# Patient Record
Sex: MALE | Race: WHITE | NOT HISPANIC OR LATINO | Employment: FULL TIME | URBAN - METROPOLITAN AREA
[De-identification: names, ages, dates, MRNs, and addresses within clinical notes are randomized per-mention and may not be internally consistent; named-entity substitution may affect disease eponyms.]

---

## 2017-01-11 RX ORDER — LEVOCETIRIZINE DIHYDROCHLORIDE 5 MG/1
5 TABLET, FILM COATED ORAL EVERY EVENING
Qty: 90 TABLET | Refills: 3 | Status: SHIPPED | OUTPATIENT
Start: 2017-01-11 | End: 2017-06-16 | Stop reason: SDUPTHER

## 2017-02-23 RX ORDER — MONTELUKAST SODIUM 10 MG/1
10 TABLET ORAL NIGHTLY
Qty: 30 TABLET | Refills: 3 | Status: SHIPPED | OUTPATIENT
Start: 2017-02-23 | End: 2022-10-31 | Stop reason: SDUPTHER

## 2017-03-10 DIAGNOSIS — Z79.899 ENCOUNTER FOR LONG-TERM (CURRENT) USE OF MEDICATIONS: Primary | ICD-10-CM

## 2017-03-10 DIAGNOSIS — Z13.1 SCREENING FOR DIABETES MELLITUS: ICD-10-CM

## 2017-03-11 LAB
ALBUMIN SERPL-MCNC: 4.3 G/DL (ref 3.5–5.2)
ALBUMIN/GLOB SERPL: 1.8 G/DL
ALP SERPL-CCNC: 71 U/L (ref 39–117)
ALT SERPL-CCNC: 16 U/L (ref 1–41)
AST SERPL-CCNC: 12 U/L (ref 1–40)
BILIRUB SERPL-MCNC: 0.4 MG/DL (ref 0.1–1.2)
BUN SERPL-MCNC: 16 MG/DL (ref 8–23)
BUN/CREAT SERPL: 13.7 (ref 7–25)
CALCIUM SERPL-MCNC: 9.3 MG/DL (ref 8.6–10.5)
CHLORIDE SERPL-SCNC: 98 MMOL/L (ref 98–107)
CO2 SERPL-SCNC: 26.1 MMOL/L (ref 22–29)
CREAT SERPL-MCNC: 1.17 MG/DL (ref 0.76–1.27)
GLOBULIN SER CALC-MCNC: 2.4 GM/DL
GLUCOSE SERPL-MCNC: 158 MG/DL (ref 65–99)
HBA1C MFR BLD: 6.45 % (ref 4.8–5.6)
POTASSIUM SERPL-SCNC: 4.1 MMOL/L (ref 3.5–5.2)
PROT SERPL-MCNC: 6.7 G/DL (ref 6–8.5)
SODIUM SERPL-SCNC: 137 MMOL/L (ref 136–145)

## 2017-03-15 LAB
CHOLEST SERPL-MCNC: 130 MG/DL (ref 0–200)
HDLC SERPL-MCNC: 35 MG/DL (ref 40–60)
LDLC SERPL CALC-MCNC: 62 MG/DL (ref 0–100)
LDLC/HDLC SERPL: 1.76 {RATIO}
TRIGL SERPL-MCNC: 167 MG/DL (ref 0–150)
VLDLC SERPL CALC-MCNC: 33.4 MG/DL (ref 5–40)
WRITTEN AUTHORIZATION: NORMAL

## 2017-05-23 RX ORDER — ATORVASTATIN CALCIUM 10 MG/1
10 TABLET, FILM COATED ORAL DAILY
Qty: 90 TABLET | Refills: 3 | Status: SHIPPED | OUTPATIENT
Start: 2017-05-23 | End: 2019-08-22 | Stop reason: SDUPTHER

## 2017-06-16 RX ORDER — CEPHALEXIN 500 MG/1
500 CAPSULE ORAL 3 TIMES DAILY
Qty: 30 CAPSULE | Refills: 1 | Status: SHIPPED | OUTPATIENT
Start: 2017-06-16

## 2017-06-16 RX ORDER — LEVOCETIRIZINE DIHYDROCHLORIDE 5 MG/1
5 TABLET, FILM COATED ORAL EVERY EVENING
Qty: 90 TABLET | Refills: 3 | Status: SHIPPED | OUTPATIENT
Start: 2017-06-16

## 2017-08-28 RX ORDER — OMEPRAZOLE 40 MG/1
40 CAPSULE, DELAYED RELEASE ORAL DAILY
Qty: 90 CAPSULE | Refills: 1 | Status: SHIPPED | OUTPATIENT
Start: 2017-08-28 | End: 2022-06-13 | Stop reason: SDUPTHER

## 2017-09-12 DIAGNOSIS — Z79.899 ENCOUNTER FOR LONG-TERM (CURRENT) USE OF MEDICATIONS: Primary | ICD-10-CM

## 2017-09-12 DIAGNOSIS — Z79.899 ENCOUNTER FOR LONG-TERM (CURRENT) USE OF MEDICATIONS: ICD-10-CM

## 2017-09-12 DIAGNOSIS — Z13.9 SCREENING: ICD-10-CM

## 2017-09-12 DIAGNOSIS — N40.0 BENIGN PROSTATIC HYPERPLASIA, PRESENCE OF LOWER URINARY TRACT SYMPTOMS UNSPECIFIED, UNSPECIFIED MORPHOLOGY: Primary | ICD-10-CM

## 2017-09-12 DIAGNOSIS — Z00.00 PREVENTATIVE HEALTH CARE: ICD-10-CM

## 2017-09-13 LAB
Lab: NORMAL
Lab: NORMAL

## 2017-09-14 LAB
ALBUMIN SERPL-MCNC: 4.5 G/DL (ref 3.5–5.2)
ALBUMIN/GLOB SERPL: 2 G/DL
ALP SERPL-CCNC: 72 U/L (ref 39–117)
ALT SERPL-CCNC: 23 U/L (ref 1–41)
AST SERPL-CCNC: 17 U/L (ref 1–40)
BASOPHILS # BLD AUTO: 0.05 10*3/MM3 (ref 0–0.2)
BASOPHILS NFR BLD AUTO: 0.6 % (ref 0–1.5)
BILIRUB SERPL-MCNC: 0.5 MG/DL (ref 0.1–1.2)
BUN SERPL-MCNC: 14 MG/DL (ref 8–23)
BUN/CREAT SERPL: 13.2 (ref 7–25)
CALCIUM SERPL-MCNC: 8.9 MG/DL (ref 8.6–10.5)
CHLORIDE SERPL-SCNC: 102 MMOL/L (ref 98–107)
CHOLEST SERPL-MCNC: 118 MG/DL (ref 0–200)
CO2 SERPL-SCNC: 25 MMOL/L (ref 22–29)
CREAT SERPL-MCNC: 1.06 MG/DL (ref 0.76–1.27)
EOSINOPHIL # BLD AUTO: 0.28 10*3/MM3 (ref 0–0.7)
EOSINOPHIL NFR BLD AUTO: 3.6 % (ref 0.3–6.2)
ERYTHROCYTE [DISTWIDTH] IN BLOOD BY AUTOMATED COUNT: 13.8 % (ref 11.5–14.5)
FT4I SERPL CALC-MCNC: 1.9 (ref 1.2–4.9)
GLOBULIN SER CALC-MCNC: 2.2 GM/DL
GLUCOSE SERPL-MCNC: 146 MG/DL (ref 65–99)
HBA1C MFR BLD: 7.6 % (ref 4.8–5.6)
HCT VFR BLD AUTO: 40.5 % (ref 40.4–52.2)
HDLC SERPL-MCNC: 35 MG/DL (ref 40–60)
HGB BLD-MCNC: 13.5 G/DL (ref 13.7–17.6)
IMM GRANULOCYTES # BLD: 0.02 10*3/MM3 (ref 0–0.03)
IMM GRANULOCYTES NFR BLD: 0.3 % (ref 0–0.5)
LDLC SERPL CALC-MCNC: 46 MG/DL (ref 0–100)
LDLC/HDLC SERPL: 1.32 {RATIO}
LYMPHOCYTES # BLD AUTO: 2.08 10*3/MM3 (ref 0.9–4.8)
LYMPHOCYTES NFR BLD AUTO: 26.8 % (ref 19.6–45.3)
MCH RBC QN AUTO: 30.1 PG (ref 27–32.7)
MCHC RBC AUTO-ENTMCNC: 33.3 G/DL (ref 32.6–36.4)
MCV RBC AUTO: 90.2 FL (ref 79.8–96.2)
MONOCYTES # BLD AUTO: 0.55 10*3/MM3 (ref 0.2–1.2)
MONOCYTES NFR BLD AUTO: 7.1 % (ref 5–12)
NEUTROPHILS # BLD AUTO: 4.79 10*3/MM3 (ref 1.9–8.1)
NEUTROPHILS NFR BLD AUTO: 61.6 % (ref 42.7–76)
PLATELET # BLD AUTO: 243 10*3/MM3 (ref 140–500)
POTASSIUM SERPL-SCNC: 4.1 MMOL/L (ref 3.5–5.2)
PROT SERPL-MCNC: 6.7 G/DL (ref 6–8.5)
PSA SERPL-MCNC: 0.82 NG/ML (ref 0–4)
R RICKETTSI IGG SER QL IA: POSITIVE
R RICKETTSI IGG TITR SER IF: ABNORMAL {TITER}
R RICKETTSI IGM TITR SER: 0.38 INDEX (ref 0–0.89)
RBC # BLD AUTO: 4.49 10*6/MM3 (ref 4.6–6)
SODIUM SERPL-SCNC: 141 MMOL/L (ref 136–145)
T3RU NFR SERPL: 24 % (ref 24–39)
T4 SERPL-MCNC: 8 UG/DL (ref 4.5–12)
TRIGL SERPL-MCNC: 184 MG/DL (ref 0–150)
TSH SERPL DL<=0.005 MIU/L-ACNC: 1.35 UIU/ML (ref 0.45–4.5)
VLDLC SERPL CALC-MCNC: 36.8 MG/DL (ref 5–40)
WBC # BLD AUTO: 7.77 10*3/MM3 (ref 4.5–10.7)
WRITTEN AUTHORIZATION: NORMAL

## 2017-09-18 RX ORDER — DOXYCYCLINE 100 MG/1
100 CAPSULE ORAL 2 TIMES DAILY
Qty: 20 CAPSULE | Refills: 0 | Status: SHIPPED | OUTPATIENT
Start: 2017-09-18 | End: 2017-09-28

## 2017-09-19 LAB
B BURGDOR IGM SER IA-ACNC: <0.8 INDEX (ref 0–0.79)
WRITTEN AUTHORIZATION: NORMAL

## 2017-12-06 DIAGNOSIS — K64.9 HEMORRHOIDS, UNSPECIFIED HEMORRHOID TYPE: Primary | ICD-10-CM

## 2017-12-13 DIAGNOSIS — E11.9 TYPE 2 DIABETES MELLITUS WITHOUT COMPLICATION, WITHOUT LONG-TERM CURRENT USE OF INSULIN (HCC): ICD-10-CM

## 2017-12-13 DIAGNOSIS — E78.5 HYPERLIPIDEMIA, UNSPECIFIED HYPERLIPIDEMIA TYPE: ICD-10-CM

## 2017-12-13 DIAGNOSIS — E78.5 HYPERLIPIDEMIA, UNSPECIFIED HYPERLIPIDEMIA TYPE: Primary | ICD-10-CM

## 2017-12-14 LAB
ALBUMIN SERPL-MCNC: 4.1 G/DL (ref 3.5–5.2)
ALBUMIN/GLOB SERPL: 1.4 G/DL
ALP SERPL-CCNC: 77 U/L (ref 39–117)
ALT SERPL-CCNC: 22 U/L (ref 1–41)
AST SERPL-CCNC: 14 U/L (ref 1–40)
BILIRUB SERPL-MCNC: 0.4 MG/DL (ref 0.1–1.2)
BUN SERPL-MCNC: 17 MG/DL (ref 8–23)
BUN/CREAT SERPL: 13.5 (ref 7–25)
CALCIUM SERPL-MCNC: 9 MG/DL (ref 8.6–10.5)
CHLORIDE SERPL-SCNC: 103 MMOL/L (ref 98–107)
CHOLEST SERPL-MCNC: 122 MG/DL (ref 0–200)
CO2 SERPL-SCNC: 25 MMOL/L (ref 22–29)
CREAT SERPL-MCNC: 1.26 MG/DL (ref 0.76–1.27)
GFR SERPLBLD CREATININE-BSD FMLA CKD-EPI: 57 ML/MIN/1.73
GFR SERPLBLD CREATININE-BSD FMLA CKD-EPI: 69 ML/MIN/1.73
GLOBULIN SER CALC-MCNC: 2.9 GM/DL
GLUCOSE SERPL-MCNC: 126 MG/DL (ref 65–99)
HBA1C MFR BLD: 7.17 % (ref 4.8–5.6)
HDLC SERPL-MCNC: 34 MG/DL (ref 40–60)
LDLC SERPL CALC-MCNC: 44 MG/DL (ref 0–100)
LDLC/HDLC SERPL: 1.29 {RATIO}
POTASSIUM SERPL-SCNC: 4.6 MMOL/L (ref 3.5–5.2)
PROT SERPL-MCNC: 7 G/DL (ref 6–8.5)
SODIUM SERPL-SCNC: 141 MMOL/L (ref 136–145)
TRIGL SERPL-MCNC: 220 MG/DL (ref 0–150)
VLDLC SERPL CALC-MCNC: 44 MG/DL (ref 5–40)

## 2017-12-20 ENCOUNTER — OFFICE VISIT (OUTPATIENT)
Dept: SURGERY | Facility: CLINIC | Age: 67
End: 2017-12-20

## 2017-12-20 VITALS
WEIGHT: 250.5 LBS | HEIGHT: 73 IN | SYSTOLIC BLOOD PRESSURE: 140 MMHG | BODY MASS INDEX: 33.2 KG/M2 | HEART RATE: 92 BPM | DIASTOLIC BLOOD PRESSURE: 78 MMHG | OXYGEN SATURATION: 99 %

## 2017-12-20 DIAGNOSIS — K64.4 ANAL SKIN TAG: Primary | ICD-10-CM

## 2017-12-20 PROCEDURE — 99203 OFFICE O/P NEW LOW 30 MIN: CPT | Performed by: COLON & RECTAL SURGERY

## 2017-12-20 PROCEDURE — 46230 REMOVAL OF ANAL TAGS: CPT | Performed by: COLON & RECTAL SURGERY

## 2017-12-20 RX ORDER — LIDOCAINE 50 MG/G
OINTMENT TOPICAL EVERY 4 HOURS PRN
Qty: 1 TUBE | Refills: 5 | Status: SHIPPED | OUTPATIENT
Start: 2017-12-20 | End: 2018-01-19

## 2017-12-20 RX ORDER — HYDROCODONE BITARTRATE AND ACETAMINOPHEN 5; 325 MG/1; MG/1
TABLET ORAL
Qty: 10 TABLET | Refills: 0 | Status: SHIPPED | OUTPATIENT
Start: 2017-12-20 | End: 2022-09-07

## 2017-12-20 RX ORDER — SPIRONOLACT/HYDROCHLOROTHIAZID 25 MG-25MG
TABLET ORAL
COMMUNITY

## 2017-12-20 NOTE — PROGRESS NOTES
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      HPI:  C/o  fecal soiling.  Going on for years.  No pain  hc cream prn    citrucel 3 t daily  miralax qod  Denies abd pain.  +anal discomfort but not pain    Tag interferes with cleaning.     No rb  Cy 6 mons ago - ok    Past Medical History:   Diagnosis Date   • Allergic rhinitis    • Arthritis    • BPH (benign prostatic hyperplasia)    • Diabetes mellitus     TYPE 2   • Foraminal stenosis of cervical region     C5-C6   • GERD (gastroesophageal reflux disease)    • Hemorrhoids    • History of urinary retention 2010    S/P TURP   • Hyperlipidemia    • Macular degeneration    • PING (obstructive sleep apnea) 01/07/2016    MILD, SEES DR. AMARILIS MORGAN       Past Surgical History:   Procedure Laterality Date   • COLONOSCOPY N/A     WNL PER PT, NO RECORDS,    • COLONOSCOPY N/A 04/07/2009    DR. GORGE BENAVIDEZ AT [place]   • PROSTATE SURGERY N/A 11/12/2010    TURP, DR. BRIGHT COLLAZO AT [facility]   • SKIN TAG REMOVAL N/A 12/20/2017    ANAL SKIN TAG X2, PERFORMED IN OFFICE, DR. LISA ORANTES   • TURP / TRANSURETHRAL INCISION / DRAINAGE PROSTATE  2010    Dr. Goodrich       Social History:   reports that he has never smoked. He has never used smokeless tobacco. He reports that he drinks alcohol. He reports that he does not use illicit drugs.      Marriage status:     Family History   Problem Relation Age of Onset   • Lung cancer Mother    • Stroke Father    • Dementia Father    • Colon polyps Brother    • Colon polyps Brother          Current Outpatient Prescriptions:   •  aspirin 81 MG tablet, Take 81 mg by mouth Daily., Disp: , Rfl:   •  Coenzyme Q10 (CO Q-10) 300 MG capsule, Take  by mouth., Disp: , Rfl:   •  atorvastatin (LIPITOR) 10 MG tablet, Take 1 tablet by mouth Daily., Disp: 90 tablet, Rfl: 3  •  cephalexin (KEFLEX) 500 MG capsule, Take 1 capsule by mouth 3 (Three) Times a Day., Disp: 30 capsule, Rfl: 1  •  HYDROcodone-acetaminophen  (NORCO) 5-325 MG per tablet, 1-2 tabs po q 6 hrs prn pain, Disp: 10 tablet, Rfl: 0  •  levocetirizine (XYZAL) 5 MG tablet, Take 1 tablet by mouth Every Evening., Disp: 90 tablet, Rfl: 3  •  lidocaine (XYLOCAINE) 5 % ointment, Apply  topically Every 4 (Four) Hours As Needed for Mild Pain  or Moderate Pain  for up to 30 days., Disp: 1 tube, Rfl: 5  •  montelukast (SINGULAIR) 10 MG tablet, Take 1 tablet by mouth Every Night., Disp: 30 tablet, Rfl: 3  •  omeprazole (priLOSEC) 40 MG capsule, Take 1 capsule by mouth Daily., Disp: 90 capsule, Rfl: 1  Metformin  gamumet bid  Allergy  Review of patient's allergies indicates no known allergies.    Review of Systems   Constitution: Negative.   HENT: Negative.    Eyes: Negative.    Cardiovascular: Negative.    Respiratory: Negative.    Endocrine: Negative.    Hematologic/Lymphatic: Negative.    Skin: Negative.    Musculoskeletal: Negative.    Gastrointestinal: Negative.    Genitourinary: Negative.    Neurological: Negative.    Psychiatric/Behavioral: Negative.    Allergic/Immunologic: Negative.    All other systems reviewed and are negative.      Vitals:    12/20/17 1323   BP: 140/78   Pulse: 92   SpO2: 99%     Body mass index is 33.05 kg/(m^2).    Physical Exam   Constitutional: He is oriented to person, place, and time. He appears well-developed and well-nourished. No distress.   HENT:   Head: Normocephalic and atraumatic.   Nose: Nose normal.   Mouth/Throat: Oropharynx is clear and moist.   Eyes: Conjunctivae and EOM are normal. Pupils are equal, round, and reactive to light.   Neck: Normal range of motion. No tracheal deviation present.   Pulmonary/Chest: Effort normal and breath sounds normal. No respiratory distress.   Abdominal: Soft. Bowel sounds are normal. He exhibits no distension.   Genitourinary:   Genitourinary Comments: Perianal exam: external hem - r lat tag and r a tag  JOSE- good tone, no masses       Musculoskeletal: Normal range of motion. He exhibits no  edema or deformity.   Neurological: He is alert and oriented to person, place, and time. No cranial nerve deficit. Coordination and gait normal.   Skin: Skin is warm and dry.   Psychiatric: He has a normal mood and affect. His behavior is normal. Judgment normal.       Assessment:  1. Anal skin tag        Plan:    Excision of tags.    I described with patient typical surgical time, postop recovery including pain management, and restrictions. I discussed with patient risks, benefits, and alternatives.  The patient had opportunity to ask questions.  I answered all questions.  Patient understands and wishes to proceed with procedure.      Procedure: excision of anal tags x 2    Patient gave informed consent verbally.  Patient was prepped with Betadine.  1% lidocaine with epinephrine and 0.25 percent Marcaine plain was used as a local infiltration.  The tags are right lateral and right anterior.  I made an elliptical incision around the tags to excise them.  I used silver nitrate for hemostasis.  I closed the incisions with 3.0 vicryl in a running fashion.      Patient tolerated well.    Patient was given Rx for pain medication, after care information sheet with warning signs, and follow up in 7-10 days.

## 2018-04-17 DIAGNOSIS — E11.9 CONTROLLED TYPE 2 DIABETES MELLITUS WITHOUT COMPLICATION, WITHOUT LONG-TERM CURRENT USE OF INSULIN (HCC): Primary | ICD-10-CM

## 2018-04-17 DIAGNOSIS — E78.2 MIXED HYPERLIPIDEMIA: ICD-10-CM

## 2018-04-27 LAB
ALBUMIN SERPL-MCNC: 4.5 G/DL (ref 3.5–5.2)
ALBUMIN/GLOB SERPL: 1.8 G/DL
ALP SERPL-CCNC: 72 U/L (ref 39–117)
ALT SERPL-CCNC: 13 U/L (ref 1–41)
AST SERPL-CCNC: 15 U/L (ref 1–40)
BASOPHILS # BLD AUTO: 0.03 10*3/MM3 (ref 0–0.2)
BASOPHILS NFR BLD AUTO: 0.3 % (ref 0–1.5)
BILIRUB SERPL-MCNC: 0.4 MG/DL (ref 0.1–1.2)
BUN SERPL-MCNC: 16 MG/DL (ref 8–23)
BUN/CREAT SERPL: 14.5 (ref 7–25)
CALCIUM SERPL-MCNC: 9.8 MG/DL (ref 8.6–10.5)
CHLORIDE SERPL-SCNC: 105 MMOL/L (ref 98–107)
CHOLEST SERPL-MCNC: 115 MG/DL (ref 0–200)
CHOLEST/HDLC SERPL: 3.11 {RATIO}
CO2 SERPL-SCNC: 25.2 MMOL/L (ref 22–29)
CREAT SERPL-MCNC: 1.1 MG/DL (ref 0.76–1.27)
EOSINOPHIL # BLD AUTO: 0.52 10*3/MM3 (ref 0–0.7)
EOSINOPHIL NFR BLD AUTO: 5.7 % (ref 0.3–6.2)
ERYTHROCYTE [DISTWIDTH] IN BLOOD BY AUTOMATED COUNT: 13.9 % (ref 11.5–14.5)
GFR SERPLBLD CREATININE-BSD FMLA CKD-EPI: 66 ML/MIN/1.73
GFR SERPLBLD CREATININE-BSD FMLA CKD-EPI: 80 ML/MIN/1.73
GLOBULIN SER CALC-MCNC: 2.5 GM/DL
GLUCOSE SERPL-MCNC: 113 MG/DL (ref 65–99)
HCT VFR BLD AUTO: 45.5 % (ref 40.4–52.2)
HDLC SERPL-MCNC: 37 MG/DL (ref 40–60)
HGB BLD-MCNC: 14.8 G/DL (ref 13.7–17.6)
IMM GRANULOCYTES # BLD: 0.03 10*3/MM3 (ref 0–0.03)
IMM GRANULOCYTES NFR BLD: 0.3 % (ref 0–0.5)
LDLC SERPL CALC-MCNC: 53 MG/DL (ref 0–100)
LYMPHOCYTES # BLD AUTO: 2.07 10*3/MM3 (ref 0.9–4.8)
LYMPHOCYTES NFR BLD AUTO: 22.7 % (ref 19.6–45.3)
MCH RBC QN AUTO: 30.3 PG (ref 27–32.7)
MCHC RBC AUTO-ENTMCNC: 32.5 G/DL (ref 32.6–36.4)
MCV RBC AUTO: 93 FL (ref 79.8–96.2)
MONOCYTES # BLD AUTO: 0.61 10*3/MM3 (ref 0.2–1.2)
MONOCYTES NFR BLD AUTO: 6.7 % (ref 5–12)
NEUTROPHILS # BLD AUTO: 5.89 10*3/MM3 (ref 1.9–8.1)
NEUTROPHILS NFR BLD AUTO: 64.6 % (ref 42.7–76)
PLATELET # BLD AUTO: 274 10*3/MM3 (ref 140–500)
POTASSIUM SERPL-SCNC: 4.6 MMOL/L (ref 3.5–5.2)
PROT SERPL-MCNC: 7 G/DL (ref 6–8.5)
RBC # BLD AUTO: 4.89 10*6/MM3 (ref 4.6–6)
SODIUM SERPL-SCNC: 144 MMOL/L (ref 136–145)
TRIGL SERPL-MCNC: 125 MG/DL (ref 0–150)
VLDLC SERPL CALC-MCNC: 25 MG/DL (ref 5–40)
WBC # BLD AUTO: 9.12 10*3/MM3 (ref 4.5–10.7)

## 2018-05-15 LAB
Lab: NORMAL
Lab: NORMAL
VERBAL ADD-ON: NORMAL

## 2018-05-23 LAB
HBA1C MFR BLD: 6.8 % (ref 4.8–5.6)
WRITTEN AUTHORIZATION: NORMAL

## 2018-09-25 DIAGNOSIS — E11.9 CONTROLLED TYPE 2 DIABETES MELLITUS WITHOUT COMPLICATION, WITHOUT LONG-TERM CURRENT USE OF INSULIN (HCC): Primary | ICD-10-CM

## 2018-09-26 LAB
ALBUMIN SERPL-MCNC: 4.4 G/DL (ref 3.5–5.2)
ALBUMIN/GLOB SERPL: 1.8 G/DL
ALP SERPL-CCNC: 74 U/L (ref 39–117)
ALT SERPL-CCNC: 18 U/L (ref 1–41)
AST SERPL-CCNC: 12 U/L (ref 1–40)
BILIRUB SERPL-MCNC: 0.4 MG/DL (ref 0.1–1.2)
BUN SERPL-MCNC: 15 MG/DL (ref 8–23)
BUN/CREAT SERPL: 13.3 (ref 7–25)
CALCIUM SERPL-MCNC: 9.3 MG/DL (ref 8.6–10.5)
CHLORIDE SERPL-SCNC: 104 MMOL/L (ref 98–107)
CO2 SERPL-SCNC: 26.6 MMOL/L (ref 22–29)
CREAT SERPL-MCNC: 1.13 MG/DL (ref 0.76–1.27)
GLOBULIN SER CALC-MCNC: 2.5 GM/DL
GLUCOSE SERPL-MCNC: 185 MG/DL (ref 65–99)
HBA1C MFR BLD: 6.7 % (ref 4.8–5.6)
POTASSIUM SERPL-SCNC: 4.3 MMOL/L (ref 3.5–5.2)
PROT SERPL-MCNC: 6.9 G/DL (ref 6–8.5)
SODIUM SERPL-SCNC: 143 MMOL/L (ref 136–145)

## 2019-05-09 DIAGNOSIS — N40.0 BENIGN PROSTATIC HYPERPLASIA WITHOUT LOWER URINARY TRACT SYMPTOMS: ICD-10-CM

## 2019-05-09 DIAGNOSIS — E78.2 MIXED HYPERLIPIDEMIA: ICD-10-CM

## 2019-05-09 DIAGNOSIS — E11.9 CONTROLLED TYPE 2 DIABETES MELLITUS WITHOUT COMPLICATION, WITHOUT LONG-TERM CURRENT USE OF INSULIN (HCC): Primary | ICD-10-CM

## 2019-05-16 LAB
ALBUMIN SERPL-MCNC: 4 G/DL (ref 3.5–5.2)
ALBUMIN/GLOB SERPL: 1.4 G/DL
ALP SERPL-CCNC: 99 U/L (ref 39–117)
ALT SERPL-CCNC: 13 U/L (ref 1–41)
AST SERPL-CCNC: 9 U/L (ref 1–40)
BASOPHILS # BLD AUTO: 0.04 10*3/MM3 (ref 0–0.2)
BASOPHILS NFR BLD AUTO: 0.5 % (ref 0–1.5)
BILIRUB SERPL-MCNC: 0.4 MG/DL (ref 0.2–1.2)
BUN SERPL-MCNC: 16 MG/DL (ref 8–23)
BUN/CREAT SERPL: 13.7 (ref 7–25)
CALCIUM SERPL-MCNC: 9.5 MG/DL (ref 8.6–10.5)
CHLORIDE SERPL-SCNC: 104 MMOL/L (ref 98–107)
CHOLEST SERPL-MCNC: 105 MG/DL (ref 0–200)
CHOLEST/HDLC SERPL: 3.09 {RATIO}
CO2 SERPL-SCNC: 22.5 MMOL/L (ref 22–29)
CREAT SERPL-MCNC: 1.17 MG/DL (ref 0.76–1.27)
EOSINOPHIL # BLD AUTO: 0.54 10*3/MM3 (ref 0–0.4)
EOSINOPHIL NFR BLD AUTO: 6.9 % (ref 0.3–6.2)
ERYTHROCYTE [DISTWIDTH] IN BLOOD BY AUTOMATED COUNT: 13.7 % (ref 12.3–15.4)
GLOBULIN SER CALC-MCNC: 2.9 GM/DL
GLUCOSE SERPL-MCNC: 124 MG/DL (ref 65–99)
HBA1C MFR BLD: 7.5 % (ref 4.8–5.6)
HCT VFR BLD AUTO: 44.1 % (ref 37.5–51)
HDLC SERPL-MCNC: 34 MG/DL (ref 40–60)
HGB BLD-MCNC: 14 G/DL (ref 13–17.7)
IMM GRANULOCYTES # BLD AUTO: 0.04 10*3/MM3 (ref 0–0.05)
IMM GRANULOCYTES NFR BLD AUTO: 0.5 % (ref 0–0.5)
LDLC SERPL CALC-MCNC: 47 MG/DL (ref 0–100)
LYMPHOCYTES # BLD AUTO: 1.32 10*3/MM3 (ref 0.7–3.1)
LYMPHOCYTES NFR BLD AUTO: 17 % (ref 19.6–45.3)
MCH RBC QN AUTO: 28.7 PG (ref 26.6–33)
MCHC RBC AUTO-ENTMCNC: 31.7 G/DL (ref 31.5–35.7)
MCV RBC AUTO: 90.4 FL (ref 79–97)
MONOCYTES # BLD AUTO: 0.77 10*3/MM3 (ref 0.1–0.9)
MONOCYTES NFR BLD AUTO: 9.9 % (ref 5–12)
NEUTROPHILS # BLD AUTO: 5.06 10*3/MM3 (ref 1.7–7)
NEUTROPHILS NFR BLD AUTO: 65.2 % (ref 42.7–76)
NRBC BLD AUTO-RTO: 0 /100 WBC (ref 0–0.2)
PLATELET # BLD AUTO: 297 10*3/MM3 (ref 140–450)
POTASSIUM SERPL-SCNC: 4.3 MMOL/L (ref 3.5–5.2)
PROT SERPL-MCNC: 6.9 G/DL (ref 6–8.5)
PSA FREE MFR SERPL: 9.2 %
PSA FREE SERPL-MCNC: 0.22 NG/ML
PSA SERPL-MCNC: 2.4 NG/ML (ref 0–4)
RBC # BLD AUTO: 4.88 10*6/MM3 (ref 4.14–5.8)
SODIUM SERPL-SCNC: 141 MMOL/L (ref 136–145)
TRIGL SERPL-MCNC: 122 MG/DL (ref 0–150)
VLDLC SERPL CALC-MCNC: 24.4 MG/DL
WBC # BLD AUTO: 7.77 10*3/MM3 (ref 3.4–10.8)

## 2019-07-02 ENCOUNTER — CLINICAL SUPPORT (OUTPATIENT)
Dept: INTERNAL MEDICINE | Facility: CLINIC | Age: 69
End: 2019-07-02

## 2019-07-02 PROCEDURE — 90471 IMMUNIZATION ADMIN: CPT | Performed by: FAMILY MEDICINE

## 2019-07-02 PROCEDURE — 90715 TDAP VACCINE 7 YRS/> IM: CPT | Performed by: FAMILY MEDICINE

## 2019-08-23 RX ORDER — ATORVASTATIN CALCIUM 10 MG/1
TABLET, FILM COATED ORAL
Qty: 90 TABLET | Refills: 0 | Status: SHIPPED | OUTPATIENT
Start: 2019-08-23

## 2019-10-17 DIAGNOSIS — E11.9 CONTROLLED TYPE 2 DIABETES MELLITUS WITHOUT COMPLICATION, WITHOUT LONG-TERM CURRENT USE OF INSULIN (HCC): Primary | ICD-10-CM

## 2019-10-18 LAB
ALBUMIN SERPL-MCNC: 4.6 G/DL (ref 3.5–5.2)
ALBUMIN/GLOB SERPL: 1.9 G/DL
ALP SERPL-CCNC: 81 U/L (ref 39–117)
ALT SERPL-CCNC: 15 U/L (ref 1–41)
AST SERPL-CCNC: 13 U/L (ref 1–40)
BILIRUB SERPL-MCNC: 0.4 MG/DL (ref 0.2–1.2)
BUN SERPL-MCNC: 19 MG/DL (ref 8–23)
BUN/CREAT SERPL: 16.1 (ref 7–25)
CALCIUM SERPL-MCNC: 9.2 MG/DL (ref 8.6–10.5)
CHLORIDE SERPL-SCNC: 103 MMOL/L (ref 98–107)
CO2 SERPL-SCNC: 23.8 MMOL/L (ref 22–29)
CREAT SERPL-MCNC: 1.18 MG/DL (ref 0.76–1.27)
GLOBULIN SER CALC-MCNC: 2.4 GM/DL
GLUCOSE SERPL-MCNC: 147 MG/DL (ref 65–99)
HBA1C MFR BLD: 7.2 % (ref 4.8–5.6)
POTASSIUM SERPL-SCNC: 4.4 MMOL/L (ref 3.5–5.2)
PROT SERPL-MCNC: 7 G/DL (ref 6–8.5)
SODIUM SERPL-SCNC: 141 MMOL/L (ref 136–145)

## 2019-12-19 DIAGNOSIS — R07.9 CHEST PAIN, UNSPECIFIED TYPE: Primary | ICD-10-CM

## 2019-12-19 PROCEDURE — 93000 ELECTROCARDIOGRAM COMPLETE: CPT | Performed by: FAMILY MEDICINE

## 2020-07-21 DIAGNOSIS — Z20.822 CLOSE EXPOSURE TO COVID-19 VIRUS: Primary | ICD-10-CM

## 2020-11-18 DIAGNOSIS — Z12.5 SPECIAL SCREENING FOR MALIGNANT NEOPLASM OF PROSTATE: ICD-10-CM

## 2020-11-18 DIAGNOSIS — N40.0 BENIGN PROSTATIC HYPERPLASIA WITHOUT LOWER URINARY TRACT SYMPTOMS: ICD-10-CM

## 2020-11-18 DIAGNOSIS — E78.2 MIXED HYPERLIPIDEMIA: ICD-10-CM

## 2020-11-18 DIAGNOSIS — E11.9 CONTROLLED TYPE 2 DIABETES MELLITUS WITHOUT COMPLICATION, WITHOUT LONG-TERM CURRENT USE OF INSULIN (HCC): Primary | ICD-10-CM

## 2020-11-20 ENCOUNTER — RESULTS ENCOUNTER (OUTPATIENT)
Dept: INTERNAL MEDICINE | Facility: CLINIC | Age: 70
End: 2020-11-20

## 2020-11-20 DIAGNOSIS — N40.0 BENIGN PROSTATIC HYPERPLASIA WITHOUT LOWER URINARY TRACT SYMPTOMS: ICD-10-CM

## 2020-11-20 DIAGNOSIS — E78.2 MIXED HYPERLIPIDEMIA: ICD-10-CM

## 2020-11-20 DIAGNOSIS — E11.9 CONTROLLED TYPE 2 DIABETES MELLITUS WITHOUT COMPLICATION, WITHOUT LONG-TERM CURRENT USE OF INSULIN (HCC): ICD-10-CM

## 2020-11-20 DIAGNOSIS — Z12.5 SPECIAL SCREENING FOR MALIGNANT NEOPLASM OF PROSTATE: ICD-10-CM

## 2020-11-24 LAB
ALBUMIN SERPL-MCNC: 4.3 G/DL (ref 3.7–4.7)
ALBUMIN/GLOB SERPL: 1.6 {RATIO} (ref 1.2–2.2)
ALP SERPL-CCNC: 78 IU/L (ref 39–117)
ALT SERPL-CCNC: 16 IU/L (ref 0–44)
AST SERPL-CCNC: 12 IU/L (ref 0–40)
BASOPHILS # BLD AUTO: 0 X10E3/UL (ref 0–0.2)
BASOPHILS NFR BLD AUTO: 0 %
BILIRUB SERPL-MCNC: 0.4 MG/DL (ref 0–1.2)
BUN SERPL-MCNC: 18 MG/DL (ref 8–27)
BUN/CREAT SERPL: 14 (ref 10–24)
CALCIUM SERPL-MCNC: 9.2 MG/DL (ref 8.6–10.2)
CHLORIDE SERPL-SCNC: 105 MMOL/L (ref 96–106)
CHOLEST SERPL-MCNC: 125 MG/DL (ref 100–199)
CO2 SERPL-SCNC: 24 MMOL/L (ref 20–29)
CREAT SERPL-MCNC: 1.31 MG/DL (ref 0.76–1.27)
EOSINOPHIL # BLD AUTO: 0.3 X10E3/UL (ref 0–0.4)
EOSINOPHIL NFR BLD AUTO: 3 %
ERYTHROCYTE [DISTWIDTH] IN BLOOD BY AUTOMATED COUNT: 12.8 % (ref 11.6–15.4)
GLOBULIN SER CALC-MCNC: 2.7 G/DL (ref 1.5–4.5)
GLUCOSE SERPL-MCNC: 148 MG/DL (ref 65–99)
HBA1C MFR BLD: 7.1 % (ref 4.8–5.6)
HCT VFR BLD AUTO: 44 % (ref 37.5–51)
HDLC SERPL-MCNC: 36 MG/DL
HGB BLD-MCNC: 15.2 G/DL (ref 13–17.7)
IMM GRANULOCYTES # BLD AUTO: 0 X10E3/UL (ref 0–0.1)
IMM GRANULOCYTES NFR BLD AUTO: 0 %
LDLC SERPL CALC-MCNC: 62 MG/DL (ref 0–99)
LDLC/HDLC SERPL: 1.7 RATIO (ref 0–3.6)
LYMPHOCYTES # BLD AUTO: 1.4 X10E3/UL (ref 0.7–3.1)
LYMPHOCYTES NFR BLD AUTO: 11 %
MCH RBC QN AUTO: 30.4 PG (ref 26.6–33)
MCHC RBC AUTO-ENTMCNC: 34.5 G/DL (ref 31.5–35.7)
MCV RBC AUTO: 88 FL (ref 79–97)
MONOCYTES # BLD AUTO: 1 X10E3/UL (ref 0.1–0.9)
MONOCYTES NFR BLD AUTO: 7 %
NEUTROPHILS # BLD AUTO: 10.4 X10E3/UL (ref 1.4–7)
NEUTROPHILS NFR BLD AUTO: 79 %
PLATELET # BLD AUTO: 257 X10E3/UL (ref 150–450)
POTASSIUM SERPL-SCNC: 4.8 MMOL/L (ref 3.5–5.2)
PROT SERPL-MCNC: 7 G/DL (ref 6–8.5)
PSA SERPL-MCNC: 1 NG/ML (ref 0–4)
RBC # BLD AUTO: 5 X10E6/UL (ref 4.14–5.8)
SODIUM SERPL-SCNC: 142 MMOL/L (ref 134–144)
TRIGL SERPL-MCNC: 160 MG/DL (ref 0–149)
VLDLC SERPL CALC-MCNC: 27 MG/DL (ref 5–40)
WBC # BLD AUTO: 13.2 X10E3/UL (ref 3.4–10.8)

## 2021-03-09 DIAGNOSIS — Z23 IMMUNIZATION DUE: ICD-10-CM

## 2021-06-02 DIAGNOSIS — E11.9 CONTROLLED TYPE 2 DIABETES MELLITUS WITHOUT COMPLICATION, WITHOUT LONG-TERM CURRENT USE OF INSULIN (HCC): Primary | ICD-10-CM

## 2021-06-02 DIAGNOSIS — E78.2 MIXED HYPERLIPIDEMIA: ICD-10-CM

## 2021-09-20 DIAGNOSIS — E11.9 CONTROLLED TYPE 2 DIABETES MELLITUS WITHOUT COMPLICATION, WITHOUT LONG-TERM CURRENT USE OF INSULIN (HCC): Primary | ICD-10-CM

## 2021-09-20 DIAGNOSIS — R53.83 OTHER FATIGUE: ICD-10-CM

## 2021-12-06 DIAGNOSIS — E11.9 CONTROLLED TYPE 2 DIABETES MELLITUS WITHOUT COMPLICATION, WITHOUT LONG-TERM CURRENT USE OF INSULIN (HCC): Primary | ICD-10-CM

## 2022-04-21 ENCOUNTER — DOCUMENTATION (OUTPATIENT)
Dept: INTERNAL MEDICINE | Facility: CLINIC | Age: 72
End: 2022-04-21

## 2022-04-22 DIAGNOSIS — U07.1 COVID: Primary | ICD-10-CM

## 2022-04-22 NOTE — PROGRESS NOTES
Pt called.     Wife + Covid.    He tested positive today.  Sx onset today.  Body aches, sweats.  No SOA     He is vaccinated.     Pmhx: asthma, dm, obesity.    Age: 69     Discussed options.   Agrees paxlovid.    He will notify me if any issues or if symptoms worsen or does not improve.

## 2022-05-16 DIAGNOSIS — E11.9 CONTROLLED TYPE 2 DIABETES MELLITUS WITHOUT COMPLICATION, WITHOUT LONG-TERM CURRENT USE OF INSULIN: Primary | ICD-10-CM

## 2022-05-16 DIAGNOSIS — M79.605 PAIN IN BOTH LOWER EXTREMITIES: ICD-10-CM

## 2022-05-16 DIAGNOSIS — R53.83 OTHER FATIGUE: ICD-10-CM

## 2022-05-16 DIAGNOSIS — M79.604 PAIN IN BOTH LOWER EXTREMITIES: ICD-10-CM

## 2022-05-24 LAB
ALBUMIN SERPL-MCNC: 4.1 G/DL (ref 3.7–4.7)
ALBUMIN/GLOB SERPL: 1.7 {RATIO} (ref 1.2–2.2)
ALP SERPL-CCNC: 93 IU/L (ref 44–121)
ALT SERPL-CCNC: 14 IU/L (ref 0–44)
AST SERPL-CCNC: 11 IU/L (ref 0–40)
B BURGDOR IGG+IGM SER QL IA: NEGATIVE
BASOPHILS # BLD AUTO: 0 X10E3/UL (ref 0–0.2)
BASOPHILS NFR BLD AUTO: 1 %
BILIRUB SERPL-MCNC: 0.3 MG/DL (ref 0–1.2)
BUN SERPL-MCNC: 13 MG/DL (ref 8–27)
BUN/CREAT SERPL: 11 (ref 10–24)
CALCIUM SERPL-MCNC: 9.2 MG/DL (ref 8.6–10.2)
CHLORIDE SERPL-SCNC: 101 MMOL/L (ref 96–106)
CO2 SERPL-SCNC: 19 MMOL/L (ref 20–29)
CREAT SERPL-MCNC: 1.22 MG/DL (ref 0.76–1.27)
EGFRCR SERPLBLD CKD-EPI 2021: 63 ML/MIN/1.73
EOSINOPHIL # BLD AUTO: 0.4 X10E3/UL (ref 0–0.4)
EOSINOPHIL NFR BLD AUTO: 6 %
ERYTHROCYTE [DISTWIDTH] IN BLOOD BY AUTOMATED COUNT: 13 % (ref 11.6–15.4)
ERYTHROCYTE [SEDIMENTATION RATE] IN BLOOD BY WESTERGREN METHOD: 25 MM/HR (ref 0–30)
GLOBULIN SER CALC-MCNC: 2.4 G/DL (ref 1.5–4.5)
GLUCOSE SERPL-MCNC: 336 MG/DL (ref 65–99)
HBA1C MFR BLD: 9.2 % (ref 4.8–5.6)
HCT VFR BLD AUTO: 40.5 % (ref 37.5–51)
HGB BLD-MCNC: 14 G/DL (ref 13–17.7)
IMM GRANULOCYTES # BLD AUTO: 0.1 X10E3/UL (ref 0–0.1)
IMM GRANULOCYTES NFR BLD AUTO: 1 %
LYMPHOCYTES # BLD AUTO: 1.2 X10E3/UL (ref 0.7–3.1)
LYMPHOCYTES NFR BLD AUTO: 17 %
MCH RBC QN AUTO: 30.4 PG (ref 26.6–33)
MCHC RBC AUTO-ENTMCNC: 34.6 G/DL (ref 31.5–35.7)
MCV RBC AUTO: 88 FL (ref 79–97)
MONOCYTES # BLD AUTO: 0.7 X10E3/UL (ref 0.1–0.9)
MONOCYTES NFR BLD AUTO: 10 %
NEUTROPHILS # BLD AUTO: 4.3 X10E3/UL (ref 1.4–7)
NEUTROPHILS NFR BLD AUTO: 65 %
PLATELET # BLD AUTO: 256 X10E3/UL (ref 150–450)
POTASSIUM SERPL-SCNC: 4.7 MMOL/L (ref 3.5–5.2)
PROT SERPL-MCNC: 6.5 G/DL (ref 6–8.5)
RBC # BLD AUTO: 4.6 X10E6/UL (ref 4.14–5.8)
SODIUM SERPL-SCNC: 137 MMOL/L (ref 134–144)
WBC # BLD AUTO: 6.7 X10E3/UL (ref 3.4–10.8)

## 2022-06-02 PROBLEM — E11.9 DM (DIABETES MELLITUS), TYPE 2: Status: ACTIVE | Noted: 2022-06-02

## 2022-06-02 PROBLEM — E78.2 MIXED HYPERLIPIDEMIA: Status: ACTIVE | Noted: 2022-06-02

## 2022-06-13 RX ORDER — OMEPRAZOLE 40 MG/1
40 CAPSULE, DELAYED RELEASE ORAL DAILY
Qty: 90 CAPSULE | Refills: 4 | Status: SHIPPED | OUTPATIENT
Start: 2022-06-13

## 2022-08-30 DIAGNOSIS — J45.909 MODERATE ASTHMA, UNSPECIFIED WHETHER COMPLICATED, UNSPECIFIED WHETHER PERSISTENT: Primary | ICD-10-CM

## 2022-08-30 PROBLEM — J45.20 MILD INTERMITTENT ASTHMA WITHOUT COMPLICATION: Status: ACTIVE | Noted: 2022-08-30

## 2022-08-31 ENCOUNTER — TELEPHONE (OUTPATIENT)
Dept: INTERNAL MEDICINE | Facility: CLINIC | Age: 72
End: 2022-08-31

## 2022-08-31 NOTE — TELEPHONE ENCOUNTER
ATTENTION ZAHIDA    Caller: ABILIO    Relationship to patient: Other    Best call back number: [phone]    Patient is needing: ABILIO FROM [facility] IS CALLING TO SPEAK WITH ZAHIDA REGARDING A REFERRAL.    SHE WOULD LIKE A RETURN CALL FROM ZAHIDA.  UNABLE TO WARM TRANSFER.    PLEASE ADVISE.

## 2022-09-08 ENCOUNTER — TRANSCRIBE ORDERS (OUTPATIENT)
Dept: ADMINISTRATIVE | Facility: HOSPITAL | Age: 72
End: 2022-09-08

## 2022-09-08 DIAGNOSIS — J84.9 ILD (INTERSTITIAL LUNG DISEASE): Primary | ICD-10-CM

## 2022-09-08 DIAGNOSIS — U09.9 POST-COVID-19 CONDITION: ICD-10-CM

## 2022-09-22 ENCOUNTER — HOSPITAL ENCOUNTER (OUTPATIENT)
Dept: CT IMAGING | Facility: HOSPITAL | Age: 72
Discharge: HOME | End: 2022-09-22
Admitting: INTERNAL MEDICINE

## 2022-09-22 DIAGNOSIS — U09.9 POST-COVID-19 CONDITION: ICD-10-CM

## 2022-09-22 DIAGNOSIS — J84.9 ILD (INTERSTITIAL LUNG DISEASE): ICD-10-CM

## 2022-09-22 PROCEDURE — 71250 CT THORAX DX C-: CPT

## 2022-10-11 ENCOUNTER — TRANSCRIBE ORDERS (OUTPATIENT)
Dept: ADMINISTRATIVE | Facility: HOSPITAL | Age: 72
End: 2022-10-11

## 2022-10-11 DIAGNOSIS — R91.8 PULMONARY NODULES: Primary | ICD-10-CM

## 2022-10-31 DIAGNOSIS — J45.20 MILD INTERMITTENT ASTHMA WITHOUT COMPLICATION: Primary | ICD-10-CM

## 2022-10-31 RX ORDER — MONTELUKAST SODIUM 10 MG/1
10 TABLET ORAL NIGHTLY
Qty: 30 TABLET | Refills: 3 | Status: SHIPPED | OUTPATIENT
Start: 2022-10-31

## 2022-10-31 NOTE — TELEPHONE ENCOUNTER
Left pt a voicemail asking if patient takes reglan 10 mg tablet.     Left voicemail [phone].

## 2023-03-21 DIAGNOSIS — E78.2 MIXED HYPERLIPIDEMIA: Primary | ICD-10-CM

## 2023-03-21 DIAGNOSIS — E11.9 CONTROLLED TYPE 2 DIABETES MELLITUS WITHOUT COMPLICATION, WITHOUT LONG-TERM CURRENT USE OF INSULIN: ICD-10-CM

## 2023-03-21 DIAGNOSIS — Z12.5 SCREENING FOR PROSTATE CANCER: ICD-10-CM

## 2023-03-27 ENCOUNTER — TELEPHONE (OUTPATIENT)
Dept: INTERNAL MEDICINE | Facility: CLINIC | Age: 73
End: 2023-03-27
Payer: MEDICARE

## 2023-05-03 ENCOUNTER — TRANSCRIBE ORDERS (OUTPATIENT)
Dept: ADMINISTRATIVE | Facility: HOSPITAL | Age: 73
End: 2023-05-03
Payer: MEDICARE

## 2023-05-03 DIAGNOSIS — I50.9 CONGESTIVE HEART FAILURE OF UNKNOWN ETIOLOGY: ICD-10-CM

## 2023-05-04 ENCOUNTER — TRANSCRIBE ORDERS (OUTPATIENT)
Dept: PULMONOLOGY | Facility: HOSPITAL | Age: 73
End: 2023-05-04
Payer: MEDICARE

## 2023-05-04 DIAGNOSIS — R06.00 DYSPNEA, UNSPECIFIED TYPE: Primary | ICD-10-CM

## 2023-05-09 ENCOUNTER — OFFICE VISIT (OUTPATIENT)
Dept: CARDIOLOGY | Facility: CLINIC | Age: 73
End: 2023-05-09
Payer: MEDICARE

## 2023-05-09 ENCOUNTER — HOSPITAL ENCOUNTER (OUTPATIENT)
Dept: CARDIOLOGY | Facility: HOSPITAL | Age: 73
Discharge: HOME | DRG: 287 | End: 2023-05-09
Payer: MEDICARE

## 2023-05-09 ENCOUNTER — APPOINTMENT (OUTPATIENT)
Dept: ULTRASOUND IMAGING | Facility: HOSPITAL | Age: 73
DRG: 287 | End: 2023-05-09
Payer: MEDICARE

## 2023-05-09 ENCOUNTER — HOSPITAL ENCOUNTER (INPATIENT)
Facility: HOSPITAL | Age: 73
LOS: 3 days | Discharge: HOME | DRG: 287 | End: 2023-05-12
Attending: INTERNAL MEDICINE | Admitting: INTERNAL MEDICINE
Payer: MEDICARE

## 2023-05-09 VITALS
WEIGHT: 265 LBS | HEIGHT: 73 IN | DIASTOLIC BLOOD PRESSURE: 68 MMHG | BODY MASS INDEX: 35.12 KG/M2 | HEART RATE: 82 BPM | SYSTOLIC BLOOD PRESSURE: 110 MMHG

## 2023-05-09 VITALS
HEART RATE: 93 BPM | WEIGHT: 250 LBS | SYSTOLIC BLOOD PRESSURE: 116 MMHG | DIASTOLIC BLOOD PRESSURE: 60 MMHG | HEIGHT: 73 IN | BODY MASS INDEX: 33.13 KG/M2

## 2023-05-09 DIAGNOSIS — I34.0 NONRHEUMATIC MITRAL VALVE REGURGITATION: Primary | ICD-10-CM

## 2023-05-09 DIAGNOSIS — Z79.4 TYPE 2 DIABETES MELLITUS WITH OTHER CIRCULATORY COMPLICATION, WITH LONG-TERM CURRENT USE OF INSULIN: ICD-10-CM

## 2023-05-09 DIAGNOSIS — E11.59 TYPE 2 DIABETES MELLITUS WITH OTHER CIRCULATORY COMPLICATION, WITH LONG-TERM CURRENT USE OF INSULIN: ICD-10-CM

## 2023-05-09 DIAGNOSIS — E78.2 MIXED HYPERLIPIDEMIA: Primary | ICD-10-CM

## 2023-05-09 DIAGNOSIS — I50.31 ACUTE DIASTOLIC CHF (CONGESTIVE HEART FAILURE): ICD-10-CM

## 2023-05-09 DIAGNOSIS — R06.00 DYSPNEA, UNSPECIFIED TYPE: ICD-10-CM

## 2023-05-09 DIAGNOSIS — I34.0 NONRHEUMATIC MITRAL VALVE REGURGITATION: ICD-10-CM

## 2023-05-09 LAB
ALBUMIN SERPL-MCNC: 4.2 G/DL (ref 3.5–5.2)
ALBUMIN/GLOB SERPL: 1.7 G/DL
ALP SERPL-CCNC: 75 U/L (ref 39–117)
ALT SERPL W P-5'-P-CCNC: 29 U/L (ref 1–41)
ANION GAP SERPL CALCULATED.3IONS-SCNC: 8.6 MMOL/L (ref 5–15)
AORTIC ARCH: 2.5 CM
ASCENDING AORTA: 2.8 CM
AST SERPL-CCNC: 14 U/L (ref 1–40)
BH CV ECHO LEFT VENTRICLE GLOBAL LONGITUDINAL STRAIN: -25.6 %
BH CV ECHO MEAS - ACS: 2.03 CM
BH CV ECHO MEAS - AO MAX PG: 9.8 MMHG
BH CV ECHO MEAS - AO MEAN PG: 5.6 MMHG
BH CV ECHO MEAS - AO ROOT DIAM: 3 CM
BH CV ECHO MEAS - AO V2 MAX: 156.9 CM/SEC
BH CV ECHO MEAS - AO V2 VTI: 27.3 CM
BH CV ECHO MEAS - AVA(I,D): 1.89 CM2
BH CV ECHO MEAS - EDV(CUBED): 128.6 ML
BH CV ECHO MEAS - EDV(MOD-SP2): 218 ML
BH CV ECHO MEAS - EDV(MOD-SP4): 233 ML
BH CV ECHO MEAS - EF(MOD-BP): 64.3 %
BH CV ECHO MEAS - EF(MOD-SP2): 61.9 %
BH CV ECHO MEAS - EF(MOD-SP4): 65.7 %
BH CV ECHO MEAS - ESV(CUBED): 35.4 ML
BH CV ECHO MEAS - ESV(MOD-SP2): 83 ML
BH CV ECHO MEAS - ESV(MOD-SP4): 80 ML
BH CV ECHO MEAS - FS: 35 %
BH CV ECHO MEAS - IVS/LVPW: 1.08 CM
BH CV ECHO MEAS - IVSD: 1.1 CM
BH CV ECHO MEAS - LAT PEAK E' VEL: 13.3 CM/SEC
BH CV ECHO MEAS - LV DIASTOLIC VOL/BSA (35-75): 98.5 CM2
BH CV ECHO MEAS - LV MASS(C)D: 199.8 GRAMS
BH CV ECHO MEAS - LV MAX PG: 3.9 MMHG
BH CV ECHO MEAS - LV MEAN PG: 1.88 MMHG
BH CV ECHO MEAS - LV SYSTOLIC VOL/BSA (12-30): 33.8 CM2
BH CV ECHO MEAS - LV V1 MAX: 99 CM/SEC
BH CV ECHO MEAS - LV V1 VTI: 17 CM
BH CV ECHO MEAS - LVIDD: 5 CM
BH CV ECHO MEAS - LVIDS: 3.3 CM
BH CV ECHO MEAS - LVOT AREA: 3.1 CM2
BH CV ECHO MEAS - LVOT DIAM: 1.97 CM
BH CV ECHO MEAS - LVPWD: 1.02 CM
BH CV ECHO MEAS - MED PEAK E' VEL: 7.8 CM/SEC
BH CV ECHO MEAS - MR MAX PG: 112.3 MMHG
BH CV ECHO MEAS - MR MAX VEL: 529.9 CM/SEC
BH CV ECHO MEAS - MR MEAN PG: 72.8 MMHG
BH CV ECHO MEAS - MR MEAN VEL: 403.7 CM/SEC
BH CV ECHO MEAS - MR VTI: 147.1 CM
BH CV ECHO MEAS - MV A DUR: 0.14 SEC
BH CV ECHO MEAS - MV A MAX VEL: 116 CM/SEC
BH CV ECHO MEAS - MV DEC SLOPE: 930.5 CM/SEC2
BH CV ECHO MEAS - MV DEC TIME: 0.11 MSEC
BH CV ECHO MEAS - MV E MAX VEL: 168 CM/SEC
BH CV ECHO MEAS - MV E/A: 1.45
BH CV ECHO MEAS - MV MAX PG: 12.8 MMHG
BH CV ECHO MEAS - MV MEAN PG: 5 MMHG
BH CV ECHO MEAS - MV P1/2T: 56 MSEC
BH CV ECHO MEAS - MV V2 VTI: 37.2 CM
BH CV ECHO MEAS - MVA(P1/2T): 3.9 CM2
BH CV ECHO MEAS - MVA(VTI): 1.39 CM2
BH CV ECHO MEAS - PA ACC TIME: 0.12 SEC
BH CV ECHO MEAS - PA PR(ACCEL): 25.5 MMHG
BH CV ECHO MEAS - PA V2 MAX: 101.8 CM/SEC
BH CV ECHO MEAS - PULM A REVS DUR: 0.15 SEC
BH CV ECHO MEAS - PULM A REVS VEL: 32.7 CM/SEC
BH CV ECHO MEAS - PULM DIAS VEL: 43.6 CM/SEC
BH CV ECHO MEAS - PULM S/D: 0.74
BH CV ECHO MEAS - PULM SYS VEL: 32.2 CM/SEC
BH CV ECHO MEAS - QP/QS: 1.27
BH CV ECHO MEAS - RAP SYSTOLE: 3 MMHG
BH CV ECHO MEAS - RV MAX PG: 1.3 MMHG
BH CV ECHO MEAS - RV V1 MAX: 57 CM/SEC
BH CV ECHO MEAS - RV V1 VTI: 11.1 CM
BH CV ECHO MEAS - RVOT DIAM: 2.8 CM
BH CV ECHO MEAS - RVSP: 60 MMHG
BH CV ECHO MEAS - SI(MOD-SP2): 57.1 ML/M2
BH CV ECHO MEAS - SI(MOD-SP4): 64.7 ML/M2
BH CV ECHO MEAS - SUP REN AO DIAM: 2.6 CM
BH CV ECHO MEAS - SV(LVOT): 51.7 ML
BH CV ECHO MEAS - SV(MOD-SP2): 135 ML
BH CV ECHO MEAS - SV(MOD-SP4): 153 ML
BH CV ECHO MEAS - SV(RVOT): 65.9 ML
BH CV ECHO MEAS - TAPSE (>1.6): 2.43 CM
BH CV ECHO MEAS - TR MAX PG: 57 MMHG
BH CV ECHO MEAS - TR MAX VEL: 377.6 CM/SEC
BH CV ECHO MEASUREMENTS AVERAGE E/E' RATIO: 15.92
BH CV XLRA - RV BASE: 3.9 CM
BH CV XLRA - RV LENGTH: 7.5 CM
BH CV XLRA - RV MID: 2.9 CM
BH CV XLRA - TDI S': 13.7 CM/SEC
BILIRUB SERPL-MCNC: 0.4 MG/DL (ref 0–1.2)
BILIRUB UR QL STRIP: NEGATIVE
BUN SERPL-MCNC: 19 MG/DL (ref 8–23)
BUN/CREAT SERPL: 15.1 (ref 7–25)
CALCIUM SPEC-SCNC: 9.1 MG/DL (ref 8.6–10.5)
CHLORIDE SERPL-SCNC: 106 MMOL/L (ref 98–107)
CLARITY UR: CLEAR
CO2 SERPL-SCNC: 24.4 MMOL/L (ref 22–29)
COLOR UR: YELLOW
CREAT SERPL-MCNC: 1.26 MG/DL (ref 0.76–1.27)
CREAT UR-MCNC: 48.6 MG/DL
DEPRECATED RDW RBC AUTO: 45.6 FL (ref 37–54)
EGFRCR SERPLBLD CKD-EPI 2021: 59.8 ML/MIN/1.73
ERYTHROCYTE [DISTWIDTH] IN BLOOD BY AUTOMATED COUNT: 14.1 % (ref 12.3–15.4)
GLOBULIN UR ELPH-MCNC: 2.5 GM/DL
GLUCOSE BLDC GLUCOMTR-MCNC: 235 MG/DL (ref 70–130)
GLUCOSE BLDC GLUCOMTR-MCNC: 308 MG/DL (ref 70–130)
GLUCOSE SERPL-MCNC: 234 MG/DL (ref 65–99)
GLUCOSE UR STRIP-MCNC: ABNORMAL MG/DL
HCT VFR BLD AUTO: 41.9 % (ref 37.5–51)
HGB BLD-MCNC: 13.7 G/DL (ref 13–17.7)
HGB UR QL STRIP.AUTO: NEGATIVE
KETONES UR QL STRIP: NEGATIVE
LEFT ATRIUM VOLUME INDEX: 36.2 ML/M2
LEUKOCYTE ESTERASE UR QL STRIP.AUTO: NEGATIVE
MAXIMAL PREDICTED HEART RATE: 146 BPM
MCH RBC QN AUTO: 29.3 PG (ref 26.6–33)
MCHC RBC AUTO-ENTMCNC: 32.7 G/DL (ref 31.5–35.7)
MCV RBC AUTO: 89.5 FL (ref 79–97)
NITRITE UR QL STRIP: NEGATIVE
NT-PROBNP SERPL-MCNC: 830 PG/ML (ref 0–900)
PH UR STRIP.AUTO: 6 [PH] (ref 5–8)
PLATELET # BLD AUTO: 271 10*3/MM3 (ref 140–450)
PMV BLD AUTO: 9.8 FL (ref 6–12)
POTASSIUM SERPL-SCNC: 4.3 MMOL/L (ref 3.5–5.2)
PROT ?TM UR-MCNC: 4.5 MG/DL
PROT SERPL-MCNC: 6.7 G/DL (ref 6–8.5)
PROT UR QL STRIP: NEGATIVE
PROT/CREAT UR: 92.6 MG/G CREA (ref 0–200)
RBC # BLD AUTO: 4.68 10*6/MM3 (ref 4.14–5.8)
SINUS: 2.9 CM
SODIUM SERPL-SCNC: 139 MMOL/L (ref 136–145)
SP GR UR STRIP: 1.03 (ref 1–1.03)
STJ: 2.6 CM
STRESS TARGET HR: 124 BPM
UROBILINOGEN UR QL STRIP: ABNORMAL
WBC NRBC COR # BLD: 11.49 10*3/MM3 (ref 3.4–10.8)

## 2023-05-09 PROCEDURE — 25010000002 FUROSEMIDE PER 20 MG: Performed by: INTERNAL MEDICINE

## 2023-05-09 PROCEDURE — 82948 REAGENT STRIP/BLOOD GLUCOSE: CPT

## 2023-05-09 PROCEDURE — 85027 COMPLETE CBC AUTOMATED: CPT | Performed by: INTERNAL MEDICINE

## 2023-05-09 PROCEDURE — 82570 ASSAY OF URINE CREATININE: CPT | Performed by: INTERNAL MEDICINE

## 2023-05-09 PROCEDURE — 83880 ASSAY OF NATRIURETIC PEPTIDE: CPT | Performed by: INTERNAL MEDICINE

## 2023-05-09 PROCEDURE — 80053 COMPREHEN METABOLIC PANEL: CPT | Performed by: INTERNAL MEDICINE

## 2023-05-09 PROCEDURE — 93356 MYOCRD STRAIN IMG SPCKL TRCK: CPT

## 2023-05-09 PROCEDURE — 93306 TTE W/DOPPLER COMPLETE: CPT

## 2023-05-09 PROCEDURE — 94640 AIRWAY INHALATION TREATMENT: CPT

## 2023-05-09 PROCEDURE — 76775 US EXAM ABDO BACK WALL LIM: CPT

## 2023-05-09 PROCEDURE — 81003 URINALYSIS AUTO W/O SCOPE: CPT | Performed by: INTERNAL MEDICINE

## 2023-05-09 PROCEDURE — 25510000001 PERFLUTREN (DEFINITY) 8.476 MG IN SODIUM CHLORIDE (PF) 0.9 % 10 ML INJECTION: Performed by: INTERNAL MEDICINE

## 2023-05-09 PROCEDURE — 94761 N-INVAS EAR/PLS OXIMETRY MLT: CPT

## 2023-05-09 PROCEDURE — 94799 UNLISTED PULMONARY SVC/PX: CPT

## 2023-05-09 PROCEDURE — 84156 ASSAY OF PROTEIN URINE: CPT | Performed by: INTERNAL MEDICINE

## 2023-05-09 PROCEDURE — 25010000002 ENOXAPARIN PER 10 MG: Performed by: INTERNAL MEDICINE

## 2023-05-09 RX ORDER — PREDNISONE 10 MG/1
15 TABLET ORAL DAILY
COMMUNITY
Start: 2023-03-27

## 2023-05-09 RX ORDER — ASPIRIN 81 MG/1
81 TABLET ORAL DAILY
Status: DISCONTINUED | OUTPATIENT
Start: 2023-05-09 | End: 2023-05-12 | Stop reason: HOSPADM

## 2023-05-09 RX ORDER — BUDESONIDE AND FORMOTEROL FUMARATE DIHYDRATE 160; 4.5 UG/1; UG/1
2 AEROSOL RESPIRATORY (INHALATION) 2 TIMES DAILY
COMMUNITY
Start: 2023-05-01

## 2023-05-09 RX ORDER — MONTELUKAST SODIUM 10 MG/1
10 TABLET ORAL NIGHTLY
Status: DISCONTINUED | OUTPATIENT
Start: 2023-05-09 | End: 2023-05-12 | Stop reason: HOSPADM

## 2023-05-09 RX ORDER — ENOXAPARIN SODIUM 100 MG/ML
40 INJECTION SUBCUTANEOUS EVERY 24 HOURS
Status: DISCONTINUED | OUTPATIENT
Start: 2023-05-09 | End: 2023-05-10

## 2023-05-09 RX ORDER — CETIRIZINE HYDROCHLORIDE 10 MG/1
10 TABLET ORAL DAILY
Status: DISCONTINUED | OUTPATIENT
Start: 2023-05-09 | End: 2023-05-12 | Stop reason: HOSPADM

## 2023-05-09 RX ORDER — BUDESONIDE AND FORMOTEROL FUMARATE DIHYDRATE 160; 4.5 UG/1; UG/1
2 AEROSOL RESPIRATORY (INHALATION) 2 TIMES DAILY
Status: DISCONTINUED | OUTPATIENT
Start: 2023-05-09 | End: 2023-05-12 | Stop reason: HOSPADM

## 2023-05-09 RX ORDER — NICOTINE POLACRILEX 4 MG
15 LOZENGE BUCCAL
Status: DISCONTINUED | OUTPATIENT
Start: 2023-05-09 | End: 2023-05-12 | Stop reason: HOSPADM

## 2023-05-09 RX ORDER — ATORVASTATIN CALCIUM 20 MG/1
10 TABLET, FILM COATED ORAL DAILY
Status: DISCONTINUED | OUTPATIENT
Start: 2023-05-10 | End: 2023-05-12 | Stop reason: HOSPADM

## 2023-05-09 RX ORDER — INSULIN LISPRO 100 [IU]/ML
2-9 INJECTION, SOLUTION INTRAVENOUS; SUBCUTANEOUS
Status: DISCONTINUED | OUTPATIENT
Start: 2023-05-10 | End: 2023-05-12 | Stop reason: HOSPADM

## 2023-05-09 RX ORDER — PREDNISONE 10 MG/1
10 TABLET ORAL DAILY
Status: DISCONTINUED | OUTPATIENT
Start: 2023-05-10 | End: 2023-05-12 | Stop reason: HOSPADM

## 2023-05-09 RX ORDER — EMPAGLIFLOZIN 25 MG/1
1 TABLET, FILM COATED ORAL DAILY
COMMUNITY
Start: 2023-05-01

## 2023-05-09 RX ORDER — PANTOPRAZOLE SODIUM 40 MG/1
40 TABLET, DELAYED RELEASE ORAL
Status: DISCONTINUED | OUTPATIENT
Start: 2023-05-10 | End: 2023-05-12 | Stop reason: HOSPADM

## 2023-05-09 RX ORDER — METOCLOPRAMIDE 10 MG/1
10 TABLET ORAL DAILY
Status: DISCONTINUED | OUTPATIENT
Start: 2023-05-09 | End: 2023-05-12 | Stop reason: HOSPADM

## 2023-05-09 RX ORDER — METOCLOPRAMIDE 10 MG/1
1 TABLET ORAL DAILY
COMMUNITY
Start: 2023-04-21

## 2023-05-09 RX ORDER — FUROSEMIDE 10 MG/ML
40 INJECTION INTRAMUSCULAR; INTRAVENOUS EVERY 12 HOURS
Status: DISCONTINUED | OUTPATIENT
Start: 2023-05-09 | End: 2023-05-10

## 2023-05-09 RX ORDER — IBUPROFEN 600 MG/1
1 TABLET ORAL
Status: DISCONTINUED | OUTPATIENT
Start: 2023-05-09 | End: 2023-05-12 | Stop reason: HOSPADM

## 2023-05-09 RX ORDER — ALUMINA, MAGNESIA, AND SIMETHICONE 2400; 2400; 240 MG/30ML; MG/30ML; MG/30ML
15 SUSPENSION ORAL EVERY 6 HOURS PRN
Status: DISCONTINUED | OUTPATIENT
Start: 2023-05-09 | End: 2023-05-12 | Stop reason: HOSPADM

## 2023-05-09 RX ORDER — FUROSEMIDE 40 MG/1
1 TABLET ORAL DAILY
Status: ON HOLD | COMMUNITY
Start: 2023-05-03 | End: 2023-05-12 | Stop reason: SDUPTHER

## 2023-05-09 RX ORDER — POTASSIUM CHLORIDE 750 MG/1
2 CAPSULE, EXTENDED RELEASE ORAL DAILY
COMMUNITY
Start: 2023-05-03

## 2023-05-09 RX ORDER — IRBESARTAN 75 MG/1
1 TABLET ORAL DAILY
COMMUNITY
Start: 2023-04-24

## 2023-05-09 RX ORDER — DEXTROSE MONOHYDRATE 25 G/50ML
25 INJECTION, SOLUTION INTRAVENOUS
Status: DISCONTINUED | OUTPATIENT
Start: 2023-05-09 | End: 2023-05-12 | Stop reason: HOSPADM

## 2023-05-09 RX ORDER — LOSARTAN POTASSIUM 25 MG/1
25 TABLET ORAL
Status: DISCONTINUED | OUTPATIENT
Start: 2023-05-10 | End: 2023-05-09

## 2023-05-09 RX ORDER — POTASSIUM CHLORIDE 750 MG/1
20 TABLET, FILM COATED, EXTENDED RELEASE ORAL DAILY
Status: DISCONTINUED | OUTPATIENT
Start: 2023-05-10 | End: 2023-05-12 | Stop reason: HOSPADM

## 2023-05-09 RX ADMIN — ENOXAPARIN SODIUM 40 MG: 100 INJECTION SUBCUTANEOUS at 16:45

## 2023-05-09 RX ADMIN — FUROSEMIDE 40 MG: 10 INJECTION, SOLUTION INTRAMUSCULAR; INTRAVENOUS at 16:46

## 2023-05-09 RX ADMIN — PERFLUTREN 1.5 ML: 6.52 INJECTION, SUSPENSION INTRAVENOUS at 11:40

## 2023-05-09 RX ADMIN — ALUMINUM HYDROXIDE, MAGNESIUM HYDROXIDE, AND DIMETHICONE 15 ML: 400; 400; 40 SUSPENSION ORAL at 21:48

## 2023-05-09 RX ADMIN — ASPIRIN 81 MG: 81 TABLET, COATED ORAL at 21:19

## 2023-05-09 RX ADMIN — INSULIN GLARGINE-YFGN 10 UNITS: 100 INJECTION, SOLUTION SUBCUTANEOUS at 23:31

## 2023-05-09 RX ADMIN — METOCLOPRAMIDE 10 MG: 10 TABLET ORAL at 21:20

## 2023-05-09 RX ADMIN — BUDESONIDE AND FORMOTEROL FUMARATE DIHYDRATE 2 PUFF: 160; 4.5 AEROSOL RESPIRATORY (INHALATION) at 19:58

## 2023-05-09 NOTE — Clinical Note
Hemostasis started on the right brachial vein. Manual pressure applied to vessel. Manual pressure was held by CHASE. Manual pressure was held for 8 min. Hemostasis achieved successfully.

## 2023-05-09 NOTE — H&P (VIEW-ONLY)
Date of Office Visit: 2023  Encounter Provider: Karishma Arango MD  Place of Service: [facility]  Patient Name: Vernon Solorzano MD  :1950    Chief complaint  Consult requested by Dr. Aguilar for evaluation of new onset heart failure.    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  He has no prior cardiac history without murmur or an abnormal EKG in the past.  He recalls having had a stress test that was unremarkable.     In 2022 he developed COVID infection with persistent pneumonia which was initially felt to be COVID pneumonitis with possible ILD flare due to COVID.  However he has not responded well to steroids which she has been on intermittently for much of the past year.  Inhalers only has been helped slightly. Patient had a CT scan of the chest 2022 that showed bronchiectasis.  On my review of images there also appears to be coronary calcification in the LAD and circumflex vessel. Has continued to have significant dyspnea.  He also has had episodes where he wakes up at night short of breath though believes it is improved and not as prevalent when he is taking the prednisone.  He had a repeat CT chest that reportedly showed pleural effusions pulmonary edema.  He was started on Lasix evaluation.    Echo today shows normal systolic function with questionable wall motion abnormality in inferolateral wall.  Posterior leaflet is thickened and catheter at least moderate mitral valve regurgitation that is likely underestimated.  RVSP has increased to 60 mmHg.  He demonstrated significant edema and dyspnea especially when lying down.    Blood work dated 3/2022 includes glucose of 158 hemoglobin A1c 8.9 otherwise CMP unremarkable, hemoglobin 12.7, white count slightly elevated at 12, platelet count normal.  Lipid panel with LDL 59, HDL 39,  triglycerides 107      Past Medical History:   Diagnosis Date   • Allergic rhinitis    • Arthritis    • BPH (benign prostatic hyperplasia)    • Diabetes mellitus     TYPE 2   • Foraminal stenosis of cervical region     C5-C6   • GERD (gastroesophageal reflux disease)    • Hemorrhoids    • History of urinary retention 2010    S/P TURP   • Hyperlipidemia    • Macular degeneration    • PING (obstructive sleep apnea) 01/07/2016    MILD, SEES DR. AMARILIS MORGAN     Past Surgical History:   Procedure Laterality Date   • COLONOSCOPY N/A     WNL PER PT, NO RECORDS,    • COLONOSCOPY N/A 04/07/2009    DR. GORGE BENAVIDEZ AT [place]   • PROSTATE SURGERY N/A 11/12/2010    TURP, DR. BRIGHT COLLAZO AT [facility]   • SKIN TAG REMOVAL N/A 12/20/2017    ANAL SKIN TAG X2, PERFORMED IN OFFICE, DR. LISA ORANTES   • TURP / TRANSURETHRAL INCISION / DRAINAGE PROSTATE  2010    Dr. Goodrich     No facility-administered medications prior to visit.     Outpatient Medications Prior to Visit   Medication Sig Dispense Refill   • aspirin 81 MG tablet Take 1 tablet by mouth Daily.     • atorvastatin (LIPITOR) 10 MG tablet TAKE 1 TABLET BY MOUTH EVERY DAY 90 tablet 0   • furosemide (LASIX) 40 MG tablet Take 1 tablet by mouth Daily.     • insulin degludec (TRESIBA FLEXTOUCH) 100 UNIT/ML solution pen-injector injection Start 10 units daily and titrate up 2 units every 3 days up to 30 units daily. 3 pen 5   • irbesartan (AVAPRO) 75 MG tablet Take 1 tablet by mouth Daily.     • Jardiance 25 MG tablet tablet Take 1 tablet by mouth Daily.     • levocetirizine (XYZAL) 5 MG tablet Take 1 tablet by mouth Every Evening. 90 tablet 3   • metoclopramide (REGLAN) 10 MG tablet Take 1 tablet by mouth Daily.     • montelukast (Singulair) 10 MG tablet Take 1 tablet by mouth Every Night. 30 tablet 3   • Multiple Vitamins-Minerals (PRESERVISION AREDS 2+MULTI VIT PO) Take  by mouth.     • omeprazole (priLOSEC) 40 MG capsule Take 1 capsule by mouth Daily. 90 capsule  "4   • potassium chloride (MICRO-K) 10 MEQ CR capsule Take 2 capsules by mouth Daily.     • predniSONE (DELTASONE) 10 MG tablet Take 1.5 tablets by mouth Daily.     • Symbicort 160-4.5 MCG/ACT inhaler Inhale 2 puffs 2 (Two) Times a Day.     • tiotropium (SPIRIVA) 18 MCG per inhalation capsule Place 1 capsule into inhaler and inhale Daily.     • cephalexin (KEFLEX) 500 MG capsule Take 1 capsule by mouth 3 (Three) Times a Day. 30 capsule 1   • Coenzyme Q10 (CO Q-10) 300 MG capsule Take  by mouth.         Allergies as of 05/09/2023   • (No Known Allergies)     Social History     Socioeconomic History   • Marital status:    Tobacco Use   • Smoking status: Never   • Smokeless tobacco: Never   Substance and Sexual Activity   • Alcohol use: Yes     Comment: RARELY   • Drug use: No   • Sexual activity: Defer     Family History   Problem Relation Age of Onset   • Lung cancer Mother    • Stroke Father    • Dementia Father    • Colon polyps Brother    • Colon polyps Brother      Review of Systems   Constitutional: Positive for malaise/fatigue and weight gain. Negative for chills, fever and weight loss.   Cardiovascular: Positive for dyspnea on exertion and leg swelling.   Respiratory: Negative for cough, snoring and wheezing.    Hematologic/Lymphatic: Negative for bleeding problem. Does not bruise/bleed easily.   Skin: Negative for color change.   Musculoskeletal: Positive for joint pain. Negative for falls and myalgias.   Gastrointestinal: Negative for melena.   Genitourinary: Negative for hematuria.   Neurological: Negative for excessive daytime sleepiness.   Psychiatric/Behavioral: Negative for depression. The patient is not nervous/anxious.         Objective:     Vitals:    05/09/23 1107 05/09/23 1201   BP: 110/62 110/68   BP Location: Left arm Right arm   Pulse: 82    Weight: 120 kg (265 lb)    Height: 185.4 cm (73\")      Body mass index is 34.96 kg/m².    Vitals reviewed.   Constitutional:       Appearance: " Well-developed.   Eyes:      General: No scleral icterus.        Right eye: No discharge.      Conjunctiva/sclera: Conjunctivae normal.      Pupils: Pupils are equal, round, and reactive to light.   HENT:      Head: Normocephalic.      Nose: Nose normal.   Neck:      Thyroid: No thyromegaly.      Vascular: No JVD.   Pulmonary:      Effort: Pulmonary effort is normal. No respiratory distress.      Breath sounds: Normal breath sounds. No wheezing. No rales.   Cardiovascular:      Normal rate. Occasional ectopic beats. Regular rhythm. Normal S1. Normal S2.      Murmurs: There is a grade 3 to 2/6 high frequency blowing holosystolic murmur at the apex, radiating to the axilla.      No gallop.   Pulses:     Carotid: 2+ bilaterally.     Radial: 2+ bilaterally.     Dorsalis pedis: 2+ bilaterally.     Posterior tibial: 2+ bilaterally.  Edema:     Pretibial: bilateral 2+ edema of the pretibial area.     Ankle: bilateral 2+ edema of the ankle.  Abdominal:      General: Bowel sounds are normal. There is no distension.      Palpations: Abdomen is soft.      Tenderness: There is no abdominal tenderness. There is no rebound.   Musculoskeletal: Normal range of motion.         General: No tenderness.      Cervical back: Normal range of motion and neck supple. Skin:     General: Skin is warm and dry.      Findings: No erythema or rash.   Neurological:      Mental Status: Alert and oriented to person, place, and time.   Psychiatric:         Behavior: Behavior normal.         Thought Content: Thought content normal.         Judgment: Judgment normal.       Lab Review:         ECG 12 Lead    Date/Time: 5/9/2023 12:07 PM  Performed by: Karishma Arango MD  Authorized by: Karishma Arango MD   Comparison: compared with previous ECG   Comparison to previous ECG: Right bundle branch block and left intrafascicular block are new  Rhythm: sinus rhythm  Conduction: right bundle branch block and left anterior fascicular block    Clinical impression:  abnormal EKG          Assessment:       Diagnosis Plan   1. Mixed hyperlipidemia        2. Type 2 diabetes mellitus with other circulatory complication, with long-term current use of insulin          Plan:       1.  Dyspnea on exertion.  This is likely multifactorial but a significant contribution is most likely from mitral regurgitation which appears to be likely severe.  I believe transthoracic echo is underestimating this.  He also has significant coronary artery calcification on prior CT scan and I suspect this is likely due to ischemic etiology.  He appears quite wet today and dyspneic while laying flat on the table for exam.  With this in mind we will admit to hospital initiate treatment with IV diuresis, consult nephrology given underlying renal insufficiency.  Will eventually need GAUDENCIO and then left and right heart cath.  2.  Congestive heart failure likely due to diastolic dysfunction and mitral regurgitation.  Assess as above.  3.  Renal insufficiency.  Creatinine was 1.43 on recent blood work with Dr. Jackman.  We will need to watch closely with diuresis.  4.  COVID pneumonia with residual dyspnea and questionable ILD.  Followed by Dr. Medina who does not feel this is likely a big contributor current symptoms  5.  Coronary artery disease with coronary artery calcification noted on CT scan 9/2022  6.  Hyperlipidemia  7.  Diabetes poorly controlled.  We will ask internal medicine service to help manage  8.  Severe pulmonary hypertension, likely due to combination of valvular heart disease asthma probable sleep apnea.  Address as above.  Will need right heart cath.    Time Spent: I spent 60 minutes caring for Vernon on this date of service. This time includes time spent by me in the following activities: preparing for the visit, reviewing tests, obtaining and/or reviewing a separately obtained history, performing a medically appropriate examination and/or evaluation, counseling and educating the  patient/family/caregiver, ordering medications, tests, or procedures, referring and communicating with other health care professionals, documenting information in the medical record, independently interpreting results and communicating that information with the patient/family/caregiver and care coordination.   I spent 3 minutes on the separately reported service of ECG and Echo. This time is not included in the time used to support the E/M service also reported today.        Your medication list          Accurate as of May 9, 2023  3:16 PM. If you have any questions, ask your nurse or doctor.            CONTINUE taking these medications      Instructions Last Dose Given Next Dose Due   aspirin 81 MG tablet      Take 1 tablet by mouth Daily.       atorvastatin 10 MG tablet  Commonly known as: LIPITOR      TAKE 1 TABLET BY MOUTH EVERY DAY       furosemide 40 MG tablet  Commonly known as: LASIX      Take 1 tablet by mouth Daily.       insulin degludec 100 UNIT/ML solution pen-injector injection  Commonly known as: TRESIBA FLEXTOUCH      Start 10 units daily and titrate up 2 units every 3 days up to 30 units daily.       irbesartan 75 MG tablet  Commonly known as: AVAPRO      Take 1 tablet by mouth Daily.       Jardiance 25 MG tablet tablet  Generic drug: empagliflozin      Take 1 tablet by mouth Daily.       levocetirizine 5 MG tablet  Commonly known as: XYZAL      Take 1 tablet by mouth Every Evening.       metoclopramide 10 MG tablet  Commonly known as: REGLAN      Take 1 tablet by mouth Daily.       montelukast 10 MG tablet  Commonly known as: Singulair      Take 1 tablet by mouth Every Night.       omeprazole 40 MG capsule  Commonly known as: priLOSEC      Take 1 capsule by mouth Daily.       potassium chloride 10 MEQ CR capsule  Commonly known as: MICRO-K      Take 2 capsules by mouth Daily.       predniSONE 10 MG tablet  Commonly known as: DELTASONE      Take 1.5 tablets by mouth Daily.       PRESERVISION AREDS  2+MULTI VIT PO      Take  by mouth.       Symbicort 160-4.5 MCG/ACT inhaler  Generic drug: budesonide-formoterol      Inhale 2 puffs 2 (Two) Times a Day.       tiotropium 18 MCG per inhalation capsule  Commonly known as: SPIRIVA      Place 1 capsule into inhaler and inhale Daily.              Patient is no longer taking -.  I corrected the med list to reflect this.  I did not stop these medications.      Dictated utilizing Dragon dictation

## 2023-05-09 NOTE — Clinical Note
Hemostasis started on the right radial artery. R-Band was used in achieving hemostasis. Radial compression device applied to vessel. Hemostasis achieved successfully. Closure device additional comment: TR BAND 14 CC OF AIR

## 2023-05-09 NOTE — PROGRESS NOTES
Date of Office Visit: 2023  Encounter Provider: Karishma Arango MD  Place of Service: [facility] CARDIOLOGY  Patient Name: Vernon Solorzano MD  :1950    Chief complaint  Consult requested by Dr. Aguilar for evaluation of new onset heart failure.    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  He has no prior cardiac history without murmur or an abnormal EKG in the past.  He recalls having had a stress test that was unremarkable.     In 2022 he developed COVID infection with persistent pneumonia which was initially felt to be COVID pneumonitis with possible ILD flare due to COVID.  However he has not responded well to steroids which she has been on intermittently for much of the past year.  Inhalers only has been helped slightly. Patient had a CT scan of the chest 2022 that showed bronchiectasis.  On my review of images there also appears to be coronary calcification in the LAD and circumflex vessel. Has continued to have significant dyspnea.  He also has had episodes where he wakes up at night short of breath though believes it is improved and not as prevalent when he is taking the prednisone.  He had a repeat CT chest that reportedly showed pleural effusions pulmonary edema.  He was started on Lasix evaluation.    Echo today shows normal systolic function with questionable wall motion abnormality in inferolateral wall.  Posterior leaflet is thickened and catheter at least moderate mitral valve regurgitation that is likely underestimated.  RVSP has increased to 60 mmHg.  He demonstrated significant edema and dyspnea especially when lying down.    Blood work dated 3/2022 includes glucose of 158 hemoglobin A1c 8.9 otherwise CMP unremarkable, hemoglobin 12.7, white count slightly elevated at 12, platelet count normal.  Lipid panel with LDL 59, HDL 39,  triglycerides 107      Past Medical History:   Diagnosis Date   • Allergic rhinitis    • Arthritis    • BPH (benign prostatic hyperplasia)    • Diabetes mellitus     TYPE 2   • Foraminal stenosis of cervical region     C5-C6   • GERD (gastroesophageal reflux disease)    • Hemorrhoids    • History of urinary retention 2010    S/P TURP   • Hyperlipidemia    • Macular degeneration    • PING (obstructive sleep apnea) 01/07/2016    MILD, SEES DR. AMARILIS MORGAN     Past Surgical History:   Procedure Laterality Date   • COLONOSCOPY N/A     WNL PER PT, NO RECORDS,    • COLONOSCOPY N/A 04/07/2009    DR. GORGE BENAVIDEZ AT [place]   • PROSTATE SURGERY N/A 11/12/2010    TURP, DR. BRIGHT COLLAZO AT [facility]   • SKIN TAG REMOVAL N/A 12/20/2017    ANAL SKIN TAG X2, PERFORMED IN OFFICE, DR. LISA ORNATES   • TURP / TRANSURETHRAL INCISION / DRAINAGE PROSTATE  2010    Dr. Goodrich     No facility-administered medications prior to visit.     Outpatient Medications Prior to Visit   Medication Sig Dispense Refill   • aspirin 81 MG tablet Take 1 tablet by mouth Daily.     • atorvastatin (LIPITOR) 10 MG tablet TAKE 1 TABLET BY MOUTH EVERY DAY 90 tablet 0   • furosemide (LASIX) 40 MG tablet Take 1 tablet by mouth Daily.     • insulin degludec (TRESIBA FLEXTOUCH) 100 UNIT/ML solution pen-injector injection Start 10 units daily and titrate up 2 units every 3 days up to 30 units daily. 3 pen 5   • irbesartan (AVAPRO) 75 MG tablet Take 1 tablet by mouth Daily.     • Jardiance 25 MG tablet tablet Take 1 tablet by mouth Daily.     • levocetirizine (XYZAL) 5 MG tablet Take 1 tablet by mouth Every Evening. 90 tablet 3   • metoclopramide (REGLAN) 10 MG tablet Take 1 tablet by mouth Daily.     • montelukast (Singulair) 10 MG tablet Take 1 tablet by mouth Every Night. 30 tablet 3   • Multiple Vitamins-Minerals (PRESERVISION AREDS 2+MULTI VIT PO) Take  by mouth.     • omeprazole (priLOSEC) 40 MG capsule Take 1 capsule by mouth Daily. 90 capsule  "4   • potassium chloride (MICRO-K) 10 MEQ CR capsule Take 2 capsules by mouth Daily.     • predniSONE (DELTASONE) 10 MG tablet Take 1.5 tablets by mouth Daily.     • Symbicort 160-4.5 MCG/ACT inhaler Inhale 2 puffs 2 (Two) Times a Day.     • tiotropium (SPIRIVA) 18 MCG per inhalation capsule Place 1 capsule into inhaler and inhale Daily.     • cephalexin (KEFLEX) 500 MG capsule Take 1 capsule by mouth 3 (Three) Times a Day. 30 capsule 1   • Coenzyme Q10 (CO Q-10) 300 MG capsule Take  by mouth.         Allergies as of 05/09/2023   • (No Known Allergies)     Social History     Socioeconomic History   • Marital status:    Tobacco Use   • Smoking status: Never   • Smokeless tobacco: Never   Substance and Sexual Activity   • Alcohol use: Yes     Comment: RARELY   • Drug use: No   • Sexual activity: Defer     Family History   Problem Relation Age of Onset   • Lung cancer Mother    • Stroke Father    • Dementia Father    • Colon polyps Brother    • Colon polyps Brother      Review of Systems   Constitutional: Positive for malaise/fatigue and weight gain. Negative for chills, fever and weight loss.   Cardiovascular: Positive for dyspnea on exertion and leg swelling.   Respiratory: Negative for cough, snoring and wheezing.    Hematologic/Lymphatic: Negative for bleeding problem. Does not bruise/bleed easily.   Skin: Negative for color change.   Musculoskeletal: Positive for joint pain. Negative for falls and myalgias.   Gastrointestinal: Negative for melena.   Genitourinary: Negative for hematuria.   Neurological: Negative for excessive daytime sleepiness.   Psychiatric/Behavioral: Negative for depression. The patient is not nervous/anxious.         Objective:     Vitals:    05/09/23 1107 05/09/23 1201   BP: 110/62 110/68   BP Location: Left arm Right arm   Pulse: 82    Weight: 120 kg (265 lb)    Height: 185.4 cm (73\")      Body mass index is 34.96 kg/m².    Vitals reviewed.   Constitutional:       Appearance: " Well-developed.   Eyes:      General: No scleral icterus.        Right eye: No discharge.      Conjunctiva/sclera: Conjunctivae normal.      Pupils: Pupils are equal, round, and reactive to light.   HENT:      Head: Normocephalic.      Nose: Nose normal.   Neck:      Thyroid: No thyromegaly.      Vascular: No JVD.   Pulmonary:      Effort: Pulmonary effort is normal. No respiratory distress.      Breath sounds: Normal breath sounds. No wheezing. No rales.   Cardiovascular:      Normal rate. Occasional ectopic beats. Regular rhythm. Normal S1. Normal S2.      Murmurs: There is a grade 3 to 2/6 high frequency blowing holosystolic murmur at the apex, radiating to the axilla.      No gallop.   Pulses:     Carotid: 2+ bilaterally.     Radial: 2+ bilaterally.     Dorsalis pedis: 2+ bilaterally.     Posterior tibial: 2+ bilaterally.  Edema:     Pretibial: bilateral 2+ edema of the pretibial area.     Ankle: bilateral 2+ edema of the ankle.  Abdominal:      General: Bowel sounds are normal. There is no distension.      Palpations: Abdomen is soft.      Tenderness: There is no abdominal tenderness. There is no rebound.   Musculoskeletal: Normal range of motion.         General: No tenderness.      Cervical back: Normal range of motion and neck supple. Skin:     General: Skin is warm and dry.      Findings: No erythema or rash.   Neurological:      Mental Status: Alert and oriented to person, place, and time.   Psychiatric:         Behavior: Behavior normal.         Thought Content: Thought content normal.         Judgment: Judgment normal.       Lab Review:         ECG 12 Lead    Date/Time: 5/9/2023 12:07 PM  Performed by: Karishma Arango MD  Authorized by: Karishma Arango MD   Comparison: compared with previous ECG   Comparison to previous ECG: Right bundle branch block and left intrafascicular block are new  Rhythm: sinus rhythm  Conduction: right bundle branch block and left anterior fascicular block    Clinical impression:  abnormal EKG          Assessment:       Diagnosis Plan   1. Mixed hyperlipidemia        2. Type 2 diabetes mellitus with other circulatory complication, with long-term current use of insulin          Plan:       1.  Dyspnea on exertion.  This is likely multifactorial but a significant contribution is most likely from mitral regurgitation which appears to be likely severe.  I believe transthoracic echo is underestimating this.  He also has significant coronary artery calcification on prior CT scan and I suspect this is likely due to ischemic etiology.  He appears quite wet today and dyspneic while laying flat on the table for exam.  With this in mind we will admit to hospital initiate treatment with IV diuresis, consult nephrology given underlying renal insufficiency.  Will eventually need GAUDENCIO and then left and right heart cath.  2.  Congestive heart failure likely due to diastolic dysfunction and mitral regurgitation.  Assess as above.  3.  Renal insufficiency.  Creatinine was 1.43 on recent blood work with Dr. Jackman.  We will need to watch closely with diuresis.  4.  COVID pneumonia with residual dyspnea and questionable ILD.  Followed by Dr. Medina who does not feel this is likely a big contributor current symptoms  5.  Coronary artery disease with coronary artery calcification noted on CT scan 9/2022  6.  Hyperlipidemia  7.  Diabetes poorly controlled.  We will ask internal medicine service to help manage  8.  Severe pulmonary hypertension, likely due to combination of valvular heart disease asthma probable sleep apnea.  Address as above.  Will need right heart cath.    Time Spent: I spent 60 minutes caring for Vernon on this date of service. This time includes time spent by me in the following activities: preparing for the visit, reviewing tests, obtaining and/or reviewing a separately obtained history, performing a medically appropriate examination and/or evaluation, counseling and educating the  patient/family/caregiver, ordering medications, tests, or procedures, referring and communicating with other health care professionals, documenting information in the medical record, independently interpreting results and communicating that information with the patient/family/caregiver and care coordination.   I spent 3 minutes on the separately reported service of ECG and Echo. This time is not included in the time used to support the E/M service also reported today.        Your medication list          Accurate as of May 9, 2023  3:16 PM. If you have any questions, ask your nurse or doctor.            CONTINUE taking these medications      Instructions Last Dose Given Next Dose Due   aspirin 81 MG tablet      Take 1 tablet by mouth Daily.       atorvastatin 10 MG tablet  Commonly known as: LIPITOR      TAKE 1 TABLET BY MOUTH EVERY DAY       furosemide 40 MG tablet  Commonly known as: LASIX      Take 1 tablet by mouth Daily.       insulin degludec 100 UNIT/ML solution pen-injector injection  Commonly known as: TRESIBA FLEXTOUCH      Start 10 units daily and titrate up 2 units every 3 days up to 30 units daily.       irbesartan 75 MG tablet  Commonly known as: AVAPRO      Take 1 tablet by mouth Daily.       Jardiance 25 MG tablet tablet  Generic drug: empagliflozin      Take 1 tablet by mouth Daily.       levocetirizine 5 MG tablet  Commonly known as: XYZAL      Take 1 tablet by mouth Every Evening.       metoclopramide 10 MG tablet  Commonly known as: REGLAN      Take 1 tablet by mouth Daily.       montelukast 10 MG tablet  Commonly known as: Singulair      Take 1 tablet by mouth Every Night.       omeprazole 40 MG capsule  Commonly known as: priLOSEC      Take 1 capsule by mouth Daily.       potassium chloride 10 MEQ CR capsule  Commonly known as: MICRO-K      Take 2 capsules by mouth Daily.       predniSONE 10 MG tablet  Commonly known as: DELTASONE      Take 1.5 tablets by mouth Daily.       PRESERVISION AREDS  2+MULTI VIT PO      Take  by mouth.       Symbicort 160-4.5 MCG/ACT inhaler  Generic drug: budesonide-formoterol      Inhale 2 puffs 2 (Two) Times a Day.       tiotropium 18 MCG per inhalation capsule  Commonly known as: SPIRIVA      Place 1 capsule into inhaler and inhale Daily.              Patient is no longer taking -.  I corrected the med list to reflect this.  I did not stop these medications.      Dictated utilizing Dragon dictation

## 2023-05-09 NOTE — CONSULTS
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  Patient developed COVID.  In  requiring prednisone 40 mg daily he was able to wean gradually to 15 mg a day, followed by pulmonary, he attempted to further wean the prednisone off but his shortness of breath would worsen reason why he kept on taking prednisone 15 mg daily for the last year he has gained over 40 pounds of weight.    Patient presented for evaluation to cardiology where a echocardiogram showing hydrostatic pulmonary pressures with mitral regurgitation and admitted to cardiology to optimize his medical management found to have mild renal dysfunction with a creatinine 1.1-1.2 suggestive of chronic kidney disease stage II/III .     Cardiology is planning to do right-sided and left-sided cardiac cath and a trans esophageal echocardiogram    Review of Systems:   14 point review of systems is otherwise negative except for mentioned above on HPI    Personal History     Past Medical History:   Diagnosis Date   • Allergic rhinitis    • Arthritis    • BPH (benign prostatic hyperplasia)    • Diabetes mellitus     TYPE 2   • Foraminal stenosis of cervical region     C5-C6   •  GERD (gastroesophageal reflux disease)    • Hemorrhoids    • History of urinary retention 2010    S/P TURP   • Hyperlipidemia    • Macular degeneration    • PING (obstructive sleep apnea) 01/07/2016    MILD, SEES DR. AMARILIS MORGAN       Past Surgical History:   Procedure Laterality Date   • COLONOSCOPY N/A     WNL PER PT, NO RECORDS,    • COLONOSCOPY N/A 04/07/2009    DR. GORGE BENAVIDEZ AT [place]   • PROSTATE SURGERY N/A 11/12/2010    TURP, DR. BRIGHT COLLAZO AT [facility]   • SKIN TAG REMOVAL N/A 12/20/2017    ANAL SKIN TAG X2, PERFORMED IN OFFICE, DR. LISA ORANTES   • TURP / TRANSURETHRAL INCISION / DRAINAGE PROSTATE  2010    Dr. Goodrich       Family History: family history includes Colon polyps in his brother and brother; Dementia in his father; Lung cancer in his mother; Stroke in his father.    Social History:  reports that he has never smoked. He has never used smokeless tobacco. He reports current alcohol use. He reports that he does not use drugs.    Home Medications:  Prior to Admission medications    Medication Sig Start Date End Date Taking? Authorizing Provider   atorvastatin (LIPITOR) 10 MG tablet TAKE 1 TABLET BY MOUTH EVERY DAY 8/23/19  Yes Vernon Solorzano MD   furosemide (LASIX) 40 MG tablet Take 1 tablet by mouth Daily. 5/3/23  Yes ProviderJv MD   irbesartan (AVAPRO) 75 MG tablet Take 1 tablet by mouth Daily. 4/24/23  Yes Provider, MD Jv   Jardiance 25 MG tablet tablet Take 1 tablet by mouth Daily. 5/1/23  Yes ProviderJv MD   metoclopramide (REGLAN) 10 MG tablet Take 1 tablet by mouth Daily. 4/21/23  Yes ProviderJv MD   Multiple Vitamins-Minerals (PRESERVISION AREDS 2+MULTI VIT PO) Take  by mouth.   Yes Provider, Historical, MD   omeprazole (priLOSEC) 40 MG capsule Take 1 capsule by mouth Daily. 6/13/22  Yes Liza Oconnell III, NP-C   potassium chloride (MICRO-K) 10 MEQ CR capsule Take 2 capsules by mouth Daily. 5/3/23  Yes Provider  MD Jv   predniSONE (DELTASONE) 10 MG tablet Take 1.5 tablets by mouth Daily. 3/27/23  Yes ProviderJv MD   Symbicort 160-4.5 MCG/ACT inhaler Inhale 2 puffs 2 (Two) Times a Day. 5/1/23  Yes Jv Beatty MD   tiotropium (SPIRIVA) 18 MCG per inhalation capsule Place 1 capsule into inhaler and inhale Daily.   Yes Jv Beatty MD   aspirin 81 MG tablet Take 1 tablet by mouth Daily.    ProviderJv MD   insulin degludec (TRESIBA FLEXTOUCH) 100 UNIT/ML solution pen-injector injection Start 10 units daily and titrate up 2 units every 3 days up to 30 units daily. 5/31/22   Liza Oconnell III, NP-C   levocetirizine (XYZAL) 5 MG tablet Take 1 tablet by mouth Every Evening. 6/16/17   Dom Alfaro Jr., MD   montelukast (Singulair) 10 MG tablet Take 1 tablet by mouth Every Night. 10/31/22   Liza Oconnell III, NP-C   cephalexin (KEFLEX) 500 MG capsule Take 1 capsule by mouth 3 (Three) Times a Day. 6/16/17 5/9/23  Dom Alfaro Jr., MD   Coenzyme Q10 (CO Q-10) 300 MG capsule Take  by mouth.  5/9/23  ProviderJv MD       Allergies:  No Known Allergies    Objective     Vitals:   Temp:  [96.5 °F (35.8 °C)-97.6 °F (36.4 °C)] 97.6 °F (36.4 °C)  Heart Rate:  [76-93] 83  Resp:  [18] 18  BP: (110-118)/(60-81) 114/73  No intake or output data in the 24 hours ending 05/09/23 1954    Physical Exam:   Constitutional: Awake, alert, no acute distress.  Cushingoid expression obese with BMI 34  HEENT: Sclera anicteric, no conjunctival injection  Neck: Supple, no thyromegaly, no lymphadenopathy, trachea at midline, no JVD  Respiratory: Clear to auscultation bilaterally, nonlabored respiration  Cardiovascular: RRR systolic ejection murmur  Gastrointestinal: Positive bowel sounds, abdomen is soft, nontender and nondistended  : No palpable bladder  Musculoskeletal: Trace edema bilateral no clubbing or cyanosis  Psychiatric: Appropriate affect,  cooperative  Neurologic: Oriented x3, moving all extremities, normal speech and mental status  Skin: Warm and dry       Scheduled Meds:     aspirin, 81 mg, Oral, Daily  [START ON 5/10/2023] atorvastatin, 10 mg, Oral, Daily  budesonide-formoterol, 2 puff, Inhalation, BID  cetirizine, 10 mg, Oral, Daily  [START ON 5/10/2023] empagliflozin, 25 mg, Oral, Daily  enoxaparin, 40 mg, Subcutaneous, Q24H  furosemide, 40 mg, Intravenous, Q12H  metoclopramide, 10 mg, Oral, Daily  montelukast, 10 mg, Oral, Nightly  [START ON 5/10/2023] pantoprazole, 40 mg, Oral, Q AM  [START ON 5/10/2023] potassium chloride, 20 mEq, Oral, Daily  [START ON 5/10/2023] predniSONE, 10 mg, Oral, Daily  tiotropium bromide monohydrate, 2 puff, Inhalation, Daily - RT      IV Meds:        Results Reviewed:   I have personally reviewed the results from the time of this admission to 5/9/2023 19:54 EDT     Lab Results   Component Value Date    GLUCOSE 234 (H) 05/09/2023    CALCIUM 9.1 05/09/2023     05/09/2023    K 4.3 05/09/2023    CO2 24.4 05/09/2023     05/09/2023    BUN 19 05/09/2023    CREATININE 1.26 05/09/2023    EGFRIFAFRI 76 12/15/2021    EGFRIFNONA 66 12/15/2021    BCR 15.1 05/09/2023    ANIONGAP 8.6 05/09/2023      Lab Results   Component Value Date    ALBUMIN 4.2 05/09/2023           Assessment / Plan     ASSESSMENT:    -Chronic kidney disease stage II/IIIa.  Patient creatinine has been between 1.1 and 1.2 ( from 2016 to 2023 ) , seems that jump  slightly after initiation of  ARB's .  Likely etiology secondary to hypertensive nephrosclerosis, diabetic nephropathy and unclear, cardiorenal syndrome ?  We will obtain UA, UPCR and renal ultrasound for completeness of his renal dysfunction.  -Benign prostatic hypertrophy status post TURP  -Diabetes mellitus type 2 over 6 yrs ago , initially diet controlled for many years since 2022 , slightly worsening glycemia secondary to prednisone use.  Currently on empaglilfozin   -Diastolic  congestive heart failure with possible cor pulmonale .  Patient has been on oral diuretics transition to IV we will continue to follow closely.  I will discontinue his losartan to allow better renal perfusion and optimize diuretic effect.  -Pulmonary hypertension by echocardiogram cardiology planning to perform right-sided cardiac cath,   -Mitral regurgitation.  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  Accompanied with lower extremity edema and cushingoid facial expressions, patient is agreeable to attempt low titration of steroids   -History of obstructive sleep apnea likely aggravated by significant weight gain over 40 pounds in the last 12 months, that could have been a contributor factor for his worsening pulmonary hypertension    PLAN:  -I agree with transition of oral diuretics to IV  -I will discontinue losartan while attempting to optimize volume status, and allowing better renal perfusion his blood pressure is borderline low.  -I discussed with patient the risk of contrast-induced nephropathy.  Dr. Solorzano  verbalized understanding  -We will obtain a UA, UPCR and renal ultrasound   -Patient would benefit from initiation of ARB, once his volume status has been optimized.  -We will continue surveillance labs    Thank you for involving us in the care of Vernon Solorzano MD.  Please feel free to call with any questions.    Jesús Braxton MD  05/09/23  19:54 EDT    [facility]  [phone]      Please note that portions of this note were completed with a voice recognition program.

## 2023-05-10 PROBLEM — I50.9 NEW ONSET OF CONGESTIVE HEART FAILURE: Status: ACTIVE | Noted: 2023-05-10

## 2023-05-10 LAB
ANION GAP SERPL CALCULATED.3IONS-SCNC: 13.9 MMOL/L (ref 5–15)
BUN SERPL-MCNC: 20 MG/DL (ref 8–23)
BUN/CREAT SERPL: 16.9 (ref 7–25)
CALCIUM SPEC-SCNC: 8.5 MG/DL (ref 8.6–10.5)
CHLORIDE SERPL-SCNC: 103 MMOL/L (ref 98–107)
CO2 SERPL-SCNC: 26.1 MMOL/L (ref 22–29)
CREAT SERPL-MCNC: 1.18 MG/DL (ref 0.76–1.27)
EGFRCR SERPLBLD CKD-EPI 2021: 64.8 ML/MIN/1.73
GLUCOSE BLDC GLUCOMTR-MCNC: 137 MG/DL (ref 70–130)
GLUCOSE BLDC GLUCOMTR-MCNC: 238 MG/DL (ref 70–130)
GLUCOSE BLDC GLUCOMTR-MCNC: 433 MG/DL (ref 70–130)
GLUCOSE BLDC GLUCOMTR-MCNC: 92 MG/DL (ref 70–130)
GLUCOSE SERPL-MCNC: 93 MG/DL (ref 65–99)
POTASSIUM SERPL-SCNC: 3.2 MMOL/L (ref 3.5–5.2)
SODIUM SERPL-SCNC: 143 MMOL/L (ref 136–145)

## 2023-05-10 PROCEDURE — 80048 BASIC METABOLIC PNL TOTAL CA: CPT | Performed by: INTERNAL MEDICINE

## 2023-05-10 PROCEDURE — 82948 REAGENT STRIP/BLOOD GLUCOSE: CPT

## 2023-05-10 PROCEDURE — 25010000002 FUROSEMIDE PER 20 MG: Performed by: INTERNAL MEDICINE

## 2023-05-10 PROCEDURE — 94799 UNLISTED PULMONARY SVC/PX: CPT

## 2023-05-10 PROCEDURE — 94761 N-INVAS EAR/PLS OXIMETRY MLT: CPT

## 2023-05-10 PROCEDURE — 63710000001 INSULIN LISPRO (HUMAN) PER 5 UNITS: Performed by: NURSE PRACTITIONER

## 2023-05-10 PROCEDURE — 63710000001 PREDNISONE PER 5 MG: Performed by: INTERNAL MEDICINE

## 2023-05-10 PROCEDURE — 99232 SBSQ HOSP IP/OBS MODERATE 35: CPT | Performed by: INTERNAL MEDICINE

## 2023-05-10 PROCEDURE — 94760 N-INVAS EAR/PLS OXIMETRY 1: CPT

## 2023-05-10 PROCEDURE — 94664 DEMO&/EVAL PT USE INHALER: CPT

## 2023-05-10 RX ORDER — ONDANSETRON 2 MG/ML
4 INJECTION INTRAMUSCULAR; INTRAVENOUS EVERY 6 HOURS PRN
Status: DISCONTINUED | OUTPATIENT
Start: 2023-05-10 | End: 2023-05-12 | Stop reason: HOSPADM

## 2023-05-10 RX ORDER — POTASSIUM CHLORIDE 750 MG/1
20 TABLET, FILM COATED, EXTENDED RELEASE ORAL ONCE
Status: COMPLETED | OUTPATIENT
Start: 2023-05-10 | End: 2023-05-10

## 2023-05-10 RX ORDER — FUROSEMIDE 10 MG/ML
80 INJECTION INTRAMUSCULAR; INTRAVENOUS ONCE
Status: COMPLETED | OUTPATIENT
Start: 2023-05-10 | End: 2023-05-10

## 2023-05-10 RX ORDER — POTASSIUM CHLORIDE 750 MG/1
40 TABLET, FILM COATED, EXTENDED RELEASE ORAL ONCE
Status: COMPLETED | OUTPATIENT
Start: 2023-05-10 | End: 2023-05-10

## 2023-05-10 RX ORDER — ACETAMINOPHEN 325 MG/1
650 TABLET ORAL EVERY 6 HOURS PRN
Status: DISCONTINUED | OUTPATIENT
Start: 2023-05-10 | End: 2023-05-12 | Stop reason: HOSPADM

## 2023-05-10 RX ADMIN — ASPIRIN 81 MG: 81 TABLET, COATED ORAL at 08:49

## 2023-05-10 RX ADMIN — POTASSIUM CHLORIDE 40 MEQ: 750 TABLET, EXTENDED RELEASE ORAL at 13:05

## 2023-05-10 RX ADMIN — BUDESONIDE AND FORMOTEROL FUMARATE DIHYDRATE 2 PUFF: 160; 4.5 AEROSOL RESPIRATORY (INHALATION) at 18:42

## 2023-05-10 RX ADMIN — PANTOPRAZOLE SODIUM 40 MG: 40 TABLET, DELAYED RELEASE ORAL at 08:50

## 2023-05-10 RX ADMIN — BUDESONIDE AND FORMOTEROL FUMARATE DIHYDRATE 2 PUFF: 160; 4.5 AEROSOL RESPIRATORY (INHALATION) at 07:30

## 2023-05-10 RX ADMIN — TIOTROPIUM BROMIDE INHALATION SPRAY 2 PUFF: 3.12 SPRAY, METERED RESPIRATORY (INHALATION) at 07:30

## 2023-05-10 RX ADMIN — FUROSEMIDE 80 MG: 10 INJECTION, SOLUTION INTRAMUSCULAR; INTRAVENOUS at 13:05

## 2023-05-10 RX ADMIN — METOCLOPRAMIDE 10 MG: 10 TABLET ORAL at 08:48

## 2023-05-10 RX ADMIN — CETIRIZINE HYDROCHLORIDE 10 MG: 10 TABLET ORAL at 08:49

## 2023-05-10 RX ADMIN — EMPAGLIFLOZIN 25 MG: 25 TABLET, FILM COATED ORAL at 08:50

## 2023-05-10 RX ADMIN — PREDNISONE 10 MG: 10 TABLET ORAL at 08:49

## 2023-05-10 RX ADMIN — POTASSIUM CHLORIDE 20 MEQ: 750 TABLET, EXTENDED RELEASE ORAL at 20:58

## 2023-05-10 RX ADMIN — INSULIN LISPRO 4 UNITS: 100 INJECTION, SOLUTION INTRAVENOUS; SUBCUTANEOUS at 19:02

## 2023-05-10 RX ADMIN — ATORVASTATIN CALCIUM 10 MG: 20 TABLET, FILM COATED ORAL at 08:49

## 2023-05-10 RX ADMIN — FUROSEMIDE 40 MG: 10 INJECTION, SOLUTION INTRAMUSCULAR; INTRAVENOUS at 04:21

## 2023-05-10 RX ADMIN — FUROSEMIDE 80 MG: 10 INJECTION, SOLUTION INTRAMUSCULAR; INTRAVENOUS at 20:56

## 2023-05-10 RX ADMIN — POTASSIUM CHLORIDE 20 MEQ: 750 TABLET, EXTENDED RELEASE ORAL at 08:50

## 2023-05-10 RX ADMIN — INSULIN GLARGINE-YFGN 10 UNITS: 100 INJECTION, SOLUTION SUBCUTANEOUS at 21:22

## 2023-05-10 NOTE — CONSULTS
CONSULT NOTE    INTERNAL MEDICINE   [facility]       Patient Identification:  Name: Vernon MD Rashad  Age: 72 y.o.  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; he was seen in the office and diagnosed with new onset chf; he has been short of air; he is followed by dr jordan;he has had issues since last year when he had covid; we are asked to see him regarding  Management of his diabetes    History of Present Illness:   As above      Past Medical History:  Past Medical History:   Diagnosis Date   • Allergic rhinitis    • Arthritis    • BPH (benign prostatic hyperplasia)    • Diabetes mellitus     TYPE 2   • Foraminal stenosis of cervical region     C5-C6   • GERD (gastroesophageal reflux disease)    • Hemorrhoids    • History of urinary retention     S/P TURP   • Hyperlipidemia    • Macular degeneration    • PING (obstructive sleep apnea) 2016    MILD, SEES DR. AMARILIS MORGAN     Past Surgical History:  Past Surgical History:   Procedure Laterality Date   • COLONOSCOPY N/A     WNL PER PT, NO RECORDS,    • COLONOSCOPY N/A 2009    DR. GORGE BENAVIDEZ AT [place]   • PROSTATE SURGERY N/A 2010    TURP, DR. BRIGHT COLLAZO AT [facility]   • SKIN TAG REMOVAL N/A 2017    ANAL SKIN TAG X2, PERFORMED IN OFFICE, DR. LISA ORANTES   • TURP / TRANSURETHRAL INCISION / DRAINAGE PROSTATE      Dr. Goodrich      Home Meds:  Medications Prior to Admission   Medication Sig Dispense Refill Last Dose   • atorvastatin (LIPITOR) 10 MG tablet TAKE 1 TABLET BY MOUTH EVERY DAY 90 tablet 0 2023   • furosemide (LASIX) 40 MG tablet Take 1 tablet by mouth Daily.   2023   • irbesartan (AVAPRO) 75  MG tablet Take 1 tablet by mouth Daily.   5/9/2023   • Jardiance 25 MG tablet tablet Take 1 tablet by mouth Daily.   5/9/2023   • metoclopramide (REGLAN) 10 MG tablet Take 1 tablet by mouth Daily.   5/8/2023 at 2000   • Multiple Vitamins-Minerals (PRESERVISION AREDS 2+MULTI VIT PO) Take  by mouth.   5/9/2023   • omeprazole (priLOSEC) 40 MG capsule Take 1 capsule by mouth Daily. 90 capsule 4 5/9/2023   • potassium chloride (MICRO-K) 10 MEQ CR capsule Take 2 capsules by mouth Daily.   5/9/2023   • predniSONE (DELTASONE) 10 MG tablet Take 1.5 tablets by mouth Daily.   5/9/2023   • Symbicort 160-4.5 MCG/ACT inhaler Inhale 2 puffs 2 (Two) Times a Day.   5/9/2023   • tiotropium (SPIRIVA) 18 MCG per inhalation capsule Place 1 capsule into inhaler and inhale Daily.   5/9/2023   • aspirin 81 MG tablet Take 1 tablet by mouth Daily.      • insulin degludec (TRESIBA FLEXTOUCH) 100 UNIT/ML solution pen-injector injection Start 10 units daily and titrate up 2 units every 3 days up to 30 units daily. 3 pen 5    • levocetirizine (XYZAL) 5 MG tablet Take 1 tablet by mouth Every Evening. 90 tablet 3    • montelukast (Singulair) 10 MG tablet Take 1 tablet by mouth Every Night. 30 tablet 3      Current Meds:     Current Facility-Administered Medications:   •  aluminum-magnesium hydroxide-simethicone (MAALOX MAX) 400-400-40 MG/5ML suspension 15 mL, 15 mL, Oral, Q6H PRN, Jessica Stiles, APRN, 15 mL at 05/09/23 2148  •  aspirin EC tablet 81 mg, 81 mg, Oral, Daily, Karishma Arango MD, 81 mg at 05/09/23 2119  •  [START ON 5/10/2023] atorvastatin (LIPITOR) tablet 10 mg, 10 mg, Oral, Daily, Karishma Arango MD  •  budesonide-formoterol (SYMBICORT) 160-4.5 MCG/ACT inhaler 2 puff, 2 puff, Inhalation, BID, Karishma Arango MD, 2 puff at 05/09/23 1958  •  cetirizine (zyrTEC) tablet 10 mg, 10 mg, Oral, Daily, Karishma Arango MD  •  dextrose (D50W) (25 g/50 mL) IV injection 25 g, 25 g, Intravenous, Q15 Min PRN, Jessica Stiles APRN  •  dextrose  (GLUTOSE) oral gel 15 g, 15 g, Oral, Q15 Min PRN, Jessica Stiles APRN  •  [START ON 5/10/2023] empagliflozin (JARDIANCE) tablet 25 mg, 25 mg, Oral, Daily, Karishma Arango MD  •  Enoxaparin Sodium (LOVENOX) syringe 40 mg, 40 mg, Subcutaneous, Q24H, Karishma Arango MD, 40 mg at 05/09/23 1645  •  furosemide (LASIX) injection 40 mg, 40 mg, Intravenous, Q12H, Karishma Arango MD, 40 mg at 05/09/23 1646  •  glucagon (GLUCAGEN) injection 1 mg, 1 mg, Intramuscular, Q15 Min PRN, Jessica Stiles APRN  •  [START ON 5/10/2023] insulin lispro (HUMALOG/ADMELOG) injection 2-9 Units, 2-9 Units, Subcutaneous, TID With Meals, Jessica Stiles APRN  •  metoclopramide (REGLAN) tablet 10 mg, 10 mg, Oral, Daily, Karishma Arango MD, 10 mg at 05/09/23 2120  •  montelukast (SINGULAIR) tablet 10 mg, 10 mg, Oral, Nightly, Karishma Arango MD  •  [START ON 5/10/2023] pantoprazole (PROTONIX) EC tablet 40 mg, 40 mg, Oral, Q AM, Karishma Arango MD  •  [START ON 5/10/2023] potassium chloride (K-DUR,KLOR-CON) ER tablet 20 mEq, 20 mEq, Oral, Daily, Karishma Arango MD  •  [START ON 5/10/2023] predniSONE (DELTASONE) tablet 10 mg, 10 mg, Oral, Daily, Karishma Arango MD  •  tiotropium (SPIRIVA RESPIMAT) 2.5 mcg/act aerosol solution inhaler, 2 puff, Inhalation, Daily - RT, Karishma Arango MD  Allergies:  No Known Allergies  Social History:   Social History     Socioeconomic History   • Marital status:    Tobacco Use   • Smoking status: Never   • Smokeless tobacco: Never   Vaping Use   • Vaping Use: Never used   Substance and Sexual Activity   • Alcohol use: Yes     Comment: RARELY   • Drug use: No   • Sexual activity: Defer     Partners: Female     Family History:  Family History   Problem Relation Age of Onset   • Lung cancer Mother    • Stroke Father    • Dementia Father    • Colon polyps Brother    • Colon polyps Brother           Review of Systems  See history of present illness and past medical history.  Patient denies headache, dizziness,  "syncope, falls, trauma, change in vision, change in hearing, change in taste, changes in weight, changes in appetite, focal weakness, numbness, or paresthesia.  Patient denies chest pain,  cough, sinus pressure, rhinorrhea, epistaxis, hemoptysis, nausea, vomiting,hematemesis, diarrhea, constipation or hematchezia.  Denies cold or heat intolerance, polydipsia, polyuria, polyphagia. Denies hematuria, pyuria, dysuria, hesitancy, frequency or urgency. Denies consumption of raw and under cooked meats foods or change in water source.  Denies fever, chills, sweats, night sweats.  Denies missing any routine medications. Remainder of ROS is negative.      Vitals:   /73 (BP Location: Right arm, Patient Position: Lying)   Pulse 79   Temp 97.6 °F (36.4 °C) (Oral)   Resp 18   Ht 185.4 cm (73\")   Wt 120 kg (265 lb)   SpO2 95%   BMI 34.96 kg/m²   I/O: No intake or output data in the 24 hours ending 05/09/23 5278  Exam:  General Appearance:    Alert, cooperative, no distress, appears stated age   Head:    Normocephalic, without obvious abnormality, atraumatic   Eyes:    PERRL, conjunctivae/corneas clear, EOM's intact, both eyes   Ears:    Normal external ear canals, both ears   Nose:   Nares normal, septum midline, mucosa normal, no drainage    or sinus tenderness   Throat:   Lips, tongue, gums normal; oral mucosa pink and moist   Neck:   Supple, symmetrical, trachea midline, no adenopathy;     thyroid:  no enlargement/tenderness/nodules; no carotid    bruit or JVD   Back:     Symmetric, no curvature, ROM normal, no CVA tenderness   Lungs:     Decreased breath sounds bilaterally, respirations unlabored   Chest Wall:    No tenderness or deformity    Heart:    Regular rate and rhythm, S1 and S2 normal, no murmur, rub   or gallop   Abdomen:     Soft, nontender, bowel sounds active all four quadrants,     no masses, no hepatomegaly, no splenomegaly   Extremities:   Extremities normal, atraumatic, no cyanosis or edema "   Pulses:   Pulses palpable in all extremities; symmetric all extremities   Skin:   Skin color normal, Skin is warm and dry,  no rashes or palpable lesions        Data Review:  Labs in chart were reviewed.  WBC   Date Value Ref Range Status   05/09/2023 11.49 (H) 3.40 - 10.80 10*3/mm3 Final     Hemoglobin   Date Value Ref Range Status   05/09/2023 13.7 13.0 - 17.7 g/dL Final     Hematocrit   Date Value Ref Range Status   05/09/2023 41.9 37.5 - 51.0 % Final     Platelets   Date Value Ref Range Status   05/09/2023 271 140 - 450 10*3/mm3 Final     Sodium   Date Value Ref Range Status   05/09/2023 139 136 - 145 mmol/L Final     Potassium   Date Value Ref Range Status   05/09/2023 4.3 3.5 - 5.2 mmol/L Final     Chloride   Date Value Ref Range Status   05/09/2023 106 98 - 107 mmol/L Final     CO2   Date Value Ref Range Status   05/09/2023 24.4 22.0 - 29.0 mmol/L Final     BUN   Date Value Ref Range Status   05/09/2023 19 8 - 23 mg/dL Final     Creatinine   Date Value Ref Range Status   05/09/2023 1.26 0.76 - 1.27 mg/dL Final     Glucose   Date Value Ref Range Status   05/09/2023 234 (H) 65 - 99 mg/dL Final     Calcium   Date Value Ref Range Status   05/09/2023 9.1 8.6 - 10.5 mg/dL Final     AST (SGOT)   Date Value Ref Range Status   05/09/2023 14 1 - 40 U/L Final     ALT (SGPT)   Date Value Ref Range Status   05/09/2023 29 1 - 41 U/L Final     Alkaline Phosphatase   Date Value Ref Range Status   05/09/2023 75 39 - 117 U/L Final               Imaging Results (Last 7 Days)     Procedure Component Value Units Date/Time    US Renal Bilateral [[id number]] Collected: 05/09/23 2116     Updated: 05/09/23 2116    Narrative:      RENAL ULTRASOUND     HISTORY: 72-year-old male with chronic renal disease. BPH.     TECHNIQUE: Real-time ultrasound of the kidneys and bladder was  performed. Confirmatory images were obtained.     FINDINGS: The right kidney measures 15.8 x 6.4 x 6.5 cm and the left  kidney measures 13.9 x 6.8 x 5.3 cm.  There is an approximately 6 cm cyst  with septations at the upper pole of the right kidney and there is a 1.5  cm cyst at the upper pole of the left kidney. There are no renal stones  and there is no hydronephrosis. Bilateral ureteral jets are seen.  Urinary bladder is moderately distended.                 Past Medical History:   Diagnosis Date   • Allergic rhinitis    • Arthritis    • BPH (benign prostatic hyperplasia)    • Diabetes mellitus     TYPE 2   • Foraminal stenosis of cervical region     C5-C6   • GERD (gastroesophageal reflux disease)    • Hemorrhoids    • History of urinary retention 2010    S/P TURP   • Hyperlipidemia    • Macular degeneration    • PING (obstructive sleep apnea) 01/07/2016    MILD, SEES DR. AMARILIS MORGAN       Assessment:  Acute on chronic diastolic chf  Pulmonary hypertension  Diabetes  Obesity  ckd3.      Plan:  Continue jardiance  He recently stopped taking his metformin because thought it made him worse  Continue insulin  Right and left heart cath is planned  Nephrology has seen him  Thanks for involving us in his care  Will follow with you  Lyndsay Stallworth MD   5/9/2023  22:45 EDT

## 2023-05-10 NOTE — PLAN OF CARE
Goal Outcome Evaluation:      Pt A&Ox4. Pt does not complain of any pain this shift, I's & O's monitored. Pt refused bed alarm, but does not appear to be a falls risk. Pt NPO at midnight, to receive GAUDENCIO in the morning and possible heart cath, consent signed and place in folder. Pt resting in bed calmly with family at beside, this RN will continue to monitor   Problem: Adult Inpatient Plan of Care  Goal: Plan of Care Review  Outcome: Ongoing, Progressing

## 2023-05-10 NOTE — PROGRESS NOTES
LOS: 1 day   Patient Care Team:  Liza Oconnell III, NP-C as PCP - General (Family Medicine)  Corey Lao Jr., MD (Inactive) as Consulting Physician (Urology)    Chief Complaint: Follow-up acute on chronic diastolic and valvular CHF, mitral regurgitation, coronary artery calcification.    Interval History: Feeling better after the IV diuretics.  However, he still has substantial edema.  Not feeling significant shortness of breath today.  No chest pain.    Vital Signs:  Temp:  [96.5 °F (35.8 °C)-98.6 °F (37 °C)] 97.6 °F (36.4 °C)  Heart Rate:  [70-92] 82  Resp:  [18] 18  BP: (113-123)/(70-91) 113/70    Intake/Output Summary (Last 24 hours) at 5/10/2023 1346  Last data filed at 5/9/2023 2200  Gross per 24 hour   Intake 220 ml   Output 200 ml   Net 20 ml       Physical Exam:   General Appearance:    No acute distress, alert and oriented x4   Lungs:     Clear to auscultation bilaterally     Heart:    Regular rhythm and normal rate. III/VI SM LLSB and apex.   Abdomen:     Soft, nontender, nondistended.    Extremities:   2-3+ edema of the lower extremities bilaterally     Results Review:    Results from last 7 days   Lab Units 05/10/23  0559   SODIUM mmol/L 143   POTASSIUM mmol/L 3.2*   CHLORIDE mmol/L 103   CO2 mmol/L 26.1   BUN mg/dL 20   CREATININE mg/dL 1.18   GLUCOSE mg/dL 93   CALCIUM mg/dL 8.5*         Results from last 7 days   Lab Units 05/09/23  1524   WBC 10*3/mm3 11.49*   HEMOGLOBIN g/dL 13.7   HEMATOCRIT % 41.9   PLATELETS 10*3/mm3 271                       I reviewed the patient's new clinical results.        Assessment:  1.  Acute on chronic diastolic and valvular CHF  2.  Moderate to severe mitral regurgitation  3.  Moderate to severe left atrial enlargement  4.  Elevated RVSP of 60 mmHg, suggestive of pulmonary hypertension  5.  Coronary artery calcification  6.  History of COVID-19 pneumonia with residual dyspnea and questionable interstitial lung disease  7.  Poorly  controlled diabetes  8.  Stage IIIa chronic kidney disease  9.  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  BPH, status post TURP    Plan:  -Discussed with the patient at length.  He really needs evaluation of his mitral regurgitation to see if he needs potential mitral valve surgery.  I agree that the posterior leaflet appears to be tethered, and there is at least moderate to severe (if not severe) mitral regurgitation.  He also has a mitral regurgitation murmur.    -Plan for GAUDENCIO tomorrow morning in the PACU.  This will be followed by a right and left heart catheterization later in the day.    -Some of his edema is from third spacing from his recent chronic steroid use.  I am going to give him Lasix 80 mg IV x1 today.    -Appreciate nephrology and general medicine consults.  Prednisone being titrated downward slowly.  Diabetes being addressed.  Continue Jardiance.    -Stop prophylactic Lovenox in preparation for heart catheterization tomorrow.    -Potassium being repleted.    Corwin Hobson MD  05/10/23  13:46 EDT

## 2023-05-10 NOTE — PLAN OF CARE
Goal Outcome Evaluation:    Problem: Adult Inpatient Plan of Care  Goal: Absence of Hospital-Acquired Illness or Injury  Intervention: Prevent and Manage VTE (Venous Thromboembolism) Risk  Recent Flowsheet Documentation  Taken 5/10/2023 0400 by Justin Morse RN  Activity Management: up ad estevan  Taken 5/10/2023 0200 by Justin Morse RN  Activity Management: up ad estevan  Taken 5/10/2023 0010 by Justin Morse RN  Activity Management: up ad estevan  Range of Motion: ROM (range of motion) performed  Taken 5/9/2023 2200 by Justin Morse RN  Activity Management: up ad estevan  Taken 5/9/2023 2010 by Justin Morse RN  Activity Management: up ad estevan  Range of Motion: ROM (range of motion) performed   Plan of Care Reviewed With: patient AOX4, room air, denies pain or distress, gave lasix will continue to monitor

## 2023-05-10 NOTE — PROGRESS NOTES
[facility] Progress Note      Patient Name: Vernon Solorzano MD  : 1950  MRN: [id number]  Primary Care Physician:  Liza Oconnell III, RACHAEL  Date of admission: 2023    Subjective     Interval History:   Follow up CKD 2.  Bored. Urine not recorded after IV lasix 40 mg yesterday. Weight reportedly down. Lower ext edema. Eating. Drinking a lot for dry mouth.      Review of Systems:   As noted above    Objective     Vitals:   Temp:  [96.5 °F (35.8 °C)-98.6 °F (37 °C)] 97.6 °F (36.4 °C)  Heart Rate:  [70-92] 82  Resp:  [18] 18  BP: (113-123)/(70-91) 113/70    Intake/Output Summary (Last 24 hours) at 5/10/2023 1342  Last data filed at 2023 2200  Gross per 24 hour   Intake 220 ml   Output 200 ml   Net 20 ml       Physical Exam:    General Appearance: alert, oriented x 3, no acute distress . Mild dyspnea with prolonged conversation  Skin: warm and dry  HEENT: oral mucosa normal, nonicteric sclera  Neck: supple, no JVD  Lungs: clear to auscultation.   Heart: RRR, normal S1 and S2  Abdomen: soft, nontender, distended. Body wall edema. + bs  : no palpable bladder  Extremities: 2+ lower ext ,1+ bilateral hand edema.   Neuro: normal speech and mental status     Scheduled Meds:     aspirin, 81 mg, Oral, Daily  atorvastatin, 10 mg, Oral, Daily  budesonide-formoterol, 2 puff, Inhalation, BID  cetirizine, 10 mg, Oral, Daily  empagliflozin, 25 mg, Oral, Daily  enoxaparin, 40 mg, Subcutaneous, Q24H  insulin glargine, 10 Units, Subcutaneous, Nightly  insulin lispro, 2-9 Units, Subcutaneous, TID With Meals  metoclopramide, 10 mg, Oral, Daily  montelukast, 10 mg, Oral, Nightly  pantoprazole, 40 mg, Oral, Q AM  potassium chloride, 20 mEq, Oral, Daily  predniSONE, 10 mg, Oral, Daily  tiotropium bromide monohydrate, 2 puff, Inhalation, Daily - RT      IV Meds:        Results Reviewed:   I have personally reviewed the results from the time of this admission to 5/10/2023 13:42 EDT      Results from last 7 days   Lab Units 05/10/23  0559 05/09/23  1524   SODIUM mmol/L 143 139   POTASSIUM mmol/L 3.2* 4.3   CHLORIDE mmol/L 103 106   CO2 mmol/L 26.1 24.4   BUN mg/dL 20 19   CREATININE mg/dL 1.18 1.26   CALCIUM mg/dL 8.5* 9.1   BILIRUBIN mg/dL  --  0.4   ALK PHOS U/L  --  75   ALT (SGPT) U/L  --  29   AST (SGOT) U/L  --  14   GLUCOSE mg/dL 93 234*       Estimated Creatinine Clearance: 73.3 mL/min (by C-G formula based on SCr of 1.18 mg/dL).                Results from last 7 days   Lab Units 05/09/23  1524   WBC 10*3/mm3 11.49*   HEMOGLOBIN g/dL 13.7   PLATELETS 10*3/mm3 271             Assessment / Plan     ASSESSMENT:  1. CKD II, baseline 1.1-1.2. Renal US negative except cyst, UA negative except glucose. Stable creatinine.  Preparation for cardiac cath tomorrow. IV diuretic.   2. Acute on chronic diastolic heart failure, Pulmonary HTN, mitral regurg.  Left and right heart cath and  GAUDENCIO planned tomorrow.  No pre-contrast IVF.   3. COVID Pna with persistent dyspnea, Possible ILD.  4. DM2, poorly controlled. On prednisone.    5. Macular degeneration.  PLAN:  1. Replace K.   2. IV diuretic. Repeat IV lasix 80 mg this evening.   3. Advised patient to drink less fluid.   Joan Batista MD  05/10/23  13:42 EDT    [facility]  [phone]

## 2023-05-10 NOTE — PROGRESS NOTES
Name: Vernon Solorzano MD ADMIT: 2023   : 1950  PCP: Liza Oconnell III, NP-C    MRN: [id number] LOS: 1 days   AGE/SEX: 72 y.o. male  ROOM: [facility]     Subjective   Subjective   Referring provider: Dr. Arango  Reason for follow-up: Type 2 DM  No acute events. Patient has no new complaints. Taking PO without nausea or vomiting.     Objective   Objective   Vital Signs  Temp:  [97.6 °F (36.4 °C)-98.6 °F (37 °C)] 97.6 °F (36.4 °C)  Heart Rate:  [70-92] 84  Resp:  [18] 18  BP: (113-123)/(70-91) 115/86  SpO2:  [92 %-99 %] 92 %  on   ;   Device (Oxygen Therapy): room air  Body mass index is 33.7 kg/m².  Physical Exam  Vitals and nursing note reviewed.   Constitutional:       General: He is not in acute distress.     Appearance: He is not diaphoretic.   HENT:      Head: Normocephalic and atraumatic.      Nose: Nose normal.      Mouth/Throat:      Mouth: Mucous membranes are moist.      Pharynx: Oropharynx is clear.   Eyes:      Conjunctiva/sclera: Conjunctivae normal.      Pupils: Pupils are equal, round, and reactive to light.   Cardiovascular:      Rate and Rhythm: Normal rate and regular rhythm.   Pulmonary:      Effort: Pulmonary effort is normal.      Breath sounds: Normal breath sounds.   Abdominal:      General: Bowel sounds are normal.      Palpations: Abdomen is soft.      Tenderness: There is no abdominal tenderness.   Musculoskeletal:         General: Swelling (3+ BLE) present. No tenderness.      Cervical back: Neck supple.   Skin:     General: Skin is warm and dry.   Neurological:      General: No focal deficit present.      Mental Status: He is alert and oriented to person, place, and time.   Psychiatric:         Mood and Affect: Mood normal.         Behavior: Behavior normal.       Results Review     I reviewed the patient's new clinical results.  Results from last 7 days   Lab Units 23  1524   WBC 10*3/mm3 11.49*   HEMOGLOBIN g/dL 13.7   PLATELETS 10*3/mm3 271     Results from  last 7 days   Lab Units 05/10/23  0559 05/09/23  1524   SODIUM mmol/L 143 139   POTASSIUM mmol/L 3.2* 4.3   CHLORIDE mmol/L 103 106   CO2 mmol/L 26.1 24.4   BUN mg/dL 20 19   CREATININE mg/dL 1.18 1.26   GLUCOSE mg/dL 93 234*   EGFR mL/min/1.73 64.8 59.8*     Results from last 7 days   Lab Units 05/09/23  1524   ALBUMIN g/dL 4.2   BILIRUBIN mg/dL 0.4   ALK PHOS U/L 75   AST (SGOT) U/L 14   ALT (SGPT) U/L 29     Results from last 7 days   Lab Units 05/10/23  0559 05/09/23  1524   CALCIUM mg/dL 8.5* 9.1   ALBUMIN g/dL  --  4.2       Glucose   Date/Time Value Ref Range Status   05/10/2023 1145 137 (H) 70 - 130 mg/dL Final     Comment:     Meter: [id number] : [id number] Ant Gant NA   05/10/2023 0605 92 70 - 130 mg/dL Final     Comment:     Meter: [id number] : [id number] Nikkie Jauregui NA   05/09/2023 2105 235 (H) 70 - 130 mg/dL Final     Comment:     Meter: [id number] : [id number] Nikkie Jauregui NA   05/09/2023 1724 308 (H) 70 - 130 mg/dL Final     Comment:     Meter: [id number] : [id number] Pallavi BARRETO       No radiology results for the last day  I have personally reviewed all medications:  Scheduled Medications  aspirin, 81 mg, Oral, Daily  atorvastatin, 10 mg, Oral, Daily  budesonide-formoterol, 2 puff, Inhalation, BID  cetirizine, 10 mg, Oral, Daily  empagliflozin, 25 mg, Oral, Daily  furosemide, 80 mg, Intravenous, Once  insulin glargine, 10 Units, Subcutaneous, Nightly  insulin lispro, 2-9 Units, Subcutaneous, TID With Meals  metoclopramide, 10 mg, Oral, Daily  montelukast, 10 mg, Oral, Nightly  pantoprazole, 40 mg, Oral, Q AM  potassium chloride, 20 mEq, Oral, Daily  potassium chloride, 20 mEq, Oral, Once  predniSONE, 10 mg, Oral, Daily  tiotropium bromide monohydrate, 2 puff, Inhalation, Daily - RT    Infusions   Diet  Diet: Diabetic Diets; Consistent Carbohydrate; Texture: Regular Texture (IDDSI 7); Fluid Consistency: Thin (IDDSI 0)  NPO Diet NPO Type: Sips with Meds    I have  personally reviewed:  [x]  Laboratory   []  Microbiology   [x]  Radiology   [x]  EKG/Telemetry  []  Cardiology/Vascular   []  Pathology    [x]  Records    Assessment/Plan     Active Hospital Problems    Diagnosis  POA   • **New onset of congestive heart failure [I50.9]  Yes   • DM (diabetes mellitus), type 2 [E11.9]  Yes   • Mixed hyperlipidemia [E78.2]  Yes      Resolved Hospital Problems   No resolved problems to display.   New Onset CHF  - management per primary team and nephrology-he is on IV diuretics and GAUDENCIO as well as left and right heart cath are planned for tomorrow    Type 2 DM  - complications include CKD stage 3a  - BG acceptable  - he is on jardiance and Tresiba 48 units nightly at home  - continue jardiance  - continue lantus 10 units nightly  - cover with ssi/hypoglycemia protocol    Hypokalemia  - replacement doses ordered    Florentino Kennedy MD  [facility]  05/10/23  16:03 EDT

## 2023-05-10 NOTE — CASE MANAGEMENT/SOCIAL WORK
Discharge Planning Assessment  [facility]     Patient Name: Vernon Solorzano MD  MRN: [id number]  Today's Date: 5/10/2023    Admit Date: 5/9/2023    Plan: Home with spouse   Discharge Needs Assessment     Row Name 05/10/23 1652       Living Environment    People in Home spouse    Current Living Arrangements home    Potentially Unsafe Housing Conditions none    Primary Care Provided by self    Provides Primary Care For no one    Family Caregiver if Needed spouse    Quality of Family Relationships helpful;supportive       Resource/Environmental Concerns    Resource/Environmental Concerns none       Transition Planning    Transportation Anticipated family or friend will provide;car, drives self       Discharge Needs Assessment    Readmission Within the Last 30 Days no previous admission in last 30 days    Equipment Currently Used at Home none    Concerns to be Addressed denies needs/concerns at this time    Anticipated Changes Related to Illness none    Equipment Needed After Discharge none    Provided Post Acute Provider List? N/A    Provided Post Acute Provider Quality & Resource List? N/A               Discharge Plan     Row Name 05/10/23 1654       Plan    Plan Home with spouse    Plan Comments S/W pt at bedside.  Facesheet and pharmacy info confirmed.  Pt lives in a house w/ his wife Maricel, is IADLs and can drive.  He does not use any home DME and no hx of HH or SNF.  Pt denies issues with mobility and states he plans to return home with his wife upon DC.  CCP will continue to follow and assist if needed.  .........Clarissa TREVIZO/ MADELYN              Continued Care and Services - Admitted Since 5/9/2023    Coordination has not been started for this encounter.          Demographic Summary     Row Name 05/10/23 1654       General Information    Admission Type inpatient    Arrived From home    Required Notices Provided Important Message from Medicare    Referral Source admission list    Reason for Consult discharge  planning    Preferred Language English               Functional Status     Row Name 05/10/23 1651       Functional Status    Usual Activity Tolerance good    Current Activity Tolerance moderate       Functional Status, IADL    Medications independent    Meal Preparation independent;assistive person    Housekeeping independent;assistive person    Laundry independent;assistive person    Shopping independent;assistive person       Mental Status    General Appearance WDL WDL       Mental Status Summary    Recent Changes in Mental Status/Cognitive Functioning no changes       Employment/    Employment Status retired                         Clarissa Boss RN

## 2023-05-11 ENCOUNTER — ANESTHESIA (OUTPATIENT)
Dept: POSTOP/PACU | Facility: HOSPITAL | Age: 73
DRG: 287 | End: 2023-05-11
Payer: MEDICARE

## 2023-05-11 ENCOUNTER — ANESTHESIA EVENT (OUTPATIENT)
Dept: POSTOP/PACU | Facility: HOSPITAL | Age: 73
DRG: 287 | End: 2023-05-11
Payer: MEDICARE

## 2023-05-11 ENCOUNTER — APPOINTMENT (OUTPATIENT)
Dept: POSTOP/PACU | Facility: HOSPITAL | Age: 73
DRG: 287 | End: 2023-05-11
Payer: MEDICARE

## 2023-05-11 VITALS — DIASTOLIC BLOOD PRESSURE: 67 MMHG | OXYGEN SATURATION: 97 % | SYSTOLIC BLOOD PRESSURE: 97 MMHG | HEART RATE: 82 BPM

## 2023-05-11 PROBLEM — I34.0 NONRHEUMATIC MITRAL VALVE REGURGITATION: Status: ACTIVE | Noted: 2023-05-11

## 2023-05-11 LAB
ALBUMIN SERPL-MCNC: 3.8 G/DL (ref 3.5–5.2)
ALBUMIN SERPL-MCNC: 4 G/DL (ref 3.5–5.2)
ANION GAP SERPL CALCULATED.3IONS-SCNC: 11.7 MMOL/L (ref 5–15)
ANION GAP SERPL CALCULATED.3IONS-SCNC: 12.6 MMOL/L (ref 5–15)
BH CV ECHO MEAS - LAT PEAK E' VEL: 10.4 CM/SEC
BH CV ECHO MEAS - MED PEAK E' VEL: 6.4 CM/SEC
BH CV ECHO MEAS - MR MAX PG: 123.6 MMHG
BH CV ECHO MEAS - MR MAX VEL: 556 CM/SEC
BH CV ECHO MEAS - MR MEAN PG: 71.7 MMHG
BH CV ECHO MEAS - MR MEAN VEL: 387.4 CM/SEC
BH CV ECHO MEAS - MR VTI: 163.7 CM
BH CV ECHO MEAS - MV A MAX VEL: 77.5 CM/SEC
BH CV ECHO MEAS - MV DEC TIME: 0.14 MSEC
BH CV ECHO MEAS - MV E MAX VEL: 125 CM/SEC
BH CV ECHO MEAS - MV E/A: 1.61
BH CV ECHO MEAS - RAP SYSTOLE: 8 MMHG
BH CV ECHO MEAS - RVSP: 44 MMHG
BH CV ECHO MEASUREMENTS AVERAGE E/E' RATIO: 14.88
BH CV ECHO SHUNT ASSESSMENT PERFORMED (HIDDEN SCRIPTING): 1
BUN SERPL-MCNC: 22 MG/DL (ref 8–23)
BUN SERPL-MCNC: 23 MG/DL (ref 8–23)
BUN/CREAT SERPL: 13.3 (ref 7–25)
BUN/CREAT SERPL: 15.1 (ref 7–25)
CALCIUM SPEC-SCNC: 8.6 MG/DL (ref 8.6–10.5)
CALCIUM SPEC-SCNC: 9.2 MG/DL (ref 8.6–10.5)
CHLORIDE SERPL-SCNC: 103 MMOL/L (ref 98–107)
CHLORIDE SERPL-SCNC: 108 MMOL/L (ref 98–107)
CO2 SERPL-SCNC: 23.4 MMOL/L (ref 22–29)
CO2 SERPL-SCNC: 26.3 MMOL/L (ref 22–29)
CREAT SERPL-MCNC: 1.52 MG/DL (ref 0.76–1.27)
CREAT SERPL-MCNC: 1.66 MG/DL (ref 0.76–1.27)
DEPRECATED RDW RBC AUTO: 46.8 FL (ref 37–54)
EGFRCR SERPLBLD CKD-EPI 2021: 43 ML/MIN/1.73
EGFRCR SERPLBLD CKD-EPI 2021: 47.8 ML/MIN/1.73
ERYTHROCYTE [DISTWIDTH] IN BLOOD BY AUTOMATED COUNT: 14.4 % (ref 12.3–15.4)
GLUCOSE BLDC GLUCOMTR-MCNC: 124 MG/DL (ref 70–130)
GLUCOSE BLDC GLUCOMTR-MCNC: 186 MG/DL (ref 70–130)
GLUCOSE BLDC GLUCOMTR-MCNC: 319 MG/DL (ref 70–130)
GLUCOSE BLDC GLUCOMTR-MCNC: 342 MG/DL (ref 70–130)
GLUCOSE SERPL-MCNC: 168 MG/DL (ref 65–99)
GLUCOSE SERPL-MCNC: 379 MG/DL (ref 65–99)
HCT VFR BLD AUTO: 44.6 % (ref 37.5–51)
HGB BLD-MCNC: 14.4 G/DL (ref 13–17.7)
LV EF 2D ECHO EST: 50 %
MAGNESIUM SERPL-MCNC: 2.6 MG/DL (ref 1.6–2.4)
MCH RBC QN AUTO: 28.9 PG (ref 26.6–33)
MCHC RBC AUTO-ENTMCNC: 32.3 G/DL (ref 31.5–35.7)
MCV RBC AUTO: 89.4 FL (ref 79–97)
PHOSPHATE SERPL-MCNC: 4 MG/DL (ref 2.5–4.5)
PHOSPHATE SERPL-MCNC: 4.9 MG/DL (ref 2.5–4.5)
PLATELET # BLD AUTO: 280 10*3/MM3 (ref 140–450)
PMV BLD AUTO: 9.8 FL (ref 6–12)
POTASSIUM SERPL-SCNC: 3.6 MMOL/L (ref 3.5–5.2)
POTASSIUM SERPL-SCNC: 4.1 MMOL/L (ref 3.5–5.2)
RBC # BLD AUTO: 4.99 10*6/MM3 (ref 4.14–5.8)
SINUS: 3 CM
SODIUM SERPL-SCNC: 139 MMOL/L (ref 136–145)
SODIUM SERPL-SCNC: 146 MMOL/L (ref 136–145)
STJ: 2.6 CM
WBC NRBC COR # BLD: 9.8 10*3/MM3 (ref 3.4–10.8)

## 2023-05-11 PROCEDURE — 94761 N-INVAS EAR/PLS OXIMETRY MLT: CPT

## 2023-05-11 PROCEDURE — 25510000001 IOPAMIDOL PER 1 ML: Performed by: INTERNAL MEDICINE

## 2023-05-11 PROCEDURE — C1769 GUIDE WIRE: HCPCS | Performed by: INTERNAL MEDICINE

## 2023-05-11 PROCEDURE — 80069 RENAL FUNCTION PANEL: CPT | Performed by: INTERNAL MEDICINE

## 2023-05-11 PROCEDURE — 85014 HEMATOCRIT: CPT

## 2023-05-11 PROCEDURE — 25010000002 PROPOFOL 10 MG/ML EMULSION

## 2023-05-11 PROCEDURE — 25010000002 FENTANYL CITRATE (PF) 50 MCG/ML SOLUTION: Performed by: INTERNAL MEDICINE

## 2023-05-11 PROCEDURE — 83735 ASSAY OF MAGNESIUM: CPT | Performed by: INTERNAL MEDICINE

## 2023-05-11 PROCEDURE — B2111ZZ FLUOROSCOPY OF MULTIPLE CORONARY ARTERIES USING LOW OSMOLAR CONTRAST: ICD-10-PCS | Performed by: INTERNAL MEDICINE

## 2023-05-11 PROCEDURE — 93325 DOPPLER ECHO COLOR FLOW MAPG: CPT | Performed by: INTERNAL MEDICINE

## 2023-05-11 PROCEDURE — 93320 DOPPLER ECHO COMPLETE: CPT

## 2023-05-11 PROCEDURE — 93325 DOPPLER ECHO COLOR FLOW MAPG: CPT

## 2023-05-11 PROCEDURE — 85027 COMPLETE CBC AUTOMATED: CPT | Performed by: INTERNAL MEDICINE

## 2023-05-11 PROCEDURE — 82948 REAGENT STRIP/BLOOD GLUCOSE: CPT

## 2023-05-11 PROCEDURE — 82810 BLOOD GASES O2 SAT ONLY: CPT

## 2023-05-11 PROCEDURE — 0 LIDOCAINE 1 % SOLUTION

## 2023-05-11 PROCEDURE — 99152 MOD SED SAME PHYS/QHP 5/>YRS: CPT | Performed by: INTERNAL MEDICINE

## 2023-05-11 PROCEDURE — 25010000002 HEPARIN (PORCINE) PER 1000 UNITS: Performed by: INTERNAL MEDICINE

## 2023-05-11 PROCEDURE — 85018 HEMOGLOBIN: CPT

## 2023-05-11 PROCEDURE — 25010000002 MIDAZOLAM PER 1 MG: Performed by: INTERNAL MEDICINE

## 2023-05-11 PROCEDURE — C1894 INTRO/SHEATH, NON-LASER: HCPCS | Performed by: INTERNAL MEDICINE

## 2023-05-11 PROCEDURE — 93312 ECHO TRANSESOPHAGEAL: CPT

## 2023-05-11 PROCEDURE — 94799 UNLISTED PULMONARY SVC/PX: CPT

## 2023-05-11 PROCEDURE — 99232 SBSQ HOSP IP/OBS MODERATE 35: CPT | Performed by: INTERNAL MEDICINE

## 2023-05-11 PROCEDURE — 4A023N8 MEASUREMENT OF CARDIAC SAMPLING AND PRESSURE, BILATERAL, PERCUTANEOUS APPROACH: ICD-10-PCS | Performed by: INTERNAL MEDICINE

## 2023-05-11 PROCEDURE — 93312 ECHO TRANSESOPHAGEAL: CPT | Performed by: INTERNAL MEDICINE

## 2023-05-11 PROCEDURE — 63710000001 INSULIN LISPRO (HUMAN) PER 5 UNITS: Performed by: INTERNAL MEDICINE

## 2023-05-11 PROCEDURE — 93320 DOPPLER ECHO COMPLETE: CPT | Performed by: INTERNAL MEDICINE

## 2023-05-11 PROCEDURE — 93460 R&L HRT ART/VENTRICLE ANGIO: CPT | Performed by: INTERNAL MEDICINE

## 2023-05-11 PROCEDURE — B2151ZZ FLUOROSCOPY OF LEFT HEART USING LOW OSMOLAR CONTRAST: ICD-10-PCS | Performed by: INTERNAL MEDICINE

## 2023-05-11 RX ORDER — SODIUM CHLORIDE, SODIUM LACTATE, POTASSIUM CHLORIDE, CALCIUM CHLORIDE 600; 310; 30; 20 MG/100ML; MG/100ML; MG/100ML; MG/100ML
9 INJECTION, SOLUTION INTRAVENOUS CONTINUOUS PRN
Status: DISCONTINUED | OUTPATIENT
Start: 2023-05-11 | End: 2023-05-12 | Stop reason: HOSPADM

## 2023-05-11 RX ORDER — NITROGLYCERIN 0.4 MG/1
0.4 TABLET SUBLINGUAL
Status: DISCONTINUED | OUTPATIENT
Start: 2023-05-11 | End: 2023-05-12 | Stop reason: HOSPADM

## 2023-05-11 RX ORDER — LIDOCAINE HYDROCHLORIDE 10 MG/ML
INJECTION, SOLUTION INFILTRATION; PERINEURAL AS NEEDED
Status: DISCONTINUED | OUTPATIENT
Start: 2023-05-11 | End: 2023-05-11 | Stop reason: SURG

## 2023-05-11 RX ORDER — PROPOFOL 10 MG/ML
VIAL (ML) INTRAVENOUS AS NEEDED
Status: DISCONTINUED | OUTPATIENT
Start: 2023-05-11 | End: 2023-05-11 | Stop reason: SURG

## 2023-05-11 RX ORDER — GLYCOPYRROLATE 0.2 MG/ML
INJECTION INTRAMUSCULAR; INTRAVENOUS AS NEEDED
Status: DISCONTINUED | OUTPATIENT
Start: 2023-05-11 | End: 2023-05-11 | Stop reason: SURG

## 2023-05-11 RX ORDER — NALOXONE HCL 0.4 MG/ML
0.4 VIAL (ML) INJECTION
Status: DISCONTINUED | OUTPATIENT
Start: 2023-05-11 | End: 2023-05-12 | Stop reason: HOSPADM

## 2023-05-11 RX ORDER — ONDANSETRON 4 MG/1
4 TABLET, FILM COATED ORAL EVERY 6 HOURS PRN
Status: DISCONTINUED | OUTPATIENT
Start: 2023-05-11 | End: 2023-05-12 | Stop reason: HOSPADM

## 2023-05-11 RX ORDER — ONDANSETRON 2 MG/ML
4 INJECTION INTRAMUSCULAR; INTRAVENOUS ONCE AS NEEDED
Status: DISCONTINUED | OUTPATIENT
Start: 2023-05-11 | End: 2023-05-11

## 2023-05-11 RX ORDER — SODIUM CHLORIDE 9 MG/ML
40 INJECTION, SOLUTION INTRAVENOUS AS NEEDED
Status: DISCONTINUED | OUTPATIENT
Start: 2023-05-11 | End: 2023-05-12 | Stop reason: HOSPADM

## 2023-05-11 RX ORDER — SODIUM CHLORIDE 9 MG/ML
INJECTION, SOLUTION INTRAVENOUS
Status: COMPLETED | OUTPATIENT
Start: 2023-05-11 | End: 2023-05-11

## 2023-05-11 RX ORDER — HYDROCODONE BITARTRATE AND ACETAMINOPHEN 5; 325 MG/1; MG/1
1 TABLET ORAL EVERY 4 HOURS PRN
Status: DISCONTINUED | OUTPATIENT
Start: 2023-05-11 | End: 2023-05-12 | Stop reason: HOSPADM

## 2023-05-11 RX ORDER — MIDAZOLAM HYDROCHLORIDE 1 MG/ML
INJECTION INTRAMUSCULAR; INTRAVENOUS
Status: DISCONTINUED | OUTPATIENT
Start: 2023-05-11 | End: 2023-05-11 | Stop reason: HOSPADM

## 2023-05-11 RX ORDER — HEPARIN SODIUM 1000 [USP'U]/ML
INJECTION, SOLUTION INTRAVENOUS; SUBCUTANEOUS
Status: DISCONTINUED | OUTPATIENT
Start: 2023-05-11 | End: 2023-05-11 | Stop reason: HOSPADM

## 2023-05-11 RX ORDER — ONDANSETRON 2 MG/ML
4 INJECTION INTRAMUSCULAR; INTRAVENOUS EVERY 6 HOURS PRN
Status: DISCONTINUED | OUTPATIENT
Start: 2023-05-11 | End: 2023-05-12 | Stop reason: HOSPADM

## 2023-05-11 RX ORDER — FENTANYL CITRATE 50 UG/ML
INJECTION, SOLUTION INTRAMUSCULAR; INTRAVENOUS
Status: DISCONTINUED | OUTPATIENT
Start: 2023-05-11 | End: 2023-05-11 | Stop reason: HOSPADM

## 2023-05-11 RX ORDER — DIPHENHYDRAMINE HYDROCHLORIDE 50 MG/ML
12.5 INJECTION INTRAMUSCULAR; INTRAVENOUS
Status: DISCONTINUED | OUTPATIENT
Start: 2023-05-11 | End: 2023-05-11

## 2023-05-11 RX ORDER — SODIUM CHLORIDE 0.9 % (FLUSH) 0.9 %
10 SYRINGE (ML) INJECTION EVERY 12 HOURS SCHEDULED
Status: DISCONTINUED | OUTPATIENT
Start: 2023-05-11 | End: 2023-05-12 | Stop reason: HOSPADM

## 2023-05-11 RX ORDER — FLUMAZENIL 0.1 MG/ML
0.2 INJECTION INTRAVENOUS AS NEEDED
Status: DISCONTINUED | OUTPATIENT
Start: 2023-05-11 | End: 2023-05-11

## 2023-05-11 RX ORDER — VERAPAMIL HYDROCHLORIDE 2.5 MG/ML
INJECTION, SOLUTION INTRAVENOUS
Status: DISCONTINUED | OUTPATIENT
Start: 2023-05-11 | End: 2023-05-11 | Stop reason: HOSPADM

## 2023-05-11 RX ORDER — NALOXONE HCL 0.4 MG/ML
0.2 VIAL (ML) INJECTION AS NEEDED
Status: DISCONTINUED | OUTPATIENT
Start: 2023-05-11 | End: 2023-05-11

## 2023-05-11 RX ORDER — SODIUM CHLORIDE 9 MG/ML
INJECTION, SOLUTION INTRAVENOUS CONTINUOUS PRN
Status: DISCONTINUED | OUTPATIENT
Start: 2023-05-11 | End: 2023-05-11 | Stop reason: SURG

## 2023-05-11 RX ORDER — FAMOTIDINE 10 MG/ML
20 INJECTION, SOLUTION INTRAVENOUS
Status: DISCONTINUED | OUTPATIENT
Start: 2023-05-11 | End: 2023-05-12 | Stop reason: HOSPADM

## 2023-05-11 RX ORDER — SODIUM CHLORIDE 0.9 % (FLUSH) 0.9 %
10 SYRINGE (ML) INJECTION AS NEEDED
Status: DISCONTINUED | OUTPATIENT
Start: 2023-05-11 | End: 2023-05-12 | Stop reason: HOSPADM

## 2023-05-11 RX ORDER — MORPHINE SULFATE 2 MG/ML
1 INJECTION, SOLUTION INTRAMUSCULAR; INTRAVENOUS EVERY 4 HOURS PRN
Status: DISCONTINUED | OUTPATIENT
Start: 2023-05-11 | End: 2023-05-12 | Stop reason: HOSPADM

## 2023-05-11 RX ORDER — PROPOFOL 10 MG/ML
VIAL (ML) INTRAVENOUS CONTINUOUS PRN
Status: DISCONTINUED | OUTPATIENT
Start: 2023-05-11 | End: 2023-05-11 | Stop reason: SURG

## 2023-05-11 RX ORDER — MIDAZOLAM HYDROCHLORIDE 1 MG/ML
1 INJECTION INTRAMUSCULAR; INTRAVENOUS
Status: DISCONTINUED | OUTPATIENT
Start: 2023-05-11 | End: 2023-05-12 | Stop reason: HOSPADM

## 2023-05-11 RX ORDER — LIDOCAINE HYDROCHLORIDE 20 MG/ML
INJECTION, SOLUTION INFILTRATION; PERINEURAL
Status: DISCONTINUED | OUTPATIENT
Start: 2023-05-11 | End: 2023-05-11 | Stop reason: HOSPADM

## 2023-05-11 RX ADMIN — INSULIN GLARGINE-YFGN 10 UNITS: 100 INJECTION, SOLUTION SUBCUTANEOUS at 21:45

## 2023-05-11 RX ADMIN — Medication 10 ML: at 13:57

## 2023-05-11 RX ADMIN — INSULIN LISPRO 7 UNITS: 100 INJECTION, SOLUTION INTRAVENOUS; SUBCUTANEOUS at 16:52

## 2023-05-11 RX ADMIN — Medication 10 ML: at 21:45

## 2023-05-11 RX ADMIN — TIOTROPIUM BROMIDE INHALATION SPRAY 2 PUFF: 3.12 SPRAY, METERED RESPIRATORY (INHALATION) at 09:30

## 2023-05-11 RX ADMIN — POTASSIUM CHLORIDE 20 MEQ: 750 TABLET, EXTENDED RELEASE ORAL at 13:57

## 2023-05-11 RX ADMIN — BUDESONIDE AND FORMOTEROL FUMARATE DIHYDRATE 2 PUFF: 160; 4.5 AEROSOL RESPIRATORY (INHALATION) at 09:30

## 2023-05-11 RX ADMIN — Medication 180 MG/HR: at 07:50

## 2023-05-11 RX ADMIN — PROPOFOL 100 MG: 10 INJECTION, EMULSION INTRAVENOUS at 07:50

## 2023-05-11 RX ADMIN — SODIUM CHLORIDE: 9 INJECTION, SOLUTION INTRAVENOUS at 07:46

## 2023-05-11 RX ADMIN — GLYCOPYRROLATE 0.1 MG: 0.2 INJECTION INTRAMUSCULAR; INTRAVENOUS at 08:00

## 2023-05-11 RX ADMIN — LIDOCAINE HYDROCHLORIDE 80 ML: 10 INJECTION, SOLUTION INFILTRATION; PERINEURAL at 07:50

## 2023-05-11 RX ADMIN — BUDESONIDE AND FORMOTEROL FUMARATE DIHYDRATE 2 PUFF: 160; 4.5 AEROSOL RESPIRATORY (INHALATION) at 19:44

## 2023-05-11 NOTE — H&P (VIEW-ONLY)
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  Recently evaluated by Dr. Arango in office on 5/9 for acute onset congestive heart failure.  He reportedly has no prior cardiac history.  He underwent transthoracic echocardiogram which showed normal LV systolic function with questionable wall motion abnormality in the inferior wall.  The mitral valve posterior leaflet and chordae are thickened.  MR is moderate to severe.  Today he is undergoing GAUDENCIO for further evaluation.  In April 2022 he developed severe COVID-19 infection with persistent pneumonia and he has been on and off steroids for the past year. He is scheduled for left and right heart catheterization today.  Dr. Hobson has consulted Dr. Frey for surgical recommendations.    Review of Systems   Constitutional: Positive for activity change.   Respiratory: Positive for shortness of breath.    Cardiovascular: Positive for leg swelling.        Past Medical History:   Diagnosis Date    Allergic rhinitis     Arthritis     BPH (benign prostatic hyperplasia)     Diabetes mellitus     TYPE 2    Foraminal stenosis of cervical region     C5-C6    GERD (gastroesophageal reflux disease)     Hemorrhoids     History of urinary retention 2010    S/P TURP    Hyperlipidemia     Macular degeneration     PING (obstructive sleep apnea) 01/07/2016    MILD, SEES DR. AMARILIS MORGAN     Past Surgical History:   Procedure Laterality Date    COLONOSCOPY N/A     WNL PER PT, NO RECORDS,     COLONOSCOPY N/A 04/07/2009    DR. GORGE BENAVIDEZ AT [place]    PROSTATE SURGERY N/A 11/12/2010    TURP, DR. NOVOA  VALE AT [facility]    SKIN TAG REMOVAL N/A 12/20/2017    ANAL SKIN TAG X2, PERFORMED IN OFFICE, DR. LISA ORANTES    TURP / TRANSURETHRAL INCISION / DRAINAGE PROSTATE  2010    Dr. Goodrich     Family History   Problem Relation Age of Onset    Lung cancer Mother     Stroke Father     Dementia Father     Colon polyps Brother     Colon polyps Brother      Social History     Tobacco Use    Smoking status: Never    Smokeless tobacco: Never   Vaping Use    Vaping Use: Never used   Substance Use Topics    Alcohol use: Yes     Comment: RARELY    Drug use: No     Medications Prior to Admission   Medication Sig Dispense Refill Last Dose    atorvastatin (LIPITOR) 10 MG tablet TAKE 1 TABLET BY MOUTH EVERY DAY 90 tablet 0 5/9/2023    furosemide (LASIX) 40 MG tablet Take 1 tablet by mouth Daily.   5/9/2023    irbesartan (AVAPRO) 75 MG tablet Take 1 tablet by mouth Daily.   5/9/2023    Jardiance 25 MG tablet tablet Take 1 tablet by mouth Daily.   5/9/2023    metoclopramide (REGLAN) 10 MG tablet Take 1 tablet by mouth Daily.   5/8/2023 at 2000    Multiple Vitamins-Minerals (PRESERVISION AREDS 2+MULTI VIT PO) Take  by mouth.   5/9/2023    omeprazole (priLOSEC) 40 MG capsule Take 1 capsule by mouth Daily. 90 capsule 4 5/9/2023    potassium chloride (MICRO-K) 10 MEQ CR capsule Take 2 capsules by mouth Daily.   5/9/2023    predniSONE (DELTASONE) 10 MG tablet Take 1.5 tablets by mouth Daily.   5/9/2023    Symbicort 160-4.5 MCG/ACT inhaler Inhale 2 puffs 2 (Two) Times a Day.   5/9/2023    tiotropium (SPIRIVA) 18 MCG per inhalation capsule Place 1 capsule into inhaler and inhale Daily.   5/9/2023    aspirin 81 MG tablet Take 1 tablet by mouth Daily.       insulin degludec (TRESIBA FLEXTOUCH) 100 UNIT/ML solution pen-injector injection Start 10 units daily and titrate up 2 units every 3 days up to 30 units daily. 3 pen 5     levocetirizine (XYZAL) 5 MG tablet Take 1 tablet by mouth Every Evening. 90 tablet 3     montelukast (Singulair) 10  "MG tablet Take 1 tablet by mouth Every Night. 30 tablet 3      [MAR Hold] aspirin, 81 mg, Oral, Daily  [MAR Hold] atorvastatin, 10 mg, Oral, Daily  [MAR Hold] budesonide-formoterol, 2 puff, Inhalation, BID  [MAR Hold] cetirizine, 10 mg, Oral, Daily  [MAR Hold] insulin glargine, 10 Units, Subcutaneous, Nightly  [MAR Hold] insulin lispro, 2-9 Units, Subcutaneous, TID With Meals  [MAR Hold] metoclopramide, 10 mg, Oral, Daily  [MAR Hold] montelukast, 10 mg, Oral, Nightly  [MAR Hold] pantoprazole, 40 mg, Oral, Q AM  potassium chloride, 20 mEq, Oral, Daily  [MAR Hold] predniSONE, 10 mg, Oral, Daily  [MAR Hold] tiotropium bromide monohydrate, 2 puff, Inhalation, Daily - RT      Allergies:  Patient has no known allergies.    Objective      Vital Signs  Temp:  [97.4 °F (36.3 °C)-98.4 °F (36.9 °C)] 97.6 °F (36.4 °C)  Heart Rate:  [65-92] 65  Resp:  [16-20] 20  BP: ()/(60-93) 140/89    Flowsheet Rows      Flowsheet Row First Filed Value   Admission Height 185.4 cm (73\") Documented at 05/09/2023 1527   Admission Weight 120 kg (265 lb) Documented at 05/09/2023 1527          185.4 cm (73\")    Physical Exam  HENT:      Head: Normocephalic.      Mouth/Throat:      Mouth: Mucous membranes are moist.   Eyes:      Pupils: Pupils are equal, round, and reactive to light.   Cardiovascular:      Rate and Rhythm: Normal rate.      Heart sounds: Murmur heard.   Pulmonary:      Effort: Pulmonary effort is normal.   Abdominal:      General: There is no distension.   Neurological:      General: No focal deficit present.      Mental Status: He is alert and oriented to person, place, and time.   Psychiatric:         Mood and Affect: Mood normal.         Results Review:   Lab Results (last 24 hours)       Procedure Component Value Units Date/Time    Renal Function Panel [[id number]]  (Abnormal) Collected: 05/11/23 0618    Specimen: Blood Updated: 05/11/23 0658     Glucose 168 mg/dL      BUN 22 mg/dL      Creatinine 1.66 mg/dL      Sodium 146 " mmol/L      Potassium 3.6 mmol/L      Chloride 108 mmol/L      CO2 26.3 mmol/L      Calcium 9.2 mg/dL      Albumin 4.0 g/dL      Phosphorus 4.9 mg/dL      Anion Gap 11.7 mmol/L      BUN/Creatinine Ratio 13.3     eGFR 43.0 mL/min/1.73     Narrative:      GFR Normal >60  Chronic Kidney Disease <60  Kidney Failure <15    The GFR formula is only valid for adults with stable renal function between ages 18 and 70.    Magnesium [[id number]]  (Abnormal) Collected: 05/11/23 0618    Specimen: Blood Updated: 05/11/23 0658     Magnesium 2.6 mg/dL     CBC (No Diff) [[id number]]  (Normal) Collected: 05/11/23 0617    Specimen: Blood Updated: 05/11/23 0637     WBC 9.80 10*3/mm3      RBC 4.99 10*6/mm3      Hemoglobin 14.4 g/dL      Hematocrit 44.6 %      MCV 89.4 fL      MCH 28.9 pg      MCHC 32.3 g/dL      RDW 14.4 %      RDW-SD 46.8 fl      MPV 9.8 fL      Platelets 280 10*3/mm3     POC Glucose Once [[id number]]  (Abnormal) Collected: 05/11/23 0622    Specimen: Blood Updated: 05/11/23 0624     Glucose 186 mg/dL      Comment: Meter: [id number] : [id number] [employer]rica [employer] NA       POC Glucose Once [[id number]]  (Abnormal) Collected: 05/10/23 2056    Specimen: Blood Updated: 05/10/23 2058     Glucose 433 mg/dL      Comment: RN Notified R and V Meter: [id number] : [id number] [employer]chame Sibite NA       POC Glucose Once [[id number]]  (Abnormal) Collected: 05/10/23 1617    Specimen: Blood Updated: 05/10/23 1619     Glucose 238 mg/dL      Comment: Meter: [id number] : [id number] Fish Stalin NA       POC Glucose Once [[id number]]  (Abnormal) Collected: 05/10/23 1145    Specimen: Blood Updated: 05/10/23 1147     Glucose 137 mg/dL      Comment: Meter: [id number] : [id number] Fish Stalin NA                     Assessment & Plan    -Dyspnea on exertion   -Moderate to severe mitral regurgitation   -Acute on chronic diastolic heart failure with preserved EF--55%  -CKD stage IIIa; baseline creatinine 1.2--nephrology following   -DM type  II--internal medicine following   -Severe pulmonary HTN  -PING  -Urinary retention s/p TURP  -BPH   -Interstitial lung disease s/p COVID-19 infection     Right and left heart catheterization was performed today which showed a right dominant system with normal filling pressures and nonobstructive coronary disease.  Transesophageal echocardiogram demonstrates severe mitral regurgitation, with moderate posterior mitral annular calcification and tethered posterior mitral valve leaflet.  Dr. Frey will review the case and provide further surgical recommendations.    Thank you for allowing us to participate in the care of this patient.      Modesta Law, APRN  05/11/23  10:08 EDT  Addendum  Patient was seen and examined by me, agree with the findings above.  I have reviewed and interpreted myself the cardiac cath and echocardiograms.  He has severe mitral regurgitation and moderate to severe tricuspid regurgitation.  He has CKD 3 and he s/p prolonged COVID.  He has congestive heart failure class III and he was admitted for optimization of diuresis.  I recommend mitral valve repair and I quoted a repairability rate of over 90%.  I recommend tricuspid valve repair.  I discussed the risks (STS calculated for mitral valve repair), benefits and alternatives of surgery and he wishes to proceed.  The plan is for continuing with the optimization and gentle diuresis as outpatient if the renal function allows and then surgery as an outpatient  George Frey MD

## 2023-05-11 NOTE — PROGRESS NOTES
LOS: 2 days   Patient Care Team:  Liza Oconnell III, NP-C as PCP - General (Family Medicine)  Corey Lao Jr., MD (Inactive) as Consulting Physician (Urology)    Chief Complaint: Follow-up acute on chronic diastolic and valvular CHF, mitral regurgitation, coronary artery calcification.    Interval History: This breath or chest pain.  GAUDENCIO showed severe mitral regurgitation.    Vital Signs:  Temp:  [97.4 °F (36.3 °C)-98.4 °F (36.9 °C)] 97.6 °F (36.4 °C)  Heart Rate:  [65-92] 72  Resp:  [9-22] 18  BP: ()/() 129/91    Intake/Output Summary (Last 24 hours) at 5/11/2023 1542  Last data filed at 5/11/2023 0600  Gross per 24 hour   Intake 260 ml   Output 2140 ml   Net -1880 ml       Physical Exam:   General Appearance:    No acute distress, alert and oriented x4   Lungs:     Clear to auscultation bilaterally     Heart:    Regular rhythm and normal rate. III/VI SM LLSB and apex.   Abdomen:     Soft, nontender, nondistended.    Extremities:   2+ edema of the lower extremities bilaterally     Results Review:    Results from last 7 days   Lab Units 05/11/23  0618   SODIUM mmol/L 146*   POTASSIUM mmol/L 3.6   CHLORIDE mmol/L 108*   CO2 mmol/L 26.3   BUN mg/dL 22   CREATININE mg/dL 1.66*   GLUCOSE mg/dL 168*   CALCIUM mg/dL 9.2         Results from last 7 days   Lab Units 05/11/23  0617   WBC 10*3/mm3 9.80   HEMOGLOBIN g/dL 14.4   HEMATOCRIT % 44.6   PLATELETS 10*3/mm3 280             Results from last 7 days   Lab Units 05/11/23  0618   MAGNESIUM mg/dL 2.6*           I reviewed the patient's new clinical results.        Assessment:  1.  Acute on chronic diastolic and valvular CHF, EF 50 to 55%  2.  Severe mitral regurgitation  3.  Normal coronary arteries by catheterization on 5/11/2023  4.  Mild pulmonary hypertension, mean 26 mmHg by RHC 5/11/2023  5.  Moderate tricuspid regurgitation  6.  History of COVID-19 pneumonia with residual dyspnea and questionable interstitial lung  disease  7.  Poorly controlled diabetes  8.  Stage IIIa chronic kidney disease  9.  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  BPH, status post TURP    Plan:  -Mitral regurgitation was severe.  The posterior leaflet is tethered and does not coapt well.  Dr. Frey has been consulted from cardiovascular surgery, and will see the patient later.    -Unbelievably, the catheterization showed normal coronary arteries.  He had mild pulmonary hypertension.  His wedge pressure was only 12.    -No further diuresis today given renal function.  Nephrology following.  Jardiance also discontinued for now.    -Appreciate nephrology and general medicine consults.  Prednisone being titrated downward slowly.  Diabetes being addressed.  Continue Jardiance.      Corwin Hobson MD  05/11/23  15:42 EDT

## 2023-05-11 NOTE — PROGRESS NOTES
[facility] Progress Note      Patient Name: Vernon Solorzano MD  : 1950  MRN: [id number]  Primary Care Physician:  Liza Oconnell III, RACHAEL  Date of admission: 2023    Subjective     Interval History:   Follow up CKD 2.  Getting GAUDENCIO this am.  Slept fair. Thinks breathing more difficult with reduction in prednisone from 15 mg to 10.  Urinated a lot. Weight down. Edema better.      Review of Systems:   As noted above    Objective     Vitals:   Temp:  [97.4 °F (36.3 °C)-98.4 °F (36.9 °C)] 97.8 °F (36.6 °C)  Heart Rate:  [72-88] 72  Resp:  [16-18] 18  BP: (103-140)/(70-93) 140/93    Intake/Output Summary (Last 24 hours) at 2023 0759  Last data filed at 2023 0600  Gross per 24 hour   Intake 260 ml   Output 3340 ml   Net -3080 ml       Physical Exam:    General Appearance: alert, oriented x 3, no acute distress .  Skin: warm and dry  HEENT: oral mucosa normal, nonicteric sclera  Neck: supple, no JVD  Lungs: clear to auscultation.   Heart: RRR, normal S1 and S2  Abdomen: soft, nontender, distended. Body wall edema. + bs  : no palpable bladder  Extremities: 2+ lower ext ,1+ bilateral hand edema. Some wrinkling of skin on hands and legs this am.   Neuro: normal speech and mental status     Scheduled Meds:     aspirin, 81 mg, Oral, Daily  atorvastatin, 10 mg, Oral, Daily  budesonide-formoterol, 2 puff, Inhalation, BID  cetirizine, 10 mg, Oral, Daily  empagliflozin, 25 mg, Oral, Daily  insulin glargine, 10 Units, Subcutaneous, Nightly  insulin lispro, 2-9 Units, Subcutaneous, TID With Meals  metoclopramide, 10 mg, Oral, Daily  montelukast, 10 mg, Oral, Nightly  pantoprazole, 40 mg, Oral, Q AM  potassium chloride, 20 mEq, Oral, Daily  predniSONE, 10 mg, Oral, Daily  tiotropium bromide monohydrate, 2 puff, Inhalation, Daily - RT      IV Meds:        Results Reviewed:   I have personally reviewed the results from the time of this admission to 2023 07:59 EDT      Results from last 7 days   Lab Units 05/11/23  0618 05/10/23  0559 05/09/23  1524   SODIUM mmol/L 146* 143 139   POTASSIUM mmol/L 3.6 3.2* 4.3   CHLORIDE mmol/L 108* 103 106   CO2 mmol/L 26.3 26.1 24.4   BUN mg/dL 22 20 19   CREATININE mg/dL 1.66* 1.18 1.26   CALCIUM mg/dL 9.2 8.5* 9.1   BILIRUBIN mg/dL  --   --  0.4   ALK PHOS U/L  --   --  75   ALT (SGPT) U/L  --   --  29   AST (SGOT) U/L  --   --  14   GLUCOSE mg/dL 168* 93 234*       Estimated Creatinine Clearance: 51.6 mL/min (A) (by C-G formula based on SCr of 1.66 mg/dL (H)).    Results from last 7 days   Lab Units 05/11/23  0618   MAGNESIUM mg/dL 2.6*   PHOSPHORUS mg/dL 4.9*             Results from last 7 days   Lab Units 05/11/23  0617 05/09/23  1524   WBC 10*3/mm3 9.80 11.49*   HEMOGLOBIN g/dL 14.4 13.7   PLATELETS 10*3/mm3 280 271             Assessment / Plan     ASSESSMENT:  1. CKD II, baseline 1.1-1.2. Renal US negative except cyst, UA negative except glucose. Diuresing. His creatinine has risen with diuresis as expected.  I think that we may see a new baseline when he is euvolemic.  Ok to proceed with cath today. Right and left heart.  Will help guide further diuretic therapy. No diuretic this am.  ARB on hold.  I am going to hold jardiance for now with his eGFR 43.   2. Acute on chronic diastolic heart failure, Pulmonary HTN, suspected significant mitral regurg. Diuresed yesterday.  Left and right heart cath and  GAUDENCIO today.   No pre-contrast IVF.   3. COVID Pna with persistent dyspnea, Possible ILD.  4. DM2, poorly controlled. On prednisone.    5. Macular degeneration.  PLAN:  1. Ok for cardiac cath today.   2. NO diuretic this am. Will check back on GAUDENCIO and cath .  3. DC Jardiance.   Joan Batista MD  05/11/23  07:59 EDT    [facility]  [phone]

## 2023-05-11 NOTE — ANESTHESIA PREPROCEDURE EVALUATION
Anesthesia Evaluation     Patient summary reviewed   NPO Solid Status: > 8 hours             Airway   Mallampati: II  Neck ROM: full  No difficulty expected  Dental      Pulmonary    (+) asthma,sleep apnea,   Cardiovascular     ECG reviewed  Rhythm: irregular    (+) CHF , hyperlipidemia,       Neuro/Psych  GI/Hepatic/Renal/Endo    (+)  GERD,  diabetes mellitus,     Musculoskeletal     Abdominal    Substance History      OB/GYN          Other                        Anesthesia Plan    ASA 3     MAC       Anesthetic plan, risks, benefits, and alternatives have been provided, discussed and informed consent has been obtained with: patient.        CODE STATUS:

## 2023-05-11 NOTE — PLAN OF CARE
Goal Outcome Evaluation:    Problem: Adult Inpatient Plan of Care  Goal: Absence of Hospital-Acquired Illness or Injury  Intervention: Identify and Manage Fall Risk  Recent Flowsheet Documentation  Taken 5/11/2023 0400 by Justin Morse RN  Safety Promotion/Fall Prevention:  • toileting scheduled  • safety round/check completed  • nonskid shoes/slippers when out of bed  • lighting adjusted  • fall prevention program maintained  • activity supervised  • clutter free environment maintained  Taken 5/11/2023 0200 by Justin Morse RN  Safety Promotion/Fall Prevention:  • toileting scheduled  • safety round/check completed  • nonskid shoes/slippers when out of bed  • lighting adjusted  • fall prevention program maintained  • clutter free environment maintained  • activity supervised  Taken 5/11/2023 0015 by Justin Morse RN  Safety Promotion/Fall Prevention:  • toileting scheduled  • safety round/check completed  • nonskid shoes/slippers when out of bed  • lighting adjusted  • fall prevention program maintained  • clutter free environment maintained  • activity supervised  Taken 5/10/2023 2200 by Justin Morse RN  Safety Promotion/Fall Prevention:  • toileting scheduled  • safety round/check completed  Taken 5/10/2023 2040 by Justin Morse RN  Safety Promotion/Fall Prevention:  • toileting scheduled  • safety round/check completed  • nonskid shoes/slippers when out of bed  • lighting adjusted  • fall prevention program maintained  • clutter free environment maintained  • activity supervised   Plan of Care Reviewed With: patient AOX4, room air, NPO,  GAUDENCIO, right and left heart cath today, no c/o at this time.

## 2023-05-11 NOTE — PROGRESS NOTES
Name: Vernon Solorzano MD ADMIT: 2023   : 1950  PCP: Liza Oconnell III, RACHAEL    MRN: [id number] LOS: 2 days   AGE/SEX: 72 y.o. male  ROOM: [facility]     Subjective   Subjective   Referring provider: Dr. Arango  Reason for follow-up: Type 2 DM  No acute events. Patient had GAUDENCIO and left heart cath today. He has no new complaints. Taking PO without nausea or vomiting.   His wife is at bedside.    Objective   Objective   Vital Signs  Temp:  [97.4 °F (36.3 °C)-98.4 °F (36.9 °C)] 97.6 °F (36.4 °C)  Heart Rate:  [65-92] 72  Resp:  [9-22] 18  BP: ()/() 129/91  SpO2:  [90 %-97 %] 94 %  on  Flow (L/min):  [2-5] 3;   Device (Oxygen Therapy): room air  Body mass index is 33.14 kg/m².  Physical Exam  Vitals and nursing note reviewed.   Constitutional:       General: He is not in acute distress.     Appearance: He is not diaphoretic.   HENT:      Head: Normocephalic and atraumatic.      Nose: Nose normal.      Mouth/Throat:      Mouth: Mucous membranes are moist.      Pharynx: Oropharynx is clear.   Eyes:      Conjunctiva/sclera: Conjunctivae normal.      Pupils: Pupils are equal, round, and reactive to light.   Cardiovascular:      Rate and Rhythm: Normal rate and regular rhythm.   Pulmonary:      Effort: Pulmonary effort is normal.      Breath sounds: Normal breath sounds.   Abdominal:      General: Bowel sounds are normal.      Palpations: Abdomen is soft.      Tenderness: There is no abdominal tenderness.   Musculoskeletal:         General: Swelling (2+ BLE R>L) present. No tenderness.      Cervical back: Neck supple.   Skin:     General: Skin is warm and dry.   Neurological:      General: No focal deficit present.      Mental Status: He is alert and oriented to person, place, and time.   Psychiatric:         Mood and Affect: Mood normal.         Behavior: Behavior normal.       Results Review     I reviewed the patient's new clinical results.  Results from last 7 days   Lab Units  05/11/23 0617 05/09/23  1524   WBC 10*3/mm3 9.80 11.49*   HEMOGLOBIN g/dL 14.4 13.7   PLATELETS 10*3/mm3 280 271     Results from last 7 days   Lab Units 05/11/23  0618 05/10/23  0559 05/09/23  1524   SODIUM mmol/L 146* 143 139   POTASSIUM mmol/L 3.6 3.2* 4.3   CHLORIDE mmol/L 108* 103 106   CO2 mmol/L 26.3 26.1 24.4   BUN mg/dL 22 20 19   CREATININE mg/dL 1.66* 1.18 1.26   GLUCOSE mg/dL 168* 93 234*   EGFR mL/min/1.73 43.0* 64.8 59.8*     Results from last 7 days   Lab Units 05/11/23  0618 05/09/23  1524   ALBUMIN g/dL 4.0 4.2   BILIRUBIN mg/dL  --  0.4   ALK PHOS U/L  --  75   AST (SGOT) U/L  --  14   ALT (SGPT) U/L  --  29     Results from last 7 days   Lab Units 05/11/23  0618 05/10/23  0559 05/09/23  1524   CALCIUM mg/dL 9.2 8.5* 9.1   ALBUMIN g/dL 4.0  --  4.2   MAGNESIUM mg/dL 2.6*  --   --    PHOSPHORUS mg/dL 4.9*  --   --        Glucose   Date/Time Value Ref Range Status   05/11/2023 1346 124 70 - 130 mg/dL Final     Comment:     Meter: [id number] : [id number] Raji Newman    05/11/2023 0622 186 (H) 70 - 130 mg/dL Final     Comment:     Meter: [id number] : [id number] Juan Palomares NA   05/10/2023 2056 433 (C) 70 - 130 mg/dL Final     Comment:     RN Notified R and V Meter: [id number] : [id number] Juan Palomares NA   05/10/2023 1617 238 (H) 70 - 130 mg/dL Final     Comment:     Meter: [id number] : [id number] Ant Stalin    05/10/2023 1145 137 (H) 70 - 130 mg/dL Final     Comment:     Meter: [id number] : [id number] Ant Gant ESTEVAN   05/10/2023 0605 92 70 - 130 mg/dL Final     Comment:     Meter: [id number] : [id number] Nikkie BARRETO   05/09/2023 2105 235 (H) 70 - 130 mg/dL Final     Comment:     Meter: [id number] : [id number] Nikkie BARRETO       No radiology results for the last day  I have personally reviewed all medications:  Scheduled Medications  aspirin, 81 mg, Oral, Daily  atorvastatin, 10 mg, Oral, Daily  budesonide-formoterol, 2 puff, Inhalation,  BID  cetirizine, 10 mg, Oral, Daily  famotidine, 20 mg, Intravenous, 60 Min Pre-Op  insulin glargine, 10 Units, Subcutaneous, Nightly  insulin lispro, 2-9 Units, Subcutaneous, TID With Meals  metoclopramide, 10 mg, Oral, Daily  montelukast, 10 mg, Oral, Nightly  pantoprazole, 40 mg, Oral, Q AM  potassium chloride, 20 mEq, Oral, Daily  predniSONE, 10 mg, Oral, Daily  sodium chloride, 10 mL, Intravenous, Q12H  tiotropium bromide monohydrate, 2 puff, Inhalation, Daily - RT    Infusions  lactated ringers, 9 mL/hr    Diet  NPO Diet NPO Type: Sips with Meds    I have personally reviewed:  [x]  Laboratory   []  Microbiology   []  Radiology   [x]  EKG/Telemetry  [x]  Cardiology/Vascular   []  Pathology    []  Records    Assessment/Plan     Active Hospital Problems    Diagnosis  POA   • **New onset of congestive heart failure [I50.9]  Yes   • Nonrheumatic mitral valve regurgitation [I34.0]  Yes   • DM (diabetes mellitus), type 2 [E11.9]  Yes   • Mixed hyperlipidemia [E78.2]  Yes      Resolved Hospital Problems   No resolved problems to display.   New Onset CHF  - management per primary team and nephrology  - diuretics held today  - GAUDENCIO and left/right cardiac cath 5/11/23 showed severe mitral regurgitation with normal coronary arteries and mild pulmonary hypertension-CT surgery consulted to evaluate for mitral valve repair    Type 2 DM  - complications include CKD stage 3a  - BG acceptable overall aside from a spike last night  - he is on jardiance and Tresiba 48 units nightly at home  - jardiance held  - continue lantus 10 units nightly  - cover with ssi/hypoglycemia protocol    Hypokalemia  - better today, replace as needed    Florentino Kennedy MD  [facility]  05/11/23  15:53 EDT

## 2023-05-11 NOTE — PLAN OF CARE
Goal Outcome Evaluation:      Pt alert and oriented x 4. Pt received GAUDENCIO and heart cath today, see notes. Pt vital signs within normal limits. Dr say he may need valve replacement in the future. Pt now resting in bed calmly, this RN will continue to monitor   Problem: Adult Inpatient Plan of Care  Goal: Plan of Care Review  Outcome: Ongoing, Progressing

## 2023-05-11 NOTE — ANESTHESIA POSTPROCEDURE EVALUATION
"Patient: Vernon Solorzano MD    Procedure Summary     Date: 05/11/23 Room / Location: [facility] PACU    Anesthesia Start: 0746 Anesthesia Stop: 0813    Procedure: ADULT TRANSESOPHAGEAL ECHO (GAUDENCIO) W/ CONT IF NECESSARY PER PROTOCOL Diagnosis: (Valvular Disease)    Scheduled Providers: Corwin Hobson MD Provider: Prateek Quezada MD    Anesthesia Type: MAC ASA Status: 3          Anesthesia Type: MAC    Vitals  Vitals Value Taken Time   /69 05/11/23 0856   Temp     Pulse 82 05/11/23 0900   Resp 20 05/11/23 0855   SpO2 94 % 05/11/23 0900   Vitals shown include unvalidated device data.        Post Anesthesia Care and Evaluation    Patient location during evaluation: bedside  Patient participation: complete - patient participated  Level of consciousness: awake  Pain management: adequate    Airway patency: patent  Anesthetic complications: No anesthetic complications  PONV Status: controlled  Cardiovascular status: acceptable  Respiratory status: acceptable  Hydration status: acceptable    Comments: /69   Pulse 82   Temp 36.6 °C (97.8 °F) (Oral)   Resp 20   Ht 185.4 cm (73\")   Wt 114 kg (251 lb 3.2 oz)   SpO2 93%   BMI 33.14 kg/m²         "

## 2023-05-11 NOTE — CONSULTS
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  Recently evaluated by Dr. Arango in office on 5/9 for acute onset congestive heart failure.  He reportedly has no prior cardiac history.  He underwent transthoracic echocardiogram which showed normal LV systolic function with questionable wall motion abnormality in the inferior wall.  The mitral valve posterior leaflet and chordae are thickened.  MR is moderate to severe.  Today he is undergoing GAUDENCIO for further evaluation.  In April 2022 he developed severe COVID-19 infection with persistent pneumonia and he has been on and off steroids for the past year. He is scheduled for left and right heart catheterization today.  Dr. Hobson has consulted Dr. Frey for surgical recommendations.    Review of Systems   Constitutional: Positive for activity change.   Respiratory: Positive for shortness of breath.    Cardiovascular: Positive for leg swelling.        Past Medical History:   Diagnosis Date   • Allergic rhinitis    • Arthritis    • BPH (benign prostatic hyperplasia)    • Diabetes mellitus     TYPE 2   • Foraminal stenosis of cervical region     C5-C6   • GERD (gastroesophageal reflux disease)    • Hemorrhoids    • History of urinary retention 2010    S/P TURP   • Hyperlipidemia    • Macular degeneration    • PING (obstructive sleep apnea) 01/07/2016    MILD, SEES DR. AMARILIS MORGAN     Past Surgical History:   Procedure Laterality Date   • COLONOSCOPY N/A     WNL PER PT, NO RECORDS,    • COLONOSCOPY N/A 04/07/2009    DR. GORGE BENAVIDEZ AT [place]   • PROSTATE SURGERY N/A 11/12/2010    TURP,   BRIGHT COLLAZO AT [facility]   • SKIN TAG REMOVAL N/A 12/20/2017    ANAL SKIN TAG X2, PERFORMED IN OFFICE, DR. LISA ORANTES   • TURP / TRANSURETHRAL INCISION / DRAINAGE PROSTATE  2010    Dr. Goodrich     Family History   Problem Relation Age of Onset   • Lung cancer Mother    • Stroke Father    • Dementia Father    • Colon polyps Brother    • Colon polyps Brother      Social History     Tobacco Use   • Smoking status: Never   • Smokeless tobacco: Never   Vaping Use   • Vaping Use: Never used   Substance Use Topics   • Alcohol use: Yes     Comment: RARELY   • Drug use: No     Medications Prior to Admission   Medication Sig Dispense Refill Last Dose   • atorvastatin (LIPITOR) 10 MG tablet TAKE 1 TABLET BY MOUTH EVERY DAY 90 tablet 0 5/9/2023   • furosemide (LASIX) 40 MG tablet Take 1 tablet by mouth Daily.   5/9/2023   • irbesartan (AVAPRO) 75 MG tablet Take 1 tablet by mouth Daily.   5/9/2023   • Jardiance 25 MG tablet tablet Take 1 tablet by mouth Daily.   5/9/2023   • metoclopramide (REGLAN) 10 MG tablet Take 1 tablet by mouth Daily.   5/8/2023 at 2000   • Multiple Vitamins-Minerals (PRESERVISION AREDS 2+MULTI VIT PO) Take  by mouth.   5/9/2023   • omeprazole (priLOSEC) 40 MG capsule Take 1 capsule by mouth Daily. 90 capsule 4 5/9/2023   • potassium chloride (MICRO-K) 10 MEQ CR capsule Take 2 capsules by mouth Daily.   5/9/2023   • predniSONE (DELTASONE) 10 MG tablet Take 1.5 tablets by mouth Daily.   5/9/2023   • Symbicort 160-4.5 MCG/ACT inhaler Inhale 2 puffs 2 (Two) Times a Day.   5/9/2023   • tiotropium (SPIRIVA) 18 MCG per inhalation capsule Place 1 capsule into inhaler and inhale Daily.   5/9/2023   • aspirin 81 MG tablet Take 1 tablet by mouth Daily.      • insulin degludec (TRESIBA FLEXTOUCH) 100 UNIT/ML solution pen-injector injection Start 10 units daily and titrate up 2 units every 3 days up to 30 units daily. 3 pen 5    • levocetirizine (XYZAL) 5 MG tablet Take 1 tablet by mouth Every Evening. 90 tablet  "3    • montelukast (Singulair) 10 MG tablet Take 1 tablet by mouth Every Night. 30 tablet 3      [MAR Hold] aspirin, 81 mg, Oral, Daily  [MAR Hold] atorvastatin, 10 mg, Oral, Daily  [MAR Hold] budesonide-formoterol, 2 puff, Inhalation, BID  [MAR Hold] cetirizine, 10 mg, Oral, Daily  [MAR Hold] insulin glargine, 10 Units, Subcutaneous, Nightly  [MAR Hold] insulin lispro, 2-9 Units, Subcutaneous, TID With Meals  [MAR Hold] metoclopramide, 10 mg, Oral, Daily  [MAR Hold] montelukast, 10 mg, Oral, Nightly  [MAR Hold] pantoprazole, 40 mg, Oral, Q AM  potassium chloride, 20 mEq, Oral, Daily  [MAR Hold] predniSONE, 10 mg, Oral, Daily  [MAR Hold] tiotropium bromide monohydrate, 2 puff, Inhalation, Daily - RT      Allergies:  Patient has no known allergies.    Objective      Vital Signs  Temp:  [97.4 °F (36.3 °C)-98.4 °F (36.9 °C)] 97.6 °F (36.4 °C)  Heart Rate:  [65-92] 65  Resp:  [16-20] 20  BP: ()/(60-93) 140/89    Flowsheet Rows    Flowsheet Row First Filed Value   Admission Height 185.4 cm (73\") Documented at 05/09/2023 1527   Admission Weight 120 kg (265 lb) Documented at 05/09/2023 1527        185.4 cm (73\")    Physical Exam  HENT:      Head: Normocephalic.      Mouth/Throat:      Mouth: Mucous membranes are moist.   Eyes:      Pupils: Pupils are equal, round, and reactive to light.   Cardiovascular:      Rate and Rhythm: Normal rate.      Heart sounds: Murmur heard.   Pulmonary:      Effort: Pulmonary effort is normal.   Abdominal:      General: There is no distension.   Neurological:      General: No focal deficit present.      Mental Status: He is alert and oriented to person, place, and time.   Psychiatric:         Mood and Affect: Mood normal.         Results Review:   Lab Results (last 24 hours)     Procedure Component Value Units Date/Time    Renal Function Panel [[id number]]  (Abnormal) Collected: 05/11/23 0618    Specimen: Blood Updated: 05/11/23 0658     Glucose 168 mg/dL      BUN 22 mg/dL      " Creatinine 1.66 mg/dL      Sodium 146 mmol/L      Potassium 3.6 mmol/L      Chloride 108 mmol/L      CO2 26.3 mmol/L      Calcium 9.2 mg/dL      Albumin 4.0 g/dL      Phosphorus 4.9 mg/dL      Anion Gap 11.7 mmol/L      BUN/Creatinine Ratio 13.3     eGFR 43.0 mL/min/1.73     Narrative:      GFR Normal >60  Chronic Kidney Disease <60  Kidney Failure <15    The GFR formula is only valid for adults with stable renal function between ages 18 and 70.    Magnesium [[id number]]  (Abnormal) Collected: 05/11/23 0618    Specimen: Blood Updated: 05/11/23 0658     Magnesium 2.6 mg/dL     CBC (No Diff) [[id number]]  (Normal) Collected: 05/11/23 0617    Specimen: Blood Updated: 05/11/23 0637     WBC 9.80 10*3/mm3      RBC 4.99 10*6/mm3      Hemoglobin 14.4 g/dL      Hematocrit 44.6 %      MCV 89.4 fL      MCH 28.9 pg      MCHC 32.3 g/dL      RDW 14.4 %      RDW-SD 46.8 fl      MPV 9.8 fL      Platelets 280 10*3/mm3     POC Glucose Once [[id number]]  (Abnormal) Collected: 05/11/23 0622    Specimen: Blood Updated: 05/11/23 0624     Glucose 186 mg/dL      Comment: Meter: [id number] : [id number] [employer] NA       POC Glucose Once [[id number]]  (Abnormal) Collected: 05/10/23 2056    Specimen: Blood Updated: 05/10/23 2058     Glucose 433 mg/dL      Comment: RN Notified R and V Meter: [id number] : [id number] [employer] Sibite NA       POC Glucose Once [[id number]]  (Abnormal) Collected: 05/10/23 1617    Specimen: Blood Updated: 05/10/23 1619     Glucose 238 mg/dL      Comment: Meter: [id number] : [id number] [employer] NA       POC Glucose Once [[id number]]  (Abnormal) Collected: 05/10/23 1145    Specimen: Blood Updated: 05/10/23 1147     Glucose 137 mg/dL      Comment: Meter: [id number] : [id number] Fish Stalin NA                   Assessment & Plan    -Dyspnea on exertion   -Moderate to severe mitral regurgitation   -Acute on chronic diastolic heart failure with preserved EF--55%  -CKD stage IIIa; baseline creatinine  1.2--nephrology following   -DM type II--internal medicine following   -Severe pulmonary HTN  -PING  -Urinary retention s/p TURP  -BPH   -Interstitial lung disease s/p COVID-19 infection     Right and left heart catheterization was performed today which showed a right dominant system with normal filling pressures and nonobstructive coronary disease.  Transesophageal echocardiogram demonstrates severe mitral regurgitation, with moderate posterior mitral annular calcification and tethered posterior mitral valve leaflet.  Dr. Frey will review the case and provide further surgical recommendations.    Thank you for allowing us to participate in the care of this patient.      Modesta Law, DSETIN  05/11/23  10:08 EDT

## 2023-05-12 ENCOUNTER — READMISSION MANAGEMENT (OUTPATIENT)
Dept: CALL CENTER | Facility: HOSPITAL | Age: 73
End: 2023-05-12
Payer: MEDICARE

## 2023-05-12 VITALS
HEIGHT: 73 IN | SYSTOLIC BLOOD PRESSURE: 140 MMHG | HEART RATE: 80 BPM | BODY MASS INDEX: 34.11 KG/M2 | WEIGHT: 257.4 LBS | TEMPERATURE: 97.5 F | OXYGEN SATURATION: 95 % | DIASTOLIC BLOOD PRESSURE: 91 MMHG | RESPIRATION RATE: 18 BRPM

## 2023-05-12 DIAGNOSIS — I34.0 NONRHEUMATIC MITRAL VALVE REGURGITATION: Primary | ICD-10-CM

## 2023-05-12 LAB
ALBUMIN SERPL-MCNC: 3.9 G/DL (ref 3.5–5.2)
ANION GAP SERPL CALCULATED.3IONS-SCNC: 11.2 MMOL/L (ref 5–15)
BUN SERPL-MCNC: 26 MG/DL (ref 8–23)
BUN/CREAT SERPL: 21.8 (ref 7–25)
CALCIUM SPEC-SCNC: 8.9 MG/DL (ref 8.6–10.5)
CHLORIDE SERPL-SCNC: 107 MMOL/L (ref 98–107)
CO2 SERPL-SCNC: 21.8 MMOL/L (ref 22–29)
CREAT SERPL-MCNC: 1.19 MG/DL (ref 0.76–1.27)
DEPRECATED RDW RBC AUTO: 43.9 FL (ref 37–54)
EGFRCR SERPLBLD CKD-EPI 2021: 64.1 ML/MIN/1.73
ERYTHROCYTE [DISTWIDTH] IN BLOOD BY AUTOMATED COUNT: 13.9 % (ref 12.3–15.4)
GLUCOSE BLDC GLUCOMTR-MCNC: 194 MG/DL (ref 70–130)
GLUCOSE BLDC GLUCOMTR-MCNC: 240 MG/DL (ref 70–130)
GLUCOSE SERPL-MCNC: 247 MG/DL (ref 65–99)
HCT VFR BLD AUTO: 42.5 % (ref 37.5–51)
HCT VFR BLDA CALC: 42 % (ref 38–51)
HGB BLD-MCNC: 13.9 G/DL (ref 13–17.7)
HGB BLDA-MCNC: 14.3 G/DL (ref 12–17)
MCH RBC QN AUTO: 29 PG (ref 26.6–33)
MCHC RBC AUTO-ENTMCNC: 32.7 G/DL (ref 31.5–35.7)
MCV RBC AUTO: 88.5 FL (ref 79–97)
PHOSPHATE SERPL-MCNC: 3.6 MG/DL (ref 2.5–4.5)
PLATELET # BLD AUTO: 281 10*3/MM3 (ref 140–450)
PMV BLD AUTO: 9.8 FL (ref 6–12)
POTASSIUM SERPL-SCNC: 4.4 MMOL/L (ref 3.5–5.2)
RBC # BLD AUTO: 4.8 10*6/MM3 (ref 4.14–5.8)
SAO2 % BLDA: 71 % (ref 95–98)
SODIUM SERPL-SCNC: 140 MMOL/L (ref 136–145)
WBC NRBC COR # BLD: 16.22 10*3/MM3 (ref 3.4–10.8)

## 2023-05-12 PROCEDURE — 85027 COMPLETE CBC AUTOMATED: CPT | Performed by: INTERNAL MEDICINE

## 2023-05-12 PROCEDURE — 63710000001 PREDNISONE PER 5 MG: Performed by: INTERNAL MEDICINE

## 2023-05-12 PROCEDURE — 25010000002 FUROSEMIDE PER 20 MG: Performed by: INTERNAL MEDICINE

## 2023-05-12 PROCEDURE — 80069 RENAL FUNCTION PANEL: CPT | Performed by: INTERNAL MEDICINE

## 2023-05-12 PROCEDURE — 63710000001 INSULIN LISPRO (HUMAN) PER 5 UNITS: Performed by: INTERNAL MEDICINE

## 2023-05-12 PROCEDURE — 94799 UNLISTED PULMONARY SVC/PX: CPT

## 2023-05-12 PROCEDURE — 82948 REAGENT STRIP/BLOOD GLUCOSE: CPT

## 2023-05-12 PROCEDURE — 94664 DEMO&/EVAL PT USE INHALER: CPT

## 2023-05-12 PROCEDURE — 99239 HOSP IP/OBS DSCHRG MGMT >30: CPT | Performed by: INTERNAL MEDICINE

## 2023-05-12 RX ORDER — FUROSEMIDE 40 MG/1
40 TABLET ORAL 2 TIMES DAILY
Qty: 60 TABLET | Refills: 1 | Status: SHIPPED | OUTPATIENT
Start: 2023-05-12

## 2023-05-12 RX ORDER — FUROSEMIDE 10 MG/ML
40 INJECTION INTRAMUSCULAR; INTRAVENOUS
Status: DISCONTINUED | OUTPATIENT
Start: 2023-05-12 | End: 2023-05-12 | Stop reason: HOSPADM

## 2023-05-12 RX ADMIN — CETIRIZINE HYDROCHLORIDE 10 MG: 10 TABLET ORAL at 08:51

## 2023-05-12 RX ADMIN — Medication 10 ML: at 08:57

## 2023-05-12 RX ADMIN — POTASSIUM CHLORIDE 20 MEQ: 750 TABLET, EXTENDED RELEASE ORAL at 08:51

## 2023-05-12 RX ADMIN — ATORVASTATIN CALCIUM 10 MG: 20 TABLET, FILM COATED ORAL at 08:51

## 2023-05-12 RX ADMIN — METOCLOPRAMIDE 10 MG: 10 TABLET ORAL at 08:51

## 2023-05-12 RX ADMIN — ASPIRIN 81 MG: 81 TABLET, COATED ORAL at 08:51

## 2023-05-12 RX ADMIN — FUROSEMIDE 40 MG: 10 INJECTION, SOLUTION INTRAMUSCULAR; INTRAVENOUS at 12:19

## 2023-05-12 RX ADMIN — PANTOPRAZOLE SODIUM 40 MG: 40 TABLET, DELAYED RELEASE ORAL at 08:51

## 2023-05-12 RX ADMIN — PREDNISONE 10 MG: 10 TABLET ORAL at 08:51

## 2023-05-12 RX ADMIN — INSULIN LISPRO 2 UNITS: 100 INJECTION, SOLUTION INTRAVENOUS; SUBCUTANEOUS at 12:19

## 2023-05-12 RX ADMIN — TIOTROPIUM BROMIDE INHALATION SPRAY 2 PUFF: 3.12 SPRAY, METERED RESPIRATORY (INHALATION) at 08:04

## 2023-05-12 RX ADMIN — BUDESONIDE AND FORMOTEROL FUMARATE DIHYDRATE 2 PUFF: 160; 4.5 AEROSOL RESPIRATORY (INHALATION) at 08:03

## 2023-05-12 RX ADMIN — INSULIN LISPRO 4 UNITS: 100 INJECTION, SOLUTION INTRAVENOUS; SUBCUTANEOUS at 08:53

## 2023-05-12 NOTE — PROGRESS NOTES
Name: Vernon Solorzano MD ADMIT: 2023   : 1950  PCP: Liza Oconnell III, NP-C    MRN: [id number] LOS: 3 days   AGE/SEX: 72 y.o. male  ROOM: [facility]     Subjective   Subjective   Referring provider: Dr. Arango  Reason for follow-up: Type 2 DM  No acute events. Patient denies new complaints. Taking PO without nausea or vomiting.   No family at bedside.    Objective   Objective   Vital Signs  Temp:  [97.5 °F (36.4 °C)-98.4 °F (36.9 °C)] 97.5 °F (36.4 °C)  Heart Rate:  [75-87] 80  Resp:  [18] 18  BP: (127-140)/(76-91) 140/91  SpO2:  [94 %-96 %] 95 %  on   ;   Device (Oxygen Therapy): room air  Body mass index is 33.96 kg/m².  Physical Exam  Vitals and nursing note reviewed.   Constitutional:       General: He is not in acute distress.     Appearance: He is not diaphoretic.   HENT:      Head: Normocephalic and atraumatic.      Nose: Nose normal.      Mouth/Throat:      Mouth: Mucous membranes are moist.      Pharynx: Oropharynx is clear.   Eyes:      Conjunctiva/sclera: Conjunctivae normal.      Pupils: Pupils are equal, round, and reactive to light.   Cardiovascular:      Rate and Rhythm: Normal rate and regular rhythm.   Pulmonary:      Effort: Pulmonary effort is normal.      Breath sounds: Normal breath sounds.   Abdominal:      General: Bowel sounds are normal.      Palpations: Abdomen is soft.      Tenderness: There is no abdominal tenderness.   Musculoskeletal:         General: Swelling (2+ BLE R>L) present. No tenderness.      Cervical back: Neck supple.   Skin:     General: Skin is warm and dry.   Neurological:      General: No focal deficit present.      Mental Status: He is alert and oriented to person, place, and time.   Psychiatric:         Mood and Affect: Mood normal.         Behavior: Behavior normal.       Results Review     I reviewed the patient's new clinical results.  Results from last 7 days   Lab Units 23  0551 23  1038 23  0617 23  1524   WBC  10*3/mm3 16.22*  --  9.80 11.49*   HEMOGLOBIN g/dL 13.9  --  14.4 13.7   HEMOGLOBIN, POC g/dL  --  14.3  --   --    PLATELETS 10*3/mm3 281  --  280 271     Results from last 7 days   Lab Units 05/12/23  0551 05/11/23  1556 05/11/23  0618 05/10/23  0559   SODIUM mmol/L 140 139 146* 143   POTASSIUM mmol/L 4.4 4.1 3.6 3.2*   CHLORIDE mmol/L 107 103 108* 103   CO2 mmol/L 21.8* 23.4 26.3 26.1   BUN mg/dL 26* 23 22 20   CREATININE mg/dL 1.19 1.52* 1.66* 1.18   GLUCOSE mg/dL 247* 379* 168* 93   EGFR mL/min/1.73 64.1 47.8* 43.0* 64.8     Results from last 7 days   Lab Units 05/12/23  0551 05/11/23  1556 05/11/23  0618 05/09/23  1524   ALBUMIN g/dL 3.9 3.8 4.0 4.2   BILIRUBIN mg/dL  --   --   --  0.4   ALK PHOS U/L  --   --   --  75   AST (SGOT) U/L  --   --   --  14   ALT (SGPT) U/L  --   --   --  29     Results from last 7 days   Lab Units 05/12/23  0551 05/11/23  1556 05/11/23 0618 05/10/23  0559 05/09/23  1524   CALCIUM mg/dL 8.9 8.6 9.2 8.5* 9.1   ALBUMIN g/dL 3.9 3.8 4.0  --  4.2   MAGNESIUM mg/dL  --   --  2.6*  --   --    PHOSPHORUS mg/dL 3.6 4.0 4.9*  --   --        Glucose   Date/Time Value Ref Range Status   05/12/2023 1141 194 (H) 70 - 130 mg/dL Final     Comment:     Meter: [id number] : [id number] Kristina BARRETO   05/12/2023 0611 240 (H) 70 - 130 mg/dL Final     Comment:     Meter: [id number] : [id number] Juan Palomares    05/11/2023 2111 319 (H) 70 - 130 mg/dL Final     Comment:     Meter: [id number] : [id number] Juan Palomares    05/11/2023 1645 342 (H) 70 - 130 mg/dL Final     Comment:     Meter: [id number] : [id number] Maguejuan Newman    05/11/2023 1346 124 70 - 130 mg/dL Final     Comment:     Meter: [id number] : [id number] Maguejuan Newman    05/11/2023 0622 186 (H) 70 - 130 mg/dL Final     Comment:     Meter: [id number] : [id number] Juan Palomares    05/10/2023 2056 433 (C) 70 - 130 mg/dL Final     Comment:     RN Notified R and V Meter: [id number] : [id number]  Jorgejanae Palomares NA       No radiology results for the last day  I have personally reviewed all medications:  Scheduled Medications  aspirin, 81 mg, Oral, Daily  atorvastatin, 10 mg, Oral, Daily  budesonide-formoterol, 2 puff, Inhalation, BID  cetirizine, 10 mg, Oral, Daily  famotidine, 20 mg, Intravenous, 60 Min Pre-Op  furosemide, 40 mg, Intravenous, BID  insulin glargine, 10 Units, Subcutaneous, Nightly  insulin lispro, 2-9 Units, Subcutaneous, TID With Meals  metoclopramide, 10 mg, Oral, Daily  montelukast, 10 mg, Oral, Nightly  pantoprazole, 40 mg, Oral, Q AM  potassium chloride, 20 mEq, Oral, Daily  predniSONE, 10 mg, Oral, Daily  sodium chloride, 10 mL, Intravenous, Q12H  tiotropium bromide monohydrate, 2 puff, Inhalation, Daily - RT    Infusions  lactated ringers, 9 mL/hr    Diet  Diet: Diabetic Diets; Consistent Carbohydrate; Texture: Regular Texture (IDDSI 7); Fluid Consistency: Thin (IDDSI 0)    I have personally reviewed:  [x]  Laboratory   []  Microbiology   []  Radiology   [x]  EKG/Telemetry  [x]  Cardiology/Vascular   []  Pathology    []  Records    Assessment/Plan     Active Hospital Problems    Diagnosis  POA   • **New onset of congestive heart failure [I50.9]  Yes   • Nonrheumatic mitral valve regurgitation [I34.0]  Yes   • DM (diabetes mellitus), type 2 [E11.9]  Yes   • Mixed hyperlipidemia [E78.2]  Yes      Resolved Hospital Problems   No resolved problems to display.   New Onset CHF  - management per primary team and nephrology  - diuretics held today  - GAUDENCIO and left/right cardiac cath 5/11/23 showed severe mitral regurgitation with normal coronary arteries and mild pulmonary hypertension-CT surgery consulted to evaluate for mitral valve repair    Type 2 DM  - complications include CKD stage 3a  - BG acceptable overall aside from a spike again yesterday afternoon  - he is on jardiance and Tresiba 48 units nightly at home  - jardiance held  - continue lantus 10 units nightly and ssi while he is  here  - I have asked him to restart his Tresiba at home at 25 units daily and titrate up from there if BG are elevated         Florentino Kennedy MD  [facility]  05/12/23  14:07 EDT

## 2023-05-12 NOTE — OUTREACH NOTE
Prep Survey    Flowsheet Row Responses   [facility] patient discharged from? [place]   Is LACE score < 7 ? No   Eligibility [facility]   Date of Admission 05/09/23   Date of Discharge 05/12/23   Discharge Disposition Home or Self Care   Discharge diagnosis New onset of CHF,  right heart cath   Does the patient have one of the following disease processes/diagnoses(primary or secondary)? CHF   Does the patient have Home health ordered? No   Is there a DME ordered? No   Prep survey completed? Yes          Viviana VIDAL - Registered Nurse

## 2023-05-12 NOTE — PROGRESS NOTES
Discharge Planning Assessment  [facility]     Patient Name: Vernon Solorzano MD  MRN: [id number]  Today's Date: 5/12/2023    Admit Date: 5/9/2023    Plan: Home with spouse   Discharge Needs Assessment    No documentation.                Discharge Plan     Row Name 05/12/23 1814       Plan    Plan Home with spouse    Final Discharge Disposition Code 01 - home or self-care    Final Note home              Continued Care and Services - Discharged on 5/12/2023 Admission date: 5/9/2023 - Discharge disposition: Home or Self Care   Coordination has not been started for this encounter.       Expected Discharge Date and Time     Expected Discharge Date Expected Discharge Time    May 12, 2023          Demographic Summary    No documentation.                Functional Status    No documentation.                Psychosocial    No documentation.                Abuse/Neglect    No documentation.                Legal    No documentation.                Substance Abuse    No documentation.                Patient Forms    No documentation.                   Rehana Landin RN

## 2023-05-12 NOTE — DISCHARGE SUMMARY
Date of Admission: 5/9/2023    Date of Discharge:  5/12/2023    Discharge Diagnoses:   1.  Acute on chronic diastolic and valvular CHF, EF 50 to 55%  2.  Severe mitral regurgitation (primary cause for #1)  3.  Normal coronary arteries by catheterization on 5/11/2023  4.  Mild pulmonary hypertension, mean 26 mmHg by RHC 5/11/2023  5.  Moderate tricuspid regurgitation  6.  History of COVID-19 pneumonia with residual dyspnea and questionable interstitial lung disease  7.  Poorly controlled diabetes  8.  Stage IIIa chronic kidney disease  9.  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  BPH, status post TURP     Procedures Performed:  1.  GAUDENCIO on 5/11/2023  2.  Left and right heart catheterization on 5/10/2023 by Dr. 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  He has a history of diabetes and stage IIIa chronic kidney disease.  The patient saw Dr. Arango in our office on 5/9/2023 with increasing shortness of breath and lower extremity edema.  He had COVID-19 pneumonia, and felt like some of his symptoms had been from long haulers syndrome and questionable interstitial lung disease.  He had been on steroids long-term, and had developed cushingoid syndrome with third spacing edema, and weight gain.  However, an echocardiogram in the office showed significant mitral regurgitation and possible inferolateral wall motion abnormalities.  There was concern for valvular heart disease causing congestive heart failure, as well as potential coronary artery disease.    He was admitted for IV diuresis.  Nephrology was consulted given his baseline chronic kidney disease.  His Avapro was held on  admission, although it was resumed at discharge.  His creatinine did peak at 1.66, but had come back down to baseline at the time of discharge.  He underwent a GAUDENCIO on 5/11/2023 which confirmed a tethered posterior mitral valve leaflet with severe mitral regurgitation.  His ejection fraction was 50 to 55% with inferolateral hypokinesis.  A subsequent right and left heart catheterization on 5/11/2023 by Dr. Gaffney showed angiographically normal coronary arteries and very mild pulmonary hypertension with a mean of 26 (secondary to mitral regurgitation and diastolic CHF).  Nephrology recommended increasing the baseline Lasix to 40 mg twice a day at discharge.  Dr. Frey saw him from cardiovascular surgery and recommended mitral valve repair.  He is going to be scheduled as an outpatient within the next several weeks to have this performed.  His prednisone was also decreased from 15 mg/day to 10 mg/day, and can likely be slowly titrated off as an outpatient as well.      Discharge Medications:     Discharge Medications      Changes to Medications      Instructions Start Date   furosemide 40 MG tablet  Commonly known as: LASIX  What changed: when to take this   40 mg, Oral, 2 Times Daily         Continue These Medications      Instructions Start Date   aspirin 81 MG tablet   81 mg, Oral, Daily      atorvastatin 10 MG tablet  Commonly known as: LIPITOR   TAKE 1 TABLET BY MOUTH EVERY DAY      insulin degludec 100 UNIT/ML solution pen-injector injection  Commonly known as: TRESIBA FLEXTOUCH   Start 10 units daily and titrate up 2 units every 3 days up to 30 units daily.      irbesartan 75 MG tablet  Commonly known as: AVAPRO   1 tablet, Oral, Daily      Jardiance 25 MG tablet tablet  Generic drug: empagliflozin   1 tablet, Oral, Daily      levocetirizine 5 MG tablet  Commonly known as: XYZAL   5 mg, Oral, Every Evening      metoclopramide 10 MG tablet  Commonly known as: REGLAN   1 tablet, Oral, Daily      montelukast 10 MG  tablet  Commonly known as: Singulair   10 mg, Oral, Nightly      omeprazole 40 MG capsule  Commonly known as: priLOSEC   40 mg, Oral, Daily      potassium chloride 10 MEQ CR capsule  Commonly known as: MICRO-K   2 capsules, Oral, Daily      predniSONE 10 MG tablet  Commonly known as: DELTASONE   15 mg, Oral, Daily      PRESERVISION AREDS 2+MULTI VIT PO   Oral      Symbicort 160-4.5 MCG/ACT inhaler  Generic drug: budesonide-formoterol   2 puffs, Inhalation, 2 Times Daily      tiotropium 18 MCG per inhalation capsule  Commonly known as: SPIRIVA   1 capsule, Inhalation, Daily - RT               Physical Exam:           General Appearance:    No acute distress, alert and oriented x4   Lungs:     Clear to auscultation bilaterally     Heart:    Regular rhythm and normal rate. III/VI SM LLSB and apex.   Abdomen:     Soft, nontender, nondistended.    Extremities:   1-2+ edema of the lower extremities bilaterally         Time Spent on Discharge: 35 minutes      Corwin Hobson MD  05/12/23  14:00 EDT

## 2023-05-12 NOTE — PROGRESS NOTES
[facility] Progress Note      Patient Name: Vernon Solorzano MD  : 1950  MRN: [id number]  Primary Care Physician:  Liza Oconnell III, RACHAEL  Date of admission: 2023    Subjective     Interval History:     Overnight no event  Patient volume status continues to improve  Patient underwent extensive work-up by cardiology    Currently sitting down in bed eating breakfast  Lower extremity edema and shortness of breath improving    Review of Systems:   As noted above    Objective     Vitals:   Temp:  [97.5 °F (36.4 °C)-98.4 °F (36.9 °C)] 97.5 °F (36.4 °C)  Heart Rate:  [65-91] 80  Resp:  [9-22] 18  BP: (123-149)/() 140/91  Flow (L/min):  [2-3] 3    Intake/Output Summary (Last 24 hours) at 2023 0928  Last data filed at 2023 0600  Gross per 24 hour   Intake 600 ml   Output 850 ml   Net -250 ml       Physical Exam:      Constitutional: Awake, alert, no acute distress.  mild Cushingoid expression obese with BMI 34  HEENT: Sclera anicteric, no conjunctival injection  Neck: Supple, no thyromegaly, no lymphadenopathy, trachea at midline, no JVD  Respiratory: Clear to auscultation bilaterally, nonlabored respiration  Cardiovascular: RRR systolic ejection murmur  Gastrointestinal: Positive bowel sounds, abdomen is soft, nontender and nondistended  : No palpable bladder  Musculoskeletal: Trace edema bilateral no clubbing or cyanosis  Psychiatric: Appropriate affect, cooperative  Neurologic: Oriented x3, moving all extremities, normal speech and mental status  Skin: Warm and dry        Scheduled Meds:     aspirin, 81 mg, Oral, Daily  atorvastatin, 10 mg, Oral, Daily  budesonide-formoterol, 2 puff, Inhalation, BID  cetirizine, 10 mg, Oral, Daily  famotidine, 20 mg, Intravenous, 60 Min Pre-Op  furosemide, 40 mg, Intravenous, BID  insulin glargine, 10 Units, Subcutaneous, Nightly  insulin lispro, 2-9 Units, Subcutaneous, TID With Meals  metoclopramide, 10 mg,  Oral, Daily  montelukast, 10 mg, Oral, Nightly  pantoprazole, 40 mg, Oral, Q AM  potassium chloride, 20 mEq, Oral, Daily  predniSONE, 10 mg, Oral, Daily  sodium chloride, 10 mL, Intravenous, Q12H  tiotropium bromide monohydrate, 2 puff, Inhalation, Daily - RT      IV Meds:   lactated ringers, 9 mL/hr        Results Reviewed:   I have personally reviewed the results from the time of this admission to 5/12/2023 09:28 EDT     Results from last 7 days   Lab Units 05/12/23  0551 05/11/23  1556 05/11/23  0618 05/10/23  0559 05/09/23  1524   SODIUM mmol/L 140 139 146*   < > 139   POTASSIUM mmol/L 4.4 4.1 3.6   < > 4.3   CHLORIDE mmol/L 107 103 108*   < > 106   CO2 mmol/L 21.8* 23.4 26.3   < > 24.4   BUN mg/dL 26* 23 22   < > 19   CREATININE mg/dL 1.19 1.52* 1.66*   < > 1.26   CALCIUM mg/dL 8.9 8.6 9.2   < > 9.1   BILIRUBIN mg/dL  --   --   --   --  0.4   ALK PHOS U/L  --   --   --   --  75   ALT (SGPT) U/L  --   --   --   --  29   AST (SGOT) U/L  --   --   --   --  14   GLUCOSE mg/dL 247* 379* 168*   < > 234*    < > = values in this interval not displayed.       Estimated Creatinine Clearance: 72.9 mL/min (by C-G formula based on SCr of 1.19 mg/dL).    Results from last 7 days   Lab Units 05/12/23  0551 05/11/23  1556 05/11/23  0618   MAGNESIUM mg/dL  --   --  2.6*   PHOSPHORUS mg/dL 3.6 4.0 4.9*             Results from last 7 days   Lab Units 05/12/23  0551 05/11/23  1038 05/11/23  0617 05/09/23  1524   WBC 10*3/mm3 16.22*  --  9.80 11.49*   HEMOGLOBIN g/dL 13.9  --  14.4 13.7   HEMOGLOBIN, POC g/dL  --  14.3  --   --    PLATELETS 10*3/mm3 281  --  280 271     Images     RENAL ULTRASOUND     HISTORY: 72-year-old male with chronic renal disease. BPH.     TECHNIQUE: Real-time ultrasound of the kidneys and bladder was  performed. Confirmatory images were obtained.     FINDINGS: The right kidney measures 15.8 x 6.4 x 6.5 cm and the leftkidney measures 13.9 x 6.8 x 5.3 cm. There is an approximately 6 cm cystwith s eptations  "at the upper pole of the right kidney and there is a 1.5cm cyst at the upper pole of the left kidney. There are no renal stonesand there is no hydronephrosis. Bilateral ureteral jets are seen.Urinary bladder is moderately distended.     This report was finalized on 5/9/2023 11:26 PM by Dr. Aleida Martinez M.D.    Transesophageal echocardiogram    •  The left ventricular cavity is mildly dilated.  •  There is moderate inferolateral hypokinesis  •  Left ventricular systolic function is low normal. Left ventricular ejection fraction appears to be 51 - 55%.  •  Left ventricular diastolic function is consistent with (grade II w/high LAP) pseudonormalization  •  The right ventricular cavity is mildly dilated. Normal right ventricular systolic function noted.  •  The left atrial cavity is moderately dilated.  •  The posterior mitral valve leaflet is tethered and restricted in motion, leading to malcoaptation of the mitral valve leaflets  •  Severe mitral valve regurgitation is present.  •  Moderate tricuspid valve regurgitation is present.  •  Calculated right ventricular systolic pressure from tricuspid regurgitation is 44 mmHg.  •  There is no evidence of pericardial effusion               Assessment / Plan       ASSESSMENT:     -Acute on chronic kidney disease stage II/IIIa.  Patient creatinine has been between 1.1 and 1.2 ( from 2016 to 2023 ) , seems that jump  slightly after initiation of  ARB's .  Likely etiology secondary to hypertensive nephrosclerosis, diabetic nephropathy and unclear, cardiorenal syndrome ?.  Acute episode to secondary to contrast pressure has normalized. UA [place] Glucosuria from jardiance and UPRC of 92 mg and renal ultrasound   \" The right kidney measures 15.8 x 6.4 x 6.5 cm and the leftkidney measures 13.9 x 6.8 x 5.3 cm. There is an approximately 6 cm cystwith s eptations at the upper pole of the right kidney and there is a 1.5cm cyst at the upper pole of the left kidney\"    -Benign prostatic " hypertrophy status post TURP  -Diabetes mellitus type 2 over 6 yrs ago , initially diet controlled for many years since 2022 , slightly worsening glycemia secondary to prednisone use.  Currently on empaglilfozin   -Diastolic congestive heart failure with possible cor pulmonale .  Patient has been on oral diuretics transition to IV we will continue to follow closely.  I will discontinue his losartan to allow better renal perfusion and optimize diuretic effect.  -Pulmonary hypertension by echocardiogram cardiology planning to perform right-sided cardiac cath,   -Mitral regurgitation.     transesophageal echo showing Severe mitral valve regurgitation is present.. Dr. Frey has been consulted from cardiovascular surgery  -Cushingoid syndrome patient has been on prednisone for over a year with over 40 pounds of weight gain.  Accompanied with lower extremity edema and cushingoid facial expressions, patient is agreeable to attempt low titration of steroids   -History of obstructive sleep apnea likely aggravated by significant weight gain over 40 pounds in the last 12 months, that could have been a contributor factor for his worsening pulmonary hypertension     PLAN:  - Will continue furosemide 40 mg IV BID   -Kidney function has improved we will resume diuresis  -Awaiting cardiothoracic surgery evaluation  -Patient would benefit from initiation of ARB, once his volume status has been optimized.  -We will continue surveillance labs       Thank you for involving us in the care of Vernon Solorzano MD.  Please feel free to call with any questions.    Jesús Braxton MD  05/12/23  09:28 EDT    [facility]\""  [phone]    Please note that portions of this note were completed with a voice recognition program.

## 2023-05-12 NOTE — PROGRESS NOTES
LOS: 3 days   Patient Care Team:  Liza Oconnell III, NP-C as PCP - General (Family Medicine)  Corey Lao Jr., MD (Inactive) as Consulting Physician (Urology)    Chief Complaint: Shortness of breath    Subjective      Vital Signs  Temp:  [97.5 °F (36.4 °C)-98.4 °F (36.9 °C)] 97.5 °F (36.4 °C)  Heart Rate:  [72-91] 80  Resp:  [18] 18  BP: (123-149)/() 140/91  Body mass index is 33.96 kg/m².    Intake/Output Summary (Last 24 hours) at 5/12/2023 1122  Last data filed at 5/12/2023 0830  Gross per 24 hour   Intake 1080 ml   Output 850 ml   Net 230 ml     I/O this shift:  In: 480 [P.O.:480]  Out: -           05/10/23  0600 05/11/23  0500 05/12/23  0600   Weight: 116 kg (255 lb 6.4 oz) 114 kg (251 lb 3.2 oz) 117 kg (257 lb 6.4 oz)       Objective    Results Review:        WBC WBC   Date Value Ref Range Status   05/12/2023 16.22 (H) 3.40 - 10.80 10*3/mm3 Final   05/11/2023 9.80 3.40 - 10.80 10*3/mm3 Final   05/09/2023 11.49 (H) 3.40 - 10.80 10*3/mm3 Final      HGB Hemoglobin   Date Value Ref Range Status   05/12/2023 13.9 13.0 - 17.7 g/dL Final   05/11/2023 14.3 12.0 - 17.0 g/dL Final   05/11/2023 14.4 13.0 - 17.7 g/dL Final   05/09/2023 13.7 13.0 - 17.7 g/dL Final      HCT Hematocrit   Date Value Ref Range Status   05/12/2023 42.5 37.5 - 51.0 % Final   05/11/2023 42 38 - 51 % Final   05/11/2023 44.6 37.5 - 51.0 % Final   05/09/2023 41.9 37.5 - 51.0 % Final      Platelets Platelets   Date Value Ref Range Status   05/12/2023 281 140 - 450 10*3/mm3 Final   05/11/2023 280 140 - 450 10*3/mm3 Final   05/09/2023 271 140 - 450 10*3/mm3 Final        PT/INR:  No results found for: PROTIME/No results found for: INR    Sodium Sodium   Date Value Ref Range Status   05/12/2023 140 136 - 145 mmol/L Final   05/11/2023 139 136 - 145 mmol/L Final   05/11/2023 146 (H) 136 - 145 mmol/L Final   05/10/2023 143 136 - 145 mmol/L Final   05/09/2023 139 136 - 145 mmol/L Final      Potassium Potassium   Date  Value Ref Range Status   05/12/2023 4.4 3.5 - 5.2 mmol/L Final   05/11/2023 4.1 3.5 - 5.2 mmol/L Final   05/11/2023 3.6 3.5 - 5.2 mmol/L Final   05/10/2023 3.2 (L) 3.5 - 5.2 mmol/L Final   05/09/2023 4.3 3.5 - 5.2 mmol/L Final      Chloride Chloride   Date Value Ref Range Status   05/12/2023 107 98 - 107 mmol/L Final   05/11/2023 103 98 - 107 mmol/L Final   05/11/2023 108 (H) 98 - 107 mmol/L Final   05/10/2023 103 98 - 107 mmol/L Final   05/09/2023 106 98 - 107 mmol/L Final      Bicarbonate CO2   Date Value Ref Range Status   05/12/2023 21.8 (L) 22.0 - 29.0 mmol/L Final   05/11/2023 23.4 22.0 - 29.0 mmol/L Final   05/11/2023 26.3 22.0 - 29.0 mmol/L Final   05/10/2023 26.1 22.0 - 29.0 mmol/L Final   05/09/2023 24.4 22.0 - 29.0 mmol/L Final      BUN BUN   Date Value Ref Range Status   05/12/2023 26 (H) 8 - 23 mg/dL Final   05/11/2023 23 8 - 23 mg/dL Final   05/11/2023 22 8 - 23 mg/dL Final   05/10/2023 20 8 - 23 mg/dL Final   05/09/2023 19 8 - 23 mg/dL Final      Creatinine Creatinine   Date Value Ref Range Status   05/12/2023 1.19 0.76 - 1.27 mg/dL Final   05/11/2023 1.52 (H) 0.76 - 1.27 mg/dL Final   05/11/2023 1.66 (H) 0.76 - 1.27 mg/dL Final   05/10/2023 1.18 0.76 - 1.27 mg/dL Final   05/09/2023 1.26 0.76 - 1.27 mg/dL Final      Calcium Calcium   Date Value Ref Range Status   05/12/2023 8.9 8.6 - 10.5 mg/dL Final   05/11/2023 8.6 8.6 - 10.5 mg/dL Final   05/11/2023 9.2 8.6 - 10.5 mg/dL Final   05/10/2023 8.5 (L) 8.6 - 10.5 mg/dL Final   05/09/2023 9.1 8.6 - 10.5 mg/dL Final      Magnesium Magnesium   Date Value Ref Range Status   05/11/2023 2.6 (H) 1.6 - 2.4 mg/dL Final          aspirin, 81 mg, Oral, Daily  atorvastatin, 10 mg, Oral, Daily  budesonide-formoterol, 2 puff, Inhalation, BID  cetirizine, 10 mg, Oral, Daily  famotidine, 20 mg, Intravenous, 60 Min Pre-Op  furosemide, 40 mg, Intravenous, BID  insulin glargine, 10 Units, Subcutaneous, Nightly  insulin lispro, 2-9 Units, Subcutaneous, TID With  Meals  metoclopramide, 10 mg, Oral, Daily  montelukast, 10 mg, Oral, Nightly  pantoprazole, 40 mg, Oral, Q AM  potassium chloride, 20 mEq, Oral, Daily  predniSONE, 10 mg, Oral, Daily  sodium chloride, 10 mL, Intravenous, Q12H  tiotropium bromide monohydrate, 2 puff, Inhalation, Daily - RT      lactated ringers, 9 mL/hr        Patient Active Problem List   Diagnosis Code   • DM (diabetes mellitus), type 2 E11.9   • Mixed hyperlipidemia E78.2   • Mild intermittent asthma without complication J45.20   • New onset of congestive heart failure I50.9   • Nonrheumatic mitral valve regurgitation I34.0       Assessment & Plan    -Severe mitral regurgitation   -Moderate TR  -Acute on chronic diastolic heart failure with preserved EF--55%  -CKD stage IIIa; baseline creatinine 1.2--nephrology following   -DM type II--internal medicine following   -Pulmonary HTN  -PING  -Urinary retention s/p TURP  -BPH   -Interstitial lung disease s/p COVID-19 infection     Dr. Frey has evaluated the patient and recommends surgical MVR. OK to dc today from CT sx standpoint after Dr. Frey has spoken to him. He would like him to continue to be diuresed and medically optimized at home. We would like to recheck a BMP next week. We will continue to titrated down his steroids prior to surgery.     Modesta Law, DESTIN  05/12/23  11:22 EDT

## 2023-05-12 NOTE — PLAN OF CARE
Chief Complaint   Patient presents with    Sore Throat     Jt Fragoso III (: 2015) is a 4 y.o. male, established patient, here for evaluation of the following chief complaint(s):   Sore Throat   He has had a sore throat for the past several days and has a decreased appetite. Mom says that his throat has been red. There has been no known exposure to covid but children have been at another person's house and mother questions covid    ASSESSMENT/PLAN:  Below is the assessment and plan developed based on review of pertinent history, labs, studies, and medications. 1. Strep throat    No follow-ups on file. SUBJECTIVE/OBJECTIVE:  HPI    Review of Systems   Constitutional:  Positive for appetite change. HENT:  Positive for sore throat.        No data recorded   Results for orders placed or performed in visit on 22   UPPER RESPIRATORY CULTURE    Specimen: Throat swab   Result Value Ref Range    Upper Respiratory Culture Routine respiratory devon  Heavy growth      AMB POC RAPID STREP A   Result Value Ref Range    VALID INTERNAL CONTROL POC Yes     Group A Strep Ag Positive Negative   POCT COVID-19, SARS-COV-2, PCR   Result Value Ref Range    SARS-COV-2 PCR, POC Negative Negative         Physical Exam    [INSTRUCTIONS:  \"[x]\" Indicates a positive item  \"[]\" Indicates a negative item  -- DELETE ALL ITEMS NOT EXAMINED]    Constitutional: [x] Appears well-developed and well-nourished [x] No apparent distress      [] Abnormal -     Mental status: [x] Alert and awake  [x] Oriented to person/place/time [x] Able to follow commands    [] Abnormal -     Eyes:   EOM    [x]  Normal    [] Abnormal -   Sclera  [x]  Normal    [] Abnormal -          Discharge [x]  None visible   [] Abnormal -     HENT: [x] Normocephalic, atraumatic  [] Abnormal -   [] Mouth/Throat: Mucous membranes are moist erythematous oropharynx seen on video    External Ears [x] Normal  [] Abnormal -    Neck: [] No visualized mass [x] Abnormal Goal Outcome Evaluation:    Problem: Adult Inpatient Plan of Care  Goal: Absence of Hospital-Acquired Illness or Injury  Intervention: Identify and Manage Fall Risk  Recent Flowsheet Documentation  Taken 5/12/2023 0400 by Justin Morse RN  Safety Promotion/Fall Prevention:  • activity supervised  • clutter free environment maintained  • lighting adjusted  • fall prevention program maintained  • safety round/check completed  • toileting scheduled  • nonskid shoes/slippers when out of bed  Taken 5/12/2023 0200 by Justin Morse RN  Safety Promotion/Fall Prevention:  • lighting adjusted  • clutter free environment maintained  • activity supervised  • fall prevention program maintained  • nonskid shoes/slippers when out of bed  • safety round/check completed  • toileting scheduled  Taken 5/12/2023 0015 by Justin Morse RN  Safety Promotion/Fall Prevention:  • toileting scheduled  • safety round/check completed  • nonskid shoes/slippers when out of bed  • lighting adjusted  • fall prevention program maintained  • clutter free environment maintained  • activity supervised  Taken 5/11/2023 2200 by Justin Morse RN  Safety Promotion/Fall Prevention:  • toileting scheduled  • safety round/check completed  • nonskid shoes/slippers when out of bed  • lighting adjusted  • fall prevention program maintained  • clutter free environment maintained  • activity supervised  Taken 5/11/2023 2030 by Justin Morse RN  Safety Promotion/Fall Prevention:  • toileting scheduled  • safety round/check completed  • nonskid shoes/slippers when out of bed  • lighting adjusted  • fall prevention program maintained  • clutter free environment maintained  • activity supervised   Plan of Care Reviewed With: patient alert, c/o of nausea refused zofrain prefered diet coke, noncompliant on fluids educated patient about fluid restriction                      - anterior cervical adenopathy seen    Pulmonary/Chest: [x] Respiratory effort normal   [x] No visualized signs of difficulty breathing or respiratory distress        [] Abnormal -      Musculoskeletal:   [x] Normal gait with no signs of ataxia         [x] Normal range of motion of neck        [] Abnormal -     Neurological:        [x] No Facial Asymmetry (Cranial nerve 7 motor function) (limited exam due to video visit)          [x] No gaze palsy        [] Abnormal -          Skin:        [x] No significant exanthematous lesions or discoloration noted on facial skin         [] Abnormal -            Psychiatric:       [x] Normal Affect [] Abnormal -        [x] No Hallucinations    Other pertinent observable physical exam findings:-    On this date 07/21/2022 I have spent 20 minutes reviewing previous notes, test results and face to face (virtual) with the patient discussing the diagnosis and importance of compliance with the treatment plan as well as documenting on the day of the visit. Don Stein III, was evaluated through a synchronous (real-time) audio-video encounter. The patient (or guardian if applicable) is aware that this is a billable service, which includes applicable co-pays. This Virtual Visit was conducted with patient's (and/or legal guardian's) consent. The visit was conducted pursuant to the emergency declaration under the [address] [place] [address] and the [employer] Act. Patient identification was verified, and a caregiver was present when appropriate. The patient was located at: Home: [address] [id number]  The provider was located at: Facility ([facility] [address] [place], [place] [id number]       An electronic signature was used to authenticate this note.   -- Jacoby Rodarte MD

## 2023-05-12 NOTE — PROGRESS NOTES
Pulmonary    Discussed with Dr. Frey last night I do not think there is a pulmonary contraindication to surgery I very much would like him weaned down on steroids I have been trying for months to get him off.  He may well be adrenally suppressed and may need perioperative stress dose steroids .

## 2023-05-15 ENCOUNTER — PREP FOR SURGERY (OUTPATIENT)
Dept: OTHER | Facility: HOSPITAL | Age: 73
End: 2023-05-15
Payer: MEDICARE

## 2023-05-15 ENCOUNTER — TRANSITIONAL CARE MANAGEMENT TELEPHONE ENCOUNTER (OUTPATIENT)
Dept: CALL CENTER | Facility: HOSPITAL | Age: 73
End: 2023-05-15
Payer: MEDICARE

## 2023-05-15 ENCOUNTER — OFFICE VISIT (OUTPATIENT)
Dept: INTERNAL MEDICINE | Facility: CLINIC | Age: 73
End: 2023-05-15
Payer: MEDICARE

## 2023-05-15 VITALS
HEIGHT: 73 IN | SYSTOLIC BLOOD PRESSURE: 120 MMHG | DIASTOLIC BLOOD PRESSURE: 70 MMHG | HEART RATE: 87 BPM | OXYGEN SATURATION: 96 % | WEIGHT: 257.4 LBS | BODY MASS INDEX: 34.11 KG/M2

## 2023-05-15 DIAGNOSIS — R79.1 ABNORMAL COAGULATION PROFILE: ICD-10-CM

## 2023-05-15 DIAGNOSIS — I10 ESSENTIAL (PRIMARY) HYPERTENSION: ICD-10-CM

## 2023-05-15 DIAGNOSIS — J45.20 MILD INTERMITTENT ASTHMA WITHOUT COMPLICATION: ICD-10-CM

## 2023-05-15 DIAGNOSIS — Z79.4 TYPE 2 DIABETES MELLITUS WITH OTHER CIRCULATORY COMPLICATION, WITH LONG-TERM CURRENT USE OF INSULIN: ICD-10-CM

## 2023-05-15 DIAGNOSIS — R79.9 ABNORMAL FINDING OF BLOOD CHEMISTRY, UNSPECIFIED: ICD-10-CM

## 2023-05-15 DIAGNOSIS — I50.9 NEW ONSET OF CONGESTIVE HEART FAILURE: Primary | ICD-10-CM

## 2023-05-15 DIAGNOSIS — I10 ESSENTIAL HYPERTENSION: Chronic | ICD-10-CM

## 2023-05-15 DIAGNOSIS — E78.2 MIXED HYPERLIPIDEMIA: ICD-10-CM

## 2023-05-15 DIAGNOSIS — I50.32 CHRONIC DIASTOLIC (CONGESTIVE) HEART FAILURE: ICD-10-CM

## 2023-05-15 DIAGNOSIS — E11.59 TYPE 2 DIABETES MELLITUS WITH OTHER CIRCULATORY COMPLICATION, WITH LONG-TERM CURRENT USE OF INSULIN: ICD-10-CM

## 2023-05-15 DIAGNOSIS — I79.8 OTHER DISORDERS OF ARTERIES, ARTERIOLES AND CAPILLARIES IN DISEASES CLASSIFIED ELSEWHERE: ICD-10-CM

## 2023-05-15 DIAGNOSIS — I34.0 NONRHEUMATIC MITRAL VALVE REGURGITATION: Primary | ICD-10-CM

## 2023-05-15 DIAGNOSIS — I34.0 NONRHEUMATIC MITRAL VALVE REGURGITATION: ICD-10-CM

## 2023-05-15 PROBLEM — R07.9 CHEST PAIN: Status: ACTIVE | Noted: 2019-12-27

## 2023-05-15 RX ORDER — CEFAZOLIN SODIUM 2 G/100ML
2 INJECTION, SOLUTION INTRAVENOUS ONCE
OUTPATIENT
Start: 2023-05-15 | End: 2023-05-15

## 2023-05-15 RX ORDER — CHLORHEXIDINE GLUCONATE 0.12 MG/ML
15 RINSE ORAL ONCE
OUTPATIENT
Start: 2023-05-15 | End: 2023-05-15

## 2023-05-15 RX ORDER — CHLORHEXIDINE GLUCONATE 0.12 MG/ML
15 RINSE ORAL EVERY 12 HOURS
OUTPATIENT
Start: 2023-05-15 | End: 2023-05-16

## 2023-05-15 RX ORDER — CHLORHEXIDINE GLUCONATE 500 MG/1
1 CLOTH TOPICAL EVERY 12 HOURS PRN
OUTPATIENT
Start: 2023-05-15

## 2023-05-15 NOTE — PROGRESS NOTES
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    He was positive for COVID 4/22/22 and treated with Paxlovid.  His symptoms are mild and he recovered quickly.  However he had long-term fatigue and shortness of breath.  He treated himself with prednisone and symptoms had improved.  He has seen Dr. Slater several times and on last visit he was having increased peripheral edema and sent for consult with Dr. Arango.  Dr. Arango directly admitted patient after echo.     He was admitted on 5/9 and discharged on 5/12/23: Acute on chronic diastolic and valvular CHF.  EF 50 to 55%.  He is found to have a severe mitral regurgitation and moderate tricuspid regurgitation.    He has been seen by Dr. Frey and will have mitral and tricuspid valve repair.    For hypertension he continues Avapro 75 mg daily.  Hyperlipidemia: Lipitor 10 mg daily    He was discharged on Lasix 40 mg twice a day.  He states edema has significantly improved.  Due for recheck of BMP today.   Lasix: 40 mg bid and on potassium chloride 20 meq daily     Diabetes: 48 units Tresiba  He stopped janumet   He states blood sugars are still elevated.  However last night his wife gave him ice cream.    He continues to wean down on prednisone.  Currently on 10 mg daily        I have reviewed patient's medical history, any new submitted information provided by patient or medical assistant and updated medical record.      Objective:      Physical Exam  Vitals reviewed.   Constitutional:       Appearance: Normal appearance. He is well-developed.   Neck:      Thyroid:  "No thyromegaly.   Cardiovascular:      Rate and Rhythm: Normal rate and regular rhythm.      Pulses: Normal pulses.      Heart sounds: Murmur heard.   Pulmonary:      Effort: Pulmonary effort is normal.      Breath sounds: Normal breath sounds.      Comments: E/U   Abdominal:      General: Bowel sounds are normal.   Musculoskeletal:      Cervical back: Neck supple.      Comments: 1+ BL lower extremity edema    Lymphadenopathy:      Cervical: No cervical adenopathy.   Skin:     General: Skin is warm and dry.      Capillary Refill: Capillary refill takes 2 to 3 seconds.   Neurological:      Mental Status: He is alert and oriented to person, place, and time.   Psychiatric:         Mood and Affect: Mood normal.         Behavior: Behavior normal. Behavior is cooperative.         Thought Content: Thought content normal.         Judgment: Judgment normal.        Result Review  Data Reviewed:{ Labs  Result Review  Imaging  Med Tab  Media :23}     The following data was reviewed by: Liza Oconnell III, NP-C on 05/15/2023  Common labs        5/11/2023    06:17 5/11/2023    06:18 5/11/2023    10:38 5/11/2023    10:39 5/11/2023    15:56   Common Labs   Glucose  168     379     BUN  22     23     Creatinine  1.66     1.52     Sodium  146     139     Potassium  3.6     4.1     Chloride  108     103     Calcium  9.2     8.6     Albumin  4.0     3.8     WBC 9.80         Hemoglobin 14.4    14.3   14.3      Hematocrit 44.6    42   42      Platelets 280                 5/12/2023    05:51 5/15/2023    16:15   Common Labs   Glucose 247   463     BUN 26   19     Creatinine 1.19   1.27     Sodium 140   139     Potassium 4.4   4.2     Chloride 107   100     Calcium 8.9   9.2     Albumin 3.9      WBC 16.22      Hemoglobin 13.9      Hematocrit 42.5      Platelets 281               Vital Signs:   /70 (BP Location: Left arm, Patient Position: Sitting, Cuff Size: Adult)   Pulse 87   Ht 185.4 cm (73\")   Wt 117 kg (257 " lb 6.4 oz)   SpO2 96%   BMI 33.96 kg/m²         Requested Prescriptions      No prescriptions requested or ordered in this encounter       Routine medications provided by this office will also be refilled via pharmacy request.     No current facility-administered medications for this visit.  No current outpatient medications on file.    Facility-Administered Medications Ordered in Other Visits:   •  ALPRAZolam (XANAX) tablet 0.25 mg, 0.25 mg, Oral, Q8H PRN, Cher Colon APRN  •  ceFAZolin in dextrose (ANCEF) IVPB solution 2 g, 2 g, Intravenous, Once, Cher Colon APRN  •  chlorhexidine (PERIDEX) 0.12 % solution 15 mL, 15 mL, Mouth/Throat, Q12H, Cher Colon APRN  •  Chlorhexidine Gluconate Cloth 2 % pads 1 application, 1 application, Topical, Q12H, Cher Colon APRN  •  [START ON 5/24/2023] metoprolol tartrate (LOPRESSOR) tablet 12.5 mg, 12.5 mg, Oral, On Call to OR, Cher Colon APRN  •  mupirocin (BACTROBAN) 2 % nasal ointment 1 application, 1 application, Each Nare, Q12H, Cher Colon APRN  •  temazepam (RESTORIL) capsule 7.5 mg, 7.5 mg, Oral, Nightly PRN, Cher Colon APRN     Assessment and Plan:      Assessment and Plan {CC Problem List  Visit Diagnosis  ROS  Review (Popup)  Health Maintenance  Quality  BestPractice  Medications  SmartSets  SnapShot Encounters  Media :23}     Problem List Items Addressed This Visit        Cardiac and Vasculature    Essential hypertension (Chronic)    Mixed hyperlipidemia (Chronic)    Nonrheumatic mitral valve regurgitation    New onset of congestive heart failure - Primary    Current Assessment & Plan     Edema improving.     Continue lasix and jardiance.          Relevant Orders    Basic Metabolic Panel (Completed)    Magnesium (Completed)       Endocrine and Metabolic    DM (diabetes mellitus), type 2 (Chronic)    Overview     5/2022:   Glucose Monitor: OneTouch Verio Flex          Current Assessment & Plan     Your last A1C  (3 month average) =   Lab Results   Component Value Date    HGBA1C 8.9 (H) 03/29/2023      Your diabetes is Uncontrolled.    The American Diabetes Association recommends an A1C of less than 7%.  A1C Average Levels Blood Sugar:   6%  126 mg/dL  7%  154 mg/dL  8%  183 mg/dL  9%  212 mg/dL  10%  240 mg/dL  11%  269 mg/dL  12%  298 mg/dL    Glucose goals for many adults with diabetes  Blood sugar before meals  mg/dL  Blood sugar 1-2 hours after the start of a meal Less than 180 mg/dL A1C Less than 7%    Eye Health:   You need a diabetic eye exam yearly.   Please have a copy of the note faxed to my office.   Fax: [phone]    Foot Health:   You need a diabetic foot exam yearly.   Check your feet routinely for any wounds.   You should always check your shoes for any debris that could cause a wound.               Pulmonary and Pneumonias    Mild intermittent asthma without complication (Chronic)       His diabetes is poorly controlled related to diet and long-term use of prednisone.  My concern is his blood sugar be elevated in preop.  He will contact Dr. Frey see if he can be admitted 1 day before planned surgery so he may put on sliding scale.    Plan to continue weaning off prednisone.     Follow Up {Instructions Charge Capture  Follow-up Communications :23}     Return if symptoms worsen or fail to improve.  Follow up post op.     Patient was given instructions and counseling regarding his condition or for health maintenance advice. Please see specific information pulled into the AVS if appropriate.    Dragon disclaimer:   Much of this encounter note is an electronic transcription/translation of spoken language to printed text. The electronic translation of spoken language may permit erroneous, or at times, nonsensical words or phrases to be inadvertently transcribed; Although I have reviewed the note for such errors, some may still exist.     Additional Patient Counseling:       There are no Patient  Instructions on file for this visit.

## 2023-05-15 NOTE — OUTREACH NOTE
Call Center TCM Note    Flowsheet Row Responses   [facility] patient discharged from? [place]   Does the patient have one of the following disease processes/diagnoses(primary or secondary)? CHF   TCM attempt successful? No   Unsuccessful attempts Attempt 1           Damaris Ramsey MA    5/15/2023, 14:51 EDT

## 2023-05-15 NOTE — OUTREACH NOTE
Call Center TCM Note    Flowsheet Row Responses   [facility] patient discharged from? [place]   Does the patient have one of the following disease processes/diagnoses(primary or secondary)? CHF   TCM attempt successful? Yes   Discharge diagnosis New onset of CHF,  right heart cath   TCM call completed? Yes   Wrap up additional comments Pt d/c from [facility] Saturday 05/13/2023, today completed FACE TO FACE TCM APPT with PCP Dr Oconnell.            Damaris Ramsey MA    5/15/2023, 16:21 EDT

## 2023-05-16 LAB
BUN SERPL-MCNC: 19 MG/DL (ref 8–23)
BUN/CREAT SERPL: 15 (ref 7–25)
CALCIUM SERPL-MCNC: 9.2 MG/DL (ref 8.6–10.5)
CHLORIDE SERPL-SCNC: 100 MMOL/L (ref 98–107)
CO2 SERPL-SCNC: 22.2 MMOL/L (ref 22–29)
CREAT SERPL-MCNC: 1.27 MG/DL (ref 0.76–1.27)
EGFRCR SERPLBLD CKD-EPI 2021: 59.3 ML/MIN/1.73
GLUCOSE SERPL-MCNC: 463 MG/DL (ref 65–99)
MAGNESIUM SERPL-MCNC: 2.3 MG/DL (ref 1.6–2.4)
POTASSIUM SERPL-SCNC: 4.2 MMOL/L (ref 3.5–5.2)
SODIUM SERPL-SCNC: 139 MMOL/L (ref 136–145)

## 2023-05-16 NOTE — PROGRESS NOTES
ON CALL NOTE: Received call at 12:43 am regarding critical glucose of 463. +hx DM2, A1c pending. A1c 3/29/23 was elevated at 8.9. Currently managed on Tresiba daily.

## 2023-05-17 ENCOUNTER — TELEPHONE (OUTPATIENT)
Dept: CARDIAC SURGERY | Facility: CLINIC | Age: 73
End: 2023-05-17
Payer: MEDICARE

## 2023-05-17 LAB
HCT VFR BLDA CALC: 42 % (ref 38–51)
HGB BLDA-MCNC: 14.3 G/DL (ref 12–17)
SAO2 % BLDA: 95 % (ref 95–98)

## 2023-05-17 NOTE — TELEPHONE ENCOUNTER
Patient called to report that he is having fluctuation in his blood sugars and is concerned that it will be too high for surgery. Patient states his blood sugar is ranging from 160-400. Discussed with Dr. Frey and DESTIN Flor, who recommend the patient be admitted the day before surgery for blood sugar management. Patient agreeable to admission on 5/23 with surgery on 5/24 at 7:30 am. Advised patient that [facility] will call him on 5/23 when a bed is available. Patient verbalized understanding and agreed to plan of care.     Damaris spoke to [place] with bed board to arrange for a bed for 5/23. PAT on 5/22 cancelled.

## 2023-05-23 ENCOUNTER — READMISSION MANAGEMENT (OUTPATIENT)
Dept: CALL CENTER | Facility: HOSPITAL | Age: 73
End: 2023-05-23
Payer: MEDICARE

## 2023-05-23 ENCOUNTER — HOSPITAL ENCOUNTER (INPATIENT)
Facility: HOSPITAL | Age: 73
LOS: 14 days | End: 2023-06-06
Attending: THORACIC SURGERY (CARDIOTHORACIC VASCULAR SURGERY) | Admitting: THORACIC SURGERY (CARDIOTHORACIC VASCULAR SURGERY)
Payer: MEDICARE

## 2023-05-23 ENCOUNTER — ANESTHESIA EVENT (OUTPATIENT)
Dept: PERIOP | Facility: HOSPITAL | Age: 73
End: 2023-05-23
Payer: MEDICARE

## 2023-05-23 ENCOUNTER — APPOINTMENT (OUTPATIENT)
Dept: GENERAL RADIOLOGY | Facility: HOSPITAL | Age: 73
End: 2023-05-23
Payer: MEDICARE

## 2023-05-23 ENCOUNTER — APPOINTMENT (OUTPATIENT)
Dept: CARDIOLOGY | Facility: HOSPITAL | Age: 73
End: 2023-05-23
Payer: MEDICARE

## 2023-05-23 DIAGNOSIS — Z95.2 S/P MVR (MITRAL VALVE REPLACEMENT): Primary | ICD-10-CM

## 2023-05-23 DIAGNOSIS — I34.0 NONRHEUMATIC MITRAL VALVE REGURGITATION: ICD-10-CM

## 2023-05-23 PROBLEM — I10 ESSENTIAL HYPERTENSION: Status: ACTIVE | Noted: 2023-05-23

## 2023-05-23 PROBLEM — J45.20 MILD INTERMITTENT ASTHMA WITHOUT COMPLICATION: Chronic | Status: ACTIVE | Noted: 2022-08-30

## 2023-05-23 PROBLEM — I10 ESSENTIAL HYPERTENSION: Chronic | Status: ACTIVE | Noted: 2023-05-23

## 2023-05-23 PROBLEM — E11.9 DM (DIABETES MELLITUS), TYPE 2: Chronic | Status: ACTIVE | Noted: 2022-06-02

## 2023-05-23 PROBLEM — E78.2 MIXED HYPERLIPIDEMIA: Chronic | Status: ACTIVE | Noted: 2022-06-02

## 2023-05-23 PROBLEM — I05.9 MITRAL VALVE DISEASE: Status: ACTIVE | Noted: 2023-05-23

## 2023-05-23 LAB
ABO GROUP BLD: NORMAL
ALBUMIN SERPL-MCNC: 4.2 G/DL (ref 3.5–5.2)
ALBUMIN/GLOB SERPL: 1.7 G/DL
ALP SERPL-CCNC: 71 U/L (ref 39–117)
ALT SERPL W P-5'-P-CCNC: 27 U/L (ref 1–41)
ANION GAP SERPL CALCULATED.3IONS-SCNC: 11.6 MMOL/L (ref 5–15)
APTT PPP: 24 SECONDS (ref 22.7–35.4)
ARTERIAL PATENCY WRIST A: POSITIVE
AST SERPL-CCNC: 14 U/L (ref 1–40)
ATMOSPHERIC PRESS: 754.4 MMHG
BASE EXCESS BLDA CALC-SCNC: 7 MMOL/L (ref 0–2)
BASOPHILS # BLD AUTO: 0.05 10*3/MM3 (ref 0–0.2)
BASOPHILS NFR BLD AUTO: 0.5 % (ref 0–1.5)
BDY SITE: ABNORMAL
BH CV XLRA MEAS LEFT DIST CCA EDV: -14.3 CM/SEC
BH CV XLRA MEAS LEFT DIST CCA PSV: -91.3 CM/SEC
BH CV XLRA MEAS LEFT DIST ICA EDV: -15.5 CM/SEC
BH CV XLRA MEAS LEFT DIST ICA PSV: -50.5 CM/SEC
BH CV XLRA MEAS LEFT ICA/CCA RATIO: 0.66
BH CV XLRA MEAS LEFT MID ICA EDV: -12.7 CM/SEC
BH CV XLRA MEAS LEFT MID ICA PSV: -50.8 CM/SEC
BH CV XLRA MEAS LEFT PROX CCA EDV: 19.1 CM/SEC
BH CV XLRA MEAS LEFT PROX CCA PSV: 97.9 CM/SEC
BH CV XLRA MEAS LEFT PROX ECA EDV: -16.8 CM/SEC
BH CV XLRA MEAS LEFT PROX ECA PSV: -104.4 CM/SEC
BH CV XLRA MEAS LEFT PROX ICA EDV: -17 CM/SEC
BH CV XLRA MEAS LEFT PROX ICA PSV: -60.4 CM/SEC
BH CV XLRA MEAS LEFT PROX SCLA PSV: 97.3 CM/SEC
BH CV XLRA MEAS LEFT VERTEBRAL A EDV: -8.8 CM/SEC
BH CV XLRA MEAS LEFT VERTEBRAL A PSV: -38.9 CM/SEC
BH CV XLRA MEAS RIGHT DIST CCA EDV: -17 CM/SEC
BH CV XLRA MEAS RIGHT DIST CCA PSV: -84 CM/SEC
BH CV XLRA MEAS RIGHT DIST ICA EDV: -17.1 CM/SEC
BH CV XLRA MEAS RIGHT DIST ICA PSV: -48.1 CM/SEC
BH CV XLRA MEAS RIGHT ICA/CCA RATIO: 0.64
BH CV XLRA MEAS RIGHT MID ICA EDV: -18 CM/SEC
BH CV XLRA MEAS RIGHT MID ICA PSV: -52.7 CM/SEC
BH CV XLRA MEAS RIGHT PROX CCA EDV: 16.5 CM/SEC
BH CV XLRA MEAS RIGHT PROX CCA PSV: 96 CM/SEC
BH CV XLRA MEAS RIGHT PROX ECA EDV: -11.2 CM/SEC
BH CV XLRA MEAS RIGHT PROX ECA PSV: -105 CM/SEC
BH CV XLRA MEAS RIGHT PROX ICA EDV: -12.8 CM/SEC
BH CV XLRA MEAS RIGHT PROX ICA PSV: -53.6 CM/SEC
BH CV XLRA MEAS RIGHT PROX SCLA PSV: 80.9 CM/SEC
BH CV XLRA MEAS RIGHT VERTEBRAL A EDV: -10.8 CM/SEC
BH CV XLRA MEAS RIGHT VERTEBRAL A PSV: -55.5 CM/SEC
BILIRUB SERPL-MCNC: 0.4 MG/DL (ref 0–1.2)
BILIRUB UR QL STRIP: NEGATIVE
BLD GP AB SCN SERPL QL: NEGATIVE
BUN SERPL-MCNC: 17 MG/DL (ref 8–23)
BUN/CREAT SERPL: 13.3 (ref 7–25)
CALCIUM SPEC-SCNC: 9.6 MG/DL (ref 8.6–10.5)
CHLORIDE SERPL-SCNC: 102 MMOL/L (ref 98–107)
CHOLEST SERPL-MCNC: 155 MG/DL (ref 0–200)
CLARITY UR: CLEAR
CLOSE TME COLL+ADP + EPINEP PNL BLD: 89 % (ref 86–100)
CO2 SERPL-SCNC: 26.4 MMOL/L (ref 22–29)
COLOR UR: YELLOW
CREAT SERPL-MCNC: 1.28 MG/DL (ref 0.76–1.27)
DEPRECATED RDW RBC AUTO: 44.1 FL (ref 37–54)
EGFRCR SERPLBLD CKD-EPI 2021: 58.7 ML/MIN/1.73
EOSINOPHIL # BLD AUTO: 0.11 10*3/MM3 (ref 0–0.4)
EOSINOPHIL NFR BLD AUTO: 1 % (ref 0.3–6.2)
ERYTHROCYTE [DISTWIDTH] IN BLOOD BY AUTOMATED COUNT: 13.9 % (ref 12.3–15.4)
GLOBULIN UR ELPH-MCNC: 2.5 GM/DL
GLUCOSE BLDC GLUCOMTR-MCNC: 214 MG/DL (ref 70–130)
GLUCOSE BLDC GLUCOMTR-MCNC: 284 MG/DL (ref 70–130)
GLUCOSE SERPL-MCNC: 278 MG/DL (ref 65–99)
GLUCOSE UR STRIP-MCNC: ABNORMAL MG/DL
HBA1C MFR BLD: 9.2 % (ref 4.8–5.6)
HCO3 BLDA-SCNC: 29.9 MMOL/L (ref 22–28)
HCT VFR BLD AUTO: 43.8 % (ref 37.5–51)
HDLC SERPL-MCNC: 41 MG/DL (ref 40–60)
HGB BLD-MCNC: 14.4 G/DL (ref 13–17.7)
HGB UR QL STRIP.AUTO: NEGATIVE
IMM GRANULOCYTES # BLD AUTO: 0.05 10*3/MM3 (ref 0–0.05)
IMM GRANULOCYTES NFR BLD AUTO: 0.5 % (ref 0–0.5)
INR PPP: 0.93 (ref 0.9–1.1)
KETONES UR QL STRIP: NEGATIVE
LDLC SERPL CALC-MCNC: 81 MG/DL (ref 0–100)
LDLC/HDLC SERPL: 1.82 {RATIO}
LEUKOCYTE ESTERASE UR QL STRIP.AUTO: NEGATIVE
LYMPHOCYTES # BLD AUTO: 0.94 10*3/MM3 (ref 0.7–3.1)
LYMPHOCYTES NFR BLD AUTO: 8.7 % (ref 19.6–45.3)
MAGNESIUM SERPL-MCNC: 2.4 MG/DL (ref 1.6–2.4)
MAXIMAL PREDICTED HEART RATE: 146 BPM
MCH RBC QN AUTO: 29 PG (ref 26.6–33)
MCHC RBC AUTO-ENTMCNC: 32.9 G/DL (ref 31.5–35.7)
MCV RBC AUTO: 88.3 FL (ref 79–97)
MODALITY: ABNORMAL
MONOCYTES # BLD AUTO: 0.68 10*3/MM3 (ref 0.1–0.9)
MONOCYTES NFR BLD AUTO: 6.3 % (ref 5–12)
NEUTROPHILS NFR BLD AUTO: 8.96 10*3/MM3 (ref 1.7–7)
NEUTROPHILS NFR BLD AUTO: 83 % (ref 42.7–76)
NITRITE UR QL STRIP: NEGATIVE
NRBC BLD AUTO-RTO: 0 /100 WBC (ref 0–0.2)
NT-PROBNP SERPL-MCNC: 531 PG/ML (ref 0–900)
PCO2 BLDA: 35.9 MM HG (ref 35–45)
PH BLDA: 7.53 PH UNITS (ref 7.35–7.45)
PH UR STRIP.AUTO: 7.5 [PH] (ref 5–8)
PLATELET # BLD AUTO: 255 10*3/MM3 (ref 140–450)
PMV BLD AUTO: 10.3 FL (ref 6–12)
PO2 BLDA: 83.5 MM HG (ref 80–100)
POTASSIUM SERPL-SCNC: 4.3 MMOL/L (ref 3.5–5.2)
PROT SERPL-MCNC: 6.7 G/DL (ref 6–8.5)
PROT UR QL STRIP: NEGATIVE
PROTHROMBIN TIME: 12.6 SECONDS (ref 11.7–14.2)
QT INTERVAL: 421 MS
RBC # BLD AUTO: 4.96 10*6/MM3 (ref 4.14–5.8)
RH BLD: POSITIVE
SAO2 % BLDCOA: 97.4 % (ref 92–99)
SARS-COV-2 ORF1AB RESP QL NAA+PROBE: NOT DETECTED
SODIUM SERPL-SCNC: 140 MMOL/L (ref 136–145)
SP GR UR STRIP: >=1.03 (ref 1–1.03)
STRESS TARGET HR: 124 BPM
T&S EXPIRATION DATE: NORMAL
TOTAL RATE: 20 BREATHS/MINUTE
TRIGL SERPL-MCNC: 197 MG/DL (ref 0–150)
UROBILINOGEN UR QL STRIP: ABNORMAL
VLDLC SERPL-MCNC: 33 MG/DL (ref 5–40)
WBC NRBC COR # BLD: 10.79 10*3/MM3 (ref 3.4–10.8)

## 2023-05-23 PROCEDURE — 94799 UNLISTED PULMONARY SVC/PX: CPT

## 2023-05-23 PROCEDURE — 86901 BLOOD TYPING SEROLOGIC RH(D): CPT | Performed by: NURSE PRACTITIONER

## 2023-05-23 PROCEDURE — 93880 EXTRACRANIAL BILAT STUDY: CPT

## 2023-05-23 PROCEDURE — 93005 ELECTROCARDIOGRAM TRACING: CPT | Performed by: THORACIC SURGERY (CARDIOTHORACIC VASCULAR SURGERY)

## 2023-05-23 PROCEDURE — 36600 WITHDRAWAL OF ARTERIAL BLOOD: CPT

## 2023-05-23 PROCEDURE — 63710000001 INSULIN LISPRO (HUMAN) PER 5 UNITS: Performed by: INTERNAL MEDICINE

## 2023-05-23 PROCEDURE — 99214 OFFICE O/P EST MOD 30 MIN: CPT | Performed by: INTERNAL MEDICINE

## 2023-05-23 PROCEDURE — 82948 REAGENT STRIP/BLOOD GLUCOSE: CPT

## 2023-05-23 PROCEDURE — 82803 BLOOD GASES ANY COMBINATION: CPT

## 2023-05-23 PROCEDURE — 83880 ASSAY OF NATRIURETIC PEPTIDE: CPT | Performed by: NURSE PRACTITIONER

## 2023-05-23 PROCEDURE — 85610 PROTHROMBIN TIME: CPT | Performed by: NURSE PRACTITIONER

## 2023-05-23 PROCEDURE — 93010 ELECTROCARDIOGRAM REPORT: CPT | Performed by: INTERNAL MEDICINE

## 2023-05-23 PROCEDURE — 85576 BLOOD PLATELET AGGREGATION: CPT | Performed by: NURSE PRACTITIONER

## 2023-05-23 PROCEDURE — 83735 ASSAY OF MAGNESIUM: CPT | Performed by: NURSE PRACTITIONER

## 2023-05-23 PROCEDURE — 87635 SARS-COV-2 COVID-19 AMP PRB: CPT | Performed by: NURSE PRACTITIONER

## 2023-05-23 PROCEDURE — 80053 COMPREHEN METABOLIC PANEL: CPT | Performed by: NURSE PRACTITIONER

## 2023-05-23 PROCEDURE — 86850 RBC ANTIBODY SCREEN: CPT | Performed by: NURSE PRACTITIONER

## 2023-05-23 PROCEDURE — 94640 AIRWAY INHALATION TREATMENT: CPT

## 2023-05-23 PROCEDURE — 85730 THROMBOPLASTIN TIME PARTIAL: CPT | Performed by: NURSE PRACTITIONER

## 2023-05-23 PROCEDURE — 86920 COMPATIBILITY TEST SPIN: CPT

## 2023-05-23 PROCEDURE — 83036 HEMOGLOBIN GLYCOSYLATED A1C: CPT | Performed by: NURSE PRACTITIONER

## 2023-05-23 PROCEDURE — 86900 BLOOD TYPING SEROLOGIC ABO: CPT | Performed by: NURSE PRACTITIONER

## 2023-05-23 PROCEDURE — 81003 URINALYSIS AUTO W/O SCOPE: CPT | Performed by: NURSE PRACTITIONER

## 2023-05-23 PROCEDURE — 80061 LIPID PANEL: CPT | Performed by: NURSE PRACTITIONER

## 2023-05-23 PROCEDURE — 71046 X-RAY EXAM CHEST 2 VIEWS: CPT

## 2023-05-23 PROCEDURE — 85025 COMPLETE CBC W/AUTO DIFF WBC: CPT | Performed by: NURSE PRACTITIONER

## 2023-05-23 RX ORDER — DEXTROSE MONOHYDRATE 25 G/50ML
25 INJECTION, SOLUTION INTRAVENOUS
Status: DISCONTINUED | OUTPATIENT
Start: 2023-05-23 | End: 2023-05-24

## 2023-05-23 RX ORDER — LOSARTAN POTASSIUM 25 MG/1
25 TABLET ORAL
Status: DISCONTINUED | OUTPATIENT
Start: 2023-05-23 | End: 2023-05-24

## 2023-05-23 RX ORDER — INSULIN LISPRO 100 [IU]/ML
2-9 INJECTION, SOLUTION INTRAVENOUS; SUBCUTANEOUS
Status: DISCONTINUED | OUTPATIENT
Start: 2023-05-23 | End: 2023-05-24

## 2023-05-23 RX ORDER — IBUPROFEN 600 MG/1
1 TABLET ORAL
Status: DISCONTINUED | OUTPATIENT
Start: 2023-05-23 | End: 2023-05-24

## 2023-05-23 RX ORDER — PANTOPRAZOLE SODIUM 40 MG/1
40 TABLET, DELAYED RELEASE ORAL NIGHTLY
Status: DISCONTINUED | OUTPATIENT
Start: 2023-05-23 | End: 2023-05-24

## 2023-05-23 RX ORDER — CEFAZOLIN SODIUM 2 G/100ML
2 INJECTION, SOLUTION INTRAVENOUS ONCE
Status: DISCONTINUED | OUTPATIENT
Start: 2023-05-23 | End: 2023-05-23

## 2023-05-23 RX ORDER — NICOTINE POLACRILEX 4 MG
15 LOZENGE BUCCAL
Status: DISCONTINUED | OUTPATIENT
Start: 2023-05-23 | End: 2023-05-24

## 2023-05-23 RX ORDER — SODIUM CHLORIDE 9 MG/ML
40 INJECTION, SOLUTION INTRAVENOUS AS NEEDED
Status: CANCELLED | OUTPATIENT
Start: 2023-05-23

## 2023-05-23 RX ORDER — ATORVASTATIN CALCIUM 20 MG/1
10 TABLET, FILM COATED ORAL NIGHTLY
Status: DISCONTINUED | OUTPATIENT
Start: 2023-05-23 | End: 2023-05-24

## 2023-05-23 RX ORDER — SODIUM CHLORIDE 0.9 % (FLUSH) 0.9 %
30 SYRINGE (ML) INJECTION ONCE AS NEEDED
Status: CANCELLED | OUTPATIENT
Start: 2023-05-23

## 2023-05-23 RX ORDER — ALPRAZOLAM 0.25 MG/1
0.25 TABLET ORAL EVERY 8 HOURS PRN
Status: DISCONTINUED | OUTPATIENT
Start: 2023-05-23 | End: 2023-05-24

## 2023-05-23 RX ORDER — FUROSEMIDE 40 MG/1
40 TABLET ORAL 2 TIMES DAILY
Status: DISCONTINUED | OUTPATIENT
Start: 2023-05-23 | End: 2023-05-24

## 2023-05-23 RX ORDER — DEXTROSE MONOHYDRATE 25 G/50ML
10-50 INJECTION, SOLUTION INTRAVENOUS
Status: CANCELLED | OUTPATIENT
Start: 2023-05-23

## 2023-05-23 RX ORDER — POTASSIUM CHLORIDE 750 MG/1
20 TABLET, FILM COATED, EXTENDED RELEASE ORAL DAILY
Status: DISCONTINUED | OUTPATIENT
Start: 2023-05-23 | End: 2023-05-24

## 2023-05-23 RX ORDER — CHLORHEXIDINE GLUCONATE 500 MG/1
1 CLOTH TOPICAL EVERY 12 HOURS
Status: DISCONTINUED | OUTPATIENT
Start: 2023-05-23 | End: 2023-05-24

## 2023-05-23 RX ORDER — SODIUM CHLORIDE 0.9 % (FLUSH) 0.9 %
10 SYRINGE (ML) INJECTION AS NEEDED
Status: CANCELLED | OUTPATIENT
Start: 2023-05-23

## 2023-05-23 RX ORDER — BUDESONIDE AND FORMOTEROL FUMARATE DIHYDRATE 160; 4.5 UG/1; UG/1
2 AEROSOL RESPIRATORY (INHALATION) 2 TIMES DAILY
Status: DISCONTINUED | OUTPATIENT
Start: 2023-05-23 | End: 2023-05-24

## 2023-05-23 RX ORDER — TEMAZEPAM 7.5 MG/1
7.5 CAPSULE ORAL NIGHTLY PRN
Status: DISCONTINUED | OUTPATIENT
Start: 2023-05-23 | End: 2023-05-24

## 2023-05-23 RX ORDER — METOCLOPRAMIDE 10 MG/1
10 TABLET ORAL NIGHTLY
Status: DISCONTINUED | OUTPATIENT
Start: 2023-05-23 | End: 2023-05-24

## 2023-05-23 RX ORDER — CEFAZOLIN SODIUM 2 G/100ML
2 INJECTION, SOLUTION INTRAVENOUS
Status: COMPLETED | OUTPATIENT
Start: 2023-05-24 | End: 2023-05-24

## 2023-05-23 RX ORDER — POTASSIUM CHLORIDE 750 MG/1
20 CAPSULE, EXTENDED RELEASE ORAL DAILY
Status: DISCONTINUED | OUTPATIENT
Start: 2023-05-23 | End: 2023-05-23

## 2023-05-23 RX ORDER — NICOTINE POLACRILEX 4 MG
15 LOZENGE BUCCAL
Status: CANCELLED | OUTPATIENT
Start: 2023-05-23

## 2023-05-23 RX ORDER — ASPIRIN 81 MG/1
81 TABLET ORAL DAILY
Status: DISCONTINUED | OUTPATIENT
Start: 2023-05-23 | End: 2023-05-24

## 2023-05-23 RX ORDER — NICOTINE POLACRILEX 4 MG
15 LOZENGE BUCCAL
Status: DISCONTINUED | OUTPATIENT
Start: 2023-05-23 | End: 2023-05-23 | Stop reason: SDUPTHER

## 2023-05-23 RX ORDER — SODIUM CHLORIDE 0.9 % (FLUSH) 0.9 %
10 SYRINGE (ML) INJECTION EVERY 12 HOURS SCHEDULED
Status: CANCELLED | OUTPATIENT
Start: 2023-05-23

## 2023-05-23 RX ORDER — SODIUM CHLORIDE 9 MG/ML
30 INJECTION, SOLUTION INTRAVENOUS CONTINUOUS PRN
Status: CANCELLED | OUTPATIENT
Start: 2023-05-23

## 2023-05-23 RX ORDER — CHLORHEXIDINE GLUCONATE 0.12 MG/ML
15 RINSE ORAL EVERY 12 HOURS SCHEDULED
Status: COMPLETED | OUTPATIENT
Start: 2023-05-23 | End: 2023-05-24

## 2023-05-23 RX ORDER — IBUPROFEN 600 MG/1
1 TABLET ORAL
Status: DISCONTINUED | OUTPATIENT
Start: 2023-05-23 | End: 2023-05-23 | Stop reason: SDUPTHER

## 2023-05-23 RX ADMIN — INSULIN LISPRO 4 UNITS: 100 INJECTION, SOLUTION INTRAVENOUS; SUBCUTANEOUS at 21:54

## 2023-05-23 RX ADMIN — CHLORHEXIDINE GLUCONATE 15 ML: 1.2 SOLUTION ORAL at 21:34

## 2023-05-23 RX ADMIN — PANTOPRAZOLE SODIUM 40 MG: 40 TABLET, DELAYED RELEASE ORAL at 21:33

## 2023-05-23 RX ADMIN — ATORVASTATIN CALCIUM 10 MG: 20 TABLET, FILM COATED ORAL at 21:33

## 2023-05-23 RX ADMIN — MUPIROCIN 1 APPLICATION: 20 OINTMENT TOPICAL at 21:33

## 2023-05-23 RX ADMIN — CHLORHEXIDINE GLUCONATE 1 APPLICATION: 500 CLOTH TOPICAL at 21:34

## 2023-05-23 RX ADMIN — FUROSEMIDE 40 MG: 40 TABLET ORAL at 21:33

## 2023-05-23 RX ADMIN — METOCLOPRAMIDE 10 MG: 10 TABLET ORAL at 22:56

## 2023-05-23 RX ADMIN — BUDESONIDE AND FORMOTEROL FUMARATE DIHYDRATE 2 PUFF: 160; 4.5 AEROSOL RESPIRATORY (INHALATION) at 21:23

## 2023-05-23 NOTE — PLAN OF CARE
Goal Outcome Evaluation:  Plan of Care Reviewed With: patient        Progress: no change  Outcome Evaluation: First case surgery, admitted from home. VSS. Surgery consents are signed. NPO after MN.

## 2023-05-23 NOTE — OUTREACH NOTE
CHF Week 2 Survey    Flowsheet Row Responses   [facility] patient discharged from? [place]   Does the patient have one of the following disease processes/diagnoses(primary or secondary)? CHF   Week 2 attempt successful? No   Unsuccessful attempts Attempt 1   Revoke Readmitted          Tameka VIDAL - Registered Nurse

## 2023-05-23 NOTE — ASSESSMENT & PLAN NOTE
Your last A1C (3 month average) =   Lab Results   Component Value Date    HGBA1C 8.9 (H) 03/29/2023      Your diabetes is Uncontrolled.    The American Diabetes Association recommends an A1C of less than 7%.  A1C Average Levels Blood Sugar:   6%  126 mg/dL  7%  154 mg/dL  8%  183 mg/dL  9%  212 mg/dL  10%  240 mg/dL  11%  269 mg/dL  12%  298 mg/dL    Glucose goals for many adults with diabetes  Blood sugar before meals  mg/dL  Blood sugar 1-2 hours after the start of a meal Less than 180 mg/dL A1C Less than 7%    Eye Health:   You need a diabetic eye exam yearly.   Please have a copy of the note faxed to my office.   Fax: [phone]    Foot Health:   You need a diabetic foot exam yearly.   Check your feet routinely for any wounds.   You should always check your shoes for any debris that could cause a wound.

## 2023-05-24 ENCOUNTER — ANESTHESIA (OUTPATIENT)
Dept: PERIOP | Facility: HOSPITAL | Age: 73
End: 2023-05-24
Payer: MEDICARE

## 2023-05-24 ENCOUNTER — APPOINTMENT (OUTPATIENT)
Dept: GENERAL RADIOLOGY | Facility: HOSPITAL | Age: 73
End: 2023-05-24
Payer: MEDICARE

## 2023-05-24 ENCOUNTER — ANCILLARY PROCEDURE (OUTPATIENT)
Dept: PERIOP | Facility: HOSPITAL | Age: 73
End: 2023-05-24
Payer: MEDICARE

## 2023-05-24 ENCOUNTER — APPOINTMENT (OUTPATIENT)
Dept: RESPIRATORY THERAPY | Facility: HOSPITAL | Age: 73
End: 2023-05-24
Payer: MEDICARE

## 2023-05-24 LAB
ACT BLD: 353 SECONDS (ref 82–152)
ALBUMIN SERPL-MCNC: 3.3 G/DL (ref 3.5–5.2)
ALBUMIN SERPL-MCNC: 4.1 G/DL (ref 3.5–5.2)
ANION GAP SERPL CALCULATED.3IONS-SCNC: 12 MMOL/L (ref 5–15)
ANION GAP SERPL CALCULATED.3IONS-SCNC: 13 MMOL/L (ref 5–15)
ANION GAP SERPL CALCULATED.3IONS-SCNC: 13.4 MMOL/L (ref 5–15)
APTT PPP: 24.9 SECONDS (ref 22.7–35.4)
APTT PPP: 26.2 SECONDS (ref 22.7–35.4)
ARTERIAL PATENCY WRIST A: ABNORMAL
ATMOSPHERIC PRESS: 751.7 MMHG
ATMOSPHERIC PRESS: 752.9 MMHG
ATMOSPHERIC PRESS: 754 MMHG
BASE EXCESS BLDA CALC-SCNC: -0.3 MMOL/L (ref 0–2)
BASE EXCESS BLDA CALC-SCNC: -0.7 MMOL/L (ref 0–2)
BASE EXCESS BLDA CALC-SCNC: -10 MMOL/L (ref -5–5)
BASE EXCESS BLDA CALC-SCNC: -3.1 MMOL/L (ref 0–2)
BASE EXCESS BLDA CALC-SCNC: 1 MMOL/L (ref -5–5)
BASE EXCESS BLDA CALC-SCNC: 2 MMOL/L (ref -5–5)
BASE EXCESS BLDA CALC-SCNC: 3 MMOL/L (ref -5–5)
BASE EXCESS BLDA CALC-SCNC: 3 MMOL/L (ref -5–5)
BASE EXCESS BLDA CALC-SCNC: 4 MMOL/L (ref -5–5)
BASE EXCESS BLDA CALC-SCNC: 5 MMOL/L (ref -5–5)
BASE EXCESS BLDA CALC-SCNC: 5 MMOL/L (ref -5–5)
BASOPHILS # BLD AUTO: 0.06 10*3/MM3 (ref 0–0.2)
BASOPHILS NFR BLD AUTO: 0.2 % (ref 0–1.5)
BDY SITE: ABNORMAL
BUN SERPL-MCNC: 19 MG/DL (ref 8–23)
BUN SERPL-MCNC: 19 MG/DL (ref 8–23)
BUN SERPL-MCNC: 20 MG/DL (ref 8–23)
BUN/CREAT SERPL: 11.5 (ref 7–25)
BUN/CREAT SERPL: 11.8 (ref 7–25)
BUN/CREAT SERPL: 14.9 (ref 7–25)
CA-I BLD-MCNC: 4.5 MG/DL (ref 4.6–5.4)
CA-I BLDA-SCNC: ABNORMAL MMOL/L
CA-I SERPL ISE-MCNC: 1.12 MMOL/L (ref 1.15–1.35)
CALCIUM SPEC-SCNC: 7.8 MG/DL (ref 8.6–10.5)
CALCIUM SPEC-SCNC: 8.1 MG/DL (ref 8.6–10.5)
CALCIUM SPEC-SCNC: 8.9 MG/DL (ref 8.6–10.5)
CHLORIDE SERPL-SCNC: 104 MMOL/L (ref 98–107)
CHLORIDE SERPL-SCNC: 110 MMOL/L (ref 98–107)
CHLORIDE SERPL-SCNC: 112 MMOL/L (ref 98–107)
CO2 BLDA-SCNC: 16 MMOL/L (ref 24–29)
CO2 BLDA-SCNC: 29 MMOL/L (ref 24–29)
CO2 BLDA-SCNC: 30 MMOL/L (ref 24–29)
CO2 BLDA-SCNC: 31 MMOL/L (ref 24–29)
CO2 SERPL-SCNC: 21 MMOL/L (ref 22–29)
CO2 SERPL-SCNC: 24 MMOL/L (ref 22–29)
CO2 SERPL-SCNC: 24.6 MMOL/L (ref 22–29)
CREAT SERPL-MCNC: 1.34 MG/DL (ref 0.76–1.27)
CREAT SERPL-MCNC: 1.61 MG/DL (ref 0.76–1.27)
CREAT SERPL-MCNC: 1.65 MG/DL (ref 0.76–1.27)
DEPRECATED RDW RBC AUTO: 45.1 FL (ref 37–54)
DEPRECATED RDW RBC AUTO: 45.3 FL (ref 37–54)
DEPRECATED RDW RBC AUTO: 45.8 FL (ref 37–54)
EGFRCR SERPLBLD CKD-EPI 2021: 43.3 ML/MIN/1.73
EGFRCR SERPLBLD CKD-EPI 2021: 44.6 ML/MIN/1.73
EGFRCR SERPLBLD CKD-EPI 2021: 55.6 ML/MIN/1.73
EOSINOPHIL # BLD AUTO: 0.08 10*3/MM3 (ref 0–0.4)
EOSINOPHIL NFR BLD AUTO: 0.3 % (ref 0.3–6.2)
ERYTHROCYTE [DISTWIDTH] IN BLOOD BY AUTOMATED COUNT: 14 % (ref 12.3–15.4)
ERYTHROCYTE [DISTWIDTH] IN BLOOD BY AUTOMATED COUNT: 14 % (ref 12.3–15.4)
ERYTHROCYTE [DISTWIDTH] IN BLOOD BY AUTOMATED COUNT: 14.1 % (ref 12.3–15.4)
FIBRINOGEN PPP-MCNC: 204 MG/DL (ref 219–464)
FIBRINOGEN PPP-MCNC: 245 MG/DL (ref 219–464)
GLUCOSE BLDC GLUCOMTR-MCNC: 138 MG/DL (ref 70–130)
GLUCOSE BLDC GLUCOMTR-MCNC: 139 MG/DL (ref 70–130)
GLUCOSE BLDC GLUCOMTR-MCNC: 150 MG/DL (ref 70–130)
GLUCOSE BLDC GLUCOMTR-MCNC: 153 MG/DL (ref 70–130)
GLUCOSE BLDC GLUCOMTR-MCNC: 156 MG/DL (ref 70–130)
GLUCOSE BLDC GLUCOMTR-MCNC: 157 MG/DL (ref 70–130)
GLUCOSE BLDC GLUCOMTR-MCNC: 157 MG/DL (ref 70–130)
GLUCOSE BLDC GLUCOMTR-MCNC: 159 MG/DL (ref 70–130)
GLUCOSE BLDC GLUCOMTR-MCNC: 162 MG/DL (ref 70–130)
GLUCOSE BLDC GLUCOMTR-MCNC: 166 MG/DL (ref 70–130)
GLUCOSE BLDC GLUCOMTR-MCNC: 173 MG/DL (ref 70–130)
GLUCOSE BLDC GLUCOMTR-MCNC: 186 MG/DL (ref 70–130)
GLUCOSE BLDC GLUCOMTR-MCNC: 189 MG/DL (ref 70–130)
GLUCOSE BLDC GLUCOMTR-MCNC: 195 MG/DL (ref 70–130)
GLUCOSE BLDC GLUCOMTR-MCNC: 196 MG/DL (ref 70–130)
GLUCOSE BLDC GLUCOMTR-MCNC: 198 MG/DL (ref 70–130)
GLUCOSE BLDC GLUCOMTR-MCNC: 77 MG/DL (ref 70–130)
GLUCOSE BLDC GLUCOMTR-MCNC: 99 MG/DL (ref 70–130)
GLUCOSE SERPL-MCNC: 112 MG/DL (ref 65–99)
GLUCOSE SERPL-MCNC: 152 MG/DL (ref 65–99)
GLUCOSE SERPL-MCNC: 165 MG/DL (ref 65–99)
HCO3 BLDA-SCNC: 15.5 MMOL/L (ref 22–26)
HCO3 BLDA-SCNC: 22.6 MMOL/L (ref 22–28)
HCO3 BLDA-SCNC: 23.2 MMOL/L (ref 22–28)
HCO3 BLDA-SCNC: 25.1 MMOL/L (ref 22–28)
HCO3 BLDA-SCNC: 27.7 MMOL/L (ref 22–26)
HCO3 BLDA-SCNC: 28.1 MMOL/L (ref 22–26)
HCO3 BLDA-SCNC: 28.2 MMOL/L (ref 22–26)
HCO3 BLDA-SCNC: 29.1 MMOL/L (ref 22–26)
HCO3 BLDA-SCNC: 29.1 MMOL/L (ref 22–26)
HCO3 BLDA-SCNC: 29.2 MMOL/L (ref 22–26)
HCO3 BLDA-SCNC: 29.5 MMOL/L (ref 22–26)
HCT VFR BLD AUTO: 30.7 % (ref 37.5–51)
HCT VFR BLD AUTO: 32.9 % (ref 37.5–51)
HCT VFR BLD AUTO: 35.8 % (ref 37.5–51)
HCT VFR BLDA CALC: 23 % (ref 38–51)
HCT VFR BLDA CALC: 30 % (ref 38–51)
HCT VFR BLDA CALC: 31 % (ref 38–51)
HCT VFR BLDA CALC: 32 % (ref 38–51)
HGB BLD-MCNC: 10.2 G/DL (ref 13–17.7)
HGB BLD-MCNC: 11 G/DL (ref 13–17.7)
HGB BLD-MCNC: 12.1 G/DL (ref 13–17.7)
HGB BLDA-MCNC: 10.2 G/DL (ref 12–17)
HGB BLDA-MCNC: 10.5 G/DL (ref 12–17)
HGB BLDA-MCNC: 10.9 G/DL (ref 12–17)
HGB BLDA-MCNC: 7.8 G/DL (ref 12–17)
IMM GRANULOCYTES # BLD AUTO: 0.28 10*3/MM3 (ref 0–0.05)
IMM GRANULOCYTES NFR BLD AUTO: 0.9 % (ref 0–0.5)
INHALED O2 CONCENTRATION: 100 %
INHALED O2 CONCENTRATION: 40 %
INHALED O2 CONCENTRATION: 40 %
INR PPP: 1.2 (ref 0.9–1.1)
INR PPP: 1.47 (ref 0.9–1.1)
LYMPHOCYTES # BLD AUTO: 1.2 10*3/MM3 (ref 0.7–3.1)
LYMPHOCYTES NFR BLD AUTO: 4.1 % (ref 19.6–45.3)
MAGNESIUM SERPL-MCNC: 2.9 MG/DL (ref 1.6–2.4)
MAGNESIUM SERPL-MCNC: 3.4 MG/DL (ref 1.6–2.4)
MCH RBC QN AUTO: 29.6 PG (ref 26.6–33)
MCH RBC QN AUTO: 29.7 PG (ref 26.6–33)
MCH RBC QN AUTO: 30 PG (ref 26.6–33)
MCHC RBC AUTO-ENTMCNC: 33.2 G/DL (ref 31.5–35.7)
MCHC RBC AUTO-ENTMCNC: 33.4 G/DL (ref 31.5–35.7)
MCHC RBC AUTO-ENTMCNC: 33.8 G/DL (ref 31.5–35.7)
MCV RBC AUTO: 88.7 FL (ref 79–97)
MCV RBC AUTO: 88.8 FL (ref 79–97)
MCV RBC AUTO: 89.5 FL (ref 79–97)
MODALITY: ABNORMAL
MONOCYTES # BLD AUTO: 1.17 10*3/MM3 (ref 0.1–0.9)
MONOCYTES NFR BLD AUTO: 4 % (ref 5–12)
NEUTROPHILS NFR BLD AUTO: 26.83 10*3/MM3 (ref 1.7–7)
NEUTROPHILS NFR BLD AUTO: 90.5 % (ref 42.7–76)
NRBC BLD AUTO-RTO: 0 /100 WBC (ref 0–0.2)
O2 A-A PPRESDIFF RESPIRATORY: 0.2 MMHG
O2 A-A PPRESDIFF RESPIRATORY: 0.3 MMHG
O2 A-A PPRESDIFF RESPIRATORY: 0.4 MMHG
PCO2 BLDA: 25.7 MM HG (ref 35–45)
PCO2 BLDA: 34.8 MM HG (ref 35–45)
PCO2 BLDA: 42.3 MM HG (ref 35–45)
PCO2 BLDA: 43.1 MM HG (ref 35–45)
PCO2 BLDA: 44.5 MM HG (ref 35–45)
PCO2 BLDA: 47.2 MM HG (ref 35–45)
PCO2 BLDA: 48 MM HG (ref 35–45)
PCO2 BLDA: 49 MM HG (ref 35–45)
PCO2 BLDA: 51.2 MM HG (ref 35–45)
PCO2 BLDA: 53.4 MM HG (ref 35–45)
PCO2 BLDA: 57.9 MM HG (ref 35–45)
PEEP RESPIRATORY: 10 CM[H2O]
PEEP RESPIRATORY: 7.5 CM[H2O]
PEEP RESPIRATORY: 7.5 CM[H2O]
PH BLDA: 7.3 PH UNITS (ref 7.35–7.6)
PH BLDA: 7.34 PH UNITS (ref 7.35–7.45)
PH BLDA: 7.36 PH UNITS (ref 7.35–7.6)
PH BLDA: 7.36 PH UNITS (ref 7.35–7.6)
PH BLDA: 7.37 PH UNITS (ref 7.35–7.45)
PH BLDA: 7.39 PH UNITS (ref 7.35–7.6)
PH BLDA: 7.4 PH UNITS (ref 7.35–7.6)
PH BLDA: 7.4 PH UNITS (ref 7.35–7.6)
PH BLDA: 7.42 PH UNITS (ref 7.35–7.6)
PH BLDA: 7.43 PH UNITS (ref 7.35–7.45)
PH BLDA: 7.44 PH UNITS (ref 7.35–7.6)
PHOSPHATE SERPL-MCNC: 0.7 MG/DL (ref 2.5–4.5)
PHOSPHATE SERPL-MCNC: 2.2 MG/DL (ref 2.5–4.5)
PLATELET # BLD AUTO: 135 10*3/MM3 (ref 140–450)
PLATELET # BLD AUTO: 160 10*3/MM3 (ref 140–450)
PLATELET # BLD AUTO: 188 10*3/MM3 (ref 140–450)
PMV BLD AUTO: 10 FL (ref 6–12)
PMV BLD AUTO: 10.2 FL (ref 6–12)
PMV BLD AUTO: 10.4 FL (ref 6–12)
PO2 BLDA: 108.1 MM HG (ref 80–100)
PO2 BLDA: 125.1 MM HG (ref 80–100)
PO2 BLDA: 241 MMHG (ref 80–105)
PO2 BLDA: 248 MMHG (ref 80–105)
PO2 BLDA: 342 MMHG (ref 80–105)
PO2 BLDA: 351 MMHG (ref 80–105)
PO2 BLDA: 358 MMHG (ref 80–105)
PO2 BLDA: 440 MMHG (ref 80–105)
PO2 BLDA: 458 MMHG (ref 80–105)
PO2 BLDA: 51 MMHG (ref 80–105)
PO2 BLDA: 85.7 MM HG (ref 80–100)
POTASSIUM BLDA-SCNC: 3.2 MMOL/L (ref 3.5–4.9)
POTASSIUM BLDA-SCNC: 3.4 MMOL/L (ref 3.5–4.9)
POTASSIUM BLDA-SCNC: 3.5 MMOL/L (ref 3.5–4.9)
POTASSIUM BLDA-SCNC: 3.6 MMOL/L (ref 3.5–4.9)
POTASSIUM BLDA-SCNC: 4.4 MMOL/L (ref 3.5–4.9)
POTASSIUM BLDA-SCNC: 4.4 MMOL/L (ref 3.5–4.9)
POTASSIUM BLDA-SCNC: 4.7 MMOL/L (ref 3.5–4.9)
POTASSIUM BLDA-SCNC: <2 MMOL/L (ref 3.5–4.9)
POTASSIUM SERPL-SCNC: 3.3 MMOL/L (ref 3.5–5.2)
POTASSIUM SERPL-SCNC: 3.4 MMOL/L (ref 3.5–5.2)
POTASSIUM SERPL-SCNC: 3.7 MMOL/L (ref 3.5–5.2)
PROTHROMBIN TIME: 15.3 SECONDS (ref 11.7–14.2)
PROTHROMBIN TIME: 18.1 SECONDS (ref 11.7–14.2)
PSV: 8 CMH2O
PSV: 8 CMH2O
QT INTERVAL: 420 MS
RBC # BLD AUTO: 3.43 10*6/MM3 (ref 4.14–5.8)
RBC # BLD AUTO: 3.71 10*6/MM3 (ref 4.14–5.8)
RBC # BLD AUTO: 4.03 10*6/MM3 (ref 4.14–5.8)
SAO2 % BLDA: 100 % (ref 95–98)
SAO2 % BLDA: 85 % (ref 95–98)
SAO2 % BLDCOA: 96.9 % (ref 92–99)
SAO2 % BLDCOA: 97.8 % (ref 92–99)
SAO2 % BLDCOA: 98.7 % (ref 92–99)
SET MECH RESP RATE: 14
SET MECH RESP RATE: 23
SODIUM SERPL-SCNC: 142 MMOL/L (ref 136–145)
SODIUM SERPL-SCNC: 146 MMOL/L (ref 136–145)
SODIUM SERPL-SCNC: 146 MMOL/L (ref 136–145)
TOTAL RATE: 14 BREATHS/MINUTE
TOTAL RATE: 23 BREATHS/MINUTE
TOTAL RATE: 24 BREATHS/MINUTE
VENTILATOR MODE: ABNORMAL
VT ON VENT VENT: 562 ML
VT ON VENT VENT: 750 ML
WBC NRBC COR # BLD: 20.66 10*3/MM3 (ref 3.4–10.8)
WBC NRBC COR # BLD: 23.61 10*3/MM3 (ref 3.4–10.8)
WBC NRBC COR # BLD: 29.62 10*3/MM3 (ref 3.4–10.8)

## 2023-05-24 PROCEDURE — 25010000002 AMIODARONE IN DEXTROSE 5% 150-4.21 MG/100ML-% SOLUTION: Performed by: NURSE PRACTITIONER

## 2023-05-24 PROCEDURE — 0 POTASSIUM CHLORIDE PER 2 MEQ: Performed by: THORACIC SURGERY (CARDIOTHORACIC VASCULAR SURGERY)

## 2023-05-24 PROCEDURE — 33430 REPLACEMENT OF MITRAL VALVE: CPT | Performed by: THORACIC SURGERY (CARDIOTHORACIC VASCULAR SURGERY)

## 2023-05-24 PROCEDURE — 82803 BLOOD GASES ANY COMBINATION: CPT

## 2023-05-24 PROCEDURE — 85384 FIBRINOGEN ACTIVITY: CPT | Performed by: NURSE PRACTITIONER

## 2023-05-24 PROCEDURE — 25010000002 FENTANYL CITRATE (PF) 250 MCG/5ML SOLUTION: Performed by: ANESTHESIOLOGY

## 2023-05-24 PROCEDURE — 25010000002 EPINEPHRINE PER 0.1 MG: Performed by: ANESTHESIOLOGY

## 2023-05-24 PROCEDURE — 85025 COMPLETE CBC W/AUTO DIFF WBC: CPT | Performed by: NURSE PRACTITIONER

## 2023-05-24 PROCEDURE — 85014 HEMATOCRIT: CPT

## 2023-05-24 PROCEDURE — 25010000002 ALBUMIN HUMAN 5% PER 50 ML: Performed by: NURSE PRACTITIONER

## 2023-05-24 PROCEDURE — 85730 THROMBOPLASTIN TIME PARTIAL: CPT | Performed by: NURSE PRACTITIONER

## 2023-05-24 PROCEDURE — 0 MILRINONE LACTATE IN DEXTROSE 20-5 MG/100ML-% SOLUTION: Performed by: NURSE PRACTITIONER

## 2023-05-24 PROCEDURE — C1713 ANCHOR/SCREW BN/BN,TIS/BN: HCPCS | Performed by: THORACIC SURGERY (CARDIOTHORACIC VASCULAR SURGERY)

## 2023-05-24 PROCEDURE — 25010000002 MAGNESIUM SULFATE 2 GM/50ML SOLUTION: Performed by: NURSE PRACTITIONER

## 2023-05-24 PROCEDURE — C1889 IMPLANT/INSERT DEVICE, NOC: HCPCS | Performed by: THORACIC SURGERY (CARDIOTHORACIC VASCULAR SURGERY)

## 2023-05-24 PROCEDURE — 63710000001 INSULIN REGULAR HUMAN PER 5 UNITS: Performed by: ANESTHESIOLOGY

## 2023-05-24 PROCEDURE — 85347 COAGULATION TIME ACTIVATED: CPT

## 2023-05-24 PROCEDURE — 02RG08Z REPLACEMENT OF MITRAL VALVE WITH ZOOPLASTIC TISSUE, OPEN APPROACH: ICD-10-PCS | Performed by: THORACIC SURGERY (CARDIOTHORACIC VASCULAR SURGERY)

## 2023-05-24 PROCEDURE — 02L70ZK OCCLUSION OF LEFT ATRIAL APPENDAGE, OPEN APPROACH: ICD-10-PCS | Performed by: THORACIC SURGERY (CARDIOTHORACIC VASCULAR SURGERY)

## 2023-05-24 PROCEDURE — 85730 THROMBOPLASTIN TIME PARTIAL: CPT | Performed by: THORACIC SURGERY (CARDIOTHORACIC VASCULAR SURGERY)

## 2023-05-24 PROCEDURE — 0 MILRINONE LACTATE 10 MG/10ML SOLUTION 10 ML VIAL: Performed by: ANESTHESIOLOGY

## 2023-05-24 PROCEDURE — B24BZZ4 ULTRASONOGRAPHY OF HEART WITH AORTA, TRANSESOPHAGEAL: ICD-10-PCS | Performed by: ANESTHESIOLOGY

## 2023-05-24 PROCEDURE — 85610 PROTHROMBIN TIME: CPT

## 2023-05-24 PROCEDURE — 25010000002 CALCIUM GLUCONATE 2-0.675 GM/100ML-% SOLUTION: Performed by: NURSE PRACTITIONER

## 2023-05-24 PROCEDURE — 94002 VENT MGMT INPAT INIT DAY: CPT

## 2023-05-24 PROCEDURE — 94060 EVALUATION OF WHEEZING: CPT

## 2023-05-24 PROCEDURE — 25010000002 HYDROCORTISONE SOD SUC (PF) 100 MG RECONSTITUTED SOLUTION: Performed by: NURSE PRACTITIONER

## 2023-05-24 PROCEDURE — 82948 REAGENT STRIP/BLOOD GLUCOSE: CPT

## 2023-05-24 PROCEDURE — 25010000002 MORPHINE PER 10 MG: Performed by: NURSE PRACTITIONER

## 2023-05-24 PROCEDURE — 86901 BLOOD TYPING SEROLOGIC RH(D): CPT

## 2023-05-24 PROCEDURE — 25010000002 PROTAMINE SULFATE PER 10 MG: Performed by: ANESTHESIOLOGY

## 2023-05-24 PROCEDURE — 25010000002 CEFAZOLIN IN DEXTROSE 2-4 GM/100ML-% SOLUTION: Performed by: NURSE PRACTITIONER

## 2023-05-24 PROCEDURE — 94799 UNLISTED PULMONARY SVC/PX: CPT

## 2023-05-24 PROCEDURE — 85018 HEMOGLOBIN: CPT

## 2023-05-24 PROCEDURE — 25010000002 CEFAZOLIN IN DEXTROSE 2-4 GM/100ML-% SOLUTION: Performed by: THORACIC SURGERY (CARDIOTHORACIC VASCULAR SURGERY)

## 2023-05-24 PROCEDURE — 85027 COMPLETE CBC AUTOMATED: CPT | Performed by: THORACIC SURGERY (CARDIOTHORACIC VASCULAR SURGERY)

## 2023-05-24 PROCEDURE — 88305 TISSUE EXAM BY PATHOLOGIST: CPT | Performed by: THORACIC SURGERY (CARDIOTHORACIC VASCULAR SURGERY)

## 2023-05-24 PROCEDURE — P9041 ALBUMIN (HUMAN),5%, 50ML: HCPCS | Performed by: NURSE PRACTITIONER

## 2023-05-24 PROCEDURE — 85610 PROTHROMBIN TIME: CPT | Performed by: THORACIC SURGERY (CARDIOTHORACIC VASCULAR SURGERY)

## 2023-05-24 PROCEDURE — C1751 CATH, INF, PER/CENT/MIDLINE: HCPCS | Performed by: ANESTHESIOLOGY

## 2023-05-24 PROCEDURE — 93010 ELECTROCARDIOGRAM REPORT: CPT | Performed by: INTERNAL MEDICINE

## 2023-05-24 PROCEDURE — C1729 CATH, DRAINAGE: HCPCS | Performed by: THORACIC SURGERY (CARDIOTHORACIC VASCULAR SURGERY)

## 2023-05-24 PROCEDURE — 82330 ASSAY OF CALCIUM: CPT | Performed by: NURSE PRACTITIONER

## 2023-05-24 PROCEDURE — 25010000002 PROPOFOL 10 MG/ML EMULSION: Performed by: ANESTHESIOLOGY

## 2023-05-24 PROCEDURE — 71045 X-RAY EXAM CHEST 1 VIEW: CPT

## 2023-05-24 PROCEDURE — 85610 PROTHROMBIN TIME: CPT | Performed by: NURSE PRACTITIONER

## 2023-05-24 PROCEDURE — 85027 COMPLETE CBC AUTOMATED: CPT | Performed by: NURSE PRACTITIONER

## 2023-05-24 PROCEDURE — 25010000002 PHENYLEPHRINE 10 MG/ML SOLUTION 5 ML VIAL: Performed by: ANESTHESIOLOGY

## 2023-05-24 PROCEDURE — 80048 BASIC METABOLIC PNL TOTAL CA: CPT | Performed by: THORACIC SURGERY (CARDIOTHORACIC VASCULAR SURGERY)

## 2023-05-24 PROCEDURE — 25010000002 HYDROCORTISONE SOD SUC (PF) 100 MG RECONSTITUTED SOLUTION: Performed by: ANESTHESIOLOGY

## 2023-05-24 PROCEDURE — 25010000002 HEPARIN (PORCINE) PER 1000 UNITS: Performed by: ANESTHESIOLOGY

## 2023-05-24 PROCEDURE — 80069 RENAL FUNCTION PANEL: CPT | Performed by: NURSE PRACTITIONER

## 2023-05-24 PROCEDURE — 94761 N-INVAS EAR/PLS OXIMETRY MLT: CPT

## 2023-05-24 PROCEDURE — 83735 ASSAY OF MAGNESIUM: CPT | Performed by: NURSE PRACTITIONER

## 2023-05-24 PROCEDURE — 94760 N-INVAS EAR/PLS OXIMETRY 1: CPT

## 2023-05-24 PROCEDURE — 33464 VALVULOPLASTY TRICUSPID: CPT | Performed by: THORACIC SURGERY (CARDIOTHORACIC VASCULAR SURGERY)

## 2023-05-24 PROCEDURE — 02UJ0JZ SUPPLEMENT TRICUSPID VALVE WITH SYNTHETIC SUBSTITUTE, OPEN APPROACH: ICD-10-PCS | Performed by: THORACIC SURGERY (CARDIOTHORACIC VASCULAR SURGERY)

## 2023-05-24 PROCEDURE — 25010000002 AMIODARONE IN DEXTROSE 5% 360-4.14 MG/200ML-% SOLUTION: Performed by: THORACIC SURGERY (CARDIOTHORACIC VASCULAR SURGERY)

## 2023-05-24 PROCEDURE — 93005 ELECTROCARDIOGRAM TRACING: CPT | Performed by: NURSE PRACTITIONER

## 2023-05-24 PROCEDURE — 25010000002 MAGNESIUM SULFATE PER 500 MG OF MAGNESIUM: Performed by: ANESTHESIOLOGY

## 2023-05-24 PROCEDURE — 85384 FIBRINOGEN ACTIVITY: CPT | Performed by: THORACIC SURGERY (CARDIOTHORACIC VASCULAR SURGERY)

## 2023-05-24 PROCEDURE — 86900 BLOOD TYPING SEROLOGIC ABO: CPT

## 2023-05-24 PROCEDURE — 25010000002 CALCIUM GLUCONATE 2-0.675 GM/100ML-% SOLUTION: Performed by: THORACIC SURGERY (CARDIOTHORACIC VASCULAR SURGERY)

## 2023-05-24 PROCEDURE — 25010000002 AMIODARONE IN DEXTROSE 5% 360-4.14 MG/200ML-% SOLUTION: Performed by: ANESTHESIOLOGY

## 2023-05-24 PROCEDURE — 25010000002 METOCLOPRAMIDE PER 10 MG: Performed by: NURSE PRACTITIONER

## 2023-05-24 PROCEDURE — 5A1221Z PERFORMANCE OF CARDIAC OUTPUT, CONTINUOUS: ICD-10-PCS | Performed by: THORACIC SURGERY (CARDIOTHORACIC VASCULAR SURGERY)

## 2023-05-24 PROCEDURE — 82947 ASSAY GLUCOSE BLOOD QUANT: CPT

## 2023-05-24 PROCEDURE — 99232 SBSQ HOSP IP/OBS MODERATE 35: CPT | Performed by: INTERNAL MEDICINE

## 2023-05-24 DEVICE — COR-KNOT MINI® COMBO KITBASE PACKAGE TYPE - KITEACH STERILE PACKAGE KIT CONTAINS (2) SINGLE PATIENT USE COR-KNOT MINI® DEVICES AND (12) COR-KNOT® QUICK LOADS®.
Type: IMPLANTABLE DEVICE | Site: HEART | Status: FUNCTIONAL
Brand: COR-KNOT MINI®

## 2023-05-24 DEVICE — COR-KNOT® QUICK LOAD® SINGLES
Type: IMPLANTABLE DEVICE | Site: HEART | Status: FUNCTIONAL
Brand: COR-KNOT® QUICK LOAD®

## 2023-05-24 DEVICE — RNG TRICUSPID MDL 6200 28MM: Type: IMPLANTABLE DEVICE | Site: HEART | Status: FUNCTIONAL

## 2023-05-24 DEVICE — SS SUTURE, 3 PER SLEEVE
Type: IMPLANTABLE DEVICE | Site: HEART | Status: FUNCTIONAL
Brand: MYO/WIRE II

## 2023-05-24 DEVICE — SS SUTURE, 4 PER SLEEVE
Type: IMPLANTABLE DEVICE | Site: HEART | Status: FUNCTIONAL
Brand: MYO/WIRE II

## 2023-05-24 DEVICE — WAX BONE HEMO NAT 2.5G: Type: IMPLANTABLE DEVICE | Site: CHEST | Status: FUNCTIONAL

## 2023-05-24 DEVICE — VLV MITRAL HANCOCK 2 ULTR T510 29MM: Type: IMPLANTABLE DEVICE | Site: HEART | Status: FUNCTIONAL

## 2023-05-24 RX ORDER — BISACODYL 5 MG/1
10 TABLET, DELAYED RELEASE ORAL DAILY PRN
Status: DISCONTINUED | OUTPATIENT
Start: 2023-05-24 | End: 2023-06-06 | Stop reason: HOSPADM

## 2023-05-24 RX ORDER — BISACODYL 10 MG
10 SUPPOSITORY, RECTAL RECTAL DAILY PRN
Status: DISCONTINUED | OUTPATIENT
Start: 2023-05-25 | End: 2023-06-06 | Stop reason: HOSPADM

## 2023-05-24 RX ORDER — MAGNESIUM SULFATE HEPTAHYDRATE 40 MG/ML
2 INJECTION, SOLUTION INTRAVENOUS EVERY 8 HOURS
Status: DISPENSED | OUTPATIENT
Start: 2023-05-24 | End: 2023-05-25

## 2023-05-24 RX ORDER — ACETAMINOPHEN 160 MG/5ML
650 SOLUTION ORAL EVERY 4 HOURS
Status: DISCONTINUED | OUTPATIENT
Start: 2023-05-25 | End: 2023-05-26

## 2023-05-24 RX ORDER — PANTOPRAZOLE SODIUM 40 MG/10ML
40 INJECTION, POWDER, LYOPHILIZED, FOR SOLUTION INTRAVENOUS ONCE
Status: COMPLETED | OUTPATIENT
Start: 2023-05-24 | End: 2023-05-24

## 2023-05-24 RX ORDER — ROCURONIUM BROMIDE 10 MG/ML
INJECTION, SOLUTION INTRAVENOUS AS NEEDED
Status: DISCONTINUED | OUTPATIENT
Start: 2023-05-24 | End: 2023-05-24 | Stop reason: SURG

## 2023-05-24 RX ORDER — ALBUTEROL SULFATE 2.5 MG/3ML
2.5 SOLUTION RESPIRATORY (INHALATION) ONCE AS NEEDED
Status: COMPLETED | OUTPATIENT
Start: 2023-05-24 | End: 2023-05-24

## 2023-05-24 RX ORDER — PHENYLEPHRINE HCL IN 0.9% NACL 0.5 MG/5ML
.2-2 SYRINGE (ML) INTRAVENOUS CONTINUOUS PRN
Status: DISCONTINUED | OUTPATIENT
Start: 2023-05-24 | End: 2023-05-26

## 2023-05-24 RX ORDER — SODIUM CHLORIDE 9 MG/ML
30 INJECTION, SOLUTION INTRAVENOUS CONTINUOUS
Status: DISCONTINUED | OUTPATIENT
Start: 2023-05-24 | End: 2023-05-26

## 2023-05-24 RX ORDER — METOCLOPRAMIDE HYDROCHLORIDE 5 MG/ML
10 INJECTION INTRAMUSCULAR; INTRAVENOUS EVERY 6 HOURS
Status: ACTIVE | OUTPATIENT
Start: 2023-05-24 | End: 2023-05-25

## 2023-05-24 RX ORDER — NICOTINE POLACRILEX 4 MG
15 LOZENGE BUCCAL
Status: DISCONTINUED | OUTPATIENT
Start: 2023-05-24 | End: 2023-05-26

## 2023-05-24 RX ORDER — PROPOFOL 10 MG/ML
VIAL (ML) INTRAVENOUS AS NEEDED
Status: DISCONTINUED | OUTPATIENT
Start: 2023-05-24 | End: 2023-05-24 | Stop reason: SURG

## 2023-05-24 RX ORDER — PROTAMINE SULFATE 10 MG/ML
INJECTION, SOLUTION INTRAVENOUS AS NEEDED
Status: DISCONTINUED | OUTPATIENT
Start: 2023-05-24 | End: 2023-05-24 | Stop reason: SURG

## 2023-05-24 RX ORDER — ASPIRIN 81 MG/1
81 TABLET ORAL DAILY
Status: DISCONTINUED | OUTPATIENT
Start: 2023-05-25 | End: 2023-05-27

## 2023-05-24 RX ORDER — MORPHINE SULFATE 2 MG/ML
4 INJECTION, SOLUTION INTRAMUSCULAR; INTRAVENOUS
Status: DISCONTINUED | OUTPATIENT
Start: 2023-05-24 | End: 2023-05-26

## 2023-05-24 RX ORDER — FENTANYL CITRATE 50 UG/ML
INJECTION, SOLUTION INTRAMUSCULAR; INTRAVENOUS AS NEEDED
Status: DISCONTINUED | OUTPATIENT
Start: 2023-05-24 | End: 2023-05-24 | Stop reason: SURG

## 2023-05-24 RX ORDER — ENOXAPARIN SODIUM 100 MG/ML
40 INJECTION SUBCUTANEOUS DAILY
Status: DISCONTINUED | OUTPATIENT
Start: 2023-05-25 | End: 2023-06-01

## 2023-05-24 RX ORDER — NITROGLYCERIN 0.4 MG/1
0.4 TABLET SUBLINGUAL
Status: DISCONTINUED | OUTPATIENT
Start: 2023-05-24 | End: 2023-06-06 | Stop reason: HOSPADM

## 2023-05-24 RX ORDER — NOREPINEPHRINE BITARTRATE 0.03 MG/ML
.02-.2 INJECTION, SOLUTION INTRAVENOUS CONTINUOUS PRN
Status: DISCONTINUED | OUTPATIENT
Start: 2023-05-24 | End: 2023-05-26

## 2023-05-24 RX ORDER — SODIUM CHLORIDE 9 MG/ML
INJECTION, SOLUTION INTRAVENOUS CONTINUOUS PRN
Status: DISCONTINUED | OUTPATIENT
Start: 2023-05-24 | End: 2023-05-24 | Stop reason: SURG

## 2023-05-24 RX ORDER — KETAMINE HCL IN NACL, ISO-OSM 100MG/10ML
SYRINGE (ML) INJECTION AS NEEDED
Status: DISCONTINUED | OUTPATIENT
Start: 2023-05-24 | End: 2023-05-24 | Stop reason: SURG

## 2023-05-24 RX ORDER — CALCIUM GLUCONATE 20 MG/ML
2 INJECTION, SOLUTION INTRAVENOUS ONCE
Status: COMPLETED | OUTPATIENT
Start: 2023-05-24 | End: 2023-05-24

## 2023-05-24 RX ORDER — HYDROCODONE BITARTRATE AND ACETAMINOPHEN 5; 325 MG/1; MG/1
2 TABLET ORAL EVERY 4 HOURS PRN
Status: DISCONTINUED | OUTPATIENT
Start: 2023-05-24 | End: 2023-05-30

## 2023-05-24 RX ORDER — ALBUMIN, HUMAN INJ 5% 5 %
1000 SOLUTION INTRAVENOUS AS NEEDED
Status: DISPENSED | OUTPATIENT
Start: 2023-05-24 | End: 2023-05-25

## 2023-05-24 RX ORDER — DEXTROSE MONOHYDRATE 25 G/50ML
10-50 INJECTION, SOLUTION INTRAVENOUS
Status: DISCONTINUED | OUTPATIENT
Start: 2023-05-24 | End: 2023-05-26

## 2023-05-24 RX ORDER — MAGNESIUM SULFATE HEPTAHYDRATE 500 MG/ML
INJECTION, SOLUTION INTRAMUSCULAR; INTRAVENOUS AS NEEDED
Status: DISCONTINUED | OUTPATIENT
Start: 2023-05-24 | End: 2023-05-24 | Stop reason: SURG

## 2023-05-24 RX ORDER — ACETAMINOPHEN 325 MG/1
650 TABLET ORAL EVERY 4 HOURS PRN
Status: DISCONTINUED | OUTPATIENT
Start: 2023-05-25 | End: 2023-06-06 | Stop reason: HOSPADM

## 2023-05-24 RX ORDER — DEXMEDETOMIDINE HYDROCHLORIDE 4 UG/ML
.2-1.5 INJECTION, SOLUTION INTRAVENOUS
Status: DISCONTINUED | OUTPATIENT
Start: 2023-05-24 | End: 2023-05-26

## 2023-05-24 RX ORDER — LIDOCAINE HYDROCHLORIDE 20 MG/ML
INJECTION, SOLUTION INFILTRATION; PERINEURAL AS NEEDED
Status: DISCONTINUED | OUTPATIENT
Start: 2023-05-24 | End: 2023-05-24 | Stop reason: SURG

## 2023-05-24 RX ORDER — PANTOPRAZOLE SODIUM 40 MG/1
40 TABLET, DELAYED RELEASE ORAL EVERY MORNING
Status: DISCONTINUED | OUTPATIENT
Start: 2023-05-25 | End: 2023-05-25

## 2023-05-24 RX ORDER — IBUPROFEN 600 MG/1
1 TABLET ORAL
Status: DISCONTINUED | OUTPATIENT
Start: 2023-05-24 | End: 2023-05-26

## 2023-05-24 RX ORDER — ACETAMINOPHEN 325 MG/1
650 TABLET ORAL EVERY 4 HOURS
Status: DISCONTINUED | OUTPATIENT
Start: 2023-05-25 | End: 2023-05-26

## 2023-05-24 RX ORDER — CYCLOBENZAPRINE HCL 10 MG
10 TABLET ORAL EVERY 8 HOURS PRN
Status: DISCONTINUED | OUTPATIENT
Start: 2023-05-25 | End: 2023-05-25

## 2023-05-24 RX ORDER — POTASSIUM CHLORIDE 29.8 MG/ML
20 INJECTION INTRAVENOUS ONCE
Status: COMPLETED | OUTPATIENT
Start: 2023-05-24 | End: 2023-05-24

## 2023-05-24 RX ORDER — HEPARIN SODIUM 1000 [USP'U]/ML
INJECTION, SOLUTION INTRAVENOUS; SUBCUTANEOUS AS NEEDED
Status: DISCONTINUED | OUTPATIENT
Start: 2023-05-24 | End: 2023-05-24 | Stop reason: SURG

## 2023-05-24 RX ORDER — DOPAMINE HYDROCHLORIDE 160 MG/100ML
2-20 INJECTION, SOLUTION INTRAVENOUS CONTINUOUS PRN
Status: DISCONTINUED | OUTPATIENT
Start: 2023-05-24 | End: 2023-05-26

## 2023-05-24 RX ORDER — CEFAZOLIN SODIUM 2 G/100ML
2 INJECTION, SOLUTION INTRAVENOUS EVERY 8 HOURS
Status: COMPLETED | OUTPATIENT
Start: 2023-05-24 | End: 2023-05-26

## 2023-05-24 RX ORDER — MILRINONE LACTATE 0.2 MG/ML
.25-.375 INJECTION, SOLUTION INTRAVENOUS CONTINUOUS PRN
Status: DISCONTINUED | OUTPATIENT
Start: 2023-05-24 | End: 2023-05-26

## 2023-05-24 RX ORDER — MIDAZOLAM HYDROCHLORIDE 1 MG/ML
2 INJECTION INTRAMUSCULAR; INTRAVENOUS
Status: DISCONTINUED | OUTPATIENT
Start: 2023-05-24 | End: 2023-05-26

## 2023-05-24 RX ORDER — CHLORHEXIDINE GLUCONATE 0.12 MG/ML
15 RINSE ORAL EVERY 12 HOURS
Status: DISCONTINUED | OUTPATIENT
Start: 2023-05-24 | End: 2023-05-26

## 2023-05-24 RX ORDER — NOREPINEPHRINE BITARTRATE 1 MG/ML
INJECTION, SOLUTION INTRAVENOUS CONTINUOUS PRN
Status: DISCONTINUED | OUTPATIENT
Start: 2023-05-24 | End: 2023-05-24 | Stop reason: SURG

## 2023-05-24 RX ORDER — ACETAMINOPHEN 650 MG/1
650 SUPPOSITORY RECTAL EVERY 4 HOURS
Status: DISCONTINUED | OUTPATIENT
Start: 2023-05-25 | End: 2023-05-26

## 2023-05-24 RX ORDER — ACETAMINOPHEN 650 MG/1
650 SUPPOSITORY RECTAL EVERY 4 HOURS PRN
Status: DISCONTINUED | OUTPATIENT
Start: 2023-05-25 | End: 2023-06-06 | Stop reason: HOSPADM

## 2023-05-24 RX ORDER — NITROGLYCERIN 20 MG/100ML
5-200 INJECTION INTRAVENOUS
Status: DISCONTINUED | OUTPATIENT
Start: 2023-05-24 | End: 2023-05-26

## 2023-05-24 RX ORDER — AMINOCAPROIC ACID 250 MG/ML
INJECTION, SOLUTION INTRAVENOUS AS NEEDED
Status: DISCONTINUED | OUTPATIENT
Start: 2023-05-24 | End: 2023-05-24 | Stop reason: SURG

## 2023-05-24 RX ORDER — DEXMEDETOMIDINE HYDROCHLORIDE 4 UG/ML
INJECTION INTRAVENOUS CONTINUOUS PRN
Status: DISCONTINUED | OUTPATIENT
Start: 2023-05-24 | End: 2023-05-24 | Stop reason: SURG

## 2023-05-24 RX ORDER — NALOXONE HCL 0.4 MG/ML
0.4 VIAL (ML) INJECTION
Status: DISCONTINUED | OUTPATIENT
Start: 2023-05-24 | End: 2023-06-06 | Stop reason: HOSPADM

## 2023-05-24 RX ORDER — ONDANSETRON 2 MG/ML
4 INJECTION INTRAMUSCULAR; INTRAVENOUS EVERY 6 HOURS PRN
Status: DISCONTINUED | OUTPATIENT
Start: 2023-05-24 | End: 2023-06-06 | Stop reason: HOSPADM

## 2023-05-24 RX ORDER — ACETAMINOPHEN 160 MG/5ML
650 SOLUTION ORAL EVERY 4 HOURS PRN
Status: DISCONTINUED | OUTPATIENT
Start: 2023-05-25 | End: 2023-06-06 | Stop reason: HOSPADM

## 2023-05-24 RX ORDER — AMOXICILLIN 250 MG
2 CAPSULE ORAL NIGHTLY
Status: DISCONTINUED | OUTPATIENT
Start: 2023-05-25 | End: 2023-06-06 | Stop reason: HOSPADM

## 2023-05-24 RX ORDER — ALPRAZOLAM 0.25 MG/1
0.25 TABLET ORAL EVERY 8 HOURS PRN
Status: DISCONTINUED | OUTPATIENT
Start: 2023-05-24 | End: 2023-05-25

## 2023-05-24 RX ORDER — MEPERIDINE HYDROCHLORIDE 25 MG/ML
25 INJECTION INTRAMUSCULAR; INTRAVENOUS; SUBCUTANEOUS EVERY 4 HOURS PRN
Status: ACTIVE | OUTPATIENT
Start: 2023-05-24 | End: 2023-05-25

## 2023-05-24 RX ORDER — POLYETHYLENE GLYCOL 3350 17 G/17G
17 POWDER, FOR SOLUTION ORAL DAILY PRN
Status: DISCONTINUED | OUTPATIENT
Start: 2023-05-24 | End: 2023-06-06 | Stop reason: HOSPADM

## 2023-05-24 RX ORDER — ATORVASTATIN CALCIUM 20 MG/1
40 TABLET, FILM COATED ORAL NIGHTLY
Status: DISCONTINUED | OUTPATIENT
Start: 2023-05-24 | End: 2023-06-06 | Stop reason: HOSPADM

## 2023-05-24 RX ORDER — OXYCODONE HYDROCHLORIDE 5 MG/1
10 TABLET ORAL EVERY 4 HOURS PRN
Status: DISCONTINUED | OUTPATIENT
Start: 2023-05-24 | End: 2023-05-25

## 2023-05-24 RX ORDER — ACETAMINOPHEN 10 MG/ML
1000 INJECTION, SOLUTION INTRAVENOUS EVERY 8 HOURS
Status: COMPLETED | OUTPATIENT
Start: 2023-05-24 | End: 2023-05-25

## 2023-05-24 RX ORDER — MORPHINE SULFATE 2 MG/ML
1 INJECTION, SOLUTION INTRAMUSCULAR; INTRAVENOUS EVERY 4 HOURS PRN
Status: DISPENSED | OUTPATIENT
Start: 2023-05-24 | End: 2023-05-31

## 2023-05-24 RX ADMIN — SODIUM CHLORIDE: 9 INJECTION, SOLUTION INTRAVENOUS at 07:15

## 2023-05-24 RX ADMIN — METOCLOPRAMIDE 10 MG: 5 INJECTION, SOLUTION INTRAMUSCULAR; INTRAVENOUS at 12:24

## 2023-05-24 RX ADMIN — MORPHINE SULFATE 4 MG: 2 INJECTION, SOLUTION INTRAMUSCULAR; INTRAVENOUS at 19:30

## 2023-05-24 RX ADMIN — OXYCODONE HYDROCHLORIDE 10 MG: 5 TABLET ORAL at 20:48

## 2023-05-24 RX ADMIN — MAGNESIUM SULFATE HEPTAHYDRATE 2 G: 500 INJECTION, SOLUTION INTRAMUSCULAR; INTRAVENOUS at 06:49

## 2023-05-24 RX ADMIN — MUPIROCIN 1 APPLICATION: 20 OINTMENT TOPICAL at 06:22

## 2023-05-24 RX ADMIN — DEXMEDETOMIDINE HYDROCHLORIDE 0.5 MCG/KG/HR: 4 INJECTION, SOLUTION INTRAVENOUS at 14:57

## 2023-05-24 RX ADMIN — ALBUMIN (HUMAN) 250 ML: 12.5 INJECTION, SOLUTION INTRAVENOUS at 17:45

## 2023-05-24 RX ADMIN — ACETAMINOPHEN 1000 MG: 10 INJECTION, SOLUTION INTRAVENOUS at 20:47

## 2023-05-24 RX ADMIN — PROPOFOL 30 MG: 10 INJECTION, EMULSION INTRAVENOUS at 06:36

## 2023-05-24 RX ADMIN — POTASSIUM CHLORIDE 20 MEQ: 400 INJECTION, SOLUTION INTRAVENOUS at 18:08

## 2023-05-24 RX ADMIN — DEXMEDETOMIDINE HYDROCHLORIDE 1 MCG/KG/HR: 4 INJECTION INTRAVENOUS at 06:36

## 2023-05-24 RX ADMIN — PANTOPRAZOLE SODIUM 40 MG: 40 INJECTION, POWDER, FOR SOLUTION INTRAVENOUS at 12:24

## 2023-05-24 RX ADMIN — FENTANYL CITRATE 100 MCG: 50 INJECTION, SOLUTION INTRAMUSCULAR; INTRAVENOUS at 06:49

## 2023-05-24 RX ADMIN — ROCURONIUM BROMIDE 30 MG: 10 INJECTION, SOLUTION INTRAVENOUS at 07:46

## 2023-05-24 RX ADMIN — ATORVASTATIN CALCIUM 40 MG: 20 TABLET, FILM COATED ORAL at 20:48

## 2023-05-24 RX ADMIN — Medication 30 MG: at 07:16

## 2023-05-24 RX ADMIN — HYDROCORTISONE SODIUM SUCCINATE 100 MG: 100 INJECTION, POWDER, FOR SOLUTION INTRAMUSCULAR; INTRAVENOUS at 07:39

## 2023-05-24 RX ADMIN — CEFAZOLIN SODIUM 2 G: 2 INJECTION, SOLUTION INTRAVENOUS at 18:22

## 2023-05-24 RX ADMIN — LIDOCAINE HYDROCHLORIDE 100 MG: 20 INJECTION, SOLUTION INFILTRATION; PERINEURAL at 06:49

## 2023-05-24 RX ADMIN — SODIUM CHLORIDE 2.8 UNITS/HR: 900 INJECTION, SOLUTION INTRAVENOUS at 08:30

## 2023-05-24 RX ADMIN — HYDROCORTISONE SODIUM SUCCINATE 100 MG: 100 INJECTION, POWDER, FOR SOLUTION INTRAMUSCULAR; INTRAVENOUS at 13:13

## 2023-05-24 RX ADMIN — CHLORHEXIDINE GLUCONATE 15 ML: 1.2 SOLUTION ORAL at 06:22

## 2023-05-24 RX ADMIN — PROPOFOL 25 MCG/KG/MIN: 10 INJECTION, EMULSION INTRAVENOUS at 08:07

## 2023-05-24 RX ADMIN — HYDROCORTISONE SODIUM SUCCINATE 100 MG: 100 INJECTION, POWDER, FOR SOLUTION INTRAMUSCULAR; INTRAVENOUS at 20:47

## 2023-05-24 RX ADMIN — MUPIROCIN 1 APPLICATION: 20 OINTMENT TOPICAL at 20:48

## 2023-05-24 RX ADMIN — SODIUM CHLORIDE: 9 INJECTION, SOLUTION INTRAVENOUS at 06:34

## 2023-05-24 RX ADMIN — POTASSIUM CHLORIDE 20 MEQ: 29.8 INJECTION, SOLUTION INTRAVENOUS at 13:13

## 2023-05-24 RX ADMIN — PROPOFOL 50 MG: 10 INJECTION, EMULSION INTRAVENOUS at 06:49

## 2023-05-24 RX ADMIN — FENTANYL CITRATE 50 MCG: 50 INJECTION, SOLUTION INTRAMUSCULAR; INTRAVENOUS at 11:14

## 2023-05-24 RX ADMIN — ALBUTEROL SULFATE 2.5 MG: 2.5 SOLUTION RESPIRATORY (INHALATION) at 06:05

## 2023-05-24 RX ADMIN — HEPARIN SODIUM 5000 UNITS: 1000 INJECTION, SOLUTION INTRAVENOUS; SUBCUTANEOUS at 08:02

## 2023-05-24 RX ADMIN — SODIUM CHLORIDE 0.25 MCG/KG/MIN: 0.9 INJECTION, SOLUTION INTRAVENOUS at 10:27

## 2023-05-24 RX ADMIN — PROTAMINE SULFATE 300 MG: 10 INJECTION, SOLUTION INTRAVENOUS at 10:47

## 2023-05-24 RX ADMIN — METOPROLOL TARTRATE 12.5 MG: 25 TABLET, FILM COATED ORAL at 06:21

## 2023-05-24 RX ADMIN — CHLORHEXIDINE GLUCONATE 15 ML: 1.2 SOLUTION ORAL at 20:47

## 2023-05-24 RX ADMIN — HEPARIN SODIUM 30000 UNITS: 1000 INJECTION, SOLUTION INTRAVENOUS; SUBCUTANEOUS at 07:52

## 2023-05-24 RX ADMIN — MILRINONE LACTATE 0.25 MCG/KG/MIN: 0.2 INJECTION, SOLUTION INTRAVENOUS at 17:59

## 2023-05-24 RX ADMIN — CALCIUM GLUCONATE 2 G: 20 INJECTION, SOLUTION INTRAVENOUS at 13:13

## 2023-05-24 RX ADMIN — NOREPINEPHRINE BITARTRATE 0.05 MCG/KG/MIN: 1 INJECTION, SOLUTION, CONCENTRATE INTRAVENOUS at 07:40

## 2023-05-24 RX ADMIN — ACETAMINOPHEN 1000 MG: 10 INJECTION, SOLUTION INTRAVENOUS at 12:20

## 2023-05-24 RX ADMIN — MAGNESIUM SULFATE HEPTAHYDRATE 2 G: 2 INJECTION, SOLUTION INTRAVENOUS at 20:48

## 2023-05-24 RX ADMIN — ALBUMIN (HUMAN) 250 ML: 12.5 INJECTION, SOLUTION INTRAVENOUS at 14:50

## 2023-05-24 RX ADMIN — POTASSIUM CHLORIDE 20 MEQ: 29.8 INJECTION, SOLUTION INTRAVENOUS at 22:58

## 2023-05-24 RX ADMIN — POTASSIUM PHOSPHATE, MONOBASIC POTASSIUM PHOSPHATE, DIBASIC 30 MMOL: 224; 236 INJECTION, SOLUTION, CONCENTRATE INTRAVENOUS at 19:18

## 2023-05-24 RX ADMIN — CEFAZOLIN SODIUM 2 G: 2 INJECTION, SOLUTION INTRAVENOUS at 11:13

## 2023-05-24 RX ADMIN — PHENYLEPHRINE HYDROCHLORIDE 0.3 MCG/KG/MIN: 10 INJECTION INTRAVENOUS at 06:49

## 2023-05-24 RX ADMIN — AMIODARONE HYDROCHLORIDE 1 MG/MIN: 1.8 INJECTION, SOLUTION INTRAVENOUS at 09:51

## 2023-05-24 RX ADMIN — AMINOCAPROIC ACID 10 G: 250 INJECTION, SOLUTION INTRAVENOUS at 11:09

## 2023-05-24 RX ADMIN — FENTANYL CITRATE 50 MCG: 50 INJECTION, SOLUTION INTRAMUSCULAR; INTRAVENOUS at 06:36

## 2023-05-24 RX ADMIN — Medication 20 MG: at 08:32

## 2023-05-24 RX ADMIN — ROCURONIUM BROMIDE 20 MG: 10 INJECTION, SOLUTION INTRAVENOUS at 08:19

## 2023-05-24 RX ADMIN — EPINEPHRINE 0.02 MCG/KG/MIN: 1 INJECTION, SOLUTION, CONCENTRATE INTRAVENOUS at 10:34

## 2023-05-24 RX ADMIN — PROPOFOL 20 MG: 10 INJECTION, EMULSION INTRAVENOUS at 06:38

## 2023-05-24 RX ADMIN — CEFAZOLIN SODIUM 2 G: 2 INJECTION, SOLUTION INTRAVENOUS at 07:16

## 2023-05-24 RX ADMIN — AMIODARONE HYDROCHLORIDE 150 MG: 1.5 INJECTION, SOLUTION INTRAVENOUS at 14:49

## 2023-05-24 RX ADMIN — ALBUMIN (HUMAN) 250 ML: 12.5 INJECTION, SOLUTION INTRAVENOUS at 13:25

## 2023-05-24 RX ADMIN — SODIUM CHLORIDE: 9 INJECTION, SOLUTION INTRAVENOUS at 06:36

## 2023-05-24 RX ADMIN — AMIODARONE HYDROCHLORIDE 1 MG/MIN: 1.8 INJECTION, SOLUTION INTRAVENOUS at 17:18

## 2023-05-24 RX ADMIN — AMINOCAPROIC ACID 10 G: 250 INJECTION, SOLUTION INTRAVENOUS at 07:52

## 2023-05-24 RX ADMIN — CALCIUM GLUCONATE 2 G: 20 INJECTION, SOLUTION INTRAVENOUS at 18:07

## 2023-05-24 RX ADMIN — FENTANYL CITRATE 50 MCG: 50 INJECTION, SOLUTION INTRAMUSCULAR; INTRAVENOUS at 10:48

## 2023-05-24 RX ADMIN — ROCURONIUM BROMIDE 50 MG: 10 INJECTION, SOLUTION INTRAVENOUS at 06:49

## 2023-05-24 RX ADMIN — ROCURONIUM BROMIDE 50 MG: 10 INJECTION, SOLUTION INTRAVENOUS at 09:48

## 2023-05-24 RX ADMIN — INSULIN HUMAN 1.9 UNITS/HR: 1 INJECTION, SOLUTION INTRAVENOUS at 23:05

## 2023-05-24 RX ADMIN — ROCURONIUM BROMIDE 40 MG: 10 INJECTION, SOLUTION INTRAVENOUS at 11:29

## 2023-05-24 RX ADMIN — MAGNESIUM SULFATE HEPTAHYDRATE 2 G: 500 INJECTION, SOLUTION INTRAMUSCULAR; INTRAVENOUS at 09:17

## 2023-05-24 NOTE — ANESTHESIA POSTPROCEDURE EVALUATION
Patient: Vernon Solorzano    Procedure Summary     Date: 05/24/23 Room / Location: [place] [address] / [facility] CARDIOVASCULAR OPERATING ROOM    Anesthesia Start: 0632 Anesthesia Stop: 1212    Procedure: MITRAL VALVE REPAIR/REPLACEMENT TRICUSPID VALVE REPAIR/REPLACEMENT TRANSESOPHAGEAL ECHOCARDIOGRAM WITH ANESTHESIA (Chest) Diagnosis:       Nonrheumatic mitral valve regurgitation      (Nonrheumatic mitral valve regurgitation [I34.0])    Surgeons: George Frey MD Provider: Darien Bullard MD    Anesthesia Type: general ASA Status: 4          Anesthesia Type: general    Vitals  Vitals Value Taken Time   /72 05/24/23 1801   Temp 36.7 °C (98.06 °F) 05/24/23 1829   Pulse 89 05/24/23 1829   Resp     SpO2 96 % 05/24/23 1829   Vitals shown include unvalidated device data.        Post Anesthesia Care and Evaluation      Comments: Post op note entered for chart completion purposes only

## 2023-05-24 NOTE — ANESTHESIA PROCEDURE NOTES
Central Line      Patient reassessed immediately prior to procedure    Patient location during procedure: OR  Start time: 5/24/2023 6:58 AM  Stop Time:5/24/2023 7:12 AM  Indications: central pressure monitoring, vascular access and MD/Surgeon request  Staff  Anesthesiologist: Darien Bullard MD  Preanesthetic Checklist  Completed: patient identified and risks and benefits discussed  Central Line Prep  Sterile Tech:cap, gloves, gown, mask and sterile barriers  Prep: chloraprep  Patient monitoring: blood pressure monitoring, continuous pulse oximetry and EKG  Central Line Procedure  Laterality:right  Location:internal jugular  Catheter Type:Bondurant-Dominick  Catheter Size:7.5 Fr  Guidance:ultrasound guided  PROCEDURE NOTE/ULTRASOUND INTERPRETATION.  Using ultrasound guidance the potential vascular sites for insertion of the catheter were visualized to determine the patency of the vessel to be used for vascular access.  After selecting the appropriate site for insertion, the needle was visualized under ultrasound being inserted into the internal jugular vein, followed by ultrasound confirmation of wire and catheter placement. There were no abnormalities seen on ultrasound; an image was taken; and the patient tolerated the procedure with no complications. Images: still images obtained, printed/placed on chart  Assessment  Post procedure:biopatch applied, line sutured, occlusive dressing applied and secured with tape  Assessement:blood return through all ports and free fluid flow  Complications:no  Patient Tolerance:patient tolerated the procedure well with no apparent complications

## 2023-05-24 NOTE — CONSULTS
CONSULT NOTE    INTERNAL MEDICINE   [facility]       Patient Identification:  Name: Vernon Solorzano  Age: 72 y.o.  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; he was admitted for planned valve replacement surgery tomorrow; we are asked to see him regarding management of his diabetes and other medical issues; the patient is doing well; he took his tresiba this morning    History of Present Illness:   As above      Past Medical History:  Past Medical History:   Diagnosis Date   • Allergic rhinitis    • Arthritis    • BPH (benign prostatic hyperplasia)    • Diabetes mellitus     TYPE 2   • Foraminal stenosis of cervical region     C5-C6   • GERD (gastroesophageal reflux disease)    • Hemorrhoids    • History of urinary retention     S/P TURP   • Hyperlipidemia    • Macular degeneration    • PING (obstructive sleep apnea) 2016    MILD, SEES DR. AMARILIS MORGAN     Past Surgical History:  Past Surgical History:   Procedure Laterality Date   • CARDIAC CATHETERIZATION N/A 2023    Procedure: Right Heart Cath;  Surgeon: Corey Gaffney MD;  Location: [facility] CATH INVASIVE LOCATION;  Service: Cardiology;  Laterality: N/A;   • CARDIAC CATHETERIZATION N/A 2023    Procedure: Left Heart Cath;  Surgeon: Corey Gaffney MD;  Location: [facility] CATH INVASIVE LOCATION;  Service: Cardiology;  Laterality: N/A;   • CARDIAC CATHETERIZATION N/A 2023    Procedure: Left ventriculography;  Surgeon: Corey Gaffney MD;  Location: [facility] CATH INVASIVE LOCATION;  Service: Cardiology;  Laterality: N/A;   • CARDIAC CATHETERIZATION N/A 2023    Procedure: Coronary angiography;  Surgeon: Corey Gaffney MD;  Location: [facility]  CATH INVASIVE LOCATION;  Service: Cardiology;  Laterality: N/A;   • COLONOSCOPY N/A     WNL PER PT, NO RECORDS,    • COLONOSCOPY N/A 04/07/2009    DR. GORGE BENAVIDEZ AT [place]   • PROSTATE SURGERY N/A 11/12/2010    TURP, DR. BRIGHT COLLAZO AT [facility]   • SKIN TAG REMOVAL N/A 12/20/2017    ANAL SKIN TAG X2, PERFORMED IN OFFICE, DR. LISA ORANTES   • TURP / TRANSURETHRAL INCISION / DRAINAGE PROSTATE  2010    Dr. Goodrich      Home Meds:  Medications Prior to Admission   Medication Sig Dispense Refill Last Dose   • aspirin 81 MG tablet Take 1 tablet by mouth Daily.   5/23/2023   • atorvastatin (LIPITOR) 10 MG tablet TAKE 1 TABLET BY MOUTH EVERY DAY (Patient taking differently: 1 tablet Every Night.) 90 tablet 0 5/22/2023   • furosemide (LASIX) 40 MG tablet Take 1 tablet by mouth 2 (Two) Times a Day. 60 tablet 1 5/23/2023   • insulin degludec (TRESIBA FLEXTOUCH) 100 UNIT/ML solution pen-injector injection Start 25 units daily and titrate up 2 units every 2 days if blood glucose levels are consistently above 150 (Patient taking differently: Inject 50 Units under the skin into the appropriate area as directed Daily.)   5/23/2023   • irbesartan (AVAPRO) 75 MG tablet Take 1 tablet by mouth Daily.   5/23/2023   • Jardiance 25 MG tablet tablet Take 1 tablet by mouth Daily.   5/23/2023   • metoclopramide (REGLAN) 10 MG tablet Take 1 tablet by mouth Every Night.   5/22/2023   • Multiple Vitamins-Minerals (PRESERVISION AREDS 2+MULTI VIT PO) Take  by mouth 2 (Two) Times a Day.   5/23/2023   • omeprazole (priLOSEC) 40 MG capsule Take 1 capsule by mouth Daily. (Patient taking differently: Take 1 capsule by mouth Every Night.) 90 capsule 4 5/22/2023   • potassium chloride (MICRO-K) 10 MEQ CR capsule Take 2 capsules by mouth Daily.   5/23/2023   • predniSONE (DELTASONE) 10 MG tablet Take 9 mg by mouth Daily.   5/23/2023   • Symbicort 160-4.5 MCG/ACT inhaler Inhale 2 puffs 2 (Two) Times a Day.   5/23/2023   • tiotropium  (SPIRIVA) 18 MCG per inhalation capsule Place 1 capsule into inhaler and inhale Daily.   5/23/2023     Current Meds:     Current Facility-Administered Medications:   •  ALPRAZolam (XANAX) tablet 0.25 mg, 0.25 mg, Oral, Q8H PRN, Cher Colon APRN  •  aspirin EC tablet 81 mg, 81 mg, Oral, Daily, Basia Duarte APRN  •  atorvastatin (LIPITOR) tablet 10 mg, 10 mg, Oral, Nightly, Basia Duarte APRN  •  budesonide-formoterol (SYMBICORT) 160-4.5 MCG/ACT inhaler 2 puff, 2 puff, Inhalation, BID, Basia Duarte APRN  •  [START ON 5/24/2023] ceFAZolin in dextrose (ANCEF) IVPB solution 2 g, 2 g, Intravenous, On Call to OR, George Frey MD  •  chlorhexidine (PERIDEX) 0.12 % solution 15 mL, 15 mL, Mouth/Throat, Q12H, Cher Colon APRN  •  Chlorhexidine Gluconate Cloth 2 % pads 1 application, 1 application, Topical, Q12H, Cher Colon APRN  •  dextrose (D50W) (25 g/50 mL) IV injection 25 g, 25 g, Intravenous, Q15 Min PRN, Lyndsay Stallworth MD  •  dextrose (D50W) (25 g/50 mL) IV injection 25 g, 25 g, Intravenous, Q15 Min PRBasim MORENO Renate Andrea, MD  •  dextrose (GLUTOSE) oral gel 15 g, 15 g, Oral, Q15 Min PRNBasim Renate Andrea, MD  •  dextrose (GLUTOSE) oral gel 15 g, 15 g, Oral, Q15 Min PRBasim MORENO Renate Andrea, MD  •  empagliflozin (JARDIANCE) tablet 25 mg, 25 mg, Oral, Daily, Basia Duarte APRN  •  furosemide (LASIX) tablet 40 mg, 40 mg, Oral, BID, Basia Duarte APRN  •  glucagon (GLUCAGEN) injection 1 mg, 1 mg, Intramuscular, Q15 Min PRN, Lyndsay Stallworth MD  •  glucagon (GLUCAGEN) injection 1 mg, 1 mg, Intramuscular, Q15 Min PRN, Lyndsay Stallworth MD  •  insulin lispro (HUMALOG/ADMELOG) injection 2-9 Units, 2-9 Units, Subcutaneous, 4x Daily AC & at Bedtime, Lyndsay Stallworth MD  •  [START ON 5/24/2023] insulin regular (humuLIN R,novoLIN R) injection 2-9 Units, 2-9 Units, Subcutaneous, Q6H, Lyndsay Stallworth MD  •  losartan  (COZAAR) tablet 25 mg, 25 mg, Oral, Q24H, Basia Duarte APRN  •  metoclopramide (REGLAN) tablet 10 mg, 10 mg, Oral, Nightly, Basia Duarte APRN  •  [START ON 5/24/2023] metoprolol tartrate (LOPRESSOR) tablet 12.5 mg, 12.5 mg, Oral, On Call to OR, Cher Colon APRN  •  mupirocin (BACTROBAN) 2 % nasal ointment 1 application, 1 application, Each Nare, Q12H, Cher Colon APRN  •  pantoprazole (PROTONIX) EC tablet 40 mg, 40 mg, Oral, Nightly, Basia Duarte APRN  •  potassium chloride (K-DUR,KLOR-CON) ER tablet 20 mEq, 20 mEq, Oral, Daily, George Frey MD  •  temazepam (RESTORIL) capsule 7.5 mg, 7.5 mg, Oral, Nightly PRN, Cher Colon APRN  •  tiotropium (SPIRIVA RESPIMAT) 2.5 mcg/act aerosol solution inhaler, 2 puff, Inhalation, Daily - RT, Basia Duarte APRN  Allergies:  No Known Allergies  Social History:   Social History     Socioeconomic History   • Marital status:    Tobacco Use   • Smoking status: Never     Passive exposure: Never   • Smokeless tobacco: Never   Vaping Use   • Vaping Use: Never used   Substance and Sexual Activity   • Alcohol use: Yes     Comment: RARELY   • Drug use: No   • Sexual activity: Defer     Partners: Female     Family History:  Family History   Problem Relation Age of Onset   • Lung cancer Mother    • Stroke Father    • Dementia Father    • Colon polyps Brother    • Colon polyps Brother           Review of Systems  See history of present illness and past medical history.  Patient denies headache, dizziness, syncope, falls, trauma, change in vision, change in hearing, change in taste, changes in weight, changes in appetite, focal weakness, numbness, or paresthesia.  Patient denies chest pain, palpitations, dyspnea, orthopnea, PND, cough, sinus pressure, rhinorrhea, epistaxis, hemoptysis, nausea, vomiting,hematemesis, diarrhea, constipation or hematochezia. Denies cold or heat intolerance, polydipsia, polyuria, polyphagia.  "Denies hematuria, pyuria, dysuria, hesitancy, frequency or urgency. Denies consumption of raw and under cooked meats foods or change in water source.  Denies fever, chills, sweats, night sweats.  Denies missing any routine medications. Remainder of ROS is negative.      Vitals:   /68 (BP Location: Left arm, Patient Position: Sitting)   Pulse 83   Temp 97.7 °F (36.5 °C) (Oral)   Resp 18   Ht 185.4 cm (73\")   Wt 117 kg (257 lb 0.9 oz)   SpO2 95%   BMI 33.91 kg/m²   I/O: No intake or output data in the 24 hours ending 05/23/23 2022  Exam:  General Appearance:    Alert, cooperative, no distress, appears stated age   Head:    Normocephalic, without obvious abnormality, atraumatic   Eyes:    PERRL, conjunctivae/corneas clear, EOM's intact, both eyes   Ears:    Normal external ear canals, both ears   Nose:   Nares normal, septum midline, mucosa normal, no drainage    or sinus tenderness   Throat:   Lips, tongue, gums normal; oral mucosa pink and moist   Neck:   Supple, symmetrical, trachea midline, no adenopathy;     thyroid:  no enlargement/tenderness/nodules; no carotid    bruit or JVD   Back:     Symmetric, no curvature, ROM normal, no CVA tenderness   Lungs:     Clear to auscultation bilaterally, respirations unlabored   Chest Wall:    No tenderness or deformity    Heart:    Regular rate and rhythm, S1 and S2 normal, no murmur, rub   or gallop   Abdomen:     Soft, nontender, bowel sounds active all four quadrants,     no masses, no hepatomegaly, no splenomegaly   Extremities:   Extremities normal, atraumatic, no cyanosis or edema   Pulses:   Pulses palpable in all extremities; symmetric all extremities   Skin:   Skin color normal, Skin is warm and dry,  no rashes or palpable lesions        Data Review:  Labs in chart were reviewed.  WBC   Date Value Ref Range Status   05/23/2023 10.79 3.40 - 10.80 10*3/mm3 Final     Hemoglobin   Date Value Ref Range Status   05/23/2023 14.4 13.0 - 17.7 g/dL Final "     Hematocrit   Date Value Ref Range Status   05/23/2023 43.8 37.5 - 51.0 % Final     Platelets   Date Value Ref Range Status   05/23/2023 255 140 - 450 10*3/mm3 Final     Sodium   Date Value Ref Range Status   05/23/2023 140 136 - 145 mmol/L Final     Potassium   Date Value Ref Range Status   05/23/2023 4.3 3.5 - 5.2 mmol/L Final     Chloride   Date Value Ref Range Status   05/23/2023 102 98 - 107 mmol/L Final     CO2   Date Value Ref Range Status   05/23/2023 26.4 22.0 - 29.0 mmol/L Final     BUN   Date Value Ref Range Status   05/23/2023 17 8 - 23 mg/dL Final     Creatinine   Date Value Ref Range Status   05/23/2023 1.28 (H) 0.76 - 1.27 mg/dL Final     Glucose   Date Value Ref Range Status   05/23/2023 278 (H) 65 - 99 mg/dL Final     Calcium   Date Value Ref Range Status   05/23/2023 9.6 8.6 - 10.5 mg/dL Final     Magnesium   Date Value Ref Range Status   05/23/2023 2.4 1.6 - 2.4 mg/dL Final     AST (SGOT)   Date Value Ref Range Status   05/23/2023 14 1 - 40 U/L Final     ALT (SGPT)   Date Value Ref Range Status   05/23/2023 27 1 - 41 U/L Final     Alkaline Phosphatase   Date Value Ref Range Status   05/23/2023 71 39 - 117 U/L Final         Results from last 7 days   Lab Units 05/23/23  1429   HEMOGLOBIN A1C % 9.20*       Imaging Results (Last 7 Days)     Procedure Component Value Units Date/Time    XR Chest PA & Lateral [[id number]] Collected: 05/23/23 1739     Updated: 05/23/23 1742    Narrative:      PA AND LATERAL RADIOGRAPHIC VIEWS OF THE CHEST      CLINICAL HISTORY: Preop open heart.     FINDINGS:     The lungs are clear of acute infiltrates. The cardiomediastinal  silhouette is mildly enlarged but otherwise unremarkable. Osseous  structures are incidentally notable for degenerative phenomena within  the thoracic spine.       Impression:         There is mild enlargement of the cardiomediastinal silhouette.  Otherwise, there is no other evidence to suggest active disease in the  chest.     This report was  finalized on 5/23/2023 5:39 PM by Dr. Codey Cruz M.D.           Past Medical History:   Diagnosis Date   • Allergic rhinitis    • Arthritis    • BPH (benign prostatic hyperplasia)    • Diabetes mellitus     TYPE 2   • Foraminal stenosis of cervical region     C5-C6   • GERD (gastroesophageal reflux disease)    • Hemorrhoids    • History of urinary retention 2010    S/P TURP   • Hyperlipidemia    • Macular degeneration    • PING (obstructive sleep apnea) 01/07/2016    MILD, SEES DR. AMARILIS MORGAN       Assessment:  Active Hospital Problems    Diagnosis  POA   • **Mitral valve disease [I05.9]  Yes      Resolved Hospital Problems   No resolved problems to display.   diabetes  Hypertension  ckd3  Obesity  Sleep apnea    Plan:  Will order sliding scale  Ac, hs until midnight and q6 hours once npo  He took long acting insulin this am so will not order any at bedtime to avoid hypoglycemia  Continue jardiance  Thanks for involving us in his care  Will follow with you    Lyndsay Stallworth MD   5/23/2023  20:22 EDT

## 2023-05-24 NOTE — ANESTHESIA PREPROCEDURE EVALUATION
Anesthesia Evaluation                  Airway   Mallampati: II  Dental      Pulmonary    (+) asthma,sleep apnea,     ROS comment: Positive PING screen/Positive PING diagnosis and 2 or more mitigating factors used (recovery in non-supine position and multimodal analgesia)    Cardiovascular     (+) hypertension, valvular problems/murmurs MR and TI, CHF , hyperlipidemia,       Neuro/Psych  GI/Hepatic/Renal/Endo    (+)  GERD,  renal disease CRI, diabetes mellitus type 2,     Musculoskeletal     Abdominal    Substance History      OB/GYN          Other                        Anesthesia Plan    ASA 4     general     Postoperative Plan: Expected vent after surgery  Anesthetic plan, risks, benefits, and alternatives have been provided, discussed and informed consent has been obtained with: patient.        CODE STATUS:    Level Of Support Discussed With: Patient  Code Status (Patient has no pulse and is not breathing): CPR (Attempt to Resuscitate)  Medical Interventions (Patient has pulse or is breathing): Full Support

## 2023-05-24 NOTE — ANESTHESIA PROCEDURE NOTES
Airway  Date/Time: 5/24/2023 6:52 AM  Airway not difficult    General Information and Staff    Anesthesiologist: Darien Bullard MD    Indications and Patient Condition    Preoxygenated: yes  Mask difficulty assessment: 1 - vent by mask    Final Airway Details  Final airway type: endotracheal airway      Successful airway: ETT  Cuffed: yes   Successful intubation technique: direct laryngoscopy  Facilitating devices/methods: anterior pressure/BURP and Bougie  Endotracheal tube insertion site: oral  Blade: Najera  Blade size: 2  ETT size (mm): 8.5  Cormack-Lehane Classification: grade IIb - view of arytenoids or posterior of glottis only  Placement verified by: chest auscultation and capnometry   Measured from: teeth  ETT/EBT  to teeth (cm): 23  Assessment: lips, teeth, and gum same as pre-op and atraumatic intubation

## 2023-05-24 NOTE — PROGRESS NOTES
LOS: 1 day   Patient Care Team:  Liza Oconnell III, NP-C as PCP - General (Family Medicine)  Corey Lao Jr., MD (Inactive) as Consulting Physician (Urology)    Chief Complaint: Follow-up severe mitral regurgitation status post tissue mitral valve replacement    Interval History: I saw him postoperatively in CVR.  He was still on multiple pressors, milrinone, and was having very frequent ventricular ectopy.  He also evidently had some short ventricular runs after the OR.    Vital Signs:  Temp:  [97.3 °F (36.3 °C)-98.6 °F (37 °C)] 98.1 °F (36.7 °C)  Heart Rate:  [75-92] 89  Resp:  [18] 18  BP: ()/(54-77) 102/72  FiO2 (%):  [41 %-61 %] 41 %    Intake/Output Summary (Last 24 hours) at 5/24/2023 1800  Last data filed at 5/24/2023 1700  Gross per 24 hour   Intake 1440 ml   Output 2555 ml   Net -1115 ml       Physical Exam:   General Appearance:    Intubated and sedated.   Lungs:     Clear to auscultation bilaterally     Heart:    Regular rhythm with ectopy and normal rate.  No murmurs, gallops, or       rubs.   Abdomen:     Soft, nontender, nondistended.    Extremities:   Trace-1+ lower extremity edema.     Results Review:    Results from last 7 days   Lab Units 05/24/23  1626   SODIUM mmol/L 146*   POTASSIUM mmol/L 3.4*   CHLORIDE mmol/L 112*   CO2 mmol/L 21.0*   BUN mg/dL 19   CREATININE mg/dL 1.61*   GLUCOSE mg/dL 152*   CALCIUM mg/dL 8.1*         Results from last 7 days   Lab Units 05/24/23  1626   WBC 10*3/mm3 23.61*   HEMOGLOBIN g/dL 11.0*   HEMATOCRIT % 32.9*   PLATELETS 10*3/mm3 135*     Results from last 7 days   Lab Units 05/24/23  1210 05/24/23  1056 05/23/23  1429   INR  1.20* 1.47* 0.93   APTT seconds 26.2 24.9 24.0     Results from last 7 days   Lab Units 05/23/23  1429   CHOLESTEROL mg/dL 155     Results from last 7 days   Lab Units 05/24/23  1626   MAGNESIUM mg/dL 2.9*     Results from last 7 days   Lab Units 05/23/23  1429   CHOLESTEROL mg/dL 155   TRIGLYCERIDES  mg/dL 197*   HDL CHOL mg/dL 41   LDL CHOL mg/dL 81       I reviewed the patient's new clinical results.        Assessment:  1.  Severe symptomatic mitral regurgitation and tricuspid regurgitation  2.  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 Frey  3.  Chronic diastolic CHF  4.  History of COVID-19 pneumonia with residual dyspnea and possible interstitial lung disease  5.  Stage IIIa chronic kidney disease  6.  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  Expected postoperative anemia  8.  Postoperative thrombocytopenia  9.  Postoperative ventricular ectopy and NSVT  10.  Diabetes    Plan:  -Again, when I saw him, he was still on 2 pressors, as well as milrinone.  He was having quite a bit of ventricular ectopy, and evidently had some short runs of ventricular tachycardia in the OR.  He was given a lidocaine bolus in the OR.  He is on an amiodarone drip currently, which I would continue.    -Continue to watch rhythm closely given valve surgery.    -Routine postoperative care otherwise.  We will continue to follow.    Corwin Hobson MD  05/24/23  18:00 EDT

## 2023-05-24 NOTE — PLAN OF CARE
Goal Outcome Evaluation:                      Patient pre-op and going to surgery this morning for Mitral/tricuspid repair/replacement. VSS, SR with PVCs. Patient had an 11 beat run of vtach. On room air. No c/o pain. Care ongoing.

## 2023-05-24 NOTE — PROGRESS NOTES
Off floor currently    Blood sugars are controlled and will continue to follow and give recommendations accordingly    Cardiology also consulted and following    CTS attending

## 2023-05-24 NOTE — ANESTHESIA PROCEDURE NOTES
Central Line      Patient reassessed immediately prior to procedure    Patient location during procedure: OR  Start time: 5/24/2023 6:58 AM  Stop Time:5/24/2023 7:12 AM  Indications: vascular access and central pressure monitoring  Staff  Anesthesiologist: Darien Bullard MD  Preanesthetic Checklist  Completed: patient identified and risks and benefits discussed  Central Line Prep  Sterile Tech:cap, gloves, gown, mask and sterile barriers  Prep: chloraprep  Patient monitoring: blood pressure monitoring, continuous pulse oximetry and EKG  Central Line Procedure  Laterality:right  Location:internal jugular  Catheter Type:Cordis  Catheter Size:9 Fr  Guidance:ultrasound guided  PROCEDURE NOTE/ULTRASOUND INTERPRETATION.  Using ultrasound guidance the potential vascular sites for insertion of the catheter were visualized to determine the patency of the vessel to be used for vascular access.  After selecting the appropriate site for insertion, the needle was visualized under ultrasound being inserted into the internal jugular vein, followed by ultrasound confirmation of wire and catheter placement. There were no abnormalities seen on ultrasound; an image was taken; and the patient tolerated the procedure with no complications. Images: still images obtained, printed/placed on chart  Assessment  Post procedure:biopatch applied, line sutured, occlusive dressing applied and secured with tape  Assessement:blood return through all ports and chest x-ray ordered  Complications:no  Patient Tolerance:patient tolerated the procedure well with no apparent complications  Additional Notes  Ultrasound Interpretation:  Using ultrasound guidance the potential vascular sites for insertion of the catheter were visualized to determine the patency of the vessel to be used for vascular access.  After selecting the appropriate site for insertion, the needle was visualized under ultrasound being inserted into the vessel, followed by ultrasound  confirmation of wire and catheter placement.  There were no abnormalities seen on ultrasound; an image was taken/ and the patient tolerated the procedure with no complications.

## 2023-05-24 NOTE — ANESTHESIA PROCEDURE NOTES
Diagnostic IntraOp Eric    Procedure Performed: Diagnostic IntraOp Eric       Start Time:        End Time:      Preanesthesia Checklist:  Procedure being performed at surgeon's request.    General Procedure Information  Diagnostic Indications for Echo:  assessment of surgical repair  Physician Requesting Echo: George Frey MD  CPT Code:  Mitral and tricuspid regurgitation  Location performed:  OR  Intubated  Bite block placed  Heart visualized  Probe Insertion:  Easy  Probe Type:  Multiplane    Echocardiographic and Doppler Measurements    Ventricles    Right Ventricle:  Cavity size normal.  Hypertrophy not present.  Thrombus not present.    Left Ventricle:  Cavity size dilated.  Thrombus not present.  Global Function mildly impaired.  Ejection Fraction 40%.          Valves    Aortic Valve:  Annulus normal.  Stenosis not present.  Area: 2.95 cm².  Mean Gradient: 6 mmHg.  Regurgitation trace.  Leaflets normal.  Leaflet motions normal.      Mitral Valve:  Annulus dilated.  Stenosis not present.  Area: 2.47 cm².  Mean Gradient: 2 mmHg.  Regurgitation severe.  Leaflets thickened.  Leaflet motions restricted.  Specific leaflet segments with abnormal motions are described in the following comments:       posterior    Tricuspid Valve:  Annulus dilated.  Stenosis not present.  Regurgitation moderate.  Leaflets normal.  Leaflet motions normal.        Aorta    Ascending Aorta:  Size normal.  Dissection not present.    Descending Aorta:  Size normal.  Dissection not present.  Plaque thickness less than 3 mm.  Mobile plaque not present.        Atria    Right Atrium:  Size normal.  Spontaneous echo contrast not present.  Thrombus not present.    Left Atrium:  Size dilated.  Spontaneous echo contrast not present.  Thrombus not present.  Left atrial appendage normal.                  Anesthesia Information  Performed Personally  Anesthesiologist:  Darien Bullard MD      Echocardiogram Comments:       S/p MVR with [employer]  29mm, tricuspid annuloplasty ring  No leak, no MR, no TR  EF reduced 30% on epi, primacor  Mitral PGmean 7, MVA 1.56 cm2  AV PGmean 5

## 2023-05-24 NOTE — ANESTHESIA PROCEDURE NOTES
Arterial Line      Patient reassessed immediately prior to procedure    Patient location during procedure: OR  Start time: 5/24/2023 6:39 AM  Stop Time:5/24/2023 6:46 AM       Line placed for hemodynamic monitoring.  Performed By   Anesthesiologist: Darien Bullard MD   Preanesthetic Checklist  Completed: patient identified and risks and benefits discussed  Arterial Line Prep    Sterile Tech: gloves  Prep: ChloraPrep  Patient monitoring: blood pressure monitoring, continuous pulse oximetry and EKG  Arterial Line Procedure   Laterality:left  Location:  radial artery  Catheter size: 20 G   Guidance: ultrasound guided  PROCEDURE NOTE/ULTRASOUND INTERPRETATION.  Using ultrasound guidance the potential vascular sites for insertion of the catheter were visualized to determine the patency of the vessel to be used for vascular access.  After selecting the appropriate site for insertion, the needle was visualized under ultrasound being inserted into the radial artery, followed by ultrasound confirmation of wire and catheter placement. There were no abnormalities seen on ultrasound; an image was taken; and the patient tolerated the procedure with no complications.   Successful placement: yes Images: still images obtained, printed/placed on the chart  Post Assessment   Dressing Type: occlusive dressing applied and secured with tape.   Complications no   Patient Tolerance: patient tolerated the procedure well with no apparent complications  Additional Notes  Using ultrasound guidance the potential vascular sites for insertion of the catheter were visualized to determine the patency of the vessel to be used for vascular access.  After selecting the appropriate site for insertion, the needle was visualized under ultrasound being inserted into the radial artery, followed by ultrasound confirmation of wire and catheter placement.  There were no abnormalities seen on ultrasound; an image was taken/ and the patient tolerated the  procedure with no complications.

## 2023-05-24 NOTE — CONSULTS
Date of Consultation: 23    Referral Provider: George Frey MD     Reason for Consultation: Severe mitral regurgitation.    Encounter Provider: Corwin Hobson MD    Group of Service: [facility]     Patient Name: Vernon Solorzano    :1950    Chief complaint: Shortness of breath.    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  He is normally followed by Dr. Arango in our group.  He recently was hospitalized on 2023 through 2023 for congestive heart failure.  He was diuresed, and underwent a GAUDENCIO on 2023 which confirmed severe mitral regurgitation and moderate tricuspid regurgitation.  He had angiographically normal coronary arteries by catheterization.  He had been on steroids for quite some time because of COVID-19 pneumonia with residual dyspnea and questionable interstitial lung disease.  However, it was actually felt that his mitral regurgitation was the primary issue for most of his symptoms.  He was discharged, and was electively admitted today for surgery tomorrow morning with Dr. Frey.  He still has some shortness of breath, much improved.  His edema has also improved.  He has had no chest pain.  He is scheduled for a likely mitral valve repair and tricuspid valve repair tomorrow.    Past Medical History:   Diagnosis Date   • Allergic rhinitis    • Arthritis    • BPH (benign prostatic hyperplasia)    • Diabetes mellitus     TYPE 2   • Foraminal stenosis of cervical region     C5-C6   • GERD (gastroesophageal reflux disease)    • Hemorrhoids    • History of urinary retention     S/P TURP   • Hyperlipidemia    • Macular degeneration    • PING (obstructive sleep apnea) 2016    MILD, SEES DR. AMARILIS MORGAN         Past Surgical History:   Procedure Laterality Date   • CARDIAC CATHETERIZATION N/A 2023    Procedure: Right Heart Cath;  Surgeon: Corey Gaffney MD;  Location: [facility] INVASIVE  LOCATION;  Service: Cardiology;  Laterality: N/A;   • CARDIAC CATHETERIZATION N/A 5/11/2023    Procedure: Left Heart Cath;  Surgeon: Corey Gaffney MD;  Location:  [facility];  Service: Cardiology;  Laterality: N/A;   • CARDIAC CATHETERIZATION N/A 5/11/2023    Procedure: Left ventriculography;  Surgeon: Corey Gaffney MD;  Location:  [facility];  Service: Cardiology;  Laterality: N/A;   • CARDIAC CATHETERIZATION N/A 5/11/2023    Procedure: Coronary angiography;  Surgeon: Corey Gaffney MD;  Location:  [facility];  Service: Cardiology;  Laterality: N/A;   • COLONOSCOPY N/A     WNL PER PT, NO RECORDS,    • COLONOSCOPY N/A 04/07/2009    DR. GORGE BENAVIDEZ AT [facility]   • PROSTATE SURGERY N/A 11/12/2010    TURP, DR. COREY COLLAZO AT [facility]   • SKIN TAG REMOVAL N/A 12/20/2017    ANAL SKIN TAG X2, PERFORMED IN OFFICE, DR. LISA ORANTES   • TURP / TRANSURETHRAL INCISION / DRAINAGE PROSTATE  2010    Dr. Goodrich         No Known Allergies      No current facility-administered medications on file prior to encounter.     Current Outpatient Medications on File Prior to Encounter   Medication Sig Dispense Refill   • aspirin 81 MG tablet Take 1 tablet by mouth Daily.     • atorvastatin (LIPITOR) 10 MG tablet TAKE 1 TABLET BY MOUTH EVERY DAY (Patient taking differently: 1 tablet Every Night.) 90 tablet 0   • furosemide (LASIX) 40 MG tablet Take 1 tablet by mouth 2 (Two) Times a Day. 60 tablet 1   • insulin degludec (TRESIBA FLEXTOUCH) 100 UNIT/ML solution pen-injector injection Start 25 units daily and titrate up 2 units every 2 days if blood glucose levels are consistently above 150 (Patient taking differently: Inject 50 Units under the skin into the appropriate area as directed Daily.)     • irbesartan (AVAPRO) 75 MG tablet Take 1 tablet by mouth Daily.     • Jardiance 25 MG tablet tablet Take 1 tablet by mouth Daily.     • metoclopramide (REGLAN) 10 MG tablet  "Take 1 tablet by mouth Every Night.     • Multiple Vitamins-Minerals (PRESERVISION AREDS 2+MULTI VIT PO) Take  by mouth 2 (Two) Times a Day.     • omeprazole (priLOSEC) 40 MG capsule Take 1 capsule by mouth Daily. (Patient taking differently: Take 1 capsule by mouth Every Night.) 90 capsule 4   • potassium chloride (MICRO-K) 10 MEQ CR capsule Take 2 capsules by mouth Daily.     • predniSONE (DELTASONE) 10 MG tablet Take 9 mg by mouth Daily.     • Symbicort 160-4.5 MCG/ACT inhaler Inhale 2 puffs 2 (Two) Times a Day.     • tiotropium (SPIRIVA) 18 MCG per inhalation capsule Place 1 capsule into inhaler and inhale Daily.           Social History     Socioeconomic History   • Marital status:    Tobacco Use   • Smoking status: Never     Passive exposure: Never   • Smokeless tobacco: Never   Vaping Use   • Vaping Use: Never used   Substance and Sexual Activity   • Alcohol use: Yes     Comment: RARELY   • Drug use: No   • Sexual activity: Defer     Partners: Female         Family History   Problem Relation Age of Onset   • Lung cancer Mother    • Stroke Father    • Dementia Father    • Colon polyps Brother    • Colon polyps Brother        REVIEW OF SYSTEMS:   Pertinent positives are noted in the HPI above.  Otherwise, all other systems were reviewed, and are negative.     Objective:     Vitals:    05/23/23 1340 05/23/23 1531 05/23/23 2123   BP: 126/82 123/68    BP Location: Left arm Left arm    Patient Position: Sitting Sitting    Pulse: 78 83 75   Resp: 20 18 18   Temp: 97.7 °F (36.5 °C)     TempSrc: Oral     SpO2: 97% 95% 93%   Weight:  117 kg (257 lb 0.9 oz)    Height: 185.4 cm (73\")       Body mass index is 33.91 kg/m².  Flowsheet Rows    Flowsheet Row First Filed Value   Admission Height 185.4 cm (73\") Documented at 05/23/2023 1340   Admission Weight 117 kg (257 lb 0.9 oz) Documented at 05/23/2023 1531           General:    No acute distress, alert and oriented x4, pleasant                   Head:    " Normocephalic, atraumatic.   Eyes:          Conjunctivae and sclerae normal, no icterus.   Throat:   No oral lesions, no thrush, oral mucosa moist.    Neck:   Supple, trachea midline.   Lungs:     Clear to auscultation bilaterally     Heart:    Regular rhythm and normal rate. II/VI SM LLSB and apex.   Abdomen:     Soft, non-tender, non-distended, positive bowel sounds.    Extremities:   Trace edema of the lower extremities.   Pulses:   Pulses palpable and equal bilaterally.    Skin:   No bleeding or rash.   Neuro:   Non-focal.  Moves all extremities well.    Psychiatric:   Normal mood and affect.         Lab Review:                Results from last 7 days   Lab Units 05/23/23  1429   SODIUM mmol/L 140   POTASSIUM mmol/L 4.3   CHLORIDE mmol/L 102   CO2 mmol/L 26.4   BUN mg/dL 17   CREATININE mg/dL 1.28*   GLUCOSE mg/dL 278*   CALCIUM mg/dL 9.6         Results from last 7 days   Lab Units 05/23/23  1429   WBC 10*3/mm3 10.79   HEMOGLOBIN g/dL 14.4   HEMATOCRIT % 43.8   PLATELETS 10*3/mm3 255     Results from last 7 days   Lab Units 05/23/23  1429   INR  0.93   APTT seconds 24.0     Results from last 7 days   Lab Units 05/23/23  1429   CHOLESTEROL mg/dL 155     Results from last 7 days   Lab Units 05/23/23  1429   MAGNESIUM mg/dL 2.4     Results from last 7 days   Lab Units 05/23/23  1429   CHOLESTEROL mg/dL 155   TRIGLYCERIDES mg/dL 197*   HDL CHOL mg/dL 41   LDL CHOL mg/dL 81           Assessment:   1.  Severe mitral regurgitation   2.  Chronic diastolic and valvular CHF, mainly secondary to #1  3.  Normal coronary arteries by catheterization on 5/11/2023  4.  Mild pulmonary hypertension, mean 26 mmHg by RHC 5/11/2023  5.  Moderate tricuspid regurgitation  6.  History of COVID-19 pneumonia with residual dyspnea and questionable interstitial lung disease  7.  Diabetes  8.  Stage IIIa chronic kidney disease  9.  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   BPH, status post TURP     Plan:       Again, he is fairly stable currently.  He is scheduled for a mitral valve repair versus valve replacement tomorrow, as well as a tricuspid valve repair.  He is first case with Dr. Frey.  I would keep his current medications the same.  He appears much better from a volume standpoint than earlier this month when he was admitted.  His renal function also is stable.  We will continue to follow postoperatively.    Thank you very much for this consult.      Jose Hobson MD

## 2023-05-25 ENCOUNTER — APPOINTMENT (OUTPATIENT)
Dept: CT IMAGING | Facility: HOSPITAL | Age: 73
End: 2023-05-25
Payer: MEDICARE

## 2023-05-25 ENCOUNTER — APPOINTMENT (OUTPATIENT)
Dept: GENERAL RADIOLOGY | Facility: HOSPITAL | Age: 73
End: 2023-05-25
Payer: MEDICARE

## 2023-05-25 LAB
ACT BLD: 107 SECONDS (ref 82–152)
ACT BLD: 143 SECONDS (ref 82–152)
ACT BLD: 365 SECONDS (ref 82–152)
ACT BLD: 371 SECONDS (ref 82–152)
ACT BLD: 395 SECONDS (ref 82–152)
ACT BLD: 450 SECONDS (ref 82–152)
ACT BLD: 456 SECONDS (ref 82–152)
ALBUMIN SERPL-MCNC: 4.2 G/DL (ref 3.5–5.2)
ANION GAP SERPL CALCULATED.3IONS-SCNC: 10.6 MMOL/L (ref 5–15)
ANION GAP SERPL CALCULATED.3IONS-SCNC: 11.9 MMOL/L (ref 5–15)
ARTERIAL PATENCY WRIST A: ABNORMAL
ATMOSPHERIC PRESS: 752.7 MMHG
BASE EXCESS BLDA CALC-SCNC: -4.5 MMOL/L (ref 0–2)
BASOPHILS # BLD AUTO: 0.01 10*3/MM3 (ref 0–0.2)
BASOPHILS NFR BLD AUTO: 0.1 % (ref 0–1.5)
BDY SITE: ABNORMAL
BUN SERPL-MCNC: 19 MG/DL (ref 8–23)
BUN SERPL-MCNC: 20 MG/DL (ref 8–23)
BUN/CREAT SERPL: 13 (ref 7–25)
BUN/CREAT SERPL: 14.3 (ref 7–25)
CA-I BLD-MCNC: 5.1 MG/DL (ref 4.6–5.4)
CA-I SERPL ISE-MCNC: 1.28 MMOL/L (ref 1.15–1.35)
CALCIUM SPEC-SCNC: 8.9 MG/DL (ref 8.6–10.5)
CALCIUM SPEC-SCNC: 9 MG/DL (ref 8.6–10.5)
CHLORIDE SERPL-SCNC: 110 MMOL/L (ref 98–107)
CHLORIDE SERPL-SCNC: 114 MMOL/L (ref 98–107)
CO2 SERPL-SCNC: 18.4 MMOL/L (ref 22–29)
CO2 SERPL-SCNC: 20.1 MMOL/L (ref 22–29)
CREAT SERPL-MCNC: 1.4 MG/DL (ref 0.76–1.27)
CREAT SERPL-MCNC: 1.46 MG/DL (ref 0.76–1.27)
DEPRECATED RDW RBC AUTO: 43.5 FL (ref 37–54)
EGFRCR SERPLBLD CKD-EPI 2021: 50.2 ML/MIN/1.73
EGFRCR SERPLBLD CKD-EPI 2021: 52.7 ML/MIN/1.73
EOSINOPHIL # BLD AUTO: 0 10*3/MM3 (ref 0–0.4)
EOSINOPHIL NFR BLD AUTO: 0 % (ref 0.3–6.2)
ERYTHROCYTE [DISTWIDTH] IN BLOOD BY AUTOMATED COUNT: 13.7 % (ref 12.3–15.4)
GAS FLOW AIRWAY: 2 LPM
GLUCOSE BLDC GLUCOMTR-MCNC: 101 MG/DL (ref 70–130)
GLUCOSE BLDC GLUCOMTR-MCNC: 113 MG/DL (ref 70–130)
GLUCOSE BLDC GLUCOMTR-MCNC: 125 MG/DL (ref 70–130)
GLUCOSE BLDC GLUCOMTR-MCNC: 128 MG/DL (ref 70–130)
GLUCOSE BLDC GLUCOMTR-MCNC: 132 MG/DL (ref 70–130)
GLUCOSE BLDC GLUCOMTR-MCNC: 145 MG/DL (ref 70–130)
GLUCOSE BLDC GLUCOMTR-MCNC: 162 MG/DL (ref 70–130)
GLUCOSE BLDC GLUCOMTR-MCNC: 164 MG/DL (ref 70–130)
GLUCOSE SERPL-MCNC: 117 MG/DL (ref 65–99)
GLUCOSE SERPL-MCNC: 149 MG/DL (ref 65–99)
HCO3 BLDA-SCNC: 20.5 MMOL/L (ref 22–28)
HCT VFR BLD AUTO: 29.6 % (ref 37.5–51)
HGB BLD-MCNC: 10.1 G/DL (ref 13–17.7)
IMM GRANULOCYTES # BLD AUTO: 0.07 10*3/MM3 (ref 0–0.05)
IMM GRANULOCYTES NFR BLD AUTO: 0.5 % (ref 0–0.5)
INR PPP: 1.16 (ref 0.9–1.1)
INR PPP: 1.5 (ref 0.8–1.2)
LAB AP CASE REPORT: NORMAL
LYMPHOCYTES # BLD AUTO: 0.82 10*3/MM3 (ref 0.7–3.1)
LYMPHOCYTES NFR BLD AUTO: 5.4 % (ref 19.6–45.3)
MAGNESIUM SERPL-MCNC: 2.9 MG/DL (ref 1.6–2.4)
MAGNESIUM SERPL-MCNC: 3 MG/DL (ref 1.6–2.4)
MCH RBC QN AUTO: 29.6 PG (ref 26.6–33)
MCHC RBC AUTO-ENTMCNC: 34.1 G/DL (ref 31.5–35.7)
MCV RBC AUTO: 86.8 FL (ref 79–97)
MODALITY: ABNORMAL
MONOCYTES # BLD AUTO: 1.06 10*3/MM3 (ref 0.1–0.9)
MONOCYTES NFR BLD AUTO: 7 % (ref 5–12)
NEUTROPHILS NFR BLD AUTO: 13.18 10*3/MM3 (ref 1.7–7)
NEUTROPHILS NFR BLD AUTO: 87 % (ref 42.7–76)
NRBC BLD AUTO-RTO: 0 /100 WBC (ref 0–0.2)
PATH REPORT.FINAL DX SPEC: NORMAL
PATH REPORT.GROSS SPEC: NORMAL
PCO2 BLDA: 36.6 MM HG (ref 35–45)
PH BLDA: 7.36 PH UNITS (ref 7.35–7.45)
PHOSPHATE SERPL-MCNC: 5.1 MG/DL (ref 2.5–4.5)
PLATELET # BLD AUTO: 118 10*3/MM3 (ref 140–450)
PMV BLD AUTO: 10.6 FL (ref 6–12)
PO2 BLDA: 80 MM HG (ref 80–100)
POTASSIUM SERPL-SCNC: 4.1 MMOL/L (ref 3.5–5.2)
POTASSIUM SERPL-SCNC: 5.1 MMOL/L (ref 3.5–5.2)
PROTHROMBIN TIME: 15 SECONDS (ref 11.7–14.2)
PROTHROMBIN TIME: 17.4 SECONDS (ref 12.8–15.2)
QT INTERVAL: 435 MS
RBC # BLD AUTO: 3.41 10*6/MM3 (ref 4.14–5.8)
SAO2 % BLDCOA: 95.3 % (ref 92–99)
SET MECH RESP RATE: 18
SODIUM SERPL-SCNC: 142 MMOL/L (ref 136–145)
SODIUM SERPL-SCNC: 143 MMOL/L (ref 136–145)
TOTAL RATE: 18 BREATHS/MINUTE
WBC NRBC COR # BLD: 15.14 10*3/MM3 (ref 3.4–10.8)

## 2023-05-25 PROCEDURE — 82948 REAGENT STRIP/BLOOD GLUCOSE: CPT

## 2023-05-25 PROCEDURE — 83735 ASSAY OF MAGNESIUM: CPT | Performed by: NURSE PRACTITIONER

## 2023-05-25 PROCEDURE — 25010000002 HYDROCORTISONE SOD SUC (PF) 100 MG RECONSTITUTED SOLUTION: Performed by: NURSE PRACTITIONER

## 2023-05-25 PROCEDURE — 99024 POSTOP FOLLOW-UP VISIT: CPT | Performed by: THORACIC SURGERY (CARDIOTHORACIC VASCULAR SURGERY)

## 2023-05-25 PROCEDURE — 70450 CT HEAD/BRAIN W/O DYE: CPT

## 2023-05-25 PROCEDURE — 71045 X-RAY EXAM CHEST 1 VIEW: CPT

## 2023-05-25 PROCEDURE — 25010000002 NALOXONE PER 1 MG: Performed by: NURSE PRACTITIONER

## 2023-05-25 PROCEDURE — 99232 SBSQ HOSP IP/OBS MODERATE 35: CPT | Performed by: INTERNAL MEDICINE

## 2023-05-25 PROCEDURE — 80069 RENAL FUNCTION PANEL: CPT | Performed by: NURSE PRACTITIONER

## 2023-05-25 PROCEDURE — 25010000002 ENOXAPARIN PER 10 MG: Performed by: NURSE PRACTITIONER

## 2023-05-25 PROCEDURE — 94799 UNLISTED PULMONARY SVC/PX: CPT

## 2023-05-25 PROCEDURE — 92610 EVALUATE SWALLOWING FUNCTION: CPT | Performed by: SPEECH-LANGUAGE PATHOLOGIST

## 2023-05-25 PROCEDURE — 93010 ELECTROCARDIOGRAM REPORT: CPT | Performed by: INTERNAL MEDICINE

## 2023-05-25 PROCEDURE — 0 MILRINONE LACTATE IN DEXTROSE 20-5 MG/100ML-% SOLUTION: Performed by: NURSE PRACTITIONER

## 2023-05-25 PROCEDURE — 25010000002 CEFAZOLIN IN DEXTROSE 2-4 GM/100ML-% SOLUTION: Performed by: NURSE PRACTITIONER

## 2023-05-25 PROCEDURE — 97162 PT EVAL MOD COMPLEX 30 MIN: CPT

## 2023-05-25 PROCEDURE — 82803 BLOOD GASES ANY COMBINATION: CPT

## 2023-05-25 PROCEDURE — 93005 ELECTROCARDIOGRAM TRACING: CPT | Performed by: NURSE PRACTITIONER

## 2023-05-25 PROCEDURE — 99223 1ST HOSP IP/OBS HIGH 75: CPT | Performed by: PSYCHIATRY & NEUROLOGY

## 2023-05-25 PROCEDURE — 80048 BASIC METABOLIC PNL TOTAL CA: CPT | Performed by: NURSE PRACTITIONER

## 2023-05-25 PROCEDURE — 63710000001 INSULIN LISPRO (HUMAN) PER 5 UNITS: Performed by: NURSE PRACTITIONER

## 2023-05-25 PROCEDURE — 25010000002 ONDANSETRON PER 1 MG: Performed by: NURSE PRACTITIONER

## 2023-05-25 PROCEDURE — 85025 COMPLETE CBC W/AUTO DIFF WBC: CPT | Performed by: NURSE PRACTITIONER

## 2023-05-25 PROCEDURE — 85610 PROTHROMBIN TIME: CPT | Performed by: NURSE PRACTITIONER

## 2023-05-25 PROCEDURE — 97530 THERAPEUTIC ACTIVITIES: CPT

## 2023-05-25 PROCEDURE — 82330 ASSAY OF CALCIUM: CPT | Performed by: NURSE PRACTITIONER

## 2023-05-25 RX ORDER — PANTOPRAZOLE SODIUM 40 MG/10ML
40 INJECTION, POWDER, LYOPHILIZED, FOR SOLUTION INTRAVENOUS
Status: DISCONTINUED | OUTPATIENT
Start: 2023-05-26 | End: 2023-06-06

## 2023-05-25 RX ORDER — PREDNISONE 10 MG/1
10 TABLET ORAL
Status: DISCONTINUED | OUTPATIENT
Start: 2023-05-25 | End: 2023-05-25

## 2023-05-25 RX ORDER — IBUPROFEN 600 MG/1
1 TABLET ORAL
Status: DISCONTINUED | OUTPATIENT
Start: 2023-05-25 | End: 2023-06-06 | Stop reason: HOSPADM

## 2023-05-25 RX ORDER — BUMETANIDE 0.25 MG/ML
2 INJECTION INTRAMUSCULAR; INTRAVENOUS EVERY 12 HOURS
Status: COMPLETED | OUTPATIENT
Start: 2023-05-25 | End: 2023-05-25

## 2023-05-25 RX ORDER — GUAIFENESIN 600 MG/1
1200 TABLET, EXTENDED RELEASE ORAL EVERY 12 HOURS SCHEDULED
Status: DISCONTINUED | OUTPATIENT
Start: 2023-05-25 | End: 2023-05-27

## 2023-05-25 RX ORDER — NICOTINE POLACRILEX 4 MG
15 LOZENGE BUCCAL
Status: DISCONTINUED | OUTPATIENT
Start: 2023-05-25 | End: 2023-06-06 | Stop reason: HOSPADM

## 2023-05-25 RX ORDER — INSULIN LISPRO 100 [IU]/ML
3-14 INJECTION, SOLUTION INTRAVENOUS; SUBCUTANEOUS
Status: DISCONTINUED | OUTPATIENT
Start: 2023-05-25 | End: 2023-05-26

## 2023-05-25 RX ORDER — BUMETANIDE 0.25 MG/ML
1 INJECTION INTRAMUSCULAR; INTRAVENOUS EVERY 12 HOURS
Status: DISCONTINUED | OUTPATIENT
Start: 2023-05-25 | End: 2023-05-25

## 2023-05-25 RX ORDER — DEXTROSE MONOHYDRATE 25 G/50ML
25 INJECTION, SOLUTION INTRAVENOUS
Status: DISCONTINUED | OUTPATIENT
Start: 2023-05-25 | End: 2023-06-06 | Stop reason: HOSPADM

## 2023-05-25 RX ADMIN — HYDROCORTISONE SODIUM SUCCINATE 100 MG: 100 INJECTION, POWDER, FOR SOLUTION INTRAMUSCULAR; INTRAVENOUS at 23:09

## 2023-05-25 RX ADMIN — MUPIROCIN: 20 OINTMENT TOPICAL at 10:10

## 2023-05-25 RX ADMIN — CHLORHEXIDINE GLUCONATE 15 ML: 1.2 SOLUTION ORAL at 20:55

## 2023-05-25 RX ADMIN — MUPIROCIN 1 APPLICATION: 20 OINTMENT TOPICAL at 20:55

## 2023-05-25 RX ADMIN — INSULIN LISPRO 3 UNITS: 100 INJECTION, SOLUTION INTRAVENOUS; SUBCUTANEOUS at 12:27

## 2023-05-25 RX ADMIN — MILRINONE LACTATE 0.12 MCG/KG/MIN: 0.2 INJECTION, SOLUTION INTRAVENOUS at 15:39

## 2023-05-25 RX ADMIN — ONDANSETRON 4 MG: 2 INJECTION INTRAMUSCULAR; INTRAVENOUS at 04:50

## 2023-05-25 RX ADMIN — NALXONE HYDROCHLORIDE 0.4 MG: 0.4 INJECTION INTRAMUSCULAR; INTRAVENOUS; SUBCUTANEOUS at 09:00

## 2023-05-25 RX ADMIN — CEFAZOLIN SODIUM 2 G: 2 INJECTION, SOLUTION INTRAVENOUS at 10:18

## 2023-05-25 RX ADMIN — CEFAZOLIN SODIUM 2 G: 2 INJECTION, SOLUTION INTRAVENOUS at 03:09

## 2023-05-25 RX ADMIN — PANTOPRAZOLE SODIUM 40 MG: 40 TABLET, DELAYED RELEASE ORAL at 06:26

## 2023-05-25 RX ADMIN — OXYCODONE HYDROCHLORIDE 10 MG: 5 TABLET ORAL at 04:49

## 2023-05-25 RX ADMIN — BUMETANIDE 2 MG: 0.25 INJECTION INTRAMUSCULAR; INTRAVENOUS at 19:40

## 2023-05-25 RX ADMIN — BUMETANIDE 1 MG: 0.25 INJECTION INTRAMUSCULAR; INTRAVENOUS at 10:16

## 2023-05-25 RX ADMIN — HYDROCORTISONE SODIUM SUCCINATE 100 MG: 100 INJECTION, POWDER, FOR SOLUTION INTRAMUSCULAR; INTRAVENOUS at 04:37

## 2023-05-25 RX ADMIN — INSULIN LISPRO 3 UNITS: 100 INJECTION, SOLUTION INTRAVENOUS; SUBCUTANEOUS at 17:06

## 2023-05-25 RX ADMIN — ACETAMINOPHEN 1000 MG: 10 INJECTION, SOLUTION INTRAVENOUS at 04:38

## 2023-05-25 RX ADMIN — HYDROCORTISONE SODIUM SUCCINATE 100 MG: 100 INJECTION, POWDER, FOR SOLUTION INTRAMUSCULAR; INTRAVENOUS at 10:17

## 2023-05-25 RX ADMIN — ENOXAPARIN SODIUM 40 MG: 100 INJECTION SUBCUTANEOUS at 17:06

## 2023-05-25 RX ADMIN — HYDROCODONE BITARTRATE AND ACETAMINOPHEN 1 TABLET: 5; 325 TABLET ORAL at 20:54

## 2023-05-25 RX ADMIN — CEFAZOLIN SODIUM 2 G: 2 INJECTION, SOLUTION INTRAVENOUS at 18:11

## 2023-05-25 NOTE — CONSULTS
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 Frey.  He is creatinine admission was 1.28 mg/dL and went up to 1.6 mg/dL on May 24 and today is 1.4 mg/dL.  He is sitting up in chair off all pressors.  Allen catheter is anchored.  We will consult help with management of his acute dysfunction and volume overload.  Patient complaining of chest pain however he is confused and neurology has been consulted    Review of Systems:   Unable to obtain due to medical condition.    Personal History     Past Medical History:   Diagnosis Date   • Allergic rhinitis    • Arthritis    • BPH (benign prostatic hyperplasia)    • Diabetes mellitus     TYPE 2   • Foraminal stenosis of cervical region     C5-C6   • GERD (gastroesophageal reflux disease)    • Hemorrhoids    • History of urinary retention 2010    S/P TURP   • Hyperlipidemia    • Macular degeneration    • PING (obstructive sleep apnea) 2016    MILD, SEES DR. AMARILIS MORGAN       Past Surgical History:   Procedure Laterality Date   • CARDIAC CATHETERIZATION N/A 2023    Procedure: Right Heart Cath;  Surgeon: Corey Gaffney MD;  Location:  [facility];  Service: Cardiology;  Laterality: N/A;   • CARDIAC CATHETERIZATION N/A 2023    Procedure: Left Heart Cath;  Surgeon: Corey Gaffney MD;  Location:  [facility];  Service: Cardiology;  Laterality: N/A;   • CARDIAC  CATHETERIZATION N/A 5/11/2023    Procedure: Left ventriculography;  Surgeon: Corey Gaffney MD;  Location:  [facility];  Service: Cardiology;  Laterality: N/A;   • CARDIAC CATHETERIZATION N/A 5/11/2023    Procedure: Coronary angiography;  Surgeon: Corey Gaffney MD;  Location:  [facility];  Service: Cardiology;  Laterality: N/A;   • COLONOSCOPY N/A     WNL PER PT, NO RECORDS,    • COLONOSCOPY N/A 04/07/2009    DR. GORGE BENAVIDEZ AT [facility]   • MITRAL VALVE REPAIR/REPLACEMENT N/A 5/24/2023    Procedure: MITRAL VALVE REPAIR/REPLACEMENT TRICUSPID VALVE REPAIR/REPLACEMENT TRANSESOPHAGEAL ECHOCARDIOGRAM WITH ANESTHESIA;  Surgeon: George Frey MD;  [facility];  Service: Cardiothoracic;  Laterality: N/A;   • PROSTATE SURGERY N/A 11/12/2010    TURP, DR. COREY COLLAZO AT [facility]   • SKIN TAG REMOVAL N/A 12/20/2017    ANAL SKIN TAG X2, PERFORMED IN OFFICE, DR. LISA ORANTES   • TURP / TRANSURETHRAL INCISION / DRAINAGE PROSTATE  2010    Dr. Goodrich       Family History: family history includes Colon polyps in his brother and brother; Dementia in his father; Lung cancer in his mother; Stroke in his father.    Social History:  reports that he has never smoked. He has never been exposed to tobacco smoke. He has never used smokeless tobacco. He reports current alcohol use. He reports that he does not use drugs.    Home Medications:  Prior to Admission medications    Medication Sig Start Date End Date Taking? Authorizing Provider   aspirin 81 MG tablet Take 1 tablet by mouth Daily.   Yes Provider, MD Jv   atorvastatin (LIPITOR) 10 MG tablet TAKE 1 TABLET BY MOUTH EVERY DAY  Patient taking differently: 1 tablet Every Night. 8/23/19  Yes Vernon Solorzano MD   furosemide (LASIX) 40 MG tablet Take 1 tablet by mouth 2 (Two) Times a Day. 5/12/23  Yes Corwin Hobson MD   insulin degludec (TRESIBA FLEXTOUCH) 100 UNIT/ML solution pen-injector injection Start 25  units daily and titrate up 2 units every 2 days if blood glucose levels are consistently above 150  Patient taking differently: Inject 50 Units under the skin into the appropriate area as directed Daily. 5/12/23  Yes Florentino Kennedy MD   irbesartan (AVAPRO) 75 MG tablet Take 1 tablet by mouth Daily. 4/24/23  Yes Jv Beatty MD   Jardiance 25 MG tablet tablet Take 1 tablet by mouth Daily. 5/1/23  Yes Jv Beatty MD   metoclopramide (REGLAN) 10 MG tablet Take 1 tablet by mouth Every Night. 4/21/23  Yes Jv Beatty MD   Multiple Vitamins-Minerals (PRESERVISION AREDS 2+MULTI VIT PO) Take  by mouth 2 (Two) Times a Day.   Yes Jv Beatty MD   omeprazole (priLOSEC) 40 MG capsule Take 1 capsule by mouth Daily.  Patient taking differently: Take 1 capsule by mouth Every Night. 6/13/22  Yes Liza Oconnell III, NP-C   potassium chloride (MICRO-K) 10 MEQ CR capsule Take 2 capsules by mouth Daily. 5/3/23  Yes Jv Beatty MD   predniSONE (DELTASONE) 10 MG tablet Take 9 mg by mouth Daily. 3/27/23  Yes Jv Beatty MD   Symbicort 160-4.5 MCG/ACT inhaler Inhale 2 puffs 2 (Two) Times a Day. 5/1/23  Yes Jv Beatty MD   tiotropium (SPIRIVA) 18 MCG per inhalation capsule Place 1 capsule into inhaler and inhale Daily.   Yes Jv Beatty MD       Allergies:  No Known Allergies    Objective     Vitals:   Temp:  [97.5 °F (36.4 °C)-98.6 °F (37 °C)] 98.4 °F (36.9 °C)  Heart Rate:  [] 80  Resp:  [16-23] 16  BP: ()/(65-82) 133/81  Flow (L/min):  [2-4] 2  FiO2 (%):  [41 %] 41 %    Intake/Output Summary (Last 24 hours) at 5/25/2023 1340  Last data filed at 5/25/2023 1100  Gross per 24 hour   Intake 1588.26 ml   Output 3745 ml   Net -2156.74 ml       Physical Exam:    General Appearance: alert, no acute distress   Skin: warm and dry  HEENT: oral mucosa normal, nonicteric sclera  Neck: supple, no JVD  Lungs: CTA  Heart: RRR, normal S1 and  S2  Abdomen: soft, nontender, nondistended  : no palpable bladder  Extremities: no edema, cyanosis or clubbing      Scheduled Meds:     acetaminophen, 650 mg, Oral, Q4H   Or  acetaminophen, 650 mg, Oral, Q4H   Or  acetaminophen, 650 mg, Rectal, Q4H  aspirin, 81 mg, Oral, Daily  atorvastatin, 40 mg, Oral, Nightly  bumetanide, 1 mg, Intravenous, Q12H  ceFAZolin, 2 g, Intravenous, Q8H  chlorhexidine, 15 mL, Mouth/Throat, Q12H  enoxaparin, 40 mg, Subcutaneous, Daily  guaiFENesin, 1,200 mg, Oral, Q12H  hydrocortisone sodium succinate, 100 mg, Intravenous, Q12H  insulin lispro, 3-14 Units, Subcutaneous, 4x Daily AC & at Bedtime  mupirocin, , Each Nare, BID  pantoprazole, 40 mg, Oral, QAM  senna-docusate sodium, 2 tablet, Oral, Nightly      IV Meds:   clevidipine, 2-32 mg/hr  dexmedetomidine, 0.2-1.5 mcg/kg/hr, Last Rate: Stopped (05/24/23 1631)  DOPamine, 2-20 mcg/kg/min  EPINEPHrine, 0.02-0.1 mcg/kg/min, Last Rate: Stopped (05/24/23 2155)  insulin, 0-100 Units/hr, Last Rate: Stopped (05/25/23 0828)  milrinone, 0.25-0.375 mcg/kg/min, Last Rate: 0.125 mcg/kg/min (05/24/23 2155)  niCARdipine, 5-15 mg/hr  nitroglycerin, 5-200 mcg/min  norepinephrine, 0.02-0.2 mcg/kg/min, Last Rate: Stopped (05/24/23 2335)  phenylephrine, 0.2-2 mcg/kg/min  propofol, 5-50 mcg/kg/min, Last Rate: Stopped (05/24/23 1516)  sodium chloride, 30 mL/hr, Last Rate: 30 mL/hr (05/24/23 1223)        Results Reviewed:   I have personally reviewed the results from the time of this admission to 5/25/2023 13:40 EDT     Lab Results   Component Value Date    GLUCOSE 117 (H) 05/25/2023    CALCIUM 9.0 05/25/2023     05/25/2023    K 5.1 05/25/2023    CO2 18.4 (L) 05/25/2023     (H) 05/25/2023    BUN 19 05/25/2023    CREATININE 1.46 (H) 05/25/2023    EGFRIFAFRI 76 12/15/2021    EGFRIFNONA 66 12/15/2021    BCR 13.0 05/25/2023    ANIONGAP 10.6 05/25/2023      Lab Results   Component Value Date    MG 3.0 (H) 05/25/2023    PHOS 5.1 (H) 05/25/2023     ALBUMIN 4.2 05/25/2023     US Renal Bilateral    Result Date: 5/9/2023  RENAL ULTRASOUND  HISTORY: 72-year-old male with chronic renal disease. BPH.  TECHNIQUE: Real-time ultrasound of the kidneys and bladder was performed. Confirmatory images were obtained.  FINDINGS: The right kidney measures 15.8 x 6.4 x 6.5 cm and the left kidney measures 13.9 x 6.8 x 5.3 cm. There is an approximately 6 cm cyst with septations at the upper pole of the right kidney and there is a 1.5 cm cyst at the upper pole of the left kidney. There are no renal stones and there is no hydronephrosis. Bilateral ureteral jets are seen. Urinary bladder is moderately distended.  This report was finalized on 5/9/2023 11:26 PM by Dr. Aleida Martinez M.D.         Assessment / Plan     ASSESSMENT:    1.  Acute kidney injury on chronic kidney disease stage III history.  Baseline creatinine 1.2 mg/dL and it went up to 1.68 mg/dL 05/24 and today is down to 1.4 mg/dL.  Volume status appears to be generous.  Renal ultrasound showed normal-looking kidneys except of 6 cm cyst with septation in the upper pole of the right kidney.  Urinalysis essentially unremarkable.  Volume status appears to be generous so we will go out and increase his Bumex to 2 mg IV every 12 hours.    2.  Chronic kidney disease stage III with baseline creatinine 1.2 mg/dL  3.  Moderate to severe mitral regurgitation status post mitral valve repair placement and tricuspid valve repair on May 24  4.  Severe pulmonary hypertension  5.  History of COVID-19 pneumonia with possible interstitial lung disease on chronic steroid   6.  Diastolic congestive heart failure  7.  Postoperative anemia and thrombocytopenia    PLAN:    1.  Will increase the Bumex to 2 mg IV twice daily  2.  We will need urology evaluation for his assist as an outpatient  3.  Neurology has been consulted for his mental status  4.  Surveillance labs    Thank you for involving us in the care of Vernon Solorzano.  Please feel  free to call with any questions.    Tae Lay MD  05/25/23  13:40 EDT    [facility]  [phone]

## 2023-05-25 NOTE — PROGRESS NOTES
" LOS: 2 days   Patient Care Team:  Liza cOonnell III, NP-C as PCP - General (Family Medicine)  Corey Lao Jr., MD (Inactive) as Consulting Physician (Urology)    Chief Complaint: post op    Subjective:  Symptoms:  He reports weakness.  No shortness of breath, cough or chest pain.    Diet:  Poor intake.  No nausea or vomiting.    Activity level: Impaired due to weakness.    Pain:  He complains of pain that is mild.  Pain is partially controlled.      Pretty drowsy this morning    Vital Signs  Temp:  [97.3 °F (36.3 °C)-98.6 °F (37 °C)] 98.6 °F (37 °C)  Heart Rate:  [] 89  Resp:  [16-23] 16  BP: ()/(54-81) 109/70  FiO2 (%):  [41 %-61 %] 41 %  Body mass index is 35.29 kg/m².    Intake/Output Summary (Last 24 hours) at 5/25/2023 0817  Last data filed at 5/25/2023 0650  Gross per 24 hour   Intake 2688.26 ml   Output 3605 ml   Net -916.74 ml     No intake/output data recorded.    Chest tube drainage last 8 hours: 160        05/23/23  1531 05/24/23  2000 05/25/23  0600   Weight: 117 kg (257 lb 0.9 oz) 117 kg (257 lb 15 oz) 121 kg (267 lb 6.7 oz)     Objective:  General Appearance:  Comfortable and in no acute distress.    Vital signs: (most recent): Blood pressure 109/70, pulse 89, temperature 98.6 °F (37 °C), resp. rate 16, height 185.4 cm (72.99\"), weight 121 kg (267 lb 6.7 oz), SpO2 92 %.  Vital signs are normal.  No fever.    Output: Producing urine and no stool output.    Lungs:  Normal effort and normal respiratory rate.  There are decreased breath sounds.    Heart: Normal rate.  Regular rhythm.  (100% AV paced; junctional 30s underlying)  Abdomen: Abdomen is soft and distended.  Hypoactive bowel sounds.     Extremities: There is dependent edema (mild bilateral LE).    Pulses: Distal pulses are intact.    Neurological: Patient is alert.  (Oriented to person).    Skin:  Warm and dry.  (Sternal dressing clean, dry, and intact)        Results Review:        WBC WBC   Date Value " Ref Range Status   05/25/2023 15.14 (H) 3.40 - 10.80 10*3/mm3 Final   05/24/2023 23.61 (H) 3.40 - 10.80 10*3/mm3 Final   05/24/2023 29.62 (H) 3.40 - 10.80 10*3/mm3 Final   05/24/2023 20.66 (H) 3.40 - 10.80 10*3/mm3 Final   05/23/2023 10.79 3.40 - 10.80 10*3/mm3 Final      HGB Hemoglobin   Date Value Ref Range Status   05/25/2023 10.1 (L) 13.0 - 17.7 g/dL Final   05/24/2023 11.0 (L) 13.0 - 17.7 g/dL Final   05/24/2023 12.1 (L) 13.0 - 17.7 g/dL Final   05/24/2023 10.2 (L) 12.0 - 17.0 g/dL Final   05/24/2023 10.2 (L) 13.0 - 17.7 g/dL Final   05/24/2023 10.5 (L) 12.0 - 17.0 g/dL Final   05/24/2023 10.5 (L) 12.0 - 17.0 g/dL Final   05/24/2023 10.2 (L) 12.0 - 17.0 g/dL Final   05/24/2023 10.5 (L) 12.0 - 17.0 g/dL Final   05/24/2023 10.9 (L) 12.0 - 17.0 g/dL Final   05/24/2023 10.2 (L) 12.0 - 17.0 g/dL Final   05/24/2023 7.8 (C) 12.0 - 17.0 g/dL Final   05/23/2023 14.4 13.0 - 17.7 g/dL Final      HCT Hematocrit   Date Value Ref Range Status   05/25/2023 29.6 (L) 37.5 - 51.0 % Final   05/24/2023 32.9 (L) 37.5 - 51.0 % Final   05/24/2023 35.8 (L) 37.5 - 51.0 % Final   05/24/2023 30 (L) 38 - 51 % Final   05/24/2023 30.7 (L) 37.5 - 51.0 % Final   05/24/2023 31 (L) 38 - 51 % Final   05/24/2023 31 (L) 38 - 51 % Final   05/24/2023 30 (L) 38 - 51 % Final   05/24/2023 31 (L) 38 - 51 % Final   05/24/2023 32 (L) 38 - 51 % Final   05/24/2023 30 (L) 38 - 51 % Final   05/24/2023 23 (L) 38 - 51 % Final   05/23/2023 43.8 37.5 - 51.0 % Final      Platelets Platelets   Date Value Ref Range Status   05/25/2023 118 (L) 140 - 450 10*3/mm3 Final   05/24/2023 135 (L) 140 - 450 10*3/mm3 Final   05/24/2023 188 140 - 450 10*3/mm3 Final   05/24/2023 160 140 - 450 10*3/mm3 Final   05/23/2023 255 140 - 450 10*3/mm3 Final        PT/INR:    Protime   Date Value Ref Range Status   05/25/2023 15.0 (H) 11.7 - 14.2 Seconds Final   05/24/2023 15.3 (H) 11.7 - 14.2 Seconds Final   05/24/2023 17.4 (H) 12.8 - 15.2 seconds Final     Comment:     Serial Number:  [id number]:  [id number]   05/24/2023 18.1 (H) 11.7 - 14.2 Seconds Final   05/23/2023 12.6 11.7 - 14.2 Seconds Final   /  INR   Date Value Ref Range Status   05/25/2023 1.16 (H) 0.90 - 1.10 Final   05/24/2023 1.20 (H) 0.90 - 1.10 Final   05/24/2023 1.5 (H) 0.8 - 1.2 Final   05/24/2023 1.47 (H) 0.90 - 1.10 Final   05/23/2023 0.93 0.90 - 1.10 Final       Sodium Sodium   Date Value Ref Range Status   05/25/2023 143 136 - 145 mmol/L Final   05/24/2023 146 (H) 136 - 145 mmol/L Final   05/24/2023 146 (H) 136 - 145 mmol/L Final   05/24/2023 142 136 - 145 mmol/L Final   05/23/2023 140 136 - 145 mmol/L Final      Potassium Potassium   Date Value Ref Range Status   05/25/2023 5.1 3.5 - 5.2 mmol/L Final   05/24/2023 3.4 (L) 3.5 - 5.2 mmol/L Final     Comment:     Slight hemolysis detected by analyzer. Results may be affected.   05/24/2023 3.7 3.5 - 5.2 mmol/L Final     Comment:     Specimen hemolyzed.  Results may be affected.   05/24/2023 3.3 (L) 3.5 - 5.2 mmol/L Final   05/23/2023 4.3 3.5 - 5.2 mmol/L Final      Chloride Chloride   Date Value Ref Range Status   05/25/2023 114 (H) 98 - 107 mmol/L Final   05/24/2023 112 (H) 98 - 107 mmol/L Final   05/24/2023 110 (H) 98 - 107 mmol/L Final   05/24/2023 104 98 - 107 mmol/L Final   05/23/2023 102 98 - 107 mmol/L Final      Bicarbonate CO2   Date Value Ref Range Status   05/25/2023 18.4 (L) 22.0 - 29.0 mmol/L Final   05/24/2023 21.0 (L) 22.0 - 29.0 mmol/L Final   05/24/2023 24.0 22.0 - 29.0 mmol/L Final   05/24/2023 24.6 22.0 - 29.0 mmol/L Final   05/23/2023 26.4 22.0 - 29.0 mmol/L Final      BUN BUN   Date Value Ref Range Status   05/25/2023 19 8 - 23 mg/dL Final   05/24/2023 19 8 - 23 mg/dL Final   05/24/2023 19 8 - 23 mg/dL Final   05/24/2023 20 8 - 23 mg/dL Final   05/23/2023 17 8 - 23 mg/dL Final      Creatinine Creatinine   Date Value Ref Range Status   05/25/2023 1.46 (H) 0.76 - 1.27 mg/dL Final   05/24/2023 1.61 (H) 0.76 - 1.27 mg/dL Final   05/24/2023 1.65 (H) 0.76 - 1.27  mg/dL Final   05/24/2023 1.34 (H) 0.76 - 1.27 mg/dL Final   05/23/2023 1.28 (H) 0.76 - 1.27 mg/dL Final      Calcium Calcium   Date Value Ref Range Status   05/25/2023 9.0 8.6 - 10.5 mg/dL Final   05/24/2023 8.1 (L) 8.6 - 10.5 mg/dL Final   05/24/2023 7.8 (L) 8.6 - 10.5 mg/dL Final   05/24/2023 8.9 8.6 - 10.5 mg/dL Final   05/23/2023 9.6 8.6 - 10.5 mg/dL Final      Magnesium Magnesium   Date Value Ref Range Status   05/25/2023 3.0 (H) 1.6 - 2.4 mg/dL Final   05/24/2023 2.9 (H) 1.6 - 2.4 mg/dL Final   05/24/2023 3.4 (H) 1.6 - 2.4 mg/dL Final   05/23/2023 2.4 1.6 - 2.4 mg/dL Final          acetaminophen, 650 mg, Oral, Q4H   Or  acetaminophen, 650 mg, Oral, Q4H   Or  acetaminophen, 650 mg, Rectal, Q4H  aspirin, 81 mg, Oral, Daily  atorvastatin, 40 mg, Oral, Nightly  bumetanide, 1 mg, Intravenous, Q12H  ceFAZolin, 2 g, Intravenous, Q8H  chlorhexidine, 15 mL, Mouth/Throat, Q12H  enoxaparin, 40 mg, Subcutaneous, Daily  magnesium sulfate, 2 g, Intravenous, Q8H  metoclopramide, 10 mg, Intravenous, Q6H  metoprolol tartrate, 12.5 mg, Oral, Q12H  mupirocin, , Each Nare, BID  pantoprazole, 40 mg, Oral, QAM  senna-docusate sodium, 2 tablet, Oral, Nightly      amiodarone, 1 mg/min, Last Rate: Stopped (05/24/23 2021)  clevidipine, 2-32 mg/hr  dexmedetomidine, 0.2-1.5 mcg/kg/hr, Last Rate: Stopped (05/24/23 1631)  DOPamine, 2-20 mcg/kg/min  EPINEPHrine, 0.02-0.1 mcg/kg/min, Last Rate: Stopped (05/24/23 2155)  insulin, 0-100 Units/hr, Last Rate: 2.1 Units/hr (05/25/23 0645)  milrinone, 0.25-0.375 mcg/kg/min, Last Rate: 0.125 mcg/kg/min (05/24/23 2155)  niCARdipine, 5-15 mg/hr  nitroglycerin, 5-200 mcg/min  norepinephrine, 0.02-0.2 mcg/kg/min, Last Rate: Stopped (05/24/23 2335)  phenylephrine, 0.2-2 mcg/kg/min  propofol, 5-50 mcg/kg/min, Last Rate: Stopped (05/24/23 1516)  sodium chloride, 30 mL/hr, Last Rate: 30 mL/hr (05/24/23 1223)      Patient Active Problem List   Diagnosis Code   • DM (diabetes mellitus), type 2 E11.9   • Mixed  hyperlipidemia E78.2   • Mild intermittent asthma without complication J45.20   • New onset of congestive heart failure I50.9   • Nonrheumatic mitral valve regurgitation I34.0   • Chest pain R07.9   • Essential hypertension I10   • Mitral valve disease I05.9     Assessment & Plan   -Moderate to severe mitral regurgitation s/p MVR/TV repair POD#1 Tk  -Moderate tricuspid valve regurgitation  -Acute on chronic diastolic heart failure with preserved EF--55%  -CKD stage IIIa; baseline creatinine 1.2  -DM type II--internal medicine following   -Severe pulmonary HTN  -PING  -Urinary retention s/p TURP  -BPH   -Interstitial lung disease s/p COVID-19 infection --on chronic steroids  - leukocytosis--reactive  - post op anemia--expected acute blood loss  -TCP--consumptive    Pretty drowsy this morning, only oriented to person and he keeps repeating his . He moves his extremities, but is unable to use his right hand. Narcan given without real improvement. Discussed with Dr. Frey, stat CT of the head ordered  Last CI 2.0; remains on milrinone at 0.125--will leave today. Discontinue swan  Creatinine improved slightly. IV diurese. Will have nephrology evaluate  On 2L NC--wean as able  Remains AV paced at 80. Appears to be junctional in the 30s underlying. Amiodarone discontinued  Stress dose steroids given yesterday, will resume home oral steroids  Mobilize/aggressive pulmonary toilet---add flutter/mucinex  Will watch him in CVR for now  Continue routine care    Basia Duarte, DESTIN  23  08:17 EDT

## 2023-05-25 NOTE — PLAN OF CARE
Goal Outcome Evaluation:  Plan of Care Reviewed With: patient           Outcome Evaluation: Pt is 71 yo male admitted to [facility] for MVR, TVR. PMH significant for HTN, HLD, PING, DM, GERD. Patient lives with spouse, independent prior to admission. Pt up in chair, confused this morning, difficulty following commands, unable to rate pain or perform cardiac exercises. Pt assisted from chair to bed with modAx2, sit to supine with modAx2. Patient demonstrates impairments consisting of generalized post op weakness and pain, decreased safety awareness and ability to follow commands, decreased balance, decreased activity tolerance and would benefit from skilled PT. Initial d/c plan is home with family assist and HH, will continue to assess as hospital course unfolds.

## 2023-05-25 NOTE — THERAPY EVALUATION
Patient Name: Vernon Solorzano  : 1950    MRN: [id number]                              Today's Date: 2023       Admit Date: 2023    Visit Dx:     ICD-10-CM ICD-9-CM   1. Nonrheumatic mitral valve regurgitation  I34.0 424.0     Patient Active Problem List   Diagnosis   • DM (diabetes mellitus), type 2   • Mixed hyperlipidemia   • Mild intermittent asthma without complication   • New onset of congestive heart failure   • Nonrheumatic mitral valve regurgitation   • Chest pain   • Essential hypertension   • Mitral valve disease     Past Medical History:   Diagnosis Date   • Allergic rhinitis    • Arthritis    • BPH (benign prostatic hyperplasia)    • Diabetes mellitus     TYPE 2   • Foraminal stenosis of cervical region     C5-C6   • GERD (gastroesophageal reflux disease)    • Hemorrhoids    • History of urinary retention 2010    S/P TURP   • Hyperlipidemia    • Macular degeneration    • PING (obstructive sleep apnea) 2016    MILD, SEES DR. AMARILIS MORGAN     Past Surgical History:   Procedure Laterality Date   • CARDIAC CATHETERIZATION N/A 2023    Procedure: Right Heart Cath;  Surgeon: Corey Gaffney MD;  [facility]:  [facility];  Service: Cardiology;  Laterality: N/A;   • CARDIAC CATHETERIZATION N/A 2023    Procedure: Left Heart Cath;  Surgeon: Corey Gaffney MD;  [facility]:  [facility];  Service: Cardiology;  Laterality: N/A;   • CARDIAC CATHETERIZATION N/A 2023    Procedure: Left ventriculography;  Surgeon: Corey Gaffney MD;  Location:  [facility];  Service: Cardiology;  Laterality: N/A;   • CARDIAC CATHETERIZATION N/A 2023    Procedure: Coronary angiography;  Surgeon: Corey Gaffney MD;  Location:  [facility];  Service: Cardiology;  Laterality: N/A;   • COLONOSCOPY N/A     WNL PER PT, NO RECORDS,    • COLONOSCOPY N/A 2009    DR. GORGE BENAVIDEZ AT [facility]   • MITRAL VALVE  REPAIR/REPLACEMENT N/A 5/24/2023    Procedure: MITRAL VALVE REPAIR/REPLACEMENT TRICUSPID VALVE REPAIR/REPLACEMENT TRANSESOPHAGEAL ECHOCARDIOGRAM WITH ANESTHESIA;  Surgeon: George Frey MD;  Location: [facility];  Service: Cardiothoracic;  Laterality: N/A;   • PROSTATE SURGERY N/A 11/12/2010    TURP, DR. BRIGHT COLLAZO AT [facility]   • SKIN TAG REMOVAL N/A 12/20/2017    ANAL SKIN TAG X2, PERFORMED IN OFFICE, DR. LISA ORANTES   • TURP / TRANSURETHRAL INCISION / DRAINAGE PROSTATE  2010    Dr. Goodrich      General Information     Row Name 05/25/23 1200          Physical Therapy Time and Intention    Document Type evaluation  -EM     Mode of Treatment individual therapy;physical therapy  -EM     Row Name 05/25/23 1200          General Information    Patient Profile Reviewed yes  -EM     Prior Level of Function independent:  -EM     Existing Precautions/Restrictions fall;cardiac;sternal  -EM     Row Name 05/25/23 1200          Living Environment    People in Home spouse  -EM     Row Name 05/25/23 1200          Home Main Entrance    Number of Stairs, Main Entrance --  pt unable to answer questions about home environment  -EM     Row Name 05/25/23 1200          Cognition    Orientation Status (Cognition) oriented to;person;place  -EM     Row Name 05/25/23 1200          Safety Issues, Functional Mobility    Impairments Affecting Function (Mobility) balance;coordination;cognition;endurance/activity tolerance;strength;pain  -EM           User Key  (r) = Recorded By, (t) = Taken By, (c) = Cosigned By    Initials Name Provider Type    EM Kathy Shaffer, PT Physical Therapist               Mobility     Row Name 05/25/23 1201          Bed Mobility    Bed Mobility sit-supine  -EM     Sit-Supine [place] (Bed Mobility) moderate assist (50% patient effort);2 person assist;verbal cues  -EM     Row Name 05/25/23 1201          Bed-Chair Transfer    Bed-Chair [place] (Transfers) moderate assist (50% patient  effort);2 person assist  -EM     Comment, (Bed-Chair Transfer) steps around from chair to bed, difficulty following commands  -EM     Row Name 05/25/23 1201          Sit-Stand Transfer    Sit-Stand [place] (Transfers) minimum assist (75% patient effort);2 person assist  -EM           User Key  (r) = Recorded By, (t) = Taken By, (c) = Cosigned By    Initials Name Provider Type    EM Kathy Shaffer PT Physical Therapist               Obj/Interventions     Row Name 05/25/23 1202          Range of Motion Comprehensive    General Range of Motion no range of motion deficits identified  -EM     Row Name 05/25/23 1202          Strength Comprehensive (MMT)    General Manual Muscle Testing (MMT) Assessment other (see comments)  -EM     Comment, General Manual Muscle Testing (MMT) Assessment generalized post op weakness noted, formal MMT deferred due to cognitive status  -EM     Row Name 05/25/23 1202          Motor Skills    Therapeutic Exercise other (see comments)  cardiac protocol deferred, level 1  -EM     Row Name 05/25/23 1202          Balance    Balance Assessment sitting static balance;sitting dynamic balance;standing static balance;standing dynamic balance  -EM     Static Sitting Balance contact guard  -EM     Dynamic Sitting Balance contact guard  -EM     Position, Sitting Balance sitting edge of bed  -EM     Static Standing Balance moderate assist  -EM     Dynamic Standing Balance moderate assist;2-person assist  -EM     Row Name 05/25/23 1202          Sensory Assessment (Somatosensory)    Sensory Assessment (Somatosensory) unable/difficult to assess  -EM           User Key  (r) = Recorded By, (t) = Taken By, (c) = Cosigned By    Initials Name Provider Type    EM Kathy Shaffer PT Physical Therapist               Goals/Plan     Row Name 05/25/23 1207          Problem Specific Goal 1 (PT)    Problem Specific Goal 1 (PT) pt able to perform level 3 per cardiac protocol  -EM     Time Frame (Problem  Specific Goal 1, PT) 1 week  -EM     Row Name 05/25/23 1207          Therapy Assessment/Plan (PT)    Planned Therapy Interventions (PT) bed mobility training;gait training;home exercise program;patient/family education;transfer training  -EM           User Key  (r) = Recorded By, (t) = Taken By, (c) = Cosigned By    Initials Name Provider Type    EM Kathy Shaffer, PT Physical Therapist               Clinical Impression     Row Name 05/25/23 1204          Pain    Pre/Posttreatment Pain Comment pt unable to rate pain on numeric scale  -EM     Pain Intervention(s) Medication (See MAR);Repositioned  -EM     Row Name 05/25/23 1201          Plan of Care Review    Plan of Care Reviewed With patient  -EM     Outcome Evaluation Pt is 73 yo male admitted to [facility] for MVR, TVR. PMH significant for HTN, HLD, PING, DM, GERD. Patient lives with spouse, independent prior to admission. Pt up in chair, confused this morning, difficulty following commands, unable to rate pain or perform cardiac exercises. Pt assisted from chair to bed with modAx2, sit to supine with modAx2. Patient demonstrates impairments consisting of generalized post op weakness and pain, decreased safety awareness and ability to follow commands, decreased balance, decreased activity tolerance and would benefit from skilled PT. Initial d/c plan is home with family assist and HH, will continue to assess as hospital course unfolds.  -EM     Row Name 05/25/23 1209          Therapy Assessment/Plan (PT)    Patient/Family Therapy Goals Statement (PT) unable to state  -EM     Rehab Potential (PT) good, to achieve stated therapy goals  -EM     Criteria for Skilled Interventions Met (PT) yes;skilled treatment is necessary  -EM     Therapy Frequency (PT) 6 times/wk  -EM     Row Name 05/25/23 1205          Vital Signs    Pre Systolic BP Rehab 138  -EM     Pre Treatment Diastolic BP 82  -EM     Pretreatment Heart Rate (beats/min) 88  -EM     Posttreatment Heart Rate  (beats/min) 89  -EM     Pre SpO2 (%) 97  -EM     O2 Delivery Pre Treatment supplemental O2  -EM     Row Name 05/25/23 1204          Positioning and Restraints    Pre-Treatment Position sitting in chair/recliner  -EM     Post Treatment Position bed  -EM     In Bed supine;with nsg  -EM           User Key  (r) = Recorded By, (t) = Taken By, (c) = Cosigned By    Initials Name Provider Type    Kathy Boo PT Physical Therapist               Outcome Measures     Row Name 05/25/23 1208          How much help from another person do you currently need...    Turning from your back to your side while in flat bed without using bedrails? 2  -EM     Moving from lying on back to sitting on the side of a flat bed without bedrails? 2  -EM     Moving to and from a bed to a chair (including a wheelchair)? 2  -EM     Standing up from a chair using your arms (e.g., wheelchair, bedside chair)? 2  -EM     Climbing 3-5 steps with a railing? 1  -EM     To walk in hospital room? 1  -EM     AM-PAC 6 Clicks Score (PT) 10  -EM     Highest level of mobility 4 --> Transferred to chair/commode  -EM           User Key  (r) = Recorded By, (t) = Taken By, (c) = Cosigned By    Initials Name Provider Type    Kathy Boo PT Physical Therapist                             Physical Therapy Education     Title: PT OT SLP Therapies (In Progress)     Topic: Physical Therapy (In Progress)     Point: Mobility training (In Progress)     Learning Progress Summary           Patient Acceptance, E, NR by EM at 5/25/2023 1208                   Point: Home exercise program (In Progress)     Learning Progress Summary           Patient Acceptance, E, NR by EM at 5/25/2023 1208                   Point: Body mechanics (Not Started)     Learner Progress:  Not documented in this visit.          Point: Precautions (Not Started)     Learner Progress:  Not documented in this visit.                      User Key     Initials Effective Dates Name  Provider Type Discipline    EM 06/16/21 -  Kathy Shaffer PT Physical Therapist PT              PT Recommendation and Plan  Planned Therapy Interventions (PT): bed mobility training, gait training, home exercise program, patient/family education, transfer training  Plan of Care Reviewed With: patient  Outcome Evaluation: Pt is 71 yo male admitted to [facility] for MVR, TVR. PMH significant for HTN, HLD, PING, DM, GERD. Patient lives with spouse, independent prior to admission. Pt up in chair, confused this morning, difficulty following commands, unable to rate pain or perform cardiac exercises. Pt assisted from chair to bed with modAx2, sit to supine with modAx2. Patient demonstrates impairments consisting of generalized post op weakness and pain, decreased safety awareness and ability to follow commands, decreased balance, decreased activity tolerance and would benefit from skilled PT. Initial d/c plan is home with family assist and HH, will continue to assess as hospital course unfolds.     Time Calculation:    PT Charges     Row Name 05/25/23 1209             Time Calculation    Start Time 0900  -EM      Stop Time 0921  -EM      Time Calculation (min) 21 min  -EM      PT Received On 05/25/23  -EM      PT - Next Appointment 05/26/23  -EM      PT Goal Re-Cert Due Date 06/01/23  -EM         Time Calculation- PT    Total Timed Code Minutes- PT 10 minute(s)  -EM         Timed Charges    20826 - PT Therapeutic Activity Minutes 10  -EM         Total Minutes    Timed Charges Total Minutes 10  -EM       Total Minutes 10  -EM            User Key  (r) = Recorded By, (t) = Taken By, (c) = Cosigned By    Initials Name Provider Type    EM Kathy Shaffer PT Physical Therapist              Therapy Charges for Today     Code Description Service Date Service Provider Modifiers Qty    90911923904  PT THERAPEUTIC ACT EA 15 MIN 5/25/2023 Kathy Shaffer PT GP 1    78786287586 HC PT EVAL MOD COMPLEXITY 2 5/25/2023  Kathy Shaffer, PT GP 1    67497427428 HC PT THER SUPP EA 15 MIN 5/25/2023 Kathy Shaffer PT GP 1          PT G-Codes  AM-PAC 6 Clicks Score (PT): 10  PT Discharge Summary  Anticipated Discharge Disposition (PT): home with assist, home with home health    Kathy Shaffer, PT  5/25/2023

## 2023-05-25 NOTE — THERAPY EVALUATION
Acute Care - Speech Language Pathology   Swallow Initial Evaluation [facility]     Patient Name: Vernon Solorzano  : 1950  MRN: [id number]  Today's Date: 2023               Admit Date: 2023    Visit Dx:     ICD-10-CM ICD-9-CM   1. Nonrheumatic mitral valve regurgitation  I34.0 424.0     Patient Active Problem List   Diagnosis   • DM (diabetes mellitus), type 2   • Mixed hyperlipidemia   • Mild intermittent asthma without complication   • New onset of congestive heart failure   • Nonrheumatic mitral valve regurgitation   • Chest pain   • Essential hypertension   • Mitral valve disease     Past Medical History:   Diagnosis Date   • Allergic rhinitis    • Arthritis    • BPH (benign prostatic hyperplasia)    • Diabetes mellitus     TYPE 2   • Foraminal stenosis of cervical region     C5-C6   • GERD (gastroesophageal reflux disease)    • Hemorrhoids    • History of urinary retention 2010    S/P TURP   • Hyperlipidemia    • Macular degeneration    • PING (obstructive sleep apnea) 2016    MILD, SEES DR. AMARILIS MORGAN     Past Surgical History:   Procedure Laterality Date   • CARDIAC CATHETERIZATION N/A 2023    Procedure: Right Heart Cath;  Surgeon: Corey Gaffney MD;  Location: [facility] CATH INVASIVE LOCATION;  Service: Cardiology;  Laterality: N/A;   • CARDIAC CATHETERIZATION N/A 2023    Procedure: Left Heart Cath;  Surgeon: Corey Gaffney MD;  [facility];  Service: Cardiology;  Laterality: N/A;   • CARDIAC CATHETERIZATION N/A 2023    Procedure: Left ventriculography;  Surgeon: Corey Gaffney MD;  [facility];  Service: Cardiology;  Laterality: N/A;   • CARDIAC CATHETERIZATION N/A 2023    Procedure: Coronary angiography;  Surgeon: Corey Gaffney MD;  Location:  [facility] CATH INVASIVE LOCATION;  Service: Cardiology;  Laterality: N/A;   • COLONOSCOPY N/A     WNL PER PT, NO RECORDS,    • COLONOSCOPY N/A  04/07/2009    DR. GORGE BENAVIDEZ AT [place]   • MITRAL VALVE REPAIR/REPLACEMENT N/A 5/24/2023    Procedure: MITRAL VALVE REPAIR/REPLACEMENT TRICUSPID VALVE REPAIR/REPLACEMENT TRANSESOPHAGEAL ECHOCARDIOGRAM WITH ANESTHESIA;  Surgeon: George Frey MD;  Location: [facility];  Service: Cardiothoracic;  Laterality: N/A;   • PROSTATE SURGERY N/A 11/12/2010    TURP, DR. BRIGHT COLLAZO AT [facility]   • SKIN TAG REMOVAL N/A 12/20/2017    ANAL SKIN TAG X2, PERFORMED IN OFFICE, DR. LISA ORANTES   • TURP / TRANSURETHRAL INCISION / DRAINAGE PROSTATE  2010    Dr. Goodrich       SLP Recommendation and Plan  SLP Swallowing Diagnosis: oral dysphagia, suspected pharyngeal dysphagia (05/25/23 1315)  SLP Diet Recommendation: ice chips between meals after oral care, with supervision, water between meals after oral care, with supervision (05/25/23 1315)     SLP Rec. for Method of Medication Administration: meds via alternate route (05/25/23 1315)     Monitor for Signs of Aspiration: notify SLP if any concerns (05/25/23 1315)  Recommended Diagnostics: reassess via clinical swallow evaluation (05/25/23 1315)  Swallow Criteria for Skilled Therapeutic Interventions Met: demonstrates skilled criteria (05/25/23 1315)  Anticipated Discharge Disposition (SLP): unknown (05/25/23 1315)  Rehab Potential/Prognosis, Swallowing: re-evaluate goals as necessary (05/25/23 1315)  Therapy Frequency (Swallow): PRN (05/25/23 1315)  Predicted Duration Therapy Intervention (Days): until discharge (05/25/23 1315)                                        Plan of Care Reviewed With: patient  Outcome Evaluation: Swallow evaluation completed.  Pt able to take ice and water from spoon and cup.  Delayed swallow with reduced laryngeal elevation. Inconsistent ability to follow oral commands for labial and lingual movements.  Strong vocal quality; however, delayed throat clear after cup trials of thin.  Pt demonstrating confusion uncoordinated lingual/labial movements  at times. Feel pt at risk of aspiration.  continue NPO for meds and nutrition.  Allow ice chips or small sips/water after oral care when upright.  Will reassess swallow as appropriate.      SWALLOW EVALUATION (last 72 hours)     SLP Adult Swallow Evaluation     Row Name 05/25/23 7986                   Rehab Evaluation    Document Type evaluation  -SA        Subjective Information no complaints  -SA        Patient Observations alert;cooperative  confused  -SA        Patient Effort fair  -SA           General Information    Patient Profile Reviewed yes  -SA        Pertinent History Of Current Problem severe mitral regurgitation status post tissue mitral valve replacement/MVR and tricuspid valve repair  hypertension, hyperlipidemia, obstructive sleep apnea, GERD, diabetes mellitus type 2, BPH, acute onset CHF, Covid19 2022, DM  -SA        Current Method of Nutrition NPO  -SA        Precautions/Limitations, Vision difficult to assess  -SA        Precautions/Limitations, Hearing difficult to assess  -SA        Prior Level of Function-Communication WFL;other (see comments)  wife reports recent memory difficulties  -SA        Prior Level of Function-Swallowing unknown  -SA        Plans/Goals Discussed with patient;agreed upon  -SA        Barriers to Rehab medically complex  -SA        Patient's Goals for Discharge patient did not state  -SA           Oral Motor Structure and Function    Dentition Assessment natural, present and adequate  -SA        Secretion Management WNL/WFL  -SA        Mucosal Quality moist, healthy  -SA           Oral Musculature and Cranial Nerve Assessment    Oral Motor General Assessment other (see comments);oral labial or buccal impairment;lingual impairment  difficulty following directions  -SA        Oral Labial or Buccal Impairment, Detail, Cranial Nerve VII (Facial): reduced strength bilaterally  -SA        Lingual Impairment, Detail. Cranial Nerves IX, XII (Glossopharyngeal and Hypoglossal)  reduced strength  -SA           General Eating/Swallowing Observations    Respiratory Support Currently in Use nasal cannula  -SA        O2 Liters 2L  -SA           Clinical Swallow Eval    Clinical Swallow Evaluation Summary Pt able to take ice and water from spoon and cup.  Delayed swallow with reduced laryngeal elevation. Inconsistent ability to follow oral commands for labial and lingual movements.  Strong vocal quality; however, delayed throat clear after cup trials of thin.  Pt demonstrating confusion uncoordinated lingual/labial movements at times. Feel pt at risk of aspiration.  Will reassess swallow as appropriate.  -           SLP Evaluation Clinical Impression    SLP Swallowing Diagnosis oral dysphagia;suspected pharyngeal dysphagia  -        Functional Impact risk of aspiration/pneumonia  -        Rehab Potential/Prognosis, Swallowing re-evaluate goals as necessary  -        Swallow Criteria for Skilled Therapeutic Interventions Met demonstrates skilled criteria  -           Recommendations    Therapy Frequency (Swallow) PRN  -        Predicted Duration Therapy Intervention (Days) until discharge  -        SLP Diet Recommendation ice chips between meals after oral care, with supervision;water between meals after oral care, with supervision  -        Recommended Diagnostics reassess via clinical swallow evaluation  -        Oral Care Recommendations Oral Care BID/PRN;Before ice/water  -        SLP Rec. for Method of Medication Administration meds via alternate route  -        Monitor for Signs of Aspiration notify SLP if any concerns  -        Anticipated Discharge Disposition (SLP) unknown  -              User Key  (r) = Recorded By, (t) = Taken By, (c) = Cosigned By    Initials Name Effective Dates    Clarissa Conley MS CCC-SLP 06/01/22 -                 EDUCATION  The patient has been educated in the following areas:   Dysphagia (Swallowing Impairment) Oral Care/Hydration  NPO rationale.              Time Calculation:    Time Calculation- SLP     Row Name 05/25/23 1654             Time Calculation- SLP    SLP Start Time 1315  -SA      SLP Received On 05/25/23  -            User Key  (r) = Recorded By, (t) = Taken By, (c) = Cosigned By    Initials Name Provider Type    Clarissa Conley MS CCC-SLP Speech and Language Pathologist                Therapy Charges for Today     Code Description Service Date Service Provider Modifiers Qty    [id number] HC ST EVAL ORAL PHARYNG SWALLOW 4 5/25/2023 Clarissa Gomez MS CCC-VIKI GN 1               MS JENNIFFER Peng  5/25/2023

## 2023-05-25 NOTE — CONSULTS
"Neurology Consult Note    Consult Date: 5/25/2023    Referring MD: George Frey MD    Reason for Consult I have been asked to see the patient in neurological consultation to render advice and opinion regarding confusion, aphasia, possible stroke    Vernon Solorzano is a 72 y.o. male with GERD, hyperlipidemia, PING status post MVR and tricuspid valve repair yesterday.  He was extubated overnight.  This morning he has been confused.  There was concern for aphasia and I was called.  Patient symptoms have improved slightly compared to shift change.    Past Medical History:   Diagnosis Date   • Allergic rhinitis    • Arthritis    • BPH (benign prostatic hyperplasia)    • Diabetes mellitus     TYPE 2   • Foraminal stenosis of cervical region     C5-C6   • GERD (gastroesophageal reflux disease)    • Hemorrhoids    • History of urinary retention 2010    S/P TURP   • Hyperlipidemia    • Macular degeneration    • PING (obstructive sleep apnea) 01/07/2016    MILD, SEES DR. AMARILIS MORGAN       Exam  /70   Pulse 80   Temp 98.6 °F (37 °C)   Resp 16   Ht 185.4 cm (72.99\")   Wt 121 kg (267 lb 6.7 oz)   SpO2 97%   BMI 35.29 kg/m²   Gen: NAD, vitals reviewed  MS: Awake, oriented, able to tell me his age, distractible and frequently perseverates but no aphasia or neglect  CN: visual acuity grossly normal, visual fields full, PERRL, EOMI, no facial droop, mild dysarthria  Motor: 5/5 throughout upper and lower extremities, normal tone, asterixis present  Sensory: Intact to temperature and vibration throughout  Reflexes: Plantars downgoing bilaterally    DATA:    Lab Results   Component Value Date    GLUCOSE 117 (H) 05/25/2023    CALCIUM 9.0 05/25/2023     05/25/2023    K 5.1 05/25/2023    CO2 18.4 (L) 05/25/2023     (H) 05/25/2023    BUN 19 05/25/2023    CREATININE 1.46 (H) 05/25/2023    EGFRIFAFRI 76 12/15/2021    EGFRIFNONA 66 12/15/2021    BCR 13.0 05/25/2023    ANIONGAP 10.6 05/25/2023     Lab Results "   Component Value Date    WBC 15.14 (H) 05/25/2023    HGB 10.1 (L) 05/25/2023    HCT 29.6 (L) 05/25/2023    MCV 86.8 05/25/2023     (L) 05/25/2023       Lab review: Creatinine 1.5, WBC 15, hemoglobin 10.1     Imaging review: I personally reviewed his CT scan of the head performed earlier this morning which looks essentially normal with no evidence of acute stroke or ICH.    Diagnoses:  Delirium  S/p MVR/ TV repair    Pre-stroke MRS: 0  NIHSS: 2 (orientation questions)    Comment: exam is most consistent with delirium and/or metabolic encephalopathy. CT reassuring. I would monitor him conservatively    PLAN:  Will follow clinically and reevaluate tomorrow  No further neuroimaging needed for now. Very low suspicion for stroke

## 2023-05-25 NOTE — PLAN OF CARE
Goal Outcome Evaluation:  Plan of Care Reviewed With: patient           Outcome Evaluation: Swallow evaluation completed.  Pt able to take ice and water from spoon and cup.  Delayed swallow with reduced laryngeal elevation. Inconsistent ability to follow oral commands for labial and lingual movements.  Strong vocal quality; however, delayed throat clear after cup trials of thin.  Pt demonstrating confusion uncoordinated lingual/labial movements at times. Feel pt at risk of aspiration.  continue NPO for meds and nutrition.  Allow ice chips or small sips/water after oral care when upright.  Will reassess swallow as appropriate.

## 2023-05-25 NOTE — PROGRESS NOTES
LOS: 2 days   Patient Care Team:  Liza Oconnell III, NP-C as PCP - General (Family Medicine)  Corey Lao Jr., MD (Inactive) as Consulting Physician (Urology)    Chief Complaint: Follow-up severe mitral regurgitation status post tissue mitral valve replacement.    Interval History: Unfortunately, he is having a rough time today.  He is confused and having aphasia.  He was not moving his right hand.  CT showed no acute events, neurology has been consulted.  He also is in an underlying significant junctional bradycardia.    Vital Signs:  Temp:  [97.7 °F (36.5 °C)-98.6 °F (37 °C)] 98.4 °F (36.9 °C)  Heart Rate:  [] 80  Resp:  [16-23] 16  BP: ()/(65-82) 133/81  FiO2 (%):  [41 %] 41 %    Intake/Output Summary (Last 24 hours) at 5/25/2023 1423  Last data filed at 5/25/2023 1100  Gross per 24 hour   Intake 1588.26 ml   Output 3685 ml   Net -2096.74 ml       Physical Exam:   General Appearance:    No acute distress, alert but aphasia noted   Lungs:     Clear to auscultation bilaterally     Heart:    Regular rhythm and normal rate.  No murmurs, gallops, or       rubs.   Abdomen:     Soft, nontender, nondistended.    Extremities:   1+ edema lower extremities.     Results Review:    Results from last 7 days   Lab Units 05/25/23  0307   SODIUM mmol/L 143   POTASSIUM mmol/L 5.1   CHLORIDE mmol/L 114*   CO2 mmol/L 18.4*   BUN mg/dL 19   CREATININE mg/dL 1.46*   GLUCOSE mg/dL 117*   CALCIUM mg/dL 9.0         Results from last 7 days   Lab Units 05/25/23  0307   WBC 10*3/mm3 15.14*   HEMOGLOBIN g/dL 10.1*   HEMATOCRIT % 29.6*   PLATELETS 10*3/mm3 118*     Results from last 7 days   Lab Units 05/25/23  0307 05/24/23  1210 05/24/23  1103 05/24/23  1056 05/23/23  1429   INR  1.16* 1.20* 1.5* 1.47* 0.93   APTT seconds  --  26.2  --  24.9 24.0     Results from last 7 days   Lab Units 05/23/23  1429   CHOLESTEROL mg/dL 155     Results from last 7 days   Lab Units 05/25/23  0307   MAGNESIUM mg/dL  3.0*     Results from last 7 days   Lab Units 05/23/23  1429   CHOLESTEROL mg/dL 155   TRIGLYCERIDES mg/dL 197*   HDL CHOL mg/dL 41   LDL CHOL mg/dL 81       I reviewed the patient's new clinical results.        Assessment:  1.  Severe symptomatic mitral regurgitation and tricuspid regurgitation  2.  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 Frey  3.  Chronic diastolic CHF  4.  History of COVID-19 pneumonia with residual dyspnea and possible interstitial lung disease  5.  Stage IIIa chronic kidney disease  6.  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  Expected postoperative anemia  8.  Postoperative thrombocytopenia  9.  Postoperative ventricular ectopy and NSVT  10.  Postoperative aphasia and right hand motor abnormality  11.  Junctional bradycardia  12.  Diabetes    Plan:  -Again, neurological issues noted.  Neurology has been consulted.  CT was negative for an acute stroke.    -Amiodarone discontinued.  Significant junctional bradycardia underlying the pacemaker at about 30.  This will obviously need to be watched closely.    -He is off vasopressors.  He remains on minimal milrinone.    -Agree with Bumex 2 mg IV every 12 hours.    Corwin Hobson MD  05/25/23  14:23 EDT

## 2023-05-26 ENCOUNTER — APPOINTMENT (OUTPATIENT)
Dept: CARDIOLOGY | Facility: HOSPITAL | Age: 73
End: 2023-05-26
Payer: MEDICARE

## 2023-05-26 ENCOUNTER — APPOINTMENT (OUTPATIENT)
Dept: GENERAL RADIOLOGY | Facility: HOSPITAL | Age: 73
End: 2023-05-26
Payer: MEDICARE

## 2023-05-26 LAB
ALBUMIN SERPL-MCNC: 4.1 G/DL (ref 3.5–5.2)
ALP SERPL-CCNC: 45 U/L (ref 39–117)
ALT SERPL W P-5'-P-CCNC: 23 U/L (ref 1–41)
ANION GAP SERPL CALCULATED.3IONS-SCNC: 13.1 MMOL/L (ref 5–15)
AORTIC DIMENSIONLESS INDEX: 0.5 (DI)
ARTERIAL PATENCY WRIST A: ABNORMAL
AST SERPL-CCNC: 80 U/L (ref 1–40)
ATMOSPHERIC PRESS: 752.7 MMHG
BASE EXCESS BLDA CALC-SCNC: -1.1 MMOL/L (ref 0–2)
BDY SITE: ABNORMAL
BH CV ECHO MEAS - AO MAX PG: 13.2 MMHG
BH CV ECHO MEAS - AO MEAN PG: 6.3 MMHG
BH CV ECHO MEAS - AO V2 MAX: 181.3 CM/SEC
BH CV ECHO MEAS - AO V2 VTI: 32.5 CM
BH CV ECHO MEAS - AVA(I,D): 1.37 CM2
BH CV ECHO MEAS - CONTRAST EF 4CH: 50 CM2
BH CV ECHO MEAS - EDV(MOD-SP2): 178 ML
BH CV ECHO MEAS - EDV(MOD-SP4): 180 ML
BH CV ECHO MEAS - EF(MOD-BP): 50 %
BH CV ECHO MEAS - EF(MOD-SP2): 53.9 %
BH CV ECHO MEAS - EF(MOD-SP4): 46.1 %
BH CV ECHO MEAS - ESV(MOD-SP2): 82 ML
BH CV ECHO MEAS - ESV(MOD-SP4): 97 ML
BH CV ECHO MEAS - LV MAX PG: 3.8 MMHG
BH CV ECHO MEAS - LV MEAN PG: 1.81 MMHG
BH CV ECHO MEAS - LV V1 MAX: 97.9 CM/SEC
BH CV ECHO MEAS - LV V1 VTI: 16.5 CM
BH CV ECHO MEAS - LVOT AREA: 2.7 CM2
BH CV ECHO MEAS - LVOT DIAM: 1.85 CM
BH CV ECHO MEAS - MV DEC SLOPE: 477.3 CM/SEC2
BH CV ECHO MEAS - MV DEC TIME: 380 MSEC
BH CV ECHO MEAS - MV E MAX VEL: 161 CM/SEC
BH CV ECHO MEAS - MV MAX PG: 16.2 MMHG
BH CV ECHO MEAS - MV MEAN PG: 7.6 MMHG
BH CV ECHO MEAS - MV P1/2T: 131.6 MSEC
BH CV ECHO MEAS - MV V2 VTI: 54.7 CM
BH CV ECHO MEAS - MVA(P1/2T): 1.67 CM2
BH CV ECHO MEAS - MVA(VTI): 0.81 CM2
BH CV ECHO MEAS - RAP SYSTOLE: 3 MMHG
BH CV ECHO MEAS - RVSP: 54 MMHG
BH CV ECHO MEAS - SV(LVOT): 44.3 ML
BH CV ECHO MEAS - SV(MOD-SP2): 96 ML
BH CV ECHO MEAS - SV(MOD-SP4): 83 ML
BH CV ECHO MEAS - TAPSE (>1.6): 0.5 CM
BH CV ECHO MEAS - TR MAX PG: 50.7 MMHG
BH CV ECHO MEAS - TR MAX VEL: 355.9 CM/SEC
BH CV VAS BP RIGHT ARM: NORMAL MMHG
BILIRUB CONJ SERPL-MCNC: <0.2 MG/DL (ref 0–0.3)
BILIRUB INDIRECT SERPL-MCNC: ABNORMAL MG/DL
BILIRUB SERPL-MCNC: 0.4 MG/DL (ref 0–1.2)
BUN SERPL-MCNC: 26 MG/DL (ref 8–23)
BUN/CREAT SERPL: 19 (ref 7–25)
CALCIUM SPEC-SCNC: 8.9 MG/DL (ref 8.6–10.5)
CHLORIDE SERPL-SCNC: 110 MMOL/L (ref 98–107)
CO2 SERPL-SCNC: 23.9 MMOL/L (ref 22–29)
CREAT SERPL-MCNC: 1.37 MG/DL (ref 0.76–1.27)
DEPRECATED RDW RBC AUTO: 44.7 FL (ref 37–54)
EGFRCR SERPLBLD CKD-EPI 2021: 54.1 ML/MIN/1.73
ERYTHROCYTE [DISTWIDTH] IN BLOOD BY AUTOMATED COUNT: 13.9 % (ref 12.3–15.4)
GAS FLOW AIRWAY: 2 LPM
GLUCOSE BLDC GLUCOMTR-MCNC: 142 MG/DL (ref 70–130)
GLUCOSE BLDC GLUCOMTR-MCNC: 155 MG/DL (ref 70–130)
GLUCOSE BLDC GLUCOMTR-MCNC: 162 MG/DL (ref 70–130)
GLUCOSE BLDC GLUCOMTR-MCNC: 193 MG/DL (ref 70–130)
GLUCOSE SERPL-MCNC: 143 MG/DL (ref 65–99)
HCO3 BLDA-SCNC: 23.3 MMOL/L (ref 22–28)
HCT VFR BLD AUTO: 31 % (ref 37.5–51)
HGB BLD-MCNC: 10.2 G/DL (ref 13–17.7)
MAGNESIUM SERPL-MCNC: 2.7 MG/DL (ref 1.6–2.4)
MAXIMAL PREDICTED HEART RATE: 146 BPM
MCH RBC QN AUTO: 29.1 PG (ref 26.6–33)
MCHC RBC AUTO-ENTMCNC: 32.9 G/DL (ref 31.5–35.7)
MCV RBC AUTO: 88.3 FL (ref 79–97)
MODALITY: ABNORMAL
PCO2 BLDA: 37.1 MM HG (ref 35–45)
PH BLDA: 7.41 PH UNITS (ref 7.35–7.45)
PLATELET # BLD AUTO: 116 10*3/MM3 (ref 140–450)
PMV BLD AUTO: 10.6 FL (ref 6–12)
PO2 BLDA: 76.7 MM HG (ref 80–100)
POTASSIUM SERPL-SCNC: 4 MMOL/L (ref 3.5–5.2)
PROT SERPL-MCNC: 5.8 G/DL (ref 6–8.5)
QT INTERVAL: 440 MS
QT INTERVAL: 457 MS
RBC # BLD AUTO: 3.51 10*6/MM3 (ref 4.14–5.8)
SAO2 % BLDCOA: 95.4 % (ref 92–99)
SET MECH RESP RATE: 20
SODIUM SERPL-SCNC: 147 MMOL/L (ref 136–145)
STRESS TARGET HR: 124 BPM
TOTAL RATE: 20 BREATHS/MINUTE
WBC NRBC COR # BLD: 18.39 10*3/MM3 (ref 3.4–10.8)

## 2023-05-26 PROCEDURE — 25010000002 ENOXAPARIN PER 10 MG: Performed by: NURSE PRACTITIONER

## 2023-05-26 PROCEDURE — 94799 UNLISTED PULMONARY SVC/PX: CPT

## 2023-05-26 PROCEDURE — 93308 TTE F-UP OR LMTD: CPT

## 2023-05-26 PROCEDURE — 82948 REAGENT STRIP/BLOOD GLUCOSE: CPT

## 2023-05-26 PROCEDURE — 71045 X-RAY EXAM CHEST 1 VIEW: CPT

## 2023-05-26 PROCEDURE — 25010000002 CEFAZOLIN IN DEXTROSE 2-4 GM/100ML-% SOLUTION: Performed by: NURSE PRACTITIONER

## 2023-05-26 PROCEDURE — 25510000001 PERFLUTREN (DEFINITY) 8.476 MG IN SODIUM CHLORIDE (PF) 0.9 % 10 ML INJECTION: Performed by: NURSE PRACTITIONER

## 2023-05-26 PROCEDURE — 93308 TTE F-UP OR LMTD: CPT | Performed by: INTERNAL MEDICINE

## 2023-05-26 PROCEDURE — 83735 ASSAY OF MAGNESIUM: CPT | Performed by: NURSE PRACTITIONER

## 2023-05-26 PROCEDURE — 25010000002 METOCLOPRAMIDE PER 10 MG: Performed by: NURSE PRACTITIONER

## 2023-05-26 PROCEDURE — 97530 THERAPEUTIC ACTIVITIES: CPT

## 2023-05-26 PROCEDURE — 93010 ELECTROCARDIOGRAM REPORT: CPT | Performed by: INTERNAL MEDICINE

## 2023-05-26 PROCEDURE — 36600 WITHDRAWAL OF ARTERIAL BLOOD: CPT

## 2023-05-26 PROCEDURE — 93321 DOPPLER ECHO F-UP/LMTD STD: CPT

## 2023-05-26 PROCEDURE — 99232 SBSQ HOSP IP/OBS MODERATE 35: CPT | Performed by: INTERNAL MEDICINE

## 2023-05-26 PROCEDURE — 93325 DOPPLER ECHO COLOR FLOW MAPG: CPT | Performed by: INTERNAL MEDICINE

## 2023-05-26 PROCEDURE — 0 MILRINONE LACTATE IN DEXTROSE 20-5 MG/100ML-% SOLUTION: Performed by: NURSE PRACTITIONER

## 2023-05-26 PROCEDURE — 85027 COMPLETE CBC AUTOMATED: CPT | Performed by: NURSE PRACTITIONER

## 2023-05-26 PROCEDURE — 90791 PSYCH DIAGNOSTIC EVALUATION: CPT

## 2023-05-26 PROCEDURE — 97167 OT EVAL HIGH COMPLEX 60 MIN: CPT

## 2023-05-26 PROCEDURE — 0 POTASSIUM CHLORIDE PER 2 MEQ: Performed by: NURSE PRACTITIONER

## 2023-05-26 PROCEDURE — 63710000001 PREDNISONE PER 5 MG: Performed by: NURSE PRACTITIONER

## 2023-05-26 PROCEDURE — 93005 ELECTROCARDIOGRAM TRACING: CPT | Performed by: NURSE PRACTITIONER

## 2023-05-26 PROCEDURE — 92526 ORAL FUNCTION THERAPY: CPT

## 2023-05-26 PROCEDURE — 80076 HEPATIC FUNCTION PANEL: CPT | Performed by: NURSE PRACTITIONER

## 2023-05-26 PROCEDURE — 80048 BASIC METABOLIC PNL TOTAL CA: CPT | Performed by: NURSE PRACTITIONER

## 2023-05-26 PROCEDURE — 93321 DOPPLER ECHO F-UP/LMTD STD: CPT | Performed by: INTERNAL MEDICINE

## 2023-05-26 PROCEDURE — 99233 SBSQ HOSP IP/OBS HIGH 50: CPT | Performed by: NURSE PRACTITIONER

## 2023-05-26 PROCEDURE — 25010000002 MORPHINE PER 10 MG: Performed by: NURSE PRACTITIONER

## 2023-05-26 PROCEDURE — 25010000002 LORAZEPAM PER 2 MG: Performed by: THORACIC SURGERY (CARDIOTHORACIC VASCULAR SURGERY)

## 2023-05-26 PROCEDURE — 93325 DOPPLER ECHO COLOR FLOW MAPG: CPT

## 2023-05-26 PROCEDURE — 99024 POSTOP FOLLOW-UP VISIT: CPT | Performed by: THORACIC SURGERY (CARDIOTHORACIC VASCULAR SURGERY)

## 2023-05-26 PROCEDURE — 63710000001 INSULIN LISPRO (HUMAN) PER 5 UNITS: Performed by: NURSE PRACTITIONER

## 2023-05-26 PROCEDURE — 82803 BLOOD GASES ANY COMBINATION: CPT

## 2023-05-26 RX ORDER — PREDNISONE 10 MG/1
10 TABLET ORAL
Status: DISCONTINUED | OUTPATIENT
Start: 2023-05-26 | End: 2023-05-26

## 2023-05-26 RX ORDER — POTASSIUM CHLORIDE 750 MG/1
20 TABLET, FILM COATED, EXTENDED RELEASE ORAL ONCE
Status: DISCONTINUED | OUTPATIENT
Start: 2023-05-26 | End: 2023-05-26

## 2023-05-26 RX ORDER — POTASSIUM CHLORIDE 29.8 MG/ML
20 INJECTION INTRAVENOUS ONCE
Status: COMPLETED | OUTPATIENT
Start: 2023-05-26 | End: 2023-05-26

## 2023-05-26 RX ORDER — LORAZEPAM 2 MG/ML
1 INJECTION INTRAMUSCULAR ONCE
Status: COMPLETED | OUTPATIENT
Start: 2023-05-26 | End: 2023-05-26

## 2023-05-26 RX ORDER — BUMETANIDE 0.25 MG/ML
2 INJECTION INTRAMUSCULAR; INTRAVENOUS ONCE
Status: DISCONTINUED | OUTPATIENT
Start: 2023-05-26 | End: 2023-05-26

## 2023-05-26 RX ORDER — IPRATROPIUM BROMIDE AND ALBUTEROL SULFATE 2.5; .5 MG/3ML; MG/3ML
3 SOLUTION RESPIRATORY (INHALATION)
Status: DISCONTINUED | OUTPATIENT
Start: 2023-05-26 | End: 2023-06-06 | Stop reason: HOSPADM

## 2023-05-26 RX ORDER — IPRATROPIUM BROMIDE AND ALBUTEROL SULFATE 2.5; .5 MG/3ML; MG/3ML
3 SOLUTION RESPIRATORY (INHALATION) EVERY 4 HOURS PRN
Status: DISCONTINUED | OUTPATIENT
Start: 2023-05-26 | End: 2023-06-06 | Stop reason: HOSPADM

## 2023-05-26 RX ORDER — PREDNISONE 10 MG/1
5 TABLET ORAL
Status: DISPENSED | OUTPATIENT
Start: 2023-05-27 | End: 2023-06-03

## 2023-05-26 RX ORDER — INSULIN LISPRO 100 [IU]/ML
3-14 INJECTION, SOLUTION INTRAVENOUS; SUBCUTANEOUS EVERY 6 HOURS SCHEDULED
Status: DISCONTINUED | OUTPATIENT
Start: 2023-05-26 | End: 2023-06-06 | Stop reason: HOSPADM

## 2023-05-26 RX ORDER — METOCLOPRAMIDE HYDROCHLORIDE 5 MG/ML
10 INJECTION INTRAMUSCULAR; INTRAVENOUS ONCE
Status: COMPLETED | OUTPATIENT
Start: 2023-05-26 | End: 2023-05-26

## 2023-05-26 RX ORDER — BUMETANIDE 0.25 MG/ML
2 INJECTION INTRAMUSCULAR; INTRAVENOUS EVERY 12 HOURS
Status: COMPLETED | OUTPATIENT
Start: 2023-05-26 | End: 2023-05-27

## 2023-05-26 RX ORDER — ACETYLCYSTEINE 200 MG/ML
3 SOLUTION ORAL; RESPIRATORY (INHALATION)
Status: COMPLETED | OUTPATIENT
Start: 2023-05-26 | End: 2023-05-27

## 2023-05-26 RX ADMIN — POTASSIUM CHLORIDE 20 MEQ: 29.8 INJECTION, SOLUTION INTRAVENOUS at 12:35

## 2023-05-26 RX ADMIN — LORAZEPAM 1 MG: 2 INJECTION INTRAMUSCULAR; INTRAVENOUS at 02:04

## 2023-05-26 RX ADMIN — LORAZEPAM 1 MG: 2 INJECTION INTRAMUSCULAR; INTRAVENOUS at 02:40

## 2023-05-26 RX ADMIN — GUAIFENESIN 1200 MG: 600 TABLET, EXTENDED RELEASE ORAL at 09:17

## 2023-05-26 RX ADMIN — ACETYLCYSTEINE 3 ML: 200 SOLUTION ORAL; RESPIRATORY (INHALATION) at 21:02

## 2023-05-26 RX ADMIN — ACETYLCYSTEINE 3 ML: 200 SOLUTION ORAL; RESPIRATORY (INHALATION) at 09:20

## 2023-05-26 RX ADMIN — INSULIN LISPRO 3 UNITS: 100 INJECTION, SOLUTION INTRAVENOUS; SUBCUTANEOUS at 12:41

## 2023-05-26 RX ADMIN — IPRATROPIUM BROMIDE AND ALBUTEROL SULFATE 3 ML: 2.5; .5 SOLUTION RESPIRATORY (INHALATION) at 21:02

## 2023-05-26 RX ADMIN — ENOXAPARIN SODIUM 40 MG: 100 INJECTION SUBCUTANEOUS at 18:35

## 2023-05-26 RX ADMIN — PANTOPRAZOLE SODIUM 40 MG: 40 INJECTION, POWDER, FOR SOLUTION INTRAVENOUS at 09:16

## 2023-05-26 RX ADMIN — MILRINONE LACTATE 0.12 MCG/KG/MIN: 0.2 INJECTION, SOLUTION INTRAVENOUS at 13:06

## 2023-05-26 RX ADMIN — PREDNISONE 10 MG: 10 TABLET ORAL at 09:20

## 2023-05-26 RX ADMIN — ASPIRIN 81 MG: 81 TABLET, COATED ORAL at 09:17

## 2023-05-26 RX ADMIN — BUMETANIDE 2 MG: 0.25 INJECTION INTRAMUSCULAR; INTRAVENOUS at 21:18

## 2023-05-26 RX ADMIN — IPRATROPIUM BROMIDE AND ALBUTEROL SULFATE 3 ML: 2.5; .5 SOLUTION RESPIRATORY (INHALATION) at 09:20

## 2023-05-26 RX ADMIN — METOCLOPRAMIDE 10 MG: 5 INJECTION, SOLUTION INTRAMUSCULAR; INTRAVENOUS at 09:17

## 2023-05-26 RX ADMIN — MUPIROCIN 1 APPLICATION: 20 OINTMENT TOPICAL at 09:17

## 2023-05-26 RX ADMIN — BUMETANIDE 2 MG: 0.25 INJECTION INTRAMUSCULAR; INTRAVENOUS at 09:17

## 2023-05-26 RX ADMIN — MORPHINE SULFATE 1 MG: 2 INJECTION, SOLUTION INTRAMUSCULAR; INTRAVENOUS at 21:18

## 2023-05-26 RX ADMIN — PERFLUTREN 2 ML: 6.52 INJECTION, SUSPENSION INTRAVENOUS at 08:55

## 2023-05-26 RX ADMIN — CEFAZOLIN SODIUM 2 G: 2 INJECTION, SOLUTION INTRAVENOUS at 03:24

## 2023-05-26 RX ADMIN — MILRINONE LACTATE 0.12 MCG/KG/MIN: 0.2 INJECTION, SOLUTION INTRAVENOUS at 13:26

## 2023-05-26 RX ADMIN — MUPIROCIN 1 APPLICATION: 20 OINTMENT TOPICAL at 21:19

## 2023-05-26 NOTE — CASE MANAGEMENT/SOCIAL WORK
Discharge Planning Assessment  [facility]     Patient Name: Vernon Solorzano  MRN: [id number]  Today's Date: 5/26/2023    Admit Date: 5/23/2023    Plan: Home with [facility]   Discharge Needs Assessment     Row Name 05/26/23 1501       Living Environment    People in Home spouse    Name(s) of People in Home wife: Maricel    Current Living Arrangements home    Potentially Unsafe Housing Conditions none    Primary Care Provided by self    Provides Primary Care For no one    Family Caregiver if Needed spouse    Family Caregiver Names wife: Maricel    Quality of Family Relationships supportive    Able to Return to Prior Arrangements yes  plan is home with home health but SNF if rehab is needed       Resource/Environmental Concerns    Resource/Environmental Concerns none    Transportation Concerns none       Transition Planning    Patient/Family Anticipates Transition to home with family    Patient/Family Anticipated Services at Transition home health care    Transportation Anticipated family or friend will provide       Discharge Needs Assessment    Readmission Within the Last 30 Days no previous admission in last 30 days    Equipment Currently Used at Home nebulizer    Concerns to be Addressed adjustment to diagnosis/illness;decision-making;basic needs    Anticipated Changes Related to Illness inability to care for self    Equipment Needed After Discharge none    Outpatient/Agency/Support Group Needs homecare agency               Discharge Plan     Row Name 05/26/23 1454       Plan    Plan Home with [facility]    Patient/Family in Agreement with Plan unable to assess  documented pt confusion, I spoke with his wife Maricel    Provided Post Acute Provider List? Yes    Post Acute Provider List Nursing Home    Provided Post Acute Provider Quality & Resource List? Yes    Post Acute Provider Quality and Resource List Nursing Home    Delivered To Support Person    Support Person pt's wife Maricel    Method of  Delivery In person    Plan Comments Due to documented pt confusion I spoke with his wife Maricel, she confirmed the address, PCP and pharmacy are correct, they live in a multi-level home with 6 steps to enter the front door and 3 steps the enter through the garage. Pt is IADL, only DME is nebulizer, can afford his Rx, has never had HH or been to a SNF, does not have POA / HCS,  Maricel selected [facility], list of SNF from Medicare.gov provided in event rehab needed.    Charmaine Archer RN              Continued Care and Services - Admitted Since 5/23/2023     Home Medical Care     Service Provider Request Status Selected Services Address Phone Fax Patient Preferred    [facility] Accepted N/A [address] [address] [phone] --              Expected Discharge Date and Time     Expected Discharge Date Expected Discharge Time    May 31, 2023          Demographic Summary     Row Name 05/26/23 1500       General Information    Admission Type inpatient    Arrived From home    Required Notices Provided Important Message from Medicare    Referral Source admission list    Reason for Consult discharge planning    Preferred Language English       Contact Information    Permission Granted to Share Info With family/designee               Functional Status     Row Name 05/26/23 1500       Functional Status, IADL    Medications independent    Meal Preparation independent    Housekeeping independent    Laundry independent    Shopping independent                    Charmaine Archer, LAZARUS

## 2023-05-26 NOTE — PLAN OF CARE
Goal Outcome Evaluation:  Plan of Care Reviewed With: patient           Outcome Evaluation: Patient seen for swallow re-evaluation this date. Seated upright in recliner with spouse at bedside. Eager to participate in evaluation. Followed one-step commands for oral mechanism examination with ~70% accuracy independently. Weak cough noted. Demonstrated throat clear x4 with trials of ice chips and x3 with trials of thin via spoon. Slight change in vocal quality with thin via cup. Audible swallow noted with trials of thin, nectar, and honey via cup. Delayed cough with honey thick liquids. Rotary/uncoordinated mastication with puree and soft solids. No overt s/sx of aspiration or penetration observed with puree. Throat clear x1 with mechanical soft solids. Regular solids not trialed this date. Feel as though patient is at risk for aspiration. Recommend NPO w/ medication via alt route. Allow ice chips following oral care. Recommend instrumental swallow study to evaluate patient's swallow function and determine least restrictive diet.

## 2023-05-26 NOTE — CONSULTS
Access Center Consult, regarding mental status changes.  Patient is evaluated in cardio recovery room 2004.  He is also currently receiving an echo.  Case discussed with primary RN who states that patient is oriented to person, place, and situation.  Pt is postop cardiology.  He is also being followed by neurology.    Pt is a 72-year-old  white male who lives at home with his wife.  He is of the [employer] [place].  He has 4 adult children.  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  He enjoys fly fishing.    Pt is alert and oriented to person, place, and situation but he is not oriented to time.  He is able to answer most questions appropriately.  Mental Health Hx: No previous mental health history  Meds: No psychotropic medications  Family Psych Hx: Negative  Pt denies SI or HI.    Sleep: Sleep is usually good at home  Appetite: Normal  Patient unable to rate depression or anxiety.    Substance Use Hx: Patient is a non-smoker, no ETOH use, no illicit drug use    Plan: Previous charting by neurology reveals that patient's confusion was likely due to delirium.  He seems to be improving.  We will consider a consult to Dr. Harper if confusion persists.  Access to follow.

## 2023-05-26 NOTE — THERAPY EVALUATION
Patient Name: Vernon Solorzano  : 1950    MRN: [id number]                              Today's Date: 2023       Admit Date: 2023    Visit Dx:     ICD-10-CM ICD-9-CM   1. Nonrheumatic mitral valve regurgitation  I34.0 424.0     Patient Active Problem List   Diagnosis   • DM (diabetes mellitus), type 2   • Mixed hyperlipidemia   • Mild intermittent asthma without complication   • New onset of congestive heart failure   • Nonrheumatic mitral valve regurgitation   • Chest pain   • Essential hypertension   • Mitral valve disease     Past Medical History:   Diagnosis Date   • Allergic rhinitis    • Arthritis    • BPH (benign prostatic hyperplasia)    • Diabetes mellitus     TYPE 2   • Foraminal stenosis of cervical region     C5-C6   • GERD (gastroesophageal reflux disease)    • Hemorrhoids    • History of urinary retention 2010    S/P TURP   • Hyperlipidemia    • Macular degeneration    • PING (obstructive sleep apnea) 2016    MILD, SEES DR. AMARILIS MORGAN     Past Surgical History:   Procedure Laterality Date   • CARDIAC CATHETERIZATION N/A 2023    Procedure: Right Heart Cath;  Surgeon: Corey Gaffney MD;  [facility]:  [facility];  Service: Cardiology;  Laterality: N/A;   • CARDIAC CATHETERIZATION N/A 2023    Procedure: Left Heart Cath;  Surgeon: Corey Gaffney MD;  [facility]:  [facility];  Service: Cardiology;  Laterality: N/A;   • CARDIAC CATHETERIZATION N/A 2023    Procedure: Left ventriculography;  Surgeon: Corey Gaffney MD;  Location:  [facility];  Service: Cardiology;  Laterality: N/A;   • CARDIAC CATHETERIZATION N/A 2023    Procedure: Coronary angiography;  Surgeon: Corey Gaffney MD;  Location:  [facility];  Service: Cardiology;  Laterality: N/A;   • COLONOSCOPY N/A     WNL PER PT, NO RECORDS,    • COLONOSCOPY N/A 2009    DR. GORGE BENAVIDEZ AT [facility]   • MITRAL VALVE  "REPAIR/REPLACEMENT N/A 5/24/2023    Procedure: MITRAL VALVE REPAIR/REPLACEMENT TRICUSPID VALVE REPAIR/REPLACEMENT TRANSESOPHAGEAL ECHOCARDIOGRAM WITH ANESTHESIA;  Surgeon: George Frey MD;  Location: [facility];  Service: Cardiothoracic;  Laterality: N/A;   • PROSTATE SURGERY N/A 11/12/2010    TURP, DR. BRIGHT COLLAZO AT [facility]   • SKIN TAG REMOVAL N/A 12/20/2017    ANAL SKIN TAG X2, PERFORMED IN OFFICE, DR. LISA ORANTES   • TURP / TRANSURETHRAL INCISION / DRAINAGE PROSTATE  2010    Dr. Goodrich      General Information     Row Name 05/26/23 1314          OT Time and Intention    Document Type evaluation  -SM     Mode of Treatment occupational therapy;physical therapy;co-treatment  2/2 medical complexity, weakness, cogntive deficits. Pt seen today in [facility].  -     Row Name 05/26/23 1314          General Information    Patient Profile Reviewed yes  -SM     Prior Level of Function independent:;ADL's;all household mobility  -     Existing Precautions/Restrictions fall;cardiac;sternal;oxygen therapy device and L/min  chest tube, multiple lines and wires  -     Row Name 05/26/23 1314          Living Environment    People in Home spouse  -     Row Name 05/26/23 1314          Cognition    Orientation Status (Cognition) oriented to;person;place  confused to situation, thinking he is at the hospital \"to take classes\" pt is a retired MD.  -     Row Name 05/26/23 1314          Safety Issues, Functional Mobility    Safety Issues Affecting Function (Mobility) ability to follow commands  -     Impairments Affecting Function (Mobility) balance;coordination;cognition;endurance/activity tolerance;strength;pain;postural/trunk control  -           User Key  (r) = Recorded By, (t) = Taken By, (c) = Cosigned By    Initials Name Provider Type    SM Latisha Kauffman, OT Occupational Therapist                 Mobility/ADL's     Row Name 05/26/23 1316          Bed Mobility    Supine-Sit [place] (Bed Mobility) " maximum assist (25% patient effort);2 person assist  -SM     Sit-Supine [place] (Bed Mobility) maximum assist (25% patient effort);2 person assist  -SM     Assistive Device (Bed Mobility) draw sheet;head of bed elevated  -[facility] Name 05/26/23 1316          Sit-Stand Transfer    Sit-Stand [place] (Transfers) dependent (less than 25% patient effort)  -     Comment, (Sit-Stand Transfer) X3 reps attempted to stand, pt not initating movement for standing. X4 person assist present but not safely able to complete today  -[facility] Name 05/26/23 1316          Activities of Daily Living    BADL Assessment/Intervention --  currently dependent for all ADLs, not following commands well for engagement in tasks.  -           User Key  (r) = Recorded By, (t) = Taken By, (c) = Cosigned By    Initials Name Provider Type    Latisha Rhodes OT Occupational Therapist               Obj/Interventions     [facility] Name 05/26/23 1320          Range of Motion Comprehensive    Comment, General Range of Motion AAROM WFL BUE  -SM     Row Name 05/26/23 1320          Strength Comprehensive (MMT)    Comment, General Manual Muscle Testing (MMT) Assessment generalized weakness all extremities. Pt not following commands for formal MMT.  -[facility] Name 05/26/23 1320          Balance    Static Sitting Balance minimal assist  -     Dynamic Sitting Balance moderate assist  -     Position, Sitting Balance sitting edge of bed  -           User Key  (r) = Recorded By, (t) = Taken By, (c) = Cosigned By    Initials Name Provider Type    Latisha Rhodes OT Occupational Therapist               Goals/Plan     [facility] Name 05/26/23 1324          Transfer Goal 1 (OT)    Activity/Assistive Device (Transfer Goal 1, OT) sit-to-stand/stand-to-sit;commode, bedside without drop arms;bed-to-chair/chair-to-bed  -SM     [place] Level/Cues Needed (Transfer Goal 1, OT) moderate assist (50-74% patient effort)  -     Time Frame (Transfer  Goal 1, OT) short term goal (STG);2 weeks  -SM     Progress/Outcome (Transfer Goal 1, OT) goal ongoing  -     Row Name 05/26/23 1324          Bathing Goal 1 (OT)    Activity/Device (Bathing Goal 1, OT) bathing skills, all  -SM     [place] Level/Cues Needed (Bathing Goal 1, OT) moderate assist (50-74% patient effort)  -SM     Time Frame (Bathing Goal 1, OT) short term goal (STG);2 weeks  -SM     Progress/Outcomes (Bathing Goal 1, OT) goal ongoing  -     Row Name 05/26/23 1324          Dressing Goal 1 (OT)    Activity/Device (Dressing Goal 1, OT) dressing skills, all  -SM     [place]/Cues Needed (Dressing Goal 1, OT) moderate assist (50-74% patient effort)  -SM     Time Frame (Dressing Goal 1, OT) short term goal (STG);2 weeks  -SM     Progress/Outcome (Dressing Goal 1, OT) goal ongoing  -     Row Name 05/26/23 1324          Toileting Goal 1 (OT)    Activity/Device (Toileting Goal 1, OT) toileting skills, all  -SM     [place] Level/Cues Needed (Toileting Goal 1, OT) moderate assist (50-74% patient effort)  -SM     Time Frame (Toileting Goal 1, OT) short term goal (STG);2 weeks  -SM     Progress/Outcome (Toileting Goal 1, OT) goal ongoing  -     Row Name 05/26/23 1324          Grooming Goal 1 (OT)    Activity/Device (Grooming Goal 1, OT) grooming skills, all  -SM     [place] (Grooming Goal 1, OT) set-up required  -SM     Time Frame (Grooming Goal 1, OT) short term goal (STG);2 weeks  -SM     Strategies/Barriers (Grooming Goal 1, OT) seated  -SM     Progress/Outcome (Grooming Goal 1, OT) goal ongoing  -     Row Name 05/26/23 1324          Therapy Assessment/Plan (OT)    Planned Therapy Interventions (OT) activity tolerance training;adaptive equipment training;BADL retraining;functional balance retraining;occupation/activity based interventions;patient/caregiver education/training;transfer/mobility retraining;ROM/therapeutic exercise;strengthening exercise  -SM           User Key  (r) =  "Recorded By, (t) = Taken By, (c) = Cosigned By    Initials Name Provider Type     Latisha Kauffman, OT Occupational Therapist               Clinical Impression     Row Name 05/26/23 1321          Pain Assessment    Pain Location incisional  -     Pre/Posttreatment Pain Comment pt stating \"hurts a little\" but doesnt rate pain  -     Row Name 05/26/23 1321          Plan of Care Review    Plan of Care Reviewed With patient  -     Outcome Evaluation Pt is a 72 y.o male s/p MVR, TVR on 5/24. He is reported to be independent at baseline, he is not able to provide much history today due to impaired cogntion. He has been confused post op and diagnoses with delirium/encephalopathy bu neuro. He is lethargic today and not following all commands. He is able to sit EOB with max AX2, poor sitting balance, attempted to cue pt and work on pre ADL skills such as reaching with UEs but command following is limiting. He would benefit from continued rehab at [place]. Recommend acute rehab vs SNF pending progress and activity tolerance while admitted.  -     Row Name 05/26/23 1321          Therapy Assessment/Plan (OT)    Rehab Potential (OT) good, to achieve stated therapy goals  -     Criteria for Skilled Therapeutic Interventions Met (OT) yes;skilled treatment is necessary  -     Therapy Frequency (OT) 5 times/wk  -     Row Name 05/26/23 1321          Therapy Plan Review/Discharge Plan (OT)    Anticipated Discharge Disposition (OT) skilled nursing facility;inpatient rehabilitation facility  -     Row Name 05/26/23 1321          Vital Signs    Pre Systolic BP Rehab 119  -SM     Pre Treatment Diastolic BP 90  -SM     Pretreatment Heart Rate (beats/min) 70  -SM     Posttreatment Heart Rate (beats/min) 66  -SM     Pre SpO2 (%) 96  -SM     O2 Delivery Pre Treatment supplemental O2  -     Post SpO2 (%) 99  -SM     O2 Delivery Post Treatment supplemental O2  -     Row Name 05/26/23 1321          Positioning and Restraints    " Pre-Treatment Position in bed  -SM     Post Treatment Position bed  -SM     In Bed fowlers;call light within reach;notified nsg  -SM           User Key  (r) = Recorded By, (t) = Taken By, (c) = Cosigned By    Initials Name Provider Type    Latisha Rhodes OT Occupational Therapist               Outcome Measures     Row Name 05/26/23 1326          How much help from another is currently needed...    Putting on and taking off regular lower body clothing? 1  -SM     Bathing (including washing, rinsing, and drying) 1  -SM     Toileting (which includes using toilet bed pan or urinal) 1  -SM     Putting on and taking off regular upper body clothing 1  -SM     Taking care of personal grooming (such as brushing teeth) 1  -SM     Eating meals 1  -SM     AM-PAC 6 Clicks Score (OT) 6  -SM     Row Name 05/26/23 1304          How much help from another person do you currently need...    Turning from your back to your side while in flat bed without using bedrails? 2  -EM     Moving from lying on back to sitting on the side of a flat bed without bedrails? 2  -EM     Moving to and from a bed to a chair (including a wheelchair)? 1  -EM     Standing up from a chair using your arms (e.g., wheelchair, bedside chair)? 1  -EM     Climbing 3-5 steps with a railing? 1  -EM     To walk in hospital room? 1  -EM     AM-PAC 6 Clicks Score (PT) 8  -EM     Highest level of mobility 3 --> Sat at edge of bed  -EM     Row Name 05/26/23 1326          Functional Assessment    Outcome Measure Options AM-PAC 6 Clicks Daily Activity (OT)  -           User Key  (r) = Recorded By, (t) = Taken By, (c) = Cosigned By    Initials Name Provider Type    Kathy Boo PT Physical Therapist    Latisha Rhodes OT Occupational Therapist                Occupational Therapy Education     Title: PT OT SLP Therapies (In Progress)     Topic: Occupational Therapy (Not Started)     Point: ADL training (Not Started)     Description:   Instruct  learner(s) on proper safety adaptation and remediation techniques during self care or transfers.   Instruct in proper use of assistive devices.              Learner Progress:  Not documented in this visit.          Point: Home exercise program (Not Started)     Description:   Instruct learner(s) on appropriate technique for monitoring, assisting and/or progressing therapeutic exercises/activities.              Learner Progress:  Not documented in this visit.          Point: Precautions (Not Started)     Description:   Instruct learner(s) on prescribed precautions during self-care and functional transfers.              Learner Progress:  Not documented in this visit.          Point: Body mechanics (Not Started)     Description:   Instruct learner(s) on proper positioning and spine alignment during self-care, functional mobility activities and/or exercises.              Learner Progress:  Not documented in this visit.                          OT Recommendation and Plan  Planned Therapy Interventions (OT): activity tolerance training, adaptive equipment training, BADL retraining, functional balance retraining, occupation/activity based interventions, patient/caregiver education/training, transfer/mobility retraining, ROM/therapeutic exercise, strengthening exercise  Therapy Frequency (OT): 5 times/wk  Plan of Care Review  Plan of Care Reviewed With: patient  Outcome Evaluation: Pt is a 72 y.o male s/p MVR, TVR on 5/24. He is reported to be independent at baseline, he is not able to provide much history today due to impaired cogntion. He has been confused post op and diagnoses with delirium/encephalopathy bu neuro. He is lethargic today and not following all commands. He is able to sit EOB with max AX2, poor sitting balance, attempted to cue pt and work on pre ADL skills such as reaching with UEs but command following is limiting. He would benefit from continued rehab at [facility]. Recommend acute rehab vs SNF pending progress  and activity tolerance while admitted.     Time Calculation:    Time Calculation- OT     Row Name 05/26/23 1327             Time Calculation- OT    OT Start Time 1019  -SM      OT Stop Time 1037  -SM      OT Time Calculation (min) 18 min  -SM      Total Timed Code Minutes- OT 12 minute(s)  -SM      OT Received On 05/26/23  -      OT - Next Appointment 05/29/23  -      OT Goal Re-Cert Due Date 06/09/23  -SM         Timed Charges    36417 - OT Therapeutic Activity Minutes 12  -SM         Untimed Charges    OT Eval/Re-eval Minutes 6  -SM         Total Minutes    Timed Charges Total Minutes 12  -SM      Untimed Charges Total Minutes 6  -SM       Total Minutes 18  -SM            User Key  (r) = Recorded By, (t) = Taken By, (c) = Cosigned By    Initials Name Provider Type     Latisha Kauffman OT Occupational Therapist              Therapy Charges for Today     Code Description Service Date Service Provider Modifiers Qty    55186651902 HC OT THERAPEUTIC ACT EA 15 MIN 5/26/2023 Latisha Kauffman OT GO 1    24422698892 HC OT EVAL HIGH COMPLEXITY 2 5/26/2023 Latisha Kauffman OT GO 1               Latisha Kauffman OT  5/26/2023

## 2023-05-26 NOTE — PROGRESS NOTES
LOS: 3 days   Patient Care Team:  Liza Oconnell III, NP-C as PCP - General (Family Medicine)  Corey Lao Jr., MD (Inactive) as Consulting Physician (Urology)    Chief Complaint: Follow-up severe mitral regurgitation status post tissue mitral valve replacement.    Interval History: He is better from mental standpoint today, but is still confused at times.  He does not have the aphasia that was present yesterday.  He is still having significant weakness in his right hand.  Rhythm is slow (AV block versus junctional) with PVCs.    Vital Signs:  Temp:  [98.7 °F (37.1 °C)-99.7 °F (37.6 °C)] 98.7 °F (37.1 °C)  Heart Rate:  [60-74] 68  Resp:  [18] 18  BP: (119-165)/(70-95) 145/84    Intake/Output Summary (Last 24 hours) at 5/26/2023 1621  Last data filed at 5/26/2023 1235  Gross per 24 hour   Intake 866 ml   Output 3065 ml   Net -2199 ml       Physical Exam:   General Appearance:    No acute distress, alert and appropriate   Lungs:     Clear to auscultation bilaterally     Heart:    Regular rhythm and normal rate.  No murmurs, gallops, or       rubs.   Abdomen:     Soft, nontender, nondistended.    Extremities:   1+ edema lower extremities.  Right hand weakness.     Results Review:    Results from last 7 days   Lab Units 05/26/23  0304   SODIUM mmol/L 147*   POTASSIUM mmol/L 4.0   CHLORIDE mmol/L 110*   CO2 mmol/L 23.9   BUN mg/dL 26*   CREATININE mg/dL 1.37*   GLUCOSE mg/dL 143*   CALCIUM mg/dL 8.9         Results from last 7 days   Lab Units 05/26/23  0304   WBC 10*3/mm3 18.39*   HEMOGLOBIN g/dL 10.2*   HEMATOCRIT % 31.0*   PLATELETS 10*3/mm3 116*     Results from last 7 days   Lab Units 05/25/23  0307 05/24/23  1210 05/24/23  1103 05/24/23  1056 05/23/23  1429   INR  1.16* 1.20* 1.5* 1.47* 0.93   APTT seconds  --  26.2  --  24.9 24.0     Results from last 7 days   Lab Units 05/23/23  1429   CHOLESTEROL mg/dL 155     Results from last 7 days   Lab Units 05/26/23  0304   MAGNESIUM mg/dL  2.7*     Results from last 7 days   Lab Units 05/23/23  1429   CHOLESTEROL mg/dL 155   TRIGLYCERIDES mg/dL 197*   HDL CHOL mg/dL 41   LDL CHOL mg/dL 81       I reviewed the patient's new clinical results.        Assessment:  1.  Severe symptomatic mitral regurgitation and tricuspid regurgitation  2.  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 Frey  3.  Chronic diastolic CHF  4.  History of COVID-19 pneumonia with residual dyspnea and possible interstitial lung disease  5.  Stage IIIa chronic kidney disease  6.  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  Expected postoperative anemia  8.  Postoperative thrombocytopenia  9.  Postoperative frequent PVCs and NSVT  10.  Postoperative aphasia and right upper extremity weakness  11.  Junctional bradycardia  12.  Diabetes    Plan:  -CT was negative for an acute stroke.  The aphasia is better, and his mental status may be from delirium at times.  He is still very weak in his right hand, and neurology is following.  A stroke has not been eliminated.    -Underlying AV block versus junctional bradycardia and frequent PVCs.  EP has been consulted, although we will likely need to have several more days to see what his rhythm does while having overlying pacing support.  He did have a tricuspid valve operation, which places him at higher risk for rhythm issues.    -Limited echo reviewed.  EF is 50% with normal right ventricular function.  Potentially wean off milrinone.    -IV Bumex again today.  Reevaluate volume status tomorrow.    Corwin Hobson MD  05/26/23  16:21 EDT

## 2023-05-26 NOTE — PROGRESS NOTES
[facility] Progress Note      Patient Name: Vernon Solorzano  : 1950  MRN: [id number]  Primary Care Physician:  Liza Oconnell III, RACHAEL  Date of admission: 2023    Subjective     Interval History:     Seen and examined.  No shortness of air or chest. answers most questions properly.    Review of Systems:   As noted above    Objective     Vitals:   Temp:  [98.4 °F (36.9 °C)-99.7 °F (37.6 °C)] 99.5 °F (37.5 °C)  Heart Rate:  [67-80] 68  Resp:  [16] 16  BP: (115-165)/(72-94) 142/90  Flow (L/min):  [2] 2    Intake/Output Summary (Last 24 hours) at 2023 0815  Last data filed at 2023 0700  Gross per 24 hour   Intake 916 ml   Output 3150 ml   Net -2234 ml       Physical Exam:    General Appearance: alert, , no acute distress   Skin: warm and dry  HEENT: oral mucosa normal, nonicteric sclera  Neck: supple, no JVD  Lungs: CTA  Heart: RRR, normal S1 and S2  Abdomen: soft, nontender, nondistended  : no palpable bladder  Extremities: no edema, cyanosis or clubbing      Scheduled Meds:     acetylcysteine, 3 mL, Nebulization, BID - RT  aspirin, 81 mg, Oral, Daily  atorvastatin, 40 mg, Oral, Nightly  bumetanide, 2 mg, Intravenous, Once  chlorhexidine, 15 mL, Mouth/Throat, Q12H  enoxaparin, 40 mg, Subcutaneous, Daily  guaiFENesin, 1,200 mg, Oral, Q12H  insulin lispro, 3-14 Units, Subcutaneous, 4x Daily AC & at Bedtime  metoclopramide, 10 mg, Intravenous, Once  mupirocin, , Each Nare, BID  pantoprazole, 40 mg, Intravenous, QAM AC  potassium chloride, 20 mEq, Oral, Once  predniSONE, 10 mg, Oral, Daily With Breakfast  senna-docusate sodium, 2 tablet, Oral, Nightly      IV Meds:   clevidipine, 2-32 mg/hr  dexmedetomidine, 0.2-1.5 mcg/kg/hr, Last Rate: Stopped (23 1631)  DOPamine, 2-20 mcg/kg/min  EPINEPHrine, 0.02-0.1 mcg/kg/min, Last Rate: Stopped (23 7978)  insulin, 0-100 Units/hr, Last Rate: Stopped (23 0395)  milrinone, 0.25-0.375 mcg/kg/min,  Last Rate: 0.125 mcg/kg/min (05/25/23 1539)  niCARdipine, 5-15 mg/hr  nitroglycerin, 5-200 mcg/min  norepinephrine, 0.02-0.2 mcg/kg/min, Last Rate: Stopped (05/24/23 2335)  phenylephrine, 0.2-2 mcg/kg/min  propofol, 5-50 mcg/kg/min, Last Rate: Stopped (05/24/23 1516)  sodium chloride, 30 mL/hr, Last Rate: 30 mL/hr (05/24/23 1223)        Results Reviewed:   I have personally reviewed the results from the time of this admission to 5/26/2023 08:15 EDT     Results from last 7 days   Lab Units 05/26/23  0304 05/25/23  1350 05/25/23  0307 05/24/23  0406 05/23/23  1429   SODIUM mmol/L 147* 142 143   < > 140   POTASSIUM mmol/L 4.0 4.1 5.1   < > 4.3   CHLORIDE mmol/L 110* 110* 114*   < > 102   CO2 mmol/L 23.9 20.1* 18.4*   < > 26.4   BUN mg/dL 26* 20 19   < > 17   CREATININE mg/dL 1.37* 1.40* 1.46*   < > 1.28*   CALCIUM mg/dL 8.9 8.9 9.0   < > 9.6   BILIRUBIN mg/dL 0.4  --   --   --  0.4   ALK PHOS U/L 45  --   --   --  71   ALT (SGPT) U/L 23  --   --   --  27   AST (SGOT) U/L 80*  --   --   --  14   GLUCOSE mg/dL 143* 149* 117*   < > 278*    < > = values in this interval not displayed.     Estimated Creatinine Clearance: 63.6 mL/min (A) (by C-G formula based on SCr of 1.37 mg/dL (H)).  Results from last 7 days   Lab Units 05/25/23  1350 05/25/23  0307 05/24/23  1626 05/24/23  1210   MAGNESIUM mg/dL 2.9* 3.0* 2.9* 3.4*   PHOSPHORUS mg/dL  --  5.1* 0.7* 2.2*         Results from last 7 days   Lab Units 05/26/23  0304 05/25/23  0307 05/24/23  1626 05/24/23  1210 05/24/23  1059 05/24/23  1056   WBC 10*3/mm3 18.39* 15.14* 23.61* 29.62*  --  20.66*   HEMOGLOBIN g/dL 10.2* 10.1* 11.0* 12.1*  --  10.2*   HEMOGLOBIN, POC g/dL  --   --   --   --  10.2*  --    PLATELETS 10*3/mm3 116* 118* 135* 188  --  160     Results from last 7 days   Lab Units 05/25/23  0307 05/24/23  1210 05/24/23  1103 05/24/23  1056 05/23/23  1429   INR  1.16* 1.20* 1.5* 1.47* 0.93       Assessment / Plan     ASSESSMENT:  1.  Acute kidney injury on chronic  kidney disease stage III history.  Baseline creatinine 1.2 mg/dL and it went up to 1.68 mg/dL 05/24 and today is down to 1.4 mg/dL.  Volume status appears to be generous.  Renal ultrasound showed normal-looking kidneys except of 6 cm cyst with septation in the upper pole of the right kidney.  Urinalysis essentially unremarkable.       2.  Chronic kidney disease stage III with baseline creatinine 1.2 mg/dL  3.  Moderate to severe mitral regurgitation status post mitral valve repair placement and tricuspid valve repair on May 24  4.  Severe pulmonary hypertension  5.  History of COVID-19 pneumonia with possible interstitial lung disease on chronic steroid   6.  Diastolic congestive heart failure  7.  Postoperative anemia and thrombocytopenia     PLAN:     1.  Continue Bumex 2 mg IV twice a day  2.  We will need urology evaluation for his assist as an outpatient  3.  might need to start IV fluids if patient cannot increase his free water intake  4.  Surveillance labs       Thank you for involving us in the care of Vernon Solorzano.  Please feel free to call with any questions.    Tae Lay MD  05/26/23  08:15 EDT    [facility]  [phone]

## 2023-05-26 NOTE — PLAN OF CARE
Goal Outcome Evaluation:  Plan of Care Reviewed With: patient           Outcome Evaluation: Pt is a 72 y.o male s/p MVR, TVR on 5/24. He is reported to be independent at baseline, he is not able to provide much history today due to impaired cogntion. He has been confused post op and diagnoses with delirium/encephalopathy bu neuro. He is lethargic today and not following all commands. He is able to sit EOB with max AX2, poor sitting balance, attempted to cue pt and work on pre ADL skills such as reaching with UEs but command following is limiting. He would benefit from continued rehab at [facility]. Recommend acute rehab vs SNF pending progress and activity tolerance while admitted.

## 2023-05-26 NOTE — PLAN OF CARE
Goal Outcome Evaluation:  Plan of Care Reviewed With: patient           Outcome Evaluation: Pt in bed, lethargic but arousable. Pt oriented to person, place but unable to state reason for hospitalization or date. Patient required maxAx2 for bed mobility, modA to maintain sitting balance on EOB, attempted standing but no initiation and unable to perform. Pt with delayed response to questions and commands, required increased assistance today with mobility efforts.

## 2023-05-26 NOTE — DISCHARGE PLACEMENT REQUEST
"Kris Solorzano (72 y.o. Male)     Date of Birth   1950    Social Security Number       [place]   [address] [place] [address]    Home Phone   [phone]    MRN   [id number]       Caodaism   None    Marital Status                               Admission Date   5/23/23    Admission Type   Urgent    Admitting Provider   George Frey MD    Attending Provider   George Frey MD    Department, Room/Bed   [facility] CARDIOVASC UNIT, 2221/1       Discharge Date       Discharge Disposition       Discharge Destination                               Attending Provider: George Frey MD    Allergies: No Known Allergies    Isolation: None   Infection: None   Code Status: CPR    Ht: 182.9 cm (72\")   Wt: 118 kg (260 lb)    Admission Cmt: None   Principal Problem: Mitral valve disease [I05.9]                 Active Insurance as of 5/23/2023     Primary Coverage     Payor Plan Insurance Group Employer/Plan Group    MEDICARE MEDICARE A & B      Payor Plan Address Payor Plan Phone Number Payor Plan Fax Number Effective Dates    [address] [phone]  11/1/2013 - None Entered    [facility] [id number]       Subscriber Name Subscriber Birth Date Member ID       KRIS SOLORZANO 1950 [id number]           Secondary Coverage     Payor Plan Insurance Group Employer/Plan Group    [employer] [facility] SUPP [id number]     Payor Plan Address Payor Plan Phone Number Payor Plan Fax Number Effective Dates    [address]   12/1/2016 - None Entered    [facility] [id number]       Subscriber Name Subscriber Birth Date Member ID       KRIS SOLORZANO 1950 [id number]                 Emergency Contacts      (Rel.) Home Phone Work Phone Mobile Phone    Maricel Solorzaon (Spouse) [phone] -- [phone]              "

## 2023-05-26 NOTE — THERAPY TREATMENT NOTE
Patient Name: Vernon Solorzano  : 1950    MRN: [id number]                              Today's Date: 2023       Admit Date: 2023    Visit Dx:     ICD-10-CM ICD-9-CM   1. Nonrheumatic mitral valve regurgitation  I34.0 424.0     Patient Active Problem List   Diagnosis   • DM (diabetes mellitus), type 2   • Mixed hyperlipidemia   • Mild intermittent asthma without complication   • New onset of congestive heart failure   • Nonrheumatic mitral valve regurgitation   • Chest pain   • Essential hypertension   • Mitral valve disease     Past Medical History:   Diagnosis Date   • Allergic rhinitis    • Arthritis    • BPH (benign prostatic hyperplasia)    • Diabetes mellitus     TYPE 2   • Foraminal stenosis of cervical region     C5-C6   • GERD (gastroesophageal reflux disease)    • Hemorrhoids    • History of urinary retention 2010    S/P TURP   • Hyperlipidemia    • Macular degeneration    • PING (obstructive sleep apnea) 2016    MILD, SEES DR. AMARILIS MORGAN     Past Surgical History:   Procedure Laterality Date   • CARDIAC CATHETERIZATION N/A 2023    Procedure: Right Heart Cath;  Surgeon: Corey Gaffney MD;  [facility]:  [facility];  Service: Cardiology;  Laterality: N/A;   • CARDIAC CATHETERIZATION N/A 2023    Procedure: Left Heart Cath;  Surgeon: Corey Gaffney MD;  Location:  [facility];  Service: Cardiology;  Laterality: N/A;   • CARDIAC CATHETERIZATION N/A 2023    Procedure: Left ventriculography;  Surgeon: Corey Gaffney MD;  Location:  [facility];  Service: Cardiology;  Laterality: N/A;   • CARDIAC CATHETERIZATION N/A 2023    Procedure: Coronary angiography;  Surgeon: Corey Gaffney MD;  Location:  [facility];  Service: Cardiology;  Laterality: N/A;   • COLONOSCOPY N/A     WNL PER PT, NO RECORDS,    • COLONOSCOPY N/A 2009    DR. GORGE BENAVIDEZ AT [place]   • MITRAL VALVE  REPAIR/REPLACEMENT N/A 5/24/2023    Procedure: MITRAL VALVE REPAIR/REPLACEMENT TRICUSPID VALVE REPAIR/REPLACEMENT TRANSESOPHAGEAL ECHOCARDIOGRAM WITH ANESTHESIA;  Surgeon: George Frey MD;  Location: [facility];  Service: Cardiothoracic;  Laterality: N/A;   • PROSTATE SURGERY N/A 11/12/2010    TURP, DR. BRIGHT COLLAZO AT [facility]   • SKIN TAG REMOVAL N/A 12/20/2017    ANAL SKIN TAG X2, PERFORMED IN OFFICE, DR. LISA ORANTES   • TURP / TRANSURETHRAL INCISION / DRAINAGE PROSTATE  2010    Dr. Goodrich      General Information     Row Name 05/26/23 1256          Physical Therapy Time and Intention    Document Type therapy note (daily note)  -EM     Mode of Treatment individual therapy;physical therapy  -EM     Row Name 05/26/23 1254          General Information    Existing Precautions/Restrictions fall;cardiac;sternal  -EM           User Key  (r) = Recorded By, (t) = Taken By, (c) = Cosigned By    Initials Name Provider Type    EM Kathy Shaffer PT Physical Therapist               Mobility     Row Name 05/26/23 1258          Bed Mobility    Bed Mobility supine-sit  -EM     Supine-Sit [place] (Bed Mobility) maximum assist (25% patient effort);2 person assist  -EM     Sit-Supine [place] (Bed Mobility) maximum assist (25% patient effort);2 person assist  -EM     Assistive Device (Bed Mobility) draw sheet;head of bed elevated  -EM     Row Name 05/26/23 1253          Sit-Stand Transfer    Comment, (Sit-Stand Transfer) attempted sit to stand tsf x 3, pt not initiating any standing, unable to safely tsf to chair  -EM           User Key  (r) = Recorded By, (t) = Taken By, (c) = Cosigned By    Initials Name Provider Type    EM Kathy Shaffer PT Physical Therapist               Obj/Interventions     Row Name 05/26/23 125          Motor Skills    Therapeutic Exercise other (see comments)  cardiac level 1, difficulty with static sitting balance on EOB, very slow to answer questions, unable to complete  "cardiac ex  -EM     Row Name 05/26/23 1259          Balance    Static Sitting Balance minimal assist  -EM     Dynamic Sitting Balance moderate assist  -EM           User Key  (r) = Recorded By, (t) = Taken By, (c) = Cosigned By    Initials Name Provider Type    Kathy Boo PT Physical Therapist               Goals/Plan    No documentation.                Clinical Impression     Row Name 05/26/23 1300          Pain    Pre/Posttreatment Pain Comment pt did not give number, states it hurts \"little bit\"  -EM     Pain Intervention(s) Repositioned;Medication (See MAR)  -EM     Row Name 05/26/23 1300          Plan of Care Review    Plan of Care Reviewed With patient  -EM     Outcome Evaluation Pt in bed, lethargic but arousable. Pt oriented to person, place but unable to state reason for hospitalization or date. Patient required maxAx2 for bed mobility, modA to maintain sitting balance on EOB, attempted standing but no initiation and unable to perform. Pt with delayed response to questions and commands, required increased assistance today with mobility efforts.  -EM     Row Name 05/26/23 1300          Vital Signs    Pre Systolic BP Rehab 119  -EM     Pre Treatment Diastolic BP 90  -EM     Pretreatment Heart Rate (beats/min) 70  -EM     Posttreatment Heart Rate (beats/min) 66  -EM     Pre SpO2 (%) 96  -EM     Post SpO2 (%) 99  -EM     Row Name 05/26/23 1300          Positioning and Restraints    Pre-Treatment Position in bed  -EM     Post Treatment Position bed  -EM     In Bed supine;with nsg  -EM           User Key  (r) = Recorded By, (t) = Taken By, (c) = Cosigned By    Initials Name Provider Type    Kathy Boo PT Physical Therapist               Outcome Measures     Row Name 05/26/23 1304          How much help from another person do you currently need...    Turning from your back to your side while in flat bed without using bedrails? 2  -EM     Moving from lying on back to sitting on the side " of a flat bed without bedrails? 2  -EM     Moving to and from a bed to a chair (including a wheelchair)? 1  -EM     Standing up from a chair using your arms (e.g., wheelchair, bedside chair)? 1  -EM     Climbing 3-5 steps with a railing? 1  -EM     To walk in hospital room? 1  -EM     AM-PAC 6 Clicks Score (PT) 8  -EM     Highest level of mobility 3 --> Sat at edge of bed  -EM           User Key  (r) = Recorded By, (t) = Taken By, (c) = Cosigned By    Initials Name Provider Type    EM Kathy Shaffer PT Physical Therapist                             Physical Therapy Education     Title: PT OT SLP Therapies (In Progress)     Topic: Physical Therapy (In Progress)     Point: Mobility training (In Progress)     Learning Progress Summary           Patient Acceptance, E, NR by EM at 5/26/2023 1304    Acceptance, E, NR by EM at 5/25/2023 1208                   Point: Home exercise program (In Progress)     Learning Progress Summary           Patient Acceptance, E, NR by EM at 5/25/2023 1208                   Point: Body mechanics (Not Started)     Learner Progress:  Not documented in this visit.          Point: Precautions (Not Started)     Learner Progress:  Not documented in this visit.                      User Key     Initials Effective Dates Name Provider Type Discipline     06/16/21 -  Kathy Shaffer PT Physical Therapist PT              PT Recommendation and Plan  Planned Therapy Interventions (PT): bed mobility training, gait training, home exercise program, patient/family education, transfer training  Plan of Care Reviewed With: patient  Outcome Evaluation: Pt in bed, lethargic but arousable. Pt oriented to person, place but unable to state reason for hospitalization or date. Patient required maxAx2 for bed mobility, modA to maintain sitting balance on EOB, attempted standing but no initiation and unable to perform. Pt with delayed response to questions and commands, required increased assistance  today with mobility efforts.     Time Calculation:    PT Charges     Row Name 05/26/23 1305             Time Calculation    Start Time 1019  -EM      Stop Time 1036  -EM      Time Calculation (min) 17 min  -EM      PT Received On 05/26/23  -EM      PT - Next Appointment 05/27/23  -EM         Time Calculation- PT    Total Timed Code Minutes- PT 17 minute(s)  -EM         Timed Charges    36375 - PT Therapeutic Activity Minutes 17  -EM         Total Minutes    Timed Charges Total Minutes 17  -EM       Total Minutes 17  -EM            User Key  (r) = Recorded By, (t) = Taken By, (c) = Cosigned By    Initials Name Provider Type    EM Kathy Shfafer, PT Physical Therapist              Therapy Charges for Today     Code Description Service Date Service Provider Modifiers Qty    44937038880 HC PT THERAPEUTIC ACT EA 15 MIN 5/25/2023 Kathy Shaffer, PT GP 1    92061171008 HC PT EVAL MOD COMPLEXITY 2 5/25/2023 Kathy Shaffer, PT GP 1    16683177296 HC PT THER SUPP EA 15 MIN 5/25/2023 Kathy Shaffer, PT GP 1    59048363236 HC PT THERAPEUTIC ACT EA 15 MIN 5/26/2023 Kathy Shaffer, PT GP 1          PT G-Codes  AM-PAC 6 Clicks Score (PT): 8  PT Discharge Summary  Anticipated Discharge Disposition (PT): home with assist, home with home health    Kathy Shaffer, PT  5/26/2023

## 2023-05-26 NOTE — THERAPY RE-EVALUATION
Acute Care - Speech Language Pathology   Swallow Re-Evaluation [facility]     Patient Name: Vernon Solorzano  : 1950  MRN: [id number]  Today's Date: 2023               Admit Date: 2023    Visit Dx:     ICD-10-CM ICD-9-CM   1. Nonrheumatic mitral valve regurgitation  I34.0 424.0     Patient Active Problem List   Diagnosis   • DM (diabetes mellitus), type 2   • Mixed hyperlipidemia   • Mild intermittent asthma without complication   • New onset of congestive heart failure   • Nonrheumatic mitral valve regurgitation   • Chest pain   • Essential hypertension   • Mitral valve disease     Past Medical History:   Diagnosis Date   • Allergic rhinitis    • Arthritis    • BPH (benign prostatic hyperplasia)    • Diabetes mellitus     TYPE 2   • Foraminal stenosis of cervical region     C5-C6   • GERD (gastroesophageal reflux disease)    • Hemorrhoids    • History of urinary retention 2010    S/P TURP   • Hyperlipidemia    • Macular degeneration    • PING (obstructive sleep apnea) 2016    MILD, SEES DR. AMARILIS MORGAN     Past Surgical History:   Procedure Laterality Date   • CARDIAC CATHETERIZATION N/A 2023    Procedure: Right Heart Cath;  Surgeon: Corey Gaffney MD;  Location: [facility] CATH INVASIVE LOCATION;  Service: Cardiology;  Laterality: N/A;   • CARDIAC CATHETERIZATION N/A 2023    Procedure: Left Heart Cath;  Surgeon: Corey Gaffney MD;  [facility];  Service: Cardiology;  Laterality: N/A;   • CARDIAC CATHETERIZATION N/A 2023    Procedure: Left ventriculography;  Surgeon: Corey Gaffney MD;  [facility];  Service: Cardiology;  Laterality: N/A;   • CARDIAC CATHETERIZATION N/A 2023    Procedure: Coronary angiography;  Surgeon: Corey Gaffney MD;  Location:  [facility] CATH INVASIVE LOCATION;  Service: Cardiology;  Laterality: N/A;   • COLONOSCOPY N/A     WNL PER PT, NO RECORDS,    • COLONOSCOPY N/A 2009     DR. GORGE BENAVIDEZ AT [place]   • MITRAL VALVE REPAIR/REPLACEMENT N/A 5/24/2023    Procedure: MITRAL VALVE REPAIR/REPLACEMENT TRICUSPID VALVE REPAIR/REPLACEMENT TRANSESOPHAGEAL ECHOCARDIOGRAM WITH ANESTHESIA;  Surgeon: George Frey MD;  Location: [facility];  Service: Cardiothoracic;  Laterality: N/A;   • PROSTATE SURGERY N/A 11/12/2010    [facility]   • SKIN TAG REMOVAL N/A 12/20/2017    ANAL SKIN TAG X2, PERFORMED IN OFFICE, DR. LISA ORANTES   • TURP / TRANSURETHRAL INCISION / DRAINAGE PROSTATE  2010    Dr. Goodrich       SLP Recommendation and Plan  SLP Swallowing Diagnosis: oral dysphagia, suspected pharyngeal dysphagia (05/26/23 1330)  SLP Diet Recommendation: NPO, ice chips between meals after oral care, with supervision (05/26/23 1330)     SLP Rec. for Method of Medication Administration: meds via alternate route (05/26/23 1330)     Monitor for Signs of Aspiration: notify SLP if any concerns (05/26/23 1330)  Recommended Diagnostics: reassess via VFSS (MBS) (05/26/23 1330)  Swallow Criteria for Skilled Therapeutic Interventions Met: demonstrates skilled criteria (05/26/23 1330)  Anticipated Discharge Disposition (SLP): unknown, anticipate therapy at next level of care (05/26/23 1330)  Rehab Potential/Prognosis, Swallowing: re-evaluate goals as necessary (05/26/23 1330)  Therapy Frequency (Swallow): PRN (05/26/23 1330)  Predicted Duration Therapy Intervention (Days): until discharge (05/26/23 1330)                                        Plan of Care Reviewed With: patient  Outcome Evaluation: Patient seen for swallow re-evaluation this date. Seated upright in recliner with spouse at bedside. Eager to participate in evaluation. Followed one-step commands for oral mechanism examination with ~70% accuracy independently. Weak cough noted. Demonstrated throat clear x4 with trials of ice chips and x3 with trials of thin via spoon. Slight change in vocal quality with thin via cup.  Audible swallow noted with trials of thin, nectar, and honey via cup. Delayed cough with honey thick liquids. Rotary/uncoordinated mastication with puree and soft solids. No overt s/sx of aspiration or penetration observed with puree. Throat clear x1 with mechanical soft solids. Regular solids not trialed this date. Feel as though patient is at risk for aspiration. Recommend NPO w/ medication via alt route. Allow ice chips following oral care. Recommend instrumental swallow study to evaluate patient's swallow function to determine least restrictive diet.      SWALLOW EVALUATION (last 72 hours)     SLP Adult Swallow Evaluation     Row Name 05/26/23 1330 05/25/23 3139          Document Type re-evaluation  -SR evaluation  -SA    Subjective Information no complaints  -SR no complaints  -SA    Patient Observations alert;cooperative;agree to therapy  -SR alert;cooperative  confused  -SA    Patient/Family/Caregiver Comments/Observations Pt seated upright in recliner. Spouse at bedside.  -SR --    Patient Effort good  -SR fair  -SA    Symptoms Noted During/After Treatment none  -SR --          Patient Profile Reviewed yes  -SR yes  -SA    Pertinent History Of Current Problem 72 y.o male; Severe mitral regurgitation s/p tissue mitral valve repair and tricuspid valve repair POD #2  -SR severe mitral regurgitation status post tissue mitral valve replacement/MVR and tricuspid valve repair  hypertension, hyperlipidemia, obstructive sleep apnea, GERD, diabetes mellitus type 2, BPH, acute onset CHF, Covid19 2022, DM  -SA    Current Method of Nutrition NPO  -SR NPO  -SA    Precautions/Limitations, Vision difficult to assess  -SR difficult to assess  -SA    Precautions/Limitations, Hearing difficult to assess  -SR difficult to assess  -SA    Prior Level of Function-Communication WFL  -SR WFL;other (see comments)  wife reports recent memory difficulties  -SA    Prior Level of Function-Swallowing no diet consistency restrictions  -SR  unknown  -SA    Plans/Goals Discussed with patient;spouse/S.O.;agreed upon  -SR patient;agreed upon  -SA    Barriers to Rehab medically complex  -SR medically complex  -SA    Patient's Goals for Discharge -- patient did not state  -SA          Pretreatment Pain Rating 0/10 - no pain  -SR --    Posttreatment Pain Rating 0/10 - no pain  -SR --          Dentition Assessment natural, present and adequate  -SR natural, present and adequate  -SA    Secretion Management WNL/WFL  -SR WNL/WFL  -SA    Mucosal Quality moist, healthy  -SR moist, healthy  -SA    Volitional Swallow unable to elicit  -SR --    Volitional Cough weak  -SR --          Oral Motor General Assessment generalized oral motor weakness  -SR other (see comments);oral labial or buccal impairment;lingual impairment  difficulty following directions  -SA    Oral Labial or Buccal Impairment, Detail, Cranial Nerve VII (Facial): -- reduced strength bilaterally  -SA    Lingual Impairment, Detail. Cranial Nerves IX, XII (Glossopharyngeal and Hypoglossal) -- reduced strength  -SA          Respiratory Support Currently in Use -- nasal cannula  -SA    O2 Liters -- 2L  -SA          Clinical Swallow Evaluation Summary Patient seen for swallow re-evaluation this date. Seated upright in recliner with spouse at bedside. Eager to participate in evaluation. Followed one-step commands for oral mechanism examination with ~70% accuracy independently. Weak cough noted. Demonstrated throat clear x4 with trials of ice chips and x3 with trials of thin via spoon. Slight change in vocal quality with thin via cup. Audible swallow noted with trials of thin, nectar, and honey via cup. Delayed cough with honey thick liquids. Rotary/uncoordinated mastication with puree and soft solids. No overt s/sx of aspiration or penetration observed with puree. Throat clear x1 with mechanical soft solids. Regular solids not trialed this date. Feel as though patient is at risk for aspiration. Recommend  NPO w/ medication via alt route. Allow ice chips following oral care. Recommend instrumental swallow study to evaluate patient's swallow function and determine least restrictive diet.  -SR Pt able to take ice and water from spoon and cup.  Delayed swallow with reduced laryngeal elevation. Inconsistent ability to follow oral commands for labial and lingual movements.  Strong vocal quality; however, delayed throat clear after cup trials of thin.  Pt demonstrating confusion uncoordinated lingual/labial movements at times. Feel pt at risk of aspiration.  Will reassess swallow as appropriate.  -SA          SLP Swallowing Diagnosis oral dysphagia;suspected pharyngeal dysphagia  -SR oral dysphagia;suspected pharyngeal dysphagia  -SA    Functional Impact risk of aspiration/pneumonia  -SR risk of aspiration/pneumonia  -SA    Rehab Potential/Prognosis, Swallowing re-evaluate goals as necessary  -SR re-evaluate goals as necessary  -SA    Swallow Criteria for Skilled Therapeutic Interventions Met demonstrates skilled criteria  -SR demonstrates skilled criteria  -SA          Therapy Frequency (Swallow) PRN  -SR PRN  -SA    Predicted Duration Therapy Intervention (Days) until discharge  -SR until discharge  -SA    SLP Diet Recommendation NPO;ice chips between meals after oral care, with supervision  -SR ice chips between meals after oral care, with supervision;water between meals after oral care, with supervision  -SA    Recommended Diagnostics reassess via VFSS (MBS)  -SR reassess via clinical swallow evaluation  -SA    Oral Care Recommendations Oral Care BID/PRN;Before ice/water  -SR Oral Care BID/PRN;Before ice/water  -SA    SLP Rec. for Method of Medication Administration meds via alternate route  -SR meds via alternate route  -SA    Monitor for Signs of Aspiration notify SLP if any concerns  -SR notify SLP if any concerns  -SA    Anticipated Discharge Disposition (SLP) unknown;anticipate therapy at next level of care  -SR  unknown  -SA          Swallow LTGs Patient will demonstrate progress toward functional swallow for  -SR --          Diet Texture (Demonstrate progress toward functional swallow) regular textures  -SR --    Liquid viscosity (Demonstrate progress toward functional swallow) thin liquids  -SR --    [place] (Demonstrate progress towards functional swallow) independently (over 90% accuracy)  -SR --    Time Frame (Demonstrate progress toward functional swallow) by discharge  -SR --          User Key  (r) = Recorded By, (t) = Taken By, (c) = Cosigned By    Initials Name Effective Dates    SA Clarissa Gomez, MS CCC-SLP 06/01/22 -     SR Latisha Morris CCC-SLP 11/10/22 -                 EDUCATION  The patient has been educated in the following areas:   Dysphagia (Swallowing Impairment) Oral Care/Hydration NPO rationale.        SLP GOALS     Row Name 05/26/23 1330             (LTG) Patient will demonstrate progress toward functional swallow for    Diet Texture (Demonstrate progress toward functional swallow) regular textures  -SR      Liquid viscosity (Demonstrate progress toward functional swallow) thin liquids  -SR      [place] (Demonstrate progress towards functional swallow) independently (over 90% accuracy)  -SR      Time Frame (Demonstrate progress toward functional swallow) by discharge  -SR            User Key  (r) = Recorded By, (t) = Taken By, (c) = Cosigned By    Initials Name Provider Type     Latisha Morris CCC-SLP Speech and Language Pathologist                   Time Calculation:    Time Calculation- SLP     Row Name 05/26/23 1507             Time Calculation- SLP    SLP Start Time 1330  -SR      SLP Received On 05/26/23  -SR         Untimed Charges    36178-EU Treatment Swallow Minutes 60  -SR         Total Minutes    Untimed Charges Total Minutes 60  -SR       Total Minutes 60  -SR            User Key  (r) = Recorded By, (t) = Taken By, (c) = Cosigned By    Initials Name Provider Type    SR  Riedford, Latisha, CCC-SLP Speech and Language Pathologist                Therapy Charges for Today     Code Description Service Date Service Provider Modifiers Qty    [id number] HC ST TREATMENT SWALLOW 4 5/26/2023 Latisha Morris CCC-SLP GN 1               JENNIFFER Hobbs  5/26/2023

## 2023-05-26 NOTE — CONSULTS
"Group: [facility]         CONSULT NOTE    Patient Identification:    Vernon Solorzano  72 y.o.  male  1950  [id number]            Patient Care Team:  Liza Oconnell III, NP-C as PCP - General (Family Medicine)  Corey Lao Jr., MD (Inactive) as Consulting Physician (Urology)    Requesting physician: Dr. 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  Patient reportedly has been on long-term steroids and there is some reported concern of long COVID and we have been asked to evaluate the patient for management of the same.    He states that he was diagnosed with COVID-19 infection in April and states that he never got admitted to the hospital but had oxygen at home and hence was using it and he started himself on 40 mg prednisone and has been on it for more than a month and he reportedly saw Dr. Slater in the clinic and he was asked to taper down and states that he has been tapering it down over the last several months and is currently on 9 mg of prednisone and \"working on\" tapering off per Dr. Slater's recommendations.  Keeps saying that he gets short of breath when he weans that down.    He does have a diagnosis of asthma and states that he uses Trelegy inhaler and is doing fairly well on it does not have any wheezing as well.    I have reviewed the H&P as well as the notes from the other consultants taking care of the patient this admission as well as previous hospital notes (if any available) from our group physicians and summarized " above      Objective     Review of Systems:  Unable to obtain more than about due to postop status    Past Medical History:  Past Medical History:   Diagnosis Date   • Allergic rhinitis    • Arthritis    • BPH (benign prostatic hyperplasia)    • Diabetes mellitus     TYPE 2   • Foraminal stenosis of cervical region     C5-C6   • GERD (gastroesophageal reflux disease)    • Hemorrhoids    • History of urinary retention 2010    S/P TURP   • Hyperlipidemia    • Macular degeneration    • PING (obstructive sleep apnea) 01/07/2016    MILD, SEES DR. AMARILIS MORGAN       Past Surgical History:  Past Surgical History:   Procedure Laterality Date   • CARDIAC CATHETERIZATION N/A 5/11/2023    Procedure: Right Heart Cath;  Surgeon: Corey Gaffney MD;  Location:  [facility] CATH INVASIVE LOCATION;  Service: Cardiology;  Laterality: N/A;   • CARDIAC CATHETERIZATION N/A 5/11/2023    Procedure: Left Heart Cath;  Surgeon: Corey Gaffney MD;  Location:  [facility] CATH INVASIVE LOCATION;  Service: Cardiology;  Laterality: N/A;   • CARDIAC CATHETERIZATION N/A 5/11/2023    Procedure: Left ventriculography;  Surgeon: Corey Gaffney MD;  Location:  [facility] CATH INVASIVE LOCATION;  Service: Cardiology;  Laterality: N/A;   • CARDIAC CATHETERIZATION N/A 5/11/2023    Procedure: Coronary angiography;  Surgeon: Corey Gaffney MD;  Location:  [facility] CATH INVASIVE LOCATION;  Service: Cardiology;  Laterality: N/A;   • COLONOSCOPY N/A     WNL PER PT, NO RECORDS,    • COLONOSCOPY N/A 04/07/2009    DR. GORGE BENAVIDEZ AT [place]   • MITRAL VALVE REPAIR/REPLACEMENT N/A 5/24/2023    Procedure: MITRAL VALVE REPAIR/REPLACEMENT TRICUSPID VALVE REPAIR/REPLACEMENT TRANSESOPHAGEAL ECHOCARDIOGRAM WITH ANESTHESIA;  Surgeon: George Frey MD;  Location: [facility];  Service: Cardiothoracic;  Laterality: N/A;   • PROSTATE SURGERY N/A 11/12/2010    TURP, DR. COREY COLLAZO AT [facility]   • SKIN TAG REMOVAL N/A 12/20/2017    ANAL SKIN TAG X2, PERFORMED IN  OFFICE, DR. LISA ORANTES   • TURP / TRANSURETHRAL INCISION / DRAINAGE PROSTATE  2010    Dr. Goodrich        Home Meds:  Medications Prior to Admission   Medication Sig Dispense Refill Last Dose   • aspirin 81 MG tablet Take 1 tablet by mouth Daily.   5/23/2023   • atorvastatin (LIPITOR) 10 MG tablet TAKE 1 TABLET BY MOUTH EVERY DAY (Patient taking differently: 1 tablet Every Night.) 90 tablet 0 5/22/2023   • furosemide (LASIX) 40 MG tablet Take 1 tablet by mouth 2 (Two) Times a Day. 60 tablet 1 5/23/2023   • insulin degludec (TRESIBA FLEXTOUCH) 100 UNIT/ML solution pen-injector injection Start 25 units daily and titrate up 2 units every 2 days if blood glucose levels are consistently above 150 (Patient taking differently: Inject 50 Units under the skin into the appropriate area as directed Daily.)   5/23/2023   • irbesartan (AVAPRO) 75 MG tablet Take 1 tablet by mouth Daily.   5/23/2023   • Jardiance 25 MG tablet tablet Take 1 tablet by mouth Daily.   5/23/2023   • metoclopramide (REGLAN) 10 MG tablet Take 1 tablet by mouth Every Night.   5/22/2023   • Multiple Vitamins-Minerals (PRESERVISION AREDS 2+MULTI VIT PO) Take  by mouth 2 (Two) Times a Day.   5/23/2023   • omeprazole (priLOSEC) 40 MG capsule Take 1 capsule by mouth Daily. (Patient taking differently: Take 1 capsule by mouth Every Night.) 90 capsule 4 5/22/2023   • potassium chloride (MICRO-K) 10 MEQ CR capsule Take 2 capsules by mouth Daily.   5/23/2023   • predniSONE (DELTASONE) 10 MG tablet Take 9 mg by mouth Daily.   5/23/2023   • Symbicort 160-4.5 MCG/ACT inhaler Inhale 2 puffs 2 (Two) Times a Day.   5/23/2023   • tiotropium (SPIRIVA) 18 MCG per inhalation capsule Place 1 capsule into inhaler and inhale Daily.   5/23/2023       Allergies:  No Known Allergies    Social History:   Social History     Socioeconomic History   • Marital status:    Tobacco Use   • Smoking status: Never     Passive exposure: Never   • Smokeless tobacco: Never   Vaping  "Use   • Vaping Use: Never used   Substance and Sexual Activity   • Alcohol use: Yes     Comment: RARELY   • Drug use: No   • Sexual activity: Defer     Partners: Female       Family History:  Family History   Problem Relation Age of Onset   • Lung cancer Mother    • Stroke Father    • Dementia Father    • Colon polyps Brother    • Colon polyps Brother        Physical Exam:  /84 (BP Location: Right arm, Patient Position: Sitting)   Pulse 68   Temp 98.7 °F (37.1 °C) (Axillary)   Resp 18   Ht 182.9 cm (72\")   Wt 118 kg (260 lb)   SpO2 95%   BMI 35.26 kg/m²    Body mass index is 35.26 kg/m². 95% 118 kg (260 lb)    Vent Settings        Resp Rate (Set): 14  Pressure Support (cm H2O): 8 cm H20  FiO2 (%): 41 %  PEEP/CPAP (cm H2O): 7.5 cm H20  Minute Ventilation (L/min) (Obs): 13.4 L/min  Resp Rate (Observed) Vent: 27     I:E Ratio (Obs): 1:1.8  PIP Observed (cm H2O): 16 cm H2O  Plateau Pressure (cm H2O): 0 cm H2O  Driving Pressure (cm H2O): -7.2 cm H2O    Physical Exam     Constitutional: Middle-aged pt in bed,+ accessory muscle use/resp distress   Head: - NCAT  Eyes: No pallor, anicteric conjunctivae  ENMT:  Mallampati 4, no oral thrush. Moist MM.   NECK: Trachea midline, No thyromegaly, no palpable cervical lymphadenopathy  Heart: RRR, sternotomy wound.  No murmur. 1+ pedal edema   Lungs: FREDERICK +, No wheezes/ crackles heard    Abdomen: Soft. No tenderness, guarding or rigidity. No palpable masses  Extremities: Extremities warm and well perfused. No cyanosis/ clubbing  Neuro: Conscious, answers appropriately, no gross focal neuro deficits  Psych: Mood and affect appropriate    PPE recommended per [facility] infectious disease Isolation protocol for the current clinical scenario (as mentioned below) was followed.     LABS:  Results from last 7 days   Lab Units 05/26/23  0304 05/25/23  1350 05/25/23  0307 05/24/23  1626 05/24/23  1210 05/24/23  1103 05/24/23  0406 05/23/23  1429   SODIUM mmol/L 147* 142 143 " 146* 146*  --    < > 140   POTASSIUM mmol/L 4.0 4.1 5.1 3.4* 3.7  --    < > 4.3   CHLORIDE mmol/L 110* 110* 114* 112* 110*  --    < > 102   CO2 mmol/L 23.9 20.1* 18.4* 21.0* 24.0  --    < > 26.4   BUN mg/dL 26* 20 19 19 19  --    < > 17   CREATININE mg/dL 1.37* 1.40* 1.46* 1.61* 1.65*  --    < > 1.28*   CALCIUM mg/dL 8.9 8.9 9.0 8.1* 7.8*  --    < > 9.6   BILIRUBIN mg/dL 0.4  --   --   --   --   --   --  0.4   ALK PHOS U/L 45  --   --   --   --   --   --  71   ALT (SGPT) U/L 23  --   --   --   --   --   --  27   AST (SGOT) U/L 80*  --   --   --   --   --   --  14   GLUCOSE mg/dL 143* 149* 117* 152* 165*  --    < > 278*   WBC 10*3/mm3 18.39*  --  15.14* 23.61* 29.62*  --    < > 10.79   HEMOGLOBIN g/dL 10.2*  --  10.1* 11.0* 12.1*  --    < > 14.4   HEMOGLOBIN, POC   --   --   --   --   --   --    < >  --    PLATELETS 10*3/mm3 116*  --  118* 135* 188  --    < > 255   INR   --   --  1.16*  --  1.20* 1.5*   < > 0.93   PROBNP pg/mL  --   --   --   --   --   --   --  531.0    < > = values in this interval not displayed.       Lab Results   Component Value Date    CALCIUM 8.9 05/26/2023    PHOS 5.1 (H) 05/25/2023             Results from last 7 days   Lab Units 05/26/23  0351 05/25/23  0641 05/24/23  1844 05/24/23  1706 05/24/23  1242 05/24/23  1059 05/24/23  1020 05/24/23  0722 05/23/23  1502   PH, ARTERIAL pH units 7.406 7.357 7.336* 7.432 7.373 7.4380 7.3590   < > 7.529*   PO2 ART mm Hg 76.7* 80.0 108.1* 85.7 125.1*  --   --   --  83.5   PCO2, ARTERIAL mm Hg 37.1 36.6 42.3 34.8* 43.1  --   --   --  35.9   HCO3 ART mmol/L 23.3 20.5* 22.6 23.2 25.1  --   --   --  29.9*    < > = values in this interval not displayed.        Result Review      I have personally reviewed the results from the time of this admission to 5/26/2023 16:38 EDT and agree with these findings:  [x]  Laboratory  [x]  Microbiology  [x]  Radiology  []  EKG/Telemetry   [x]  Cardiology/Vascular   []  Pathology  []  Old records  []   Other:    ASSESSMENT  Postop hypoxia  Steroid dependence -several months  Severe valvular heart disease s/p MVR/TV repair-5/24  Previous COVID-19 infection  Chronic diastolic heart failure  CKD 3  PING  Asthma  Obesity    RECOMMENDATIONS:  Patient is doing well from a postop standpoint.  Will defer postop management to cardiac surgery/cardiology.  Would continue with diuresis to keep the patient as negative as possible  CT of the chest in September 2022 reviewed personally has no evidence of interstitial lung disease and patient does not have any indication to be on steroid.  Would need ongoing aggressive taper of steroid to avoid further complications with regards to steroid use-risk of infections, wound healing and myopathy.  Discussed the same with the patient - Will wean down to 5mg and c/w 1 week and alt days after that for a week and STOP  Will switch to nebulized medications for asthma  Continue with physical therapy and out of bed  Guarded prognosis    Thank you for letting me participate in the care of this pleasant patient.  I have discussed my findings and recommendations with patient and nursing staff.     Laith Gayle MD  5/26/2023  16:38 EDT

## 2023-05-26 NOTE — PROGRESS NOTES
"DOS: 2023  NAME: Vernon Solorzano   : 1950  PCP: Liza Oconnell III, NP-C  Reason for consult: AMS    Stroke    Subjective: Patient was reportedly more alert and oriented this morning however fell asleep prior to my examination.  Reportedly he had been awake for approximately 48 hours.  He is transferring out of CVU soon.  Continues to have some right hand weakness. Pt seen in follow up today, however the problem is new to the examiner.      Objective:  Vital signs: /70   Pulse 66   Temp 99.3 °F (37.4 °C) (Bladder)   Resp 18   Ht 182.9 cm (72\")   Wt 118 kg (260 lb)   SpO2 95%   BMI 35.26 kg/m²       General appearance: Well developed, well nourished, drowsy and cooperative.   HEENT: Normocephalic.   Cardiac: Irregular, paced rhythm.  Chest Exam: Clear to auscultation bilaterally, no wheezes, no rhonchi.  Extremities: Trace edema in extremities.  Skin: No rashes or birthmarks.     Higher integrative function: Drowsy, oriented to self only.  When asked where he was he stated he is in [place].  Unable to give me the month or year.  Follows a few simple commands.  No neglect.  Cranial nerves: Visual fields intact to threat, extraocular movements intact.  PERRL.  Normal facial sensation.  Face symmetric.  Hearing intact, symmetric palatal movement.  Tongue midline.  Mild dysarthria.  Motor: Moves bilateral upper extremities against gravity with right upper extremity drift. BLE at least 2/5.   Sensation: Intact to LT x4.    Station and gait: Deferred.  Muscle stretch reflexes:Plantar reflexes are flexor bilaterally.   Coordination Unable to test coordination, not following complex commands.      Scheduled Meds:acetylcysteine, 3 mL, Nebulization, BID - RT  aspirin, 81 mg, Oral, Daily  atorvastatin, 40 mg, Oral, Nightly  bumetanide, 2 mg, Intravenous, Q12H  enoxaparin, 40 mg, Subcutaneous, Daily  guaiFENesin, 1,200 mg, Oral, Q12H  insulin lispro, 3-14 Units, Subcutaneous, 4x " Daily AC & at Bedtime  mupirocin, , Each Nare, BID  pantoprazole, 40 mg, Intravenous, QAM AC  predniSONE, 10 mg, Oral, Daily With Breakfast  senna-docusate sodium, 2 tablet, Oral, Nightly      Continuous Infusions:clevidipine, 2-32 mg/hr  dexmedetomidine, 0.2-1.5 mcg/kg/hr, Last Rate: Stopped (05/24/23 1631)  DOPamine, 2-20 mcg/kg/min  EPINEPHrine, 0.02-0.1 mcg/kg/min, Last Rate: Stopped (05/24/23 2155)  insulin, 0-100 Units/hr, Last Rate: Stopped (05/25/23 0828)  milrinone, 0.25-0.375 mcg/kg/min, Last Rate: 0.125 mcg/kg/min (05/26/23 1326)  niCARdipine, 5-15 mg/hr  nitroglycerin, 5-200 mcg/min  norepinephrine, 0.02-0.2 mcg/kg/min, Last Rate: Stopped (05/24/23 2335)  phenylephrine, 0.2-2 mcg/kg/min  propofol, 5-50 mcg/kg/min, Last Rate: Stopped (05/24/23 1516)  sodium chloride, 30 mL/hr, Last Rate: 30 mL/hr (05/24/23 1223)      PRN Meds:.•  acetaminophen **OR** acetaminophen **OR** acetaminophen  •  bisacodyl  •  bisacodyl  •  clevidipine  •  dextrose  •  dextrose  •  dextrose  •  dextrose  •  DOPamine  •  EPINEPHrine  •  glucagon (human recombinant)  •  glucagon (human recombinant)  •  HYDROcodone-acetaminophen  •  ipratropium-albuterol  •  magnesium hydroxide  •  midazolam  •  milrinone  •  Morphine **AND** naloxone  •  Morphine  •  niCARdipine  •  nitroglycerin  •  norepinephrine  •  ondansetron  •  phenylephrine  •  polyethylene glycol  •  Potassium Replacement - Follow Nurse / BPA Driven Protocol  •  propofol    Laboratory results:  Lab Results   Component Value Date    GLUCOSE 143 (H) 05/26/2023    CALCIUM 8.9 05/26/2023     (H) 05/26/2023    K 4.0 05/26/2023    CO2 23.9 05/26/2023     (H) 05/26/2023    BUN 26 (H) 05/26/2023    CREATININE 1.37 (H) 05/26/2023    EGFRIFAFRI 76 12/15/2021    EGFRIFNONA 66 12/15/2021    BCR 19.0 05/26/2023    ANIONGAP 13.1 05/26/2023     Lab Results   Component Value Date    WBC 18.39 (H) 05/26/2023    HGB 10.2 (L) 05/26/2023    HCT 31.0 (L) 05/26/2023    MCV 88.3  05/26/2023     (L) 05/26/2023     Lab Results   Component Value Date    CHOL 155 05/23/2023     Lab Results   Component Value Date    HDL 41 05/23/2023    HDL 39 (L) 03/29/2023    HDL 30 (L) 06/07/2021     Lab Results   Component Value Date    LDL 81 05/23/2023    LDL 59 03/29/2023    LDL 59 06/07/2021     Lab Results   Component Value Date    TRIG 197 (H) 05/23/2023    TRIG 107 03/29/2023    TRIG 273 (H) 06/07/2021         Lab 05/23/23  1429   HEMOGLOBIN A1C 9.20*      Review and interpretation of imaging:  Spirometry - Pre & Post Bronchodilator    Result Date: 5/24/2023  Flow-volume loops reviewed and demonstrate good patient effort there is some sawtooth thing noted both on inspiratory and expiratory limbs this could suggest some redundant pharyngeal tissue.  The FEV1 and FVC are both moderately reduced and the FEV1 to FVC ratio is slightly increased this pattern could suggest restriction.  There was no significant response noted to single-dose bronchodilator challenge. Impression: Spirometry suggest possible restriction if there is concerns lung volume testing could help clarify.  There is some sawtooth thing of the flow-volume loops which sometimes suggest some redundant pharyngeal tissue this can be seen sometimes in patients with sleep apnea correlation recommended.    CT Head Without Contrast    Result Date: 5/25/2023  CT HEAD WITHOUT CONTRAST  CLINICAL HISTORY: Aphasia. Status post heart surgery.  TECHNIQUE: CT scan of the head was obtained with 3 mm axial soft tissue algorithm images. No intravenous contrast was administered. Sagittal and coronal reconstructions were obtained.  COMPARISON: No previous cranial imaging studies are available for comparison.  FINDINGS:   There are moderate changes of chronic small vessel ischemic phenomena. The basal ganglia and thalami are unremarkable in appearance. The gray-white matter differentiation is within normal limits. The ventricles, sulci, and cisterns are  age-appropriate. Atherosclerotic changes are incidentally seen within the intracranial vasculature.  Incidental note is made of mild mucosal thickening or a small amount of secretions within the left posterior ethmoids.       There is no CT evidence for acute intracranial pathology. The etiology of the patient's a aphasia is not further elucidated on this examination; and if further assessment is required, one could obtain an MRI of the brain for follow-up.  Incidental note is made of moderate changes of chronic small vessel ischemic phenomena.   Radiation dose reduction techniques were utilized, including automated exposure control and exposure modulation based on body size.  This report was finalized on 5/25/2023 10:47 AM by Dr. Codey Cruz M.D.      XR Chest 1 View    Result Date: 5/26/2023  Patient: KRIS TAYLOR  Time Out: 05:14 Exam(s): XR CXR 1 VIEW EXAM:   XR Chest, 1 View CLINICAL HISTORY:      Post-Op Heart Surgery. TECHNIQUE:   Frontal view of the chest. COMPARISON:   Chest radiograph 5 25 2023 FINDINGS:   Lungs:  Bilateral airspace opacities are present.   Pleural space:  Small bilateral pleural effusions.  No pneumothorax.   Heart:  Cardiomegaly.   Mediastinum:  The mediastinal drain is stable.   Bones joints:  Unremarkable.   Tubes, lines and devices:  Interval removal of the Fairview-Dominick catheter with persistent right IJ sheath. IMPRESSION:     1.  Bilateral airspace opacities may represent pulmonary edema and or atypical infection. 2.  Cardiomegaly. 3.  Interval removal of the Fairview-Dominick catheter with persistent right IJ sheath. 4.  Small bilateral pleural effusions.     Electronically signed by Laura Davidson MD on 05-26-23 at 0514    XR Chest 1 View    Result Date: 5/25/2023  Patient: KRIS TAYLOR  Time Out: 04:50 Exam(s): XR CXR 1 VIEW EXAM:   XR Chest, 1 View CLINICAL HISTORY:    Reason for exam: Post-Op Heart Surgery. TECHNIQUE:   Frontal view of the chest. COMPARISON:   5 24 2023. FINDINGS  IMPRESSION:       Interval extubation with hypoventilation.  Remaining support devices are unchanged.  Exam is otherwise unchanged.  Continued attention on follow-up is recommended.     Electronically signed by Artemio Enrique MD on 05-25-23 at 0450    XR Chest 1 View    Result Date: 5/24/2023  XR CHEST 1 VW-  HISTORY:  Postop.  COMPARISON:  Chest radiograph 05/23/2023  FINDINGS:  A single view of the chest was obtained. Multiple new support devices are identified. There is a new endotracheal tube with the tip terminating approximately 4.7 cm above the melissa, although the melissa is difficult to accurately visualize. There is a right IJ [place]-Dominick catheter with the tip projecting over the expected location of the pulmonary trunk or proximal right main pulmonary artery. Epicardial pacing wires are present. The cardiac silhouette is normal in size. There is a new cardiac valve annuloplasty ring. There is central pulmonary venous congestion with mild left greater than right bibasilar atelectasis. There is no definite pneumothorax. There are new sternotomy wires.  This report was finalized on 5/24/2023 12:51 PM by Dr. Modesta Gannon M.D.      Carotid Duplex - Bilateral    Result Date: 5/23/2023  •  Minimal atherosclerotic plaque proximal right internal carotid artery with less than 50% ICA stenosis. •  Left internal carotid artery is normal.     Adult Transthoracic Echo Limited W/ Cont if Necessary Per Protocol    Result Date: 5/26/2023  •  Limited echocardiogram for left and right ventricular function. •  The left ventricular cavity is mildly dilated. •  Septal wall motion is abnormal, consistent with a post-operative state. •  Left ventricular systolic function is normal. Calculated left ventricular EF = 50% •  The right ventricular cavity is mildly dilated. Normal right ventricular systolic function noted. •  The left atrial cavity is moderately dilated. •  The mitral valve mean gradient is 7.6 mmHg. There is a  bioprosthetic mitral valve present. The prosthetic mitral valve is grossly normal. •  Mild tricuspid valve regurgitation is present. •  Calculated right ventricular systolic pressure from tricuspid regurgitation is 54 mmHg. •  There is no evidence of pericardial effusion     XR Chest PA & Lateral    Result Date: 5/23/2023  PA AND LATERAL RADIOGRAPHIC VIEWS OF THE CHEST  CLINICAL HISTORY: Preop open heart.  FINDINGS:  The lungs are clear of acute infiltrates. The cardiomediastinal silhouette is mildly enlarged but otherwise unremarkable. Osseous structures are incidentally notable for degenerative phenomena within the thoracic spine.       There is mild enlargement of the cardiomediastinal silhouette. Otherwise, there is no other evidence to suggest active disease in the chest.  This report was finalized on 5/23/2023 5:39 PM by Dr. Cdoey Cruz M.D.      Arterial Line    Result Date: 5/24/2023  Darien Bullard MD     5/24/2023  8:11 AM Arterial Line Patient reassessed immediately prior to procedure Patient location during procedure: OR Start time: 5/24/2023 6:39 AM Stop Time:5/24/2023 6:46 AM  Line placed for hemodynamic monitoring. Performed By Anesthesiologist: Darien Bullard MD Preanesthetic Checklist Completed: patient identified and risks and benefits discussed Arterial Line Prep  Sterile Tech: gloves Prep: ChloraPrep Patient monitoring: blood pressure monitoring, continuous pulse oximetry and EKG Arterial Line Procedure Laterality:left Location:  radial artery Catheter size: 20 G Guidance: ultrasound guided PROCEDURE NOTE/ULTRASOUND INTERPRETATION.  Using ultrasound guidance the potential vascular sites for insertion of the catheter were visualized to determine the patency of the vessel to be used for vascular access.  After selecting the appropriate site for insertion, the needle was visualized under ultrasound being inserted into the radial artery, followed by ultrasound confirmation of wire and catheter  placement. There were no abnormalities seen on ultrasound; an image was taken; and the patient tolerated the procedure with no complications. Successful placement: yes Images: still images obtained, printed/placed on the chart Post Assessment Dressing Type: occlusive dressing applied and secured with tape. Complications no Patient Tolerance: patient tolerated the procedure well with no apparent complications Additional Notes Using ultrasound guidance the potential vascular sites for insertion of the catheter were visualized to determine the patency of the vessel to be used for vascular access.  After selecting the appropriate site for insertion, the needle was visualized under ultrasound being inserted into the radial artery, followed by ultrasound confirmation of wire and catheter placement.  There were no abnormalities seen on ultrasound; an image was taken/ and the patient tolerated the procedure with no complications.     Central Line    Result Date: 5/24/2023  Darien Bullard MD     5/24/2023  8:14 AM Central Line Patient reassessed immediately prior to procedure Patient location during procedure: OR Start time: 5/24/2023 6:58 AM Stop Time:5/24/2023 7:12 AM Indications: central pressure monitoring, vascular access and MD/Surgeon request Staff Anesthesiologist: Darien Bullard MD Preanesthetic Checklist Completed: patient identified and risks and benefits discussed Central Line Prep Sterile Tech:cap, gloves, gown, mask and sterile barriers Prep: chloraprep Patient monitoring: blood pressure monitoring, continuous pulse oximetry and EKG Central Line Procedure Laterality:right Location:internal jugular Catheter Type:Douglas-Dominick Catheter Size:7.5 Fr Guidance:ultrasound guided PROCEDURE NOTE/ULTRASOUND INTERPRETATION.  Using ultrasound guidance the potential vascular sites for insertion of the catheter were visualized to determine the patency of the vessel to be used for vascular access.  After selecting the  appropriate site for insertion, the needle was visualized under ultrasound being inserted into the internal jugular vein, followed by ultrasound confirmation of wire and catheter placement. There were no abnormalities seen on ultrasound; an image was taken; and the patient tolerated the procedure with no complications. Images: still images obtained, printed/placed on chart Assessment Post procedure:biopatch applied, line sutured, occlusive dressing applied and secured with tape Assessement:blood return through all ports and free fluid flow Complications:no Patient Tolerance:patient tolerated the procedure well with no apparent complications     Central Line    Result Date: 5/24/2023  Darien Bullard MD     5/24/2023  8:14 AM Central Line Patient reassessed immediately prior to procedure Patient location during procedure: OR Start time: 5/24/2023 6:58 AM Stop Time:5/24/2023 7:12 AM Indications: vascular access and central pressure monitoring Staff Anesthesiologist: Darien Bullard MD Preanesthetic Checklist Completed: patient identified and risks and benefits discussed Central Line Prep Sterile Tech:cap, gloves, gown, mask and sterile barriers Prep: chloraprep Patient monitoring: blood pressure monitoring, continuous pulse oximetry and EKG Central Line Procedure Laterality:right Location:internal jugular Catheter Type:Cordis Catheter Size:9 Fr Guidance:ultrasound guided PROCEDURE NOTE/ULTRASOUND INTERPRETATION.  Using ultrasound guidance the potential vascular sites for insertion of the catheter were visualized to determine the patency of the vessel to be used for vascular access.  After selecting the appropriate site for insertion, the needle was visualized under ultrasound being inserted into the internal jugular vein, followed by ultrasound confirmation of wire and catheter placement. There were no abnormalities seen on ultrasound; an image was taken; and the patient tolerated the procedure with no complications.  Images: still images obtained, printed/placed on chart Assessment Post procedure:biopatch applied, line sutured, occlusive dressing applied and secured with tape Assessement:blood return through all ports and chest x-ray ordered Complications:no Patient Tolerance:patient tolerated the procedure well with no apparent complications Additional Notes Ultrasound Interpretation:  Using ultrasound guidance the potential vascular sites for insertion of the catheter were visualized to determine the patency of the vessel to be used for vascular access.  After selecting the appropriate site for insertion, the needle was visualized under ultrasound being inserted into the vessel, followed by ultrasound confirmation of wire and catheter placement.  There were no abnormalities seen on ultrasound; an image was taken/ and the patient tolerated the procedure with no complications.     Diagnostic IntraOp Eric    Result Date: 5/24/2023  Darien Bullard MD     5/24/2023 11:38 AM Diagnostic IntraOp Eric Procedure Performed: Diagnostic IntraOp Eric      Start Time:      End Time:  Preanesthesia Checklist: Procedure being performed at surgeon's request. General Procedure Information Diagnostic Indications for Echo:  assessment of surgical repair Physician Requesting Echo: George Frey MD CPT Code:  Mitral and tricuspid regurgitation Location performed:  OR Intubated Bite block placed Heart visualized Probe Insertion:  Easy Probe Type:  Multiplane Echocardiographic and Doppler Measurements Ventricles Right Ventricle: Cavity size normal.  Hypertrophy not present.  Thrombus not present.  Left Ventricle: Cavity size dilated.  Thrombus not present.  Global Function mildly impaired.  Ejection Fraction 40%.  Valves Aortic Valve: Annulus normal.  Stenosis not present.  Area: 2.95 cm².  Mean Gradient: 6 mmHg.  Regurgitation trace.  Leaflets normal.  Leaflet motions normal.  Mitral Valve: Annulus dilated.  Stenosis not present.  Area: 2.47 cm².   Mean Gradient: 2 mmHg.  Regurgitation severe.  Leaflets thickened.  Leaflet motions restricted.  Specific leaflet segments with abnormal motions are described in the following comments:      posterior Tricuspid Valve: Annulus dilated.  Stenosis not present.  Regurgitation moderate.  Leaflets normal.  Leaflet motions normal.  Aorta Ascending Aorta: Size normal.  Dissection not present.  Descending Aorta: Size normal.  Dissection not present.  Plaque thickness less than 3 mm.  Mobile plaque not present.  Atria Right Atrium: Size normal.  Spontaneous echo contrast not present.  Thrombus not present.  Left Atrium: Size dilated.  Spontaneous echo contrast not present.  Thrombus not present.  Left atrial appendage normal. 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  On 5/25 after extubation he was confused and there was concern for aphasia and right hand weakness.  He was seen by Dr. Sage and exam was most notable for encephalopathy with asterixis and concern for delirium.  Nephrology following for CUONG.  CT head showed no acute findings.    Diagnosis:  VHD s/p MVR/TVr  Right upper extremity weakness, mild, possible stroke  AMS secondary to delirium/encephalopathy  CUONG    Plan:  On aspirin 81 mg daily Lipitor 40 mg daily as well as Lovenox for VTE prophylaxis.  Will follow him clinically for now.  Recommend delirium precautions.  Discussed with Dr. Sage today.  Will follow.

## 2023-05-26 NOTE — PROGRESS NOTES
" LOS: 3 days   Patient Care Team:  Liza Oconnell III, NP-C as PCP - General (Family Medicine)  Corey Lao Jr., MD (Inactive) as Consulting Physician (Urology)    Chief Complaint: post op    Subjective:  Symptoms:  He reports weakness.  No shortness of breath, cough or chest pain.    Diet:  NPO.  No nausea or vomiting.    Activity level: Impaired due to weakness.    Pain:  He complains of pain that is mild.  Pain is well controlled.        Vital Signs  Temp:  [98.2 °F (36.8 °C)-99.7 °F (37.6 °C)] 99.5 °F (37.5 °C)  Heart Rate:  [67-80] 68  Resp:  [16] 16  BP: (115-165)/(72-94) 142/90  Body mass index is 34.33 kg/m².    Intake/Output Summary (Last 24 hours) at 5/26/2023 0742  Last data filed at 5/26/2023 0700  Gross per 24 hour   Intake 916 ml   Output 3255 ml   Net -2339 ml     No intake/output data recorded.    Chest tube drainage last 8 hours: 110        05/24/23  2000 05/25/23  0600 05/26/23  0700   Weight: 117 kg (257 lb 15 oz) 121 kg (267 lb 6.7 oz) 118 kg (260 lb 2.3 oz)     Objective:  General Appearance:  Comfortable and in no acute distress.    Vital signs: (most recent): Blood pressure 142/90, pulse 68, temperature 99.5 °F (37.5 °C), temperature source Bladder, resp. rate 16, height 185.4 cm (72.99\"), weight 118 kg (260 lb 2.3 oz), SpO2 97 %.  Vital signs are normal.  No fever.    Output: Producing urine and no stool output.    Lungs:  Normal respiratory rate and increased effort.  There are rales and decreased breath sounds.    Heart: Normal rate.  Regular rhythm.  (100% paced. Appears to be in heart block with PVCs underlying)  Abdomen: Abdomen is soft and distended.  Hypoactive bowel sounds.     Extremities: There is dependent edema (mild bilateral LE).    Pulses: Distal pulses are intact.    Neurological: Patient is alert.  (Oriented to person, place, and situation).    Skin:  Warm and dry.  (Sternal dressing clean, dry, and intact)      Results Review:        WBC WBC "   Date Value Ref Range Status   05/26/2023 18.39 (H) 3.40 - 10.80 10*3/mm3 Final   05/25/2023 15.14 (H) 3.40 - 10.80 10*3/mm3 Final   05/24/2023 23.61 (H) 3.40 - 10.80 10*3/mm3 Final   05/24/2023 29.62 (H) 3.40 - 10.80 10*3/mm3 Final   05/24/2023 20.66 (H) 3.40 - 10.80 10*3/mm3 Final   05/23/2023 10.79 3.40 - 10.80 10*3/mm3 Final      HGB Hemoglobin   Date Value Ref Range Status   05/26/2023 10.2 (L) 13.0 - 17.7 g/dL Final   05/25/2023 10.1 (L) 13.0 - 17.7 g/dL Final   05/24/2023 11.0 (L) 13.0 - 17.7 g/dL Final   05/24/2023 12.1 (L) 13.0 - 17.7 g/dL Final   05/24/2023 10.2 (L) 12.0 - 17.0 g/dL Final   05/24/2023 10.2 (L) 13.0 - 17.7 g/dL Final   05/24/2023 10.5 (L) 12.0 - 17.0 g/dL Final   05/24/2023 10.5 (L) 12.0 - 17.0 g/dL Final   05/24/2023 10.2 (L) 12.0 - 17.0 g/dL Final   05/24/2023 10.5 (L) 12.0 - 17.0 g/dL Final   05/24/2023 10.9 (L) 12.0 - 17.0 g/dL Final   05/24/2023 10.2 (L) 12.0 - 17.0 g/dL Final   05/24/2023 7.8 (C) 12.0 - 17.0 g/dL Final   05/23/2023 14.4 13.0 - 17.7 g/dL Final      HCT Hematocrit   Date Value Ref Range Status   05/26/2023 31.0 (L) 37.5 - 51.0 % Final   05/25/2023 29.6 (L) 37.5 - 51.0 % Final   05/24/2023 32.9 (L) 37.5 - 51.0 % Final   05/24/2023 35.8 (L) 37.5 - 51.0 % Final   05/24/2023 30 (L) 38 - 51 % Final   05/24/2023 30.7 (L) 37.5 - 51.0 % Final   05/24/2023 31 (L) 38 - 51 % Final   05/24/2023 31 (L) 38 - 51 % Final   05/24/2023 30 (L) 38 - 51 % Final   05/24/2023 31 (L) 38 - 51 % Final   05/24/2023 32 (L) 38 - 51 % Final   05/24/2023 30 (L) 38 - 51 % Final   05/24/2023 23 (L) 38 - 51 % Final   05/23/2023 43.8 37.5 - 51.0 % Final      Platelets Platelets   Date Value Ref Range Status   05/26/2023 116 (L) 140 - 450 10*3/mm3 Final   05/25/2023 118 (L) 140 - 450 10*3/mm3 Final   05/24/2023 135 (L) 140 - 450 10*3/mm3 Final   05/24/2023 188 140 - 450 10*3/mm3 Final   05/24/2023 160 140 - 450 10*3/mm3 Final   05/23/2023 255 140 - 450 10*3/mm3 Final        PT/INR:    Protime   Date  Value Ref Range Status   05/25/2023 15.0 (H) 11.7 - 14.2 Seconds Final   05/24/2023 15.3 (H) 11.7 - 14.2 Seconds Final   05/24/2023 17.4 (H) 12.8 - 15.2 seconds Final     Comment:     Serial Number: [id number]:  [id number]   05/24/2023 18.1 (H) 11.7 - 14.2 Seconds Final   05/23/2023 12.6 11.7 - 14.2 Seconds Final   /  INR   Date Value Ref Range Status   05/25/2023 1.16 (H) 0.90 - 1.10 Final   05/24/2023 1.20 (H) 0.90 - 1.10 Final   05/24/2023 1.5 (H) 0.8 - 1.2 Final   05/24/2023 1.47 (H) 0.90 - 1.10 Final   05/23/2023 0.93 0.90 - 1.10 Final       Sodium Sodium   Date Value Ref Range Status   05/26/2023 147 (H) 136 - 145 mmol/L Final   05/25/2023 142 136 - 145 mmol/L Final   05/25/2023 143 136 - 145 mmol/L Final   05/24/2023 146 (H) 136 - 145 mmol/L Final   05/24/2023 146 (H) 136 - 145 mmol/L Final   05/24/2023 142 136 - 145 mmol/L Final   05/23/2023 140 136 - 145 mmol/L Final      Potassium Potassium   Date Value Ref Range Status   05/26/2023 4.0 3.5 - 5.2 mmol/L Final   05/25/2023 4.1 3.5 - 5.2 mmol/L Final   05/25/2023 5.1 3.5 - 5.2 mmol/L Final   05/24/2023 3.4 (L) 3.5 - 5.2 mmol/L Final     Comment:     Slight hemolysis detected by analyzer. Results may be affected.   05/24/2023 3.7 3.5 - 5.2 mmol/L Final     Comment:     Specimen hemolyzed.  Results may be affected.   05/24/2023 3.3 (L) 3.5 - 5.2 mmol/L Final   05/23/2023 4.3 3.5 - 5.2 mmol/L Final      Chloride Chloride   Date Value Ref Range Status   05/26/2023 110 (H) 98 - 107 mmol/L Final   05/25/2023 110 (H) 98 - 107 mmol/L Final   05/25/2023 114 (H) 98 - 107 mmol/L Final   05/24/2023 112 (H) 98 - 107 mmol/L Final   05/24/2023 110 (H) 98 - 107 mmol/L Final   05/24/2023 104 98 - 107 mmol/L Final   05/23/2023 102 98 - 107 mmol/L Final      Bicarbonate CO2   Date Value Ref Range Status   05/26/2023 23.9 22.0 - 29.0 mmol/L Final   05/25/2023 20.1 (L) 22.0 - 29.0 mmol/L Final   05/25/2023 18.4 (L) 22.0 - 29.0 mmol/L Final   05/24/2023 21.0 (L) 22.0 - 29.0 mmol/L  Final   05/24/2023 24.0 22.0 - 29.0 mmol/L Final   05/24/2023 24.6 22.0 - 29.0 mmol/L Final   05/23/2023 26.4 22.0 - 29.0 mmol/L Final      BUN BUN   Date Value Ref Range Status   05/26/2023 26 (H) 8 - 23 mg/dL Final   05/25/2023 20 8 - 23 mg/dL Final   05/25/2023 19 8 - 23 mg/dL Final   05/24/2023 19 8 - 23 mg/dL Final   05/24/2023 19 8 - 23 mg/dL Final   05/24/2023 20 8 - 23 mg/dL Final   05/23/2023 17 8 - 23 mg/dL Final      Creatinine Creatinine   Date Value Ref Range Status   05/26/2023 1.37 (H) 0.76 - 1.27 mg/dL Final   05/25/2023 1.40 (H) 0.76 - 1.27 mg/dL Final   05/25/2023 1.46 (H) 0.76 - 1.27 mg/dL Final   05/24/2023 1.61 (H) 0.76 - 1.27 mg/dL Final   05/24/2023 1.65 (H) 0.76 - 1.27 mg/dL Final   05/24/2023 1.34 (H) 0.76 - 1.27 mg/dL Final   05/23/2023 1.28 (H) 0.76 - 1.27 mg/dL Final      Calcium Calcium   Date Value Ref Range Status   05/26/2023 8.9 8.6 - 10.5 mg/dL Final   05/25/2023 8.9 8.6 - 10.5 mg/dL Final   05/25/2023 9.0 8.6 - 10.5 mg/dL Final   05/24/2023 8.1 (L) 8.6 - 10.5 mg/dL Final   05/24/2023 7.8 (L) 8.6 - 10.5 mg/dL Final   05/24/2023 8.9 8.6 - 10.5 mg/dL Final   05/23/2023 9.6 8.6 - 10.5 mg/dL Final      Magnesium Magnesium   Date Value Ref Range Status   05/25/2023 2.9 (H) 1.6 - 2.4 mg/dL Final   05/25/2023 3.0 (H) 1.6 - 2.4 mg/dL Final   05/24/2023 2.9 (H) 1.6 - 2.4 mg/dL Final   05/24/2023 3.4 (H) 1.6 - 2.4 mg/dL Final   05/23/2023 2.4 1.6 - 2.4 mg/dL Final          aspirin, 81 mg, Oral, Daily  atorvastatin, 40 mg, Oral, Nightly  chlorhexidine, 15 mL, Mouth/Throat, Q12H  enoxaparin, 40 mg, Subcutaneous, Daily  guaiFENesin, 1,200 mg, Oral, Q12H  hydrocortisone sodium succinate, 100 mg, Intravenous, Q12H  insulin lispro, 3-14 Units, Subcutaneous, 4x Daily AC & at Bedtime  mupirocin, , Each Nare, BID  pantoprazole, 40 mg, Intravenous, QAM AC  senna-docusate sodium, 2 tablet, Oral, Nightly      clevidipine, 2-32 mg/hr  dexmedetomidine, 0.2-1.5 mcg/kg/hr, Last Rate: Stopped (05/24/23  1631)  DOPamine, 2-20 mcg/kg/min  EPINEPHrine, 0.02-0.1 mcg/kg/min, Last Rate: Stopped (05/24/23 2155)  insulin, 0-100 Units/hr, Last Rate: Stopped (05/25/23 0828)  milrinone, 0.25-0.375 mcg/kg/min, Last Rate: 0.125 mcg/kg/min (05/25/23 1539)  niCARdipine, 5-15 mg/hr  nitroglycerin, 5-200 mcg/min  norepinephrine, 0.02-0.2 mcg/kg/min, Last Rate: Stopped (05/24/23 2335)  phenylephrine, 0.2-2 mcg/kg/min  propofol, 5-50 mcg/kg/min, Last Rate: Stopped (05/24/23 1516)  sodium chloride, 30 mL/hr, Last Rate: 30 mL/hr (05/24/23 1223)      Patient Active Problem List   Diagnosis Code   • DM (diabetes mellitus), type 2 E11.9   • Mixed hyperlipidemia E78.2   • Mild intermittent asthma without complication J45.20   • New onset of congestive heart failure I50.9   • Nonrheumatic mitral valve regurgitation I34.0   • Chest pain R07.9   • Essential hypertension I10   • Mitral valve disease I05.9     Assessment & Plan   -Moderate to severe mitral regurgitation s/p MVR/TV repair POD#2 Tk  -Moderate tricuspid valve regurgitation  -Acute on chronic diastolic heart failure with preserved EF--55%  -CKD stage IIIa; baseline creatinine 1.2  -DM type II--internal medicine following   -Severe pulmonary HTN  -PING  -Urinary retention s/p TURP  -BPH   -Interstitial lung disease s/p COVID-19 infection --on chronic steroids  - leukocytosis--reactive/steroid administration  - post op anemia--expected acute blood loss  -TCP--consumptive    Mentation appears to be somewhat improved this morning. He is alert and oriented to person, place, and situation. He follows simple commands. Neurology evaluated yesterday and felt this was delirium. Access center to see per Dr. Frey. I would like to move him out CVR so he can have a window  Remains on milrinone 0.125. Discussed with Dr. Frey about discontinuing. He would like an echocardiogram to evaluate RV/LV function  SLP recommended ice chips/sips of water secondary to cognition/confusion. To re-evaluate  today. Per nursing patient took medications with sips of water okay overnight  Creatinine improved this morning. Still with volume excess on exam, will IV diurese again today  On 2L NC--wean as able  Remains AV paced at 70. Appears to be in AV block with PVCs underlying with rates in the 50s. Will check 12 lead to confirm  IV steroids given yesterday due to impaired cognition, will resume home oral steroids  Mobilize/aggressive pulmonary toilet---continue flutter/mucinex. Add nebs  He has not gotten up to the chair yet this morning. Will re-evaluate chest tube output after ambulating  Continue PT/OT  Possible transfer to stepdown later today once bed available  Continue routine care    DESTIN Jin  05/26/23  07:42 EDT

## 2023-05-27 ENCOUNTER — APPOINTMENT (OUTPATIENT)
Dept: GENERAL RADIOLOGY | Facility: HOSPITAL | Age: 73
End: 2023-05-27
Payer: MEDICARE

## 2023-05-27 LAB
ANION GAP SERPL CALCULATED.3IONS-SCNC: 14.5 MMOL/L (ref 5–15)
BH BB BLOOD EXPIRATION DATE: NORMAL
BH BB BLOOD EXPIRATION DATE: NORMAL
BH BB BLOOD TYPE BARCODE: 5100
BH BB BLOOD TYPE BARCODE: 5100
BH BB DISPENSE STATUS: NORMAL
BH BB DISPENSE STATUS: NORMAL
BH BB PRODUCT CODE: NORMAL
BH BB PRODUCT CODE: NORMAL
BH BB UNIT NUMBER: NORMAL
BH BB UNIT NUMBER: NORMAL
BUN SERPL-MCNC: 35 MG/DL (ref 8–23)
BUN/CREAT SERPL: 26.5 (ref 7–25)
CALCIUM SPEC-SCNC: 9.1 MG/DL (ref 8.6–10.5)
CHLORIDE SERPL-SCNC: 111 MMOL/L (ref 98–107)
CO2 SERPL-SCNC: 25.5 MMOL/L (ref 22–29)
CREAT SERPL-MCNC: 1.32 MG/DL (ref 0.76–1.27)
CROSSMATCH INTERPRETATION: NORMAL
CROSSMATCH INTERPRETATION: NORMAL
DEPRECATED RDW RBC AUTO: 44.3 FL (ref 37–54)
EGFRCR SERPLBLD CKD-EPI 2021: 56.6 ML/MIN/1.73
ERYTHROCYTE [DISTWIDTH] IN BLOOD BY AUTOMATED COUNT: 13.9 % (ref 12.3–15.4)
GLUCOSE BLDC GLUCOMTR-MCNC: 150 MG/DL (ref 70–130)
GLUCOSE BLDC GLUCOMTR-MCNC: 153 MG/DL (ref 70–130)
GLUCOSE BLDC GLUCOMTR-MCNC: 171 MG/DL (ref 70–130)
GLUCOSE BLDC GLUCOMTR-MCNC: 203 MG/DL (ref 70–130)
GLUCOSE BLDC GLUCOMTR-MCNC: 204 MG/DL (ref 70–130)
GLUCOSE BLDC GLUCOMTR-MCNC: 222 MG/DL (ref 70–130)
GLUCOSE BLDC GLUCOMTR-MCNC: 254 MG/DL (ref 70–130)
GLUCOSE SERPL-MCNC: 153 MG/DL (ref 65–99)
HCT VFR BLD AUTO: 34.1 % (ref 37.5–51)
HGB BLD-MCNC: 11.4 G/DL (ref 13–17.7)
MAGNESIUM SERPL-MCNC: 2.6 MG/DL (ref 1.6–2.4)
MCH RBC QN AUTO: 29.6 PG (ref 26.6–33)
MCHC RBC AUTO-ENTMCNC: 33.4 G/DL (ref 31.5–35.7)
MCV RBC AUTO: 88.6 FL (ref 79–97)
PLATELET # BLD AUTO: 146 10*3/MM3 (ref 140–450)
PMV BLD AUTO: 10.5 FL (ref 6–12)
POTASSIUM SERPL-SCNC: 3.5 MMOL/L (ref 3.5–5.2)
POTASSIUM SERPL-SCNC: 3.6 MMOL/L (ref 3.5–5.2)
QT INTERVAL: 376 MS
RBC # BLD AUTO: 3.85 10*6/MM3 (ref 4.14–5.8)
SODIUM SERPL-SCNC: 151 MMOL/L (ref 136–145)
UNIT  ABO: NORMAL
UNIT  ABO: NORMAL
UNIT  RH: NORMAL
UNIT  RH: NORMAL
WBC NRBC COR # BLD: 16.03 10*3/MM3 (ref 3.4–10.8)

## 2023-05-27 PROCEDURE — 93010 ELECTROCARDIOGRAM REPORT: CPT | Performed by: STUDENT IN AN ORGANIZED HEALTH CARE EDUCATION/TRAINING PROGRAM

## 2023-05-27 PROCEDURE — 92611 MOTION FLUOROSCOPY/SWALLOW: CPT

## 2023-05-27 PROCEDURE — 84132 ASSAY OF SERUM POTASSIUM: CPT | Performed by: THORACIC SURGERY (CARDIOTHORACIC VASCULAR SURGERY)

## 2023-05-27 PROCEDURE — 94799 UNLISTED PULMONARY SVC/PX: CPT

## 2023-05-27 PROCEDURE — 93005 ELECTROCARDIOGRAM TRACING: CPT | Performed by: INTERNAL MEDICINE

## 2023-05-27 PROCEDURE — 25010000002 MORPHINE PER 10 MG: Performed by: NURSE PRACTITIONER

## 2023-05-27 PROCEDURE — 99233 SBSQ HOSP IP/OBS HIGH 50: CPT | Performed by: NURSE PRACTITIONER

## 2023-05-27 PROCEDURE — 63710000001 INSULIN LISPRO (HUMAN) PER 5 UNITS: Performed by: NURSE PRACTITIONER

## 2023-05-27 PROCEDURE — 3E0G76Z INTRODUCTION OF NUTRITIONAL SUBSTANCE INTO UPPER GI, VIA NATURAL OR ARTIFICIAL OPENING: ICD-10-PCS | Performed by: THORACIC SURGERY (CARDIOTHORACIC VASCULAR SURGERY)

## 2023-05-27 PROCEDURE — 74230 X-RAY XM SWLNG FUNCJ C+: CPT

## 2023-05-27 PROCEDURE — 83735 ASSAY OF MAGNESIUM: CPT | Performed by: NURSE PRACTITIONER

## 2023-05-27 PROCEDURE — 99232 SBSQ HOSP IP/OBS MODERATE 35: CPT | Performed by: INTERNAL MEDICINE

## 2023-05-27 PROCEDURE — 71045 X-RAY EXAM CHEST 1 VIEW: CPT

## 2023-05-27 PROCEDURE — 82948 REAGENT STRIP/BLOOD GLUCOSE: CPT

## 2023-05-27 PROCEDURE — 85027 COMPLETE CBC AUTOMATED: CPT | Performed by: NURSE PRACTITIONER

## 2023-05-27 PROCEDURE — 80048 BASIC METABOLIC PNL TOTAL CA: CPT | Performed by: NURSE PRACTITIONER

## 2023-05-27 PROCEDURE — 25010000002 HYDRALAZINE PER 20 MG: Performed by: THORACIC SURGERY (CARDIOTHORACIC VASCULAR SURGERY)

## 2023-05-27 PROCEDURE — 25010000002 ENOXAPARIN PER 10 MG: Performed by: NURSE PRACTITIONER

## 2023-05-27 PROCEDURE — 74018 RADEX ABDOMEN 1 VIEW: CPT

## 2023-05-27 PROCEDURE — 0 POTASSIUM CHLORIDE PER 2 MEQ: Performed by: THORACIC SURGERY (CARDIOTHORACIC VASCULAR SURGERY)

## 2023-05-27 RX ORDER — POTASSIUM CHLORIDE 29.8 MG/ML
20 INJECTION INTRAVENOUS
Status: COMPLETED | OUTPATIENT
Start: 2023-05-27 | End: 2023-05-27

## 2023-05-27 RX ORDER — ASPIRIN 81 MG/1
81 TABLET, CHEWABLE ORAL DAILY
Status: DISCONTINUED | OUTPATIENT
Start: 2023-05-27 | End: 2023-06-06 | Stop reason: HOSPADM

## 2023-05-27 RX ORDER — DEXTROSE MONOHYDRATE 50 MG/ML
60 INJECTION, SOLUTION INTRAVENOUS CONTINUOUS
Status: DISCONTINUED | OUTPATIENT
Start: 2023-05-27 | End: 2023-05-30

## 2023-05-27 RX ORDER — DEXTROSE AND SODIUM CHLORIDE 5; .45 G/100ML; G/100ML
50 INJECTION, SOLUTION INTRAVENOUS CONTINUOUS
Status: DISCONTINUED | OUTPATIENT
Start: 2023-05-27 | End: 2023-05-27

## 2023-05-27 RX ORDER — HYDRALAZINE HYDROCHLORIDE 20 MG/ML
10 INJECTION INTRAMUSCULAR; INTRAVENOUS EVERY 6 HOURS PRN
Status: DISCONTINUED | OUTPATIENT
Start: 2023-05-27 | End: 2023-06-06 | Stop reason: HOSPADM

## 2023-05-27 RX ORDER — GUAIFENESIN 200 MG/10ML
400 LIQUID ORAL EVERY 8 HOURS
Status: DISCONTINUED | OUTPATIENT
Start: 2023-05-27 | End: 2023-06-06 | Stop reason: HOSPADM

## 2023-05-27 RX ORDER — POTASSIUM CHLORIDE 1.5 G/1.77G
40 POWDER, FOR SOLUTION ORAL EVERY 4 HOURS
Status: COMPLETED | OUTPATIENT
Start: 2023-05-27 | End: 2023-05-27

## 2023-05-27 RX ADMIN — HYDROCODONE BITARTRATE AND ACETAMINOPHEN 2 TABLET: 5; 325 TABLET ORAL at 20:11

## 2023-05-27 RX ADMIN — BARIUM SULFATE 4 ML: 980 POWDER, FOR SUSPENSION ORAL at 12:00

## 2023-05-27 RX ADMIN — ACETYLCYSTEINE 3 ML: 200 SOLUTION ORAL; RESPIRATORY (INHALATION) at 07:40

## 2023-05-27 RX ADMIN — BUMETANIDE 2 MG: 0.25 INJECTION INTRAMUSCULAR; INTRAVENOUS at 08:23

## 2023-05-27 RX ADMIN — POTASSIUM CHLORIDE 40 MEQ: 1.5 POWDER, FOR SOLUTION ORAL at 20:11

## 2023-05-27 RX ADMIN — BARIUM SULFATE 50 ML: 400 SUSPENSION ORAL at 12:00

## 2023-05-27 RX ADMIN — IPRATROPIUM BROMIDE AND ALBUTEROL SULFATE 3 ML: 2.5; .5 SOLUTION RESPIRATORY (INHALATION) at 19:18

## 2023-05-27 RX ADMIN — ATORVASTATIN CALCIUM 40 MG: 20 TABLET, FILM COATED ORAL at 20:11

## 2023-05-27 RX ADMIN — GUAIFENESIN 400 MG: 200 SOLUTION ORAL at 17:46

## 2023-05-27 RX ADMIN — HYDRALAZINE HYDROCHLORIDE 10 MG: 20 INJECTION INTRAMUSCULAR; INTRAVENOUS at 10:01

## 2023-05-27 RX ADMIN — MORPHINE SULFATE 1 MG: 2 INJECTION, SOLUTION INTRAMUSCULAR; INTRAVENOUS at 10:02

## 2023-05-27 RX ADMIN — ASPIRIN 81 MG: 81 TABLET, CHEWABLE ORAL at 17:50

## 2023-05-27 RX ADMIN — ACETYLCYSTEINE 3 ML: 200 SOLUTION ORAL; RESPIRATORY (INHALATION) at 19:18

## 2023-05-27 RX ADMIN — MUPIROCIN 1 APPLICATION: 20 OINTMENT TOPICAL at 08:24

## 2023-05-27 RX ADMIN — POTASSIUM CHLORIDE 20 MEQ: 29.8 INJECTION, SOLUTION INTRAVENOUS at 07:02

## 2023-05-27 RX ADMIN — GUAIFENESIN 400 MG: 200 SOLUTION ORAL at 23:52

## 2023-05-27 RX ADMIN — DEXTROSE MONOHYDRATE 50 ML/HR: 50 INJECTION, SOLUTION INTRAVENOUS at 12:58

## 2023-05-27 RX ADMIN — MUPIROCIN 1 APPLICATION: 20 OINTMENT TOPICAL at 20:11

## 2023-05-27 RX ADMIN — ENOXAPARIN SODIUM 40 MG: 100 INJECTION SUBCUTANEOUS at 17:46

## 2023-05-27 RX ADMIN — IPRATROPIUM BROMIDE AND ALBUTEROL SULFATE 3 ML: 2.5; .5 SOLUTION RESPIRATORY (INHALATION) at 07:40

## 2023-05-27 RX ADMIN — IPRATROPIUM BROMIDE AND ALBUTEROL SULFATE 3 ML: 2.5; .5 SOLUTION RESPIRATORY (INHALATION) at 15:40

## 2023-05-27 RX ADMIN — MORPHINE SULFATE 1 MG: 2 INJECTION, SOLUTION INTRAMUSCULAR; INTRAVENOUS at 13:52

## 2023-05-27 RX ADMIN — POTASSIUM CHLORIDE 20 MEQ: 29.8 INJECTION, SOLUTION INTRAVENOUS at 08:23

## 2023-05-27 RX ADMIN — PANTOPRAZOLE SODIUM 40 MG: 40 INJECTION, POWDER, FOR SOLUTION INTRAVENOUS at 07:01

## 2023-05-27 RX ADMIN — INSULIN LISPRO 5 UNITS: 100 INJECTION, SOLUTION INTRAVENOUS; SUBCUTANEOUS at 18:54

## 2023-05-27 RX ADMIN — POTASSIUM CHLORIDE 40 MEQ: 1.5 POWDER, FOR SOLUTION ORAL at 23:52

## 2023-05-27 NOTE — PROGRESS NOTES
Laith Gayle MD                          [phone]            Patient ID:    Name:  Vernon Solorzano    MRN:  [id number]    1950   72 y.o.  male            Patient Care Team:  Liza Oconnell III, NP-C as PCP - General (Family Medicine)  Corey Lao Jr., MD (Inactive) as Consulting Physician (Urology)    CC/ Reason for visit: Postop hypoxia, post cardiac surgery, previous COVID-19 infection with long-term steroid use    Subjective: Pt seen and examined this AM. No acute overnight events noted. Doing better.  Frustrated that he has a Dobbhoff tube and states that he is concerned that it is not in the right place as it is uncomfortable.  KUB ordered to confirm position.    ROS: Denies any subjective fevers, syncope or presyncopal events, new neurological deficits, nausea or vomiting currently    Objective     Vital Signs past 24hrs    BP range: BP: (135-161)/(76-97) 149/89  Pulse range: Heart Rate:  [69-81] 79  Resp rate range: Resp:  [16-19] 18  Temp range: Temp (24hrs), Av °F (36.7 °C), Min:97.5 °F (36.4 °C), Max:98.7 °F (37.1 °C)      Ventilator/Non-Invasive Ventilation Settings (From admission, onward)     Start     Ordered    23 1207  Ventilator - Vent Mode: AC/VC; Rate: Other; Rate: 14; FiO2: 100%; PEEP: 7.5; Tidal Volume: mL; TV: 750  Continuous,   Status:  Canceled        Question Answer Comment   Vent Mode AC/VC    Rate Other    Rate 14    FiO2 100%    PEEP 7.5    Tidal Volume mL            23 1234                Vent Settings        Resp Rate (Set): 14  Pressure Support (cm H2O): 8 cm H20  FiO2 (%): 41 %  PEEP/CPAP (cm H2O): 7.5 cm H20  Minute Ventilation (L/min) (Obs): 13.4 L/min  Resp Rate (Observed) Vent: 27     I:E Ratio (Obs): 1:1.8  PIP Observed (cm H2O): 16 cm H2O  Plateau Pressure (cm H2O): 0 cm H2O  Driving Pressure (cm H2O): -7.2 cm H2O  Device (Oxygen Therapy): room air FiO2 (%): 41 %     118 kg (260  lb 2.3 oz); Body mass index is 35.28 kg/m².      Intake/Output Summary (Last 24 hours) at 5/27/2023 1557  Last data filed at 5/27/2023 1537  Gross per 24 hour   Intake 95 ml   Output 5030 ml   Net -4935 ml       PHYSICAL EXAM   Constitutional: Middle-aged pt in bed,+ accessory muscle use/resp distress   Head: - NCAT  Eyes: No pallor, anicteric conjunctivae  ENMT:  Mallampati 4, no oral thrush. Moist MM.   NECK: Trachea midline, No thyromegaly, no palpable cervical lymphadenopathy  Heart: RRR, sternotomy wound.  No murmur. 1+ pedal edema   Lungs: FREDERICK +, No wheezes/ crackles heard    Abdomen: Soft. No tenderness, guarding or rigidity. No palpable masses  Extremities: Extremities warm and well perfused. No cyanosis/ clubbing  Neuro: Conscious, answers appropriately, no gross focal neuro deficits  Psych: Mood and affect appropriate    PPE recommended per [facility] infectious disease Isolation protocol for the current clinical scenario (as mentioned below) was followed.     Scheduled meds:  acetylcysteine, 3 mL, Nebulization, BID - RT  aspirin, 81 mg, Oral, Daily  atorvastatin, 40 mg, Oral, Nightly  enoxaparin, 40 mg, Subcutaneous, Daily  guaifenesin, 400 mg, Oral, Q8H  insulin lispro, 3-14 Units, Subcutaneous, Q6H  ipratropium-albuterol, 3 mL, Nebulization, 4x Daily - RT  mupirocin, , Each Nare, BID  pantoprazole, 40 mg, Intravenous, QAM AC  predniSONE, 5 mg, Oral, Daily With Breakfast  senna-docusate sodium, 2 tablet, Oral, Nightly        IV meds:                      dextrose, 50 mL/hr, Last Rate: 50 mL/hr (05/27/23 1258)        Data Review:      Results from last 7 days   Lab Units 05/27/23  0604 05/26/23  0304 05/25/23  1350 05/25/23  0307 05/24/23  1626 05/24/23  1210 05/24/23  1103 05/24/23  0406 05/23/23  1429   SODIUM mmol/L 151* 147* 142 143   < > 146*  --    < > 140   POTASSIUM mmol/L 3.5 4.0 4.1 5.1   < > 3.7  --    < > 4.3   CHLORIDE mmol/L 111* 110* 110* 114*   < > 110*  --    < > 102   CO2 mmol/L  25.5 23.9 20.1* 18.4*   < > 24.0  --    < > 26.4   BUN mg/dL 35* 26* 20 19   < > 19  --    < > 17   CREATININE mg/dL 1.32* 1.37* 1.40* 1.46*   < > 1.65*  --    < > 1.28*   CALCIUM mg/dL 9.1 8.9 8.9 9.0   < > 7.8*  --    < > 9.6   BILIRUBIN mg/dL  --  0.4  --   --   --   --   --   --  0.4   ALK PHOS U/L  --  45  --   --   --   --   --   --  71   ALT (SGPT) U/L  --  23  --   --   --   --   --   --  27   AST (SGOT) U/L  --  80*  --   --   --   --   --   --  14   GLUCOSE mg/dL 153* 143* 149* 117*   < > 165*  --    < > 278*   WBC 10*3/mm3 16.03* 18.39*  --  15.14*   < > 29.62*  --    < > 10.79   HEMOGLOBIN g/dL 11.4* 10.2*  --  10.1*   < > 12.1*  --    < > 14.4   HEMOGLOBIN, POC   --   --   --   --   --   --   --    < >  --    PLATELETS 10*3/mm3 146 116*  --  118*   < > 188  --    < > 255   INR   --   --   --  1.16*  --  1.20* 1.5*   < > 0.93   PROBNP pg/mL  --   --   --   --   --   --   --   --  531.0    < > = values in this interval not displayed.       Lab Results   Component Value Date    CALCIUM 9.1 05/27/2023    PHOS 5.1 (H) 05/25/2023             Results from last 7 days   Lab Units 05/26/23  0351 05/25/23  0641 05/24/23  1844 05/24/23  1706 05/24/23  1242 05/24/23  1059 05/24/23  1020 05/24/23  0722 05/23/23  1502   PH, ARTERIAL pH units 7.406 7.357 7.336* 7.432 7.373 7.4380 7.3590   < > 7.529*   PO2 ART mm Hg 76.7* 80.0 108.1* 85.7 125.1*  --   --   --  83.5   PCO2, ARTERIAL mm Hg 37.1 36.6 42.3 34.8* 43.1  --   --   --  35.9   HCO3 ART mmol/L 23.3 20.5* 22.6 23.2 25.1  --   --   --  29.9*    < > = values in this interval not displayed.        I have personally reviewed the results from the time of this admission to 5/27/2023 15:57 EDT and agree with these findings:  [x]  Laboratory  [x]  Microbiology  [x]  Radiology  []  EKG/Telemetry   [x]  Cardiology/Vascular   []  Pathology  []  Old records    ASSESSMENT   Postop hypoxia  Steroid dependence -several months  Severe valvular heart disease s/p MVR/TV  repair-5/24  Previous COVID-19 infection -no ILD concern on CT  Chronic diastolic heart failure  CKD 3  PING  Asthma  Obesity     RECOMMENDATIONS:  Patient making progress from a respiratory standpoint.  Chest x-ray reviewed.  No acute concerns noted.  Nephrology managing volume status  Postop management per cardiac surgery  Continue with plan to slowly taper prednisone. 5 mg today for a week and alt days after that for a week and STOP  C/w nebulized medications for asthma  Continue with physical therapy and out of bed  Guarded prognosis    Follow-up outpatient with Dr. Slater in 6 to 8 weeks to confirm patient tolerating the current treatment plan.    Laith Gayle MD  5/27/2023

## 2023-05-27 NOTE — CONSULTS
Nutrition Services    Patient Name:  Vernon Solorzano  YOB: 1950  MRN: [id number]  Admit Date:  5/23/2023    Assessment Date:  05/27/23    Comment: Consulted for cortrak placement and TF Assessment:  Consulted for cortrak placement and TF Assessment. POD#3 s/p MVR/TV repair. Pt failed VFSS today with SLP and recommend alternative means of nutrition at this time. Placed Cortrak at the bedside (ND) and bridled in place at 100 cm.   IVF@ 50 ml/hr    Recommend:  1. Diabetisource AC @ 25 ml/hr and increase 25 ml q 6 hrs as tolerates to goal rate of 80 ml/hr with 35 ml/hr free water flushes.   (Provides: 2304 kcal, 115 gm protein, 1566 mL free water + 840 mL water flushes)    Will follow per protocol.    CLINICAL NUTRITION ASSESSMENT      Reason for Assessment Cortrak Placement, Physician Consult, Tube Feeding Assessment      Diagnosis/Problem   Dx: Mitral Valve Disease, POD#3 s/p MVR/TV repair    Medical/Surgical History Past Medical History:   Diagnosis Date   • Allergic rhinitis    • Arthritis    • BPH (benign prostatic hyperplasia)    • Diabetes mellitus     TYPE 2   • Foraminal stenosis of cervical region     C5-C6   • GERD (gastroesophageal reflux disease)    • Hemorrhoids    • History of urinary retention 2010    S/P TURP   • Hyperlipidemia    • Macular degeneration    • PING (obstructive sleep apnea) 01/07/2016    MILD, SEES DR. AMARILIS MORGAN       Past Surgical History:   Procedure Laterality Date   • CARDIAC CATHETERIZATION N/A 5/11/2023    Procedure: Right Heart Cath;  Surgeon: Corey Gaffney MD;  Location:  [facility] CATH INVASIVE LOCATION;  Service: Cardiology;  Laterality: N/A;   • CARDIAC CATHETERIZATION N/A 5/11/2023    Procedure: Left Heart Cath;  Surgeon: Corey Gaffney MD;  Location:  [facility] CATH INVASIVE LOCATION;  Service: Cardiology;  Laterality: N/A;   • CARDIAC CATHETERIZATION N/A 5/11/2023    Procedure: Left ventriculography;  Surgeon: Corey Gaffney MD;  Location: [facility] CATH  "INVASIVE LOCATION;  Service: Cardiology;  Laterality: N/A;   • CARDIAC CATHETERIZATION N/A 5/11/2023    Procedure: Coronary angiography;  Surgeon: Corey Gaffney MD;  Location: [facility] CATH INVASIVE LOCATION;  Service: Cardiology;  Laterality: N/A;   • COLONOSCOPY N/A     WNL PER PT, NO RECORDS,    • COLONOSCOPY N/A 04/07/2009    DR. GORGE BENAVIDEZ AT [place]   • MITRAL VALVE REPAIR/REPLACEMENT N/A 5/24/2023    Procedure: MITRAL VALVE REPAIR/REPLACEMENT TRICUSPID VALVE REPAIR/REPLACEMENT TRANSESOPHAGEAL ECHOCARDIOGRAM WITH ANESTHESIA;  Surgeon: George Frey MD;  Location: [facility] CVOR;  Service: Cardiothoracic;  Laterality: N/A;   • PROSTATE SURGERY N/A 11/12/2010    TURP, DR. COREY COLLAZO AT [facility]   • SKIN TAG REMOVAL N/A 12/20/2017    ANAL SKIN TAG X2, PERFORMED IN OFFICE, DR. LISA ORANTES   • TURP / TRANSURETHRAL INCISION / DRAINAGE PROSTATE  2010    Dr. Goodrich        Encounter Information        Nutrition History:  Consulted for cortrak placement and TF Assessment. POD#3 s/p MVR/TV repair. Pt failed VFSS today with SLP and recommend alternative means of nutrition at this time. Placed Cortrak at the bedside (ND) and bridled in place at 100 cm.    Food Preferences:    Supplements:    Factors Affecting Intake: swallow impairment     Anthropometrics        Current Height  Current Weight  BMI kg/m2 Height: 182.9 cm (72\")  Weight: 118 kg (260 lb 2.3 oz) (05/27/23 0823)  Body mass index is 35.28 kg/m².   Adjusted BMI (if applicable)    BMI Category Obese, Class II (35 - 39.9)       Admission Weight 118 kg (260 lb 2.3 oz)       Ideal Body Weight (IBW) 171 lb   Adjusted IBW (if applicable)        Usual Body Weight (UBW) 250-260 lb   Weight Change/Trend Stable       Weight History Wt Readings from Last 30 Encounters:   05/27/23 0823 118 kg (260 lb 2.3 oz)   05/26/23 0855 118 kg (260 lb)   05/26/23 0700 118 kg (260 lb 2.3 oz)   05/25/23 0600 121 kg (267 lb 6.7 oz)   05/24/23 2000 117 kg (257 lb 15 oz) "   05/23/23 1531 117 kg (257 lb 0.9 oz)   05/15/23 1512 117 kg (257 lb 6.4 oz)   05/12/23 0600 117 kg (257 lb 6.4 oz)   05/11/23 0500 114 kg (251 lb 3.2 oz)   05/10/23 0600 116 kg (255 lb 6.4 oz)   05/09/23 1527 120 kg (265 lb)   05/09/23 1105 113 kg (250 lb)   05/09/23 1107 120 kg (265 lb)   12/20/17 1323 114 kg (250 lb 8 oz)           --  Estimated/Assessed Needs       Energy Requirements    Weight for Calculation 118 kg (260 lb 2.3 oz)   Method for Estimation  20 kcal/kg   EST Needs (kcal/day) 2360 kcals       Protein Requirements    Weight for Calculation 118 kg (260 lb 2.3 oz)   EST Protein Needs (g/kg) 1.0 - 1.2 gm/kg   EST Daily Needs (g/day) 118-141 g       Fluid Requirements     Method for Estimation 1 mL/kcal    Estimated Needs (mL/day) 2360 ml     Tests/Procedures        Tests/Procedures GAUDENCIO, VFSS, X-Ray     Labs       Pertinent Labs    Results from last 7 days   Lab Units 05/27/23  0604 05/26/23  0304 05/25/23  1350 05/24/23  0406 05/23/23  1429   SODIUM mmol/L 151* 147* 142   < > 140   POTASSIUM mmol/L 3.5 4.0 4.1   < > 4.3   CHLORIDE mmol/L 111* 110* 110*   < > 102   CO2 mmol/L 25.5 23.9 20.1*   < > 26.4   BUN mg/dL 35* 26* 20   < > 17   CREATININE mg/dL 1.32* 1.37* 1.40*   < > 1.28*   CALCIUM mg/dL 9.1 8.9 8.9   < > 9.6   BILIRUBIN mg/dL  --  0.4  --   --  0.4   ALK PHOS U/L  --  45  --   --  71   ALT (SGPT) U/L  --  23  --   --  27   AST (SGOT) U/L  --  80*  --   --  14   GLUCOSE mg/dL 153* 143* 149*   < > 278*    < > = values in this interval not displayed.     Results from last 7 days   Lab Units 05/27/23  0604 05/26/23  0304 05/25/23  1350 05/25/23  0307 05/24/23  0722 05/23/23  1429   MAGNESIUM mg/dL 2.6* 2.7* 2.9* 3.0*   < > 2.4   PHOSPHORUS mg/dL  --   --   --  5.1*   < >  --    HEMOGLOBIN g/dL 11.4* 10.2*  --  10.1*   < > 14.4   HEMOGLOBIN, POC   --   --   --   --    < >  --    HEMATOCRIT % 34.1* 31.0*  --  29.6*   < > 43.8   HEMATOCRIT POC   --   --   --   --    < >  --    WBC 10*3/mm3 16.03*  18.39*  --  15.14*   < > 10.79   TRIGLYCERIDES mg/dL  --   --   --   --   --  197*   ALBUMIN g/dL  --  4.1  --  4.2   < > 4.2    < > = values in this interval not displayed.     Results from last 7 days   Lab Units 05/27/23  0604 05/26/23  0304 05/25/23  0307 05/24/23  1626 05/24/23  1210 05/24/23  1103 05/24/23  1056 05/23/23  1429   INR   --   --  1.16*  --  1.20* 1.5* 1.47* 0.93   APTT seconds  --   --   --   --  26.2  --  24.9 24.0   PLATELETS 10*3/mm3 146 116* 118* 135* 188  --  160 255     COVID19   Date Value Ref Range Status   05/23/2023 Not Detected Not Detected - Ref. Range Final     Lab Results   Component Value Date    HGBA1C 9.20 (H) 05/23/2023          Medications           Scheduled Medications acetylcysteine, 3 mL, Nebulization, BID - RT  aspirin, 81 mg, Oral, Daily  atorvastatin, 40 mg, Oral, Nightly  enoxaparin, 40 mg, Subcutaneous, Daily  guaiFENesin, 1,200 mg, Oral, Q12H  insulin lispro, 3-14 Units, Subcutaneous, Q6H  ipratropium-albuterol, 3 mL, Nebulization, 4x Daily - RT  mupirocin, , Each Nare, BID  pantoprazole, 40 mg, Intravenous, QAM AC  predniSONE, 5 mg, Oral, Daily With Breakfast  senna-docusate sodium, 2 tablet, Oral, Nightly       Infusions dextrose, 50 mL/hr, Last Rate: 50 mL/hr (05/27/23 1258)       PRN Medications •  acetaminophen **OR** acetaminophen **OR** acetaminophen  •  bisacodyl  •  bisacodyl  •  dextrose  •  dextrose  •  glucagon (human recombinant)  •  hydrALAZINE  •  HYDROcodone-acetaminophen  •  ipratropium-albuterol  •  magnesium hydroxide  •  Morphine **AND** naloxone  •  nitroglycerin  •  ondansetron  •  polyethylene glycol  •  Potassium Replacement - Follow Nurse / BPA Driven Protocol     Physical Findings          Physical Appearance alert, disoriented, obese, on oxygen therapy   Oral/Mouth Cavity tooth or teeth missing   Edema  generalized, lower extremity , upper extremity, 1+ (trace), 2+ (mild)   Gastrointestinal hypoactive bowel sounds, last bowel movement:5/26    Skin  surgical incision   Tubes/Drains/Lines chest tube, Cortrak, ND tube, bridle in place   NFPE Not indicated at this time   --  Current Nutrition Orders & Evaluation of Intake       Oral Nutrition     Food Allergies NKFA   Current PO Diet NPO Diet NPO Type: Strict NPO   Supplement n/a   PO Evaluation     % PO Intake     # of Days Evaluated    --  PES STATEMENT / NUTRITION DIAGNOSIS      Nutrition Dx Problem  Problem: Inadequate Intake/Infusion  Etiology: Functional Diagnosis and Factors Affecting Nutrition dysphagia  Signs/Symptoms: NPO    Comment:    --  NUTRITION INTERVENTION / PLAN OF CARE      Intervention Goal(s) Maintain nutrition status, Nutrition support treatment, Reduce/improve symptoms, Meet estimated needs, Disease management/therapy, Initiate TF/PN, Tolerate TF/PN at goal, Transition TF to PO and Appropriate weight loss         RD Intervention/Action Await initiation of EN/PN, Follow Tx Progress, Care plan reviewed and Recommend/ordered:          Prescription/Orders:       PO Diet Diet per SLP once passes VFSS      Supplements       Snacks       Enteral Nutrition See below      Parenteral Nutrition    New Prescription Ordered? Yes   --   Enteral Prescription:     Enteral Route ND    TF Delivery Method Continuous    Enteral Product Diabetisource AC    Modular None    Propofol Rate/Kcal -    TF Start Rate (mL/hr) 25 ml/hr    TF Goal Rate (mL/hr) 80 ml/hr    Free Water Flush (mL) 35 ml/hr    TF Provision at Goal: 2304 kcal, 115 gm protein, 1566 mL free water + 840 mL water flushes         Calories 100% needs met         Protein  99% needs met         Fluid (mL)  2406 mL total     Prescription Ordered Yes         Monitor/Evaluation Per protocol   Discharge Plan/Needs Pending clinical course   Education Will instruct as appropriate   --    RD to follow per protocol.      Electronically signed by:  Payton Santiago RD  05/27/23 14:30 EDT

## 2023-05-27 NOTE — PROGRESS NOTES
[facility] Progress Note      Patient Name: Vernon Solorzano  : 1950  MRN: [id number]  Primary Care Physician:  Liza Oconnell III, NP-C  Date of admission: 2023    Subjective     Interval History:     Seen and examined.  No shortness of air or chest. answers most questions properly.    Review of Systems:   As noted above    Objective     Vitals:   Temp:  [97.5 °F (36.4 °C)-98.7 °F (37.1 °C)] 98 °F (36.7 °C)  Heart Rate:  [69-81] 69  Resp:  [16-19] 19  BP: (135-161)/(76-97) 161/85  Flow (L/min):  [2] 2    Intake/Output Summary (Last 24 hours) at 2023 1217  Last data filed at 2023 1020  Gross per 24 hour   Intake 100 ml   Output 3931 ml   Net -3831 ml       Physical Exam:    General Appearance: alert, , no acute distress   Skin: warm and dry  HEENT: oral mucosa normal, nonicteric sclera  Neck: supple, no JVD  Lungs: CTA  Heart: RRR, normal S1 and S2  Abdomen: soft, nontender, nondistended  : no palpable bladder  Extremities: no edema, cyanosis or clubbing      Scheduled Meds:     acetylcysteine, 3 mL, Nebulization, BID - RT  aspirin, 81 mg, Oral, Daily  atorvastatin, 40 mg, Oral, Nightly  enoxaparin, 40 mg, Subcutaneous, Daily  guaiFENesin, 1,200 mg, Oral, Q12H  insulin lispro, 3-14 Units, Subcutaneous, Q6H  ipratropium-albuterol, 3 mL, Nebulization, 4x Daily - RT  mupirocin, , Each Nare, BID  pantoprazole, 40 mg, Intravenous, QAM AC  predniSONE, 5 mg, Oral, Daily With Breakfast  senna-docusate sodium, 2 tablet, Oral, Nightly      IV Meds:   dextrose 5 % and sodium chloride 0.45 %, 50 mL/hr        Results Reviewed:   I have personally reviewed the results from the time of this admission to 2023 12:17 EDT     Results from last 7 days   Lab Units 23  0604 23  0304 23  1350 23  0406 23  1429   SODIUM mmol/L 151* 147* 142   < > 140   POTASSIUM mmol/L 3.5 4.0 4.1   < > 4.3   CHLORIDE mmol/L 111* 110* 110*   < > 102    CO2 mmol/L 25.5 23.9 20.1*   < > 26.4   BUN mg/dL 35* 26* 20   < > 17   CREATININE mg/dL 1.32* 1.37* 1.40*   < > 1.28*   CALCIUM mg/dL 9.1 8.9 8.9   < > 9.6   BILIRUBIN mg/dL  --  0.4  --   --  0.4   ALK PHOS U/L  --  45  --   --  71   ALT (SGPT) U/L  --  23  --   --  27   AST (SGOT) U/L  --  80*  --   --  14   GLUCOSE mg/dL 153* 143* 149*   < > 278*    < > = values in this interval not displayed.     Estimated Creatinine Clearance: 65.1 mL/min (A) (by C-G formula based on SCr of 1.32 mg/dL (H)).  Results from last 7 days   Lab Units 05/27/23  0604 05/26/23  0304 05/25/23  1350 05/25/23  0307 05/24/23  1626 05/24/23  1210   MAGNESIUM mg/dL 2.6* 2.7* 2.9* 3.0* 2.9* 3.4*   PHOSPHORUS mg/dL  --   --   --  5.1* 0.7* 2.2*         Results from last 7 days   Lab Units 05/27/23  0604 05/26/23  0304 05/25/23  0307 05/24/23  1626 05/24/23  1210   WBC 10*3/mm3 16.03* 18.39* 15.14* 23.61* 29.62*   HEMOGLOBIN g/dL 11.4* 10.2* 10.1* 11.0* 12.1*   PLATELETS 10*3/mm3 146 116* 118* 135* 188     Results from last 7 days   Lab Units 05/25/23  0307 05/24/23  1210 05/24/23  1103 05/24/23  1056 05/23/23  1429   INR  1.16* 1.20* 1.5* 1.47* 0.93       Assessment / Plan     ASSESSMENT:  1.  Acute kidney injury on chronic kidney disease stage III history.  Baseline creatinine 1.2 mg/dL and it went up to 1.68 mg/dL 05/24 and today is down to 1.4 mg/dL.  Volume status appears to be generous.  Renal ultrasound showed normal-looking kidneys except of 6 cm cyst with septation in the upper pole of the right kidney.  Urinalysis essentially unremarkable.       2.  Chronic kidney disease stage III with baseline creatinine 1.2 mg/dL  3.  Moderate to severe mitral regurgitation status post mitral valve repair placement and tricuspid valve repair on May 24  4.  Severe pulmonary hypertension  5.  History of COVID-19 pneumonia with possible interstitial lung disease on chronic steroid   6.  Diastolic congestive heart failure  7.  Postoperative anemia  and thrombocytopenia     PLAN:     1. Hold diuretics and increase free water intake  2.  We will need urology evaluation for his assist as an outpatient  3.  Surveillance labs       Thank you for involving us in the care of Vernon Solorzano.  Please feel free to call with any questions.    Tae Lay MD  05/27/23  12:17 EDT    [facility]  [phone]

## 2023-05-27 NOTE — NURSING NOTE
Spoke with Christina WEIR with [facility] regarding EP consult from yesterday am, No one has seen patient. She will contact Dr. Arango and verify that consult called.

## 2023-05-27 NOTE — PROGRESS NOTES
Rhythm may be improved.  Creatinine is about the same at 1.32.  Feeding tube was ordered last night but not done.  Apparently he is have a swallow study today.  He needs IV fluids as maintenance for a while.  Discontinue chest tubes.    He knew me today.  This was an improvement.

## 2023-05-27 NOTE — PROGRESS NOTES
"DOS: 2023  NAME: Vernon Solorzano   : 1950  PCP: Liza Oconnell III, NP-C  Reason for consult: AMS    Stroke    Subjective: Little more alert and answering questions a little better today compared to yesterday.  Patient notes some right hand weakness.  Family at bedside.  Required significant help to get out of bed to the chair but got back into bed on his own per nursing.    Objective:  Vital signs: /85   Pulse 69   Temp 98 °F (36.7 °C) (Oral)   Resp 19   Ht 182.9 cm (72\")   Wt 118 kg (260 lb 2.3 oz)   SpO2 97%   BMI 35.28 kg/m²       General appearance: Well developed, well nourished, drowsy and cooperative.   HEENT: Normocephalic.   Cardiac: Irregular, paced rhythm.  Chest Exam: Clear to auscultation bilaterally, no wheezes, no rhonchi.  Extremities: Trace edema in extremities.  Skin: No rashes or birthmarks.      Higher integrative function: Drowsy, oriented to self, [facility], [place], operated by [facility], situation.  Stated it was October, unable to state the year.  Impaired recent memory, attention/concentration.  Follows simple commands without difficulty.  Naming intact except for unable to name \"4 x 4/gauze.\"  No neglect.  Cranial nerves: Visual fields intact, extraocular movements intact.  PERRL.  Normal facial sensation.  Face symmetric.  Hearing intact, symmetric palatal movement.  Tongue midline.  No dysarthria.  Motor:  Right upper extremity 4+ out of 5, with drift, left upper extremity and bilateral lower extremities 5 out of 5.  Mild asterixis.  Sensation: Intact/symmetric to LT x4.    Station and gait: Deferred.  Coordination Unable to test coordination, not following complex commands.      Scheduled Meds:acetylcysteine, 3 mL, Nebulization, BID - RT  aspirin, 81 mg, Oral, Daily  atorvastatin, 40 mg, Oral, Nightly  enoxaparin, 40 mg, Subcutaneous, Daily  guaiFENesin, 1,200 mg, Oral, Q12H  insulin lispro, 3-14 Units, Subcutaneous, Q6H  ipratropium-albuterol, 3 mL, Nebulization, 4x " Daily - RT  mupirocin, , Each Nare, BID  pantoprazole, 40 mg, Intravenous, QAM AC  predniSONE, 5 mg, Oral, Daily With Breakfast  senna-docusate sodium, 2 tablet, Oral, Nightly      Continuous Infusions:dextrose 5 % and sodium chloride 0.45 %, 50 mL/hr      PRN Meds:.•  acetaminophen **OR** acetaminophen **OR** acetaminophen  •  bisacodyl  •  bisacodyl  •  dextrose  •  dextrose  •  glucagon (human recombinant)  •  hydrALAZINE  •  HYDROcodone-acetaminophen  •  ipratropium-albuterol  •  magnesium hydroxide  •  Morphine **AND** naloxone  •  nitroglycerin  •  ondansetron  •  polyethylene glycol  •  Potassium Replacement - Follow Nurse / BPA Driven Protocol    Laboratory results:  Lab Results   Component Value Date    GLUCOSE 153 (H) 05/27/2023    CALCIUM 9.1 05/27/2023     (H) 05/27/2023    K 3.5 05/27/2023    CO2 25.5 05/27/2023     (H) 05/27/2023    BUN 35 (H) 05/27/2023    CREATININE 1.32 (H) 05/27/2023    EGFRIFAFRI 76 12/15/2021    EGFRIFNONA 66 12/15/2021    BCR 26.5 (H) 05/27/2023    ANIONGAP 14.5 05/27/2023     Lab Results   Component Value Date    WBC 16.03 (H) 05/27/2023    HGB 11.4 (L) 05/27/2023    HCT 34.1 (L) 05/27/2023    MCV 88.6 05/27/2023     05/27/2023     Lab Results   Component Value Date    CHOL 155 05/23/2023     Lab Results   Component Value Date    HDL 41 05/23/2023    HDL 39 (L) 03/29/2023    HDL 30 (L) 06/07/2021     Lab Results   Component Value Date    LDL 81 05/23/2023    LDL 59 03/29/2023    LDL 59 06/07/2021     Lab Results   Component Value Date    TRIG 197 (H) 05/23/2023    TRIG 107 03/29/2023    TRIG 273 (H) 06/07/2021         Lab 05/23/23  1429   HEMOGLOBIN A1C 9.20*      Review and interpretation of imaging:  XR Chest 1 View    Result Date: 5/27/2023  XR CHEST 1 VW-  HISTORY:  Post chest tube removal.  COMPARISON:  Chest radiograph 05/27/2023  FINDINGS:  A single view of the chest was obtained. There is a right IJ central line, not significantly changed. The cardiac  silhouette and mediastinal and hilar contours are not significantly changed. Pulmonary opacities are similar. There is no definite pneumothorax. Sternotomy wires are present.  This report was finalized on 5/27/2023 10:48 AM by Dr. Modesta Gannon M.D.      XR Chest 1 View    Result Date: 5/27/2023  XR CHEST 1 VW-  HISTORY:  Follow-up open heart.  COMPARISON:  Chest radiograph 05/26/2023  FINDINGS:  A single view of the chest was obtained. There is a right IJ central line, not significantly changed. The mediastinal drains are poorly visualized. The cardiac silhouette is enlarged. Mediastinal contours are not significantly changed. There is central pulmonary venous congestion. Left greater than right airspace opacities and small pleural effusions are similar to 05/26/2023. There are low lung volumes. Sternotomy wires are present.  This report was finalized on 5/27/2023 6:27 AM by Dr. Modesta Gannon M.D.      XR Chest 1 View    Result Date: 5/26/2023  Patient: KRIS TAYLOR  Time Out: 05:14 Exam(s): XR CXR 1 VIEW EXAM:   XR Chest, 1 View CLINICAL HISTORY:      Post-Op Heart Surgery. TECHNIQUE:   Frontal view of the chest. COMPARISON:   Chest radiograph 5 25 2023 FINDINGS:   Lungs:  Bilateral airspace opacities are present.   Pleural space:  Small bilateral pleural effusions.  No pneumothorax.   Heart:  Cardiomegaly.   Mediastinum:  The mediastinal drain is stable.   Bones joints:  Unremarkable.   Tubes, lines and devices:  Interval removal of the [place] catheter with persistent right IJ sheath. IMPRESSION:     1.  Bilateral airspace opacities may represent pulmonary edema and or atypical infection. 2.  Cardiomegaly. 3.  Interval removal of the [place] catheter with persistent right IJ sheath. 4.  Small bilateral pleural effusions.     Impression: Electronically signed by Laura Davidson MD on 05-26-23 at 0514    Adult Transthoracic Echo Limited W/ Cont if Necessary Per Protocol    Result Date: 5/26/2023  •  Limited  echocardiogram for left and right ventricular function. •  The left ventricular cavity is mildly dilated. •  Septal wall motion is abnormal, consistent with a post-operative state. •  Left ventricular systolic function is normal. Calculated left ventricular EF = 50% •  The right ventricular cavity is mildly dilated. Normal right ventricular systolic function noted. •  The left atrial cavity is moderately dilated. •  The mitral valve mean gradient is 7.6 mmHg. There is a bioprosthetic mitral valve present. The prosthetic mitral valve is grossly normal. •  Mild tricuspid valve regurgitation is present. •  Calculated right ventricular systolic pressure from tricuspid regurgitation is 54 mmHg. 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  On 5/25 after extubation he was confused and there was concern for aphasia and right hand weakness.  He was seen by Dr. Sage and exam was most notable for encephalopathy with asterixis and concern for delirium.  Nephrology following for CUONG.  CT head showed no acute findings.     Diagnosis:  VHD s/p MVR/TVr  Right upper extremity weakness, mild, possible stroke  AMS secondary to delirium/encephalopathy with asterixis on exam  CUONG  Hypernatremia    Plan:  On aspirin 81 mg daily, Lipitor 40 mg daily, as well as Lovenox for DVT prophylaxis.  Mental status improving, most consistent with toxic metabolic encephalopathy.  Hypernatremia likely contributing.  Right upper extremity weakness mild, stable.  Recommend delirium precautions as below.  Discussed with Dr. Sage today. Will follow.

## 2023-05-27 NOTE — OP NOTE
Operative Note    Date of Dictation: 05/27/23    Date of Procedure: 05/24/23    Referring Physician: Karishma Arango MD    Preoperative diagnosis:   1.  Severe mitral regurgitation  2.  Annular dilatation and possibly inflammatory etiology  3.  Moderate to severe tricuspid regurgitation  4.  Dyspnea with exertion  5.  Congestive heart failure class II-III  6.  CKD    Postoperative diagnosis:   Same    Procedure:   1. Mitral valve repair with a 28 mm physio II ring annuloplasty with residual mild to moderate MR  2.  Mitral valve replacement with a 29 mm Schwartz II porcine prosthesis  3.  Tricuspid valve repair with 28 mm physio tricuspid ring  4.  Left atrial appendage endocardial closure    Surgeon: George Frey MD     Assistants: EMILIANA Camilo   was responsible for performing the following activities: Retraction, Suction, Irrigation, Suturing, Closing, Placing Dressing and All aspects of the complex case and their skilled assistance was necessary for the success of this case.    Anesthesia: General endotracheal anesthesia and GAUDENCIO    Findings:  The mitral valve had fibrosis of the anterior leaflet and retraction of the posterior leaflet suggesting some postinflammatory or rheumatic changes.  Tricuspid valve had annular dilatation    Estimated Blood Loss: Approximately 600 cc, most of it recovered with a Cell Saver device and cardiotomy suckers and was retransfuse to the patient    STS Data:    The patient was explained the risks (STS risk score calculated), benefits and alternatives of surgery and agreed to proceed. The antibiotics and b blockers were given in the STS required window.  Counseling was given about diet, alcohol and tobacco use as needed          Description of the procedure:     The patient was placed supine on the operative table. Anesthesia was given and lines placed. The patient was prepped and draped using the usual sterile technique. A median sternotomy was performed with a scalpel and the  layers carried down to the sternum using the electrocautery. The sternum was split in the midline using a vertical oscillating saw. Hemostasis was achieved. A Ema retractor was placed and the anterior mediastinum was exposed. The pericardium was opened and edges tacked to the wound.  300 units of IV heparin was given to maintain the ACT over 400.  4-0 Prolene cannulation sutures were placed in the ascending aorta and both superior and inferior vena cava. Small tip cannulas were placed and aorto bicaval cardiopulmonary bypass was started. Cardioplegia cannulas were placed and then the ascending aorta was clamped. One liter of cold blood cardioplegia was given in an antegrade fashion to achieve diastolic arrest and further doses every 15 minutes thereafter also using retrograde infusion.  The left atrium was opened in the right interatrial groove and incision was extended below each cava.  Self retracting blades were placed with good exposure.  Left atrial appendage was primarily closed with a double layer of 3-0 Prolene continuous suture.  The mitral valve was explored and showed fibrotic changes and retraction of the posterior leaflet.  Also there was thickening of the anterior papillary muscles.    MV REPAIR:  The mitral valve was systematically evaluated to identify the etiology using valve hooks. The problem was Posteriorly for retraction and fibrosis of the anterior leaflet and subvalvular apparatus..  I believe that the valve could be repaired therefore an attempt was performed.. Then, 2.0 Ethibond suture were placed in a plicating fashion Circumferentially around the annulus annulus. A ring was selected by measuring the inter-trigonal distance and the AP distance of the anterior leaflet. The sutures were passed through the28 mm physio 2 ring and the knots secured. The valve was tested by injecting saline and no residual leak was seen. Then the atrium was closed using a 3.0 prolene running sutures, the  chamber de-aired and suture tied down. and MVR:  The mitral valve was evaluated using valve hooks and replacement was indicated because The mitral valve repair failed upon evaluation timing of bypass.  At that time the left atrium was reopened and the sutures were removed as well as the ring.  The anterior leaflet was resected and the chordae to the commissures and posterior leaflets were preserved.. 2.0 Ethibond pledgeted sutures were placed in a supra annular fashion circumferentially around the annulus using a transverse mattress technique. The valve was sized to a 29 mm Schwartz II porcine prosthesis. The sutures were passed through the cuff and the valve descended. The knots were secured and sutures cut. The valve was tested and there were no paravalvular leaks. Then the atrium was closed using a 3.0 prolene running suture, the atrium de-aired by filling it with blood and the sutures tied down.  Note, the core knot device was used to secure the knots.  Tricuspid Performed: Yes:  The superior and inferior vena cava were snared. The right atrium was opened and the edges retracted. The tricuspid valve was exposed. There was annular dilatation. I placed 3.0 Ethibond non pledgeted sutures in a plication fashion around the annulus, avoiding the area of the conduction bundle. I selected a 28 mm physio tricuspid ring as measured to the septal leaflet and septal inter- trigonal area measured. The sutures were passed through the ring and then the ring seated. The knots were secured and strings cut. The valve was tested and no leak was seen. The right atrium was closed using a 4.0 prolene suture in 2 layers, with good hemostasis. The valve was repaired while the patient was rewarmed.     Then root was de-aired and the aortic clamp removed.The left pleural space was suctioned and the lungs ventilated. The heart was paced till regular atrial rhythm resumed. I allowed the heart to eject  and I de-aired the left heart chambers  under GAUDENCIO guidance and once hemodynamics were acceptable, then the CPB was discontinued and the venous and cardioplegia cannulas removed. The matching dose of protamine was given and the aortic cannula removed as well. AV temporary wires and pleural and mediastinal chest tubes were placed and the wound sprayed with platelet rich plasma.  Low-dose inotropic support was used to enhance the hemodynamics. the perioperative GAUDENCIO showed the mitral valve well seated without significant perivalvular leaks competent tricuspid valve post repair. The sternum was closed with single and double wires and soft tissue in layers of reabsorbable material. The wounds were covered with sterile dressings.       Specimen removed: Anterior leaflet mitral valve    CPB time: 156 minutes    Aortic clamp time: 119 minutes    Complications:  none           Disposition: Cardiovascular recovery room           Condition: Critical but stable.

## 2023-05-27 NOTE — PLAN OF CARE
Goal Outcome Evaluation:  Plan of Care Reviewed With: patient, spouse        Progress: no change  Outcome Evaluation: Cortrack placed, tube feeding started. Chest tubes and RIJ removed, tolerating well. AV and V paced, underlying junctional with some SR beats. Allen catheter inplace, IV Bumex this am and good output noted. Restless at times, SATs on room air 94%. Respirations at times after activity 22-24. SATs remain 94%.

## 2023-05-27 NOTE — NURSING NOTE
AC follow up regarding confusion. Chart reviewed. Noted that pt's confusion has somewhat improved, however will follow for support and note that if there is a decline that the inpatient psychiatrist may need to see.

## 2023-05-27 NOTE — PLAN OF CARE
Goal Outcome Evaluation:  Plan of Care Reviewed With: patient           Outcome Evaluation: VFSS completed. mod-severe paryngeal dysphagia characterized by poor tongue base retraction, weak laryngeal elevation and inconsistent, sluggish epiglottic deflection. deep penetration noted with puree, nectar and honey thick liquids. pharyngeal residue with puree caused silent aspiration after the swallow. silent aspiration also noted with trials of nectar thick liquids. pt fatigued quickly and swallow quality declined as study advanced. REC: NPO x ice chips sparingly w/supervision, meds via alternate route, repeat VFSS as pt strength improves, SLP to follow.

## 2023-05-27 NOTE — PROGRESS NOTES
[facility]  Progress note: 2023    Patient Identification:  Name:Vernon Solorzano  Age:72 y.o.  Sex: male  :  1950  MRN: [id number]           CC:  Valvular heart disease, congestive heart failure, postop aphasia and weakness.    Interval history:  Significant diuresis overnight.  Possible feeding tube to be placed and increase free water to be administered.  He is arousable and does recognize me.  Follows commands.  Actually stood and walked to the bathroom on his own earlier today unattended.    Vital Signs:   Temp:  [97.5 °F (36.4 °C)-98.7 °F (37.1 °C)] 98 °F (36.7 °C)  Heart Rate:  [66-81] 69  Resp:  [16-19] 19  BP: (119-161)/(70-97) 161/85    Intake/Output Summary (Last 24 hours) at 2023 0919  Last data filed at 2023 0836  Gross per 24 hour   Intake 100 ml   Output 4256 ml   Net -4156 ml       Physical Examination:    General Appearance No acute distress   Neck No adenopathy, supple, trachea midline, no thyromegaly, no carotid bruit, no JVD   Lungs Clear to auscultation,respirations regular, even and unlabored   Heart Regular rhythm and normal rate, normal S1 and S2, no murmur, no gallop, no rub, no click   Chest wall No abnormalities observed   Abdomen Normal bowel sounds, no masses, no hepatomegaly, soft   Extremities Moves all extremities well, no edema, no cyanosis, no redness   Neurological Alert and oriented x 3     Lab Review:  Personally reviewed the labs, radiology imaging and other cardiac procedures.   Results from last 7 days   Lab Units 23  0604 23  0304   SODIUM mmol/L 151* 147*   POTASSIUM mmol/L 3.5 4.0   CHLORIDE mmol/L 111* 110*   CO2 mmol/L 25.5 23.9   BUN mg/dL 35* 26*   CREATININE mg/dL 1.32* 1.37*   CALCIUM mg/dL 9.1 8.9   BILIRUBIN mg/dL  --  0.4   ALK PHOS U/L  --  45   ALT (SGPT) U/L  --  23   AST (SGOT) U/L  --  80*   GLUCOSE mg/dL 153* 143*         Results from last 7 days   Lab Units 23  0604 23  0304 23  0307   WBC  10*3/mm3 16.03* 18.39* 15.14*   HEMOGLOBIN g/dL 11.4* 10.2* 10.1*   HEMATOCRIT % 34.1* 31.0* 29.6*   PLATELETS 10*3/mm3 146 116* 118*     Results from last 7 days   Lab Units 05/25/23  0307 05/24/23  1210 05/24/23  1103 05/24/23  1056 05/23/23  1429   INR  1.16* 1.20* 1.5* 1.47* 0.93   APTT seconds  --  26.2  --  24.9 24.0     Medication Review:   Meds reviewed  Scheduled Meds:acetylcysteine, 3 mL, Nebulization, BID - RT  aspirin, 81 mg, Oral, Daily  atorvastatin, 40 mg, Oral, Nightly  enoxaparin, 40 mg, Subcutaneous, Daily  guaiFENesin, 1,200 mg, Oral, Q12H  insulin lispro, 3-14 Units, Subcutaneous, Q6H  ipratropium-albuterol, 3 mL, Nebulization, 4x Daily - RT  mupirocin, , Each Nare, BID  pantoprazole, 40 mg, Intravenous, QAM AC  potassium chloride, 20 mEq, Intravenous, Q1H  predniSONE, 5 mg, Oral, Daily With Breakfast  senna-docusate sodium, 2 tablet, Oral, Nightly      Continuous Infusions:dextrose 5 % and sodium chloride 0.45 %, 50 mL/hr        I personally viewed and interpreted the patient's EKG/Telemetry data    Assessment and Plan  1.  Severe symptomatic mitral regurgitation and tricuspid regurgitation, status post tissue MVR TV repair 5/24/2023  2.  Postop aphasia and right upper extremity weakness.  CT negative for acute stroke, symptoms improved with intermittent delirium.  Right-sided weakness still present.  Stroke has not been ruled out.  3.  Chronic diastolic CHF.  Significant diuresis overnight.  Additional recommendations per nephrology.  4.  History of COVID-19 pneumonia with residual dyspnea and possible interstitial lung disease  5.  Stage IIIa chronic kidney disease.  Recommendations per nephrology  6.  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  Expected postoperative anemia  8.  Postoperative thrombocytopenia, resolved  9.  Postoperative frequent PVCs and NSVT  10.  Intermittent junctional rhythm.  11.  Diabetes  12.  Hypernatremia.  Recommendations per nephrology.   Feeding tube to be placed.    Mini Arango  5/27/202309:19 EDT  25min spent in reviewing records, discussion and examination of the patient and discussion with other members of the patient's medical team.     Dictated utilizing Dragon dictation

## 2023-05-27 NOTE — PLAN OF CARE
Problem: Aspiration (Enteral Nutrition)  Goal: Absence of Aspiration Signs and Symptoms  Outcome: Ongoing, Progressing     Problem: Device-Related Complication Risk (Enteral Nutrition)  Goal: Safe, Effective Therapy Delivery  Outcome: Ongoing, Progressing     Problem: Feeding Intolerance (Enteral Nutrition)  Goal: Feeding Tolerance  Outcome: Ongoing, Progressing   Goal Outcome Evaluation:   Start TF's via cortrak of Diabetisource AC @ 25 ml/hr and increase 25 ml q 6 hrs as tolerates to goal rate of 80 ml/hr with 35 ml/hr free water flushes.

## 2023-05-27 NOTE — MBS/VFSS/FEES
Acute Care - Speech Language Pathology   Swallow Initial Evaluation [facility]     Patient Name: Vernon Solorzano  : 1950  MRN: [id number]  Today's Date: 2023               Admit Date: 2023    Visit Dx:     ICD-10-CM ICD-9-CM   1. S/P MVR (mitral valve replacement)  Z95.2 V43.3   2. Nonrheumatic mitral valve regurgitation  I34.0 424.0     Patient Active Problem List   Diagnosis   • DM (diabetes mellitus), type 2   • Mixed hyperlipidemia   • Mild intermittent asthma without complication   • New onset of congestive heart failure   • Nonrheumatic mitral valve regurgitation   • Chest pain   • Essential hypertension   • Mitral valve disease     Past Medical History:   Diagnosis Date   • Allergic rhinitis    • Arthritis    • BPH (benign prostatic hyperplasia)    • Diabetes mellitus     TYPE 2   • Foraminal stenosis of cervical region     C5-C6   • GERD (gastroesophageal reflux disease)    • Hemorrhoids    • History of urinary retention     S/P TURP   • Hyperlipidemia    • Macular degeneration    • PING (obstructive sleep apnea) 2016    MILD, SEES DR. AMARILIS MORGAN     Past Surgical History:   Procedure Laterality Date   • CARDIAC CATHETERIZATION N/A 2023    Procedure: Right Heart Cath;  Surgeon: Corey Gaffney MD;  Location: [facility] CATH INVASIVE LOCATION;  Service: Cardiology;  Laterality: N/A;   • CARDIAC CATHETERIZATION N/A 2023    Procedure: Left Heart Cath;  Surgeon: Corey Gaffney MD;  Location: [facility] CATH INVASIVE LOCATION;  Service: Cardiology;  Laterality: N/A;   • CARDIAC CATHETERIZATION N/A 2023    Procedure: Left ventriculography;  Surgeon: Corey Gaffney MD;  Location:  [facility] CATH INVASIVE LOCATION;  Service: Cardiology;  Laterality: N/A;   • CARDIAC CATHETERIZATION N/A 2023    Procedure: Coronary angiography;  Surgeon: Corey Gaffney MD;  Location: [facility] CATH INVASIVE LOCATION;  Service: Cardiology;  Laterality: N/A;   • COLONOSCOPY N/A     WNL  PER PT, NO RECORDS,    • COLONOSCOPY N/A 04/07/2009    DR. GORGE BENAVIDEZ AT [place]   • MITRAL VALVE REPAIR/REPLACEMENT N/A 5/24/2023    Procedure: MITRAL VALVE REPAIR/REPLACEMENT TRICUSPID VALVE REPAIR/REPLACEMENT TRANSESOPHAGEAL ECHOCARDIOGRAM WITH ANESTHESIA;  Surgeon: George Frey MD;  Location: [facility];  Service: Cardiothoracic;  Laterality: N/A;   • PROSTATE SURGERY N/A 11/12/2010    TURP, DR. BRIGHT COLLAZO AT [facility]   • SKIN TAG REMOVAL N/A 12/20/2017    ANAL SKIN TAG X2, PERFORMED IN OFFICE, DR. LISA ORANTES   • TURP / TRANSURETHRAL INCISION / DRAINAGE PROSTATE  2010    Dr. Goodrich       SLP Recommendation and Plan  SLP Swallowing Diagnosis: mod-severe, pharyngeal dysphagia (05/27/23 1300)  SLP Diet Recommendation: NPO, ice chips between meals after oral care, with supervision (05/27/23 1300)     SLP Rec. for Method of Medication Administration: meds via alternate route (05/27/23 1300)     Monitor for Signs of Aspiration: notify SLP if any concerns (05/27/23 1300)  Recommended Diagnostics: reassess via [place] (05/27/23 1300)  Swallow Criteria for Skilled Therapeutic Interventions Met: demonstrates skilled criteria (05/27/23 1300)  Anticipated Discharge Disposition (SLP): unknown, anticipate therapy at next level of care (05/27/23 1300)  Rehab Potential/Prognosis, Swallowing: re-evaluate goals as necessary (05/27/23 1300)  Therapy Frequency (Swallow): PRN (05/27/23 1300)  Predicted Duration Therapy Intervention (Days): until discharge (05/27/23 1300)                                        Plan of Care Reviewed With: patient  Outcome Evaluation: VFSS completed. mod-severe paryngeal dysphagia characterized by poor tongue base retraction, weak laryngeal elevation and inconsistent, sluggish epiglottic deflection. deep penetration noted with puree, nectar and honey thick liquids. pharyngeal residue with puree caused silent aspiration after the swallow. silent aspiration also noted with  trials of nectar thick liquids. pt fatigued quickly and swallow quality declined as study advanced. REC: NPO x ice chips sparingly w/supervision, meds via alternate route, repeat VFSS as pt strength improves, SLP to follow.      SWALLOW EVALUATION (last 72 hours)     SLP Adult Swallow Evaluation     Row Name 05/27/23 1300 05/26/23 1330 05/25/23 1315             Rehab Evaluation    Document Type evaluation  -LT re-evaluation  -SR evaluation  -SA      Subjective Information no complaints  -LT no complaints  -SR no complaints  -SA      Patient Observations alert;cooperative;agree to therapy  -LT alert;cooperative;agree to therapy  -SR alert;cooperative  confused  -SA      Patient/Family/Caregiver Comments/Observations -- Pt seated upright in recliner. Spouse at bedside.  -SR --      Patient Effort good  -LT good  -SR fair  -SA      Symptoms Noted During/After Treatment none  -LT none  -SR --         General Information    Patient Profile Reviewed yes  -LT yes  -SR yes  -SA      Pertinent History Of Current Problem -- 72 y.o male; Severe mitral regurgitation s/p tissue mitral valve repair and tricuspid valve repair POD #2  -SR severe mitral regurgitation status post tissue mitral valve replacement/MVR and tricuspid valve repair  hypertension, hyperlipidemia, obstructive sleep apnea, GERD, diabetes mellitus type 2, BPH, acute onset CHF, Covid19 2022, DM  -SA      Current Method of Nutrition NPO  -LT NPO  -SR NPO  -SA      Precautions/Limitations, Vision -- difficult to assess  -SR difficult to assess  -SA      Precautions/Limitations, Hearing -- difficult to assess  -SR difficult to assess  -SA      Prior Level of Function-Communication -- WFL  -SR WFL;other (see comments)  wife reports recent memory difficulties  -SA      Prior Level of Function-Swallowing no diet consistency restrictions  -LT no diet consistency restrictions  -SR unknown  -SA      Plans/Goals Discussed with -- patient;spouse/S.O.;agreed upon  -SR  patient;agreed upon  -      Barriers to Rehab medically complex  -LT medically complex  -SR medically complex  -SA      Patient's Goals for Discharge -- -- patient did not state  -SA         Pain Scale: Numbers Pre/Post-Treatment    Pretreatment Pain Rating 0/10 - no pain  -LT 0/10 - no pain  -SR --      Posttreatment Pain Rating 0/10 - no pain  -LT 0/10 - no pain  -SR --         Oral Motor Structure and Function    Dentition Assessment natural, present and adequate  -LT natural, present and adequate  -SR natural, present and adequate  -SA      Secretion Management WNL/WFL  -LT WNL/WFL  -SR WNL/WFL  -SA      Mucosal Quality -- moist, healthy  -SR moist, healthy  -SA      Volitional Swallow -- unable to elicit  -SR --      Volitional Cough weak  -LT weak  -SR --         Oral Musculature and Cranial Nerve Assessment    Oral Motor General Assessment generalized oral motor weakness  -LT generalized oral motor weakness  -SR other (see comments);oral labial or buccal impairment;lingual impairment  difficulty following directions  -SA      Oral Labial or Buccal Impairment, Detail, Cranial Nerve VII (Facial): -- -- reduced strength bilaterally  -SA      Lingual Impairment, Detail. Cranial Nerves IX, XII (Glossopharyngeal and Hypoglossal) -- -- reduced strength  -SA         General Eating/Swallowing Observations    Respiratory Support Currently in Use -- -- nasal cannula  -SA      O2 Liters -- -- 2L  -SA         Clinical Swallow Eval    Clinical Swallow Evaluation Summary -- Patient seen for swallow re-evaluation this date. Seated upright in recliner with spouse at bedside. Eager to participate in evaluation. Followed one-step commands for oral mechanism examination with ~70% accuracy independently. Weak cough noted. Demonstrated throat clear x4 with trials of ice chips and x3 with trials of thin via spoon. Slight change in vocal quality with thin via cup. Audible swallow noted with trials of thin, nectar, and honey via  cup. Delayed cough with honey thick liquids. Rotary/uncoordinated mastication with puree and soft solids. No overt s/sx of aspiration or penetration observed with puree. Throat clear x1 with mechanical soft solids. Regular solids not trialed this date. Feel as though patient is at risk for aspiration. Recommend NPO w/ medication via alt route. Allow ice chips following oral care. Recommend instrumental swallow study to evaluate patient's swallow function and determine least restrictive diet.  -SR Pt able to take ice and water from spoon and cup.  Delayed swallow with reduced laryngeal elevation. Inconsistent ability to follow oral commands for labial and lingual movements.  Strong vocal quality; however, delayed throat clear after cup trials of thin.  Pt demonstrating confusion uncoordinated lingual/labial movements at times. Feel pt at risk of aspiration.  Will reassess swallow as appropriate.  -SA         MBS/VFSS Interpretation    VFSS Summary VFSS completed. mod-severe paryngeal dysphagia characterized by poor tongue base retraction, weak laryngeal elevation and inconsistent, sluggish epiglottic deflection. deep penetration noted with puree, nectar and honey thick liquids. pharyngeal residue with puree caused silent aspiration after the swallow. silent aspiration also noted with trials of nectar thick liquids. pt fatigued quickly and swallow quality declined as study advanced. REC: NPO, meds via alternate route, repeat VFSS as pt strength improves, SLP to follow.  -LT -- --         SLP Communication to Radiology    Severity Level of Dysphagia mod-severe dysphagia  -LT -- --      Consistencies Aspirated/Penetrated penetrated and aspirated;pudding/puree;nectar-thick liquids;penetrated;honey-thick liquids  -LT -- --      Summary Statement VFSS completed. mod-severe paryngeal dysphagia characterized by poor tongue base retraction, weak laryngeal elevation and inconsistent, sluggish epiglottic deflection. deep  penetration noted with puree, nectar and honey thick liquids. pharyngeal residue with puree caused silent aspiration after the swallow. silent aspiration also noted with trials of nectar thick liquids. pt fatigued quickly and swallow quality declined as study advanced.  -LT -- --         SLP Evaluation Clinical Impression    SLP Swallowing Diagnosis mod-severe;pharyngeal dysphagia  -LT oral dysphagia;suspected pharyngeal dysphagia  -SR oral dysphagia;suspected pharyngeal dysphagia  -SA      Functional Impact risk of aspiration/pneumonia  -LT risk of aspiration/pneumonia  -SR risk of aspiration/pneumonia  -SA      Rehab Potential/Prognosis, Swallowing re-evaluate goals as necessary  -LT re-evaluate goals as necessary  -SR re-evaluate goals as necessary  -SA      Swallow Criteria for Skilled Therapeutic Interventions Met demonstrates skilled criteria  -LT demonstrates skilled criteria  -SR demonstrates skilled criteria  -SA         Recommendations    Therapy Frequency (Swallow) PRN  -LT PRN  -SR PRN  -SA      Predicted Duration Therapy Intervention (Days) until discharge  -LT until discharge  -SR until discharge  -SA      SLP Diet Recommendation NPO;ice chips between meals after oral care, with supervision  -LT NPO;ice chips between meals after oral care, with supervision  -SR ice chips between meals after oral care, with supervision;water between meals after oral care, with supervision  -SA      Recommended Diagnostics reassess via VFSS (MBS)  -LT reassess via VFSS (MBS)  -SR reassess via clinical swallow evaluation  -SA      Oral Care Recommendations Oral Care BID/PRN  -LT Oral Care BID/PRN;Before ice/water  -SR Oral Care BID/PRN;Before ice/water  -SA      SLP Rec. for Method of Medication Administration meds via alternate route  -LT meds via alternate route  -SR meds via alternate route  -SA      Monitor for Signs of Aspiration notify SLP if any concerns  -LT notify SLP if any concerns  -SR notify SLP if any  concerns  -SA      Anticipated Discharge Disposition (SLP) unknown;anticipate therapy at next level of care  -LT unknown;anticipate therapy at next level of care  -SR unknown  -SA         Swallow Goals (SLP)    Swallow LTGs -- Patient will demonstrate progress toward functional swallow for  -SR --         (LTG) Patient will demonstrate progress toward functional swallow for    Diet Texture (Demonstrate progress toward functional swallow) -- regular textures  -SR --      Liquid viscosity (Demonstrate progress toward functional swallow) -- thin liquids  -SR --      [place] (Demonstrate progress towards functional swallow) -- independently (over 90% accuracy)  -SR --      Time Frame (Demonstrate progress toward functional swallow) -- by discharge  -SR --            User Key  (r) = Recorded By, (t) = Taken By, (c) = Cosigned By    Initials Name Effective Dates    SA Clarissa Gomez, MS CCC-SLP 06/01/22 -     LT Paty Regi MS CCC-SLP 06/16/21 -     SR Latisha Morris CCC-SLP 11/10/22 -                 EDUCATION  The patient has been educated in the following areas:   Dysphagia (Swallowing Impairment).        SLP GOALS     Row Name 05/26/23 1330             (LTG) Patient will demonstrate progress toward functional swallow for    Diet Texture (Demonstrate progress toward functional swallow) regular textures  -SR      Liquid viscosity (Demonstrate progress toward functional swallow) thin liquids  -SR      [place] (Demonstrate progress towards functional swallow) independently (over 90% accuracy)  -SR      Time Frame (Demonstrate progress toward functional swallow) by discharge  -SR            User Key  (r) = Recorded By, (t) = Taken By, (c) = Cosigned By    Initials Name Provider Type    SR Latisha Morris CCC-SLP Speech and Language Pathologist              SLP Outcome Measures (last 72 hours)     SLP Outcome Measures     Row Name 05/27/23 1300             SLP Outcome Measures    Outcome Measure Used?  Adult NOMS  -LT         Adult FCM Scores    FCM Chosen Swallowing  -LT      Swallowing FCM Score 1  -LT            User Key  (r) = Recorded By, (t) = Taken By, (c) = Cosigned By    Initials Name Effective Dates    LT Regi Newman MS CCC-SLP 06/16/21 -                  Time Calculation:    Time Calculation- SLP     Row Name 05/27/23 1320             Time Calculation- SLP    SLP Received On 05/27/23  -LT            User Key  (r) = Recorded By, (t) = Taken By, (c) = Cosigned By    Initials Name Provider Type    LT Regi Newman MS CCC-SLP Speech and Language Pathologist                Therapy Charges for Today     Code Description Service Date Service Provider Modifiers Qty    87402716469 HC ST MOTION FLUORO EVAL SWALLOW 4 5/27/2023 Regi Newman MS CCC-SLP GN 1               Regi Newman MS CCC-VIKI  5/27/2023

## 2023-05-28 ENCOUNTER — APPOINTMENT (OUTPATIENT)
Dept: CT IMAGING | Facility: HOSPITAL | Age: 73
End: 2023-05-28
Payer: MEDICARE

## 2023-05-28 ENCOUNTER — APPOINTMENT (OUTPATIENT)
Dept: GENERAL RADIOLOGY | Facility: HOSPITAL | Age: 73
End: 2023-05-28
Payer: MEDICARE

## 2023-05-28 LAB
ANION GAP SERPL CALCULATED.3IONS-SCNC: 11 MMOL/L (ref 5–15)
BUN SERPL-MCNC: 38 MG/DL (ref 8–23)
BUN/CREAT SERPL: 33.6 (ref 7–25)
CALCIUM SPEC-SCNC: 8.6 MG/DL (ref 8.6–10.5)
CHLORIDE SERPL-SCNC: 112 MMOL/L (ref 98–107)
CO2 SERPL-SCNC: 27 MMOL/L (ref 22–29)
CREAT SERPL-MCNC: 1.13 MG/DL (ref 0.76–1.27)
DEPRECATED RDW RBC AUTO: 44.9 FL (ref 37–54)
EGFRCR SERPLBLD CKD-EPI 2021: 68.2 ML/MIN/1.73
ERYTHROCYTE [DISTWIDTH] IN BLOOD BY AUTOMATED COUNT: 13.8 % (ref 12.3–15.4)
GLUCOSE BLDC GLUCOMTR-MCNC: 233 MG/DL (ref 70–130)
GLUCOSE BLDC GLUCOMTR-MCNC: 305 MG/DL (ref 70–130)
GLUCOSE SERPL-MCNC: 209 MG/DL (ref 65–99)
HCT VFR BLD AUTO: 35.1 % (ref 37.5–51)
HGB BLD-MCNC: 11.7 G/DL (ref 13–17.7)
MCH RBC QN AUTO: 29.5 PG (ref 26.6–33)
MCHC RBC AUTO-ENTMCNC: 33.3 G/DL (ref 31.5–35.7)
MCV RBC AUTO: 88.6 FL (ref 79–97)
PLATELET # BLD AUTO: 171 10*3/MM3 (ref 140–450)
PMV BLD AUTO: 10.7 FL (ref 6–12)
POTASSIUM SERPL-SCNC: 3.4 MMOL/L (ref 3.5–5.2)
POTASSIUM SERPL-SCNC: 4.2 MMOL/L (ref 3.5–5.2)
QT INTERVAL: 471 MS
RBC # BLD AUTO: 3.96 10*6/MM3 (ref 4.14–5.8)
SODIUM SERPL-SCNC: 150 MMOL/L (ref 136–145)
WBC NRBC COR # BLD: 13.01 10*3/MM3 (ref 3.4–10.8)

## 2023-05-28 PROCEDURE — 84132 ASSAY OF SERUM POTASSIUM: CPT | Performed by: THORACIC SURGERY (CARDIOTHORACIC VASCULAR SURGERY)

## 2023-05-28 PROCEDURE — 97530 THERAPEUTIC ACTIVITIES: CPT

## 2023-05-28 PROCEDURE — 70498 CT ANGIOGRAPHY NECK: CPT

## 2023-05-28 PROCEDURE — 80048 BASIC METABOLIC PNL TOTAL CA: CPT | Performed by: NURSE PRACTITIONER

## 2023-05-28 PROCEDURE — 70496 CT ANGIOGRAPHY HEAD: CPT

## 2023-05-28 PROCEDURE — 25010000002 MORPHINE PER 10 MG: Performed by: NURSE PRACTITIONER

## 2023-05-28 PROCEDURE — 85027 COMPLETE CBC AUTOMATED: CPT | Performed by: NURSE PRACTITIONER

## 2023-05-28 PROCEDURE — 93010 ELECTROCARDIOGRAM REPORT: CPT | Performed by: STUDENT IN AN ORGANIZED HEALTH CARE EDUCATION/TRAINING PROGRAM

## 2023-05-28 PROCEDURE — 94799 UNLISTED PULMONARY SVC/PX: CPT

## 2023-05-28 PROCEDURE — 63710000001 INSULIN LISPRO (HUMAN) PER 5 UNITS: Performed by: NURSE PRACTITIONER

## 2023-05-28 PROCEDURE — 99233 SBSQ HOSP IP/OBS HIGH 50: CPT | Performed by: PSYCHIATRY & NEUROLOGY

## 2023-05-28 PROCEDURE — 93005 ELECTROCARDIOGRAM TRACING: CPT | Performed by: INTERNAL MEDICINE

## 2023-05-28 PROCEDURE — 25510000001 IOPAMIDOL PER 1 ML: Performed by: THORACIC SURGERY (CARDIOTHORACIC VASCULAR SURGERY)

## 2023-05-28 PROCEDURE — 71046 X-RAY EXAM CHEST 2 VIEWS: CPT

## 2023-05-28 PROCEDURE — 99232 SBSQ HOSP IP/OBS MODERATE 35: CPT | Performed by: INTERNAL MEDICINE

## 2023-05-28 PROCEDURE — 82948 REAGENT STRIP/BLOOD GLUCOSE: CPT

## 2023-05-28 PROCEDURE — 63710000001 PREDNISONE PER 5 MG: Performed by: INTERNAL MEDICINE

## 2023-05-28 PROCEDURE — 25010000002 ENOXAPARIN PER 10 MG: Performed by: NURSE PRACTITIONER

## 2023-05-28 RX ORDER — POTASSIUM CHLORIDE 1.5 G/1.77G
20 POWDER, FOR SOLUTION ORAL 2 TIMES DAILY WITH MEALS
Status: DISCONTINUED | OUTPATIENT
Start: 2023-05-28 | End: 2023-05-28

## 2023-05-28 RX ORDER — POTASSIUM CHLORIDE 1.5 G/1.77G
40 POWDER, FOR SOLUTION ORAL EVERY 4 HOURS
Status: COMPLETED | OUTPATIENT
Start: 2023-05-28 | End: 2023-05-28

## 2023-05-28 RX ORDER — POTASSIUM CHLORIDE 1.5 G/1.77G
20 POWDER, FOR SOLUTION ORAL 2 TIMES DAILY WITH MEALS
Status: DISCONTINUED | OUTPATIENT
Start: 2023-05-28 | End: 2023-06-03

## 2023-05-28 RX ORDER — FUROSEMIDE 40 MG/1
40 TABLET ORAL DAILY
Status: DISCONTINUED | OUTPATIENT
Start: 2023-05-28 | End: 2023-05-31

## 2023-05-28 RX ADMIN — POTASSIUM CHLORIDE 20 MEQ: 1.5 POWDER, FOR SOLUTION ORAL at 17:44

## 2023-05-28 RX ADMIN — ATORVASTATIN CALCIUM 40 MG: 20 TABLET, FILM COATED ORAL at 21:38

## 2023-05-28 RX ADMIN — IPRATROPIUM BROMIDE AND ALBUTEROL SULFATE 3 ML: 2.5; .5 SOLUTION RESPIRATORY (INHALATION) at 08:05

## 2023-05-28 RX ADMIN — HYDROCODONE BITARTRATE AND ACETAMINOPHEN 2 TABLET: 5; 325 TABLET ORAL at 17:45

## 2023-05-28 RX ADMIN — PREDNISONE 5 MG: 10 TABLET ORAL at 08:37

## 2023-05-28 RX ADMIN — ENOXAPARIN SODIUM 40 MG: 100 INJECTION SUBCUTANEOUS at 17:44

## 2023-05-28 RX ADMIN — GUAIFENESIN 400 MG: 200 SOLUTION ORAL at 08:37

## 2023-05-28 RX ADMIN — PANTOPRAZOLE SODIUM 40 MG: 40 INJECTION, POWDER, FOR SOLUTION INTRAVENOUS at 08:35

## 2023-05-28 RX ADMIN — INSULIN LISPRO 5 UNITS: 100 INJECTION, SOLUTION INTRAVENOUS; SUBCUTANEOUS at 11:52

## 2023-05-28 RX ADMIN — DEXTROSE MONOHYDRATE 50 ML/HR: 50 INJECTION, SOLUTION INTRAVENOUS at 06:34

## 2023-05-28 RX ADMIN — MORPHINE SULFATE 1 MG: 2 INJECTION, SOLUTION INTRAMUSCULAR; INTRAVENOUS at 14:23

## 2023-05-28 RX ADMIN — IPRATROPIUM BROMIDE AND ALBUTEROL SULFATE 3 ML: 2.5; .5 SOLUTION RESPIRATORY (INHALATION) at 20:44

## 2023-05-28 RX ADMIN — HYDROCODONE BITARTRATE AND ACETAMINOPHEN 2 TABLET: 5; 325 TABLET ORAL at 08:35

## 2023-05-28 RX ADMIN — GUAIFENESIN 400 MG: 200 SOLUTION ORAL at 17:44

## 2023-05-28 RX ADMIN — ACETAMINOPHEN 650 MG: 325 TABLET, FILM COATED ORAL at 00:11

## 2023-05-28 RX ADMIN — INSULIN LISPRO 10 UNITS: 100 INJECTION, SOLUTION INTRAVENOUS; SUBCUTANEOUS at 06:28

## 2023-05-28 RX ADMIN — HYDROCODONE BITARTRATE AND ACETAMINOPHEN 2 TABLET: 5; 325 TABLET ORAL at 22:44

## 2023-05-28 RX ADMIN — POTASSIUM CHLORIDE 40 MEQ: 1.5 POWDER, FOR SOLUTION ORAL at 08:35

## 2023-05-28 RX ADMIN — MORPHINE SULFATE 1 MG: 2 INJECTION, SOLUTION INTRAMUSCULAR; INTRAVENOUS at 03:36

## 2023-05-28 RX ADMIN — IOPAMIDOL 100 ML: 755 INJECTION, SOLUTION INTRAVENOUS at 15:18

## 2023-05-28 RX ADMIN — HYDROCODONE BITARTRATE AND ACETAMINOPHEN 2 TABLET: 5; 325 TABLET ORAL at 03:36

## 2023-05-28 RX ADMIN — MUPIROCIN 1 APPLICATION: 20 OINTMENT TOPICAL at 08:36

## 2023-05-28 RX ADMIN — MUPIROCIN 1 APPLICATION: 20 OINTMENT TOPICAL at 21:38

## 2023-05-28 RX ADMIN — INSULIN LISPRO 3 UNITS: 100 INJECTION, SOLUTION INTRAVENOUS; SUBCUTANEOUS at 00:11

## 2023-05-28 RX ADMIN — POTASSIUM CHLORIDE 40 MEQ: 1.5 POWDER, FOR SOLUTION ORAL at 11:46

## 2023-05-28 RX ADMIN — ASPIRIN 81 MG: 81 TABLET, CHEWABLE ORAL at 08:35

## 2023-05-28 RX ADMIN — FUROSEMIDE 40 MG: 40 TABLET ORAL at 11:46

## 2023-05-28 RX ADMIN — INSULIN LISPRO 5 UNITS: 100 INJECTION, SOLUTION INTRAVENOUS; SUBCUTANEOUS at 17:44

## 2023-05-28 RX ADMIN — DOCUSATE SODIUM 50MG AND SENNOSIDES 8.6MG 2 TABLET: 8.6; 5 TABLET, FILM COATED ORAL at 21:38

## 2023-05-28 NOTE — PROGRESS NOTES
[facility] Progress Note      Patient Name: Vernon Solorzano  : 1950  MRN: [id number]  Primary Care Physician:  Liza Oconnell III, RACHAEL  Date of admission: 2023    Subjective     Interval History:     Seen and examined.  Just came back from CT of the head.  Continues with aphasia.    Review of Systems:   As noted above    Objective     Vitals:   Temp:  [97.4 °F (36.3 °C)-98.7 °F (37.1 °C)] 98 °F (36.7 °C)  Heart Rate:  [67-83] 76  Resp:  [18-20] 20  BP: (123-147)/(67-81) 129/67  Flow (L/min):  [2] 2    Intake/Output Summary (Last 24 hours) at 2023 1542  Last data filed at 2023 1300  Gross per 24 hour   Intake 1729.67 ml   Output 2100 ml   Net -370.33 ml       Physical Exam:    General Appearance: alert, , no acute distress   Skin: warm and dry  HEENT: oral mucosa normal, nonicteric sclera  Neck: supple, no JVD  Lungs: CTA  Heart: RRR, normal S1 and S2  Abdomen: soft, nontender, nondistended  : no palpable bladder  Extremities: no edema, cyanosis or clubbing      Scheduled Meds:     aspirin, 81 mg, Oral, Daily  atorvastatin, 40 mg, Oral, Nightly  enoxaparin, 40 mg, Subcutaneous, Daily  furosemide, 40 mg, Per G Tube, Daily  guaifenesin, 400 mg, Oral, Q8H  insulin lispro, 3-14 Units, Subcutaneous, Q6H  ipratropium-albuterol, 3 mL, Nebulization, 4x Daily - RT  mupirocin, , Each Nare, BID  pantoprazole, 40 mg, Intravenous, QAM AC  potassium chloride, 20 mEq, Nasogastric, BID With Meals  predniSONE, 5 mg, Oral, Daily With Breakfast  senna-docusate sodium, 2 tablet, Oral, Nightly      IV Meds:   dextrose, 50 mL/hr, Last Rate: 50 mL/hr (23 0634)        Results Reviewed:   I have personally reviewed the results from the time of this admission to 2023 15:42 EDT     Results from last 7 days   Lab Units 23  0344 23  1606 23  0604 23  0304 23  0406 23  1429   SODIUM mmol/L 150*  --  151* 147*   < > 140   POTASSIUM  mmol/L 3.4* 3.6 3.5 4.0   < > 4.3   CHLORIDE mmol/L 112*  --  111* 110*   < > 102   CO2 mmol/L 27.0  --  25.5 23.9   < > 26.4   BUN mg/dL 38*  --  35* 26*   < > 17   CREATININE mg/dL 1.13  --  1.32* 1.37*   < > 1.28*   CALCIUM mg/dL 8.6  --  9.1 8.9   < > 9.6   BILIRUBIN mg/dL  --   --   --  0.4  --  0.4   ALK PHOS U/L  --   --   --  45  --  71   ALT (SGPT) U/L  --   --   --  23  --  27   AST (SGOT) U/L  --   --   --  80*  --  14   GLUCOSE mg/dL 209*  --  153* 143*   < > 278*    < > = values in this interval not displayed.     Estimated Creatinine Clearance: 76.1 mL/min (by C-G formula based on SCr of 1.13 mg/dL).  Results from last 7 days   Lab Units 05/27/23  0604 05/26/23  0304 05/25/23  1350 05/25/23  0307 05/24/23  1626 05/24/23  1210   MAGNESIUM mg/dL 2.6* 2.7* 2.9* 3.0* 2.9* 3.4*   PHOSPHORUS mg/dL  --   --   --  5.1* 0.7* 2.2*         Results from last 7 days   Lab Units 05/28/23  0344 05/27/23  0604 05/26/23  0304 05/25/23  0307 05/24/23  1626   WBC 10*3/mm3 13.01* 16.03* 18.39* 15.14* 23.61*   HEMOGLOBIN g/dL 11.7* 11.4* 10.2* 10.1* 11.0*   PLATELETS 10*3/mm3 171 146 116* 118* 135*     Results from last 7 days   Lab Units 05/25/23  0307 05/24/23  1210 05/24/23  1103 05/24/23  1056 05/23/23  1429   INR  1.16* 1.20* 1.5* 1.47* 0.93       Assessment / Plan     ASSESSMENT:  1.  Acute kidney injury on chronic kidney disease stage III history.  Baseline creatinine 1.2 mg/dL and it went up to 1.68 mg/dL 05/24 and today is down to 1.4 mg/dL.  Volume status appears to be generous.  Renal ultrasound showed normal-looking kidneys except of 6 cm cyst with septation in the upper pole of the right kidney.  Urinalysis essentially unremarkable.       2.  Chronic kidney disease stage III with baseline creatinine 1.2 mg/dL  3.  Moderate to severe mitral regurgitation status post mitral valve repair placement and tricuspid valve repair on May 24  4.  Severe pulmonary hypertension  5.  History of COVID-19 pneumonia with  possible interstitial lung disease on chronic steroid   6.  Diastolic congestive heart failure  7.  Postoperative anemia and thrombocytopenia     PLAN:     1. Will increase D5 water to 75 cc/h and replete potassium  2. We will need urology evaluation for his cyst as an outpatient  3. Surveillance labs       Thank you for involving us in the care of Vernon Solorzano.  Please feel free to call with any questions.    Tae Lay MD  05/28/23  15:42 EDT    [facility]  [phone]

## 2023-05-28 NOTE — NURSING NOTE
"  Access center follow up.    Pt. sitting up in chair.Per overnight RN pt. remained confused. This am he is alert and oriented x2(name, place) and drowsy. Patient reports was able to rest overnight. Prn pain medication administered overnight.He reports feeling \"fair\" this morning. Access following.   "

## 2023-05-28 NOTE — PROGRESS NOTES
"DOS: 2023  NAME: Vernon Solorzano   : 1950  PCP: Liza Oconnell III, NP-C  No chief complaint on file.      Chief complaint: stroke  Subjective: aphasia persists. Right arm coordination remains a problem. Called by his nurse earlier due to persistent perseverating speech. Wife reports deficits have overall been pretty static since I saw him post op.    Objective:  Vital signs: /67 (BP Location: Right arm, Patient Position: Sitting)   Pulse 76   Temp 98 °F (36.7 °C) (Oral)   Resp 20   Ht 182.9 cm (72\")   Wt 118 kg (260 lb 2.3 oz)   SpO2 95%   BMI 35.28 kg/m²    Gen: NAD, vitals reviewed  MS: Awake, alert, predominantly motor aphasia, able to follow multiple commands but some difficulty with dates and numbers.  Right/left confusion  CN: visual acuity grossly normal, PERRL, EOMI, no facial droop, no dysarthria  Motor: Subtle apraxia of the right upper extremity, otherwise 5/5 throughout, normal tone    Laboratory results:  Lab Results   Component Value Date    GLUCOSE 209 (H) 2023    CALCIUM 8.6 2023     (H) 2023    K 3.4 (L) 2023    CO2 27.0 2023     (H) 2023    BUN 38 (H) 2023    CREATININE 1.13 2023    EGFRIFAFRI 76 12/15/2021    EGFRIFNONA 66 12/15/2021    BCR 33.6 (H) 2023    ANIONGAP 11.0 2023     Lab Results   Component Value Date    WBC 13.01 (H) 2023    HGB 11.7 (L) 2023    HCT 35.1 (L) 2023    MCV 88.6 2023     2023     Lab Results   Component Value Date    LDL 81 2023    LDL 59 2023    LDL 59 2021         Lab 23  1429   HEMOGLOBIN A1C 9.20*        Review of labs: Sodium 150, WBC 13    Review and interpretation of imaging: CTA head and neck ordered    Diagnoses:  Stroke, left middle cerebral artery, embolic  Status post MVR, TVR  Acute kidney injury  Hyponatremia    Comment: Lack of speech improvement and right/left confusion with right " upper extremity apraxia suggest a small embolic left MCA stroke.  I will check CTAs today    Plan:  1.  Aspirin, statin  2.  CTA head and neck  3.  Speech therapy    Management discussed with nurse. Discussed with patient and spouse at the bedside.

## 2023-05-28 NOTE — THERAPY TREATMENT NOTE
Patient Name: Vernon Solorzano  : 1950    MRN: [id number]                              Today's Date: 2023       Admit Date: 2023    Visit Dx:     ICD-10-CM ICD-9-CM   1. S/P MVR (mitral valve replacement)  Z95.2 V43.3   2. Nonrheumatic mitral valve regurgitation  I34.0 424.0     Patient Active Problem List   Diagnosis   • DM (diabetes mellitus), type 2   • Mixed hyperlipidemia   • Mild intermittent asthma without complication   • New onset of congestive heart failure   • Nonrheumatic mitral valve regurgitation   • Chest pain   • Essential hypertension   • Mitral valve disease     Past Medical History:   Diagnosis Date   • Allergic rhinitis    • Arthritis    • BPH (benign prostatic hyperplasia)    • Diabetes mellitus     TYPE 2   • Foraminal stenosis of cervical region     C5-C6   • GERD (gastroesophageal reflux disease)    • Hemorrhoids    • History of urinary retention 2010    S/P TURP   • Hyperlipidemia    • Macular degeneration    • PING (obstructive sleep apnea) 2016    MILD, SEES DR. AMARILIS MORGAN     Past Surgical History:   Procedure Laterality Date   • CARDIAC CATHETERIZATION N/A 2023    Procedure: Right Heart Cath;  Surgeon: Corey Gaffney MD;  [facility]:  [facility];  Service: Cardiology;  Laterality: N/A;   • CARDIAC CATHETERIZATION N/A 2023    Procedure: Left Heart Cath;  Surgeon: Corey Gaffney MD;  Location:  [facility];  Service: Cardiology;  Laterality: N/A;   • CARDIAC CATHETERIZATION N/A 2023    Procedure: Left ventriculography;  Surgeon: Corey Gaffney MD;  Location:  [facility];  Service: Cardiology;  Laterality: N/A;   • CARDIAC CATHETERIZATION N/A 2023    Procedure: Coronary angiography;  Surgeon: Corey Gaffney MD;  Location:  [facility];  Service: Cardiology;  Laterality: N/A;   • COLONOSCOPY N/A     WNL PER PT, NO RECORDS,    • COLONOSCOPY N/A 2009      GORGE BENAVIDEZ AT [place]   • MITRAL VALVE REPAIR/REPLACEMENT N/A 5/24/2023    Procedure: MITRAL VALVE REPAIR/REPLACEMENT TRICUSPID VALVE REPAIR/REPLACEMENT TRANSESOPHAGEAL ECHOCARDIOGRAM WITH ANESTHESIA;  Surgeon: George Frey MD;  Location: [facility];  Service: Cardiothoracic;  Laterality: N/A;   • PROSTATE SURGERY N/A 11/12/2010    TURP, DR. BRIGHT COLLAZO AT [facility]   • SKIN TAG REMOVAL N/A 12/20/2017    ANAL SKIN TAG X2, PERFORMED IN OFFICE, DR. LISA ORANTES   • TURP / TRANSURETHRAL INCISION / DRAINAGE PROSTATE  2010    Dr. Goodrich      General Information     Row Name 05/28/23 1340          Physical Therapy Time and Intention    Document Type therapy note (daily note)  -DB     Mode of Treatment individual therapy;physical therapy  -DB     Row Name 05/28/23 1340          General Information    Patient Profile Reviewed yes  -DB           User Key  (r) = Recorded By, (t) = Taken By, (c) = Cosigned By    Initials Name Provider Type    DB Marissa Zamora PT Physical Therapist               Mobility     Row Name 05/28/23 1340          Bed Mobility    Bed Mobility supine-sit;sit-supine;scooting/bridging  -DB     Scooting/Bridging [place] (Bed Mobility) minimum assist (75% patient effort);2 person assist;verbal cues  -DB     Supine-Sit [place] (Bed Mobility) moderate assist (50% patient effort);verbal cues;nonverbal cues (demo/gesture)  -DB     Sit-Supine [place] (Bed Mobility) moderate assist (50% patient effort);2 person assist;verbal cues;nonverbal cues (demo/gesture)  -DB     Assistive Device (Bed Mobility) draw sheet;head of bed elevated;bed rails  -DB     Row Name 05/28/23 4580          Sit-Stand Transfer    Sit-Stand [place] (Transfers) not tested  -DB           User Key  (r) = Recorded By, (t) = Taken By, (c) = Cosigned By    Initials Name Provider Type    Marissa Gimenez PT Physical Therapist               Obj/Interventions     Row Name 05/28/23 1345          Motor Skills     Therapeutic Exercise other (see comments)  cardiac exercises x 5  -DB     Row Name 05/28/23 1341          Balance    Balance Assessment sitting static balance;sitting dynamic balance  -DB     Static Sitting Balance standby assist  -DB     Dynamic Sitting Balance contact guard  -DB     Position, Sitting Balance unsupported;sitting edge of bed  -DB     Balance Interventions sitting  -DB           User Key  (r) = Recorded By, (t) = Taken By, (c) = Cosigned By    Initials Name Provider Type    DB Marissa Zamora PT Physical Therapist               Goals/Plan    No documentation.                Clinical Impression     Row Name 05/28/23 1341          Pain    Pain Intervention(s) Ambulation/increased activity;Repositioned  -DB     Row Name 05/28/23 1341          Plan of Care Review    Plan of Care Reviewed With patient  -DB     Progress improving  -DB     Outcome Evaluation Confirmed with RN pt OK for PT today. Pt was supine in bed at start of the session. Attempted orientation questions, pt perseverating and unable to answer. He demo's improvement in bed mobility this date, modA for sup<>sit and modA x2 for sit<>sup. Pt performs seated ther ex at EOB, he fatigues quickly after sitting up for ~5 min. Pt returns to supine position at end of the session. He continues to benefit from skilled PT intervention.  -DB     Row Name 05/28/23 1341          Vital Signs    O2 Delivery Pre Treatment supplemental O2  -DB     O2 Delivery Intra Treatment supplemental O2  -DB     O2 Delivery Post Treatment supplemental O2  -DB     Pre Patient Position Supine  -DB     Intra Patient Position Sitting  -DB     Post Patient Position Supine  -DB     Row Name 05/28/23 1341          Positioning and Restraints    Pre-Treatment Position in bed  -DB     Post Treatment Position bed  -DB     In Bed call light within reach;notified nsg;encouraged to call for assist;fowlers;exit alarm on;with family/caregiver  -DB           User Key  (r) = Recorded By,  (t) = Taken By, (c) = Cosigned By    Initials Name Provider Type    Marissa Gimenez, NANETTE Physical Therapist               Outcome Measures     Row Name 05/28/23 1344 05/28/23 0834       How much help from another person do you currently need...    Turning from your back to your side while in flat bed without using bedrails? 2  -DB 2  -AC    Moving from lying on back to sitting on the side of a flat bed without bedrails? 2  -DB 2  -AC    Moving to and from a bed to a chair (including a wheelchair)? 2  -DB 1  -AC    Standing up from a chair using your arms (e.g., wheelchair, bedside chair)? 2  -DB 1  -AC    Climbing 3-5 steps with a railing? 1  -DB 1  -AC    To walk in hospital room? 1  -DB 1  -AC    AM-PAC 6 Clicks Score (PT) 10  -DB 8  -AC    Highest level of mobility 4 --> Transferred to chair/commode  -DB 3 --> Sat at edge of bed  -AC    Row Name 05/28/23 1344          Functional Assessment    Outcome Measure Options AM-PAC 6 Clicks Basic Mobility (PT)  -DB           User Key  (r) = Recorded By, (t) = Taken By, (c) = Cosigned By    Initials Name Provider Type    Neeru Rosario RN Registered Nurse    Marissa Gimenez, NANETTE Physical Therapist                             Physical Therapy Education     Title: PT OT SLP Therapies (In Progress)     Topic: Physical Therapy (Done)     Point: Mobility training (Done)     Learning Progress Summary           Patient Acceptance, E, VU by DB at 5/28/2023 1344    Acceptance, E, NR by EM at 5/26/2023 1304    Acceptance, E, NR by EM at 5/25/2023 1208                   Point: Home exercise program (Done)     Learning Progress Summary           Patient Acceptance, E, VU by DB at 5/28/2023 1344    Acceptance, E, NR by EM at 5/25/2023 1208                   Point: Body mechanics (Done)     Learning Progress Summary           Patient Acceptance, E, VU by DB at 5/28/2023 1344                   Point: Precautions (Done)     Learning Progress Summary           Patient  Acceptance, E, VU by DB at 5/28/2023 1344                               User Key     Initials Effective Dates Name Provider Type Discipline    EM 06/16/21 -  Kathy Shaffer PT Physical Therapist PT    DB 06/16/21 -  Marissa Zamora PT Physical Therapist PT              PT Recommendation and Plan     Plan of Care Reviewed With: patient  Progress: improving  Outcome Evaluation: Confirmed with RN pt OK for PT today. Pt was supine in bed at start of the session. Attempted orientation questions, pt perseverating and unable to answer. He demo's improvement in bed mobility this date, modA for sup<>sit and modA x2 for sit<>sup. Pt performs seated ther ex at EOB, he fatigues quickly after sitting up for ~5 min. Pt returns to supine position at end of the session. He continues to benefit from skilled PT intervention.     Time Calculation:    PT Charges     Row Name 05/28/23 1346             Time Calculation    Start Time 1255  -DB      Stop Time 1311  -DB      Time Calculation (min) 16 min  -DB      PT Received On 05/28/23  -DB      PT - Next Appointment 05/29/23  -DB         Time Calculation- PT    Total Timed Code Minutes- PT 16 minute(s)  -DB            User Key  (r) = Recorded By, (t) = Taken By, (c) = Cosigned By    Initials Name Provider Type    DB Marissa Zamora PT Physical Therapist              Therapy Charges for Today     Code Description Service Date Service Provider Modifiers Qty    [id number]  PT THERAPEUTIC ACT EA 15 MIN 5/28/2023 Marissa Zamora PT GP 1          PT G-Codes  Outcome Measure Options: AM-PAC 6 Clicks Basic Mobility (PT)  AM-PAC 6 Clicks Score (PT): 10  AM-PAC 6 Clicks Score (OT): 6  PT Discharge Summary  Anticipated Discharge Disposition (PT): skilled nursing facility    Marissa Zamora PT  5/28/2023

## 2023-05-28 NOTE — PROGRESS NOTES
Still foggy.  He does know me again today.  Rhythm seems to be stabilizing.  No need for pacemaker at this point.

## 2023-05-28 NOTE — PLAN OF CARE
Goal Outcome Evaluation:  Plan of Care Reviewed With: patient        Progress: improving  Outcome Evaluation: Confirmed with RN pt OK for PT today. Pt was supine in bed at start of the session. Attempted orientation questions, pt perseverating and unable to answer. He demo's improvement in bed mobility this date, modA for sup<>sit and modA x2 for sit<>sup. Pt performs seated ther ex at EOB, he fatigues quickly after sitting up for ~5 min. Pt returns to supine position at end of the session. He continues to benefit from skilled PT intervention.

## 2023-05-28 NOTE — PROGRESS NOTES
[facility]  Progress note: 2023    Patient Identification:  Name:Vernon Solorzano  Age:72 y.o.  Sex: male  :  1950  MRN: [id number]           CC:  Congestive heart failure, mitral regurgitation, s/p mitral valve replacement/tricuspid valve repair    Interval history:  Remains slightly confused but oriented x2.  Working with PT.  Sinus rhythm and intermittent junctional on telemetry.  Vital stable.  Sodium level still elevated at 150.  Oriented x2 and slightly more alert.    Vital Signs:   Temp:  [97.4 °F (36.3 °C)-98.7 °F (37.1 °C)] 98 °F (36.7 °C)  Heart Rate:  [67-83] 72  Resp:  [18-20] 20  BP: (123-149)/(67-89) 129/67    Intake/Output Summary (Last 24 hours) at 2023 1136  Last data filed at 2023 0834  Gross per 24 hour   Intake 1774.67 ml   Output 2575 ml   Net -800.33 ml       Physical Examination:    General Appearance No acute distress   Neck  neck supple, trachea midline   Lungs Clear to auscultation,respirations regular, even and unlabored   Heart Regular rhythm and normal rate, normal S1 and S2, no murmur, no gallop, no rub, no click   Chest wall No abnormalities observed   Abdomen Normal bowel sounds, no masses, no hepatomegaly, soft   Extremities Moves all extremities well, trace edema left leg slightly more edema right leg, no cyanosis, no redness   Neurological Alert and oriented x 2     Lab Review:  Personally reviewed the labs, radiology imaging and other cardiac procedures.   Results from last 7 days   Lab Units 23  0344 23  0604 23  0304   SODIUM mmol/L 150*   < > 147*   POTASSIUM mmol/L 3.4*   < > 4.0   CHLORIDE mmol/L 112*   < > 110*   CO2 mmol/L 27.0   < > 23.9   BUN mg/dL 38*   < > 26*   CREATININE mg/dL 1.13   < > 1.37*   CALCIUM mg/dL 8.6   < > 8.9   BILIRUBIN mg/dL  --   --  0.4   ALK PHOS U/L  --   --  45   ALT (SGPT) U/L  --   --  23   AST (SGOT) U/L  --   --  80*   GLUCOSE mg/dL 209*   < > 143*    < > = values in this interval not  displayed.         Results from last 7 days   Lab Units 05/28/23  0344 05/27/23  0604 05/26/23  0304   WBC 10*3/mm3 13.01* 16.03* 18.39*   HEMOGLOBIN g/dL 11.7* 11.4* 10.2*   HEMATOCRIT % 35.1* 34.1* 31.0*   PLATELETS 10*3/mm3 171 146 116*     Results from last 7 days   Lab Units 05/25/23  0307 05/24/23  1210 05/24/23  1103 05/24/23  1056 05/23/23  1429   INR  1.16* 1.20* 1.5* 1.47* 0.93   APTT seconds  --  26.2  --  24.9 24.0     Medication Review:   Meds reviewed  Scheduled Meds:aspirin, 81 mg, Oral, Daily  atorvastatin, 40 mg, Oral, Nightly  enoxaparin, 40 mg, Subcutaneous, Daily  furosemide, 40 mg, Per G Tube, Daily  guaifenesin, 400 mg, Oral, Q8H  insulin lispro, 3-14 Units, Subcutaneous, Q6H  ipratropium-albuterol, 3 mL, Nebulization, 4x Daily - RT  mupirocin, , Each Nare, BID  pantoprazole, 40 mg, Intravenous, QAM AC  potassium chloride, 20 mEq, Nasogastric, BID With Meals  potassium chloride, 40 mEq, Oral, Q4H  predniSONE, 5 mg, Oral, Daily With Breakfast  senna-docusate sodium, 2 tablet, Oral, Nightly      Continuous Infusions:dextrose, 50 mL/hr, Last Rate: 50 mL/hr (05/28/23 0634)      I personally viewed and interpreted the patient's EKG/Telemetry data    Assessment and Plan  1.  Severe symptomatic mitral regurgitation and tricuspid regurgitation, status post tissue MVR TV repair 5/24/2023 slow but steady progress.  2.  Postop aphasia and right upper extremity weakness.  CT negative for acute stroke, unable to do MRI currently.  Symptoms confounded by intermittent delirium, confusion and hyponatremia  3.  Chronic diastolic CHF.  Modest diuresis overnight guided by nephrology  4.  History of COVID-19 pneumonia with residual dyspnea and possible interstitial lung disease  5.  Stage IIIa chronic kidney disease.  Recommendations per nephrology  6.  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  Expected postoperative anemia  8.  Postoperative thrombocytopenia, resolved  9.   Postoperative frequent PVCs and NSVT  10.  Intermittent junctional rhythm.  Seems to be improving and likely will not need pacer  11.  Diabetes  12.  Hypernatremia.  Recommendations per nephrology slightly improved.    Mini Arango  5/28/202311:36 EDT  25min spent in reviewing records, discussion and examination of the patient and discussion with other members of the patient's medical team.     Dictated utilizing Dragon dictation

## 2023-05-28 NOTE — PLAN OF CARE
Goal Outcome Evaluation:  Plan of Care Reviewed With: patient                   Pt remains confused , was able to stand up and pivot to chair, Request water frequently , C/o bridle in nose hurting , Tolerating hydrocodone , morphine given once IV . Pacer on standby , Nsr with PVCs, increased tube feeding to 75 , still receiving D5W at 50cc/hr for Sodium level.

## 2023-05-28 NOTE — PROGRESS NOTES
Chart reviewed.  Continue with postop management per cardiac surgery.  Hypernatremia noted and being managed by nephrology service.  Seems to be clinically volume overloaded. Strongly stressed the importance of working with therapy and sitting in a chair.  Continue with the same plan for steroids.  No new recommendations from my standpoint

## 2023-05-29 LAB
ANION GAP SERPL CALCULATED.3IONS-SCNC: 9.1 MMOL/L (ref 5–15)
BUN SERPL-MCNC: 34 MG/DL (ref 8–23)
BUN/CREAT SERPL: 32.7 (ref 7–25)
CALCIUM SPEC-SCNC: 8.7 MG/DL (ref 8.6–10.5)
CHLORIDE SERPL-SCNC: 109 MMOL/L (ref 98–107)
CO2 SERPL-SCNC: 26.9 MMOL/L (ref 22–29)
CREAT SERPL-MCNC: 1.04 MG/DL (ref 0.76–1.27)
EGFRCR SERPLBLD CKD-EPI 2021: 75.3 ML/MIN/1.73
GLUCOSE BLDC GLUCOMTR-MCNC: 251 MG/DL (ref 70–130)
GLUCOSE BLDC GLUCOMTR-MCNC: 257 MG/DL (ref 70–130)
GLUCOSE BLDC GLUCOMTR-MCNC: 291 MG/DL (ref 70–130)
GLUCOSE BLDC GLUCOMTR-MCNC: 296 MG/DL (ref 70–130)
GLUCOSE BLDC GLUCOMTR-MCNC: 308 MG/DL (ref 70–130)
GLUCOSE SERPL-MCNC: 249 MG/DL (ref 65–99)
POTASSIUM SERPL-SCNC: 3.6 MMOL/L (ref 3.5–5.2)
POTASSIUM SERPL-SCNC: 4.6 MMOL/L (ref 3.5–5.2)
POTASSIUM SERPL-SCNC: 5 MMOL/L (ref 3.5–5.2)
SODIUM SERPL-SCNC: 145 MMOL/L (ref 136–145)

## 2023-05-29 PROCEDURE — 94799 UNLISTED PULMONARY SVC/PX: CPT

## 2023-05-29 PROCEDURE — 97530 THERAPEUTIC ACTIVITIES: CPT

## 2023-05-29 PROCEDURE — 25010000002 ENOXAPARIN PER 10 MG: Performed by: NURSE PRACTITIONER

## 2023-05-29 PROCEDURE — 94761 N-INVAS EAR/PLS OXIMETRY MLT: CPT

## 2023-05-29 PROCEDURE — 99232 SBSQ HOSP IP/OBS MODERATE 35: CPT | Performed by: INTERNAL MEDICINE

## 2023-05-29 PROCEDURE — 84132 ASSAY OF SERUM POTASSIUM: CPT | Performed by: THORACIC SURGERY (CARDIOTHORACIC VASCULAR SURGERY)

## 2023-05-29 PROCEDURE — 82948 REAGENT STRIP/BLOOD GLUCOSE: CPT

## 2023-05-29 PROCEDURE — 63710000001 PREDNISONE PER 5 MG: Performed by: INTERNAL MEDICINE

## 2023-05-29 PROCEDURE — 80048 BASIC METABOLIC PNL TOTAL CA: CPT | Performed by: NURSE PRACTITIONER

## 2023-05-29 PROCEDURE — 94664 DEMO&/EVAL PT USE INHALER: CPT

## 2023-05-29 PROCEDURE — 63710000001 INSULIN LISPRO (HUMAN) PER 5 UNITS: Performed by: NURSE PRACTITIONER

## 2023-05-29 RX ORDER — POTASSIUM CHLORIDE 1.5 G/1.77G
40 POWDER, FOR SOLUTION ORAL ONCE
Status: COMPLETED | OUTPATIENT
Start: 2023-05-29 | End: 2023-05-29

## 2023-05-29 RX ORDER — POTASSIUM CHLORIDE 1.5 G/1.77G
40 POWDER, FOR SOLUTION ORAL EVERY 4 HOURS
Status: COMPLETED | OUTPATIENT
Start: 2023-05-29 | End: 2023-05-29

## 2023-05-29 RX ADMIN — INSULIN LISPRO 8 UNITS: 100 INJECTION, SOLUTION INTRAVENOUS; SUBCUTANEOUS at 18:19

## 2023-05-29 RX ADMIN — INSULIN LISPRO 8 UNITS: 100 INJECTION, SOLUTION INTRAVENOUS; SUBCUTANEOUS at 06:36

## 2023-05-29 RX ADMIN — DOCUSATE SODIUM 50MG AND SENNOSIDES 8.6MG 2 TABLET: 8.6; 5 TABLET, FILM COATED ORAL at 20:47

## 2023-05-29 RX ADMIN — GUAIFENESIN 400 MG: 200 SOLUTION ORAL at 06:46

## 2023-05-29 RX ADMIN — MUPIROCIN 1 APPLICATION: 20 OINTMENT TOPICAL at 20:48

## 2023-05-29 RX ADMIN — DEXTROSE MONOHYDRATE 60 ML/HR: 50 INJECTION, SOLUTION INTRAVENOUS at 17:29

## 2023-05-29 RX ADMIN — IPRATROPIUM BROMIDE AND ALBUTEROL SULFATE 3 ML: 2.5; .5 SOLUTION RESPIRATORY (INHALATION) at 16:07

## 2023-05-29 RX ADMIN — POTASSIUM CHLORIDE 40 MEQ: 1.5 POWDER, FOR SOLUTION ORAL at 06:37

## 2023-05-29 RX ADMIN — POTASSIUM CHLORIDE 40 MEQ: 1.5 POWDER, FOR SOLUTION ORAL at 09:27

## 2023-05-29 RX ADMIN — IPRATROPIUM BROMIDE AND ALBUTEROL SULFATE 3 ML: 2.5; .5 SOLUTION RESPIRATORY (INHALATION) at 19:56

## 2023-05-29 RX ADMIN — FUROSEMIDE 40 MG: 40 TABLET ORAL at 09:26

## 2023-05-29 RX ADMIN — ENOXAPARIN SODIUM 40 MG: 100 INJECTION SUBCUTANEOUS at 18:19

## 2023-05-29 RX ADMIN — INSULIN LISPRO 8 UNITS: 100 INJECTION, SOLUTION INTRAVENOUS; SUBCUTANEOUS at 12:55

## 2023-05-29 RX ADMIN — ATORVASTATIN CALCIUM 40 MG: 20 TABLET, FILM COATED ORAL at 20:48

## 2023-05-29 RX ADMIN — GUAIFENESIN 400 MG: 200 SOLUTION ORAL at 00:22

## 2023-05-29 RX ADMIN — PANTOPRAZOLE SODIUM 40 MG: 40 INJECTION, POWDER, FOR SOLUTION INTRAVENOUS at 06:37

## 2023-05-29 RX ADMIN — POTASSIUM CHLORIDE 40 MEQ: 1.5 POWDER, FOR SOLUTION ORAL at 15:49

## 2023-05-29 RX ADMIN — IPRATROPIUM BROMIDE AND ALBUTEROL SULFATE 3 ML: 2.5; .5 SOLUTION RESPIRATORY (INHALATION) at 11:37

## 2023-05-29 RX ADMIN — PREDNISONE 5 MG: 10 TABLET ORAL at 09:26

## 2023-05-29 RX ADMIN — IPRATROPIUM BROMIDE AND ALBUTEROL SULFATE 3 ML: 2.5; .5 SOLUTION RESPIRATORY (INHALATION) at 07:07

## 2023-05-29 RX ADMIN — ASPIRIN 81 MG: 81 TABLET, CHEWABLE ORAL at 09:27

## 2023-05-29 RX ADMIN — HYDROCODONE BITARTRATE AND ACETAMINOPHEN 2 TABLET: 5; 325 TABLET ORAL at 15:49

## 2023-05-29 RX ADMIN — MUPIROCIN 1 APPLICATION: 20 OINTMENT TOPICAL at 09:26

## 2023-05-29 RX ADMIN — POTASSIUM CHLORIDE 20 MEQ: 1.5 POWDER, FOR SOLUTION ORAL at 09:26

## 2023-05-29 RX ADMIN — HYDROCODONE BITARTRATE AND ACETAMINOPHEN 2 TABLET: 5; 325 TABLET ORAL at 06:26

## 2023-05-29 RX ADMIN — INSULIN LISPRO 8 UNITS: 100 INJECTION, SOLUTION INTRAVENOUS; SUBCUTANEOUS at 00:22

## 2023-05-29 RX ADMIN — DEXTROSE MONOHYDRATE 75 ML/HR: 50 INJECTION, SOLUTION INTRAVENOUS at 02:18

## 2023-05-29 NOTE — PROGRESS NOTES
[facility] Progress Note      Patient Name: Vernon Solorzano  : 1950  MRN: [id number]  Primary Care Physician:  Liza Oconnell III, RACHAEL  Date of admission: 2023    Subjective     Interval History:     Seen and examined.  Appears to be more alert and more conversing.  Holding urinal.  Denies shortness of air.  Wife is on bedside.    Review of Systems:   As noted above    Objective     Vitals:   Temp:  [97.7 °F (36.5 °C)-98.5 °F (36.9 °C)] 98.5 °F (36.9 °C)  Heart Rate:  [] 72  Resp:  [19-20] 20  BP: (124-160)/(67-94) 134/93  Flow (L/min):  [2] 2    Intake/Output Summary (Last 24 hours) at 2023 1026  Last data filed at 2023 0927  Gross per 24 hour   Intake 1860.67 ml   Output 2400 ml   Net -539.33 ml       Physical Exam:    General Appearance: alert, , no acute distress   Skin: warm and dry  HEENT: oral mucosa normal, nonicteric sclera  Neck: supple, no JVD  Lungs: CTA  Heart: RRR, normal S1 and S2  Abdomen: soft, nontender, nondistended  : no palpable bladder  Extremities: no edema, cyanosis or clubbing      Scheduled Meds:     aspirin, 81 mg, Oral, Daily  atorvastatin, 40 mg, Oral, Nightly  enoxaparin, 40 mg, Subcutaneous, Daily  furosemide, 40 mg, Per G Tube, Daily  guaifenesin, 400 mg, Oral, Q8H  insulin lispro, 3-14 Units, Subcutaneous, Q6H  ipratropium-albuterol, 3 mL, Nebulization, 4x Daily - RT  mupirocin, , Each Nare, BID  pantoprazole, 40 mg, Intravenous, QAM AC  potassium chloride, 20 mEq, Nasogastric, BID With Meals  predniSONE, 5 mg, Oral, Daily With Breakfast  senna-docusate sodium, 2 tablet, Oral, Nightly      IV Meds:   dextrose, 75 mL/hr, Last Rate: 75 mL/hr (23)        Results Reviewed:   I have personally reviewed the results from the time of this admission to 2023 10:26 EDT     Results from last 7 days   Lab Units 23  0358 23  1633 23  0344 23  1606 23  0604 23  0304  05/24/23  0406 05/23/23  1429   SODIUM mmol/L 145  --  150*  --  151* 147*   < > 140   POTASSIUM mmol/L 3.6 4.2 3.4*   < > 3.5 4.0   < > 4.3   CHLORIDE mmol/L 109*  --  112*  --  111* 110*   < > 102   CO2 mmol/L 26.9  --  27.0  --  25.5 23.9   < > 26.4   BUN mg/dL 34*  --  38*  --  35* 26*   < > 17   CREATININE mg/dL 1.04  --  1.13  --  1.32* 1.37*   < > 1.28*   CALCIUM mg/dL 8.7  --  8.6  --  9.1 8.9   < > 9.6   BILIRUBIN mg/dL  --   --   --   --   --  0.4  --  0.4   ALK PHOS U/L  --   --   --   --   --  45  --  71   ALT (SGPT) U/L  --   --   --   --   --  23  --  27   AST (SGOT) U/L  --   --   --   --   --  80*  --  14   GLUCOSE mg/dL 249*  --  209*  --  153* 143*   < > 278*    < > = values in this interval not displayed.     Estimated Creatinine Clearance: 83.7 mL/min (by C-G formula based on SCr of 1.04 mg/dL).  Results from last 7 days   Lab Units 05/27/23  0604 05/26/23  0304 05/25/23  1350 05/25/23  0307 05/24/23  1626 05/24/23  1210   MAGNESIUM mg/dL 2.6* 2.7* 2.9* 3.0* 2.9* 3.4*   PHOSPHORUS mg/dL  --   --   --  5.1* 0.7* 2.2*         Results from last 7 days   Lab Units 05/28/23  0344 05/27/23  0604 05/26/23  0304 05/25/23  0307 05/24/23  1626   WBC 10*3/mm3 13.01* 16.03* 18.39* 15.14* 23.61*   HEMOGLOBIN g/dL 11.7* 11.4* 10.2* 10.1* 11.0*   PLATELETS 10*3/mm3 171 146 116* 118* 135*     Results from last 7 days   Lab Units 05/25/23  0307 05/24/23  1210 05/24/23  1103 05/24/23  1056 05/23/23  1429   INR  1.16* 1.20* 1.5* 1.47* 0.93       Assessment / Plan     ASSESSMENT:  1.  Acute kidney injury on chronic kidney disease stage III history.  Baseline creatinine 1.2 mg/dL and it went up to 1.68 mg/dL 05/24 and today is down to 1.4 mg/dL.  Volume status appears to be generous.  Renal ultrasound showed normal-looking kidneys except of 6 cm cyst with septation in the upper pole of the right kidney.  Urinalysis essentially unremarkable.       2.  Chronic kidney disease stage III with baseline creatinine 1.2  mg/dL  3.  Moderate to severe mitral regurgitation status post mitral valve repair placement and tricuspid valve repair on May 24  4.  Severe pulmonary hypertension  5.  History of COVID-19 pneumonia with possible interstitial lung disease on chronic steroid   6.  Diastolic congestive heart failure  7.  Postoperative anemia and thrombocytopenia     PLAN:     1.  Continue IV fluid and follow-up on his labs.  Allen catheter was discontinued so we will monitor postvoid residual  2. We will need urology evaluation for his cyst as an outpatient  3. Surveillance labs       Thank you for involving us in the care of Vernon Solorzano.  Please feel free to call with any questions.    Tae Lay MD  05/29/23  10:26 EDT    [facility]  [phone]

## 2023-05-29 NOTE — PLAN OF CARE
Goal Outcome Evaluation:  Plan of Care Reviewed With: patient, spouse           Outcome Evaluation: POD5 s/p MV replacement, TV repair. Junctional on monitor is 70s, 2L NC, blood pressures stable. Tube feedings infusing per sb. Inscisional pain treated per MAR. May need to readjust in AM due to droswiness. Wires remain intact. IS and activity encouraged. Did not work well with PT today due to drowsniess but did get back and fourth from chair to bed with staff multiple times. Voiding per urinal. Care ongoing.

## 2023-05-29 NOTE — PLAN OF CARE
Goal Outcome Evaluation:  Plan of Care Reviewed With: patient        Progress: no change  Outcome Evaluation: Pt seated UIC at start of session, agreeable to work with PT. Attempted STS from chair with maxA x2, pt unable to clear bottom from chair. Performed seated LE ther ex, pt needing multiple cues to participate. Pt very fatigued. Encouraged pt and wife to perform LE ther ex later this afternoon as well. Will continue to progress pt as tolerated.

## 2023-05-29 NOTE — PROGRESS NOTES
LOS: 6 days   Patient Care Team:  Liza Oconnell III, NP-C as PCP - General (Family Medicine)  Corey Lao Jr., MD (Inactive) as Consulting Physician (Urology)    Chief Complaint: Post op     Subjective      Vital Signs  Temp:  [97.7 °F (36.5 °C)-98 °F (36.7 °C)] 97.7 °F (36.5 °C)  Heart Rate:  [] 73  Resp:  [18-20] 20  BP: (124-160)/(67-94) 134/93  Body mass index is 36.18 kg/m².    Intake/Output Summary (Last 24 hours) at 5/29/2023 0703  Last data filed at 5/29/2023 0600  Gross per 24 hour   Intake 1830.67 ml   Output 2325 ml   Net -494.33 ml     No intake/output data recorded.          05/26/23  0855 05/27/23  0823 05/29/23  0600   Weight: 118 kg (260 lb) 118 kg (260 lb 2.3 oz) 121 kg (266 lb 12.1 oz)         Objective    Results Review:        WBC WBC   Date Value Ref Range Status   05/28/2023 13.01 (H) 3.40 - 10.80 10*3/mm3 Final   05/27/2023 16.03 (H) 3.40 - 10.80 10*3/mm3 Final      HGB Hemoglobin   Date Value Ref Range Status   05/28/2023 11.7 (L) 13.0 - 17.7 g/dL Final   05/27/2023 11.4 (L) 13.0 - 17.7 g/dL Final      HCT Hematocrit   Date Value Ref Range Status   05/28/2023 35.1 (L) 37.5 - 51.0 % Final   05/27/2023 34.1 (L) 37.5 - 51.0 % Final      Platelets Platelets   Date Value Ref Range Status   05/28/2023 171 140 - 450 10*3/mm3 Final   05/27/2023 146 140 - 450 10*3/mm3 Final        PT/INR:  No results found for: PROTIME/No results found for: INR    Sodium Sodium   Date Value Ref Range Status   05/29/2023 145 136 - 145 mmol/L Final   05/28/2023 150 (H) 136 - 145 mmol/L Final   05/27/2023 151 (H) 136 - 145 mmol/L Final      Potassium Potassium   Date Value Ref Range Status   05/29/2023 3.6 3.5 - 5.2 mmol/L Final   05/28/2023 4.2 3.5 - 5.2 mmol/L Final     Comment:     Slight hemolysis detected by analyzer. Results may be affected.   05/28/2023 3.4 (L) 3.5 - 5.2 mmol/L Final   05/27/2023 3.6 3.5 - 5.2 mmol/L Final   05/27/2023 3.5 3.5 - 5.2 mmol/L Final      Chloride  Chloride   Date Value Ref Range Status   05/29/2023 109 (H) 98 - 107 mmol/L Final   05/28/2023 112 (H) 98 - 107 mmol/L Final   05/27/2023 111 (H) 98 - 107 mmol/L Final      Bicarbonate CO2   Date Value Ref Range Status   05/29/2023 26.9 22.0 - 29.0 mmol/L Final   05/28/2023 27.0 22.0 - 29.0 mmol/L Final   05/27/2023 25.5 22.0 - 29.0 mmol/L Final      BUN BUN   Date Value Ref Range Status   05/29/2023 34 (H) 8 - 23 mg/dL Final   05/28/2023 38 (H) 8 - 23 mg/dL Final   05/27/2023 35 (H) 8 - 23 mg/dL Final      Creatinine Creatinine   Date Value Ref Range Status   05/29/2023 1.04 0.76 - 1.27 mg/dL Final   05/28/2023 1.13 0.76 - 1.27 mg/dL Final   05/27/2023 1.32 (H) 0.76 - 1.27 mg/dL Final      Calcium Calcium   Date Value Ref Range Status   05/29/2023 8.7 8.6 - 10.5 mg/dL Final   05/28/2023 8.6 8.6 - 10.5 mg/dL Final   05/27/2023 9.1 8.6 - 10.5 mg/dL Final      Magnesium Magnesium   Date Value Ref Range Status   05/27/2023 2.6 (H) 1.6 - 2.4 mg/dL Final          aspirin, 81 mg, Oral, Daily  atorvastatin, 40 mg, Oral, Nightly  enoxaparin, 40 mg, Subcutaneous, Daily  furosemide, 40 mg, Per G Tube, Daily  guaifenesin, 400 mg, Oral, Q8H  insulin lispro, 3-14 Units, Subcutaneous, Q6H  ipratropium-albuterol, 3 mL, Nebulization, 4x Daily - RT  mupirocin, , Each Nare, BID  pantoprazole, 40 mg, Intravenous, QAM AC  potassium chloride, 20 mEq, Nasogastric, BID With Meals  potassium chloride, 40 mEq, Oral, Q4H  predniSONE, 5 mg, Oral, Daily With Breakfast  senna-docusate sodium, 2 tablet, Oral, Nightly      dextrose, 75 mL/hr, Last Rate: 75 mL/hr (05/29/23 0218)      Assessment & Plan     -Moderate to severe mitral regurgitation s/p MVR/TV repair POD#5--Pagni  -Moderate tricuspid valve regurgitation  -Acute on chronic diastolic heart failure with preserved EF--55%  -CKD stage IIIa; baseline creatinine 1.2  -DM type II--internal medicine following   -Severe pulmonary HTN  -PING  -Urinary retention s/p TURP  -BPH   -Interstitial lung  disease s/p COVID-19 infection --on chronic steroids  - leukocytosis--reactive/steroid administration  - post op anemia--expected acute blood loss  -TCP--consumptive      Resting in the chair; oriented to person and and place  CTA head negative for acute infarct; chronic left cerebellar lacunar infarct  NSR, rate 70's; still with intermittent junctional rhythm--beta blocker on hold   Tolerating room air  On PO lasix per nephrology; replete electrolytes   Feeding tube in place--TFs per SLP--D5 water increased yesterday per nephrology--NA improving   Cr improving--f/c removed this am   Prednisone taper per pulmonology--5 mg for one week started yesterday then stopping  Mobilize--PT/OT  Encourage pulmonary toilet  Continue routine post op care     Modesta Law, DESTIN  05/29/23  07:03 EDT

## 2023-05-29 NOTE — PROGRESS NOTES
Laith Gayle MD                          [phone]            Patient ID:    Name:  Vernon Solorzano    MRN:  [id number]    1950   72 y.o.  male            Patient Care Team:  Liza Oconnell III, NP-C as PCP - General (Family Medicine)  Corey Lao Jr., MD (Inactive) as Consulting Physician (Urology)    CC/ Reason for visit: Postop hypoxia, post cardiac surgery, previous COVID-19 infection with long-term steroid use    Subjective: Pt seen and examined this AM. No acute overnight events noted. Doing better. Sits in the chair and ongoing w/u for AMS noted    ROS: Denies any subjective fevers, syncope or presyncopal events, new neurological deficits, nausea or vomiting currently    Objective     Vital Signs past 24hrs    BP range: BP: (124-160)/(67-94) 134/93  Pulse range: Heart Rate:  [] 70  Resp rate range: Resp:  [18-20] 18  Temp range: Temp (24hrs), Av.9 °F (36.6 °C), Min:97.7 °F (36.5 °C), Max:98.5 °F (36.9 °C)      Ventilator/Non-Invasive Ventilation Settings (From admission, onward)     Start     Ordered    23 1207  Ventilator - Vent Mode: AC/VC; Rate: Other; Rate: 14; FiO2: 100%; PEEP: 7.5; Tidal Volume: mL; TV: 750  Continuous,   Status:  Canceled        Question Answer Comment   Vent Mode AC/VC    Rate Other    Rate 14    FiO2 100%    PEEP 7.5    Tidal Volume mL            23 1234                Vent Settings        Resp Rate (Set): 14  Pressure Support (cm H2O): 8 cm H20  FiO2 (%): 41 %  PEEP/CPAP (cm H2O): 7.5 cm H20  Minute Ventilation (L/min) (Obs): 13.4 L/min  Resp Rate (Observed) Vent: 27     I:E Ratio (Obs): 1:1.8  PIP Observed (cm H2O): 16 cm H2O  Plateau Pressure (cm H2O): 0 cm H2O  Driving Pressure (cm H2O): -7.2 cm H2O  Device (Oxygen Therapy): nasal cannula FiO2 (%): 41 %     121 kg (266 lb 12.1 oz); Body mass index is 36.18 kg/m².      Intake/Output Summary (Last 24 hours) at 2023  1247  Last data filed at 5/29/2023 0927  Gross per 24 hour   Intake 1860.67 ml   Output 2200 ml   Net -339.33 ml       PHYSICAL EXAM   Constitutional: Middle-aged pt in bed,+ accessory muscle use/resp distress   Head: - NCAT  Eyes: No pallor, anicteric conjunctivae  ENMT:  Mallampati 4, no oral thrush. Moist MM.   NECK: Trachea midline, No thyromegaly, no palpable cervical lymphadenopathy  Heart: RRR, sternotomy wound.  No murmur. 1+ pedal edema   Lungs: FREDERICK +, No wheezes/ crackles heard    Abdomen: Soft. No tenderness, guarding or rigidity. No palpable masses  Extremities: Extremities warm and well perfused. No cyanosis/ clubbing  Neuro: Conscious, answers appropriately, no gross focal neuro deficits  Psych: Mood and affect appropriate    PPE recommended per [facility] [place], operated by [facility] infectious disease Isolation protocol for the current clinical scenario (as mentioned below) was followed.     Scheduled meds:  aspirin, 81 mg, Oral, Daily  atorvastatin, 40 mg, Oral, Nightly  enoxaparin, 40 mg, Subcutaneous, Daily  furosemide, 40 mg, Per G Tube, Daily  guaifenesin, 400 mg, Oral, Q8H  insulin lispro, 3-14 Units, Subcutaneous, Q6H  ipratropium-albuterol, 3 mL, Nebulization, 4x Daily - RT  mupirocin, , Each Nare, BID  pantoprazole, 40 mg, Intravenous, QAM AC  potassium chloride, 20 mEq, Nasogastric, BID With Meals  potassium chloride, 40 mEq, Oral, Once  predniSONE, 5 mg, Oral, Daily With Breakfast  senna-docusate sodium, 2 tablet, Oral, Nightly        IV meds:                      dextrose, 60 mL/hr, Last Rate: 60 mL/hr (05/29/23 1126)        Data Review:      Results from last 7 days   Lab Units 05/29/23  0358 05/28/23  1633 05/28/23  0344 05/27/23  1606 05/27/23  0604 05/26/23  0304 05/25/23  1350 05/25/23  0307 05/24/23  1626 05/24/23  1210 05/24/23  1103 05/24/23  0406 05/23/23  1429   SODIUM mmol/L 145  --  150*  --  151* 147*   < > 143   < > 146*  --    < > 140   POTASSIUM mmol/L 3.6 4.2 3.4*   < > 3.5 4.0   < > 5.1   < >  3.7  --    < > 4.3   CHLORIDE mmol/L 109*  --  112*  --  111* 110*   < > 114*   < > 110*  --    < > 102   CO2 mmol/L 26.9  --  27.0  --  25.5 23.9   < > 18.4*   < > 24.0  --    < > 26.4   BUN mg/dL 34*  --  38*  --  35* 26*   < > 19   < > 19  --    < > 17   CREATININE mg/dL 1.04  --  1.13  --  1.32* 1.37*   < > 1.46*   < > 1.65*  --    < > 1.28*   CALCIUM mg/dL 8.7  --  8.6  --  9.1 8.9   < > 9.0   < > 7.8*  --    < > 9.6   BILIRUBIN mg/dL  --   --   --   --   --  0.4  --   --   --   --   --   --  0.4   ALK PHOS U/L  --   --   --   --   --  45  --   --   --   --   --   --  71   ALT (SGPT) U/L  --   --   --   --   --  23  --   --   --   --   --   --  27   AST (SGOT) U/L  --   --   --   --   --  80*  --   --   --   --   --   --  14   GLUCOSE mg/dL 249*  --  209*  --  153* 143*   < > 117*   < > 165*  --    < > 278*   WBC 10*3/mm3  --   --  13.01*  --  16.03* 18.39*  --  15.14*   < > 29.62*  --    < > 10.79   HEMOGLOBIN g/dL  --   --  11.7*  --  11.4* 10.2*  --  10.1*   < > 12.1*  --    < > 14.4   HEMOGLOBIN, POC   --   --   --   --   --   --   --   --   --   --   --    < >  --    PLATELETS 10*3/mm3  --   --  171  --  146 116*  --  118*   < > 188  --    < > 255   INR   --   --   --   --   --   --   --  1.16*  --  1.20* 1.5*   < > 0.93   PROBNP pg/mL  --   --   --   --   --   --   --   --   --   --   --   --  531.0    < > = values in this interval not displayed.       Lab Results   Component Value Date    CALCIUM 8.7 05/29/2023    PHOS 5.1 (H) 05/25/2023             Results from last 7 days   Lab Units 05/26/23  0351 05/25/23  0641 05/24/23  1844 05/24/23  1706 05/24/23  1242 05/24/23  1059 05/24/23  1020 05/24/23  0722 05/23/23  1502   PH, ARTERIAL pH units 7.406 7.357 7.336* 7.432 7.373 7.4380 7.3590   < > 7.529*   PO2 ART mm Hg 76.7* 80.0 108.1* 85.7 125.1*  --   --   --  83.5   PCO2, ARTERIAL mm Hg 37.1 36.6 42.3 34.8* 43.1  --   --   --  35.9   HCO3 ART mmol/L 23.3 20.5* 22.6 23.2 25.1  --   --   --  29.9*    < > =  values in this interval not displayed.        I have personally reviewed the results from the time of this admission to 5/29/2023 12:47 EDT and agree with these findings:  [x]  Laboratory  [x]  Microbiology  [x]  Radiology  []  EKG/Telemetry   [x]  Cardiology/Vascular   []  Pathology  []  Old records    ASSESSMENT   Postop hypoxia  Steroid dependence -several months  Severe valvular heart disease s/p MVR/TV repair-5/24  Previous COVID-19 infection -no ILD concern on CT  Chronic diastolic heart failure  CKD 3  PING  Asthma  Obesity     RECOMMENDATIONS:  Patient making progress from a respiratory standpoint.   Nephrology managing volume status  Postop management per cardiac surgery  Continue with plan to slowly taper prednisone. 5 mg for a week and alt days after that for a week and STOP  C/w nebulized medications for asthma  Continue with physical therapy and out of bed  Guarded prognosis    Will  Sign off and pt will need Follow-up outpatient with Dr. Slater in 6 to 8 weeks to confirm patient tolerating the current treatment plan.    Laith Gayle MD  5/29/2023

## 2023-05-29 NOTE — PLAN OF CARE
Goal Outcome Evaluation:  Plan of Care Reviewed With: patient        Progress: declining  Outcome Evaluation: Telemetry alternating junction/SR with PVCs. Neurology called d/t mental status change, Dr. Sage to unit. CTA per ordered. IVF's and tube feeds continue.

## 2023-05-29 NOTE — PLAN OF CARE
Goal Outcome Evaluation:  Plan of Care Reviewed With: patient        Progress: no change       Patient c/o incisional pain which improves with Norco. Patient continues to request to have something to drink. Minimal ice chips given. Patient tube feeds continue at goal rate of 80ml/hr. Patient alert and able to follow commands, NIH q shift. Patient SR/Junctional on the monitor.Patient horta cath d/c'd this am. Patient tolerated well.  Will continue to monitor and await discharge plans.

## 2023-05-29 NOTE — NURSING NOTE
"  Access center follow up.    Patient sitting up in bedside chair. Visitor(spouse) at bedside. Patient alert and oriented to name and place, difficulty finding words, and delayed response at times. Patient drowsy, minimal conversation, states \"Im hungary and thirsty.\" He is receiving tube feeding. Spouse checked with nurse for ice chips.  Spouse reports patient has \"not really\" experienced confusion prior to admit but has been less engaged the past 4-6 months and thought was due to his care home as a PCP with [facility].  Patient received prn hydrocodone this am. Spouse reports pt. Has hx. of being \"foggy\" with pain medication. Access following.  "

## 2023-05-29 NOTE — PROGRESS NOTES
[facility]  Progress note: 2023    Patient Identification:  Name:Vernon Solorzano  Age:72 y.o.  Sex: male  :  1950  MRN: [id number]           CC:  Congestive heart failure, mitral regurgitation, s/p mitral valve replacement/tricuspid valve repair, intermittent junctional rhythm.   Interval history:  Vitasl stable.  Much more alert, oriented and communicative.  Still weak    Vital Signs:   Temp:  [97.7 °F (36.5 °C)-98.5 °F (36.9 °C)] 98.5 °F (36.9 °C)  Heart Rate:  [] 72  Resp:  [19-20] 20  BP: (124-160)/(67-94) 134/93    Intake/Output Summary (Last 24 hours) at 2023 1102  Last data filed at 2023 0927  Gross per 24 hour   Intake 1860.67 ml   Output 2400 ml   Net -539.33 ml       Physical Examination:    General Appearance No acute distress   Neck No adenopathy, supple,    Lungs Clear to auscultation,respirations regular, even and unlabored   Heart Regular rhythm and normal rate, normal S1 and S2, no murmur, no gallop, no rub, no click   Chest wall No abnormalities observed   Abdomen Normal bowel sounds, no masses, no hepatomegaly, soft   Extremities Moves all extremities well, 1+ edema, no cyanosis, no redness   Neurological Alert and oriented x 3     Lab Review:  Personally reviewed the labs, radiology imaging and other cardiac procedures.   Results from last 7 days   Lab Units 23  0358 23  0604 23  0304   SODIUM mmol/L 145   < > 147*   POTASSIUM mmol/L 3.6   < > 4.0   CHLORIDE mmol/L 109*   < > 110*   CO2 mmol/L 26.9   < > 23.9   BUN mg/dL 34*   < > 26*   CREATININE mg/dL 1.04   < > 1.37*   CALCIUM mg/dL 8.7   < > 8.9   BILIRUBIN mg/dL  --   --  0.4   ALK PHOS U/L  --   --  45   ALT (SGPT) U/L  --   --  23   AST (SGOT) U/L  --   --  80*   GLUCOSE mg/dL 249*   < > 143*    < > = values in this interval not displayed.         Results from last 7 days   Lab Units 23  0344 23  0604 23  0304   WBC 10*3/mm3 13.01* 16.03* 18.39*   HEMOGLOBIN g/dL  11.7* 11.4* 10.2*   HEMATOCRIT % 35.1* 34.1* 31.0*   PLATELETS 10*3/mm3 171 146 116*     Results from last 7 days   Lab Units 05/25/23  0307 05/24/23  1210 05/24/23  1103 05/24/23  1056 05/23/23  1429   INR  1.16* 1.20* 1.5* 1.47* 0.93   APTT seconds  --  26.2  --  24.9 24.0     Medication Review:   Meds reviewed  Scheduled Meds:aspirin, 81 mg, Oral, Daily  atorvastatin, 40 mg, Oral, Nightly  enoxaparin, 40 mg, Subcutaneous, Daily  furosemide, 40 mg, Per G Tube, Daily  guaifenesin, 400 mg, Oral, Q8H  insulin lispro, 3-14 Units, Subcutaneous, Q6H  ipratropium-albuterol, 3 mL, Nebulization, 4x Daily - RT  mupirocin, , Each Nare, BID  pantoprazole, 40 mg, Intravenous, QAM AC  potassium chloride, 20 mEq, Nasogastric, BID With Meals  potassium chloride, 40 mEq, Oral, Once  predniSONE, 5 mg, Oral, Daily With Breakfast  senna-docusate sodium, 2 tablet, Oral, Nightly      Continuous Infusions:dextrose, 60 mL/hr, Last Rate: 75 mL/hr (05/29/23 0218)      I personally viewed and interpreted the patient's EKG/Telemetry data    Assessment and Plan  1.  Severe symptomatic mitral regurgitation and tricuspid regurgitation, status post tissue MVR TV repair 5/24/2023 slow but steady progress.  2.  Postop aphasia and right upper extremity weakness.  CT negative for acute stroke, unable to do MRI currently.  Symptoms confounded by intermittent delirium, confusion and hyponatremia  3.  Chronic diastolic CHF. On po lasix an IVF for now per neph. Hopefully can stop IVF by tomorrow  4.  History of COVID-19 pneumonia with residual dyspnea and possible interstitial lung disease  5.  Stage IIIa chronic kidney disease.  Recommendations per nephrology  6.  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  Expected postoperative anemia  8.  Postoperative thrombocytopenia, resolved  9.  Postoperative frequent PVCs and NSVT  10.  Intermittent junctional rhythm.  Seems to be improving and likely will not need pacer  ????  11.  Diabetes  12.  Hypernatremia.  Normal!!     Mini Nba  5/29/202311:02 EDT  25min spent in reviewing records, discussion and examination of the patient and discussion with other members of the patient's medical team.     Dictated utilizing Dragon dictation

## 2023-05-30 ENCOUNTER — APPOINTMENT (OUTPATIENT)
Dept: GENERAL RADIOLOGY | Facility: HOSPITAL | Age: 73
End: 2023-05-30
Payer: MEDICARE

## 2023-05-30 LAB
ANION GAP SERPL CALCULATED.3IONS-SCNC: 7.4 MMOL/L (ref 5–15)
BASOPHILS # BLD AUTO: 0.05 10*3/MM3 (ref 0–0.2)
BASOPHILS NFR BLD AUTO: 0.5 % (ref 0–1.5)
BUN SERPL-MCNC: 31 MG/DL (ref 8–23)
BUN/CREAT SERPL: 28.7 (ref 7–25)
CALCIUM SPEC-SCNC: 8.7 MG/DL (ref 8.6–10.5)
CHLORIDE SERPL-SCNC: 110 MMOL/L (ref 98–107)
CO2 SERPL-SCNC: 28.6 MMOL/L (ref 22–29)
CREAT SERPL-MCNC: 1.08 MG/DL (ref 0.76–1.27)
DEPRECATED RDW RBC AUTO: 44.3 FL (ref 37–54)
EGFRCR SERPLBLD CKD-EPI 2021: 72 ML/MIN/1.73
EOSINOPHIL # BLD AUTO: 0.42 10*3/MM3 (ref 0–0.4)
EOSINOPHIL NFR BLD AUTO: 4.2 % (ref 0.3–6.2)
ERYTHROCYTE [DISTWIDTH] IN BLOOD BY AUTOMATED COUNT: 13.7 % (ref 12.3–15.4)
GLUCOSE BLDC GLUCOMTR-MCNC: 227 MG/DL (ref 70–130)
GLUCOSE BLDC GLUCOMTR-MCNC: 280 MG/DL (ref 70–130)
GLUCOSE BLDC GLUCOMTR-MCNC: 298 MG/DL (ref 70–130)
GLUCOSE BLDC GLUCOMTR-MCNC: 327 MG/DL (ref 70–130)
GLUCOSE SERPL-MCNC: 322 MG/DL (ref 65–99)
HCT VFR BLD AUTO: 34.2 % (ref 37.5–51)
HGB BLD-MCNC: 11.2 G/DL (ref 13–17.7)
IMM GRANULOCYTES # BLD AUTO: 0.16 10*3/MM3 (ref 0–0.05)
IMM GRANULOCYTES NFR BLD AUTO: 1.6 % (ref 0–0.5)
LYMPHOCYTES # BLD AUTO: 1.17 10*3/MM3 (ref 0.7–3.1)
LYMPHOCYTES NFR BLD AUTO: 11.6 % (ref 19.6–45.3)
MCH RBC QN AUTO: 29.4 PG (ref 26.6–33)
MCHC RBC AUTO-ENTMCNC: 32.7 G/DL (ref 31.5–35.7)
MCV RBC AUTO: 89.8 FL (ref 79–97)
MONOCYTES # BLD AUTO: 0.94 10*3/MM3 (ref 0.1–0.9)
MONOCYTES NFR BLD AUTO: 9.3 % (ref 5–12)
NEUTROPHILS NFR BLD AUTO: 7.34 10*3/MM3 (ref 1.7–7)
NEUTROPHILS NFR BLD AUTO: 72.8 % (ref 42.7–76)
NRBC BLD AUTO-RTO: 0 /100 WBC (ref 0–0.2)
PLATELET # BLD AUTO: 213 10*3/MM3 (ref 140–450)
PMV BLD AUTO: 10.6 FL (ref 6–12)
POTASSIUM SERPL-SCNC: 4.4 MMOL/L (ref 3.5–5.2)
QT INTERVAL: 418 MS
RBC # BLD AUTO: 3.81 10*6/MM3 (ref 4.14–5.8)
SODIUM SERPL-SCNC: 146 MMOL/L (ref 136–145)
WBC NRBC COR # BLD: 10.08 10*3/MM3 (ref 3.4–10.8)

## 2023-05-30 PROCEDURE — 97530 THERAPEUTIC ACTIVITIES: CPT

## 2023-05-30 PROCEDURE — 94761 N-INVAS EAR/PLS OXIMETRY MLT: CPT

## 2023-05-30 PROCEDURE — 80048 BASIC METABOLIC PNL TOTAL CA: CPT | Performed by: NURSE PRACTITIONER

## 2023-05-30 PROCEDURE — 94799 UNLISTED PULMONARY SVC/PX: CPT

## 2023-05-30 PROCEDURE — 93010 ELECTROCARDIOGRAM REPORT: CPT | Performed by: INTERNAL MEDICINE

## 2023-05-30 PROCEDURE — 93005 ELECTROCARDIOGRAM TRACING: CPT | Performed by: NURSE PRACTITIONER

## 2023-05-30 PROCEDURE — 25010000002 METOCLOPRAMIDE PER 10 MG: Performed by: NURSE PRACTITIONER

## 2023-05-30 PROCEDURE — 97535 SELF CARE MNGMENT TRAINING: CPT

## 2023-05-30 PROCEDURE — 99024 POSTOP FOLLOW-UP VISIT: CPT | Performed by: THORACIC SURGERY (CARDIOTHORACIC VASCULAR SURGERY)

## 2023-05-30 PROCEDURE — 25010000002 ENOXAPARIN PER 10 MG: Performed by: NURSE PRACTITIONER

## 2023-05-30 PROCEDURE — 99222 1ST HOSP IP/OBS MODERATE 55: CPT

## 2023-05-30 PROCEDURE — 99232 SBSQ HOSP IP/OBS MODERATE 35: CPT | Performed by: INTERNAL MEDICINE

## 2023-05-30 PROCEDURE — 63710000001 INSULIN LISPRO (HUMAN) PER 5 UNITS: Performed by: NURSE PRACTITIONER

## 2023-05-30 PROCEDURE — 85025 COMPLETE CBC W/AUTO DIFF WBC: CPT | Performed by: NURSE PRACTITIONER

## 2023-05-30 PROCEDURE — 82948 REAGENT STRIP/BLOOD GLUCOSE: CPT

## 2023-05-30 PROCEDURE — 63710000001 PREDNISONE PER 5 MG: Performed by: INTERNAL MEDICINE

## 2023-05-30 PROCEDURE — 99231 SBSQ HOSP IP/OBS SF/LOW 25: CPT | Performed by: PSYCHIATRY & NEUROLOGY

## 2023-05-30 PROCEDURE — 71045 X-RAY EXAM CHEST 1 VIEW: CPT

## 2023-05-30 PROCEDURE — 94664 DEMO&/EVAL PT USE INHALER: CPT

## 2023-05-30 RX ORDER — DEXTROSE MONOHYDRATE 50 MG/ML
50 INJECTION, SOLUTION INTRAVENOUS CONTINUOUS
Status: DISCONTINUED | OUTPATIENT
Start: 2023-05-30 | End: 2023-06-01

## 2023-05-30 RX ORDER — METOCLOPRAMIDE HYDROCHLORIDE 5 MG/ML
10 INJECTION INTRAMUSCULAR; INTRAVENOUS EVERY 6 HOURS
Status: COMPLETED | OUTPATIENT
Start: 2023-05-30 | End: 2023-05-30

## 2023-05-30 RX ORDER — PREDNISONE 10 MG/1
5 TABLET ORAL EVERY OTHER DAY
Status: DISCONTINUED | OUTPATIENT
Start: 2023-06-03 | End: 2023-06-06 | Stop reason: HOSPADM

## 2023-05-30 RX ORDER — POLYETHYLENE GLYCOL 3350 17 G/17G
17 POWDER, FOR SOLUTION ORAL DAILY
Status: DISCONTINUED | OUTPATIENT
Start: 2023-05-30 | End: 2023-05-31

## 2023-05-30 RX ORDER — TRAMADOL HYDROCHLORIDE 50 MG/1
50 TABLET ORAL EVERY 6 HOURS PRN
Status: DISCONTINUED | OUTPATIENT
Start: 2023-05-30 | End: 2023-06-06 | Stop reason: HOSPADM

## 2023-05-30 RX ADMIN — ENOXAPARIN SODIUM 40 MG: 100 INJECTION SUBCUTANEOUS at 18:20

## 2023-05-30 RX ADMIN — INSULIN LISPRO 8 UNITS: 100 INJECTION, SOLUTION INTRAVENOUS; SUBCUTANEOUS at 06:15

## 2023-05-30 RX ADMIN — GUAIFENESIN 400 MG: 200 SOLUTION ORAL at 09:00

## 2023-05-30 RX ADMIN — PREDNISONE 5 MG: 10 TABLET ORAL at 09:02

## 2023-05-30 RX ADMIN — GUAIFENESIN 400 MG: 200 SOLUTION ORAL at 00:25

## 2023-05-30 RX ADMIN — DOCUSATE SODIUM 50MG AND SENNOSIDES 8.6MG 2 TABLET: 8.6; 5 TABLET, FILM COATED ORAL at 20:59

## 2023-05-30 RX ADMIN — IPRATROPIUM BROMIDE AND ALBUTEROL SULFATE 3 ML: 2.5; .5 SOLUTION RESPIRATORY (INHALATION) at 15:27

## 2023-05-30 RX ADMIN — ASPIRIN 81 MG: 81 TABLET, CHEWABLE ORAL at 09:02

## 2023-05-30 RX ADMIN — IPRATROPIUM BROMIDE AND ALBUTEROL SULFATE 3 ML: 2.5; .5 SOLUTION RESPIRATORY (INHALATION) at 20:03

## 2023-05-30 RX ADMIN — GUAIFENESIN 400 MG: 200 SOLUTION ORAL at 15:51

## 2023-05-30 RX ADMIN — MUPIROCIN 1 APPLICATION: 20 OINTMENT TOPICAL at 20:59

## 2023-05-30 RX ADMIN — ATORVASTATIN CALCIUM 40 MG: 20 TABLET, FILM COATED ORAL at 20:59

## 2023-05-30 RX ADMIN — POTASSIUM CHLORIDE 20 MEQ: 1.5 POWDER, FOR SOLUTION ORAL at 18:19

## 2023-05-30 RX ADMIN — INSULIN LISPRO 8 UNITS: 100 INJECTION, SOLUTION INTRAVENOUS; SUBCUTANEOUS at 13:49

## 2023-05-30 RX ADMIN — POTASSIUM CHLORIDE 20 MEQ: 1.5 POWDER, FOR SOLUTION ORAL at 09:02

## 2023-05-30 RX ADMIN — HYDROCODONE BITARTRATE AND ACETAMINOPHEN 2 TABLET: 5; 325 TABLET ORAL at 03:32

## 2023-05-30 RX ADMIN — FUROSEMIDE 40 MG: 40 TABLET ORAL at 09:01

## 2023-05-30 RX ADMIN — INSULIN GLARGINE-YFGN 15 UNITS: 100 INJECTION, SOLUTION SUBCUTANEOUS at 09:04

## 2023-05-30 RX ADMIN — TRAMADOL HYDROCHLORIDE 50 MG: 50 TABLET, COATED ORAL at 18:20

## 2023-05-30 RX ADMIN — PANTOPRAZOLE SODIUM 40 MG: 40 INJECTION, POWDER, FOR SOLUTION INTRAVENOUS at 06:16

## 2023-05-30 RX ADMIN — EMPAGLIFLOZIN 25 MG: 25 TABLET, FILM COATED ORAL at 13:45

## 2023-05-30 RX ADMIN — METOCLOPRAMIDE 10 MG: 5 INJECTION, SOLUTION INTRAMUSCULAR; INTRAVENOUS at 09:01

## 2023-05-30 RX ADMIN — INSULIN LISPRO 10 UNITS: 100 INJECTION, SOLUTION INTRAVENOUS; SUBCUTANEOUS at 00:25

## 2023-05-30 RX ADMIN — INSULIN LISPRO 5 UNITS: 100 INJECTION, SOLUTION INTRAVENOUS; SUBCUTANEOUS at 18:19

## 2023-05-30 RX ADMIN — MUPIROCIN: 20 OINTMENT TOPICAL at 09:02

## 2023-05-30 RX ADMIN — METOCLOPRAMIDE 10 MG: 5 INJECTION, SOLUTION INTRAMUSCULAR; INTRAVENOUS at 15:51

## 2023-05-30 RX ADMIN — IPRATROPIUM BROMIDE AND ALBUTEROL SULFATE 3 ML: 2.5; .5 SOLUTION RESPIRATORY (INHALATION) at 06:46

## 2023-05-30 RX ADMIN — IPRATROPIUM BROMIDE AND ALBUTEROL SULFATE 3 ML: 2.5; .5 SOLUTION RESPIRATORY (INHALATION) at 11:15

## 2023-05-30 RX ADMIN — POLYETHYLENE GLYCOL 3350 17 G: 17 POWDER, FOR SOLUTION ORAL at 09:01

## 2023-05-30 NOTE — PLAN OF CARE
Goal Outcome Evaluation:  Plan of Care Reviewed With: patient        Progress: improving  Outcome Evaluation: Pt was supine in bed at start of the session and agreeable to work with PT/OT. Pt A&O to self and place and needing multiple cues for sequencing today, although demo's signficiant improvement in mobility today. He performs sup<>sit with modA and inc'd time to complete. Once sitting EOB, pt demo's good sitting balance and is able to perform STS with maxA x 2, B HHA, and elevated bed. On 3rd trial, pt takes a few side steps over to chair with min/modA x 2. Pt seated UIC at end of the session. He continues to benefit from skilled PT.

## 2023-05-30 NOTE — PROGRESS NOTES
LOS: 7 days   Patient Care Team:  Liza Oconnell III, NP-C as PCP - General (Family Medicine)  Corey Lao Jr., MD (Inactive) as Consulting Physician (Urology)    Chief Complaint: Follow-up severe mitral regurgitation status post tissue mitral valve replacement.    Interval History: He is still struggling with periods of aphasia and confusion.  His right arm seems to be better, and he can move his hand better.  No reported chest pain.  He is in rate controlled atrial flutter.    Vital Signs:  Temp:  [97.3 °F (36.3 °C)-98.4 °F (36.9 °C)] 97.9 °F (36.6 °C)  Heart Rate:  [72-79] 79  Resp:  [18-22] 22  BP: (102-126)/(68-75) 126/75    Intake/Output Summary (Last 24 hours) at 5/30/2023 1511  Last data filed at 5/30/2023 1301  Gross per 24 hour   Intake 3675.22 ml   Output 1275 ml   Net 2400.22 ml       Physical Exam:   General Appearance:    No acute distress, somewhat somnolent.   Lungs:     Clear to auscultation bilaterally     Heart:    Regular rhythm and normal rate.  No murmurs, gallops, or       rubs.   Abdomen:     Soft, nontender, nondistended.    Extremities:   1+ edema lower extremities.  Right hand weakness.     Results Review:    Results from last 7 days   Lab Units 05/30/23  0210   SODIUM mmol/L 146*   POTASSIUM mmol/L 4.4   CHLORIDE mmol/L 110*   CO2 mmol/L 28.6   BUN mg/dL 31*   CREATININE mg/dL 1.08   GLUCOSE mg/dL 322*   CALCIUM mg/dL 8.7         Results from last 7 days   Lab Units 05/30/23  0837   WBC 10*3/mm3 10.08   HEMOGLOBIN g/dL 11.2*   HEMATOCRIT % 34.2*   PLATELETS 10*3/mm3 213     Results from last 7 days   Lab Units 05/25/23  0307 05/24/23  1210 05/24/23  1103 05/24/23  1056   INR  1.16* 1.20* 1.5* 1.47*   APTT seconds  --  26.2  --  24.9         Results from last 7 days   Lab Units 05/27/23  0604   MAGNESIUM mg/dL 2.6*           I reviewed the patient's new clinical results.        Assessment:  1.  Severe symptomatic mitral regurgitation and tricuspid  regurgitation  2.  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 Frey  3.  Chronic diastolic CHF  4.  History of COVID-19 pneumonia with residual dyspnea and possible interstitial lung disease  5.  Stage IIIa chronic kidney disease  6.  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  Expected postoperative anemia  8.  Postoperative thrombocytopenia  9.  Postoperative frequent PVCs and NSVT  10.  Postoperative aphasia, confusion, and right upper extremity weakness  11.  Postoperative junctional bradycardia, complete heart block, and typical atrial flutter  12.  Diabetes    Plan:  -Mental status remains an issue.  He cannot get an MRI of the brain until his pacing wires are pulled.  His Norco is going to be changed to tramadol in case this is contributing to his overall picture.    -He remains in typical atrial flutter.  Electrophysiology is now following him.  Dr. Valdovinos recommended holding beta-blockers given the recent complete heart block and junctional bradycardia.  He is no longer on amiodarone.    -Anticoagulation when okay from a cardiovascular surgery standpoint.    -Continue Lasix 40 mg per tube daily.  Back on Jardiance 25 mg/day.    Corwin Hobson MD  05/30/23  15:11 EDT

## 2023-05-30 NOTE — THERAPY TREATMENT NOTE
Patient Name: Vernon Solorzano  : 1950    MRN: [id number]                              Today's Date: 2023       Admit Date: 2023    Visit Dx:     ICD-10-CM ICD-9-CM   1. S/P MVR (mitral valve replacement)  Z95.2 V43.3   2. Nonrheumatic mitral valve regurgitation  I34.0 424.0     Patient Active Problem List   Diagnosis   • DM (diabetes mellitus), type 2   • Mixed hyperlipidemia   • Mild intermittent asthma without complication   • New onset of congestive heart failure   • Nonrheumatic mitral valve regurgitation   • Chest pain   • Essential hypertension   • Mitral valve disease     Past Medical History:   Diagnosis Date   • Allergic rhinitis    • Arthritis    • BPH (benign prostatic hyperplasia)    • Diabetes mellitus     TYPE 2   • Foraminal stenosis of cervical region     C5-C6   • GERD (gastroesophageal reflux disease)    • Hemorrhoids    • History of urinary retention 2010    S/P TURP   • Hyperlipidemia    • Macular degeneration    • PING (obstructive sleep apnea) 2016    MILD, SEES DR. AMARILIS MORGAN     Past Surgical History:   Procedure Laterality Date   • CARDIAC CATHETERIZATION N/A 2023    Procedure: Right Heart Cath;  Surgeon: Corey Gaffney MD;  [facility]:  [facility];  Service: Cardiology;  Laterality: N/A;   • CARDIAC CATHETERIZATION N/A 2023    Procedure: Left Heart Cath;  Surgeon: Corey Gaffney MD;  Location:  [facility];  Service: Cardiology;  Laterality: N/A;   • CARDIAC CATHETERIZATION N/A 2023    Procedure: Left ventriculography;  Surgeon: Corey Gaffney MD;  Location:  [facility];  Service: Cardiology;  Laterality: N/A;   • CARDIAC CATHETERIZATION N/A 2023    Procedure: Coronary angiography;  Surgeon: Corey Gaffney MD;  Location:  [facility];  Service: Cardiology;  Laterality: N/A;   • COLONOSCOPY N/A     WNL PER PT, NO RECORDS,    • COLONOSCOPY N/A 2009      GORGE BENAVIDEZ AT [place]   • MITRAL VALVE REPAIR/REPLACEMENT N/A 5/24/2023    Procedure: MITRAL VALVE REPAIR/REPLACEMENT TRICUSPID VALVE REPAIR/REPLACEMENT TRANSESOPHAGEAL ECHOCARDIOGRAM WITH ANESTHESIA;  Surgeon: George Frey MD;  Location: [facility];  Service: Cardiothoracic;  Laterality: N/A;   • PROSTATE SURGERY N/A 11/12/2010    TURP, DR. BRIGHT COLLAZO AT [facility]   • SKIN TAG REMOVAL N/A 12/20/2017    ANAL SKIN TAG X2, PERFORMED IN OFFICE, DR. LISA ORANTES   • TURP / TRANSURETHRAL INCISION / DRAINAGE PROSTATE  2010    Dr. Goodrich      General Information     Row Name 05/30/23 7680          OT Time and Intention    Document Type therapy note (daily note)  -CE     Mode of Treatment co-treatment;occupational therapy;physical therapy  -CE     Row Name 05/30/23 6030          General Information    Patient Profile Reviewed yes  -CE     Existing Precautions/Restrictions fall;cardiac;sternal;oxygen therapy device and L/min  -CE     Row Name 05/30/23 1785          Cognition    Orientation Status (Cognition) oriented to;person;place  not month/yr despite reorientation and 2-choice options  -CE     Row Name 05/30/23 5026          Safety Issues, Functional Mobility    Safety Issues Affecting Function (Mobility) problem-solving;safety precaution awareness;ability to follow commands  -CE     Impairments Affecting Function (Mobility) balance;coordination;cognition;endurance/activity tolerance;strength;pain;postural/trunk control  -CE     Cognitive Impairments, Mobility Safety/Performance awareness, need for assistance;insight into deficits/self-awareness;problem-solving/reasoning;safety precaution awareness;sequencing abilities  -CE           User Key  (r) = Recorded By, (t) = Taken By, (c) = Cosigned By    Initials Name Provider Type    CE Faina Pak OT Occupational Therapist                 Mobility/ADL's     Row Name 05/30/23 5599          Bed Mobility    Bed Mobility supine-sit  -CE     Scooting/Bridging  [place] (Bed Mobility) minimum assist (75% patient effort);2 person assist;verbal cues  -CE     Bed Mobility, Safety Issues decreased use of arms for pushing/pulling  -CE     Assistive Device (Bed Mobility) draw sheet;head of bed elevated;bed rails  -CE     Row Name 05/30/23 1129          Bed-Chair Transfer    Bed-Chair [place] (Transfers) minimum assist (75% patient effort);moderate assist (50% patient effort);2 person assist;verbal cues  -CE     Assistive Device (Bed-Chair Transfers) other (see comments)  B HHA  -CE     Row Name 05/30/23 1129          Sit-Stand Transfer    Sit-Stand [place] (Transfers) maximum assist (25% patient effort);2 person assist  -CE     Assistive Device (Sit-Stand Transfers) other (see comments)  B HHA  -CE     Comment, (Sit-Stand Transfer) x 3 trials; able to stand ~1 min on best attempt  -CE     Row Name 05/30/23 1129          Activities of Daily Living    BADL Assessment/Intervention grooming;lower body dressing  -CE     Row Name 05/30/23 1129          Grooming Assessment/Training    [place] Level (Grooming) oral care regimen;moderate assist (50% patient effort);set up;verbal cues;nonverbal cues (demo/gesture)  -CE     Position (Grooming) edge of bed sitting  -CE     Comment, (Grooming) cues to initiate with RUE and modA to complete swabbing mouth for oral care thoroughly  -CE     Row Name 05/30/23 1129          Lower Body Dressing Assessment/Training    [place] Level (Lower Body Dressing) don;doff;socks;dependent (less than 25% patient effort)  -CE     Position (Lower Body Dressing) edge of bed sitting  -CE           User Key  (r) = Recorded By, (t) = Taken By, (c) = Cosigned By    Initials Name Provider Type    Faina Ewing OT Occupational Therapist               Obj/Interventions     Row Name 05/30/23 1132          Balance    Dynamic Standing Balance 2-person assist;moderate assist;verbal cues  -CE     Position/Device Used, Standing Balance  supported  -CE     Balance Interventions sit to stand;weight shifting activity  -CE           User Key  (r) = Recorded By, (t) = Taken By, (c) = Cosigned By    Initials Name Provider Type    CE Faina Pak OT Occupational Therapist               Goals/Plan    No documentation.                Clinical Impression     Row Name 05/30/23 1133          Pain Assessment    Pretreatment Pain Rating 0/10 - no pain  -CE     Posttreatment Pain Rating 0/10 - no pain  -CE     Row Name 05/30/23 1133          Plan of Care Review    Plan of Care Reviewed With patient  -CE     Progress improving  -CE     Outcome Evaluation Pt seen for PT and OT co-tx to maximize functional mobility and therapeutic benefit. Pt able to transfer to EOB with min/modA x 2 and increased time/cues. Pt able to stand with maxA x 2 for 3 trials and able to take side steps to R to transfer to chair on 3rd trial. Pt con't to require significant cues for initiation and is oriented to self/place only despite reorientation and 2-choice options for month/year. OT will con't to follow for stated goals.  -CE     Row Name 05/30/23 1133          Therapy Assessment/Plan (OT)    Rehab Potential (OT) good, to achieve stated therapy goals  -CE     Criteria for Skilled Therapeutic Interventions Met (OT) yes;skilled treatment is necessary  -CE     Therapy Frequency (OT) 5 times/wk  -CE     Row Name 05/30/23 1133          Therapy Plan Review/Discharge Plan (OT)    Anticipated Discharge Disposition (OT) skilled nursing facility;inpatient rehabilitation facility  -CE     Row Name 05/30/23 1133          Vital Signs    O2 Delivery Pre Treatment room air  -CE     Pre Patient Position Supine  -CE     Intra Patient Position Standing  -CE     Post Patient Position Sitting  -CE     Row Name 05/30/23 1133          Positioning and Restraints    Pre-Treatment Position in bed  -CE     Post Treatment Position chair  -CE     In Chair notified nsg;reclined;call light within  reach;encouraged to call for assist;exit alarm on;legs elevated  -CE           User Key  (r) = Recorded By, (t) = Taken By, (c) = Cosigned By    Initials Name Provider Type    Faina Ewing OT Occupational Therapist               Outcome Measures     Row Name 05/30/23 1136          How much help from another is currently needed...    Putting on and taking off regular lower body clothing? 1  -CE     Bathing (including washing, rinsing, and drying) 1  -CE     Toileting (which includes using toilet bed pan or urinal) 1  -CE     Putting on and taking off regular upper body clothing 1  -CE     Taking care of personal grooming (such as brushing teeth) 2  -CE     Eating meals 1  -CE     AM-PAC 6 Clicks Score (OT) 7  -CE     Row Name 05/30/23 1124          How much help from another person do you currently need...    Turning from your back to your side while in flat bed without using bedrails? 2  -DB     Moving from lying on back to sitting on the side of a flat bed without bedrails? 2  -DB     Moving to and from a bed to a chair (including a wheelchair)? 2  -DB     Standing up from a chair using your arms (e.g., wheelchair, bedside chair)? 2  -DB     Climbing 3-5 steps with a railing? 1  -DB     To walk in hospital room? 2  -DB     AM-PAC 6 Clicks Score (PT) 11  -DB     Highest level of mobility 4 --> Transferred to chair/commode  -DB     Row Name 05/30/23 1136 05/30/23 1124       Functional Assessment    Outcome Measure Options AM-PAC 6 Clicks Daily Activity (OT)  -CE AM-PAC 6 Clicks Basic Mobility (PT)  -DB          User Key  (r) = Recorded By, (t) = Taken By, (c) = Cosigned By    Initials Name Provider Type    Marissa Gimenez, PT Physical Therapist    Faina Ewing OT Occupational Therapist                Occupational Therapy Education     Title: PT OT SLP Therapies (In Progress)     Topic: Occupational Therapy (Not Started)     Point: ADL training (Not Started)     Description:   Instruct learner(s)  on proper safety adaptation and remediation techniques during self care or transfers.   Instruct in proper use of assistive devices.              Learner Progress:  Not documented in this visit.          Point: Home exercise program (Not Started)     Description:   Instruct learner(s) on appropriate technique for monitoring, assisting and/or progressing therapeutic exercises/activities.              Learner Progress:  Not documented in this visit.          Point: Precautions (Not Started)     Description:   Instruct learner(s) on prescribed precautions during self-care and functional transfers.              Learner Progress:  Not documented in this visit.          Point: Body mechanics (Not Started)     Description:   Instruct learner(s) on proper positioning and spine alignment during self-care, functional mobility activities and/or exercises.              Learner Progress:  Not documented in this visit.                          OT Recommendation and Plan  Therapy Frequency (OT): 5 times/wk  Plan of Care Review  Plan of Care Reviewed With: patient  Progress: improving  Outcome Evaluation: Pt seen for PT and OT co-tx to maximize functional mobility and therapeutic benefit. Pt able to transfer to EOB with min/modA x 2 and increased time/cues. Pt able to stand with maxA x 2 for 3 trials and able to take side steps to R to transfer to chair on 3rd trial. Pt con't to require significant cues for initiation and is oriented to self/place only despite reorientation and 2-choice options for month/year. OT will con't to follow for stated goals.     Time Calculation:    Time Calculation- OT     Row Name 05/30/23 1136             Time Calculation- OT    OT Start Time 0833  -CE      OT Stop Time 0857  -CE      OT Time Calculation (min) 24 min  -CE      Total Timed Code Minutes- OT 24 minute(s)  -CE      OT Received On 05/30/23  -CE      OT - Next Appointment 05/31/23  -CE         Timed Charges    34050 - OT Therapeutic Activity  Minutes 12  -CE      52900 - OT Self Care/Mgmt Minutes 12  -CE         Total Minutes    Timed Charges Total Minutes 24  -CE       Total Minutes 24  -CE            User Key  (r) = Recorded By, (t) = Taken By, (c) = Cosigned By    Initials Name Provider Type    Faina Ewing OT Occupational Therapist              Therapy Charges for Today     Code Description Service Date Service Provider Modifiers Qty    83178023882  OT THERAPEUTIC ACT EA 15 MIN 5/30/2023 Faina Pak OT GO 1    [id number]  OT SELF CARE/MGMT/TRAIN EA 15 MIN 5/30/2023 Faina Pak OT GO 1               Faina Pak OT  5/30/2023

## 2023-05-30 NOTE — PROGRESS NOTES
[facility] follow up d/t mental status changes; this writer reviewed chart, spoke with RN, and met with patient individually in room 2221. Per RN, patient still exhibiting aphasia and slow to speak; he/pt has been more alert but somewhat restless.  Patient states he is anxious regarding speech eval (rated anxiety at a 10 out of 10 with 10 being the most); depression rated 7 out of 10 (using the same scale).  Patient denies any SI, HI, and/or hallucinations; tube feeding continues.  No further needs/concerns noted at this time per pt and/or RN; Access Center to continue following.

## 2023-05-30 NOTE — PLAN OF CARE
Goal Outcome Evaluation:  Plan of Care Reviewed With: patient        Progress: improving  Outcome Evaluation: Pt seen for PT and OT co-tx to maximize functional mobility and therapeutic benefit. Pt able to transfer to EOB with min/modA x 2 and increased time/cues. Pt able to stand with maxA x 2 for 3 trials and able to take side steps to R to transfer to chair on 3rd trial. Pt con't to require significant cues for initiation and is oriented to self/place only despite reorientation and 2-choice options for month/year. OT will con't to follow for stated goals.

## 2023-05-30 NOTE — PROGRESS NOTES
"Nutrition Services    Patient Name:  Vernon Solorzano  YOB: 1950  MRN: [id number]  Admit Date:  5/23/2023    Assessment Date:  05/30/23    CLINICAL NUTRITION    Comments: Follow up for tube feeding    Current TF regimen: TFs of Diabetisource running at goal rate of 80 mL/hr.  Tolerating well per RN report.      Free water flushes adjusted to 150 mL q hour per renal MD.  Fear patient will not tolerate this much volume per nasoduodenal feeding tube.  Discussed with RN.  RD to follow closely.    Plan/Recommendations: Will continue to follow for tolerance.    RD to continue to monitor per protocol.     Encounter Information         Reason For Encounter Follow up for TF    Current Issues S/p MVR, TVr and SU closure 5/22.  CT negative for acute CVA.  Patient anxious regarding SLP evaluation.  Remains NPO.  More alert.       Estimated Requirements         Calories 2360 kcals (20 kcal/kg)    Protein (gm) 118-141 grams protein (1.0 - 1.2 gm/kg)    Fluid (mL) 2360 mL (1 mL/kcal)     Current Nutrition Orders & Evaluation of Intake       Oral Nutrition     Current PO Diet NPO Diet NPO Type: Strict NPO   Supplement n/a   PO Evaluation     Trending % PO Intake n/a    Factors Affecting Intake  swallow impairment      Enteral Nutrition     Enteral Route ND    TF Delivery Method Continuous    Enteral Product Diabetisource AC    Modular None    Propofol Rate/Kcal     TF Current Rate (mL) 80    TF Goal Rate (mL) 80    Current Water Flush (mL) 150 mL q hour (per MD orders)    Current TF Provision  2304 kcal, 115 gm protein, 1574 mL free water + 3600 mL water flushes         Calories 98 % needs met         Protein  97 % needs met         TF Fluid (mL) 5174         IV Fluids     Avg Volume Delivered (mL)     % Goal Volume     TF Residual  n/a - tube is small bowel    TF Changes free water added    TF Tolerance tolerating     Anthropometrics          Height    Weight Height: 182.9 cm (72\")  Weight: 119 kg (262 lb 5.6 oz) " (05/30/23 0526)    BMI kg/m2 Body mass index is 35.58 kg/m².  Obese, Class II (35 - 39.9)    Weight trend Stable     Labs        Pertinent Labs Reviewed, listed below     Results from last 7 days   Lab Units 05/30/23  0210 05/29/23  1730 05/29/23  1436 05/29/23  0358 05/28/23  1633 05/28/23  0344 05/27/23  0604 05/26/23  0304   SODIUM mmol/L 146*  --   --  145  --  150*   < > 147*   POTASSIUM mmol/L 4.4 5.0 4.6 3.6   < > 3.4*   < > 4.0   CHLORIDE mmol/L 110*  --   --  109*  --  112*   < > 110*   CO2 mmol/L 28.6  --   --  26.9  --  27.0   < > 23.9   BUN mg/dL 31*  --   --  34*  --  38*   < > 26*   CREATININE mg/dL 1.08  --   --  1.04  --  1.13   < > 1.37*   CALCIUM mg/dL 8.7  --   --  8.7  --  8.6   < > 8.9   BILIRUBIN mg/dL  --   --   --   --   --   --   --  0.4   ALK PHOS U/L  --   --   --   --   --   --   --  45   ALT (SGPT) U/L  --   --   --   --   --   --   --  23   AST (SGOT) U/L  --   --   --   --   --   --   --  80*   GLUCOSE mg/dL 322*  --   --  249*  --  209*   < > 143*    < > = values in this interval not displayed.     Results from last 7 days   Lab Units 05/30/23  0837 05/28/23  0344 05/27/23  0604 05/26/23  0304 05/25/23  1350 05/25/23  0307   MAGNESIUM mg/dL  --   --  2.6* 2.7* 2.9* 3.0*   PHOSPHORUS mg/dL  --   --   --   --   --  5.1*   HEMOGLOBIN g/dL 11.2*   < > 11.4* 10.2*  --  10.1*   HEMATOCRIT % 34.2*   < > 34.1* 31.0*  --  29.6*   WBC 10*3/mm3 10.08   < > 16.03* 18.39*  --  15.14*   ALBUMIN g/dL  --   --   --  4.1  --  4.2    < > = values in this interval not displayed.     Results from last 7 days   Lab Units 05/30/23  0837 05/28/23  0344 05/27/23  0604 05/26/23  0304 05/25/23  0307 05/24/23  1626 05/24/23  1210 05/24/23  1103 05/24/23  1056   INR   --   --   --   --  1.16*  --  1.20* 1.5* 1.47*   APTT seconds  --   --   --   --   --   --  26.2  --  24.9   PLATELETS 10*3/mm3 213 171 146 116* 118*   < > 188  --  160    < > = values in this interval not displayed.     COVID19   Date Value Ref  Range Status   05/23/2023 Not Detected Not Detected - Ref. Range Final     Lab Results   Component Value Date    HGBA1C 9.20 (H) 05/23/2023          Medications            Scheduled Medications aspirin, 81 mg, Oral, Daily  atorvastatin, 40 mg, Oral, Nightly  empagliflozin, 25 mg, Oral, Daily  enoxaparin, 40 mg, Subcutaneous, Daily  furosemide, 40 mg, Per G Tube, Daily  guaifenesin, 400 mg, Oral, Q8H  insulin glargine, 15 Units, Subcutaneous, Daily  insulin lispro, 3-14 Units, Subcutaneous, Q6H  ipratropium-albuterol, 3 mL, Nebulization, 4x Daily - RT  mupirocin, , Each Nare, BID  pantoprazole, 40 mg, Intravenous, QAM AC  polyethylene glycol, 17 g, Oral, Daily  potassium chloride, 20 mEq, Nasogastric, BID With Meals  predniSONE, 5 mg, Oral, Daily With Breakfast  [START ON 6/3/2023] predniSONE, 5 mg, Oral, Every Other Day  senna-docusate sodium, 2 tablet, Oral, Nightly        Infusions dextrose, 50 mL/hr, Last Rate: 50 mL/hr (05/30/23 1138)        PRN Medications •  acetaminophen **OR** acetaminophen **OR** acetaminophen  •  bisacodyl  •  bisacodyl  •  dextrose  •  dextrose  •  glucagon (human recombinant)  •  hydrALAZINE  •  ipratropium-albuterol  •  magnesium hydroxide  •  Morphine **AND** naloxone  •  nitroglycerin  •  ondansetron  •  polyethylene glycol  •  Potassium Replacement - Follow Nurse / BPA Driven Protocol  •  traMADol     Physical Findings          Physical Appearance disoriented, obese, on oxygen therapy   Oral/Mouth Cavity tooth or teeth missing   Edema  generalized, lower extremity , upper extremity, 1+ (trace), 2+ (mild)   Gastrointestinal hypoactive bowel sounds, last bowel movement:5/28   Skin  surgical incision sternal inc   Tubes/Drains Cortrak, ND tube, bridle in place   NFPE Not indicated at this time     NUTRITION INTERVENTION / PLAN OF CARE  Intervention Goal         Intervention Goal(s) Maintain nutrition status, Nutrition support treatment, Improved nutrition related labs, Reduce/improve  symptoms, Disease management/therapy, Maintain TF/PN and Appropriate weight loss     Nutrition Intervention         RD Action Follow Tx Progress and Care plan reviewed     Prescription         Diet Prescription     Supplement Prescription    EN/PN Prescription    New Prescription Ordered? No changes at this time   --  Monitor/Evaluation        Monitor Per protocol, I&O, Pertinent labs, EN delivery/tolerance, POC/GOC, Swallow function   Discharge Needs Pending clinical course   Education Will instruct as appropriate       Electronically signed by:  Vandana Merrill RD  05/30/23 15:54 EDT

## 2023-05-30 NOTE — CONSULTS
Electrophysiology Hospital Consult            Patient Name: Vernon Solorzano  Age/Sex: 72 y.o. male  : 1950  MRN: [id number]    Date of Admission: 2023  Date of Encounter Visit: 23  Encounter Provider: DESTIN Dumont  Referring Provider: George Frey MD  Place of Service: [facility] CARDIOLOGY  Patient Care Team:  Liza Oconnell III, NP-C as PCP - General (Family Medicine)  Corey Lao Jr., MD (Inactive) as Consulting Physician (Urology)      Subjective:   EP Consultation for: atrial flutter, junctional rhythm     Chief Complaint: post op    History of Present Illness:  Vernon Solorzano is a 72 y.o. male who follows with Dr. Arango. He has a history of BPH, diabetes, and PING.     He was recently hospitalized in May with acute CHF. He was diuresed and GAUDENCIO showed severe mitral valve regurgitation. Normal coronaries. Recommended to undergo valve replacement with dr. Frey.     Presented for elective valve replacement with Dr. Frey on . He underwent mitral valve replacement and tricuspid valve repair and SU closure.     His postop course has been complicated by confusion, aphasia, and multiple rhythm issues. CT negative for acute stroke. Currently has NG tube placed due to aphasia.     According to notes. He had periods of junctional rhythm. Reviewing EKG, one appears to show CHB with escape in the 70s. His beta blocker was held.     EP has been asked to see.     Dr. Valdovinos and I saw him this morning. Difficult to take a history due to confusion and speech issues. He is currently in typical atrial flutter with controlled rates. Beta blocker is still being held. He has NG tube in place. On lovenox.      Past Medical History:  Past Medical History:   Diagnosis Date   • Allergic rhinitis    • Arthritis    • BPH (benign prostatic hyperplasia)    • Diabetes mellitus     TYPE 2   • Foraminal stenosis of cervical region     C5-C6   •  GERD (gastroesophageal reflux disease)    • Hemorrhoids    • History of urinary retention 2010    S/P TURP   • Hyperlipidemia    • Macular degeneration    • PING (obstructive sleep apnea) 01/07/2016    MILD, SEES DR. AMARILIS MORGAN       Past Surgical History:   Procedure Laterality Date   • CARDIAC CATHETERIZATION N/A 5/11/2023    Procedure: Right Heart Cath;  Surgeon: Corey Gaffney MD;  Location:  [facility] CATH [facility]VASIVE LOC[facility];  Service: Cardiology;  Laterality: N/A;   • CARDIAC CATHETERIZATION N/A 5/11/2023    Procedure: Left Heart Cath;  Surgeon: Corey Gaffney MD;  Location:  [facility] CATH [facility]IVE [facility];  Service: Cardiology;  Laterality: N/A;   • CARDIAC CATHETERIZATION N/A 5/11/2023    Procedure: Left ventriculography;  Surgeon: Corey Gaffney MD;  Location:  [facility] CATH INVASIVE [facility];  Service: Cardiology;  Laterality: N/A;   • CARDIAC CATHETERIZATION N/A 5/11/2023    Procedure: Coronary angiography;  Surgeon: Corey Gaffney MD;  [facility];  Service: Cardiology;  Laterality: N/A;   • COLONOSCOPY N/A     WNL PER PT, NO RECORDS,    • COLONOSCOPY N/A 04/07/2009    DR. GORGE BENAVIDEZ AT [place]   • MITRAL VALVE REPAIR/REPLACEMENT N/A 5/24/2023    Procedure: MITRAL VALVE REPAIR/REPLACEMENT TRICUSPID VALVE REPAIR/REPLACEMENT TRANSESOPHAGEAL ECHOCARDIOGRAM WITH ANESTHESIA;  Surgeon: George Frey MD;  Location: [facility];  Service: Cardiothoracic;  Laterality: N/A;   • PROSTATE SURGERY N/A 11/12/2010    TURP, DR. COREY COLLAZO AT [facility]   • SKIN TAG REMOVAL N/A 12/20/2017    ANAL SKIN TAG X2, PERFORMED IN OFFICE, DR. LISA ORANTES   • TURP / TRANSURETHRAL INCISION / DRAINAGE PROSTATE  2010    Dr. Goodrich       Home Medications:   Medications Prior to Admission   Medication Sig Dispense Refill Last Dose   • aspirin 81 MG tablet Take 1 tablet by mouth Daily.   5/23/2023   • atorvastatin (LIPITOR) 10 MG tablet TAKE 1 TABLET BY MOUTH EVERY DAY (Patient taking  differently: 1 tablet Every Night.) 90 tablet 0 5/22/2023   • furosemide (LASIX) 40 MG tablet Take 1 tablet by mouth 2 (Two) Times a Day. 60 tablet 1 5/23/2023   • insulin degludec (TRESIBA FLEXTOUCH) 100 UNIT/ML solution pen-injector injection Start 25 units daily and titrate up 2 units every 2 days if blood glucose levels are consistently above 150 (Patient taking differently: Inject 50 Units under the skin into the appropriate area as directed Daily.)   5/23/2023   • irbesartan (AVAPRO) 75 MG tablet Take 1 tablet by mouth Daily.   5/23/2023   • Jardiance 25 MG tablet tablet Take 1 tablet by mouth Daily.   5/23/2023   • metoclopramide (REGLAN) 10 MG tablet Take 1 tablet by mouth Every Night.   5/22/2023   • Multiple Vitamins-Minerals (PRESERVISION AREDS 2+MULTI VIT PO) Take  by mouth 2 (Two) Times a Day.   5/23/2023   • omeprazole (priLOSEC) 40 MG capsule Take 1 capsule by mouth Daily. (Patient taking differently: Take 1 capsule by mouth Every Night.) 90 capsule 4 5/22/2023   • potassium chloride (MICRO-K) 10 MEQ CR capsule Take 2 capsules by mouth Daily.   5/23/2023   • predniSONE (DELTASONE) 10 MG tablet Take 9 mg by mouth Daily.   5/23/2023   • Symbicort 160-4.5 MCG/ACT inhaler Inhale 2 puffs 2 (Two) Times a Day.   5/23/2023   • tiotropium (SPIRIVA) 18 MCG per inhalation capsule Place 1 capsule into inhaler and inhale Daily.   5/23/2023       Allergies:  No Known Allergies    Past Social History:  Social History     Socioeconomic History   • Marital status:    Tobacco Use   • Smoking status: Never     Passive exposure: Never   • Smokeless tobacco: Never   Vaping Use   • Vaping Use: Never used   Substance and Sexual Activity   • Alcohol use: Yes     Comment: RARELY   • Drug use: No   • Sexual activity: Defer     Partners: Female       Past Family History:  Family History   Problem Relation Age of Onset   • Lung cancer Mother    • Stroke Father    • Dementia Father    • Colon polyps Brother    • Colon  polyps Brother        Review of Systems: All systems reviewed. Pertinent positives identified in HPI. All other systems are negative.     14 point ROS was performed and is negative except as outlined in HPI.     Objective:     Objective:  Vital Signs (last 24 hours)       05/29 0700  05/30 0659 05/30 0700 05/30 0941   Most Recent      Temp (°F) 97.3 -  98.5      97.7     97.7 (36.5) 05/30 0810    Heart Rate 70 -  78      78     78 05/30 0810    Resp 18 -  20      20     20 05/30 0810    /74 -  117/74      119/69     119/69 05/30 0810    SpO2 (%) 95 -  100      96     96 05/30 0810        Temp:  [97.3 °F (36.3 °C)-98.4 °F (36.9 °C)] 97.7 °F (36.5 °C)  Heart Rate:  [70-78] 78  Resp:  [18-20] 20  BP: (102-119)/(68-74) 119/69  Body mass index is 35.58 kg/m².        Physical Exam:     General Appearance: No acute distress, well developed and well nourished.   Eyes: Conjunctiva and lids: No erythema, swelling, or discharge. Sclera non-icteric.   HENT: Atraumatic, normocephalic. External eyes, ears, and nose normal.   Respiratory: No signs of respiratory distress. Respiration rhythm and depth normal.   Clear to auscultation. No rales, crackles, rhonchi, or wheezing auscultated.   Cardiovascular:  Heart Rate and Rhythm: Normal, Heart Sounds: Normal S1 and S2. No S3 or S4 noted.  Gastrointestinal:  Abdomen soft, non-distended, non-tender.  Musculoskeletal: Normal movement of extremities  Skin: Warm and dry.   Psychiatric: Patient alert and oriented to person, place, and time. Speech and behavior appropriate. Normal mood and affect.    Labs:   Lab Review:     Results from last 7 days   Lab Units 05/30/23  0210 05/29/23  1730 05/29/23  1436 05/29/23  0358 05/28/23  1633 05/28/23  0344 05/27/23  1606 05/27/23  0604 05/26/23  0304 05/25/23  1350 05/25/23  0307 05/24/23  0406 05/23/23  1429   SODIUM mmol/L 146*  --   --  145  --  150*  --  151* 147* 142 143   < > 140   POTASSIUM mmol/L 4.4 5.0 4.6 3.6 4.2 3.4* 3.6 3.5 4.0  4.1 5.1   < > 4.3   CHLORIDE mmol/L 110*  --   --  109*  --  112*  --  111* 110* 110* 114*   < > 102   CO2 mmol/L 28.6  --   --  26.9  --  27.0  --  25.5 23.9 20.1* 18.4*   < > 26.4   BUN mg/dL 31*  --   --  34*  --  38*  --  35* 26* 20 19   < > 17   CREATININE mg/dL 1.08  --   --  1.04  --  1.13  --  1.32* 1.37* 1.40* 1.46*   < > 1.28*   GLUCOSE mg/dL 322*  --   --  249*  --  209*  --  153* 143* 149* 117*   < > 278*   CALCIUM mg/dL 8.7  --   --  8.7  --  8.6  --  9.1 8.9 8.9 9.0   < > 9.6   AST (SGOT) U/L  --   --   --   --   --   --   --   --  80*  --   --   --  14   ALT (SGPT) U/L  --   --   --   --   --   --   --   --  23  --   --   --  27    < > = values in this interval not displayed.         Results from last 7 days   Lab Units 05/30/23  0837   WBC 10*3/mm3 10.08   HEMOGLOBIN g/dL 11.2*   HEMATOCRIT % 34.2*   PLATELETS 10*3/mm3 213     Results from last 7 days   Lab Units 05/25/23  0307 05/24/23  1210 05/24/23  1103 05/24/23  1056 05/23/23  1429   INR  1.16* 1.20* 1.5* 1.47* 0.93   APTT seconds  --  26.2  --  24.9 24.0     Results from last 7 days   Lab Units 05/23/23  1429   CHOLESTEROL mg/dL 155     Results from last 7 days   Lab Units 05/27/23  0604   MAGNESIUM mg/dL 2.6*     Results from last 7 days   Lab Units 05/23/23  1429   CHOLESTEROL mg/dL 155   TRIGLYCERIDES mg/dL 197*   HDL CHOL mg/dL 41   LDL CHOL mg/dL 81     Results from last 7 days   Lab Units 05/23/23  1429   PROBNP pg/mL 531.0               EKG:         I personally viewed and interpreted the patient's EKG/Telemetry tracings.    Assessment:       Mitral valve disease    Nonrheumatic mitral valve regurgitation        Plan:   Dr. Aguila and I saw this patient.      He currently is dealing with confusion, aphasia and has NG tube in place. Postoperatively he has had periods of CHB and now in atrial flutter with controlled rates.      Recommend holding beta blocker and getting him on anticoagulation when okay with CTS. Will send him home with a  wearable monitor and have him follow up in clinic to discuss treatment of his atrial flutter. A/P, CV.      Thank you for allowing me to participate in the care of Vernon Solorzano. Feel free to contact me directly with any further questions or concerns.    DESTIN Dumont  [facility]  05/30/23  09:41 EDT

## 2023-05-30 NOTE — CASE MANAGEMENT/SOCIAL WORK
Continued Stay Note  [facility]     Patient Name: Vernon Solorzano  MRN: [id number]  Today's Date: 5/30/2023    Admit Date: 5/23/2023    Plan: Home with [facility] vs [facility] following for progress   Discharge Plan     Row Name 05/30/23 1303       Plan    Plan Home with [facility] vs [facility]- [facility] following for progress    Patient/Family in Agreement with Plan yes    Plan Comments Spoke with patient's spouse- Maricel at bedside who stated she is now interested in him going to [facility] rehab [place]. Referral called to [facility]- spoke with April/[facility] who stated they are following for progress closer to [place]. CCP to follow. Rocky JEFFERS RN               Discharge Codes    No documentation.               Expected Discharge Date and Time     Expected Discharge Date Expected Discharge Time    May 31, 2023             Rocky Mccabe RN

## 2023-05-30 NOTE — PROGRESS NOTES
[facility] Acute Rehab  Referral received. Spoke with pt/ wife about Acute Rehab. DC plan discussed.   Per wife work up continues for possible stroke.   WIll continue to follow progress and will discuss with Dr. Adrianne Rendon,RN  Acute Rehab Admission Nurse

## 2023-05-30 NOTE — PROGRESS NOTES
" LOS: 7 days   Patient Care Team:  Liza Oconnell III, NP-C as PCP - General (Family Medicine)  Corey Lao Jr., MD (Inactive) as Consulting Physician (Urology)    Chief Complaint: post op    Subjective:  Symptoms:  He reports chest pain.  No shortness of breath or cough.    Diet:  NPO (TF).  No nausea or vomiting.    Activity level: Impaired due to weakness.    Pain:  He complains of pain that is mild.  Pain is requiring pain medication.      Vital Signs  Temp:  [97.3 °F (36.3 °C)-98.5 °F (36.9 °C)] 97.3 °F (36.3 °C)  Heart Rate:  [70-78] 75  Resp:  [18-20] 18  BP: (102-117)/(68-74) 117/74  Body mass index is 35.58 kg/m².    Intake/Output Summary (Last 24 hours) at 5/30/2023 0730  Last data filed at 5/30/2023 0400  Gross per 24 hour   Intake 3650.22 ml   Output 325 ml   Net 3325.22 ml     No intake/output data recorded.          05/27/23  0823 05/29/23  0600 05/30/23  0526   Weight: 118 kg (260 lb 2.3 oz) 121 kg (266 lb 12.1 oz) 119 kg (262 lb 5.6 oz)     Objective:  General Appearance:  Comfortable and in no acute distress.    Vital signs: (most recent): Blood pressure 117/74, pulse 75, temperature 97.3 °F (36.3 °C), temperature source Oral, resp. rate 18, height 182.9 cm (72\"), weight 119 kg (262 lb 5.6 oz), SpO2 95 %.  Vital signs are normal.  No fever.    Output: Producing urine and minimal stool output.    Lungs:  Normal effort and normal respiratory rate.  There are decreased breath sounds.    Heart: Normal rate.  Irregular rhythm.    Abdomen: Abdomen is soft.  Hypoactive bowel sounds.     Extremities: There is dependent edema (moderate bilateral LE).    Pulses: Distal pulses are intact.    Neurological: Patient is alert.  (Oriented to person, place, and situation).    Skin:  Warm and dry.  (Sternal incision clean, dry, and intact)        Results Review:        WBC WBC   Date Value Ref Range Status   05/28/2023 13.01 (H) 3.40 - 10.80 10*3/mm3 Final      HGB Hemoglobin   Date Value " Ref Range Status   05/28/2023 11.7 (L) 13.0 - 17.7 g/dL Final      HCT Hematocrit   Date Value Ref Range Status   05/28/2023 35.1 (L) 37.5 - 51.0 % Final      Platelets Platelets   Date Value Ref Range Status   05/28/2023 171 140 - 450 10*3/mm3 Final        PT/INR:  No results found for: PROTIME/No results found for: INR    Sodium Sodium   Date Value Ref Range Status   05/30/2023 146 (H) 136 - 145 mmol/L Final   05/29/2023 145 136 - 145 mmol/L Final   05/28/2023 150 (H) 136 - 145 mmol/L Final      Potassium Potassium   Date Value Ref Range Status   05/30/2023 4.4 3.5 - 5.2 mmol/L Final   05/29/2023 5.0 3.5 - 5.2 mmol/L Final   05/29/2023 4.6 3.5 - 5.2 mmol/L Final   05/29/2023 3.6 3.5 - 5.2 mmol/L Final   05/28/2023 4.2 3.5 - 5.2 mmol/L Final     Comment:     Slight hemolysis detected by analyzer. Results may be affected.   05/28/2023 3.4 (L) 3.5 - 5.2 mmol/L Final   05/27/2023 3.6 3.5 - 5.2 mmol/L Final      Chloride Chloride   Date Value Ref Range Status   05/30/2023 110 (H) 98 - 107 mmol/L Final   05/29/2023 109 (H) 98 - 107 mmol/L Final   05/28/2023 112 (H) 98 - 107 mmol/L Final      Bicarbonate CO2   Date Value Ref Range Status   05/30/2023 28.6 22.0 - 29.0 mmol/L Final   05/29/2023 26.9 22.0 - 29.0 mmol/L Final   05/28/2023 27.0 22.0 - 29.0 mmol/L Final      BUN BUN   Date Value Ref Range Status   05/30/2023 31 (H) 8 - 23 mg/dL Final   05/29/2023 34 (H) 8 - 23 mg/dL Final   05/28/2023 38 (H) 8 - 23 mg/dL Final      Creatinine Creatinine   Date Value Ref Range Status   05/30/2023 1.08 0.76 - 1.27 mg/dL Final   05/29/2023 1.04 0.76 - 1.27 mg/dL Final   05/28/2023 1.13 0.76 - 1.27 mg/dL Final      Calcium Calcium   Date Value Ref Range Status   05/30/2023 8.7 8.6 - 10.5 mg/dL Final   05/29/2023 8.7 8.6 - 10.5 mg/dL Final   05/28/2023 8.6 8.6 - 10.5 mg/dL Final      Magnesium No results found for: MG       aspirin, 81 mg, Oral, Daily  atorvastatin, 40 mg, Oral, Nightly  empagliflozin, 25 mg, Oral,  Daily  enoxaparin, 40 mg, Subcutaneous, Daily  furosemide, 40 mg, Per G Tube, Daily  guaifenesin, 400 mg, Oral, Q8H  insulin glargine, 20 Units, Subcutaneous, Daily  insulin lispro, 3-14 Units, Subcutaneous, Q6H  ipratropium-albuterol, 3 mL, Nebulization, 4x Daily - RT  mupirocin, , Each Nare, BID  pantoprazole, 40 mg, Intravenous, QAM AC  potassium chloride, 20 mEq, Nasogastric, BID With Meals  predniSONE, 5 mg, Oral, Daily With Breakfast  senna-docusate sodium, 2 tablet, Oral, Nightly      dextrose, 60 mL/hr, Last Rate: 60 mL/hr (05/29/23 1729)            Patient Active Problem List   Diagnosis Code   • DM (diabetes mellitus), type 2 E11.9   • Mixed hyperlipidemia E78.2   • Mild intermittent asthma without complication J45.20   • New onset of congestive heart failure I50.9   • Nonrheumatic mitral valve regurgitation I34.0   • Chest pain R07.9   • Essential hypertension I10   • Mitral valve disease I05.9       Assessment & Plan   -Moderate to severe mitral regurgitation s/p MVR/TV repair POD#6 Tk  -Moderate tricuspid valve regurgitation  -Acute on chronic diastolic heart failure with preserved EF--55%  -CKD stage IIIa; baseline creatinine 1.2  -DM type II--A1c  -Severe pulmonary HTN  -PING  -BPH/Urinary retention s/p TURP  -Interstitial lung disease s/p COVID-19 infection --on chronic steroids  -leukocytosis--reactive/steroid administration  -post op anemia--expected acute blood loss  -TCP--consumptive     Resting comfortably in the bed. Appears more alert this morning. Moving all extremities and following my commands.Nursing reports he gets pretty drowsy with Norco. Will switch Tramadol and see if he tolerates this better  Appears to be in atrial flutter with PVCs with rates in the 70s. Check 12 lead to confirm. Will discuss AV wires with Dr. Frey  On 2L NC--wean as able  Blood glucose very elevated overnight. Stop D5 and increase free water through feeing tube. Resume Lantus and Jardiance  NA improving.  Creatinine stable. Continue oral diuretics  Tolerating tube feeds. SLP to re-evaluate swallow  Encourage aggressive pulmonary toilet--continue flutter/guaifinisin  Very slow bowel sounds. On Reglan at home, will give 2 doses and increase bowel regimen  Prednisone taper per pulmonology--5 mg for one week, then every other day for 1 week and then stop  Still very weak--continue aggressive PT/OT. Will have inpatient rehab evaluate  Continue routine post op care    DESTIN Jin  05/30/23  07:30 EDT

## 2023-05-30 NOTE — THERAPY TREATMENT NOTE
Patient Name: Vernon Solorzano  : 1950    MRN: [id number]                              Today's Date: 2023       Admit Date: 2023    Visit Dx:     ICD-10-CM ICD-9-CM   1. S/P MVR (mitral valve replacement)  Z95.2 V43.3   2. Nonrheumatic mitral valve regurgitation  I34.0 424.0     Patient Active Problem List   Diagnosis   • DM (diabetes mellitus), type 2   • Mixed hyperlipidemia   • Mild intermittent asthma without complication   • New onset of congestive heart failure   • Nonrheumatic mitral valve regurgitation   • Chest pain   • Essential hypertension   • Mitral valve disease     Past Medical History:   Diagnosis Date   • Allergic rhinitis    • Arthritis    • BPH (benign prostatic hyperplasia)    • Diabetes mellitus     TYPE 2   • Foraminal stenosis of cervical region     C5-C6   • GERD (gastroesophageal reflux disease)    • Hemorrhoids    • History of urinary retention 2010    S/P TURP   • Hyperlipidemia    • Macular degeneration    • PING (obstructive sleep apnea) 2016    MILD, SEES DR. AMARILIS MORGAN     Past Surgical History:   Procedure Laterality Date   • CARDIAC CATHETERIZATION N/A 2023    Procedure: Right Heart Cath;  Surgeon: Corey Gaffney MD;  [facility]:  [facility];  Service: Cardiology;  Laterality: N/A;   • CARDIAC CATHETERIZATION N/A 2023    Procedure: Left Heart Cath;  Surgeon: Corey Gaffney MD;  [facility]:  [facility];  Service: Cardiology;  Laterality: N/A;   • CARDIAC CATHETERIZATION N/A 2023    Procedure: Left ventriculography;  Surgeon: Corey Gaffney MD;  Location:  [facility];  Service: Cardiology;  Laterality: N/A;   • CARDIAC CATHETERIZATION N/A 2023    Procedure: Coronary angiography;  Surgeon: Corey Gaffney MD;  Location:  [facility];  Service: Cardiology;  Laterality: N/A;   • COLONOSCOPY N/A     WNL PER PT, NO RECORDS,    • COLONOSCOPY N/A 2009      GORGE BENAVIDEZ AT [place]   • MITRAL VALVE REPAIR/REPLACEMENT N/A 5/24/2023    Procedure: MITRAL VALVE REPAIR/REPLACEMENT TRICUSPID VALVE REPAIR/REPLACEMENT TRANSESOPHAGEAL ECHOCARDIOGRAM WITH ANESTHESIA;  Surgeon: George Frey MD;  Location: [facility];  Service: Cardiothoracic;  Laterality: N/A;   • PROSTATE SURGERY N/A 11/12/2010    TURP, DR. BRIGHT COLLAZO AT [facility]   • SKIN TAG REMOVAL N/A 12/20/2017    ANAL SKIN TAG X2, PERFORMED IN OFFICE, DR. LISA ORANTES   • TURP / TRANSURETHRAL INCISION / DRAINAGE PROSTATE  2010    Dr. Goodrich      General Information     Row Name 05/30/23 1110          Physical Therapy Time and Intention    Document Type therapy note (daily note)  -DB     Mode of Treatment co-treatment;physical therapy;occupational therapy  -DB     Row Name 05/30/23 1110          General Information    Patient Profile Reviewed yes  -DB     Row Name 05/30/23 1110          Cognition    Orientation Status (Cognition) oriented to;person;place  -DB           User Key  (r) = Recorded By, (t) = Taken By, (c) = Cosigned By    Initials Name Provider Type    DB Marissa Zamora PT Physical Therapist               Mobility     Row Name 05/30/23 1110          Bed Mobility    Bed Mobility supine-sit  -DB     Supine-Sit [place] (Bed Mobility) moderate assist (50% patient effort);verbal cues;nonverbal cues (demo/gesture)  -DB     Assistive Device (Bed Mobility) draw sheet;head of bed elevated;bed rails  -DB     Row Name 05/30/23 1110          Bed-Chair Transfer    Bed-Chair [place] (Transfers) minimum assist (75% patient effort);moderate assist (50% patient effort);2 person assist;verbal cues  -DB     Assistive Device (Bed-Chair Transfers) other (see comments)  HHA x 2  -DB     Row Name 05/30/23 1110          Sit-Stand Transfer    Sit-Stand [place] (Transfers) maximum assist (25% patient effort);2 person assist  -DB     Assistive Device (Sit-Stand Transfers) other (see comments)  B HHA  -DB      Comment, (Sit-Stand Transfer) performed 3x  -DB     Row Name 05/30/23 1110          Gait/Stairs (Locomotion)    [place] Level (Gait) minimum assist (75% patient effort);moderate assist (50% patient effort);2 person assist;verbal cues;nonverbal cues (demo/gesture)  -DB     Assistive Device (Gait) other (see comments)  B HHA  -DB     Distance in Feet (Gait) 3' pivoting steps from bed to chair  -DB     Deviations/Abnormal Patterns (Gait) festinating/shuffling;base of support, narrow;stride length decreased  -DB     Bilateral Gait Deviations forward flexed posture  -DB           User Key  (r) = Recorded By, (t) = Taken By, (c) = Cosigned By    Initials Name Provider Type    Marissa Gimenez PT Physical Therapist               Obj/Interventions     Row Name 05/30/23 1112          Balance    Balance Assessment sitting static balance;sitting dynamic balance;standing static balance;standing dynamic balance  -DB     Static Sitting Balance standby assist  -DB     Dynamic Sitting Balance standby assist  -DB     Position, Sitting Balance unsupported;sitting edge of bed  -DB     Static Standing Balance minimal assist;2-person assist;verbal cues  -DB     Dynamic Standing Balance moderate assist;2-person assist;verbal cues  -DB     Position/Device Used, Standing Balance supported  -DB     Balance Interventions sitting;standing;sit to stand  -DB           User Key  (r) = Recorded By, (t) = Taken By, (c) = Cosigned By    Initials Name Provider Type    Marissa Gimenez PT Physical Therapist               Goals/Plan    No documentation.                Clinical Impression     Row Name 05/30/23 1112          Pain    Pain Intervention(s) Ambulation/increased activity;Repositioned  -DB     Row Name 05/30/23 1112          Plan of Care Review    Plan of Care Reviewed With patient  -DB     Progress improving  -DB     Outcome Evaluation Pt was supine in bed at start of the session and agreeable to work with PT/OT. Pt A&O to self  and place and needing multiple cues for sequencing today, although richard's signficiant improvement in mobility today. He performs sup<>sit with modA and inc'd time to complete. Once sitting EOB, pt richard's good sitting balance and is able to perform STS with maxA x 2, B HHA, and elevated bed. On 3rd trial, pt takes a few side steps over to chair with min/modA x 2. Pt seated UIC at end of the session. He continues to benefit from skilled PT.  -DB     Row Name 05/30/23 1112          Vital Signs    O2 Delivery Pre Treatment room air  -DB     O2 Delivery Intra Treatment room air  -DB     O2 Delivery Post Treatment room air  -DB     Pre Patient Position Supine  -DB     Intra Patient Position Standing  -DB     Post Patient Position Sitting  -DB     Row Name 05/30/23 1112          Positioning and Restraints    Pre-Treatment Position in bed  -DB     Post Treatment Position chair  -DB     In Chair reclined;sitting;call light within reach;encouraged to call for assist;exit alarm on  -DB           User Key  (r) = Recorded By, (t) = Taken By, (c) = Cosigned By    Initials Name Provider Type    DB Marissa Zamora PT Physical Therapist               Outcome Measures     Row Name 05/30/23 1124          How much help from another person do you currently need...    Turning from your back to your side while in flat bed without using bedrails? 2  -DB     Moving from lying on back to sitting on the side of a flat bed without bedrails? 2  -DB     Moving to and from a bed to a chair (including a wheelchair)? 2  -DB     Standing up from a chair using your arms (e.g., wheelchair, bedside chair)? 2  -DB     Climbing 3-5 steps with a railing? 1  -DB     To walk in hospital room? 2  -DB     AM-PAC 6 Clicks Score (PT) 11  -DB     Highest level of mobility 4 --> Transferred to chair/commode  -DB     Row Name 05/30/23 1124          Functional Assessment    Outcome Measure Options AM-PAC 6 Clicks Basic Mobility (PT)  -DB           User Key  (r) =  Recorded By, (t) = Taken By, (c) = Cosigned By    Initials Name Provider Type    DB Marissa Zamora, NANETTE Physical Therapist                             Physical Therapy Education     Title: PT OT SLP Therapies (In Progress)     Topic: Physical Therapy (Done)     Point: Mobility training (Done)     Learning Progress Summary           Patient Acceptance, E, VU,NR by DB at 5/30/2023 1125    Acceptance, E, NR by DB at 5/29/2023 1157    Acceptance, E, VU by DB at 5/28/2023 1344    Acceptance, E, NR by EM at 5/26/2023 1304    Acceptance, E, NR by EM at 5/25/2023 1208                   Point: Home exercise program (Done)     Learning Progress Summary           Patient Acceptance, E, VU,NR by DB at 5/30/2023 1125    Acceptance, E, NR by DB at 5/29/2023 1157    Acceptance, E, VU by DB at 5/28/2023 1344    Acceptance, E, NR by EM at 5/25/2023 1208                   Point: Body mechanics (Done)     Learning Progress Summary           Patient Acceptance, E, VU,NR by DB at 5/30/2023 1125    Acceptance, E, NR by DB at 5/29/2023 1157    Acceptance, E, VU by DB at 5/28/2023 1344                   Point: Precautions (Done)     Learning Progress Summary           Patient Acceptance, E, VU,NR by DB at 5/30/2023 1125    Acceptance, E, NR by DB at 5/29/2023 1157    Acceptance, E, VU by DB at 5/28/2023 1344                               User Key     Initials Effective Dates Name Provider Type Discipline    EM 06/16/21 -  Kathy Shaffer PT Physical Therapist PT     06/16/21 -  Marissa Zamora PT Physical Therapist PT              PT Recommendation and Plan     Plan of Care Reviewed With: patient  Progress: improving  Outcome Evaluation: Pt was supine in bed at start of the session and agreeable to work with PT/OT. Pt A&O to self and place and needing multiple cues for sequencing today, although demo's signficiant improvement in mobility today. He performs sup<>sit with modA and inc'd time to complete. Once sitting EOB, pt  richard's good sitting balance and is able to perform STS with maxA x 2, B HHA, and elevated bed. On 3rd trial, pt takes a few side steps over to chair with min/modA x 2. Pt seated UIC at end of the session. He continues to benefit from skilled PT.     Time Calculation:    PT Charges     Row Name 05/30/23 1125             Time Calculation    Start Time 0833  -DB      Stop Time 0858  -DB      Time Calculation (min) 25 min  -DB      PT Received On 05/30/23  -DB      PT - Next Appointment 05/31/23  -DB         Time Calculation- PT    Total Timed Code Minutes- PT 25 minute(s)  -DB            User Key  (r) = Recorded By, (t) = Taken By, (c) = Cosigned By    Initials Name Provider Type    Marissa Gimenez, NANETTE Physical Therapist              Therapy Charges for Today     Code Description Service Date Service Provider Modifiers Qty    59965399474  PT THERAPEUTIC ACT EA 15 MIN 5/29/2023 Marissa Zamora, PT GP 1    [id number]  PT THERAPEUTIC ACT EA 15 MIN 5/30/2023 Marissa Zamora, PT GP 2          PT G-Codes  Outcome Measure Options: AM-PAC 6 Clicks Basic Mobility (PT)  AM-PAC 6 Clicks Score (PT): 11  AM-PAC 6 Clicks Score (OT): 6  PT Discharge Summary  Anticipated Discharge Disposition (PT): skilled nursing facility    Marissa Zamora PT  5/30/2023

## 2023-05-30 NOTE — PROGRESS NOTES
[facility] Progress Note      Patient Name: Vernon Solorzano  : 1950  MRN: [id number]  Primary Care Physician:  Liza Oconnell III, RACHAEL  Date of admission: 2023    Subjective     Interval History:     Seen and examined.  Appears to be more alert and more conversing.  Wife is on bedside and reported improvement in his mental status as well.  Answer question properly.  Review of Systems:   As noted above    Objective     Vitals:   Temp:  [97.3 °F (36.3 °C)-98.4 °F (36.9 °C)] 97.7 °F (36.5 °C)  Heart Rate:  [70-78] 78  Resp:  [18-20] 20  BP: (102-119)/(68-74) 119/69  Flow (L/min):  [2] 2    Intake/Output Summary (Last 24 hours) at 2023 1101  Last data filed at 2023 0739  Gross per 24 hour   Intake 3615.22 ml   Output 825 ml   Net 2790.22 ml       Physical Exam:    General Appearance: alert, , no acute distress   Skin: warm and dry  HEENT: oral mucosa normal, nonicteric sclera  Neck: supple, no JVD  Lungs: CTA  Heart: RRR, normal S1 and S2  Abdomen: soft, nontender, nondistended  : no palpable bladder  Extremities: no edema, cyanosis or clubbing      Scheduled Meds:     aspirin, 81 mg, Oral, Daily  atorvastatin, 40 mg, Oral, Nightly  empagliflozin, 25 mg, Oral, Daily  enoxaparin, 40 mg, Subcutaneous, Daily  furosemide, 40 mg, Per G Tube, Daily  guaifenesin, 400 mg, Oral, Q8H  insulin glargine, 15 Units, Subcutaneous, Daily  insulin lispro, 3-14 Units, Subcutaneous, Q6H  ipratropium-albuterol, 3 mL, Nebulization, 4x Daily - RT  metoclopramide, 10 mg, Intravenous, Q6H  mupirocin, , Each Nare, BID  pantoprazole, 40 mg, Intravenous, QAM AC  polyethylene glycol, 17 g, Oral, Daily  potassium chloride, 20 mEq, Nasogastric, BID With Meals  predniSONE, 5 mg, Oral, Daily With Breakfast  [START ON 6/3/2023] predniSONE, 5 mg, Oral, Every Other Day  senna-docusate sodium, 2 tablet, Oral, Nightly      IV Meds:   dextrose, 100 mL/hr        Results Reviewed:   I have  personally reviewed the results from the time of this admission to 5/30/2023 11:01 EDT     Results from last 7 days   Lab Units 05/30/23  0210 05/29/23  1730 05/29/23  1436 05/29/23  0358 05/28/23  1633 05/28/23  0344 05/27/23  0604 05/26/23  0304 05/24/23  0406 05/23/23  1429   SODIUM mmol/L 146*  --   --  145  --  150*   < > 147*   < > 140   POTASSIUM mmol/L 4.4 5.0 4.6 3.6   < > 3.4*   < > 4.0   < > 4.3   CHLORIDE mmol/L 110*  --   --  109*  --  112*   < > 110*   < > 102   CO2 mmol/L 28.6  --   --  26.9  --  27.0   < > 23.9   < > 26.4   BUN mg/dL 31*  --   --  34*  --  38*   < > 26*   < > 17   CREATININE mg/dL 1.08  --   --  1.04  --  1.13   < > 1.37*   < > 1.28*   CALCIUM mg/dL 8.7  --   --  8.7  --  8.6   < > 8.9   < > 9.6   BILIRUBIN mg/dL  --   --   --   --   --   --   --  0.4  --  0.4   ALK PHOS U/L  --   --   --   --   --   --   --  45  --  71   ALT (SGPT) U/L  --   --   --   --   --   --   --  23  --  27   AST (SGOT) U/L  --   --   --   --   --   --   --  80*  --  14   GLUCOSE mg/dL 322*  --   --  249*  --  209*   < > 143*   < > 278*    < > = values in this interval not displayed.     Estimated Creatinine Clearance: 80 mL/min (by C-G formula based on SCr of 1.08 mg/dL).  Results from last 7 days   Lab Units 05/27/23  0604 05/26/23  0304 05/25/23  1350 05/25/23  0307 05/24/23  1626 05/24/23  1210   MAGNESIUM mg/dL 2.6* 2.7* 2.9* 3.0* 2.9* 3.4*   PHOSPHORUS mg/dL  --   --   --  5.1* 0.7* 2.2*         Results from last 7 days   Lab Units 05/30/23  0837 05/28/23  0344 05/27/23  0604 05/26/23  0304 05/25/23  0307   WBC 10*3/mm3 10.08 13.01* 16.03* 18.39* 15.14*   HEMOGLOBIN g/dL 11.2* 11.7* 11.4* 10.2* 10.1*   PLATELETS 10*3/mm3 213 171 146 116* 118*     Results from last 7 days   Lab Units 05/25/23  0307 05/24/23  1210 05/24/23  1103 05/24/23  1056 05/23/23  1429   INR  1.16* 1.20* 1.5* 1.47* 0.93       Assessment / Plan     ASSESSMENT:  1.  Acute kidney injury on chronic kidney disease stage III history.   Baseline creatinine 1.2 mg/dL and it went up to 1.68 mg/dL 05/24 and today is down to 1.4 mg/dL.  Volume status appears to be generous.  Renal ultrasound showed normal-looking kidneys except of 6 cm cyst with septation in the upper pole of the right kidney.  Urinalysis essentially unremarkable.       2.  Chronic kidney disease stage III with baseline creatinine 1.2 mg/dL  3.  Moderate to severe mitral regurgitation status post mitral valve repair placement and tricuspid valve repair on May 24  4.  Severe pulmonary hypertension  5.  History of COVID-19 pneumonia with possible interstitial lung disease on chronic steroid   6.  Diastolic congestive heart failure  7.  Postoperative anemia and thrombocytopenia     PLAN:     1.  Will increase water flushes to 150 cc/h and cut down D5 water to 50 cc/h given his hyponatremia and difficult to control blood sugar  2. We will need urology evaluation for his cyst as an outpatient  3. Surveillance labs       Thank you for involving us in the care of Vernon Solorzano.  Please feel free to call with any questions.    Tae Lay MD  05/30/23  11:01 EDT    [facility]  [phone]

## 2023-05-31 ENCOUNTER — APPOINTMENT (OUTPATIENT)
Dept: GENERAL RADIOLOGY | Facility: HOSPITAL | Age: 73
End: 2023-05-31
Payer: MEDICARE

## 2023-05-31 LAB
25(OH)D3 SERPL-MCNC: 20 NG/ML (ref 30–100)
ANION GAP SERPL CALCULATED.3IONS-SCNC: 8 MMOL/L (ref 5–15)
BASOPHILS # BLD AUTO: 0.05 10*3/MM3 (ref 0–0.2)
BASOPHILS NFR BLD AUTO: 0.4 % (ref 0–1.5)
BUN SERPL-MCNC: 31 MG/DL (ref 8–23)
BUN/CREAT SERPL: 30.4 (ref 7–25)
CALCIUM SPEC-SCNC: 8.2 MG/DL (ref 8.6–10.5)
CHLORIDE SERPL-SCNC: 105 MMOL/L (ref 98–107)
CO2 SERPL-SCNC: 28 MMOL/L (ref 22–29)
CREAT SERPL-MCNC: 1.02 MG/DL (ref 0.76–1.27)
DEPRECATED RDW RBC AUTO: 45.1 FL (ref 37–54)
EGFRCR SERPLBLD CKD-EPI 2021: 77.1 ML/MIN/1.73
EOSINOPHIL # BLD AUTO: 0.51 10*3/MM3 (ref 0–0.4)
EOSINOPHIL NFR BLD AUTO: 4 % (ref 0.3–6.2)
ERYTHROCYTE [DISTWIDTH] IN BLOOD BY AUTOMATED COUNT: 13.8 % (ref 12.3–15.4)
GLUCOSE BLDC GLUCOMTR-MCNC: 200 MG/DL (ref 70–130)
GLUCOSE BLDC GLUCOMTR-MCNC: 213 MG/DL (ref 70–130)
GLUCOSE BLDC GLUCOMTR-MCNC: 225 MG/DL (ref 70–130)
GLUCOSE BLDC GLUCOMTR-MCNC: 229 MG/DL (ref 70–130)
GLUCOSE BLDC GLUCOMTR-MCNC: 239 MG/DL (ref 70–130)
GLUCOSE BLDC GLUCOMTR-MCNC: 246 MG/DL (ref 70–130)
GLUCOSE SERPL-MCNC: 219 MG/DL (ref 65–99)
HCT VFR BLD AUTO: 33.6 % (ref 37.5–51)
HGB BLD-MCNC: 11 G/DL (ref 13–17.7)
IMM GRANULOCYTES # BLD AUTO: 0.29 10*3/MM3 (ref 0–0.05)
IMM GRANULOCYTES NFR BLD AUTO: 2.3 % (ref 0–0.5)
LYMPHOCYTES # BLD AUTO: 1.41 10*3/MM3 (ref 0.7–3.1)
LYMPHOCYTES NFR BLD AUTO: 11 % (ref 19.6–45.3)
MCH RBC QN AUTO: 29.1 PG (ref 26.6–33)
MCHC RBC AUTO-ENTMCNC: 32.7 G/DL (ref 31.5–35.7)
MCV RBC AUTO: 88.9 FL (ref 79–97)
MONOCYTES # BLD AUTO: 1.08 10*3/MM3 (ref 0.1–0.9)
MONOCYTES NFR BLD AUTO: 8.5 % (ref 5–12)
NEUTROPHILS NFR BLD AUTO: 73.8 % (ref 42.7–76)
NEUTROPHILS NFR BLD AUTO: 9.43 10*3/MM3 (ref 1.7–7)
NRBC BLD AUTO-RTO: 0.1 /100 WBC (ref 0–0.2)
PLATELET # BLD AUTO: 211 10*3/MM3 (ref 140–450)
PMV BLD AUTO: 10.4 FL (ref 6–12)
POTASSIUM SERPL-SCNC: 4.2 MMOL/L (ref 3.5–5.2)
RBC # BLD AUTO: 3.78 10*6/MM3 (ref 4.14–5.8)
SODIUM SERPL-SCNC: 141 MMOL/L (ref 136–145)
TSH SERPL DL<=0.05 MIU/L-ACNC: 2.43 UIU/ML (ref 0.27–4.2)
WBC NRBC COR # BLD: 12.77 10*3/MM3 (ref 3.4–10.8)

## 2023-05-31 PROCEDURE — 82948 REAGENT STRIP/BLOOD GLUCOSE: CPT

## 2023-05-31 PROCEDURE — 80048 BASIC METABOLIC PNL TOTAL CA: CPT | Performed by: NURSE PRACTITIONER

## 2023-05-31 PROCEDURE — 63710000001 INSULIN LISPRO (HUMAN) PER 5 UNITS: Performed by: NURSE PRACTITIONER

## 2023-05-31 PROCEDURE — 97530 THERAPEUTIC ACTIVITIES: CPT

## 2023-05-31 PROCEDURE — 85025 COMPLETE CBC W/AUTO DIFF WBC: CPT | Performed by: NURSE PRACTITIONER

## 2023-05-31 PROCEDURE — 99232 SBSQ HOSP IP/OBS MODERATE 35: CPT | Performed by: INTERNAL MEDICINE

## 2023-05-31 PROCEDURE — 84443 ASSAY THYROID STIM HORMONE: CPT | Performed by: NURSE PRACTITIONER

## 2023-05-31 PROCEDURE — 25010000002 FUROSEMIDE PER 20 MG: Performed by: NURSE PRACTITIONER

## 2023-05-31 PROCEDURE — 82306 VITAMIN D 25 HYDROXY: CPT | Performed by: NURSE PRACTITIONER

## 2023-05-31 PROCEDURE — 99024 POSTOP FOLLOW-UP VISIT: CPT | Performed by: THORACIC SURGERY (CARDIOTHORACIC VASCULAR SURGERY)

## 2023-05-31 PROCEDURE — 74230 X-RAY XM SWLNG FUNCJ C+: CPT

## 2023-05-31 PROCEDURE — 94799 UNLISTED PULMONARY SVC/PX: CPT

## 2023-05-31 PROCEDURE — 92611 MOTION FLUOROSCOPY/SWALLOW: CPT

## 2023-05-31 PROCEDURE — 94664 DEMO&/EVAL PT USE INHALER: CPT

## 2023-05-31 PROCEDURE — 94761 N-INVAS EAR/PLS OXIMETRY MLT: CPT

## 2023-05-31 PROCEDURE — 63710000001 PREDNISONE PER 5 MG: Performed by: INTERNAL MEDICINE

## 2023-05-31 PROCEDURE — 25010000002 ENOXAPARIN PER 10 MG: Performed by: NURSE PRACTITIONER

## 2023-05-31 RX ORDER — FUROSEMIDE 40 MG/1
40 TABLET ORAL DAILY
Status: DISCONTINUED | OUTPATIENT
Start: 2023-06-01 | End: 2023-06-01

## 2023-05-31 RX ORDER — FUROSEMIDE 10 MG/ML
40 INJECTION INTRAMUSCULAR; INTRAVENOUS ONCE
Status: COMPLETED | OUTPATIENT
Start: 2023-05-31 | End: 2023-05-31

## 2023-05-31 RX ADMIN — PREDNISONE 5 MG: 10 TABLET ORAL at 10:01

## 2023-05-31 RX ADMIN — GUAIFENESIN 400 MG: 200 SOLUTION ORAL at 10:06

## 2023-05-31 RX ADMIN — FUROSEMIDE 40 MG: 40 INJECTION, SOLUTION INTRAMUSCULAR; INTRAVENOUS at 10:06

## 2023-05-31 RX ADMIN — MUPIROCIN 1 APPLICATION: 20 OINTMENT TOPICAL at 20:09

## 2023-05-31 RX ADMIN — BARIUM SULFATE 1 TEASPOON(S): 0.6 CREAM ORAL at 08:49

## 2023-05-31 RX ADMIN — BARIUM SULFATE 50 ML: 400 SUSPENSION ORAL at 08:50

## 2023-05-31 RX ADMIN — MUPIROCIN 1 APPLICATION: 20 OINTMENT TOPICAL at 10:07

## 2023-05-31 RX ADMIN — GUAIFENESIN 400 MG: 200 SOLUTION ORAL at 01:01

## 2023-05-31 RX ADMIN — BARIUM SULFATE 4 ML: 980 POWDER, FOR SUSPENSION ORAL at 08:49

## 2023-05-31 RX ADMIN — INSULIN LISPRO 5 UNITS: 100 INJECTION, SOLUTION INTRAVENOUS; SUBCUTANEOUS at 06:54

## 2023-05-31 RX ADMIN — POTASSIUM CHLORIDE 20 MEQ: 1.5 POWDER, FOR SOLUTION ORAL at 10:05

## 2023-05-31 RX ADMIN — IPRATROPIUM BROMIDE AND ALBUTEROL SULFATE 3 ML: 2.5; .5 SOLUTION RESPIRATORY (INHALATION) at 06:57

## 2023-05-31 RX ADMIN — ASPIRIN 81 MG: 81 TABLET, CHEWABLE ORAL at 10:05

## 2023-05-31 RX ADMIN — BARIUM SULFATE 55 ML: 0.81 POWDER, FOR SUSPENSION ORAL at 08:49

## 2023-05-31 RX ADMIN — IPRATROPIUM BROMIDE AND ALBUTEROL SULFATE 3 ML: 2.5; .5 SOLUTION RESPIRATORY (INHALATION) at 19:17

## 2023-05-31 RX ADMIN — ATORVASTATIN CALCIUM 40 MG: 20 TABLET, FILM COATED ORAL at 20:09

## 2023-05-31 RX ADMIN — DOCUSATE SODIUM 50MG AND SENNOSIDES 8.6MG 2 TABLET: 8.6; 5 TABLET, FILM COATED ORAL at 20:09

## 2023-05-31 RX ADMIN — PANTOPRAZOLE SODIUM 40 MG: 40 INJECTION, POWDER, FOR SOLUTION INTRAVENOUS at 05:49

## 2023-05-31 RX ADMIN — IPRATROPIUM BROMIDE AND ALBUTEROL SULFATE 3 ML: 2.5; .5 SOLUTION RESPIRATORY (INHALATION) at 15:04

## 2023-05-31 RX ADMIN — EMPAGLIFLOZIN 25 MG: 25 TABLET, FILM COATED ORAL at 10:06

## 2023-05-31 RX ADMIN — INSULIN LISPRO 5 UNITS: 100 INJECTION, SOLUTION INTRAVENOUS; SUBCUTANEOUS at 12:22

## 2023-05-31 RX ADMIN — INSULIN LISPRO 5 UNITS: 100 INJECTION, SOLUTION INTRAVENOUS; SUBCUTANEOUS at 23:33

## 2023-05-31 RX ADMIN — IPRATROPIUM BROMIDE AND ALBUTEROL SULFATE 3 ML: 2.5; .5 SOLUTION RESPIRATORY (INHALATION) at 12:54

## 2023-05-31 RX ADMIN — GUAIFENESIN 400 MG: 200 SOLUTION ORAL at 23:34

## 2023-05-31 RX ADMIN — ENOXAPARIN SODIUM 40 MG: 100 INJECTION SUBCUTANEOUS at 18:32

## 2023-05-31 RX ADMIN — INSULIN GLARGINE-YFGN 20 UNITS: 100 INJECTION, SOLUTION SUBCUTANEOUS at 10:05

## 2023-05-31 RX ADMIN — POTASSIUM CHLORIDE 20 MEQ: 1.5 POWDER, FOR SOLUTION ORAL at 18:32

## 2023-05-31 RX ADMIN — INSULIN LISPRO 5 UNITS: 100 INJECTION, SOLUTION INTRAVENOUS; SUBCUTANEOUS at 18:33

## 2023-05-31 RX ADMIN — DEXTROSE MONOHYDRATE 50 ML/HR: 50 INJECTION, SOLUTION INTRAVENOUS at 12:58

## 2023-05-31 RX ADMIN — TRAMADOL HYDROCHLORIDE 50 MG: 50 TABLET, COATED ORAL at 18:32

## 2023-05-31 RX ADMIN — INSULIN LISPRO 5 UNITS: 100 INJECTION, SOLUTION INTRAVENOUS; SUBCUTANEOUS at 01:00

## 2023-05-31 NOTE — THERAPY TREATMENT NOTE
Patient Name: Vernon Solorzano  : 1950    MRN: [id number]                              Today's Date: 2023       Admit Date: 2023    Visit Dx:     ICD-10-CM ICD-9-CM   1. S/P MVR (mitral valve replacement)  Z95.2 V43.3   2. Nonrheumatic mitral valve regurgitation  I34.0 424.0     Patient Active Problem List   Diagnosis   • DM (diabetes mellitus), type 2   • Mixed hyperlipidemia   • Mild intermittent asthma without complication   • New onset of congestive heart failure   • Nonrheumatic mitral valve regurgitation   • Chest pain   • Essential hypertension   • Mitral valve disease     Past Medical History:   Diagnosis Date   • Allergic rhinitis    • Arthritis    • BPH (benign prostatic hyperplasia)    • Diabetes mellitus     TYPE 2   • Foraminal stenosis of cervical region     C5-C6   • GERD (gastroesophageal reflux disease)    • Hemorrhoids    • History of urinary retention 2010    S/P TURP   • Hyperlipidemia    • Macular degeneration    • PING (obstructive sleep apnea) 2016    MILD, SEES DR. AMARILIS MORGAN     Past Surgical History:   Procedure Laterality Date   • CARDIAC CATHETERIZATION N/A 2023    Procedure: Right Heart Cath;  Surgeon: Corey Gaffney MD;  Location:  [facility];  Service: Cardiology;  Laterality: N/A;   • CARDIAC CATHETERIZATION N/A 2023    Procedure: Left Heart Cath;  Surgeon: Corey Gaffney MD;  Location:  [facility];  Service: Cardiology;  Laterality: N/A;   • CARDIAC CATHETERIZATION N/A 2023    Procedure: Left ventriculography;  Surgeon: Corey Gaffney MD;  Location:  [facility];  Service: Cardiology;  Laterality: N/A;   • CARDIAC CATHETERIZATION N/A 2023    Procedure: Coronary angiography;  Surgeon: Corey Gaffney MD;  Location:  [facility];  Service: Cardiology;  Laterality: N/A;   • COLONOSCOPY N/A     WNL PER PT, NO RECORDS,    • COLONOSCOPY N/A 2009      GORGE BENAVIDEZ AT [place]   • MITRAL VALVE REPAIR/REPLACEMENT N/A 5/24/2023    Procedure: MITRAL VALVE REPAIR/REPLACEMENT TRICUSPID VALVE REPAIR/REPLACEMENT TRANSESOPHAGEAL ECHOCARDIOGRAM WITH ANESTHESIA;  Surgeon: George Frey MD;  Location: [facility];  Service: Cardiothoracic;  Laterality: N/A;   • PROSTATE SURGERY N/A 11/12/2010    TURP, DR. BRIGHT COLLAZO AT [facility]   • SKIN TAG REMOVAL N/A 12/20/2017    ANAL SKIN TAG X2, PERFORMED IN OFFICE, DR. LISA ORANTES   • TURP / TRANSURETHRAL INCISION / DRAINAGE PROSTATE  2010    Dr. Goodrich      General Information     Row Name 05/31/23 1029          Physical Therapy Time and Intention    Document Type therapy note (daily note)  -EM     Mode of Treatment co-treatment;physical therapy;occupational therapy  -EM     Row Name 05/31/23 1029          General Information    Existing Precautions/Restrictions fall;cardiac;sternal  -EM           User Key  (r) = Recorded By, (t) = Taken By, (c) = Cosigned By    Initials Name Provider Type    Kathy Boo PT Physical Therapist               Mobility     Row Name 05/31/23 1030          Bed Mobility    Supine-Sit Sims (Bed Mobility) contact guard  -EM     Comment, (Bed Mobility) coming off stretcher with transport  -EM     Row Name 05/31/23 1030          Sit-Stand Transfer    Sit-Stand Sims (Transfers) moderate assist (50% patient effort);2 person assist;verbal cues  -EM     Row Name 05/31/23 1030          Gait/Stairs (Locomotion)    Sims Level (Gait) minimum assist (75% patient effort);2 person assist  -EM     Distance in Feet (Gait) 5  -EM     Deviations/Abnormal Patterns (Gait) festinating/shuffling;stride length decreased  -EM     Comment, (Gait/Stairs) shuffling steps around from stretcher to chair, cues for upright posture  -EM           User Key  (r) = Recorded By, (t) = Taken By, (c) = Cosigned By    Initials Name Provider Type    Kathy Boo PT Physical Therapist                Obj/Interventions     Row Name 05/31/23 1031          Motor Skills    Therapeutic Exercise other (see comments)  sit to stand x 2 more times, shoulder rolls while in standing, working on upright posture, weight shifting  -EM           User Key  (r) = Recorded By, (t) = Taken By, (c) = Cosigned By    Initials Name Provider Type    EM Kathy Shaffer, PT Physical Therapist               Goals/Plan    No documentation.                Clinical Impression     Row Name 05/31/23 1036          Pain    Pain Location - neck  -EM     Pre/Posttreatment Pain Comment pt c/o neck soreness, did not rate on numeric scale  -EM     Row Name 05/31/23 1036          Plan of Care Review    Plan of Care Reviewed With patient;spouse  -EM     Outcome Evaluation Patient awake and alert, returning from swallow study. Pt performed bed mobility off stretcher with min/CGA, sit to stand with min/modAx2, able to take a few small shuffling steps around from stretcher to chair, cues for upright posture. Pt able to perform sit to stand x 2 more trials after seated rest, working on upright posture, weight shifting. Pt demonstrates improvement in mobility today, will continue to benefit from PT.  -EM     Row Name 05/31/23 1036          Positioning and Restraints    Pre-Treatment Position other (comment)  -EM     Post Treatment Position chair  -EM     In Chair sitting;with family/caregiver;with nsg  -EM           User Key  (r) = Recorded By, (t) = Taken By, (c) = Cosigned By    Initials Name Provider Type    EM Kathy Shaffer, PT Physical Therapist               Outcome Measures     Row Name 05/31/23 1043 05/31/23 0755       How much help from another person do you currently need...    Turning from your back to your side while in flat bed without using bedrails? 2  -EM 2  -AC    Moving from lying on back to sitting on the side of a flat bed without bedrails? 2  -EM 2  -AC    Moving to and from a bed to a chair (including a wheelchair)?  2  -EM 2  -AC    Standing up from a chair using your arms (e.g., wheelchair, bedside chair)? 2  -EM 2  -AC    Climbing 3-5 steps with a railing? 1  -EM 1  -AC    To walk in hospital room? 2  -EM 2  -AC    AM-PAC 6 Clicks Score (PT) 11  -EM 11  -AC    Highest level of mobility 4 --> Transferred to chair/commode  -EM 4 --> Transferred to chair/commode  -AC          User Key  (r) = Recorded By, (t) = Taken By, (c) = Cosigned By    Initials Name Provider Type    EM Kathy Shaffer, PT Physical Therapist    Neeru Rosario RN Registered Nurse                             Physical Therapy Education     Title: PT OT SLP Therapies (Done)     Topic: Physical Therapy (Done)     Point: Mobility training (Done)     Learning Progress Summary           Patient Acceptance, E, VU by EM at 5/31/2023 1043    Acceptance, E,TB, VU by TH at 5/30/2023 1652    Acceptance, E, VU,NR by DB at 5/30/2023 1125    Acceptance, E, NR by DB at 5/29/2023 1157    Acceptance, E, VU by DB at 5/28/2023 1344    Acceptance, E, NR by EM at 5/26/2023 1304    Acceptance, E, NR by EM at 5/25/2023 1208                   Point: Home exercise program (Done)     Learning Progress Summary           Patient Acceptance, E, VU by EM at 5/31/2023 1043    Acceptance, E,TB, VU by TH at 5/30/2023 1652    Acceptance, E, VU,NR by DB at 5/30/2023 1125    Acceptance, E, NR by DB at 5/29/2023 1157    Acceptance, E, VU by DB at 5/28/2023 1344    Acceptance, E, NR by EM at 5/25/2023 1208                   Point: Body mechanics (Done)     Learning Progress Summary           Patient Acceptance, E,TB, VU by TH at 5/30/2023 1652    Acceptance, E, VU,NR by DB at 5/30/2023 1125    Acceptance, E, NR by DB at 5/29/2023 1157    Acceptance, E, VU by DB at 5/28/2023 1344                   Point: Precautions (Done)     Learning Progress Summary           Patient Acceptance, E,TB, VU by TH at 5/30/2023 1652    Acceptance, E, VU,NR by DB at 5/30/2023 1125    Acceptance, E, NR by DB  at 5/29/2023 1157    Acceptance, E, VU by DB at 5/28/2023 1344                               User Key     Initials Effective Dates Name Provider Type Discipline     06/16/21 -  Kathy Shaffer PT Physical Therapist PT     06/16/21 -  Jelly Sepulveda, RN Registered Nurse Nurse    DB 06/16/21 -  Marissa Zamora, PT Physical Therapist PT              PT Recommendation and Plan  Planned Therapy Interventions (PT): bed mobility training, gait training, home exercise program, patient/family education, transfer training  Plan of Care Reviewed With: patient, spouse  Outcome Evaluation: Patient awake and alert, returning from swallow study. Pt performed bed mobility off stretcher with min/CGA, sit to stand with min/modAx2, able to take a few small shuffling steps around from stretcher to chair, cues for upright posture. Pt able to perform sit to stand x 2 more trials after seated rest, working on upright posture, weight shifting. Pt demonstrates improvement in mobility today, will continue to benefit from PT.     Time Calculation:    PT Charges     Row Name 05/31/23 1044             Time Calculation    Start Time 0917  -EM      Stop Time 0936  -EM      Time Calculation (min) 19 min  -EM      PT Received On 05/31/23  -EM      PT - Next Appointment 06/01/23  -EM         Time Calculation- PT    Total Timed Code Minutes- PT 19 minute(s)  -EM         Timed Charges    20372 - PT Therapeutic Exercise Minutes 9  -EM      16613 - PT Therapeutic Activity Minutes 10  -EM         Total Minutes    Timed Charges Total Minutes 19  -EM       Total Minutes 19  -EM            User Key  (r) = Recorded By, (t) = Taken By, (c) = Cosigned By    Initials Name Provider Type     Kathy Shaffer PT Physical Therapist              Therapy Charges for Today     Code Description Service Date Service Provider Modifiers Qty    37218284110 HC PT THERAPEUTIC ACT EA 15 MIN 5/31/2023 Kathy Shaffer PT GP 1          PT  G-Codes  Outcome Measure Options: AM-PAC 6 Clicks Daily Activity (OT)  AM-PAC 6 Clicks Score (PT): 11  AM-PAC 6 Clicks Score (OT): 7  PT Discharge Summary  Anticipated Discharge Disposition (PT): inpatient rehabilitation facility    Kathy Shaffer, PT  5/31/2023

## 2023-05-31 NOTE — PLAN OF CARE
Goal Outcome Evaluation:  Plan of Care Reviewed With: patient        Progress: no change  Outcome Evaluation: Diet started, ate good lunch but did not eat much dinner. Tuber feeds changed to 7pm to 7am. Oriented to person and place. NIH 3.

## 2023-05-31 NOTE — PLAN OF CARE
Goal Outcome Evaluation:  Plan of Care Reviewed With: patient, spouse           Outcome Evaluation: Pt returning from swallow study and agreeable to PT/OT co treat to maximize function. Min A/CGA to sit edge of stretcher, min/Mod A x 2 for STS, dep to doff socks at end of session. Constant cues required throughout session to stay on task. Pt perform BUE fist punches reach across mid line to improve ROM and strength. Pt will continue to benefit from skilled OT services to maxmize ind.

## 2023-05-31 NOTE — PROGRESS NOTES
Discussed with CTS this morning. Patient going for swallow study. Remains in atrial flutter with controlled rates, needs AC prior to DC. Okay to remove pacing wires. Plan is for [facility] acute rehab. We will see patient in office post discharge or as needed while he is here.

## 2023-05-31 NOTE — PROGRESS NOTES
[facility] Progress Note      Patient Name: Vernon Solorzano  : 1950  MRN: [id number]  Primary Care Physician:  Liza Oconnell III, RACHAEL  Date of admission: 2023    Subjective     Interval History:     Seen and examined.  Appears to be more alert and more conversing.  Wife is on bedside and reported improvement in his mental status as well.  Answer question properly.  Review of Systems:   As noted above    Objective     Vitals:   Temp:  [97.7 °F (36.5 °C)-98.9 °F (37.2 °C)] 98.9 °F (37.2 °C)  Heart Rate:  [77-79] 78  Resp:  [18-22] 18  BP: ()/(66-88) 131/77  Flow (L/min):  [2] 2    Intake/Output Summary (Last 24 hours) at 2023 0801  Last data filed at 2023 0640  Gross per 24 hour   Intake 285 ml   Output 1850 ml   Net -1565 ml       Physical Exam:    General Appearance: alert, , no acute distress   Skin: warm and dry  HEENT: oral mucosa normal, nonicteric sclera  Neck: supple, no JVD  Lungs: CTA  Heart: RRR, normal S1 and S2  Abdomen: soft, nontender, nondistended  : no palpable bladder  Extremities: no edema, cyanosis or clubbing      Scheduled Meds:     aspirin, 81 mg, Oral, Daily  atorvastatin, 40 mg, Oral, Nightly  empagliflozin, 25 mg, Oral, Daily  enoxaparin, 40 mg, Subcutaneous, Daily  furosemide, 40 mg, Intravenous, Once  [START ON 2023] furosemide, 40 mg, Per G Tube, Daily  guaifenesin, 400 mg, Oral, Q8H  insulin glargine, 20 Units, Subcutaneous, Daily  insulin lispro, 3-14 Units, Subcutaneous, Q6H  ipratropium-albuterol, 3 mL, Nebulization, 4x Daily - RT  mupirocin, , Each Nare, BID  pantoprazole, 40 mg, Intravenous, QAM AC  potassium chloride, 20 mEq, Nasogastric, BID With Meals  predniSONE, 5 mg, Oral, Daily With Breakfast  [START ON 6/3/2023] predniSONE, 5 mg, Oral, Every Other Day  senna-docusate sodium, 2 tablet, Oral, Nightly      IV Meds:   dextrose, 50 mL/hr, Last Rate: 50 mL/hr (23 0358)        Results Reviewed:    I have personally reviewed the results from the time of this admission to 5/31/2023 08:01 EDT     Results from last 7 days   Lab Units 05/31/23  0448 05/30/23  0210 05/29/23  1730 05/29/23  1436 05/29/23  0358 05/27/23  0604 05/26/23  0304   SODIUM mmol/L 141 146*  --   --  145   < > 147*   POTASSIUM mmol/L 4.2 4.4 5.0   < > 3.6   < > 4.0   CHLORIDE mmol/L 105 110*  --   --  109*   < > 110*   CO2 mmol/L 28.0 28.6  --   --  26.9   < > 23.9   BUN mg/dL 31* 31*  --   --  34*   < > 26*   CREATININE mg/dL 1.02 1.08  --   --  1.04   < > 1.37*   CALCIUM mg/dL 8.2* 8.7  --   --  8.7   < > 8.9   BILIRUBIN mg/dL  --   --   --   --   --   --  0.4   ALK PHOS U/L  --   --   --   --   --   --  45   ALT (SGPT) U/L  --   --   --   --   --   --  23   AST (SGOT) U/L  --   --   --   --   --   --  80*   GLUCOSE mg/dL 219* 322*  --   --  249*   < > 143*    < > = values in this interval not displayed.     Estimated Creatinine Clearance: 85 mL/min (by C-G formula based on SCr of 1.02 mg/dL).  Results from last 7 days   Lab Units 05/27/23  0604 05/26/23  0304 05/25/23  1350 05/25/23  0307 05/24/23  1626 05/24/23  1210   MAGNESIUM mg/dL 2.6* 2.7* 2.9* 3.0* 2.9* 3.4*   PHOSPHORUS mg/dL  --   --   --  5.1* 0.7* 2.2*         Results from last 7 days   Lab Units 05/31/23 0448 05/30/23  0837 05/28/23  0344 05/27/23  0604 05/26/23  0304   WBC 10*3/mm3 12.77* 10.08 13.01* 16.03* 18.39*   HEMOGLOBIN g/dL 11.0* 11.2* 11.7* 11.4* 10.2*   PLATELETS 10*3/mm3 211 213 171 146 116*     Results from last 7 days   Lab Units 05/25/23  0307 05/24/23  1210 05/24/23  1103 05/24/23  1056   INR  1.16* 1.20* 1.5* 1.47*       Assessment / Plan     ASSESSMENT:  1.  Acute kidney injury on chronic kidney disease stage III history.  Baseline creatinine 1.2 mg/dL and it went up to 1.68 mg/dL 05/24 and today is down to 1.4 mg/dL.  Volume status appears to be generous.  Renal ultrasound showed normal-looking kidneys except of 6 cm cyst with septation in the upper pole  of the right kidney.  Urinalysis essentially unremarkable.       2.  Chronic kidney disease stage III with baseline creatinine 1.2 mg/dL  3.  Moderate to severe mitral regurgitation status post mitral valve repair placement and tricuspid valve repair on May 24  4.  Severe pulmonary hypertension  5.  History of COVID-19 pneumonia with possible interstitial lung disease on chronic steroid   6.  Diastolic congestive heart failure  7.  Postoperative anemia and thrombocytopenia     PLAN:     1.  Continue current management  2. We will need urology evaluation for his cyst as an outpatient  3. Surveillance labs       Thank you for involving us in the care of Vernon Solorzano.  Please feel free to call with any questions.    Tae Lay MD  05/31/23  08:01 EDT    [facility]  [phone]

## 2023-05-31 NOTE — PROGRESS NOTES
LOS: 8 days   Patient Care Team:  Liza Oconnell III, NP-C as PCP - General (Family Medicine)  Corey Lao Jr., MD (Inactive) as Consulting Physician (Urology)    Chief Complaint: Follow-up severe mitral regurgitation status post tissue mitral valve replacement.    Interval History: He looked much better this afternoon when I saw him.  He was sitting up and eating.  He was much more alert.  He is moving his right hand.    Vital Signs:  Temp:  [97.8 °F (36.6 °C)-98.9 °F (37.2 °C)] 97.8 °F (36.6 °C)  Heart Rate:  [77-78] 78  Resp:  [14-20] 14  BP: ()/(56-88) 123/64    Intake/Output Summary (Last 24 hours) at 5/31/2023 1826  Last data filed at 5/31/2023 1400  Gross per 24 hour   Intake 2669.83 ml   Output 1250 ml   Net 1419.83 ml       Physical Exam:   General Appearance:    No acute distress, alert today.   Lungs:     Clear to auscultation bilaterally     Heart:    Regular rhythm and normal rate.  No murmurs, gallops, or       rubs.   Abdomen:     Soft, nontender, nondistended.    Extremities:   1+ edema lower extremities.  Right hand weakness improved.     Results Review:    Results from last 7 days   Lab Units 05/31/23  0448   SODIUM mmol/L 141   POTASSIUM mmol/L 4.2   CHLORIDE mmol/L 105   CO2 mmol/L 28.0   BUN mg/dL 31*   CREATININE mg/dL 1.02   GLUCOSE mg/dL 219*   CALCIUM mg/dL 8.2*         Results from last 7 days   Lab Units 05/31/23  0448   WBC 10*3/mm3 12.77*   HEMOGLOBIN g/dL 11.0*   HEMATOCRIT % 33.6*   PLATELETS 10*3/mm3 211     Results from last 7 days   Lab Units 05/25/23  0307   INR  1.16*         Results from last 7 days   Lab Units 05/27/23  0604   MAGNESIUM mg/dL 2.6*           I reviewed the patient's new clinical results.        Assessment:  1.  Severe symptomatic mitral regurgitation and tricuspid regurgitation  2.  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 Frey  3.  Chronic  diastolic CHF  4.  History of COVID-19 pneumonia with residual dyspnea and possible interstitial lung disease  5.  Stage IIIa chronic kidney disease  6.  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  Expected postoperative anemia  8.  Postoperative thrombocytopenia  9.  Postoperative frequent PVCs and NSVT  10.  Postoperative aphasia, confusion, and right upper extremity weakness  11.  Postoperative junctional bradycardia, complete heart block, and typical atrial flutter  12.  Diabetes    Plan:  -Agree with IV Lasix, which was given this morning.  He is on Lasix 40 mg orally per day at baseline.  Watch his volume status closely.    -Atrial flutter with PVCs intermittently still noted.  Rate is in the 70s.  Holding beta-blockers because of previous AV block per EP.  Holding on anticoagulation for now per cardiovascular surgery.  Start when appropriate.    -Mental status seems much improved today.  Hopefully, this will continue.  His right hand weakness also was better.    -He remains in typical atrial flutter.  Electrophysiology is now following him.  Dr. Valdovinos recommended holding beta-blockers given the recent complete heart block and junctional bradycardia.  He is no longer on amiodarone.    -Anticoagulation when okay from a cardiovascular surgery standpoint.    -Continue Jardiance 25 mg/day.    Corwin Hobson MD  05/31/23  18:26 EDT

## 2023-05-31 NOTE — MBS/VFSS/FEES
Acute Care - Speech Language Pathology   Swallow Initial Evaluation [facility]     Patient Name: Vernon Solorzano  : 1950  MRN: [id number]  Today's Date: 2023               Admit Date: 2023    Visit Dx:     ICD-10-CM ICD-9-CM   1. S/P MVR (mitral valve replacement)  Z95.2 V43.3   2. Nonrheumatic mitral valve regurgitation  I34.0 424.0     Patient Active Problem List   Diagnosis   • DM (diabetes mellitus), type 2   • Mixed hyperlipidemia   • Mild intermittent asthma without complication   • New onset of congestive heart failure   • Nonrheumatic mitral valve regurgitation   • Chest pain   • Essential hypertension   • Mitral valve disease     Past Medical History:   Diagnosis Date   • Allergic rhinitis    • Arthritis    • BPH (benign prostatic hyperplasia)    • Diabetes mellitus     TYPE 2   • Foraminal stenosis of cervical region     C5-C6   • GERD (gastroesophageal reflux disease)    • Hemorrhoids    • History of urinary retention     S/P TURP   • Hyperlipidemia    • Macular degeneration    • PING (obstructive sleep apnea) 2016    MILD, SEES DR. AMARILIS MORGAN     Past Surgical History:   Procedure Laterality Date   • CARDIAC CATHETERIZATION N/A 2023    Procedure: Right Heart Cath;  Surgeon: Corey Gaffney MD;  Location: [facility] CATH INVASIVE LOCATION;  Service: Cardiology;  Laterality: N/A;   • CARDIAC CATHETERIZATION N/A 2023    Procedure: Left Heart Cath;  Surgeon: Corey Gaffney MD;  Location: [facility] CATH INVASIVE LOCATION;  Service: Cardiology;  Laterality: N/A;   • CARDIAC CATHETERIZATION N/A 2023    Procedure: Left ventriculography;  Surgeon: Corey Gaffney MD;  Location:  [facility] CATH INVASIVE LOCATION;  Service: Cardiology;  Laterality: N/A;   • CARDIAC CATHETERIZATION N/A 2023    Procedure: Coronary angiography;  Surgeon: Corey Gaffney MD;  Location: [facility] CATH INVASIVE LOCATION;  Service: Cardiology;  Laterality: N/A;   • COLONOSCOPY N/A     WNL  PER PT, NO RECORDS,    • COLONOSCOPY N/A 04/07/2009    DR. GORGE BENAVIDEZ AT [place]   • MITRAL VALVE REPAIR/REPLACEMENT N/A 5/24/2023    Procedure: MITRAL VALVE REPAIR/REPLACEMENT TRICUSPID VALVE REPAIR/REPLACEMENT TRANSESOPHAGEAL ECHOCARDIOGRAM WITH ANESTHESIA;  Surgeon: George Frey MD;  Location: [facility];  Service: Cardiothoracic;  Laterality: N/A;   • PROSTATE SURGERY N/A 11/12/2010    TURP, DR. BRIGHT COLLAZO AT [facility]   • SKIN TAG REMOVAL N/A 12/20/2017    ANAL SKIN TAG X2, PERFORMED IN OFFICE, DR. LISA ORANTES   • TURP / TRANSURETHRAL INCISION / DRAINAGE PROSTATE  2010    Dr. Goodrich       SLP Recommendation and Plan  SLP Swallowing Diagnosis: moderate, pharyngeal dysphagia, oral dysphagia (05/31/23 0900)  SLP Diet Recommendation: honey thick liquids, soft to chew textures, chopped, water between meals after oral care, with supervision, ice chips between meals after oral care, with supervision (05/31/23 0900)  Recommended Precautions and Strategies: upright posture during/after eating, small bites of food and sips of liquid, multiple swallows per sip of liquid, multiple swallows per bite of food, general aspiration precautions (05/31/23 0900)  SLP Rec. for Method of Medication Administration: meds crushed, with puree, as tolerated (05/31/23 0900)     Monitor for Signs of Aspiration: yes, notify SLP if any concerns (05/31/23 0900)  Recommended Diagnostics: reassess via clinical swallow evaluation, reassess via VFSS ([facility]) (05/31/23 0900)  Swallow Criteria for Skilled Therapeutic Interventions Met: demonstrates skilled criteria (05/31/23 0900)  Anticipated Discharge Disposition (SLP): unknown, anticipate therapy at next level of care (05/31/23 0900)  Rehab Potential/Prognosis, Swallowing: good, to achieve stated therapy goals (05/31/23 0900)  Therapy Frequency (Swallow): PRN (05/31/23 0900)  Predicted Duration Therapy Intervention (Days): until discharge (05/31/23 0900)                                         Plan of Care Reviewed With: patient, spouse  Outcome Evaluation: VFSS complete. Radiologist, Dr. Lieberman, present during the study. Patient presents with moderate oropharyngeal dysphagia. Increased fatigue observed as study progressed. No penetration/aspiration noted with thin liquid by spoon. Nontransient penetration during the swallow with thin liquid by cup. Deep penetration to the vocal folds and suspected aspiration during the swallow with thin liquid by straw. Transient penetration with nectar thick liquid by spoon/cup. Deep penetration before the swallow and aspiration during the swallow with nectar by straw. Patient with very subtle throat clear to sensate, however, throat clear/cough ineffective in expectorating material from the airway. No penetration/aspiration with honey thick liquid by cup/straw, puree, soft/chopped solids, or regular solids. Slow and disorganized mastication noted. Recommend honey thick liquid and soft/chopped solid diet. Meds whole or crushed in puree, as tolerated. Free water protocol with ice chips/water sips from spoon/cup only after oral care in between meals. As endurance improves, consider upgrade at bedside per SLP discretion. Speech to follow.    Spoke to wife and patient at bedside to review VFSS. Thorough education provided regarding safe swallow strategies. Wife and patient both verbalized understanding.      SWALLOW EVALUATION (last 72 hours)     SLP Adult Swallow Evaluation     Row Name 05/31/23 0900                   Rehab Evaluation    Document Type re-evaluation  -CR        Subjective Information no complaints  -CR        Patient Observations alert;cooperative  -CR        Patient Effort good  -CR        Symptoms Noted During/After Treatment none  -CR           General Information    Patient Profile Reviewed yes  -CR        Pertinent History Of Current Problem 71 y/o male; Severe mitral regurgitation s/p tissue mitral valve repair and tricuspid valve  repair POD #2. Previous VFSS 5/27/23 recommended NPO as patient with significant fatigue and penetration/aspiration of most consistencies.  -CR        Current Method of Nutrition NPO  -CR        Precautions/Limitations, Vision WFL;for purposes of eval  -CR        Precautions/Limitations, Hearing difficult to assess  -CR        Prior Level of Function-Communication WFL  -CR        Prior Level of Function-Swallowing no diet consistency restrictions  -CR        Plans/Goals Discussed with patient;spouse/S.O.;agreed upon  -CR        Barriers to Rehab medically complex  -CR           Pain Scale: Numbers Pre/Post-Treatment    Pretreatment Pain Rating 0/10 - no pain  -CR        Posttreatment Pain Rating 3/10  -CR        Pain Location - Side/Orientation Left  -CR        Pain Location - other (see comments)  thigh  -CR           Oral Motor Structure and Function    Dentition Assessment natural, present and adequate  -CR        Secretion Management WNL/WFL  -CR        Mucosal Quality moist, healthy  -CR           Oral Musculature and Cranial Nerve Assessment    Oral Motor General Assessment generalized oral motor weakness  -CR           General Eating/Swallowing Observations    Respiratory Support Currently in Use nasal cannula  -CR        O2 Liters 2L  -CR           MBS/VFSS Interpretation    VFSS Summary VFSS complete. Radiologist, Dr. Lieberman, present during the study. Patient presents with moderate oropharyngeal dysphagia characterized by swallow delay, disorganized oral and pharyngeal stages of the swallow, and reduced hyolaryngeal elevation/excursion. Increased fatigue observed as study progressed. Swallow triggered at the level of the pyriforms with thin liquid and nectar thick liquid. No penetration/aspiration noted with thin liquid by spoon. Nontransient penetration during the swallow with thin liquid by cup. Deep penetration to the vocal folds and suspected aspiration during the swallow with thin liquid by straw.  Transient penetration with nectar thick liquid by spoon/cup. Deep penetration before the swallow and aspiration during the swallow with nectar by straw. Patient with very subtle throat clear to sensate, however, throat clear/cough ineffective in expectorating material from the airway. Swallow triggered at the level of the valleculae with honey thick liquid. No penetration/aspiration with honey thick liquid by cup/straw, puree, soft/chopped solids, or regular solids. Slow and disorganized munching mastication noted. Recommend honey thick liquid and soft/chopped solid diet. Meds whole or crushed in puree, as tolerated. Free water protocol with ice chips/water sips from spoon/cup only after oral care in between meals. As endurance improves, consider upgrade at bedside per SLP discretion.  -CR           SLP Communication to Radiology    Summary Statement VFSS complete. Radiologist, Dr. Lieberman, present during the study. Patient presents with moderate oropharyngeal dysphagia. Increased fatigue observed as study progressed. No penetration/aspiration noted with thin liquid by spoon. Nontransient penetration during the swallow with thin liquid by cup. Deep penetration to the vocal folds and suspected aspiration during the swallow with thin liquid by straw. Transient penetration with nectar thick liquid by spoon/cup. Deep penetration before the swallow and aspiration during the swallow with nectar by straw. Patient with very subtle throat clear to sensate, however, throat clear/cough ineffective in expectorating material from the airway. No penetration/aspiration with honey thick liquid by cup/straw, puree, soft/chopped solids, or regular solids. Slow and disorganized munching mastication noted.  -CR           SLP Evaluation Clinical Impression    SLP Swallowing Diagnosis moderate;pharyngeal dysphagia;oral dysphagia  -CR        Functional Impact risk of aspiration/pneumonia  -CR        Rehab Potential/Prognosis, Swallowing  good, to achieve stated therapy goals  -CR        Swallow Criteria for Skilled Therapeutic Interventions Met demonstrates skilled criteria  -CR           Recommendations    Therapy Frequency (Swallow) PRN  -CR        Predicted Duration Therapy Intervention (Days) until discharge  -CR        SLP Diet Recommendation honey thick liquids;soft to chew textures;chopped;water between meals after oral care, with supervision;ice chips between meals after oral care, with supervision  -CR        Recommended Diagnostics reassess via clinical swallow evaluation;reassess via VFSS (MBS)  -CR        Recommended Precautions and Strategies upright posture during/after eating;small bites of food and sips of liquid;multiple swallows per sip of liquid;multiple swallows per bite of food;general aspiration precautions  -CR        Oral Care Recommendations Oral Care BID/PRN  -CR        SLP Rec. for Method of Medication Administration meds crushed;with puree;as tolerated  -CR        Monitor for Signs of Aspiration yes;notify SLP if any concerns  -CR        Anticipated Discharge Disposition (SLP) unknown;anticipate therapy at next level of care  -CR           Swallow Goals (SLP)    Swallow STGs pharyngeal strengthening exercise goal selection (SLP)  -CR        Pharyngeal Strengthening Exercise Goal Selection (SLP) pharyngeal strengthening exercise, SLP goal 1  -CR           (STG) Pharyngeal Strengthening Exercise Goal 1 (SLP)    Activity (Pharyngeal Strengthening Goal 1, SLP) increase closure at entrance to airway/closure of airway at glottis  -CR        Increase Closure at Entrance to Airway/Closure of Airway at Glottis hard effortful swallow;chin tuck against resistance (CTAR);sarita  -CR        [place]/Accuracy (Pharyngeal Strengthening Goal 1, SLP) with minimal cues (75-90% accuracy)  -CR        Time Frame (Pharyngeal Strengthening Goal 1, SLP) 1 week  -CR        Progress/Outcomes (Pharyngeal Strengthening Goal 1, SLP) new goal  -CR               User Key  (r) = Recorded By, (t) = Taken By, (c) = Cosigned By    Initials Name Effective Dates    Mariely Corcoran CF-SLP 11/10/22 -                 EDUCATION  The patient has been educated in the following areas:   Dysphagia (Swallowing Impairment).        SLP GOALS     Row Name 05/31/23 0900             (STG) Pharyngeal Strengthening Exercise Goal 1 (SLP)    Activity (Pharyngeal Strengthening Goal 1, SLP) increase closure at entrance to airway/closure of airway at glottis  -CR      Increase Closure at Entrance to Airway/Closure of Airway at Glottis hard effortful swallow;chin tuck against resistance (CTAR);sarita  -CR      [place]/Accuracy (Pharyngeal Strengthening Goal 1, SLP) with minimal cues (75-90% accuracy)  -CR      Time Frame (Pharyngeal Strengthening Goal 1, SLP) 1 week  -CR      Progress/Outcomes (Pharyngeal Strengthening Goal 1, SLP) new goal  -CR            User Key  (r) = Recorded By, (t) = Taken By, (c) = Cosigned By    Initials Name Provider Type    Mariely Corcoran CF-SLP Speech and Language Pathologist                   Time Calculation:    Time Calculation- SLP     Row Name 05/31/23 0958             Time Calculation- SLP    SLP Start Time 0830  -CR      SLP Received On 05/31/23  -CR            User Key  (r) = Recorded By, (t) = Taken By, (c) = Cosigned By    Initials Name Provider Type    Mariely Corcoran CF-SLP Speech and Language Pathologist                Therapy Charges for Today     Code Description Service Date Service Provider Modifiers Qty    26498584183 HC ST MOTION FLUORO EVAL SWALLOW 7 5/31/2023 Mariely Bridges CF-SLP GN 1               ZACH [place]  5/31/2023

## 2023-05-31 NOTE — PLAN OF CARE
Goal Outcome Evaluation:  Plan of Care Reviewed With: patient, spouse           Outcome Evaluation: Patient awake and alert, returning from swallow study. Pt performed bed mobility off stretcher with min/CGA, sit to stand with min/modAx2, able to take a few small shuffling steps around from stretcher to chair, cues for upright posture. Pt able to perform sit to stand x 2 more trials after seated rest, working on upright posture, weight shifting. Pt demonstrates improvement in mobility today, will continue to benefit from PT.

## 2023-05-31 NOTE — PROGRESS NOTES
" LOS: 8 days   Patient Care Team:  Liza Oconnell III, NP-C as PCP - General (Family Medicine)  Corey Lao Jr., MD (Inactive) as Consulting Physician (Urology)    Chief Complaint: post op    Subjective:  Symptoms:  He reports shortness of breath and weakness.  No cough or chest pain.    Diet:  NPO (TF).  No nausea or vomiting.    Activity level: Impaired due to weakness.    Pain:  He complains of pain that is mild.  Pain is requiring pain medication.      With conversational dyspnea this morning. Oriented to person and place.     Vital Signs  Temp:  [97.7 °F (36.5 °C)-98.9 °F (37.2 °C)] 98.9 °F (37.2 °C)  Heart Rate:  [77-79] 78  Resp:  [18-22] 18  BP: ()/(66-88) 131/77  Body mass index is 35.91 kg/m².    Intake/Output Summary (Last 24 hours) at 5/31/2023 0716  Last data filed at 5/31/2023 0640  Gross per 24 hour   Intake 320 ml   Output 2350 ml   Net -2030 ml     No intake/output data recorded.        05/29/23  0600 05/30/23  0526 05/31/23  0546   Weight: 121 kg (266 lb 12.1 oz) 119 kg (262 lb 5.6 oz) 120 kg (264 lb 12.4 oz)     Objective:  General Appearance:  Comfortable and in no acute distress.    Vital signs: (most recent): Blood pressure 131/77, pulse 78, temperature 98.9 °F (37.2 °C), temperature source Oral, resp. rate 18, height 182.9 cm (72\"), weight 120 kg (264 lb 12.4 oz), SpO2 97 %.  Vital signs are normal.  No fever.    Output: Producing urine and producing stool.    Lungs:  Normal effort and normal respiratory rate.  There are rales (bibasilar) and decreased breath sounds.    Heart: Normal rate.  Irregular rhythm.    Abdomen: Abdomen is soft.  Bowel sounds are normal.     Extremities: There is dependent edema (moderate bilateral LE).    Pulses: Distal pulses are intact.    Neurological: Patient is alert.  (Oriented to person, place, and situation).    Skin:  Warm and dry.  (Sternal incision clean, dry, and intact)      Results Review:        WBC WBC   Date Value " Ref Range Status   05/31/2023 12.77 (H) 3.40 - 10.80 10*3/mm3 Final   05/30/2023 10.08 3.40 - 10.80 10*3/mm3 Final      HGB Hemoglobin   Date Value Ref Range Status   05/31/2023 11.0 (L) 13.0 - 17.7 g/dL Final   05/30/2023 11.2 (L) 13.0 - 17.7 g/dL Final      HCT Hematocrit   Date Value Ref Range Status   05/31/2023 33.6 (L) 37.5 - 51.0 % Final   05/30/2023 34.2 (L) 37.5 - 51.0 % Final      Platelets Platelets   Date Value Ref Range Status   05/31/2023 211 140 - 450 10*3/mm3 Final   05/30/2023 213 140 - 450 10*3/mm3 Final        PT/INR:  No results found for: PROTIME/No results found for: INR    Sodium Sodium   Date Value Ref Range Status   05/31/2023 141 136 - 145 mmol/L Final   05/30/2023 146 (H) 136 - 145 mmol/L Final   05/29/2023 145 136 - 145 mmol/L Final      Potassium Potassium   Date Value Ref Range Status   05/31/2023 4.2 3.5 - 5.2 mmol/L Final   05/30/2023 4.4 3.5 - 5.2 mmol/L Final   05/29/2023 5.0 3.5 - 5.2 mmol/L Final   05/29/2023 4.6 3.5 - 5.2 mmol/L Final   05/29/2023 3.6 3.5 - 5.2 mmol/L Final   05/28/2023 4.2 3.5 - 5.2 mmol/L Final     Comment:     Slight hemolysis detected by analyzer. Results may be affected.      Chloride Chloride   Date Value Ref Range Status   05/31/2023 105 98 - 107 mmol/L Final   05/30/2023 110 (H) 98 - 107 mmol/L Final   05/29/2023 109 (H) 98 - 107 mmol/L Final      Bicarbonate CO2   Date Value Ref Range Status   05/31/2023 28.0 22.0 - 29.0 mmol/L Final   05/30/2023 28.6 22.0 - 29.0 mmol/L Final   05/29/2023 26.9 22.0 - 29.0 mmol/L Final      BUN BUN   Date Value Ref Range Status   05/31/2023 31 (H) 8 - 23 mg/dL Final   05/30/2023 31 (H) 8 - 23 mg/dL Final   05/29/2023 34 (H) 8 - 23 mg/dL Final      Creatinine Creatinine   Date Value Ref Range Status   05/31/2023 1.02 0.76 - 1.27 mg/dL Final   05/30/2023 1.08 0.76 - 1.27 mg/dL Final   05/29/2023 1.04 0.76 - 1.27 mg/dL Final      Calcium Calcium   Date Value Ref Range Status   05/31/2023 8.2 (L) 8.6 - 10.5 mg/dL Final    05/30/2023 8.7 8.6 - 10.5 mg/dL Final   05/29/2023 8.7 8.6 - 10.5 mg/dL Final      Magnesium No results found for: MG       aspirin, 81 mg, Oral, Daily  atorvastatin, 40 mg, Oral, Nightly  empagliflozin, 25 mg, Oral, Daily  enoxaparin, 40 mg, Subcutaneous, Daily  furosemide, 40 mg, Per G Tube, Daily  guaifenesin, 400 mg, Oral, Q8H  insulin glargine, 15 Units, Subcutaneous, Daily  insulin lispro, 3-14 Units, Subcutaneous, Q6H  ipratropium-albuterol, 3 mL, Nebulization, 4x Daily - RT  mupirocin, , Each Nare, BID  pantoprazole, 40 mg, Intravenous, QAM AC  polyethylene glycol, 17 g, Oral, Daily  potassium chloride, 20 mEq, Nasogastric, BID With Meals  predniSONE, 5 mg, Oral, Daily With Breakfast  [START ON 6/3/2023] predniSONE, 5 mg, Oral, Every Other Day  senna-docusate sodium, 2 tablet, Oral, Nightly      dextrose, 50 mL/hr, Last Rate: 50 mL/hr (05/30/23 1138)            Patient Active Problem List   Diagnosis Code   • DM (diabetes mellitus), type 2 E11.9   • Mixed hyperlipidemia E78.2   • Mild intermittent asthma without complication J45.20   • New onset of congestive heart failure I50.9   • Nonrheumatic mitral valve regurgitation I34.0   • Chest pain R07.9   • Essential hypertension I10   • Mitral valve disease I05.9       Assessment & Plan   -Moderate to severe mitral regurgitation s/p MVR/TV repair/SU closure POD#7 Pagni  -Moderate tricuspid valve regurgitation  -Acute on chronic diastolic heart failure with preserved EF--55%  -CKD stage IIIa; baseline creatinine 1.2  -DM type II--A1c  -Severe pulmonary HTN  -PING  -BPH/Urinary retention s/p TURP  -Interstitial lung disease s/p COVID-19 infection --on chronic steroids  -leukocytosis--reactive/steroid administration  -post op anemia--expected acute blood loss  -TCP--consumptive     Sitting up in the chair, tells me that he is more short of breath this morning. CXR yesterday with vascular congestion. Will give a dose of IV Lasix. He is also fatigued. Will check  vitamin D and TSH level  He is more alert today, but still remains only oriented to person and place. Nursing to see if neurology can come back to see him today  Appears to be in atrial flutter with PVCs with rates in the 70s. EP evaluated yesterday and recommends holding beta blocker due to previous AV block. Discussed with  Dr. Frey, will keep AV wires another day and hold on AC at this time. Of note SU is closed  On 2L NC--wean as able  Glucose is slowly improving, will increase Lantus further   this morning. Water flush increased yesterday. Patient remains on D5 per renal, will see if we can stop the fluids  Tolerating tube feeds. Plan for repeat video swallow today  Encourage aggressive pulmonary toilet--continue flutter/guaifinisin  Prednisone taper per pulmonology--5 mg for one week, then every other day for 1 week and then stop  Still very weak--continue aggressive PT/OT. BAR evaluation underway  Continue routine post op care    Basia Duarte, DESTIN  05/31/23  07:16 EDT

## 2023-05-31 NOTE — PROGRESS NOTES
Neurology Progress Note    Reason for visit  Follow up for possible seizure    Interval History  Transfer of care from Dr. Sage.  Patient underwent repair of 2 valves and experienced some neurological symptoms following procedure.  Patient reports right hand still feels numb and different.  He has no new symptoms.  Nursing reports no new neurologic issues    Medications:  No current facility-administered medications on file prior to encounter.     Current Outpatient Medications on File Prior to Encounter   Medication Sig Dispense Refill   • aspirin 81 MG tablet Take 1 tablet by mouth Daily.     • atorvastatin (LIPITOR) 10 MG tablet TAKE 1 TABLET BY MOUTH EVERY DAY (Patient taking differently: 1 tablet Every Night.) 90 tablet 0   • furosemide (LASIX) 40 MG tablet Take 1 tablet by mouth 2 (Two) Times a Day. 60 tablet 1   • insulin degludec (TRESIBA FLEXTOUCH) 100 UNIT/ML solution pen-injector injection Start 25 units daily and titrate up 2 units every 2 days if blood glucose levels are consistently above 150 (Patient taking differently: Inject 50 Units under the skin into the appropriate area as directed Daily.)     • irbesartan (AVAPRO) 75 MG tablet Take 1 tablet by mouth Daily.     • Jardiance 25 MG tablet tablet Take 1 tablet by mouth Daily.     • metoclopramide (REGLAN) 10 MG tablet Take 1 tablet by mouth Every Night.     • Multiple Vitamins-Minerals (PRESERVISION AREDS 2+MULTI VIT PO) Take  by mouth 2 (Two) Times a Day.     • omeprazole (priLOSEC) 40 MG capsule Take 1 capsule by mouth Daily. (Patient taking differently: Take 1 capsule by mouth Every Night.) 90 capsule 4   • potassium chloride (MICRO-K) 10 MEQ CR capsule Take 2 capsules by mouth Daily.     • predniSONE (DELTASONE) 10 MG tablet Take 9 mg by mouth Daily.     • Symbicort 160-4.5 MCG/ACT inhaler Inhale 2 puffs 2 (Two) Times a Day.     • tiotropium (SPIRIVA) 18 MCG per inhalation capsule Place 1 capsule into inhaler and inhale Daily.          Review of Systems:   Review of Systems  No headache, chest pain, shortness of breath, abdominal,pain, nausea, diarrhea, fever    Vital Signs  Temp:  [97.9 °F (36.6 °C)-98.9 °F (37.2 °C)] 98.9 °F (37.2 °C)  Heart Rate:  [77-79] 78  Resp:  [18-22] 18  BP: ()/(66-88) 131/77    Physical Exam:  General: appears comfortable  HEENT:  normal  CVS:  Regular rate and rhythm.    Musculoskeletal:  No signs of peripheral edema  Neurologic:      Alert, oriented to place, situation, month and year      Fluent, no right/left confusion, no apraxia     Power normal except right hand clumsy  Psychiatric: no anxiety     Results Review:    Head CT showed no acute pathology  CTA head and neck showed mild to moderate calcified cervical and intracranial atherosclerosis with no rate limiting stenosis    Medical Decision Making and Recommendations  Probable small ischemic reuben-procedural event  Exam improving    Continue aspirin and statin therapy    No indication for further imaging unless has additIonal symptoms    Discussed with patient and updated nursing staff.    Lidia Ybarra MD  5/30/2023

## 2023-05-31 NOTE — PROGRESS NOTES
[facility]  Dr. Silver has accepted pt when medically ready. Noted awaiting neuro to see pt again and wires to be removed for MRI. Currently, pt is not medically ready for Acute Rehab  Will continue to follow  [facility] Rocky crain.     Lola Rendon RN  Acute Rehab Admission Nurse

## 2023-05-31 NOTE — CASE MANAGEMENT/SOCIAL WORK
Continued Stay Note  [facility]     Patient Name: Vernon Solorzano  MRN: [id number]  Today's Date: 5/31/2023    Admit Date: 5/23/2023    Plan: BAR pending medical appropriateness for DC   Discharge Plan     Row Name 05/31/23 0845       Plan    Plan BAR pending medical appropriateness for DC    Plan Comments Spoke with Lola/MICK who stated Dr. Silver has accepted patient to admit to [facility] acute rehab once medically stable for DC. CCP to follow. Rocky JEFFERS RN               Discharge Codes    No documentation.               Expected Discharge Date and Time     Expected Discharge Date Expected Discharge Time    Jun 6, 2023             Rocky Mccabe RN

## 2023-05-31 NOTE — THERAPY TREATMENT NOTE
Patient Name: Vernon Solorzano  : 1950    MRN: [id number]                              Today's Date: 2023       Admit Date: 2023    Visit Dx:     ICD-10-CM ICD-9-CM   1. S/P MVR (mitral valve replacement)  Z95.2 V43.3   2. Nonrheumatic mitral valve regurgitation  I34.0 424.0     Patient Active Problem List   Diagnosis   • DM (diabetes mellitus), type 2   • Mixed hyperlipidemia   • Mild intermittent asthma without complication   • New onset of congestive heart failure   • Nonrheumatic mitral valve regurgitation   • Chest pain   • Essential hypertension   • Mitral valve disease     Past Medical History:   Diagnosis Date   • Allergic rhinitis    • Arthritis    • BPH (benign prostatic hyperplasia)    • Diabetes mellitus     TYPE 2   • Foraminal stenosis of cervical region     C5-C6   • GERD (gastroesophageal reflux disease)    • Hemorrhoids    • History of urinary retention 2010    S/P TURP   • Hyperlipidemia    • Macular degeneration    • PING (obstructive sleep apnea) 2016    MILD, SEES DR. AMARILIS MORGAN     Past Surgical History:   Procedure Laterality Date   • CARDIAC CATHETERIZATION N/A 2023    Procedure: Right Heart Cath;  Surgeon: Corey Gaffney MD;  Location:  [facility];  Service: Cardiology;  Laterality: N/A;   • CARDIAC CATHETERIZATION N/A 2023    Procedure: Left Heart Cath;  Surgeon: Corey Gaffney MD;  Location:  [facility];  Service: Cardiology;  Laterality: N/A;   • CARDIAC CATHETERIZATION N/A 2023    Procedure: Left ventriculography;  Surgeon: Corey Gaffney MD;  Location:  [facility];  Service: Cardiology;  Laterality: N/A;   • CARDIAC CATHETERIZATION N/A 2023    Procedure: Coronary angiography;  Surgeon: Corey Gaffney MD;  Location:  [facility];  Service: Cardiology;  Laterality: N/A;   • COLONOSCOPY N/A     WNL PER PT, NO RECORDS,    • COLONOSCOPY N/A 2009      GORGE BENAVIDEZ AT [place]   • MITRAL VALVE REPAIR/REPLACEMENT N/A 5/24/2023    Procedure: MITRAL VALVE REPAIR/REPLACEMENT TRICUSPID VALVE REPAIR/REPLACEMENT TRANSESOPHAGEAL ECHOCARDIOGRAM WITH ANESTHESIA;  Surgeon: George Frey MD;  Location: [facility];  Service: Cardiothoracic;  Laterality: N/A;   • PROSTATE SURGERY N/A 11/12/2010    TURP, DR. BRIGHT COLLAZO AT [facility]   • SKIN TAG REMOVAL N/A 12/20/2017    ANAL SKIN TAG X2, PERFORMED IN OFFICE, DR. LISA ORANTES   • TURP / TRANSURETHRAL INCISION / DRAINAGE PROSTATE  2010    Dr. Goodrich      General Information     Row Name 05/31/23 1220          OT Time and Intention    Document Type therapy note (daily note)  -     Mode of Treatment co-treatment;physical therapy;occupational therapy  -     Row Name 05/31/23 1220          General Information    Patient Profile Reviewed yes  -MAYANK     Existing Precautions/Restrictions fall;cardiac;sternal  -     Row Name 05/31/23 1220          Cognition    Orientation Status (Cognition) oriented to;person;place  -     Row Name 05/31/23 1220          Safety Issues, Functional Mobility    Impairments Affecting Function (Mobility) balance;coordination;cognition;endurance/activity tolerance;strength;pain;postural/trunk control  -           User Key  (r) = Recorded By, (t) = Taken By, (c) = Cosigned By    Initials Name Provider Type    Shon Cramer OT Occupational Therapist                 Mobility/ADL's     Row Name 05/31/23 1225          Bed Mobility    Supine-Sit Martin (Bed Mobility) contact guard  -     Row Name 05/31/23 1225          Sit-Stand Transfer    Sit-Stand Martin (Transfers) moderate assist (50% patient effort);2 person assist;verbal cues  -     Row Name 05/31/23 1225          Activities of Daily Living    BADL Assessment/Intervention lower body dressing  -     Row Name 05/31/23 1225          Lower Body Dressing Assessment/Training    Martin Level (Lower Body Dressing)  doff;socks;dependent (less than 25% patient effort)  -KN           User Key  (r) = Recorded By, (t) = Taken By, (c) = Cosigned By    Initials Name Provider Type    Shon Cramer OT Occupational Therapist               Obj/Interventions     Row Name 05/31/23 1254          Sensory Assessment (Somatosensory)    Sensory Assessment (Somatosensory) UE sensation intact  -     Row Name 05/31/23 1254          Motor Skills    Therapeutic Exercise other (see comments)  BUE fist punches crossing midline to touch therapist hand 1 x 5 each.  -KN           User Key  (r) = Recorded By, (t) = Taken By, (c) = Cosigned By    Initials Name Provider Type    Shon Cramer OT Occupational Therapist               Goals/Plan    No documentation.                Clinical Impression     Row Name 05/31/23 1255          Pain Assessment    Pretreatment Pain Rating 0/10 - no pain  -KN     Posttreatment Pain Rating 3/10  -KN     Pain Location - Side/Orientation Left  -KN     Pain Location incisional  -KN     Pain Location - neck  -KN     Pain Intervention(s) Therapeutic presence  -     Row Name 05/31/23 1255          Plan of Care Review    Plan of Care Reviewed With patient;spouse  -     Outcome Evaluation Pt returning from swallow study and agreeable to PT/OT co treat to maximize function. Min A/CGA to sit edge of stretcher, min/Mod A x 2 for STS, dep to doff socks at end of session. Constant cues required throughout session to stay on task. Pt perform BUE fist punches reach across mid line to improve ROM and strength. Pt will continue to benefit from skilled OT services to maxmize ind.  -     Row Name 05/31/23 1255          Vital Signs    O2 Delivery Pre Treatment room air  -KN     O2 Delivery Intra Treatment room air  -KN     O2 Delivery Post Treatment room air  -     Row Name 05/31/23 1255          Positioning and Restraints    Pre-Treatment Position other (comment)  -KN     Post Treatment Position chair  -MAYANK     In Chair  sitting;with family/caregiver;with PT  -KN           User Key  (r) = Recorded By, (t) = Taken By, (c) = Cosigned By    Initials Name Provider Type    Shon Cramer OT Occupational Therapist               Outcome Measures     Row Name 05/31/23 2059          How much help from another is currently needed...    Putting on and taking off regular lower body clothing? 1  -KN     Bathing (including washing, rinsing, and drying) 1  -KN     Toileting (which includes using toilet bed pan or urinal) 1  -KN     Putting on and taking off regular upper body clothing 1  -KN     Taking care of personal grooming (such as brushing teeth) 2  -KN     Eating meals 1  -KN     AM-PAC 6 Clicks Score (OT) 7  -KN     Row Name 05/31/23 1043 05/31/23 0755       How much help from another person do you currently need...    Turning from your back to your side while in flat bed without using bedrails? 2  -EM 2  -AC    Moving from lying on back to sitting on the side of a flat bed without bedrails? 2  -EM 2  -AC    Moving to and from a bed to a chair (including a wheelchair)? 2  -EM 2  -AC    Standing up from a chair using your arms (e.g., wheelchair, bedside chair)? 2  -EM 2  -AC    Climbing 3-5 steps with a railing? 1  -EM 1  -AC    To walk in hospital room? 2  -EM 2  -AC    AM-PAC 6 Clicks Score (PT) 11  -EM 11  -AC    Highest level of mobility 4 --> Transferred to chair/commode  -EM 4 --> Transferred to chair/commode  -AC    Row Name 05/31/23 4180          Functional Assessment    Outcome Measure Options AM-PAC 6 Clicks Daily Activity (OT)  -KN           User Key  (r) = Recorded By, (t) = Taken By, (c) = Cosigned By    Initials Name Provider Type    Kathy Boo, PT Physical Therapist    Neeru Rosario, RN Registered Nurse    Shon Cramer OT Occupational Therapist                Occupational Therapy Education     Title: PT OT SLP Therapies (Done)     Topic: Occupational Therapy (Done)     Point: ADL training (Done)      Description:   Instruct learner(s) on proper safety adaptation and remediation techniques during self care or transfers.   Instruct in proper use of assistive devices.              Learning Progress Summary           Patient Acceptance, E, VU by  at 5/31/2023 1300    Acceptance, E,TB, VU by  at 5/30/2023 1652                   Point: Home exercise program (Done)     Description:   Instruct learner(s) on appropriate technique for monitoring, assisting and/or progressing therapeutic exercises/activities.              Learning Progress Summary           Patient Acceptance, E, VU by  at 5/31/2023 1300    Acceptance, E,TB, VU by  at 5/30/2023 1652                   Point: Precautions (Done)     Description:   Instruct learner(s) on prescribed precautions during self-care and functional transfers.              Learning Progress Summary           Patient Acceptance, E, VU by  at 5/31/2023 1300    Acceptance, E,TB, VU by  at 5/30/2023 1652                   Point: Body mechanics (Done)     Description:   Instruct learner(s) on proper positioning and spine alignment during self-care, functional mobility activities and/or exercises.              Learning Progress Summary           Patient Acceptance, E, VU by  at 5/31/2023 1300    Acceptance, E,TB, VU by  at 5/30/2023 1652                               User Key     Initials Effective Dates Name Provider Type Discipline     06/16/21 -  Jelly Sepulveda, RN Registered Nurse Nurse     08/02/22 -  Shon Sargent OT Occupational Therapist OT              OT Recommendation and Plan     Plan of Care Review  Plan of Care Reviewed With: patient, spouse  Outcome Evaluation: Pt returning from swallow study and agreeable to PT/OT co treat to maximize function. Min A/CGA to sit edge of stretcher, min/Mod A x 2 for STS, dep to doff socks at end of session. Constant cues required throughout session to stay on task. Pt perform BUE fist punches reach across mid line to  improve ROM and strength. Pt will continue to benefit from skilled OT services to maxmize ind.     Time Calculation:    Time Calculation- OT     Row Name 05/31/23 1300             Time Calculation- OT    OT Start Time 0917  -KN      OT Stop Time 0930  -KN      OT Time Calculation (min) 13 min  -KN         Timed Charges    01208 - OT Therapeutic Activity Minutes 13  -KN         Total Minutes    Timed Charges Total Minutes 13  -KN       Total Minutes 13  -KN            User Key  (r) = Recorded By, (t) = Taken By, (c) = Cosigned By    Initials Name Provider Type    Shon Cramer OT Occupational Therapist              Therapy Charges for Today     Code Description Service Date Service Provider Modifiers Qty    89109355579 HC OT THERAPEUTIC ACT EA 15 MIN 5/31/2023 Shon Sargent OT GO 1               Shon Sargent OT  5/31/2023

## 2023-05-31 NOTE — PROGRESS NOTES
Nutrition Services    Patient Name:  Vernon Solorzano  YOB: 1950  MRN: [id number]  Admit Date:  5/23/2023    Assessment Date:  05/31/23    CLINICAL NUTRITION    Comments: Follow up TF/po diet:    Current TF regimen: TFs of Diabetisource running at goal rate of 80 mL/hr with 150 ml/hr free water flushes per renal.  Tolerating well per RN report.      VFSS today with SLP and pt advanced to HH/Soft to Chew (chopped meat) diet with HTL.     Recommend:  1. Change TF's to nocturnal (7p-7a) x 12 hrs/day of Diabetisource AC @ 80 ml/hr with free water flushes per Renal team, will change order.  2. Add Consistent CHO to diet order, will change order.    RD to continue to monitor per protocol.     Encounter Information         Reason For Encounter Follow up for TF    Current Issues S/p MVR, TVr and SU closure 5/22.  CT negative for acute CVA.  Patient anxious regarding SLP evaluation.  Remains NPO.  More alert.       Estimated Requirements         Calories 2360 kcals (20 kcal/kg)    Protein (gm) 118-141 grams protein (1.0 - 1.2 gm/kg)    Fluid (mL) 2360 mL (1 mL/kcal)     Current Nutrition Orders & Evaluation of Intake       Oral Nutrition     Current PO Diet Diet: Cardiac Diets; Healthy Heart (2-3 Na+); Texture: Soft to Chew (NDD 3); Soft to Chew: Chopped Meat; Fluid Consistency: Honey Thick   Supplement n/a   PO Evaluation     Trending % PO Intake n/a    Factors Affecting Intake  swallow impairment      Enteral Nutrition     Enteral Route ND    TF Delivery Method Continuous    Enteral Product Diabetisource AC    Modular None    Propofol Rate/Kcal     TF Current Rate (mL) 80 ml/hr    TF Goal Rate (mL) 80 ml/hr    Current Water Flush (mL) 150 mL q hour (per MD orders)    Current TF Provision  2304 kcal, 115 gm protein, 1574 mL free water + 3600 mL water flushes         Calories 98 % needs met         Protein  97 % needs met         TF Fluid (mL) 5174         IV Fluids 50 ml/hr    Avg Volume Delivered (mL)  "1001 ml    % Goal Volume 52%    TF Residual  n/a - tube is small bowel    TF Changes free water added    TF Tolerance tolerating     Anthropometrics          Height    Weight Height: 182.9 cm (72\")  Weight: 120 kg (264 lb 12.4 oz) (05/31/23 0546)    BMI kg/m2 Body mass index is 35.91 kg/m².  Obese, Class II (35 - 39.9)    Weight trend Stable     Labs        Pertinent Labs Reviewed, listed below     Results from last 7 days   Lab Units 05/31/23 0448 05/30/23  0210 05/29/23  1730 05/29/23  1436 05/29/23  0358 05/27/23  0604 05/26/23  0304   SODIUM mmol/L 141 146*  --   --  145   < > 147*   POTASSIUM mmol/L 4.2 4.4 5.0   < > 3.6   < > 4.0   CHLORIDE mmol/L 105 110*  --   --  109*   < > 110*   CO2 mmol/L 28.0 28.6  --   --  26.9   < > 23.9   BUN mg/dL 31* 31*  --   --  34*   < > 26*   CREATININE mg/dL 1.02 1.08  --   --  1.04   < > 1.37*   CALCIUM mg/dL 8.2* 8.7  --   --  8.7   < > 8.9   BILIRUBIN mg/dL  --   --   --   --   --   --  0.4   ALK PHOS U/L  --   --   --   --   --   --  45   ALT (SGPT) U/L  --   --   --   --   --   --  23   AST (SGOT) U/L  --   --   --   --   --   --  80*   GLUCOSE mg/dL 219* 322*  --   --  249*   < > 143*    < > = values in this interval not displayed.     Results from last 7 days   Lab Units 05/31/23 0448 05/28/23  0344 05/27/23  0604 05/26/23  0304 05/25/23  1350 05/25/23  0307   MAGNESIUM mg/dL  --   --  2.6* 2.7* 2.9* 3.0*   PHOSPHORUS mg/dL  --   --   --   --   --  5.1*   HEMOGLOBIN g/dL 11.0*   < > 11.4* 10.2*  --  10.1*   HEMATOCRIT % 33.6*   < > 34.1* 31.0*  --  29.6*   WBC 10*3/mm3 12.77*   < > 16.03* 18.39*  --  15.14*   ALBUMIN g/dL  --   --   --  4.1  --  4.2    < > = values in this interval not displayed.     Results from last 7 days   Lab Units 05/31/23  0448 05/30/23  0837 05/28/23  0344 05/27/23  0604 05/26/23  0304 05/25/23  0307   INR   --   --   --   --   --  1.16*   PLATELETS 10*3/mm3 211 213 171 146 116* 118*     COVID19   Date Value Ref Range Status   05/23/2023 Not " Detected Not Detected - Ref. Range Final     Lab Results   Component Value Date    HGBA1C 9.20 (H) 2023          Medications            Scheduled Medications aspirin, 81 mg, Oral, Daily  atorvastatin, 40 mg, Oral, Nightly  empagliflozin, 25 mg, Oral, Daily  enoxaparin, 40 mg, Subcutaneous, Daily  [START ON 2023] furosemide, 40 mg, Per G Tube, Daily  guaifenesin, 400 mg, Oral, Q8H  insulin glargine, 20 Units, Subcutaneous, Daily  insulin lispro, 3-14 Units, Subcutaneous, Q6H  ipratropium-albuterol, 3 mL, Nebulization, 4x Daily - RT  mupirocin, , Each Nare, BID  pantoprazole, 40 mg, Intravenous, QAM AC  potassium chloride, 20 mEq, Nasogastric, BID With Meals  predniSONE, 5 mg, Oral, Daily With Breakfast  [START ON 6/3/2023] predniSONE, 5 mg, Oral, Every Other Day  senna-docusate sodium, 2 tablet, Oral, Nightly        Infusions dextrose, 50 mL/hr, Last Rate: 50 mL/hr (23 1258)        PRN Medications •  acetaminophen **OR** acetaminophen **OR** acetaminophen  •  bisacodyl  •  bisacodyl  •  dextrose  •  dextrose  •  glucagon (human recombinant)  •  hydrALAZINE  •  ipratropium-albuterol  •  magnesium hydroxide  •  [] Morphine **AND** naloxone  •  nitroglycerin  •  ondansetron  •  polyethylene glycol  •  Potassium Replacement - Follow Nurse / BPA Driven Protocol  •  traMADol     Physical Findings          Physical Appearance disoriented, obese, on oxygen therapy   Oral/Mouth Cavity tooth or teeth missing   Edema  generalized, lower extremity , upper extremity, 1+ (trace), 2+ (mild)   Gastrointestinal hypoactive bowel sounds, last bowel movement:   Skin  surgical incision sternal inc   Tubes/Drains Cortrak, ND tube, bridle in place   NFPE Not indicated at this time     NUTRITION INTERVENTION / PLAN OF CARE  Intervention Goal         Intervention Goal(s) Maintain nutrition status, Nutrition support treatment, Improved nutrition related labs, Reduce/improve symptoms, Disease management/therapy,  Maintain TF/PN and Appropriate weight loss     Nutrition Intervention         RD Action Follow Tx Progress and Care plan reviewed     Prescription         Diet Prescription CC/HH/soft to chew (chopped meat) diet with HTL (textures per SLP recommendations)    Supplement Prescription    EN/PN Prescription Change to nocturnal TF's (7p-7a) of Diabetisource AC @ 80 ml/hr   New Prescription Ordered? No changes at this time   --   Enteral Prescription:     Enteral Route ND    TF Delivery Method Cyclic  X 12 hrs (7p-7a)    Enteral Product Diabetisource AC    Modular None    Propofol Rate/Kcal -    TF Start Rate (mL/hr) 80 ml/hr    TF Goal Rate (mL/hr) 80 ml/hr    Free Water Flush (mL) Per renal team (currently 150 ml/hr)    TF Provision at Goal: 1152 kcal, 57 gm protein, 787 mL free water + 1800 mL water flushes         Calories 44% needs met         Protein  43% needs met         Fluid (mL)  2587 mL total     Prescription Ordered Yes     Monitor/Evaluation        Monitor Per protocol, I&O, Pertinent labs, EN delivery/tolerance, POC/GOC, Swallow function   Discharge Needs Pending clinical course   Education Will instruct as appropriate       Electronically signed by:  Payton Santiago RD  05/31/23 13:09 EDT

## 2023-05-31 NOTE — PLAN OF CARE
Goal Outcome Evaluation:  Plan of Care Reviewed With: patient, spouse           Outcome Evaluation: VFSS complete. Radiologist, Dr. Lieberman, present during the study. Patient presents with moderate oropharyngeal dysphagia. Increased fatigue observed as study progressed. No penetration/aspiration noted with thin liquid by spoon. Nontransient penetration during the swallow with thin liquid by cup. Deep penetration to the vocal folds and suspected aspiration during the swallow with thin liquid by straw. Transient penetration with nectar thick liquid by spoon/cup. Deep penetration before the swallow and aspiration during the swallow with nectar by straw. Patient with very subtle throat clear to sensate, however, throat clear/cough ineffective in expectorating material from the airway. No penetration/aspiration with honey thick liquid by cup/straw, puree, soft/chopped solids, or regular solids. Slow and disorganized mastication noted. Recommend honey thick liquid and soft/chopped solid diet. Meds whole or crushed in puree, as tolerated. Free water protocol with ice chips/water sips from spoon/cup only after oral care in between meals. As endurance improves, consider upgrade at bedside per SLP discretion. Speech to follow.    Spoke to wife and patient at bedside to review VFSS. Thorough education provided regarding safe swallow strategies. Wife and patient both verbalized understanding.

## 2023-05-31 NOTE — NURSING NOTE
"Access Center follow-up regarding mental status changes.  Per RN patient still confused.  Upon entering room, he is awake sitting up in bed, wife at bedside.  He is trying to eat supper, he reports it is difficult to eat \"with this tube in my nose\".  He is anxious for NGT removal but hasn't been able to eat 50% of PO.  He appears frustrated.  Is slow to respond to questions versus annoyed and doesn't want to answer.  Wife reports mentation is improving and is more oriented today compared to previous.  He denies SI.  He asks for ice cream, informed RN; called dietary for magic cup as he is on honey thickened liquid.    Access following.         "

## 2023-06-01 ENCOUNTER — APPOINTMENT (OUTPATIENT)
Dept: CT IMAGING | Facility: HOSPITAL | Age: 73
End: 2023-06-01
Payer: MEDICARE

## 2023-06-01 ENCOUNTER — APPOINTMENT (OUTPATIENT)
Dept: CARDIOLOGY | Facility: HOSPITAL | Age: 73
End: 2023-06-01
Payer: MEDICARE

## 2023-06-01 ENCOUNTER — APPOINTMENT (OUTPATIENT)
Dept: NEUROLOGY | Facility: HOSPITAL | Age: 73
End: 2023-06-01
Payer: MEDICARE

## 2023-06-01 LAB
ALBUMIN SERPL-MCNC: 3.2 G/DL (ref 3.5–5.2)
ALBUMIN/GLOB SERPL: 1.2 G/DL
ALP SERPL-CCNC: 65 U/L (ref 39–117)
ALT SERPL W P-5'-P-CCNC: 14 U/L (ref 1–41)
ANION GAP SERPL CALCULATED.3IONS-SCNC: 10.7 MMOL/L (ref 5–15)
ANION GAP SERPL CALCULATED.3IONS-SCNC: 12.9 MMOL/L (ref 5–15)
APTT PPP: 24.2 SECONDS (ref 22.7–35.4)
APTT PPP: 31.4 SECONDS (ref 22.7–35.4)
AST SERPL-CCNC: 23 U/L (ref 1–40)
BASOPHILS # BLD AUTO: 0.08 10*3/MM3 (ref 0–0.2)
BASOPHILS NFR BLD AUTO: 0.6 % (ref 0–1.5)
BH CV ECHO MEAS - CONTRAST EF 4CH: 44 CM2
BH CV ECHO MEAS - EDV(MOD-SP2): 151 ML
BH CV ECHO MEAS - EDV(MOD-SP4): 184 ML
BH CV ECHO MEAS - EF(MOD-BP): 44.1 %
BH CV ECHO MEAS - EF(MOD-SP2): 47 %
BH CV ECHO MEAS - EF(MOD-SP4): 41.8 %
BH CV ECHO MEAS - ESV(MOD-SP2): 80 ML
BH CV ECHO MEAS - ESV(MOD-SP4): 107 ML
BH CV ECHO MEAS - RAP SYSTOLE: 3 MMHG
BH CV ECHO MEAS - SV(MOD-SP2): 71 ML
BH CV ECHO MEAS - SV(MOD-SP4): 77 ML
BH CV ECHO MEAS - TAPSE (>1.6): 0.3 CM
BH CV LOW VAS LEFT LESSER SAPH VESSEL: 1
BH CV LOW VAS RIGHT GREATER SAPH BK VESSEL: 1
BH CV LOW VAS RIGHT LESSER SAPH VESSEL: 1
BH CV LOWER VASCULAR LEFT COMMON FEMORAL AUGMENT: NORMAL
BH CV LOWER VASCULAR LEFT COMMON FEMORAL COMPETENT: NORMAL
BH CV LOWER VASCULAR LEFT COMMON FEMORAL COMPRESS: NORMAL
BH CV LOWER VASCULAR LEFT COMMON FEMORAL PHASIC: NORMAL
BH CV LOWER VASCULAR LEFT COMMON FEMORAL SPONT: NORMAL
BH CV LOWER VASCULAR LEFT DISTAL FEMORAL COMPRESS: NORMAL
BH CV LOWER VASCULAR LEFT GASTRONEMIUS COMPRESS: NORMAL
BH CV LOWER VASCULAR LEFT GREATER SAPH AK COMPRESS: NORMAL
BH CV LOWER VASCULAR LEFT GREATER SAPH BK COMPRESS: NORMAL
BH CV LOWER VASCULAR LEFT LESSER SAPH COMPRESS: NORMAL
BH CV LOWER VASCULAR LEFT LESSER SAPH THROMBUS: NORMAL
BH CV LOWER VASCULAR LEFT MID FEMORAL AUGMENT: NORMAL
BH CV LOWER VASCULAR LEFT MID FEMORAL COMPETENT: NORMAL
BH CV LOWER VASCULAR LEFT MID FEMORAL COMPRESS: NORMAL
BH CV LOWER VASCULAR LEFT MID FEMORAL PHASIC: NORMAL
BH CV LOWER VASCULAR LEFT MID FEMORAL SPONT: NORMAL
BH CV LOWER VASCULAR LEFT PERONEAL COMPRESS: NORMAL
BH CV LOWER VASCULAR LEFT POPLITEAL AUGMENT: NORMAL
BH CV LOWER VASCULAR LEFT POPLITEAL COMPETENT: NORMAL
BH CV LOWER VASCULAR LEFT POPLITEAL COMPRESS: NORMAL
BH CV LOWER VASCULAR LEFT POPLITEAL PHASIC: NORMAL
BH CV LOWER VASCULAR LEFT POPLITEAL SPONT: NORMAL
BH CV LOWER VASCULAR LEFT POSTERIOR TIBIAL COMPRESS: NORMAL
BH CV LOWER VASCULAR LEFT PROFUNDA FEMORAL COMPRESS: NORMAL
BH CV LOWER VASCULAR LEFT PROXIMAL FEMORAL COMPRESS: NORMAL
BH CV LOWER VASCULAR LEFT SAPHENOFEMORAL JUNCTION COMPRESS: NORMAL
BH CV LOWER VASCULAR RIGHT COMMON FEMORAL AUGMENT: NORMAL
BH CV LOWER VASCULAR RIGHT COMMON FEMORAL COMPETENT: NORMAL
BH CV LOWER VASCULAR RIGHT COMMON FEMORAL COMPRESS: NORMAL
BH CV LOWER VASCULAR RIGHT COMMON FEMORAL PHASIC: NORMAL
BH CV LOWER VASCULAR RIGHT COMMON FEMORAL SPONT: NORMAL
BH CV LOWER VASCULAR RIGHT DISTAL FEMORAL COMPRESS: NORMAL
BH CV LOWER VASCULAR RIGHT GASTRONEMIUS COMPRESS: NORMAL
BH CV LOWER VASCULAR RIGHT GREATER SAPH AK COMPRESS: NORMAL
BH CV LOWER VASCULAR RIGHT GREATER SAPH BK COMPRESS: NORMAL
BH CV LOWER VASCULAR RIGHT GREATER SAPH BK THROMBUS: NORMAL
BH CV LOWER VASCULAR RIGHT LESSER SAPH COMPRESS: NORMAL
BH CV LOWER VASCULAR RIGHT LESSER SAPH THROMBUS: NORMAL
BH CV LOWER VASCULAR RIGHT MID FEMORAL AUGMENT: NORMAL
BH CV LOWER VASCULAR RIGHT MID FEMORAL COMPETENT: NORMAL
BH CV LOWER VASCULAR RIGHT MID FEMORAL COMPRESS: NORMAL
BH CV LOWER VASCULAR RIGHT MID FEMORAL PHASIC: NORMAL
BH CV LOWER VASCULAR RIGHT MID FEMORAL SPONT: NORMAL
BH CV LOWER VASCULAR RIGHT PERONEAL COMPRESS: NORMAL
BH CV LOWER VASCULAR RIGHT POPLITEAL AUGMENT: NORMAL
BH CV LOWER VASCULAR RIGHT POPLITEAL COMPETENT: NORMAL
BH CV LOWER VASCULAR RIGHT POPLITEAL COMPRESS: NORMAL
BH CV LOWER VASCULAR RIGHT POPLITEAL PHASIC: NORMAL
BH CV LOWER VASCULAR RIGHT POPLITEAL SPONT: NORMAL
BH CV LOWER VASCULAR RIGHT POSTERIOR TIBIAL COMPRESS: NORMAL
BH CV LOWER VASCULAR RIGHT PROFUNDA FEMORAL COMPRESS: NORMAL
BH CV LOWER VASCULAR RIGHT PROXIMAL FEMORAL COMPRESS: NORMAL
BH CV LOWER VASCULAR RIGHT SAPHENOFEMORAL JUNCTION COMPRESS: NORMAL
BH CV VAS BP RIGHT ARM: NORMAL MMHG
BH CV VAS PRELIMINARY FINDINGS SCRIPTING: 1
BH CV XLRA - RV BASE: 3.2 CM
BH CV XLRA - RV LENGTH: 8.4 CM
BH CV XLRA - RV MID: 3.3 CM
BH CV XLRA - TDI S': 4.9 CM/SEC
BILIRUB SERPL-MCNC: 0.4 MG/DL (ref 0–1.2)
BUN SERPL-MCNC: 28 MG/DL (ref 8–23)
BUN SERPL-MCNC: 29 MG/DL (ref 8–23)
BUN/CREAT SERPL: 29.2 (ref 7–25)
BUN/CREAT SERPL: 29.9 (ref 7–25)
CALCIUM SPEC-SCNC: 8.6 MG/DL (ref 8.6–10.5)
CALCIUM SPEC-SCNC: 8.6 MG/DL (ref 8.6–10.5)
CHLORIDE SERPL-SCNC: 99 MMOL/L (ref 98–107)
CHLORIDE SERPL-SCNC: 99 MMOL/L (ref 98–107)
CO2 SERPL-SCNC: 22.1 MMOL/L (ref 22–29)
CO2 SERPL-SCNC: 24.3 MMOL/L (ref 22–29)
CREAT SERPL-MCNC: 0.96 MG/DL (ref 0.76–1.27)
CREAT SERPL-MCNC: 0.97 MG/DL (ref 0.76–1.27)
DEPRECATED RDW RBC AUTO: 44.7 FL (ref 37–54)
EGFRCR SERPLBLD CKD-EPI 2021: 81.9 ML/MIN/1.73
EGFRCR SERPLBLD CKD-EPI 2021: 82.9 ML/MIN/1.73
EOSINOPHIL # BLD AUTO: 0.54 10*3/MM3 (ref 0–0.4)
EOSINOPHIL NFR BLD AUTO: 4.1 % (ref 0.3–6.2)
ERYTHROCYTE [DISTWIDTH] IN BLOOD BY AUTOMATED COUNT: 13.8 % (ref 12.3–15.4)
GLOBULIN UR ELPH-MCNC: 2.6 GM/DL
GLUCOSE BLDC GLUCOMTR-MCNC: 154 MG/DL (ref 70–130)
GLUCOSE BLDC GLUCOMTR-MCNC: 193 MG/DL (ref 70–130)
GLUCOSE BLDC GLUCOMTR-MCNC: 231 MG/DL (ref 70–130)
GLUCOSE BLDC GLUCOMTR-MCNC: 243 MG/DL (ref 70–130)
GLUCOSE SERPL-MCNC: 199 MG/DL (ref 65–99)
GLUCOSE SERPL-MCNC: 200 MG/DL (ref 65–99)
HCT VFR BLD AUTO: 31.8 % (ref 37.5–51)
HGB BLD-MCNC: 10.5 G/DL (ref 13–17.7)
IMM GRANULOCYTES # BLD AUTO: 0.3 10*3/MM3 (ref 0–0.05)
IMM GRANULOCYTES NFR BLD AUTO: 2.3 % (ref 0–0.5)
INR PPP: 1.01 (ref 0.9–1.1)
LYMPHOCYTES # BLD AUTO: 1.39 10*3/MM3 (ref 0.7–3.1)
LYMPHOCYTES NFR BLD AUTO: 10.6 % (ref 19.6–45.3)
MAXIMAL PREDICTED HEART RATE: 146 BPM
MAXIMAL PREDICTED HEART RATE: 146 BPM
MCH RBC QN AUTO: 29.5 PG (ref 26.6–33)
MCHC RBC AUTO-ENTMCNC: 33 G/DL (ref 31.5–35.7)
MCV RBC AUTO: 89.3 FL (ref 79–97)
MONOCYTES # BLD AUTO: 0.93 10*3/MM3 (ref 0.1–0.9)
MONOCYTES NFR BLD AUTO: 7.1 % (ref 5–12)
NEUTROPHILS NFR BLD AUTO: 75.3 % (ref 42.7–76)
NEUTROPHILS NFR BLD AUTO: 9.92 10*3/MM3 (ref 1.7–7)
NRBC BLD AUTO-RTO: 0 /100 WBC (ref 0–0.2)
PLATELET # BLD AUTO: 193 10*3/MM3 (ref 140–450)
PMV BLD AUTO: 10.4 FL (ref 6–12)
POTASSIUM SERPL-SCNC: 4.4 MMOL/L (ref 3.5–5.2)
POTASSIUM SERPL-SCNC: 4.4 MMOL/L (ref 3.5–5.2)
PROT SERPL-MCNC: 5.8 G/DL (ref 6–8.5)
PROTHROMBIN TIME: 13.4 SECONDS (ref 11.7–14.2)
RBC # BLD AUTO: 3.56 10*6/MM3 (ref 4.14–5.8)
SODIUM SERPL-SCNC: 134 MMOL/L (ref 136–145)
SODIUM SERPL-SCNC: 134 MMOL/L (ref 136–145)
STRESS TARGET HR: 124 BPM
STRESS TARGET HR: 124 BPM
WBC NRBC COR # BLD: 13.16 10*3/MM3 (ref 3.4–10.8)

## 2023-06-01 PROCEDURE — 63710000001 PREDNISONE PER 5 MG: Performed by: INTERNAL MEDICINE

## 2023-06-01 PROCEDURE — 93321 DOPPLER ECHO F-UP/LMTD STD: CPT

## 2023-06-01 PROCEDURE — 95819 EEG AWAKE AND ASLEEP: CPT

## 2023-06-01 PROCEDURE — 94761 N-INVAS EAR/PLS OXIMETRY MLT: CPT

## 2023-06-01 PROCEDURE — 93308 TTE F-UP OR LMTD: CPT | Performed by: INTERNAL MEDICINE

## 2023-06-01 PROCEDURE — 93325 DOPPLER ECHO COLOR FLOW MAPG: CPT | Performed by: INTERNAL MEDICINE

## 2023-06-01 PROCEDURE — 63710000001 INSULIN LISPRO (HUMAN) PER 5 UNITS: Performed by: NURSE PRACTITIONER

## 2023-06-01 PROCEDURE — 85730 THROMBOPLASTIN TIME PARTIAL: CPT | Performed by: THORACIC SURGERY (CARDIOTHORACIC VASCULAR SURGERY)

## 2023-06-01 PROCEDURE — 85730 THROMBOPLASTIN TIME PARTIAL: CPT | Performed by: NURSE PRACTITIONER

## 2023-06-01 PROCEDURE — 25010000002 FUROSEMIDE PER 20 MG: Performed by: INTERNAL MEDICINE

## 2023-06-01 PROCEDURE — 70450 CT HEAD/BRAIN W/O DYE: CPT

## 2023-06-01 PROCEDURE — 85610 PROTHROMBIN TIME: CPT | Performed by: NURSE PRACTITIONER

## 2023-06-01 PROCEDURE — 93325 DOPPLER ECHO COLOR FLOW MAPG: CPT

## 2023-06-01 PROCEDURE — 99232 SBSQ HOSP IP/OBS MODERATE 35: CPT | Performed by: INTERNAL MEDICINE

## 2023-06-01 PROCEDURE — 82948 REAGENT STRIP/BLOOD GLUCOSE: CPT

## 2023-06-01 PROCEDURE — 95813 EEG EXTND MNTR 61-119 MIN: CPT | Performed by: PSYCHIATRY & NEUROLOGY

## 2023-06-01 PROCEDURE — 94799 UNLISTED PULMONARY SVC/PX: CPT

## 2023-06-01 PROCEDURE — 94760 N-INVAS EAR/PLS OXIMETRY 1: CPT

## 2023-06-01 PROCEDURE — 94664 DEMO&/EVAL PT USE INHALER: CPT

## 2023-06-01 PROCEDURE — 25510000001 PERFLUTREN (DEFINITY) 8.476 MG IN SODIUM CHLORIDE (PF) 0.9 % 10 ML INJECTION: Performed by: INTERNAL MEDICINE

## 2023-06-01 PROCEDURE — 85025 COMPLETE CBC W/AUTO DIFF WBC: CPT | Performed by: NURSE PRACTITIONER

## 2023-06-01 PROCEDURE — 93321 DOPPLER ECHO F-UP/LMTD STD: CPT | Performed by: INTERNAL MEDICINE

## 2023-06-01 PROCEDURE — 99233 SBSQ HOSP IP/OBS HIGH 50: CPT | Performed by: NURSE PRACTITIONER

## 2023-06-01 PROCEDURE — 93308 TTE F-UP OR LMTD: CPT

## 2023-06-01 PROCEDURE — 80053 COMPREHEN METABOLIC PANEL: CPT | Performed by: NURSE PRACTITIONER

## 2023-06-01 PROCEDURE — 99024 POSTOP FOLLOW-UP VISIT: CPT | Performed by: THORACIC SURGERY (CARDIOTHORACIC VASCULAR SURGERY)

## 2023-06-01 PROCEDURE — 25010000002 HEPARIN (PORCINE) 25000-0.45 UT/250ML-% SOLUTION: Performed by: NURSE PRACTITIONER

## 2023-06-01 PROCEDURE — 93970 EXTREMITY STUDY: CPT

## 2023-06-01 RX ORDER — FUROSEMIDE 10 MG/ML
40 INJECTION INTRAMUSCULAR; INTRAVENOUS EVERY 8 HOURS
Status: DISCONTINUED | OUTPATIENT
Start: 2023-06-01 | End: 2023-06-02

## 2023-06-01 RX ORDER — HEPARIN SODIUM 10000 [USP'U]/100ML
8.4 INJECTION, SOLUTION INTRAVENOUS
Status: DISCONTINUED | OUTPATIENT
Start: 2023-06-01 | End: 2023-06-04

## 2023-06-01 RX ADMIN — ATORVASTATIN CALCIUM 40 MG: 20 TABLET, FILM COATED ORAL at 20:35

## 2023-06-01 RX ADMIN — GUAIFENESIN 400 MG: 200 SOLUTION ORAL at 08:58

## 2023-06-01 RX ADMIN — INSULIN GLARGINE-YFGN 25 UNITS: 100 INJECTION, SOLUTION SUBCUTANEOUS at 09:00

## 2023-06-01 RX ADMIN — MUPIROCIN 1 APPLICATION: 20 OINTMENT TOPICAL at 20:35

## 2023-06-01 RX ADMIN — FUROSEMIDE 40 MG: 40 INJECTION, SOLUTION INTRAMUSCULAR; INTRAVENOUS at 23:51

## 2023-06-01 RX ADMIN — FUROSEMIDE 40 MG: 40 INJECTION, SOLUTION INTRAMUSCULAR; INTRAVENOUS at 09:31

## 2023-06-01 RX ADMIN — HEPARIN SODIUM 8.4 UNITS/KG/HR: 10000 INJECTION, SOLUTION INTRAVENOUS at 15:41

## 2023-06-01 RX ADMIN — PANTOPRAZOLE SODIUM 40 MG: 40 INJECTION, POWDER, FOR SOLUTION INTRAVENOUS at 09:31

## 2023-06-01 RX ADMIN — CALCIUM CARBONATE-VITAMIN D TAB 500 MG-200 UNIT 1 TABLET: 500-200 TAB at 08:58

## 2023-06-01 RX ADMIN — TRAMADOL HYDROCHLORIDE 50 MG: 50 TABLET, COATED ORAL at 15:36

## 2023-06-01 RX ADMIN — GUAIFENESIN 400 MG: 200 SOLUTION ORAL at 23:51

## 2023-06-01 RX ADMIN — GUAIFENESIN 400 MG: 200 SOLUTION ORAL at 15:38

## 2023-06-01 RX ADMIN — ASPIRIN 81 MG: 81 TABLET, CHEWABLE ORAL at 08:58

## 2023-06-01 RX ADMIN — INSULIN LISPRO 3 UNITS: 100 INJECTION, SOLUTION INTRAVENOUS; SUBCUTANEOUS at 11:50

## 2023-06-01 RX ADMIN — PREDNISONE 5 MG: 10 TABLET ORAL at 08:57

## 2023-06-01 RX ADMIN — EMPAGLIFLOZIN 25 MG: 25 TABLET, FILM COATED ORAL at 08:58

## 2023-06-01 RX ADMIN — PERFLUTREN 3 ML: 6.52 INJECTION, SUSPENSION INTRAVENOUS at 15:17

## 2023-06-01 RX ADMIN — INSULIN LISPRO 5 UNITS: 100 INJECTION, SOLUTION INTRAVENOUS; SUBCUTANEOUS at 06:18

## 2023-06-01 RX ADMIN — MUPIROCIN 1 APPLICATION: 20 OINTMENT TOPICAL at 09:00

## 2023-06-01 RX ADMIN — FUROSEMIDE 40 MG: 40 INJECTION, SOLUTION INTRAMUSCULAR; INTRAVENOUS at 15:36

## 2023-06-01 RX ADMIN — IPRATROPIUM BROMIDE AND ALBUTEROL SULFATE 3 ML: 2.5; .5 SOLUTION RESPIRATORY (INHALATION) at 11:41

## 2023-06-01 RX ADMIN — POTASSIUM CHLORIDE 20 MEQ: 1.5 POWDER, FOR SOLUTION ORAL at 17:50

## 2023-06-01 RX ADMIN — IPRATROPIUM BROMIDE AND ALBUTEROL SULFATE 3 ML: 2.5; .5 SOLUTION RESPIRATORY (INHALATION) at 19:46

## 2023-06-01 RX ADMIN — POTASSIUM CHLORIDE 20 MEQ: 1.5 POWDER, FOR SOLUTION ORAL at 08:57

## 2023-06-01 NOTE — PROGRESS NOTES
" LOS: 9 days   Patient Care Team:  Liza Oconnell III, NP-C as PCP - General (Family Medicine)  Corey Lao Jr., MD (Inactive) as Consulting Physician (Urology)    Chief Complaint: post op    Subjective:  Symptoms:  He reports shortness of breath and weakness.  No cough or chest pain.    Diet:  NPO (TF).  No nausea or vomiting.    Activity level: Impaired due to weakness.    Pain:  He complains of pain that is mild.  Pain is requiring pain medication.      Speech is worse this morning. Able to follow simple commands    Vital Signs  Temp:  [97.8 °F (36.6 °C)-98.7 °F (37.1 °C)] 98.5 °F (36.9 °C)  Heart Rate:  [75-78] 76  Resp:  [14-20] 20  BP: (114-133)/(56-77) 121/77  Body mass index is 35.52 kg/m².    Intake/Output Summary (Last 24 hours) at 6/1/2023 0735  Last data filed at 6/1/2023 0558  Gross per 24 hour   Intake 2544.83 ml   Output 2250 ml   Net 294.83 ml     No intake/output data recorded.        05/30/23  0526 05/31/23  0546 06/01/23  0558   Weight: 119 kg (262 lb 5.6 oz) 120 kg (264 lb 12.4 oz) 119 kg (261 lb 14.4 oz)     Objective:  General Appearance:  Comfortable and in no acute distress.    Vital signs: (most recent): Blood pressure 121/77, pulse 76, temperature 98.5 °F (36.9 °C), temperature source Oral, resp. rate 20, height 182.9 cm (72\"), weight 119 kg (261 lb 14.4 oz), SpO2 98 %.  Vital signs are normal.  No fever.    Output: Producing urine and producing stool.    Lungs:  Normal effort and normal respiratory rate.  There are rales (bibasilar) and decreased breath sounds.    Heart: Normal rate.  Irregular rhythm.    Abdomen: Abdomen is soft.  Bowel sounds are normal.     Extremities: There is dependent edema (bilaterally L>R).    Pulses: Distal pulses are intact.    Neurological: Patient is alert.  (Oriented to person, place, and situation).    Skin:  Warm and dry.  (Sternal incision clean, dry, and intact)      Results Review:        WBC WBC   Date Value Ref Range Status "   06/01/2023 13.16 (H) 3.40 - 10.80 10*3/mm3 Final   05/31/2023 12.77 (H) 3.40 - 10.80 10*3/mm3 Final   05/30/2023 10.08 3.40 - 10.80 10*3/mm3 Final      HGB Hemoglobin   Date Value Ref Range Status   06/01/2023 10.5 (L) 13.0 - 17.7 g/dL Final   05/31/2023 11.0 (L) 13.0 - 17.7 g/dL Final   05/30/2023 11.2 (L) 13.0 - 17.7 g/dL Final      HCT Hematocrit   Date Value Ref Range Status   06/01/2023 31.8 (L) 37.5 - 51.0 % Final   05/31/2023 33.6 (L) 37.5 - 51.0 % Final   05/30/2023 34.2 (L) 37.5 - 51.0 % Final      Platelets Platelets   Date Value Ref Range Status   06/01/2023 193 140 - 450 10*3/mm3 Final   05/31/2023 211 140 - 450 10*3/mm3 Final   05/30/2023 213 140 - 450 10*3/mm3 Final        PT/INR:  No results found for: PROTIME/No results found for: INR    Sodium Sodium   Date Value Ref Range Status   06/01/2023 134 (L) 136 - 145 mmol/L Final   06/01/2023 134 (L) 136 - 145 mmol/L Final   05/31/2023 141 136 - 145 mmol/L Final   05/30/2023 146 (H) 136 - 145 mmol/L Final      Potassium Potassium   Date Value Ref Range Status   06/01/2023 4.4 3.5 - 5.2 mmol/L Final   06/01/2023 4.4 3.5 - 5.2 mmol/L Final     Comment:     Slight hemolysis detected by analyzer. Results may be affected.   05/31/2023 4.2 3.5 - 5.2 mmol/L Final   05/30/2023 4.4 3.5 - 5.2 mmol/L Final   05/29/2023 5.0 3.5 - 5.2 mmol/L Final   05/29/2023 4.6 3.5 - 5.2 mmol/L Final      Chloride Chloride   Date Value Ref Range Status   06/01/2023 99 98 - 107 mmol/L Final   06/01/2023 99 98 - 107 mmol/L Final   05/31/2023 105 98 - 107 mmol/L Final   05/30/2023 110 (H) 98 - 107 mmol/L Final      Bicarbonate CO2   Date Value Ref Range Status   06/01/2023 24.3 22.0 - 29.0 mmol/L Final   06/01/2023 22.1 22.0 - 29.0 mmol/L Final   05/31/2023 28.0 22.0 - 29.0 mmol/L Final   05/30/2023 28.6 22.0 - 29.0 mmol/L Final      BUN BUN   Date Value Ref Range Status   06/01/2023 28 (H) 8 - 23 mg/dL Final   06/01/2023 29 (H) 8 - 23 mg/dL Final   05/31/2023 31 (H) 8 - 23 mg/dL  Final   05/30/2023 31 (H) 8 - 23 mg/dL Final      Creatinine Creatinine   Date Value Ref Range Status   06/01/2023 0.96 0.76 - 1.27 mg/dL Final   06/01/2023 0.97 0.76 - 1.27 mg/dL Final   05/31/2023 1.02 0.76 - 1.27 mg/dL Final   05/30/2023 1.08 0.76 - 1.27 mg/dL Final      Calcium Calcium   Date Value Ref Range Status   06/01/2023 8.6 8.6 - 10.5 mg/dL Final   06/01/2023 8.6 8.6 - 10.5 mg/dL Final   05/31/2023 8.2 (L) 8.6 - 10.5 mg/dL Final   05/30/2023 8.7 8.6 - 10.5 mg/dL Final      Magnesium No results found for: MG       aspirin, 81 mg, Oral, Daily  atorvastatin, 40 mg, Oral, Nightly  calcium 500 mg vitamin D 5 mcg (200 UT), 1 tablet, Oral, Daily  empagliflozin, 25 mg, Oral, Daily  enoxaparin, 40 mg, Subcutaneous, Daily  furosemide, 40 mg, Per G Tube, Daily  guaifenesin, 400 mg, Oral, Q8H  insulin glargine, 20 Units, Subcutaneous, Daily  insulin lispro, 3-14 Units, Subcutaneous, Q6H  ipratropium-albuterol, 3 mL, Nebulization, 4x Daily - RT  mupirocin, , Each Nare, BID  pantoprazole, 40 mg, Intravenous, QAM AC  potassium chloride, 20 mEq, Nasogastric, BID With Meals  predniSONE, 5 mg, Oral, Daily With Breakfast  [START ON 6/3/2023] predniSONE, 5 mg, Oral, Every Other Day  senna-docusate sodium, 2 tablet, Oral, Nightly               Patient Active Problem List   Diagnosis Code   • DM (diabetes mellitus), type 2 E11.9   • Mixed hyperlipidemia E78.2   • Mild intermittent asthma without complication J45.20   • New onset of congestive heart failure I50.9   • Nonrheumatic mitral valve regurgitation I34.0   • Chest pain R07.9   • Essential hypertension I10   • Mitral valve disease I05.9       Assessment & Plan   -Moderate to severe mitral regurgitation s/p MVR/TV repair/SU closure POD#8 Tk  -Moderate tricuspid valve regurgitation  -Acute on chronic diastolic heart failure with preserved EF--55%  -CKD stage IIIa; baseline creatinine 1.2  -DM type II--A1c  -Severe pulmonary HTN  -PING  -BPH/Urinary retention s/p  TURP  -Interstitial lung disease s/p COVID-19 infection --on chronic steroids  -leukocytosis--reactive/steroid administration  -post op anemia--expected acute blood loss  -TCP--consumptive--resolved     Sitting up in the chair. He is oriented to person and place. Perseverating speech with word salad. This is a change from yesterday. I think we need to re-scan his head, I am going to reach out to neurology. He does move all extremities to command.   Appears to be in atrial flutter with PVCs with rates in the 70s. EP evaluated yesterday and recommends holding beta blocker due to previous AV block. Discussed with Dr. Frey again today, will keep AV wires another day and hold on AC at this time. Of note SU is closed  Weaned to RA and tolerating  Right LE is significantly more swollen the the left LE today. WBC is trending up. Discussed with Dr. Batista, will check venous duplex  Glucose is slowly improving, will increase Lantus further   this am. D5 discontinued. He does appear volume overloaded on exam, agree with IV diuretics  VFSS yesterday, and patient placed on modified diet. Continue tube feedings nightly, and patient is not eating much  Encourage aggressive pulmonary toilet--continue flutter/guaifinisin  Prednisone taper per pulmonology--5 mg for one week, then every other day for 1 week and then stop  Still very weak--continue aggressive PT/OT. BAR evaluation underway  Continue routine post op care    ADDENDUM:  Per Dr. Frey will start heparin gtt. He wants to leave AV wires for now, and discuss possibly cardioversion with cardiology.    DESTIN Jin  06/01/23  07:35 EDT

## 2023-06-01 NOTE — CONSULTS
Met with patient and his wife to discuss cardiac rehab. Patient and wife are very familiar with the  program as wife worked as the dietitian for the program for a number of years. Current plan is for pt to be discharged to inpatient rehab.Provided phase II information along with the contact information for cardiac rehab here at [facility]. They will call and schedule when ready to attend our program.

## 2023-06-01 NOTE — NURSING NOTE
"Access follow-up regards AMS:    Pt found RIB.  Pt aphasic. When asked where he was, he replied, \"It's late\". He does not answer orientation questions - observed to stare off into space.     Access will sign off. Consider consult to psychiatrist if primary providers feel necessary. Updated primary RN.   "

## 2023-06-01 NOTE — CODE DOCUMENTATION
Patient Name:  Vernon Solorzano  YOB: 1950  MRN:  [id number]  Admit Date:  5/23/2023    Visit Diagnoses:     ICD-10-CM ICD-9-CM   1. S/P MVR (mitral valve replacement)  Z95.2 V43.3   2. Nonrheumatic mitral valve regurgitation  I34.0 424.0       Reason For Rapid:   Worsening aphasia   RN Communicated With:  CT SX APRN     Rapid Outcome:  Remain on unit   Communication From Rapid Team:   APRN spoke with neuro and ordered plan head ct  RRT team spoke with neuro orders  for ct head w/o contrast. Worsening aphasia r.t waxing and waning of hospital course     Most Recent Vital Signs  Temp:  [97.8 °F (36.6 °C)-98.7 °F (37.1 °C)] 98.5 °F (36.9 °C)  Heart Rate:  [75-78] 76  Resp:  [14-20] 20  BP: (114-133)/(56-77) 129/76  SpO2:  [98 %] 98 %  on  Flow (L/min):  [1-2] 1;   Device (Oxygen Therapy): nasal cannula    Labs:      Glucose   Date/Time Value Ref Range Status   06/01/2023 0610 231 (H) 70 - 130 mg/dL Final     Comment:     Meter: [id number] : [id number] Jimenez Eloissa NA   05/31/2023 2331 225 (H) 70 - 130 mg/dL Final     Comment:     Meter: [id number] : [id number] Jimenez Eloissa NA   05/31/2023 2032 229 (H) 70 - 130 mg/dL Final     Comment:     Meter: [id number] : [id number] Jimenez Eloissa NA   05/31/2023 1555 213 (H) 70 - 130 mg/dL Final     Comment:     Meter: [id number] : [id number] Odin Jiménez NA   05/31/2023 1058 246 (H) 70 - 130 mg/dL Final     Comment:     Meter: [id number] : [id number] Odin Jiménez NA   05/31/2023 0613 239 (H) 70 - 130 mg/dL Final     Comment:     Meter: [id number] : [id number] Yamilka Duran MARY   05/31/2023 0014 200 (H) 70 - 130 mg/dL Final     Comment:     Meter: [id number] : [id number] Asher Salomon RN     No results found for: SITE, ALLENTEST, PHART, QBP5HRI, PO2ART, HRK4NGA, BASEEXCESS, U4BWONVW, HGBBG, HCTABG, OXYHEMOGLOBI, METHHGBN, CARBOXYHGB, CO2CT, BAROMETRIC, MODALITY, FIO2  Results from last 7 days   Lab Units 06/01/23  9266  05/31/23  0448 05/30/23  0837 05/28/23  0344   WBC 10*3/mm3 13.16* 12.77* 10.08 13.01*   HEMOGLOBIN g/dL 10.5* 11.0* 11.2* 11.7*   PLATELETS 10*3/mm3 193 211 213 171     Results from last 7 days   Lab Units 06/01/23  0308 05/31/23  0448 05/30/23  0210 05/27/23  0604 05/26/23  0304   SODIUM mmol/L 134*  134* 141 146*   < > 147*   POTASSIUM mmol/L 4.4  4.4 4.2 4.4   < > 4.0   CHLORIDE mmol/L 99  99 105 110*   < > 110*   CO2 mmol/L 22.1  24.3 28.0 28.6   < > 23.9   BUN mg/dL 29*  28* 31* 31*   < > 26*   CREATININE mg/dL 0.97  0.96 1.02 1.08   < > 1.37*   GLUCOSE mg/dL 199*  200* 219* 322*   < > 143*   ALBUMIN g/dL 3.2*  --   --   --  4.1   BILIRUBIN mg/dL 0.4  --   --   --  0.4   ALK PHOS U/L 65  --   --   --  45   AST (SGOT) U/L 23  --   --   --  80*   ALT (SGPT) U/L 14  --   --   --  23    < > = values in this interval not displayed.   Estimated Creatinine Clearance: 89.9 mL/min (by C-G formula based on SCr of 0.96 mg/dL).          Results from last 7 days   Lab Units 05/26/23  0351   PH, ARTERIAL pH units 7.406   PO2 ART mm Hg 76.7*   PCO2, ARTERIAL mm Hg 37.1   HCO3 ART mmol/L 23.3   MODALITY  Cannula   No results found for: STREPPNEUAG, LEGANTIGENUR        NIH Stroke Scale:     1a. Level of Consciousness: 0-->Alert, keenly responsive  1b. LOC Questions: 1-->Answers one question correctly  1c. LOC Commands: 0-->Performs both tasks correctly  2. Best Gaze: 1-->Partial gaze palsy, gaze is abnormal in one or both eyes, but forced deviation or total gaze paresis is not present  3. Visual: 0-->No visual loss  4. Facial Palsy: 0-->Normal symmetrical movements  5a. Motor Arm, Left: 0-->No drift, limb holds 90 (or 45) degrees for full 10 secs  5b. Motor Arm, Right: 0-->No drift, limb holds 90 (or 45) degrees for full 10 secs  6a. Motor Leg, Left: 0-->No drift, leg holds 30 degree position for full 5 secs  6b. Motor Leg, Right: 0-->No drift, leg holds 30 degree position for full 5 secs  7. Limb Ataxia: 0-->Absent  8.  Sensory: 0-->Normal, no sensory loss  9. Best Language: 1-->Mild-to-moderate aphasia, some obvious loss of fluency or facility of comprehension, without significant limitation on ideas expressed or form of expression. Reduction of speech and/or comprehension, however, makes conversation. . . (see row details)  10. Dysarthria: 0-->Normal  11. Extinction and Inattention (formerly Neglect): 0-->No abnormality    Total (NIH Stroke Scale): 3    Please refer to full rapid documentation on summary page under Index / Code Timeline

## 2023-06-01 NOTE — PROGRESS NOTES
Nutrition Services    Patient Name:  Vernon Solorzano  YOB: 1950  MRN: [id number]  Admit Date:  5/23/2023    Assessment Date:  06/01/23    CLINICAL NUTRITION    Comments: Follow up TF/po diet:    Current TF regimen: TFs of Diabetisource running at goal rate of 80 mL/hr 12 hrs/day (7p-7a) with 150 ml/hr free water flushes per renal.  Tolerating TF's well but pt wanting ND tube out.      Noted Rapid called this am for worsening aphasia and stat CT ordered.    Pt tolerating HH/Soft to Chew (chopped meat) diet with HTL with only 25% po intake at meals. Spoke with pt's wife at beside who stated pt has not been getting foods he likes except for meatloaf and no one taking food orders for pt. Obtained meal orders for dinner tonight and breakfast tomorrow and will honor. Pt agreed to Magic cups (berry flavor) with meals, will order. Encouraged increased po intake at all meals in order to meet nutrition needs and d/c cortrak/TF's. Will leave current nocturnal TF's overnight and assess po intakes again tomorrow. RN stated SLP to re-evaluate swallow again for potential diet upgrade soon, will follow for results.     Recommend:  1. Continue current nocturnal TF's from (7p-7a) x 12 hrs/day of Diabetisource AC @ 80 ml/hr with free water flushes per Renal team, will change order.  2. Magic Cups BID (berry), will order.    RD to continue to monitor per protocol.     Encounter Information         Reason For Encounter Follow up for TF    Current Issues S/p MVR, TVr and SU closure 5/22.    Rapid called this am for worsening aphasia, stat CT ordered.  SLP to re-eval swallow soon for possible diet upgrade per RN.     Estimated Requirements         Calories 2360 kcals (20 kcal/kg)    Protein (gm) 118-141 grams protein (1.0 - 1.2 gm/kg)    Fluid (mL) 2360 mL (1 mL/kcal)     Current Nutrition Orders & Evaluation of Intake       Oral Nutrition     Current PO Diet Diet: Cardiac Diets, Diabetic Diets; Healthy Heart (2-3 Na+);  "Consistent Carbohydrate; Texture: Soft to Chew (NDD 3); Soft to Chew: Chopped Meat; Fluid Consistency: Honey Thick  NPO Diet NPO Type: Strict NPO   Supplement n/a   PO Evaluation     Trending % PO Intake 25% breakfast    Factors Affecting Intake  swallow impairment      Enteral Nutrition     Enteral Route ND    TF Delivery Method Cyclic x 12 hrs/day    Enteral Product Diabetisource AC    Modular None    Propofol Rate/Kcal     TF Current Rate (mL) 80 ml/hr    TF Goal Rate (mL) 80 ml/hr    Current Water Flush (mL) 150 mL q hour (per MD orders)    Current TF Provision  1152 kcal, 57 gm protein, 787 mL free water + 1800 mL water flushes         Calories 44 % needs met         Protein  43 % needs met         TF Fluid (mL) 2787         IV Fluids -    Avg Volume Delivered (mL) 354 ml    % Goal Volume 37%    TF Residual  n/a - tube is small bowel    TF Changes rate decreased, other:changed to nocturnal TF's    TF Tolerance tolerating     Anthropometrics          Height    Weight Height: 182.9 cm (72\")  Weight: 119 kg (261 lb 14.4 oz) (06/01/23 0558)    BMI kg/m2 Body mass index is 35.52 kg/m².  Obese, Class II (35 - 39.9)    Weight trend Stable     Labs        Pertinent Labs Reviewed, listed below     Results from last 7 days   Lab Units 06/01/23  0308 05/31/23  0448 05/30/23  0210 05/27/23  0604 05/26/23  0304   SODIUM mmol/L 134*  134* 141 146*   < > 147*   POTASSIUM mmol/L 4.4  4.4 4.2 4.4   < > 4.0   CHLORIDE mmol/L 99  99 105 110*   < > 110*   CO2 mmol/L 22.1  24.3 28.0 28.6   < > 23.9   BUN mg/dL 29*  28* 31* 31*   < > 26*   CREATININE mg/dL 0.97  0.96 1.02 1.08   < > 1.37*   CALCIUM mg/dL 8.6  8.6 8.2* 8.7   < > 8.9   BILIRUBIN mg/dL 0.4  --   --   --  0.4   ALK PHOS U/L 65  --   --   --  45   ALT (SGPT) U/L 14  --   --   --  23   AST (SGOT) U/L 23  --   --   --  80*   GLUCOSE mg/dL 199*  200* 219* 322*   < > 143*    < > = values in this interval not displayed.     Results from last 7 days   Lab Units " 23  0308 23  0344 23  0604 23  0304   MAGNESIUM mg/dL  --   --  2.6* 2.7*   HEMOGLOBIN g/dL 10.5*   < > 11.4* 10.2*   HEMATOCRIT % 31.8*   < > 34.1* 31.0*   WBC 10*3/mm3 13.16*   < > 16.03* 18.39*   ALBUMIN g/dL 3.2*  --   --  4.1    < > = values in this interval not displayed.     Results from last 7 days   Lab Units 23  0308 23  0448 23  0837 23  0344 23  0604   PLATELETS 10*3/mm3 193 211 213 171 146     COVID19   Date Value Ref Range Status   2023 Not Detected Not Detected - Ref. Range Final     Lab Results   Component Value Date    HGBA1C 9.20 (H) 2023          Medications            Scheduled Medications aspirin, 81 mg, Oral, Daily  atorvastatin, 40 mg, Oral, Nightly  calcium 500 mg vitamin D 5 mcg (200 UT), 1 tablet, Oral, Daily  empagliflozin, 25 mg, Oral, Daily  furosemide, 40 mg, Intravenous, Q8H  guaifenesin, 400 mg, Oral, Q8H  insulin glargine, 25 Units, Subcutaneous, Daily  insulin lispro, 3-14 Units, Subcutaneous, Q6H  ipratropium-albuterol, 3 mL, Nebulization, 4x Daily - RT  mupirocin, , Each Nare, BID  pantoprazole, 40 mg, Intravenous, QAM AC  potassium chloride, 20 mEq, Nasogastric, BID With Meals  predniSONE, 5 mg, Oral, Daily With Breakfast  [START ON 6/3/2023] predniSONE, 5 mg, Oral, Every Other Day  senna-docusate sodium, 2 tablet, Oral, Nightly        Infusions heparin, 8.4 Units/kg/hr        PRN Medications •  acetaminophen **OR** acetaminophen **OR** acetaminophen  •  bisacodyl  •  bisacodyl  •  dextrose  •  dextrose  •  glucagon (human recombinant)  •  hydrALAZINE  •  ipratropium-albuterol  •  magnesium hydroxide  •  [] Morphine **AND** naloxone  •  nitroglycerin  •  ondansetron  •  polyethylene glycol  •  Potassium Replacement - Follow Nurse / BPA Driven Protocol  •  traMADol     Physical Findings          Physical Appearance aphasic, disoriented, obese, on oxygen therapy   Oral/Mouth Cavity tooth or teeth missing    Edema  generalized, lower extremity , upper extremity, 1+ (trace), 2+ (mild)   Gastrointestinal hypoactive bowel sounds, last bowel movement:5/31   Skin  surgical incision sternal inc   Tubes/Drains Cortrak, ND tube, bridle in place   NFPE Not indicated at this time     NUTRITION INTERVENTION / PLAN OF CARE  Intervention Goal         Intervention Goal(s) Maintain nutrition status, Nutrition support treatment, Improved nutrition related labs, Reduce/improve symptoms, Disease management/therapy, Maintain TF/PN and Appropriate weight loss     Nutrition Intervention         RD Action Follow Tx Progress and Care plan reviewed     Prescription         Diet Prescription CC/HH/soft to chew (chopped meat) diet with HTL (textures per SLP recommendations)    Supplement Prescription Magic Cups TID (berry)   EN/PN Prescription Continue nocturnal TF's (7p-7a) of Diabetisource AC @ 80 ml/hr   New Prescription Ordered? Yes   --  Monitor/Evaluation        Monitor Per protocol, I&O, Pertinent labs, EN delivery/tolerance, POC/GOC, Swallow function   Discharge Needs Pending clinical course   Education Will instruct as appropriate       Electronically signed by:  Payton Santiago RD  06/01/23 14:39 EDT

## 2023-06-01 NOTE — PROGRESS NOTES
[facility] Acute Inpt Rehab    Noted rapid called this morning for worsening aphasia.  Stat CT ordered.  Waiting for results of CT scan.  Accepted to acute rehab pending medical stability.  Will continue to monitor.    Thank you,  Joan Phipps RN  Rehab Admission Nurse

## 2023-06-01 NOTE — PROGRESS NOTES
[facility] Progress Note      Patient Name: Vernon Solorzano  : 1950  MRN: [id number]  Primary Care Physician:  Liza Oconnell III, RACHAEL  Date of admission: 2023    Subjective     Interval History:   Follow up CKD III. Perseverating. Up in chair. Not oriented to date or situation.  Cortrak feeds overnight. On d5W ( I dc this am).  Able to tell me , one previous partner.  RN reports assist of 2 to get to chair.      Review of Systems:   As noted above    Objective     Vitals:   Temp:  [97.8 °F (36.6 °C)-98.7 °F (37.1 °C)] 98.5 °F (36.9 °C)  Heart Rate:  [75-78] 76  Resp:  [14-20] 20  BP: (114-133)/(56-77) 121/77  Flow (L/min):  [1-2] 1    Intake/Output Summary (Last 24 hours) at 2023 0743  Last data filed at 2023 0558  Gross per 24 hour   Intake 2544.83 ml   Output 2250 ml   Net 294.83 ml       Physical Exam:    General Appearance: alert, sitting in chair.  Perseverating. Expressive aphasia.   Skin: warm and dry  HEENT: oral mucosa normal, nonicteric sclera  Neck: supple, no JVD  Lungs: Coarse upper airway rhonchi. Nasal 02  Heart: RRR, normal S1 and S2  Abdomen: soft, nontender, + bs, distended. Body wall edema.   : external catheter .  Extremities 2+ lower ext edema . R>L.   Neuro: word salad, perseverating. Not oriented to time or situation.  Using right hand and arm.   Scheduled Meds:     aspirin, 81 mg, Oral, Daily  atorvastatin, 40 mg, Oral, Nightly  calcium 500 mg vitamin D 5 mcg (200 UT), 1 tablet, Oral, Daily  empagliflozin, 25 mg, Oral, Daily  enoxaparin, 40 mg, Subcutaneous, Daily  furosemide, 40 mg, Per G Tube, Daily  guaifenesin, 400 mg, Oral, Q8H  insulin glargine, 25 Units, Subcutaneous, Daily  insulin lispro, 3-14 Units, Subcutaneous, Q6H  ipratropium-albuterol, 3 mL, Nebulization, 4x Daily - RT  mupirocin, , Each Nare, BID  pantoprazole, 40 mg, Intravenous, QAM AC  potassium chloride, 20 mEq, Nasogastric, BID With  Meals  predniSONE, 5 mg, Oral, Daily With Breakfast  [START ON 6/3/2023] predniSONE, 5 mg, Oral, Every Other Day  senna-docusate sodium, 2 tablet, Oral, Nightly      IV Meds:        Results Reviewed:   I have personally reviewed the results from the time of this admission to 6/1/2023 07:43 EDT     Results from last 7 days   Lab Units 06/01/23  0308 05/31/23  0448 05/30/23  0210 05/27/23  0604 05/26/23  0304   SODIUM mmol/L 134*  134* 141 146*   < > 147*   POTASSIUM mmol/L 4.4  4.4 4.2 4.4   < > 4.0   CHLORIDE mmol/L 99  99 105 110*   < > 110*   CO2 mmol/L 22.1  24.3 28.0 28.6   < > 23.9   BUN mg/dL 29*  28* 31* 31*   < > 26*   CREATININE mg/dL 0.97  0.96 1.02 1.08   < > 1.37*   CALCIUM mg/dL 8.6  8.6 8.2* 8.7   < > 8.9   BILIRUBIN mg/dL 0.4  --   --   --  0.4   ALK PHOS U/L 65  --   --   --  45   ALT (SGPT) U/L 14  --   --   --  23   AST (SGOT) U/L 23  --   --   --  80*   GLUCOSE mg/dL 199*  200* 219* 322*   < > 143*    < > = values in this interval not displayed.       Estimated Creatinine Clearance: 89.9 mL/min (by C-G formula based on SCr of 0.96 mg/dL).    Results from last 7 days   Lab Units 05/27/23  0604 05/26/23  0304 05/25/23  1350   MAGNESIUM mg/dL 2.6* 2.7* 2.9*             Results from last 7 days   Lab Units 06/01/23  0308 05/31/23 0448 05/30/23  0837 05/28/23  0344 05/27/23  0604   WBC 10*3/mm3 13.16* 12.77* 10.08 13.01* 16.03*   HEMOGLOBIN g/dL 10.5* 11.0* 11.2* 11.7* 11.4*   PLATELETS 10*3/mm3 193 211 213 171 146             Assessment / Plan     ASSESSMENT:  1. CKD III, baseline creatinine 1.2.  Peak 1.68.  Volume excess by exam.  IV diuretic today.  2. MR now sp MV replacement, tissue and TV repair, SU closure 5/24/23.  3. Chronic steroid use after COVID 19 PNA.  4. Anemia post op. Hg stable .  5. Delirium, expressive aphasia. Ct without CVA, may need MRI.   Neuro thought small ischemic periprocedural event. On ASA and statin.  Perseverating.  Not oriented to situation.   6. DM2  7.  Leukocytosis.  RLE a little bigger than LLE.  Check doppler.  PLAN:  1. Lasix IV today every 8 hours.  2. Neuro to re-evaluate.  3. Doppler lower ext .  4. AMY Segovia .  Joan Batista MD  06/01/23  07:43 EDT    [facility]  [phone]

## 2023-06-01 NOTE — PROGRESS NOTES
"DOS: 2023  NAME: Vernon Solorzano   : 1950  PCP: Liza Oconnell III, NPSolangeC  Patient seen in follow-up today; new to me        Stroke    Subjective: Pt. With worsening aphasia this am- stat CT ordered.  Nursing reports that typically alert to person and place although today was not oriented to place-asking for otoscope.  She describes speech as \"word salad\".  Nursing reports blood sugars are much better controlled today was the first day with blood sugar reading less than 100 and blood pressure has been within range.  Wife feels as if patient has improved since this AM.      Wife at bedside    Objective:  Vital signs: /76   Pulse 77   Temp 97.9 °F (36.6 °C) (Oral)   Resp 20   Ht 182.9 cm (72\")   Wt 119 kg (261 lb 14.4 oz)   SpO2 94%   BMI 35.52 kg/m²    General: Well-kempt.  Obese BMI 35.5 to  HEENT: Normocephalic, atraumatic   COR: RRR  Resp: Even and unlabored  Extremities: Equal pulses, nondistal embolization    Neurological:   MS: Alert although somewhat distant.  Oriented to self/location/wife at bedside.  Language normal. No obvious neglect neglect.   CN: II-XII normal  Motor: 5/5, normal tone except mild right upper extremity weakness 4+/5  Reflexes: Toes downgoing  Sensory: Intact-to light touch    Laboratory results:  Lab Results   Component Value Date    GLUCOSE 200 (H) 2023    GLUCOSE 199 (H) 2023    CALCIUM 8.6 2023    CALCIUM 8.6 2023     (L) 2023     (L) 2023    K 4.4 2023    K 4.4 2023    CO2 24.3 2023    CO2 22.1 2023    CL 99 2023    CL 99 2023    BUN 28 (H) 2023    BUN 29 (H) 2023    CREATININE 0.96 2023    CREATININE 0.97 2023    EGFRIFAFRI 76 12/15/2021    EGFRIFNONA 66 12/15/2021    BCR 29.2 (H) 2023    BCR 29.9 (H) 2023    ANIONGAP 10.7 2023    ANIONGAP 12.9 2023     Lab Results   Component Value Date    WBC 13.16 (H) 2023 "    HGB 10.5 (L) 06/01/2023    HCT 31.8 (L) 06/01/2023    MCV 89.3 06/01/2023     06/01/2023     Lab Results   Component Value Date    CHOL 155 05/23/2023     Lab Results   Component Value Date    HDL 41 05/23/2023    HDL 39 (L) 03/29/2023    HDL 30 (L) 06/07/2021     Lab Results   Component Value Date    LDL 81 05/23/2023    LDL 59 03/29/2023    LDL 59 06/07/2021     Lab Results   Component Value Date    TRIG 197 (H) 05/23/2023    TRIG 107 03/29/2023    TRIG 273 (H) 06/07/2021          Review and interpretation of imaging:    Impression: 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  Our service was consulted on 5/25 after extubation when patient was found to be confused and potentially aphasic with right hand weakness.  Etiology of presentation initially felt to be due to presence of asterixis-concern for delirium.  Nephrology team has been following for acute kidney injury.  Initial CT of the head was negative for acute findings.  CTA of the head and neck showed no acute intracranial hemorrhage.  Evidence of moderate small vessel ischemic disease and chronic appearing left cerebellar lacunar infarct noted.  Atherosclerosis noted without evidence of high-grade stenosis/aneurysm and/or occlusion.  Repeat CT of the head today 6/1 stable with no acute intracranial abnormalities noted.      Neurologically, stable with continued.'s of confusion and difficulty following along in conversation/perseverating.  Wife reports some episodes of staring without evidence of witnessed seizure-like activity/urinary incontinence and/or over the past 1 year.  Will recommend obtaining EEG to further evaluate for seizure in setting of fluctuating aphasia. PT/OT/ST. CCP to assist with discharge  planning. Call RRT for any acute neurological changes and/or concerns. We will continue to follow and advise.     Diagnosis:   1. Unilateral weakness and aphasia postoperatively likely d/t small reuben-procedural ischemia event- awaiting MRI Brain. EEG ordered d/t reported blank starring over the past 1 year and recurrent intermittent aphasia   2. VHD s/p MVR/TVr   3. CUONG   4. DM  5. Obese   6. PING        Plan:  MRI Brain once pacer wires have been removed   EEG - 61 min study   Aspirin 81 mg daily- on PTA   Lipitor 40 mg daily- on PTA - LDL 81   Neurochecks  BP control  PT/OT/ST    Case reviewed with attending neurologist Dr. Ybarra she agrees with treatment plan above.    Call placed to attending Dr. Frey- awaiting call back to update regarding plan.     DESTIN Ro

## 2023-06-01 NOTE — PROGRESS NOTES
LOS: 9 days   Patient Care Team:  Liza Oconnell III, NP-C as PCP - General (Family Medicine)  Corey Lao Jr., MD (Inactive) as Consulting Physician (Urology)    Chief Complaint: Follow-up severe mitral regurgitation status post tissue mitral valve replacement.    Interval History: Still with speech difficulties intermittently and confusion intermittently.  Neurology is back on board.  Remains in rate controlled atrial flutter.    Vital Signs:  Temp:  [97.8 °F (36.6 °C)-98.7 °F (37.1 °C)] 98.3 °F (36.8 °C)  Heart Rate:  [72-80] 78  Resp:  [14-20] 20  BP: (113-133)/(63-77) 113/63    Intake/Output Summary (Last 24 hours) at 6/1/2023 1534  Last data filed at 6/1/2023 0805  Gross per 24 hour   Intake 50 ml   Output 1700 ml   Net -1650 ml       Physical Exam:   General Appearance:    No acute distress, alert with some mild confusion.   Lungs:     Clear to auscultation bilaterally     Heart:    Regular rhythm and normal rate.  No murmurs, gallops, or       rubs.   Abdomen:     Soft, nontender, nondistended.    Extremities:   2+ edema lower extremities (R>L)     Results Review:    Results from last 7 days   Lab Units 06/01/23  0308   SODIUM mmol/L 134*  134*   POTASSIUM mmol/L 4.4  4.4   CHLORIDE mmol/L 99  99   CO2 mmol/L 22.1  24.3   BUN mg/dL 29*  28*   CREATININE mg/dL 0.97  0.96   GLUCOSE mg/dL 199*  200*   CALCIUM mg/dL 8.6  8.6         Results from last 7 days   Lab Units 06/01/23  0308   WBC 10*3/mm3 13.16*   HEMOGLOBIN g/dL 10.5*   HEMATOCRIT % 31.8*   PLATELETS 10*3/mm3 193             Results from last 7 days   Lab Units 05/27/23  0604   MAGNESIUM mg/dL 2.6*           I reviewed the patient's new clinical results.        Assessment:  1.  Severe symptomatic mitral regurgitation and tricuspid regurgitation  2.  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 Frey  3.  Chronic diastolic CHF  4.   History of COVID-19 pneumonia with residual dyspnea and possible interstitial lung disease  5.  Stage IIIa chronic kidney disease  6.  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  Expected postoperative anemia  8.  Postoperative thrombocytopenia  9.  Postoperative frequent PVCs and NSVT  10.  Postoperative aphasia, confusion, and right upper extremity weakness  11.  Postoperative junctional bradycardia, complete heart block, and typical atrial flutter  12.  Diabetes    Plan:  -Agree Dr. Batista - IV Lasix q8h ordered.  Limited echo ordered today.    -Venous Doppler ultrasound showed superficial venous thrombosis, but no DVT.    -Repeat CT scan without new findings.  Still has pacing wires, so cannot get an MRI of the brain for now.  Neurology is back on board.    -Dr. Frey is concerned about the atrial flutter.  Holding beta-blockers because of previous AV block.  Starting a low-dose heparin drip today.  We will discuss potential cardioversion further with Dr. Frey.    -Continue Jardiance 25 mg/day.    Corwin Hobson MD  06/01/23  15:34 EDT

## 2023-06-01 NOTE — PLAN OF CARE
Goal Outcome Evaluation:   PT alert, Ox2, VSS, XPM maintained 0% paced, NIH remained 3, pt unable to communicate needs well during the shift and appeared to not want to respond. Pt seemed to have rested well during the shift.

## 2023-06-01 NOTE — PLAN OF CARE
Goal Outcome Evaluation:  Plan of Care Reviewed With: patient        Progress: declining  Outcome Evaluation: VSS. Oriented to person only this am. Word salad, and aphasia increased, Neurology, CT surgery notified. Rapid called. CT, Echo, EEG and dopplers today. Doppler results Called to cardiology and CT surgery. Continues A-Flutter. Heparin gtt started. Epicardial wires connected to pacer box but pacer off. MRI to be completed when epicardial wire are removed. MRI sheet in chart. NPO after MN for Cardioversion. Dr. Hobson to see Maricel Solorzano in am for update.

## 2023-06-02 ENCOUNTER — APPOINTMENT (OUTPATIENT)
Dept: POSTOP/PACU | Facility: HOSPITAL | Age: 73
End: 2023-06-02
Payer: MEDICARE

## 2023-06-02 ENCOUNTER — ANESTHESIA EVENT (OUTPATIENT)
Dept: POSTOP/PACU | Facility: HOSPITAL | Age: 73
End: 2023-06-02
Payer: MEDICARE

## 2023-06-02 ENCOUNTER — ANESTHESIA (OUTPATIENT)
Dept: POSTOP/PACU | Facility: HOSPITAL | Age: 73
End: 2023-06-02
Payer: MEDICARE

## 2023-06-02 LAB
ALBUMIN SERPL-MCNC: 3.2 G/DL (ref 3.5–5.2)
ANION GAP SERPL CALCULATED.3IONS-SCNC: 12 MMOL/L (ref 5–15)
APTT PPP: 32.9 SECONDS (ref 22.7–35.4)
APTT PPP: 37.4 SECONDS (ref 22.7–35.4)
APTT PPP: 39.1 SECONDS (ref 22.7–35.4)
APTT PPP: 46.4 SECONDS (ref 22.7–35.4)
BASOPHILS # BLD AUTO: 0.07 10*3/MM3 (ref 0–0.2)
BASOPHILS NFR BLD AUTO: 0.6 % (ref 0–1.5)
BUN SERPL-MCNC: 27 MG/DL (ref 8–23)
BUN/CREAT SERPL: 24.1 (ref 7–25)
CALCIUM SPEC-SCNC: 8.5 MG/DL (ref 8.6–10.5)
CHLORIDE SERPL-SCNC: 99 MMOL/L (ref 98–107)
CO2 SERPL-SCNC: 26 MMOL/L (ref 22–29)
CREAT SERPL-MCNC: 1.12 MG/DL (ref 0.76–1.27)
DEPRECATED RDW RBC AUTO: 44 FL (ref 37–54)
EGFRCR SERPLBLD CKD-EPI 2021: 68.9 ML/MIN/1.73
EOSINOPHIL # BLD AUTO: 0.42 10*3/MM3 (ref 0–0.4)
EOSINOPHIL NFR BLD AUTO: 3.6 % (ref 0.3–6.2)
ERYTHROCYTE [DISTWIDTH] IN BLOOD BY AUTOMATED COUNT: 14 % (ref 12.3–15.4)
GLUCOSE BLDC GLUCOMTR-MCNC: 156 MG/DL (ref 70–130)
GLUCOSE BLDC GLUCOMTR-MCNC: 165 MG/DL (ref 70–130)
GLUCOSE BLDC GLUCOMTR-MCNC: 192 MG/DL (ref 70–130)
GLUCOSE BLDC GLUCOMTR-MCNC: 317 MG/DL (ref 70–130)
GLUCOSE SERPL-MCNC: 151 MG/DL (ref 65–99)
HCT VFR BLD AUTO: 33.1 % (ref 37.5–51)
HGB BLD-MCNC: 10.8 G/DL (ref 13–17.7)
IMM GRANULOCYTES # BLD AUTO: 0.43 10*3/MM3 (ref 0–0.05)
IMM GRANULOCYTES NFR BLD AUTO: 3.6 % (ref 0–0.5)
LYMPHOCYTES # BLD AUTO: 1.38 10*3/MM3 (ref 0.7–3.1)
LYMPHOCYTES NFR BLD AUTO: 11.7 % (ref 19.6–45.3)
MCH RBC QN AUTO: 28.3 PG (ref 26.6–33)
MCHC RBC AUTO-ENTMCNC: 32.6 G/DL (ref 31.5–35.7)
MCV RBC AUTO: 86.9 FL (ref 79–97)
MONOCYTES # BLD AUTO: 0.85 10*3/MM3 (ref 0.1–0.9)
MONOCYTES NFR BLD AUTO: 7.2 % (ref 5–12)
NEUTROPHILS NFR BLD AUTO: 73.3 % (ref 42.7–76)
NEUTROPHILS NFR BLD AUTO: 8.66 10*3/MM3 (ref 1.7–7)
NRBC BLD AUTO-RTO: 0 /100 WBC (ref 0–0.2)
PHOSPHATE SERPL-MCNC: 4.7 MG/DL (ref 2.5–4.5)
PLATELET # BLD AUTO: 257 10*3/MM3 (ref 140–450)
PMV BLD AUTO: 10.1 FL (ref 6–12)
POTASSIUM SERPL-SCNC: 4.1 MMOL/L (ref 3.5–5.2)
QT INTERVAL: 419 MS
QT INTERVAL: 444 MS
QT INTERVAL: 446 MS
RBC # BLD AUTO: 3.81 10*6/MM3 (ref 4.14–5.8)
SODIUM SERPL-SCNC: 137 MMOL/L (ref 136–145)
WBC NRBC COR # BLD: 11.81 10*3/MM3 (ref 3.4–10.8)

## 2023-06-02 PROCEDURE — 25010000002 HEPARIN (PORCINE) 25000-0.45 UT/250ML-% SOLUTION: Performed by: NURSE PRACTITIONER

## 2023-06-02 PROCEDURE — 97530 THERAPEUTIC ACTIVITIES: CPT

## 2023-06-02 PROCEDURE — 93005 ELECTROCARDIOGRAM TRACING: CPT | Performed by: INTERNAL MEDICINE

## 2023-06-02 PROCEDURE — 92526 ORAL FUNCTION THERAPY: CPT | Performed by: SPEECH-LANGUAGE PATHOLOGIST

## 2023-06-02 PROCEDURE — 85730 THROMBOPLASTIN TIME PARTIAL: CPT | Performed by: THORACIC SURGERY (CARDIOTHORACIC VASCULAR SURGERY)

## 2023-06-02 PROCEDURE — 92960 CARDIOVERSION ELECTRIC EXT: CPT

## 2023-06-02 PROCEDURE — 94799 UNLISTED PULMONARY SVC/PX: CPT

## 2023-06-02 PROCEDURE — 82948 REAGENT STRIP/BLOOD GLUCOSE: CPT

## 2023-06-02 PROCEDURE — 99024 POSTOP FOLLOW-UP VISIT: CPT | Performed by: THORACIC SURGERY (CARDIOTHORACIC VASCULAR SURGERY)

## 2023-06-02 PROCEDURE — 99231 SBSQ HOSP IP/OBS SF/LOW 25: CPT | Performed by: PSYCHIATRY & NEUROLOGY

## 2023-06-02 PROCEDURE — 80069 RENAL FUNCTION PANEL: CPT | Performed by: INTERNAL MEDICINE

## 2023-06-02 PROCEDURE — 94664 DEMO&/EVAL PT USE INHALER: CPT

## 2023-06-02 PROCEDURE — 94761 N-INVAS EAR/PLS OXIMETRY MLT: CPT

## 2023-06-02 PROCEDURE — 93005 ELECTROCARDIOGRAM TRACING: CPT | Performed by: THORACIC SURGERY (CARDIOTHORACIC VASCULAR SURGERY)

## 2023-06-02 PROCEDURE — 63710000001 INSULIN LISPRO (HUMAN) PER 5 UNITS: Performed by: NURSE PRACTITIONER

## 2023-06-02 PROCEDURE — 92960 CARDIOVERSION ELECTRIC EXT: CPT | Performed by: INTERNAL MEDICINE

## 2023-06-02 PROCEDURE — 25010000002 PROPOFOL 10 MG/ML EMULSION: Performed by: ANESTHESIOLOGY

## 2023-06-02 PROCEDURE — 85025 COMPLETE CBC W/AUTO DIFF WBC: CPT | Performed by: NURSE PRACTITIONER

## 2023-06-02 PROCEDURE — 93010 ELECTROCARDIOGRAM REPORT: CPT | Performed by: INTERNAL MEDICINE

## 2023-06-02 PROCEDURE — 94760 N-INVAS EAR/PLS OXIMETRY 1: CPT

## 2023-06-02 PROCEDURE — 5A2204Z RESTORATION OF CARDIAC RHYTHM, SINGLE: ICD-10-PCS | Performed by: INTERNAL MEDICINE

## 2023-06-02 PROCEDURE — 25010000002 FUROSEMIDE PER 20 MG: Performed by: INTERNAL MEDICINE

## 2023-06-02 PROCEDURE — 99232 SBSQ HOSP IP/OBS MODERATE 35: CPT | Performed by: INTERNAL MEDICINE

## 2023-06-02 PROCEDURE — 63710000001 PREDNISONE PER 5 MG: Performed by: INTERNAL MEDICINE

## 2023-06-02 PROCEDURE — 85730 THROMBOPLASTIN TIME PARTIAL: CPT | Performed by: NURSE PRACTITIONER

## 2023-06-02 RX ORDER — PROMETHAZINE HYDROCHLORIDE 25 MG/1
25 TABLET ORAL ONCE AS NEEDED
Status: DISCONTINUED | OUTPATIENT
Start: 2023-06-02 | End: 2023-06-05

## 2023-06-02 RX ORDER — HYDROMORPHONE HYDROCHLORIDE 1 MG/ML
0.25 INJECTION, SOLUTION INTRAMUSCULAR; INTRAVENOUS; SUBCUTANEOUS
Status: DISCONTINUED | OUTPATIENT
Start: 2023-06-02 | End: 2023-06-05

## 2023-06-02 RX ORDER — FLUMAZENIL 0.1 MG/ML
0.2 INJECTION INTRAVENOUS AS NEEDED
Status: DISCONTINUED | OUTPATIENT
Start: 2023-06-02 | End: 2023-06-05

## 2023-06-02 RX ORDER — HYDROCODONE BITARTRATE AND ACETAMINOPHEN 7.5; 325 MG/1; MG/1
1 TABLET ORAL EVERY 4 HOURS PRN
Status: DISCONTINUED | OUTPATIENT
Start: 2023-06-02 | End: 2023-06-06 | Stop reason: HOSPADM

## 2023-06-02 RX ORDER — IPRATROPIUM BROMIDE AND ALBUTEROL SULFATE 2.5; .5 MG/3ML; MG/3ML
3 SOLUTION RESPIRATORY (INHALATION) ONCE AS NEEDED
Status: DISCONTINUED | OUTPATIENT
Start: 2023-06-02 | End: 2023-06-06 | Stop reason: HOSPADM

## 2023-06-02 RX ORDER — SODIUM CHLORIDE, SODIUM LACTATE, POTASSIUM CHLORIDE, CALCIUM CHLORIDE 600; 310; 30; 20 MG/100ML; MG/100ML; MG/100ML; MG/100ML
INJECTION, SOLUTION INTRAVENOUS CONTINUOUS PRN
Status: DISCONTINUED | OUTPATIENT
Start: 2023-06-02 | End: 2023-06-02 | Stop reason: SURG

## 2023-06-02 RX ORDER — FUROSEMIDE 10 MG/ML
40 INJECTION INTRAMUSCULAR; INTRAVENOUS EVERY 12 HOURS
Status: DISCONTINUED | OUTPATIENT
Start: 2023-06-02 | End: 2023-06-03

## 2023-06-02 RX ORDER — DROPERIDOL 2.5 MG/ML
0.62 INJECTION, SOLUTION INTRAMUSCULAR; INTRAVENOUS
Status: DISCONTINUED | OUTPATIENT
Start: 2023-06-02 | End: 2023-06-05

## 2023-06-02 RX ORDER — HYDRALAZINE HYDROCHLORIDE 20 MG/ML
5 INJECTION INTRAMUSCULAR; INTRAVENOUS
Status: DISCONTINUED | OUTPATIENT
Start: 2023-06-02 | End: 2023-06-05

## 2023-06-02 RX ORDER — METOPROLOL SUCCINATE 25 MG/1
12.5 TABLET, EXTENDED RELEASE ORAL
Status: DISCONTINUED | OUTPATIENT
Start: 2023-06-02 | End: 2023-06-04

## 2023-06-02 RX ORDER — PROMETHAZINE HYDROCHLORIDE 25 MG/1
25 SUPPOSITORY RECTAL ONCE AS NEEDED
Status: DISCONTINUED | OUTPATIENT
Start: 2023-06-02 | End: 2023-06-05

## 2023-06-02 RX ORDER — NALOXONE HCL 0.4 MG/ML
0.2 VIAL (ML) INJECTION AS NEEDED
Status: DISCONTINUED | OUTPATIENT
Start: 2023-06-02 | End: 2023-06-06 | Stop reason: HOSPADM

## 2023-06-02 RX ORDER — FENTANYL CITRATE 50 UG/ML
25 INJECTION, SOLUTION INTRAMUSCULAR; INTRAVENOUS
Status: DISCONTINUED | OUTPATIENT
Start: 2023-06-02 | End: 2023-06-05

## 2023-06-02 RX ORDER — ONDANSETRON 2 MG/ML
4 INJECTION INTRAMUSCULAR; INTRAVENOUS ONCE AS NEEDED
Status: DISCONTINUED | OUTPATIENT
Start: 2023-06-02 | End: 2023-06-06 | Stop reason: HOSPADM

## 2023-06-02 RX ORDER — HYDROCODONE BITARTRATE AND ACETAMINOPHEN 5; 325 MG/1; MG/1
1 TABLET ORAL ONCE AS NEEDED
Status: DISCONTINUED | OUTPATIENT
Start: 2023-06-02 | End: 2023-06-06 | Stop reason: HOSPADM

## 2023-06-02 RX ORDER — PROPOFOL 10 MG/ML
VIAL (ML) INTRAVENOUS AS NEEDED
Status: DISCONTINUED | OUTPATIENT
Start: 2023-06-02 | End: 2023-06-02 | Stop reason: SURG

## 2023-06-02 RX ORDER — LABETALOL HYDROCHLORIDE 5 MG/ML
5 INJECTION, SOLUTION INTRAVENOUS
Status: DISCONTINUED | OUTPATIENT
Start: 2023-06-02 | End: 2023-06-05

## 2023-06-02 RX ORDER — EPHEDRINE SULFATE 50 MG/ML
5 INJECTION, SOLUTION INTRAVENOUS ONCE AS NEEDED
Status: DISCONTINUED | OUTPATIENT
Start: 2023-06-02 | End: 2023-06-05

## 2023-06-02 RX ORDER — DIPHENHYDRAMINE HYDROCHLORIDE 50 MG/ML
12.5 INJECTION INTRAMUSCULAR; INTRAVENOUS
Status: DISCONTINUED | OUTPATIENT
Start: 2023-06-02 | End: 2023-06-05

## 2023-06-02 RX ADMIN — MUPIROCIN 1 APPLICATION: 20 OINTMENT TOPICAL at 20:05

## 2023-06-02 RX ADMIN — POTASSIUM CHLORIDE 20 MEQ: 1.5 POWDER, FOR SOLUTION ORAL at 18:58

## 2023-06-02 RX ADMIN — IPRATROPIUM BROMIDE AND ALBUTEROL SULFATE 3 ML: 2.5; .5 SOLUTION RESPIRATORY (INHALATION) at 07:16

## 2023-06-02 RX ADMIN — FUROSEMIDE 40 MG: 40 INJECTION, SOLUTION INTRAMUSCULAR; INTRAVENOUS at 09:19

## 2023-06-02 RX ADMIN — ASPIRIN 81 MG: 81 TABLET, CHEWABLE ORAL at 09:20

## 2023-06-02 RX ADMIN — INSULIN LISPRO 3 UNITS: 100 INJECTION, SOLUTION INTRAVENOUS; SUBCUTANEOUS at 12:00

## 2023-06-02 RX ADMIN — SODIUM CHLORIDE, POTASSIUM CHLORIDE, SODIUM LACTATE AND CALCIUM CHLORIDE: 600; 310; 30; 20 INJECTION, SOLUTION INTRAVENOUS at 10:08

## 2023-06-02 RX ADMIN — METOPROLOL SUCCINATE 12.5 MG: 25 TABLET, EXTENDED RELEASE ORAL at 21:12

## 2023-06-02 RX ADMIN — PANTOPRAZOLE SODIUM 40 MG: 40 INJECTION, POWDER, FOR SOLUTION INTRAVENOUS at 09:20

## 2023-06-02 RX ADMIN — IPRATROPIUM BROMIDE AND ALBUTEROL SULFATE 3 ML: 2.5; .5 SOLUTION RESPIRATORY (INHALATION) at 20:08

## 2023-06-02 RX ADMIN — INSULIN GLARGINE-YFGN 25 UNITS: 100 INJECTION, SOLUTION SUBCUTANEOUS at 09:21

## 2023-06-02 RX ADMIN — INSULIN LISPRO 3 UNITS: 100 INJECTION, SOLUTION INTRAVENOUS; SUBCUTANEOUS at 16:27

## 2023-06-02 RX ADMIN — FUROSEMIDE 40 MG: 40 INJECTION, SOLUTION INTRAMUSCULAR; INTRAVENOUS at 21:14

## 2023-06-02 RX ADMIN — HEPARIN SODIUM 11.4 UNITS/KG/HR: 10000 INJECTION, SOLUTION INTRAVENOUS at 12:00

## 2023-06-02 RX ADMIN — MUPIROCIN 1 APPLICATION: 20 OINTMENT TOPICAL at 09:21

## 2023-06-02 RX ADMIN — DOCUSATE SODIUM 50MG AND SENNOSIDES 8.6MG 2 TABLET: 8.6; 5 TABLET, FILM COATED ORAL at 20:04

## 2023-06-02 RX ADMIN — POTASSIUM CHLORIDE 20 MEQ: 1.5 POWDER, FOR SOLUTION ORAL at 09:20

## 2023-06-02 RX ADMIN — GUAIFENESIN 400 MG: 200 SOLUTION ORAL at 09:19

## 2023-06-02 RX ADMIN — ATORVASTATIN CALCIUM 40 MG: 20 TABLET, FILM COATED ORAL at 20:04

## 2023-06-02 RX ADMIN — PROPOFOL 50 MG: 10 INJECTION, EMULSION INTRAVENOUS at 10:10

## 2023-06-02 RX ADMIN — EMPAGLIFLOZIN 25 MG: 25 TABLET, FILM COATED ORAL at 09:20

## 2023-06-02 RX ADMIN — CALCIUM CARBONATE-VITAMIN D TAB 500 MG-200 UNIT 1 TABLET: 500-200 TAB at 09:20

## 2023-06-02 RX ADMIN — GUAIFENESIN 400 MG: 200 SOLUTION ORAL at 16:28

## 2023-06-02 RX ADMIN — PREDNISONE 5 MG: 10 TABLET ORAL at 09:20

## 2023-06-02 RX ADMIN — GUAIFENESIN 400 MG: 200 SOLUTION ORAL at 23:36

## 2023-06-02 NOTE — PLAN OF CARE
Goal Outcome Evaluation:  Plan of Care Reviewed With: patient           Outcome Evaluation: Swallow re-evaluation completed recommend upgrade to NTL and continue mechanical soft chopped. Small bites and sips and meds whole or crushed with puree.

## 2023-06-02 NOTE — PLAN OF CARE
Goal Outcome Evaluation:  Plan of Care Reviewed With: patient        Progress: improving         Pt POD 9 for MV replace and TV repair. Pt was AAOx 3 this am. Remains in SR after cardioversion. All other vss. Pacer wires remain. Ambulated from bed to chair with staff and PT. Hep gtt still continuous. No c/o pain. Plan to d/c to rehab in 4 days.

## 2023-06-02 NOTE — PROGRESS NOTES
[facility]    Accepted to rehab once medically stable.  Noted plans for cardioversion today.  Will continue to monitor.    Thank you,  Joan Phipps, RN  Rehab Admission Nurse

## 2023-06-02 NOTE — THERAPY RE-EVALUATION
Acute Care - Speech Language Pathology   Swallow Re-Evaluation [facility]     Patient Name: Vernon Solorzano  : 1950  MRN: [id number]  Today's Date: 2023               Admit Date: 2023    Visit Dx:     ICD-10-CM ICD-9-CM   1. S/P MVR (mitral valve replacement)  Z95.2 V43.3   2. Nonrheumatic mitral valve regurgitation  I34.0 424.0     Patient Active Problem List   Diagnosis    DM (diabetes mellitus), type 2    Mixed hyperlipidemia    Mild intermittent asthma without complication    New onset of congestive heart failure    Nonrheumatic mitral valve regurgitation    Chest pain    Essential hypertension    Mitral valve disease     Past Medical History:   Diagnosis Date    Allergic rhinitis     Arthritis     BPH (benign prostatic hyperplasia)     Diabetes mellitus     TYPE 2    Foraminal stenosis of cervical region     C5-C6    GERD (gastroesophageal reflux disease)     Hemorrhoids     History of urinary retention     S/P TURP    Hyperlipidemia     Macular degeneration     PING (obstructive sleep apnea) 2016    MILD, SEES DR. AMARILIS MORGAN     Past Surgical History:   Procedure Laterality Date    CARDIAC CATHETERIZATION N/A 2023    Procedure: Right Heart Cath;  Surgeon: Corey Gaffney MD;  Location: [facility] CATH INVASIVE LOCATION;  Service: Cardiology;  Laterality: N/A;    CARDIAC CATHETERIZATION N/A 2023    Procedure: Left Heart Cath;  Surgeon: Corey Gaffney MD;  Location:  [facility] CATH INVASIVE LOCATION;  Service: Cardiology;  Laterality: N/A;    CARDIAC CATHETERIZATION N/A 2023    Procedure: Left ventriculography;  Surgeon: Corey Gaffney MD;  [facility];  Service: Cardiology;  Laterality: N/A;    CARDIAC CATHETERIZATION N/A 2023    Procedure: Coronary angiography;  Surgeon: Corey Gaffney MD;  Location:  [facility] CATH INVASIVE LOCATION;  Service: Cardiology;  Laterality: N/A;    COLONOSCOPY N/A     WNL PER PT, NO RECORDS,      COLONOSCOPY N/A 04/07/2009    DR. GORGE BENAVIDEZ AT [place]    MITRAL VALVE REPAIR/REPLACEMENT N/A 5/24/2023    Procedure: MITRAL VALVE REPAIR/REPLACEMENT TRICUSPID VALVE REPAIR/REPLACEMENT TRANSESOPHAGEAL ECHOCARDIOGRAM WITH ANESTHESIA;  Surgeon: George Frey MD;  Location: [facility];  Service: Cardiothoracic;  Laterality: N/A;    PROSTATE SURGERY N/A 11/12/2010    TURP, DR. BRIGHT COLLAZO AT [facility]    SKIN TAG REMOVAL N/A 12/20/2017    ANAL SKIN TAG X2, PERFORMED IN OFFICE, DR. LISA ORANTES    TURP / TRANSURETHRAL INCISION / DRAINAGE PROSTATE  2010    Dr. Goodrich       SLP Recommendation and Plan  SLP Swallowing Diagnosis: moderate, pharyngeal dysphagia, oral dysphagia (06/02/23 1400)  SLP Diet Recommendation: soft to chew textures, chopped, water between meals after oral care, with supervision, ice chips between meals after oral care, with supervision, nectar thick liquids (06/02/23 1400)  Recommended Precautions and Strategies: upright posture during/after eating, small bites of food and sips of liquid, multiple swallows per sip of liquid, multiple swallows per bite of food, general aspiration precautions (06/02/23 1400)  SLP Rec. for Method of Medication Administration: meds whole, meds crushed, with puree, as tolerated (06/02/23 1400)     Monitor for Signs of Aspiration: yes, notify SLP if any concerns (06/02/23 1400)  Recommended Diagnostics: reassess via clinical swallow evaluation, reassess via VFSS (MBS) (06/02/23 1400)  Swallow Criteria for Skilled Therapeutic Interventions Met: demonstrates skilled criteria (06/02/23 1400)  Anticipated Discharge Disposition (SLP): anticipate therapy at next level of care (06/02/23 1400)  Rehab Potential/Prognosis, Swallowing: good, to achieve stated therapy goals (06/02/23 1400)  Therapy Frequency (Swallow): PRN (06/02/23 1400)  Predicted Duration Therapy Intervention (Days): until discharge (06/02/23 1400)                                        Plan of Care  Reviewed With: patient  Outcome Evaluation: Swallow re-evaluation completed recommend upgrade to NTL and continue mechanical soft chopped. Small bites and sips and meds whole or crushed with puree.      SWALLOW EVALUATION (last 72 hours)       SLP Adult Swallow Evaluation       Row Name 06/02/23 1400 05/31/23 0900                Rehab Evaluation    Document Type evaluation  -KA re-evaluation  -CR       Subjective Information no complaints  -KA no complaints  -CR       Patient Observations alert;cooperative  -KA alert;cooperative  -CR       Patient Effort good  -KA good  -CR       Symptoms Noted During/After Treatment none  -KA none  -CR          General Information    Patient Profile Reviewed yes  -KA yes  -CR       Pertinent History Of Current Problem -- 73 y/o male; Severe mitral regurgitation s/p tissue mitral valve repair and tricuspid valve repair POD #2. Previous VFSS 5/27/23 recommended NPO as patient with significant fatigue and penetration/aspiration of most consistencies.  -CR       Current Method of Nutrition soft to chew textures;honey-thick liquids  -KA NPO  -CR       Precautions/Limitations, Vision -- WFL;for purposes of eval  -CR       Precautions/Limitations, Hearing -- difficult to assess  -CR       Prior Level of Function-Communication -- WFL  -CR       Prior Level of Function-Swallowing -- no diet consistency restrictions  -CR       Plans/Goals Discussed with -- patient;spouse/S.O.;agreed upon  -CR       Barriers to Rehab -- medically complex  -CR          Pain Scale: Numbers Pre/Post-Treatment    Pretreatment Pain Rating -- 0/10 - no pain  -CR       Posttreatment Pain Rating -- 3/10  -CR       Pain Location - Side/Orientation -- Left  -CR       Pain Location - -- other (see comments)  thigh  -CR          Oral Motor Structure and Function    Dentition Assessment -- natural, present and adequate  -CR       Secretion Management -- WNL/WFL  -CR       Mucosal Quality -- moist, healthy  -CR           Oral Musculature and Cranial Nerve Assessment    Oral Motor General Assessment -- generalized oral motor weakness  -CR          General Eating/Swallowing Observations    Respiratory Support Currently in Use -- nasal cannula  -CR       O2 Liters -- 2L  -CR          Clinical Swallow Eval    Clinical Swallow Evaluation Summary Patient seen for swallow re-evaluation, Patietn sitting upright in chair eating lunch. No overt s/s of pen/asp with HTL, NTL via cup and puree and mechanical soft diet. Reviewed results of VFSS and rationale for current diet. Recommend upgrade to NTL, however recommend pt remain on mechanical soft. Patient intermittently confused during re-evaluation (RN confirmed normal for patient) and talked with PO in mouth at times with slower mastication at times.  -KA --          MBS/VFSS Interpretation    VFSS Summary -- VFSS complete. Radiologist, Dr. Lieberman, present during the study. Patient presents with moderate oropharyngeal dysphagia characterized by swallow delay, disorganized oral and pharyngeal stages of the swallow, and reduced hyolaryngeal elevation/excursion. Increased fatigue observed as study progressed. Swallow triggered at the level of the pyriforms with thin liquid and nectar thick liquid. No penetration/aspiration noted with thin liquid by spoon. Nontransient penetration during the swallow with thin liquid by cup. Deep penetration to the vocal folds and suspected aspiration during the swallow with thin liquid by straw. Transient penetration with nectar thick liquid by spoon/cup. Deep penetration before the swallow and aspiration during the swallow with nectar by straw. Patient with very subtle throat clear to sensate, however, throat clear/cough ineffective in expectorating material from the airway. Swallow triggered at the level of the valleculae with honey thick liquid. No penetration/aspiration with honey thick liquid by cup/straw, puree, soft/chopped solids, or regular solids. Slow  and disorganized munching mastication noted. Recommend honey thick liquid and soft/chopped solid diet. Meds whole or crushed in puree, as tolerated. Free water protocol with ice chips/water sips from spoon/cup only after oral care in between meals. As endurance improves, consider upgrade at bedside per SLP discretion.  -CR          SLP Communication to Radiology    Summary Statement -- VFSS complete. Radiologist, Dr. Lieberman, present during the study. Patient presents with moderate oropharyngeal dysphagia. Increased fatigue observed as study progressed. No penetration/aspiration noted with thin liquid by spoon. Nontransient penetration during the swallow with thin liquid by cup. Deep penetration to the vocal folds and suspected aspiration during the swallow with thin liquid by straw. Transient penetration with nectar thick liquid by spoon/cup. Deep penetration before the swallow and aspiration during the swallow with nectar by straw. Patient with very subtle throat clear to sensate, however, throat clear/cough ineffective in expectorating material from the airway. No penetration/aspiration with honey thick liquid by cup/straw, puree, soft/chopped solids, or regular solids. Slow and disorganized munching mastication noted.  -CR          SLP Evaluation Clinical Impression    SLP Swallowing Diagnosis moderate;pharyngeal dysphagia;oral dysphagia  -KA moderate;pharyngeal dysphagia;oral dysphagia  -CR       Functional Impact risk of aspiration/pneumonia  -KA risk of aspiration/pneumonia  -CR       Rehab Potential/Prognosis, Swallowing good, to achieve stated therapy goals  -KA good, to achieve stated therapy goals  -CR       Swallow Criteria for Skilled Therapeutic Interventions Met demonstrates skilled criteria  -KA demonstrates skilled criteria  -CR          Recommendations    Therapy Frequency (Swallow) PRN  -KA PRN  -CR       Predicted Duration Therapy Intervention (Days) until discharge  -KA until discharge  -CR        SLP Diet Recommendation soft to chew textures;chopped;water between meals after oral care, with supervision;ice chips between meals after oral care, with supervision;nectar thick liquids  -KA honey thick liquids;soft to chew textures;chopped;water between meals after oral care, with supervision;ice chips between meals after oral care, with supervision  -CR       Recommended Diagnostics reassess via clinical swallow evaluation;reassess via VFSS (MBS)  -KA reassess via clinical swallow evaluation;reassess via VFSS (MBS)  -CR       Recommended Precautions and Strategies upright posture during/after eating;small bites of food and sips of liquid;multiple swallows per sip of liquid;multiple swallows per bite of food;general aspiration precautions  -KA upright posture during/after eating;small bites of food and sips of liquid;multiple swallows per sip of liquid;multiple swallows per bite of food;general aspiration precautions  -CR       Oral Care Recommendations Oral Care BID/PRN  -KA Oral Care BID/PRN  -CR       SLP Rec. for Method of Medication Administration meds whole;meds crushed;with puree;as tolerated  -KA meds crushed;with puree;as tolerated  -CR       Monitor for Signs of Aspiration yes;notify SLP if any concerns  -KA yes;notify SLP if any concerns  -CR       Anticipated Discharge Disposition (SLP) anticipate therapy at next level of care  -KA unknown;anticipate therapy at next level of care  -CR          Swallow Goals (SLP)    Swallow STGs -- pharyngeal strengthening exercise goal selection (SLP)  -CR       Pharyngeal Strengthening Exercise Goal Selection (SLP) -- pharyngeal strengthening exercise, SLP goal 1  -CR          (STG) Pharyngeal Strengthening Exercise Goal 1 (SLP)    Activity (Pharyngeal Strengthening Goal 1, SLP) -- increase closure at entrance to airway/closure of airway at glottis  -CR       Increase Closure at Entrance to Airway/Closure of Airway at Glottis -- hard effortful swallow;chin tuck  against resistance (CTAR);sarita  -CR       [place]/Accuracy (Pharyngeal Strengthening Goal 1, SLP) -- with minimal cues (75-90% accuracy)  -CR       Time Frame (Pharyngeal Strengthening Goal 1, SLP) -- 1 week  -CR       Progress/Outcomes (Pharyngeal Strengthening Goal 1, SLP) -- new goal  -CR                 User Key  (r) = Recorded By, (t) = Taken By, (c) = Cosigned By      Initials Name Effective Dates    Ryan Rey MA,[facility]-SLP 05/31/23 -     Mariely Corcoran CF-SLP 05/31/23 -                     EDUCATION  The patient has been educated in the following areas:   Dysphagia (Swallowing Impairment).        SLP GOALS       Row Name 05/31/23 0900             (STG) Pharyngeal Strengthening Exercise Goal 1 (SLP)    Activity (Pharyngeal Strengthening Goal 1, SLP) increase closure at entrance to airway/closure of airway at glottis  -CR      Increase Closure at Entrance to Airway/Closure of Airway at Glottis hard effortful swallow;chin tuck against resistance (CTAR);sarita  -CR      [place]/Accuracy (Pharyngeal Strengthening Goal 1, SLP) with minimal cues (75-90% accuracy)  -CR      Time Frame (Pharyngeal Strengthening Goal 1, SLP) 1 week  -CR      Progress/Outcomes (Pharyngeal Strengthening Goal 1, SLP) new goal  -CR                User Key  (r) = Recorded By, (t) = Taken By, (c) = Cosigned By      Initials Name Provider Type    Mariely Corcoran CF-SLP Speech and Language Pathologist                       Time Calculation:    Time Calculation- SLP       Row Name 06/02/23 1426             Time Calculation- SLP    SLP Start Time 1250  -KA      SLP Received On 06/02/23  -KA         Untimed Charges    [id number] Treatment Swallow Minutes 60  -KA         Total Minutes    Untimed Charges Total Minutes 60  -KA       Total Minutes 60  -KA                User Key  (r) = Recorded By, (t) = Taken By, (c) = Cosigned By      Initials Name Provider Type    Ryan Rey MA,[facility]-SLP Speech and Language  Pathologist                    Therapy Charges for Today       Code Description Service Date Service Provider Modifiers Qty    [id number]  ST TREATMENT SWALLOW 4 6/2/2023 Ryan Alcala MA,CCC-SLP GN 1                 Ryan Alcala MA,ARMANDO-SLP  6/2/2023

## 2023-06-02 NOTE — PROGRESS NOTES
" LOS: 10 days   Patient Care Team:  Liza Oconnell III, NP-C as PCP - General (Family Medicine)  Corey Lao Jr., MD (Inactive) as Consulting Physician (Urology)    Chief Complaint: post op    Subjective:  Symptoms:  He reports weakness.  No shortness of breath, cough or chest pain.    Diet:  NPO.  No nausea or vomiting.    Activity level: Impaired due to weakness.    Pain:  He complains of pain that is mild.  Pain is requiring pain medication.      Mentation is better today, able to have an appropriate conversation    Vital Signs  Temp:  [97.9 °F (36.6 °C)-99 °F (37.2 °C)] 98.8 °F (37.1 °C)  Heart Rate:  [69-80] 75  Resp:  [18-20] 18  BP: (107-129)/(63-76) 117/67  Body mass index is 34.88 kg/m².    Intake/Output Summary (Last 24 hours) at 6/2/2023 0802  Last data filed at 6/2/2023 0616  Gross per 24 hour   Intake 50 ml   Output 2400 ml   Net -2350 ml     No intake/output data recorded.        06/01/23  0558 06/01/23  1516 06/02/23  0612   Weight: 119 kg (261 lb 14.4 oz) 118 kg (261 lb) 117 kg (257 lb 3.2 oz)     Objective:  General Appearance:  Comfortable and in no acute distress.    Vital signs: (most recent): Blood pressure 117/67, pulse 75, temperature 98.8 °F (37.1 °C), temperature source Oral, resp. rate 18, height 182.9 cm (72\"), weight 117 kg (257 lb 3.2 oz), SpO2 94 %.  Vital signs are normal.  No fever.    Output: Producing urine and producing stool.    Lungs:  Normal effort and normal respiratory rate.  There are decreased breath sounds.  Rales: bibasilar.    Heart: Normal rate.  Irregular rhythm.    Abdomen: Abdomen is soft.  Bowel sounds are normal.     Extremities: There is dependent edema (bilaterally L>R).    Pulses: Distal pulses are intact.    Neurological: Patient is alert.  (Oriented to person, place, and situation).    Skin:  Warm and dry.  (Sternal incision clean, dry, and intact)      Results Review:        WBC WBC   Date Value Ref Range Status   06/02/2023 11.81 " (H) 3.40 - 10.80 10*3/mm3 Final   06/01/2023 13.16 (H) 3.40 - 10.80 10*3/mm3 Final   05/31/2023 12.77 (H) 3.40 - 10.80 10*3/mm3 Final   05/30/2023 10.08 3.40 - 10.80 10*3/mm3 Final      HGB Hemoglobin   Date Value Ref Range Status   06/02/2023 10.8 (L) 13.0 - 17.7 g/dL Final   06/01/2023 10.5 (L) 13.0 - 17.7 g/dL Final   05/31/2023 11.0 (L) 13.0 - 17.7 g/dL Final   05/30/2023 11.2 (L) 13.0 - 17.7 g/dL Final      HCT Hematocrit   Date Value Ref Range Status   06/02/2023 33.1 (L) 37.5 - 51.0 % Final   06/01/2023 31.8 (L) 37.5 - 51.0 % Final   05/31/2023 33.6 (L) 37.5 - 51.0 % Final   05/30/2023 34.2 (L) 37.5 - 51.0 % Final      Platelets Platelets   Date Value Ref Range Status   06/02/2023 257 140 - 450 10*3/mm3 Final   06/01/2023 193 140 - 450 10*3/mm3 Final   05/31/2023 211 140 - 450 10*3/mm3 Final   05/30/2023 213 140 - 450 10*3/mm3 Final        PT/INR:    Protime   Date Value Ref Range Status   06/01/2023 13.4 11.7 - 14.2 Seconds Final   /  INR   Date Value Ref Range Status   06/01/2023 1.01 0.90 - 1.10 Final       Sodium Sodium   Date Value Ref Range Status   06/02/2023 137 136 - 145 mmol/L Final   06/01/2023 134 (L) 136 - 145 mmol/L Final   06/01/2023 134 (L) 136 - 145 mmol/L Final   05/31/2023 141 136 - 145 mmol/L Final      Potassium Potassium   Date Value Ref Range Status   06/02/2023 4.1 3.5 - 5.2 mmol/L Final     Comment:     Slight hemolysis detected by analyzer. Results may be affected.   06/01/2023 4.4 3.5 - 5.2 mmol/L Final   06/01/2023 4.4 3.5 - 5.2 mmol/L Final     Comment:     Slight hemolysis detected by analyzer. Results may be affected.   05/31/2023 4.2 3.5 - 5.2 mmol/L Final      Chloride Chloride   Date Value Ref Range Status   06/02/2023 99 98 - 107 mmol/L Final   06/01/2023 99 98 - 107 mmol/L Final   06/01/2023 99 98 - 107 mmol/L Final   05/31/2023 105 98 - 107 mmol/L Final      Bicarbonate CO2   Date Value Ref Range Status   06/02/2023 26.0 22.0 - 29.0 mmol/L Final   06/01/2023 24.3 22.0 -  29.0 mmol/L Final   06/01/2023 22.1 22.0 - 29.0 mmol/L Final   05/31/2023 28.0 22.0 - 29.0 mmol/L Final      BUN BUN   Date Value Ref Range Status   06/02/2023 27 (H) 8 - 23 mg/dL Final   06/01/2023 28 (H) 8 - 23 mg/dL Final   06/01/2023 29 (H) 8 - 23 mg/dL Final   05/31/2023 31 (H) 8 - 23 mg/dL Final      Creatinine Creatinine   Date Value Ref Range Status   06/02/2023 1.12 0.76 - 1.27 mg/dL Final   06/01/2023 0.96 0.76 - 1.27 mg/dL Final   06/01/2023 0.97 0.76 - 1.27 mg/dL Final   05/31/2023 1.02 0.76 - 1.27 mg/dL Final      Calcium Calcium   Date Value Ref Range Status   06/02/2023 8.5 (L) 8.6 - 10.5 mg/dL Final   06/01/2023 8.6 8.6 - 10.5 mg/dL Final   06/01/2023 8.6 8.6 - 10.5 mg/dL Final   05/31/2023 8.2 (L) 8.6 - 10.5 mg/dL Final      Magnesium No results found for: MG       aspirin, 81 mg, Oral, Daily  atorvastatin, 40 mg, Oral, Nightly  calcium 500 mg vitamin D 5 mcg (200 UT), 1 tablet, Oral, Daily  empagliflozin, 25 mg, Oral, Daily  furosemide, 40 mg, Intravenous, Q8H  guaifenesin, 400 mg, Oral, Q8H  insulin glargine, 25 Units, Subcutaneous, Daily  insulin lispro, 3-14 Units, Subcutaneous, Q6H  ipratropium-albuterol, 3 mL, Nebulization, 4x Daily - RT  mupirocin, , Each Nare, BID  pantoprazole, 40 mg, Intravenous, QAM AC  potassium chloride, 20 mEq, Nasogastric, BID With Meals  predniSONE, 5 mg, Oral, Daily With Breakfast  [START ON 6/3/2023] predniSONE, 5 mg, Oral, Every Other Day  senna-docusate sodium, 2 tablet, Oral, Nightly      heparin, 8.4 Units/kg/hr, Last Rate: 11.4 Units/kg/hr (06/02/23 0108)            Patient Active Problem List   Diagnosis Code   • DM (diabetes mellitus), type 2 E11.9   • Mixed hyperlipidemia E78.2   • Mild intermittent asthma without complication J45.20   • New onset of congestive heart failure I50.9   • Nonrheumatic mitral valve regurgitation I34.0   • Chest pain R07.9   • Essential hypertension I10   • Mitral valve disease I05.9       Assessment & Plan   -Moderate to severe  mitral regurgitation s/p MVR/TV repair/SU closure POD#9 Pagni  -Moderate tricuspid valve regurgitation  -Acute on chronic diastolic heart failure with preserved EF--55%  -CKD stage IIIa; baseline creatinine 1.2  -DM type II--A1c  -Severe pulmonary HTN  -PING  -BPH/Urinary retention s/p TURP  -Interstitial lung disease s/p COVID-19 infection --steroid taper per pulm.  -leukocytosis--reactive/steroid administration  -post op anemia--expected acute blood loss  -TCP--consumptive--resolved  - post op aphasia/confusion/unilateral weakness--neurology evaluating  - atrial flutter/junctional bradycardia post op     Mentation is better this morning. CT of head yesterday negative for acute issues. Neurology recommends MRI once AV wires removed  He is in atrial flutter with rates in the 70s. Plan for cardioversion today. Remains on IV heparin  Echocardiogram yesterday reviewed.   Weaned to RA and tolerating  Venous doppler negative for DVT but did show bilateral LE superficial thrombophlebitis  Excellent response to IV diuretics. Still with edema, but does appear improved. Diuretic management per renal. Will have nursing apply HOLLY hose to bilateral LE  Tolerating modified diet, but is receiving nocturnal tube feeding  Encourage aggressive pulmonary toilet--continue flutter/guaifinisin  Still very weak--continue aggressive PT/OT. [facility] has accepted him whenever he is ready medically  Continue routine post op care    Basia Duarte, DESTIN  06/02/23  08:02 EDT

## 2023-06-02 NOTE — ANESTHESIA PREPROCEDURE EVALUATION
Anesthesia Evaluation     NPO Solid Status: > 8 hours             Airway   Mallampati: III  TM distance: >3 FB  Difficult intubation highly probable and Small opening  Dental    (+) poor dentition        Pulmonary - normal exam   (+) asthma,sleep apnea on CPAP,   Cardiovascular - normal exam    (+) hypertension, dysrhythmias Atrial Flutter, hyperlipidemia,       Neuro/Psych  GI/Hepatic/Renal/Endo    (+)  GERD,  diabetes mellitus,     Musculoskeletal     Abdominal    Substance History      OB/GYN          Other                        Anesthesia Plan    ASA 3     MAC       Anesthetic plan, risks, benefits, and alternatives have been provided, discussed and informed consent has been obtained with: patient.        CODE STATUS:    Code Status (Patient has no pulse and is not breathing): CPR (Attempt to Resuscitate)  Medical Interventions (Patient has pulse or is breathing): Full Support  Release to patient: Routine Release

## 2023-06-02 NOTE — SIGNIFICANT NOTE
06/02/23 0939   OTHER   Discipline physical therapist   Rehab Time/Intention   Session Not Performed patient unavailable for treatment  (Pt off floor for cardioversion - f/u 6/3 if appropriate)   Recommendation   PT - Next Appointment 06/03/23

## 2023-06-02 NOTE — PROGRESS NOTES
LOS: 10 days   Patient Care Team:  Liza Oconnell III, NP-C as PCP - General (Family Medicine)  Corey Lao Jr., MD (Inactive) as Consulting Physician (Urology)    Chief Complaint: Follow-up severe mitral regurgitation status post tissue mitral valve replacement.    Interval History: He still had some mild confusion this morning, but was much better than yesterday.  He did undergo a successful cardioversion earlier today, and is back in sinus rhythm in the 90s.  No chest pain.  He diuresed well, and looks better from a volume standpoint.    Vital Signs:  Temp:  [98 °F (36.7 °C)-99.1 °F (37.3 °C)] 98.9 °F (37.2 °C)  Heart Rate:  [69-95] 95  Resp:  [18-20] 18  BP: ()/(64-92) 123/92    Intake/Output Summary (Last 24 hours) at 6/2/2023 1809  Last data filed at 6/2/2023 1200  Gross per 24 hour   Intake 340 ml   Output 1850 ml   Net -1510 ml       Physical Exam:   General Appearance:    No acute distress, alert with some mild confusion (improved).   Lungs:     Clear to auscultation bilaterally     Heart:    Regular rhythm and normal rate.  No murmurs, gallops, or       rubs.   Abdomen:     Soft, nontender, nondistended.    Extremities:   1+ edema lower extremities.     Results Review:    Results from last 7 days   Lab Units 06/02/23  0433   SODIUM mmol/L 137   POTASSIUM mmol/L 4.1   CHLORIDE mmol/L 99   CO2 mmol/L 26.0   BUN mg/dL 27*   CREATININE mg/dL 1.12   GLUCOSE mg/dL 151*   CALCIUM mg/dL 8.5*         Results from last 7 days   Lab Units 06/02/23  0433   WBC 10*3/mm3 11.81*   HEMOGLOBIN g/dL 10.8*   HEMATOCRIT % 33.1*   PLATELETS 10*3/mm3 257     Results from last 7 days   Lab Units 06/02/23  1428 06/02/23  0718 06/02/23  0433 06/01/23  2318 06/01/23  1555   INR   --   --   --   --  1.01   APTT seconds 37.4* 39.1* 32.9   < > 24.2    < > = values in this interval not displayed.         Results from last 7 days   Lab Units 05/27/23  0604   MAGNESIUM mg/dL 2.6*           I reviewed  the patient's new clinical results.        Assessment:  1.  Severe symptomatic mitral regurgitation and tricuspid regurgitation  2.  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 Frey  3.  Chronic diastolic CHF  4.  History of COVID-19 pneumonia with residual dyspnea and possible interstitial lung disease  5.  Stage IIIa chronic kidney disease  6.  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  Expected postoperative anemia  8.  Postoperative thrombocytopenia  9.  Postoperative frequent PVCs and NSVT  10.  Postoperative aphasia, confusion, and right upper extremity weakness  11.  Postoperative junctional bradycardia, complete heart block, and typical atrial flutter (status post DCCV on 6/2/2023).  12.  LBBB and first degree AV block  13.  Diabetes    Plan:  -Diuresed well with IV Lasix.  Decreased to 40 mg IV every 12 hours per nephrology.    -Looks better today.  He was more alert this morning with only mild confusion.  MRI has been ordered once his pacing wires are pulled.      -I was able to cardiovert him back to sinus rhythm with a single 200 J shock earlier today.  He has a first-degree AV block and left bundle branch block, but was in sinus rhythm in the 90s.  I am going to start him on a minimal amount of Toprol XL and see how he does.    -Venous Doppler ultrasound showed superficial venous thrombosis, but no DVT.    -Continue heparin drip for now.  Left atrial appendage was closed during surgery.    -Continue Jardiance 25 mg/day.    Corwin Hobson MD  06/02/23  18:09 EDT

## 2023-06-02 NOTE — THERAPY TREATMENT NOTE
Patient Name: Vernon Solorzano  : 1950    MRN: [id number]                              Today's Date: 2023       Admit Date: 2023    Visit Dx:     ICD-10-CM ICD-9-CM   1. S/P MVR (mitral valve replacement)  Z95.2 V43.3   2. Nonrheumatic mitral valve regurgitation  I34.0 424.0     Patient Active Problem List   Diagnosis   • DM (diabetes mellitus), type 2   • Mixed hyperlipidemia   • Mild intermittent asthma without complication   • New onset of congestive heart failure   • Nonrheumatic mitral valve regurgitation   • Chest pain   • Essential hypertension   • Mitral valve disease     Past Medical History:   Diagnosis Date   • Allergic rhinitis    • Arthritis    • BPH (benign prostatic hyperplasia)    • Diabetes mellitus     TYPE 2   • Foraminal stenosis of cervical region     C5-C6   • GERD (gastroesophageal reflux disease)    • Hemorrhoids    • History of urinary retention     S/P TURP   • Hyperlipidemia    • Macular degeneration    • PING (obstructive sleep apnea) 2016    MILD, SEES DR. AMARILIS MORGAN     Past Surgical History:   Procedure Laterality Date   • CARDIAC CATHETERIZATION N/A 2023    Procedure: Right Heart Cath;  Surgeon: Corey Gaffney MD;  [facility]:  [facility];  Service: Cardiology;  Laterality: N/A;   • CARDIAC CATHETERIZATION N/A 2023    Procedure: Left Heart Cath;  Surgeon: Corey Gaffney MD;  [facility]:  [facility];  Service: Cardiology;  Laterality: N/A;   • CARDIAC CATHETERIZATION N/A 2023    Procedure: Left ventriculography;  Surgeon: Corey Gaffney MD;  Location:  [facility];  Service: Cardiology;  Laterality: N/A;   • CARDIAC CATHETERIZATION N/A 2023    Procedure: Coronary angiography;  Surgeon: Corey Gaffney MD;  [facility]:  [facility];  Service: Cardiology;  Laterality: N/A;   • COLONOSCOPY N/A     WNL PER PT, NO RECORDS,    • COLONOSCOPY N/A 2009      GORGE BENAVIDEZ AT [facility]   • MITRAL VALVE REPAIR/REPLACEMENT N/A 5/24/2023    Procedure: MITRAL VALVE REPAIR/REPLACEMENT TRICUSPID VALVE REPAIR/REPLACEMENT TRANSESOPHAGEAL ECHOCARDIOGRAM WITH ANESTHESIA;  Surgeon: George Frey MD;  Location: [facility];  Service: Cardiothoracic;  Laterality: N/A;   • PROSTATE SURGERY N/A 11/12/2010    TURP, DR. BRIGHT COLLAZO AT [facility]   • SKIN TAG REMOVAL N/A 12/20/2017    ANAL SKIN TAG X2, PERFORMED IN OFFICE, DR. LISA ORANTES   • TURP / TRANSURETHRAL INCISION / DRAINAGE PROSTATE  2010    Dr. Goodrich      General Information     Row Name 06/02/23 1259          OT Time and Intention    Document Type therapy note (daily note)  -     Mode of Treatment occupational therapy;individual therapy  -     Row Name 06/02/23 1259          General Information    Patient Profile Reviewed yes  -SM     Existing Precautions/Restrictions fall;cardiac;sternal;oxygen therapy device and L/min  -     Row Name 06/02/23 1259          Cognition    Orientation Status (Cognition) oriented to;person;place;situation  -     Row Name 06/02/23 1259          Safety Issues, Functional Mobility    Safety Issues Affecting Function (Mobility) ability to follow commands  -     Impairments Affecting Function (Mobility) balance;coordination;cognition;endurance/activity tolerance;strength;pain;postural/trunk control  -           User Key  (r) = Recorded By, (t) = Taken By, (c) = Cosigned By    Initials Name Provider Type    SM Latisha Kauffman OT Occupational Therapist                 Mobility/ADL's     Row Name 06/02/23 1300          Bed Mobility    Sit-Supine [place] (Bed Mobility) maximum assist (25% patient effort);2 person assist  -     Row Name 06/02/23 1300          Transfers    Transfers sit-stand transfer;bed-chair transfer  -     Row Name 06/02/23 1300          Bed-Chair Transfer    Bed-Chair [place] (Transfers) minimum assist (75% patient effort);2 person assist  -SM      "Comment, (Bed-Chair Transfer) HHAX2 pivot to chair  -     Row Name 06/02/23 1300          Sit-Stand Transfer    Sit-Stand [place] (Transfers) minimum assist (75% patient effort);2 person assist  -     Row Name 06/02/23 1300          Grooming Assessment/Training    [place] Level (Grooming) maximum assist (25% patient effort);hair care, combing/brushing  -     Position (Grooming) supported sitting  -           User Key  (r) = Recorded By, (t) = Taken By, (c) = Cosigned By    Initials Name Provider Type    Latisha Rhodes OT Occupational Therapist               Obj/Interventions     Row Name 06/02/23 1301          Balance    Static Sitting Balance standby assist  -     Position, Sitting Balance sitting edge of bed  -     Static Standing Balance contact guard;minimal assist;2-person assist  -     Dynamic Standing Balance minimal assist;2-person assist  -           User Key  (r) = Recorded By, (t) = Taken By, (c) = Cosigned By    Initials Name Provider Type     Latisha Kauffman OT Occupational Therapist               Goals/Plan    No documentation.                Clinical Impression     Row Name 06/02/23 1301          Pain Assessment    Pre/Posttreatment Pain Comment pt doesnt rate but reports \"ouch\" to even the lightest touch on all extremites  -     Pain Intervention(s) Repositioned  -     Row Name 06/02/23 1301          Plan of Care Review    Plan of Care Reviewed With patient  -     Outcome Evaluation Pt participated in OT today, disoriented to month/year. He is following one step simple commands but needs multiple cues and also very delayed command following. OT session today to focus on transfers and bed mobility for ADLs. X2 person assist to the chair. Pt declines further UE ex or ADLs today due to \"chest being so sore\". Plans acute rehab at [facility].  -     Row Name 06/02/23 1301          Therapy Plan Review/Discharge Plan (OT)    Anticipated Discharge Disposition (OT) " inpatient rehabilitation facility  -     Row Name 06/02/23 1301          Vital Signs    Pre Systolic BP Rehab 119  -SM     Pre Treatment Diastolic BP 75  -SM     Pretreatment Heart Rate (beats/min) 90  -SM     Posttreatment Heart Rate (beats/min) 93  -SM     Pre SpO2 (%) 92  -SM     O2 Delivery Pre Treatment supplemental O2  -SM     Post SpO2 (%) 95  -SM     O2 Delivery Post Treatment supplemental O2  -SM     Row Name 06/02/23 1301          Positioning and Restraints    Pre-Treatment Position in bed  -SM     Post Treatment Position chair  -SM     In Chair call light within reach;encouraged to call for assist;sitting;exit alarm on;with family/caregiver  -           User Key  (r) = Recorded By, (t) = Taken By, (c) = Cosigned By    Initials Name Provider Type    Latisha Rhodes, GARY Occupational Therapist               Outcome Measures     Row Name 06/02/23 1304          How much help from another is currently needed...    Putting on and taking off regular lower body clothing? 1  -SM     Bathing (including washing, rinsing, and drying) 1  -SM     Toileting (which includes using toilet bed pan or urinal) 1  -SM     Putting on and taking off regular upper body clothing 2  -SM     Taking care of personal grooming (such as brushing teeth) 2  -SM     Eating meals 1  NPO  -SM     AM-PAC 6 Clicks Score (OT) 8  -     Row Name 06/02/23 0900          How much help from another person do you currently need...    Turning from your back to your side while in flat bed without using bedrails? 2  -TD     Moving from lying on back to sitting on the side of a flat bed without bedrails? 2  -TD     Moving to and from a bed to a chair (including a wheelchair)? 2  -TD     Standing up from a chair using your arms (e.g., wheelchair, bedside chair)? 2  -TD     Climbing 3-5 steps with a railing? 1  -TD     To walk in hospital room? 2  -TD     AM-PAC 6 Clicks Score (PT) 11  -TD     Highest level of mobility 4 --> Transferred to  chair/commode  -TD     Row Name 06/02/23 1304          Functional Assessment    Outcome Measure Options AM-PAC 6 Clicks Daily Activity (OT)  -SM           User Key  (r) = Recorded By, (t) = Taken By, (c) = Cosigned By    Initials Name Provider Type    TD Kaci Crocker, RN Registered Nurse    Latisha Rhodes OT Occupational Therapist                Occupational Therapy Education     Title: PT OT SLP Therapies (Done)     Topic: Occupational Therapy (Done)     Point: ADL training (Done)     Description:   Instruct learner(s) on proper safety adaptation and remediation techniques during self care or transfers.   Instruct in proper use of assistive devices.              Learning Progress Summary           Patient Acceptance, E, VU by  at 5/31/2023 1300    Acceptance, E,TB, VU by  at 5/30/2023 1652                   Point: Home exercise program (Done)     Description:   Instruct learner(s) on appropriate technique for monitoring, assisting and/or progressing therapeutic exercises/activities.              Learning Progress Summary           Patient Acceptance, E, VU by  at 5/31/2023 1300    Acceptance, E,TB, VU by  at 5/30/2023 1652                   Point: Precautions (Done)     Description:   Instruct learner(s) on prescribed precautions during self-care and functional transfers.              Learning Progress Summary           Patient Acceptance, E, VU by  at 5/31/2023 1300    Acceptance, E,TB, VU by  at 5/30/2023 1652                   Point: Body mechanics (Done)     Description:   Instruct learner(s) on proper positioning and spine alignment during self-care, functional mobility activities and/or exercises.              Learning Progress Summary           Patient Acceptance, E, VU by  at 5/31/2023 1300    Acceptance, E,TB, VU by  at 5/30/2023 1652                               User Key     Initials Effective Dates Name Provider Type Discipline     06/16/21 -  Jelly Sepulveda, LAZARUS Registered  "Nurse Nurse    MAYANK 08/02/22 -  Shon Sargent OT Occupational Therapist OT              OT Recommendation and Plan  Planned Therapy Interventions (OT): activity tolerance training, adaptive equipment training, BADL retraining, functional balance retraining, occupation/activity based interventions, patient/caregiver education/training, transfer/mobility retraining, ROM/therapeutic exercise, strengthening exercise  Therapy Frequency (OT): 5 times/wk  Plan of Care Review  Plan of Care Reviewed With: patient  Outcome Evaluation: Pt participated in OT today, disoriented to month/year. He is following one step simple commands but needs multiple cues and also very delayed command following. OT session today to focus on transfers and bed mobility for ADLs. X2 person assist to the chair. Pt declines further UE ex or ADLs today due to \"chest being so sore\". [facility] rehab at [facility].     Time Calculation:    Time Calculation- OT     Row Name 06/02/23 1304             Time Calculation- OT    OT Start Time 1115  -SM      OT Stop Time 1133  -SM      OT Time Calculation (min) 18 min  -SM      Total Timed Code Minutes- OT 18 minute(s)  -SM      OT Received On 06/02/23  -SM      OT - Next Appointment 06/05/23  -         Timed Charges    63375 - OT Therapeutic Activity Minutes 18  -SM         Total Minutes    Timed Charges Total Minutes 18  -SM       Total Minutes 18  -SM            User Key  (r) = Recorded By, (t) = Taken By, (c) = Cosigned By    Initials Name Provider Type     Latisha Kauffman OT Occupational Therapist              Therapy Charges for Today     Code Description Service Date Service Provider Modifiers Qty    05673489242 HC OT THERAPEUTIC ACT EA 15 MIN 6/2/2023 Latisha Kauffman OT GO 1    99094472545 HC OT THER SUPP EA 15 MIN 6/2/2023 Latisha Kauffman OT GO 1               Latisha Kauffman OT  6/2/2023  "

## 2023-06-02 NOTE — ANESTHESIA POSTPROCEDURE EVALUATION
Patient: Vernon Solorzano    Procedure Summary     Date: 06/02/23 Room / Location: [facility] PACU    Anesthesia Start: 1008 Anesthesia Stop: 1023    Procedure: CARDIOVERSION EXTERNAL IN CARDIOLOGY DEPARTMENT Diagnosis: (afib)    Scheduled Providers: Corwin Hobson MD Provider: Sam Waggoner MD    Anesthesia Type: MAC ASA Status: 3          Anesthesia Type: MAC    Vitals  Vitals Value Taken Time   /72 06/02/23 1030   Temp     Pulse 90 06/02/23 1044   Resp     SpO2 95 % 06/02/23 1044   Vitals shown include unvalidated device data.        Post Anesthesia Care and Evaluation    Patient location during evaluation: PACU  Patient participation: complete - patient participated  Level of consciousness: awake  Pain management: satisfactory to patient    Airway patency: patent  Anesthetic complications: No anesthetic complications  PONV Status: controlled  Cardiovascular status: acceptable  Respiratory status: acceptable  Hydration status: acceptable

## 2023-06-02 NOTE — PROGRESS NOTES
[facility] Progress Note      Patient Name: Vernon Solorzano  : 1950  MRN: [id number]  Primary Care Physician:  Liza Oconnell III, RACHAEL  Date of admission: 2023    Subjective     Interval History:     Follow up CKD III.  Mentation is better today.  Family at bedside.  No shortness of air or chest pain.    Review of Systems:   As noted above    Objective     Vitals:   Temp:  [98 °F (36.7 °C)-99.1 °F (37.3 °C)] 99 °F (37.2 °C)  Heart Rate:  [69-93] 90  Resp:  [18-20] 18  BP: ()/(63-78) 113/78    Intake/Output Summary (Last 24 hours) at 2023 1118  Last data filed at 2023 1100  Gross per 24 hour   Intake 100 ml   Output 2850 ml   Net -2750 ml       Physical Exam:    General Appearance: alert  . Expressive aphasia.   Skin: warm and dry  HEENT: oral mucosa normal, nonicteric sclera  Neck: supple, no JVD  Lungs: Coarse upper airway rhonchi. Nasal 02  Heart: RRR, normal S1 and S2  Abdomen: soft, nontender, + bs, distended. Body wall edema.   : external catheter .  Extremities 1+ lower ext edema . R>L.   Neuro: word salad, perseverating. Not oriented to time or situation.  Using right hand and arm.   Scheduled Meds:     aspirin, 81 mg, Oral, Daily  atorvastatin, 40 mg, Oral, Nightly  calcium 500 mg vitamin D 5 mcg (200 UT), 1 tablet, Oral, Daily  empagliflozin, 25 mg, Oral, Daily  furosemide, 40 mg, Intravenous, Q8H  guaifenesin, 400 mg, Oral, Q8H  insulin glargine, 25 Units, Subcutaneous, Daily  insulin lispro, 3-14 Units, Subcutaneous, Q6H  ipratropium-albuterol, 3 mL, Nebulization, 4x Daily - RT  mupirocin, , Each Nare, BID  pantoprazole, 40 mg, Intravenous, QAM AC  potassium chloride, 20 mEq, Nasogastric, BID With Meals  predniSONE, 5 mg, Oral, Daily With Breakfast  [START ON 6/3/2023] predniSONE, 5 mg, Oral, Every Other Day  senna-docusate sodium, 2 tablet, Oral, Nightly      IV Meds:   heparin, 8.4 Units/kg/hr, Last Rate: 11.4 Units/kg/hr  (06/02/23 0108)        Results Reviewed:   I have personally reviewed the results from the time of this admission to 6/2/2023 11:18 EDT     Results from last 7 days   Lab Units 06/02/23  0433 06/01/23  0308 05/31/23  0448   SODIUM mmol/L 137 134*  134* 141   POTASSIUM mmol/L 4.1 4.4  4.4 4.2   CHLORIDE mmol/L 99 99  99 105   CO2 mmol/L 26.0 22.1  24.3 28.0   BUN mg/dL 27* 29*  28* 31*   CREATININE mg/dL 1.12 0.97  0.96 1.02   CALCIUM mg/dL 8.5* 8.6  8.6 8.2*   BILIRUBIN mg/dL  --  0.4  --    ALK PHOS U/L  --  65  --    ALT (SGPT) U/L  --  14  --    AST (SGOT) U/L  --  23  --    GLUCOSE mg/dL 151* 199*  200* 219*       Estimated Creatinine Clearance: 76.4 mL/min (by C-G formula based on SCr of 1.12 mg/dL).    Results from last 7 days   Lab Units 06/02/23 0433 05/27/23  0604   MAGNESIUM mg/dL  --  2.6*   PHOSPHORUS mg/dL 4.7*  --              Results from last 7 days   Lab Units 06/02/23 0433 06/01/23 0308 05/31/23 0448 05/30/23  0837 05/28/23  0344   WBC 10*3/mm3 11.81* 13.16* 12.77* 10.08 13.01*   HEMOGLOBIN g/dL 10.8* 10.5* 11.0* 11.2* 11.7*   PLATELETS 10*3/mm3 257 193 211 213 171       Results from last 7 days   Lab Units 06/01/23  1555   INR  1.01       Assessment / Plan     ASSESSMENT:  1. CKD III, baseline creatinine 1.2.  Peak 1.68.  Volume excess by exam.    2. MR now sp MV replacement, tissue and TV repair, SU closure 5/24/23.  3. Chronic steroid use after COVID 19 PNA.  4. Anemia post op. Hg stable .  5. Delirium, expressive aphasia. Ct without CVA, may need MRI.   Neuro thought small ischemic periprocedural event. On ASA and statin.  Perseverating.  Not oriented to situation.   6. DM2  7. Leukocytosis.  RLE a little bigger than LLE.  Check doppler.      PLAN:    1.  Continue current management  2.  Surveillance labs      Tae Lay MD  06/02/23  11:18 EDT    [facility]  [phone]

## 2023-06-02 NOTE — PROGRESS NOTES
Neurology Progress Note    Reason for visit  Follow up for possible seizure    Interval History  Patient reports right hand still feels different.  Nursing and family report that he has been more alert consistently.  Still having trouble swallowing and has not passed with SLP yet to advance to diet.    He did have atrial flutter today but converted.     Medications:  No current facility-administered medications on file prior to encounter.     Current Outpatient Medications on File Prior to Encounter   Medication Sig Dispense Refill   • aspirin 81 MG tablet Take 1 tablet by mouth Daily.     • atorvastatin (LIPITOR) 10 MG tablet TAKE 1 TABLET BY MOUTH EVERY DAY (Patient taking differently: 1 tablet Every Night.) 90 tablet 0   • furosemide (LASIX) 40 MG tablet Take 1 tablet by mouth 2 (Two) Times a Day. 60 tablet 1   • insulin degludec (TRESIBA FLEXTOUCH) 100 UNIT/ML solution pen-injector injection Start 25 units daily and titrate up 2 units every 2 days if blood glucose levels are consistently above 150 (Patient taking differently: Inject 50 Units under the skin into the appropriate area as directed Daily.)     • irbesartan (AVAPRO) 75 MG tablet Take 1 tablet by mouth Daily.     • Jardiance 25 MG tablet tablet Take 1 tablet by mouth Daily.     • metoclopramide (REGLAN) 10 MG tablet Take 1 tablet by mouth Every Night.     • Multiple Vitamins-Minerals (PRESERVISION AREDS 2+MULTI VIT PO) Take  by mouth 2 (Two) Times a Day.     • omeprazole (priLOSEC) 40 MG capsule Take 1 capsule by mouth Daily. (Patient taking differently: Take 1 capsule by mouth Every Night.) 90 capsule 4   • potassium chloride (MICRO-K) 10 MEQ CR capsule Take 2 capsules by mouth Daily.     • predniSONE (DELTASONE) 10 MG tablet Take 9 mg by mouth Daily.     • Symbicort 160-4.5 MCG/ACT inhaler Inhale 2 puffs 2 (Two) Times a Day.     • tiotropium (SPIRIVA) 18 MCG per inhalation capsule Place 1 capsule into inhaler and inhale Daily.       Review of  Systems:   Review of Systems   Respiratory: Positive for cough.    Cardiovascular:        Pain at sterniotomy site   Psychiatric/Behavioral: Positive for confusion.   All other systems reviewed and are negative.    Vital Signs  Temp:  [98 °F (36.7 °C)-99.1 °F (37.3 °C)] 98.9 °F (37.2 °C)  Heart Rate:  [69-95] 95  Resp:  [18-20] 18  BP: ()/(64-92) 123/92    Physical Exam:  General: appears uncomfortable, sitting up in chair  HEENT:  normal  CVS:  Regular rate and rhythm.    Musculoskeletal:  No signs of peripheral edema  Neurologic:      Alert, oriented to place, situation, month and year      Fluent with occasional word substitutions      Difficulty following complex thought process intermittently      Right hand clumsy  Psychiatric: no anxiety     Results Review:    Head CT showed no acute pathology  CTA head and neck showed mild to moderate calcified cervical and intracranial atherosclerosis with no rate limiting stenosis    Medical Decision Making and Recommendations  Probable small ischemic reuben-procedural event  Continue aspirin and statin therapy  Does not need anticoagulation for atrial flutter as atrial appendage was removed during surgery.    Will pursue brain MRI when pacer wires are out and he is coughing less.  This will be informational and prognostic purposes, will not likely     Discussed with patient, wife and with Dr. Frey.    Will continue to follow    Lidia Ybarra MD  5/30/2023

## 2023-06-02 NOTE — PLAN OF CARE
"Goal Outcome Evaluation:  Plan of Care Reviewed With: patient           Outcome Evaluation: Pt participated in OT today, disoriented to month/year. He is following one step simple commands but needs multiple cues and also very delayed command following. OT session today to focus on transfers and bed mobility for ADLs. X2 person assist to the chair. Pt declines further UE ex or ADLs today due to \"chest being so sore\". Plans acute rehab at [place].        "

## 2023-06-03 LAB
ALBUMIN SERPL-MCNC: 3.6 G/DL (ref 3.5–5.2)
ALBUMIN/GLOB SERPL: 1.3 G/DL
ALP SERPL-CCNC: 82 U/L (ref 39–117)
ALT SERPL W P-5'-P-CCNC: 16 U/L (ref 1–41)
ANION GAP SERPL CALCULATED.3IONS-SCNC: 9.9 MMOL/L (ref 5–15)
APTT PPP: 43.4 SECONDS (ref 22.7–35.4)
APTT PPP: 80.2 SECONDS (ref 22.7–35.4)
AST SERPL-CCNC: 14 U/L (ref 1–40)
BASOPHILS # BLD AUTO: 0.09 10*3/MM3 (ref 0–0.2)
BASOPHILS NFR BLD AUTO: 0.6 % (ref 0–1.5)
BILIRUB SERPL-MCNC: 0.6 MG/DL (ref 0–1.2)
BUN SERPL-MCNC: 29 MG/DL (ref 8–23)
BUN/CREAT SERPL: 21.8 (ref 7–25)
CALCIUM SPEC-SCNC: 9 MG/DL (ref 8.6–10.5)
CHLORIDE SERPL-SCNC: 100 MMOL/L (ref 98–107)
CO2 SERPL-SCNC: 31.1 MMOL/L (ref 22–29)
CREAT SERPL-MCNC: 1.33 MG/DL (ref 0.76–1.27)
DEPRECATED RDW RBC AUTO: 44.3 FL (ref 37–54)
EGFRCR SERPLBLD CKD-EPI 2021: 56.1 ML/MIN/1.73
EOSINOPHIL # BLD AUTO: 0.39 10*3/MM3 (ref 0–0.4)
EOSINOPHIL NFR BLD AUTO: 2.8 % (ref 0.3–6.2)
ERYTHROCYTE [DISTWIDTH] IN BLOOD BY AUTOMATED COUNT: 14 % (ref 12.3–15.4)
GLOBULIN UR ELPH-MCNC: 2.8 GM/DL
GLUCOSE BLDC GLUCOMTR-MCNC: 167 MG/DL (ref 70–130)
GLUCOSE BLDC GLUCOMTR-MCNC: 204 MG/DL (ref 70–130)
GLUCOSE BLDC GLUCOMTR-MCNC: 214 MG/DL (ref 70–130)
GLUCOSE BLDC GLUCOMTR-MCNC: 243 MG/DL (ref 70–130)
GLUCOSE BLDC GLUCOMTR-MCNC: 247 MG/DL (ref 70–130)
GLUCOSE SERPL-MCNC: 187 MG/DL (ref 65–99)
HCT VFR BLD AUTO: 31.9 % (ref 37.5–51)
HGB BLD-MCNC: 10.7 G/DL (ref 13–17.7)
IMM GRANULOCYTES # BLD AUTO: 0.63 10*3/MM3 (ref 0–0.05)
IMM GRANULOCYTES NFR BLD AUTO: 4.5 % (ref 0–0.5)
LYMPHOCYTES # BLD AUTO: 1.42 10*3/MM3 (ref 0.7–3.1)
LYMPHOCYTES NFR BLD AUTO: 10.2 % (ref 19.6–45.3)
MCH RBC QN AUTO: 29.2 PG (ref 26.6–33)
MCHC RBC AUTO-ENTMCNC: 33.5 G/DL (ref 31.5–35.7)
MCV RBC AUTO: 87.2 FL (ref 79–97)
MONOCYTES # BLD AUTO: 1.04 10*3/MM3 (ref 0.1–0.9)
MONOCYTES NFR BLD AUTO: 7.4 % (ref 5–12)
NEUTROPHILS NFR BLD AUTO: 10.42 10*3/MM3 (ref 1.7–7)
NEUTROPHILS NFR BLD AUTO: 74.5 % (ref 42.7–76)
NRBC BLD AUTO-RTO: 0 /100 WBC (ref 0–0.2)
PLATELET # BLD AUTO: 270 10*3/MM3 (ref 140–450)
PMV BLD AUTO: 10.6 FL (ref 6–12)
POTASSIUM SERPL-SCNC: 4 MMOL/L (ref 3.5–5.2)
PROT SERPL-MCNC: 6.4 G/DL (ref 6–8.5)
RBC # BLD AUTO: 3.66 10*6/MM3 (ref 4.14–5.8)
SODIUM SERPL-SCNC: 141 MMOL/L (ref 136–145)
WBC NRBC COR # BLD: 13.99 10*3/MM3 (ref 3.4–10.8)

## 2023-06-03 PROCEDURE — 94760 N-INVAS EAR/PLS OXIMETRY 1: CPT

## 2023-06-03 PROCEDURE — 99024 POSTOP FOLLOW-UP VISIT: CPT | Performed by: NURSE PRACTITIONER

## 2023-06-03 PROCEDURE — 80053 COMPREHEN METABOLIC PANEL: CPT | Performed by: INTERNAL MEDICINE

## 2023-06-03 PROCEDURE — 97110 THERAPEUTIC EXERCISES: CPT

## 2023-06-03 PROCEDURE — 94761 N-INVAS EAR/PLS OXIMETRY MLT: CPT

## 2023-06-03 PROCEDURE — 94799 UNLISTED PULMONARY SVC/PX: CPT

## 2023-06-03 PROCEDURE — 93005 ELECTROCARDIOGRAM TRACING: CPT | Performed by: NURSE PRACTITIONER

## 2023-06-03 PROCEDURE — 63710000001 INSULIN LISPRO (HUMAN) PER 5 UNITS: Performed by: NURSE PRACTITIONER

## 2023-06-03 PROCEDURE — 97530 THERAPEUTIC ACTIVITIES: CPT

## 2023-06-03 PROCEDURE — 85025 COMPLETE CBC W/AUTO DIFF WBC: CPT | Performed by: NURSE PRACTITIONER

## 2023-06-03 PROCEDURE — 25010000002 FUROSEMIDE PER 20 MG: Performed by: INTERNAL MEDICINE

## 2023-06-03 PROCEDURE — 82948 REAGENT STRIP/BLOOD GLUCOSE: CPT

## 2023-06-03 PROCEDURE — 25010000002 HEPARIN (PORCINE) 25000-0.45 UT/250ML-% SOLUTION: Performed by: NURSE PRACTITIONER

## 2023-06-03 PROCEDURE — 85730 THROMBOPLASTIN TIME PARTIAL: CPT | Performed by: THORACIC SURGERY (CARDIOTHORACIC VASCULAR SURGERY)

## 2023-06-03 PROCEDURE — 99232 SBSQ HOSP IP/OBS MODERATE 35: CPT | Performed by: INTERNAL MEDICINE

## 2023-06-03 PROCEDURE — 63710000001 PREDNISONE PER 5 MG: Performed by: INTERNAL MEDICINE

## 2023-06-03 PROCEDURE — 93010 ELECTROCARDIOGRAM REPORT: CPT | Performed by: INTERNAL MEDICINE

## 2023-06-03 RX ORDER — POTASSIUM CHLORIDE 1.5 G/1.77G
20 POWDER, FOR SOLUTION ORAL DAILY
Status: DISCONTINUED | OUTPATIENT
Start: 2023-06-04 | End: 2023-06-06 | Stop reason: HOSPADM

## 2023-06-03 RX ORDER — FUROSEMIDE 10 MG/ML
40 INJECTION INTRAMUSCULAR; INTRAVENOUS DAILY
Status: DISCONTINUED | OUTPATIENT
Start: 2023-06-04 | End: 2023-06-06

## 2023-06-03 RX ADMIN — IPRATROPIUM BROMIDE AND ALBUTEROL SULFATE 3 ML: 2.5; .5 SOLUTION RESPIRATORY (INHALATION) at 17:30

## 2023-06-03 RX ADMIN — ATORVASTATIN CALCIUM 40 MG: 20 TABLET, FILM COATED ORAL at 20:58

## 2023-06-03 RX ADMIN — POTASSIUM CHLORIDE 20 MEQ: 1.5 POWDER, FOR SOLUTION ORAL at 09:27

## 2023-06-03 RX ADMIN — GUAIFENESIN 400 MG: 200 SOLUTION ORAL at 22:43

## 2023-06-03 RX ADMIN — METOPROLOL SUCCINATE 12.5 MG: 25 TABLET, EXTENDED RELEASE ORAL at 20:58

## 2023-06-03 RX ADMIN — INSULIN LISPRO 5 UNITS: 100 INJECTION, SOLUTION INTRAVENOUS; SUBCUTANEOUS at 13:02

## 2023-06-03 RX ADMIN — ASPIRIN 81 MG: 81 TABLET, CHEWABLE ORAL at 09:27

## 2023-06-03 RX ADMIN — HEPARIN SODIUM 20.4 UNITS/KG/HR: 10000 INJECTION, SOLUTION INTRAVENOUS at 22:39

## 2023-06-03 RX ADMIN — HEPARIN SODIUM 17.4 UNITS/KG/HR: 10000 INJECTION, SOLUTION INTRAVENOUS at 14:00

## 2023-06-03 RX ADMIN — MUPIROCIN 1 APPLICATION: 20 OINTMENT TOPICAL at 20:58

## 2023-06-03 RX ADMIN — FUROSEMIDE 40 MG: 40 INJECTION, SOLUTION INTRAMUSCULAR; INTRAVENOUS at 09:28

## 2023-06-03 RX ADMIN — GUAIFENESIN 400 MG: 200 SOLUTION ORAL at 16:18

## 2023-06-03 RX ADMIN — EMPAGLIFLOZIN 25 MG: 25 TABLET, FILM COATED ORAL at 09:28

## 2023-06-03 RX ADMIN — GUAIFENESIN 400 MG: 200 SOLUTION ORAL at 06:30

## 2023-06-03 RX ADMIN — IPRATROPIUM BROMIDE AND ALBUTEROL SULFATE 3 ML: 2.5; .5 SOLUTION RESPIRATORY (INHALATION) at 07:25

## 2023-06-03 RX ADMIN — APIXABAN 5 MG: 5 TABLET, FILM COATED ORAL at 20:58

## 2023-06-03 RX ADMIN — HEPARIN SODIUM 17.4 UNITS/KG/HR: 10000 INJECTION, SOLUTION INTRAVENOUS at 06:41

## 2023-06-03 RX ADMIN — HEPARIN SODIUM 20.4 UNITS/KG/HR: 10000 INJECTION, SOLUTION INTRAVENOUS at 22:40

## 2023-06-03 RX ADMIN — HYDROCODONE BITARTRATE AND ACETAMINOPHEN 1 TABLET: 7.5; 325 TABLET ORAL at 16:18

## 2023-06-03 RX ADMIN — MUPIROCIN 1 APPLICATION: 20 OINTMENT TOPICAL at 09:28

## 2023-06-03 RX ADMIN — HYDROCODONE BITARTRATE AND ACETAMINOPHEN 1 TABLET: 7.5; 325 TABLET ORAL at 10:19

## 2023-06-03 RX ADMIN — PANTOPRAZOLE SODIUM 40 MG: 40 INJECTION, POWDER, FOR SOLUTION INTRAVENOUS at 06:30

## 2023-06-03 RX ADMIN — PREDNISONE 5 MG: 10 TABLET ORAL at 09:27

## 2023-06-03 RX ADMIN — INSULIN GLARGINE-YFGN 25 UNITS: 100 INJECTION, SOLUTION SUBCUTANEOUS at 09:28

## 2023-06-03 RX ADMIN — HEPARIN SODIUM 17.4 UNITS/KG/HR: 10000 INJECTION, SOLUTION INTRAVENOUS at 00:52

## 2023-06-03 RX ADMIN — CALCIUM CARBONATE-VITAMIN D TAB 500 MG-200 UNIT 1 TABLET: 500-200 TAB at 09:27

## 2023-06-03 RX ADMIN — IPRATROPIUM BROMIDE AND ALBUTEROL SULFATE 3 ML: 2.5; .5 SOLUTION RESPIRATORY (INHALATION) at 19:39

## 2023-06-03 RX ADMIN — INSULIN LISPRO 5 UNITS: 100 INJECTION, SOLUTION INTRAVENOUS; SUBCUTANEOUS at 07:05

## 2023-06-03 RX ADMIN — INSULIN LISPRO 5 UNITS: 100 INJECTION, SOLUTION INTRAVENOUS; SUBCUTANEOUS at 00:57

## 2023-06-03 NOTE — PLAN OF CARE
Goal Outcome Evaluation:                      Patient resting at this time, vital signs stable, SR on the monitor, patient on room air, pt alert and orient forgetful , reorient at times , heparin infusing and Cortrack, feeding tube infusing 7  p -7 am,  blood sugar done and treated, pt refused to get up this morning , cont to monitor.

## 2023-06-03 NOTE — PROGRESS NOTES
[facility]  Continuing to follow for medical readiness. Awaiting MRI after pacer wires removed and Heparin still infusing. Not medically ready yet.   Will continue to follow    Lola Rendon RN  Acute Rehab Admission Nurse

## 2023-06-03 NOTE — PROGRESS NOTES
[facility] Progress Note      Patient Name: Vernon Solorzano  : 1950  MRN: [id number]  Primary Care Physician:  Liza Oconnell III, RACHAEL  Date of admission: 2023    Subjective     Interval History:   Follow up CKD III.  Mentation is better today.  Family at bedside.  No shortness of air or chest pain.    Review of Systems:   As noted above    Objective     Vitals:   Temp:  [98.2 °F (36.8 °C)-98.9 °F (37.2 °C)] 98.2 °F (36.8 °C)  Heart Rate:  [88-96] 90  Resp:  [18-19] 19  BP: (101-145)/(64-92) 111/71    Intake/Output Summary (Last 24 hours) at 6/3/2023 1223  Last data filed at 6/3/2023 0405  Gross per 24 hour   Intake 0 ml   Output 1800 ml   Net -1800 ml         Physical Exam:    General Appearance: alert  . Expressive aphasia.   Skin: warm and dry  HEENT: oral mucosa normal, nonicteric sclera  Neck: supple, no JVD  Lungs: Coarse upper airway rhonchi. Nasal 02  Heart: RRR, normal S1 and S2  Abdomen: soft, nontender, + bs, distended. Body wall edema.   : external catheter .  Extremities 1+ lower ext edema . R>L.   Neuro: word salad, perseverating. Not oriented to time or situation.  Using right hand and arm.   Scheduled Meds:     apixaban, 5 mg, Oral, Q12H  aspirin, 81 mg, Oral, Daily  atorvastatin, 40 mg, Oral, Nightly  calcium 500 mg vitamin D 5 mcg (200 UT), 1 tablet, Oral, Daily  empagliflozin, 25 mg, Oral, Daily  furosemide, 40 mg, Intravenous, Q12H  guaifenesin, 400 mg, Oral, Q8H  insulin glargine, 25 Units, Subcutaneous, Daily  insulin lispro, 3-14 Units, Subcutaneous, Q6H  ipratropium-albuterol, 3 mL, Nebulization, 4x Daily - RT  metoprolol succinate XL, 12.5 mg, Oral, Q24H  mupirocin, , Each Nare, BID  pantoprazole, 40 mg, Intravenous, QAM AC  potassium chloride, 20 mEq, Nasogastric, BID With Meals  predniSONE, 5 mg, Oral, Every Other Day  senna-docusate sodium, 2 tablet, Oral, Nightly      IV Meds:   heparin, 8.4 Units/kg/hr, Last Rate: Stopped  (06/03/23 1010)        Results Reviewed:   I have personally reviewed the results from the time of this admission to 6/3/2023 12:23 EDT     Results from last 7 days   Lab Units 06/03/23  0834 06/02/23  0433 06/01/23  0308   SODIUM mmol/L 141 137 134*  134*   POTASSIUM mmol/L 4.0 4.1 4.4  4.4   CHLORIDE mmol/L 100 99 99  99   CO2 mmol/L 31.1* 26.0 22.1  24.3   BUN mg/dL 29* 27* 29*  28*   CREATININE mg/dL 1.33* 1.12 0.97  0.96   CALCIUM mg/dL 9.0 8.5* 8.6  8.6   BILIRUBIN mg/dL 0.6  --  0.4   ALK PHOS U/L 82  --  65   ALT (SGPT) U/L 16  --  14   AST (SGOT) U/L 14  --  23   GLUCOSE mg/dL 187* 151* 199*  200*         Estimated Creatinine Clearance: 64.1 mL/min (A) (by C-G formula based on SCr of 1.33 mg/dL (H)).    Results from last 7 days   Lab Units 06/02/23  0433   PHOSPHORUS mg/dL 4.7*               Results from last 7 days   Lab Units 06/03/23  0300 06/02/23  0433 06/01/23  0308 05/31/23  0448 05/30/23  0837   WBC 10*3/mm3 13.99* 11.81* 13.16* 12.77* 10.08   HEMOGLOBIN g/dL 10.7* 10.8* 10.5* 11.0* 11.2*   PLATELETS 10*3/mm3 270 257 193 211 213         Results from last 7 days   Lab Units 06/01/23  1555   INR  1.01         Assessment / Plan     ASSESSMENT:  1. CKD III, baseline creatinine 1.2.  Peak 1.68.  creatinine up slightly at 1.3.  Will reduce lasix to 40mg iv qday and monitor.  2. MR now sp MV replacement, tissue and TV repair, SU closure 5/24/23.  3. Chronic steroid use after COVID 19 PNA.  4. Anemia post op. Hg stable .  5. Delirium, expressive aphasia. Ct without CVA, may need MRI.   Neuro thought small ischemic periprocedural event. On ASA and statin.  Perseverating.  Not oriented to situation.   6. DM2  7. Leukocytosis.  RLE a little bigger than LLE.  Check doppler.      Vipul Viera MD  06/03/23  12:23 EDT    [facility]  [phone]

## 2023-06-03 NOTE — PROGRESS NOTES
LOS: 11 days   Patient Care Team:  Liza Oconnell III, NP-C as PCP - General (Family Medicine)  Corey Lao Jr., MD (Inactive) as Consulting Physician (Urology)    Chief Complaint: Follow-up severe mitral regurgitation status post tissue mitral valve replacement.    Interval History: Still with mild confusion, but mainly alert today.  He remains in sinus rhythm versus ectopic atrial rhythm in the 90s.  No chest pain.  He was coughing this morning.    Vital Signs:  Temp:  [98.2 °F (36.8 °C)-98.5 °F (36.9 °C)] 98.5 °F (36.9 °C)  Heart Rate:  [90-96] 90  Resp:  [18-19] 19  BP: (101-128)/(64-85) 118/85    Intake/Output Summary (Last 24 hours) at 6/3/2023 1656  Last data filed at 6/3/2023 1200  Gross per 24 hour   Intake 120 ml   Output 1800 ml   Net -1680 ml       Physical Exam:   General Appearance:    No acute distress, alert with some mild confusion (overall improved).   Lungs:     Clear to auscultation bilaterally     Heart:    Regular rhythm and normal rate.  No murmurs, gallops, or    rubs.   Abdomen:     Soft, nontender, nondistended.    Extremities:   1+ edema lower extremities.     Results Review:    Results from last 7 days   Lab Units 06/03/23  0834   SODIUM mmol/L 141   POTASSIUM mmol/L 4.0   CHLORIDE mmol/L 100   CO2 mmol/L 31.1*   BUN mg/dL 29*   CREATININE mg/dL 1.33*   GLUCOSE mg/dL 187*   CALCIUM mg/dL 9.0         Results from last 7 days   Lab Units 06/03/23  0300   WBC 10*3/mm3 13.99*   HEMOGLOBIN g/dL 10.7*   HEMATOCRIT % 31.9*   PLATELETS 10*3/mm3 270     Results from last 7 days   Lab Units 06/03/23  0834 06/02/23  2242 06/02/23  1428 06/01/23  2318 06/01/23  1555   INR   --   --   --   --  1.01   APTT seconds 80.2* 46.4* 37.4*   < > 24.2    < > = values in this interval not displayed.                     I reviewed the patient's new clinical results.        Assessment:  1.  Severe symptomatic mitral regurgitation and tricuspid regurgitation  2.  Status post tissue  mitral valve replacement and tricuspid valve repair (and left atrial appendage closure) on 5/24/2023 by Dr. Frey  3.  Chronic diastolic CHF  4.  History of COVID-19 pneumonia with residual dyspnea and possible interstitial lung disease  5.  Stage IIIa chronic kidney disease  6.  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  Expected postoperative anemia  8.  Postoperative thrombocytopenia  9.  Postoperative frequent PVCs and NSVT  10.  Postoperative aphasia, confusion, and right upper extremity weakness  11.  Postoperative junctional bradycardia, complete heart block, and typical atrial flutter (status post DCCV on 6/2/2023).  12.  LBBB and first degree AV block  13.  Diabetes    Plan:  -Diuresed well with IV Lasix.  Decreased to 40 mg IV daily per nephrology.  Continue Jardiance 25 mg/day.    -MRI of the brain now pending.    -Cardioverted yesterday.  His EKG is consistent with a likely ectopic atrial rhythm in the 90s.  He does not appear to be in atrial fibrillation or flutter today.  Continue heparin drip.    -Continue Toprol-XL 12.5 mg/day.  I did not increase this because of his conduction issues postoperatively.  However, I could potentially increase this tomorrow if he tolerates.    -Continue heparin drip for now.  Left atrial appendage was closed during surgery.    -Continue Jardiance 25 mg/day.    Corwin Hobson MD  06/03/23  16:56 EDT

## 2023-06-03 NOTE — PLAN OF CARE
Problem: Adult Inpatient Plan of Care  Goal: Absence of Hospital-Acquired Illness or Injury  Intervention: Prevent and Manage VTE (Venous Thromboembolism) Risk  Recent Flowsheet Documentation  Taken 6/3/2023 0940 by Zaynab Baker RN  Activity Management: activity encouraged  Range of Motion: active ROM (range of motion) encouraged     Problem: Adult Inpatient Plan of Care  Goal: Optimal Comfort and Wellbeing  Intervention: Provide Person-Centered Care  Recent Flowsheet Documentation  Taken 6/3/2023 1542 by Zaynab Baker, RN  Trust Relationship/Rapport:   care explained   questions answered  Taken 6/3/2023 1300 by Zaynab Baker RN  Trust Relationship/Rapport:   care explained   questions answered  Taken 6/3/2023 0940 by Zaynab Baker RN  Trust Relationship/Rapport:   care explained   questions encouraged   Goal Outcome Evaluation:

## 2023-06-03 NOTE — THERAPY TREATMENT NOTE
Patient Name: Vernon Solorzano  : 1950    MRN: [id number]                              Today's Date: 6/3/2023       Admit Date: 2023    Visit Dx:     ICD-10-CM ICD-9-CM   1. S/P MVR (mitral valve replacement)  Z95.2 V43.3   2. Nonrheumatic mitral valve regurgitation  I34.0 424.0     Patient Active Problem List   Diagnosis    DM (diabetes mellitus), type 2    Mixed hyperlipidemia    Mild intermittent asthma without complication    New onset of congestive heart failure    Nonrheumatic mitral valve regurgitation    Chest pain    Essential hypertension    Mitral valve disease     Past Medical History:   Diagnosis Date    Allergic rhinitis     Arthritis     BPH (benign prostatic hyperplasia)     Diabetes mellitus     TYPE 2    Foraminal stenosis of cervical region     C5-C6    GERD (gastroesophageal reflux disease)     Hemorrhoids     History of urinary retention     S/P TURP    Hyperlipidemia     Macular degeneration     PING (obstructive sleep apnea) 2016    MILD, SEES DR. AMARILIS MORGAN     Past Surgical History:   Procedure Laterality Date    CARDIAC CATHETERIZATION N/A 2023    Procedure: Right Heart Cath;  Surgeon: Corey Gaffney MD;  [facility];  Service: Cardiology;  Laterality: N/A;    CARDIAC CATHETERIZATION N/A 2023    Procedure: Left Heart Cath;  Surgeon: Corey Gaffney MD;  [facility];  Service: Cardiology;  Laterality: N/A;    CARDIAC CATHETERIZATION N/A 2023    Procedure: Left ventriculography;  Surgeon: Corey Gaffney MD;  [facility];  Service: Cardiology;  Laterality: N/A;    CARDIAC CATHETERIZATION N/A 2023    Procedure: Coronary angiography;  Surgeon: Corey Gaffney MD;  [facility]:  [facility];  Service: Cardiology;  Laterality: N/A;    COLONOSCOPY N/A     WNL PER PT, NO RECORDS,     COLONOSCOPY N/A 2009    DR. GORGE BENAVIDEZ AT [place]     MITRAL VALVE REPAIR/REPLACEMENT N/A 5/24/2023    Procedure: MITRAL VALVE REPAIR/REPLACEMENT TRICUSPID VALVE REPAIR/REPLACEMENT TRANSESOPHAGEAL ECHOCARDIOGRAM WITH ANESTHESIA;  Surgeon: George Frey MD;  Location: [facility];  Service: Cardiothoracic;  Laterality: N/A;    PROSTATE SURGERY N/A 11/12/2010    TURP, DR. BRIGHT COLLAZO AT [facility]    SKIN TAG REMOVAL N/A 12/20/2017    ANAL SKIN TAG X2, PERFORMED IN OFFICE, DR. LISA ORANTES    TURP / TRANSURETHRAL INCISION / DRAINAGE PROSTATE  2010    Dr. Goodrich      General Information       Row Name 06/03/23 1509          Physical Therapy Time and Intention    Document Type therapy note (daily note)  -EB     Mode of Treatment individual therapy;physical therapy  -EB       Row Name 06/03/23 1509          General Information    Patient Profile Reviewed yes  -EB     Existing Precautions/Restrictions fall;cardiac;sternal  -EB       Row Name 06/03/23 1509          Cognition    Orientation Status (Cognition) oriented to;person;place  -EB       Row Name 06/03/23 1509          Safety Issues, Functional Mobility    Safety Issues Affecting Function (Mobility) ability to follow commands  -EB     Impairments Affecting Function (Mobility) balance;endurance/activity tolerance;strength;pain;cognition;coordination  -EB               User Key  (r) = Recorded By, (t) = Taken By, (c) = Cosigned By      Initials Name Provider Type    EB Sarina Shah PTA Physical Therapist Assistant                   Mobility       Row Name 06/03/23 1501          Bed Mobility    Supine-Sit [place] (Bed Mobility) minimum assist (75% patient effort);2 person assist  -EB     Sit-Supine [place] (Bed Mobility) not tested  -EB     Assistive Device (Bed Mobility) bed rails;head of bed elevated  -EB     Comment, (Bed Mobility) increased!!!!! time for pt to get to  EOB. pt had pain meds so either slow due to that or stalling.  -EB       Row Name 06/03/23 1504          Bed-Chair Transfer     Bed-Chair [place] (Transfers) minimum assist (75% patient effort);2 person assist;1 person to manage equipment  -EB     Assistive Device (Bed-Chair Transfers) --  HHAX2  -EB       Row Name 06/03/23 1509          Sit-Stand Transfer    Sit-Stand [place] (Transfers) minimum assist (75% patient effort);2 person assist;1 person to manage equipment  -EB     Assistive Device (Sit-Stand Transfers) other (see comments)  -EB       Row Name 06/03/23 1509          Gait/Stairs (Locomotion)    [place] Level (Gait) minimum assist (75% patient effort);2 person assist  -EB     Assistive Device (Gait) --  HHAX2  -EB     Comment, (Gait/Stairs) pt took 4 steps towards the chair.  -EB               User Key  (r) = Recorded By, (t) = Taken By, (c) = Cosigned By      Initials Name Provider Type    Sarina Darby PTA Physical Therapist Assistant                   Obj/Interventions    No documentation.                  Goals/Plan    No documentation.                  Clinical Impression       Row Name 06/03/23 1512          Pain    Pain Location - chest;neck  -EB       Row Name 06/03/23 1512          Plan of Care Review    Plan of Care Reviewed With patient  -EB     Progress improving  -EB     Outcome Evaluation Pt seen for PT treatment this AM. Pt very slow with mobility and needing increased time. Pt required MinAX2 to get to EOB and to complete STS transfer. Pt would stop when positioning supine to sit due to neck pain. Pt was able to take at least 4 steps to the chair with MiNAX2 today. Will continue to progress pt as able.  -       Row Name 06/03/23 1512          Therapy Assessment/Plan (PT)    Therapy Frequency (PT) 6 times/wk  -       Row Name 06/03/23 1512          Positioning and Restraints    Pre-Treatment Position in bed  -EB     Post Treatment Position chair  -EB     In Chair sitting;call light within reach;encouraged to call for assist;exit alarm on;with family/caregiver;[place] [facility] [place]  -                User Key  (r) = Recorded By, (t) = Taken By, (c) = Cosigned By      Initials Name Provider Type    Sarina Darby PTA Physical Therapist Assistant                   Outcome Measures       Row Name 06/03/23 1515 06/03/23 0940       How much help from another person do you currently need...    Turning from your back to your side while in flat bed without using bedrails? 2  -EB 2  -MG    Moving from lying on back to sitting on the side of a flat bed without bedrails? 2  -EB 2  -MG    Moving to and from a bed to a chair (including a wheelchair)? 2  -EB 2  -MG    Standing up from a chair using your arms (e.g., wheelchair, bedside chair)? 2  -EB 2  -MG    Climbing 3-5 steps with a railing? 1  -EB 1  -MG    To walk in hospital room? 2  -EB 2  -MG    AM-PAC 6 Clicks Score (PT) 11  -EB 11  -MG    Highest level of mobility 4 --> Transferred to chair/commode  -EB 4 --> Transferred to chair/commode  -MG              User Key  (r) = Recorded By, (t) = Taken By, (c) = Cosigned By      Initials Name Provider Type    Sarina Darby PTA Physical Therapist Assistant    Zaynab Messina RN Registered Nurse                                 Physical Therapy Education       Title: PT OT SLP Therapies (Done)       Topic: Physical Therapy (Done)       Point: Mobility training (Done)       Learning Progress Summary             Patient Acceptance, E,D, VU,NR by EB at 6/3/2023 1515    Acceptance, E, VU by KN at 5/31/2023 1300    Acceptance, E, VU by EM at 5/31/2023 1043    Acceptance, E,TB, VU by TH at 5/30/2023 1652    Acceptance, E, VU,NR by DB at 5/30/2023 1125    Acceptance, E, NR by DB at 5/29/2023 1157    Acceptance, E, VU by DB at 5/28/2023 1344    Acceptance, E, NR by EM at 5/26/2023 1304    Acceptance, E, NR by EM at 5/25/2023 1208                         Point: Home exercise program (Done)       Learning Progress Summary             Patient Acceptance, E, VU by KN at 5/31/2023 1300    Acceptance, E, VU by EM at 5/31/2023  1043    Acceptance, E,TB, VU by TH at 5/30/2023 1652    Acceptance, E, VU,NR by DB at 5/30/2023 1125    Acceptance, E, NR by DB at 5/29/2023 1157    Acceptance, E, VU by DB at 5/28/2023 1344    Acceptance, E, NR by EM at 5/25/2023 1208                         Point: Body mechanics (Done)       Learning Progress Summary             Patient Acceptance, E,D, VU,NR by EB at 6/3/2023 1515    Acceptance, E, VU by KN at 5/31/2023 1300    Acceptance, E,TB, VU by TH at 5/30/2023 1652    Acceptance, E, VU,NR by DB at 5/30/2023 1125    Acceptance, E, NR by DB at 5/29/2023 1157    Acceptance, E, VU by DB at 5/28/2023 1344                         Point: Precautions (Done)       Learning Progress Summary             Patient Acceptance, E,D, VU,NR by EB at 6/3/2023 1515    Acceptance, E, VU by KN at 5/31/2023 1300    Acceptance, E,TB, VU by TH at 5/30/2023 1652    Acceptance, E, VU,NR by DB at 5/30/2023 1125    Acceptance, E, NR by DB at 5/29/2023 1157    Acceptance, E, VU by DB at 5/28/2023 1344                                         User Key       Initials Effective Dates Name Provider Type Discipline    EM 06/16/21 -  Kathy Shaffer, PT Physical Therapist PT     06/16/21 -  Jelly Sepulveda, RN Registered Nurse Nurse     02/14/23 -  Sarina Shah PTA Physical Therapist Assistant PT    DB 06/16/21 -  Marissa Zamora, PT Physical Therapist PT     08/02/22 -  Shon Sargent OT Occupational Therapist OT                  PT Recommendation and Plan     Plan of Care Reviewed With: patient  Progress: improving  Outcome Evaluation: Pt seen for PT treatment this AM. Pt very slow with mobility and needing increased time. Pt required MinAX2 to get to EOB and to complete STS transfer. Pt would stop when positioning supine to sit due to neck pain. Pt was able to take at least 4 steps to the chair with MiNAX2 today. Will continue to progress pt as able.     Time Calculation:    PT Charges       Row Name 06/03/23 1506              Time Calculation    Start Time 1129  -EB      Stop Time 1154  -EB      Time Calculation (min) 25 min  -EB      PT Received On 06/03/23  -EB      PT - Next Appointment 06/05/23  -EB         Time Calculation- PT    Total Timed Code Minutes- PT 25 minute(s)  -EB                User Key  (r) = Recorded By, (t) = Taken By, (c) = Cosigned By      Initials Name Provider Type    EB Sarina Shah PTA Physical Therapist Assistant                  Therapy Charges for Today       Code Description Service Date Service Provider Modifiers Qty    14315511853 HC PT THERAPEUTIC ACT EA 15 MIN 6/3/2023 Sarina Shah PTA GP 1    76157124184 HC PT THER PROC EA 15 MIN 6/3/2023 Sarina Shah PTA GP 1    12684342893 HC PT THER SUPP EA 15 MIN 6/3/2023 Sarina Shah PTA GP 1            PT G-Codes  Outcome Measure Options: AM-PAC 6 Clicks Daily Activity (OT)  AM-PAC 6 Clicks Score (PT): 11  AM-PAC 6 Clicks Score (OT): 8       Sarina Shah PTA  6/3/2023     [facility]

## 2023-06-03 NOTE — PROGRESS NOTES
" LOS: 11 days   Patient Care Team:  Liza Oconnell III, NP-C as PCP - General (Family Medicine)  Corey Lao Jr., MD (Inactive) as Consulting Physician (Urology)    Chief Complaint: post op    Subjective:  Symptoms:  He reports weakness.  No shortness of breath, cough or chest pain.    Diet:  NPO.  No nausea or vomiting.    Activity level: Impaired due to weakness.    Pain:  He complains of pain that is mild.  Pain is requiring pain medication.    Mentation is better today, able to have an appropriate conversation    Vital Signs  Temp:  [98.2 °F (36.8 °C)-99 °F (37.2 °C)] 98.2 °F (36.8 °C)  Heart Rate:  [77-96] 90  Resp:  [18-19] 19  BP: (101-145)/(64-92) 111/71  Body mass index is 34.72 kg/m².    Intake/Output Summary (Last 24 hours) at 6/3/2023 0840  Last data filed at 6/3/2023 0405  Gross per 24 hour   Intake 340 ml   Output 2250 ml   Net -1910 ml       No intake/output data recorded.        06/01/23  1516 06/02/23  0612 06/03/23  0600   Weight: 118 kg (261 lb) 117 kg (257 lb 3.2 oz) 116 kg (256 lb)     Objective:  General Appearance:  Comfortable and in no acute distress.    Vital signs: (most recent): Blood pressure 111/71, pulse 90, temperature 98.2 °F (36.8 °C), temperature source Oral, resp. rate 19, height 182.9 cm (72\"), weight 116 kg (256 lb), SpO2 95 %.  Vital signs are normal.  No fever.    Output: Producing urine and producing stool.    Lungs:  Normal effort and normal respiratory rate.  There are decreased breath sounds.  Rales: bibasilar.  Heart: Normal rate.  Irregular rhythm.    Abdomen: Abdomen is soft.  Bowel sounds are normal.     Extremities: There is dependent edema (bilaterally L>R).    Pulses: Distal pulses are intact.    Neurological: Patient is alert.  (Oriented to person, place, and situation).    Skin:  Warm and dry.  (Sternal incision clean, dry, and intact)        Results Review:        WBC WBC   Date Value Ref Range Status   06/03/2023 13.99 (H) 3.40 - " 10.80 10*3/mm3 Final   06/02/2023 11.81 (H) 3.40 - 10.80 10*3/mm3 Final   06/01/2023 13.16 (H) 3.40 - 10.80 10*3/mm3 Final      HGB Hemoglobin   Date Value Ref Range Status   06/03/2023 10.7 (L) 13.0 - 17.7 g/dL Final   06/02/2023 10.8 (L) 13.0 - 17.7 g/dL Final   06/01/2023 10.5 (L) 13.0 - 17.7 g/dL Final      HCT Hematocrit   Date Value Ref Range Status   06/03/2023 31.9 (L) 37.5 - 51.0 % Final   06/02/2023 33.1 (L) 37.5 - 51.0 % Final   06/01/2023 31.8 (L) 37.5 - 51.0 % Final      Platelets Platelets   Date Value Ref Range Status   06/03/2023 270 140 - 450 10*3/mm3 Final   06/02/2023 257 140 - 450 10*3/mm3 Final   06/01/2023 193 140 - 450 10*3/mm3 Final        PT/INR:    Protime   Date Value Ref Range Status   06/01/2023 13.4 11.7 - 14.2 Seconds Final   /  INR   Date Value Ref Range Status   06/01/2023 1.01 0.90 - 1.10 Final       Sodium Sodium   Date Value Ref Range Status   06/02/2023 137 136 - 145 mmol/L Final   06/01/2023 134 (L) 136 - 145 mmol/L Final   06/01/2023 134 (L) 136 - 145 mmol/L Final      Potassium Potassium   Date Value Ref Range Status   06/02/2023 4.1 3.5 - 5.2 mmol/L Final     Comment:     Slight hemolysis detected by analyzer. Results may be affected.   06/01/2023 4.4 3.5 - 5.2 mmol/L Final   06/01/2023 4.4 3.5 - 5.2 mmol/L Final     Comment:     Slight hemolysis detected by analyzer. Results may be affected.      Chloride Chloride   Date Value Ref Range Status   06/02/2023 99 98 - 107 mmol/L Final   06/01/2023 99 98 - 107 mmol/L Final   06/01/2023 99 98 - 107 mmol/L Final      Bicarbonate CO2   Date Value Ref Range Status   06/02/2023 26.0 22.0 - 29.0 mmol/L Final   06/01/2023 24.3 22.0 - 29.0 mmol/L Final   06/01/2023 22.1 22.0 - 29.0 mmol/L Final      BUN BUN   Date Value Ref Range Status   06/02/2023 27 (H) 8 - 23 mg/dL Final   06/01/2023 28 (H) 8 - 23 mg/dL Final   06/01/2023 29 (H) 8 - 23 mg/dL Final      Creatinine Creatinine   Date Value Ref Range Status   06/02/2023 1.12 0.76 - 1.27  mg/dL Final   06/01/2023 0.96 0.76 - 1.27 mg/dL Final   06/01/2023 0.97 0.76 - 1.27 mg/dL Final      Calcium Calcium   Date Value Ref Range Status   06/02/2023 8.5 (L) 8.6 - 10.5 mg/dL Final   06/01/2023 8.6 8.6 - 10.5 mg/dL Final   06/01/2023 8.6 8.6 - 10.5 mg/dL Final      Magnesium No results found for: MG       aspirin, 81 mg, Oral, Daily  atorvastatin, 40 mg, Oral, Nightly  calcium 500 mg vitamin D 5 mcg (200 UT), 1 tablet, Oral, Daily  empagliflozin, 25 mg, Oral, Daily  furosemide, 40 mg, Intravenous, Q12H  guaifenesin, 400 mg, Oral, Q8H  insulin glargine, 25 Units, Subcutaneous, Daily  insulin lispro, 3-14 Units, Subcutaneous, Q6H  ipratropium-albuterol, 3 mL, Nebulization, 4x Daily - RT  metoprolol succinate XL, 12.5 mg, Oral, Q24H  mupirocin, , Each Nare, BID  pantoprazole, 40 mg, Intravenous, QAM AC  potassium chloride, 20 mEq, Nasogastric, BID With Meals  predniSONE, 5 mg, Oral, Every Other Day  senna-docusate sodium, 2 tablet, Oral, Nightly      heparin, 8.4 Units/kg/hr, Last Rate: 17.4 Units/kg/hr (06/03/23 0641)            Patient Active Problem List   Diagnosis Code    DM (diabetes mellitus), type 2 E11.9    Mixed hyperlipidemia E78.2    Mild intermittent asthma without complication J45.20    New onset of congestive heart failure I50.9    Nonrheumatic mitral valve regurgitation I34.0    Chest pain R07.9    Essential hypertension I10    Mitral valve disease I05.9       Assessment & Plan   -Moderate to severe mitral regurgitation s/p MVR/TV repair/SU closure POD#10 Pagni  -Moderate tricuspid valve regurgitation  -Acute on chronic diastolic heart failure with preserved EF--55%  -CKD stage IIIa; baseline creatinine 1.2  -DM type II--A1c  -Severe pulmonary HTN  -PING  -BPH/Urinary retention s/p TURP  -Interstitial lung disease s/p COVID-19 infection --steroid taper per pulm.  -leukocytosis--reactive/steroid administration  -post op anemia--expected acute blood loss  -TCP--consumptive--resolved  - post op  aphasia/confusion/unilateral weakness--neurology evaluating  - atrial flutter/junctional bradycardia post op     Mentation is better this morning. Neurology recommends MRI once AV wires removed  Cardioversion yesterday-- converted to in sinus rhythm rate in the 90s he currently looks like he is in atrial fibrillation again rate controlled  Plan to remove AV wires-- hold heparin 2 hours prior to removing will start eliquis this afternoon  Weaned to RA and tolerating  Still with edema, but does appear improved. Diuretic management per renal. Will have nursing apply HOLLY hose to bilateral LE  Tolerating modified diet, but is receiving nocturnal tube feeding   Encourage aggressive pulmonary toilet--continue flutter/guaifinisin  Still very weak--continue aggressive PT/OT. [facility] accepted him whenever he is ready medically  Continue routine post op care    DESTIN Vicente  06/03/23  08:40 EDT

## 2023-06-03 NOTE — PLAN OF CARE
Goal Outcome Evaluation:  Plan of Care Reviewed With: patient        Progress: improving  Outcome Evaluation: Pt seen for PT treatment this AM. Pt very slow with mobility and needing increased time. Pt required MinAX2 to get to EOB and to complete STS transfer. Pt would stop when positioning supine to sit due to neck pain. Pt was able to take at least 4 steps to the chair with MiNAX2 today. Will continue to progress pt as able.

## 2023-06-04 LAB
ANION GAP SERPL CALCULATED.3IONS-SCNC: 12 MMOL/L (ref 5–15)
APTT PPP: 151.6 SECONDS (ref 22.7–35.4)
APTT PPP: 30.3 SECONDS (ref 22.7–35.4)
BASOPHILS # BLD AUTO: 0.06 10*3/MM3 (ref 0–0.2)
BASOPHILS NFR BLD AUTO: 0.4 % (ref 0–1.5)
BUN SERPL-MCNC: 27 MG/DL (ref 8–23)
BUN/CREAT SERPL: 22.9 (ref 7–25)
CALCIUM SPEC-SCNC: 8.2 MG/DL (ref 8.6–10.5)
CHLORIDE SERPL-SCNC: 96 MMOL/L (ref 98–107)
CO2 SERPL-SCNC: 26 MMOL/L (ref 22–29)
CREAT SERPL-MCNC: 1.18 MG/DL (ref 0.76–1.27)
DEPRECATED RDW RBC AUTO: 44.1 FL (ref 37–54)
EGFRCR SERPLBLD CKD-EPI 2021: 64.8 ML/MIN/1.73
EOSINOPHIL # BLD AUTO: 0.49 10*3/MM3 (ref 0–0.4)
EOSINOPHIL NFR BLD AUTO: 3.5 % (ref 0.3–6.2)
ERYTHROCYTE [DISTWIDTH] IN BLOOD BY AUTOMATED COUNT: 14.1 % (ref 12.3–15.4)
GLUCOSE BLDC GLUCOMTR-MCNC: 225 MG/DL (ref 70–130)
GLUCOSE BLDC GLUCOMTR-MCNC: 226 MG/DL (ref 70–130)
GLUCOSE BLDC GLUCOMTR-MCNC: 233 MG/DL (ref 70–130)
GLUCOSE BLDC GLUCOMTR-MCNC: 266 MG/DL (ref 70–130)
GLUCOSE BLDC GLUCOMTR-MCNC: 296 MG/DL (ref 70–130)
GLUCOSE SERPL-MCNC: 174 MG/DL (ref 65–99)
HCT VFR BLD AUTO: 30.3 % (ref 37.5–51)
HGB BLD-MCNC: 10 G/DL (ref 13–17.7)
IMM GRANULOCYTES # BLD AUTO: 0.48 10*3/MM3 (ref 0–0.05)
IMM GRANULOCYTES NFR BLD AUTO: 3.4 % (ref 0–0.5)
LYMPHOCYTES # BLD AUTO: 1.53 10*3/MM3 (ref 0.7–3.1)
LYMPHOCYTES NFR BLD AUTO: 10.9 % (ref 19.6–45.3)
MCH RBC QN AUTO: 28.7 PG (ref 26.6–33)
MCHC RBC AUTO-ENTMCNC: 33 G/DL (ref 31.5–35.7)
MCV RBC AUTO: 87.1 FL (ref 79–97)
MONOCYTES # BLD AUTO: 1.03 10*3/MM3 (ref 0.1–0.9)
MONOCYTES NFR BLD AUTO: 7.4 % (ref 5–12)
NEUTROPHILS NFR BLD AUTO: 10.41 10*3/MM3 (ref 1.7–7)
NEUTROPHILS NFR BLD AUTO: 74.4 % (ref 42.7–76)
NRBC BLD AUTO-RTO: 0 /100 WBC (ref 0–0.2)
PLATELET # BLD AUTO: 239 10*3/MM3 (ref 140–450)
PMV BLD AUTO: 10.5 FL (ref 6–12)
POTASSIUM SERPL-SCNC: 3.5 MMOL/L (ref 3.5–5.2)
RBC # BLD AUTO: 3.48 10*6/MM3 (ref 4.14–5.8)
SODIUM SERPL-SCNC: 134 MMOL/L (ref 136–145)
WBC NRBC COR # BLD: 14 10*3/MM3 (ref 3.4–10.8)

## 2023-06-04 PROCEDURE — 94799 UNLISTED PULMONARY SVC/PX: CPT

## 2023-06-04 PROCEDURE — 85025 COMPLETE CBC W/AUTO DIFF WBC: CPT | Performed by: NURSE PRACTITIONER

## 2023-06-04 PROCEDURE — 25010000002 FUROSEMIDE PER 20 MG: Performed by: INTERNAL MEDICINE

## 2023-06-04 PROCEDURE — 94664 DEMO&/EVAL PT USE INHALER: CPT

## 2023-06-04 PROCEDURE — 82948 REAGENT STRIP/BLOOD GLUCOSE: CPT

## 2023-06-04 PROCEDURE — 85730 THROMBOPLASTIN TIME PARTIAL: CPT | Performed by: NURSE PRACTITIONER

## 2023-06-04 PROCEDURE — 80048 BASIC METABOLIC PNL TOTAL CA: CPT | Performed by: INTERNAL MEDICINE

## 2023-06-04 PROCEDURE — 25010000002 HEPARIN (PORCINE) 25000-0.45 UT/250ML-% SOLUTION: Performed by: NURSE PRACTITIONER

## 2023-06-04 PROCEDURE — 99232 SBSQ HOSP IP/OBS MODERATE 35: CPT | Performed by: INTERNAL MEDICINE

## 2023-06-04 PROCEDURE — 94761 N-INVAS EAR/PLS OXIMETRY MLT: CPT

## 2023-06-04 PROCEDURE — 63710000001 INSULIN LISPRO (HUMAN) PER 5 UNITS: Performed by: NURSE PRACTITIONER

## 2023-06-04 PROCEDURE — 99231 SBSQ HOSP IP/OBS SF/LOW 25: CPT | Performed by: PSYCHIATRY & NEUROLOGY

## 2023-06-04 PROCEDURE — 85730 THROMBOPLASTIN TIME PARTIAL: CPT | Performed by: THORACIC SURGERY (CARDIOTHORACIC VASCULAR SURGERY)

## 2023-06-04 PROCEDURE — 99024 POSTOP FOLLOW-UP VISIT: CPT | Performed by: NURSE PRACTITIONER

## 2023-06-04 RX ORDER — METOPROLOL SUCCINATE 25 MG/1
25 TABLET, EXTENDED RELEASE ORAL
Status: DISCONTINUED | OUTPATIENT
Start: 2023-06-04 | End: 2023-06-06 | Stop reason: HOSPADM

## 2023-06-04 RX ADMIN — IPRATROPIUM BROMIDE AND ALBUTEROL SULFATE 3 ML: 2.5; .5 SOLUTION RESPIRATORY (INHALATION) at 19:40

## 2023-06-04 RX ADMIN — MUPIROCIN 1 APPLICATION: 20 OINTMENT TOPICAL at 20:46

## 2023-06-04 RX ADMIN — ATORVASTATIN CALCIUM 40 MG: 20 TABLET, FILM COATED ORAL at 20:46

## 2023-06-04 RX ADMIN — GUAIFENESIN 400 MG: 200 SOLUTION ORAL at 07:01

## 2023-06-04 RX ADMIN — FUROSEMIDE 40 MG: 10 INJECTION, SOLUTION INTRAMUSCULAR; INTRAVENOUS at 09:16

## 2023-06-04 RX ADMIN — INSULIN LISPRO 5 UNITS: 100 INJECTION, SOLUTION INTRAVENOUS; SUBCUTANEOUS at 01:39

## 2023-06-04 RX ADMIN — CALCIUM CARBONATE-VITAMIN D TAB 500 MG-200 UNIT 1 TABLET: 500-200 TAB at 09:17

## 2023-06-04 RX ADMIN — ACETAMINOPHEN 650 MG: 325 TABLET, FILM COATED ORAL at 20:57

## 2023-06-04 RX ADMIN — INSULIN LISPRO 8 UNITS: 100 INJECTION, SOLUTION INTRAVENOUS; SUBCUTANEOUS at 11:31

## 2023-06-04 RX ADMIN — METOPROLOL SUCCINATE 25 MG: 25 TABLET, EXTENDED RELEASE ORAL at 11:31

## 2023-06-04 RX ADMIN — GUAIFENESIN 400 MG: 200 SOLUTION ORAL at 17:12

## 2023-06-04 RX ADMIN — PANTOPRAZOLE SODIUM 40 MG: 40 INJECTION, POWDER, FOR SOLUTION INTRAVENOUS at 06:55

## 2023-06-04 RX ADMIN — MUPIROCIN 1 APPLICATION: 20 OINTMENT TOPICAL at 09:17

## 2023-06-04 RX ADMIN — EMPAGLIFLOZIN 25 MG: 25 TABLET, FILM COATED ORAL at 09:17

## 2023-06-04 RX ADMIN — ASPIRIN 81 MG: 81 TABLET, CHEWABLE ORAL at 09:16

## 2023-06-04 RX ADMIN — DOCUSATE SODIUM 50MG AND SENNOSIDES 8.6MG 2 TABLET: 8.6; 5 TABLET, FILM COATED ORAL at 20:46

## 2023-06-04 RX ADMIN — APIXABAN 5 MG: 5 TABLET, FILM COATED ORAL at 20:46

## 2023-06-04 RX ADMIN — INSULIN LISPRO 5 UNITS: 100 INJECTION, SOLUTION INTRAVENOUS; SUBCUTANEOUS at 17:11

## 2023-06-04 RX ADMIN — INSULIN LISPRO 3 UNITS: 100 INJECTION, SOLUTION INTRAVENOUS; SUBCUTANEOUS at 06:55

## 2023-06-04 RX ADMIN — INSULIN GLARGINE-YFGN 25 UNITS: 100 INJECTION, SOLUTION SUBCUTANEOUS at 09:17

## 2023-06-04 RX ADMIN — IPRATROPIUM BROMIDE AND ALBUTEROL SULFATE 3 ML: 2.5; .5 SOLUTION RESPIRATORY (INHALATION) at 08:12

## 2023-06-04 RX ADMIN — HYDROCODONE BITARTRATE AND ACETAMINOPHEN 1 TABLET: 7.5; 325 TABLET ORAL at 05:23

## 2023-06-04 RX ADMIN — APIXABAN 5 MG: 5 TABLET, FILM COATED ORAL at 09:16

## 2023-06-04 RX ADMIN — IPRATROPIUM BROMIDE AND ALBUTEROL SULFATE 3 ML: 2.5; .5 SOLUTION RESPIRATORY (INHALATION) at 15:42

## 2023-06-04 RX ADMIN — POTASSIUM CHLORIDE 20 MEQ: 1.5 POWDER, FOR SOLUTION ORAL at 09:17

## 2023-06-04 RX ADMIN — IPRATROPIUM BROMIDE AND ALBUTEROL SULFATE 3 ML: 2.5; .5 SOLUTION RESPIRATORY (INHALATION) at 11:46

## 2023-06-04 NOTE — PROGRESS NOTES
Neurology Progress Note    Reason for visit  Follow up for possible seizure    Interval History  Patient reports right hand still feels different.  He also reports feelings of disorientation  Passed swallow evaluation to modified diet.  Pacer wires are out.  He is still coughing but less frequently.    Medications:  No current facility-administered medications on file prior to encounter.     Current Outpatient Medications on File Prior to Encounter   Medication Sig Dispense Refill    aspirin 81 MG tablet Take 1 tablet by mouth Daily.      atorvastatin (LIPITOR) 10 MG tablet TAKE 1 TABLET BY MOUTH EVERY DAY (Patient taking differently: 1 tablet Every Night.) 90 tablet 0    furosemide (LASIX) 40 MG tablet Take 1 tablet by mouth 2 (Two) Times a Day. 60 tablet 1    insulin degludec (TRESIBA FLEXTOUCH) 100 UNIT/ML solution pen-injector injection Start 25 units daily and titrate up 2 units every 2 days if blood glucose levels are consistently above 150 (Patient taking differently: Inject 50 Units under the skin into the appropriate area as directed Daily.)      irbesartan (AVAPRO) 75 MG tablet Take 1 tablet by mouth Daily.      Jardiance 25 MG tablet tablet Take 1 tablet by mouth Daily.      metoclopramide (REGLAN) 10 MG tablet Take 1 tablet by mouth Every Night.      Multiple Vitamins-Minerals (PRESERVISION AREDS 2+MULTI VIT PO) Take  by mouth 2 (Two) Times a Day.      omeprazole (priLOSEC) 40 MG capsule Take 1 capsule by mouth Daily. (Patient taking differently: Take 1 capsule by mouth Every Night.) 90 capsule 4    potassium chloride (MICRO-K) 10 MEQ CR capsule Take 2 capsules by mouth Daily.      predniSONE (DELTASONE) 10 MG tablet Take 9 mg by mouth Daily.      Symbicort 160-4.5 MCG/ACT inhaler Inhale 2 puffs 2 (Two) Times a Day.      tiotropium (SPIRIVA) 18 MCG per inhalation capsule Place 1 capsule into inhaler and inhale Daily.       Review of Systems:   Review of Systems   Respiratory:  Positive for cough.     Cardiovascular:         Pain at sterniotomy site   Psychiatric/Behavioral:  Positive for confusion.    All other systems reviewed and are negative.    Vital Signs  Temp:  [97.7 °F (36.5 °C)-98.5 °F (36.9 °C)] 98 °F (36.7 °C)  Heart Rate:  [90-98] 92  Resp:  [16-22] 22  BP: (111-118)/(74-95) 114/74    Physical Exam:  General: appears uncomfortable, sitting up in chair  HEENT:  normal  CVS:  Regular rate and rhythm.    Musculoskeletal:  No signs of peripheral edema  Neurologic:      Alert, oriented to place, situation, month and year      Fluent with occasional word substitutions      Decreased concentration, distracted easily      Right hand clumsy  Psychiatric: no anxiety     Results Review:    Head CT showed no acute pathology  CTA head and neck showed mild to moderate calcified cervical and intracranial atherosclerosis with no rate limiting stenosis    Medical Decision Making and Recommendations  Probable small ischemic reuben-procedural event  Continue aspirin and statin therapy  Does not need anticoagulation for atrial flutter as atrial appendage was removed during surgery.    Will try brain MRI tomorrow now that pacer wires are out and he is coughing less.  This will be informational and prognostic purposes, will not likely     Discussed with patient and son.    Stroke neurology will continue to follow. Dr Barbour and Dr. Martin be following patient.    Lidia Ybarra MD  5/30/2023

## 2023-06-04 NOTE — PROGRESS NOTES
" LOS: 12 days   Patient Care Team:  Liza Oconnell III, NP-C as PCP - General (Family Medicine)  Corey Lao Jr., MD (Inactive) as Consulting Physician (Urology)    Chief Complaint: post op    Subjective:  Symptoms:  He reports weakness.  No shortness of breath, cough or chest pain.    Diet:  NPO.  No nausea or vomiting.    Activity level: Impaired due to weakness.    Pain:  He complains of pain that is mild.  Pain is requiring pain medication.    Mentation is better today, able to have an appropriate conversation    Vital Signs  Temp:  [97.7 °F (36.5 °C)-98.5 °F (36.9 °C)] 98 °F (36.7 °C)  Heart Rate:  [90-98] 97  Resp:  [16-19] 18  BP: (111-118)/(74-95) 114/74  Body mass index is 34.72 kg/m².    Intake/Output Summary (Last 24 hours) at 6/4/2023 0934  Last data filed at 6/4/2023 0400  Gross per 24 hour   Intake 1020 ml   Output 600 ml   Net 420 ml       No intake/output data recorded.        06/01/23  1516 06/02/23  0612 06/03/23  0600   Weight: 118 kg (261 lb) 117 kg (257 lb 3.2 oz) 116 kg (256 lb)     Objective:  General Appearance:  Comfortable and in no acute distress.    Vital signs: (most recent): Blood pressure 114/74, pulse 97, temperature 98 °F (36.7 °C), temperature source Oral, resp. rate 18, height 182.9 cm (72\"), weight 116 kg (256 lb), SpO2 96 %.  Vital signs are normal.  No fever.    Output: Producing urine and producing stool.    Lungs:  Normal effort and normal respiratory rate.  There are decreased breath sounds.  Rales: bibasilar.  Heart: Normal rate.  Irregular rhythm.    Abdomen: Abdomen is soft.  Bowel sounds are normal.     Extremities: There is dependent edema (bilaterally L>R).    Pulses: Distal pulses are intact.    Neurological: Patient is alert.  (Oriented to person, place, and situation).    Skin:  Warm and dry.  (Sternal incision clean, dry, and intact)        Results Review:        WBC WBC   Date Value Ref Range Status   06/04/2023 14.00 (H) 3.40 - 10.80 " 10*3/mm3 Final   06/03/2023 13.99 (H) 3.40 - 10.80 10*3/mm3 Final   06/02/2023 11.81 (H) 3.40 - 10.80 10*3/mm3 Final      HGB Hemoglobin   Date Value Ref Range Status   06/04/2023 10.0 (L) 13.0 - 17.7 g/dL Final   06/03/2023 10.7 (L) 13.0 - 17.7 g/dL Final   06/02/2023 10.8 (L) 13.0 - 17.7 g/dL Final      HCT Hematocrit   Date Value Ref Range Status   06/04/2023 30.3 (L) 37.5 - 51.0 % Final   06/03/2023 31.9 (L) 37.5 - 51.0 % Final   06/02/2023 33.1 (L) 37.5 - 51.0 % Final      Platelets Platelets   Date Value Ref Range Status   06/04/2023 239 140 - 450 10*3/mm3 Final   06/03/2023 270 140 - 450 10*3/mm3 Final   06/02/2023 257 140 - 450 10*3/mm3 Final        PT/INR:    Protime   Date Value Ref Range Status   06/01/2023 13.4 11.7 - 14.2 Seconds Final   /  INR   Date Value Ref Range Status   06/01/2023 1.01 0.90 - 1.10 Final       Sodium Sodium   Date Value Ref Range Status   06/04/2023 134 (L) 136 - 145 mmol/L Final   06/03/2023 141 136 - 145 mmol/L Final   06/02/2023 137 136 - 145 mmol/L Final      Potassium Potassium   Date Value Ref Range Status   06/04/2023 3.5 3.5 - 5.2 mmol/L Final   06/03/2023 4.0 3.5 - 5.2 mmol/L Final   06/02/2023 4.1 3.5 - 5.2 mmol/L Final     Comment:     Slight hemolysis detected by analyzer. Results may be affected.      Chloride Chloride   Date Value Ref Range Status   06/04/2023 96 (L) 98 - 107 mmol/L Final   06/03/2023 100 98 - 107 mmol/L Final   06/02/2023 99 98 - 107 mmol/L Final      Bicarbonate CO2   Date Value Ref Range Status   06/04/2023 26.0 22.0 - 29.0 mmol/L Final   06/03/2023 31.1 (H) 22.0 - 29.0 mmol/L Final   06/02/2023 26.0 22.0 - 29.0 mmol/L Final      BUN BUN   Date Value Ref Range Status   06/04/2023 27 (H) 8 - 23 mg/dL Final   06/03/2023 29 (H) 8 - 23 mg/dL Final   06/02/2023 27 (H) 8 - 23 mg/dL Final      Creatinine Creatinine   Date Value Ref Range Status   06/04/2023 1.18 0.76 - 1.27 mg/dL Final   06/03/2023 1.33 (H) 0.76 - 1.27 mg/dL Final   06/02/2023 1.12 0.76 -  1.27 mg/dL Final      Calcium Calcium   Date Value Ref Range Status   06/04/2023 8.2 (L) 8.6 - 10.5 mg/dL Final   06/03/2023 9.0 8.6 - 10.5 mg/dL Final   06/02/2023 8.5 (L) 8.6 - 10.5 mg/dL Final      Magnesium No results found for: MG       apixaban, 5 mg, Oral, Q12H  aspirin, 81 mg, Oral, Daily  atorvastatin, 40 mg, Oral, Nightly  calcium 500 mg vitamin D 5 mcg (200 UT), 1 tablet, Oral, Daily  empagliflozin, 25 mg, Oral, Daily  furosemide, 40 mg, Intravenous, Daily  guaifenesin, 400 mg, Oral, Q8H  insulin glargine, 25 Units, Subcutaneous, Daily  insulin lispro, 3-14 Units, Subcutaneous, Q6H  ipratropium-albuterol, 3 mL, Nebulization, 4x Daily - RT  metoprolol succinate XL, 25 mg, Oral, Q24H  mupirocin, , Each Nare, BID  pantoprazole, 40 mg, Intravenous, QAM AC  potassium chloride, 20 mEq, Nasogastric, Daily  predniSONE, 5 mg, Oral, Every Other Day  senna-docusate sodium, 2 tablet, Oral, Nightly      heparin, 8.4 Units/kg/hr, Last Rate: 17.4 Units/kg/hr (06/04/23 0800)            Patient Active Problem List   Diagnosis Code    DM (diabetes mellitus), type 2 E11.9    Mixed hyperlipidemia E78.2    Mild intermittent asthma without complication J45.20    New onset of congestive heart failure I50.9    Nonrheumatic mitral valve regurgitation I34.0    Chest pain R07.9    Essential hypertension I10    Mitral valve disease I05.9       Assessment & Plan   -Moderate to severe mitral regurgitation s/p MVR/TV repair/SU closure POD#11 Pagni  -Moderate tricuspid valve regurgitation  -Acute on chronic diastolic heart failure with preserved EF--55%  -CKD stage IIIa; baseline creatinine 1.2  -DM type II--A1c  -Severe pulmonary HTN  -PING  -BPH/Urinary retention s/p TURP  -Interstitial lung disease s/p COVID-19 infection --steroid taper per pulm.  -leukocytosis--reactive/steroid administration  -post op anemia--expected acute blood loss  -TCP--consumptive--resolved  - post op aphasia/confusion/unilateral weakness--neurology  evaluating  - atrial flutter/junctional bradycardia post op     Looks great today  Mentation is better this morning. Was confused on where he was but he knows that he is confused. Neurology recommends MRI once AV wires removed -- this could be done today or tomorrow  Ectopic rhythm this morning rate 90s-- Toprol started  Eliquis started for anticoagulation   Weaned to RA and tolerating  Tolerating modified diet, but is receiving nocturnal tube feeding   Encourage aggressive pulmonary toilet--continue flutter/guaifinisin  Still very weak--continue aggressive PT/OT. [facility] accepted him whenever he is ready medically-- getting close  Continue routine post op care    DESTIN Vicente  06/04/23  09:34 EDT

## 2023-06-04 NOTE — PROGRESS NOTES
[facility] Progress Note      Patient Name: Vernon Solorzano  : 1950  MRN: [id number]  Primary Care Physician:  Liza Oconnell III, RACHAEL  Date of admission: 2023    Subjective     Interval History:   Follow up CKD III.  Mentation is better today.  Family at bedside.  No shortness of air or chest pain.    Review of Systems:   As noted above    Objective     Vitals:   Temp:  [97.7 °F (36.5 °C)-98.5 °F (36.9 °C)] 98 °F (36.7 °C)  Heart Rate:  [90-98] 92  Resp:  [16-22] 22  BP: (111-118)/(74-95) 114/74    Intake/Output Summary (Last 24 hours) at 2023 1430  Last data filed at 2023 1154  Gross per 24 hour   Intake 900 ml   Output 1550 ml   Net -650 ml         Physical Exam:    General Appearance: alert  . Expressive aphasia.   Skin: warm and dry  HEENT: oral mucosa normal, nonicteric sclera  Neck: supple, no JVD  Lungs: Coarse upper airway rhonchi. Nasal 02  Heart: RRR, normal S1 and S2  Abdomen: soft, nontender, + bs, distended. Body wall edema.   : external catheter .  Extremities 1+ lower ext edema . R>L.   Neuro: word salad, perseverating. Not oriented to time or situation.  Using right hand and arm.   Scheduled Meds:     apixaban, 5 mg, Oral, Q12H  aspirin, 81 mg, Oral, Daily  atorvastatin, 40 mg, Oral, Nightly  calcium 500 mg vitamin D 5 mcg (200 UT), 1 tablet, Oral, Daily  empagliflozin, 25 mg, Oral, Daily  furosemide, 40 mg, Intravenous, Daily  guaifenesin, 400 mg, Oral, Q8H  insulin glargine, 25 Units, Subcutaneous, Daily  insulin lispro, 3-14 Units, Subcutaneous, Q6H  ipratropium-albuterol, 3 mL, Nebulization, 4x Daily - RT  metoprolol succinate XL, 25 mg, Oral, Q24H  mupirocin, , Each Nare, BID  pantoprazole, 40 mg, Intravenous, QAM AC  potassium chloride, 20 mEq, Nasogastric, Daily  predniSONE, 5 mg, Oral, Every Other Day  senna-docusate sodium, 2 tablet, Oral, Nightly      IV Meds:          Results Reviewed:   I have personally reviewed the  results from the time of this admission to 6/4/2023 14:30 EDT     Results from last 7 days   Lab Units 06/04/23  0319 06/03/23  0834 06/02/23  0433 06/01/23  0308   SODIUM mmol/L 134* 141 137 134*  134*   POTASSIUM mmol/L 3.5 4.0 4.1 4.4  4.4   CHLORIDE mmol/L 96* 100 99 99  99   CO2 mmol/L 26.0 31.1* 26.0 22.1  24.3   BUN mg/dL 27* 29* 27* 29*  28*   CREATININE mg/dL 1.18 1.33* 1.12 0.97  0.96   CALCIUM mg/dL 8.2* 9.0 8.5* 8.6  8.6   BILIRUBIN mg/dL  --  0.6  --  0.4   ALK PHOS U/L  --  82  --  65   ALT (SGPT) U/L  --  16  --  14   AST (SGOT) U/L  --  14  --  23   GLUCOSE mg/dL 174* 187* 151* 199*  200*         Estimated Creatinine Clearance: 72.2 mL/min (by C-G formula based on SCr of 1.18 mg/dL).    Results from last 7 days   Lab Units 06/02/23  0433   PHOSPHORUS mg/dL 4.7*               Results from last 7 days   Lab Units 06/04/23  0320 06/03/23  0300 06/02/23  0433 06/01/23  0308 05/31/23  0448   WBC 10*3/mm3 14.00* 13.99* 11.81* 13.16* 12.77*   HEMOGLOBIN g/dL 10.0* 10.7* 10.8* 10.5* 11.0*   PLATELETS 10*3/mm3 239 270 257 193 211         Results from last 7 days   Lab Units 06/01/23  1555   INR  1.01         Assessment / Plan     ASSESSMENT:  1. CKD III, baseline creatinine 1.2.  Peak 1.68.  creatinine stable at 1.3->1.18.  continue same.  2. MR now sp MV replacement, tissue and TV repair, SU closure 5/24/23.  3. Chronic steroid use after COVID 19 PNA.  4. Anemia post op. Hg stable .  5. Delirium, expressive aphasia. Ct without CVA.  Neuro thought small ischemic periprocedural event. On ASA and statin.    6. DM2    Vipul Viera MD  06/04/23  14:30 EDT    [facility]  [phone]

## 2023-06-04 NOTE — PLAN OF CARE
"Goal Outcome Evaluation:           Progress: improving          72 yr old male, Oriented to person and place. Pt states \"aware of being confused at times\". Patient still searching for words at times. Poor appetite. Eliquis started and Heparin drip d/c as ordered. Surgical incisions healing, no signs of infection. PO lasix given and diuresed 2000ml. Waiting to have MRI completed. Will continue to monitor  "

## 2023-06-04 NOTE — PROGRESS NOTES
[facility]  Pt has been accepted at [facility] when he is medically ready for dc. Noted Heparin continues. Awaiting MRI. Will continue to follow    Lola Rendon RN  Acute Rehab Admission Nurse

## 2023-06-04 NOTE — PLAN OF CARE
Goal Outcome Evaluation:                   Patient denies chest pain nor discomfort, vital signs stable, pt educate to call for assist, assist  x 1, call light in reach, SR in the monitor,on room air, cont on Heparin drip, diabetisource  infusing cont to monitor.

## 2023-06-04 NOTE — PROGRESS NOTES
LOS: 12 days   Patient Care Team:  Liza Oconnell III, NP-C as PCP - General (Family Medicine)  Corey Lao Jr., MD (Inactive) as Consulting Physician (Urology)    Chief Complaint: Follow-up severe mitral regurgitation status post tissue mitral valve replacement.    Interval History: Still with mild confusion, although he is aware that he is confused at times.  MRI of the brain is still pending.  He remains in what appears to be an ectopic atrial rhythm with a rate in the 90s.    Vital Signs:  Temp:  [97.7 °F (36.5 °C)-98.5 °F (36.9 °C)] 98.2 °F (36.8 °C)  Heart Rate:  [91-98] 91  Resp:  [18-22] 22  BP: (109-118)/(74-95) 109/82    Intake/Output Summary (Last 24 hours) at 6/4/2023 1903  Last data filed at 6/4/2023 1848  Gross per 24 hour   Intake 1260 ml   Output 2450 ml   Net -1190 ml       Physical Exam:   General Appearance:    No acute distress, alert with some mild confusion (overall improved).   Lungs:     Clear to auscultation bilaterally     Heart:    Regular rhythm and normal rate.  No murmurs, gallops, or    rubs.   Abdomen:     Soft, nontender, nondistended.    Extremities:   1+ edema lower extremities.     Results Review:    Results from last 7 days   Lab Units 06/04/23  0319   SODIUM mmol/L 134*   POTASSIUM mmol/L 3.5   CHLORIDE mmol/L 96*   CO2 mmol/L 26.0   BUN mg/dL 27*   CREATININE mg/dL 1.18   GLUCOSE mg/dL 174*   CALCIUM mg/dL 8.2*         Results from last 7 days   Lab Units 06/04/23  0320   WBC 10*3/mm3 14.00*   HEMOGLOBIN g/dL 10.0*   HEMATOCRIT % 30.3*   PLATELETS 10*3/mm3 239     Results from last 7 days   Lab Units 06/04/23  1410 06/04/23  0319 06/03/23  1947 06/01/23  2318 06/01/23  1555   INR   --   --   --   --  1.01   APTT seconds 30.3 151.6* 43.4*   < > 24.2    < > = values in this interval not displayed.                     I reviewed the patient's new clinical results.        Assessment:  1.  Severe symptomatic mitral regurgitation and tricuspid  regurgitation  2.  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 Frey  3.  Chronic diastolic CHF  4.  History of COVID-19 pneumonia with residual dyspnea and possible interstitial lung disease  5.  Stage IIIa chronic kidney disease  6.  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  Expected postoperative anemia  8.  Postoperative thrombocytopenia  9.  Postoperative frequent PVCs and NSVT  10.  Postoperative aphasia, confusion, and right upper extremity weakness  11.  Postoperative junctional bradycardia, complete heart block, and typical atrial flutter (status post DCCV on 6/2/2023).  12.  LBBB and first degree AV block  13.  Diabetes    Plan:  -Diuresed well with IV Lasix.  Currently on Lasix 40 mg IV daily per nephrology.  Continue Jardiance 25 mg/day.    -MRI of the brain now pending.  Likely tomorrow now that pacing wires have been pulled and he is coughing less.  He is still confused at times, although he is now aware that he is confused, which is a good sign.    -Cardioverted several days ago from atrial flutter.  His EKG yesterday is consistent with a likely ectopic atrial rhythm in the 90s.  He still is in the same rhythm today, which is fine.  He has occasional ectopy, but no atrial fibrillation or atrial flutter on my review of telemetry.  Check EKG tomorrow morning.    -Increased Toprol-XL to 25 mg/day today.  No recent AV block noted.    -Started Eliquis 5 mg twice a day yesterday.  Left atrial appendage was closed during surgery.    -He has been approved for inpatient rehab when medically appropriate.    Corwin Hobson MD  06/04/23  19:03 EDT

## 2023-06-05 ENCOUNTER — APPOINTMENT (OUTPATIENT)
Dept: MRI IMAGING | Facility: HOSPITAL | Age: 73
End: 2023-06-05
Payer: MEDICARE

## 2023-06-05 LAB
ANION GAP SERPL CALCULATED.3IONS-SCNC: 12.3 MMOL/L (ref 5–15)
BASOPHILS # BLD AUTO: 0.05 10*3/MM3 (ref 0–0.2)
BASOPHILS NFR BLD AUTO: 0.4 % (ref 0–1.5)
BUN SERPL-MCNC: 22 MG/DL (ref 8–23)
BUN/CREAT SERPL: 16.8 (ref 7–25)
CALCIUM SPEC-SCNC: 9.1 MG/DL (ref 8.6–10.5)
CHLORIDE SERPL-SCNC: 94 MMOL/L (ref 98–107)
CO2 SERPL-SCNC: 27.7 MMOL/L (ref 22–29)
CREAT SERPL-MCNC: 1.31 MG/DL (ref 0.76–1.27)
DEPRECATED RDW RBC AUTO: 45.3 FL (ref 37–54)
EGFRCR SERPLBLD CKD-EPI 2021: 57.1 ML/MIN/1.73
EOSINOPHIL # BLD AUTO: 0.44 10*3/MM3 (ref 0–0.4)
EOSINOPHIL NFR BLD AUTO: 3.3 % (ref 0.3–6.2)
ERYTHROCYTE [DISTWIDTH] IN BLOOD BY AUTOMATED COUNT: 14.2 % (ref 12.3–15.4)
GLUCOSE BLDC GLUCOMTR-MCNC: 219 MG/DL (ref 70–130)
GLUCOSE BLDC GLUCOMTR-MCNC: 230 MG/DL (ref 70–130)
GLUCOSE BLDC GLUCOMTR-MCNC: 253 MG/DL (ref 70–130)
GLUCOSE BLDC GLUCOMTR-MCNC: 285 MG/DL (ref 70–130)
GLUCOSE BLDC GLUCOMTR-MCNC: 307 MG/DL (ref 70–130)
GLUCOSE SERPL-MCNC: 219 MG/DL (ref 65–99)
HCT VFR BLD AUTO: 30 % (ref 37.5–51)
HGB BLD-MCNC: 9.9 G/DL (ref 13–17.7)
IMM GRANULOCYTES # BLD AUTO: 0.36 10*3/MM3 (ref 0–0.05)
IMM GRANULOCYTES NFR BLD AUTO: 2.7 % (ref 0–0.5)
LYMPHOCYTES # BLD AUTO: 1.39 10*3/MM3 (ref 0.7–3.1)
LYMPHOCYTES NFR BLD AUTO: 10.3 % (ref 19.6–45.3)
MCH RBC QN AUTO: 29.2 PG (ref 26.6–33)
MCHC RBC AUTO-ENTMCNC: 33 G/DL (ref 31.5–35.7)
MCV RBC AUTO: 88.5 FL (ref 79–97)
MONOCYTES # BLD AUTO: 0.91 10*3/MM3 (ref 0.1–0.9)
MONOCYTES NFR BLD AUTO: 6.7 % (ref 5–12)
NEUTROPHILS NFR BLD AUTO: 10.38 10*3/MM3 (ref 1.7–7)
NEUTROPHILS NFR BLD AUTO: 76.6 % (ref 42.7–76)
NRBC BLD AUTO-RTO: 0 /100 WBC (ref 0–0.2)
PLATELET # BLD AUTO: 264 10*3/MM3 (ref 140–450)
PMV BLD AUTO: 9.9 FL (ref 6–12)
POTASSIUM SERPL-SCNC: 4.1 MMOL/L (ref 3.5–5.2)
QT INTERVAL: 428 MS
QT INTERVAL: 438 MS
RBC # BLD AUTO: 3.39 10*6/MM3 (ref 4.14–5.8)
SODIUM SERPL-SCNC: 134 MMOL/L (ref 136–145)
WBC NRBC COR # BLD: 13.53 10*3/MM3 (ref 3.4–10.8)

## 2023-06-05 PROCEDURE — 97530 THERAPEUTIC ACTIVITIES: CPT | Performed by: PHYSICAL THERAPIST

## 2023-06-05 PROCEDURE — 25010000002 FUROSEMIDE PER 20 MG: Performed by: INTERNAL MEDICINE

## 2023-06-05 PROCEDURE — 94760 N-INVAS EAR/PLS OXIMETRY 1: CPT

## 2023-06-05 PROCEDURE — 99024 POSTOP FOLLOW-UP VISIT: CPT | Performed by: THORACIC SURGERY (CARDIOTHORACIC VASCULAR SURGERY)

## 2023-06-05 PROCEDURE — 99232 SBSQ HOSP IP/OBS MODERATE 35: CPT | Performed by: INTERNAL MEDICINE

## 2023-06-05 PROCEDURE — 94664 DEMO&/EVAL PT USE INHALER: CPT

## 2023-06-05 PROCEDURE — 92526 ORAL FUNCTION THERAPY: CPT

## 2023-06-05 PROCEDURE — 80048 BASIC METABOLIC PNL TOTAL CA: CPT | Performed by: INTERNAL MEDICINE

## 2023-06-05 PROCEDURE — 93005 ELECTROCARDIOGRAM TRACING: CPT | Performed by: INTERNAL MEDICINE

## 2023-06-05 PROCEDURE — 94761 N-INVAS EAR/PLS OXIMETRY MLT: CPT

## 2023-06-05 PROCEDURE — 85025 COMPLETE CBC W/AUTO DIFF WBC: CPT | Performed by: NURSE PRACTITIONER

## 2023-06-05 PROCEDURE — 82948 REAGENT STRIP/BLOOD GLUCOSE: CPT

## 2023-06-05 PROCEDURE — 63710000001 PREDNISONE PER 5 MG: Performed by: INTERNAL MEDICINE

## 2023-06-05 PROCEDURE — 93010 ELECTROCARDIOGRAM REPORT: CPT | Performed by: INTERNAL MEDICINE

## 2023-06-05 PROCEDURE — 94799 UNLISTED PULMONARY SVC/PX: CPT

## 2023-06-05 PROCEDURE — 99233 SBSQ HOSP IP/OBS HIGH 50: CPT | Performed by: PSYCHIATRY & NEUROLOGY

## 2023-06-05 PROCEDURE — 70551 MRI BRAIN STEM W/O DYE: CPT

## 2023-06-05 PROCEDURE — 63710000001 INSULIN LISPRO (HUMAN) PER 5 UNITS: Performed by: NURSE PRACTITIONER

## 2023-06-05 RX ADMIN — METOPROLOL SUCCINATE 25 MG: 25 TABLET, EXTENDED RELEASE ORAL at 09:24

## 2023-06-05 RX ADMIN — GUAIFENESIN 400 MG: 200 SOLUTION ORAL at 00:07

## 2023-06-05 RX ADMIN — HYDROCODONE BITARTRATE AND ACETAMINOPHEN 1 TABLET: 7.5; 325 TABLET ORAL at 02:29

## 2023-06-05 RX ADMIN — ASPIRIN 81 MG: 81 TABLET, CHEWABLE ORAL at 09:24

## 2023-06-05 RX ADMIN — IPRATROPIUM BROMIDE AND ALBUTEROL SULFATE 3 ML: 2.5; .5 SOLUTION RESPIRATORY (INHALATION) at 19:43

## 2023-06-05 RX ADMIN — DOCUSATE SODIUM 50MG AND SENNOSIDES 8.6MG 2 TABLET: 8.6; 5 TABLET, FILM COATED ORAL at 20:50

## 2023-06-05 RX ADMIN — INSULIN LISPRO 8 UNITS: 100 INJECTION, SOLUTION INTRAVENOUS; SUBCUTANEOUS at 07:35

## 2023-06-05 RX ADMIN — PREDNISONE 5 MG: 10 TABLET ORAL at 09:24

## 2023-06-05 RX ADMIN — APIXABAN 5 MG: 5 TABLET, FILM COATED ORAL at 09:24

## 2023-06-05 RX ADMIN — IPRATROPIUM BROMIDE AND ALBUTEROL SULFATE 3 ML: 2.5; .5 SOLUTION RESPIRATORY (INHALATION) at 15:06

## 2023-06-05 RX ADMIN — PANTOPRAZOLE SODIUM 40 MG: 40 INJECTION, POWDER, FOR SOLUTION INTRAVENOUS at 06:30

## 2023-06-05 RX ADMIN — APIXABAN 5 MG: 5 TABLET, FILM COATED ORAL at 20:50

## 2023-06-05 RX ADMIN — IPRATROPIUM BROMIDE AND ALBUTEROL SULFATE 3 ML: 2.5; .5 SOLUTION RESPIRATORY (INHALATION) at 06:58

## 2023-06-05 RX ADMIN — IPRATROPIUM BROMIDE AND ALBUTEROL SULFATE 3 ML: 2.5; .5 SOLUTION RESPIRATORY (INHALATION) at 11:07

## 2023-06-05 RX ADMIN — CALCIUM CARBONATE-VITAMIN D TAB 500 MG-200 UNIT 1 TABLET: 500-200 TAB at 09:24

## 2023-06-05 RX ADMIN — INSULIN LISPRO 10 UNITS: 100 INJECTION, SOLUTION INTRAVENOUS; SUBCUTANEOUS at 18:17

## 2023-06-05 RX ADMIN — INSULIN LISPRO 5 UNITS: 100 INJECTION, SOLUTION INTRAVENOUS; SUBCUTANEOUS at 00:11

## 2023-06-05 RX ADMIN — POTASSIUM CHLORIDE 20 MEQ: 1.5 POWDER, FOR SOLUTION ORAL at 09:27

## 2023-06-05 RX ADMIN — INSULIN GLARGINE-YFGN 15 UNITS: 100 INJECTION, SOLUTION SUBCUTANEOUS at 09:27

## 2023-06-05 RX ADMIN — GUAIFENESIN 400 MG: 200 SOLUTION ORAL at 06:30

## 2023-06-05 RX ADMIN — FUROSEMIDE 40 MG: 10 INJECTION, SOLUTION INTRAMUSCULAR; INTRAVENOUS at 11:28

## 2023-06-05 RX ADMIN — ATORVASTATIN CALCIUM 40 MG: 20 TABLET, FILM COATED ORAL at 20:50

## 2023-06-05 RX ADMIN — MUPIROCIN 1 APPLICATION: 20 OINTMENT TOPICAL at 20:50

## 2023-06-05 RX ADMIN — MUPIROCIN 1 APPLICATION: 20 OINTMENT TOPICAL at 09:25

## 2023-06-05 RX ADMIN — EMPAGLIFLOZIN 25 MG: 25 TABLET, FILM COATED ORAL at 09:24

## 2023-06-05 RX ADMIN — INSULIN LISPRO 8 UNITS: 100 INJECTION, SOLUTION INTRAVENOUS; SUBCUTANEOUS at 13:26

## 2023-06-05 RX ADMIN — GUAIFENESIN 400 MG: 200 SOLUTION ORAL at 15:49

## 2023-06-05 NOTE — PROGRESS NOTES
" LOS: 13 days   Patient Care Team:  Liza Oconnell III, NP-C as PCP - General (Family Medicine)  Corey Lao Jr., MD (Inactive) as Consulting Physician (Urology)    Chief Complaint: post op    Subjective:  Symptoms:  He reports weakness.  No shortness of breath, cough or chest pain.    Diet:  NPO.  No nausea or vomiting.    Activity level: Impaired due to weakness.    Pain:  He complains of pain that is mild.  Pain is requiring pain medication.      Mentation continuing to improve, able to have an appropriate conversation    Vital Signs  Temp:  [98.2 °F (36.8 °C)-98.7 °F (37.1 °C)] 98.2 °F (36.8 °C)  Heart Rate:  [90-97] 95  Resp:  [18-22] 19  BP: (106-119)/(67-90) 106/67  Body mass index is 34.04 kg/m².    Intake/Output Summary (Last 24 hours) at 6/5/2023 0918  Last data filed at 6/5/2023 0737  Gross per 24 hour   Intake 1680 ml   Output 3050 ml   Net -1370 ml       I/O this shift:  In: 120 [P.O.:120]  Out: -         06/02/23  0612 06/03/23  0600 06/05/23  0500   Weight: 117 kg (257 lb 3.2 oz) 116 kg (256 lb) 114 kg (251 lb)     Objective:  General Appearance:  Comfortable and in no acute distress.    Vital signs: (most recent): Blood pressure 106/67, pulse 95, temperature 98.2 °F (36.8 °C), temperature source Oral, resp. rate 19, height 182.9 cm (72\"), weight 114 kg (251 lb), SpO2 93 %.  Vital signs are normal.  No fever.    Output: Producing urine and producing stool.    Lungs:  Normal effort and normal respiratory rate.  There are decreased breath sounds.  Rales: bibasilar.  Heart: Normal rate.  Regular rhythm and irregular rhythm.    Abdomen: Abdomen is soft.  Bowel sounds are normal.     Extremities: There is dependent edema (bilaterally L>R).    Pulses: Distal pulses are intact.    Neurological: Patient is alert.  (Oriented to person, place, and situation).    Skin:  Warm and dry.  (Sternal incision clean, dry, and intact)        Results Review:        WBC WBC   Date Value Ref " Range Status   06/05/2023 13.53 (H) 3.40 - 10.80 10*3/mm3 Final   06/04/2023 14.00 (H) 3.40 - 10.80 10*3/mm3 Final   06/03/2023 13.99 (H) 3.40 - 10.80 10*3/mm3 Final      HGB Hemoglobin   Date Value Ref Range Status   06/05/2023 9.9 (L) 13.0 - 17.7 g/dL Final   06/04/2023 10.0 (L) 13.0 - 17.7 g/dL Final   06/03/2023 10.7 (L) 13.0 - 17.7 g/dL Final      HCT Hematocrit   Date Value Ref Range Status   06/05/2023 30.0 (L) 37.5 - 51.0 % Final   06/04/2023 30.3 (L) 37.5 - 51.0 % Final   06/03/2023 31.9 (L) 37.5 - 51.0 % Final      Platelets Platelets   Date Value Ref Range Status   06/05/2023 264 140 - 450 10*3/mm3 Final   06/04/2023 239 140 - 450 10*3/mm3 Final   06/03/2023 270 140 - 450 10*3/mm3 Final        PT/INR:    No results found for: PROTIME  /  No results found for: INR      Sodium Sodium   Date Value Ref Range Status   06/05/2023 134 (L) 136 - 145 mmol/L Final   06/04/2023 134 (L) 136 - 145 mmol/L Final   06/03/2023 141 136 - 145 mmol/L Final      Potassium Potassium   Date Value Ref Range Status   06/05/2023 4.1 3.5 - 5.2 mmol/L Final   06/04/2023 3.5 3.5 - 5.2 mmol/L Final   06/03/2023 4.0 3.5 - 5.2 mmol/L Final      Chloride Chloride   Date Value Ref Range Status   06/05/2023 94 (L) 98 - 107 mmol/L Final   06/04/2023 96 (L) 98 - 107 mmol/L Final   06/03/2023 100 98 - 107 mmol/L Final      Bicarbonate CO2   Date Value Ref Range Status   06/05/2023 27.7 22.0 - 29.0 mmol/L Final   06/04/2023 26.0 22.0 - 29.0 mmol/L Final   06/03/2023 31.1 (H) 22.0 - 29.0 mmol/L Final      BUN BUN   Date Value Ref Range Status   06/05/2023 22 8 - 23 mg/dL Final   06/04/2023 27 (H) 8 - 23 mg/dL Final   06/03/2023 29 (H) 8 - 23 mg/dL Final      Creatinine Creatinine   Date Value Ref Range Status   06/05/2023 1.31 (H) 0.76 - 1.27 mg/dL Final   06/04/2023 1.18 0.76 - 1.27 mg/dL Final   06/03/2023 1.33 (H) 0.76 - 1.27 mg/dL Final      Calcium Calcium   Date Value Ref Range Status   06/05/2023 9.1 8.6 - 10.5 mg/dL Final   06/04/2023  8.2 (L) 8.6 - 10.5 mg/dL Final   06/03/2023 9.0 8.6 - 10.5 mg/dL Final      Magnesium No results found for: MG       apixaban, 5 mg, Oral, Q12H  aspirin, 81 mg, Oral, Daily  atorvastatin, 40 mg, Oral, Nightly  calcium 500 mg vitamin D 5 mcg (200 UT), 1 tablet, Oral, Daily  empagliflozin, 25 mg, Oral, Daily  furosemide, 40 mg, Intravenous, Daily  guaifenesin, 400 mg, Oral, Q8H  insulin glargine, 25 Units, Subcutaneous, Daily  insulin lispro, 3-14 Units, Subcutaneous, Q6H  ipratropium-albuterol, 3 mL, Nebulization, 4x Daily - RT  metoprolol succinate XL, 25 mg, Oral, Q24H  mupirocin, , Each Nare, BID  pantoprazole, 40 mg, Intravenous, QAM AC  potassium chloride, 20 mEq, Nasogastric, Daily  predniSONE, 5 mg, Oral, Every Other Day  senna-docusate sodium, 2 tablet, Oral, Nightly                 Patient Active Problem List   Diagnosis Code    DM (diabetes mellitus), type 2 E11.9    Mixed hyperlipidemia E78.2    Mild intermittent asthma without complication J45.20    New onset of congestive heart failure I50.9    Nonrheumatic mitral valve regurgitation I34.0    Chest pain R07.9    Essential hypertension I10    Mitral valve disease I05.9       Assessment & Plan   -Moderate to severe mitral regurgitation s/p MVR/TV repair/SU closure POD#12 Pagni  -Moderate tricuspid valve regurgitation  -Acute on chronic diastolic heart failure with preserved EF--55%  -CKD stage IIIa; baseline creatinine 1.2  -DM type II--A1c  -Severe pulmonary HTN  -PING  -BPH/Urinary retention s/p TURP  -Interstitial lung disease s/p COVID-19 infection --steroid taper per pulm.  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 Tolerating beta blocker  Weaned to RA and tolerating  Blood sugar remains elevated--will increase Lantus  Excellent response to IV diuretics. Creatinine up slightly to 1.3. Still with edema, but does appear improved.Diuretic management per renal. Will have nursing apply HOLLY hose to bilateral LE  Tolerating modified diet, but is receiving nocturnal tube feeding. Patient anxious to have thin liquids. Will see if SLP can re-evaluate today. Will start calorie count to see if we can stop his nocturnal feeds and remove his feeding tube  Encourage aggressive pulmonary toilet--continue flutter/guaifinisin  Still very weak--continue aggressive PT/OT. [facility] has accepted him whenever he is ready medically  Continue routine care    Basia Duarte, DESTIN  06/05/23  09:18 EDT

## 2023-06-05 NOTE — PROGRESS NOTES
Nutrition Services    Patient Name:  Vernon Solorzano  YOB: 1950  MRN: [id number]  Admit Date:  5/23/2023    Assessment Date:  06/05/23    CLINICAL NUTRITION    Comments: Follow up TF/po diet + consult for calorie count (3 day)    Current TF regimen: TFs of Diabetisource running at goal rate of 80 mL/hr 12 hrs/day (7p-7a) with 150 ml/hr free water flushes per renal.  Tolerating TF's well.      RN reports patient eating well.  Ate all of his breakfast this am except some of the potatoes and ate 1/2 of his lunch.  Patient wants regular coffee (not thickened) and wants tube out.    Calorie count starting today.  Will follow up tomorrow with day 2 results.    Patient now with hyponatremia - recommend decreasing free water flushes.  Flushes being managed by renal MD - spoke with him.  Decreasing free water flushes to 75 mL q hour.    RD to continue to monitor per protocol.     Encounter Information         Reason For Encounter Follow up for TF    Current Issues S/p MVR, TVr and SU closure 5/22.    SLP to re-eval swallow soon for possible diet upgrade per RN.  Calorie count starting today.     Estimated Requirements         Calories 2360 kcals (20 kcal/kg)    Protein (gm) 118-141 grams protein (1.0 - 1.2 gm/kg)    Fluid (mL) 2360 mL (1 mL/kcal)     Current Nutrition Orders & Evaluation of Intake       Oral Nutrition     Current PO Diet Diet: Regular/House Diet, Diabetic Diets, Cardiac Diets; Healthy Heart (2-3 Na+); Consistent Carbohydrate; Texture: Soft to Chew (NDD 3); Soft to Chew: Chopped Meat; Fluid Consistency: Nectar Thick   Supplement Magic Cup, TID   PO Evaluation     Trending % PO Intake % x 5 meals    Factors Affecting Intake  swallow impairment      Enteral Nutrition     Enteral Route ND    TF Delivery Method Cyclic x 12 hrs/day    Enteral Product Diabetisource AC    Modular None    Propofol Rate/Kcal     TF Current Rate (mL) 80 ml/hr    TF Goal Rate (mL) 80 ml/hr    Current Water Flush  "(mL) 150 mL q hour (per MD orders)    Current TF Provision  1152 kcal, 57 gm protein, 787 mL free water + 1800 mL water flushes         Calories 44 % needs met         Protein  43 % needs met         TF Fluid (mL) 2787         IV Fluids -    Avg Volume Delivered (mL)     % Goal Volume     TF Residual  n/a - tube is small bowel    TF Changes rate decreased, other:changed to nocturnal TF's    TF Tolerance tolerating     Anthropometrics          Height    Weight Height: 182.9 cm (72\")  Weight: 114 kg (251 lb) (06/05/23 0500)    BMI kg/m2 Body mass index is 34.04 kg/m².  Obese, Class II (35 - 39.9)    Weight trend Stable     Labs        Pertinent Labs Reviewed, listed below     Results from last 7 days   Lab Units 06/05/23  0305 06/04/23 0319 06/03/23  0834 06/02/23  0433 06/01/23  0308   SODIUM mmol/L 134* 134* 141   < > 134*  134*   POTASSIUM mmol/L 4.1 3.5 4.0   < > 4.4  4.4   CHLORIDE mmol/L 94* 96* 100   < > 99  99   CO2 mmol/L 27.7 26.0 31.1*   < > 22.1  24.3   BUN mg/dL 22 27* 29*   < > 29*  28*   CREATININE mg/dL 1.31* 1.18 1.33*   < > 0.97  0.96   CALCIUM mg/dL 9.1 8.2* 9.0   < > 8.6  8.6   BILIRUBIN mg/dL  --   --  0.6  --  0.4   ALK PHOS U/L  --   --  82  --  65   ALT (SGPT) U/L  --   --  16  --  14   AST (SGOT) U/L  --   --  14  --  23   GLUCOSE mg/dL 219* 174* 187*   < > 199*  200*    < > = values in this interval not displayed.       Results from last 7 days   Lab Units 06/05/23  0305 06/04/23  0320 06/03/23 0834 06/03/23  0300 06/02/23  0433   PHOSPHORUS mg/dL  --   --   --   --  4.7*   HEMOGLOBIN g/dL 9.9*   < >  --    < > 10.8*   HEMATOCRIT % 30.0*   < >  --    < > 33.1*   WBC 10*3/mm3 13.53*   < >  --    < > 11.81*   ALBUMIN g/dL  --   --  3.6  --  3.2*    < > = values in this interval not displayed.       Results from last 7 days   Lab Units 06/05/23  0305 06/04/23  1410 06/04/23  0320 06/04/23  0319 06/03/23  1947 06/03/23  0834 06/03/23  0300 06/02/23  2242 06/02/23  0718 06/02/23  0433 " 23  2318 23  1555 23  0308   INR   --   --   --   --   --   --   --   --   --   --   --  1.01  --    APTT seconds  --  30.3  --  151.6* 43.4* 80.2*  --  46.4*   < > 32.9   < > 24.2  --    PLATELETS 10*3/mm3 264  --  239  --   --   --  270  --   --  257  --   --  193    < > = values in this interval not displayed.       COVID19   Date Value Ref Range Status   2023 Not Detected Not Detected - Ref. Range Final     Lab Results   Component Value Date    HGBA1C 9.20 (H) 2023          Medications            Scheduled Medications apixaban, 5 mg, Oral, Q12H  aspirin, 81 mg, Oral, Daily  atorvastatin, 40 mg, Oral, Nightly  calcium 500 mg vitamin D 5 mcg (200 UT), 1 tablet, Oral, Daily  empagliflozin, 25 mg, Oral, Daily  furosemide, 40 mg, Intravenous, Daily  guaifenesin, 400 mg, Oral, Q8H  [START ON 2023] insulin glargine, 40 Units, Subcutaneous, Daily  insulin lispro, 3-14 Units, Subcutaneous, Q6H  ipratropium-albuterol, 3 mL, Nebulization, 4x Daily - RT  metoprolol succinate XL, 25 mg, Oral, Q24H  mupirocin, , Each Nare, BID  pantoprazole, 40 mg, Intravenous, QAM AC  potassium chloride, 20 mEq, Nasogastric, Daily  predniSONE, 5 mg, Oral, Every Other Day  senna-docusate sodium, 2 tablet, Oral, Nightly        Infusions        PRN Medications   acetaminophen **OR** acetaminophen **OR** acetaminophen    bisacodyl    bisacodyl    dextrose    dextrose    glucagon (human recombinant)    hydrALAZINE    HYDROcodone-acetaminophen    HYDROcodone-acetaminophen    ipratropium-albuterol    ipratropium-albuterol    magnesium hydroxide    naloxone    [] Morphine **AND** naloxone    nitroglycerin    ondansetron    ondansetron    polyethylene glycol    Potassium Replacement - Follow Nurse / BPA Driven Protocol    traMADol     Physical Findings          Physical Appearance aphasic, disoriented, obese   Oral/Mouth Cavity tooth or teeth missing   Edema  generalized, lower extremity , upper extremity, 2+  (mild), 3+ (moderate)   Gastrointestinal hypoactive bowel sounds, last bowel movement:6/1   Skin  bruising, surgical incision sternal inc   Tubes/Drains Cortrak, ND tube, bridle in place   NFPE Not indicated at this time     NUTRITION INTERVENTION / PLAN OF CARE  Intervention Goal         Intervention Goal(s) Maintain nutrition status, Nutrition support treatment, Improved nutrition related labs, Reduce/improve symptoms, Disease management/therapy, Maintain TF/PN and Appropriate weight loss     Nutrition Intervention         RD Action Follow Tx Progress and Care plan reviewed     Prescription         Diet Prescription     Supplement Prescription Magic Cups TID (berry)   EN/PN Prescription Decreasing free water flushes to 75 mL q hour   New Prescription Ordered? Continue same per protocol   --  Monitor/Evaluation        Monitor Per protocol, I&O, Pertinent labs, EN delivery/tolerance, POC/GOC, Swallow function   Discharge Needs Pending clinical course   Education Will instruct as appropriate       Electronically signed by:  Vandana Merrill RD  06/05/23 15:13 EDT

## 2023-06-05 NOTE — THERAPY TREATMENT NOTE
Patient Name: Vernon Solorzano  : 1950    MRN: [id number]                              Today's Date: 2023       Admit Date: 2023    Visit Dx:     ICD-10-CM ICD-9-CM   1. S/P MVR (mitral valve replacement)  Z95.2 V43.3   2. Nonrheumatic mitral valve regurgitation  I34.0 424.0     Patient Active Problem List   Diagnosis    DM (diabetes mellitus), type 2    Mixed hyperlipidemia    Mild intermittent asthma without complication    New onset of congestive heart failure    Nonrheumatic mitral valve regurgitation    Chest pain    Essential hypertension    Mitral valve disease     Past Medical History:   Diagnosis Date    Allergic rhinitis     Arthritis     BPH (benign prostatic hyperplasia)     Diabetes mellitus     TYPE 2    Foraminal stenosis of cervical region     C5-C6    GERD (gastroesophageal reflux disease)     Hemorrhoids     History of urinary retention     S/P TURP    Hyperlipidemia     Macular degeneration     PING (obstructive sleep apnea) 2016    MILD, SEES DR. AMARILIS MORGAN     Past Surgical History:   Procedure Laterality Date    CARDIAC CATHETERIZATION N/A 2023    Procedure: Right Heart Cath;  Surgeon: Corey Gaffney MD;  [facility]  [facility];  Service: Cardiology;  Laterality: N/A;    CARDIAC CATHETERIZATION N/A 2023    Procedure: Left Heart Cath;  Surgeon: Corey Gaffney MD;  [facility]:  [facility];  Service: Cardiology;  Laterality: N/A;    CARDIAC CATHETERIZATION N/A 2023    Procedure: Left ventriculography;  Surgeon: Corey Gaffney MD;  [facility]:  [facility];  Service: Cardiology;  Laterality: N/A;    CARDIAC CATHETERIZATION N/A 2023    Procedure: Coronary angiography;  Surgeon: Corey Gaffney MD;  [facility]:  [facility];  Service: Cardiology;  Laterality: N/A;    COLONOSCOPY N/A     WNL PER PT, NO RECORDS,     COLONOSCOPY N/A 2009    DR. GORGE BENAVIDEZ AT [facility]     MITRAL VALVE REPAIR/REPLACEMENT N/A 5/24/2023    Procedure: MITRAL VALVE REPAIR/REPLACEMENT TRICUSPID VALVE REPAIR/REPLACEMENT TRANSESOPHAGEAL ECHOCARDIOGRAM WITH ANESTHESIA;  Surgeon: George Frey MD;  Location: [facility];  Service: Cardiothoracic;  Laterality: N/A;    PROSTATE SURGERY N/A 11/12/2010    TURP, DR. BRIGHT COLLAZO AT [facility]    SKIN TAG REMOVAL N/A 12/20/2017    ANAL SKIN TAG X2, PERFORMED IN OFFICE, DR. LISA ORANTES    TURP / TRANSURETHRAL INCISION / DRAINAGE PROSTATE  2010    Dr. Goodrich      General Information       Row Name 06/05/23 1631          Physical Therapy Time and Intention    Document Type therapy note (daily note)  -     Mode of Treatment individual therapy;physical therapy  -               User Key  (r) = Recorded By, (t) = Taken By, (c) = Cosigned By      Initials Name Provider Type    Renata Nickerson, PT Physical Therapist                   Mobility       Row Name 06/05/23 1631          Bed Mobility    Scooting/Bridging [place] (Bed Mobility) moderate assist (50% patient effort)  -     Supine-Sit [place] (Bed Mobility) minimum assist (75% patient effort);2 person assist  -     Assistive Device (Bed Mobility) bed rails;head of bed elevated  -       Row Name 06/05/23 1631          Sit-Stand Transfer    Sit-Stand [place] (Transfers) minimum assist (75% patient effort);2 person assist  -     Assistive Device (Sit-Stand Transfers) --  HHA  -       Row Name 06/05/23 1631          Gait/Stairs (Locomotion)    [place] Level (Gait) minimum assist (75% patient effort);2 person assist  -     Assistive Device (Gait) --  HHA  -KH     Distance in Feet (Gait) 15 ft  -     Deviations/Abnormal Patterns (Gait) festinating/shuffling;stride length decreased  -     Bilateral Gait Deviations forward flexed posture  -               User Key  (r) = Recorded By, (t) = Taken By, (c) = Cosigned By      Initials Name Provider Type    REJI Bass  Renata Montano, PT Physical Therapist                   Obj/Interventions       Row Name 06/05/23 1633          Motor Skills    Therapeutic Exercise --  cardiac protocol x 10 reps  -               User Key  (r) = Recorded By, (t) = Taken By, (c) = Cosigned By      Initials Name Provider Type    Renata Nickerson, PT Physical Therapist                   Goals/Plan    No documentation.                  Clinical Impression       [facility] Name 06/05/23 1633          Pain    Pretreatment Pain Rating 3/10  -     Posttreatment Pain Rating 3/10  -     Pain Location generalized  -     Pre/Posttreatment Pain Comment generalized muscle pain  -     Pain Intervention(s) Repositioned  -[facility] Name 06/05/23 1633          Plan of Care Review    Outcome Evaluation Pt was agreeable to therapy. Progressing with bed mobility, transfer and gait. Pt ambulated 15 ft in room with min A x2. Short shuffling steps with decreased foot clearance as pt fatigued. Verbal cues for longer steps length. SOA with activity. Tolerated cardiac exercises well.  -KH       Row Name 06/05/23 1633          Vital Signs    O2 Delivery Pre Treatment room air  -     O2 Delivery Intra Treatment room air  -     O2 Delivery Post Treatment room air  -[facility] Name 06/05/23 1633          Positioning and Restraints    In Chair reclined;call light within reach;encouraged to call for assist;exit alarm on;notified [facility]               User Key  (r) = Recorded By, (t) = Taken By, (c) = Cosigned By      Initials Name Provider Type    Renata Nickerson, PT Physical Therapist                   Outcome Measures       Row Name 06/05/23 1638 06/05/23 0924       How much help from another person do you currently need...    Turning from your back to your side while in flat bed without using bedrails? 3  -KH 3  -HL    Moving from lying on back to sitting on the side of a flat bed without bedrails? 3  -KH 3  -HL    Moving to and from a bed to a  chair (including a wheelchair)? 3  -KH 2  -HL    Standing up from a chair using your arms (e.g., wheelchair, bedside chair)? 3  -KH 2  -HL    Climbing 3-5 steps with a railing? 1  -KH 1  -HL    To walk in hospital room? 3  -KH 2  -HL    AM-PAC 6 Clicks Score (PT) 16  -KH 13  -HL    Highest level of mobility 5 --> Static standing  -KH 4 --> Transferred to chair/commode  -HL      Row Name 06/05/23 1638          Functional Assessment    Outcome Measure Options AM-PAC 6 Clicks Basic Mobility (PT)  -               User Key  (r) = Recorded By, (t) = Taken By, (c) = Cosigned By      Initials Name Provider Type    Renata Nickerson, PT Physical Therapist    COLLIN Joel, Aurora Murguia, RN Registered Nurse                                 Physical Therapy Education       Title: PT OT SLP Therapies (Done)       Topic: Physical Therapy (Done)       Point: Mobility training (Done)       Learning Progress Summary             Patient Acceptance, E,TB, VU by TH at 6/4/2023 1044    Acceptance, E,D, VU,NR by EB at 6/3/2023 1515    Acceptance, E, VU by KN at 5/31/2023 1300    Acceptance, E, VU by EM at 5/31/2023 1043    Acceptance, E,TB, VU by TH at 5/30/2023 1652    Acceptance, E, VU,NR by DB at 5/30/2023 1125    Acceptance, E, NR by DB at 5/29/2023 1157    Acceptance, E, VU by DB at 5/28/2023 1344    Acceptance, E, NR by EM at 5/26/2023 1304    Acceptance, E, NR by EM at 5/25/2023 1208                         Point: Home exercise program (Done)       Learning Progress Summary             Patient Acceptance, E,TB, VU by TH at 6/4/2023 1044    Acceptance, E, VU by KN at 5/31/2023 1300    Acceptance, E, VU by EM at 5/31/2023 1043    Acceptance, E,TB, VU by TH at 5/30/2023 1652    Acceptance, E, VU,NR by DB at 5/30/2023 1125    Acceptance, E, NR by DB at 5/29/2023 1157    Acceptance, E, VU by DB at 5/28/2023 1344    Acceptance, E, NR by EM at 5/25/2023 1208                         Point: Body mechanics (Done)       Learning  Progress Summary             Patient Acceptance, E,TB, VU by TH at 6/4/2023 1044    Acceptance, E,D, VU,NR by EB at 6/3/2023 1515    Acceptance, E, VU by KN at 5/31/2023 1300    Acceptance, E,TB, VU by TH at 5/30/2023 1652    Acceptance, E, VU,NR by DB at 5/30/2023 1125    Acceptance, E, NR by DB at 5/29/2023 1157    Acceptance, E, VU by DB at 5/28/2023 1344                         Point: Precautions (Done)       Learning Progress Summary             Patient Acceptance, E,TB, VU by TH at 6/4/2023 1044    Acceptance, E,D, VU,NR by EB at 6/3/2023 1515    Acceptance, E, VU by KN at 5/31/2023 1300    Acceptance, E,TB, VU by TH at 5/30/2023 1652    Acceptance, E, VU,NR by DB at 5/30/2023 1125    Acceptance, E, NR by DB at 5/29/2023 1157    Acceptance, E, VU by DB at 5/28/2023 1344                                         User Key       Initials Effective Dates Name Provider Type Discipline     06/16/21 -  Kathy Shaffer, PT Physical Therapist PT     06/16/21 -  Jelly Sepulveda, RN Registered Nurse Nurse     02/14/23 -  Sarina Shah PTA Physical Therapist Assistant PT     06/16/21 -  Marissa Zamora, PT Physical Therapist PT     08/02/22 -  Shon Sargent OT Occupational Therapist OT                  PT Recommendation and Plan     Outcome Evaluation: Pt was agreeable to therapy. Progressing with bed mobility, transfer and gait. Pt ambulated 15 ft in room with min A x2. Short shuffling steps with decreased foot clearance as pt fatigued. Verbal cues for longer steps length. SOA with activity. Tolerated cardiac exercises well.     Time Calculation:    PT Charges       Row Name 06/05/23 1638             Time Calculation    Start Time 1515  -      Stop Time 1530  -      Time Calculation (min) 15 min  -KH      PT Received On 06/05/23  -      PT - Next Appointment 06/06/23  -         Time Calculation- PT    Total Timed Code Minutes- PT 15 minute(s)  -         Timed Charges    20346 - PT Therapeutic  Activity Minutes 15  -KH         Total Minutes    Timed Charges Total Minutes 15  -KH       Total Minutes 15  -KH                User Key  (r) = Recorded By, (t) = Taken By, (c) = Cosigned By      Initials Name Provider Type    Renata Nickerson, PT Physical Therapist                  Therapy Charges for Today       Code Description Service Date Service Provider Modifiers Qty    [id number] HC PT THERAPEUTIC ACT EA 15 MIN 6/5/2023 Renata Bass, PT GP 1            PT G-Codes  Outcome Measure Options: AM-PAC 6 Clicks Basic Mobility (PT)  AM-PAC 6 Clicks Score (PT): 16  AM-PAC 6 Clicks Score (OT): 8       Renata Bass PT  6/5/2023

## 2023-06-05 NOTE — PROGRESS NOTES
[facility] Progress Note      Patient Name: Vernon Solorzano  : 1950  MRN: [id number]  Primary Care Physician:  Liza Oconnell III, RACHAEL  Date of admission: 2023    Subjective     Interval History:   Follow up CKD III.  Mentation is better today.  Family at bedside.  No shortness of air or chest pain.    Review of Systems:   As noted above    Objective     Vitals:   Temp:  [98.2 °F (36.8 °C)-98.7 °F (37.1 °C)] 98.2 °F (36.8 °C)  Heart Rate:  [90-97] 95  Resp:  [18-22] 19  BP: (106-119)/(67-90) 106/67    Intake/Output Summary (Last 24 hours) at 2023 0920  Last data filed at 2023 0737  Gross per 24 hour   Intake 1680 ml   Output 3050 ml   Net -1370 ml       Physical Exam:    General Appearance: alert  . Expressive aphasia.   Skin: warm and dry  HEENT: oral mucosa normal, nonicteric sclera  Neck: supple, no JVD  Lungs: Coarse upper airway rhonchi. Nasal 02  Heart: RRR, normal S1 and S2  Abdomen: soft, nontender, + bs, distended. Body wall edema.   : external catheter .  Extremities 1+ lower ext edema . R>L.   Neuro: word salad, perseverating. Not oriented to time or situation.  Using right hand and arm.   Scheduled Meds:     apixaban, 5 mg, Oral, Q12H  aspirin, 81 mg, Oral, Daily  atorvastatin, 40 mg, Oral, Nightly  calcium 500 mg vitamin D 5 mcg (200 UT), 1 tablet, Oral, Daily  empagliflozin, 25 mg, Oral, Daily  furosemide, 40 mg, Intravenous, Daily  guaifenesin, 400 mg, Oral, Q8H  insulin glargine, 15 Units, Subcutaneous, Once  [START ON 2023] insulin glargine, 40 Units, Subcutaneous, Daily  insulin lispro, 3-14 Units, Subcutaneous, Q6H  ipratropium-albuterol, 3 mL, Nebulization, 4x Daily - RT  metoprolol succinate XL, 25 mg, Oral, Q24H  mupirocin, , Each Nare, BID  pantoprazole, 40 mg, Intravenous, QAM AC  potassium chloride, 20 mEq, Nasogastric, Daily  predniSONE, 5 mg, Oral, Every Other Day  senna-docusate sodium, 2 tablet, Oral,  Nightly      IV Meds:          Results Reviewed:   I have personally reviewed the results from the time of this admission to 6/5/2023 09:20 EDT     Results from last 7 days   Lab Units 06/05/23  0305 06/04/23  0319 06/03/23  0834 06/02/23  0433 06/01/23  0308   SODIUM mmol/L 134* 134* 141   < > 134*  134*   POTASSIUM mmol/L 4.1 3.5 4.0   < > 4.4  4.4   CHLORIDE mmol/L 94* 96* 100   < > 99  99   CO2 mmol/L 27.7 26.0 31.1*   < > 22.1  24.3   BUN mg/dL 22 27* 29*   < > 29*  28*   CREATININE mg/dL 1.31* 1.18 1.33*   < > 0.97  0.96   CALCIUM mg/dL 9.1 8.2* 9.0   < > 8.6  8.6   BILIRUBIN mg/dL  --   --  0.6  --  0.4   ALK PHOS U/L  --   --  82  --  65   ALT (SGPT) U/L  --   --  16  --  14   AST (SGOT) U/L  --   --  14  --  23   GLUCOSE mg/dL 219* 174* 187*   < > 199*  200*    < > = values in this interval not displayed.       Estimated Creatinine Clearance: 64.5 mL/min (A) (by C-G formula based on SCr of 1.31 mg/dL (H)).    Results from last 7 days   Lab Units 06/02/23 0433   PHOSPHORUS mg/dL 4.7*             Results from last 7 days   Lab Units 06/05/23  0305 06/04/23  0320 06/03/23  0300 06/02/23  0433 06/01/23  0308   WBC 10*3/mm3 13.53* 14.00* 13.99* 11.81* 13.16*   HEMOGLOBIN g/dL 9.9* 10.0* 10.7* 10.8* 10.5*   PLATELETS 10*3/mm3 264 239 270 257 193       Results from last 7 days   Lab Units 06/01/23  1555   INR  1.01       Assessment / Plan     ASSESSMENT:  1. CKD III, baseline creatinine 1.2.  Peak 1.68.  creatinine stable at 1.3->1.18.  continue same.  2. MR now sp MV replacement, tissue and TV repair, SU closure 5/24/23.  3. Chronic steroid use after COVID 19 PNA.  4. Anemia post op. Hg stable .  5. Delirium, expressive aphasia. Ct without CVA.  Neuro thought small ischemic periprocedural event. On ASA and statin.    6. DM2    Tae Lay MD  06/05/23  09:20 EDT    [facility]  [phone]

## 2023-06-05 NOTE — PLAN OF CARE
Goal Outcome Evaluation:  Plan of Care Reviewed With: patient, spouse           Outcome Evaluation: Clinical swallow re-eval completed. Suggest advance diet to regular textures (no mixed consistencies).Spouse reports that family will assist w chopping foods if needed for all meals. Suggest consider continue nectar thick liquids. Suggest consider free water protocol with water and ice chips between meals (after oral care) per VFSS recommendations to assist with loading swallow system and assist with error-based learning. Oral care t.i.d. with toothbrush/toothpaste to mitigate abberant oropharyngeal mani s/p sx, CVA. Reduce additional modifiable risk factors associated w acute deglutition disorders, including mobilization per PT recommendations, pulmonary toilet per cardiology recommendations. SLP following.

## 2023-06-05 NOTE — PROGRESS NOTES
"DOS: 2023  NAME: Vernon Solorzano   : 1950  PCP: Liza Oconnell III, NP-C  No chief complaint on file.      Chief complaint: aphasia, encephalopathy  Subjective: Wife reports that since I last saw him there has been fairly steady uphill improvement in his speech and alertness although he has gotten a little bit more weak generally.  No witnessed convulsions.  MRI completed.    Objective:  Vital signs: /67   Pulse 76   Temp 98.2 °F (36.8 °C) (Oral)   Resp 18   Ht 182.9 cm (72\")   Wt 114 kg (251 lb)   SpO2 93%   BMI 34.04 kg/m²    Gen: NAD, vitals reviewed  MS: oriented x3, recent/remote memory intact, mild to moderately impaired attention/concentration, language intact, no neglect.  CN: visual acuity grossly normal, PERRL, EOMI, no facial droop, mild dysarthria  Motor: 5/5 throughout upper and lower extremities, normal tone, no apraxia  Sensory: intact to light touch all 4 ext.    Laboratory results:  Lab Results   Component Value Date    GLUCOSE 219 (H) 2023    CALCIUM 9.1 2023     (L) 2023    K 4.1 2023    CO2 27.7 2023    CL 94 (L) 2023    BUN 22 2023    CREATININE 1.31 (H) 2023    EGFRIFAFRI 76 12/15/2021    EGFRIFNONA 66 12/15/2021    BCR 16.8 2023    ANIONGAP 12.3 2023     Lab Results   Component Value Date    WBC 13.53 (H) 2023    HGB 9.9 (L) 2023    HCT 30.0 (L) 2023    MCV 88.5 2023     2023     Lab Results   Component Value Date    LDL 81 2023    LDL 59 2023    LDL 59 2021            Review of labs: Sodium 134, WBC 14, hemoglobin 9.9    Review and interpretation of imaging: I personally reviewed his MRI brain performed today which shows several small punctate embolic infarcts, right frontal, left Broca's, left motor strip.  Radiology report reviewed.    Prolonged EEG last week was negative apart from generalized slowing    Diagnoses:  Stroke, " bilateral middle cerebral artery, embolic versus due to underlying small vessel disease  Aphasia, resolved  Right hand apraxia, resolved  Encephalopathy, improving    Comment: MRI brain is helpful and that it shows several small embolic infarcts that ultimately do correlate to his deficits since admission although his exam being out of proportion to the infarct volume is highly suggestive of the possibility of intermittent focal seizures    Plan:  1.  Aspirin/anticoagulation per cardiology/CTS  2.  Statin  3.  No indication for long-term AEDs as of yet.  I did  him against driving for 90 days as a precaution  4.  Could go to rehab at any time from a neurology standpoint    Management discussed with Dr. Frey, patient, spouse.    Total visit time 30 minutes including chart/imaging review, patient assessment, care coordination, patient/family education.

## 2023-06-05 NOTE — PROGRESS NOTES
[facility]  Continuing to follow for medical readiness. Noted  diuresing with lasix and now NA+ is low. Called dietitian to check on water flushes with HS TF. She will address with nephrology.   Plan admit when medically ready.     Lola Rendon RN  Acute Rehab Admission Nurse

## 2023-06-05 NOTE — PROGRESS NOTES
"Patient Name: Vernon Solorzano  :1950  72 y.o.      Patient Care Team:  Liza Oconnell III, NP-C as PCP - General (Family Medicine)  Corey Lao Jr., MD (Inactive) as Consulting Physician (Urology)    Interval History:   Stable heart rate and blood pressure.    Subjective:  Following for valve disease and arrhythmia.    Objective   Vital Signs  Temp:  [98.2 °F (36.8 °C)-98.7 °F (37.1 °C)] 98.2 °F (36.8 °C)  Heart Rate:  [76-97] 76  Resp:  [18-22] 18  BP: (106-119)/(67-90) 106/67    Intake/Output Summary (Last 24 hours) at 2023 1341  Last data filed at 2023 1336  Gross per 24 hour   Intake 1560 ml   Output 2925 ml   Net -1365 ml     Flowsheet Rows      Flowsheet Row First Filed Value   Admission Height 185.4 cm (73\") Documented at 2023 1340   Admission Weight 117 kg (257 lb 0.9 oz) Documented at 2023 1531            Physical Exam:   General Appearance:    Alert, cooperative, in no acute distress   Lungs:     Clear to auscultation.  Normal respiratory effort and rate.      Heart:    Regular rhythm and normal rate, normal S1 and S2, no murmurs, gallops or rubs.     Chest Wall:    No abnormalities observed   Abdomen:     Soft, nontender, positive bowel sounds.     Extremities:   no cyanosis, clubbing or edema.  No marked joint deformities.  Adequate musculoskeletal strength.       Results Review:    Results from last 7 days   Lab Units 23  0305   SODIUM mmol/L 134*   POTASSIUM mmol/L 4.1   CHLORIDE mmol/L 94*   CO2 mmol/L 27.7   BUN mg/dL 22   CREATININE mg/dL 1.31*   GLUCOSE mg/dL 219*   CALCIUM mg/dL 9.1         Results from last 7 days   Lab Units 23  0305   WBC 10*3/mm3 13.53*   HEMOGLOBIN g/dL 9.9*   HEMATOCRIT % 30.0*   PLATELETS 10*3/mm3 264     Results from last 7 days   Lab Units 23  1410 23  0319 23  1947 23  2318 23  1555   INR   --   --   --   --  1.01   APTT seconds 30.3 151.6* 43.4*   < > 24.2    < > = " values in this interval not displayed.                     Medication Review:   apixaban, 5 mg, Oral, Q12H  aspirin, 81 mg, Oral, Daily  atorvastatin, 40 mg, Oral, Nightly  calcium 500 mg vitamin D 5 mcg (200 UT), 1 tablet, Oral, Daily  empagliflozin, 25 mg, Oral, Daily  furosemide, 40 mg, Intravenous, Daily  guaifenesin, 400 mg, Oral, Q8H  [START ON 6/6/2023] insulin glargine, 40 Units, Subcutaneous, Daily  insulin lispro, 3-14 Units, Subcutaneous, Q6H  ipratropium-albuterol, 3 mL, Nebulization, 4x Daily - RT  metoprolol succinate XL, 25 mg, Oral, Q24H  mupirocin, , Each Nare, BID  pantoprazole, 40 mg, Intravenous, QAM AC  potassium chloride, 20 mEq, Nasogastric, Daily  predniSONE, 5 mg, Oral, Every Other Day  senna-docusate sodium, 2 tablet, Oral, Nightly              Assessment & Plan     1.  Severe symptomatic mitral regurgitation and tricuspid regurgitation status post mitral valve replacement and tricuspid valve repair with left atrial appendage closure May 24, 2023 by Dr. Frey.  Postoperative ejection fraction 50 to 55%.  2.  Chronic diastolic heart failure  3.  Stage IIIa chronic kidney disease  4.  Postoperative PVCs and nonsustained VT  5.  Postop typical flutter.  Status post cardioversion on June 2, 2023.  Left atrial appendage ligation.  On Eliquis.  5.  First-degree AV delay left bundle branch block.  6.  Bilateral strokes    -IV Lasix per nephrology.  -Noted plans for inpatient rehab.    Abby Resendez MD, [facility]  06/05/23  13:41 EDT

## 2023-06-05 NOTE — PROGRESS NOTES
Acute Care - Speech Language Pathology   Swallow Re-Evaluation [facility]     Patient Name: Vernon Solorzano  : 1950  MRN: [id number]  Today's Date: 2023               Admit Date: 2023    Visit Dx:     ICD-10-CM ICD-9-CM   1. S/P MVR (mitral valve replacement)  Z95.2 V43.3   2. Nonrheumatic mitral valve regurgitation  I34.0 424.0     Patient Active Problem List   Diagnosis    DM (diabetes mellitus), type 2    Mixed hyperlipidemia    Mild intermittent asthma without complication    New onset of congestive heart failure    Nonrheumatic mitral valve regurgitation    Chest pain    Essential hypertension    Mitral valve disease     Past Medical History:   Diagnosis Date    Allergic rhinitis     Arthritis     BPH (benign prostatic hyperplasia)     Diabetes mellitus     TYPE 2    Foraminal stenosis of cervical region     C5-C6    GERD (gastroesophageal reflux disease)     Hemorrhoids     History of urinary retention     S/P TURP    Hyperlipidemia     Macular degeneration     PING (obstructive sleep apnea) 2016    MILD, SEES DR. AMARILIS MORGAN     Past Surgical History:   Procedure Laterality Date    CARDIAC CATHETERIZATION N/A 2023    Procedure: Right Heart Cath;  Surgeon: Corey Gaffney MD;  Location: [facility] CATH INVASIVE LOCATION;  Service: Cardiology;  Laterality: N/A;    CARDIAC CATHETERIZATION N/A 2023    Procedure: Left Heart Cath;  Surgeon: Corey Gaffney MD;  Location:  [facility] CATH INVASIVE LOCATION;  Service: Cardiology;  Laterality: N/A;    CARDIAC CATHETERIZATION N/A 2023    Procedure: Left ventriculography;  Surgeon: Corey Gaffney MD;  Location: [facility] CATH INVASIVE LOCATION;  Service: Cardiology;  Laterality: N/A;    CARDIAC CATHETERIZATION N/A 2023    Procedure: Coronary angiography;  Surgeon: Corey Gaffney MD;  Location:  [facility] CATH INVASIVE LOCATION;  Service: Cardiology;  Laterality: N/A;    COLONOSCOPY N/A     WNL PER PT, NO RECORDS,      COLONOSCOPY N/A 04/07/2009    DR. GORGE BENAVIDEZ AT [place]    MITRAL VALVE REPAIR/REPLACEMENT N/A 5/24/2023    Procedure: MITRAL VALVE REPAIR/REPLACEMENT TRICUSPID VALVE REPAIR/REPLACEMENT TRANSESOPHAGEAL ECHOCARDIOGRAM WITH ANESTHESIA;  Surgeon: George Frey MD;  Location: [facility];  Service: Cardiothoracic;  Laterality: N/A;    PROSTATE SURGERY N/A 11/12/2010    TURP, DR. BRIGHT COLLAZO AT [facility]    SKIN TAG REMOVAL N/A 12/20/2017    ANAL SKIN TAG X2, PERFORMED IN OFFICE, DR. LISA ORANTES    TURP / TRANSURETHRAL INCISION / DRAINAGE PROSTATE  2010    Dr. Goodrich       SLP Recommendation and Plan     SLP Diet Recommendation: regular textures, no mixed consistencies, nectar thick liquids, other (see comments) (water protocol) (06/05/23 1016)  Recommended Precautions and Strategies: upright posture during/after eating, small bites of food and sips of liquid, multiple swallows per sip of liquid, multiple swallows per bite of food, general aspiration precautions (06/05/23 1016)  SLP Rec. for Method of Medication Administration: meds whole, meds crushed, with puree, as tolerated (06/05/23 1016)     Monitor for Signs of Aspiration: yes, notify SLP if any concerns (06/05/23 1016)  Recommended Diagnostics: reassess via clinical swallow evaluation, [place] [place] (06/05/23 1016)     Anticipated Discharge Disposition (SLP): anticipate therapy at next level of care (06/05/23 1016)     Therapy Frequency (Swallow): PRN (06/05/23 1016)  Predicted Duration Therapy Intervention (Days): until discharge (06/05/23 1016)        Daily Summary of Progress (SLP): progress toward functional goals as expected (06/05/23 1016)               Treatment Assessment (SLP): improved (06/05/23 1016)     Plan for Continued Treatment (SLP): continue treatment per plan of care (06/05/23 1016)         Plan of Care Reviewed With: patient, spouse  Outcome Evaluation: Clinical swallow re-eval completed. Suggest advance diet to regular  textures (no mixed consistencies).Spouse reports that family will assist w chopping foods if needed for all meals. Suggest consider continue nectar thick liquids. Suggest consider free water protocol with water and ice chips between meals (after oral care) per VFSS recommendations to assist with loading swallow system and assist with error-based learning. Oral care t.i.d. with toothbrush/toothpaste to mitigate abberant oropharyngeal mani. Reduce additional modifiable risk factors associated w acute deglutition disorders, including mobilization per PT recommendations, pulmonary toilet per cardiology recommendations. SLP following.      SWALLOW EVALUATION (last 72 hours)       SLP Adult Swallow Evaluation       Row Name 06/05/23 1016 06/02/23 1400       Rehab Evaluation    Document Type -- evaluation  -KA    Subjective Information -- no complaints  -KA    Patient Observations -- alert;cooperative  -KA    Patient Effort -- good  -KA    Symptoms Noted During/After Treatment -- none  -KA       General Information    Patient Profile Reviewed -- yes  -KA    Current Method of Nutrition -- soft to chew textures;honey-thick liquids  -       General Eating/Swallowing Observations    Respiratory Support Currently in Use room air  -BB --    Eating/Swallowing Skills self-fed  -BB --    Positioning During Eating upright 90 degree  -BB --    Utensils Used spoon;cup  -BB --    Consistencies Trialed regular textures;pureed;ice chips;thin liquids;nectar/syrup-thick liquids  -BB --       Clinical Swallow Eval    Clinical Swallow Evaluation Summary Clinical swallow re-eval completed. Pt's wife at bedside. Cognition: A/Ox3. Pt demo's extended time to task/completion. Pt occasionally demo's incongruent responses to questions (cognitive-linguistic considerations). Pt able to follow basic multi-step commands. Voice: Vocal loudness appears mildly reduced. Cough: Not elicited. Affect: Pleasant. Speech: Intelligible. Bulbar mech exam:  "Integrity of cranial nerves V, VII, IX/X and XII appear grossly intact. [place] swallow protocol: not completed (VFSS completed 5/31). Dysphagia symptoms: Pt/family denies any symptoms of dysphagia currently on modified diet and/or with sips of diet coke which the patient tried recently. Dysphagia signs: Consistent throat clear with trials of thin liquids and carbonated beverage. Pt reported feeling of \"fullness\" with trials of thin and carbonated beverage (liquids largely trialed after solids). Symptom is nonspecific. ?Could this be due to aerophagia, sign of aspiration, gastroesophageal issue, etc. Feeding abilities: pt able to open packages independently and self-feed. Active problems related to dysphagia management: Cardiac sx, Acute CVA (\"bilateral middle cerebral artery, embolic versus due to underlying small vessel disease\"), recent intubation. Chronic problems related to dysphagia management: GERD, CHF, CKDIIIa. Pertinent negatives include (per patient/family report): dysphonia/aphonia, prior dysphagia. Assessment / Plan: Suggest advance diet to regular textures (no mixed consistencies). Suggest consider continue nectar thick liquids. Suggest consider free water protocol with water and ice chips between meals (after oral care) per VFSS recommendations to assist with loading swallow system and assist with error-based learning. Oral care t.i.d. with toothbrush/toothpaste and rinse/spit. Reduce additional modifiable risk factors associated w acute deglutition disorders, including mobilization per PT recommendations, pulmonary toilet per cardiology recommendations.  -BB Patient seen for swallow re-evaluation, Patietn sitting upright in chair eating lunch. No overt s/s of pen/asp with HTL, NTL via cup and puree and mechanical soft diet. Reviewed results of VFSS and rationale for current diet. Recommend upgrade to NTL, however recommend pt remain on mechanical soft. Patient intermittently confused during " re-evaluation (RN confirmed normal for patient) and talked with PO in mouth at times with slower mastication at times.  -       SLP Evaluation Clinical Impression    SLP Swallowing Diagnosis -- moderate;pharyngeal dysphagia;oral dysphagia  -    Functional Impact -- risk of aspiration/pneumonia  -    Rehab Potential/Prognosis, Swallowing -- good, to achieve stated therapy goals  -    Swallow Criteria for Skilled Therapeutic Interventions Met -- demonstrates skilled criteria  -       SLP Treatment Clinical Impressions    Treatment Assessment (SLP) improved  -BB --    Daily Summary of Progress (SLP) progress toward functional goals as expected  -BB --    Barriers to Overall Progress (SLP) Cognitive status;Fatigue  -BB --    Plan for Continued Treatment (SLP) continue treatment per plan of care  -BB --    Care Plan Review evaluation/treatment results reviewed;care plan/treatment goals reviewed;risks/benefits reviewed;current/potential barriers reviewed;patient/other agree to care plan  -BB --    Care Plan Review, Other Participant(s) spouse  -BB --       Recommendations    Therapy Frequency (Swallow) PRN  -BB PRN  -KA    Predicted Duration Therapy Intervention (Days) until discharge  -BB until discharge  -    SLP Diet Recommendation regular textures;no mixed consistencies;nectar thick liquids;other (see comments)  water protocol  - soft to chew textures;chopped;water between meals after oral care, with supervision;ice chips between meals after oral care, with supervision;nectar thick liquids  -    Recommended Diagnostics reassess via clinical swallow evaluation;reassess via VFSS ([facility])  -BB reassess via clinical swallow evaluation;reassess via VFSS ([facility])  -    Recommended Precautions and Strategies upright posture during/after eating;small bites of food and sips of liquid;multiple swallows per sip of liquid;multiple swallows per bite of food;general aspiration precautions  - upright posture  during/after eating;small bites of food and sips of liquid;multiple swallows per sip of liquid;multiple swallows per bite of food;general aspiration precautions  -KA    Oral Care Recommendations Other (see comments)  oral care tid  -BB Oral Care BID/PRN  -NBA    SLP Rec. for Method of Medication Administration meds whole;meds crushed;with puree;as tolerated  -BB meds whole;meds crushed;with puree;as tolerated  -KA    Monitor for Signs of Aspiration yes;notify SLP if any concerns  -BB yes;notify SLP if any concerns  -NBA    Anticipated Discharge Disposition (SLP) anticipate therapy at next level of care  -ZUHAIR anticipate therapy at next level of care  -NBA              User Key  (r) = Recorded By, (t) = Taken By, (c) = Cosigned By      Initials Name Effective Dates    Ryan Rey MA,[facility]-SLP 05/31/23 -     Ervin Temple SLP 02/19/23 -                     EDUCATION  The patient has been educated in the following areas:   Dysphagia (Swallowing Impairment) Oral Care/Hydration Modified Diet Instruction.              Time Calculation:    Time Calculation- SLP       Row Name 06/05/23 1337             Time Calculation- SLP    SLP Start Time 1015  -BB      SLP Stop Time 1115  -BB      SLP Time Calculation (min) 60 min  -BB         Untimed Charges    37842-IB Treatment Swallow Minutes 60  -BB         Total Minutes    Untimed Charges Total Minutes 60  -BB       Total Minutes 60  -BB                User Key  (r) = Recorded By, (t) = Taken By, (c) = Cosigned By      Initials Name Provider Type    BB Ervin Bermudez SLP Speech and Language Pathologist                    Therapy Charges for Today       Code Description Service Date Service Provider Modifiers Qty    [id number]  ST TREATMENT SWALLOW 4 6/5/2023 Ervin Bermudez SLP GN 1                 VIKI Rodriguez  6/5/2023

## 2023-06-05 NOTE — PLAN OF CARE
Goal Outcome Evaluation:                      Patient pain controlled  by pain med, vital signs stable, pt on room air, SR on the monitor, , cont on tube feed Diabetisource 7P- 7  A, MRI of the brain done, pt educate to call for assist, call  light in reach, swallow  precaution , up with 2 assist, cont to monitor

## 2023-06-06 ENCOUNTER — HOSPITAL ENCOUNTER (INPATIENT)
Facility: HOSPITAL | Age: 73
DRG: 066 | End: 2023-06-06
Attending: PHYSICAL MEDICINE & REHABILITATION | Admitting: PHYSICAL MEDICINE & REHABILITATION
Payer: MEDICARE

## 2023-06-06 VITALS
HEIGHT: 72 IN | BODY MASS INDEX: 34.15 KG/M2 | HEART RATE: 94 BPM | RESPIRATION RATE: 20 BRPM | WEIGHT: 252.1 LBS | TEMPERATURE: 97.7 F | SYSTOLIC BLOOD PRESSURE: 99 MMHG | OXYGEN SATURATION: 96 % | DIASTOLIC BLOOD PRESSURE: 66 MMHG

## 2023-06-06 DIAGNOSIS — Z74.09 IMPAIRED FUNCTIONAL MOBILITY, BALANCE, GAIT, AND ENDURANCE: Primary | ICD-10-CM

## 2023-06-06 PROBLEM — I63.512 ACUTE ISCHEMIC LEFT MCA STROKE: Status: ACTIVE | Noted: 2023-06-06

## 2023-06-06 LAB
ALBUMIN SERPL-MCNC: 3.7 G/DL (ref 3.5–5.2)
ALBUMIN/GLOB SERPL: 1.1 G/DL
ALP SERPL-CCNC: 91 U/L (ref 39–117)
ALT SERPL W P-5'-P-CCNC: 15 U/L (ref 1–41)
ANION GAP SERPL CALCULATED.3IONS-SCNC: 12.2 MMOL/L (ref 5–15)
AST SERPL-CCNC: 12 U/L (ref 1–40)
BASOPHILS # BLD AUTO: 0.08 10*3/MM3 (ref 0–0.2)
BASOPHILS NFR BLD AUTO: 0.5 % (ref 0–1.5)
BILIRUB SERPL-MCNC: 0.5 MG/DL (ref 0–1.2)
BUN SERPL-MCNC: 22 MG/DL (ref 8–23)
BUN/CREAT SERPL: 16.8 (ref 7–25)
CALCIUM SPEC-SCNC: 9.5 MG/DL (ref 8.6–10.5)
CHLORIDE SERPL-SCNC: 94 MMOL/L (ref 98–107)
CO2 SERPL-SCNC: 25.8 MMOL/L (ref 22–29)
CREAT SERPL-MCNC: 1.31 MG/DL (ref 0.76–1.27)
DEPRECATED RDW RBC AUTO: 43.4 FL (ref 37–54)
EGFRCR SERPLBLD CKD-EPI 2021: 57.1 ML/MIN/1.73
EOSINOPHIL # BLD AUTO: 0.55 10*3/MM3 (ref 0–0.4)
EOSINOPHIL NFR BLD AUTO: 3.5 % (ref 0.3–6.2)
ERYTHROCYTE [DISTWIDTH] IN BLOOD BY AUTOMATED COUNT: 13.8 % (ref 12.3–15.4)
GLOBULIN UR ELPH-MCNC: 3.4 GM/DL
GLUCOSE BLDC GLUCOMTR-MCNC: 161 MG/DL (ref 70–130)
GLUCOSE BLDC GLUCOMTR-MCNC: 204 MG/DL (ref 70–130)
GLUCOSE BLDC GLUCOMTR-MCNC: 209 MG/DL (ref 70–130)
GLUCOSE BLDC GLUCOMTR-MCNC: 238 MG/DL (ref 70–130)
GLUCOSE BLDC GLUCOMTR-MCNC: 253 MG/DL (ref 70–130)
GLUCOSE SERPL-MCNC: 218 MG/DL (ref 65–99)
HCT VFR BLD AUTO: 32.7 % (ref 37.5–51)
HGB BLD-MCNC: 10.9 G/DL (ref 13–17.7)
IMM GRANULOCYTES # BLD AUTO: 0.29 10*3/MM3 (ref 0–0.05)
IMM GRANULOCYTES NFR BLD AUTO: 1.8 % (ref 0–0.5)
LYMPHOCYTES # BLD AUTO: 1.43 10*3/MM3 (ref 0.7–3.1)
LYMPHOCYTES NFR BLD AUTO: 9 % (ref 19.6–45.3)
MCH RBC QN AUTO: 28.8 PG (ref 26.6–33)
MCHC RBC AUTO-ENTMCNC: 33.3 G/DL (ref 31.5–35.7)
MCV RBC AUTO: 86.5 FL (ref 79–97)
MONOCYTES # BLD AUTO: 0.93 10*3/MM3 (ref 0.1–0.9)
MONOCYTES NFR BLD AUTO: 5.9 % (ref 5–12)
NEUTROPHILS NFR BLD AUTO: 12.57 10*3/MM3 (ref 1.7–7)
NEUTROPHILS NFR BLD AUTO: 79.3 % (ref 42.7–76)
NRBC BLD AUTO-RTO: 0 /100 WBC (ref 0–0.2)
PLATELET # BLD AUTO: 294 10*3/MM3 (ref 140–450)
PMV BLD AUTO: 9.7 FL (ref 6–12)
POTASSIUM SERPL-SCNC: 4.1 MMOL/L (ref 3.5–5.2)
PROT SERPL-MCNC: 7.1 G/DL (ref 6–8.5)
RBC # BLD AUTO: 3.78 10*6/MM3 (ref 4.14–5.8)
SODIUM SERPL-SCNC: 132 MMOL/L (ref 136–145)
WBC NRBC COR # BLD: 15.85 10*3/MM3 (ref 3.4–10.8)

## 2023-06-06 PROCEDURE — 94664 DEMO&/EVAL PT USE INHALER: CPT

## 2023-06-06 PROCEDURE — 80053 COMPREHEN METABOLIC PANEL: CPT | Performed by: INTERNAL MEDICINE

## 2023-06-06 PROCEDURE — 82948 REAGENT STRIP/BLOOD GLUCOSE: CPT

## 2023-06-06 PROCEDURE — 63710000001 INSULIN LISPRO (HUMAN) PER 5 UNITS: Performed by: PHYSICAL MEDICINE & REHABILITATION

## 2023-06-06 PROCEDURE — 99232 SBSQ HOSP IP/OBS MODERATE 35: CPT | Performed by: INTERNAL MEDICINE

## 2023-06-06 PROCEDURE — 99024 POSTOP FOLLOW-UP VISIT: CPT | Performed by: THORACIC SURGERY (CARDIOTHORACIC VASCULAR SURGERY)

## 2023-06-06 PROCEDURE — 97110 THERAPEUTIC EXERCISES: CPT | Performed by: OCCUPATIONAL THERAPIST

## 2023-06-06 PROCEDURE — 97116 GAIT TRAINING THERAPY: CPT | Performed by: PHYSICAL THERAPIST

## 2023-06-06 PROCEDURE — 94799 UNLISTED PULMONARY SVC/PX: CPT

## 2023-06-06 PROCEDURE — 25010000002 FUROSEMIDE PER 20 MG: Performed by: INTERNAL MEDICINE

## 2023-06-06 PROCEDURE — 99232 SBSQ HOSP IP/OBS MODERATE 35: CPT | Performed by: PSYCHIATRY & NEUROLOGY

## 2023-06-06 PROCEDURE — 63710000001 INSULIN LISPRO (HUMAN) PER 5 UNITS: Performed by: NURSE PRACTITIONER

## 2023-06-06 PROCEDURE — 85025 COMPLETE CBC W/AUTO DIFF WBC: CPT | Performed by: NURSE PRACTITIONER

## 2023-06-06 PROCEDURE — 97110 THERAPEUTIC EXERCISES: CPT | Performed by: PHYSICAL THERAPIST

## 2023-06-06 RX ORDER — ACETAMINOPHEN 160 MG/5ML
650 SOLUTION ORAL EVERY 4 HOURS PRN
Status: DISCONTINUED | OUTPATIENT
Start: 2023-06-06 | End: 2023-06-07

## 2023-06-06 RX ORDER — METOPROLOL SUCCINATE 25 MG/1
25 TABLET, EXTENDED RELEASE ORAL
Status: CANCELLED | OUTPATIENT
Start: 2023-06-07

## 2023-06-06 RX ORDER — IPRATROPIUM BROMIDE AND ALBUTEROL SULFATE 2.5; .5 MG/3ML; MG/3ML
3 SOLUTION RESPIRATORY (INHALATION)
Status: CANCELLED | OUTPATIENT
Start: 2023-06-06

## 2023-06-06 RX ORDER — GUAIFENESIN 200 MG/10ML
400 LIQUID ORAL EVERY 8 HOURS
Status: CANCELLED | OUTPATIENT
Start: 2023-06-06

## 2023-06-06 RX ORDER — ATORVASTATIN CALCIUM 20 MG/1
40 TABLET, FILM COATED ORAL NIGHTLY
Status: DISPENSED | OUTPATIENT
Start: 2023-06-06

## 2023-06-06 RX ORDER — PREDNISONE 10 MG/1
5 TABLET ORAL EVERY OTHER DAY
Status: COMPLETED | OUTPATIENT
Start: 2023-06-07 | End: 2023-06-07

## 2023-06-06 RX ORDER — TORSEMIDE 20 MG/1
40 TABLET ORAL DAILY
Status: DISCONTINUED | OUTPATIENT
Start: 2023-06-06 | End: 2023-06-06 | Stop reason: HOSPADM

## 2023-06-06 RX ORDER — GUAIFENESIN 200 MG/10ML
400 LIQUID ORAL EVERY 8 HOURS
Status: DISPENSED | OUTPATIENT
Start: 2023-06-07

## 2023-06-06 RX ORDER — PREDNISONE 10 MG/1
5 TABLET ORAL EVERY OTHER DAY
Status: CANCELLED | OUTPATIENT
Start: 2023-06-07 | End: 2023-06-09

## 2023-06-06 RX ORDER — TRAMADOL HYDROCHLORIDE 50 MG/1
50 TABLET ORAL EVERY 6 HOURS PRN
Status: CANCELLED | OUTPATIENT
Start: 2023-06-06 | End: 2023-06-09

## 2023-06-06 RX ORDER — TORSEMIDE 20 MG/1
40 TABLET ORAL DAILY
Status: CANCELLED | OUTPATIENT
Start: 2023-06-07

## 2023-06-06 RX ORDER — POTASSIUM CHLORIDE 1.5 G/1.77G
20 POWDER, FOR SOLUTION ORAL DAILY
Status: CANCELLED | OUTPATIENT
Start: 2023-06-07

## 2023-06-06 RX ORDER — ASPIRIN 81 MG/1
81 TABLET, CHEWABLE ORAL DAILY
Status: DISPENSED | OUTPATIENT
Start: 2023-06-07

## 2023-06-06 RX ORDER — METOPROLOL SUCCINATE 25 MG/1
25 TABLET, EXTENDED RELEASE ORAL
Status: DISPENSED | OUTPATIENT
Start: 2023-06-07

## 2023-06-06 RX ORDER — PANTOPRAZOLE SODIUM 40 MG/1
40 TABLET, DELAYED RELEASE ORAL
Status: DISPENSED | OUTPATIENT
Start: 2023-06-07

## 2023-06-06 RX ORDER — ATORVASTATIN CALCIUM 20 MG/1
40 TABLET, FILM COATED ORAL NIGHTLY
Status: CANCELLED | OUTPATIENT
Start: 2023-06-06

## 2023-06-06 RX ORDER — PANTOPRAZOLE SODIUM 40 MG/1
40 TABLET, DELAYED RELEASE ORAL
Status: DISCONTINUED | OUTPATIENT
Start: 2023-06-07 | End: 2023-06-06 | Stop reason: HOSPADM

## 2023-06-06 RX ORDER — ACETAMINOPHEN 325 MG/1
650 TABLET ORAL EVERY 4 HOURS PRN
Status: CANCELLED | OUTPATIENT
Start: 2023-06-06

## 2023-06-06 RX ORDER — POTASSIUM CHLORIDE 1.5 G/1.77G
20 POWDER, FOR SOLUTION ORAL DAILY
Status: DISCONTINUED | OUTPATIENT
Start: 2023-06-07 | End: 2023-06-08

## 2023-06-06 RX ORDER — ACETAMINOPHEN 160 MG/5ML
650 SOLUTION ORAL EVERY 4 HOURS PRN
Status: CANCELLED | OUTPATIENT
Start: 2023-06-06

## 2023-06-06 RX ORDER — ASPIRIN 81 MG/1
81 TABLET, CHEWABLE ORAL DAILY
Status: CANCELLED | OUTPATIENT
Start: 2023-06-07

## 2023-06-06 RX ORDER — PANTOPRAZOLE SODIUM 40 MG/1
40 TABLET, DELAYED RELEASE ORAL
Status: CANCELLED | OUTPATIENT
Start: 2023-06-07

## 2023-06-06 RX ORDER — ACETAMINOPHEN 650 MG/1
650 SUPPOSITORY RECTAL EVERY 4 HOURS PRN
Status: DISCONTINUED | OUTPATIENT
Start: 2023-06-06 | End: 2023-06-07

## 2023-06-06 RX ORDER — TORSEMIDE 20 MG/1
40 TABLET ORAL DAILY
Status: DISCONTINUED | OUTPATIENT
Start: 2023-06-07 | End: 2023-06-08

## 2023-06-06 RX ORDER — TRAMADOL HYDROCHLORIDE 50 MG/1
50 TABLET ORAL EVERY 6 HOURS PRN
Status: DISPENSED | OUTPATIENT
Start: 2023-06-06 | End: 2023-06-18

## 2023-06-06 RX ORDER — ACETAMINOPHEN 650 MG/1
650 SUPPOSITORY RECTAL EVERY 4 HOURS PRN
Status: CANCELLED | OUTPATIENT
Start: 2023-06-06

## 2023-06-06 RX ORDER — IPRATROPIUM BROMIDE AND ALBUTEROL SULFATE 2.5; .5 MG/3ML; MG/3ML
3 SOLUTION RESPIRATORY (INHALATION)
Status: DISPENSED | OUTPATIENT
Start: 2023-06-06

## 2023-06-06 RX ORDER — INSULIN LISPRO 100 [IU]/ML
3-14 INJECTION, SOLUTION INTRAVENOUS; SUBCUTANEOUS EVERY 6 HOURS SCHEDULED
Status: CANCELLED | OUTPATIENT
Start: 2023-06-06

## 2023-06-06 RX ORDER — INSULIN LISPRO 100 [IU]/ML
3-14 INJECTION, SOLUTION INTRAVENOUS; SUBCUTANEOUS EVERY 6 HOURS SCHEDULED
Status: DISCONTINUED | OUTPATIENT
Start: 2023-06-06 | End: 2023-06-08

## 2023-06-06 RX ORDER — ACETAMINOPHEN 325 MG/1
650 TABLET ORAL EVERY 4 HOURS PRN
Status: DISPENSED | OUTPATIENT
Start: 2023-06-06

## 2023-06-06 RX ADMIN — IPRATROPIUM BROMIDE AND ALBUTEROL SULFATE 3 ML: 2.5; .5 SOLUTION RESPIRATORY (INHALATION) at 06:58

## 2023-06-06 RX ADMIN — APIXABAN 5 MG: 5 TABLET, FILM COATED ORAL at 08:23

## 2023-06-06 RX ADMIN — APIXABAN 5 MG: 5 TABLET, FILM COATED ORAL at 19:48

## 2023-06-06 RX ADMIN — GUAIFENESIN 400 MG: 200 SOLUTION ORAL at 15:46

## 2023-06-06 RX ADMIN — EMPAGLIFLOZIN 25 MG: 25 TABLET, FILM COATED ORAL at 08:24

## 2023-06-06 RX ADMIN — INSULIN LISPRO 8 UNITS: 100 INJECTION, SOLUTION INTRAVENOUS; SUBCUTANEOUS at 12:32

## 2023-06-06 RX ADMIN — POTASSIUM CHLORIDE 20 MEQ: 1.5 POWDER, FOR SOLUTION ORAL at 08:24

## 2023-06-06 RX ADMIN — PANTOPRAZOLE SODIUM 40 MG: 40 INJECTION, POWDER, FOR SOLUTION INTRAVENOUS at 07:37

## 2023-06-06 RX ADMIN — MUPIROCIN 1 APPLICATION: 20 OINTMENT TOPICAL at 08:23

## 2023-06-06 RX ADMIN — IPRATROPIUM BROMIDE AND ALBUTEROL SULFATE 3 ML: 2.5; .5 SOLUTION RESPIRATORY (INHALATION) at 16:07

## 2023-06-06 RX ADMIN — GUAIFENESIN 400 MG: 200 SOLUTION ORAL at 07:37

## 2023-06-06 RX ADMIN — FUROSEMIDE 40 MG: 10 INJECTION, SOLUTION INTRAMUSCULAR; INTRAVENOUS at 08:23

## 2023-06-06 RX ADMIN — METOPROLOL SUCCINATE 25 MG: 25 TABLET, EXTENDED RELEASE ORAL at 10:25

## 2023-06-06 RX ADMIN — INSULIN LISPRO 5 UNITS: 100 INJECTION, SOLUTION INTRAVENOUS; SUBCUTANEOUS at 07:37

## 2023-06-06 RX ADMIN — TORSEMIDE 40 MG: 20 TABLET ORAL at 11:31

## 2023-06-06 RX ADMIN — IPRATROPIUM BROMIDE AND ALBUTEROL SULFATE 3 ML: 2.5; .5 SOLUTION RESPIRATORY (INHALATION) at 20:27

## 2023-06-06 RX ADMIN — CALCIUM CARBONATE-VITAMIN D TAB 500 MG-200 UNIT 1 TABLET: 500-200 TAB at 08:24

## 2023-06-06 RX ADMIN — ATORVASTATIN CALCIUM 40 MG: 20 TABLET, FILM COATED ORAL at 19:48

## 2023-06-06 RX ADMIN — INSULIN LISPRO 5 UNITS: 100 INJECTION, SOLUTION INTRAVENOUS; SUBCUTANEOUS at 01:04

## 2023-06-06 RX ADMIN — INSULIN GLARGINE-YFGN 40 UNITS: 100 INJECTION, SOLUTION SUBCUTANEOUS at 08:24

## 2023-06-06 RX ADMIN — INSULIN LISPRO 3 UNITS: 100 INJECTION, SOLUTION INTRAVENOUS; SUBCUTANEOUS at 18:27

## 2023-06-06 RX ADMIN — GUAIFENESIN 400 MG: 200 SOLUTION ORAL at 01:05

## 2023-06-06 RX ADMIN — ASPIRIN 81 MG: 81 TABLET, CHEWABLE ORAL at 08:23

## 2023-06-06 NOTE — PROGRESS NOTES
"DOS: 2023  NAME: Vernon Solorzano   : 1950  PCP: Liza Oconnell III, NP-C  No chief complaint on file.      Chief complaint: Stroke  Subjective: No significant neurologic change today.  No new complaints.  Eager to start acute rehab.    Objective:  Vital signs: BP 93/75   Pulse 97   Temp 98.4 °F (36.9 °C) (Oral)   Resp 20   Ht 182.9 cm (72\")   Wt 114 kg (252 lb 1.6 oz)   SpO2 96%   BMI 34.19 kg/m²    Gen: NAD, vitals reviewed  MS: oriented x3, recent/remote memory intact, mildly impaired attention/concentration, language intact, no neglect.  CN: visual acuity grossly normal, PERRL, EOMI, no facial droop, no dysarthria  Motor: 5/5 throughout upper and lower extremities, normal tone  Sensory: intact to light touch all 4 ext.    Laboratory results:  Lab Results   Component Value Date    GLUCOSE 218 (H) 2023    CALCIUM 9.5 2023     (L) 2023    K 4.1 2023    CO2 25.8 2023    CL 94 (L) 2023    BUN 22 2023    CREATININE 1.31 (H) 2023    EGFRIFAFRI 76 12/15/2021    EGFRIFNONA 66 12/15/2021    BCR 16.8 2023    ANIONGAP 12.2 2023     Lab Results   Component Value Date    WBC 15.85 (H) 2023    HGB 10.9 (L) 2023    HCT 32.7 (L) 2023    MCV 86.5 2023     2023     Lab Results   Component Value Date    LDL 81 2023    LDL 59 2023    LDL 59 2021            Review of labs: Sodium 132, WBC 16    Diagnoses:  Stroke, bilateral middle cerebral artery, embolic versus due to underlying small vessel disease  Aphasia, resolved  Right hand apraxia, resolved  Encephalopathy, improving  Atrial flutter, status post ablation, anticoagulated on Eliquis    Comment: 72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  In hindsight there is suspicion for intermittent focal seizures earlier in his stay " but clinically this seems to have resolved.  No plan for seizure medication unless spells recur.    Plan:  1.  On Eliquis for a flutter  2.  Low threshold for continuous EEG if staring spells recur or there are unexplained periods of aphasia    Follow up with me in 3 months. Okay to start rehab from neurology standpoint    Management discussed with patient's spouse at the bedside

## 2023-06-06 NOTE — THERAPY TREATMENT NOTE
Patient Name: Vernon Solorzano  : 1950    MRN: [id number]                              Today's Date: 2023       Admit Date: 2023    Visit Dx:     ICD-10-CM ICD-9-CM   1. S/P MVR (mitral valve replacement)  Z95.2 V43.3   2. Nonrheumatic mitral valve regurgitation  I34.0 424.0     Patient Active Problem List   Diagnosis    DM (diabetes mellitus), type 2    Mixed hyperlipidemia    Mild intermittent asthma without complication    New onset of congestive heart failure    Nonrheumatic mitral valve regurgitation    Chest pain    Essential hypertension    Mitral valve disease     Past Medical History:   Diagnosis Date    Allergic rhinitis     Arthritis     BPH (benign prostatic hyperplasia)     Diabetes mellitus     TYPE 2    Foraminal stenosis of cervical region     C5-C6    GERD (gastroesophageal reflux disease)     Hemorrhoids     History of urinary retention     S/P TURP    Hyperlipidemia     Macular degeneration     PING (obstructive sleep apnea) 2016    MILD, SEES DR. AMARILIS MORGAN     Past Surgical History:   Procedure Laterality Date    CARDIAC CATHETERIZATION N/A 2023    Procedure: Right Heart Cath;  Surgeon: Corey Gaffney MD;  [facility]:  [facility];  Service: Cardiology;  Laterality: N/A;    CARDIAC CATHETERIZATION N/A 2023    Procedure: Left Heart Cath;  Surgeon: Corey Gaffney MD;  [facility]:  [facility];  Service: Cardiology;  Laterality: N/A;    CARDIAC CATHETERIZATION N/A 2023    Procedure: Left ventriculography;  Surgeon: Corey Gaffney MD;  [facility]:  [facility];  Service: Cardiology;  Laterality: N/A;    CARDIAC CATHETERIZATION N/A 2023    Procedure: Coronary angiography;  Surgeon: Corey Gaffney MD;  [facility]:  [facility];  Service: Cardiology;  Laterality: N/A;    COLONOSCOPY N/A     WNL PER PT, NO RECORDS,     COLONOSCOPY N/A 2009    DR. GORGE BENAVIDEZ AT [place]     MITRAL VALVE REPAIR/REPLACEMENT N/A 5/24/2023    Procedure: MITRAL VALVE REPAIR/REPLACEMENT TRICUSPID VALVE REPAIR/REPLACEMENT TRANSESOPHAGEAL ECHOCARDIOGRAM WITH ANESTHESIA;  Surgeon: George Frey MD;  Location: [facility];  Service: Cardiothoracic;  Laterality: N/A;    PROSTATE SURGERY N/A 11/12/2010    TURP, DR. BRIGHT COLLAZO AT [facility]    SKIN TAG REMOVAL N/A 12/20/2017    ANAL SKIN TAG X2, PERFORMED IN OFFICE, DR. LISA ORANTES    TURP / TRANSURETHRAL INCISION / DRAINAGE PROSTATE  2010    Dr. Goodrich      General Information       Row Name 06/06/23 1149          Physical Therapy Time and Intention    Document Type therapy note (daily note)  -     Mode of Treatment individual therapy;physical therapy  -               User Key  (r) = Recorded By, (t) = Taken By, (c) = Cosigned By      Initials Name Provider Type    Renata Nickerson, NANETTE Physical Therapist                   Mobility       Row Name 06/06/23 1149          Bed Mobility    Bed Mobility sit-supine  -     Sit-Supine [place] (Bed Mobility) moderate assist (50% patient effort);2 person assist  -     Assistive Device (Bed Mobility) bed rails;head of bed elevated  -       Row Name 06/06/23 1149          Sit-Stand Transfer    Sit-Stand [place] (Transfers) minimum assist (75% patient effort);2 person assist  -       Row Name 06/06/23 1149          Gait/Stairs (Locomotion)    [place] Level (Gait) minimum assist (75% patient effort);2 person assist  -     Assistive Device (Gait) --  HHA  -     Distance in Feet (Gait) 25 ft  -KH     Deviations/Abnormal Patterns (Gait) festinating/shuffling;stride length decreased  -     Bilateral Gait Deviations forward flexed posture  -               User Key  (r) = Recorded By, (t) = Taken By, (c) = Cosigned By      Initials Name Provider Type    Renata Nickerson PT Physical Therapist                   Obj/Interventions       Row Name 06/06/23 1152          Motor  Skills    Therapeutic Exercise --  cardiac protocol x 5 reps  -               User Key  (r) = Recorded By, (t) = Taken By, (c) = Cosigned By      Initials Name Provider Type    Renata Nickerson, PT Physical Therapist                   Goals/Plan    No documentation.                  Clinical Impression       Row Name 06/06/23 1152          Pain    Pretreatment Pain Rating 4/10  -     Posttreatment Pain Rating 4/10  -     Pain Location - Side/Orientation Bilateral  -     Pain Location lower  -     Pain Location - extremity  -       Row Name 06/06/23 1152          Plan of Care Review    Plan of Care Reviewed With patient;spouse  -     Outcome Evaluation Pt was up in the chair and agreeable to therapy. Pt's spouse was there and assisted as needed. Pt tolerated increased ambulation distance. Gait remains slow with small, shuffling steps. Improved step length and heel strike at times with verbal cueing.  -[facility] Name 06/06/23 1152          Positioning and Restraints    Pre-Treatment Position sitting in chair/recliner  -     Post Treatment Position bed  -     In Bed fowlers;call light within reach;encouraged to call for assist;exit alarm on;with family/caregiver  -               User Key  (r) = Recorded By, (t) = Taken By, (c) = Cosigned By      Initials Name Provider Type    Renata Nickerson, PT Physical Therapist                   Outcome Measures       Row Name 06/06/23 1155 06/06/23 0823       How much help from another person do you currently need...    Turning from your back to your side while in flat bed without using bedrails? 3  -KH 4  -HL (r) MB (t) HL (c)    Moving from lying on back to sitting on the side of a flat bed without bedrails? 2  -KH 3  -HL (r) MB (t) HL (c)    Moving to and from a bed to a chair (including a wheelchair)? 3  -KH 2  -HL (r) MB (t) HL (c)    Standing up from a chair using your arms (e.g., wheelchair, bedside chair)? 3  -KH 2  -HL (r) MB (t)  HL (c)    Climbing 3-5 steps with a railing? 2  -KH 2  -HL (r) MB (t) HL (c)    To walk in hospital room? 3  -KH 3  -HL (r) MB (t) HL (c)    AM-PAC 6 Clicks Score (PT) 16  -KH 16  -HL (r) MB (t)    Highest level of mobility 5 --> Static standing  -KH 5 --> Static standing  -HL (r) MB (t)      Row Name 06/06/23 1155          Functional Assessment    Outcome Measure Options AM-PAC 6 Clicks Basic Mobility (PT)  -               User Key  (r) = Recorded By, (t) = Taken By, (c) = Cosigned By      Initials Name Provider Type    Renata Nickerson, PT Physical Therapist    Aurora Bolivar, RN Registered Nurse    Saranya Friedman RN Extern Registered Nurse                                 Physical Therapy Education       Title: PT OT SLP Therapies (Done)       Topic: Physical Therapy (Done)       Point: Mobility training (Done)       Learning Progress Summary             Patient Acceptance, E,TB, VU by TH at 6/4/2023 1044    Acceptance, E,D, VU,NR by EB at 6/3/2023 1515    Acceptance, E, VU by KN at 5/31/2023 1300    Acceptance, E, VU by EM at 5/31/2023 1043    Acceptance, E,TB, VU by TH at 5/30/2023 1652    Acceptance, E, VU,NR by DB at 5/30/2023 1125    Acceptance, E, NR by DB at 5/29/2023 1157    Acceptance, E, VU by DB at 5/28/2023 1344    Acceptance, E, NR by EM at 5/26/2023 1304    Acceptance, E, NR by EM at 5/25/2023 1208                         Point: Home exercise program (Done)       Learning Progress Summary             Patient Acceptance, E,TB, VU by TH at 6/4/2023 1044    Acceptance, E, VU by KN at 5/31/2023 1300    Acceptance, E, VU by EM at 5/31/2023 1043    Acceptance, E,TB, VU by TH at 5/30/2023 1652    Acceptance, E, VU,NR by DB at 5/30/2023 1125    Acceptance, E, NR by DB at 5/29/2023 1157    Acceptance, E, VU by DB at 5/28/2023 1344    Acceptance, E, NR by EM at 5/25/2023 1208                         Point: Body mechanics (Done)       Learning Progress Summary             Patient  Acceptance, E,TB, VU by TH at 6/4/2023 1044    Acceptance, E,D, VU,NR by EB at 6/3/2023 1515    Acceptance, E, VU by KN at 5/31/2023 1300    Acceptance, E,TB, VU by TH at 5/30/2023 1652    Acceptance, E, VU,NR by DB at 5/30/2023 1125    Acceptance, E, NR by DB at 5/29/2023 1157    Acceptance, E, VU by DB at 5/28/2023 1344                         Point: Precautions (Done)       Learning Progress Summary             Patient Acceptance, E,TB, VU by TH at 6/4/2023 1044    Acceptance, E,D, VU,NR by EB at 6/3/2023 1515    Acceptance, E, VU by KN at 5/31/2023 1300    Acceptance, E,TB, VU by TH at 5/30/2023 1652    Acceptance, E, VU,NR by DB at 5/30/2023 1125    Acceptance, E, NR by DB at 5/29/2023 1157    Acceptance, E, VU by DB at 5/28/2023 1344                                         User Key       Initials Effective Dates Name Provider Type Discipline     06/16/21 -  Kathy Shaffer, PT Physical Therapist PT     06/16/21 -  Jelly Sepulveda, RN Registered Nurse Nurse     02/14/23 -  Sarina Shah PTA Physical Therapist Assistant PT     06/16/21 -  Marissa Zamora, PT Physical Therapist PT     08/02/22 -  Shon Sargent OT Occupational Therapist OT                  PT Recommendation and Plan     Plan of Care Reviewed With: patient, spouse  Outcome Evaluation: Pt was up in the chair and agreeable to therapy. Pt's spouse was there and assisted as needed. Pt tolerated increased ambulation distance. Gait remains slow with small, shuffling steps. Improved step length and heel strike at times with verbal cueing.     Time Calculation:    PT Charges       Row Name 06/06/23 1156             Time Calculation    Start Time 1023  -KH      Stop Time 1046  -KH      Time Calculation (min) 23 min  -KH         Time Calculation- PT    Total Timed Code Minutes- PT 23 minute(s)  -KH         Timed Charges    46274 - PT Therapeutic Exercise Minutes 15  -KH      61211 - Gait Training Minutes  8  -KH         Total Minutes     Timed Charges Total Minutes 23  -KH       Total Minutes 23  -KH                User Key  (r) = Recorded By, (t) = Taken By, (c) = Cosigned By      Initials Name Provider Type    Renata Nickerson PT Physical Therapist                  Therapy Charges for Today       Code Description Service Date Service Provider Modifiers Qty    10938020844  PT THERAPEUTIC ACT EA 15 MIN 6/5/2023 Renata Bass, PT GP 1    67900585022 HC PT THER PROC EA 15 MIN 6/6/2023 Renata Bass, PT GP 1    [id number]  GAIT TRAINING EA 15 MIN 6/6/2023 Renata Bass, PT GP 1            PT G-Codes  Outcome Measure Options: AM-PAC 6 Clicks Basic Mobility (PT)  AM-PAC 6 Clicks Score (PT): 16  AM-PAC 6 Clicks Score (OT): 8  PT Discharge Summary  Anticipated Discharge Disposition (PT): inpatient rehabilitation facility    Renata Bass PT  6/6/2023

## 2023-06-06 NOTE — DISCHARGE INSTRUCTIONS
Cardiac precautions-heart rate less than 120 or less than 20 bpm above baseline.  Systolic blood pressure greater than 90 and less than 180.  Sternotomy precautions-no lifting greater than 10 pounds x 6 weeks from date of sternotomy surgery-May 24, 2023.

## 2023-06-06 NOTE — PROGRESS NOTES
" LOS: 14 days   Patient Care Team:  Liza Oconnell III, NP-C as PCP - General (Family Medicine)  Corey Lao Jr., MD (Inactive) as Consulting Physician (Urology)    Chief Complaint: post op    Subjective:  Symptoms:  He reports weakness.  No shortness of breath, cough or chest pain.    Diet:  NPO.  No nausea or vomiting.    Activity level: Impaired due to weakness.    Pain:  He complains of pain that is mild.  Pain is requiring pain medication.      Mentation continuing to improve, able to have an appropriate conversation    Vital Signs  Temp:  [97.8 °F (36.6 °C)-98.7 °F (37.1 °C)] 98.4 °F (36.9 °C)  Heart Rate:  [76-97] 97  Resp:  [18-20] 20  BP: ()/(66-74) 98/74  Body mass index is 34.19 kg/m².    Intake/Output Summary (Last 24 hours) at 6/6/2023 0856  Last data filed at 6/6/2023 0500  Gross per 24 hour   Intake 1125 ml   Output 2825 ml   Net -1700 ml       No intake/output data recorded.        06/03/23  0600 06/05/23  0500 06/06/23  0500   Weight: 116 kg (256 lb) 114 kg (251 lb) 114 kg (252 lb 1.6 oz)     Objective:  General Appearance:  Comfortable and in no acute distress.    Vital signs: (most recent): Blood pressure 98/74, pulse 97, temperature 98.4 °F (36.9 °C), temperature source Oral, resp. rate 20, height 182.9 cm (72\"), weight 114 kg (252 lb 1.6 oz), SpO2 96 %.  Vital signs are normal.  No fever.    Output: Producing urine and producing stool.    Lungs:  Normal effort and normal respiratory rate.  There are decreased breath sounds.  Rales: bibasilar.  Heart: Normal rate.  Regular rhythm and irregular rhythm.    Abdomen: Abdomen is soft.  Bowel sounds are normal.     Extremities: There is dependent edema (bilaterally L>R).    Pulses: Distal pulses are intact.    Neurological: Patient is alert.  (Oriented to person, place, and situation).    Skin:  Warm and dry.  (Sternal incision clean, dry, and intact)        Results Review:        WBC WBC   Date Value Ref Range Status "   06/06/2023 15.85 (H) 3.40 - 10.80 10*3/mm3 Final   06/05/2023 13.53 (H) 3.40 - 10.80 10*3/mm3 Final   06/04/2023 14.00 (H) 3.40 - 10.80 10*3/mm3 Final      HGB Hemoglobin   Date Value Ref Range Status   06/06/2023 10.9 (L) 13.0 - 17.7 g/dL Final   06/05/2023 9.9 (L) 13.0 - 17.7 g/dL Final   06/04/2023 10.0 (L) 13.0 - 17.7 g/dL Final      HCT Hematocrit   Date Value Ref Range Status   06/06/2023 32.7 (L) 37.5 - 51.0 % Final   06/05/2023 30.0 (L) 37.5 - 51.0 % Final   06/04/2023 30.3 (L) 37.5 - 51.0 % Final      Platelets Platelets   Date Value Ref Range Status   06/06/2023 294 140 - 450 10*3/mm3 Final   06/05/2023 264 140 - 450 10*3/mm3 Final   06/04/2023 239 140 - 450 10*3/mm3 Final        PT/INR:    No results found for: PROTIME  /  No results found for: INR      Sodium Sodium   Date Value Ref Range Status   06/05/2023 134 (L) 136 - 145 mmol/L Final   06/04/2023 134 (L) 136 - 145 mmol/L Final      Potassium Potassium   Date Value Ref Range Status   06/05/2023 4.1 3.5 - 5.2 mmol/L Final   06/04/2023 3.5 3.5 - 5.2 mmol/L Final      Chloride Chloride   Date Value Ref Range Status   06/05/2023 94 (L) 98 - 107 mmol/L Final   06/04/2023 96 (L) 98 - 107 mmol/L Final      Bicarbonate CO2   Date Value Ref Range Status   06/05/2023 27.7 22.0 - 29.0 mmol/L Final   06/04/2023 26.0 22.0 - 29.0 mmol/L Final      BUN BUN   Date Value Ref Range Status   06/05/2023 22 8 - 23 mg/dL Final   06/04/2023 27 (H) 8 - 23 mg/dL Final      Creatinine Creatinine   Date Value Ref Range Status   06/05/2023 1.31 (H) 0.76 - 1.27 mg/dL Final   06/04/2023 1.18 0.76 - 1.27 mg/dL Final      Calcium Calcium   Date Value Ref Range Status   06/05/2023 9.1 8.6 - 10.5 mg/dL Final   06/04/2023 8.2 (L) 8.6 - 10.5 mg/dL Final      Magnesium No results found for: MG       apixaban, 5 mg, Oral, Q12H  aspirin, 81 mg, Oral, Daily  atorvastatin, 40 mg, Oral, Nightly  calcium 500 mg vitamin D 5 mcg (200 UT), 1 tablet, Oral, Daily  empagliflozin, 25 mg, Oral,  Daily  guaifenesin, 400 mg, Oral, Q8H  insulin glargine, 40 Units, Subcutaneous, Daily  insulin lispro, 3-14 Units, Subcutaneous, Q6H  ipratropium-albuterol, 3 mL, Nebulization, 4x Daily - RT  metoprolol succinate XL, 25 mg, Oral, Q24H  mupirocin, , Each Nare, BID  pantoprazole, 40 mg, Intravenous, QAM AC  potassium chloride, 20 mEq, Nasogastric, Daily  predniSONE, 5 mg, Oral, Every Other Day  senna-docusate sodium, 2 tablet, Oral, Nightly  torsemide, 40 mg, Oral, Daily               Patient Active Problem List   Diagnosis Code    DM (diabetes mellitus), type 2 E11.9    Mixed hyperlipidemia E78.2    Mild intermittent asthma without complication J45.20    New onset of congestive heart failure I50.9    Nonrheumatic mitral valve regurgitation I34.0    Chest pain R07.9    Essential hypertension I10    Mitral valve disease I05.9       Assessment & Plan   -Moderate to severe mitral regurgitation s/p MVR/TV repair/SU closure POD#13 Pagni  -Moderate tricuspid valve regurgitation  -Acute on chronic diastolic heart failure with preserved EF--55%  -CKD stage IIIa; baseline creatinine 1.2  -DM type II--A1c  -Severe pulmonary HTN  -PING  -BPH/Urinary retention s/p TURP  -Interstitial lung disease s/p COVID-19 infection --steroid taper per pulm.  -leukocytosis--reactive/steroid administration  -post op anemia--expected acute blood loss  -TCP--consumptive--resolved  - post op aphasia/confusion/unilateral weakness--neurology evaluating  - post atrial flutter with occasional AV block s/p successful cardioversion 6/2  - acute CVA--bilateral frontal and left parietal occipital cortices     Looks good this morning, resting in bed  NSR, rate 90's. B/p too soft to increase beta blocker.   Tolerating room air  Blood glucose improving after lantus increase yesterday.   On torsemide per nephrology; CMP pending.  Tolerating modified diet, but is receiving nocturnal tube feeding. SLP to reevaluate today. On calorie count to see if we can stop  nocturnal feeds and remove dobhoff.   Encourage aggressive pulmonary toilet--continue flutter/guaifinisin  Very weak--continue aggressive PT/OT.   [facility] has accepted him whenever he is ready medically--hopefully tomorrow after there is a definite plan with SLP.  Continue routine care    Modesta Law, DESTIN  06/06/23  08:56 EDT

## 2023-06-06 NOTE — PLAN OF CARE
Goal Outcome Evaluation:                      Patient denies pain , resting at this time, cont on tube feeding Diabetisource going at 80 cc/hr, 75  cc flush hourly, vital signs stable, SR on the monitor, pt on marialuisa count, possible going to rehab when able

## 2023-06-06 NOTE — PLAN OF CARE
Goal Outcome Evaluation:  Plan of Care Reviewed With: patient        Progress: improving  Outcome Evaluation: pt worked w OT this date, was asleep upon OT arrival but willing to complete UE ex while in bed. pt completed 10x2 w UE ex to incr strength/endurance. pt planning to dc to [facility]. noted after session pt may dc today to [facility].

## 2023-06-06 NOTE — PLAN OF CARE
Goal Outcome Evaluation:  Plan of Care Reviewed With: patient, spouse           Outcome Evaluation: Pt was up in the chair and agreeable to therapy. Pt's spouse was there and assisted as needed. Pt tolerated increased ambulation distance. Gait remains slow with small, shuffling steps. Improved step length and heel strike at times with verbal cueing.

## 2023-06-06 NOTE — CASE MANAGEMENT/SOCIAL WORK
Continued Stay Note  [facility]     Patient Name: Vernon Solorzano  MRN: [id number]  Today's Date: 6/6/2023    Admit Date: 5/23/2023    Plan: [facility]   Discharge Plan       Row Name 06/06/23 1610       Plan    Plan [facility] accepted    Plan Comments Pt and spouse agreeable to [facility] at d/c.  CCP following.............SRS/RN CM                   Discharge Codes    No documentation.                 Expected Discharge Date and Time       Expected Discharge Date Expected Discharge Time    Jun 6, 2023               Vidhya Ewing RN

## 2023-06-06 NOTE — H&P
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  Now transferred to the acute inpatient rehab program.    Per his discharge summary-[On  he underwent MVR, TVR, and left atrial appendage closure with Dr. Frey.  He has done well despite a very complicated postoperative course.  He was extubated day of surgery.  The following morning he was unable to move his right hand and had echolalia, constantly repeating his date of birth.  He underwent CT of the head which was normal.  He was given IV steroids given altered mental status.  Home p.o. steroids were resumed.  Nephrology was consulted and he was gently IV diuresed.  He was evaluated by speech therapy swallow concerns.  Feeding tube was placed and tube feeds were managed per SLP until  when he was cleared for modified diet..  On postop day 2 he was in AV block with frequent PVCs, with rates in the 50s.  Beta-blocker was held.  He was then found to be in a junctional rhythm.  After being IV diuresed he was changed to p.o. Lasix per nephrology.  On postop day 6 his rhythm changed to atrial flutter with PVCs.  He came hyperglycemic with tube feeds.  He was placed on SSI and Lantus.  This was uptitrated until .  His prednisone was tapered per pulmonology.  The morning of postop day 8 he was alert and oriented, but had word salad which was an acute change from his previous neurologic state.  He underwent repeat CT scan which was negative for acute changes.  He also underwent lower extremity duplex due to swelling of the left lower extremity which was  negative for DVT but positive for superficial thrombophlebitis. He remained in atrial flutter, was place on heparin drip, and underwent cardioversion and converted to normal sinus rhythm with rates in the 90s, however he went back into atrial fibrillation afterward with controlled rate.  AV wires were removed afterward on postop day 10 and he was started on Eliquis.  On postop day 11 he was started on Toprol-XL.  He was weaned to room air.  He tolerated modified diet per SLP and received nocturnal tube feeding.  He underwent MRI which showed an acute infarction in the bilateral frontal and left parietal occipital cortices.  He was back in normal sinus rhythm with rates in the 90s.  He tolerated beta-blocker.  He was again IV diuresed.  Then switched to p.o. torsemide per nephrology. ]      Recently, aphasia and right hand apraxia improved.  Transfers min assist.  Gait two-person assist min assist 25 feet, festinating, shuffling, decreased stride length, forward flexed posture.  Impaired ADL performance.  On a dysphagia diet with nectar thick liquids    Given his functional impairments and comorbidities he is now mated for acute inpatient rehabilitation  He complains of expected sternotomy discomfort.  Denies any headache.  No vision changes.  Reports swallowing fair and tolerating dysphagia diet.  No shortness of air.  Denies any bowel complaints.  Does have some bladder urgency.  Chronic edema in the legs which was worse over the past 6 months but is better now although still present.  His speech is showing improvement.  His wife notes that he does wake up with initial disorientation each time.  Pain control is better on the right side.    Review of Systems   10 point review of systems reviewed and as per HPI  Personal History     Past Medical History:   Diagnosis Date    Allergic rhinitis     Arthritis     BPH (benign prostatic hyperplasia)     Diabetes mellitus     TYPE 2    Foraminal stenosis of cervical region      C5-C6    GERD (gastroesophageal reflux disease)     Hemorrhoids     History of urinary retention 2010    S/P TURP    Hyperlipidemia     Macular degeneration     PING (obstructive sleep apnea) 01/07/2016    MILD, SEES DR. AMARILIS MORGAN       Past Surgical History:   Procedure Laterality Date    CARDIAC CATHETERIZATION N/A 5/11/2023    Procedure: Right Heart Cath;  Surgeon: Corey Gaffney MD;  [facility];  Service: Cardiology;  Laterality: N/A;    CARDIAC CATHETERIZATION N/A 5/11/2023    Procedure: Left Heart Cath;  Surgeon: Corey Gaffney MD;  [facility]:  [facility];  Service: Cardiology;  Laterality: N/A;    CARDIAC CATHETERIZATION N/A 5/11/2023    Procedure: Left ventriculography;  Surgeon: Corey Gaffney MD;  [facility];  Service: Cardiology;  Laterality: N/A;    CARDIAC CATHETERIZATION N/A 5/11/2023    Procedure: Coronary angiography;  Surgeon: Corey Gaffney MD;  [facility];  Service: Cardiology;  Laterality: N/A;    COLONOSCOPY N/A     WNL PER PT, NO RECORDS,     COLONOSCOPY N/A 04/07/2009    DR. GORGE BENAVIDEZ AT [facility]    MITRAL VALVE REPAIR/REPLACEMENT N/A 5/24/2023    Procedure: MITRAL VALVE REPAIR/REPLACEMENT TRICUSPID VALVE REPAIR/REPLACEMENT TRANSESOPHAGEAL ECHOCARDIOGRAM WITH ANESTHESIA;  Surgeon: George Frey MD;  Location: [facility];  Service: Cardiothoracic;  Laterality: N/A;    PROSTATE SURGERY N/A 11/12/2010    TURP, DR. COREY COLLAZO AT [facility]    SKIN TAG REMOVAL N/A 12/20/2017    ANAL SKIN TAG X2, PERFORMED IN OFFICE, DR. LISA ORANTES    TURP / TRANSURETHRAL INCISION / DRAINAGE PROSTATE  2010    Dr. Goodrich       Family History: family history includes Colon polyps in his brother and brother; Dementia in his father; Lung cancer in his mother; Stroke in his father. Otherwise pertinent FHx was reviewed and not pertinent to current issue.    Social History:  reports  that he has never smoked. He has never been exposed to tobacco smoke. He has never used smokeless tobacco. He reports current alcohol use. He reports that he does not use drugs.  .  Lives in a multilevel home with 6 steps to enter the front door 3 steps to enter through the garage.  Retired physician.  Home Medications:  Multiple Vitamins-Minerals, aspirin, atorvastatin, budesonide-formoterol, empagliflozin, furosemide, insulin degludec, irbesartan, metoclopramide, omeprazole, potassium chloride, predniSONE, and tiotropium    Allergies:  No Known Allergies    Medications:  Scheduled Meds:apixaban, 5 mg, Oral, Q12H  [START ON 6/7/2023] aspirin, 81 mg, Oral, Daily  atorvastatin, 40 mg, Oral, Nightly  [START ON 6/7/2023] calcium 500 mg vitamin D 5 mcg (200 UT), 1 tablet, Oral, Daily  [START ON 6/7/2023] empagliflozin, 25 mg, Oral, Daily  [START ON 6/7/2023] guaifenesin, 400 mg, Oral, Q8H  [START ON 6/7/2023] insulin glargine, 40 Units, Subcutaneous, Daily  insulin lispro, 3-14 Units, Subcutaneous, Q6H  ipratropium-albuterol, 3 mL, Nebulization, 4x Daily - RT  [START ON 6/7/2023] metoprolol succinate XL, 25 mg, Oral, Q24H  [START ON 6/7/2023] pantoprazole, 40 mg, Oral, Q AM  [START ON 6/7/2023] potassium chloride, 20 mEq, Nasogastric, Daily  [START ON 6/7/2023] predniSONE, 5 mg, Oral, Every Other Day  [START ON 6/7/2023] torsemide, 40 mg, Oral, Daily      Continuous Infusions:   PRN Meds:.  acetaminophen **OR** acetaminophen **OR** acetaminophen    traMADol      Objective    Objective     Vitals:   Temp:  [97.7 °F (36.5 °C)-98.7 °F (37.1 °C)] 97.8 °F (36.6 °C)  Heart Rate:  [91-99] 99  Resp:  [20] 20  BP: ()/(64-75) 106/71    Physical Exam  MENTAL STATUS -  AWAKE / ALERT  HEENT- NCAT, PUPILS EQUALLY ROUND, SCLERAE ANICTERIC, CONJUNCTIVAE PINK, OP MOIST, NO JVD, EARS UNREMARKABLE EXTERNALLY  LUNGS - CTA, NO WHEEZES, RALES OR RHONCHI  Sternotomy incision clean dry and intact  HEART-sinus with occasional  ectopy  ABD - NORMOACTIVE BOWEL SOUNDS, SOFT, NT. NO HEPATOSPLENOMEGALY APPRECIATED  EXT - NO EDEMA OR CYANOSIS  NEURO -oriented to person place time and situation.  Extraocular movements intact.  Visual fields intact.  Facial sensation equal.  Face symmetric.  Hearing intact to finger rub bilaterally.  Tongue midline.  Shoulder shrug equal.  Speech is fluent.  He is able to do simple time management.  Able to do 1 simple money management problem but had difficulty with another money management problem.  Could spell his last name and world forward but could not spell either 1 backward.  MOTOR EXAM - RUE/RLE 5/5. LUE/LLE 5/5.      Result Review    Result Review:    Results from last 7 days   Lab Units 06/06/23  0338 06/05/23  0305 06/04/23  0320   WBC 10*3/mm3 15.85* 13.53* 14.00*   HEMOGLOBIN g/dL 10.9* 9.9* 10.0*   HEMATOCRIT % 32.7* 30.0* 30.3*   PLATELETS 10*3/mm3 294 264 239     Results from last 7 days   Lab Units 06/06/23  0943 06/05/23  0305 06/04/23  0319 06/03/23  0834 06/02/23  0433 06/01/23  0308   SODIUM mmol/L 132* 134* 134* 141   < > 134*  134*   POTASSIUM mmol/L 4.1 4.1 3.5 4.0   < > 4.4  4.4   CHLORIDE mmol/L 94* 94* 96* 100   < > 99  99   CO2 mmol/L 25.8 27.7 26.0 31.1*   < > 22.1  24.3   BUN mg/dL 22 22 27* 29*   < > 29*  28*   CREATININE mg/dL 1.31* 1.31* 1.18 1.33*   < > 0.97  0.96   CALCIUM mg/dL 9.5 9.1 8.2* 9.0   < > 8.6  8.6   BILIRUBIN mg/dL 0.5  --   --  0.6  --  0.4   ALK PHOS U/L 91  --   --  82  --  65   ALT (SGPT) U/L 15  --   --  16  --  14   AST (SGOT) U/L 12  --   --  14  --  23   GLUCOSE mg/dL 218* 219* 174* 187*   < > 199*  200*    < > = values in this interval not displayed.     Glucose   Date/Time Value Ref Range Status   06/06/2023 1655 161 (H) 70 - 130 mg/dL Final     Comment:     Meter: [id number] : [id number] Roe BARRETO   06/06/2023 1217 253 (H) 70 - 130 mg/dL Final     Comment:     Meter: [id number] : [id number] Mark BARRETO   06/06/2023  1110 238 (H) 70 - 130 mg/dL Final     Comment:     Meter: [id number] : [id number] Jose Sloan PCA   06/06/2023 0707 209 (H) 70 - 130 mg/dL Final     Comment:     Meter: [id number] : [id number] Susie Palumbo MARY   06/06/2023 0045 204 (H) 70 - 130 mg/dL Final     Comment:     Meter: [id number] : [id number] Yamilka Duran MARY   06/05/2023 1801 307 (H) 70 - 130 mg/dL Final     Comment:     Meter: [id number] : [id number] Hyacinthe Lindy NA   06/05/2023 1606 219 (H) 70 - 130 mg/dL Final     Comment:     Meter: [id number] : [id number] Hyacinthe Lindy NA   06/05/2023 1217 285 (H) 70 - 130 mg/dL Final     Comment:     Meter: [id number] : [id number] Hyacinthe Lindy NA     EXAM:    MR Head Without Intravenous Contrast-June 5, 2023     CLINICAL HISTORY:     Reason for exam: unlateral weakness  aphasia.     TECHNIQUE:    Magnetic resonance images of the head brain without intravenous   contrast in multiple planes.     COMPARISON:    No relevant prior studies available.     FINDINGS:    Brain:  No mass.  Few scattered chronic microhemorrhage.  Scattered   punctate restricted diffusion acute infarct in the bilateral frontal and   left parietal occipital cortices.    Ventricles:  No hydrocephalus.    Bones joints:  Unremarkable.    Sinuses:  No acute sinusitis.    Mastoid air cells:  No effusion.    Orbits:  Unremarkable.     IMPRESSION:           Scattered punctate restricted diffusion acute infarct in the bilateral   frontal and left parietal occipital cortices.  IMPRESSION:      Assessment & Plan   Assessment / Plan         Active Hospital Problems:  Active Hospital Problems    Diagnosis     **Acute ischemic left MCA stroke      Status post CVA-Scattered  punctate restricted diffusion acute infarct in the bilateral frontal and   left parietal occipital cortices.      Impaired Cognition/impaired mobility/impaired self-care    Aphasia-resolved    Right hand  apraxia-resolved    Encephalopathy-improving    Dysphagia/nutrition-healthy heart 2-3 g sodium, consistent carb, no mixed consistency, regular texture, nectar thick liquid    Stroke prophylaxis-Eliquis/aspirin/atorvastatin    History of severe mitral regurgitation and annular dilatation, moderate to severe tricuspid regurgitation-  status post May 24, 2023 - 1. Mitral valve repair with a 28 mm physio II ring annuloplasty with residual mild to moderate MR  2.  Mitral valve replacement with a 29 mm Schwartz II porcine prosthesis  3.  Tricuspid valve repair with 28 mm physio tricuspid ring  4.  Left atrial appendage endocardial closure    Atrial flutter-status post cardioversion June 2, 2023 anticoagulation with Eliquis  Metoprolol    Volume status-torsemide    Severe pulmonary hypertension  Obstructive sleep apnea    Interstitial lung disease status post COVID-19 infection-steroid taper per pulmonology  DuoNebs 4 times a day  Chronic steroid use with cushingoid syndrome  Prednisone 5 mg every other day    Leukocytosis-reactive/steroid administration    Postoperative anemia    Iqiaxrkvuqubyapf-pagwdhqjgna-escklmcr    Chronic kidney disease stage III-baseline creatinine 1.2    Diabetes mellitus type 2-Jardiance/Lantus/sliding scale insulin    BPH/urinary retention-history of TURP    Pain management-tramadol-/Tylenol Dk report reviewed          Now admit for comprehensive acute inpatient rehabilitation .  This would be an interdisciplinary program with physical therapy 1 hour,  occupational therapy 1 hour, and speech therapy 1 hour, 5 days a week.  Rehabilitation nursing for carryover, monitoring of neurologic, cardiac, pulmonary, diabetes   status, bowel and bladder, and skin  Ongoing physician follow-up.  Weekly team conferences.  Goals are to achieve a level of contact-guard with  mobility and self-care and improved activity tolerance.   Rehabilitation prognosis fair.  Medical prognosis defer to cardiology.   Estimated length of stay is approximately 2 weeks, but is only an estimation.   The patient's functional status and clinical status is unchanged from preadmission assessment and the patient continues appropriate for acute inpatient rehabilitation.  Goal is for home with outpatient cardiac rehab   therapies.  Barrier to discharge: Impaired cognition mobility self-care- work on follow, executive function, conditioning, transfers, progressive ambulation, ADLs to overcome.                       Cardiac precautions-heart rate less than 120 or less than 20 bpm above baseline.  Systolic blood pressure greater than 90 and less than 180.  Sternotomy precautions-no lifting greater than 10 pounds x 6 weeks from date of sternotomy surgery-May 24, 2023.    DVT prophylaxis:  Medical and mechanical DVT prophylaxis orders are present.    CODE STATUS:    Code Status (Patient has no pulse and is not breathing): CPR (Attempt to Resuscitate)  Medical Interventions (Patient has pulse or is breathing): Full Support  Release to patient: Routine Release    Admission Status:  I believe this patient meets inpatient status.    Tu Silver MD    During rounds, used appropriate personal protective equipment including mask and gloves.  Additional gown if indicated.  Mask used was standard procedure mask. Appropriate PPE was worn during the entire visit.  Hand hygiene was completed before and after.

## 2023-06-06 NOTE — PROGRESS NOTES
Nutrition Services    Patient Name:  Vernon Solorzano  YOB: 1950  MRN: [id number]  Admit Date:  5/23/2023    Assessment Date:  06/06/23    CLINICAL NUTRITION - PROGRESS NOTE       Reason for Encounter Follow-up         Nutrition Order Diet: Regular/House Diet, Diabetic Diets, Cardiac Diets; Healthy Heart (2-3 Na+); Consistent Carbohydrate; No Mixed Consistencies; Texture: Regular Texture (IDDSI 7); Fluid Consistency: Nectar Thick    Supplements/Snacks Magic Cup TID    EN/PN Order Discontinued          Pertinent Information Calorie count started yesterday at lunch.  RN and wife report patient eating well, at least 50% at meals.  Patient and wife very eager to get cortrak removed and get transferred to [facility].  Due to the fact that patient appears to be eating adequately, this RD feels that cortrak can be removed.  RD removed cortrak at bedside.          Intervention/Plan Plans for transfer to [facility].  RD there will continue to follow patient to ensure adequate intake.  Wife is a dietitian and monitors patient's intake closely and brings him food from home at times.     RD to follow up per protocol.    Electronically signed by:  Vandana Merrill RD  06/06/23 13:11 EDT

## 2023-06-06 NOTE — CASE MANAGEMENT/SOCIAL WORK
Case Management Discharge Note      Final Note: Pt discharged to [facility] [place], 6/6/23 - Vidhya COX /LAZARUS CM.    Provided Post Acute Provider List?: Yes  Post Acute Provider List: Nursing Home  Provided Post Acute Provider Quality & Resource List?: Yes  Post Acute Provider Quality and Resource List: Nursing Home  Delivered To: Support Person  Support Person: pt's wife Maricel  Method of Delivery: In person    Selected Continued Care - Discharged on 6/6/2023 Admission date: 5/23/2023 - Discharge disposition: Rehab Facility or Unit ([facility])      Destination Coordination complete.      Service Provider Selected Services Address Phone Fax Patient Preferred    [facility] [address] [id number] [phone] -- --              Durable Medical Equipment    No services have been selected for the patient.                Dialysis/Infusion    No services have been selected for the patient.                Home Medical Care Coordination complete.      Service Provider Selected Services Address Phone Fax Patient Preferred    [facility] [address] [address] [phone] --              Therapy    No services have been selected for the patient.                Community Resources    No services have been selected for the patient.                Community & DME    No services have been selected for the patient.                         Final Discharge Disposition Code: 62 - inpatient rehab facility

## 2023-06-06 NOTE — PROGRESS NOTES
[facility] Progress Note      Patient Name: Vernon Solorzano  : 1950  MRN: [id number]  Primary Care Physician:  Liza Oconnell III, RACHAEL  Date of admission: 2023    Subjective     Interval History:   Follow up CKD III.  Eating his breakfast.  NG tube in place.  Feeling much better.  Answered all question properly.  Review of Systems:   As noted above    Objective     Vitals:   Temp:  [97.8 °F (36.6 °C)-98.7 °F (37.1 °C)] 98.4 °F (36.9 °C)  Heart Rate:  [76-97] 97  Resp:  [18-20] 20  BP: ()/(66-74) 98/74    Intake/Output Summary (Last 24 hours) at 2023 0851  Last data filed at 2023 0500  Gross per 24 hour   Intake 1125 ml   Output 2825 ml   Net -1700 ml       Physical Exam:    General Appearance: alert oriented in no acute distress  Skin: warm and dry  HEENT: oral mucosa normal, nonicteric sclera  Neck: supple, no JVD  Lungs: Coarse upper airway rhonchi. Nasal 02  Heart: RRR, normal S1 and S2  Abdomen: soft, nontender, + bs, distended.   : external catheter .  Extremities 1+ lower ext edema .    Scheduled Meds:     apixaban, 5 mg, Oral, Q12H  aspirin, 81 mg, Oral, Daily  atorvastatin, 40 mg, Oral, Nightly  calcium 500 mg vitamin D 5 mcg (200 UT), 1 tablet, Oral, Daily  empagliflozin, 25 mg, Oral, Daily  furosemide, 40 mg, Intravenous, Daily  guaifenesin, 400 mg, Oral, Q8H  insulin glargine, 40 Units, Subcutaneous, Daily  insulin lispro, 3-14 Units, Subcutaneous, Q6H  ipratropium-albuterol, 3 mL, Nebulization, 4x Daily - RT  metoprolol succinate XL, 25 mg, Oral, Q24H  mupirocin, , Each Nare, BID  pantoprazole, 40 mg, Intravenous, QAM AC  potassium chloride, 20 mEq, Nasogastric, Daily  predniSONE, 5 mg, Oral, Every Other Day  senna-docusate sodium, 2 tablet, Oral, Nightly      IV Meds:          Results Reviewed:   I have personally reviewed the results from the time of this admission to 2023 08:51 EDT     Results from last 7 days   Lab Units  06/05/23  0305 06/04/23  0319 06/03/23  0834 06/02/23  0433 06/01/23  0308   SODIUM mmol/L 134* 134* 141   < > 134*  134*   POTASSIUM mmol/L 4.1 3.5 4.0   < > 4.4  4.4   CHLORIDE mmol/L 94* 96* 100   < > 99  99   CO2 mmol/L 27.7 26.0 31.1*   < > 22.1  24.3   BUN mg/dL 22 27* 29*   < > 29*  28*   CREATININE mg/dL 1.31* 1.18 1.33*   < > 0.97  0.96   CALCIUM mg/dL 9.1 8.2* 9.0   < > 8.6  8.6   BILIRUBIN mg/dL  --   --  0.6  --  0.4   ALK PHOS U/L  --   --  82  --  65   ALT (SGPT) U/L  --   --  16  --  14   AST (SGOT) U/L  --   --  14  --  23   GLUCOSE mg/dL 219* 174* 187*   < > 199*  200*    < > = values in this interval not displayed.       Estimated Creatinine Clearance: 64.5 mL/min (A) (by C-G formula based on SCr of 1.31 mg/dL (H)).    Results from last 7 days   Lab Units 06/02/23  0433   PHOSPHORUS mg/dL 4.7*             Results from last 7 days   Lab Units 06/06/23  0338 06/05/23  0305 06/04/23  0320 06/03/23  0300 06/02/23  0433   WBC 10*3/mm3 15.85* 13.53* 14.00* 13.99* 11.81*   HEMOGLOBIN g/dL 10.9* 9.9* 10.0* 10.7* 10.8*   PLATELETS 10*3/mm3 294 264 239 270 257       Results from last 7 days   Lab Units 06/01/23  1555   INR  1.01       Assessment / Plan     ASSESSMENT:  1. CKD III, baseline creatinine 1.2.  Peak 1.68.  creatinine stable.  continue same.  2. MR now sp MV replacement, tissue and TV repair, SU closure 5/24/23.  3. Chronic steroid use after COVID 19 PNA.  4. Anemia post op. Hg stable .  5. Delirium, expressive aphasia. Ct without CVA.  Neuro thought small ischemic periprocedural event. On ASA and statin.    6. DM2    Plan:    Continue current management.  Surveillance labs    Tae Lay MD  06/06/23  08:51 EDT    [facility]  [phone]

## 2023-06-06 NOTE — DISCHARGE SUMMARY
Date of Admission: 5/23823  Date of Discharge:  6/6/2023    Discharge Diagnosis:     Presenting Problem/History of Present Illness  Mitral valve disease [I05.9]     Hospital Course  Patient is a 72 y.o. male presented with for previously scheduled cardiac surgery with Dr. Frey. On 5/24 he underwent MVR, TVR, and left atrial appendage closure with Dr. Frey.  He has done well despite a very complicated postoperative course.  He was extubated day of surgery.  The following morning he was unable to move his right hand and had echolalia, constantly repeating his date of birth.  He underwent CT of the head which was normal.  He was given IV steroids given altered mental status.  Home p.o. steroids were resumed.  Nephrology was consulted and he was gently IV diuresed.  He was evaluated by speech therapy swallow concerns.  Feeding tube was placed and tube feeds were managed per SLP until 6/6 when he was cleared for modified diet..  On postop day 2 he was in AV block with frequent PVCs, with rates in the 50s.  Beta-blocker was held.  He was then found to be in a junctional rhythm.  After being IV diuresed he was changed to p.o. Lasix per nephrology.  On postop day 6 his rhythm changed to atrial flutter with PVCs.  He came hyperglycemic with tube feeds.  He was placed on SSI and Lantus.  This was uptitrated until 6/5.  His prednisone was tapered per pulmonology.  The morning of postop day 8 he was alert and oriented, but had word salad which was an acute change from his previous neurologic state.  He underwent repeat CT scan which was negative for acute changes.  He also underwent lower extremity duplex due to swelling of the left lower extremity which was negative for DVT but positive for superficial thrombophlebitis. He remained in atrial flutter, was place on heparin drip, and underwent cardioversion and converted to normal sinus rhythm with rates in the 90s, however he went back into atrial fibrillation afterward with  controlled rate.  AV wires were removed afterward on postop day 10 and he was started on Eliquis.  On postop day 11 he was started on Toprol-XL.  He was weaned to room air.  He tolerated modified diet per SLP and received nocturnal tube feeding.  He underwent MRI which showed an acute infarction in the bilateral frontal and left parietal occipital cortices.  He was back in normal sinus rhythm with rates in the 90s.  He tolerated beta-blocker.  He was again IV diuresed.  Then switched to p.o. torsemide per nephrology.  On the morning of postop day 13 after being reevaluated by both speech therapy and nutrition he was deemed stable for discharged to [facility] acute rehab.  Sternal precautions reviewed as well as signs and symptoms of sternal wound infection.  At BAR he should continue sternal precautions, showering daily with antibacterial soap and water, and applying Betadine to the incisions twice daily.  He will follow-up with Dr. Frey in office on 6/27 with Dr. Gomez on 9/11.  Questions answered and verbalized understanding.    Procedures Performed  Procedure(s):  MITRAL VALVE REPAIR/REPLACEMENT TRICUSPID VALVE REPAIR/REPLACEMENT TRANSESOPHAGEAL ECHOCARDIOGRAM WITH ANESTHESIA       Consults:   Consults       Date and Time Order Name Status Description    5/26/2023  6:46 PM Inpatient Nephrology Consult      5/26/2023  9:41 AM Cardiac Electrophysiologist Inpatient Consult      5/25/2023 10:28 AM Inpatient Neurology Consult Stroke Completed     5/25/2023  9:02 AM Inpatient Nephrology Consult Completed     5/24/2023 12:09 PM Inpatient Cardiology Consult Completed     5/24/2023 11:24 AM Inpatient Pulmonology Consult Completed     5/23/2023  1:41 PM Inpatient Internal Medicine Consult Completed     5/23/2023  1:39 PM Inpatient Cardiology Consult Completed     5/9/2023  2:44 PM Inpatient Internal Medicine Consult Completed     5/9/2023  2:44 PM Inpatient Nephrology Consult Completed             Pertinent Test  Results:    Lab Results   Component Value Date    WBC 15.85 (H) 06/06/2023    HGB 10.9 (L) 06/06/2023    HCT 32.7 (L) 06/06/2023    MCV 86.5 06/06/2023     06/06/2023      Lab Results   Component Value Date    GLUCOSE 218 (H) 06/06/2023    CALCIUM 9.5 06/06/2023     (L) 06/06/2023    K 4.1 06/06/2023    CO2 25.8 06/06/2023    CL 94 (L) 06/06/2023    BUN 22 06/06/2023    CREATININE 1.31 (H) 06/06/2023    EGFRIFAFRI 76 12/15/2021    EGFRIFNONA 66 12/15/2021    BCR 16.8 06/06/2023    ANIONGAP 12.2 06/06/2023     Lab Results   Component Value Date    INR 1.01 06/01/2023    PROTIME 13.4 06/01/2023         Condition on Discharge: Stable    Vital Signs  Temp:  [97.8 °F (36.6 °C)-98.7 °F (37.1 °C)] 97.8 °F (36.6 °C)  Heart Rate:  [91-97] 94  Resp:  [18-20] 20  BP: ()/(64-75) 97/64      Discharge Disposition  Rehab Facility or Unit ([facility] [place] [facility])    Discharge Medications     Discharge Medications        ASK your doctor about these medications        Instructions Start Date   aspirin 81 MG tablet   81 mg, Oral, Daily      atorvastatin 10 MG tablet  Commonly known as: LIPITOR   TAKE 1 TABLET BY MOUTH EVERY DAY      furosemide 40 MG tablet  Commonly known as: LASIX   40 mg, Oral, 2 Times Daily      insulin degludec 100 UNIT/ML solution pen-injector injection  Commonly known as: TRESIBA FLEXTOUCH   Start 25 units daily and titrate up 2 units every 2 days if blood glucose levels are consistently above 150      irbesartan 75 MG tablet  Commonly known as: AVAPRO   1 tablet, Oral, Daily      Jardiance 25 MG tablet tablet  Generic drug: empagliflozin   1 tablet, Oral, Daily      metoclopramide 10 MG tablet  Commonly known as: REGLAN   10 mg, Oral, Nightly      omeprazole 40 MG capsule  Commonly known as: priLOSEC   40 mg, Oral, Daily      potassium chloride 10 MEQ CR capsule  Commonly known as: MICRO-K   2 capsules, Oral, Daily      predniSONE 10 MG tablet  Commonly known as: DELTASONE   9 mg, Oral,  Daily      PRESERVISION AREDS 2+MULTI VIT PO   Oral, 2 Times Daily      Symbicort 160-4.5 MCG/ACT inhaler  Generic drug: budesonide-formoterol   2 puffs, Inhalation, 2 Times Daily      tiotropium 18 MCG per inhalation capsule  Commonly known as: SPIRIVA   1 capsule, Inhalation, Daily - RT               Discharge Diet:     Activity at Discharge:   1. No driving for 2 weeks and off narcotic pain medications.  2. Shower daily. Clean incisions with warm water and antibacterial soap only. Do not put any lotion or ointments on incisions.  3. Ambulate for 10 minutes at least 3 times a day.  4. No heavy lifting > 10lbs until seen in office.   5. Take all medications as prescribed.     Follow-up Appointments  Future Appointments   Date Time Provider Department Center   6/27/2023 11:15 AM George Frey MD [facility]   9/11/2023  8:30 AM Jim Sage MD [facility]     Additional Instructions for the Follow-ups that You Need to Schedule       Ambulatory Referral to Cardiac Rehab   As directed      Call MD With Problems / Concerns   As directed      Instructions:  Call office at [phone] for any drainage, increased redness, or fever over 100.5    Order Comments: Instructions:  Call office at [phone] for any drainage, increased redness, or fever over 100.5          Discharge Follow-up with PCP   As directed       Currently Documented PCP:    Liza Oconnell III, NP-C    PCP Phone Number:    [phone]     Follow Up Details: in 1 week         Discharge Follow-up with Specialty: Cardiologist DESTIN/PA; 1 Week   As directed      Specialty: Cardiologist APRN/PA    Follow Up: 1 Week    Follow Up Details: bring all prescription bottles to appointment, call for appointment         Discharge Follow-up with Specified Provider: Cardiologist; 1 Month   As directed      To: Cardiologist    Follow Up: 1 Month    Follow Up Details: call for appointment, bring all medication bottles to  appointment         Discharge Follow-up with Specified Provider: Dr. Frey 6/27 at 11:30; 1 Month   As directed      To: Dr. Frey 6/27 at 11:30    Follow Up: 1 Month    Follow Up Details: 4-6 weeks, bring all current medications to appointment                   DESTIN Cevallos  06/06/23  14:33 EDT

## 2023-06-06 NOTE — THERAPY TREATMENT NOTE
Patient Name: Vernon Solorzano  : 1950    MRN: [id number]                              Today's Date: 2023       Admit Date: 2023    Visit Dx:     ICD-10-CM ICD-9-CM   1. S/P MVR (mitral valve replacement)  Z95.2 V43.3   2. Nonrheumatic mitral valve regurgitation  I34.0 424.0     Patient Active Problem List   Diagnosis    DM (diabetes mellitus), type 2    Mixed hyperlipidemia    Mild intermittent asthma without complication    New onset of congestive heart failure    Nonrheumatic mitral valve regurgitation    Chest pain    Essential hypertension    Mitral valve disease     Past Medical History:   Diagnosis Date    Allergic rhinitis     Arthritis     BPH (benign prostatic hyperplasia)     Diabetes mellitus     TYPE 2    Foraminal stenosis of cervical region     C5-C6    GERD (gastroesophageal reflux disease)     Hemorrhoids     History of urinary retention     S/P TURP    Hyperlipidemia     Macular degeneration     PING (obstructive sleep apnea) 2016    MILD, SEES DR. AMARILIS MORGAN     Past Surgical History:   Procedure Laterality Date    CARDIAC CATHETERIZATION N/A 2023    Procedure: Right Heart Cath;  Surgeon: Corey Gaffney MD;  [facility]  [facility];  Service: Cardiology;  Laterality: N/A;    CARDIAC CATHETERIZATION N/A 2023    Procedure: Left Heart Cath;  Surgeon: Corey Gaffney MD;  Location:  [facility];  Service: Cardiology;  Laterality: N/A;    CARDIAC CATHETERIZATION N/A 2023    Procedure: Left ventriculography;  Surgeon: Corey Gaffney MD;  Location:  [facility];  Service: Cardiology;  Laterality: N/A;    CARDIAC CATHETERIZATION N/A 2023    Procedure: Coronary angiography;  Surgeon: Corey Gaffney MD;  Location:  [facility];  Service: Cardiology;  Laterality: N/A;    COLONOSCOPY N/A     WNL PER PT, NO RECORDS,     COLONOSCOPY N/A 2009    DR. GORGE BENAVIDEZ AT [place]     MITRAL VALVE REPAIR/REPLACEMENT N/A 5/24/2023    Procedure: MITRAL VALVE REPAIR/REPLACEMENT TRICUSPID VALVE REPAIR/REPLACEMENT TRANSESOPHAGEAL ECHOCARDIOGRAM WITH ANESTHESIA;  Surgeon: George Frey MD;  Location: [facility];  Service: Cardiothoracic;  Laterality: N/A;    PROSTATE SURGERY N/A 11/12/2010    TURP, DR. BRIGHT COLLAZO AT [facility]    SKIN TAG REMOVAL N/A 12/20/2017    ANAL SKIN TAG X2, PERFORMED IN OFFICE, DR. LISA ORANTES    TURP / TRANSURETHRAL INCISION / DRAINAGE PROSTATE  2010    Dr. Goodrich      General Information       Row Name 06/06/23 1520          OT Time and Intention    Document Type therapy note (daily note)  -     Mode of Treatment occupational therapy;individual therapy  -       Row Name 06/06/23 1520          General Information    Existing Precautions/Restrictions fall;cardiac;sternal  -       Row Name 06/06/23 1520          Cognition    Orientation Status (Cognition) oriented to;person;place;situation  -       Row Name 06/06/23 1520          Safety Issues, Functional Mobility    Impairments Affecting Function (Mobility) balance;endurance/activity tolerance;strength  -               User Key  (r) = Recorded By, (t) = Taken By, (c) = Cosigned By      Initials Name Provider Type     Dolores Hilario, OTR Occupational Therapist                     Mobility/ADL's       Row Name 06/06/23 1520          Bed Mobility    Comment, (Bed Mobility) NT pt fatigued and woke pt upon OT arrival  -       Row Name 06/06/23 1520          Sit-Stand Transfer    Comment, (Sit-Stand Transfer) NT up earlier, fatigued  -       Row Name 06/06/23 1520          Grooming Assessment/Training    Comment, (Grooming) already completed ADLs  -               User Key  (r) = Recorded By, (t) = Taken By, (c) = Cosigned By      Initials Name Provider Type     Dolores Hilario, OTR Occupational Therapist                   Obj/Interventions       Row Name 06/06/23 1521          Shoulder  (Therapeutic Exercise)    Shoulder (Therapeutic Exercise) AROM (active range of motion)  -     Shoulder AROM (Therapeutic Exercise) left;right;flexion;extension;2 sets;10 repetitions;supine;scapular elevation;scapular protraction  -       Row Name 06/06/23 1521          Elbow/Forearm (Therapeutic Exercise)    Elbow/Forearm (Therapeutic Exercise) AROM (active range of motion)  -     Elbow/Forearm AROM (Therapeutic Exercise) right;left;flexion;extension;supination;pronation;supine;10 repetitions;2 sets  -       Row Name 06/06/23 1521          Motor Skills    Therapeutic Exercise elbow/forearm;shoulder  -       Row Name 06/06/23 1521          Balance    Comment, Balance NT pt fatigued, woke up by therapist upon arrival. had busy morning.  -               User Key  (r) = Recorded By, (t) = Taken By, (c) = Cosigned By      Initials Name Provider Type    Dolores Brown, HI Occupational Therapist                   Goals/Plan    No documentation.                  Clinical Impression       Row Name 06/06/23 1522          Pain Assessment    Pretreatment Pain Rating 0/10 - no pain  -     Posttreatment Pain Rating 0/10 - no pain  -       Row Name 06/06/23 1522          Plan of Care Review    Plan of Care Reviewed With patient  -     Progress improving  -     Outcome Evaluation pt worked w OT this date, was asleep upon OT arrival but willing to complete UE ex while in bed. pt completed 10x2 w UE ex to incr strength/endurance. pt planning to dc to [facility]. noted after session pt may dc today to [facility].  -       Row Name 06/06/23 1522          Positioning and Restraints    Pre-Treatment Position in bed  -KP     Post Treatment Position bed  -KP     In Bed supine;call light within reach;encouraged to call for assist;exit alarm on;with other staff  -               User Key  (r) = Recorded By, (t) = Taken By, (c) = Cosigned By      Initials Name Provider Type    Dolores Brown OTR Occupational  Therapist                   Outcome Measures       Row Name 06/06/23 1523          How much help from another is currently needed...    Putting on and taking off regular lower body clothing? 2  -KP     Bathing (including washing, rinsing, and drying) 2  -KP     Toileting (which includes using toilet bed pan or urinal) 2  -KP     Putting on and taking off regular upper body clothing 3  -KP     Taking care of personal grooming (such as brushing teeth) 3  -KP     Eating meals 3  -KP     AM-PAC 6 Clicks Score (OT) 15  -KP       Row Name 06/06/23 1155 06/06/23 0823       How much help from another person do you currently need...    Turning from your back to your side while in flat bed without using bedrails? 3  -KH 4  -HL (r) MB (t) HL (c)    Moving from lying on back to sitting on the side of a flat bed without bedrails? 2  -KH 3  -HL (r) MB (t) HL (c)    Moving to and from a bed to a chair (including a wheelchair)? 3  -KH 2  -HL (r) MB (t) HL (c)    Standing up from a chair using your arms (e.g., wheelchair, bedside chair)? 3  -KH 2  -HL (r) MB (t) HL (c)    Climbing 3-5 steps with a railing? 2  -KH 2  -HL (r) MB (t) HL (c)    To walk in hospital room? 3  -KH 3  -HL (r) MB (t) HL (c)    AM-PAC 6 Clicks Score (PT) 16  -KH 16  -HL (r) MB (t)    Highest level of mobility 5 --> Static standing  -KH 5 --> Static standing  -HL (r) MB (t)      Row Name 06/06/23 1523 06/06/23 1155       Functional Assessment    Outcome Measure Options AM-PAC 6 Clicks Daily Activity (OT)  - AM-PAC 6 Clicks Basic Mobility (PT)  -              User Key  (r) = Recorded By, (t) = Taken By, (c) = Cosigned By      Initials Name Provider Type    Renata Nickerson, PT Physical Therapist    Dolores Brown, OTR Occupational Therapist    Aurora Bolivar RN Registered Nurse    MB Flores, Saranya, RN Extern Registered Nurse                    Occupational Therapy Education       Title: PT OT SLP Therapies (Resolved)        Topic: Occupational Therapy (Resolved)       Point: ADL training (Resolved)       Description:   Instruct learner(s) on proper safety adaptation and remediation techniques during self care or transfers.   Instruct in proper use of assistive devices.                  Learning Progress Summary             Patient Acceptance, E,TB, VU by  at 6/4/2023 1044    Acceptance, E, VU by  at 5/31/2023 1300    Acceptance, E,TB, VU by  at 5/30/2023 1652                         Point: Home exercise program (Resolved)       Description:   Instruct learner(s) on appropriate technique for monitoring, assisting and/or progressing therapeutic exercises/activities.                  Learning Progress Summary             Patient Acceptance, E,TB, VU by  at 6/4/2023 1044    Acceptance, E, VU by  at 5/31/2023 1300    Acceptance, E,TB, VU by  at 5/30/2023 1652                         Point: Precautions (Resolved)       Description:   Instruct learner(s) on prescribed precautions during self-care and functional transfers.                  Learning Progress Summary             Patient Acceptance, E,TB, VU by  at 6/4/2023 1044    Acceptance, E, VU by  at 5/31/2023 1300    Acceptance, E,TB, VU by  at 5/30/2023 1652                         Point: Body mechanics (Resolved)       Description:   Instruct learner(s) on proper positioning and spine alignment during self-care, functional mobility activities and/or exercises.                  Learning Progress Summary             Patient Acceptance, E,TB, VU by  at 6/4/2023 1044    Acceptance, E, VU by  at 5/31/2023 1300    Acceptance, E,TB, VU by  at 5/30/2023 1652                                         User Key       Initials Effective Dates Name Provider Type Discipline     06/16/21 -  Jelly Sepulveda, RN Registered Nurse Nurse     08/02/22 -  Shon Sargent OT Occupational Therapist OT                  OT Recommendation and Plan     Plan of Care Review  Plan of Care  Reviewed With: patient  Progress: improving  Outcome Evaluation: pt worked w OT this date, was asleep upon OT arrival but willing to complete UE ex while in bed. pt completed 10x2 w UE ex to incr strength/endurance. pt planning to dc to [facility]. noted after session pt may dc today to BAR.     Time Calculation:    Time Calculation- OT       Row Name 06/06/23 1523 06/06/23 1156          Time Calculation- OT    OT Start Time 1437  -KP --     OT Stop Time 1446  -KP --     OT Time Calculation (min) 9 min  -KP --     Total Timed Code Minutes- OT 9 minute(s)  -KP --     OT Received On 06/06/23  - --     OT - Next Appointment 06/07/23  - --        Timed Charges    17214 - OT Therapeutic Exercise Minutes 9  -KP --     60943 - Gait Training Minutes  -- 8  -KH        Total Minutes    Timed Charges Total Minutes 9  -KP 8  -KH      Total Minutes 9  -KP 8  -KH               User Key  (r) = Recorded By, (t) = Taken By, (c) = Cosigned By      Initials Name Provider Type    Renata Nickerson, PT Physical Therapist     Dolores Hilario, OTSIMONA Occupational Therapist                  Therapy Charges for Today       Code Description Service Date Service Provider Modifiers Qty    [id number] HC OT THER PROC EA 15 MIN 6/6/2023 Dolores Hilario OTR GO 1                 HI Denny  6/6/2023

## 2023-06-06 NOTE — PLAN OF CARE
Goal Outcome Evaluation:  Plan of Care Reviewed With: patient        Progress: no change   Patient is calm and cooperative. Alert to person and place. New admitted to rehab. Oriented to room. Call light placed within reach. V/S stable. Blood sugar 161 at supper.

## 2023-06-06 NOTE — PROGRESS NOTES
LOS: 14 days   Patient Care Team:  Liza Oconnell III, NP-C as PCP - General (Family Medicine)  Corey Lao Jr., MD (Inactive) as Consulting Physician (Urology)    Chief Complaint: Follow-up severe mitral regurgitation status post tissue mitral valve replacement.    Interval History: Confusion is better in general.  He is in sinus rhythm versus an ectopic atrial rhythm with a rate in the 90s.  No chest pain or shortness of breath.    Vital Signs:  Temp:  [97.8 °F (36.6 °C)-98.7 °F (37.1 °C)] 97.8 °F (36.6 °C)  Heart Rate:  [91-97] 94  Resp:  [20] 20  BP: ()/(64-75) 97/64    Intake/Output Summary (Last 24 hours) at 6/6/2023 1612  Last data filed at 6/6/2023 1547  Gross per 24 hour   Intake 1125 ml   Output 3400 ml   Net -2275 ml       Physical Exam:   General Appearance:    No acute distress, alert and appropriate.   Lungs:     Clear to auscultation bilaterally     Heart:    Regular rhythm and normal rate.  No murmurs, gallops, or    rubs.   Abdomen:     Soft, nontender, nondistended.    Extremities:   Trace edema lower extremities.     Results Review:    Results from last 7 days   Lab Units 06/06/23  0943   SODIUM mmol/L 132*   POTASSIUM mmol/L 4.1   CHLORIDE mmol/L 94*   CO2 mmol/L 25.8   BUN mg/dL 22   CREATININE mg/dL 1.31*   GLUCOSE mg/dL 218*   CALCIUM mg/dL 9.5         Results from last 7 days   Lab Units 06/06/23  0338   WBC 10*3/mm3 15.85*   HEMOGLOBIN g/dL 10.9*   HEMATOCRIT % 32.7*   PLATELETS 10*3/mm3 294     Results from last 7 days   Lab Units 06/04/23  1410 06/04/23  0319 06/03/23  1947 06/01/23  2318 06/01/23  1555   INR   --   --   --   --  1.01   APTT seconds 30.3 151.6* 43.4*   < > 24.2    < > = values in this interval not displayed.                     I reviewed the patient's new clinical results.        Assessment:  1.  Severe symptomatic mitral regurgitation and tricuspid regurgitation  2.  Status post tissue mitral valve replacement and tricuspid valve  repair (and left atrial appendage closure) on 5/24/2023 by Dr. Frey  3.  Chronic diastolic CHF  4.  History of COVID-19 pneumonia with residual dyspnea and possible interstitial lung disease  5.  Stage IIIa chronic kidney disease  6.  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  Expected postoperative anemia  8.  Postoperative thrombocytopenia  9.  Postoperative frequent PVCs and NSVT  10.  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  11.  Postoperative junctional bradycardia, complete heart block, and typical atrial flutter (status post DCCV on 6/2/2023).  12.  LBBB and first degree AV block  13.  Diabetes    Plan:  -Neurology started torsemide 40 mg/day today.  Continue Jardiance 25 mg/day.    -MRI of the brain from stroke, which explains much of the confusion and other symptoms.  This is better in general.  Continue aspirin and statin therapy    -Remains in sinus rhythm versus ectopic atrial rhythm in the 90s.  Continue Toprol-XL 25 mg/day.    -Started Eliquis 5 mg twice a day yesterday.  Left atrial appendage was closed during surgery.    -Clear for rehab from a cardiac standpoint whenever medically appropriate.    Corwin Hobson MD  06/06/23  16:12 EDT

## 2023-06-07 LAB
BASOPHILS # BLD AUTO: 0.06 10*3/MM3 (ref 0–0.2)
BASOPHILS NFR BLD AUTO: 0.4 % (ref 0–1.5)
BILIRUB UR QL STRIP: NEGATIVE
CLARITY UR: CLEAR
COLOR UR: YELLOW
DEPRECATED RDW RBC AUTO: 45.1 FL (ref 37–54)
EOSINOPHIL # BLD AUTO: 0.62 10*3/MM3 (ref 0–0.4)
EOSINOPHIL NFR BLD AUTO: 3.8 % (ref 0.3–6.2)
ERYTHROCYTE [DISTWIDTH] IN BLOOD BY AUTOMATED COUNT: 14.3 % (ref 12.3–15.4)
GLUCOSE BLDC GLUCOMTR-MCNC: 166 MG/DL (ref 70–130)
GLUCOSE BLDC GLUCOMTR-MCNC: 168 MG/DL (ref 70–130)
GLUCOSE BLDC GLUCOMTR-MCNC: 267 MG/DL (ref 70–130)
GLUCOSE BLDC GLUCOMTR-MCNC: 331 MG/DL (ref 70–130)
GLUCOSE BLDC GLUCOMTR-MCNC: 356 MG/DL (ref 70–130)
GLUCOSE UR STRIP-MCNC: ABNORMAL MG/DL
HCT VFR BLD AUTO: 32.8 % (ref 37.5–51)
HGB BLD-MCNC: 10.7 G/DL (ref 13–17.7)
HGB UR QL STRIP.AUTO: NEGATIVE
IMM GRANULOCYTES # BLD AUTO: 0.21 10*3/MM3 (ref 0–0.05)
IMM GRANULOCYTES NFR BLD AUTO: 1.3 % (ref 0–0.5)
KETONES UR QL STRIP: NEGATIVE
LEUKOCYTE ESTERASE UR QL STRIP.AUTO: NEGATIVE
LYMPHOCYTES # BLD AUTO: 1.23 10*3/MM3 (ref 0.7–3.1)
LYMPHOCYTES NFR BLD AUTO: 7.6 % (ref 19.6–45.3)
MCH RBC QN AUTO: 28.7 PG (ref 26.6–33)
MCHC RBC AUTO-ENTMCNC: 32.6 G/DL (ref 31.5–35.7)
MCV RBC AUTO: 87.9 FL (ref 79–97)
MONOCYTES # BLD AUTO: 0.98 10*3/MM3 (ref 0.1–0.9)
MONOCYTES NFR BLD AUTO: 6 % (ref 5–12)
NEUTROPHILS NFR BLD AUTO: 13.19 10*3/MM3 (ref 1.7–7)
NEUTROPHILS NFR BLD AUTO: 80.9 % (ref 42.7–76)
NITRITE UR QL STRIP: NEGATIVE
NRBC BLD AUTO-RTO: 0.1 /100 WBC (ref 0–0.2)
PH UR STRIP.AUTO: 5.5 [PH] (ref 5–8)
PLATELET # BLD AUTO: 325 10*3/MM3 (ref 140–450)
PMV BLD AUTO: 9.6 FL (ref 6–12)
PROT UR QL STRIP: NEGATIVE
RBC # BLD AUTO: 3.73 10*6/MM3 (ref 4.14–5.8)
SP GR UR STRIP: 1.02 (ref 1–1.03)
UROBILINOGEN UR QL STRIP: ABNORMAL
WBC NRBC COR # BLD: 16.29 10*3/MM3 (ref 3.4–10.8)

## 2023-06-07 PROCEDURE — 94664 DEMO&/EVAL PT USE INHALER: CPT

## 2023-06-07 PROCEDURE — 81003 URINALYSIS AUTO W/O SCOPE: CPT | Performed by: PHYSICAL MEDICINE & REHABILITATION

## 2023-06-07 PROCEDURE — 97166 OT EVAL MOD COMPLEX 45 MIN: CPT

## 2023-06-07 PROCEDURE — 92610 EVALUATE SWALLOWING FUNCTION: CPT

## 2023-06-07 PROCEDURE — 82948 REAGENT STRIP/BLOOD GLUCOSE: CPT

## 2023-06-07 PROCEDURE — 94799 UNLISTED PULMONARY SVC/PX: CPT

## 2023-06-07 PROCEDURE — 85025 COMPLETE CBC W/AUTO DIFF WBC: CPT | Performed by: PHYSICAL MEDICINE & REHABILITATION

## 2023-06-07 PROCEDURE — 63710000001 PREDNISONE PER 5 MG: Performed by: PHYSICAL MEDICINE & REHABILITATION

## 2023-06-07 PROCEDURE — 63710000001 INSULIN LISPRO (HUMAN) PER 5 UNITS: Performed by: PHYSICAL MEDICINE & REHABILITATION

## 2023-06-07 PROCEDURE — 97110 THERAPEUTIC EXERCISES: CPT

## 2023-06-07 PROCEDURE — 97162 PT EVAL MOD COMPLEX 30 MIN: CPT

## 2023-06-07 PROCEDURE — 92522 EVALUATE SPEECH PRODUCTION: CPT

## 2023-06-07 PROCEDURE — 97535 SELF CARE MNGMENT TRAINING: CPT

## 2023-06-07 PROCEDURE — 97530 THERAPEUTIC ACTIVITIES: CPT

## 2023-06-07 RX ADMIN — IPRATROPIUM BROMIDE AND ALBUTEROL SULFATE 3 ML: 2.5; .5 SOLUTION RESPIRATORY (INHALATION) at 12:07

## 2023-06-07 RX ADMIN — IPRATROPIUM BROMIDE AND ALBUTEROL SULFATE 3 ML: 2.5; .5 SOLUTION RESPIRATORY (INHALATION) at 15:51

## 2023-06-07 RX ADMIN — APIXABAN 5 MG: 5 TABLET, FILM COATED ORAL at 20:38

## 2023-06-07 RX ADMIN — ATORVASTATIN CALCIUM 40 MG: 20 TABLET, FILM COATED ORAL at 20:38

## 2023-06-07 RX ADMIN — ASPIRIN 81 MG: 81 TABLET, CHEWABLE ORAL at 08:08

## 2023-06-07 RX ADMIN — GUAIFENESIN 400 MG: 200 SOLUTION ORAL at 17:55

## 2023-06-07 RX ADMIN — INSULIN GLARGINE-YFGN 10 UNITS: 100 INJECTION, SOLUTION SUBCUTANEOUS at 20:51

## 2023-06-07 RX ADMIN — PREDNISONE 5 MG: 10 TABLET ORAL at 08:08

## 2023-06-07 RX ADMIN — APIXABAN 5 MG: 5 TABLET, FILM COATED ORAL at 08:09

## 2023-06-07 RX ADMIN — EMPAGLIFLOZIN 25 MG: 25 TABLET, FILM COATED ORAL at 08:08

## 2023-06-07 RX ADMIN — CALCIUM CARBONATE-VITAMIN D TAB 500 MG-200 UNIT 1 TABLET: 500-200 TAB at 08:08

## 2023-06-07 RX ADMIN — PANTOPRAZOLE SODIUM 40 MG: 40 TABLET, DELAYED RELEASE ORAL at 05:33

## 2023-06-07 RX ADMIN — POTASSIUM CHLORIDE 20 MEQ: 1.5 POWDER, FOR SOLUTION ORAL at 08:08

## 2023-06-07 RX ADMIN — IPRATROPIUM BROMIDE AND ALBUTEROL SULFATE 3 ML: 2.5; .5 SOLUTION RESPIRATORY (INHALATION) at 07:11

## 2023-06-07 RX ADMIN — INSULIN GLARGINE-YFGN 40 UNITS: 100 INJECTION, SOLUTION SUBCUTANEOUS at 08:08

## 2023-06-07 RX ADMIN — IPRATROPIUM BROMIDE AND ALBUTEROL SULFATE 3 ML: 2.5; .5 SOLUTION RESPIRATORY (INHALATION) at 21:00

## 2023-06-07 RX ADMIN — INSULIN LISPRO 3 UNITS: 100 INJECTION, SOLUTION INTRAVENOUS; SUBCUTANEOUS at 17:55

## 2023-06-07 RX ADMIN — GUAIFENESIN 400 MG: 200 SOLUTION ORAL at 08:08

## 2023-06-07 RX ADMIN — METOPROLOL SUCCINATE 25 MG: 25 TABLET, EXTENDED RELEASE ORAL at 08:08

## 2023-06-07 RX ADMIN — TORSEMIDE 40 MG: 20 TABLET ORAL at 08:08

## 2023-06-07 RX ADMIN — INSULIN LISPRO 10 UNITS: 100 INJECTION, SOLUTION INTRAVENOUS; SUBCUTANEOUS at 11:23

## 2023-06-07 NOTE — PROGRESS NOTES
Inpatient Rehabilitation Plan of Care Note    Plan of Care  Updated Problems/Interventions  Swallow Function    [ST] Swallowing(Active)  Current Status(06/07/2023): Patient is currently on nectar liquids  Weekly Goal(06/08/2023): Patient will tolerate thin liquids without s/sx of  aspiration  Discharge Goal: Patient will tolerate the least restrictive diet without s/sx of  aspiration    Signed by: Paty Humphries, SLP

## 2023-06-07 NOTE — PROGRESS NOTES
Inpatient Rehabilitation Plan of Care Note    Plan of Care  Care Plan Reviewed - No updates at this time.    Sphincter Control    Performed Intervention(s)  Offer toileting/ timed voids  Monitor I&O      Safety    Performed Intervention(s)  Safety monitoring during functional activities  Environmental set- upto reduce risk      Psychosocial    Performed Intervention(s)  Allow patient to verbalize needs and concerns    Signed by: Joan Mckenzie RN

## 2023-06-07 NOTE — NURSING NOTE
Noted DM ed order. Meet with 73 y/o at bedside and introduce self. Explain role of DM educator. Request pt to have family bring his meter in as his meter may not be working per his report.

## 2023-06-07 NOTE — PROGRESS NOTES
Inpatient Rehabilitation Functional Measures Assessment and Plan of Care    Plan of Care  Updated Problems/Interventions  Self Care    [OT] Bathing(Active)  Current Status(06/07/2023): Mod A  Weekly Goal(06/14/2023): Min A  Discharge Goal: CGA    [OT] Dressing (Lower)(Active)  Current Status(06/07/2023): Max A  Weekly Goal(06/14/2023): Mod A  Discharge Goal: Min A    [OT] Dressing (Upper)(Active)  Current Status(06/07/2023): Min A  Weekly Goal(06/14/2023): Set up  Discharge Goal: Indep    [OT] Eating(Active)  Current Status(01/01/1753):  Weekly Goal(01/01/1753):  Discharge Goal:    [OT] Grooming(Active)  Current Status(06/07/2023): Min A  Weekly Goal(06/14/2023): Set up  Discharge Goal: Mod I    [OT] Toileting(Active)  Current Status(06/07/2023): Max A  Weekly Goal(06/14/2023): Mod A  Discharge Goal: Min A        Mobility    [OT] Toilet Transfers(Active)  Current Status(06/07/2023): Anticipate Mod A  Weekly Goal(06/14/2023): Min A  Discharge Goal: CGA    [OT] Tub/Shower Transfers(Active)  Current Status(06/07/2023): Mod A  Weekly Goal(06/14/2023): Min A  Discharge Goal: CGA    Functional Measures  PERI Eating:  Branch  PERI Grooming: Branch  PERI Bathing:  Branch  PERI Upper Body Dressing:  Branch  PERI Lower Body Dressing:  Branch  PERI Toileting:  Branch    PERI Bladder Management  Level of Assistance:  Branch  Frequency/Number of Accidents this Shift:  Branch    PERI Bowel Management  Level of Assistance: Branch  Frequency/Number of Accidents this Shift: Branch    PERI Bed/Chair/Wheelchair Transfer:  Branch  PERI Toilet Transfer:  Branch  PERI Tub/Shower Transfer:  Branch    Previously Documented Mode of Locomotion at Discharge: Field  PERI Expected Mode of Locomotion at Discharge: Branch  PERI Walk/Wheelchair:  Branch  PERI Stairs:  Branch    PERI Comprehension:  Branch  PERI Expression:  Branch  PERI Social Interaction:  Branch  PERI Problem Solving:  Branch  PERI Memory:  Branch    Therapy Mode Minutes  Occupational Therapy:  Branch  Physical Therapy: Branch  Speech Language Pathology:  Branch    Signed by: Nazanin Espinoza OT

## 2023-06-07 NOTE — PROGRESS NOTES
Inpatient Rehabilitation Plan of Care Note    Plan of Care  Care Plan Reviewed - No updates at this time.    Sphincter Control    [RN] Bladder Management(Active)  Current Status(06/07/2023): Patient is incontinent  Weekly Goal(06/13/2023): Patient will be continent 50 %  Discharge Goal: Patient will be continent    [RN] Bowel Management(Active)  Current Status(06/07/2023): Patietn is continent of bowels  Weekly Goal(06/13/2023): patient is continent of bowels  Discharge Goal: Patient is continent of bowels    Performed Intervention(s)  Offer toileting/ timed voids  Monitor I&O      Safety    [RN] Potential for Injury(Active)  Current Status(06/07/2023): Patient is weak related to stroke  Weekly Goal(06/13/2023): Patient will use the call light for assistance  Discharge Goal: Patient/ family will be aware of risk of fall and safety in the  home setting    Performed Intervention(s)  Safety monitoring during functional activities  Environmental set- upto reduce risk      Psychosocial    [RN] Coping/Adjustment(Active)  Current Status(06/07/2023): Patient has a supportive family  Weekly Goal(06/13/2023): Patient will express concerns regarding current status  Discharge Goal: Patient will demonstrate healthy coping strategies    Performed Intervention(s)  Allow patient to verbalize needs and concerns      Pain    [RN] Pain Management(Active)  Current Status(01/01/1753):  Weekly Goal(01/01/1753):  Discharge Goal:    Signed by: Yajaira Babcock RN

## 2023-06-07 NOTE — PLAN OF CARE
Goal Outcome Evaluation:  Plan of Care Reviewed With: patient        Progress: improving  Outcome Evaluation: Immobility syndrome. S/P cabg and MCA stroke. NIH=2. A&OX4. Delayed mental processing and response time. Forgetful. Short term memory loss. Midsternal incision and chest tube sites. Scabbed and KATHY. Hx. HTN, CAD, HLD, Seizures. Blood glucose checks ACHS. Finished steroid today. Meds whole with NT or puree. Diet: Reg, NT. Can have ice chips or free water protocol after oral care and in between meal times. Up for meals. Assistx2 with walker and wheelchair. Participated fully in therapy. Tired and fatigued afterwards. Napped after sessions. Calm, cooperative, and flat. Was being given regular diet coke prior to admission here. Can have with supervision by the spoonful. In between meals. Ate fair to poor at meals. Continent and incontinent B&B. Last BM reported 6/03. PRN bowel meds given today. Full code.

## 2023-06-07 NOTE — THERAPY EVALUATION
Inpatient Rehabilitation - Speech Language Pathology   Swallow Initial Evaluation [facility]     Patient Name: Vernon Solorzano  : 1950  MRN: [id number]  Today's Date: 2023               Admit Date: 2023    Visit Dx:     ICD-10-CM ICD-9-CM   1. Impaired functional mobility, balance, gait, and endurance  Z74.09 V49.89     Patient Active Problem List   Diagnosis    DM (diabetes mellitus), type 2    Mixed hyperlipidemia    Mild intermittent asthma without complication    New onset of congestive heart failure    Nonrheumatic mitral valve regurgitation    Chest pain    Essential hypertension    Mitral valve disease    Acute ischemic left MCA stroke     Past Medical History:   Diagnosis Date    Allergic rhinitis     Arthritis     BPH (benign prostatic hyperplasia)     Diabetes mellitus     TYPE 2    Foraminal stenosis of cervical region     C5-C6    GERD (gastroesophageal reflux disease)     Hemorrhoids     History of urinary retention     S/P TURP    Hyperlipidemia     Macular degeneration     PING (obstructive sleep apnea) 2016    MILD, SEES DR. AMARILIS MORGAN     Past Surgical History:   Procedure Laterality Date    CARDIAC CATHETERIZATION N/A 2023    Procedure: Right Heart Cath;  Surgeon: Corey Gaffney MD;  Location: [facility] CATH INVASIVE LOCATION;  Service: Cardiology;  Laterality: N/A;    CARDIAC CATHETERIZATION N/A 2023    Procedure: Left Heart Cath;  Surgeon: Corey Gaffney MD;  Location: [facility] CATH INVASIVE LOCATION;  Service: Cardiology;  Laterality: N/A;    CARDIAC CATHETERIZATION N/A 2023    Procedure: Left ventriculography;  Surgeon: Corey Gaffney MD;  Location: [facility] CATH INVASIVE LOCATION;  Service: Cardiology;  Laterality: N/A;    CARDIAC CATHETERIZATION N/A 2023    Procedure: Coronary angiography;  Surgeon: Corey Gaffney MD;  Location: [facility] CATH INVASIVE LOCATION;  Service: Cardiology;  Laterality: N/A;    COLONOSCOPY N/A     WNL PER PT, NO  RECORDS,     COLONOSCOPY N/A 04/07/2009    DR. GORGE BENAVIDEZ AT [facility]    MITRAL VALVE REPAIR/REPLACEMENT N/A 5/24/2023    Procedure: MITRAL VALVE REPAIR/REPLACEMENT TRICUSPID VALVE REPAIR/REPLACEMENT TRANSESOPHAGEAL ECHOCARDIOGRAM WITH ANESTHESIA;  Surgeon: George Frey MD;  Location: [facility];  Service: Cardiothoracic;  Laterality: N/A;    PROSTATE SURGERY N/A 11/12/2010    TURP, DR. BRIGHT COLLAZO AT [facility]    SKIN TAG REMOVAL N/A 12/20/2017    ANAL SKIN TAG X2, PERFORMED IN OFFICE, DR. LISA ORANTES    TURP / TRANSURETHRAL INCISION / DRAINAGE PROSTATE  2010    Dr. Goodrich       SLP Recommendation and Plan  SLP Swallowing Diagnosis: moderate, pharyngeal dysphagia, oral dysphagia (06/07/23 1200)  SLP Diet Recommendation: regular textures, no mixed consistencies, nectar thick liquids, other (see comments) (water/ice w/ oral care- may have thins via tsp only w/ supervision) (06/07/23 1200)  Recommended Precautions and Strategies: upright posture during/after eating, small bites of food and sips of liquid, general aspiration precautions (06/07/23 1200)  SLP Rec. for Method of Medication Administration: meds whole, meds crushed, with puree, as tolerated (06/07/23 1200)     Monitor for Signs of Aspiration: yes, notify SLP if any concerns (06/07/23 1200)  Recommended Diagnostics: VFSS (MBS) (06/07/23 1200)  Swallow Criteria for Skilled Therapeutic Interventions Met: demonstrates skilled criteria (06/07/23 1200)  Anticipated Discharge Disposition (SLP): home with assist (06/07/23 1200)  Rehab Potential/Prognosis, Swallowing: good, to achieve stated therapy goals (06/07/23 1200)  Therapy Frequency (Swallow): 5 days per week (06/07/23 1200)  Predicted Duration Therapy Intervention (Days): until discharge (06/07/23 1200)                                               SWALLOW EVALUATION (last 72 hours)       SLP Adult Swallow Evaluation       Row Name 06/07/23 1300 06/07/23 1200                Rehab  Evaluation    Document Type -- evaluation  -TH       Subjective Information -- no complaints  -TH       Patient Effort -- fair  -TH          General Information    Patient Profile Reviewed -- yes  -TH       Pertinent History Of Current Problem -- Stroke/Valvular heart disease status post repair/replacement  -TH       Current Method of Nutrition -- nectar/syrup-thick liquids;regular textures  -TH       Precautions/Limitations, Vision -- WFL;for purposes of eval  -TH       Precautions/Limitations, Hearing -- WFL  -TH       Prior Level of Function-Communication -- WFL  -TH       Prior Level of Function-Swallowing -- no diet consistency restrictions  -TH       Plans/Goals Discussed with -- patient;spouse/S.O.;agreed upon  -TH       Barriers to Rehab -- none identified  -TH       Patient's Goals for Discharge -- return to regular diet  regular liquids  -TH       Family Goals for Discharge -- patient able to return to regular diet;patient able to return to all previous activities/roles  -TH          Pain Scale: Numbers Pre/Post-Treatment    Pretreatment Pain Rating -- 0/10 - no pain  -TH       Posttreatment Pain Rating -- 0/10 - no pain  -TH          Oral Motor Structure and Function    Dentition Assessment -- natural, present and adequate  -TH       Secretion Management -- WNL/WFL  -TH       Mucosal Quality -- moist, healthy  -TH          Oral Musculature and Cranial Nerve Assessment    Oral Motor General Assessment -- WFL  -TH          General Eating/Swallowing Observations    Respiratory Support Currently in Use -- room air  -TH          Clinical Swallow Eval    Clinical Swallow Evaluation Summary --  -TH Patient seen at bedside on this date for swallow evaluation upon admission to inpatient rehab. He was first seen on 5/25 for bedside swallow evaluation and 5/31 for VFSS. It was recommended he was on honey liquids and mech soft following VFSS, but since then he has been upgraded at bedside to nectar/regular solids.  Patient and wife requesting if patient could have diet coke with ice upon SLP arrival during initial session; as they report he was able to have this on the 2nd floor per RN. Discussed results of VFSS and aspiration precautions; it does appear patient tolerated thins via tsp without aspiration/penetration therefore gave patient okay to have diet coke with tsp with supervision only at this time. During session, completed trials of items off of patients lunch tray including nectar liquids, cooked broccoli, crackers, and meatloaf. He presented with wet vocal quality upon SLP arrival but able to clear with cues to cough (thin liquids observed sitting at bedside within patient reach, during meal) Discussed with RN and patient that he is to only have ice chips/water in between meals with oral care and diet coke via tsp in between meals with supervision. He appeared to tolerate nectar liquids without s/sx of aspiration initially, however, given approximately 5 minute delay without anything to eat or drink he presented with throat clearing. Patients wife reports he usually clears his throat at home and he reports “I’m clearing out mucus.” Anterior spillage observed x1 with liquids. He tolerated regular solids without overt s/sx of aspiration as well. Prolonged but adequate mastication noted w/ solids. No oral residue observed with solids or soft solids. At this time recommend continuation of nectar liquids/regular with no mixed consistencies with upright position for all PO, slow rate, small bites/sips, assistance with set up, thin liquids via tsp only in between meals, and ice chips okay between meals with oral care.  -TH          SLP Evaluation Clinical Impression    SLP Swallowing Diagnosis --  -TH moderate;pharyngeal dysphagia;oral dysphagia  -TH       Functional Impact --  -TH risk of aspiration/pneumonia  -TH       Rehab Potential/Prognosis, Swallowing --  -TH good, to achieve stated therapy goals  -TH       Swallow  Criteria for Skilled Therapeutic Interventions Met --  -TH demonstrates skilled criteria  -TH          Recommendations    Therapy Frequency (Swallow) --  -TH 5 days per week  -TH       Predicted Duration Therapy Intervention (Days) --  -TH until discharge  -TH       SLP Diet Recommendation --  -TH regular textures;no mixed consistencies;nectar thick liquids;other (see comments)  water/ice w/ oral care- may have thins via tsp only w/ supervision  -TH       Recommended Diagnostics --  -TH VFSS (MBS)  -TH       Recommended Precautions and Strategies --  -TH upright posture during/after eating;small bites of food and sips of liquid;general aspiration precautions  -TH       Oral Care Recommendations -- Oral Care before breakfast, after meals and PRN;Before ice/water  -TH       SLP Rec. for Method of Medication Administration -- meds whole;meds crushed;with puree;as tolerated  -TH       Monitor for Signs of Aspiration -- yes;notify SLP if any concerns  -TH       Anticipated Discharge Disposition (SLP) -- home with assist  -TH          Swallow Goals (SLP)    Swallow LTGs -- Patient will demonstrate progress toward functional swallow for  -TH       Swallow STGs -- pharyngeal strengthening exercise goal selection (SLP);diet tolerance goal selection (SLP)  -TH       Diet Tolerance Goal Selection (SLP) -- Other (see comments)  Patient will participate in repeat instrumental swallow study  -TH       Pharyngeal Strengthening Exercise Goal Selection (SLP) -- other (see comments)  -TH          (LTG) Patient will demonstrate progress toward functional swallow for    Diet Texture (Demonstrate progress toward functional swallow) -- regular textures  -TH       Liquid viscosity (Demonstrate progress toward functional swallow) -- thin liquids  -TH       [place] (Demonstrate progress towards functional swallow) -- independently (over 90% accuracy)  -TH       Time Frame (Demonstrate progress toward functional swallow) -- by discharge   -TH          (STG) Pharyngeal Strengthening Exercise Goal 1 (SLP)    Activity (Pharyngeal Strengthening Goal 1, SLP) -- increase closure at entrance to airway/closure of airway at glottis  -TH       Increase Closure at Entrance to Airway/Closure of Airway at Glottis -- hard effortful swallow;chin tuck against resistance (CTAR);sarita  -TH       [place]/Accuracy (Pharyngeal Strengthening Goal 1, SLP) -- with minimal cues (75-90% accuracy)  -TH       Time Frame (Pharyngeal Strengthening Goal 1, SLP) -- by discharge  -TH                 User Key  (r) = Recorded By, (t) = Taken By, (c) = Cosigned By      Initials Name Effective Dates    TH Paty Humphries 06/16/21 -                     EDUCATION  The patient has been educated in the following areas:   Dysphagia (Swallowing Impairment).        SLP GOALS       Row Name 06/07/23 1200 06/07/23 1000          (LTG) Patient will demonstrate progress toward functional swallow for    Diet Texture (Demonstrate progress toward functional swallow) regular textures  -TH --     Liquid viscosity (Demonstrate progress toward functional swallow) thin liquids  -TH --     [place] (Demonstrate progress towards functional swallow) independently (over 90% accuracy)  -TH --     Time Frame (Demonstrate progress toward functional swallow) by discharge  -TH --        (STG) Pharyngeal Strengthening Exercise Goal 1 (SLP)    Activity (Pharyngeal Strengthening Goal 1, SLP) increase closure at entrance to airway/closure of airway at glottis  -TH --     Increase Closure at Entrance to Airway/Closure of Airway at Glottis hard effortful swallow;chin tuck against resistance (CTAR);sarita  -TH --     [place]/Accuracy (Pharyngeal Strengthening Goal 1, SLP) with minimal cues (75-90% accuracy)  -TH --     Time Frame (Pharyngeal Strengthening Goal 1, SLP) by discharge  -TH --        Patient will demonstrate functional cognitive-linguistic skills for return to discharge environment     [place] -- with moderate cues  -TH     Time frame -- by discharge  -TH        Attention Goal 1 (SLP)    Improve Attention by Goal 1 (SLP) -- complete selective attention task;complete sustained attention task;70%;with moderate cues (50-74%)  -TH     Time Frame (Attention Goal 1, SLP) -- by discharge  -TH        Orientation Goal 1 (SLP)    Improve Orientation Through Goal 1 (SLP) -- demonstrating orientation to day;demonstrating orientation to month;demonstrating orientation to year;demonstrating orientation to place;demonstrating orientation to disease/impairment;use environmental aids to assist with orientation;80%;with minimal cues (75-90%)  -TH     Time Frame (Orientation Goal 1, SLP) -- by discharge  -TH        Memory Skills Goal 1 (SLP)    Improve Memory Skills Through Goal 1 (SLP) -- recalling related word lists immediately;recall details of the day;use memory strategies;use written schedule;use external memory aid;listen to a paragraph and answer questions;visual memory task;repeat list in sequential order;select a word from a list by exclusion;recalling unrelated word lists immediately;recalling related word lists with an imposed delay;recalling unrelated word lists with an imposed delay;recall details from a word list;read a paragraph and answer questions;with moderate cues (50-74%);70%  -TH     Time Frame (Memory Skills Goal 1, SLP) -- by discharge  -TH               User Key  (r) = Recorded By, (t) = Taken By, (c) = Cosigned By      Initials Name Provider Type    Paty Hernandez Speech and Language Pathologist                       Time Calculation:    Time Calculation- SLP       Row Name 06/07/23 1227 06/07/23 1225          Time Calculation- [facility]    SLP Start Time 1130  -TH 0900  -TH     [facility] Stop Time 1200  -TH 0950  -TH     [facility] Time Calculation (min) 30 min  -TH 50 min  -TH               User Key  (r) = Recorded By, (t) = Taken By, (c) = Cosigned By      Initials Name Provider Type    CHRISTIAN Humphries  Paty Speech and Language Pathologist                    Therapy Charges for Today       Code Description Service Date Service Provider Modifiers Qty    [id number]  ST EVAL SPEECH SOUND PRODUCTION 4 6/7/2023 Paty Humphries 1    [id number]  ST ASAELAL ORAL PHARYNG SWALLOW 2 6/7/2023 Paty Humphries 1                 Paty Humphries  6/7/2023

## 2023-06-07 NOTE — PROGRESS NOTES
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  Now transferred to the  acute inpatient rehab program.    Per his discharge summary-[On 5/24 he underwent MVR, TVR, and left atrial  appendage closure with Dr. Frey.  He has done well despite a very complicated  postoperative course.  He was extubated day of surgery.  The following morning  he was unable to move his right hand and had echolalia, constantly repeating his  date of birth.  He underwent CT of the head which was normal.  He was given IV  steroids given altered mental status.  Home p.o. steroids were resumed.  Nephrology was consulted and he was gently IV diuresed.  He was evaluated by  speech therapy swallow concerns.  Feeding tube was placed and tube feeds were  managed per SLP until 6/6 when he was cleared for modified diet..  On postop day  2 he was in AV block with frequent PVCs, with rates in the 50s.  Beta-blocker  was held.  He was then found to be in a junctional rhythm.  After being IV  diuresed he was changed to p.o. Lasix per nephrology.  On postop day 6 his  rhythm changed to atrial flutter with PVCs.  He came hyperglycemic with tube  feeds.  He was placed on SSI and Lantus.  This was uptitrated until 6/5.  His  prednisone was tapered per pulmonology.  The morning of postop day 8 he was  alert and oriented, but had word salad which was an acute change from his  previous neurologic state.  He underwent repeat CT scan which was negative for  acute changes.  He also underwent lower extremity duplex due to swelling of the  left lower extremity which was negative for DVT but positive for superficial  thrombophlebitis. He remained in atrial flutter, was place on heparin drip, and  underwent cardioversion and  converted to normal sinus rhythm with rates in the  90s, however he went back into atrial fibrillation afterward with controlled  rate.  AV wires were removed afterward on postop day 10 and he was started on  Eliquis.  On postop day 11 he was started on Toprol-XL.  He was weaned to room  air.  He tolerated modified diet per SLP and received nocturnal tube feeding.  He underwent MRI which showed an acute infarction in the bilateral frontal and  left parietal occipital cortices.  He was back in normal sinus rhythm with rates  in the 90s.  He tolerated beta-blocker.  He was again IV diuresed.  Then  switched to p.o. torsemide per nephrology. ]      Recently, aphasia and right hand apraxia improved.  Transfers min assist.  Gait  two-person assist min assist 25 feet, festinating, shuffling, decreased stride  length, forward flexed posture.  Impaired ADL performance.  On a dysphagia diet with nectar thick liquids    Given his functional impairments and comorbidities he is now mated for acute  inpatient rehabilitation  He complains of expected sternotomy discomfort.  Denies any headache.  No vision  changes.  Reports swallowing fair and tolerating dysphagia diet.  No shortness  of air.  Denies any bowel complaints.  Does have some bladder urgency.  Chronic  edema in the legs which was worse over the past 6 months but is better now  although still present.  His speech is showing improvement.  His wife notes that  he does wake up with initial disorientation each time.  Pain control is better  on the right side.  Past Medical History: Past Medical History:  DiagnosisDate  ?Allergic rhinitis  ?Arthritis  ?BPH (benign prostatic hyperplasia)  ?Diabetes mellitus   TYPE 2  ?Foraminal stenosis of cervical region   C5-C6  ?GERD (gastroesophageal reflux disease)  ?Hemorrhoids  ?History of urinary [id number]   S/P TURP  ?Hyperlipidemia  ?Macular degeneration  ?PING (obstructive sleep apnea)01/07/2016   MILD, SEES DR. OLMOS  EDDIE        Surgical History  Past Surgical History:  ProcedureLateralityDate  ?CARDIAC CATHETERIZATIONN/A5/11/2023   Procedure: Right Heart Cath;  Surgeon: Corey Gaffney MD;  Location: [facility]  CATH INVASIVE LOCATION;  Service: Cardiology;  Laterality: N/A;  ?CARDIAC CATHETERIZATIONN/A5/11/2023   Procedure: Left Heart Cath;  Surgeon: Corey Gaffney MD;  [facility];  Service: Cardiology;  Laterality: N/A;  ?CARDIAC CATHETERIZATIONN/A5/11/2023   Procedure: Left ventriculography;  Surgeon: Corey Gaffney MD;  Location:   [facility] CATH INVASIVE LOCATION;  Service: Cardiology;  Laterality: N/A;  ?CARDIAC CATHETERIZATIONN/A5/11/2023   Procedure: Coronary angiography;  Surgeon: Corey Gaffney MD;  [facility];  Service: Cardiology;  Laterality: N/A;  ?COLONOSCOPYN/A   WNL PER PT, NO RECORDS,   ?COLONOSCOPYN/A04/07/2009   DR. GORGE BENAVIDEZ AT [facility]  ?MITRAL VALVE REPAIR/REPLACEMENTN/A5/24/2023   Procedure: MITRAL VALVE REPAIR/REPLACEMENT TRICUSPID VALVE REPAIR/REPLACEMENT  TRANSESOPHAGEAL ECHOCARDIOGRAM WITH ANESTHESIA;  Surgeon: George Frey MD;  Location[facility];  Service: Cardiothoracic;  Laterality: N/A;  ?PROSTATE SURGERYN/A11/12/2010   TURP, DR. COREY COLLAZO AT [facility]  ?SKIN TAG REMOVALN/A12/20/2017   ANAL SKIN TAG X2, PERFORMED IN OFFICE, DR. LISA ORANTES  ?TURP / TRANSURETHRAL INCISION / DRAINAGE PROSTATE 2010   Dr. Goodrich    Plan of Care  Updated Problems/Interventions  Field    Expected Intensity:  Average of 3 hours of therapy 5 days/week.  Interdisciplinary Team:  Interdisciplinary Team: Medical Supervision and 24 Hour Rehabilitation Nursing.,  Physical Therapy:, Occupational Therapy:, Speech and Language Therapy:, Social  Work, Therapeutic Recreation., Psychology., Registered Dietitian.  Physical Therapy Intensity/Duration: 1 hour / day, 5 days / week, for  approximately 2 weeks  Occupational Therapy Intensity/Duration:  1 hour / day, 5 days / week, for  approximately 2 weeks  Speech Language Pathology  Intensity/Duration: 1 hour / day, 5 days / week, for  approximately 2 weeks  Estimated Length of Stay/Anticipated Discharge Date: 2 weeks  Anticipated Discharge Destination:  Anticipated discharge destination from inpatient rehabilitation is community  discharge with assistance. Return home with 24/7 assist.      Based on the patient's medical and functional status, their prognosis and  expected level of functional improvement is:  Goals are to achieve a level of  contact-guard with  mobility and self-care and improved activity tolerance.  Rehabilitation prognosis fair.  Medical prognosis defer to cardiology.    Signed by: Angelika Najera RN

## 2023-06-07 NOTE — PROGRESS NOTES
SECTION GG    Self Care Performance:   Oral Hygiene: Harris does less than half the effort. Harris lifts, holds or  supports trunk or limbs but provides less than half the effort.   Toileting Hygiene: Harris does more than half the effort. Harris lifts or holds  trunk or limbs and provides more than half the effort.   Shower/Bathe Self: Harris does more than half the effort. Harris lifts or holds  trunk or limbs and provides more than half the effort.   Upper Body Dressing: Harris does less than half the effort. Harris lifts, holds  or supports trunk or limbs but provides less than half the effort.   Lower Body Dressing: Harris does more than half the effort. Harris lifts or  holds trunk or limbs and provides more than half the effort.   Putting On/Taking Off Footwear: Harris does all of the effort. Patient does  none of the effort to complete the activity. Or, the assistance of 2 or more  helpers is required for the patient to complete the activity.    Self Care Discharge Goals:   Toileting Hygiene: Harris does less than half the effort. Harris lifts, holds  or supports trunk or limbs but provides less than half the effort.   Oral Hygiene: Harris sets up or cleans up; patient completes activity. Harris  assists only prior to or following the activity.   Shower/Bathe Self: Harris provides verbal cues or touching/steadying assistance  as patient completes activity.   Upper Body Dressing: Patient completed the activities by themself with no  assistance from a helper.   Lower Body Dressing: Harris provides verbal cues or touching/steadying  assistance as patient completes activity.   Putting On/Taking Off Footwear: Harris does less than half the effort. Harris  lifts, holds or supports trunk or limbs but provides less than half the effort.    Mobility Toilet Transfer Performance: Harris does more than half the effort.  Harris lifts or holds trunk or limbs and provides more than half the effort.    Mobility Toilet  Transfer Discharge Goal: [place] provides verbal cues or  touching/steadying assistance as patient completes activity.    Signed by: Nazanin Espinoza, OT

## 2023-06-07 NOTE — PROGRESS NOTES
Case Management  Inpatient Rehabilitation Plan of Care and Discharge Plan Note    Rehabilitation Diagnosis:  Branch  Date of Onset:  Branch    Medical Summary:  Branch  Past Medical History: Branch    Plan of Care  Updated Problems/Interventions  Field    Expected Intensity:  Branch  Interdisciplinary Team:  Kelechi  Estimated Length of Stay/Anticipated Discharge Date: Branch  Anticipated Discharge Destination:  Anticipated discharge destination from inpatient rehabilitation is community  discharge with assistance. Patient plans to d/c home with his wife who can  provide 24 hour assist.      Based on the patient's medical and functional status, their prognosis and  expected level of functional improvement is:  Branch    Signed by: Génesis Narayan, Social Work

## 2023-06-07 NOTE — PROGRESS NOTES
Inpatient Rehabilitation Admission  Section A. Transportation  Issues Due to Lack of Transportation:   No    Signed by: Génesis Narayan, Social Work

## 2023-06-07 NOTE — PROGRESS NOTES
SECTION GG    Eating Performance: Cedar sets up or cleans up; patient completes activity.  Cedar assists only prior to or following the activity.    Eating Discharge Goals: Patient completed the activities by themself with no  assistance from a helper.    Section B. Hearing and Vision  Ability to Hear:  Adequate - no difficulty in normal conversation, social  interaction, listening to TV  Ability to See in Adequate Light:  Adequate - sees fine detail, such as regular  print in newspapers/books    Section B. Health Literacy  Frequency of Needing Assistance Reading:  Never    Section D. Mood  Presence of little interest or pleasure in doing things:   No  Frequency of having little interest or pleasure in doing things:   Never or 1  day  Presence of feeling down, depressed, or hopeless:   No  Frequency of feeling down, depressed, or hopeless:   Never or 1 day   Interview Ended. Above responses do not meet criteria to continue.  Total Severity Score:   0    Section D. Social Isolation  Frequecy of Feeling Lonely or Isolated:  Never    Section J. Health Conditions (Pain Effect on Sleep)  Pain Effect on Sleep:   Occasionally    Signed by: Yajaira Babcock RN

## 2023-06-07 NOTE — PROGRESS NOTES
"Section B. Hearing, Speech, Vision: Expression of Ideas and Wants: Expresses  complex messages without difficulty and with speech that is clear and easy to  understand.  Understanding Verbal and Non-Verbal Content: Understands: Clear comprehension  without cues or repetitions.    Section C. Cognitive Patterns: Brief Interview for Mental Status (BIMS) was  conducted.  Repetition of Three Words: Three words  Able to report correct year: Correct  Able to report correct month: Missed by 6 days to 1 month  Able to report correct day of the week: Incorrect or no answer  Able to recall \"sock\": Yes, after cuing  Able to recall \"blue\": No, could not recall  Able to recall \"bed\": Yes, after cuing    BIMS SUMMARY SCORE: 9 Moderately impaired Patient was able to complete the Brief  Interview for Mental Status    Section C. Signs and Symptoms of Delirium (from CAM): Evidence of Acute Change  in Mental Status:   Yes  Inattention: Behavior present, fluctuates (comes and goes, changes in severity)  Thinking Disorganized or Incoherent:   Behavior present, fluctuates (comes and  goes, changes in severity)  Altered Level of Consciousness:   Behavior not present    Signed by: Paty Humphries, SLP    "

## 2023-06-07 NOTE — THERAPY EVALUATION
Inpatient Rehabilitation - Physical Therapy Initial Evaluation       [facility]     Patient Name: Vernon Solorzano  : 1950  MRN: [id number]    Today's Date: 2023                    Admit Date: 2023      Visit Dx:     ICD-10-CM ICD-9-CM   1. Impaired functional mobility, balance, gait, and endurance  Z74.09 V49.89       Patient Active Problem List   Diagnosis    DM (diabetes mellitus), type 2    Mixed hyperlipidemia    Mild intermittent asthma without complication    New onset of congestive heart failure    Nonrheumatic mitral valve regurgitation    Chest pain    Essential hypertension    Mitral valve disease    Acute ischemic left MCA stroke       Past Medical History:   Diagnosis Date    Allergic rhinitis     Arthritis     BPH (benign prostatic hyperplasia)     Diabetes mellitus     TYPE 2    Foraminal stenosis of cervical region     C5-C6    GERD (gastroesophageal reflux disease)     Hemorrhoids     History of urinary retention     S/P TURP    Hyperlipidemia     Macular degeneration     PING (obstructive sleep apnea) 2016    MILD, SEES DR. AMARILIS MORGAN       Past Surgical History:   Procedure Laterality Date    CARDIAC CATHETERIZATION N/A 2023    Procedure: Right Heart Cath;  Surgeon: Corey Gaffney MD;  Location: [facility] CATH INVASIVE LOCATION;  Service: Cardiology;  Laterality: N/A;    CARDIAC CATHETERIZATION N/A 2023    Procedure: Left Heart Cath;  Surgeon: Corey Gaffney MD;  Location: [facility] CATH INVASIVE LOCATION;  Service: Cardiology;  Laterality: N/A;    CARDIAC CATHETERIZATION N/A 2023    Procedure: Left ventriculography;  Surgeon: Corey Gaffney MD;  Location: [facility] CATH INVASIVE LOCATION;  Service: Cardiology;  Laterality: N/A;    CARDIAC CATHETERIZATION N/A 2023    Procedure: Coronary angiography;  Surgeon: Corey Gaffney MD;  Location: [facility] CATH INVASIVE LOCATION;  Service: Cardiology;  Laterality: N/A;    COLONOSCOPY N/A     WNL PER PT,  NO RECORDS,     COLONOSCOPY N/A 04/07/2009    DR. GORGE BENAVIDEZ AT [place]    MITRAL VALVE REPAIR/REPLACEMENT N/A 5/24/2023    Procedure: MITRAL VALVE REPAIR/REPLACEMENT TRICUSPID VALVE REPAIR/REPLACEMENT TRANSESOPHAGEAL ECHOCARDIOGRAM WITH ANESTHESIA;  Surgeon: George Frey MD;  Location: [facility];  Service: Cardiothoracic;  Laterality: N/A;    PROSTATE SURGERY N/A 11/12/2010    TURP, DR. BRIGHT COLLAZO AT [facility]    SKIN TAG REMOVAL N/A 12/20/2017    ANAL SKIN TAG X2, PERFORMED IN OFFICE, DR. LISA ORANTES    TURP / TRANSURETHRAL INCISION / DRAINAGE PROSTATE  2010    Dr. Goodrich       PT ASSESSMENT (last 12 hours)       IRF PT Evaluation and Treatment       Row Name 06/07/23 1030          PT Time and Intention    Document Type initial evaluation  -     Mode of Treatment physical therapy  -     Patient/Family/Caregiver Comments/Observations Pt up in chair at bedside with spouse present.  Pt agreeable to PT.  MIn conversation with dec phonation.  -       Row Name 06/07/23 1030          General Information    Patient Profile Reviewed yes  -     General Observations of Patient Pt is motivated to participate but fatigues easliy.  Pt noted that he was tired today.  Reports pain in chest with any touch.and with transitional movts.  WIfe encouraging and supportive.  -     Existing Precautions/Restrictions fall;cardiac;sternal  -       Row Name 06/07/23 1030          Previous Level of Function/Home Environm    Bed Mobility, Premorbid Functional Level independent  -     Transfers, Premorbid Functional Level independent  -     Household Ambulation, Premorbid Functional Level independent  -     Stairs, Premorbid Functional Level independent  -     Community Ambulation, Premorbid Functional Level independent  -     Activity/Exercise/Self-Care Comment Wife noted that pt has a minimal decline in activity over the last  -       Row Name 06/07/23 1030          Living Environment    Current  Living Arrangements home  -     Home Accessibility stairs to enter home;stairs within home  -     People in Home spouse  -     Primary Care Provided by spouse/significant other  -       Row Name 06/07/23 1030          Home Main Entrance    Number of Stairs, Main Entrance three  -     Stair Railings, Main Entrance none  -     Stairs Comment, Main Entrance from garage - no railing but encouraged spouse to think about adding rail.  -       Row Name 06/07/23 1030          Stairs Within Home, Primary    Number of Stairs, Within Home, Primary twelve  +  -     Stair Railings, Within Home, Primary railing on left side (ascending)  -     Landing, Stairs, Within Home, Primary adequate turning radius  -     Stairs Comment, Within Home, Primary stairs to bed/bath on second floorl  -       Row Name 06/07/23 1030          Pain Assessment    Pretreatment Pain Rating 0/10 - no pain  -     Posttreatment Pain Rating 0/10 - no pain  -       Row Name 06/07/23 1030          Cognition/Psychosocial    Affect/Mental Status (Cognition) flat/blunted affect;low arousal/lethargic  -     Orientation Status (Cognition) oriented to;person;place;situation  -     Follows Commands (Cognition) follows one-step commands;75-90% accuracy;increased processing time needed;delayed response/completion;verbal cues/prompting required  -     Personal Safety Interventions fall prevention program maintained;gait belt;nonskid shoes/slippers when out of bed;supervised activity  -     Cognitive Function attention deficit;memory deficit;safety deficit  -     Attention Deficit (Cognition) distractible in noisy environment;focused/sustained attention  -     Memory Deficit (Cognition) episodic memory;procedural memory;recall, biographical information  -     Safety Deficit (Cognition) insight into deficits/self-awareness;judgment;safety precautions follow-through/compliance  -     Comment, Cognition Pt struggled to stay awake at  times during session.  -       Row Name 06/07/23 1030          Range of Motion Comprehensive    Comment, General Range of Motion B LE AROM WFL  -       Row Name 06/07/23 1030          Strength Comprehensive (MMT)    Comment, General Manual Muscle Testing (MMT) Assessment B LE MMT  - B hips    - 4/5, B knees - 5/5, B ankles  - 5/5.  Assessed grossly.  -[facility] Name 06/07/23 1030          Sensory Assessment (Somatosensory)    Sensory Assessment (Somatosensory) LE sensation intact  Minor difficulty with assessment  -[facility] Name 06/07/23 1030          Bed Mobility    Scooting/Bridging [place] (Bed Mobility) minimum assist (75% patient effort)  -     Supine-Sit [place] (Bed Mobility) moderate assist (50% patient effort);verbal cues  -     Sit-Supine [place] (Bed Mobility) moderate assist (50% patient effort);verbal cues  -     Bed Mobility, Safety Issues decreased use of arms for pushing/pulling;decreased use of legs for bridging/pushing  -     Assistive Device (Bed Mobility) --  wedge on mat  -[facility] Name 06/07/23 1030          Transfer Assessment/Treatment    Transfers bed-chair transfer;chair-bed transfer;sit-stand transfer;stand-sit transfer;toilet transfer  -[facility] Name 06/07/23 1030          Bed-Chair Transfer    Bed-Chair [place] (Transfers) moderate assist (50% patient effort);verbal cues;set up  -     Assistive Device (Bed-Chair Transfers) wheelchair  -[facility] Name 06/07/23 1030          Chair-Bed Transfer    Chair-Bed [place] (Transfers) moderate assist (50% patient effort);verbal cues  -     Assistive Device (Chair-Bed Transfers) wheelchair  -[facility] Name 06/07/23 1030          Sit-Stand Transfer    Sit-Stand [place] (Transfers) moderate assist (50% patient effort);verbal cues;set up  -     Assistive Device (Sit-Stand Transfers) wheelchair;walker, front-wheeled  -     Comment, (Sit-Stand Transfer) struggles with fwd WS  -        Row Name 06/07/23 1030          Stand-Sit Transfer    Stand-Sit [place] (Transfers) minimum assist (75% patient effort);verbal cues;set up  -     Assistive Device (Stand-Sit Transfers) walker, front-wheeled;wheelchair  -       Row Name 06/07/23 1030          Toilet Transfer    Type (Toilet Transfer) stand pivot/stand step  -     [place] Level (Toilet Transfer) moderate assist (50% patient effort);verbal cues;set up  -     Assistive Device (Toilet Transfer) grab bars/safety frame;wheelchair  -       Row Name 06/07/23 1030          Gait/Stairs (Locomotion)    [place] Level (Gait) minimum assist (75% patient effort);1 person to manage equipment  second perdon to follow with chair.  -     Assistive Device (Gait) walker, front-wheeled  -     Distance in Feet (Gait) 8, 15, 20  -     Deviations/Abnormal Patterns (Gait) base of support, wide;festinating/shuffling;stride length decreased;weight shifting decreased  -     Bilateral Gait Deviations forward flexed posture;heel strike decreased  B knee flexion  -     Gait Assessment/Intervention Gait is slow and labored.  Limited by c/o fatigue in LEs and c/o SOA.  dec fluidity of movt in LEs to advance, B knee flexion.  -       Row Name 06/07/23 1030          Balance    Static Sitting Balance standby assist  -     Static Standing Balance minimal assist;verbal cues  -       Row Name 06/07/23 1030          Positioning and Restraints    Pre-Treatment Position sitting in chair/recliner  -     Post Treatment Position wheelchair  -     In Wheelchair sitting;call light within reach;encouraged to call for assist;exit alarm on;with nsg  -       Row Name 06/07/23 1030          Vital Signs    Pre SpO2 (%) 95  -KP     O2 Delivery Pre Treatment room air  -KP     Post SpO2 (%) 94  -KP     O2 Delivery Post Treatment room air  -KP     Pre Patient Position Sitting  -     Post Patient Position Sitting  -       Row Name 06/07/23 1030           Therapy Assessment/Plan (PT)    Patient's Goals For Discharge return to all previous roles/activities  -       Row Name 06/07/23 1030          Therapy Assessment/Plan (PT)    Functional Level at Time of Evaluation (PT) mod  -     PT Diagnosis (PT) Impaired functional mobility  -     Rehab Potential/Prognosis (PT) good, to achieve stated therapy goals  -     Frequency of Treatment (PT) 5 times per week;60 minutes per session  -     Estimated Duration of Therapy (PT) 3 weeks  -     Problem List (PT) problems related to;balance;cognition;coordination;mobility;strength  -     Activity Limitations Related to Problem List (PT) unable to ambulate safely;unable to transfer safely;community activities not performed adequately or safely;home management activity not performed adequately or safely  -     Comment, Therapy Assessment/Plan (PT) Pt is a 71 yo male who presents for PT eval with a dx of IS following MVR, TVR, and L MCA Stroke.  Pt has comorbidities of Asthma, severe Covid, HTN, cervical stenosis, and BPH which may impact pt POC.  Pt with impairments inc LE weakness, dec balance, generalized deconditioning, SOA, and impaired cognition, all of which impact pt functional indep and mobilty.  Pt would benfit from skilled PT to address impairments and assist in return to home.  -       Row Name 06/07/23 1030          Therapy Plan Review/Discharge Plan (PT)    Therapy Plan Review (PT) evaluation/treatment results reviewed;care plan/treatment goals reviewed;risks/benefits reviewed;current/potential barriers reviewed;patient  -     Anticipated Equipment Needs at Discharge (PT Eval) front wheeled walker  -     Anticipated Discharge Disposition (PT) home with 24/7 care  -       Row Name 06/07/23 1030          IRF PT Goals    Bed Mobility Goal Selection (PT-IRF) bed mobility, PT goal 1;bed mobility, PT goal 2  -KP     Transfer Goal Selection (PT-IRF) transfers, PT goal 1;transfers, PT goal 2  -     Gait  (Walking Locomotion) Goal Selection (PT-IRF) gait, PT goal 1;gait, PT goal 2  -KP     Stairs Goal Selection (PT-IRF) stairs, PT goal 1;stairs, PT goal 2  -KP     Caregiver Training Goal Selection (PT-IRF) caregiver training, PT goal 1  -       Row Name 06/07/23 1030          Bed Mobility Goal 1 (PT-IRF)    Activity/Assistive Device (Bed Mobility Goal 1, PT-IRF) bed mobility activities, all  -KP     [place] Level (Bed Mobility Goal 1, PT-IRF) minimum assist (75% or more patient effort)  -KP     Time Frame (Bed Mobility Goal 1, PT-IRF) short-term goal (STG);1 week  -KP     Progress/Outcomes (Bed Mobility Goal 1, PT-IRF) goal ongoing  -       Row Name 06/07/23 1030          Bed Mobility Goal 2 (PT-IRF)    Activity/Assistive Device (Bed Mobility Goal 2, PT-IRF) bed mobility activities, all  -KP     [place] Level (Bed Mobility Goal 2, PT-IRF) supervision required  -KP     Time Frame (Bed Mobility Goal 2, PT-IRF) long-term goal (LTG);3 weeks  -KP     Progress/Outcomes (Bed Mobility Goal 2, PT-IRF) goal ongoing  -       Row Name 06/07/23 1030          Transfer Goal 1 (PT-IRF)    Activity/Assistive Device (Transfer Goal 1, PT-IRF) all transfers;sit-to-stand/stand-to-sit;bed-to-chair/chair-to-bed;walker, rolling  -KP     [place] Level (Transfer Goal 1, PT-IRF) minimum assist (75% or more patient effort)  -KP     Time Frame (Transfer Goal 1, PT-IRF) short-term goal (STG);2 weeks  -KP     Progress/Outcomes (Transfer Goal 1, PT-IRF) goal ongoing  -       Row Name 06/07/23 1030          Transfer Goal 2 (PT-IRF)    Activity/Assistive Device (Transfer Goal 2, PT-IRF) all transfers  -KP     [place] Level (Transfer Goal 2, PT-IRF) contact guard required  -KP     Time Frame (Transfer Goal 2, PT-IRF) long-term goal (LTG);3 weeks  -KP     Progress/Outcomes (Transfer Goal 2, PT-IRF) goal ongoing  -       Row Name 06/07/23 1030          Gait/Walking Locomotion Goal 1 (PT-IRF)    Activity/Assistive Device  (Gait/Walking Locomotion Goal 1, PT-IRF) gait (walking locomotion);walker, rolling  -KP     Gait/Walking Locomotion Distance Goal 1 (PT-IRF) 50  -KP     [place] Level (Gait/Walking Locomotion Goal 1, PT-IRF) minimum assist (75% or more patient effort)  -KP     Time Frame (Gait/Walking Locomotion Goal 1, PT-IRF) short-term goal (STG);2 weeks  -KP     Progress/Outcomes (Gait/Walking Locomotion Goal 1, PT-IRF) goal ongoing  -       Row Name 06/07/23 1030          Gait/Walking Locomotion Goal 2 (PT-IRF)    Activity/Assistive Device (Gait/Walking Locomotion Goal 2, PT-IRF) assistive device use;walker, rolling  -KP     Gait/Walking Locomotion Distance Goal 2 (PT-IRF) 150  -KP     [place] Level (Gait/Walking Locomotion Goal 2, PT-IRF) contact guard required  -KP     Time Frame (Gait/Walking Locomotion Goal 2, PT-IRF) long-term goal (LTG);3 weeks  -KP     Progress/Outcomes (Gait/Walking Locomotion Goal 2, PT-IRF) goal ongoing  -       Row Name 06/07/23 1030          Stairs Goal 1 (PT-IRF)    Activity/Assistive Device (Stairs Goal 1, PT-IRF) stairs, all skills;using handrail, left  -KP     Number of Stairs (Stairs Goal 1, PT-IRF) 12  -KP     [place] Level (Stairs Goal 1, PT-IRF) contact guard required  -KP     Time Frame (Stairs Goal 1, PT-IRF) long-term goal (LTG);3 weeks  -KP     Progress/Outcomes (Stairs Goal 1, PT-IRF) goal ongoing  -       Row Name 06/07/23 1030          Caregiver Training Goal 1 (PT-IRF)    Caregiver Training Goal 1 (PT-IRF) Family to be indep with appropriate level of assist for pt.  -KP     Time Frame (Caregiver Training Goal 1, PT-IRF) long-term goal (LTG);3 weeks  -KP     Progress/Outcomes (Caregiver Training Goal 1, PT-IRF) goal ongoing  -               User Key  (r) = Recorded By, (t) = Taken By, (c) = Cosigned By      Initials Name Provider Type    KP Elizabeth Levin, PT Physical Therapist                  Wound 05/24/23 0737 sternal Incision (Active)   Dressing  Appearance open to air 06/07/23 0808   Closure Approximated 06/07/23 0808   Base scab 06/07/23 0808   Periwound dry 06/1950   Periwound Temperature warm 06/1950   [place] Skin Turgor soft 06/1950   Drainage Amount none 06/06/23 1735   Dressing Care open to air 06/07/23 0808   Periwound Care dry periwound area maintained 06/07/23 0808     Physical Therapy Education       Title: PT OT SLP Therapies (Done)       Topic: Physical Therapy (Done)       Point: Mobility training (Done)       Learning Progress Summary             Patient Acceptance, E,TB,D, VU,DU,NR by  at 6/7/2023 1513    Comment: POC, Goals, safety with mobility.   Significant Other Acceptance, E,TB,D, VU,DU,NR by  at 6/7/2023 1513    Comment: POC, Goals, safety with mobility.                         Point: Home exercise program (Done)       Learning Progress Summary             Patient Acceptance, E,TB,D, VU,DU,NR by  at 6/7/2023 1513    Comment: POC, Goals, safety with mobility.   Significant Other Acceptance, E,TB,D, VU,DU,NR by  at 6/7/2023 1513    Comment: POC, Goals, safety with mobility.                                         User Key       Initials Effective Dates Name Provider Type Discipline     06/16/21 -  Elizabeth Levin, PT Physical Therapist PT                    PT Recommendation and Plan    Planned Therapy Interventions (PT): balance training, bed mobility training, gait training, home exercise program, neuromuscular re-education, patient/family education, strengthening, transfer training  Frequency of Treatment (PT): 5 times per week, 60 minutes per session  Anticipated Equipment Needs at Discharge (PT Eval): front wheeled walker                  Time Calculation:      PT Charges       Row Name 06/07/23 1514             Time Calculation    Start Time 1000  -      Stop Time 1100  -      Time Calculation (min) 60 min  -      PT Received On 06/07/23  -      PT - Next Appointment 06/08/23  -      PT  Goal Re-Cert Due Date 06/14/23  -                User Key  (r) = Recorded By, (t) = Taken By, (c) = Cosigned By      Initials Name Provider Type     Elizabeth Levin, PT Physical Therapist                    Therapy Charges for Today       Code Description Service Date Service Provider Modifiers Qty    [id number]  PT EVAL MOD COMPLEXITY 3 6/7/2023 Elizabeth Levin, PT GP 1    [id number]  PT THERAPEUTIC ACT EA 15 MIN 6/7/2023 Elizabeth Levin, PT GP 1    [id number]  PT THER PROC EA 15 MIN 6/7/2023 Elizabeth Levin, PT GP 1              PT G-Codes  AM-PAC 6 Clicks Score (PT): 14      Elizabeth Levni, PT  6/7/2023

## 2023-06-07 NOTE — PROGRESS NOTES
SECTION GG    Mobility Performance:     Roll Left and Right: [place] does less than half the effort. [place] lifts, holds  or supports trunk or limbs but provides less than half the effort.   Sit to Lying: [place] does less than half the effort. [place] lifts, holds or  supports trunk or limbs but provides less than half the effort.   Lying to Sitting on Side of Bed: [place] does less than half the effort. [place]  lifts, holds or supports trunk or limbs but provides less than half the effort.   Sit to Stand: [place] does less than half the effort. [place] lifts, holds or  supports trunk or limbs but provides less than half the effort.   Chair/Bed to Chair Transfer: [place] does less than half the effort. [place]  lifts, holds or supports trunk or limbs but provides less than half the effort.   Car Transfer: Not attempted due to medical or safety concerns.   Walk 10 Feet:   [place] does less than half the effort. [place] lifts, holds or  supports trunk or limbs but provides less than half the effort.  Walk 50 Feet with 2 Turns:   Not attempted due to medical or safety concerns.  Walk 150 Feet:   Not attempted due to medical or safety concerns.  Walking 10 Feet on Uneven Surfaces:   Not attempted due to medical or safety  concerns.  1 Step Over Curb or Up/Down Stair:   Not attempted due to medical or safety  concerns.  Picking up an Object:   Not attempted due to medical or safety concerns.  Uses Wheelchair/Scooter: No    Mobility Discharge Goals:   Walk Discharge Goals:   Walk 150 Feet: [place] provides verbal cues or touching/steadying assistance as  patient completes activity.    Section J. Health Conditions (Pain):  Pain Interference with Therapy Activities:   Occasionally  Pain Interference with Day-to-Day Activities:   Rarely or not at all    Signed by: Elizabeth Levin, PT

## 2023-06-07 NOTE — THERAPY EVALUATION
Inpatient Rehabilitation - Occupational Therapy Initial Evaluation    [facility]     Patient Name: Vernon Solorzano  : 1950  MRN: [id number]    Today's Date: 2023                 Admit Date: 2023         ICD-10-CM ICD-9-CM   1. Impaired functional mobility, balance, gait, and endurance  Z74.09 V49.89       Patient Active Problem List   Diagnosis    DM (diabetes mellitus), type 2    Mixed hyperlipidemia    Mild intermittent asthma without complication    New onset of congestive heart failure    Nonrheumatic mitral valve regurgitation    Chest pain    Essential hypertension    Mitral valve disease    Acute ischemic left MCA stroke       Past Medical History:   Diagnosis Date    Allergic rhinitis     Arthritis     BPH (benign prostatic hyperplasia)     Diabetes mellitus     TYPE 2    Foraminal stenosis of cervical region     C5-C6    GERD (gastroesophageal reflux disease)     Hemorrhoids     History of urinary retention     S/P TURP    Hyperlipidemia     Macular degeneration     PING (obstructive sleep apnea) 2016    MILD, SEES DR. AMARILIS MORGAN       Past Surgical History:   Procedure Laterality Date    CARDIAC CATHETERIZATION N/A 2023    Procedure: Right Heart Cath;  Surgeon: Corey Gaffney MD;  Location: [facility] CATH INVASIVE LOCATION;  Service: Cardiology;  Laterality: N/A;    CARDIAC CATHETERIZATION N/A 2023    Procedure: Left Heart Cath;  Surgeon: Corey Gaffney MD;  Location: [facility] CATH INVASIVE LOCATION;  Service: Cardiology;  Laterality: N/A;    CARDIAC CATHETERIZATION N/A 2023    Procedure: Left ventriculography;  Surgeon: Corey Gaffney MD;  Location: [facility] CATH INVASIVE LOCATION;  Service: Cardiology;  Laterality: N/A;    CARDIAC CATHETERIZATION N/A 2023    Procedure: Coronary angiography;  Surgeon: Corey Gaffney MD;  Location: [facility] CATH INVASIVE LOC[facility];  Service: Cardiology;  Laterality: N/A;    COLONOSCOPY N/A     WNL PER PT, NO RECORDS,      COLONOSCOPY N/A 04/07/2009    DR. GORGE BENAVIDEZ AT [place]    MITRAL VALVE REPAIR/REPLACEMENT N/A 5/24/2023    Procedure: MITRAL VALVE REPAIR/REPLACEMENT TRICUSPID VALVE REPAIR/REPLACEMENT TRANSESOPHAGEAL ECHOCARDIOGRAM WITH ANESTHESIA;  Surgeon: George Frey MD;  Location: [facility];  Service: Cardiothoracic;  Laterality: N/A;    PROSTATE SURGERY N/A 11/12/2010    TURP, DR. BRIGHT COLLAZO AT [facility]    SKIN TAG REMOVAL N/A 12/20/2017    ANAL SKIN TAG X2, PERFORMED IN OFFICE, DR. LISA ORANTES    TURP / TRANSURETHRAL INCISION / DRAINAGE PROSTATE  2010    Dr. Goodrich             IRF OT ASSESSMENT FLOWSHEET (last 12 hours)       IRF OT Evaluation and Treatment       Row Name 06/07/23 1130          OT Time and Intention    Document Type initial evaluation  -KA     Mode of Treatment individual therapy;occupational therapy  -KA     Symptoms Noted During/After Treatment fatigue  pt tired this AM. Requested to complete shower  -KA       Row Name 06/07/23 1138          General Information    Patient Profile Reviewed yes  -KA     Patient/Family/Caregiver Comments/Observations Pt sitting up in bed with HOB elevated upon arrival.  -KA     Existing Precautions/Restrictions fall;cardiac;sternal  HR less than 120 or less than 20 bpm above baseline. Systolic BP greater than 90 and less than 180. Sternal- no lifting greater than 10lbs x6 weeks from date of sx (5/24/23)  -KA       Row Name 06/07/23 1130          Previous Level of Function/Home Environm    Bathing/Grooming, Premorbid Functional Level independent  -KA     Dressing, Premorbid Functional Level independent  -KA     Eating/Feeding, Premorbid Functional Level independent  -KA     Toileting, Premorbid Functional Level independent  -KA     BADLs, Premorbid Functional Level independent  -KA     IADLs, Premorbid Functional Level independent  -KA     Bed Mobility, Premorbid Functional Level independent  -KA     Transfers, Premorbid Functional Level independent   -KA     Household Ambulation, Premorbid Functional Level independent  -     Stairs, Premorbid Functional Level independent  -       Row Name 06/07/23 1130          Living Environment    Current Living Arrangements home  -KA     People in Home spouse  -     Primary Care Provided by self  -NBA       Row Name 06/07/23 1130          Home Main Entrance    Number of Stairs, Main Entrance three  per chart wife reports 3 KENDRICK with no hand rail. She is currently working on installing one. Pt with delayed processing and increased time to answer questions today. He reported 5-6 KENDRICK  -       Row Name 06/07/23 1130          Home Use of Assistive/Adaptive Equipment    Equipment Currently Used at Home none  -       Row Name 06/07/23 1130          Occupational Profile    Occupational History/Life Experiences (Occupational Profile) Pt is a retired physician.  -       Row Name 06/07/23 1130          Pain Assessment    Pretreatment Pain Rating 7/10  -KA     Posttreatment Pain Rating 7/10  -KA     Pain Location incisional  -       Row Name 06/07/23 1130          Pain Scale: Word Pre/Post-Treatment    Pain Intervention(s) Rest;Nursing Notified  -       Row Name 06/07/23 1130          Cognition/Psychosocial    Orientation Status (Cognition) oriented x 3  -KA     Follows Commands (Cognition) follows one-step commands;delayed response/completion;increased processing time needed;repetition of directions required  -     Personal Safety Interventions fall prevention program maintained;gait belt;nonskid shoes/slippers when out of bed  -     Cognitive Function attention deficit;executive function deficit  -     Attention Deficit (Cognition) distractible in noisy environment;focused/sustained attention  -       Row Name 06/07/23 1130          Range of Motion (ROM)    Range of Motion ROM is WFL;bilateral upper extremities  -       Row Name 06/07/23 1130          Range of Motion Comprehensive    General Range of Motion  bilateral upper extremity ROM WFL  -KA       Row Name 06/07/23 1130          Strength (Manual Muscle Testing)    Left Hand, Setting 2 (Dynamometer Testing) 40  -KA     Right Hand, Setting 2 (Dynamometer Testing) 30  -KA     Left Hand: Lateral (Key) Pinch Strength (Pinch Dynamometer Testing) 13  -KA     Left Hand: [place] (Barber) Pinch Strength (Pinch Dynamometer Testing) 10  -KA     Right Hand: Lateral (Key) Pinch Strength (Pinch Dynamometer Testing) 12  -KA     Right Hand: [place] (Barber) Pinch Strength (Pinch Dynamometer Testing) 12  -KA     Additional Documentation Left Hand, Setting 2 (Dynamometer Testing) (Row);Left Hand: Lateral (Key) Pinch Strength (Pinch Dynamometer Testing) (Row);Left Hand: [place] (Barber) Pinch Strength (Pinch Dynamometer Testing) (Row);Right Hand, Setting 2 (Dynamometer Testing) (Row);Right Hand: Lateral (Key) Pinch Strength (Pinch Dynamometer Testing) (Row);Right Hand: [place] (Barber) Pinch Strength (Pinch Dynamometer Testing) (Row)  -KA       Row Name 06/07/23 1130          Strength Comprehensive (MMT)    General Manual Muscle Testing (MMT) Assessment upper extremity strength deficits identified  -KA       Row Name 06/07/23 1130          Upper Extremity (Manual Muscle Testing)    Upper Extremity: Manual Muscle Testing (MMT) left shoulder strength deficit;right shoulder strength deficit;left elbow/forearm strength deficit;right elbow/forearm strength deficit  -KA       Row Name 06/07/23 1130          Left Shoulder (Manual Muscle Testing)    Left Shoulder Manual Muscle Testing (MMT) flexion  -KA     MMT: Flexion, Left Shoulder flexion  -KA     MMT, Gross Movement: Left Shoulder Flexion (4-/5) good minus  -KA       Row Name 06/07/23 1130          Right Shoulder (Manual Muscle Testing)    Right Shoulder Manual Muscle Testing (MMT) flexion  -KA     MMT: Flexion, Right Shoulder flexion  -KA     MMT, Gross Movement: Right Shoulder Flexion (3+/5) fair plus  -KA       Row Name  06/07/23 1130          Left Elbow/Forearm (Manual Muscle Testing)    Left Elbow/Forearm Manual Muscle Testing (MMT) flexion;extension  -KA     MMT: Flexion, Left Elbow flexion  -KA     MMT, Gross Movement: Left Elbow Flexion (4-/5) good minus  -KA     MMT: Extension, Left Elbow extension  -KA     MMT, Gross Movement: Left Elbow Extension (4-/5) good minus  -KA       Row Name 06/07/23 1130          Right Elbow/Forearm (Manual Muscle Testing)    Right Elbow/Forearm Manual Muscle Testing (MMT) flexion;extension  -KA     MMT: Flexion, Right Elbow flexion  -KA     MMT, Gross Movement: Right Elbow Flexion (3+/5) fair plus  -KA     MMT: Extension, Right Elbow extension  -KA     MMT, Gross Movement: Right Elbow Extension (3+/5) fair plus  -KA       Row Name 06/07/23 1130          Sensory    Additional Documentation Vision Assessment/Intervention (Group);Sensory Assessment (Somatosensory) (Group)  -       Row Name 06/07/23 1130          Basic Activities of Daily Living (BADLs)    Basic Activities of Daily Living bathing;upper body dressing;lower body dressing;grooming;toileting;self-feeding  -       Row Name 06/07/23 1130          Bathing    [place] Level (Bathing) bathing skills;upper body;lower body;moderate assist (50% patient effort)  pt very fatigued this AM  -     Assistive Device (Bathing) grab bar/tub rail;hand held shower spray hose;tub bench  -KA     Position (Bathing) supported sitting  -KA     Comment (Bathing) fatigued this AM  -KA       Row Name 06/07/23 1130          Upper Body Dressing    [place] Level (Upper Body Dressing) upper body dressing skills;don;doff;pull over garment;minimum assist (75% or more patient effort)  -KA     Position (Upper Body Dressing) supported sitting  -KA     Set-up Assistance (Upper Body Dressing) obtain clothing  -       Row Name 06/07/23 1130          Lower Body Dressing    [place] Level (Lower Body Dressing)  don;doff;socks;shoes/slippers;pants/bottoms;maximum assist (25% patient effort);dependent (less than 25% patient effort)  -     Position (Lower Body Dressing) supported sitting  -     Set-up Assistance (Lower Body Dressing) obtain clothing;don;doff  -     Comment (Lower Body Dressing) Dep with HOLLY hose  -       Row Name 06/07/23 1130          Grooming    [place] Level (Grooming) grooming skills;wash face, hands;deodorant application;minimum assist (75% patient effort)  -     Position (Grooming) supported sitting  -     Set-up Assistance (Grooming) obtain supplies;open containers  -       Row Name 06/07/23 1130          Toileting    [place] Level (Toileting) toileting skills;adjust/manage clothing;perform perineal hygiene;maximum assist (25% patient effort);verbal cues  -     Position (Toileting) supported sitting;supported standing  -     Set-up Assistance (Toileting) change pad/brief;obtain supplies  -       Row Name 06/07/23 1130          Self-Feeding    Comment (Self-Feeding) Pt reports he was able to feed himself lunch with set up A  -       Row Name 06/07/23 1130          Bed Mobility    Supine-Sit [place] (Bed Mobility) minimum assist (75% patient effort)  Pt sitting with HOB elevated upon arrival in seated postion  -[facility] Name 06/07/23 1130          Bed-Chair Transfer    Bed-Chair [place] (Transfers) moderate assist (50% patient effort)  -     Assistive Device (Bed-Chair Transfers) walker, front-wheeled;wheelchair  -     Comment, (Bed-Chair Transfer) stand pivot to wc  -       Row Name 06/07/23 1130          Sit-Stand Transfer    Sit-Stand [place] (Transfers) moderate assist (50% patient effort)  -     Assistive Device (Sit-Stand Transfers) walker, front-wheeled;wheelchair  -     Comment, (Sit-Stand Transfer) Required more assistance at times with STS in shower due to increased fatigue  -       Row Name 06/07/23 1130          Safety Issues,  Functional Mobility    Safety Issues Affecting Function (Mobility) problem-solving;sequencing abilities;insight into deficits/self-awareness  -     Impairments Affecting Function (Mobility) balance;cognition;coordination;endurance/activity tolerance;motor planning;shortness of breath;strength  -       Row Name 06/07/23 1130          Motor Skills    Motor Skills coordination  -     Coordination 9 Hole Peg Test of Fine Motor Coordination Results  -     Results, 9 Hole Peg Test of Fine Motor Coordination RUE-16 blocks LUE- 27 blocks  9 hole RUE: 1 min 9 secs LUE: 1 min 4 secs. pt with difficulty attending to task and slow processing  -     Therapeutic Exercise shoulder;elbow/forearm;wrist;hand  -       Row Name 06/07/23 1130          Balance    Static Sitting Balance standby assist  -     Dynamic Sitting Balance contact guard  -     Position, Sitting Balance --  seated on chair in shower  -       Row Name 06/07/23 1130          Positioning and Restraints    Pre-Treatment Position in bed  -     Post Treatment Position wheelchair  -     In Wheelchair sitting;call light within reach;encouraged to call for assist;notified nsg;with family/caregiver  -       Row Name 06/07/23 1130          Therapy Assessment/Plan (OT)    OT Diagnosis Pt seen for OT eval this AM. Underwent cardiac sx (MVR, TVR) on 5/24/23 with post op acute ischemic L MCA stroke. Pt presents with decreased strength, endurance,activity tolerance, increased assistance with ADLs, functional, decreased FMC, delayed processing and increase response time. Pt to benefit from skilled OT to address deficits and maximize independence with ADLs. pt's plan is to dc home with wife with 24/7 assistance.  -     Estimated Duration of Therapy (OT) 3 weeks  -     Problem List (OT) balance;cognition;coordination;mobility;strength;communication  -     Planned Therapy Interventions (OT) activity tolerance training;adaptive equipment training;BADL  retraining;cognitive/visual perception retraining;neuromuscular control/coordination retraining;occupation/activity based interventions;patient/caregiver education/training;ROM/therapeutic exercise;strengthening exercise;transfer/mobility retraining  -KA       Row Name 06/07/23 1130          Therapy Plan Review/Discharge Plan (OT)    Therapy Plan Review (OT) evaluation/treatment results reviewed;care plan/treatment goals reviewed;participants voiced agreement with care plan;participants included;patient;spouse/significant other;son  -KA       Row Name 06/07/23 1130          IRF OT Goals    Transfer Goal Selection (OT-IRF) transfers, OT goal 1;transfers, OT goal 2  -KA     Bathing Goal Selection (OT-IRF) bathing, OT goal 1;bathing, OT goal 2  -KA     UB Dressing Goal Selection (OT-IRF) UB dressing, OT goal 1;UB dressing, OT goal 2  -KA     LB Dressing Goal Selection (OT-IRF) LB dressing, OT goal 1;LB dressing, OT goal 2  -KA     Grooming Goal Selection (OT-IRF) grooming, OT goal 1;grooming, OT goal 2  -KA     Toileting Goal Selection (OT-IRF) toileting, OT goal 1;toileting, OT goal 2  -KA     Strength Goal Selection (OT-IRF) strength, OT goal 1 (free text)  -KA     Endurance Goal Selection (OT) endurance, OT goal 1  -KA     Coordination Goal Selection (OT) coordination, OT goal 1  -KA     Caregiver Training Goal Selection (OT-IRF) caregiver training, OT goal 1  -KA       Row Name 06/07/23 1130          Transfer Goal 1 (OT-IRF)    Activity/Assistive Device (Transfer Goal 1, OT-IRF) toilet;shower chair  -KA     [place] Level (Transfer Goal 1, OT-IRF) minimum assist (75% or more patient effort)  -KA     Time Frame (Transfer Goal 1, OT-IRF) short-term goal (STG)  -KA     Progress/Outcomes (Transfer Goal 1, OT-IRF) goal ongoing  -KA       Row Name 06/07/23 1130          Transfer Goal 2 (OT-IRF)    Activity/Assistive Device (Transfer Goal 2, OT-IRF) toilet;shower chair  -KA     [place] Level (Transfer Goal 2,  OT-IRF) contact guard required  -KA     Time Frame (Transfer Goal 2, OT-IRF) long-term goal (LTG)  -KA     Progress/Outcomes (Transfer Goal 2, OT-IRF) goal ongoing  -       Row Name 06/07/23 1130          Bathing Goal 1 (OT-IRF)    Activity/Device (Bathing Goal 1, OT-IRF) bathing skills, all;long-handled sponge;shower chair  -KA     [place] Level (Bathing Goal 1, OT-IRF) minimum assist (75% or more patient effort)  -KA     Time Frame (Bathing Goal 1, OT-IRF) short-term goal (STG)  -KA     Progress/Outcomes (Bathing Goal 1, OT-IRF) goal ongoing  -       Row Name 06/07/23 1130          Bathing Goal 2 (OT-IRF)    Activity/Device (Bathing Goal 2, OT-IRF) bathing skills, all;long-handled sponge;shower chair  -KA     [place] Level (Bathing Goal 2, OT-IRF) contact guard required  -KA     Time Frame (Bathing Goal 2, OT-IRF) short-term goal (STG)  -KA     Progress/Outcomes (Bathing Goal 2, OT-IRF) goal ongoing  -       Row Name 06/07/23 1130          UB Dressing Goal 1 (OT-IRF)    Activity/Device (UB Dressing Goal 1, OT-IRF) upper body dressing  -KA     [place] (UB Dress Goal 1, OT-IRF) set-up required  -KA     Time Frame (UB Dressing Goal 1, OT-IRF) short-term goal (STG)  -KA     Progress/Outcomes (UB Dressing Goal 1, OT-IRF) goal ongoing  -       Row Name 06/07/23 1130          UB Dressing Goal 2 (OT-IRF)    Activity/Device (UB Dressing Goal 2, OT-IRF) upper body dressing  -KA     [place] (UB Dress Goal 2, OT-IRF) independent  -KA     Time Frame (UB Dressing Goal 2, OT-IRF) long-term goal (LTG)  -KA     Progress/Outcomes (UB Dressing Goal 2, OT-IRF) goal ongoing  -       Row Name 06/07/23 1130          LB Dressing Goal 1 (OT-IRF)    Activity/Device (LB Dressing Goal 1, OT-IRF) lower body dressing  -KA     [place] (LB Dressing Goal 1, OT-IRF) maximum assist (25-49% patient effort)  -KA     Time Frame (LB Dressing Goal 1, OT-IRF) short-term goal (STG)  -KA     Progress/Outcomes (LB Dressing  Goal 1, OT-IRF) goal ongoing  -KA       Row Name 06/07/23 1130          LB Dressing Goal 2 (OT-IRF)    Activity/Device (LB Dressing Goal 2, OT-IRF) lower body dressing;reacher;sock aid  -KA     [place] (LB Dressing Goal 2, OT-IRF) minimum assist (75% or more patient effort)  -KA     Time Frame (LB Dressing Goal 2, OT-IRF) long-term goal (LTG)  -KA     Progress/Outcomes (LB Dressing Goal 2, OT-IRF) goal ongoing  -KA       Row Name 06/07/23 1130          Grooming Goal 1 (OT-IRF)    Activity/Device (Grooming Goal 1, OT-IRF) grooming skills, all  -KA     [place] (Grooming Goal 1, OT-IRF) contact guard required  -KA     Time Frame (Grooming Goal 1, OT-IRF) short-term goal (STG)  -KA     Progress/Outcomes (Grooming Goal 1, OT-IRF) goal ongoing  -KA       Row Name 06/07/23 1130          Grooming Goal 2 (OT-IRF)    Activity/Device (Grooming Goal 2, OT-IRF) grooming skills, all  -KA     [place] (Grooming Goal 2, OT-IRF) set-up required  -KA     Time Frame (Grooming Goal 2, OT-IRF) long-term goal (LTG)  -KA     Progress/Outcomes (Grooming Goal 2, OT-IRF) goal ongoing  -KA       Row Name 06/07/23 1130          Toileting Goal 1 (OT-IRF)    Activity/Device (Toileting Goal 1, OT-IRF) toileting skills, all  -KA     [place] Level (Toileting Goal 1, OT-IRF) moderate assist (50-74% patient effort)  -KA     Progress/Outcomes (Toileting Goal 1, OT-IRF) goal ongoing  -KA     Time Frame (Toileting Goal 1, OT-IRF) short-term goal (STG)  -KA       Row Name 06/07/23 1130          Toileting Goal 2 (OT-IRF)    Activity/Device (Toileting Goal 2, OT-IRF) toileting skills, all  -KA     [place] Level (Toileting Goal 2, OT-IRF) minimum assist (75% or more patient effort)  -KA     Progress/Outcomes (Toileting Goal 2, OT-IRF) goal ongoing  -KA     Time Frame (Toileting Goal 2, OT-IRF) long-term goal (LTG)  -KA       Row Name 06/07/23 1130          Strength Goal 1 (OT-IRF)    Strength Goal 1 (OT-IRF) Pt to demonstrate  understanding of HEP in order to improve overall strength and endurance required for ADLs and functional mobility.  -KA     Time Frame (Strength Goal 1, OT-IRF) long-term goal (LTG)  -KA     Progress/Outcomes (Strength Goal 1, OT-IRF) goal ongoing  -KA       Row Name 06/07/23 1130           Endurance Goal 1 (OT)    Activity Level (Endurance Goal 1, OT) endurance 2 good  -KA     Time Frame (Endurance Goal 1, OT) long term goal (LTG)  -KA     Progress/Outcome (Endurance Goal 1, OT) goal ongoing  -KA       Row Name 06/07/23 1130          Coordination Goal 1 (OT)    Activity/Assistive Device (Coordination Goal 1, OT) FM written ex program  -KA     [place] Level/Cues Needed (Coordination Goal 1, OT) independent  -KA     Time Frame (Coordination Goal 1, OT) long term goal (LTG)  -KA     Progress/Outcomes (Coordination Goal 1, OT) goal ongoing  -KA       Row Name 06/07/23 1130          Caregiver Training Goal 1 (OT-IRF)    Caregiver Training Goal 1 (OT-IRF) Pt's family to demonstrate understanding with functional transfers and safety with ADL techniques.  -KA     Time Frame (Caregiver Training Goal 1, OT-IRF) long-term goal (LTG)  -KA     Progress/Outcomes (Caregiver Training Goal 1, OT-IRF) goal ongoing  -               User Key  (r) = Recorded By, (t) = Taken By, (c) = Cosigned By      Initials Name Effective Dates    Nazanin Brooke, OT 09/22/22 -                              OT Recommendation and Plan    Planned Therapy Interventions (OT): activity tolerance training, adaptive equipment training, BADL retraining, cognitive/visual perception retraining, neuromuscular control/coordination retraining, occupation/activity based interventions, patient/caregiver education/training, ROM/therapeutic exercise, strengthening exercise, transfer/mobility retraining                    Time Calculation:      Time Calculation- OT       Row Name 06/07/23 1549 06/07/23 1548          Time Calculation- OT    OT Start Time 1330   -KA 0800  -     OT Stop Time 1400  -KA 0900  -KA     OT Time Calculation (min) 30 min  -KA 60 min  -               User Key  (r) = Recorded By, (t) = Taken By, (c) = Cosigned By      Initials Name Provider Type    Nazanin Brooke OT Occupational Therapist                  Therapy Charges for Today       Code Description Service Date Service Provider Modifiers Qty    [id number]  OT EVAL MOD COMPLEXITY 3 6/7/2023 Nazanin Espinoza OT GO 1    [id number]  OT SELF CARE/MGMT/TRAIN EA 15 MIN 6/7/2023 Nazanin Espinoza OT GO 4                     Nazanin Espinoaz OT  6/7/2023

## 2023-06-07 NOTE — PROGRESS NOTES
Discharge Planning Assessment  [facility]     Patient Name: Vernon Solorzano  MRN: [id number]  Today's Date: 6/7/2023    Admit Date: 6/6/2023    Plan: Patient plans to d/c home with his wife who can provide 24 hour assist. Possible home health or outpatient therapies.   Discharge Needs Assessment    No documentation.                  Discharge Plan       Row Name 06/07/23 1406       Plan    Plan Patient plans to d/c home with his wife who can provide 24 hour assist. Possible home health or outpatient therapies.    Patient/Family in Agreement with Plan yes    Plan Comments Started assessment with patient. Patient was getting very tired during our conversation and asked that I speak with his wife. SW completed assessment with patient's wife. Patient lives at home with his wife in two story home with 3 KENDRICK. Patient's wife is currently working on getting a rail installed for the steps. Patient's bed and bathroom are on the second floor of the house. Patient was independent before coming to the hospital. He has never used any assistive equipment or had any home health in the past. 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 His wife is also retired and can assist 24 hours. Patient also has two adult sons. Patient's wife reported that patient has been feeling a little frustrated with his current condition but not depressed. SW explained her role on the unit and team and family conference. SW will follow up with patient and wife tomorrow after team conference.                  Continued Care and Services - Admitted Since 6/6/2023    Coordination has not been started for this encounter.       Selected Continued Care - Prior Encounters Includes continued care and service providers with selected services from prior encounters from 3/8/2023 to 6/7/2023      Discharged on 6/6/2023 Admission date: 5/23/2023 - Discharge disposition: Rehab Facility or Unit ([facility])      Destination       Service  Provider Selected Services Address Phone Fax Patient Preferred    [facility] ACUTE REHAB PROGRAM Inpatient Rehabilitation [address] [place] [address] [id number] [phone] -- --              Home Medical Care       Service Provider Selected Services Address Phone Fax Patient Preferred     [facility] [address] [address] [phone] --                             Demographic Summary    No documentation.                  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 Not at all       Speech WDL    Speech response lag       Perceptual State WDL    Perceptual State WDL perceptual state       Thought Process WDL    Thought Process WDL judgment and insight       Intellectual Performance WDL    Intellectual Performance WDL intellectual performance    Intellectual Performance forgetfulness;impaired concentration    Level of Consciousness Alert        Coping/Stress    Major Change/Loss/Stressor medical condition/diagnosis    Sources of Support spouse    Reaction to Health Status adjusting       Developmental Stage (Eriksson's)    Developmental Stage Stage 8 (65 years-death/Late Adulthood) Integrity vs. Despair       C-SSRS (Recent)    Q1 Wished to be Dead (Past Month) no    Q2 Suicidal Thoughts (Past Month) no    Q6 Suicide Behavior (Lifetime) no       Violence Risk    Feels Like Hurting Others no    Previous Attempt to Harm Others no                   Abuse/Neglect       Row Name 06/07/23 1357       Personal Safety    Feels Unsafe at Home or Work/School no    Feels Threatened by Someone no    Does Anyone Try to Keep You From Having Contact with Others or Doing Things Outside Your Home? no    Physical Signs of Abuse Present no                   Legal    No documentation.                  Substance Abuse       Row Name 06/07/23 1369       AUDIT-C (Alcohol Use Disorders ID Test)    Q1: How often do you have a drink containing alcohol? Monthly or l    Q2: How many drinks containing alcohol do you have on a typical day when you are drinking? 1 or 2    Q3: How often do you have six or more drinks on one occasion? Never    Audit-C Score 1                   Patient Forms    No documentation.                     CAMMY Jones

## 2023-06-07 NOTE — CONSULTS
Nutrition Services    Patient Name:  Vernon Solorzano  YOB: 1950  MRN: [id number]  Admit Date:  6/6/2023      Assessment Date:  06/07/23    CLINICAL NUTRITION ASSESSMENT    Comments: Pt is 73 yo male admitted to rehab s/p acute ischemic left MCA stroke following MVR, TVR heart surgery. Noted pre-existing dx of DM 2, HTN, CKD III, PING.     Reviewed acute MNT notes - pt had Cortrak feeding tube that was removed yesterday prior to his transfer to acute rehab unit as he was reportedly eating adequately. Consumed 100% of breakfast tray this morning. MD notes pt hoping to advance from nectar liquid consistency soon.  Speech therapy is following and working with him during my rounding at lunch time. RD will try to see at a later date/time. Labs reviewed, Aic 9%. Skin intact with sternal incision noted.     Will continue to monitor diet tolerance, PO intake, labs during rehab stay.           Reason for Assessment Acute Rehab Admission     Admitting Diagnosis S/p heart valve surgery, left MCA stroke     Medical/Surgical History   Past Medical History:   Diagnosis Date    Allergic rhinitis     Arthritis     BPH (benign prostatic hyperplasia)     Diabetes mellitus     TYPE 2    Foraminal stenosis of cervical region     C5-C6    GERD (gastroesophageal reflux disease)     Hemorrhoids     History of urinary retention 2010    S/P TURP    Hyperlipidemia     Macular degeneration     PING (obstructive sleep apnea) 01/07/2016    MILD, SEES DR. AMARILIS MORGAN       Past Surgical History:   Procedure Laterality Date    CARDIAC CATHETERIZATION N/A 5/11/2023    Procedure: Right Heart Cath;  Surgeon: Corey Gaffney MD;  [facility];  Service: Cardiology;  Laterality: N/A;    CARDIAC CATHETERIZATION N/A 5/11/2023    Procedure: Left Heart Cath;  Surgeon: Corey Gaffney MD;  [facility]:  [facility];  Service: Cardiology;  Laterality: N/A;    CARDIAC CATHETERIZATION N/A  "5/11/2023    Procedure: Left ventriculography;  Surgeon: Corey Gaffney MD;  [facility]:  [facility];  Service: Cardiology;  Laterality: N/A;    CARDIAC CATHETERIZATION N/A 5/11/2023    Procedure: Coronary angiography;  Surgeon: Corey Gaffney MD;  Location:  [facility];  Service: Cardiology;  Laterality: N/A;    COLONOSCOPY N/A     WNL PER PT, NO RECORDS,     COLONOSCOPY N/A 04/07/2009    DR. GORGE BENAVIDEZ AT [place]    MITRAL VALVE REPAIR/REPLACEMENT N/A 5/24/2023    Procedure: MITRAL VALVE REPAIR/REPLACEMENT TRICUSPID VALVE REPAIR/REPLACEMENT TRANSESOPHAGEAL ECHOCARDIOGRAM WITH ANESTHESIA;  Surgeon: George Frey MD;  Location: [facility];  Service: Cardiothoracic;  Laterality: N/A;    PROSTATE SURGERY N/A 11/12/2010    TURP, DR. COREY COLLAZO AT [facility]    SKIN TAG REMOVAL N/A 12/20/2017    ANAL SKIN TAG X2, PERFORMED IN OFFICE, DR. LISA ORANTES    TURP / TRANSURETHRAL INCISION / DRAINAGE PROSTATE  2010    Dr. Goodrich        Encounter Information        Nutrition History:  Pt is a retired MD. Wife Maricel is a dietitian in our cardiac rehab. She has been bringing in foods from outside at times to supplement. Just came off TF in last 2 days. Aic 9%   Food Preferences:    Supplements:    Factors Affecting Intake: swallow impairment, weakness     Current Nutrition Orders & Evaluation of Intake       Oral Nutrition     Food Allergies NKFA   Current PO Diet Diet: Regular/House Diet, Diabetic Diets, Cardiac Diets; Healthy Heart (2-3 Na+); Consistent Carbohydrate; No Mixed Consistencies; Texture: Regular Texture (IDDSI 7); Fluid Consistency: Nectar Thick   Supplement Magic cup TID   PO Evaluation     % PO Intake 100% x 1 meal on rehab     # of Days Evaluated PO diet recently advanced on acute, eating >50%   --  Anthropometrics        Current Height  Current Weight  BMI kg/m2 Height: 185.4 cm (73\")  Weight: 114 kg (252 lb 3.3 oz) (06/07/23 0535)  Body mass index is " 33.27 kg/m².       BMI Category Obese, Class I (30 - 34.9)       Ideal Body Weight (IBW) 184 lb           Usual Body Weight (UBW) 250-265 lb per wt index below   Weight Change/Trend Other: fluctuation during acute stay       Weight History Wt Readings from Last 15 Encounters:   06/07/23 0535 114 kg (252 lb 3.3 oz)   06/06/23 1641 115 kg (254 lb 10.1 oz)   06/06/23 0500 114 kg (252 lb 1.6 oz)   06/05/23 0500 114 kg (251 lb)   06/03/23 0600 116 kg (256 lb)   06/02/23 0612 117 kg (257 lb 3.2 oz)   06/01/23 1516 118 kg (261 lb)   06/01/23 0558 119 kg (261 lb 14.4 oz)   05/31/23 0546 120 kg (264 lb 12.4 oz)   05/30/23 0526 119 kg (262 lb 5.6 oz)   05/29/23 0600 121 kg (266 lb 12.1 oz)   05/27/23 0823 118 kg (260 lb 2.3 oz)   05/26/23 0855 118 kg (260 lb)   05/26/23 0700 118 kg (260 lb 2.3 oz)   05/25/23 0600 121 kg (267 lb 6.7 oz)   05/24/23 2000 117 kg (257 lb 15 oz)   05/23/23 1531 117 kg (257 lb 0.9 oz)   05/15/23 1512 117 kg (257 lb 6.4 oz)   05/12/23 0600 117 kg (257 lb 6.4 oz)   05/11/23 0500 114 kg (251 lb 3.2 oz)   05/10/23 0600 116 kg (255 lb 6.4 oz)   05/09/23 1527 120 kg (265 lb)   05/09/23 1105 113 kg (250 lb)   05/09/23 1107 120 kg (265 lb)   12/20/17 1323 114 kg (250 lb 8 oz)      --  Physical Findings          Physical Appearance alert, obese, oriented - working with SLP at time of rounding/lunch   Oral/Mouth Cavity tooth or teeth missing   Edema  lower extremity , 1+ (trace), 2+ (mild)   Gastrointestinal last bowel movement:   Skin  surgical incision - midsternal    Tubes/Drains/Lines none   NFPE Not indicated at this time     Tests/Procedures/Labs        Tests/Procedures VFSS pending/requested by SLP       Pertinent Labs     Results from last 7 days   Lab Units 06/06/23  0943 06/05/23  0305 06/04/23  0319 06/03/23  0834 06/02/23  0433 06/01/23  0308   SODIUM mmol/L 132* 134* 134* 141   < > 134*  134*   POTASSIUM mmol/L 4.1 4.1 3.5 4.0   < > 4.4  4.4   CHLORIDE mmol/L 94* 94* 96* 100   < > 99  99   CO2  mmol/L 25.8 27.7 26.0 31.1*   < > 22.1  24.3   BUN mg/dL 22 22 27* 29*   < > 29*  28*   CREATININE mg/dL 1.31* 1.31* 1.18 1.33*   < > 0.97  0.96   CALCIUM mg/dL 9.5 9.1 8.2* 9.0   < > 8.6  8.6   BILIRUBIN mg/dL 0.5  --   --  0.6  --  0.4   ALK PHOS U/L 91  --   --  82  --  65   ALT (SGPT) U/L 15  --   --  16  --  14   AST (SGOT) U/L 12  --   --  14  --  23   GLUCOSE mg/dL 218* 219* 174* 187*   < > 199*  200*    < > = values in this interval not displayed.     Results from last 7 days   Lab Units 06/07/23  0706 06/06/23  0943 06/03/23  0300 06/02/23  0433   PHOSPHORUS mg/dL  --   --   --  4.7*   HEMOGLOBIN g/dL 10.7*  --    < > 10.8*   HEMATOCRIT % 32.8*  --    < > 33.1*   WBC 10*3/mm3 16.29*  --    < > 11.81*   ALBUMIN g/dL  --  3.7   < > 3.2*    < > = values in this interval not displayed.     Results from last 7 days   Lab Units 06/07/23  0706 06/06/23  0338 06/05/23  0305 06/04/23  1410 06/04/23  0320 06/04/23  0319 06/03/23  1947 06/03/23  0834 06/03/23  0300 06/02/23  2242 06/01/23  2318 06/01/23  1555   INR   --   --   --   --   --   --   --   --   --   --   --  1.01   APTT seconds  --   --   --  30.3  --  151.6* 43.4* 80.2*  --  46.4*   < > 24.2   PLATELETS 10*3/mm3 325 294 264  --  239  --   --   --  270  --    < >  --     < > = values in this interval not displayed.     COVID19   Date Value Ref Range Status   05/23/2023 Not Detected Not Detected - Ref. Range Final     Lab Results   Component Value Date    HGBA1C 9.20 (H) 05/23/2023        Medications           Scheduled Medications apixaban, 5 mg, Oral, Q12H  aspirin, 81 mg, Oral, Daily  atorvastatin, 40 mg, Oral, Nightly  calcium 500 mg vitamin D 5 mcg (200 UT), 1 tablet, Oral, Daily  empagliflozin, 25 mg, Oral, Daily  guaifenesin, 400 mg, Oral, Q8H  insulin glargine, 10 Units, Subcutaneous, Nightly  insulin glargine, 40 Units, Subcutaneous, Daily  insulin lispro, 3-14 Units, Subcutaneous, Q6H  ipratropium-albuterol, 3 mL, Nebulization, 4x Daily -  RT  metoprolol succinate XL, 25 mg, Oral, Q24H  pantoprazole, 40 mg, Oral, Q AM  potassium chloride, 20 mEq, Nasogastric, Daily  torsemide, 40 mg, Oral, Daily       Infusions     PRN Medications   acetaminophen **OR** [DISCONTINUED] acetaminophen **OR** [DISCONTINUED] acetaminophen    hydrocortisone-bacitracin-zinc oxide-nystatin    traMADol        Nutrition Diagnosis        Nutrition Dx Problem  Problem: Swallowing Difficulty  Etiology: Medical Diagnosis - acute stroke  Signs/Symptoms: SLP/Swallow Evaluation    Comment: Working with SLP. Currently on regular/nectar liquids     Nutrition Dx Problem Problem: Altered Nutrition Related to Labs  Etiology: Medical Diagnosis - DM2  Signs/Symptoms: Biochemical    Comment: Aic 9%     NUTRITION INTERVENTION / PLAN OF CARE  Goals        Intervention Goal(s) Maintain nutrition status, Improved nutrition related labs, Tolerate PO , Advance diet, and Appropriate weight loss     Nutrition Intervention        RD Action Interview for preferences, Encourage intake, Follow Tx Progress, and Care plan reviewed     Prescription        Diet Prescription    Supplement Prescription    Snack Prescription    EN Prescription    New Prescription Ordered? Continue same per protocol, No changes at this time     Monitor/Evaluation        Monitor Per protocol   Discharge Needs Pending clinical course   Education Will instruct as appropriate       RD to follow up per protocol.    Electronically signed by:  Cher Weaver RD  06/07/23 11:57 EDT

## 2023-06-07 NOTE — PROGRESS NOTES
LOS: 1 day   Patient Care Team:  Liza Oconnell III, NP-C as PCP - General (Family Medicine)  Corey Lao Jr., MD (Inactive) as Consulting Physician (Urology)      KRIS TAYLOR  1950      ADMITTING DIAGNOSIS:  Stroke  Valvular heart disease status post repair/replacement      Subjective     Expected sternotomy discomfort but not pain.  Some baseline shortness of air.  Tolerating therapies.  Right hand control improved  Hopes to advance the consistency of the liquids-currently nectar thick    Objective     Vitals:    06/07/23 0711   BP:    Pulse: 69   Resp:    Temp:    SpO2: 99%       PHYSICAL EXAM:   MENTAL STATUS -  AWAKE / ALERT  HEENT- NCAT, PUPILS EQUALLY ROUND, SCLERAE ANICTERIC, CONJUNCTIVAE PINK, OP MOIST, NO JVD, EARS UNREMARKABLE EXTERNALLY  LUNGS - CTA, NO WHEEZES, RALES OR RHONCHI  Sternotomy incision -dressed  HEART-sinus with occasional ectopy  ABD - NORMOACTIVE BOWEL SOUNDS, SOFT, NT. NO HEPATOSPLENOMEGALY APPRECIATED  EXT - NO EDEMA OR CYANOSIS  NEURO -oriented to person place time and situation.  Extraocular movements intact.  Visual fields intact.    Speech is fluent.    MOTOR EXAM - RUE/RLE 5/5. LUE/LLE 5/5.        MEDICATIONS  Scheduled Meds:apixaban, 5 mg, Oral, Q12H  aspirin, 81 mg, Oral, Daily  atorvastatin, 40 mg, Oral, Nightly  calcium 500 mg vitamin D 5 mcg (200 UT), 1 tablet, Oral, Daily  empagliflozin, 25 mg, Oral, Daily  guaifenesin, 400 mg, Oral, Q8H  insulin glargine, 40 Units, Subcutaneous, Daily  insulin lispro, 3-14 Units, Subcutaneous, Q6H  ipratropium-albuterol, 3 mL, Nebulization, 4x Daily - RT  metoprolol succinate XL, 25 mg, Oral, Q24H  pantoprazole, 40 mg, Oral, Q AM  potassium chloride, 20 mEq, Nasogastric, Daily  torsemide, 40 mg, Oral, Daily      Continuous Infusions:   PRN Meds:.  acetaminophen **OR** acetaminophen **OR** acetaminophen    traMADol      RESULTS  Glucose   Date/Time Value Ref Range Status   06/07/2023 0801 267 (H) 70 -  130 mg/dL Final     Comment:     Meter: [id number] : [id number] Denis Carbone NA   06/07/2023 0250 168 (H) 70 - 130 mg/dL Final     Comment:     Meter: [id number] : [id number] Justyna Hargrove RN   06/06/2023 1655 161 (H) 70 - 130 mg/dL Final     Comment:     Meter: [id number] : [id number] Roe Herman NA   06/06/2023 1217 253 (H) 70 - 130 mg/dL Final     Comment:     Meter: [id number] : [id number] Mark Beaver  NA   06/06/2023 1110 238 (H) 70 - 130 mg/dL Final     Comment:     Meter: [id number] : [id number] Garcia Nathalia PCA   06/06/2023 0707 209 (H) 70 - 130 mg/dL Final     Comment:     Meter: [id number] : [id number] Hand Deepali MARY   06/06/2023 0045 204 (H) 70 - 130 mg/dL Final     Comment:     Meter: [id number] : [id number] Yamilkazina Duran MARY   06/05/2023 1801 307 (H) 70 - 130 mg/dL Final     Comment:     Meter: [id number] : [id number] Brian BARRETO     Results from last 7 days   Lab Units 06/07/23  0706 06/06/23  0338 06/05/23  0305   WBC 10*3/mm3 16.29* 15.85* 13.53*   HEMOGLOBIN g/dL 10.7* 10.9* 9.9*   HEMATOCRIT % 32.8* 32.7* 30.0*   PLATELETS 10*3/mm3 325 294 264     Results from last 7 days   Lab Units 06/06/23  0943 06/05/23  0305 06/04/23  0319 06/03/23  0834 06/02/23  0433 06/01/23  0308   SODIUM mmol/L 132* 134* 134* 141   < > 134*  134*   POTASSIUM mmol/L 4.1 4.1 3.5 4.0   < > 4.4  4.4   CHLORIDE mmol/L 94* 94* 96* 100   < > 99  99   CO2 mmol/L 25.8 27.7 26.0 31.1*   < > 22.1  24.3   BUN mg/dL 22 22 27* 29*   < > 29*  28*   CREATININE mg/dL 1.31* 1.31* 1.18 1.33*   < > 0.97  0.96   CALCIUM mg/dL 9.5 9.1 8.2* 9.0   < > 8.6  8.6   BILIRUBIN mg/dL 0.5  --   --  0.6  --  0.4   ALK PHOS U/L 91  --   --  82  --  65   ALT (SGPT) U/L 15  --   --  16  --  14   AST (SGOT) U/L 12  --   --  14  --  23   GLUCOSE mg/dL 218* 219* 174* 187*   < > 199*  200*    < > = values in this interval not displayed.      Latest Reference Range & Units 05/23/23 14:29    Hemoglobin A1C 4.80 - 5.60 % 9.20 (H)   Total Cholesterol 0 - 200 mg/dL 155   HDL Cholesterol 40 - 60 mg/dL 41   LDL Cholesterol  0 - 100 mg/dL 81   Triglycerides 0 - 150 mg/dL 197 (H)   (H): Data is abnormally high     Latest Reference Range & Units 05/31/23 08:03   25 Hydroxy, Vitamin D 30.0 - 100.0 ng/ml 20.0 (L)   (L): Data is abnormally low  ASSESSMENT and PLAN    Acute ischemic left MCA stroke    Status post CVA-Scattered  punctate restricted diffusion acute infarct in the bilateral frontal and   left parietal occipital cortices.        Impaired Cognition/impaired mobility/impaired self-care     Aphasia-resolved     Right hand apraxia-resolved     Encephalopathy-improving     Dysphagia/nutrition-healthy heart 2-3 g sodium, consistent carb, no mixed consistency, regular texture, nectar thick liquid  June 7-videofluoroscopic swallow study planned tomorrow     Stroke prophylaxis-Eliquis/aspirin/atorvastatin     History of severe mitral regurgitation and annular dilatation, moderate to severe tricuspid regurgitation-  status post May 24, 2023 - 1. Mitral valve repair with a 28 mm physio II ring annuloplasty with residual mild to moderate MR  2.  Mitral valve replacement with a 29 mm Schwartz II porcine prosthesis  3.  Tricuspid valve repair with 28 mm physio tricuspid ring  4.  Left atrial appendage endocardial closure     Atrial flutter-status post cardioversion June 2, 2023 anticoagulation with Eliquis  Metoprolol     Volume status-torsemide     Severe pulmonary hypertension  Obstructive sleep apnea     Interstitial lung disease status post COVID-19 infection-steroid taper per pulmonology  DuoNebs 4 times a day  Chronic steroid use with cushingoid syndrome  Prednisone 5 mg every other day     Leukocytosis-reactive/steroid administration     Postoperative anemia     Sgeeiveshnxdjggk-gpafnwnxhwd-hjdhvjkq     Chronic kidney disease stage III-baseline creatinine 1.2     Diabetes mellitus type  2-Jardiance/Lantus/sliding scale insulin-uncontrolled  June 7-on Jardiance and Lantus 40 units daily.  Elevated blood glucose.  Received 21 units on sliding scale insulin yesterday.  Increase Lantus by adding 10 units nightly.     BPH/urinary retention-history of TURP     Pain management-tramadol-/Tylenol Dk report reviewed              Now admit for comprehensive acute inpatient rehabilitation .  This would be an interdisciplinary program with physical therapy 1 hour,  occupational therapy 1 hour, and speech therapy 1 hour, 5 days a week.  Rehabilitation nursing for carryover, monitoring of neurologic, cardiac, pulmonary, diabetes   status, bowel and bladder, and skin  Ongoing physician follow-up.  Weekly team conferences.  Goals are to achieve a level of contact-guard with  mobility and self-care and improved activity tolerance.   Rehabilitation prognosis fair.  Medical prognosis defer to cardiology.  Estimated length of stay is approximately 2 weeks, but is only an estimation.   The patient's functional status and clinical status is unchanged from preadmission assessment and the patient continues appropriate for acute inpatient rehabilitation.  Goal is for home with outpatient cardiac rehab   therapies.  Barrier to discharge: Impaired cognition mobility self-care- work on follow, executive function, conditioning, transfers, progressive ambulation, ADLs to overcome.           Tu Silver MD      During rounds, used appropriate personal protective equipment including mask and gloves.  Additional gown if indicated.  Mask used was standard procedure mask. Appropriate PPE was worn during the entire visit.  Hand hygiene was completed before and after.

## 2023-06-07 NOTE — THERAPY EVALUATION
Inpatient Rehabilitation - Speech Language Pathology Initial Evaluation    [facility]     Patient Name: Vernon Solorzano  : 1950  MRN: [id number]    Today's Date: 2023                   Admit Date: 2023       Visit Dx:      ICD-10-CM ICD-9-CM   1. Impaired functional mobility, balance, gait, and endurance  Z74.09 V49.89       Patient Active Problem List   Diagnosis    DM (diabetes mellitus), type 2    Mixed hyperlipidemia    Mild intermittent asthma without complication    New onset of congestive heart failure    Nonrheumatic mitral valve regurgitation    Chest pain    Essential hypertension    Mitral valve disease    Acute ischemic left MCA stroke       Past Medical History:   Diagnosis Date    Allergic rhinitis     Arthritis     BPH (benign prostatic hyperplasia)     Diabetes mellitus     TYPE 2    Foraminal stenosis of cervical region     C5-C6    GERD (gastroesophageal reflux disease)     Hemorrhoids     History of urinary retention     S/P TURP    Hyperlipidemia     Macular degeneration     PING (obstructive sleep apnea) 2016    MILD, SEES DR. AMARILIS MORGAN       Past Surgical History:   Procedure Laterality Date    CARDIAC CATHETERIZATION N/A 2023    Procedure: Right Heart Cath;  Surgeon: Croey Gaffney MD;  Location: [facility] CATH INVASIVE LOCATION;  Service: Cardiology;  Laterality: N/A;    CARDIAC CATHETERIZATION N/A 2023    Procedure: Left Heart Cath;  Surgeon: Corey Gaffney MD;  Location: [facility] CATH INVASIVE LOCATION;  Service: Cardiology;  Laterality: N/A;    CARDIAC CATHETERIZATION N/A 2023    Procedure: Left ventriculography;  Surgeon: Corey Gaffney MD;  Location: [facility] CATH INVASIVE LOCATION;  Service: Cardiology;  Laterality: N/A;    CARDIAC CATHETERIZATION N/A 2023    Procedure: Coronary angiography;  Surgeon: Corey Gaffney MD;  Location: [facility] CATH INVASIVE LOCATION;  Service: Cardiology;  Laterality: N/A;    COLONOSCOPY N/A     WNL  PER PT, NO RECORDS,     COLONOSCOPY N/A 04/07/2009    DR. GORGE BENAVIDEZ AT [place]    MITRAL VALVE REPAIR/REPLACEMENT N/A 5/24/2023    Procedure: MITRAL VALVE REPAIR/REPLACEMENT TRICUSPID VALVE REPAIR/REPLACEMENT TRANSESOPHAGEAL ECHOCARDIOGRAM WITH ANESTHESIA;  Surgeon: George Frey MD;  Location: [facility];  Service: Cardiothoracic;  Laterality: N/A;    PROSTATE SURGERY N/A 11/12/2010    TURP, DR. BRIGHT COLLAZO AT [facility]    SKIN TAG REMOVAL N/A 12/20/2017    ANAL SKIN TAG X2, PERFORMED IN OFFICE, DR. LISA ORANTES    TURP / TRANSURETHRAL INCISION / DRAINAGE PROSTATE  2010    Dr. Goodrich       SLP Recommendation and Plan    SLP Diagnosis: moderate-severe, cognitive-linguistic disorder (06/07/23 1000)        Rehab Potential/Prognosis: good (06/07/23 1000)     Anticipated Discharge Disposition (SLP): home with assist (06/07/23 1000)        Predicted Duration Therapy Intervention (Days): until discharge (06/07/23 1000)  SLC Diagnostic Follow-Up Needed: cognitive-linguistic, higher-level cognitive-linguistic (06/07/23 1000)                            SLP EVALUATION (last 72 hours)       SLP SLC Evaluation       Row Name 06/07/23 1000                   Communication Assessment/Intervention    Document Type therapy note (daily note)  -TH        Subjective Information no complaints  -TH        Patient/Family/Caregiver Comments/Observations Patients wife was present for sesison  -TH        Patient Effort fair  -TH           General Information    Patient Profile Reviewed yes  -TH        Pertinent History Of Current Problem Scattered punctate restricted diffusion acute infarct in the bilateral   frontal and left parietal occipital cortices; s/p MVR, TVR, and left atrial  appendage closure  on 5/24 with post op complications  -TH           Pain Scale: Numbers Pre/Post-Treatment    Pretreatment Pain Rating 0/10 - no pain  -TH        Posttreatment Pain Rating 0/10 - no pain  -TH           Cognitive Assessment  Intervention- SLP    Cognitive Function (Cognition) severe impairment  -TH        Memory (Cognitive) severe impairment  -TH        Attention (Cognitive) moderate impairment;severe impairment  -TH           Standardized Tests    Cognitive/Memory Tests CLQT: Cognitive Linguistic Quick Test  -TH           CLQT (The Cognitive Linguistic Quick Test)    CLQT Comments Patient seen for cognitive linguistic evaluation on this date. Attempted to complete the CLQT, however, unable to complete 2/2 time constraints. He scored the following with completed portions; personal facts 8/8; symbol cancelation 4/12; confrontational naming 10/10; clock drawing 6/13; story retelling 3/10; symbol trails 2/10. Patient presents with difficulty with sustained and focused attention and impulsivity. He also presents with decreased insight into deficits. At this time recommend patient continues speech therapy services targeting ongoing diagnostic therapy targeting cognitive linguistic skills as well as dysphagia therapy.  -TH           SLP Evaluation Clinical Impressions    SLP Diagnosis moderate-severe;cognitive-linguistic disorder  -TH        Rehab Potential/Prognosis good  -TH        SLC Criteria for Skilled Therapy Interventions Met yes  -TH           Recommendations    Therapy Frequency (SLP SLC) 5 days per week  -TH        Predicted Duration Therapy Intervention (Days) until discharge  -TH        Anticipated Discharge Disposition (SLP) home with assist  -TH        [facility] [place] Needed cognitive-linguistic;higher-level cognitive-linguistic  -TH           Communication Treatment Objective and Progress Goals (SLP)    [facility] [place] Patient will demonstrate functional cognitive-linguistic skills for return to discharge environment  -TH        [facility] [place] Additional Goals (Group)  -TH        Diagnostic Treatment Objective Diagnostic Treatment Objectives (Group)  -TH        Cognitive Linguistic Treatment Objectives Cognitive Linguistic  Treatment Objectives (Group)  -TH           Patient will demonstrate functional cognitive-linguistic skills for return to discharge environment    [place] with moderate cues  -TH        Time frame by discharge  -TH           Diagnostic Treatment Objectives    Diagnostic Treatment Objective Selection Diagnostic Treatment Objectives  -TH           SLP Diagnostic Treatment     Patient will participate in further assessment in the following areas higher-level cognitive-linguistic;cognitive-linguistic  visual scanning tasks  -TH        Time Frame (Diagnostic) 2 weeks  -TH           Cognitive Linguistic Treatment Objectives    Attention Selection attention, SLP goal 1  -TH        Orientation Selection orientation, SLP goal 1  -TH        Memory Skills Selection memory skills, SLP goal 1  -TH           Attention Goal 1 (SLP)    Improve Attention by Goal 1 (SLP) complete selective attention task;complete sustained attention task;70%;with moderate cues (50-74%)  -TH        Time Frame (Attention Goal 1, SLP) by discharge  -TH           Orientation Goal 1 (SLP)    Improve Orientation Through Goal 1 (SLP) demonstrating orientation to day;demonstrating orientation to month;demonstrating orientation to year;demonstrating orientation to place;demonstrating orientation to disease/impairment;use environmental aids to assist with orientation;80%;with minimal cues (75-90%)  -TH        Time Frame (Orientation Goal 1, SLP) by discharge  -TH           Memory Skills Goal 1 (SLP)    Improve Memory Skills Through Goal 1 (SLP) recalling related word lists immediately;recall details of the day;use memory strategies;use written schedule;use external memory aid;listen to a paragraph and answer questions;visual memory task;repeat list in sequential order;select a word from a list by exclusion;recalling unrelated word lists immediately;recalling related word lists with an imposed delay;recalling unrelated word lists with an imposed  delay;recall details from a word list;read a paragraph and answer questions;with moderate cues (50-74%);70%  -TH        Time Frame (Memory Skills Goal 1, SLP) by discharge  -TH           SLC STGs    Additional Goal Selection additional goal, SLP goal 1  -TH                  User Key  (r) = Recorded By, (t) = Taken By, (c) = Cosigned By      Initials Name Effective Dates    TH Paty Humphries 06/16/21 -                        EDUCATION    The patient has been educated in the following areas:       Cognitive Impairment.             SLP GOALS       Row Name 06/07/23 1000             Patient will demonstrate functional cognitive-linguistic skills for return to discharge environment    [place] with moderate cues  -TH      Time frame by discharge  -TH         Attention Goal 1 (SLP)    Improve Attention by Goal 1 (SLP) complete selective attention task;complete sustained attention task;70%;with moderate cues (50-74%)  -TH      Time Frame (Attention Goal 1, SLP) by discharge  -TH         Orientation Goal 1 (SLP)    Improve Orientation Through Goal 1 (SLP) demonstrating orientation to day;demonstrating orientation to month;demonstrating orientation to year;demonstrating orientation to place;demonstrating orientation to disease/impairment;use environmental aids to assist with orientation;80%;with minimal cues (75-90%)  -TH      Time Frame (Orientation Goal 1, SLP) by discharge  -TH         Memory Skills Goal 1 (SLP)    Improve Memory Skills Through Goal 1 (SLP) recalling related word lists immediately;recall details of the day;use memory strategies;use written schedule;use external memory aid;listen to a paragraph and answer questions;visual memory task;repeat list in sequential order;select a word from a list by exclusion;recalling unrelated word lists immediately;recalling related word lists with an imposed delay;recalling unrelated word lists with an imposed delay;recall details from a word list;read a paragraph and  answer questions;with moderate cues (50-74%);70%  -TH      Time Frame (Memory Skills Goal 1, SLP) by discharge  -TH                User Key  (r) = Recorded By, (t) = Taken By, (c) = Cosigned By      Initials Name Provider Type    Paty Hernandez Speech and Language Pathologist                                Time Calculation:        Time Calculation- SLP       Row Name 06/07/23 1227 06/07/23 1225          Time Calculation- SLP    SLP Start Time 1130  -TH 0900  -TH     SLP Stop Time 1200  -TH 0950  -TH     SLP Time Calculation (min) 30 min  -TH 50 min  -TH               User Key  (r) = Recorded By, (t) = Taken By, (c) = Cosigned By      Initials Name Provider Type    Paty Hernandez Speech and Language Pathologist                      Therapy Charges for Today       Code Description Service Date Service Provider Modifiers Qty    [id number]  ST EVAL SPEECH SOUND PRODUCTION 4 6/7/2023 Paty Humphries GN 1    [id number]  ST EVAL ORAL PHARYNG SWALLOW 2 6/7/2023 Paty Humphries 1                             Paty Humphries  6/7/2023

## 2023-06-07 NOTE — PLAN OF CARE
Problem: Rehabilitation (IRF) Plan of Care  Goal: Plan of Care Review  Outcome: Ongoing, Progressing  Flowsheets (Taken 6/6/2023 2309)  Plan of Care Reviewed With:   patient   spouse  Outcome Evaluation: pt A&Ox4, able to answer all orientation questions but appears with forgetifulness, reoriented to environment and sorroundings, education on safety completed, all safety precautions in place, will continue to monitor pt.  Goal: Patient-Specific Goal (Individualized)  Outcome: Ongoing, Progressing  Goal: Absence of New-Onset Illness or Injury  Outcome: Ongoing, Progressing  Intervention: Prevent Fall and Fall Injury  Recent Flowsheet Documentation  Taken 6/6/2023 2210 by Delvin Jansen RN  Safety Promotion/Fall Prevention: safety round/check completed  Taken 6/6/2023 1948 by Delvin Jansen RN  Safety Promotion/Fall Prevention:   activity supervised   assistive device/personal items within reach   clutter free environment maintained   fall prevention program maintained   lighting adjusted   nonskid shoes/slippers when out of bed   muscle strengthening facilitated   room organization consistent   safety round/check completed  Intervention: Prevent Infection  Recent Flowsheet Documentation  Taken 6/6/2023 2210 by Delvin Jansen RN  Infection Prevention:   environmental surveillance performed   hand hygiene promoted   single patient room provided  Taken 6/6/2023 1948 by Delvin Jansen RN  Infection Prevention:   cohorting utilized   environmental surveillance performed   hand hygiene promoted   single patient room provided  Intervention: Prevent VTE (Venous Thromboembolism)  Recent Flowsheet Documentation  Taken 6/6/2023 1948 by Delvin Jansen RN  VTE Prevention/Management:   bilateral   compression stockings on  Goal: Optimal Comfort and Wellbeing  Outcome: Ongoing, Progressing  Goal: Home and Community Transition Plan Established  Outcome: Ongoing, Progressing     Problem: Fall Injury Risk  Goal:  Absence of Fall and Fall-Related Injury  Outcome: Ongoing, Progressing  Intervention: Identify and Manage Contributors  Recent Flowsheet Documentation  Taken 6/6/2023 1948 by Delvin Jansen RN  Medication Review/Management: medications reviewed  Self-Care Promotion: independence encouraged  Intervention: Promote Injury-Free Environment  Recent Flowsheet Documentation  Taken 6/6/2023 2210 by Delvin Jansen RN  Safety Promotion/Fall Prevention: safety round/check completed  Taken 6/6/2023 1948 by Delvin Jansen RN  Safety Promotion/Fall Prevention:   activity supervised   assistive device/personal items within reach   clutter free environment maintained   fall prevention program maintained   lighting adjusted   nonskid shoes/slippers when out of bed   muscle strengthening facilitated   room organization consistent   safety round/check completed     Problem: Skin Injury Risk Increased  Goal: Skin Health and Integrity  Outcome: Ongoing, Progressing  Intervention: Promote and Optimize Oral Intake  Recent Flowsheet Documentation  Taken 6/6/2023 1948 by Delvin Jansen RN  Oral Nutrition Promotion:   physical activity promoted   social interaction promoted  Intervention: Optimize Skin Protection  Recent Flowsheet Documentation  Taken 6/6/2023 2210 by Delvin Jansen RN  Head of Bed (HOB) Positioning: HOB elevated  Taken 6/6/2023 1948 by Delvin Jansen RN  Pressure Reduction Techniques:   weight shift assistance provided   frequent weight shift encouraged  Head of Bed (HOB) Positioning: HOB lowered  Pressure Reduction Devices: positioning supports utilized  Skin Protection: adhesive use limited   Goal Outcome Evaluation:  Plan of Care Reviewed With: patient, spouse           Outcome Evaluation: pt A&Ox4, able to answer all orientation questions but appears with forgetifulness, reoriented to environment and sorroundings, education on safety completed, all safety precautions in place, will continue to monitor  pt.

## 2023-06-08 ENCOUNTER — APPOINTMENT (OUTPATIENT)
Dept: GENERAL RADIOLOGY | Facility: HOSPITAL | Age: 73
DRG: 066 | End: 2023-06-08
Payer: MEDICARE

## 2023-06-08 LAB
GLUCOSE BLDC GLUCOMTR-MCNC: 144 MG/DL (ref 70–130)
GLUCOSE BLDC GLUCOMTR-MCNC: 156 MG/DL (ref 70–130)
GLUCOSE BLDC GLUCOMTR-MCNC: 195 MG/DL (ref 70–130)
GLUCOSE BLDC GLUCOMTR-MCNC: 216 MG/DL (ref 70–130)
GLUCOSE BLDC GLUCOMTR-MCNC: 269 MG/DL (ref 70–130)

## 2023-06-08 PROCEDURE — 82948 REAGENT STRIP/BLOOD GLUCOSE: CPT

## 2023-06-08 PROCEDURE — 94799 UNLISTED PULMONARY SVC/PX: CPT

## 2023-06-08 PROCEDURE — 63710000001 INSULIN LISPRO (HUMAN) PER 5 UNITS: Performed by: PHYSICAL MEDICINE & REHABILITATION

## 2023-06-08 PROCEDURE — 74230 X-RAY XM SWLNG FUNCJ C+: CPT

## 2023-06-08 PROCEDURE — 92611 MOTION FLUOROSCOPY/SWALLOW: CPT

## 2023-06-08 PROCEDURE — 97110 THERAPEUTIC EXERCISES: CPT

## 2023-06-08 PROCEDURE — 97530 THERAPEUTIC ACTIVITIES: CPT

## 2023-06-08 PROCEDURE — 97116 GAIT TRAINING THERAPY: CPT

## 2023-06-08 PROCEDURE — 94664 DEMO&/EVAL PT USE INHALER: CPT

## 2023-06-08 PROCEDURE — 97535 SELF CARE MNGMENT TRAINING: CPT

## 2023-06-08 PROCEDURE — 94761 N-INVAS EAR/PLS OXIMETRY MLT: CPT

## 2023-06-08 RX ORDER — INSULIN DEGLUDEC INJECTION 100 U/ML
50 INJECTION, SOLUTION SUBCUTANEOUS DAILY
Status: ON HOLD | COMMUNITY

## 2023-06-08 RX ORDER — ATORVASTATIN CALCIUM 10 MG/1
10 TABLET, FILM COATED ORAL NIGHTLY
Status: ON HOLD | COMMUNITY

## 2023-06-08 RX ORDER — OMEPRAZOLE 40 MG/1
40 CAPSULE, DELAYED RELEASE ORAL NIGHTLY
Status: ON HOLD | COMMUNITY

## 2023-06-08 RX ORDER — MONTELUKAST SODIUM 10 MG/1
10 TABLET ORAL NIGHTLY
Status: ON HOLD | COMMUNITY

## 2023-06-08 RX ORDER — INSULIN LISPRO 100 [IU]/ML
3-14 INJECTION, SOLUTION INTRAVENOUS; SUBCUTANEOUS
Status: DISPENSED | OUTPATIENT
Start: 2023-06-08

## 2023-06-08 RX ORDER — SITAGLIPTIN AND METFORMIN HYDROCHLORIDE 1000; 100 MG/1; MG/1
1 TABLET, FILM COATED, EXTENDED RELEASE ORAL DAILY
Status: ON HOLD | COMMUNITY

## 2023-06-08 RX ORDER — BLOOD SUGAR DIAGNOSTIC
1 STRIP MISCELLANEOUS AS NEEDED
Status: ON HOLD | COMMUNITY

## 2023-06-08 RX ADMIN — POTASSIUM CHLORIDE 20 MEQ: 1.5 POWDER, FOR SOLUTION ORAL at 08:36

## 2023-06-08 RX ADMIN — IPRATROPIUM BROMIDE AND ALBUTEROL SULFATE 3 ML: 2.5; .5 SOLUTION RESPIRATORY (INHALATION) at 15:51

## 2023-06-08 RX ADMIN — IPRATROPIUM BROMIDE AND ALBUTEROL SULFATE 3 ML: 2.5; .5 SOLUTION RESPIRATORY (INHALATION) at 07:50

## 2023-06-08 RX ADMIN — ASPIRIN 81 MG: 81 TABLET, CHEWABLE ORAL at 08:28

## 2023-06-08 RX ADMIN — EMPAGLIFLOZIN 25 MG: 25 TABLET, FILM COATED ORAL at 08:27

## 2023-06-08 RX ADMIN — TORSEMIDE 40 MG: 20 TABLET ORAL at 08:36

## 2023-06-08 RX ADMIN — GUAIFENESIN 400 MG: 200 SOLUTION ORAL at 05:12

## 2023-06-08 RX ADMIN — INSULIN LISPRO 3 UNITS: 100 INJECTION, SOLUTION INTRAVENOUS; SUBCUTANEOUS at 00:28

## 2023-06-08 RX ADMIN — BARIUM SULFATE 4 ML: 980 POWDER, FOR SUSPENSION ORAL at 12:42

## 2023-06-08 RX ADMIN — BARIUM SULFATE 55 ML: 0.81 POWDER, FOR SUSPENSION ORAL at 12:42

## 2023-06-08 RX ADMIN — IPRATROPIUM BROMIDE AND ALBUTEROL SULFATE 3 ML: 2.5; .5 SOLUTION RESPIRATORY (INHALATION) at 11:45

## 2023-06-08 RX ADMIN — INSULIN LISPRO 5 UNITS: 100 INJECTION, SOLUTION INTRAVENOUS; SUBCUTANEOUS at 11:50

## 2023-06-08 RX ADMIN — INSULIN LISPRO 3 UNITS: 100 INJECTION, SOLUTION INTRAVENOUS; SUBCUTANEOUS at 18:09

## 2023-06-08 RX ADMIN — INSULIN GLARGINE-YFGN 40 UNITS: 100 INJECTION, SOLUTION SUBCUTANEOUS at 08:27

## 2023-06-08 RX ADMIN — BARIUM SULFATE 1 TEASPOON(S): 0.6 CREAM ORAL at 12:42

## 2023-06-08 RX ADMIN — APIXABAN 5 MG: 5 TABLET, FILM COATED ORAL at 20:02

## 2023-06-08 RX ADMIN — PANTOPRAZOLE SODIUM 40 MG: 40 TABLET, DELAYED RELEASE ORAL at 05:12

## 2023-06-08 RX ADMIN — METOPROLOL SUCCINATE 25 MG: 25 TABLET, EXTENDED RELEASE ORAL at 08:36

## 2023-06-08 RX ADMIN — TRAMADOL HYDROCHLORIDE 50 MG: 50 TABLET, COATED ORAL at 19:59

## 2023-06-08 RX ADMIN — CALCIUM CARBONATE-VITAMIN D TAB 500 MG-200 UNIT 1 TABLET: 500-200 TAB at 08:36

## 2023-06-08 RX ADMIN — INSULIN GLARGINE-YFGN 10 UNITS: 100 INJECTION, SOLUTION SUBCUTANEOUS at 20:14

## 2023-06-08 RX ADMIN — BARIUM SULFATE 50 ML: 400 SUSPENSION ORAL at 12:42

## 2023-06-08 RX ADMIN — APIXABAN 5 MG: 5 TABLET, FILM COATED ORAL at 08:27

## 2023-06-08 RX ADMIN — ATORVASTATIN CALCIUM 40 MG: 20 TABLET, FILM COATED ORAL at 20:03

## 2023-06-08 RX ADMIN — IPRATROPIUM BROMIDE AND ALBUTEROL SULFATE 3 ML: 2.5; .5 SOLUTION RESPIRATORY (INHALATION) at 19:18

## 2023-06-08 RX ADMIN — ACETAMINOPHEN 650 MG: 325 TABLET, FILM COATED ORAL at 05:18

## 2023-06-08 NOTE — PROGRESS NOTES
LOS: 2 days   Patient Care Team:  Liza Oconnell III, NP-C as PCP - General (Family Medicine)  Corey Lao Jr., MD (Inactive) as Consulting Physician (Urology)      KRIS TAYLOR  1950      ADMITTING DIAGNOSIS:  Stroke  Valvular heart disease status post repair/replacement      Subjective     Had some hypotension yesterday afternoon.  Has had some fatigue.  Comfortable with his breathing.  Expected sternotomy discomfort.  Swallow study today.  Shows some improvement.  He had aspiration of thin liquids by spoon but not by cup.  Plans to advance to regular solid, no mixed consistency, thin liquids by cup.  No straws.      Objective     Vitals:    06/08/23 1145   BP:    Pulse: 100   Resp: 16   Temp:    SpO2: 97%       PHYSICAL EXAM:   MENTAL STATUS -  AWAKE / ALERT  HEENT- NCAT, PUPILS EQUALLY ROUND, SCLERAE ANICTERIC, CONJUNCTIVAE PINK, OP MOIST, NO JVD, EARS UNREMARKABLE EXTERNALLY  LUNGS - CTA, NO WHEEZES, RALES OR RHONCHI  Sternotomy incision -dressed  HEART-sinus with occasional ectopy  ABD - NORMOACTIVE BOWEL SOUNDS, SOFT, NT. NO HEPATOSPLENOMEGALY APPRECIATED  EXT - NO EDEMA OR CYANOSIS  NEURO -oriented to person place time and situation.  Extraocular movements intact.  Visual fields intact.    Speech is fluent.    MOTOR EXAM - RUE/RLE 5/5. LUE/LLE 5/5.        MEDICATIONS  Scheduled Meds:apixaban, 5 mg, Oral, Q12H  aspirin, 81 mg, Oral, Daily  atorvastatin, 40 mg, Oral, Nightly  calcium 500 mg vitamin D 5 mcg (200 UT), 1 tablet, Oral, Daily  empagliflozin, 25 mg, Oral, Daily  guaifenesin, 400 mg, Oral, Q8H  insulin glargine, 10 Units, Subcutaneous, Nightly  insulin glargine, 40 Units, Subcutaneous, Daily  insulin lispro, 3-14 Units, Subcutaneous, TID With Meals  ipratropium-albuterol, 3 mL, Nebulization, 4x Daily - RT  metoprolol succinate XL, 25 mg, Oral, Q24H  pantoprazole, 40 mg, Oral, Q AM  potassium chloride, 20 mEq, Nasogastric, Daily  torsemide, 40 mg, Oral,  Daily      Continuous Infusions:   PRN Meds:.  acetaminophen **OR** [DISCONTINUED] acetaminophen **OR** [DISCONTINUED] acetaminophen    hydrocortisone-bacitracin-zinc oxide-nystatin    traMADol      RESULTS  Glucose   Date/Time Value Ref Range Status   06/08/2023 1128 216 (H) 70 - 130 mg/dL Final     Comment:     Meter: [id number] : [id number] Denis Carbone NA   06/08/2023 0709 144 (H) 70 - 130 mg/dL Final     Comment:     Meter: [id number] : [id number] Denis Ndiayey NA   06/08/2023 0020 156 (H) 70 - 130 mg/dL Final     Comment:     Meter: [id number] : [id number] Nahid Michelle RN   06/07/2023 2003 356 (H) 70 - 130 mg/dL Final     Comment:     Meter: [id number] : [id number] Tito BARRETO   06/07/2023 1615 166 (H) 70 - 130 mg/dL Final     Comment:     Meter: [id number] : [id number] Denis Ndiayey NA   06/07/2023 1115 331 (H) 70 - 130 mg/dL Final     Comment:     Meter: [id number] : [id number] Denis Ndiayey NA   06/07/2023 0801 267 (H) 70 - 130 mg/dL Final     Comment:     Meter: [id number] : [id number] Denis Ndiayey NA   06/07/2023 0250 168 (H) 70 - 130 mg/dL Final     Comment:     Meter: [id number] : [id number] Justyna Hargrove RN     Results from last 7 days   Lab Units 06/07/23  0706 06/06/23  0338 06/05/23  0305   WBC 10*3/mm3 16.29* 15.85* 13.53*   HEMOGLOBIN g/dL 10.7* 10.9* 9.9*   HEMATOCRIT % 32.8* 32.7* 30.0*   PLATELETS 10*3/mm3 325 294 264       Results from last 7 days   Lab Units 06/06/23  0943 06/05/23  0305 06/04/23  0319 06/03/23  0834   SODIUM mmol/L 132* 134* 134* 141   POTASSIUM mmol/L 4.1 4.1 3.5 4.0   CHLORIDE mmol/L 94* 94* 96* 100   CO2 mmol/L 25.8 27.7 26.0 31.1*   BUN mg/dL 22 22 27* 29*   CREATININE mg/dL 1.31* 1.31* 1.18 1.33*   CALCIUM mg/dL 9.5 9.1 8.2* 9.0   BILIRUBIN mg/dL 0.5  --   --  0.6   ALK PHOS U/L 91  --   --  82   ALT (SGPT) U/L 15  --   --  16   AST (SGOT) U/L 12  --   --  14   GLUCOSE mg/dL 218* 219* 174* 187*        Latest Reference Range &  Units 05/23/23 14:29   Hemoglobin A1C 4.80 - 5.60 % 9.20 (H)   Total Cholesterol 0 - 200 mg/dL 155   HDL Cholesterol 40 - 60 mg/dL 41   LDL Cholesterol  0 - 100 mg/dL 81   Triglycerides 0 - 150 mg/dL 197 (H)   (H): Data is abnormally high     Latest Reference Range & Units 05/31/23 08:03   25 Hydroxy, Vitamin D 30.0 - 100.0 ng/ml 20.0 (L)   (L): Data is abnormally low  ASSESSMENT and PLAN    Acute ischemic left MCA stroke    Status post CVA-Scattered  punctate restricted diffusion acute infarct in the bilateral frontal and   left parietal occipital cortices.        Impaired Cognition/impaired mobility/impaired self-care     Aphasia-resolved     Right hand apraxia-resolved     Encephalopathy-improving     Dysphagia/nutrition-healthy heart 2-3 g sodium, consistent carb, no mixed consistency, regular texture, nectar thick liquid  June 7-videofluoroscopic swallow study planned tomorrow  June 8-swallow study today-advance to regular solid, no mixed consistency, thin liquids by cup.  No straws.     Stroke prophylaxis-Eliquis/aspirin/atorvastatin     History of severe mitral regurgitation and annular dilatation, moderate to severe tricuspid regurgitation-  status post May 24, 2023 - 1. Mitral valve repair with a 28 mm physio II ring annuloplasty with residual mild to moderate MR  2.  Mitral valve replacement with a 29 mm Schwartz II porcine prosthesis  3.  Tricuspid valve repair with 28 mm physio tricuspid ring  4.  Left atrial appendage endocardial closure     Atrial flutter-status post cardioversion June 2, 2023 anticoagulation with Eliquis  Metoprolol     Volume status-torsemide     Severe pulmonary hypertension  Obstructive sleep apnea     Interstitial lung disease status post COVID-19 infection-steroid taper per pulmonology  DuoNebs 4 times a day  Chronic steroid use with cushingoid syndrome  Prednisone 5 mg every other day     Leukocytosis-reactive/steroid administration     Postoperative anemia      Iwphbocbxqurwdjt-xeiczgstsqy-xtwjlune     Chronic kidney disease stage III-baseline creatinine 1.2     Diabetes mellitus type 2-Jardiance/Lantus/sliding scale insulin-uncontrolled  June 7-on Jardiance and Lantus 40 units daily.  Elevated blood glucose.  Received 21 units on sliding scale insulin yesterday.  Increase Lantus by adding 10 units nightly.     BPH/urinary retention-history of TURP     Pain management-tramadol-/Tylenol Dk report reviewed      TEAM CONF - JUNE 8 - ENDURANCE POOR. BED MOD. TRANSFERS MOD. GAIT 25 FEET MOD ASSIST RW. SHOWER TRANSFERS MOD A.   BATH MOD ASSIST. LBD MAX. UBD MIN. TOILETING MAX.   Patient is currently on nectar liquids VFSS TODAY.   BIMS SUMMARY SCORE: 9 Moderately impaired   DIFFICULTY WITH SUSTAINED AND FOCAL ATTENTION.  CONTINENT / INCONTINENT  ELOS - 2 -3 WEEKS        Now admit for comprehensive acute inpatient rehabilitation .  This would be an interdisciplinary program with physical therapy 1 hour,  occupational therapy 1 hour, and speech therapy 1 hour, 5 days a week.  Rehabilitation nursing for carryover, monitoring of neurologic, cardiac, pulmonary, diabetes   status, bowel and bladder, and skin  Ongoing physician follow-up.  Weekly team conferences.  Goals are to achieve a level of contact-guard with  mobility and self-care and improved activity tolerance.   Rehabilitation prognosis fair.  Medical prognosis defer to cardiology.  Estimated length of stay is approximately 2 weeks, but is only an estimation.   The patient's functional status and clinical status is unchanged from preadmission assessment and the patient continues appropriate for acute inpatient rehabilitation.  Goal is for home with outpatient cardiac rehab   therapies.  Barrier to discharge: Impaired cognition mobility self-care- work on follow, executive function, conditioning, transfers, progressive ambulation, ADLs to overcome.           Tu Silver MD      During rounds, used appropriate  personal protective equipment including mask and gloves.  Additional gown if indicated.  Mask used was standard procedure mask. Appropriate PPE was worn during the entire visit.  Hand hygiene was completed before and after.

## 2023-06-08 NOTE — PROGRESS NOTES
Case Management  Inpatient Rehabilitation Team Conference    Conference Date/Time: 6/8/2023 7:59:52 AM    Team Conference Attendees:  Dr. Tu Montalvo, Pharmacist  Génesis Narayan, TRUDY Levin PT  Latisha Morris, SLP  Clarissa Marie, CTRS  Angelika Najera, RN  Mraiah Stroud, RN   Nazanin Espinoza, OT  Génesis Beaulieu, Dietician  Regi Stokes, Psychologist    Demographics            Age: 72Y            Gender: Male    Admission Date: 6/6/2023 4:36:00 PM  Rehabilitation Diagnosis:  No anup stroke  Past Medical History: Past Medical History:  DiagnosisDate  ?Allergic rhinitis  ?Arthritis  ?BPH (benign prostatic hyperplasia)  ?Diabetes mellitus   TYPE 2  ?Foraminal stenosis of cervical region   C5-C6  ?GERD (gastroesophageal reflux disease)  ?Hemorrhoids  ?History of urinary [id number]   S/P TURP  ?Hyperlipidemia  ?Macular degeneration  ?PING (obstructive sleep apnea)01/07/2016   MILD, SEES DR. AMARILIS MORGAN        Surgical History  Past Surgical History:  ProcedureLateralityDate  ?CARDIAC CATHETERIZATIONN/A5/11/2023   Procedure: Right Heart Cath;  Surgeon: Corey Gaffney MD;  Location:  [facility];  Service: Cardiology;  Laterality: N/A;  ?CARDIAC CATHETERIZATIONN/A5/11/2023   Procedure: Left Heart Cath;  Surgeon: Corey Gaffney MD;  Location:  [facility];  Service: Cardiology;  Laterality: N/A;  ?CARDIAC CATHETERIZATIONN/A5/11/2023   Procedure: Left ventriculography;  Surgeon: Corey Gaffney MD;  Location:   [facility];  Service: Cardiology;  Laterality: N/A;  ?CARDIAC CATHETERIZATIONN/A5/11/2023   Procedure: Coronary angiography;  Surgeon: Corey Gaffney MD;  Location:   [facility];  Service: Cardiology;  Laterality: N/A;  ?COLONOSCOPYN/A   WNL PER PT, NO RECORDS,   ?COLONOSCOPYN/A04/07/2009   DR. GORGE BENAVIDEZ AT [facility]  ?MITRAL VALVE REPAIR/REPLACEMENTN/A5/24/2023   Procedure: MITRAL VALVE  REPAIR/REPLACEMENT TRICUSPID VALVE REPAIR/REPLACEMENT  TRANSESOPHAGEAL ECHOCARDIOGRAM WITH ANESTHESIA;  Surgeon: George Frey MD;  Location: [facility];  Service: Cardiothoracic;  Laterality: N/A;  ?PROSTATE SURGERYN/A11/12/2010   TURP, DR. BRIGHT COLLAZO AT [facility]  ?SKIN TAG REMOVALN/A12/20/2017   ANAL SKIN TAG X2, PERFORMED IN OFFICE, DR. LISA ORANTES  ?TURP / TRANSURETHRAL INCISION / DRAINAGE PROSTATE 2010   Dr. Goodrich      Plan of Care  Anticipated Discharge Date/Estimated Length of Stay: 2 weeks  Anticipated Discharge Destination: Community discharge with assistance  Discharge Plan : Patient plans to d/c home with his wife who can provide 24 hour  assist.  Medical Necessity Expected Level Rationale: Goals are to achieve a level of  contact-guard with  mobility and self-care and improved activity tolerance.  Rehabilitation prognosis fair.  Medical prognosis defer to cardiology.  Intensity and Duration: an average of 3 hours/5 days per week  Medical Supervision and 24 Hour Rehab Nursing: x  Physical Therapy: x  PT Intensity/Duration: 1 hour / day, 5 days / week, for approximately 2 weeks  Occupational Therapy: x  OT Intensity/Duration: 1 hour / day, 5 days / week, for approximately 2 weeks  Speech and Language Therapy: x  SLP Intensity/Duration: 1 hour / day, 5 days / week, for approximately 2 weeks  Social Work: x  Therapeutic Recreation: x  Psychology: x  Registered Dietician: x  Updated (if changes indicated)    Anticipated Discharge Date/Estimated Length of Stay:   ELOS: 2-3 weeks    Based on the patient's medical and functional status, their prognosis and  expected level of functional improvement is: Goals are to achieve a level of  contact-guard with  mobility and self-care and improved activity tolerance.  Rehabilitation prognosis fair.  Medical prognosis defer to cardiology.      Interdisciplinary Problem/Goals/Status    All Rehab Problems:  Sphincter Control    [RN] Bladder  Management(Active)  Current Status(06/08/2023): Patient may be continent or incontinent of urine.  Weekly Goal(06/15/2023): Patient will be continent 50 %  Discharge Goal: Patient will be continent    [RN] Bowel Management(Active)  Current Status(06/08/2023): Virgenn is continent of bowels  Weekly Goal(06/14/2023): patient is continent of bowels  Discharge Goal: Patient is continent of bowels        Safety    [RN] Potential for Injury(Active)  Current Status(06/08/2023): Patient is at increased risk for falls/injury r/t  recent surgery and stroke.  Weekly Goal(06/15/2023): Patient will use the call light for assistance  Discharge Goal: Patient/ family will be aware of risk of fall and safety in the  home setting        Psychosocial    [RN] Coping/Adjustment(Active)  Current Status(06/08/2023): Patient has a supportive family  Weekly Goal(06/14/2023): Patient will express concerns regarding current status  Discharge Goal: Patient will demonstrate healthy coping strategies        Swallow Function    [ST] Swallowing(Active)  Current Status(06/07/2023): Patient is currently on nectar liquids  Weekly Goal(06/08/2023): Patient will tolerate thin liquids without s/sx of  aspiration  Discharge Goal: Patient will tolerate the least restrictive diet without s/sx of  aspiration        Self Care    [OT] Bathing(Active)  Current Status(06/07/2023): Mod A  Weekly Goal(06/14/2023): Min A  Discharge Goal: CGA    [OT] Dressing (Lower)(Active)  Current Status(06/07/2023): Max A  Weekly Goal(06/14/2023): Mod A  Discharge Goal: Min A    [OT] Dressing (Upper)(Active)  Current Status(06/07/2023): Min A  Weekly Goal(06/14/2023): Set up  Discharge Goal: Indep    [OT] Grooming(Active)  Current Status(06/07/2023): Min A  Weekly Goal(06/14/2023): Set up  Discharge Goal: Mod I    [OT] Toileting(Active)  Current Status(06/07/2023): Max A  Weekly Goal(06/14/2023): Mod A  Discharge Goal: Min A        Mobility    [OT] Toilet  Transfers(Active)  Current Status(06/07/2023): Anticipate Mod A  Weekly Goal(06/14/2023): Min A  Discharge Goal: CGA    [OT] Tub/Shower Transfers(Active)  Current Status(06/07/2023): Mod A  Weekly Goal(06/14/2023): Min A  Discharge Goal: CGA    [PT] Stairs(Active)  Current Status(06/07/2023): TBA  Weekly Goal(06/22/2023): curb, Min, rwx  Discharge Goal: 12, rail, Min/CG    [PT] Walk(Active)  Current Status(06/07/2023): 25, Mod + one to follow, rwx  Weekly Goal(06/22/2023): 50, Min, rwx  Discharge Goal: 150, CG, rwx    [PT] Bed/Chair/Wheelchair(Active)  Current Status(06/07/2023): Mod  Weekly Goal(06/22/2023): Min  Discharge Goal: CG    [PT] Bed Mobility(Active)  Current Status(06/07/2023): Mod  Weekly Goal(06/22/2023): Min  Discharge Goal: Sup        Comments: 6/8 Mod A bed mob, Mod A transfers, amb 25' Mod A + 1 to follow w/  rwx.  Mod A shower transfer.  Low endurance.  Max A toileting.  Mod A bathing,  Max A LBD.  VFSS today.  Cont / Incont urine, continent of bowel.    Signed by: Angelika Najera RN    Physician CoSigned By: Tu Silver 06/08/2023 08:16:54

## 2023-06-08 NOTE — PROGRESS NOTES
Inpatient Rehabilitation Plan of Care Note    Plan of Care  Care Plan Reviewed - No updates at this time.    Sphincter Control    Performed Intervention(s)  Offer toileting/ timed voids  Monitor I&O      Safety    Performed Intervention(s)  Safety monitoring during functional activities  Environmental set- upto reduce risk      Psychosocial    Performed Intervention(s)  Allow patient to verbalize needs and concerns    Signed by: Tala Sharma RN

## 2023-06-08 NOTE — MBS/VFSS/FEES
Inpatient Rehabilitation - Speech Language Pathology   Swallow Re-Evaluation [facility]     Patient Name: Vernon Solorzano  : 1950  MRN: [id number]  Today's Date: 2023               Admit Date: 2023    Visit Dx:     ICD-10-CM ICD-9-CM   1. Impaired functional mobility, balance, gait, and endurance  Z74.09 V49.89     Patient Active Problem List   Diagnosis    DM (diabetes mellitus), type 2    Mixed hyperlipidemia    Mild intermittent asthma without complication    New onset of congestive heart failure    Nonrheumatic mitral valve regurgitation    Chest pain    Essential hypertension    Mitral valve disease    Acute ischemic left MCA stroke     Past Medical History:   Diagnosis Date    Allergic rhinitis     Arthritis     BPH (benign prostatic hyperplasia)     Diabetes mellitus     TYPE 2    Foraminal stenosis of cervical region     C5-C6    GERD (gastroesophageal reflux disease)     Hemorrhoids     History of urinary retention     S/P TURP    Hyperlipidemia     Macular degeneration     PING (obstructive sleep apnea) 2016    MILD, SEES DR. AMARILIS MORGAN     Past Surgical History:   Procedure Laterality Date    CARDIAC CATHETERIZATION N/A 2023    Procedure: Right Heart Cath;  Surgeon: Corey Gaffney MD;  Location: [facility] CATH INVASIVE LOCATION;  Service: Cardiology;  Laterality: N/A;    CARDIAC CATHETERIZATION N/A 2023    Procedure: Left Heart Cath;  Surgeon: Corey Gaffney MD;  Location: [facility] CATH INVASIVE LOCATION;  Service: Cardiology;  Laterality: N/A;    CARDIAC CATHETERIZATION N/A 2023    Procedure: Left ventriculography;  Surgeon: Corey Gaffney MD;  Location[facility] IN[facility]AS[facility] LOC[facility];  Service: Cardiology;  Laterality: N/A;    CARDIAC CATHETERIZATION N/A 2023    Procedure: Coronary angiography;  Surgeon: Corey Gaffney MD;  Location[facility] LOC[facility];  Service: Cardiology;  Laterality: N/A;    COLONOSCOPY N/A     WNL PER PT, NO  RECORDS,     COLONOSCOPY N/A 04/07/2009    DR. GORGE BENAVIDEZ AT [place]    MITRAL VALVE REPAIR/REPLACEMENT N/A 5/24/2023    Procedure: MITRAL VALVE REPAIR/REPLACEMENT TRICUSPID VALVE REPAIR/REPLACEMENT TRANSESOPHAGEAL ECHOCARDIOGRAM WITH ANESTHESIA;  Surgeon: George Frey MD;  Location: [facility];  Service: Cardiothoracic;  Laterality: N/A;    PROSTATE SURGERY N/A 11/12/2010    TURP, DR. BRIGHT COLLAZO AT [facility]    SKIN TAG REMOVAL N/A 12/20/2017    ANAL SKIN TAG X2, PERFORMED IN OFFICE, DR. LISA ORANTES    TURP / TRANSURETHRAL INCISION / DRAINAGE PROSTATE  2010    Dr. Goodrich       SLP Recommendation and Plan  SLP Swallowing Diagnosis: mild, oral dysphagia, pharyngeal dysphagia (06/08/23 1230)  SLP Diet Recommendation: regular textures, no mixed consistencies, thin liquids (06/08/23 1230)  Recommended Precautions and Strategies: upright posture during/after eating, small bites of food and sips of liquid, no straw (06/08/23 1230)  SLP Rec. for Method of Medication Administration: meds whole, with puree (06/08/23 1230)     Monitor for Signs of Aspiration: yes, notify SLP if any concerns, cough, throat clearing (06/08/23 1230)  Recommended Diagnostics: reassess via clinical swallow evaluation (06/08/23 1230)  Swallow Criteria for Skilled Therapeutic Interventions Met: demonstrates skilled criteria (06/08/23 1230)  Anticipated Discharge Disposition (SLP): home with /7 care (06/08/23 1230)  Rehab Potential/Prognosis, Swallowing: good, to achieve stated therapy goals (06/08/23 1230)  Therapy Frequency (Swallow): 5 days per week (06/08/23 1230)  Predicted Duration Therapy Intervention (Days): until discharge (06/08/23 1230)                                        Plan of Care Reviewed With: patient  Outcome Evaluation: VFSS completed. Patient presents with mild oropharyngeal dysphagia which is an improvement from the previous swallow study completed on 5/31. No penetration/aspiration noted with nectar by  "spoon, cup, or straw (single sips). Transient penetration during the swallow with initial trial of thin by spoon. Progressed to deep penetration with trace aspiration with further trials of thin by spoon. Sensed by patient with throat clear, successful in expectorating material from airway. Exhibited penetration with thin by spoon with chin tuck strategy. No penetration or aspiration noted with thin by cup. Timely anterior posterior transit with puree. Prespill to the pyriforms with mechanical soft solids. Exhibited nontransient penetration of thin liquid portion of mixed consistency. Prolonged mastication with regular solids with trace valleculae residue post swallow. No penetration or aspiration with regular solids. No penetration or aspiration with nectar by straw as liquid wash or with final trials of thin by cup.     Recommend regular solids (NO MIXED) with thin liquids via cup only. Medication whole with applesauce. Precautions: upright for meals, NO STRAW, small bites/sips, alternate solids with liquids, slow rate. ST to follow. If patient exhibits overt s/sx of aspiration (ie, cough or throat clear) with thin liquids, downgrade to nectar thick liquids.      SWALLOW EVALUATION (last 72 hours)       SLP Adult Swallow Evaluation       Row Name 06/08/23 1230 06/07/23 1300 06/07/23 1200             Rehab Evaluation    Document Type re-evaluation  -SR -- evaluation  -TH      Subjective Information no complaints  -SR -- no complaints  -TH      Patient Observations alert;cooperative;agree to therapy  -SR -- --      Patient Effort adequate  -SR -- fair  -TH      Symptoms Noted During/After Treatment none  -SR -- --         General Information    Patient Profile Reviewed yes  -SR -- yes  -TH      Pertinent History Of Current Problem 72 y.o male; S/p CVA; \"Scattered punctate restricted diffusion acute infarct in the bilateral frontal and   left parietal occipital cortices.\" S/p MVR, TVR, and left atrial appendage " closure on 5/24 with post op complications  -SR -- Stroke/Valvular heart disease status post repair/replacement  -TH      Current Method of Nutrition nectar/syrup-thick liquids;regular textures  -SR -- nectar/syrup-thick liquids;regular textures  -TH      Precautions/Limitations, Vision WFL;for purposes of eval  -SR -- WFL;for purposes of eval  -TH      Precautions/Limitations, Hearing WFL  -SR -- WFL  -TH      Prior Level of Function-Communication WFL  -SR -- WFL  -TH      Prior Level of Function-Swallowing no diet consistency restrictions  -SR -- no diet consistency restrictions  -TH      Plans/Goals Discussed with patient;spouse/S.O.;agreed upon  -SR -- patient;spouse/S.O.;agreed upon  -TH      Barriers to Rehab none identified  -SR -- none identified  -TH      Patient's Goals for Discharge -- -- return to regular diet  regular liquids  -TH      Family Goals for Discharge -- -- patient able to return to regular diet;patient able to return to all previous activities/roles  -TH         Pain Scale: Numbers Pre/Post-Treatment    Pretreatment Pain Rating 0/10 - no pain  -SR -- 0/10 - no pain  -TH      Posttreatment Pain Rating 0/10 - no pain  -SR -- 0/10 - no pain  -TH         Oral Motor Structure and Function    Dentition Assessment natural, present and adequate  -SR -- natural, present and adequate  -TH      Secretion Management WNL/WFL  -SR -- WNL/WFL  -TH      Mucosal Quality moist, healthy  -SR -- moist, healthy  -TH         Oral Musculature and Cranial Nerve Assessment    Oral Motor General Assessment -- -- WFL  -TH         General Eating/Swallowing Observations    Respiratory Support Currently in Use room air  -SR -- room air  -TH         Clinical Swallow Eval    Clinical Swallow Evaluation Summary -- --  -TH Patient seen at bedside on this date for swallow evaluation upon admission to inpatient rehab. He was first seen on 5/25 for bedside swallow evaluation and 5/31 for VFSS. It was recommended he was on honey  liquids and mech soft following VFSS, but since then he has been upgraded at bedside to nectar/regular solids. Patient and wife requesting if patient could have diet coke with ice upon SLP arrival during initial session; as they report he was able to have this on the 2nd floor per RN. Discussed results of VFSS and aspiration precautions; it does appear patient tolerated thins via tsp without aspiration/penetration therefore gave patient okay to have diet coke with tsp with supervision only at this time. During session, completed trials of items off of patients lunch tray including nectar liquids, cooked broccoli, crackers, and meatloaf. He presented with wet vocal quality upon SLP arrival but able to clear with cues to cough (thin liquids observed sitting at bedside within patient reach, during meal) Discussed with RN and patient that he is to only have ice chips/water in between meals with oral care and diet coke via tsp in between meals with supervision. He appeared to tolerate nectar liquids without s/sx of aspiration initially, however, given approximately 5 minute delay without anything to eat or drink he presented with throat clearing. Patients wife reports he usually clears his throat at home and he reports “I’m clearing out mucus.” Anterior spillage observed x1 with liquids. He tolerated regular solids without overt s/sx of aspiration as well. Prolonged but adequate mastication noted w/ solids. No oral residue observed with solids or soft solids. At this time recommend continuation of nectar liquids/regular with no mixed consistencies with upright position for all PO, slow rate, small bites/sips, assistance with set up, thin liquids via tsp only in between meals, and ice chips okay between meals with oral care.  -TH         MBS/VFSS    Utensils Used spoon;cup;straw  -SR -- --      Consistencies Trialed regular textures;mechanical ground textures;mixed consistency;pureed;thin liquids;nectar/syrup-thick  liquids  -SR -- --         MBS/VFSS Interpretation    VFSS Summary VFSS completed with radiologist present, Dr. Montalvo. Patient presents with mild oropharyngeal dysphagia which is an improvement from the previous swallow study completed on 5/31. No penetration/aspiration noted with nectar by spoon, cup, or straw (single sips). Transient penetration during the swallow with initial trial of thin by spoon. Progressed to deep penetration with trace aspiration with further trials of thin by spoon. Sensed by patient with throat clear, successful in expectorating material from airway. Exhibited penetration with thin by spoon with chin tuck strategy. No penetration or aspiration noted with thin by cup. Timely anterior posterior transit with puree. Prespill to the pyriforms with mechanical soft solids. Exhibited nontransient penetration of thin liquid portion of mixed consistency. Prolonged mastication with regular solids with trace valleculae residue post swallow. No penetration or aspiration with regular solids. No penetration or aspiration with nectar by straw as liquid wash or with final trials of thin by cup.  -SR -- --         SLP Communication to Radiology    Summary Statement VFSS completed with radiologist present, Dr. Montalvo. Patient presents with mild oropharyngeal dysphagia which is an improvement from the previous swallow study completed on 5/31. No penetration/aspiration noted with nectar by spoon, cup, or straw (single sips). Transient penetration during the swallow with initial trial of thin by spoon. Progressed to deep penetration with trace aspiration with further trials of thin by spoon. Sensed by patient with throat clear, successful in expectorating material from airway. Exhibited penetration with thin by spoon with chin tuck strategy. No penetration or aspiration noted with thin by cup. Timely anterior posterior transit with puree. Prespill to the pyriforms with mechanical soft solids. Exhibited  nontransient penetration of thin liquid portion of mixed consistency. Prolonged mastication with regular solids with trace valleculae residue post swallow. No penetration or aspiration with regular solids. No penetration or aspiration with nectar by straw as liquid wash or with final trials of thin by cup.  -SR -- --         SLP Evaluation Clinical Impression    SLP Swallowing Diagnosis mild;oral dysphagia;pharyngeal dysphagia  -SR --  -TH moderate;pharyngeal dysphagia;oral dysphagia  -TH      Functional Impact risk of aspiration/pneumonia  -SR --  -TH risk of aspiration/pneumonia  -TH      Rehab Potential/Prognosis, Swallowing good, to achieve stated therapy goals  -SR --  -TH good, to achieve stated therapy goals  -TH      Swallow Criteria for Skilled Therapeutic Interventions Met demonstrates skilled criteria  -SR --  -TH demonstrates skilled criteria  -TH         Recommendations    Therapy Frequency (Swallow) 5 days per week  -SR --  -TH 5 days per week  -TH      Predicted Duration Therapy Intervention (Days) until discharge  -SR --  -TH until discharge  -TH      SLP Diet Recommendation regular textures;no mixed consistencies;thin liquids  -SR --  -TH regular textures;no mixed consistencies;nectar thick liquids;other (see comments)  water/ice w/ oral care- may have thins via tsp only w/ supervision  -TH      Recommended Diagnostics reassess via clinical swallow evaluation  -SR --  -TH VFSS (MBS)  -TH      Recommended Precautions and Strategies upright posture during/after eating;small bites of food and sips of liquid;no straw  -SR --  -TH upright posture during/after eating;small bites of food and sips of liquid;general aspiration precautions  -TH      Oral Care Recommendations Oral Care before breakfast, after meals and PRN  -SR -- Oral Care before breakfast, after meals and PRN;Before ice/water  -TH      SLP Rec. for Method of Medication Administration meds whole;with puree  -SR -- meds whole;meds crushed;with  puree;as tolerated  -TH      Monitor for Signs of Aspiration yes;notify SLP if any concerns;cough;throat clearing  -SR -- yes;notify SLP if any concerns  -TH      Anticipated Discharge Disposition (SLP) home with 24/7 care  -SR -- home with assist  -TH         Swallow Goals (SLP)    Swallow LTGs -- -- Patient will demonstrate progress toward functional swallow for  -TH      Swallow STGs -- -- pharyngeal strengthening exercise goal selection (SLP);diet tolerance goal selection (SLP)  -TH      Diet Tolerance Goal Selection (SLP) -- -- Other (see comments)  Patient will participate in further diagnostic assessment  -TH      Pharyngeal Strengthening Exercise Goal Selection (SLP) pharyngeal strengthening exercise, SLP goal 1  -SR -- other (see comments)  -TH         (LTG) Patient will demonstrate progress toward functional swallow for    Diet Texture (Demonstrate progress toward functional swallow) regular textures  -SR -- regular textures  -TH      Liquid viscosity (Demonstrate progress toward functional swallow) thin liquids  -SR -- thin liquids  -TH      [place] (Demonstrate progress towards functional swallow) independently (over 90% accuracy)  -SR -- independently (over 90% accuracy)  -TH      Time Frame (Demonstrate progress toward functional swallow) by discharge  -SR -- by discharge  -TH      Progress/Outcomes (Demonstrate progress toward functional swallow) new goal  -SR -- --         (STG) Pharyngeal Strengthening Exercise Goal 1 (SLP)    Activity (Pharyngeal Strengthening Goal 1, SLP) increase timing;increase closure at entrance to airway/closure of airway at glottis  -SR -- increase closure at entrance to airway/closure of airway at glottis  -TH      Increase Timing hard effortful swallow;[place]  -SR -- --      Increase Closure at Entrance to Airway/Closure of Airway at Glottis hard effortful swallow;chin tuck against resistance (CTAR);sarita  -SR -- hard effortful swallow;chin tuck against  resistance (CTAR);sarita  -TH      [place]/Accuracy (Pharyngeal Strengthening Goal 1, SLP) with minimal cues (75-90% accuracy)  -SR -- with minimal cues (75-90% accuracy)  -TH      Time Frame (Pharyngeal Strengthening Goal 1, SLP) -- -- by discharge  -TH      Progress/Outcomes (Pharyngeal Strengthening Goal 1, SLP) new goal  -SR -- --                User Key  (r) = Recorded By, (t) = Taken By, (c) = Cosigned By      Initials Name Effective Dates    TH Paty Humphries 06/16/21 -     SR Latisha Morris CCC-SLP 11/10/22 -                     EDUCATION  The patient has been educated in the following areas:   Dysphagia (Swallowing Impairment) Oral Care/Hydration.        SLP GOALS       Row Name 06/08/23 1230 06/07/23 1200 06/07/23 1000       (LTG) Patient will demonstrate progress toward functional swallow for    Diet Texture (Demonstrate progress toward functional swallow) regular textures  -SR regular textures  -TH --    Liquid viscosity (Demonstrate progress toward functional swallow) thin liquids  -SR thin liquids  -TH --    [place] (Demonstrate progress towards functional swallow) independently (over 90% accuracy)  -SR independently (over 90% accuracy)  -TH --    Time Frame (Demonstrate progress toward functional swallow) by discharge  -SR by discharge  -TH --    Progress/Outcomes (Demonstrate progress toward functional swallow) new goal  -SR -- --       (STG) Pharyngeal Strengthening Exercise Goal 1 (SLP)    Activity (Pharyngeal Strengthening Goal 1, SLP) increase timing;increase closure at entrance to airway/closure of airway at glottis  -SR increase closure at entrance to airway/closure of airway at glottis  -TH --    Increase Timing hard effortful swallow;[place]elsohn  -SR -- --    Increase Closure at Entrance to Airway/Closure of Airway at Glottis hard effortful swallow;chin tuck against resistance (CTAR);sarita  -SR hard effortful swallow;chin tuck against resistance (CTAR);sarita  -TH --     [place]/Accuracy (Pharyngeal Strengthening Goal 1, SLP) with minimal cues (75-90% accuracy)  -SR with minimal cues (75-90% accuracy)  - --    Time Frame (Pharyngeal Strengthening Goal 1, SLP) -- by discharge  -TH --    Progress/Outcomes (Pharyngeal Strengthening Goal 1, SLP) new goal  -SR -- --       Patient will demonstrate functional cognitive-linguistic skills for return to discharge environment    [place] -- -- with moderate cues  -TH    Time frame -- -- by discharge  -TH       Attention Goal 1 (SLP)    Improve Attention by Goal 1 (SLP) -- -- complete selective attention task;complete sustained attention task;70%;with moderate cues (50-74%)  -TH    Time Frame (Attention Goal 1, SLP) -- -- by discharge  -TH       Orientation Goal 1 (SLP)    Improve Orientation Through Goal 1 (SLP) -- -- demonstrating orientation to day;demonstrating orientation to month;demonstrating orientation to year;demonstrating orientation to place;demonstrating orientation to disease/impairment;use environmental aids to assist with orientation;80%;with minimal cues (75-90%)  -TH    Time Frame (Orientation Goal 1, SLP) -- -- by discharge  -TH       Memory Skills Goal 1 (SLP)    Improve Memory Skills Through Goal 1 (SLP) -- -- recalling related word lists immediately;recall details of the day;use memory strategies;use written schedule;use external memory aid;listen to a paragraph and answer questions;visual memory task;repeat list in sequential order;select a word from a list by exclusion;recalling unrelated word lists immediately;recalling related word lists with an imposed delay;recalling unrelated word lists with an imposed delay;recall details from a word list;read a paragraph and answer questions;with moderate cues (50-74%);70%  -TH    Time Frame (Memory Skills Goal 1, SLP) -- -- by discharge  -TH              User Key  (r) = Recorded By, (t) = Taken By, (c) = Cosigned By      Initials Name Provider Type    CHRISTIAN Humphries  Paty Speech and Language Pathologist     Latisha Morris CCC-SLP Speech and Language Pathologist                       Time Calculation:    Time Calculation- SLP       Row Name 06/08/23 1521 06/08/23 1300          Time Calculation- SLP    SLP Start Time 1430  -SR 1220  -SR     SLP Stop Time 1505  -SR 1300  -SR     SLP Time Calculation (min) 35 min  -SR 40 min  -SR               User Key  (r) = Recorded By, (t) = Taken By, (c) = Cosigned By      Initials Name Provider Type     Latisha Morris CCC-SLP Speech and Language Pathologist                    Therapy Charges for Today       Code Description Service Date Service Provider Modifiers Qty    [id number] HC ST MOTION FLUORO EVAL SWALLOW 5 6/8/2023 Latisha Morris CCC-SLP GN 1                 JENNIFFER Hobbs  6/8/2023

## 2023-06-08 NOTE — PLAN OF CARE
Goal Outcome Evaluation:  Plan of Care Reviewed With: patient           Outcome Evaluation: VFSS completed. Patient presents with mild oropharyngeal dysphagia which is an improvement from the previous swallow study completed on 5/31. No penetration/aspiration noted with nectar by spoon, cup, or straw (single sips). Transient penetration during the swallow with initial trial of thin by spoon. Progressed to deep penetration with trace aspiration with further trials of thin by spoon. Sensed by patient with throat clear, successful in expectorating material from airway. Exhibited penetration with thin by spoon with chin tuck strategy. No penetration or aspiration noted with thin by cup. Timely anterior posterior transit with puree. Prespill to the pyriforms with mechanical soft solids. Exhibited nontransient penetration of thin liquid portion of mixed consistency. Prolonged mastication with regular solids with trace valleculae residue post swallow. No penetration or aspiration with regular solids. No penetration or aspiration with nectar by straw as liquid wash or with final trials of thin by cup.         Recommend regular solids (NO MIXED) with thin liquids via cup only. Medication whole with applesauce. Precautions: upright for meals, NO STRAW, small bites/sips, alternate solids with liquids, slow rate. ST to follow. If patient exhibits overt s/sx of aspiration (ie, cough or throat clear) with thin liquids, downgrade to nectar thick liquids.

## 2023-06-08 NOTE — PLAN OF CARE
Goal Outcome Evaluation:         Pt is A/Ox4, calm/cooperative, OOB x 1 stand/pivot using rwx. Meds taken whole w puree. Pt c/o incisional pain; prn Tylenol given x1. Random bladder scan completed x1. NIHSS score of 4.

## 2023-06-08 NOTE — PROGRESS NOTES
Recreational Therapy Note    Patient Name: Vernon Solorzano   MRN: [id number]    Therapeutic Recreation Eval and Treat (last 12 hours)       Therapeutic Recreation Eval & Treat       Row Name 06/08/23 0907       Lifestyle/Recreational History/Interest    Current Living Situation with family  -      Row Name 06/08/23 0907       Recreational History and Interests    How Important is Recreation to You? somewhat important  -    Current Hobbies/Interests family functions;outdoor activities  -    Outdoor Activities fishing  -      Row Name 06/08/23 0907       Coping/Wellbeing    Current State of Wellbeing calm  -    Factors Impacting Current State of Wellbeing no significant issues  -      Row Name 06/08/23 0907       Therapeutic Recreation Participation    Orientation to Therapeutic Recreation patient  -      Row Name 06/08/23 0907       Therapeutic Recreation Assessment/Plan    Recreation Therapy Goals/Objectives strength and endurance;cognitive skills  -    Recreation Plan structured leisure participation  -              User Key  (r) = Recorded By, (t) = Taken By, (c) = Cosigned By      Initials Name Provider Type    SS Clarissa Marie, CTRS Recreational Therapist                      SALMA Wilkes  6/8/2023

## 2023-06-08 NOTE — PROGRESS NOTES
TEAM CONF - JUNE 8 - ENDURANCE POOR. BED MOD. TRANSFERS MOD. GAIT 25 FEET MOD ASSIST RW. SHOWER TRANSFERS MOD A.   BATH MOD ASSIST. LBD MAX. UBD MIN. TOILETING MAX.   Patient is currently on nectar liquids VFSS TODAY.   BIMS SUMMARY SCORE: 9 Moderately impaired   DIFFICULTY WITH SUSTAINED AND FOCAL ATTENTION.  CONTINENT / INCONTINENT  ELOS - 2 -3 WEEKS

## 2023-06-08 NOTE — PROGRESS NOTES
Reviewed goals and progress with patient after team conference. Patient has been very tired and has little endurance during therapy. He has been struggling to stay awake when doing therapy, at times. He is motivated to participate in therapies and to get stronger. Patient's ELOS is 2-3 weeks. SW will continue to follow for d/c needs.

## 2023-06-08 NOTE — PLAN OF CARE
Goal Outcome Evaluation:  Plan of Care Reviewed With: patient           Outcome Evaluation: I picked up this patient at 2:30pm. Patient is alert and oriented, assist x1, and medication with applesauce. Diet upgrade after video swallow evaluation. He is an acuucheck AC/HS, kiarra hose worn, and incison care completed by previous nurse. Turn q2, bladder scan every 12 hours, and tolerated all therapy. VSS, no pain, and labs scheduled for Friday.

## 2023-06-08 NOTE — PROGRESS NOTES
Inpatient Rehabilitation Plan of Care Note    Plan of Care  Care Plan Reviewed - No updates at this time.    Sphincter Control    [RN] Bladder Management(Active)  Current Status(06/08/2023): Patient may be continent or incontinent of urine.  Weekly Goal(06/15/2023): Patient will be continent 50 %  Discharge Goal: Patient will be continent    [RN] Bowel Management(Active)  Current Status(06/08/2023): Patietn is continent of bowels  Weekly Goal(06/14/2023): patient is continent of bowels  Discharge Goal: Patient is continent of bowels    Performed Intervention(s)  Offer toileting/ timed voids  Monitor I&O      Safety    [RN] Potential for Injury(Active)  Current Status(06/08/2023): Patient is at increased risk for falls/injury r/t  recent surgery and stroke.  Weekly Goal(06/15/2023): Patient will use the call light for assistance  Discharge Goal: Patient/ family will be aware of risk of fall and safety in the  home setting    Performed Intervention(s)  Safety monitoring during functional activities  Environmental set- upto reduce risk      Psychosocial    [RN] Coping/Adjustment(Active)  Current Status(06/08/2023): Patient has a supportive family  Weekly Goal(06/14/2023): Patient will express concerns regarding current status  Discharge Goal: Patient will demonstrate healthy coping strategies    Performed Intervention(s)  Allow patient to verbalize needs and concerns    Signed by: Viviana Whitfield RN

## 2023-06-08 NOTE — THERAPY TREATMENT NOTE
Inpatient Rehabilitation - Physical Therapy Treatment Note       [facility]     Patient Name: Vernon Solorzano  : 1950  MRN: [id number]    Today's Date: 2023                    Admit Date: 2023      Visit Dx:     ICD-10-CM ICD-9-CM   1. Impaired functional mobility, balance, gait, and endurance  Z74.09 V49.89       Patient Active Problem List   Diagnosis    DM (diabetes mellitus), type 2    Mixed hyperlipidemia    Mild intermittent asthma without complication    New onset of congestive heart failure    Nonrheumatic mitral valve regurgitation    Chest pain    Essential hypertension    Mitral valve disease    Acute ischemic left MCA stroke       Past Medical History:   Diagnosis Date    Allergic rhinitis     Arthritis     BPH (benign prostatic hyperplasia)     Diabetes mellitus     TYPE 2    Foraminal stenosis of cervical region     C5-C6    GERD (gastroesophageal reflux disease)     Hemorrhoids     History of urinary retention     S/P TURP    Hyperlipidemia     Macular degeneration     PING (obstructive sleep apnea) 2016    MILD, SEES DR. AMARILIS MORGAN       Past Surgical History:   Procedure Laterality Date    CARDIAC CATHETERIZATION N/A 2023    Procedure: Right Heart Cath;  Surgeon: Corey Gaffney MD;  Location: [facility] CATH INVASIVE LOCATION;  Service: Cardiology;  Laterality: N/A;    CARDIAC CATHETERIZATION N/A 2023    Procedure: Left Heart Cath;  Surgeon: Corey Gaffney MD;  Location: [facility] CATH INVASIVE LOCATION;  Service: Cardiology;  Laterality: N/A;    CARDIAC CATHETERIZATION N/A 2023    Procedure: Left ventriculography;  Surgeon: Corey Gaffney MD;  [facility] C[facility]VASIVE LOC[facility];  Service: Cardiology;  Laterality: N/A;    CARDIAC CATHETERIZATION N/A 2023    Procedure: Coronary angiography;  Surgeon: Corey Gaffney MD;  Location: [facility] CATH INVASIVE LOCATION;  Service: Cardiology;  Laterality: N/A;    COLONOSCOPY N/A     WNL PER PT, NO  RECORDS,     COLONOSCOPY N/A 04/07/2009    DR. GORGE BENAVIDEZ AT [place]    MITRAL VALVE REPAIR/REPLACEMENT N/A 5/24/2023    Procedure: MITRAL VALVE REPAIR/REPLACEMENT TRICUSPID VALVE REPAIR/REPLACEMENT TRANSESOPHAGEAL ECHOCARDIOGRAM WITH ANESTHESIA;  Surgeon: George Frey MD;  Location: [facility];  Service: Cardiothoracic;  Laterality: N/A;    PROSTATE SURGERY N/A 11/12/2010    TURP, DR. BRIGHT COLLAZO AT [facility]    SKIN TAG REMOVAL N/A 12/20/2017    ANAL SKIN TAG X2, PERFORMED IN OFFICE, DR. LISA ORANTES    TURP / TRANSURETHRAL INCISION / DRAINAGE PROSTATE  2010    Dr. Goodrich       PT ASSESSMENT (last 12 hours)       IRF PT Evaluation and Treatment       Row Name 06/08/23 1015          PT Time and Intention    Document Type daily treatment  -MD     Mode of Treatment physical therapy  -MD     Patient/Family/Caregiver Comments/Observations Pt sitting in WC showing no signs of acute distress.  Pt w c/o fatigue when PT arrived.  -MD       Row Name 06/08/23 1015          Pain Assessment    Pretreatment Pain Rating 0/10 - no pain  -MD       Row Name 06/08/23 1015          Cognition/Psychosocial    Orientation Status (Cognition) oriented x 3  -MD     Follows Commands (Cognition) follows one-step commands;delayed response/completion;increased processing time needed;repetition of directions required  -MD     Personal Safety Interventions fall prevention program maintained;gait belt  -MD       Row Name 06/08/23 1015          Bed Mobility    Supine-Sit [place] (Bed Mobility) verbal cues;moderate assist (50% patient effort)  -MD     Sit-Supine [place] (Bed Mobility) verbal cues;minimum assist (75% patient effort);moderate assist (50% patient effort);2 person assist  -MD       Row Name 06/08/23 1015          Bed-Chair Transfer    Bed-Chair [place] (Transfers) verbal cues;contact guard;minimum assist (75% patient effort)  -MD     Assistive Device (Bed-Chair Transfers) wheelchair  -MD       Row Name  06/08/23 1015          Chair-Bed Transfer    Chair-Bed [place] (Transfers) verbal cues;minimum assist (75% patient effort)  -MD     Assistive Device (Chair-Bed Transfers) wheelchair  -MD       Row Name 06/08/23 1015          Sit-Stand Transfer    Sit-Stand [place] (Transfers) verbal cues;contact guard;minimum assist (75% patient effort)  -MD     Assistive Device (Sit-Stand Transfers) walker, front-wheeled;wheelchair  -MD       Row Name 06/08/23 1015          Stand-Sit Transfer    Stand-Sit [place] (Transfers) verbal cues;contact guard;minimum assist (75% patient effort)  -MD     Assistive Device (Stand-Sit Transfers) walker, front-wheeled;wheelchair  -MD       Row Name 06/08/23 1015          Gait/Stairs (Locomotion)    [place] Level (Gait) verbal cues;minimum assist (75% patient effort)  -MD     Assistive Device (Gait) walker, front-wheeled  -MD     Distance in Feet (Gait) 20'x1 in am and 30'x1 in pm  -MD     Deviations/Abnormal Patterns (Gait) base of support, wide;festinating/shuffling;stride length decreased;weight shifting decreased  -MD     Bilateral Gait Deviations forward flexed posture;heel strike decreased  -MD     Comment, (Gait/Stairs) second attempt at ambulation held this am due to pt c/o feeling woozie.  PT took VS and notified RN.  Pt was returned to supine.  In PM VC provided to encourage PLB while ambulating.  -MD       Row Name 06/08/23 1015          Positioning and Restraints    Pre-Treatment Position sitting in chair/recliner  -MD     Post Treatment Position wheelchair  -MD     In Wheelchair with OT  -MD       Row Name 06/08/23 1015          Vital Signs    Intra Systolic BP Rehab 100  -MD     Intra Treatment Diastolic BP 69  -MD     Intratreatment Heart Rate (beats/min) 100  -MD     Pre SpO2 (%) 96  -MD     O2 Delivery Pre Treatment room air  -MD     Intra Patient Position Sitting  -MD               User Key  (r) = Recorded By, (t) = Taken By, (c) = Cosigned By      Initials  Name Provider Type    Damaris Farrell, PT Physical Therapist                  Wound 05/24/23 0737 sternal Incision (Active)   Dressing Appearance open to air 06/08/23 0756   Closure Approximated 06/08/23 0756   Base clean;dry;scab 06/08/23 0756   Drainage Amount none 06/08/23 0756   Care, Wound cleansed with 06/08/23 0756   Dressing Care open to air 06/08/23 0756     Physical Therapy Education       Title: PT OT SLP Therapies (Done)       Topic: Physical Therapy (Done)       Point: Mobility training (Done)       Learning Progress Summary             Patient Acceptance, E, VU by MD at 6/8/2023 1017    Acceptance, E,TB,D, VU,DU,NR by  at 6/7/2023 1513    Comment: POC, Goals, safety with mobility.   Significant Other Acceptance, E,TB,D, VU,DU,NR by  at 6/7/2023 1513    Comment: POC, Goals, safety with mobility.                         Point: Home exercise program (Done)       Learning Progress Summary             Patient Acceptance, E,TB,D, VU,DU,NR by  at 6/7/2023 1513    Comment: POC, Goals, safety with mobility.   Significant Other Acceptance, E,TB,D, VU,DU,NR by  at 6/7/2023 1513    Comment: POC, Goals, safety with mobility.                                         User Key       Initials Effective Dates Name Provider Type Ben COOPER 06/16/21 -  Damaris Francis, PT Physical Therapist PT     06/16/21 -  Elizabeth Levin, PT Physical Therapist PT                    PT Recommendation and Plan                          Time Calculation:      PT Charges       Row Name 06/08/23 1549 06/08/23 1015          Time Calculation    Start Time 1300  -MD 1000  -MD     Stop Time 1330  -MD 1030  -MD     Time Calculation (min) 30 min  -MD 30 min  -MD     PT Received On -- 06/08/23  -MD     PT - Next Appointment -- 06/09/23  -MD               User Key  (r) = Recorded By, (t) = Taken By, (c) = Cosigned By      Initials Name Provider Type    Damaris Farrell, PT Physical Therapist                    Therapy Charges for Today        Code Description Service Date Service Provider Modifiers Qty    51875995785 HC PT THER PROC EA 15 MIN 6/8/2023 Damaris Francis, PT GP 1    69081031969 HC GAIT TRAINING EA 15 MIN 6/8/2023 Damaris Francis, PT GP 1    25619328155 HC PT THERAPEUTIC ACT EA 15 MIN 6/8/2023 Damaris Francis, PT GP 2    23777564611 HC PT THER SUPP EA 15 MIN 6/8/2023 Damaris Francis, PT GP 2              PT G-Codes  AM-PAC 6 Clicks Score (PT): 14      Damaris Francis, PT  6/8/2023

## 2023-06-08 NOTE — THERAPY TREATMENT NOTE
Inpatient Rehabilitation - Occupational Therapy Treatment Note    [facility]     Patient Name: Vernon Solorzano  : 1950  MRN: [id number]    Today's Date: 2023                 Admit Date: 2023         ICD-10-CM ICD-9-CM   1. Impaired functional mobility, balance, gait, and endurance  Z74.09 V49.89       Patient Active Problem List   Diagnosis    DM (diabetes mellitus), type 2    Mixed hyperlipidemia    Mild intermittent asthma without complication    New onset of congestive heart failure    Nonrheumatic mitral valve regurgitation    Chest pain    Essential hypertension    Mitral valve disease    Acute ischemic left MCA stroke       Past Medical History:   Diagnosis Date    Allergic rhinitis     Arthritis     BPH (benign prostatic hyperplasia)     Diabetes mellitus     TYPE 2    Foraminal stenosis of cervical region     C5-C6    GERD (gastroesophageal reflux disease)     Hemorrhoids     History of urinary retention     S/P TURP    Hyperlipidemia     Macular degeneration     PING (obstructive sleep apnea) 2016    MILD, SEES DR. AMARILIS MORGAN       Past Surgical History:   Procedure Laterality Date    CARDIAC CATHETERIZATION N/A 2023    Procedure: Right Heart Cath;  Surgeon: Corey Gaffney MD;  Location: [facility] CATH INVASIVE LOCATION;  Service: Cardiology;  Laterality: N/A;    CARDIAC CATHETERIZATION N/A 2023    Procedure: Left Heart Cath;  Surgeon: Corey Gaffney MD;  Location: [facility] CATH INVASIVE LOCATION;  Service: Cardiology;  Laterality: N/A;    CARDIAC CATHETERIZATION N/A 2023    Procedure: Left ventriculography;  Surgeon: Corey Gaffney MD;  Location: [facility] CATH INVASIVE LOCATION;  Service: Cardiology;  Laterality: N/A;    CARDIAC CATHETERIZATION N/A 2023    Procedure: Coronary angiography;  Surgeon: Corey Gaffney MD;  [facility];  Service: Cardiology;  Laterality: N/A;    COLONOSCOPY N/A     WNL PER PT, NO RECORDS,      COLONOSCOPY N/A 04/07/2009    DR. GORGE BENAVIDEZ AT [place]    MITRAL VALVE REPAIR/REPLACEMENT N/A 5/24/2023    Procedure: MITRAL VALVE REPAIR/REPLACEMENT TRICUSPID VALVE REPAIR/REPLACEMENT TRANSESOPHAGEAL ECHOCARDIOGRAM WITH ANESTHESIA;  Surgeon: George Frey MD;  Location: [facility];  Service: Cardiothoracic;  Laterality: N/A;    PROSTATE SURGERY N/A 11/12/2010    TURP, DR. BRIGHT COLLAZO AT [facility]    SKIN TAG REMOVAL N/A 12/20/2017    ANAL SKIN TAG X2, PERFORMED IN OFFICE, DR. LISA ORANTES    TURP / TRANSURETHRAL INCISION / DRAINAGE PROSTATE  2010    Dr. Goodrich             IRF OT ASSESSMENT FLOWSHEET (last 12 hours)       IRF OT Evaluation and Treatment       Row Name 06/08/23 1513          OT Time and Intention    Document Type daily treatment  -AF     Mode of Treatment occupational therapy  -AF     Patient Effort fair  -AF     Symptoms Noted During/After Treatment fatigue  decreased processing  -AF       Row Name 06/08/23 1513          General Information    Patient/Family/Caregiver Comments/Observations pt sitting up in w/c in AM and PM sessions  -AF     Existing Precautions/Restrictions fall;cardiac;sternal  no lifting greater hand 10LBS x 6 wks from sx on 5/24/23  -AF       Row Name 06/08/23 1513          Pain Assessment    Pretreatment Pain Rating 0/10 - no pain  -AF     Posttreatment Pain Rating 0/10 - no pain  -AF     Pre/Posttreatment Pain Comment no stated pain would wince when moving for certian functional tasks  -AF       Row Name 06/08/23 1513          Cognition/Psychosocial    Affect/Mental Status (Cognition) flat/blunted affect;low arousal/lethargic  -AF     Orientation Status (Cognition) oriented to;person;place;situation  -AF     Follows Commands (Cognition) follows one-step commands;delayed response/completion;increased processing time needed;repetition of directions required  -AF     Personal Safety Interventions fall prevention program maintained;gait belt;nonskid  shoes/slippers when out of bed  -AF     Cognitive Function attention deficit;executive function deficit  -AF     Attention Deficit (Cognition) distractible in noisy environment;focused/sustained attention  -AF     Safety Deficit (Cognition) insight into deficits/self-awareness  -AF     Comment, Cognition pt demos decreased processing with delay responses  -AF       Row Name 06/08/23 1513          Vision Assessment/Intervention    Vision Assessment Comment reports no deficits, no deficits seen during funcitonal tasks at this time  -AF       Row Name 06/08/23 1513          Bathing    [place] Level (Bathing) bathing skills;upper body;lower body;moderate assist (50% patient effort)  -AF     Position (Bathing) supported sitting;supported standing  -AF     Comment (Bathing) very fatigued this AM  -AF       Row Name 06/08/23 1513          Upper Body Dressing    [place] Level (Upper Body Dressing) upper body dressing skills;minimum assist (75% or more patient effort);verbal cues  -AF     Position (Upper Body Dressing) supported sitting  -AF     Comment (Upper Body Dressing) became winded when changing shirts  -AF       Row Name 06/08/23 1513          Lower Body Dressing    [place] Level (Lower Body Dressing) don;pants/bottoms;socks;dependent (less than 25% patient effort);maximum assist (25% patient effort)  -AF     Position (Lower Body Dressing) supported sitting;supported standing  -AF     Set-up Assistance (Lower Body Dressing) obtain clothing  -AF     Comment (Lower Body Dressing) pt already had on TEDS prior to OT  -AF       Row Name 06/08/23 1513          Grooming    [place] Level (Grooming) grooming skills;wash face, hands;deodorant application;minimum assist (75% patient effort);oral care regimen  -AF     Position (Grooming) supported sitting;sink side  -AF     Comment (Grooming) pt not araceli to figure out how to open deoderant  -AF       Row Name 06/08/23 1513          Toileting    [place]  Level (Toileting) toileting skills;adjust/manage clothing;perform perineal hygiene;maximum assist (25% patient effort);verbal cues  -AF     Position (Toileting) supported sitting;supported standing  -AF       Row Name 06/08/23 1513          Transfer Assessment/Treatment    Comment, (Transfers) w/c to recliner chair MIN stand pivot  -AF       Row Name 06/08/23 1513          Toilet Transfer    Type (Toilet Transfer) stand pivot/stand step  -AF     [place] Level (Toilet Transfer) minimum assist (75% patient effort);verbal cues  -AF     Assistive Device (Toilet Transfer) commode;grab bars/safety frame;wheelchair  -AF       Row Name 06/08/23 1513          Motor Skills    Motor Skills functional endurance  -AF     Functional Endurance poor - with ADL tasks, required rest breaks and kept eyes closed  -AF     Therapeutic Exercise shoulder;elbow/forearm;wrist;aerobic  -AF       Row Name 06/08/23 1513          Shoulder (Therapeutic Exercise)    Shoulder (Therapeutic Exercise) strengthening exercise  -AF     Shoulder Strengthening (Therapeutic Exercise) bilateral;external rotation;internal rotation;2 lb free weight;10 repetitions;2 sets  with rest breaks  -AF       Row Name 06/08/23 1513          Elbow/Forearm (Therapeutic Exercise)    Elbow/Forearm (Therapeutic Exercise) strengthening exercise  -AF     Elbow/Forearm Strengthening (Therapeutic Exercise) bilateral;flexion;extension;supination;pronation;sitting;2 lb free weight;10 repetitions;2 sets  rest breaks  -AF       Row Name 06/08/23 1513          Wrist (Therapeutic Exercise)    Wrist (Therapeutic Exercise) strengthening exercise  -AF     Wrist Strengthening (Therapeutic Exercise) bilateral;flexion;extension;2 lb free weight;10 repetitions;2 sets  required assistance to count poor dual tasking noted  -AF       Row Name 06/08/23 1513          Hand (Therapeutic Exercise)    Hand (Therapeutic Exercise) strengthening exercise  -AF     Hand Strengthening (Therapeutic  Exercise) bilateral; strengthening;hand gripper;20 repititions  -AF       Row Name 06/08/23 1513          Balance    Static Sitting Balance standby assist  -AF     Static Standing Balance minimal assist  -AF       Row Name 06/08/23 1513          Positioning and Restraints    Pre-Treatment Position sitting in chair/recliner  -AF     Post Treatment Position bathroom  -AF     In Chair reclined;call light within reach;encouraged to call for assist;exit alarm on;notified nsg  in PM  -AF     Bathroom sitting;notified nsg;call light within reach;encouraged to call for assist  in AM  -AF               User Key  (r) = Recorded By, (t) = Taken By, (c) = Cosigned By      Initials Name Effective Dates    AF Candy Davis OTR 06/16/21 -                              OT Recommendation and Plan                         Time Calculation:      Time Calculation- OT       Row Name 06/08/23 1520 06/08/23 1519          Time Calculation- OT    OT Start Time 1330  -AF 0830  -AF     OT Stop Time 1400  -AF 0900  -AF     OT Time Calculation (min) 30 min  -AF 30 min  -AF               User Key  (r) = Recorded By, (t) = Taken By, (c) = Cosigned By      Initials Name Provider Type    AF aCndy Davis OTSIMONA Occupational Therapist                  Therapy Charges for Today       Code Description Service Date Service Provider Modifiers Qty    52813256557 HC OT SELF CARE/MGMT/TRAIN EA 15 MIN 6/8/2023 Candy Davis OTR GO 2    24448866374 HC OT THER PROC EA 15 MIN 6/8/2023 Candy Davis OTR GO 2                     HI Wyman  6/8/2023

## 2023-06-09 LAB
ANION GAP SERPL CALCULATED.3IONS-SCNC: 16.5 MMOL/L (ref 5–15)
BASOPHILS # BLD AUTO: 0.07 10*3/MM3 (ref 0–0.2)
BASOPHILS NFR BLD AUTO: 0.6 % (ref 0–1.5)
BUN SERPL-MCNC: 21 MG/DL (ref 8–23)
BUN/CREAT SERPL: 14.4 (ref 7–25)
CALCIUM SPEC-SCNC: 9.3 MG/DL (ref 8.6–10.5)
CHLORIDE SERPL-SCNC: 98 MMOL/L (ref 98–107)
CO2 SERPL-SCNC: 26.5 MMOL/L (ref 22–29)
CREAT SERPL-MCNC: 1.46 MG/DL (ref 0.76–1.27)
DEPRECATED RDW RBC AUTO: 44.1 FL (ref 37–54)
EGFRCR SERPLBLD CKD-EPI 2021: 50.2 ML/MIN/1.73
EOSINOPHIL # BLD AUTO: 0.69 10*3/MM3 (ref 0–0.4)
EOSINOPHIL NFR BLD AUTO: 5.5 % (ref 0.3–6.2)
ERYTHROCYTE [DISTWIDTH] IN BLOOD BY AUTOMATED COUNT: 14.4 % (ref 12.3–15.4)
GLUCOSE BLDC GLUCOMTR-MCNC: 147 MG/DL (ref 70–130)
GLUCOSE BLDC GLUCOMTR-MCNC: 153 MG/DL (ref 70–130)
GLUCOSE BLDC GLUCOMTR-MCNC: 227 MG/DL (ref 70–130)
GLUCOSE BLDC GLUCOMTR-MCNC: 264 MG/DL (ref 70–130)
GLUCOSE SERPL-MCNC: 145 MG/DL (ref 65–99)
HCT VFR BLD AUTO: 32.4 % (ref 37.5–51)
HGB BLD-MCNC: 10.9 G/DL (ref 13–17.7)
IMM GRANULOCYTES # BLD AUTO: 0.14 10*3/MM3 (ref 0–0.05)
IMM GRANULOCYTES NFR BLD AUTO: 1.1 % (ref 0–0.5)
LYMPHOCYTES # BLD AUTO: 1.28 10*3/MM3 (ref 0.7–3.1)
LYMPHOCYTES NFR BLD AUTO: 10.2 % (ref 19.6–45.3)
MCH RBC QN AUTO: 28.9 PG (ref 26.6–33)
MCHC RBC AUTO-ENTMCNC: 33.6 G/DL (ref 31.5–35.7)
MCV RBC AUTO: 85.9 FL (ref 79–97)
MONOCYTES # BLD AUTO: 1.06 10*3/MM3 (ref 0.1–0.9)
MONOCYTES NFR BLD AUTO: 8.4 % (ref 5–12)
NEUTROPHILS NFR BLD AUTO: 74.2 % (ref 42.7–76)
NEUTROPHILS NFR BLD AUTO: 9.37 10*3/MM3 (ref 1.7–7)
NRBC BLD AUTO-RTO: 0 /100 WBC (ref 0–0.2)
PLATELET # BLD AUTO: 345 10*3/MM3 (ref 140–450)
PMV BLD AUTO: 9.3 FL (ref 6–12)
POTASSIUM SERPL-SCNC: 3.7 MMOL/L (ref 3.5–5.2)
RBC # BLD AUTO: 3.77 10*6/MM3 (ref 4.14–5.8)
SODIUM SERPL-SCNC: 141 MMOL/L (ref 136–145)
VIT B12 BLD-MCNC: 1863 PG/ML (ref 211–946)
WBC NRBC COR # BLD: 12.61 10*3/MM3 (ref 3.4–10.8)

## 2023-06-09 PROCEDURE — 63710000001 INSULIN LISPRO (HUMAN) PER 5 UNITS: Performed by: PHYSICAL MEDICINE & REHABILITATION

## 2023-06-09 PROCEDURE — 82607 VITAMIN B-12: CPT | Performed by: PHYSICAL MEDICINE & REHABILITATION

## 2023-06-09 PROCEDURE — 82948 REAGENT STRIP/BLOOD GLUCOSE: CPT

## 2023-06-09 PROCEDURE — 97530 THERAPEUTIC ACTIVITIES: CPT

## 2023-06-09 PROCEDURE — 94799 UNLISTED PULMONARY SVC/PX: CPT

## 2023-06-09 PROCEDURE — 85025 COMPLETE CBC W/AUTO DIFF WBC: CPT | Performed by: PHYSICAL MEDICINE & REHABILITATION

## 2023-06-09 PROCEDURE — 80048 BASIC METABOLIC PNL TOTAL CA: CPT | Performed by: PHYSICAL MEDICINE & REHABILITATION

## 2023-06-09 PROCEDURE — 97110 THERAPEUTIC EXERCISES: CPT

## 2023-06-09 PROCEDURE — 92526 ORAL FUNCTION THERAPY: CPT

## 2023-06-09 PROCEDURE — 97116 GAIT TRAINING THERAPY: CPT

## 2023-06-09 PROCEDURE — 97535 SELF CARE MNGMENT TRAINING: CPT

## 2023-06-09 PROCEDURE — 97129 THER IVNTJ 1ST 15 MIN: CPT

## 2023-06-09 RX ORDER — IPRATROPIUM BROMIDE AND ALBUTEROL SULFATE 2.5; .5 MG/3ML; MG/3ML
3 SOLUTION RESPIRATORY (INHALATION) EVERY 4 HOURS PRN
Status: ACTIVE | OUTPATIENT
Start: 2023-06-09

## 2023-06-09 RX ADMIN — GUAIFENESIN 400 MG: 200 SOLUTION ORAL at 14:37

## 2023-06-09 RX ADMIN — GUAIFENESIN 400 MG: 200 SOLUTION ORAL at 20:40

## 2023-06-09 RX ADMIN — INSULIN LISPRO 5 UNITS: 100 INJECTION, SOLUTION INTRAVENOUS; SUBCUTANEOUS at 13:08

## 2023-06-09 RX ADMIN — EMPAGLIFLOZIN 25 MG: 25 TABLET, FILM COATED ORAL at 07:57

## 2023-06-09 RX ADMIN — CALCIUM CARBONATE-VITAMIN D TAB 500 MG-200 UNIT 1 TABLET: 500-200 TAB at 07:57

## 2023-06-09 RX ADMIN — ZINC OXIDE 1 APPLICATION: 200 OINTMENT TOPICAL at 08:45

## 2023-06-09 RX ADMIN — PANTOPRAZOLE SODIUM 40 MG: 40 TABLET, DELAYED RELEASE ORAL at 06:41

## 2023-06-09 RX ADMIN — METOPROLOL SUCCINATE 25 MG: 25 TABLET, EXTENDED RELEASE ORAL at 07:57

## 2023-06-09 RX ADMIN — IPRATROPIUM BROMIDE AND ALBUTEROL SULFATE 3 ML: 2.5; .5 SOLUTION RESPIRATORY (INHALATION) at 17:03

## 2023-06-09 RX ADMIN — TRAMADOL HYDROCHLORIDE 50 MG: 50 TABLET, COATED ORAL at 06:45

## 2023-06-09 RX ADMIN — TRAMADOL HYDROCHLORIDE 50 MG: 50 TABLET, COATED ORAL at 20:41

## 2023-06-09 RX ADMIN — APIXABAN 5 MG: 5 TABLET, FILM COATED ORAL at 07:58

## 2023-06-09 RX ADMIN — ATORVASTATIN CALCIUM 40 MG: 20 TABLET, FILM COATED ORAL at 20:40

## 2023-06-09 RX ADMIN — IPRATROPIUM BROMIDE AND ALBUTEROL SULFATE 3 ML: .5; 3 SOLUTION RESPIRATORY (INHALATION) at 01:29

## 2023-06-09 RX ADMIN — APIXABAN 5 MG: 5 TABLET, FILM COATED ORAL at 20:40

## 2023-06-09 RX ADMIN — ASPIRIN 81 MG: 81 TABLET, CHEWABLE ORAL at 07:56

## 2023-06-09 RX ADMIN — INSULIN GLARGINE-YFGN 10 UNITS: 100 INJECTION, SOLUTION SUBCUTANEOUS at 20:40

## 2023-06-09 RX ADMIN — IPRATROPIUM BROMIDE AND ALBUTEROL SULFATE 3 ML: 2.5; .5 SOLUTION RESPIRATORY (INHALATION) at 20:44

## 2023-06-09 RX ADMIN — INSULIN GLARGINE-YFGN 40 UNITS: 100 INJECTION, SOLUTION SUBCUTANEOUS at 07:58

## 2023-06-09 RX ADMIN — GUAIFENESIN 400 MG: 200 SOLUTION ORAL at 01:42

## 2023-06-09 RX ADMIN — IPRATROPIUM BROMIDE AND ALBUTEROL SULFATE 3 ML: 2.5; .5 SOLUTION RESPIRATORY (INHALATION) at 12:04

## 2023-06-09 RX ADMIN — GUAIFENESIN 400 MG: 200 SOLUTION ORAL at 06:41

## 2023-06-09 RX ADMIN — ACETAMINOPHEN 650 MG: 325 TABLET, FILM COATED ORAL at 01:01

## 2023-06-09 NOTE — PROGRESS NOTES
Inpatient Rehabilitation Plan of Care Note    Plan of Care  Updated Problems/Interventions  Swallow Function    [ST] Swallowing(Active)  Current Status(06/09/2023): Regular (no mixed) thin by cup. precautions:  upright, small single sips, no straw, alternate solids with liquids  Weekly Goal(06/16/2023): Patient will tolerate the least restrictive diet  without s/sx of aspiration  Discharge Goal: Patient will tolerate the least restrictive diet without s/sx of  aspiration        Cognition    [ST] Attention(Active)  Current Status(06/09/2023): Moderate cognitive impairment; decreased  initiation/slow processing speed. Increased difficulty with attention, memory,  and executive function  Weekly Goal(06/16/2023): Patient will participate in functional therapy  activities given MOD cues  Discharge Goal: Functional cognition for home    Signed by: Latisha Morris, SLP

## 2023-06-09 NOTE — PLAN OF CARE
Goal Outcome Evaluation:  Plan of Care Reviewed With: patient        Progress: improving  Patient is cooperative. Alert and oriented. Forgetful at times. Extra time given for verbal response. Denied pain, numbness, or tingling. He is using the call light for assistance. Gait unsteady. Continent of B/B. Heart incision and abdominal puncture sides KATHY. He participated in therapies. Will continue to monitor.

## 2023-06-09 NOTE — THERAPY TREATMENT NOTE
Inpatient Rehabilitation - Occupational Therapy Treatment Note    [facility]     Patient Name: Vernon Solorzano  : 1950  MRN: [id number]    Today's Date: 2023                 Admit Date: 2023         ICD-10-CM ICD-9-CM   1. Impaired functional mobility, balance, gait, and endurance  Z74.09 V49.89       Patient Active Problem List   Diagnosis   • DM (diabetes mellitus), type 2   • Mixed hyperlipidemia   • Mild intermittent asthma without complication   • New onset of congestive heart failure   • Nonrheumatic mitral valve regurgitation   • Chest pain   • Essential hypertension   • Mitral valve disease   • Acute ischemic left MCA stroke       Past Medical History:   Diagnosis Date   • Allergic rhinitis    • Arthritis    • BPH (benign prostatic hyperplasia)    • Diabetes mellitus     TYPE 2   • Foraminal stenosis of cervical region     C5-C6   • GERD (gastroesophageal reflux disease)    • Hemorrhoids    • History of urinary retention     S/P TURP   • Hyperlipidemia    • Macular degeneration    • PING (obstructive sleep apnea) 2016    MILD, SEES DR. AMARILIS MORGAN       Past Surgical History:   Procedure Laterality Date   • CARDIAC CATHETERIZATION N/A 2023    Procedure: Right Heart Cath;  Surgeon: Corey Gaffney MD;  Location: [facility] CATH INVASIVE LOCATION;  Service: Cardiology;  Laterality: N/A;   • CARDIAC CATHETERIZATION N/A 2023    Procedure: Left Heart Cath;  Surgeon: Corey Gaffney MD;  Location: [facility] CATH INVASIVE LOCATION;  Service: Cardiology;  Laterality: N/A;   • CARDIAC CATHETERIZATION N/A 2023    Procedure: Left ventriculography;  Surgeon: Corey Gaffney MD;  [facility];  Service: Cardiology;  Laterality: N/A;   • CARDIAC CATHETERIZATION N/A 2023    Procedure: Coronary angiography;  Surgeon: Corey Gaffney MD;  Location:  [facility] CATH INVASIVE LOCATION;  Service: Cardiology;  Laterality: N/A;   • COLONOSCOPY N/A     WNL  PER PT, NO RECORDS,    • COLONOSCOPY N/A 04/07/2009    DR. GORGE BENAVIDEZ AT [place]   • MITRAL VALVE REPAIR/REPLACEMENT N/A 5/24/2023    Procedure: MITRAL VALVE REPAIR/REPLACEMENT TRICUSPID VALVE REPAIR/REPLACEMENT TRANSESOPHAGEAL ECHOCARDIOGRAM WITH ANESTHESIA;  Surgeon: George Frey MD;  Location: [facility];  Service: Cardiothoracic;  Laterality: N/A;   • PROSTATE SURGERY N/A 11/12/2010    TURP, DR. BRIGHT COLLAZO AT [facility]   • SKIN TAG REMOVAL N/A 12/20/2017    ANAL SKIN TAG X2, PERFORMED IN OFFICE, DR. LISA ORANTES   • TURP / TRANSURETHRAL INCISION / DRAINAGE PROSTATE  2010    Dr. Goodrich             IRF OT ASSESSMENT FLOWSHEET (last 12 hours)     IRF OT Evaluation and Treatment     Row Name 06/09/23 1440 06/09/23 1205       OT Time and Intention    Document Type daily treatment  -AF daily treatment  -AF    Mode of Treatment occupational therapy  -AF occupational therapy  -AF    Patient Effort adequate  -AF adequate  -AF    Symptoms Noted During/After Treatment -- fatigue  -AF    Row Name 06/09/23 1440 06/09/23 1205       General Information    Patient/Family/Caregiver Comments/Observations pt sitting up in w/c in therapy gym after PT session  -AF pt sitting up in on EOB with wife present  -AF    General Observations of Patient -- pt did not eat much during breakfast this morning  -AF    Existing Precautions/Restrictions fall;cardiac;sternal  -AF fall;cardiac;sternal  no lifting greater than 10LBS x 6 weeks post surgery  -AF    Row Name 06/09/23 1440 06/09/23 1205       Pain Assessment    Pretreatment Pain Rating 0/10 - no pain  -AF 0/10 - no pain  -AF    Posttreatment Pain Rating 0/10 - no pain  -AF 0/10 - no pain  -AF    Row Name 06/09/23 1440 06/09/23 1205       Cognition/Psychosocial    Affect/Mental Status (Cognition) flat/blunted affect;low arousal/lethargic  -AF flat/blunted affect;low arousal/lethargic  -AF    Orientation Status (Cognition) oriented to;person;place;situation  -AF oriented  to;person;place;situation  -AF    Follows Commands (Cognition) follows one-step commands;delayed response/completion;increased processing time needed;repetition of directions required  -AF follows one-step commands;delayed response/completion;increased processing time needed;repetition of directions required  -AF    Personal Safety Interventions fall prevention program maintained;gait belt;nonskid shoes/slippers when out of bed  -AF fall prevention program maintained;gait belt;nonskid shoes/slippers when out of bed  -AF    Cognitive Function attention deficit;executive function deficit  -AF attention deficit;executive function deficit  -AF    Attention Deficit (Cognition) distractible in noisy environment;focused/sustained attention  -AF distractible in noisy environment;focused/sustained attention  -AF    Row Name 06/09/23 1205          Bathing    [place] Level (Bathing) bathing skills;lower body;upper body;minimum assist (75% patient effort);verbal cues  -AF     Assistive Device (Bathing) grab bar/tub rail;hand held shower spray hose;shower chair  -AF     Position (Bathing) supported sitting;supported standing  -AF     Comment (Bathing) fatigued, increased time and verbal prompting for sequencing and iniation  -AF     Row Name 06/09/23 1205          Upper Body Dressing    [place] Level (Upper Body Dressing) upper body dressing skills;minimum assist (75% or more patient effort)  -AF     Position (Upper Body Dressing) supported sitting  -AF     Row Name 06/09/23 1205          Lower Body Dressing    [place] Level (Lower Body Dressing) doff;don;pants/bottoms;shoes/slippers;socks;maximum assist (25% patient effort)  -AF     Position (Lower Body Dressing) supported sitting;supported standing  -AF     Comment (Lower Body Dressing) knee high TEDs  -AF     Row Name 06/09/23 1205          Grooming    [place] Level (Grooming) wash face, hands;oral care regimen;grooming skills;deodorant application;set up   -AF     Position (Grooming) supported sitting  -AF     Comment (Grooming) w/c level  -AF     Row Name 06/09/23 1205          Functional Mobility    Functional Mobility- Comment pt walked from EOB to shower chair with RWX MIN with increased time  -AF     Row Name 06/09/23 1205          Transfer Assessment/Treatment    Transfers shower transfer  -AF     Row Name 06/09/23 1205          Sit-Stand Transfer    Sit-Stand [place] (Transfers) minimum assist (75% patient effort);verbal cues  -AF     Assistive Device (Sit-Stand Transfers) walker, front-wheeled  -AF     Comment, (Sit-Stand Transfer) from EOB  -AF     Row Name 06/09/23 1205          Stand-Sit Transfer    Stand-Sit [place] (Transfers) minimum assist (75% patient effort);contact guard;verbal cues  -AF     Assistive Device (Stand-Sit Transfers) walker, front-wheeled  -AF     Row Name 06/09/23 1205          Shower Transfer    Type (Shower Transfer) sit-stand;stand-sit  -AF     [place] Level (Shower Transfer) minimum assist (75% patient effort);verbal cues  -AF     Assistive Device (Shower Transfer) shower chair;grab bar, tub/shower;walker, front-wheeled  -AF     Row Name 06/09/23 1205          Motor Skills    Functional Endurance poor kept eyes closed most of the time states that he felt out of breath  -AF     Row Name 06/09/23 1440          Shoulder (Therapeutic Exercise)    Shoulder Strengthening (Therapeutic Exercise) bilateral;flexion;extension;scapular stabilization;sitting;10 repetitions;2 sets  to shoulder flexion 90 degrees, #2 dowel rdo  -AF     Row Name 06/09/23 1440          Elbow/Forearm (Therapeutic Exercise)    Elbow/Forearm Strengthening (Therapeutic Exercise) bilateral;flexion;extension;supination;pronation;sitting;2 lb free weight;10 repetitions;2 sets  less rest breaks required between sets  -AF     Row Name 06/09/23 1440          Wrist (Therapeutic Exercise)    Wrist Strengthening (Therapeutic Exercise) bilateral;flexion;extension;2  lb free weight;10 repetitions;2 sets  -AF     Row Name 06/09/23 1440          Hand (Therapeutic Exercise)    Hand Strengthening (Therapeutic Exercise) bilateral;hand gripper; strengthening;20 repititions  -AF     Row Name 06/09/23 1205          Balance    Static Sitting Balance standby assist  -AF     Dynamic Sitting Balance contact guard  -AF     Static Standing Balance minimal assist;contact guard;verbal cues  -AF     Row Name 06/09/23 1440 06/09/23 1205       Positioning and Restraints    Pre-Treatment Position sitting in chair/recliner  -AF in bed  seated on EOB  -AF    Post Treatment Position wheelchair  -AF wheelchair  -AF    In Wheelchair sitting;exit alarm on;with SLP  -AF sitting;exit alarm on;with PT  -AF    Row Name 06/09/23 1440          Vital Signs    Intra SpO2 (%) 94  -AF     O2 Delivery Intra Treatment room air  -AF           User Key  (r) = Recorded By, (t) = Taken By, (c) = Cosigned By    Initials Name Effective Dates    AF Candy Davis OTR 06/16/21 -                          OT Recommendation and Plan                         Time Calculation:      Time Calculation- OT     Row Name 06/09/23 1443 06/09/23 1216          Time Calculation- OT    OT Start Time 1330  -AF 0830  -AF     OT Stop Time 1400  -AF 0930  -AF     OT Time Calculation (min) 30 min  -AF 60 min  -AF           User Key  (r) = Recorded By, (t) = Taken By, (c) = Cosigned By    Initials Name Provider Type    AF Candy Davis, OTR Occupational Therapist              Therapy Charges for Today     Code Description Service Date Service Provider Modifiers Qty    99407929446 HC OT SELF CARE/MGMT/TRAIN EA 15 MIN 6/8/2023 Candy Dvais, OTR GO 2    55626367570 HC OT THER PROC EA 15 MIN 6/8/2023 Candy Davis, OTR GO 2    29863183150 HC OT SELF CARE/MGMT/TRAIN EA 15 MIN 6/9/2023 Candy Davis, OTR GO 4    55226209612 HC OT THER PROC EA 15 MIN 6/9/2023 Candy Davis, OTR GO 2                   HI Wyman  6/9/2023

## 2023-06-09 NOTE — PROGRESS NOTES
[facility] Progress Note      Patient Name: Vernon Solorzano  : 1950  MRN: [id number]  Primary Care Physician:  Liza Oconnell III, NP-C  Date of admission: 2023    Subjective     Interval History:     Seen and examined. Transferred to rehab. Feeling better    Review of Systems:   As noted above    Objective     Vitals:   Temp:  [96.9 °F (36.1 °C)-98.2 °F (36.8 °C)] 98.2 °F (36.8 °C)  Heart Rate:  [] 88  Resp:  [16-20] 20  BP: (100-110)/(50-73) 109/59    Intake/Output Summary (Last 24 hours) at 2023 1047  Last data filed at 2023 0813  Gross per 24 hour   Intake 1280 ml   Output 1125 ml   Net 155 ml       Physical Exam:    General Appearance: alert, oriented x 3, no acute distress   Skin: warm and dry  HEENT: oral mucosa normal, nonicteric sclera  Neck: supple, no JVD  Lungs: CTA  Heart: RRR, normal S1 and S2  Abdomen: soft, nontender, nondistended  : no palpable bladder  Extremities: no edema, cyanosis or clubbing  Neuro: normal speech and mental status     Scheduled Meds:     apixaban, 5 mg, Oral, Q12H  aspirin, 81 mg, Oral, Daily  atorvastatin, 40 mg, Oral, Nightly  calcium 500 mg vitamin D 5 mcg (200 UT), 1 tablet, Oral, Daily  empagliflozin, 25 mg, Oral, Daily  guaifenesin, 400 mg, Oral, Q8H  insulin glargine, 10 Units, Subcutaneous, Nightly  insulin glargine, 40 Units, Subcutaneous, Daily  insulin lispro, 3-14 Units, Subcutaneous, TID With Meals  ipratropium-albuterol, 3 mL, Nebulization, 4x Daily - RT  metoprolol succinate XL, 25 mg, Oral, Q24H  pantoprazole, 40 mg, Oral, Q AM      IV Meds:        Results Reviewed:   I have personally reviewed the results from the time of this admission to 2023 10:47 EDT     Results from last 7 days   Lab Units 23  0830 23  0943 23  0305 23  0319 23  0834   SODIUM mmol/L 141 132* 134*   < > 141   POTASSIUM mmol/L 3.7 4.1 4.1   < > 4.0   CHLORIDE mmol/L 98 94* 94*   < > 100    CO2 mmol/L 26.5 25.8 27.7   < > 31.1*   BUN mg/dL 21 22 22   < > 29*   CREATININE mg/dL 1.46* 1.31* 1.31*   < > 1.33*   CALCIUM mg/dL 9.3 9.5 9.1   < > 9.0   BILIRUBIN mg/dL  --  0.5  --   --  0.6   ALK PHOS U/L  --  91  --   --  82   ALT (SGPT) U/L  --  15  --   --  16   AST (SGOT) U/L  --  12  --   --  14   GLUCOSE mg/dL 145* 218* 219*   < > 187*    < > = values in this interval not displayed.     Estimated Creatinine Clearance: 59.2 mL/min (A) (by C-G formula based on SCr of 1.46 mg/dL (H)).          Results from last 7 days   Lab Units 06/09/23  0832 06/07/23  0706 06/06/23  0338 06/05/23  0305 06/04/23  0320   WBC 10*3/mm3 12.61* 16.29* 15.85* 13.53* 14.00*   HEMOGLOBIN g/dL 10.9* 10.7* 10.9* 9.9* 10.0*   PLATELETS 10*3/mm3 345 325 294 264 239           Assessment / Plan     ASSESSMENT:  1. CKD III, baseline creatinine 1.2.  creatinine  slightly worse.  2. MR now sp MV replacement, tissue and TV repair, SU closure 5/24/23.  3. Chronic steroid use after COVID 19 PNA.  4. Anemia post op. Hg stable .  5. DM2     Plan:     Continue current management for now. If CR continues to rise, We will D/C Jardiance  Surveillance labs    Thank you for involving us in the care of Vernon Solorzano.  Please feel free to call with any questions.    Tae Lay MD  06/09/23  10:47 EDT    [facility]  [phone]

## 2023-06-09 NOTE — PROGRESS NOTES
Inpatient Rehabilitation Plan of Care Note    Plan of Care  Care Plan Reviewed - No updates at this time.    Sphincter Control    Performed Intervention(s)  Offer toileting/ timed voids  Monitor I&O      Safety    Performed Intervention(s)  Safety monitoring during functional activities  Environmental set- upto reduce risk      Psychosocial    Performed Intervention(s)  Allow patient to verbalize needs and concerns    Signed by: Yajaira Babcock RN

## 2023-06-09 NOTE — PROGRESS NOTES
LOS: 3 days   Patient Care Team:  Liza Oconnell III, NP-C as PCP - General (Family Medicine)  Corey Lao Jr., MD (Inactive) as Consulting Physician (Urology)      KRIS TAYLOR  1950      ADMITTING DIAGNOSIS:  Stroke  Valvular heart disease status post repair/replacement      Subjective   Patient with expected sternotomy discomfort.  Fatigue still an issue.  Hand control on the right side is better.  Reports tolerating therapies overall.         Objective     Vitals:    06/09/23 0756   BP: 109/59   Pulse: 88   Resp:    Temp:    SpO2:        PHYSICAL EXAM:   MENTAL STATUS -  AWAKE / ALERT  HEENT- NCAT, PUPILS EQUALLY ROUND, SCLERAE ANICTERIC, CONJUNCTIVAE PINK, OP MOIST, NO JVD, EARS UNREMARKABLE EXTERNALLY  LUNGS - CTA, NO WHEEZES, RALES OR RHONCHI  Sternotomy incision -dressed  HEART-sinus with occasional ectopy  ABD - NORMOACTIVE BOWEL SOUNDS, SOFT, NT.   EXT - NO EDEMA OR CYANOSIS  NEURO -oriented to person place time and situation.  Extraocular movements intact.  Visual fields intact.    Speech is fluent.    MOTOR EXAM - RUE/RLE 5/5. LUE/LLE 5/5.        MEDICATIONS  Scheduled Meds:apixaban, 5 mg, Oral, Q12H  aspirin, 81 mg, Oral, Daily  atorvastatin, 40 mg, Oral, Nightly  calcium 500 mg vitamin D 5 mcg (200 UT), 1 tablet, Oral, Daily  empagliflozin, 25 mg, Oral, Daily  guaifenesin, 400 mg, Oral, Q8H  insulin glargine, 10 Units, Subcutaneous, Nightly  insulin glargine, 40 Units, Subcutaneous, Daily  insulin lispro, 3-14 Units, Subcutaneous, TID With Meals  ipratropium-albuterol, 3 mL, Nebulization, 4x Daily - RT  metoprolol succinate XL, 25 mg, Oral, Q24H  pantoprazole, 40 mg, Oral, Q AM      Continuous Infusions:   PRN Meds:.•  acetaminophen **OR** [DISCONTINUED] acetaminophen **OR** [DISCONTINUED] acetaminophen  •  hydrocortisone-bacitracin-zinc oxide-nystatin  •  ipratropium-albuterol  •  traMADol      RESULTS  Glucose   Date/Time Value Ref Range Status   06/09/2023  0712 147 (H) 70 - 130 mg/dL Final     Comment:     Meter: [id number] : [id number] Denis Carbone NA   06/08/2023 2008 269 (H) 70 - 130 mg/dL Final     Comment:     Meter: [id number] : [id number] Victorino Luna NA   06/08/2023 1604 195 (H) 70 - 130 mg/dL Final     Comment:     Meter: [id number] : [id number] Denis Tona NA   06/08/2023 1128 216 (H) 70 - 130 mg/dL Final     Comment:     Meter: [id number] : [id number] Denis Ndiayey NA   06/08/2023 0709 144 (H) 70 - 130 mg/dL Final     Comment:     Meter: [id number] : [id number] Foster Tona NA   06/08/2023 0020 156 (H) 70 - 130 mg/dL Final     Comment:     Meter: [id number] : [id number] Nahid Michelle RN   06/07/2023 2003 356 (H) 70 - 130 mg/dL Final     Comment:     Meter: [id number] : [id number] Tito Anglin NA   06/07/2023 1615 166 (H) 70 - 130 mg/dL Final     Comment:     Meter: [id number] : [id number] Denis Carbone NA     Results from last 7 days   Lab Units 06/09/23  0832 06/07/23  0706 06/06/23  0338   WBC 10*3/mm3 12.61* 16.29* 15.85*   HEMOGLOBIN g/dL 10.9* 10.7* 10.9*   HEMATOCRIT % 32.4* 32.8* 32.7*   PLATELETS 10*3/mm3 345 325 294     Results from last 7 days   Lab Units 06/09/23  0830 06/06/23  0943 06/05/23  0305 06/04/23  0319 06/03/23  0834   SODIUM mmol/L 141 132* 134*   < > 141   POTASSIUM mmol/L 3.7 4.1 4.1   < > 4.0   CHLORIDE mmol/L 98 94* 94*   < > 100   CO2 mmol/L 26.5 25.8 27.7   < > 31.1*   BUN mg/dL 21 22 22   < > 29*   CREATININE mg/dL 1.46* 1.31* 1.31*   < > 1.33*   CALCIUM mg/dL 9.3 9.5 9.1   < > 9.0   BILIRUBIN mg/dL  --  0.5  --   --  0.6   ALK PHOS U/L  --  91  --   --  82   ALT (SGPT) U/L  --  15  --   --  16   AST (SGOT) U/L  --  12  --   --  14   GLUCOSE mg/dL 145* 218* 219*   < > 187*    < > = values in this interval not displayed.      Latest Reference Range & Units 05/23/23 14:29   Hemoglobin A1C 4.80 - 5.60 % 9.20 (H)   Total Cholesterol 0 - 200 mg/dL 155   HDL Cholesterol 40 - 60 mg/dL 41   LDL  Cholesterol  0 - 100 mg/dL 81   Triglycerides 0 - 150 mg/dL 197 (H)   (H): Data is abnormally high     Latest Reference Range & Units 05/31/23 08:03   25 Hydroxy, Vitamin D 30.0 - 100.0 ng/ml 20.0 (L)   (L): Data is abnormally low  ASSESSMENT and PLAN    Acute ischemic left MCA stroke    Status post CVA-Scattered  punctate restricted diffusion acute infarct in the bilateral frontal and   left parietal occipital cortices.        Impaired Cognition/impaired mobility/impaired self-care     Aphasia-resolved     Right hand apraxia-resolved     Encephalopathy-improving     Dysphagia/nutrition-healthy heart 2-3 g sodium, consistent carb, no mixed consistency, regular texture, nectar thick liquid  June 7-videofluoroscopic swallow study planned tomorrow  June 8-swallow study today-advance to regular solid, no mixed consistency, thin liquids by cup.  No straws.     Stroke prophylaxis-Eliquis/aspirin/atorvastatin     History of severe mitral regurgitation and annular dilatation, moderate to severe tricuspid regurgitation-  status post May 24, 2023 - 1. Mitral valve repair with a 28 mm physio II ring annuloplasty with residual mild to moderate MR  2.  Mitral valve replacement with a 29 mm Schwartz II porcine prosthesis  3.  Tricuspid valve repair with 28 mm physio tricuspid ring  4.  Left atrial appendage endocardial closure     Atrial flutter-status post cardioversion June 2, 2023 anticoagulation with Eliquis  Metoprolol     Volume status-torsemide     Severe pulmonary hypertension  Obstructive sleep apnea     Interstitial lung disease status post COVID-19 infection-steroid taper per pulmonology  DuoNebs 4 times a day  Chronic steroid use with cushingoid syndrome  Prednisone 5 mg every other day     Leukocytosis-reactive/steroid administration     Postoperative anemia     Rwkabhvnlhgqycep-gafqylkbsrw-ibiclcva     Chronic kidney disease stage III-baseline creatinine 1.2     Diabetes mellitus type 2-Jardiance/Lantus/sliding scale  insulin-uncontrolled  June 7-on Jardiance and Lantus 40 units daily.  Elevated blood glucose.  Received 21 units on sliding scale insulin yesterday.  Increase Lantus by adding 10 units nightly.     BPH/urinary retention-history of TURP    Renal insufficiency - June 9 - Creatinine increased to 1.46. Meds reviewed. Is on Jardiance - already received dose today. Will get input from Nephrology.     Pain management-tramadol-/Tylenol Dk report reviewed    Nutrition-addendum-June 9 6:43 PM-decreased appetite-would like to have zinc level checked in the morning      TEAM CONF - JUNE 8 - ENDURANCE POOR. BED MOD. TRANSFERS MOD. GAIT 25 FEET MOD ASSIST RW. SHOWER TRANSFERS MOD A.   BATH MOD ASSIST. LBD MAX. UBD MIN. TOILETING MAX.   Patient is currently on nectar liquids VFSS TODAY.   BIMS SUMMARY SCORE: 9 Moderately impaired   DIFFICULTY WITH SUSTAINED AND FOCAL ATTENTION.  CONTINENT / INCONTINENT  ELOS - 2 -3 WEEKS        Now admit for comprehensive acute inpatient rehabilitation .  This would be an interdisciplinary program with physical therapy 1 hour,  occupational therapy 1 hour, and speech therapy 1 hour, 5 days a week.  Rehabilitation nursing for carryover, monitoring of neurologic, cardiac, pulmonary, diabetes   status, bowel and bladder, and skin  Ongoing physician follow-up.  Weekly team conferences.  Goals are to achieve a level of contact-guard with  mobility and self-care and improved activity tolerance.   Rehabilitation prognosis fair.  Medical prognosis defer to cardiology.  Estimated length of stay is approximately 2 weeks, but is only an estimation.   The patient's functional status and clinical status is unchanged from preadmission assessment and the patient continues appropriate for acute inpatient rehabilitation.  Goal is for home with outpatient cardiac rehab   therapies.  Barrier to discharge: Impaired cognition mobility self-care- work on follow, executive function, conditioning, transfers,  progressive ambulation, ADLs to overcome.           Tu Silver MD      During rounds, used appropriate personal protective equipment including mask and gloves.  Additional gown if indicated.  Mask used was standard procedure mask. Appropriate PPE was worn during the entire visit.  Hand hygiene was completed before and after.

## 2023-06-09 NOTE — PROGRESS NOTES
Inpatient Rehabilitation Plan of Care Note    Plan of Care  Care Plan Reviewed - Updates as Follows    Sphincter Control    [RN] Bladder Management(Active)  Current Status(06/08/2023): Patient may be continent or incontinent of urine.  Bladder scan q shift.  Weekly Goal(06/15/2023): Patient will be continent 50 %  Discharge Goal: Patient will be continent    Performed Intervention(s)  Offer toileting/ timed voids  Monitor I&O      Safety    Performed Intervention(s)  Safety monitoring during functional activities  Environmental set- upto reduce risk      Psychosocial    Performed Intervention(s)  Allow patient to verbalize needs and concerns    Signed by: Torrie Matias RN

## 2023-06-09 NOTE — THERAPY TREATMENT NOTE
Inpatient Rehabilitation - Occupational Therapy Treatment Note    [facility]     Patient Name: Vernon Solorzano  : 1950  MRN: [id number]    Today's Date: 2023                 Admit Date: 2023         ICD-10-CM ICD-9-CM   1. Impaired functional mobility, balance, gait, and endurance  Z74.09 V49.89       Patient Active Problem List   Diagnosis    DM (diabetes mellitus), type 2    Mixed hyperlipidemia    Mild intermittent asthma without complication    New onset of congestive heart failure    Nonrheumatic mitral valve regurgitation    Chest pain    Essential hypertension    Mitral valve disease    Acute ischemic left MCA stroke       Past Medical History:   Diagnosis Date    Allergic rhinitis     Arthritis     BPH (benign prostatic hyperplasia)     Diabetes mellitus     TYPE 2    Foraminal stenosis of cervical region     C5-C6    GERD (gastroesophageal reflux disease)     Hemorrhoids     History of urinary retention     S/P TURP    Hyperlipidemia     Macular degeneration     PING (obstructive sleep apnea) 2016    MILD, SEES DR. AMARILIS MORGAN       Past Surgical History:   Procedure Laterality Date    CARDIAC CATHETERIZATION N/A 2023    Procedure: Right Heart Cath;  Surgeon: Corey Gaffney MD;  Location: [facility] CATH INVASIVE LOCATION;  Service: Cardiology;  Laterality: N/A;    CARDIAC CATHETERIZATION N/A 2023    Procedure: Left Heart Cath;  Surgeon: Corey Gaffney MD;  Location: [facility] CATH INVASIVE LOCATION;  Service: Cardiology;  Laterality: N/A;    CARDIAC CATHETERIZATION N/A 2023    Procedure: Left ventriculography;  Surgeon: Corey Gaffney MD;  Location: [facility] CATH INVASIVE LOCATION;  Service: Cardiology;  Laterality: N/A;    CARDIAC CATHETERIZATION N/A 2023    Procedure: Coronary angiography;  Surgeon: Corey Gaffney MD;  Location: [facility] CATH INVASIVE LOCATION;  Service: Cardiology;  Laterality: N/A;    COLONOSCOPY N/A     WNL PER PT, NO RECORDS,      COLONOSCOPY N/A 04/07/2009    DR. GORGE BENAVIDEZ AT [place]    MITRAL VALVE REPAIR/REPLACEMENT N/A 5/24/2023    Procedure: MITRAL VALVE REPAIR/REPLACEMENT TRICUSPID VALVE REPAIR/REPLACEMENT TRANSESOPHAGEAL ECHOCARDIOGRAM WITH ANESTHESIA;  Surgeon: George Frey MD;  Location: [facility];  Service: Cardiothoracic;  Laterality: N/A;    PROSTATE SURGERY N/A 11/12/2010    TURP, DR. BRIGHT COLLAZO AT [facility]    SKIN TAG REMOVAL N/A 12/20/2017    ANAL SKIN TAG X2, PERFORMED IN OFFICE, DR. LISA ORANTES    TURP / TRANSURETHRAL INCISION / DRAINAGE PROSTATE  2010    Dr. Goodrich             IRF OT ASSESSMENT FLOWSHEET (last 12 hours)       IRF OT Evaluation and Treatment       Row Name 06/09/23 1205          OT Time and Intention    Document Type daily treatment  -AF     Mode of Treatment occupational therapy  -AF     Patient Effort adequate  -AF     Symptoms Noted During/After Treatment fatigue  -AF       Row Name 06/09/23 1205          General Information    Patient/Family/Caregiver Comments/Observations pt sitting up in on EOB with wife present  -AF     General Observations of Patient pt did not eat much during breakfast this morning  -AF     Existing Precautions/Restrictions fall;cardiac;sternal  no lifting greater than 10LBS x 6 weeks post surgery  -AF       Row Name 06/09/23 1205          Pain Assessment    Pretreatment Pain Rating 0/10 - no pain  -AF     Posttreatment Pain Rating 0/10 - no pain  -AF       Row Name 06/09/23 1205          Cognition/Psychosocial    Affect/Mental Status (Cognition) flat/blunted affect;low arousal/lethargic  -AF     Orientation Status (Cognition) oriented to;person;place;situation  -AF     Follows Commands (Cognition) follows one-step commands;delayed response/completion;increased processing time needed;repetition of directions required  -AF     Personal Safety Interventions fall prevention program maintained;gait belt;nonskid shoes/slippers when out of bed  -AF     Cognitive  Function attention deficit;executive function deficit  -AF     Attention Deficit (Cognition) distractible in noisy environment;focused/sustained attention  -AF       Row Name 06/09/23 1205          Bathing    [place] Level (Bathing) bathing skills;lower body;upper body;minimum assist (75% patient effort);verbal cues  -AF     Assistive Device (Bathing) grab bar/tub rail;hand held shower spray hose;shower chair  -AF     Position (Bathing) supported sitting;supported standing  -AF     Comment (Bathing) fatigued, increased time and verbal prompting for sequencing and iniation  -AF       Row Name 06/09/23 1205          Upper Body Dressing    [place] Level (Upper Body Dressing) upper body dressing skills;minimum assist (75% or more patient effort)  -AF     Position (Upper Body Dressing) supported sitting  -AF       Row Name 06/09/23 1205          Lower Body Dressing    [place] Level (Lower Body Dressing) doff;don;pants/bottoms;shoes/slippers;socks;maximum assist (25% patient effort)  -AF     Position (Lower Body Dressing) supported sitting;supported standing  -AF     Comment (Lower Body Dressing) knee high TEDs  -AF       Row Name 06/09/23 1205          Grooming    [place] Level (Grooming) wash face, hands;oral care regimen;grooming skills;deodorant application;set up  -AF     Position (Grooming) supported sitting  -AF     Comment (Grooming) w/c level  -AF       Row Name 06/09/23 1205          Functional Mobility    Functional Mobility- Comment pt walked from EOB to shower chair with RWX MIN with increased time  -AF       Row Name 06/09/23 1205          Transfer Assessment/Treatment    Transfers shower transfer  -AF       Row Name 06/09/23 1205          Sit-Stand Transfer    Sit-Stand [place] (Transfers) minimum assist (75% patient effort);verbal cues  -AF     Assistive Device (Sit-Stand Transfers) walker, front-wheeled  -AF     Comment, (Sit-Stand Transfer) from EOB  -AF       Row Name 06/09/23  1205          Stand-Sit Transfer    Stand-Sit [place] (Transfers) minimum assist (75% patient effort);contact guard;verbal cues  -AF     Assistive Device (Stand-Sit Transfers) walker, front-wheeled  -AF       Row Name 06/09/23 1205          Shower Transfer    Type (Shower Transfer) sit-stand;stand-sit  -AF     [place] Level (Shower Transfer) minimum assist (75% patient effort);verbal cues  -AF     Assistive Device (Shower Transfer) shower chair;grab bar, tub/shower;walker, front-wheeled  -AF       Row Name 06/09/23 1205          Motor Skills    Functional Endurance poor kept eyes closed most of the time states that he felt out of breath  -AF       Row Name 06/09/23 1205          Balance    Static Sitting Balance standby assist  -AF     Dynamic Sitting Balance contact guard  -AF     Static Standing Balance minimal assist;contact guard;verbal cues  -AF       Row Name 06/09/23 1205          Positioning and Restraints    Pre-Treatment Position in bed  seated on EOB  -AF     Post Treatment Position wheelchair  -AF     In Wheelchair sitting;exit alarm on;with PT  -AF               User Key  (r) = Recorded By, (t) = Taken By, (c) = Cosigned By      Initials Name Effective Dates    AF Candy Davis OTR 06/16/21 -                              OT Recommendation and Plan                         Time Calculation:      Time Calculation- OT       Row Name 06/09/23 1216             Time Calculation- OT    OT Start Time 0830  -AF      OT Stop Time 0930  -AF      OT Time Calculation (min) 60 min  -AF                User Key  (r) = Recorded By, (t) = Taken By, (c) = Cosigned By      Initials Name Provider Type    Candy Clark OTR Occupational Therapist                  Therapy Charges for Today       Code Description Service Date Service Provider Modifiers Qty    69732153110 HC OT SELF CARE/MGMT/TRAIN EA 15 MIN 6/8/2023 Candy Davis OTR GO 2    89179035934 HC OT THER PROC EA 15 MIN 6/8/2023 Candy Davis OTR GO  2    [id number]  OT SELF CARE/MGMT/TRAIN EA 15 MIN 6/9/2023 Candy Davis, OTR GO 4                     HI Wyman  6/9/2023

## 2023-06-09 NOTE — THERAPY TREATMENT NOTE
Inpatient Rehabilitation - Physical Therapy Treatment Note       [facility]     Patient Name: Vernon Solorzano  : 1950  MRN: [id number]    Today's Date: 2023                    Admit Date: 2023      Visit Dx:     ICD-10-CM ICD-9-CM   1. Impaired functional mobility, balance, gait, and endurance  Z74.09 V49.89       Patient Active Problem List   Diagnosis    DM (diabetes mellitus), type 2    Mixed hyperlipidemia    Mild intermittent asthma without complication    New onset of congestive heart failure    Nonrheumatic mitral valve regurgitation    Chest pain    Essential hypertension    Mitral valve disease    Acute ischemic left MCA stroke       Past Medical History:   Diagnosis Date    Allergic rhinitis     Arthritis     BPH (benign prostatic hyperplasia)     Diabetes mellitus     TYPE 2    Foraminal stenosis of cervical region     C5-C6    GERD (gastroesophageal reflux disease)     Hemorrhoids     History of urinary retention     S/P TURP    Hyperlipidemia     Macular degeneration     PING (obstructive sleep apnea) 2016    MILD, SEES DR. AMARILIS MORGAN       Past Surgical History:   Procedure Laterality Date    CARDIAC CATHETERIZATION N/A 2023    Procedure: Right Heart Cath;  Surgeon: Corey Gaffney MD;  Location: [facility] CATH INVASIVE LOCATION;  Service: Cardiology;  Laterality: N/A;    CARDIAC CATHETERIZATION N/A 2023    Procedure: Left Heart Cath;  Surgeon: Corey Gaffney MD;  Location: [facility] CATH INVASIVE LOCATION;  Service: Cardiology;  Laterality: N/A;    CARDIAC CATHETERIZATION N/A 2023    Procedure: Left ventriculography;  Surgeon: Corey Gaffney MD;  [facility];  Service: Cardiology;  Laterality: N/A;    CARDIAC CATHETERIZATION N/A 2023    Procedure: Coronary angiography;  Surgeon: Corey Gaffney MD;  Location: [facility] CATH INVASIVE LOCATION;  Service: Cardiology;  Laterality: N/A;    COLONOSCOPY N/A     WNL PER PT, NO  RECORDS,     COLONOSCOPY N/A 04/07/2009    DR. GORGE BENAVIDEZ AT [place]    MITRAL VALVE REPAIR/REPLACEMENT N/A 5/24/2023    Procedure: MITRAL VALVE REPAIR/REPLACEMENT TRICUSPID VALVE REPAIR/REPLACEMENT TRANSESOPHAGEAL ECHOCARDIOGRAM WITH ANESTHESIA;  Surgeon: George Frey MD;  Location: [facility];  Service: Cardiothoracic;  Laterality: N/A;    PROSTATE SURGERY N/A 11/12/2010    TURP, DR. BRIGHT COLLAZO AT [facility]    SKIN TAG REMOVAL N/A 12/20/2017    ANAL SKIN TAG X2, PERFORMED IN OFFICE, DR. LISA ORANTES    TURP / TRANSURETHRAL INCISION / DRAINAGE PROSTATE  2010    Dr. Goodrich       PT ASSESSMENT (last 12 hours)       IRF PT Evaluation and Treatment       Row Name 06/09/23 1119          PT Time and Intention    Document Type daily treatment  -MD     Mode of Treatment physical therapy  -MD     Patient/Family/Caregiver Comments/Observations Pt sitting in WC showing no signs of acute distress.  Pt c/o [place]zien[place] w standing and ambulating.  VS documented below  -MD       Row Name 06/09/23 1119          Pain Assessment    Pretreatment Pain Rating 0/10 - no pain  -MD       Row Name 06/09/23 1119          Cognition/Psychosocial    Orientation Status (Cognition) oriented to;person;place;situation  -MD     Follows Commands (Cognition) follows one-step commands;delayed response/completion;increased processing time needed;repetition of directions required  -MD     Personal Safety Interventions fall prevention program maintained;gait belt  -MD       Row Name 06/09/23 1119          Bed Mobility    Sit-Supine [place] (Bed Mobility) verbal cues;standby assist  -MD       Row Name 06/09/23 1119          Bed-Chair Transfer    Bed-Chair [place] (Transfers) verbal cues;contact guard  -MD     Assistive Device (Bed-Chair Transfers) wheelchair;walker, front-wheeled  -MD       Row Name 06/09/23 1119          Chair-Bed Transfer    Chair-Bed [place] (Transfers) verbal cues;minimum assist (75% patient effort)   "-MD     Assistive Device (Chair-Bed Transfers) wheelchair  -MD       Row Name 06/09/23 1119          Sit-Stand Transfer    Sit-Stand [place] (Transfers) verbal cues;contact guard;minimum assist (75% patient effort)  -MD     Assistive Device (Sit-Stand Transfers) walker, front-wheeled;wheelchair  -MD       Row Name 06/09/23 1119          Stand-Sit Transfer    Stand-Sit [place] (Transfers) verbal cues;contact guard;minimum assist (75% patient effort)  -MD     Assistive Device (Stand-Sit Transfers) walker, front-wheeled;wheelchair  -MD       Row Name 06/09/23 1119          Gait/Stairs (Locomotion)    [place] Level (Gait) verbal cues;minimum assist (75% patient effort)  -MD     Assistive Device (Gait) walker, front-wheeled  -MD     Distance in Feet (Gait) 40'x3  -MD     Deviations/Abnormal Patterns (Gait) base of support, wide;festinating/shuffling;stride length decreased;weight shifting decreased  -MD     Bilateral Gait Deviations forward flexed posture;heel strike decreased  -MD     Gait Assessment/Intervention Decreased gait speed.  Max VC to PLB w ambulation.  -MD     Comment, (Gait/Stairs) Pt PM pt demonstrated increase step length and increase gait speed as compared to this am.  -MD       Row Name 06/09/23 1119          Motor Skills    Additional Documentation Advanced Stepping/Walking Interventions (Group)  -MD       Row Name 06/09/23 1119          Advanced Stepping/Walking Interventions    Stepping/Walking Interventions box stepping  -MD     Box Stepping (Stepping/Walking Interventions) toe taps on 5\" step x10 reps w CGA-Min A  -MD       Row Name 06/09/23 1119          Positioning and Restraints    Pre-Treatment Position sitting in chair/recliner  -MD     Post Treatment Position wheelchair  -MD     In Wheelchair with OT;exit alarm on  -MD       Row Name 06/09/23 1119          Vital Signs    Intratreatment Heart Rate (beats/min) 100  -MD     Posttreatment Heart Rate (beats/min) 97  -MD     Intra " SpO2 (%) 94  -MD     O2 Delivery Intra Treatment room air  -MD     Post SpO2 (%) 97  -MD     O2 Delivery Post Treatment room air  -MD               User Key  (r) = Recorded By, (t) = Taken By, (c) = Cosigned By      Initials Name Provider Type    Damaris Farrell, PT Physical Therapist                  Wound 05/24/23 0737 sternal Incision (Active)   Dressing Appearance open to air 06/09/23 0717   Closure Approximated 06/09/23 0717   Base clean;dry;scab 06/09/23 0717   Periwound dry;intact 06/09/23 0717   [place] Temperature warm 06/09/23 0717   [place] Skin Turgor soft 06/09/23 0717   Drainage Amount none 06/09/23 0717   Care, Wound cleansed with;other (see comments) 06/08/23 1959   Dressing Care open to air 06/09/23 0717     Physical Therapy Education       Title: PT OT SLP Therapies (Done)       Topic: Physical Therapy (Done)       Point: Mobility training (Done)       Learning Progress Summary             Patient Acceptance, E, VU by MD at 6/8/2023 1017    Acceptance, E,TB,D, VU,DU,NR by  at 6/7/2023 1513    Comment: POC, Goals, safety with mobility.   Significant Other Acceptance, E,TB,D, VU,DU,NR by  at 6/7/2023 1513    Comment: POC, Goals, safety with mobility.                         Point: Home exercise program (Done)       Learning Progress Summary             Patient Acceptance, E,TB,D, VU,DU,NR by  at 6/7/2023 1513    Comment: POC, Goals, safety with mobility.   Significant Other Acceptance, E,TB,D, VU,DU,NR by  at 6/7/2023 1513    Comment: POC, Goals, safety with mobility.                                         User Key       Initials Effective Dates Name Provider Type Discipline    MD 06/16/21 -  Damaris Francis, PT Physical Therapist PT    KP 06/16/21 -  Elizabeth Levin PT Physical Therapist PT                    PT Recommendation and Plan                          Time Calculation:      PT Charges       Row Name 06/09/23 1319 06/09/23 0946          Time Calculation    Start Time 1300  -MD 0930   -MD     Stop Time 1330  -MD 1000  -MD     Time Calculation (min) 30 min  -MD 30 min  -MD     PT Received On -- 06/09/23  -MD     PT - Next Appointment -- 06/10/23  -MD               User Key  (r) = Recorded By, (t) = Taken By, (c) = Cosigned By      Initials Name Provider Type    Damaris Farrell, PT Physical Therapist                    Therapy Charges for Today       Code Description Service Date Service Provider Modifiers Qty    64284848814 HC PT THER PROC EA 15 MIN 6/8/2023 Damaris Francis, PT GP 1    92730259178 HC GAIT TRAINING EA 15 MIN 6/8/2023 Damaris Francis, PT GP 1    42308834136 HC PT THERAPEUTIC ACT EA 15 MIN 6/8/2023 Damaris Francis, PT GP 2    94498556846 HC PT THER SUPP EA 15 MIN 6/8/2023 Damaris Francis, PT GP 2    52814058356 HC GAIT TRAINING EA 15 MIN 6/9/2023 Damaris Francis, PT GP 2    67265739129 HC PT THER PROC EA 15 MIN 6/9/2023 Damaris Francis, PT GP 1    98901893790 HC PT THERAPEUTIC ACT EA 15 MIN 6/9/2023 Damaris Francis, PT GP 1              PT G-Codes  AM-PAC 6 Clicks Score (PT): 14      Damaris Francis, NANETTE  6/9/2023

## 2023-06-09 NOTE — THERAPY TREATMENT NOTE
Inpatient Rehabilitation - Speech Language Pathology Treatment Note    [facility]     Patient Name: Vernon Solorzano  : 1950  MRN: [id number]    Today's Date: 2023                   Admit Date: 2023       Visit Dx:      ICD-10-CM ICD-9-CM   1. Impaired functional mobility, balance, gait, and endurance  Z74.09 V49.89       Patient Active Problem List   Diagnosis   • DM (diabetes mellitus), type 2   • Mixed hyperlipidemia   • Mild intermittent asthma without complication   • New onset of congestive heart failure   • Nonrheumatic mitral valve regurgitation   • Chest pain   • Essential hypertension   • Mitral valve disease   • Acute ischemic left MCA stroke       Past Medical History:   Diagnosis Date   • Allergic rhinitis    • Arthritis    • BPH (benign prostatic hyperplasia)    • Diabetes mellitus     TYPE 2   • Foraminal stenosis of cervical region     C5-C6   • GERD (gastroesophageal reflux disease)    • Hemorrhoids    • History of urinary retention 2010    S/P TURP   • Hyperlipidemia    • Macular degeneration    • PING (obstructive sleep apnea) 2016    MILD, SEES DR. AMARILIS MORGAN       Past Surgical History:   Procedure Laterality Date   • CARDIAC CATHETERIZATION N/A 2023    Procedure: Right Heart Cath;  Surgeon: Corey Gaffney MD;  Location: [facility] CATH INVASIVE LOCATION;  Service: Cardiology;  Laterality: N/A;   • CARDIAC CATHETERIZATION N/A 2023    Procedure: Left Heart Cath;  Surgeon: Corey Gaffney MD;  Location: [facility] CATH INVASIVE LOCATION;  Service: Cardiology;  Laterality: N/A;   • CARDIAC CATHETERIZATION N/A 2023    Procedure: Left ventriculography;  Surgeon: Corey Gaffney MD;  Location: [facility] C[facility] INVASIVE LOCATION;  Service: Cardiology;  Laterality: N/A;   • CARDIAC CATHETERIZATION N/A 2023    Procedure: Coronary angiography;  Surgeon: Corey Gaffney MD;  Location:  [facility] CATH INVASIVE LOCATION;  Service: Cardiology;  Laterality: N/A;   •  COLONOSCOPY N/A     WNL PER PT, NO RECORDS,    • COLONOSCOPY N/A 04/07/2009    DR. GORGE BENAVIDEZ AT [place]   • MITRAL VALVE REPAIR/REPLACEMENT N/A 5/24/2023    Procedure: MITRAL VALVE REPAIR/REPLACEMENT TRICUSPID VALVE REPAIR/REPLACEMENT TRANSESOPHAGEAL ECHOCARDIOGRAM WITH ANESTHESIA;  Surgeon: George Frey MD;  Location: [facility];  Service: Cardiothoracic;  Laterality: N/A;   • PROSTATE SURGERY N/A 11/12/2010    TURP, DR. BRIGHT COLLAZO AT [facility]   • SKIN TAG REMOVAL N/A 12/20/2017    ANAL SKIN TAG X2, PERFORMED IN OFFICE, DR. LISA ORANTES   • TURP / TRANSURETHRAL INCISION / DRAINAGE PROSTATE  2010    Dr. Goodrich       SLP Recommendation and Plan                                                            SLP EVALUATION (last 72 hours)     SLP SLC Evaluation     Row Name 06/09/23 1440 06/09/23 1400 06/09/23 0800 06/07/23 1000          Communication Assessment/Intervention    Document Type -- therapy note (daily note)  -SR therapy note (daily note)  -SR therapy note (daily note)  -TH     Subjective Information -- no complaints  -SR no complaints  -SR no complaints  -TH     Patient Observations -- alert;cooperative;agree to therapy  -SR alert;cooperative;agree to therapy  -SR --     Patient/Family/Caregiver Comments/Observations -- -- Pt seated edge of bed with breakfast tray  -SR Patients wife was present for sesison  -TH     Patient Effort -- adequate  -SR good  -SR fair  -TH     Symptoms Noted During/After Treatment -- none  -SR none  -SR --        General Information    Patient Profile Reviewed -- -- -- yes  -TH     Pertinent History Of Current Problem -- -- -- Scattered punctate restricted diffusion acute infarct in the bilateral   frontal and left parietal occipital cortices; s/p MVR, TVR, and left atrial  appendage closure  on 5/24 with post op complications  -TH        Pain Scale: Numbers Pre/Post-Treatment    Pretreatment Pain Rating -- 0/10 - no pain  -SR 0/10 - no pain  -SR 0/10 - no pain   -TH     Posttreatment Pain Rating -- 0/10 - no pain  -SR 0/10 - no pain  -SR 0/10 - no pain  -TH        Cognitive Assessment Intervention- SLP    Cognitive Function (Cognition) -- -- -- severe impairment  -TH     Memory (Cognitive) -- -- -- severe impairment  -TH     Attention (Cognitive) -- -- -- moderate impairment;severe impairment  -TH        Standardized Tests    Cognitive/Memory Tests -- -- -- CLQT: Cognitive Linguistic Quick Test  -TH        CLQT (The Cognitive Linguistic Quick Test)    Attention Domain Score 71  -SR -- -- --     Attention Severity Rating 2: Moderate  -SR -- -- --     Memory Domain Score 102  -SR -- -- --     Memory Severity Rating 2: Moderate  -SR -- -- --     Executive Function Domain Score 13  -SR -- -- --     Executive Function Severity Rating 2: Moderate  -SR -- -- --     Language Domain Score 25  -SR -- -- --     Language Severity Rating 3: Mild  -SR -- -- --     Visuospatial Domain Score 35  -SR -- -- --     Visuospatial Severity Rating 2: Moderate  -SR -- -- --     Clock Drawing Total Score 6  -SR -- -- --     Clock Drawing Severity Rating Severe  -SR -- -- --     Composite Severity Rating 2.2  -SR -- -- --     Composite Severity Rating Range 2.4 - 1.5: Moderate  -SR -- -- --     CLQT Comments Completed last 4 subtests of CLQT this date. Patient scored an overall composite score of 2.2/4.0 indicating moderate cognitive impairment. Slow processing/initiation noted throughout assessment. Required verbal cue x1 at the end of the session to stay awake. The results of the assessment were as followed: Attention - Moderate, Memory - Moderate, Executive Functions - Moderate, Language -Mild, Visuospatial skills -Moderate, and Clock Drawing - Severe. Demonstrated increased difficulty with planning/organization during mazes and design generation subtests. Recommend ongoing therapy to target cognitive skills to increase independence following discharge.  -SR -- -- Patient seen for cognitive  linguistic evaluation on this date. Attempted to complete the CLQT, however, unable to complete 2/2 time constraints. He scored the following with completed portions; personal facts 8/8; symbol cancelation 4/12; confrontational naming 10/10; clock drawing 6/13; story retelling 3/10; symbol trails 2/10. Patient presents with difficulty with sustained and focused attention and impulsivity. He also presents with decreased insight into deficits. At this time recommend patient continues speech therapy services targeting ongoing diagnostic therapy targeting cognitive linguistic skills as well as dysphagia therapy.  -        SLP Evaluation Clinical Impressions    SLP Diagnosis -- -- -- moderate-severe;cognitive-linguistic disorder  -     Rehab Potential/Prognosis -- -- -- good  -[facility] Criteria for Skilled Therapy Interventions Met -- -- -- yes  -        Recommendations    Therapy Frequency (SLP SLC) -- -- -- 5 days per week  -     Predicted Duration Therapy Intervention (Days) -- -- -- until discharge  [facility]     Anticipated Discharge Disposition (SLP) -- -- -- home with assist  [facility] Diagnostic Follow-Up Needed -- -- -- cognitive-linguistic;higher-level cognitive-linguistic  -        Communication Treatment Objective and Progress Goals ([facility])    [facility] LTGs -- -- -- Patient will demonstrate functional cognitive-linguistic skills for return to discharge environment  -[facility] STGs -- -- -- Additional Goals (Group)  -     Diagnostic Treatment Objective -- -- -- Diagnostic Treatment Objectives (Group)  [facility]     Cognitive Linguistic Treatment Objectives -- -- -- Cognitive Linguistic Treatment Objectives (Group)  [facility]        Patient will demonstrate functional cognitive-linguistic skills for return to discharge environment    [place] -- -- -- with moderate cues  -     Time frame -- -- -- by discharge  -        Diagnostic Treatment Objectives    Diagnostic Treatment Objective Selection -- -- --  Diagnostic Treatment Objectives  -TH        [facility] Diagnostic Treatment     Patient will participate in further assessment in the following areas -- -- -- higher-level cognitive-linguistic;cognitive-linguistic  visual scanning tasks  -TH     Time Frame (Diagnostic) -- -- -- 2 weeks  -TH        Cognitive Linguistic Treatment Objectives    Attention Selection -- -- -- attention, [facility] goal 1  -TH     Orientation Selection -- -- -- orientation, SLP goal 1  -TH     Memory Skills Selection -- -- -- memory skills, SLP goal 1  -TH        Attention Goal 1 (SLP)    Improve Attention by Goal 1 (SLP) -- -- -- complete selective attention task;complete sustained attention task;70%;with moderate cues (50-74%)  -TH     Time Frame (Attention Goal 1, SLP) -- -- -- by discharge  -        Orientation Goal 1 (SLP)    Improve Orientation Through Goal 1 (SLP) -- -- -- demonstrating orientation to day;demonstrating orientation to month;demonstrating orientation to year;demonstrating orientation to place;demonstrating orientation to disease/impairment;use environmental aids to assist with orientation;80%;with minimal cues (75-90%)  -TH     Time Frame (Orientation Goal 1, SLP) -- -- -- by discharge  -        Memory Skills Goal 1 (SLP)    Improve Memory Skills Through Goal 1 (SLP) -- -- -- recalling related word lists immediately;recall details of the day;use memory strategies;use written schedule;use external memory aid;listen to a paragraph and answer questions;visual memory task;repeat list in sequential order;select a word from a list by exclusion;recalling unrelated word lists immediately;recalling related word lists with an imposed delay;recalling unrelated word lists with an imposed delay;recall details from a word list;read a paragraph and answer questions;with moderate cues (50-74%);70%  -TH     Time Frame (Memory Skills Goal 1, SLP) -- -- -- by discharge  -TH        [facility]    Additional Goal Selection -- -- -- additional goal,  SLP goal 1  -TH           User Key  (r) = Recorded By, (t) = Taken By, (c) = Cosigned By    Initials Name Effective Dates    TH Paty Humphries 06/16/21 -     SR Latisha Morris CCC-SLP 11/10/22 -                    EDUCATION    The patient has been educated in the following areas:       Cognitive Impairment Communication Impairment Dysphagia (Swallowing Impairment) Oral Care/Hydration.             SLP GOALS     Row Name 06/09/23 1400 06/09/23 0800 06/08/23 1230       (LTG) Patient will demonstrate progress toward functional swallow for    Diet Texture (Demonstrate progress toward functional swallow) regular textures  -SR regular textures  -SR regular textures  -SR    Liquid viscosity (Demonstrate progress toward functional swallow) thin liquids  -SR thin liquids  -SR thin liquids  -SR    [place] (Demonstrate progress towards functional swallow) independently (over 90% accuracy)  -SR independently (over 90% accuracy)  -SR independently (over 90% accuracy)  -SR    Time Frame (Demonstrate progress toward functional swallow) by discharge  -SR by discharge  -SR by discharge  -SR    Progress/Outcomes (Demonstrate progress toward functional swallow) good progress toward goal  -SR good progress toward goal  -SR new goal  -SR    Comment (Demonstrate progress toward functional swallow) Reviewed images of VFSS with patient. Provided education regarding results of study, diet recommendations, and aspiration risk. Patient demonstrated understanding of results. Agreeable to use swallow strategies: small single sips, slow rate, alternate solids with liquids.  -SR Patient seated edge of bed with breakfast tray for session. SLP provided education regarding VFSS results and recommendations. Patient agreeable to use safe swallow strategies during meals (i.e, small bites/sips, no straw, single sips, alternate solids with liquids). Updated white board in patient's room. Prolonged mastication at times with regular/soft solids.  Mild oral residue post swallow; cleared with liquid wash. Throughout the meal, patient tolerated 8/10 sips of thin via cup with no overt s/sx of aspiration or penetration. Throat clear x2 (suspect due to consecutive sips). Patient required verbal reminders throughout meals to take small single sips. Decreased initiation noted throughout. Ongoing education recommended to ensure patient utilizes safe swallow strategies during meals. ST to continue to follow.  -SR --       (STG) Pharyngeal Strengthening Exercise Goal 1 (SLP)    Activity (Pharyngeal Strengthening Goal 1, SLP) -- -- increase timing;increase closure at entrance to airway/closure of airway at glottis  -SR    Increase Timing -- -- hard effortful swallow;[place]  -SR    Increase Closure at Entrance to Airway/Closure of Airway at Glottis -- -- hard effortful swallow;chin tuck against resistance (CTAR);sarita  -SR    [place]/Accuracy (Pharyngeal Strengthening Goal 1, SLP) -- -- with minimal cues (75-90% accuracy)  -SR    Progress/Outcomes (Pharyngeal Strengthening Goal 1, SLP) -- -- new goal  -SR       Executive Functional Skills Goal 1 (SLP)    Improve Executive Function Skills Goal 1 (SLP) demonstrate awareness of deficit;home management activity;80%;with moderate cues (50-74%)  -SR -- --    Time Frame (Executive Function Skills Goal 1, SLP) by discharge  -SR -- --    Progress/Outcomes (Executive Function Skills Goal 1, SLP) new goal  -SR -- --    Comment (Executive Function Skills Goal 1, SLP) Completed last 4 subtests of CLQT this date. Patient scored an overall composite score of 2.2/4.0 indicating moderate cognitive impairment. Slow processing/initiation noted throughout assessment. Required verbal cue x1 at the end of the session to stay awake. The results of the assessment were as followed: Attention - Moderate, Memory - Moderate, Executive Functions - Moderate, Language -Mild, Visuospatial skills -Moderate, and Clock Drawing - Severe.  Demonstrated increased difficulty with planning/organization during mazes and design generation subtests. Recommend ongoing therapy to target cognitive skills to increase independence following discharge.  -SR -- --    Row Name 06/07/23 1200 06/07/23 1000          (LTG) Patient will demonstrate progress toward functional swallow for    Diet Texture (Demonstrate progress toward functional swallow) regular textures  -TH --     Liquid viscosity (Demonstrate progress toward functional swallow) thin liquids  -TH --     Delaplane (Demonstrate progress towards functional swallow) independently (over 90% accuracy)  -TH --     Time Frame (Demonstrate progress toward functional swallow) by discharge  -TH --        ([facility]) Pharyngeal Strengthening Exercise Goal 1 (SLP)    Activity (Pharyngeal Strengthening Goal 1, SLP) increase closure at entrance to airway/closure of airway at glottis  -TH --     Increase Closure at Entrance to Airway/Closure of Airway at Glottis hard effortful swallow;chin tuck against resistance (CTAR);sarita  -TH --     Delaplane/Accuracy (Pharyngeal Strengthening Goal 1, SLP) with minimal cues (75-90% accuracy)  -TH --     Time Frame (Pharyngeal Strengthening Goal 1, SLP) by discharge  -TH --        Patient will demonstrate functional cognitive-linguistic skills for return to discharge environment    Delaplane -- with moderate cues  -TH     Time frame -- by discharge  -TH        Attention Goal 1 (SLP)    Improve Attention by Goal 1 (SLP) -- complete selective attention task;complete sustained attention task;70%;with moderate cues (50-74%)  -TH     Time Frame (Attention Goal 1, SLP) -- by discharge  -TH        Orientation Goal 1 (SLP)    Improve Orientation Through Goal 1 (SLP) -- demonstrating orientation to day;demonstrating orientation to month;demonstrating orientation to year;demonstrating orientation to place;demonstrating orientation to disease/impairment;use environmental aids to assist with  orientation;80%;with minimal cues (75-90%)  -TH     Time Frame (Orientation Goal 1, SLP) -- by discharge  -TH        Memory Skills Goal 1 (SLP)    Improve Memory Skills Through Goal 1 (SLP) -- recalling related word lists immediately;recall details of the day;use memory strategies;use written schedule;use external memory aid;listen to a paragraph and answer questions;visual memory task;repeat list in sequential order;select a word from a list by exclusion;recalling unrelated word lists immediately;recalling related word lists with an imposed delay;recalling unrelated word lists with an imposed delay;recall details from a word list;read a paragraph and answer questions;with moderate cues (50-74%);70%  -TH     Time Frame (Memory Skills Goal 1, SLP) -- by discharge  -TH           User Key  (r) = Recorded By, (t) = Taken By, (c) = Cosigned By    Initials Name Provider Type     Paty Humphries Speech and Language Pathologist     Latisha Morris CCC-SLP Speech and Language Pathologist                            Time Calculation:        Time Calculation- SLP     Row Name 06/09/23 1540 06/09/23 0806          Time Calculation- SLP    SLP Start Time 1400  -SR 0730  -SR     SLP Stop Time 1430  -SR 0800  -SR     SLP Time Calculation (min) 30 min  -SR 30 min  -SR           User Key  (r) = Recorded By, (t) = Taken By, (c) = Cosigned By    Initials Name Provider Type     Latisha Morris CCC-SLP Speech and Language Pathologist                  Therapy Charges for Today     Code Description Service Date Service Provider Modifiers Qty    17046867079 HC ST MOTION FLUORO EVAL SWALLOW 5 6/8/2023 Latisha Morris CCC-SLP GN 1    97314210940 HC ST TREATMENT SWALLOW 3 6/9/2023 Latisha Morris CCC-SLP GN 1    [id number] HC ST DEV OF COGN SKILLS INITIAL 15 MIN 6/9/2023 Latisha Morris CCC-SLP  1                           JENNIFFER Hobbs  6/9/2023

## 2023-06-09 NOTE — PROGRESS NOTES
Results from last 7 days   Lab Units 06/09/23  0830 06/06/23  0943 06/05/23  0305   SODIUM mmol/L 141 132* 134*   POTASSIUM mmol/L 3.7 4.1 4.1   CHLORIDE mmol/L 98 94* 94*   CO2 mmol/L 26.5 25.8 27.7   BUN mg/dL 21 22 22   CREATININE mg/dL 1.46* 1.31* 1.31*   GLUCOSE mg/dL 145* 218* 219*   CALCIUM mg/dL 9.3 9.5 9.1     Creatinine worsening. Is on Jardiance. Already received dose today.  Consult Nephrology for input.

## 2023-06-09 NOTE — PLAN OF CARE
"Goal Outcome Evaluation:  Plan of Care Reviewed With: patient        Progress: improving  Outcome Evaluation: Pain med for midsternal, BUE, headache pain. Meds with thin liquids per pt request. Betadine applied to midsternal incision and upper abd. scabbed puncture sites. Sits up side of bed at times, reminders given not to get up OOB without assist. Stands to use urinal with walker, assist 1 or 2. Continent bladder so far tonight. Void per urinal x2 so far with bladder scan pvr 426cc. No c/o bladder discomfort. Delayed response at times. Dr. Hudson notified of pt c/o \"a little SOA\"during the night, order received for prn Duoneb with relief voiced.         "

## 2023-06-10 LAB
ANION GAP SERPL CALCULATED.3IONS-SCNC: 9.4 MMOL/L (ref 5–15)
BUN SERPL-MCNC: 17 MG/DL (ref 8–23)
BUN/CREAT SERPL: 13.3 (ref 7–25)
CALCIUM SPEC-SCNC: 9.2 MG/DL (ref 8.6–10.5)
CHLORIDE SERPL-SCNC: 97 MMOL/L (ref 98–107)
CO2 SERPL-SCNC: 28.6 MMOL/L (ref 22–29)
CREAT SERPL-MCNC: 1.28 MG/DL (ref 0.76–1.27)
EGFRCR SERPLBLD CKD-EPI 2021: 58.7 ML/MIN/1.73
GLUCOSE BLDC GLUCOMTR-MCNC: 103 MG/DL (ref 70–130)
GLUCOSE BLDC GLUCOMTR-MCNC: 172 MG/DL (ref 70–130)
GLUCOSE BLDC GLUCOMTR-MCNC: 194 MG/DL (ref 70–130)
GLUCOSE BLDC GLUCOMTR-MCNC: 256 MG/DL (ref 70–130)
GLUCOSE SERPL-MCNC: 226 MG/DL (ref 65–99)
POTASSIUM SERPL-SCNC: 4.1 MMOL/L (ref 3.5–5.2)
SODIUM SERPL-SCNC: 135 MMOL/L (ref 136–145)

## 2023-06-10 PROCEDURE — 94761 N-INVAS EAR/PLS OXIMETRY MLT: CPT

## 2023-06-10 PROCEDURE — 63710000001 INSULIN LISPRO (HUMAN) PER 5 UNITS: Performed by: PHYSICAL MEDICINE & REHABILITATION

## 2023-06-10 PROCEDURE — 97110 THERAPEUTIC EXERCISES: CPT

## 2023-06-10 PROCEDURE — 97129 THER IVNTJ 1ST 15 MIN: CPT

## 2023-06-10 PROCEDURE — 82948 REAGENT STRIP/BLOOD GLUCOSE: CPT

## 2023-06-10 PROCEDURE — 97535 SELF CARE MNGMENT TRAINING: CPT

## 2023-06-10 PROCEDURE — 94799 UNLISTED PULMONARY SVC/PX: CPT

## 2023-06-10 PROCEDURE — 92526 ORAL FUNCTION THERAPY: CPT

## 2023-06-10 PROCEDURE — 80048 BASIC METABOLIC PNL TOTAL CA: CPT | Performed by: PHYSICAL MEDICINE & REHABILITATION

## 2023-06-10 PROCEDURE — 94760 N-INVAS EAR/PLS OXIMETRY 1: CPT

## 2023-06-10 PROCEDURE — 97130 THER IVNTJ EA ADDL 15 MIN: CPT

## 2023-06-10 PROCEDURE — 84630 ASSAY OF ZINC: CPT | Performed by: PHYSICAL MEDICINE & REHABILITATION

## 2023-06-10 PROCEDURE — 94664 DEMO&/EVAL PT USE INHALER: CPT

## 2023-06-10 PROCEDURE — 97116 GAIT TRAINING THERAPY: CPT

## 2023-06-10 RX ADMIN — EMPAGLIFLOZIN 25 MG: 25 TABLET, FILM COATED ORAL at 08:44

## 2023-06-10 RX ADMIN — IPRATROPIUM BROMIDE AND ALBUTEROL SULFATE 3 ML: 2.5; .5 SOLUTION RESPIRATORY (INHALATION) at 11:51

## 2023-06-10 RX ADMIN — ATORVASTATIN CALCIUM 40 MG: 20 TABLET, FILM COATED ORAL at 21:44

## 2023-06-10 RX ADMIN — GUAIFENESIN 400 MG: 200 SOLUTION ORAL at 06:04

## 2023-06-10 RX ADMIN — INSULIN GLARGINE-YFGN 10 UNITS: 100 INJECTION, SOLUTION SUBCUTANEOUS at 21:44

## 2023-06-10 RX ADMIN — APIXABAN 5 MG: 5 TABLET, FILM COATED ORAL at 21:44

## 2023-06-10 RX ADMIN — METOPROLOL SUCCINATE 25 MG: 25 TABLET, EXTENDED RELEASE ORAL at 08:44

## 2023-06-10 RX ADMIN — IPRATROPIUM BROMIDE AND ALBUTEROL SULFATE 3 ML: 2.5; .5 SOLUTION RESPIRATORY (INHALATION) at 07:25

## 2023-06-10 RX ADMIN — PANTOPRAZOLE SODIUM 40 MG: 40 TABLET, DELAYED RELEASE ORAL at 06:04

## 2023-06-10 RX ADMIN — IPRATROPIUM BROMIDE AND ALBUTEROL SULFATE 3 ML: 2.5; .5 SOLUTION RESPIRATORY (INHALATION) at 15:36

## 2023-06-10 RX ADMIN — CALCIUM CARBONATE-VITAMIN D TAB 500 MG-200 UNIT 1 TABLET: 500-200 TAB at 08:44

## 2023-06-10 RX ADMIN — INSULIN LISPRO 3 UNITS: 100 INJECTION, SOLUTION INTRAVENOUS; SUBCUTANEOUS at 11:57

## 2023-06-10 RX ADMIN — GUAIFENESIN 400 MG: 200 SOLUTION ORAL at 08:43

## 2023-06-10 RX ADMIN — INSULIN GLARGINE-YFGN 40 UNITS: 100 INJECTION, SOLUTION SUBCUTANEOUS at 08:44

## 2023-06-10 RX ADMIN — INSULIN LISPRO 3 UNITS: 100 INJECTION, SOLUTION INTRAVENOUS; SUBCUTANEOUS at 16:54

## 2023-06-10 RX ADMIN — APIXABAN 5 MG: 5 TABLET, FILM COATED ORAL at 08:44

## 2023-06-10 RX ADMIN — IPRATROPIUM BROMIDE AND ALBUTEROL SULFATE 3 ML: 2.5; .5 SOLUTION RESPIRATORY (INHALATION) at 19:54

## 2023-06-10 RX ADMIN — ASPIRIN 81 MG: 81 TABLET, CHEWABLE ORAL at 08:44

## 2023-06-10 RX ADMIN — GUAIFENESIN 400 MG: 200 SOLUTION ORAL at 14:24

## 2023-06-10 NOTE — THERAPY TREATMENT NOTE
Inpatient Rehabilitation - Speech Language Pathology Treatment Note    [facility]     Patient Name: Vernon Solorzano  : 1950  MRN: [id number]    Today's Date: 6/10/2023                   Admit Date: 2023       Visit Dx:      ICD-10-CM ICD-9-CM   1. Impaired functional mobility, balance, gait, and endurance  Z74.09 V49.89       Patient Active Problem List   Diagnosis   • DM (diabetes mellitus), type 2   • Mixed hyperlipidemia   • Mild intermittent asthma without complication   • New onset of congestive heart failure   • Nonrheumatic mitral valve regurgitation   • Chest pain   • Essential hypertension   • Mitral valve disease   • Acute ischemic left MCA stroke       Past Medical History:   Diagnosis Date   • Allergic rhinitis    • Arthritis    • BPH (benign prostatic hyperplasia)    • Diabetes mellitus     TYPE 2   • Foraminal stenosis of cervical region     C5-C6   • GERD (gastroesophageal reflux disease)    • Hemorrhoids    • History of urinary retention 2010    S/P TURP   • Hyperlipidemia    • Macular degeneration    • PING (obstructive sleep apnea) 2016    MILD, SEES DR. AMARILIS MORGAN       Past Surgical History:   Procedure Laterality Date   • CARDIAC CATHETERIZATION N/A 2023    Procedure: Right Heart Cath;  Surgeon: Corey Gaffney MD;  Location: [facility] CATH INVASIVE LOCATION;  Service: Cardiology;  Laterality: N/A;   • CARDIAC CATHETERIZATION N/A 2023    Procedure: Left Heart Cath;  Surgeon: Corey Gaffney MD;  Location: [facility] CATH INVASIVE LOCATION;  Service: Cardiology;  Laterality: N/A;   • CARDIAC CATHETERIZATION N/A 2023    Procedure: Left ventriculography;  Surgeon: Corey Gaffney MD;  Location: [facility] CATH INVASIVE LOCATION;  Service: Cardiology;  Laterality: N/A;   • CARDIAC CATHETERIZATION N/A 2023    Procedure: Coronary angiography;  Surgeon: Corey Gaffney MD;  Location:  [facility] CATH INVASIVE LOCATION;  Service: Cardiology;  Laterality: N/A;   •  COLONOSCOPY N/A     WNL PER PT, NO RECORDS,    • COLONOSCOPY N/A 04/07/2009    DR. GORGE BENAVIDEZ AT [place]   • MITRAL VALVE REPAIR/REPLACEMENT N/A 5/24/2023    Procedure: MITRAL VALVE REPAIR/REPLACEMENT TRICUSPID VALVE REPAIR/REPLACEMENT TRANSESOPHAGEAL ECHOCARDIOGRAM WITH ANESTHESIA;  Surgeon: George Frey MD;  Location: [facility];  Service: Cardiothoracic;  Laterality: N/A;   • PROSTATE SURGERY N/A 11/12/2010    TURP, DR. BRIGHT COLLAZO AT [facility]   • SKIN TAG REMOVAL N/A 12/20/2017    ANAL SKIN TAG X2, PERFORMED IN OFFICE, DR. LISA ORANTES   • TURP / TRANSURETHRAL INCISION / DRAINAGE PROSTATE  2010    Dr. Goodrich       SLP Recommendation and Plan                                                            SLP EVALUATION (last 72 hours)     SLP SLC Evaluation     Row Name 06/10/23 1300 06/09/23 1440 06/09/23 1400 06/09/23 0800          Communication Assessment/Intervention    Document Type therapy note (daily note)  -TH -- therapy note (daily note)  -SR therapy note (daily note)  -SR     Subjective Information -- -- no complaints  -SR no complaints  -SR     Patient Observations -- -- alert;cooperative;agree to therapy  -SR alert;cooperative;agree to therapy  -SR     Patient/Family/Caregiver Comments/Observations -- -- -- Pt seated edge of bed with breakfast tray  -SR     Patient Effort good  -TH -- adequate  -SR good  -SR     Symptoms Noted During/After Treatment -- -- none  -SR none  -SR        Pain Scale: Numbers Pre/Post-Treatment    Pretreatment Pain Rating -- -- 0/10 - no pain  -SR 0/10 - no pain  -SR     Posttreatment Pain Rating -- -- 0/10 - no pain  -SR 0/10 - no pain  -SR        CLQT (The Cognitive Linguistic Quick Test)    Attention Domain Score -- 71  -SR -- --     Attention Severity Rating -- 2: Moderate  -SR -- --     Memory Domain Score -- 102  -SR -- --     Memory Severity Rating -- 2: Moderate  -SR -- --     Executive Function Domain Score -- 13  -SR -- --     Executive Function  Severity Rating -- 2: Moderate  -SR -- --     Language Domain Score -- 25  -SR -- --     Language Severity Rating -- 3: Mild  -SR -- --     Visuospatial Domain Score -- 35  -SR -- --     Visuospatial Severity Rating -- 2: Moderate  -SR -- --     Clock Drawing Total Score -- 6  -SR -- --     Clock Drawing Severity Rating -- Severe  -SR -- --     Composite Severity Rating -- 2.2  -SR -- --     Composite Severity Rating Range -- 2.4 - 1.5: Moderate  -SR -- --     CLQT Comments -- Completed last 4 subtests of CLQT this date. Patient scored an overall composite score of 2.2/4.0 indicating moderate cognitive impairment. Slow processing/initiation noted throughout assessment. Required verbal cue x1 at the end of the session to stay awake. The results of the assessment were as followed: Attention - Moderate, Memory - Moderate, Executive Functions - Moderate, Language -Mild, Visuospatial skills -Moderate, and Clock Drawing - Severe. Demonstrated increased difficulty with planning/organization during mazes and design generation subtests. Recommend ongoing therapy to target cognitive skills to increase independence following discharge.  -SR -- --           User Key  (r) = Recorded By, (t) = Taken By, (c) = Cosigned By    Initials Name Effective Dates    TH Paty Humphries 06/16/21 -     SR Latisha Morris CCC-SLP 11/10/22 -                    EDUCATION    The patient has been educated in the following areas:       Cognitive Impairment Dysphagia (Swallowing Impairment).             SLP GOALS     Row Name 06/10/23 1300 06/09/23 1400 06/09/23 0800       (LTG) Patient will demonstrate progress toward functional swallow for    Diet Texture (Demonstrate progress toward functional swallow) regular textures  -TH regular textures  -SR regular textures  -SR    Liquid viscosity (Demonstrate progress toward functional swallow) thin liquids  -TH thin liquids  -SR thin liquids  -SR    Desha (Demonstrate progress towards functional  "swallow) -- independently (over 90% accuracy)  -SR independently (over 90% accuracy)  -SR    Time Frame (Demonstrate progress toward functional swallow) -- by discharge  -SR by discharge  -SR    Progress/Outcomes (Demonstrate progress toward functional swallow) -- good progress toward goal  -SR good progress toward goal  -SR    Comment (Demonstrate progress toward functional swallow) Attempted to see patient during meal time on this date, however, patient ate very little and reports \"nothing tastes good to me\" RN aware of decreased appetite and reports its been ongoing. He ate x1 bite of chicken, carrot, fruit and ice cream without any difficulty. He tolerated thin liquids without s/sx of aspiration as well. No oral residue observed. He demonstrated appropriate use of swallow precautions independently.  -TH Reviewed images of VFSS with patient. Provided education regarding results of study, diet recommendations, and aspiration risk. Patient demonstrated understanding of results. Agreeable to use swallow strategies: small single sips, slow rate, alternate solids with liquids.  -SR Patient seated edge of bed with breakfast tray for session. SLP provided education regarding VFSS results and recommendations. Patient agreeable to use safe swallow strategies during meals (i.e, small bites/sips, no straw, single sips, alternate solids with liquids). Updated white board in patient's room. Prolonged mastication at times with regular/soft solids. Mild oral residue post swallow; cleared with liquid wash. Throughout the meal, patient tolerated 8/10 sips of thin via cup with no overt s/sx of aspiration or penetration. Throat clear x2 (suspect due to consecutive sips). Patient required verbal reminders throughout meals to take small single sips. Decreased initiation noted throughout. Ongoing education recommended to ensure patient utilizes safe swallow strategies during meals. ST to continue to follow.  -SR       Patient will " demonstrate functional cognitive-linguistic skills for return to discharge environment    Comments Reviewed cognitive evaluation results with patient and spouse and discussed SLP goals and POC based on results. They verbalize understanding, but may benefit from ongoing education.  -TH -- --       Attention Goal 1 (SLP)    Improve Attention by Goal 1 (SLP) complete selective attention task;complete sustained attention task;70%;with moderate cues (50-74%)  -TH -- --    Progress/Outcomes (Attention Goal 1, SLP) goal ongoing  -TH -- --    Comment (Attention Goal 1, SLP) Began working on written directions task, however, unable to complete 2/2 time constraints. He completed 1/2 questions correctly at this time; he was observed with delayed processing and difficulty with sustained attention during this task. He also presented with difficulty with sustained attention during divergent naming task.  -TH -- --       Orientation Goal 1 (SLP)    Improve Orientation Through Goal 1 (SLP) demonstrating orientation to day;demonstrating orientation to month;demonstrating orientation to year;demonstrating orientation to place;demonstrating orientation to disease/impairment;use environmental aids to assist with orientation;80%;with minimal cues (75-90%)  -TH -- --    Progress/Outcomes (Orientation Goal 1, SLP) goal ongoing  -TH -- --    Comment (Orientation Goal 1, SLP) Patient was oriented to place, date, MONE, and self, but unsure of reason for being here.  -TH -- --       Memory Skills Goal 1 (SLP)    Improve Memory Skills Through Goal 1 (SLP) recalling related word lists immediately;recall details of the day;use memory strategies;use written schedule;use external memory aid;listen to a paragraph and answer questions;visual memory task;repeat list in sequential order;select a word from a list by exclusion;recalling unrelated word lists immediately;recalling related word lists with an imposed delay;recalling unrelated word lists with  an imposed delay;recall details from a word list;read a paragraph and answer questions;with moderate cues (50-74%);70%  -TH -- --    Progress/Outcomes (Memory Skills Goal 1, SLP) goal ongoing  -TH -- --    Comment (Memory Skills Goal 1, SLP) Patient was asked to remember 3 pieces of info about SLP; he was able to recall 2/3 facts immediately and after review and repetition patient was asked to retain info for 5 minutes. Given 5 min delay he was able to recall 1/3 facts independently increasing to 2.5/3 with min cues.  -TH -- --       Reasoning Goal 1 (SLP)    Improve Reasoning Through Goal 1 (SLP) complete deductive reasoning task;complete basic reasoning task;complete mental flexibility task;50%;with moderate cues (50-74%)  -TH -- --    Time Frame (Reasoning Goal 1, SLP) by discharge  -TH -- --    Comment (Reasoning Goal 1, SLP) Patient was able to name up to 7 items given 60 seconds. He was easily distracted during this task despite quiet enviornment.  -TH -- --       Executive Functional Skills Goal 1 (SLP)    Improve Executive Function Skills Goal 1 (SLP) -- demonstrate awareness of deficit;home management activity;80%;with moderate cues (50-74%)  -SR --    Time Frame (Executive Function Skills Goal 1, SLP) -- by discharge  -SR --    Progress/Outcomes (Executive Function Skills Goal 1, SLP) -- new goal  -SR --    Comment (Executive Function Skills Goal 1, SLP) -- Completed last 4 subtests of CLQT this date. Patient scored an overall composite score of 2.2/4.0 indicating moderate cognitive impairment. Slow processing/initiation noted throughout assessment. Required verbal cue x1 at the end of the session to stay awake. The results of the assessment were as followed: Attention - Moderate, Memory - Moderate, Executive Functions - Moderate, Language -Mild, Visuospatial skills -Moderate, and Clock Drawing - Severe. Demonstrated increased difficulty with planning/organization during mazes and design generation  subtests. Recommend ongoing therapy to target cognitive skills to increase independence following discharge.  -SR --    Row Name 06/08/23 1230             (LTG) Patient will demonstrate progress toward functional swallow for    Diet Texture (Demonstrate progress toward functional swallow) regular textures  -SR      Liquid viscosity (Demonstrate progress toward functional swallow) thin liquids  -SR      Branch (Demonstrate progress towards functional swallow) independently (over 90% accuracy)  -SR      Time Frame (Demonstrate progress toward functional swallow) by discharge  -SR      Progress/Outcomes (Demonstrate progress toward functional swallow) new goal  -SR         (STG) Pharyngeal Strengthening Exercise Goal 1 (SLP)    Activity (Pharyngeal Strengthening Goal 1, SLP) increase timing;increase closure at entrance to airway/closure of airway at glottis  -SR      Increase Timing hard effortful swallow;Mendelsohn  -SR      Increase Closure at Entrance to Airway/Closure of Airway at Glottis hard effortful swallow;chin tuck against resistance (CTAR);sarita  -SR      Branch/Accuracy (Pharyngeal Strengthening Goal 1, SLP) with minimal cues (75-90% accuracy)  -SR      Progress/Outcomes (Pharyngeal Strengthening Goal 1, SLP) new goal  -SR            User Key  (r) = Recorded By, (t) = Taken By, (c) = Cosigned By    Initials Name Provider Type    Paty Hernandez Speech and Language Pathologist    SR Latisha Morris, CCC-SLP Speech and Language Pathologist                            Time Calculation:        Time Calculation- SLP     Row Name 06/10/23 1319             Time Calculation- SLP    SLP Start Time 1100  -TH      SLP Stop Time 1200  -TH      SLP Time Calculation (min) 60 min  -TH            User Key  (r) = Recorded By, (t) = Taken By, (c) = Cosigned By    Initials Name Provider Type    Paty Hernandez Speech and Language Pathologist                  Therapy Charges for Today     Code Description  Service Date Service Provider Modifiers Qty    [id number] [facility] DEV OF COGN SKILLS INITIAL 15 MIN 6/10/2023 Paty Humphries  1    [id number] [facility] DEV OF COGN SKILLS EACH ADDT'L 15 MIN 6/10/2023 Paty Humphries  2    [id number] [facility] TREATMENT SWALLOW 1 6/10/2023 Paty Humphries GN 1                           Paty Humphries  6/10/2023

## 2023-06-10 NOTE — PROGRESS NOTES
LOS: 4 days   Patient Care Team:  Liza Oconnell III, NP-C as PCP - General (Family Medicine)  Corey Lao Jr., MD (Inactive) as Consulting Physician (Urology)      KRIS TAYLOR  1950      ADMITTING DIAGNOSIS:  Stroke  Valvular heart disease status post repair/replacement      Subjective   Seen and examined, no acute events overnight. Feels OK, denies chest pain, shortness of breath, f/c. Slept well.            Objective     Vitals:    06/10/23 1412   BP: 116/80   Pulse: 97   Resp:    Temp:    SpO2:        PHYSICAL EXAM:   MENTAL STATUS -  AWAKE / ALERT  HEENT- NCAT, PUPILS EQUALLY ROUND, SCLERAE ANICTERIC, CONJUNCTIVAE PINK, OP MOIST, NO JVD, EARS UNREMARKABLE EXTERNALLY  LUNGS - CTA, NO WHEEZES, RALES OR RHONCHI  Sternotomy incision -dressed  HEART-sinus with occasional ectopy  ABD - NORMOACTIVE BOWEL SOUNDS, SOFT, NT.   EXT - NO EDEMA OR CYANOSIS  NEURO -oriented to person place time and situation.  Extraocular movements intact.  Visual fields intact.    Speech is fluent.    MOTOR EXAM - RUE/RLE 5/5. LUE/LLE 5/5.        MEDICATIONS  Scheduled Meds:apixaban, 5 mg, Oral, Q12H  aspirin, 81 mg, Oral, Daily  atorvastatin, 40 mg, Oral, Nightly  calcium 500 mg vitamin D 5 mcg (200 UT), 1 tablet, Oral, Daily  empagliflozin, 25 mg, Oral, Daily  guaifenesin, 400 mg, Oral, Q8H  insulin glargine, 10 Units, Subcutaneous, Nightly  insulin glargine, 40 Units, Subcutaneous, Daily  insulin lispro, 3-14 Units, Subcutaneous, TID With Meals  ipratropium-albuterol, 3 mL, Nebulization, 4x Daily - RT  metoprolol succinate XL, 25 mg, Oral, Q24H  pantoprazole, 40 mg, Oral, Q AM      Continuous Infusions:   PRN Meds:.  acetaminophen **OR** [DISCONTINUED] acetaminophen **OR** [DISCONTINUED] acetaminophen    hydrocortisone-bacitracin-zinc oxide-nystatin    ipratropium-albuterol    traMADol      RESULTS  Glucose   Date/Time Value Ref Range Status   06/10/2023 1142 172 (H) 70 - 130 mg/dL Final      Comment:     Meter: [id number] : [id number] Roe Herman NA   06/10/2023 0745 103 70 - 130 mg/dL Final     Comment:     Meter: [id number] : [id number] Roe Herman NA   06/09/2023 2029 264 (H) 70 - 130 mg/dL Final     Comment:     Meter: [id number] : [id number] Summer Venegas NA   06/09/2023 1616 153 (H) 70 - 130 mg/dL Final     Comment:     Meter: [id number] : [id number] Foster Tona NA   06/09/2023 1118 227 (H) 70 - 130 mg/dL Final     Comment:     Meter: [id number] : [id number] Foster Tona NA   06/09/2023 0712 147 (H) 70 - 130 mg/dL Final     Comment:     Meter: [id number] : [id number] Foster Tona NA   06/08/2023 2008 269 (H) 70 - 130 mg/dL Final     Comment:     Meter: [id number] : [id number] Victorino Luna NA   06/08/2023 1604 195 (H) 70 - 130 mg/dL Final     Comment:     Meter: [id number] : [id number] Denis Tona NA     Results from last 7 days   Lab Units 06/09/23  0832 06/07/23  0706 06/06/23  0338   WBC 10*3/mm3 12.61* 16.29* 15.85*   HEMOGLOBIN g/dL 10.9* 10.7* 10.9*   HEMATOCRIT % 32.4* 32.8* 32.7*   PLATELETS 10*3/mm3 345 325 294     Results from last 7 days   Lab Units 06/10/23  0841 06/09/23  0830 06/06/23  0943   SODIUM mmol/L 135* 141 132*   POTASSIUM mmol/L 4.1 3.7 4.1   CHLORIDE mmol/L 97* 98 94*   CO2 mmol/L 28.6 26.5 25.8   BUN mg/dL 17 21 22   CREATININE mg/dL 1.28* 1.46* 1.31*   CALCIUM mg/dL 9.2 9.3 9.5   BILIRUBIN mg/dL  --   --  0.5   ALK PHOS U/L  --   --  91   ALT (SGPT) U/L  --   --  15   AST (SGOT) U/L  --   --  12   GLUCOSE mg/dL 226* 145* 218*      Latest Reference Range & Units 05/23/23 14:29   Hemoglobin A1C 4.80 - 5.60 % 9.20 (H)   Total Cholesterol 0 - 200 mg/dL 155   HDL Cholesterol 40 - 60 mg/dL 41   LDL Cholesterol  0 - 100 mg/dL 81   Triglycerides 0 - 150 mg/dL 197 (H)   (H): Data is abnormally high     Latest Reference Range & Units 05/31/23 08:03   25 Hydroxy, Vitamin D 30.0 - 100.0 ng/ml 20.0 (L)   (L): Data is abnormally  low  ASSESSMENT and PLAN    Acute ischemic left MCA stroke    Status post CVA-Scattered  punctate restricted diffusion acute infarct in the bilateral frontal and   left parietal occipital cortices.        Impaired Cognition/impaired mobility/impaired self-care     Aphasia-resolved     Right hand apraxia-resolved     Encephalopathy-improving     Dysphagia/nutrition-healthy heart 2-3 g sodium, consistent carb, no mixed consistency, regular texture, nectar thick liquid  June 7-videofluoroscopic swallow study planned tomorrow  June 8-swallow study today-advance to regular solid, no mixed consistency, thin liquids by cup.  No straws.     Stroke prophylaxis-Eliquis/aspirin/atorvastatin     History of severe mitral regurgitation and annular dilatation, moderate to severe tricuspid regurgitation-  status post May 24, 2023 - 1. Mitral valve repair with a 28 mm physio II ring annuloplasty with residual mild to moderate MR  2.  Mitral valve replacement with a 29 mm Schwartz II porcine prosthesis  3.  Tricuspid valve repair with 28 mm physio tricuspid ring  4.  Left atrial appendage endocardial closure     Atrial flutter-status post cardioversion June 2, 2023 anticoagulation with Eliquis  Metoprolol     Volume status-torsemide     Severe pulmonary hypertension  Obstructive sleep apnea     Interstitial lung disease status post COVID-19 infection-steroid taper per pulmonology  DuoNebs 4 times a day  Chronic steroid use with cushingoid syndrome  Prednisone 5 mg every other day     Leukocytosis-reactive/steroid administration     Postoperative anemia     Yfkakyvenrukpcab-iedpzjqkoun-idregxxb     Chronic kidney disease stage III-baseline creatinine 1.2     Diabetes mellitus type 2-Jardiance/Lantus/sliding scale insulin-uncontrolled  June 7-on Jardiance and Lantus 40 units daily.  Elevated blood glucose.  Received 21 units on sliding scale insulin yesterday.  Increase Lantus by adding 10 units nightly.     BPH/urinary  retention-history of TURP    Renal insufficiency - June 9 - Creatinine increased to 1.46. Meds reviewed. Is on Jardiance - already received dose today. Will get input from Nephrology.     Pain management-tramadol-/Tylenol Dk report reviewed    Nutrition-addendum-June 9 6:43 PM-decreased appetite-would like to have zinc level checked in the morning      TEAM CONF - JUNE 8 - ENDURANCE POOR. BED MOD. TRANSFERS MOD. GAIT 25 FEET MOD ASSIST RW. SHOWER TRANSFERS MOD A.   BATH MOD ASSIST. LBD MAX. UBD MIN. TOILETING MAX.   Patient is currently on nectar liquids VFSS TODAY.   BIMS SUMMARY SCORE: 9 Moderately impaired   DIFFICULTY WITH SUSTAINED AND FOCAL ATTENTION.  CONTINENT / INCONTINENT  ELOS - 2 -3 WEEKS        Now admit for comprehensive acute inpatient rehabilitation .  This would be an interdisciplinary program with physical therapy 1 hour,  occupational therapy 1 hour, and speech therapy 1 hour, 5 days a week.  Rehabilitation nursing for carryover, monitoring of neurologic, cardiac, pulmonary, diabetes   status, bowel and bladder, and skin  Ongoing physician follow-up.  Weekly team conferences.  Goals are to achieve a level of contact-guard with  mobility and self-care and improved activity tolerance.   Rehabilitation prognosis fair.  Medical prognosis defer to cardiology.  Estimated length of stay is approximately 2 weeks, but is only an estimation.   The patient's functional status and clinical status is unchanged from preadmission assessment and the patient continues appropriate for acute inpatient rehabilitation.  Goal is for home with outpatient cardiac rehab   therapies.  Barrier to discharge: Impaired cognition mobility self-care- work on follow, executive function, conditioning, transfers, progressive ambulation, ADLs to overcome.           Jeanette Linares,       During rounds, used appropriate personal protective equipment including mask and gloves.  Additional gown if indicated.  Mask used was  standard procedure mask. Appropriate PPE was worn during the entire visit.  Hand hygiene was completed before and after.

## 2023-06-10 NOTE — PROGRESS NOTES
Inpatient Rehabilitation Plan of Care Note    Plan of Care  Care Plan Reviewed - No updates at this time.    Sphincter Control    Performed Intervention(s)  Offer toileting/ timed voids  Monitor I&O      Safety    Performed Intervention(s)  Safety monitoring during functional activities  Environmental set- upto reduce risk      Psychosocial    Performed Intervention(s)  Allow patient to verbalize needs and concerns    Signed by: Torrie Matias RN

## 2023-06-10 NOTE — PLAN OF CARE
Goal Outcome Evaluation:  Plan of Care Reviewed With: patient        Progress: improving  Outcome Evaluation: Pain med for midsternal soreness. C/o SOA at HS, Respiratory Therapist gave scheduled duoneb, able to get back to sleep. Voids standing with walker using urinal with assist 1. Awake at intervals, sits side of bed. Bladder scan PVR 271cc.

## 2023-06-10 NOTE — PLAN OF CARE
Goal Outcome Evaluation:  Plan of Care Reviewed With: patient        Progress: improving  Outcome Evaluation: Immobility syndrome. S/P cabg and MCA stroke. NIH=2. A&OX4. Delayed mental processing and response time. Forgetful. Short term memory loss. Midsternal incision and chest tube sites. Scabbed and KATHY. Hx. HTN, CAD, HLD, Seizures. Blood glucose checks ACHS. Meds whole with thins. Diet: Reg, thins. Up for meals. Assistx1 with walker and wheelchair. Participated fully in therapy. Tired and fatigued afterwards. Napped after sessions. Calm, cooperative, and flat. Ate fair to poor at meals. Continent and incontinent B&B. Last BM reported 6/08. PRN bowel meds given today. Full code. Spouse at bedside. Complained of feeling light-headed during shower. BP WDL. Complained of tingling to R. hand.

## 2023-06-10 NOTE — THERAPY TREATMENT NOTE
Inpatient Rehabilitation - Occupational Therapy Treatment Note    [facility]     Patient Name: Vernon Solorzano  : 1950  MRN: [id number]    Today's Date: 6/10/2023                 Admit Date: 2023         ICD-10-CM ICD-9-CM   1. Impaired functional mobility, balance, gait, and endurance  Z74.09 V49.89       Patient Active Problem List   Diagnosis   • DM (diabetes mellitus), type 2   • Mixed hyperlipidemia   • Mild intermittent asthma without complication   • New onset of congestive heart failure   • Nonrheumatic mitral valve regurgitation   • Chest pain   • Essential hypertension   • Mitral valve disease   • Acute ischemic left MCA stroke       Past Medical History:   Diagnosis Date   • Allergic rhinitis    • Arthritis    • BPH (benign prostatic hyperplasia)    • Diabetes mellitus     TYPE 2   • Foraminal stenosis of cervical region     C5-C6   • GERD (gastroesophageal reflux disease)    • Hemorrhoids    • History of urinary retention     S/P TURP   • Hyperlipidemia    • Macular degeneration    • PING (obstructive sleep apnea) 2016    MILD, SEES DR. AMARILIS MORGAN       Past Surgical History:   Procedure Laterality Date   • CARDIAC CATHETERIZATION N/A 2023    Procedure: Right Heart Cath;  Surgeon: Corey Gaffney MD;  Location: [facility] CATH INVASIVE LOCATION;  Service: Cardiology;  Laterality: N/A;   • CARDIAC CATHETERIZATION N/A 2023    Procedure: Left Heart Cath;  Surgeon: Corey Gaffney MD;  Location: [facility] CATH INVASIVE LOCATION;  Service: Cardiology;  Laterality: N/A;   • CARDIAC CATHETERIZATION N/A 2023    Procedure: Left ventriculography;  Surgeon: Corey Gaffney MD;  [facility];  Service: Cardiology;  Laterality: N/A;   • CARDIAC CATHETERIZATION N/A 2023    Procedure: Coronary angiography;  Surgeon: Corey Gaffney MD;  Location:  [facility] CATH INVASIVE LOCATION;  Service: Cardiology;  Laterality: N/A;   • COLONOSCOPY N/A     WNL  PER PT, NO RECORDS,    • COLONOSCOPY N/A 04/07/2009    DR. GORGE BENAVIDEZ AT [place]   • MITRAL VALVE REPAIR/REPLACEMENT N/A 5/24/2023    Procedure: MITRAL VALVE REPAIR/REPLACEMENT TRICUSPID VALVE REPAIR/REPLACEMENT TRANSESOPHAGEAL ECHOCARDIOGRAM WITH ANESTHESIA;  Surgeon: George Frey MD;  Location: [facility];  Service: Cardiothoracic;  Laterality: N/A;   • PROSTATE SURGERY N/A 11/12/2010    TURP, DR. BRIGHT COLLAZO AT [facility]   • SKIN TAG REMOVAL N/A 12/20/2017    ANAL SKIN TAG X2, PERFORMED IN OFFICE, DR. LISA ORANTES   • TURP / TRANSURETHRAL INCISION / DRAINAGE PROSTATE  2010    Dr. Goodrich             IRF OT ASSESSMENT FLOWSHEET (last 12 hours)     IRF OT Evaluation and Treatment     Row Name 06/10/23 1218          OT Time and Intention    Document Type daily treatment  -CC     Mode of Treatment occupational therapy  -CC     Patient Effort good  -CC     Symptoms Noted During/After Treatment fatigue;other (see comments)  tingling in R hand. SPouse states he has had this. RN notified  -CC     Row Name 06/10/23 1218          Pain Assessment    Pretreatment Pain Rating 0/10 - no pain  -CC     Posttreatment Pain Rating 0/10 - no pain  -CC     Row Name 06/10/23 1218          Cognition/Psychosocial    Affect/Mental Status (Cognition) flat/blunted affect;low arousal/lethargic  -CC     Orientation Status (Cognition) oriented to;person;place;situation  -CC     Follows Commands (Cognition) follows one-step commands;delayed response/completion;increased processing time needed;repetition of directions required  -CC     Personal Safety Interventions fall prevention program maintained;gait belt;nonskid shoes/slippers when out of bed  -CC     Cognitive Function attention deficit;executive function deficit  -CC     Row Name 06/10/23 1218          Bathing    [place] Level (Bathing) upper body;bathing skills;lower body;minimum assist (75% patient effort);nonverbal cues (demo/gesture);verbal cues  -CC      Assistive Device (Bathing) grab bar/tub rail;hand held shower spray hose;shower chair  -     Position (Bathing) supported standing;supported sitting  -     Row Name 06/10/23 1218          Upper Body Dressing    [place] Level (Upper Body Dressing) upper body dressing skills;doff;don;pull over garment;verbal cues;nonverbal cues (demo/gesture);minimum assist (75% or more patient effort)  -CC     Position (Upper Body Dressing) supported sitting  -CC     Set-up Assistance (Upper Body Dressing) obtain clothing  -     Row Name 06/10/23 1218          Lower Body Dressing    [place] Level (Lower Body Dressing) doff;don;pants/bottoms;shoes/slippers;socks;maximum assist (25% patient effort)  -CC     Position (Lower Body Dressing) supported sitting;supported standing  -     Set-up Assistance (Lower Body Dressing) obtain clothing  -     Row Name 06/10/23 1218          Grooming    [place] Level (Grooming) grooming skills;deodorant application;hair care, combing/brushing;oral care regimen;wash face, hands;minimum assist (75% patient effort)  -CC     Position (Grooming) supported sitting  -     Set-up Assistance (Grooming) obtain supplies  -     Row Name 06/10/23 1218          Toileting    [place] Level (Toileting) toileting skills;adjust/manage clothing;perform perineal hygiene;moderate assist (50% patient effort)  -     Position (Toileting) supported sitting;supported standing  -     Set-up Assistance (Toileting) change pad/brief  -     Row Name 06/10/23 1218          Bed Mobility    Comment, (Bed Mobility) in w/c  -     Row Name 06/10/23 1218          Sit-Stand Transfer    Sit-Stand [place] (Transfers) minimum assist (75% patient effort);verbal cues  -     Assistive Device (Sit-Stand Transfers) wheelchair  -CC     Row Name 06/10/23 1218          Stand-Sit Transfer    Stand-Sit [place] (Transfers) minimum assist (75% patient effort);contact guard;verbal cues  -     Assistive  Device (Stand-Sit Transfers) wheelchair  -     Row Name 06/10/23 1218          Toilet Transfer    Type (Toilet Transfer) stand pivot/stand step  -     [place] Level (Toilet Transfer) minimum assist (75% patient effort);verbal cues  -     Assistive Device (Toilet Transfer) commode chair;grab bars/safety frame;wheelchair  -     Row Name 06/10/23 1218          Shower Transfer    Type (Shower Transfer) sit-stand;stand-sit  -     [place] Level (Shower Transfer) minimum assist (75% patient effort);verbal cues  -     Assistive Device (Shower Transfer) shower chair;grab bar, tub/shower;wheelchair  -     Row Name 06/10/23 1218          Elbow/Forearm (Therapeutic Exercise)    Elbow/Forearm (Therapeutic Exercise) strengthening exercise  -     Elbow/Forearm Strengthening (Therapeutic Exercise) bilateral;flexion;extension;supination;pronation;sitting;2 lb free weight;10 repetitions;2 sets  -     Row Name 06/10/23 1218          Wrist (Therapeutic Exercise)    Wrist (Therapeutic Exercise) strengthening exercise  -     Wrist Strengthening (Therapeutic Exercise) flexion;extension;bilateral;2 lb free weight;10 repetitions;2 sets  -     Row Name 06/10/23 1218          Balance    Static Sitting Balance standby assist  -     Dynamic Sitting Balance contact guard  -     Static Standing Balance minimal assist  -     Row Name 06/10/23 1218          Positioning and Restraints    Pre-Treatment Position sitting in chair/recliner  -     Post Treatment Position wheelchair  -     In Wheelchair sitting;with SLP;exit alarm on;with family/caregiver  wife  -           User Key  (r) = Recorded By, (t) = Taken By, (c) = Cosigned By    Initials Name Effective Dates    CC Kathrine Pinedo, OTR 06/16/21 -                          OT Recommendation and Plan                         Time Calculation:      Time Calculation- OT     Row Name 06/10/23 1000             Time Calculation- OT    OT Start Time 1000  -       OT Stop Time 1100  -CC      OT Time Calculation (min) 60 min  -CC            User Key  (r) = Recorded By, (t) = Taken By, (c) = Cosigned By    Initials Name Provider Type    CC Kathrine Pinedo OTR Occupational Therapist              Therapy Charges for Today     Code Description Service Date Service Provider Modifiers Qty    74689644970  OT THER PROC EA 15 MIN 6/10/2023 Kathrine Pinedo OTR GO 1    [id number]  OT SELF CARE/MGMT/TRAIN EA 15 MIN 6/10/2023 Kathrine Pinedo OTR GO 3                   HI Morgan  6/10/2023

## 2023-06-10 NOTE — THERAPY TREATMENT NOTE
Patient Name: Vernon Solorzano  : 1950    MRN: [id number]                              Today's Date: 6/10/2023       Admit Date: 2023    Visit Dx:     ICD-10-CM ICD-9-CM   1. Impaired functional mobility, balance, gait, and endurance  Z74.09 V49.89     Patient Active Problem List   Diagnosis    DM (diabetes mellitus), type 2    Mixed hyperlipidemia    Mild intermittent asthma without complication    New onset of congestive heart failure    Nonrheumatic mitral valve regurgitation    Chest pain    Essential hypertension    Mitral valve disease    Acute ischemic left MCA stroke     Past Medical History:   Diagnosis Date    Allergic rhinitis     Arthritis     BPH (benign prostatic hyperplasia)     Diabetes mellitus     TYPE 2    Foraminal stenosis of cervical region     C5-C6    GERD (gastroesophageal reflux disease)     Hemorrhoids     History of urinary retention     S/P TURP    Hyperlipidemia     Macular degeneration     PING (obstructive sleep apnea) 2016    MILD, SEES DR. AMARILIS MORGAN     Past Surgical History:   Procedure Laterality Date    CARDIAC CATHETERIZATION N/A 2023    Procedure: Right Heart Cath;  Surgeon: Corey Gaffney MD;  Location:  [facility] CATH INVASIVE LOCATION;  Service: Cardiology;  Laterality: N/A;    CARDIAC CATHETERIZATION N/A 2023    Procedure: Left Heart Cath;  Surgeon: Corey Gaffney MD;  Location:  [facility] CATH INVASIVE LOCATION;  Service: Cardiology;  Laterality: N/A;    CARDIAC CATHETERIZATION N/A 2023    Procedure: Left ventriculography;  Surgeon: Corey Gaffney MD;  Location:  [facility] CATH INVASIVE LOCATION;  Service: Cardiology;  Laterality: N/A;    CARDIAC CATHETERIZATION N/A 2023    Procedure: Coronary angiography;  Surgeon: Corey Gaffney MD;  Location:  [facility] CATH INVASIVE LOCATION;  Service: Cardiology;  Laterality: N/A;    COLONOSCOPY N/A     WNL PER PT, NO RECORDS,     COLONOSCOPY N/A 2009    DR. GORGE BENAVIDEZ AT [place]     MITRAL VALVE REPAIR/REPLACEMENT N/A 5/24/2023    Procedure: MITRAL VALVE REPAIR/REPLACEMENT TRICUSPID VALVE REPAIR/REPLACEMENT TRANSESOPHAGEAL ECHOCARDIOGRAM WITH ANESTHESIA;  Surgeon: George Frey MD;  Location: [facility];  Service: Cardiothoracic;  Laterality: N/A;    PROSTATE SURGERY N/A 11/12/2010    TURP, DR. BRIGHT COLLAZO AT [facility]    SKIN TAG REMOVAL N/A 12/20/2017    ANAL SKIN TAG X2, PERFORMED IN OFFICE, DR. LISA ORANTES    TURP / TRANSURETHRAL INCISION / DRAINAGE PROSTATE  2010    Dr. Goodrich      General Information       Row Name 06/10/23 0927          Physical Therapy Time and Intention    Document Type therapy note (daily note)  -MD     Mode of Treatment physical therapy  -MD       Row Name 06/10/23 0927          General Information    Patient Profile Reviewed yes  Pt reports CP this am however states he feels it is more incisional. O2 sats at 100% prior to activity and 100% following activity.  -MD     Existing Precautions/Restrictions fall;cardiac;sternal  -MD       Row Name 06/10/23 0927          Cognition    Orientation Status (Cognition) oriented to;person;place;situation  -MD               User Key  (r) = Recorded By, (t) = Taken By, (c) = Cosigned By      Initials Name Provider Type    Damaris Farrell, PT Physical Therapist                   Mobility       Row Name 06/10/23 0928          Bed-Chair Transfer    Bed-Chair [place] (Transfers) verbal cues;contact guard  -MD     Assistive Device (Bed-Chair Transfers) wheelchair;walker, front-wheeled  -MD       Row Name 06/10/23 0928          Sit-Stand Transfer    Sit-Stand [place] (Transfers) minimum assist (75% patient effort);verbal cues  -MD     Assistive Device (Sit-Stand Transfers) walker, front-wheeled  -MD       Row Name 06/10/23 0928          Gait/Stairs (Locomotion)    [place] Level (Gait) verbal cues;minimum assist (75% patient effort)  -MD     Assistive Device (Gait) walker, front-wheeled  -MD     Distance in  Feet (Gait) 80'x3 w 2-3 standing rest breaks while ambulating w VC for PLB  -MD     Deviations/Abnormal Patterns (Gait) base of support, wide;festinating/shuffling;stride length decreased;weight shifting decreased  -MD     Bilateral Gait Deviations forward flexed posture;heel strike decreased  -MD               User Key  (r) = Recorded By, (t) = Taken By, (c) = Cosigned By      Initials Name Provider Type    Damaris Farrell, PT Physical Therapist                   Obj/Interventions       Row Name 06/10/23 0928          Motor Skills    Therapeutic Exercise --  B LE ther ex x15 reps w red thera band.  marches in seated, LAQ, HS curls, hip abd/add and DF  -MD               User Key  (r) = Recorded By, (t) = Taken By, (c) = Cosigned By      Initials Name Provider Type    Damaris Farrell, PT Physical Therapist                   Goals/Plan    No documentation.                  Clinical Impression       Row Name 06/10/23 1039          Pain    Pretreatment Pain Rating 0/10 - no pain  -MD       Row Name 06/10/23 1039          Positioning and Restraints    Pre-Treatment Position in bed  -MD     Post Treatment Position wheelchair  -MD     In Chair sitting;exit alarm on;with family/caregiver;with OT  -MD               User Key  (r) = Recorded By, (t) = Taken By, (c) = Cosigned By      Initials Name Provider Type    Damaris Farrell, PT Physical Therapist                   Outcome Measures    No documentation.                                Physical Therapy Education       Title: PT OT SLP Therapies (Done)       Topic: Physical Therapy (Done)       Point: Mobility training (Done)       Learning Progress Summary             Patient Acceptance, E, VU by MD at 6/10/2023 1040    Acceptance, E, VU by MD at 6/8/2023 1017    Acceptance, E,TB,D, VU,DU,NR by  at 6/7/2023 1513    Comment: POC, Goals, safety with mobility.   Significant Other Acceptance, E,TB,D, VU,DU,NR by  at 6/7/2023 1513    Comment: POC, Goals, safety with mobility.                          Point: Home exercise program (Done)       Learning Progress Summary             Patient Acceptance, E,TB,D, VU,DU,NR by  at 6/7/2023 1513    Comment: POC, Goals, safety with mobility.   Significant Other Acceptance, E,TB,D, VU,DU,NR by  at 6/7/2023 1513    Comment: POC, Goals, safety with mobility.                                         User Key       Initials Effective Dates Name Provider Type Discipline    MD 06/16/21 -  Damaris Francis, PT Physical Therapist PT     06/16/21 -  Elizabeth Levin PT Physical Therapist PT                  PT Recommendation and Plan           Time Calculation:    PT Charges       Row Name 06/10/23 0926             Time Calculation    Start Time 0900  -MD      Stop Time 1000  -MD      Time Calculation (min) 60 min  -MD      PT Received On 06/10/23  -MD      PT - Next Appointment 06/12/23  -MD                User Key  (r) = Recorded By, (t) = Taken By, (c) = Cosigned By      Initials Name Provider Type    Damaris Farrell, PT Physical Therapist                  Therapy Charges for Today       Code Description Service Date Service Provider Modifiers Qty    96359397129 HC GAIT TRAINING EA 15 MIN 6/9/2023 Damaris Francis, PT GP 2    79140132913 HC PT THER PROC EA 15 MIN 6/9/2023 Damaris Francis, PT GP 1    49171675086 HC PT THERAPEUTIC ACT EA 15 MIN 6/9/2023 Damaris Francis, PT GP 1    92928482711 HC GAIT TRAINING EA 15 MIN 6/10/2023 Damaris Francis, PT GP 3    24577504792 HC PT THER PROC EA 15 MIN 6/10/2023 Damaris Francis, PT GP 1    92517536243 HC PT THER SUPP EA 15 MIN 6/10/2023 Damaris Francis, PT GP 4            PT G-Codes  AM-PAC 6 Clicks Score (PT): 14       Damaris Francis PT  6/10/2023

## 2023-06-11 LAB
ANION GAP SERPL CALCULATED.3IONS-SCNC: 11.2 MMOL/L (ref 5–15)
BUN SERPL-MCNC: 14 MG/DL (ref 8–23)
BUN/CREAT SERPL: 11.2 (ref 7–25)
CALCIUM SPEC-SCNC: 9.6 MG/DL (ref 8.6–10.5)
CHLORIDE SERPL-SCNC: 97 MMOL/L (ref 98–107)
CO2 SERPL-SCNC: 28.8 MMOL/L (ref 22–29)
CREAT SERPL-MCNC: 1.25 MG/DL (ref 0.76–1.27)
EGFRCR SERPLBLD CKD-EPI 2021: 60.4 ML/MIN/1.73
GLUCOSE BLDC GLUCOMTR-MCNC: 159 MG/DL (ref 70–130)
GLUCOSE BLDC GLUCOMTR-MCNC: 162 MG/DL (ref 70–130)
GLUCOSE BLDC GLUCOMTR-MCNC: 173 MG/DL (ref 70–130)
GLUCOSE BLDC GLUCOMTR-MCNC: 214 MG/DL (ref 70–130)
GLUCOSE BLDC GLUCOMTR-MCNC: 371 MG/DL (ref 70–130)
GLUCOSE SERPL-MCNC: 126 MG/DL (ref 65–99)
POTASSIUM SERPL-SCNC: 3.9 MMOL/L (ref 3.5–5.2)
SODIUM SERPL-SCNC: 137 MMOL/L (ref 136–145)

## 2023-06-11 PROCEDURE — 94799 UNLISTED PULMONARY SVC/PX: CPT

## 2023-06-11 PROCEDURE — 80048 BASIC METABOLIC PNL TOTAL CA: CPT | Performed by: PHYSICAL MEDICINE & REHABILITATION

## 2023-06-11 PROCEDURE — 82948 REAGENT STRIP/BLOOD GLUCOSE: CPT

## 2023-06-11 PROCEDURE — 94664 DEMO&/EVAL PT USE INHALER: CPT

## 2023-06-11 PROCEDURE — 63710000001 INSULIN LISPRO (HUMAN) PER 5 UNITS: Performed by: PHYSICAL MEDICINE & REHABILITATION

## 2023-06-11 PROCEDURE — 94761 N-INVAS EAR/PLS OXIMETRY MLT: CPT

## 2023-06-11 PROCEDURE — 94760 N-INVAS EAR/PLS OXIMETRY 1: CPT

## 2023-06-11 RX ORDER — CALCIUM CARBONATE 500 MG/1
2 TABLET, CHEWABLE ORAL EVERY 4 HOURS PRN
Status: DISPENSED | OUTPATIENT
Start: 2023-06-11

## 2023-06-11 RX ORDER — BISACODYL 10 MG
10 SUPPOSITORY, RECTAL RECTAL DAILY PRN
Status: ACTIVE | OUTPATIENT
Start: 2023-06-11

## 2023-06-11 RX ORDER — AMOXICILLIN 250 MG
2 CAPSULE ORAL 2 TIMES DAILY PRN
Status: DISPENSED | OUTPATIENT
Start: 2023-06-11

## 2023-06-11 RX ADMIN — PANTOPRAZOLE SODIUM 40 MG: 40 TABLET, DELAYED RELEASE ORAL at 05:51

## 2023-06-11 RX ADMIN — METOPROLOL SUCCINATE 25 MG: 25 TABLET, EXTENDED RELEASE ORAL at 09:11

## 2023-06-11 RX ADMIN — GUAIFENESIN 400 MG: 200 SOLUTION ORAL at 14:39

## 2023-06-11 RX ADMIN — INSULIN LISPRO 5 UNITS: 100 INJECTION, SOLUTION INTRAVENOUS; SUBCUTANEOUS at 13:19

## 2023-06-11 RX ADMIN — IPRATROPIUM BROMIDE AND ALBUTEROL SULFATE 3 ML: 2.5; .5 SOLUTION RESPIRATORY (INHALATION) at 06:58

## 2023-06-11 RX ADMIN — GUAIFENESIN 400 MG: 200 SOLUTION ORAL at 05:51

## 2023-06-11 RX ADMIN — ANTACID TABLETS 2 TABLET: 500 TABLET, CHEWABLE ORAL at 02:00

## 2023-06-11 RX ADMIN — INSULIN LISPRO 3 UNITS: 100 INJECTION, SOLUTION INTRAVENOUS; SUBCUTANEOUS at 17:36

## 2023-06-11 RX ADMIN — ATORVASTATIN CALCIUM 40 MG: 20 TABLET, FILM COATED ORAL at 21:11

## 2023-06-11 RX ADMIN — INSULIN LISPRO 3 UNITS: 100 INJECTION, SOLUTION INTRAVENOUS; SUBCUTANEOUS at 09:11

## 2023-06-11 RX ADMIN — GUAIFENESIN 400 MG: 200 SOLUTION ORAL at 00:00

## 2023-06-11 RX ADMIN — ANTACID TABLETS 2 TABLET: 500 TABLET, CHEWABLE ORAL at 05:51

## 2023-06-11 RX ADMIN — IPRATROPIUM BROMIDE AND ALBUTEROL SULFATE 3 ML: 2.5; .5 SOLUTION RESPIRATORY (INHALATION) at 16:38

## 2023-06-11 RX ADMIN — BISACODYL 10 MG: 10 SUPPOSITORY RECTAL at 16:29

## 2023-06-11 RX ADMIN — INSULIN GLARGINE-YFGN 10 UNITS: 100 INJECTION, SOLUTION SUBCUTANEOUS at 21:12

## 2023-06-11 RX ADMIN — INSULIN GLARGINE-YFGN 40 UNITS: 100 INJECTION, SOLUTION SUBCUTANEOUS at 09:12

## 2023-06-11 RX ADMIN — APIXABAN 5 MG: 5 TABLET, FILM COATED ORAL at 09:11

## 2023-06-11 RX ADMIN — ASPIRIN 81 MG: 81 TABLET, CHEWABLE ORAL at 09:11

## 2023-06-11 RX ADMIN — GUAIFENESIN 400 MG: 200 SOLUTION ORAL at 21:12

## 2023-06-11 RX ADMIN — EMPAGLIFLOZIN 25 MG: 25 TABLET, FILM COATED ORAL at 09:11

## 2023-06-11 RX ADMIN — DOCUSATE SODIUM 50MG AND SENNOSIDES 8.6MG 2 TABLET: 8.6; 5 TABLET, FILM COATED ORAL at 16:29

## 2023-06-11 RX ADMIN — CALCIUM CARBONATE-VITAMIN D TAB 500 MG-200 UNIT 1 TABLET: 500-200 TAB at 09:11

## 2023-06-11 RX ADMIN — IPRATROPIUM BROMIDE AND ALBUTEROL SULFATE 3 ML: 2.5; .5 SOLUTION RESPIRATORY (INHALATION) at 19:47

## 2023-06-11 RX ADMIN — APIXABAN 5 MG: 5 TABLET, FILM COATED ORAL at 21:12

## 2023-06-11 NOTE — PLAN OF CARE
Goal Outcome Evaluation:  Plan of Care Reviewed With: patient        Progress: improving  Outcome Evaluation: Immobility syndrome. S/P cabg and MCA stroke. NIH=2. A&OX4. Delayed mental processing and response time. Forgetful. Short term memory loss. Midsternal incision and chest tube sites. Scabbed and KATHY. Hx. HTN, CAD, HLD, Seizures. Blood glucose checks ACHS. Meds whole with thins. Diet: Reg, thins. Up for meals. Assistx1 with walker and wheelchair. Participated fully in therapy. Tired and fatigued afterwards. Napped after sessions. Calm, cooperative, and flat. Ate fair to poor at meals. Continent and incontinent B&B. Last BM reported 6/08. PRN bowel meds given today. Full code. Spouse at bedside. Complained of feeling light-headed and dizzy during transfer to restroom. BP WDL. Blood glucose WDL. Complained of tingling to R. hand.

## 2023-06-11 NOTE — PROGRESS NOTES
LOS: 5 days   Patient Care Team:  Liza Oconnell III, NP-C as PCP - General (Family Medicine)  Corey Lao Jr., MD (Inactive) as Consulting Physician (Urology)      KRIS TAYLOR  1950      ADMITTING DIAGNOSIS:  Stroke  Valvular heart disease status post repair/replacement      Subjective   Seen and examined, no acute events overnight. Feels OK, denies chest pain, shortness of breath, f/c. Slept well. Continues to complaint of inability to taste food.            Objective     Vitals:    06/11/23 0658   BP:    Pulse: 95   Resp: 20   Temp:    SpO2: 96%       PHYSICAL EXAM:   MENTAL STATUS -  AWAKE / ALERT  HEENT- NCAT, PUPILS EQUALLY ROUND, SCLERAE ANICTERIC, CONJUNCTIVAE PINK, OP MOIST, NO JVD, EARS UNREMARKABLE EXTERNALLY  LUNGS - CTA, NO WHEEZES, RALES OR RHONCHI  Sternotomy incision -dressed  HEART-sinus with occasional ectopy  ABD - NORMOACTIVE BOWEL SOUNDS, SOFT, NT.   EXT - NO EDEMA OR CYANOSIS  NEURO -oriented to person place time and situation.  Extraocular movements intact.  Visual fields intact.    Speech is fluent.    MOTOR EXAM - RUE/RLE 5/5. LUE/LLE 5/5.        MEDICATIONS  Scheduled Meds:apixaban, 5 mg, Oral, Q12H  aspirin, 81 mg, Oral, Daily  atorvastatin, 40 mg, Oral, Nightly  calcium 500 mg vitamin D 5 mcg (200 UT), 1 tablet, Oral, Daily  empagliflozin, 25 mg, Oral, Daily  guaifenesin, 400 mg, Oral, Q8H  insulin glargine, 10 Units, Subcutaneous, Nightly  insulin glargine, 40 Units, Subcutaneous, Daily  insulin lispro, 3-14 Units, Subcutaneous, TID With Meals  ipratropium-albuterol, 3 mL, Nebulization, 4x Daily - RT  metoprolol succinate XL, 25 mg, Oral, Q24H  pantoprazole, 40 mg, Oral, Q AM      Continuous Infusions:   PRN Meds:.  acetaminophen **OR** [DISCONTINUED] acetaminophen **OR** [DISCONTINUED] acetaminophen    calcium carbonate    hydrocortisone-bacitracin-zinc oxide-nystatin    ipratropium-albuterol    traMADol      RESULTS  Glucose   Date/Time Value  Ref Range Status   06/11/2023 0731 162 (H) 70 - 130 mg/dL Final     Comment:     Meter: [id number] : [id number] Roe Herman NA   06/10/2023 2034 256 (H) 70 - 130 mg/dL Final     Comment:     Meter: [id number] : [id number] Summer Laia NA   06/10/2023 1648 194 (H) 70 - 130 mg/dL Final     Comment:     Meter: [id number] : [id number] Roe Victoriaal NA   06/10/2023 1142 172 (H) 70 - 130 mg/dL Final     Comment:     Meter: [id number] : [id number] Roe Victoriaal NA   06/10/2023 0745 103 70 - 130 mg/dL Final     Comment:     Meter: [id number] : [id number] Roe Herman NA   06/09/2023 2029 264 (H) 70 - 130 mg/dL Final     Comment:     Meter: [id number] : [id number] Summer Laia NA   06/09/2023 1616 153 (H) 70 - 130 mg/dL Final     Comment:     Meter: [id number] : [id number] Denis Carbone NA   06/09/2023 1118 227 (H) 70 - 130 mg/dL Final     Comment:     Meter: [id number] : [id number] Denis Tona NA     Results from last 7 days   Lab Units 06/09/23  0832 06/07/23  0706 06/06/23  0338   WBC 10*3/mm3 12.61* 16.29* 15.85*   HEMOGLOBIN g/dL 10.9* 10.7* 10.9*   HEMATOCRIT % 32.4* 32.8* 32.7*   PLATELETS 10*3/mm3 345 325 294     Results from last 7 days   Lab Units 06/11/23  0552 06/10/23  0841 06/09/23  0830 06/06/23  0943   SODIUM mmol/L 137 135* 141 132*   POTASSIUM mmol/L 3.9 4.1 3.7 4.1   CHLORIDE mmol/L 97* 97* 98 94*   CO2 mmol/L 28.8 28.6 26.5 25.8   BUN mg/dL 14 17 21 22   CREATININE mg/dL 1.25 1.28* 1.46* 1.31*   CALCIUM mg/dL 9.6 9.2 9.3 9.5   BILIRUBIN mg/dL  --   --   --  0.5   ALK PHOS U/L  --   --   --  91   ALT (SGPT) U/L  --   --   --  15   AST (SGOT) U/L  --   --   --  12   GLUCOSE mg/dL 126* 226* 145* 218*      Latest Reference Range & Units 05/23/23 14:29   Hemoglobin A1C 4.80 - 5.60 % 9.20 (H)   Total Cholesterol 0 - 200 mg/dL 155   HDL Cholesterol 40 - 60 mg/dL 41   LDL Cholesterol  0 - 100 mg/dL 81   Triglycerides 0 - 150 mg/dL 197 (H)   (H): Data is  abnormally high     Latest Reference Range & Units 05/31/23 08:03   25 Hydroxy, Vitamin D 30.0 - 100.0 ng/ml 20.0 (L)   (L): Data is abnormally low  ASSESSMENT and PLAN    Acute ischemic left MCA stroke    Status post CVA-Scattered  punctate restricted diffusion acute infarct in the bilateral frontal and   left parietal occipital cortices.        Impaired Cognition/impaired mobility/impaired self-care     Aphasia-resolved     Right hand apraxia-resolved     Encephalopathy-improving     Dysphagia/nutrition-healthy heart 2-3 g sodium, consistent carb, no mixed consistency, regular texture, nectar thick liquid  June 7-videofluoroscopic swallow study planned tomorrow  June 8-swallow study today-advance to regular solid, no mixed consistency, thin liquids by cup.  No straws.     Stroke prophylaxis-Eliquis/aspirin/atorvastatin     History of severe mitral regurgitation and annular dilatation, moderate to severe tricuspid regurgitation-  status post May 24, 2023 - 1. Mitral valve repair with a 28 mm physio II ring annuloplasty with residual mild to moderate MR  2.  Mitral valve replacement with a 29 mm Schwartz II porcine prosthesis  3.  Tricuspid valve repair with 28 mm physio tricuspid ring  4.  Left atrial appendage endocardial closure     Atrial flutter-status post cardioversion June 2, 2023 anticoagulation with Eliquis  Metoprolol     Volume status-torsemide     Severe pulmonary hypertension  Obstructive sleep apnea     Interstitial lung disease status post COVID-19 infection-steroid taper per pulmonology  DuoNebs 4 times a day  Chronic steroid use with cushingoid syndrome  Prednisone 5 mg every other day     Leukocytosis-reactive/steroid administration     Postoperative anemia     Mtdiigarrbkeunjd-gkctpcrnilw-cwjeiwyi     Chronic kidney disease stage III-baseline creatinine 1.2     Diabetes mellitus type 2-Jardiance/Lantus/sliding scale insulin-uncontrolled  June 7-on Jardiance and Lantus 40 units daily.  Elevated  blood glucose.  Received 21 units on sliding scale insulin yesterday.  Increase Lantus by adding 10 units nightly.     BPH/urinary retention-history of TURP    Renal insufficiency - June 9 - Creatinine increased to 1.46. Meds reviewed. Is on Jardiance - already received dose today. Will get input from Nephrology.     Pain management-tramadol-/Tylenol Dk report reviewed    Nutrition-addendum-June 9 6:43 PM-decreased appetite-would like to have zinc level checked in the morning      TEAM CONF - JUNE 8 - ENDURANCE POOR. BED MOD. TRANSFERS MOD. GAIT 25 FEET MOD ASSIST RW. SHOWER TRANSFERS MOD A.   BATH MOD ASSIST. LBD MAX. UBD MIN. TOILETING MAX.   Patient is currently on nectar liquids VFSS TODAY.   BIMS SUMMARY SCORE: 9 Moderately impaired   DIFFICULTY WITH SUSTAINED AND FOCAL ATTENTION.  CONTINENT / INCONTINENT  ELOS - 2 -3 WEEKS         Jeanette Linares DO

## 2023-06-11 NOTE — PROGRESS NOTES
[facility] Progress Note      Patient Name: Vernon Solorzano  : 1950  MRN: [id number]  Primary Care Physician:  Liza Oconnell III, NP-C  Date of admission: 2023    Subjective     Interval History:     Seen and examined.  Alert oriented answers question properly.  No new issues overnight    Review of Systems:   As noted above    Objective     Vitals:   Temp:  [98 °F (36.7 °C)-98.8 °F (37.1 °C)] 98 °F (36.7 °C)  Heart Rate:  [] 95  Resp:  [18-20] 20  BP: ()/(59-80) 115/64    Intake/Output Summary (Last 24 hours) at 2023 0926  Last data filed at 2023 0221  Gross per 24 hour   Intake 720 ml   Output 450 ml   Net 270 ml       Physical Exam:    General Appearance: alert, oriented x 3, no acute distress   Skin: warm and dry  HEENT: oral mucosa normal, nonicteric sclera  Neck: supple, no JVD  Lungs: CTA  Heart: RRR, normal S1 and S2  Abdomen: soft, nontender, nondistended  : no palpable bladder  Extremities: no edema, cyanosis or clubbing      Scheduled Meds:     apixaban, 5 mg, Oral, Q12H  aspirin, 81 mg, Oral, Daily  atorvastatin, 40 mg, Oral, Nightly  calcium 500 mg vitamin D 5 mcg (200 UT), 1 tablet, Oral, Daily  empagliflozin, 25 mg, Oral, Daily  guaifenesin, 400 mg, Oral, Q8H  insulin glargine, 10 Units, Subcutaneous, Nightly  insulin glargine, 40 Units, Subcutaneous, Daily  insulin lispro, 3-14 Units, Subcutaneous, TID With Meals  ipratropium-albuterol, 3 mL, Nebulization, 4x Daily - RT  metoprolol succinate XL, 25 mg, Oral, Q24H  pantoprazole, 40 mg, Oral, Q AM      IV Meds:        Results Reviewed:   I have personally reviewed the results from the time of this admission to 2023 09:26 EDT     Results from last 7 days   Lab Units 23  0552 06/10/23  0841 23  0830 06/06/23  0943   SODIUM mmol/L 137 135* 141 132*   POTASSIUM mmol/L 3.9 4.1 3.7 4.1   CHLORIDE mmol/L 97* 97* 98 94*   CO2 mmol/L 28.8 28.6 26.5 25.8   BUN mg/dL  Addended by: WAYNE CROW on: 2/5/2020 03:06 PM     Modules accepted: Orders     14 17 21 22   CREATININE mg/dL 1.25 1.28* 1.46* 1.31*   CALCIUM mg/dL 9.6 9.2 9.3 9.5   BILIRUBIN mg/dL  --   --   --  0.5   ALK PHOS U/L  --   --   --  91   ALT (SGPT) U/L  --   --   --  15   AST (SGOT) U/L  --   --   --  12   GLUCOSE mg/dL 126* 226* 145* 218*     Estimated Creatinine Clearance: 65.9 mL/min (by C-G formula based on SCr of 1.25 mg/dL).          Results from last 7 days   Lab Units 06/09/23  0832 06/07/23  0706 06/06/23  0338 06/05/23  0305   WBC 10*3/mm3 12.61* 16.29* 15.85* 13.53*   HEMOGLOBIN g/dL 10.9* 10.7* 10.9* 9.9*   PLATELETS 10*3/mm3 345 325 294 264           Assessment / Plan     ASSESSMENT:  1. CKD III, baseline creatinine 1.2.  creatinine is down to baseline at 1.2  2. MR now sp MV replacement, tissue and TV repair, SU closure 5/24/23.  3. Chronic steroid use after COVID 19 PNA.  4. Anemia post op. Hg stable .  5. DM2     Plan:     Continue current management creatinine improving  Surveillance labs    Thank you for involving us in the care of Vernon Solorzano.  Please feel free to call with any questions.    Tae Lay MD  06/11/23  09:26 EDT    [facility]  [phone]

## 2023-06-12 VITALS
RESPIRATION RATE: 15 BRPM | DIASTOLIC BLOOD PRESSURE: 68 MMHG | BODY MASS INDEX: 35.12 KG/M2 | HEIGHT: 73 IN | WEIGHT: 264.99 LBS | OXYGEN SATURATION: 95 % | SYSTOLIC BLOOD PRESSURE: 97 MMHG | HEART RATE: 97 BPM | TEMPERATURE: 98.9 F

## 2023-06-12 LAB
ANION GAP SERPL CALCULATED.3IONS-SCNC: 10.2 MMOL/L (ref 5–15)
BASOPHILS # BLD AUTO: 0.06 10*3/MM3 (ref 0–0.2)
BASOPHILS NFR BLD AUTO: 0.6 % (ref 0–1.5)
BUN SERPL-MCNC: 12 MG/DL (ref 8–23)
BUN/CREAT SERPL: 8.2 (ref 7–25)
CALCIUM SPEC-SCNC: 9.3 MG/DL (ref 8.6–10.5)
CHLORIDE SERPL-SCNC: 97 MMOL/L (ref 98–107)
CO2 SERPL-SCNC: 28.8 MMOL/L (ref 22–29)
CREAT SERPL-MCNC: 1.46 MG/DL (ref 0.76–1.27)
DEPRECATED RDW RBC AUTO: 45.9 FL (ref 37–54)
EGFRCR SERPLBLD CKD-EPI 2021: 50.2 ML/MIN/1.73
EOSINOPHIL # BLD AUTO: 0.61 10*3/MM3 (ref 0–0.4)
EOSINOPHIL NFR BLD AUTO: 6.5 % (ref 0.3–6.2)
ERYTHROCYTE [DISTWIDTH] IN BLOOD BY AUTOMATED COUNT: 14.4 % (ref 12.3–15.4)
GLUCOSE BLDC GLUCOMTR-MCNC: 144 MG/DL (ref 70–130)
GLUCOSE BLDC GLUCOMTR-MCNC: 150 MG/DL (ref 70–130)
GLUCOSE BLDC GLUCOMTR-MCNC: 167 MG/DL (ref 70–130)
GLUCOSE BLDC GLUCOMTR-MCNC: 196 MG/DL (ref 70–130)
GLUCOSE SERPL-MCNC: 170 MG/DL (ref 65–99)
HCT VFR BLD AUTO: 33.1 % (ref 37.5–51)
HGB BLD-MCNC: 11 G/DL (ref 13–17.7)
IMM GRANULOCYTES # BLD AUTO: 0.11 10*3/MM3 (ref 0–0.05)
IMM GRANULOCYTES NFR BLD AUTO: 1.2 % (ref 0–0.5)
LYMPHOCYTES # BLD AUTO: 1.35 10*3/MM3 (ref 0.7–3.1)
LYMPHOCYTES NFR BLD AUTO: 14.3 % (ref 19.6–45.3)
MCH RBC QN AUTO: 29 PG (ref 26.6–33)
MCHC RBC AUTO-ENTMCNC: 33.2 G/DL (ref 31.5–35.7)
MCV RBC AUTO: 87.3 FL (ref 79–97)
MONOCYTES # BLD AUTO: 0.99 10*3/MM3 (ref 0.1–0.9)
MONOCYTES NFR BLD AUTO: 10.5 % (ref 5–12)
NEUTROPHILS NFR BLD AUTO: 6.3 10*3/MM3 (ref 1.7–7)
NEUTROPHILS NFR BLD AUTO: 66.9 % (ref 42.7–76)
NRBC BLD AUTO-RTO: 0.1 /100 WBC (ref 0–0.2)
PLATELET # BLD AUTO: 318 10*3/MM3 (ref 140–450)
PMV BLD AUTO: 9.1 FL (ref 6–12)
POTASSIUM SERPL-SCNC: 4.1 MMOL/L (ref 3.5–5.2)
RBC # BLD AUTO: 3.79 10*6/MM3 (ref 4.14–5.8)
SODIUM SERPL-SCNC: 136 MMOL/L (ref 136–145)
WBC NRBC COR # BLD: 9.42 10*3/MM3 (ref 3.4–10.8)

## 2023-06-12 PROCEDURE — 97116 GAIT TRAINING THERAPY: CPT

## 2023-06-12 PROCEDURE — 97130 THER IVNTJ EA ADDL 15 MIN: CPT

## 2023-06-12 PROCEDURE — 97129 THER IVNTJ 1ST 15 MIN: CPT

## 2023-06-12 PROCEDURE — 63710000001 INSULIN LISPRO (HUMAN) PER 5 UNITS: Performed by: PHYSICAL MEDICINE & REHABILITATION

## 2023-06-12 PROCEDURE — 97110 THERAPEUTIC EXERCISES: CPT

## 2023-06-12 PROCEDURE — 94799 UNLISTED PULMONARY SVC/PX: CPT

## 2023-06-12 PROCEDURE — 97535 SELF CARE MNGMENT TRAINING: CPT

## 2023-06-12 PROCEDURE — 97530 THERAPEUTIC ACTIVITIES: CPT

## 2023-06-12 PROCEDURE — 94761 N-INVAS EAR/PLS OXIMETRY MLT: CPT

## 2023-06-12 PROCEDURE — 94664 DEMO&/EVAL PT USE INHALER: CPT

## 2023-06-12 RX ADMIN — METOPROLOL SUCCINATE 25 MG: 25 TABLET, EXTENDED RELEASE ORAL at 08:37

## 2023-06-12 RX ADMIN — ASPIRIN 81 MG: 81 TABLET, CHEWABLE ORAL at 08:37

## 2023-06-12 RX ADMIN — INSULIN GLARGINE-YFGN 10 UNITS: 100 INJECTION, SOLUTION SUBCUTANEOUS at 21:45

## 2023-06-12 RX ADMIN — APIXABAN 5 MG: 5 TABLET, FILM COATED ORAL at 21:45

## 2023-06-12 RX ADMIN — APIXABAN 5 MG: 5 TABLET, FILM COATED ORAL at 08:37

## 2023-06-12 RX ADMIN — IPRATROPIUM BROMIDE AND ALBUTEROL SULFATE 3 ML: 2.5; .5 SOLUTION RESPIRATORY (INHALATION) at 19:50

## 2023-06-12 RX ADMIN — GUAIFENESIN 400 MG: 200 SOLUTION ORAL at 13:59

## 2023-06-12 RX ADMIN — GUAIFENESIN 400 MG: 200 SOLUTION ORAL at 21:45

## 2023-06-12 RX ADMIN — EMPAGLIFLOZIN 25 MG: 25 TABLET, FILM COATED ORAL at 08:38

## 2023-06-12 RX ADMIN — IPRATROPIUM BROMIDE AND ALBUTEROL SULFATE 3 ML: 2.5; .5 SOLUTION RESPIRATORY (INHALATION) at 06:54

## 2023-06-12 RX ADMIN — ATORVASTATIN CALCIUM 40 MG: 20 TABLET, FILM COATED ORAL at 21:45

## 2023-06-12 RX ADMIN — INSULIN GLARGINE-YFGN 40 UNITS: 100 INJECTION, SOLUTION SUBCUTANEOUS at 08:37

## 2023-06-12 RX ADMIN — GUAIFENESIN 400 MG: 200 SOLUTION ORAL at 05:04

## 2023-06-12 RX ADMIN — CALCIUM CARBONATE-VITAMIN D TAB 500 MG-200 UNIT 1 TABLET: 500-200 TAB at 08:37

## 2023-06-12 RX ADMIN — IPRATROPIUM BROMIDE AND ALBUTEROL SULFATE 3 ML: 2.5; .5 SOLUTION RESPIRATORY (INHALATION) at 12:49

## 2023-06-12 RX ADMIN — INSULIN LISPRO 3 UNITS: 100 INJECTION, SOLUTION INTRAVENOUS; SUBCUTANEOUS at 08:37

## 2023-06-12 RX ADMIN — PANTOPRAZOLE SODIUM 40 MG: 40 TABLET, DELAYED RELEASE ORAL at 05:04

## 2023-06-12 RX ADMIN — INSULIN LISPRO 3 UNITS: 100 INJECTION, SOLUTION INTRAVENOUS; SUBCUTANEOUS at 16:51

## 2023-06-12 NOTE — PROGRESS NOTES
Physical Medicine and Rehabilitation  Inpatient Rehabilitation Interdisciplinary Plan of Care    Demographics            Age: 72Y            Gender: Male    Admission Date: 6/6/2023 4:36:00 PM  Rehabilitation Diagnosis:  No anup stroke  Status post CVA-Scattered  punctate restricted diffusion acute infarct in the  bilateral frontal and  left parietal occipital cortices.      Impaired Cognition/impaired mobility/impaired self-care    Aphasia-resolved    Right hand apraxia-resolved    Encephalopathy-improving    Dysphagia/nutrition-healthy heart 2-3 g sodium, consistent carb, no mixed  consistency, regular texture, nectar thick liquid  June 7-videofluoroscopic swallow study planned tomorrow  June 8-swallow study today-advance to regular solid, no mixed consistency, thin  liquids by cup.  No straws.    Stroke prophylaxis-Eliquis/aspirin/atorvastatin    History of severe mitral regurgitation and annular dilatation, moderate to  severe tricuspid regurgitation-  status post May 24, 2023 - 1. Mitral valve repair with a 28 mm physio II ring  annuloplasty with residual mild to moderate MR  2.  Mitral valve replacement with a 29 mm Schwartz II porcine prosthesis  3.  Tricuspid valve repair with 28 mm physio tricuspid ring  4.  Left atrial appendage endocardial closure    Atrial flutter-status post cardioversion June 2, 2023 anticoagulation with  Eliquis  Metoprolol    Volume status-torsemide    Severe pulmonary hypertension  Obstructive sleep apnea    Interstitial lung disease status post COVID-19 infection-steroid taper per  pulmonology  DuoNebs 4 times a day  Chronic steroid use with cushingoid syndrome  Prednisone 5 mg every other day    Leukocytosis-reactive/steroid administration    Postoperative anemia    Oygfqgsawpuvjuno-gckvtiqdzch-lskybmdl    Chronic kidney disease stage III-baseline creatinine 1.2    Diabetes mellitus type 2-Jardiance/Lantus/sliding scale insulin-uncontrolled  June 7-on Jardiance and Lantus 40 units  daily.  Elevated blood glucose.  Received 21 units on sliding scale insulin yesterday.  Increase Lantus by adding  10 units nightly.    BPH/urinary retention-history of TURP    Renal insufficiency - June 9 - Creatinine increased to 1.46. Meds reviewed. Is  on Jardiance - already received dose today. Will get input from Nephrology.    Pain management-tramadol-/Tylenol Dk report reviewed    Nutrition-addendum-June 9 6:43 PM-decreased appetite-would like to have zinc  level checked in the morning    Plan of Care  Anticipated Discharge Date/Estimated Length of Stay: ELOS: 2-3 weeks  Anticipated Discharge Destination: Community discharge with assistance  Discharge Plan : Patient plans to d/c home with his wife who can provide 24 hour  assist.  Medical Necessity Expected Level Rationale: Goals are to achieve a level of  contact-guard with  mobility and self-care and improved activity tolerance.  Rehabilitation prognosis fair.  Medical prognosis defer to cardiology.  Intensity and Duration: an average of 3 hours/5 days per week  Medical Supervision and 24 Hour Rehab Nursing: x  Physical Therapy: x  PT Intensity/Duration: 1 hour / day, 5 days / week, for approximately 2 weeks  Occupational Therapy: x  OT Intensity/Duration: 1 hour / day, 5 days / week, for approximately 2 weeks  Speech and Language Therapy: x  SLP Intensity/Duration: 1 hour / day, 5 days / week, for approximately 2 weeks  Social Work: x  Therapeutic Recreation: x  Psychology: x  Registered Dietician: x  Updated (if changes indicated)  No changes to plan.    Based on the patient's medical and functional status, their prognosis and  expected level of functional improvement is: Goals are to achieve a level of  contact-guard with  mobility and self-care and improved activity tolerance.  Rehabilitation prognosis fair.  Medical prognosis defer to cardiology.    Interdisciplinary Problem/Goals/Status  Sphincter Control    [RN] Bladder  Management(Active)  Current Status(06/08/2023): Patient may be continent or incontinent of urine.  Bladder scan q shift.  Weekly Goal(06/15/2023): Patient will be continent 50 %  Discharge Goal: Patient will be continent    [RN] Bowel Management(Active)  Current Status(06/08/2023): Virgenn is continent of bowels  Weekly Goal(06/14/2023): patient is continent of bowels  Discharge Goal: Patient is continent of bowels        Safety    [RN] Potential for Injury(Active)  Current Status(06/08/2023): Patient is at increased risk for falls/injury r/t  recent surgery and stroke.  Weekly Goal(06/15/2023): Patient will use the call light for assistance  Discharge Goal: Patient/ family will be aware of risk of fall and safety in the  home setting        Psychosocial    [RN] Coping/Adjustment(Active)  Current Status(06/08/2023): Patient has a supportive family  Weekly Goal(06/14/2023): Patient will express concerns regarding current status  Discharge Goal: Patient will demonstrate healthy coping strategies        Swallow Function    [ST] Swallowing(Active)  Current Status(06/09/2023): Regular (no mixed) thin by cup. precautions:  upright, small single sips, no straw, alternate solids with liquids  Weekly Goal(06/16/2023): Patient will tolerate the least restrictive diet  without s/sx of aspiration  Discharge Goal: Patient will tolerate the least restrictive diet without s/sx of  aspiration        Self Care    [OT] Bathing(Active)  Current Status(06/07/2023): Mod A  Weekly Goal(06/14/2023): Min A  Discharge Goal: CGA    [OT] Dressing (Lower)(Active)  Current Status(06/07/2023): Max A  Weekly Goal(06/14/2023): Mod A  Discharge Goal: Min A    [OT] Dressing (Upper)(Active)  Current Status(06/07/2023): Min A  Weekly Goal(06/14/2023): Set up  Discharge Goal: Indep    [OT] Grooming(Active)  Current Status(06/07/2023): Min A  Weekly Goal(06/14/2023): Set up  Discharge Goal: Mod I    [OT] Toileting(Active)  Current Status(06/07/2023):  Max A  Weekly Goal(06/14/2023): Mod A  Discharge Goal: Min A        Mobility    [OT] Toilet Transfers(Active)  Current Status(06/07/2023): Anticipate Mod A  Weekly Goal(06/14/2023): Min A  Discharge Goal: CGA    [OT] Tub/Shower Transfers(Active)  Current Status(06/07/2023): Mod A  Weekly Goal(06/14/2023): Min A  Discharge Goal: CGA    [PT] Stairs(Active)  Current Status(06/07/2023): TBA  Weekly Goal(06/22/2023): curb, Min, rwx  Discharge Goal: 12, rail, Min/CG    [PT] Walk(Active)  Current Status(06/07/2023): 25, Mod + one to follow, rwx  Weekly Goal(06/22/2023): 50, Min, rwx  Discharge Goal: 150, CG, rwx    [PT] Bed/Chair/Wheelchair(Active)  Current Status(06/07/2023): Mod  Weekly Goal(06/22/2023): Min  Discharge Goal: CG    [PT] Bed Mobility(Active)  Current Status(06/07/2023): Mod  Weekly Goal(06/22/2023): Min  Discharge Goal: Sup        Cognition    [ST] Attention(Active)  Current Status(06/09/2023): Moderate cognitive impairment; decreased  initiation/slow processing speed. Increased difficulty with attention, memory,  and executive function  Weekly Goal(06/16/2023): Patient will participate in functional therapy  activities given MOD cues  Discharge Goal: Functional cognition for home      Comments: 6/8 Mod A bed mob, Mod A transfers, amb 25' Mod A + 1 to follow w/  rwx.  Mod A shower transfer.  Low endurance.  Max A toileting.  Mod A bathing,  Max A LBD.  VFSS today.  Cont / Incont urine, continent of bowel.    Signed by: Tu Silver MD

## 2023-06-12 NOTE — PROGRESS NOTES
LOS: 6 days   Patient Care Team:  Liza Oconnell III, NP-C as PCP - General (Family Medicine)  Corey Lao Jr., MD (Inactive) as Consulting Physician (Urology)      KRIS TAYLOR  1950      ADMITTING DIAGNOSIS:  Stroke  Valvular heart disease status post repair/replacement      Subjective     Patient reports tolerating activities.  Feels his coordination is improving on the right side.  Comfortable with his breathing.  No chest pain.         Objective     Vitals:    06/12/23 1249   BP: 140/75   Pulse: 78   Resp: 18   Temp: 97.8 °F (36.6 °C)   SpO2: 94%       PHYSICAL EXAM:   MENTAL STATUS -  AWAKE / ALERT  HEENT- NCAT, PUPILS EQUALLY ROUND, SCLERAE ANICTERIC, CONJUNCTIVAE PINK, OP MOIST, NO JVD, EARS UNREMARKABLE EXTERNALLY  LUNGS - CTA, NO WHEEZES, RALES OR RHONCHI  Sternotomy incision -dressed  HEART-sinus with occasional ectopy  ABD - NORMOACTIVE BOWEL SOUNDS, SOFT, NT.   EXT - NO EDEMA OR CYANOSIS  NEURO -oriented to person place time and situation.    Speech is fluent.    MOTOR EXAM - RUE/RLE 5/5. LUE/LLE 5/5.        MEDICATIONS  Scheduled Meds:apixaban, 5 mg, Oral, Q12H  aspirin, 81 mg, Oral, Daily  atorvastatin, 40 mg, Oral, Nightly  calcium 500 mg vitamin D 5 mcg (200 UT), 1 tablet, Oral, Daily  guaifenesin, 400 mg, Oral, Q8H  insulin glargine, 10 Units, Subcutaneous, Nightly  insulin glargine, 40 Units, Subcutaneous, Daily  insulin lispro, 3-14 Units, Subcutaneous, TID With Meals  ipratropium-albuterol, 3 mL, Nebulization, 4x Daily - RT  metoprolol succinate XL, 25 mg, Oral, Q24H  pantoprazole, 40 mg, Oral, Q AM      Continuous Infusions:   PRN Meds:.  acetaminophen **OR** [DISCONTINUED] acetaminophen **OR** [DISCONTINUED] acetaminophen    bisacodyl    calcium carbonate    hydrocortisone-bacitracin-zinc oxide-nystatin    ipratropium-albuterol    senna-docusate sodium    traMADol      RESULTS  Glucose   Date/Time Value Ref Range Status   06/12/2023 1614 167 (H) 70 - 130  mg/dL Final     Comment:     Meter: [id number] : [id number] Summer Laia NA   06/12/2023 1119 144 (H) 70 - 130 mg/dL Final     Comment:     Meter: [id number] : [id number] Abalosva Rubi NA   06/12/2023 0720 150 (H) 70 - 130 mg/dL Final     Comment:     Meter: [id number] : [id number] Abatiffanie Rubi NA   06/11/2023 2033 371 (H) 70 - 130 mg/dL Final     Comment:     Meter: [id number] : [id number] Suzanneatul Chavesma NA   06/11/2023 1629 159 (H) 70 - 130 mg/dL Final     Comment:     Meter: [id number] : [id number] Roe Carvajalntal NA   06/11/2023 1147 214 (H) 70 - 130 mg/dL Final     Comment:     SHIRA ARBOLEDA NOTIFIED Meter: [id number] : [id number] Roe Victoriaal NA   06/11/2023 0935 173 (H) 70 - 130 mg/dL Final     Comment:     Meter: [id number] : [id number] Levyisabel Jaramillovega QIU   06/11/2023 0731 162 (H) 70 - 130 mg/dL Final     Comment:     Meter: [id number] : [id number] Roe Herman NA     Results from last 7 days   Lab Units 06/12/23  0544 06/09/23  0832 06/07/23  0706   WBC 10*3/mm3 9.42 12.61* 16.29*   HEMOGLOBIN g/dL 11.0* 10.9* 10.7*   HEMATOCRIT % 33.1* 32.4* 32.8*   PLATELETS 10*3/mm3 318 345 325       Results from last 7 days   Lab Units 06/12/23  0544 06/11/23  0552 06/10/23  0841 06/09/23  0830 06/06/23  0943   SODIUM mmol/L 136 137 135*   < > 132*   POTASSIUM mmol/L 4.1 3.9 4.1   < > 4.1   CHLORIDE mmol/L 97* 97* 97*   < > 94*   CO2 mmol/L 28.8 28.8 28.6   < > 25.8   BUN mg/dL 12 14 17   < > 22   CREATININE mg/dL 1.46* 1.25 1.28*   < > 1.31*   CALCIUM mg/dL 9.3 9.6 9.2   < > 9.5   BILIRUBIN mg/dL  --   --   --   --  0.5   ALK PHOS U/L  --   --   --   --  91   ALT (SGPT) U/L  --   --   --   --  15   AST (SGOT) U/L  --   --   --   --  12   GLUCOSE mg/dL 170* 126* 226*   < > 218*    < > = values in this interval not displayed.        Latest Reference Range & Units 05/23/23 14:29   Hemoglobin A1C 4.80 - 5.60 % 9.20 (H)   Total Cholesterol 0 - 200 mg/dL 155   HDL Cholesterol 40 -  60 mg/dL 41   LDL Cholesterol  0 - 100 mg/dL 81   Triglycerides 0 - 150 mg/dL 197 (H)   (H): Data is abnormally high     Latest Reference Range & Units 05/31/23 08:03   25 Hydroxy, Vitamin D 30.0 - 100.0 ng/ml 20.0 (L)   (L): Data is abnormally low  ASSESSMENT and PLAN    Acute ischemic left MCA stroke    Status post CVA-Scattered  punctate restricted diffusion acute infarct in the bilateral frontal and   left parietal occipital cortices.        Impaired Cognition/impaired mobility/impaired self-care     Aphasia-resolved     Right hand apraxia-resolved     Encephalopathy-improving     Dysphagia/nutrition-healthy heart 2-3 g sodium, consistent carb, no mixed consistency, regular texture, nectar thick liquid  June 7-videofluoroscopic swallow study planned tomorrow  June 8-swallow study today-advance to regular solid, no mixed consistency, thin liquids by cup.  No straws.     Stroke prophylaxis-Eliquis/aspirin/atorvastatin     History of severe mitral regurgitation and annular dilatation, moderate to severe tricuspid regurgitation-  status post May 24, 2023 - 1. Mitral valve repair with a 28 mm physio II ring annuloplasty with residual mild to moderate MR  2.  Mitral valve replacement with a 29 mm Schwartz II porcine prosthesis  3.  Tricuspid valve repair with 28 mm physio tricuspid ring  4.  Left atrial appendage endocardial closure     Atrial flutter-status post cardioversion June 2, 2023 anticoagulation with Eliquis  Metoprolol     Volume status-torsemide     Severe pulmonary hypertension  Obstructive sleep apnea     Interstitial lung disease status post COVID-19 infection-steroid taper per pulmonology  DuoNebs 4 times a day  Chronic steroid use with cushingoid syndrome  Prednisone 5 mg every other day     Leukocytosis-reactive/steroid administration     Postoperative anemia     Japkwqriicicctzl-rliqpzktjdw-tkbnmudt     Chronic kidney disease stage III-baseline creatinine 1.2     Diabetes mellitus type  2-Jardiance/Lantus/sliding scale insulin-uncontrolled  June 7-on Jardiance and Lantus 40 units daily.  Elevated blood glucose.  Received 21 units on sliding scale insulin yesterday.  Increase Lantus by adding 10 units nightly.     BPH/urinary retention-history of TURP    Renal insufficiency - June 9 - Creatinine increased to 1.46. Meds reviewed. Is on Jardiance - already received dose today. Will get input from Nephrology.  June 12-with the increase in his creatinine, Jardiance discontinued     Pain management-tramadol-/Tylenol Dk report reviewed    Nutrition-addendum-June 9 6:43 PM-decreased appetite-would like to have zinc level checked in the morning      TEAM CONF - JUNE 8 - ENDURANCE POOR. BED MOD. TRANSFERS MOD. GAIT 25 FEET MOD ASSIST RW. SHOWER TRANSFERS MOD A.   BATH MOD ASSIST. LBD MAX. UBD MIN. TOILETING MAX.   Patient is currently on nectar liquids VFSS TODAY.   BIMS SUMMARY SCORE: 9 Moderately impaired   DIFFICULTY WITH SUSTAINED AND FOCAL ATTENTION.  CONTINENT / INCONTINENT  ELOS - 2 -3 WEEKS        Now admit for comprehensive acute inpatient rehabilitation .  This would be an interdisciplinary program with physical therapy 1 hour,  occupational therapy 1 hour, and speech therapy 1 hour, 5 days a week.  Rehabilitation nursing for carryover, monitoring of neurologic, cardiac, pulmonary, diabetes   status, bowel and bladder, and skin  Ongoing physician follow-up.  Weekly team conferences.  Goals are to achieve a level of contact-guard with  mobility and self-care and improved activity tolerance.   Rehabilitation prognosis fair.  Medical prognosis defer to cardiology.  Estimated length of stay is approximately 2 weeks, but is only an estimation.   The patient's functional status and clinical status is unchanged from preadmission assessment and the patient continues appropriate for acute inpatient rehabilitation.  Goal is for home with outpatient cardiac rehab   therapies.  Barrier to discharge:  Impaired cognition mobility self-care- work on follow, executive function, conditioning, transfers, progressive ambulation, ADLs to overcome.           Tu Silver MD      During rounds, used appropriate personal protective equipment including mask and gloves.  Additional gown if indicated.  Mask used was standard procedure mask. Appropriate PPE was worn during the entire visit.  Hand hygiene was completed before and after.

## 2023-06-12 NOTE — PLAN OF CARE
Goal Outcome Evaluation:  Plan of Care Reviewed With: patient        Progress: improving  Outcome Evaluation: Pt's chest incision is intact. No SOA this shift. No c/o pain or problems. Slow processing.

## 2023-06-12 NOTE — PLAN OF CARE
Goal Outcome Evaluation:        Pt is A/Ox4, calm/cooperative, OOB x 1 stand/pivot to w/c, cues are helpful. Meds taken whole w thin liquids one at a time. PVR bladder scan completed x1. Pt has been continent overnight using urinal or toilet. Pt c/o constipation but was eventually able to have BM. No c/o pain overnight.

## 2023-06-12 NOTE — PROGRESS NOTES
Inpatient Rehabilitation Plan of Care Note    Plan of Care  Care Plan Reviewed - No updates at this time.    Sphincter Control    Performed Intervention(s)  Offer toileting/ timed voids  Monitor I&O      Safety    Performed Intervention(s)  Safety monitoring during functional activities  Environmental set- upto reduce risk      Psychosocial    Performed Intervention(s)  Allow patient to verbalize needs and concerns    Signed by: Sarah Lloyd RN

## 2023-06-12 NOTE — PROGRESS NOTES
[facility] Progress Note      Patient Name: Vernon Solorzano  : 1950  MRN: [id number]  Primary Care Physician:  Liza Oconnell III, RACHAEL  Date of admission: 2023    Subjective     Interval History:     Seen and examined for follow-up on acute kidney injury.  Denies any nausea or vomiting.  Noted poor oral intake.  Has no urinary symptoms    Review of Systems:   As noted above    Objective     Vitals:   Temp:  [97.8 °F (36.6 °C)-98 °F (36.7 °C)] 97.8 °F (36.6 °C)  Heart Rate:  [] 98  Resp:  [18-20] 18  BP: (112-120)/(58-75) 112/58    Intake/Output Summary (Last 24 hours) at 2023 0948  Last data filed at 2023 0800  Gross per 24 hour   Intake 850 ml   Output --   Net 850 ml         Physical Exam:    General Appearance: alert, oriented x 3, no acute distress   Skin: warm and dry  HEENT: oral mucosa normal, nonicteric sclera  Neck: supple, no JVD  Lungs: CTA  Heart: RRR, normal S1 and S2  Abdomen: soft, nontender, nondistended  : no palpable bladder  Extremities: Bilateral lower extremity edema noted.  Compressive socks      Scheduled Meds:     apixaban, 5 mg, Oral, Q12H  aspirin, 81 mg, Oral, Daily  atorvastatin, 40 mg, Oral, Nightly  calcium 500 mg vitamin D 5 mcg (200 UT), 1 tablet, Oral, Daily  empagliflozin, 25 mg, Oral, Daily  guaifenesin, 400 mg, Oral, Q8H  insulin glargine, 10 Units, Subcutaneous, Nightly  insulin glargine, 40 Units, Subcutaneous, Daily  insulin lispro, 3-14 Units, Subcutaneous, TID With Meals  ipratropium-albuterol, 3 mL, Nebulization, 4x Daily - RT  metoprolol succinate XL, 25 mg, Oral, Q24H  pantoprazole, 40 mg, Oral, Q AM      IV Meds:        Results Reviewed:   I have personally reviewed the results from the time of this admission to 2023 09:48 EDT     Results from last 7 days   Lab Units 23  0544 23  0552 06/10/23  0841 23  0830 23  0943   SODIUM mmol/L 136 137 135*   < > 132*   POTASSIUM  mmol/L 4.1 3.9 4.1   < > 4.1   CHLORIDE mmol/L 97* 97* 97*   < > 94*   CO2 mmol/L 28.8 28.8 28.6   < > 25.8   BUN mg/dL 12 14 17   < > 22   CREATININE mg/dL 1.46* 1.25 1.28*   < > 1.31*   CALCIUM mg/dL 9.3 9.6 9.2   < > 9.5   BILIRUBIN mg/dL  --   --   --   --  0.5   ALK PHOS U/L  --   --   --   --  91   ALT (SGPT) U/L  --   --   --   --  15   AST (SGOT) U/L  --   --   --   --  12   GLUCOSE mg/dL 170* 126* 226*   < > 218*    < > = values in this interval not displayed.       Estimated Creatinine Clearance: 60.2 mL/min (A) (by C-G formula based on SCr of 1.46 mg/dL (H)).          Results from last 7 days   Lab Units 06/12/23  0544 06/09/23  0832 06/07/23  0706 06/06/23  0338   WBC 10*3/mm3 9.42 12.61* 16.29* 15.85*   HEMOGLOBIN g/dL 11.0* 10.9* 10.7* 10.9*   PLATELETS 10*3/mm3 318 345 325 294             Assessment / Plan     ASSESSMENT:  1.  Acute kidney injury on top of CKD III, baseline creatinine 1.2.  Is up to 1.5 today.  Etiology is likely secondary to  2. MR now sp MV replacement, tissue and TV repair, SU closure 5/24/23.  3. Chronic steroid use after COVID 19 PNA.  4. Anemia post op. Hg stable .  5. DM2 with CKD     Plan:     Given worsening creatinine will hold Jardiance for the time being.  Insulin management per primary  Encourage p.o. intake.    Thank you for involving us in the care of Vernon Solorzano.  Please feel free to call with any questions.    Ludwig Marquez MD  06/12/23  09:48 EDT    [facility]  [phone]

## 2023-06-12 NOTE — PROGRESS NOTES
"Nutrition Services    Patient Name:  Vernon Solorzano  YOB: 1950  MRN: [id number]  Admit Date:  6/6/2023    Assessment Date:  06/12/23    FOLLOW UP NOTE - CLINICAL NUTRITION    Comments:   Since last review, pt's diet advanced to Diabetic, Cardiac, no mixed consistencies, thin liquids.   Visited pt in room who reported fair intakes. Per EMR, PO varying, %, but often 25% at recent meals (100% x 2 meals today). Pt reported a change/loss in taste, in addition to menu fatigue attributing to low intakes. Advised pt of a la carte selections on menu, in addition to snacks and ONS available on unit. Pt not liking Magic Cups recently; was agreeable to Boost glucose control q daily + Mighty Shakes BID. Pt denies N/V on this day. 1-2+ edema noted. Last BM 6/11. Labs reviewed: BG elevated (126-371 x 24 hrs).     Recommend:   Adding Boost GC (chocolate) q daily at breakfast.   D/c Magic Cups; instead adding Mighty Shakes (strawberry) BID.   Continue to encourage adequate PO and snacks PRN.     RD will continue to follow-up, per protocol.      Encounter Information        Reason for Encounter Follow-up   Current Issues S/p acute ischemic L MCA stroke     Current Nutrition Orders & Evaluation of Intake       Oral Nutrition     Current PO Diet Diet: Regular/House Diet, Diabetic Diets, Cardiac Diets; Healthy Heart (2-3 Na+); Consistent Carbohydrate; No Mixed Consistencies; Texture: Regular Texture (IDDSI 7); Fluid Consistency: Thin (IDDSI 0)   Supplement Magic Cup TID   PO Evaluation    % PO Intake/# of days %, varying   Factors Affecting Intake  decreased appetite, taste changes     Anthropometrics         Height   Weight Height: 185.4 cm (73\")  Weight: 120 kg (264 lb 15.9 oz) (06/12/23 0500)   BMI kg/m2 Body mass index is 34.96 kg/m².  Obese, Class I (30 - 34.9)   Weight trend Gain     Physical Findings          Physical Appearance alert, flat affect, obese, oriented   Oral/Mouth Cavity tooth or teeth " missing   Edema  1+ (trace), 2+ (mild)   Gastrointestinal normoactive, last bowel movement:6/11   Skin  surgical incision   Tubes/Drains/Lines none     Labs       Pertinent Labs Reviewed, listed below     Results from last 7 days   Lab Units 06/12/23  0544 06/11/23  0552 06/10/23  0841 06/09/23  0830 06/06/23  0943   SODIUM mmol/L 136 137 135*   < > 132*   POTASSIUM mmol/L 4.1 3.9 4.1   < > 4.1   CHLORIDE mmol/L 97* 97* 97*   < > 94*   CO2 mmol/L 28.8 28.8 28.6   < > 25.8   BUN mg/dL 12 14 17   < > 22   CREATININE mg/dL 1.46* 1.25 1.28*   < > 1.31*   CALCIUM mg/dL 9.3 9.6 9.2   < > 9.5   BILIRUBIN mg/dL  --   --   --   --  0.5   ALK PHOS U/L  --   --   --   --  91   ALT (SGPT) U/L  --   --   --   --  15   AST (SGOT) U/L  --   --   --   --  12   GLUCOSE mg/dL 170* 126* 226*   < > 218*    < > = values in this interval not displayed.     Results from last 7 days   Lab Units 06/12/23  0544   HEMOGLOBIN g/dL 11.0*   HEMATOCRIT % 33.1*   WBC 10*3/mm3 9.42     Results from last 7 days   Lab Units 06/12/23  0544 06/09/23  0832 06/07/23  0706 06/06/23  0338   PLATELETS 10*3/mm3 318 345 325 294     COVID19   Date Value Ref Range Status   05/23/2023 Not Detected Not Detected - Ref. Range Final     Lab Results   Component Value Date    HGBA1C 9.20 (H) 05/23/2023          Medications           Scheduled Medications apixaban, 5 mg, Oral, Q12H  aspirin, 81 mg, Oral, Daily  atorvastatin, 40 mg, Oral, Nightly  calcium 500 mg vitamin D 5 mcg (200 UT), 1 tablet, Oral, Daily  empagliflozin, 25 mg, Oral, Daily  guaifenesin, 400 mg, Oral, Q8H  insulin glargine, 10 Units, Subcutaneous, Nightly  insulin glargine, 40 Units, Subcutaneous, Daily  insulin lispro, 3-14 Units, Subcutaneous, TID With Meals  ipratropium-albuterol, 3 mL, Nebulization, 4x Daily - RT  metoprolol succinate XL, 25 mg, Oral, Q24H  pantoprazole, 40 mg, Oral, Q AM       Infusions     PRN Medications   acetaminophen **OR** [DISCONTINUED] acetaminophen **OR** [DISCONTINUED]  acetaminophen    bisacodyl    calcium carbonate    hydrocortisone-bacitracin-zinc oxide-nystatin    ipratropium-albuterol    senna-docusate sodium    traMADol     PLAN OF CARE  Intervention Goal        Intervention Goal(s) Maintain nutrition status, Improved nutrition related labs, Continue positive trend, Advance diet, No significant weight loss, and Appropriate weight loss     Nutrition Intervention        RD Action Advise alternative selection, Advise available snack, Adjusted supplement, Encourage intake, Follow Tx Progress, and Care plan reviewed     Prescription        Diet Prescription    Supplement Prescription Boost GC (mir) q daily at breakfast. Mighty Shakes (strawberry) BID at L/D. D/c magic cups.    EN/PN Prescription    Prescription Ordered Yes   --  Monitor/Evaluation        Monitor Per protocol, PO intake, Supplement intake, Pertinent labs, Weight, Skin status, GI status, Symptoms   Discharge Plan Pending clinical course   Education Will educate as needed       Electronically signed by:  Génesis Beaulieu RD  06/12/23 13:58 EDT

## 2023-06-12 NOTE — THERAPY TREATMENT NOTE
Inpatient Rehabilitation - Physical Therapy Treatment Note       [facility]     Patient Name: Vernon Solorzano  : 1950  MRN: [id number]    Today's Date: 2023                    Admit Date: 2023      Visit Dx:     ICD-10-CM ICD-9-CM   1. Impaired functional mobility, balance, gait, and endurance  Z74.09 V49.89       Patient Active Problem List   Diagnosis    DM (diabetes mellitus), type 2    Mixed hyperlipidemia    Mild intermittent asthma without complication    New onset of congestive heart failure    Nonrheumatic mitral valve regurgitation    Chest pain    Essential hypertension    Mitral valve disease    Acute ischemic left MCA stroke       Past Medical History:   Diagnosis Date    Allergic rhinitis     Arthritis     BPH (benign prostatic hyperplasia)     Diabetes mellitus     TYPE 2    Foraminal stenosis of cervical region     C5-C6    GERD (gastroesophageal reflux disease)     Hemorrhoids     History of urinary retention     S/P TURP    Hyperlipidemia     Macular degeneration     PING (obstructive sleep apnea) 2016    MILD, SEES DR. AMARILIS MORGAN       Past Surgical History:   Procedure Laterality Date    CARDIAC CATHETERIZATION N/A 2023    Procedure: Right Heart Cath;  Surgeon: Corey Gaffney MD;  Location: [facility] CATH INVASIVE LOCATION;  Service: Cardiology;  Laterality: N/A;    CARDIAC CATHETERIZATION N/A 2023    Procedure: Left Heart Cath;  Surgeon: Corey Gaffney MD;  Location: [facility] CATH INVASIVE LOCATION;  Service: Cardiology;  Laterality: N/A;    CARDIAC CATHETERIZATION N/A 2023    Procedure: Left ventriculography;  Surgeon: Corey Gaffney MD;  Location: [facility] CATH INVASIVE LOCATION;  Service: Cardiology;  Laterality: N/A;    CARDIAC CATHETERIZATION N/A 2023    Procedure: Coronary angiography;  Surgeon: Corey Gaffney MD;  Location: [facility] CATH INVASIVE LOCATION;  Service: Cardiology;  Laterality: N/A;    COLONOSCOPY N/A     WNL PER PT, NO  RECORDS,     COLONOSCOPY N/A 04/07/2009    DR. GORGE BENAVIDEZ AT [place]    MITRAL VALVE REPAIR/REPLACEMENT N/A 5/24/2023    Procedure: MITRAL VALVE REPAIR/REPLACEMENT TRICUSPID VALVE REPAIR/REPLACEMENT TRANSESOPHAGEAL ECHOCARDIOGRAM WITH ANESTHESIA;  Surgeon: George Frey MD;  [place] [facility]  Service: Cardiothoracic;  Laterality: N/A;    PROSTATE SURGERY N/A 11/12/2010    TURP, DR. BRIGHT COLLAZO AT [facility]    SKIN TAG REMOVAL N/A 12/20/2017    ANAL SKIN TAG X2, PERFORMED IN OFFICE, DR. LISA ORANTES    TURP / TRANSURETHRAL INCISION / DRAINAGE PROSTATE  2010    Dr. Goodrich       PT ASSESSMENT (last 12 hours)       IRF PT Evaluation and Treatment       Row Name 06/12/23 0993          PT Time and Intention    Document Type daily treatment  -MD     Mode of Treatment physical therapy  -MD     Patient/Family/Caregiver Comments/Observations Pt sitting in WC showing no signs of acute distress.  -MD       Row Name 06/12/23 0991          Pain Assessment    Pretreatment Pain Rating 7/10  -MD     Pain Location - Side/Orientation Bilateral  -MD     Pain Location - hand  -MD       Row Name 06/12/23 0931          Pain Scale: Word Pre/Post-Treatment    Pain Intervention(s) Repositioned;Ambulation/increased activity  -MD       Row Name 06/12/23 0980          Cognition/Psychosocial    Orientation Status (Cognition) oriented to;person;place;situation  -MD     Follows Commands (Cognition) follows one-step commands;delayed response/completion;increased processing time needed;repetition of directions required  -MD     Personal Safety Interventions gait belt;fall prevention program maintained  -MD       Row Name 06/12/23 0976          Bed Mobility    Supine-Sit [place] (Bed Mobility) verbal cues;moderate assist (50% patient effort)  -MD       Row Name 06/12/23 0947          Bed-Chair Transfer    Bed-Chair [place] (Transfers) verbal cues;contact guard  -MD     Assistive Device (Bed-Chair Transfers) walker,  front-wheeled;wheelchair  -MD       Row Name 06/12/23 0947          Sit-Stand Transfer    Sit-Stand [place] (Transfers) verbal cues;minimum assist (75% patient effort)  -MD     Assistive Device (Sit-Stand Transfers) wheelchair  -MD       Row Name 06/12/23 0947          Stand-Sit Transfer    Stand-Sit [place] (Transfers) verbal cues;contact guard;minimum assist (75% patient effort)  -MD     Assistive Device (Stand-Sit Transfers) wheelchair  -MD       Row Name 06/12/23 0947          Gait/Stairs (Locomotion)    [place] (Gait) verbal cues;minimum assist (75% patient effort)  -MD     Assistive Device (Gait) walker, front-wheeled  -MD     Distance in Feet (Gait) 80'x2 in am and 80'x2 in pm  -MD     Deviations/Abnormal Patterns (Gait) base of support, wide;festinating/shuffling;stride length decreased;weight shifting decreased;gait speed decreased  -MD     Bilateral Gait Deviations forward flexed posture;heel strike decreased  -MD       Row Name 06/12/23 0947          Motor Skills    Therapeutic Exercise hip;knee;ankle  -MD       Row Name 06/12/23 0947          Hip (Therapeutic Exercise)    Hip (Therapeutic Exercise) strengthening exercise  -MD     Hip Strengthening (Therapeutic Exercise) bilateral;aBduction;aDduction;marching while seated;red;10 repetitions  -MD       Row Name 06/12/23 0947          Knee (Therapeutic Exercise)    Knee (Therapeutic Exercise) strengthening exercise  -MD     Knee Strengthening (Therapeutic Exercise) bilateral;LAQ (long arc quad);hamstring curls;red;10 repetitions  -MD       Row Name 06/12/23 0947          Ankle (Therapeutic Exercise)    Ankle (Therapeutic Exercise) strengthening exercise  -MD     Ankle Strengthening (Therapeutic Exercise) bilateral;dorsiflexion;plantarflexion;10 repetitions  -MD       Row Name 06/12/23 0947          Positioning and Restraints    Pre-Treatment Position sitting in chair/recliner  -MD     Post Treatment Position wheelchair  -MD     In  Wheelchair with OT  -MD               User Key  (r) = Recorded By, (t) = Taken By, (c) = Cosigned By      Initials Name Provider Type    Damaris Farrell, PT Physical Therapist                  Wound 05/24/23 0737 sternal Incision (Active)   Dressing Appearance open to air 06/12/23 0837   Closure Approximated 06/11/23 2111   Base scab 06/12/23 0837   Periwound dry;intact 06/11/23 2111   Drainage Amount none 06/11/23 2111   Care, Wound cleansed with 06/12/23 0837   Dressing Care open to air 06/12/23 0837     Physical Therapy Education       Title: PT OT SLP Therapies (Done)       Topic: Physical Therapy (Done)       Point: Mobility training (Done)       Learning Progress Summary             Patient Acceptance, E, VU by MD at 6/12/2023 1018    Acceptance, E, VU by MD at 6/10/2023 1040    Acceptance, E, VU by MD at 6/8/2023 1017    Acceptance, E,TB,D, VU,DU,NR by  at 6/7/2023 1513    Comment: POC, Goals, safety with mobility.   Significant Other Acceptance, E,TB,D, VU,DU,NR by  at 6/7/2023 1513    Comment: POC, Goals, safety with mobility.                         Point: Home exercise program (Done)       Learning Progress Summary             Patient Acceptance, E,TB,D, VU,DU,NR by  at 6/7/2023 1513    Comment: POC, Goals, safety with mobility.   Significant Other Acceptance, E,TB,D, VU,DU,NR by  at 6/7/2023 1513    Comment: POC, Goals, safety with mobility.                                         User Key       Initials Effective Dates Name Provider Type Discipline    MD 06/16/21 -  Damaris Francis, PT Physical Therapist PT     06/16/21 -  Elizabeth Levin PT Physical Therapist PT                    PT Recommendation and Plan                          Time Calculation:      PT Charges       Row Name 06/12/23 1319 06/12/23 0947          Time Calculation    Start Time 1300  -MD 0930  -MD     Stop Time 1330  -MD 1000  -MD     Time Calculation (min) 30 min  -MD 30 min  -MD     PT Received On -- 06/12/23  -MD     PT -  Next Appointment -- 06/13/23  -MD               User Key  (r) = Recorded By, (t) = Taken By, (c) = Cosigned By      Initials Name Provider Type    Damaris Farrell, PT Physical Therapist                    Therapy Charges for Today       Code Description Service Date Service Provider Modifiers Qty    [id number]  GAIT TRAINING EA 15 MIN 6/12/2023 Damaris Francis, PT GP 2    [id number]  PT THER PROC EA 15 MIN 6/12/2023 Damaris Francis, PT GP 1    [id number]  PT THERAPEUTIC ACT EA 15 MIN 6/12/2023 Damaris Francis, PT GP 1              PT G-Codes  AM-PAC 6 Clicks Score (PT): 14      Damaris Francis, PT  6/12/2023

## 2023-06-12 NOTE — THERAPY TREATMENT NOTE
Inpatient Rehabilitation - Speech Language Pathology Treatment Note    [facility]     Patient Name: Vernon Solorzano  : 1950  MRN: [id number]    Today's Date: 2023                   Admit Date: 2023       Visit Dx:      ICD-10-CM ICD-9-CM   1. Impaired functional mobility, balance, gait, and endurance  Z74.09 V49.89       Patient Active Problem List   Diagnosis    DM (diabetes mellitus), type 2    Mixed hyperlipidemia    Mild intermittent asthma without complication    New onset of congestive heart failure    Nonrheumatic mitral valve regurgitation    Chest pain    Essential hypertension    Mitral valve disease    Acute ischemic left MCA stroke       Past Medical History:   Diagnosis Date    Allergic rhinitis     Arthritis     BPH (benign prostatic hyperplasia)     Diabetes mellitus     TYPE 2    Foraminal stenosis of cervical region     C5-C6    GERD (gastroesophageal reflux disease)     Hemorrhoids     History of urinary retention     S/P TURP    Hyperlipidemia     Macular degeneration     PING (obstructive sleep apnea) 2016    MILD, SEES DR. AMARILIS MORGAN       Past Surgical History:   Procedure Laterality Date    CARDIAC CATHETERIZATION N/A 2023    Procedure: Right Heart Cath;  Surgeon: Corey Gaffney MD;  Location: [facility] CATH INVASIVE LOCATION;  Service: Cardiology;  Laterality: N/A;    CARDIAC CATHETERIZATION N/A 2023    Procedure: Left Heart Cath;  Surgeon: Corey Gaffney MD;  Location: [facility] CATH INVASIVE LOC[facility];  Service: Cardiology;  Laterality: N/A;    CARDIAC CATHETERIZATION N/A 2023    Procedure: Left ventriculography;  Surgeon: Corey Gaffney MD;  [facility];  Service: Cardiology;  Laterality: N/A;    CARDIAC CATHETERIZATION N/A 2023    Procedure: Coronary angiography;  Surgeon: Corey Gaffney MD;  [facility];  Service: Cardiology;  Laterality: N/A;    COLONOSCOPY N/A     WNL PER  PT, NO RECORDS,     COLONOSCOPY N/A 04/07/2009    DR. GORGE BENAVIDEZ AT [place]    MITRAL VALVE REPAIR/REPLACEMENT N/A 5/24/2023    Procedure: MITRAL VALVE REPAIR/REPLACEMENT TRICUSPID VALVE REPAIR/REPLACEMENT TRANSESOPHAGEAL ECHOCARDIOGRAM WITH ANESTHESIA;  Surgeon: George Frey MD;  Location: [facility];  Service: Cardiothoracic;  Laterality: N/A;    PROSTATE SURGERY N/A 11/12/2010    TURP, DR. BRIGHT COLLAZO AT [facility]    SKIN TAG REMOVAL N/A 12/20/2017    ANAL SKIN TAG X2, PERFORMED IN OFFICE, DR. LISA ORANTES    TURP / TRANSURETHRAL INCISION / DRAINAGE PROSTATE  2010    Dr. Goodrich       SLP Recommendation and Plan                                                            SLP EVALUATION (last 72 hours)       SLP SLC Evaluation       Row Name 06/12/23 1400 06/12/23 1000 06/10/23 1300             Communication Assessment/Intervention    Document Type therapy note (daily note)  -SR therapy note (daily note)  -SR therapy note (daily note)  -TH      Subjective Information no complaints  -SR no complaints  -SR --      Patient Observations alert;cooperative;agree to therapy  -SR lethargic;cooperative;agree to therapy  -SR --      Patient/Family/Caregiver Comments/Observations -- Patient's spouse present for AM therapy session  -SR --      Patient Effort adequate  -SR adequate  -SR good  -TH      Symptoms Noted During/After Treatment fatigue  -SR fatigue  -SR --         Pain Scale: Numbers Pre/Post-Treatment    Pretreatment Pain Rating 0/10 - no pain  -SR 0/10 - no pain  -SR --      Posttreatment Pain Rating 0/10 - no pain  -SR 0/10 - no pain  -SR --                User Key  (r) = Recorded By, (t) = Taken By, (c) = Cosigned By      Initials Name Effective Dates    TH Paty Humphries 06/16/21 -     SR Latisha Morris CCC-SLP 11/10/22 -                        EDUCATION    The patient has been educated in the following areas:       Cognitive Impairment Communication Impairment.             SLP GOALS        "Row Name 06/12/23 1400 06/12/23 1000 06/10/23 1300       (LTG) Patient will demonstrate progress toward functional swallow for    Diet Texture (Demonstrate progress toward functional swallow) -- regular textures  -SR regular textures  -TH    Liquid viscosity (Demonstrate progress toward functional swallow) -- thin liquids  -SR thin liquids  -TH    [place] (Demonstrate progress towards functional swallow) -- independently (over 90% accuracy)  -SR --    Time Frame (Demonstrate progress toward functional swallow) -- by discharge  -SR --    Comment (Demonstrate progress toward functional swallow) -- Reviewed VFSS results from 5/31 and 6/8 with patient and spouse. Provided education regarding diet recommendations, swallow strategies, and aspiration risk. Both patient and spouse agreeable to recommendations.  -SR Attempted to see patient during meal time on this date, however, patient ate very little and reports \"nothing tastes good to me\" RN aware of decreased appetite and reports its been ongoing. He ate x1 bite of chicken, carrot, fruit and ice cream without any difficulty. He tolerated thin liquids without s/sx of aspiration as well. No oral residue observed. He demonstrated appropriate use of swallow precautions independently.  -TH       Patient will demonstrate functional cognitive-linguistic skills for return to discharge environment    Comments -- -- Reviewed cognitive evaluation results with patient and spouse and discussed SLP goals and POC based on results. They verbalize understanding, but may benefit from ongoing education.  -TH       Attention Goal 1 (SLP)    Improve Attention by Goal 1 (SLP) complete selective attention task;complete sustained attention task;70%;with moderate cues (50-74%)  -SR complete selective attention task;complete sustained attention task;70%;with moderate cues (50-74%)  -SR complete selective attention task;complete sustained attention task;70%;with moderate cues (50-74%)  -TH "    Time Frame (Attention Goal 1, SLP) by discharge  -SR by discharge  -SR --    Progress/Outcomes (Attention Goal 1, SLP) goal ongoing  -SR goal ongoing  -SR goal ongoing  -TH    Comment (Attention Goal 1, SLP) Patient followed 1 step-2 component written directions with 40% acc given NO cues; 60% acc given MOD cues. Activity incomplete. Patient required extended time due to prolonged speed of processing. Increased difficulty with sustained attention noted during task.  -SR Increased fatigue noted throughout session. Required verbal cue x2 to keep eyes open.  -SR Began working on written directions task, however, unable to complete 2/2 time constraints. He completed 1/2 questions correctly at this time; he was observed with delayed processing and difficulty with sustained attention during this task. He also presented with difficulty with sustained attention during divergent naming task.  -TH       Orientation Goal 1 (SLP)    Improve Orientation Through Goal 1 (SLP) -- -- demonstrating orientation to day;demonstrating orientation to month;demonstrating orientation to year;demonstrating orientation to place;demonstrating orientation to disease/impairment;use environmental aids to assist with orientation;80%;with minimal cues (75-90%)  -TH    Progress/Outcomes (Orientation Goal 1, SLP) -- -- goal ongoing  -TH    Comment (Orientation Goal 1, SLP) -- -- Patient was oriented to place, date, MONE, and self, but unsure of reason for being here.  -TH       Memory Skills Goal 1 (SLP)    Improve Memory Skills Through Goal 1 (SLP) use memory strategies;listen to a paragraph and answer questions;recall details of the day;80%;with moderate cues (50-74%)  -SR -- recalling related word lists immediately;recall details of the day;use memory strategies;use written schedule;use external memory aid;listen to a paragraph and answer questions;visual memory task;repeat list in sequential order;select a word from a list by  exclusion;recalling unrelated word lists immediately;recalling related word lists with an imposed delay;recalling unrelated word lists with an imposed delay;recall details from a word list;read a paragraph and answer questions;with moderate cues (50-74%);70%  -TH    Time Frame (Memory Skills Goal 1, SLP) by discharge  -SR -- --    Progress/Outcomes (Memory Skills Goal 1, SLP) goal ongoing  -SR -- goal ongoing  -TH    Comment (Memory Skills Goal 1, SLP) Short-term memory targeted with paragraph retention activity. Patient listened to short story and answered inference-based questions about the content with 50% acc given NO cues.  -SR -- Patient was asked to remember 3 pieces of info about SLP; he was able to recall 2/3 facts immediately and after review and repetition patient was asked to retain info for 5 minutes. Given 5 min delay he was able to recall 1/3 facts independently increasing to 2.5/3 with min cues.  -TH       Reasoning Goal 1 (SLP)    Improve Reasoning Through Goal 1 (SLP) -- complete deductive reasoning task;complete basic reasoning task;complete mental flexibility task;50%;with moderate cues (50-74%)  -SR complete deductive reasoning task;complete basic reasoning task;complete mental flexibility task;50%;with moderate cues (50-74%)  -TH    Time Frame (Reasoning Goal 1, SLP) -- by discharge  -SR by discharge  -TH    Progress/Outcomes (Reasoning Goal 1, SLP) -- good progress toward goal  -SR --    Comment (Reasoning Goal 1, SLP) -- Verbal sequencing targeted this date with task analysis activity. Patient provided 5-6 steps for activites of interest (i.e, fishing, gardening) given MOD cues. Slow processing speed observed. Reviewed plan of care and speech therapy goals with patient and spouse. Both verbalized understanding. Will continue to target speed of processing, judgement, attention, and problem solving/reasoning.  -SR Patient was able to name up to 7 items given 60 seconds. He was easily distracted  during this task despite quiet enviornment.  -TH       Executive Functional Skills Goal 1 (SLP)    Improve Executive Function Skills Goal 1 (SLP) -- demonstrate awareness of deficit;home management activity;80%;with moderate cues (50-74%)  -SR --    Time Frame (Executive Function Skills Goal 1, SLP) -- by discharge  -SR --    Progress/Outcomes (Executive Function Skills Goal 1, SLP) -- goal ongoing  -SR --              User Key  (r) = Recorded By, (t) = Taken By, (c) = Cosigned By      Initials Name Provider Type     Paty Humphries Speech and Language Pathologist     Latisha Morris CCC-SLP Speech and Language Pathologist                                Time Calculation:        Time Calculation- SLP       Row Name 06/12/23 1526 06/12/23 1046          Time Calculation- SLP    SLP Start Time 1400  -SR 1000  -SR     SLP Stop Time 1430  -SR 1030  -SR     SLP Time Calculation (min) 30 min  -SR 30 min  -SR               User Key  (r) = Recorded By, (t) = Taken By, (c) = Cosigned By      Initials Name Provider Type     Latisha Morris CCC-SLP Speech and Language Pathologist                      Therapy Charges for Today       Code Description Service Date Service Provider Modifiers Qty    67825514719 HC ST DEV OF COGN SKILLS INITIAL 15 MIN 6/12/2023 Latisha Morris CCC-SLP  1    [id number] HC ST DEV OF COGN SKILLS EACH ADDT'L 15 MIN 6/12/2023 Latisha Morris CCC-SLP  3                             JENNIFFER Hobbs  6/12/2023

## 2023-06-12 NOTE — THERAPY TREATMENT NOTE
Inpatient Rehabilitation - Occupational Therapy Treatment Note    [facility]     Patient Name: Vernon Solorzano  : 1950  MRN: [id number]    Today's Date: 2023                 Admit Date: 2023         ICD-10-CM ICD-9-CM   1. Impaired functional mobility, balance, gait, and endurance  Z74.09 V49.89       Patient Active Problem List   Diagnosis    DM (diabetes mellitus), type 2    Mixed hyperlipidemia    Mild intermittent asthma without complication    New onset of congestive heart failure    Nonrheumatic mitral valve regurgitation    Chest pain    Essential hypertension    Mitral valve disease    Acute ischemic left MCA stroke       Past Medical History:   Diagnosis Date    Allergic rhinitis     Arthritis     BPH (benign prostatic hyperplasia)     Diabetes mellitus     TYPE 2    Foraminal stenosis of cervical region     C5-C6    GERD (gastroesophageal reflux disease)     Hemorrhoids     History of urinary retention     S/P TURP    Hyperlipidemia     Macular degeneration     PING (obstructive sleep apnea) 2016    MILD, SEES DR. AMARILIS MORGAN       Past Surgical History:   Procedure Laterality Date    CARDIAC CATHETERIZATION N/A 2023    Procedure: Right Heart Cath;  Surgeon: Corey Gaffney MD;  Location: [facility] CATH INVASIVE LOCATION;  Service: Cardiology;  Laterality: N/A;    CARDIAC CATHETERIZATION N/A 2023    Procedure: Left Heart Cath;  Surgeon: Corey Gaffney MD;  Location: [facility] CATH INVASIVE LOCATION;  Service: Cardiology;  Laterality: N/A;    CARDIAC CATHETERIZATION N/A 2023    Procedure: Left ventriculography;  Surgeon: Corey Gaffney MD;  Location: [facility] CATH INVASIVE LOCATION;  Service: Cardiology;  Laterality: N/A;    CARDIAC CATHETERIZATION N/A 2023    Procedure: Coronary angiography;  Surgeon: Corey Gaffney MD;  [facility];  Service: Cardiology;  Laterality: N/A;    COLONOSCOPY N/A     WNL PER PT, NO RECORDS,      COLONOSCOPY N/A 04/07/2009    DR. GORGE BENAVIDEZ AT [place]    MITRAL VALVE REPAIR/REPLACEMENT N/A 5/24/2023    Procedure: MITRAL VALVE REPAIR/REPLACEMENT TRICUSPID VALVE REPAIR/REPLACEMENT TRANSESOPHAGEAL ECHOCARDIOGRAM WITH ANESTHESIA;  Surgeon: George Frey MD;  Location: [facility];  Service: Cardiothoracic;  Laterality: N/A;    PROSTATE SURGERY N/A 11/12/2010    TURP, DR. BRIGHT COLLAZO AT [facility]    SKIN TAG REMOVAL N/A 12/20/2017    ANAL SKIN TAG X2, PERFORMED IN OFFICE, DR. LISA ORANTES    TURP / TRANSURETHRAL INCISION / DRAINAGE PROSTATE  2010    Dr. Goodrich             IRF OT ASSESSMENT FLOWSHEET (last 12 hours)       IRF OT Evaluation and Treatment       Row Name 06/12/23 1531          OT Time and Intention    Document Type daily treatment  -AF     Mode of Treatment occupational therapy  -AF     Patient Effort adequate  -AF     Symptoms Noted During/After Treatment fatigue  c/o of feeling dizzy in standing and keeps eyes closed for most of sessions, BP taken seated 105/60, standing 105/57  -AF       Row Name 06/12/23 1531          General Information    Patient/Family/Caregiver Comments/Observations sitting up in w/c for both sessions  -AF     General Observations of Patient pts wife present in AM session  -AF     Existing Precautions/Restrictions fall;cardiac;sternal  -AF       Row Name 06/12/23 1531          Pain Assessment    Pretreatment Pain Rating 0/10 - no pain  -AF     Posttreatment Pain Rating 0/10 - no pain  -AF       Row Name 06/12/23 1531          Cognition/Psychosocial    Affect/Mental Status (Cognition) flat/blunted affect;low arousal/lethargic  -AF     Orientation Status (Cognition) oriented to;person;place;situation  -AF     Follows Commands (Cognition) follows one-step commands;delayed response/completion;increased processing time needed;repetition of directions required  -AF     Personal Safety Interventions fall prevention program maintained;gait belt;nonskid  shoes/slippers when out of bed  -AF     Cognitive Function attention deficit;executive function deficit  -AF     Attention Deficit (Cognition) distractible in noisy environment;focused/sustained attention  -AF     Memory Deficit (Cognition) episodic memory;procedural memory;recall, biographical information  -AF     Safety Deficit (Cognition) insight into deficits/self-awareness  -AF       Row Name 06/12/23 1531          Bathing    Comment (Bathing) deferred  -AF       Row Name 06/12/23 1531          Upper Body Dressing    [place] Level (Upper Body Dressing) upper body dressing skills;set up assistance;pull over garment  -AF     Position (Upper Body Dressing) supported sitting  -AF     Set-up Assistance (Upper Body Dressing) obtain clothing  -AF       Row Name 06/12/23 1531          Lower Body Dressing    [place] Level (Lower Body Dressing) doff;don;socks;shoes/slippers;moderate assist (50% patient effort)  TEDS  -AF     Position (Lower Body Dressing) supported sitting  -AF     Set-up Assistance (Lower Body Dressing) obtain clothing  -AF     Comment (Lower Body Dressing) assistance with TEDs, worked on figure four leg crossing with donning/doffing socks  -AF       Row Name 06/12/23 1531          Grooming    [place] Level (Grooming) grooming skills;deodorant application;hair care, combing/brushing;set up  -AF     Position (Grooming) supported sitting  -AF       Row Name 06/12/23 1531          Transfer Assessment/Treatment    Comment, (Transfers) sit to stand CGA with encouragement  -AF       [facility] Name 06/12/23 1531          Shoulder (Therapeutic Exercise)    Shoulder Strengthening (Therapeutic Exercise) bilateral;flexion;extension;scapular stabilization;sitting;10 repetitions;3 sets  #2 dowel sha, shoulder flexion to 90 degrees  -AF       Row Name 06/12/23 1531          Elbow/Forearm (Therapeutic Exercise)    Elbow/Forearm Strengthening (Therapeutic Exercise)  bilateral;extension;flexion;supination;pronation;sitting;2 lb free weight;10 repetitions;3 sets  -AF       Row Name 06/12/23 1531          Wrist (Therapeutic Exercise)    Wrist Strengthening (Therapeutic Exercise) bilateral;flexion;extension;2 lb free weight;10 repetitions;3 sets  -AF       Row Name 06/12/23 1531          Hand (Therapeutic Exercise)    Hand Strengthening (Therapeutic Exercise) bilateral; strengthening;hand gripper;10 repetitions;3 sets  -AF       Row Name 06/12/23 1531          Balance    Static Standing Balance contact guard  -AF     Comment, Balance pt participated in static standing at counter top, table top task to work on keeping eyes open when standing stood for 3 mins then 1 min. poor endurance noted with all tasks  -AF       Row Name 06/12/23 1531          Positioning and Restraints    Pre-Treatment Position sitting in chair/recliner  -AF     Post Treatment Position wheelchair  -AF     In Wheelchair sitting;exit alarm on;with PT;with SLP  with PT in AM and with SLP in PM  -AF               User Key  (r) = Recorded By, (t) = Taken By, (c) = Cosigned By      Initials Name Effective Dates    AF Candy Davis OTR 06/16/21 -                              OT Recommendation and Plan                         Time Calculation:      Time Calculation- OT       Row Name 06/12/23 1541 06/12/23 1540          Time Calculation- OT    OT Start Time 1330  -AF 0900  -AF     OT Stop Time 1400  -AF 0930  -AF     OT Time Calculation (min) 30 min  -AF 30 min  -AF               User Key  (r) = Recorded By, (t) = Taken By, (c) = Cosigned By      Initials Name Provider Type    AF Candy Davis OTR Occupational Therapist                  Therapy Charges for Today       Code Description Service Date Service Provider Modifiers Qty    02553532746  OT SELF CARE/MGMT/TRAIN EA 15 MIN 6/12/2023 Candy Davis OTR GO 1    [id number]  OT THER PROC EA 15 MIN 6/12/2023 Candy Davis OTR GO 2    [id number]  OT  THERAPEUTIC ACT EA 15 MIN 6/12/2023 Candy Davis, OTR GO 1                     HI Wyman  6/12/2023

## 2023-06-13 LAB
ALBUMIN SERPL-MCNC: 3.5 G/DL (ref 3.5–5.2)
ANION GAP SERPL CALCULATED.3IONS-SCNC: 11 MMOL/L (ref 5–15)
BUN SERPL-MCNC: 13 MG/DL (ref 8–23)
BUN/CREAT SERPL: 9.6 (ref 7–25)
CALCIUM SPEC-SCNC: 8.8 MG/DL (ref 8.6–10.5)
CHLORIDE SERPL-SCNC: 103 MMOL/L (ref 98–107)
CO2 SERPL-SCNC: 25 MMOL/L (ref 22–29)
CREAT SERPL-MCNC: 1.35 MG/DL (ref 0.76–1.27)
EGFRCR SERPLBLD CKD-EPI 2021: 55.1 ML/MIN/1.73
GLUCOSE BLDC GLUCOMTR-MCNC: 135 MG/DL (ref 70–130)
GLUCOSE BLDC GLUCOMTR-MCNC: 137 MG/DL (ref 70–130)
GLUCOSE BLDC GLUCOMTR-MCNC: 195 MG/DL (ref 70–130)
GLUCOSE BLDC GLUCOMTR-MCNC: 281 MG/DL (ref 70–130)
GLUCOSE SERPL-MCNC: 119 MG/DL (ref 65–99)
PHOSPHATE SERPL-MCNC: 2.4 MG/DL (ref 2.5–4.5)
POTASSIUM SERPL-SCNC: 3.6 MMOL/L (ref 3.5–5.2)
SODIUM SERPL-SCNC: 139 MMOL/L (ref 136–145)

## 2023-06-13 NOTE — PLAN OF CARE
Goal Outcome Evaluation:  Plan of Care Reviewed With: patient        Progress: improving  Outcome Evaluation: Immobility syndrome. S/P cabg and MCA stroke. NIH=2. A&OX4. Delayed mental processing and response time. Forgetful. Short term memory loss. Midsternal incision and chest tube sites. Scabbed and KATHY. Hx. HTN, CAD, HLD, Seizures. Blood glucose checks ACHS. Meds whole with thins. Diet: Reg, thins. Up for meals. Assistx1 with walker and wheelchair. Participated fully in therapy. Tired and fatigued afterwards. Napped after sessions. Calm, cooperative, and flat. Ate fair to poor at meals. Continent and incontinent B&B. Last BM reported 6/12. PRN bowel meds given today. Full code. Spouse at bedside. Complained of feeling light-headed and dizzy during transfer to restroom. BP WDL. Blood glucose WDL. Complained of tingling to R. hand. Rash to L. axilla and abdomen. Doctor checked the rash but patient said it was not bothering him. No current treatment.

## 2023-06-13 NOTE — THERAPY TREATMENT NOTE
Inpatient Rehabilitation - Speech Language Pathology Treatment Note    [facility]     Patient Name: Vernon Solorzano  : 1950  MRN: [id number]    Today's Date: 2023                   Admit Date: 2023       Visit Dx:      ICD-10-CM ICD-9-CM   1. Impaired functional mobility, balance, gait, and endurance  Z74.09 V49.89       Patient Active Problem List   Diagnosis    DM (diabetes mellitus), type 2    Mixed hyperlipidemia    Mild intermittent asthma without complication    New onset of congestive heart failure    Nonrheumatic mitral valve regurgitation    Chest pain    Essential hypertension    Mitral valve disease    Acute ischemic left MCA stroke       Past Medical History:   Diagnosis Date    Allergic rhinitis     Arthritis     BPH (benign prostatic hyperplasia)     Diabetes mellitus     TYPE 2    Foraminal stenosis of cervical region     C5-C6    GERD (gastroesophageal reflux disease)     Hemorrhoids     History of urinary retention     S/P TURP    Hyperlipidemia     Macular degeneration     PING (obstructive sleep apnea) 2016    MILD, SEES DR. AMARILIS MORGAN       Past Surgical History:   Procedure Laterality Date    CARDIAC CATHETERIZATION N/A 2023    Procedure: Right Heart Cath;  Surgeon: Corey Gaffney MD;  Location: [facility] CATH INVASIVE LOCATION;  Service: Cardiology;  Laterality: N/A;    CARDIAC CATHETERIZATION N/A 2023    Procedure: Left Heart Cath;  Surgeon: Corey Gaffney MD;  Location: [facility] CATH INVASIVE LOCATION;  Service: Cardiology;  Laterality: N/A;    CARDIAC CATHETERIZATION N/A 2023    Procedure: Left ventriculography;  Surgeon: Corey Gaffney MD;  Location: [facility] CATH INVASIVE LOC[facility];  Service: Cardiology;  Laterality: N/A;    CARDIAC CATHETERIZATION N/A 2023    Procedure: Coronary angiography;  Surgeon: Corey Gaffney MD;  Location: [facility] CATH INVASIVE LOC[facility];  Service: Cardiology;  Laterality: N/A;    COLONOSCOPY N/A     WNL PER  PT, NO RECORDS,     COLONOSCOPY N/A 04/07/2009    DR. GORGE BENAVIDEZ AT [facility]    MITRAL VALVE REPAIR/REPLACEMENT N/A 5/24/2023    Procedure: MITRAL VALVE REPAIR/REPLACEMENT TRICUSPID VALVE REPAIR/REPLACEMENT TRANSESOPHAGEAL ECHOCARDIOGRAM WITH ANESTHESIA;  Surgeon: George Frey MD;  Location: [facility];  Service: Cardiothoracic;  Laterality: N/A;    PROSTATE SURGERY N/A 11/12/2010    TURP, DR. BRIGHT COLLAZO AT [facility]    SKIN TAG REMOVAL N/A 12/20/2017    ANAL SKIN TAG X2, PERFORMED IN OFFICE, DR. LISA ORANTES    TURP / TRANSURETHRAL INCISION / DRAINAGE PROSTATE  2010    Dr. Goodrich       SLP Recommendation and Plan                                                            SLP EVALUATION (last 72 hours)       SLP SLC Evaluation       Row Name 06/13/23 1400 06/12/23 1400 06/12/23 1000             Communication Assessment/Intervention    Document Type therapy note (daily note)  -SR therapy note (daily note)  -SR therapy note (daily note)  -SR      Subjective Information no complaints  -SR no complaints  -SR no complaints  -SR      Patient Observations alert;cooperative;agree to therapy  -SR alert;cooperative;agree to therapy  -SR lethargic;cooperative;agree to therapy  -SR      Patient/Family/Caregiver Comments/Observations -- -- Patient's spouse present for AM therapy session  -SR      Patient Effort adequate  -SR adequate  -SR adequate  -SR      Symptoms Noted During/After Treatment fatigue  -SR fatigue  -SR fatigue  -SR         Pain Scale: Numbers Pre/Post-Treatment    Pretreatment Pain Rating 0/10 - no pain  -SR 0/10 - no pain  -SR 0/10 - no pain  -SR      Posttreatment Pain Rating 0/10 - no pain  -SR 0/10 - no pain  -SR 0/10 - no pain  -SR                User Key  (r) = Recorded By, (t) = Taken By, (c) = Cosigned By      Initials Name Effective Dates    SR Latisha Morris CCC-SLP 11/10/22 -                        EDUCATION    The patient has been educated in the following areas:       Cognitive  Impairment.             SLP GOALS       Row Name 06/13/23 1400 06/12/23 1400 06/12/23 1000       (LTG) Patient will demonstrate progress toward functional swallow for    Diet Texture (Demonstrate progress toward functional swallow) -- -- regular textures  -SR    Liquid viscosity (Demonstrate progress toward functional swallow) -- -- thin liquids  -SR    [place] (Demonstrate progress towards functional swallow) -- -- independently (over 90% accuracy)  -SR    Time Frame (Demonstrate progress toward functional swallow) -- -- by discharge  -SR    Comment (Demonstrate progress toward functional swallow) -- -- Reviewed VFSS results from 5/31 and 6/8 with patient and spouse. Provided education regarding diet recommendations, swallow strategies, and aspiration risk. Both patient and spouse agreeable to recommendations.  -SR       Attention Goal 1 (SLP)    Improve Attention by Goal 1 (SLP) complete selective attention task;complete sustained attention task;70%;with moderate cues (50-74%)  -SR complete selective attention task;complete sustained attention task;70%;with moderate cues (50-74%)  -SR complete selective attention task;complete sustained attention task;70%;with moderate cues (50-74%)  -SR    Time Frame (Attention Goal 1, SLP) by discharge  -SR by discharge  -SR by discharge  -SR    Progress/Outcomes (Attention Goal 1, SLP) goal ongoing  -SR goal ongoing  -SR goal ongoing  -SR    Comment (Attention Goal 1, SLP) During the AM therapy session, patient demonstrated increased difficulty with sustained/selective attention. Accuracy increased with verbal cues. Required redirection to task x5 during 5 minute activity. He completed 3 stimulus items for money counting task due to increased distractability.  -SR Patient followed 1 step-2 component written directions with 40% acc given NO cues; 60% acc given MOD cues. Activity incomplete. Patient required extended time due to prolonged speed of processing. Increased  difficulty with sustained attention noted during task.  -SR Increased fatigue noted throughout session. Required verbal cue x2 to keep eyes open.  -SR       Memory Skills Goal 1 (SLP)    Improve Memory Skills Through Goal 1 (SLP) -- use memory strategies;listen to a paragraph and answer questions;recall details of the day;80%;with moderate cues (50-74%)  -SR --    Time Frame (Memory Skills Goal 1, SLP) -- by discharge  -SR --    Progress/Outcomes (Memory Skills Goal 1, SLP) -- goal ongoing  -SR --    Comment (Memory Skills Goal 1, SLP) -- Short-term memory targeted with paragraph retention activity. Patient listened to short story and answered inference-based questions about the content with 50% acc given NO cues.  -SR --       Reasoning Goal 1 (SLP)    Improve Reasoning Through Goal 1 (SLP) complete deductive reasoning task;complete basic reasoning task;complete mental flexibility task;50%;with moderate cues (50-74%)  -SR -- complete deductive reasoning task;complete basic reasoning task;complete mental flexibility task;50%;with moderate cues (50-74%)  -SR    Time Frame (Reasoning Goal 1, SLP) by discharge  -SR -- by discharge  -SR    Progress/Outcomes (Reasoning Goal 1, SLP) good progress toward goal  -SR -- good progress toward goal  -SR    Comment (Reasoning Goal 1, SLP) During the PM therapy session, patient read written directions and used process of elimination to determine the solution to logic activity with 40% acc given NO cues; 80% acc given MOD cues. Prolonged processing speed. Required extended time to complete activity.  -SR -- Verbal sequencing targeted this date with task analysis activity. Patient provided 5-6 steps for activites of interest (i.e, fishing, gardening) given MOD cues. Slow processing speed observed. Reviewed plan of care and speech therapy goals with patient and spouse. Both verbalized understanding. Will continue to target speed of processing, judgement, attention, and problem  solving/reasoning.  -SR       Executive Functional Skills Goal 1 (SLP)    Improve Executive Function Skills Goal 1 (SLP) demonstrate awareness of deficit;home management activity;80%;with moderate cues (50-74%)  -SR -- demonstrate awareness of deficit;home management activity;80%;with moderate cues (50-74%)  -SR    Time Frame (Executive Function Skills Goal 1, SLP) by discharge  -SR -- by discharge  -SR    Progress/Outcomes (Executive Function Skills Goal 1, SLP) goal ongoing  -SR -- goal ongoing  -SR    Comment (Executive Function Skills Goal 1, SLP) Patient answered questions about a monthly calendar/daily schedule with 50% acc given NO cues; 70% acc given MIN cues.  -SR -- --              User Key  (r) = Recorded By, (t) = Taken By, (c) = Cosigned By      Initials Name Provider Type    Latisha Haas CCC-SLP Speech and Language Pathologist                                Time Calculation:        Time Calculation- SLP       Row Name 06/13/23 1608 06/13/23 1130          Time Calculation- SLP    SLP Start Time 1400  -SR 1100  -SR     SLP Stop Time 1430  -SR 1130  -SR     SLP Time Calculation (min) 30 min  -SR 30 min  -SR               User Key  (r) = Recorded By, (t) = Taken By, (c) = Cosigned By      Initials Name Provider Type    Latisha Haas CCC-SLP Speech and Language Pathologist                      Therapy Charges for Today       Code Description Service Date Service Provider Modifiers Qty    69615709433 HC ST DEV OF COGN SKILLS INITIAL 15 MIN 6/12/2023 Latisha Morris CCC-SLP  1    66161332590 HC ST DEV OF COGN SKILLS EACH ADDT'L 15 MIN 6/12/2023 Latisha Morris CCC-SLP  3    34506788742 HC ST DEV OF COGN SKILLS INITIAL 15 MIN 6/13/2023 Latisha Morris CCC-SLP  1    11788331081 HC ST DEV OF COGN SKILLS EACH ADDT'L 15 MIN 6/13/2023 Latisha Morris CCC-SLP  3                             JENNIFFER Hobbs  6/13/2023

## 2023-06-13 NOTE — PROGRESS NOTES
LOS: 7 days   Patient Care Team:  Liza Oconnell III, NP-C as PCP - General (Family Medicine)  Corey Lao Jr., MD (Inactive) as Consulting Physician (Urology)      KRIS TAYLOR  1950      ADMITTING DIAGNOSIS:  Stroke  Valvular heart disease status post repair/replacement      Subjective     Pt seen at bedside this AM, did not sleep well last night and can't attribute it to a reason. Also, has rash on L axilla and chest. Does not wish to have anything at this time as it is not bothering him. Denies chest pain, sob, fevers, n/v/d.          Objective     Vitals:    06/13/23 1105   BP:    Pulse:    Resp: 18   Temp:    SpO2:        PHYSICAL EXAM:   MENTAL STATUS -  AWAKE / ALERT  HEENT- NCAT, PUPILS EQUALLY ROUND, SCLERAE ANICTERIC, CONJUNCTIVAE PINK, OP MOIST, NO JVD, EARS UNREMARKABLE EXTERNALLY  LUNGS - CTA, NO WHEEZES, RALES OR RHONCHI  Sternotomy incision -dressed  HEART-sinus with occasional ectopy  ABD - NORMOACTIVE BOWEL SOUNDS, SOFT, NT.   EXT - NO EDEMA OR CYANOSIS  NEURO -oriented to person place time and situation.    Speech is fluent.    Skin: rash on left axilla and chest       MEDICATIONS  Scheduled Meds:apixaban, 5 mg, Oral, Q12H  aspirin, 81 mg, Oral, Daily  atorvastatin, 40 mg, Oral, Nightly  calcium 500 mg vitamin D 5 mcg (200 UT), 1 tablet, Oral, Daily  guaifenesin, 400 mg, Oral, Q8H  insulin glargine, 10 Units, Subcutaneous, Nightly  insulin glargine, 40 Units, Subcutaneous, Daily  insulin lispro, 3-14 Units, Subcutaneous, TID With Meals  ipratropium-albuterol, 3 mL, Nebulization, 4x Daily - RT  metoprolol succinate XL, 25 mg, Oral, Q24H  pantoprazole, 40 mg, Oral, Q AM      Continuous Infusions:   PRN Meds:.•  acetaminophen **OR** [DISCONTINUED] acetaminophen **OR** [DISCONTINUED] acetaminophen  •  bisacodyl  •  calcium carbonate  •  hydrocortisone-bacitracin-zinc oxide-nystatin  •  ipratropium-albuterol  •  senna-docusate sodium  •   traMADol      RESULTS  Glucose   Date/Time Value Ref Range Status   06/13/2023 1131 135 (H) 70 - 130 mg/dL Final     Comment:     Meter: [id number] : [id number] Summer Venegas NA   06/13/2023 0706 137 (H) 70 - 130 mg/dL Final     Comment:     Meter: [id number] : [id number] Summer Venegas NA   06/12/2023 2112 196 (H) 70 - 130 mg/dL Final     Comment:     Meter: [id number] : [id number] Del Bhatia NA   06/12/2023 1614 167 (H) 70 - 130 mg/dL Final     Comment:     Meter: [id number] : [id number] Summer Venegas NA   06/12/2023 1119 144 (H) 70 - 130 mg/dL Final     Comment:     Meter: [id number] : [id number] Summer Venegas NA   06/12/2023 0720 150 (H) 70 - 130 mg/dL Final     Comment:     Meter: [id number] : [id number] Summer Venegas NA   06/11/2023 2033 371 (H) 70 - 130 mg/dL Final     Comment:     Meter: [id number] : [id number] Marek Cardenas NA   06/11/2023 1629 159 (H) 70 - 130 mg/dL Final     Comment:     Meter: [id number] : [id number] Roe Engeljudie BARRETO     Results from last 7 days   Lab Units 06/12/23  0544 06/09/23  0832 06/07/23  0706   WBC 10*3/mm3 9.42 12.61* 16.29*   HEMOGLOBIN g/dL 11.0* 10.9* 10.7*   HEMATOCRIT % 33.1* 32.4* 32.8*   PLATELETS 10*3/mm3 318 345 325     Results from last 7 days   Lab Units 06/13/23  0549 06/12/23  0544 06/11/23  0552   SODIUM mmol/L 139 136 137   POTASSIUM mmol/L 3.6 4.1 3.9   CHLORIDE mmol/L 103 97* 97*   CO2 mmol/L 25.0 28.8 28.8   BUN mg/dL 13 12 14   CREATININE mg/dL 1.35* 1.46* 1.25   CALCIUM mg/dL 8.8 9.3 9.6   GLUCOSE mg/dL 119* 170* 126*      Latest Reference Range & Units 05/23/23 14:29   Hemoglobin A1C 4.80 - 5.60 % 9.20 (H)   Total Cholesterol 0 - 200 mg/dL 155   HDL Cholesterol 40 - 60 mg/dL 41   LDL Cholesterol  0 - 100 mg/dL 81   Triglycerides 0 - 150 mg/dL 197 (H)   (H): Data is abnormally high     Latest Reference Range & Units 05/31/23 08:03   25 Hydroxy, Vitamin D 30.0 - 100.0 ng/ml 20.0 (L)   (L): Data is abnormally  low  ASSESSMENT and PLAN    Acute ischemic left MCA stroke    Status post CVA-Scattered  punctate restricted diffusion acute infarct in the bilateral frontal and   left parietal occipital cortices.        Impaired Cognition/impaired mobility/impaired self-care     Aphasia-resolved     Right hand apraxia-resolved     Encephalopathy-improving     Dysphagia/nutrition-healthy heart 2-3 g sodium, consistent carb, no mixed consistency, regular texture, nectar thick liquid  June 7-videofluoroscopic swallow study planned tomorrow  June 8-swallow study today-advance to regular solid, no mixed consistency, thin liquids by cup.  No straws.     Stroke prophylaxis-Eliquis/aspirin/atorvastatin     History of severe mitral regurgitation and annular dilatation, moderate to severe tricuspid regurgitation-  status post May 24, 2023 - 1. Mitral valve repair with a 28 mm physio II ring annuloplasty with residual mild to moderate MR  2.  Mitral valve replacement with a 29 mm Schwartz II porcine prosthesis  3.  Tricuspid valve repair with 28 mm physio tricuspid ring  4.  Left atrial appendage endocardial closure     Atrial flutter-status post cardioversion June 2, 2023 anticoagulation with Eliquis  Metoprolol     Volume status-torsemide     Severe pulmonary hypertension  Obstructive sleep apnea     Interstitial lung disease status post COVID-19 infection-steroid taper per pulmonology  DuoNebs 4 times a day  Chronic steroid use with cushingoid syndrome  Prednisone 5 mg every other day     Leukocytosis-reactive/steroid administration     Postoperative anemia     Wfzbyylbqdsxnanm-quemhhkaqvs-tviubqiq     Chronic kidney disease stage III-baseline creatinine 1.2     Diabetes mellitus type 2-Jardiance/Lantus/sliding scale insulin-uncontrolled  June 7-on Jardiance and Lantus 40 units daily.  Elevated blood glucose.  Received 21 units on sliding scale insulin yesterday.  Increase Lantus by adding 10 units nightly.     BPH/urinary  retention-history of TURP    Renal insufficiency - June 9 - Creatinine increased to 1.46. Meds reviewed. Is on Jardiance - already received dose today. Will get input from Nephrology.  June 12-with the increase in his creatinine, Jardiance discontinued     Pain management-tramadol-/Tylenol Dk report reviewed    Nutrition-addendum-June 9 6:43 PM-decreased appetite-would like to have zinc level checked in the morning    Rash on L axilla  June 13-does not wish to have anything as it is not causing patient discomfort     Sleep  June 13-offered melatonin, but declined       TEAM CONF - JUNE 8 - ENDURANCE POOR. BED MOD. TRANSFERS MOD. GAIT 25 FEET MOD ASSIST RW. SHOWER TRANSFERS MOD A.   BATH MOD ASSIST. LBD MAX. UBD MIN. TOILETING MAX.   Patient is currently on nectar liquids VFSS TODAY.   BIMS SUMMARY SCORE: 9 Moderately impaired   DIFFICULTY WITH SUSTAINED AND FOCAL ATTENTION.  CONTINENT / INCONTINENT  ELOS - 2 -3 WEEKS        Now admit for comprehensive acute inpatient rehabilitation .  This would be an interdisciplinary program with physical therapy 1 hour,  occupational therapy 1 hour, and speech therapy 1 hour, 5 days a week.  Rehabilitation nursing for carryover, monitoring of neurologic, cardiac, pulmonary, diabetes   status, bowel and bladder, and skin  Ongoing physician follow-up.  Weekly team conferences.  Goals are to achieve a level of contact-guard with  mobility and self-care and improved activity tolerance.   Rehabilitation prognosis fair.  Medical prognosis defer to cardiology.  Estimated length of stay is approximately 2 weeks, but is only an estimation.   The patient's functional status and clinical status is unchanged from preadmission assessment and the patient continues appropriate for acute inpatient rehabilitation.  Goal is for home with outpatient cardiac rehab   therapies.  Barrier to discharge: Impaired cognition mobility self-care- work on follow, executive function, conditioning,  transfers, progressive ambulation, ADLs to overcome.           Jeffery Dudley DO, PGY-2   6/13/2023  12:10 PM       During rounds, used appropriate personal protective equipment including mask and gloves.  Additional gown if indicated.  Mask used was standard procedure mask. Appropriate PPE was worn during the entire visit.  Hand hygiene was completed before and after.      Patient has been staffed and discussed with attending Physician, Dr. Tu Silver.

## 2023-06-13 NOTE — THERAPY TREATMENT NOTE
Inpatient Rehabilitation - Physical Therapy Treatment Note       [facility]     Patient Name: Vernon Solorzano  : 1950  MRN: [id number]    Today's Date: 2023                    Admit Date: 2023      Visit Dx:     ICD-10-CM ICD-9-CM   1. Impaired functional mobility, balance, gait, and endurance  Z74.09 V49.89       Patient Active Problem List   Diagnosis    DM (diabetes mellitus), type 2    Mixed hyperlipidemia    Mild intermittent asthma without complication    New onset of congestive heart failure    Nonrheumatic mitral valve regurgitation    Chest pain    Essential hypertension    Mitral valve disease    Acute ischemic left MCA stroke       Past Medical History:   Diagnosis Date    Allergic rhinitis     Arthritis     BPH (benign prostatic hyperplasia)     Diabetes mellitus     TYPE 2    Foraminal stenosis of cervical region     C5-C6    GERD (gastroesophageal reflux disease)     Hemorrhoids     History of urinary retention     S/P TURP    Hyperlipidemia     Macular degeneration     PING (obstructive sleep apnea) 2016    MILD, SEES DR. AMARILIS MORGAN       Past Surgical History:   Procedure Laterality Date    CARDIAC CATHETERIZATION N/A 2023    Procedure: Right Heart Cath;  Surgeon: Corey Gaffney MD;  Location: [facility] CATH INVASIVE LOCATION;  Service: Cardiology;  Laterality: N/A;    CARDIAC CATHETERIZATION N/A 2023    Procedure: Left Heart Cath;  Surgeon: Corey Gaffney MD;  Location: [facility] CATH INVASIVE LOCATION;  Service: Cardiology;  Laterality: N/A;    CARDIAC CATHETERIZATION N/A 2023    Procedure: Left ventriculography;  Surgeon: Corey Gaffney MD;  Location: [facility] CATH INVASIVE LOCATION;  Service: Cardiology;  Laterality: N/A;    CARDIAC CATHETERIZATION N/A 2023    Procedure: Coronary angiography;  Surgeon: Corey Gaffney MD;  Location: [facility] CATH INVASIVE LOCATION;  Service: Cardiology;  Laterality: N/A;    COLONOSCOPY N/A     WNL PER PT, NO  RECORDS,     COLONOSCOPY N/A 04/07/2009    DR. GORGE BENAVIDEZ AT [place]    MITRAL VALVE REPAIR/REPLACEMENT N/A 5/24/2023    Procedure: MITRAL VALVE REPAIR/REPLACEMENT TRICUSPID VALVE REPAIR/REPLACEMENT TRANSESOPHAGEAL ECHOCARDIOGRAM WITH ANESTHESIA;  Surgeon: George Frey MD;  Location: [facility];  Service: Cardiothoracic;  Laterality: N/A;    PROSTATE SURGERY N/A 11/12/2010    [facility]    SKIN TAG REMOVAL N/A 12/20/2017    ANAL SKIN TAG X2, PERFORMED IN OFFICE, DR. LISA ORANTES    TURP / TRANSURETHRAL INCISION / DRAINAGE PROSTATE  2010    Dr. Goodrich       PT ASSESSMENT (last 12 hours)       IRF PT Evaluation and Treatment       Row Name 06/13/23 1010          PT Time and Intention    Document Type daily treatment  -MD     Mode of Treatment physical therapy  -MD     Patient/Family/Caregiver Comments/Observations Pt sitting in WC w OT.  Pt w c/o not sleeping last night leading to increased fatigue throughout OT session.  -MD       Row Name 06/13/23 1010          Pain Assessment    Pre/Posttreatment Pain Comment Pt reporting HA and B hand pain.  Pt did not state level of pain.  RN notified and awear.  -MD       Row Name 06/13/23 1010          Cognition/Psychosocial    Orientation Status (Cognition) oriented to;person;place;situation  -MD     Follows Commands (Cognition) follows one-step commands;delayed response/completion;increased processing time needed;repetition of directions required  -MD     Personal Safety Interventions fall prevention program maintained;gait belt  -MD       Row Name 06/13/23 1010          Bed Mobility    Sit-Supine Elk (Bed Mobility) verbal cues;standby assist  -MD       Row Name 06/13/23 1010          Chair-Bed Transfer    Chair-Bed Elk (Transfers) verbal cues;minimum assist (75% patient effort)  -MD     Assistive Device (Chair-Bed Transfers) wheelchair;walker, front-wheeled  -MD       Row Name 06/13/23 1010          Sit-Stand  Transfer    Sit-Stand Portal (Transfers) verbal cues;contact guard;minimum assist (75% patient effort)  -MD     Assistive Device (Sit-Stand Transfers) wheelchair;walker, front-wheeled  -MD       Row Name 06/13/23 1010          Stand-Sit Transfer    Stand-Sit Portal (Transfers) verbal cues;contact guard  -MD     Assistive Device (Stand-Sit Transfers) wheelchair  -MD       Row Name 06/13/23 1010          Gait/Stairs (Locomotion)    Portal Level (Gait) verbal cues;contact guard;minimum assist (75% patient effort)  -MD     Assistive Device (Gait) walker, front-wheeled  -MD     Distance in Feet (Gait) 80'x2  -MD     Deviations/Abnormal Patterns (Gait) base of support, wide;festinating/shuffling;stride length decreased;weight shifting decreased;gait speed decreased  -MD     Bilateral Gait Deviations forward flexed posture;heel strike decreased  -MD       Row Name 06/13/23 1010          Positioning and Restraints    Pre-Treatment Position sitting in chair/recliner  -MD     Post Treatment Position wheelchair  -MD     In Wheelchair with OT  -MD               User Key  (r) = Recorded By, (t) = Taken By, (c) = Cosigned By      Initials Name Provider Type    Damaris Farrell, PT Physical Therapist                  Wound 05/24/23 0737 sternal Incision (Active)   Dressing Appearance open to air 06/13/23 0834   Closure Approximated 06/13/23 0834   Base scab 06/13/23 0834   Periwound dry;intact 06/13/23 0834   Drainage Amount none 06/13/23 0834   Dressing Care open to air 06/13/23 0834     Physical Therapy Education       Title: PT OT SLP Therapies (Done)       Topic: Physical Therapy (Done)       Point: Mobility training (Done)       Learning Progress Summary             Patient Acceptance, E, VU by MD at 6/13/2023 1012    Acceptance, E, VU by MD at 6/12/2023 1018    Acceptance, E, VU by MD at 6/10/2023 1040    Acceptance, E, VU by MD at 6/8/2023 1017    Acceptance, E,TB,D, VU,DU,NR by  at 6/7/2023 1513     Comment: POC, Goals, safety with mobility.   Significant Other Acceptance, E,TB,D, VU,DU,NR by  at 6/7/2023 1513    Comment: POC, Goals, safety with mobility.                         Point: Home exercise program (Done)       Learning Progress Summary             Patient Acceptance, E,TB,D, VU,DU,NR by  at 6/7/2023 1513    Comment: POC, Goals, safety with mobility.   Significant Other Acceptance, E,TB,D, VU,DU,NR by  at 6/7/2023 1513    Comment: POC, Goals, safety with mobility.                                         User Key       Initials Effective Dates Name Provider Type Discipline    MD 06/16/21 -  Damaris Francis, PT Physical Therapist PT     06/16/21 -  Elizabeth Levin, PT Physical Therapist PT                    PT Recommendation and Plan                          Time Calculation:      PT Charges       Row Name 06/13/23 1332 06/13/23 1010          Time Calculation    Start Time 1245  -MD 0930  -MD     Stop Time 1330  -MD 0950  -MD     Time Calculation (min) 45 min  -MD 20 min  -MD     PT Received On -- 06/13/23  -MD     PT - Next Appointment -- 06/14/23  -MD               User Key  (r) = Recorded By, (t) = Taken By, (c) = Cosigned By      Initials Name Provider Type    Damaris Farrell, PT Physical Therapist                    Therapy Charges for Today       Code Description Service Date Service Provider Modifiers Qty    85877736769 HC GAIT TRAINING EA 15 MIN 6/12/2023 Damaris Francis, PT GP 2    29002024896 HC PT THER PROC EA 15 MIN 6/12/2023 Damaris Francis, PT GP 1    42085640932 HC PT THERAPEUTIC ACT EA 15 MIN 6/12/2023 Damaris Francis, PT GP 1    52492389709 HC GAIT TRAINING EA 15 MIN 6/13/2023 Damaris Francis, PT GP 2    27517200804 HC PT THERAPEUTIC ACT EA 15 MIN 6/13/2023 Damaris Francis, PT GP 2              PT G-Codes  AM-PAC 6 Clicks Score (PT): 14      Damaris Francis PT  6/13/2023

## 2023-06-13 NOTE — PROGRESS NOTES
Inpatient Rehabilitation Plan of Care Note    Plan of Care  Care Plan Reviewed - Updates as Follows    Sphincter Control    [RN] Bladder Management(Active)  Current Status(06/08/2023): Patient may be continent or incontinent of urine.  Bladder scan q shift.  Weekly Goal(06/19/2023): Patient will be continent 50 %  Discharge Goal: Patient will be continent    [RN] Bowel Management(Active)  Current Status(06/08/2023): Patietn is continent of bowels  Weekly Goal(06/20/2023): patient is continent of bowels  Discharge Goal: Patient is continent of bowels    Performed Intervention(s)  Offer toileting/ timed voids  Monitor I&O      Safety    [RN] Potential for Injury(Active)  Current Status(06/08/2023): Patient is at increased risk for falls/injury r/t  recent surgery and stroke.  Weekly Goal(06/20/2023): Patient will use the call light for assistance  Discharge Goal: Patient/ family will be aware of risk of fall and safety in the  home setting    Performed Intervention(s)  Safety monitoring during functional activities  Environmental set- upto reduce risk      Psychosocial    [RN] Coping/Adjustment(Active)  Current Status(06/08/2023): Patient has a supportive family  Weekly Goal(06/19/2023): Patient will express concerns regarding current status  Discharge Goal: Patient will demonstrate healthy coping strategies    Performed Intervention(s)  Allow patient to verbalize needs and concerns    Signed by: Gail Taylor RN

## 2023-06-13 NOTE — PROGRESS NOTES
[facility] Progress Note      Patient Name: Vernon Solorzano  : 1950  MRN: [id number]  Primary Care Physician:  Liza Oconnell III, RACHAEL  Date of admission: 2023    Subjective     Interval History:   Follow up CKD III. Patient in speech therapy . uop 1 Liter. BP stable.     Review of Systems:   As noted above    Objective     Vitals:   Temp:  [97.8 °F (36.6 °C)-98.9 °F (37.2 °C)] 97.8 °F (36.6 °C)  Heart Rate:  [] 88  Resp:  [12-24] 20  BP: ()/(68-72) 118/71    Intake/Output Summary (Last 24 hours) at 2023 1416  Last data filed at 2023 1200  Gross per 24 hour   Intake 860 ml   Output 1400 ml   Net -540 ml         Physical Exam:    In speech therapy .  Scheduled Meds:     apixaban, 5 mg, Oral, Q12H  aspirin, 81 mg, Oral, Daily  atorvastatin, 40 mg, Oral, Nightly  calcium 500 mg vitamin D 5 mcg (200 UT), 1 tablet, Oral, Daily  guaifenesin, 400 mg, Oral, Q8H  insulin glargine, 10 Units, Subcutaneous, Nightly  insulin glargine, 40 Units, Subcutaneous, Daily  insulin lispro, 3-14 Units, Subcutaneous, TID With Meals  ipratropium-albuterol, 3 mL, Nebulization, 4x Daily - RT  metoprolol succinate XL, 25 mg, Oral, Q24H  pantoprazole, 40 mg, Oral, Q AM      IV Meds:        Results Reviewed:   I have personally reviewed the results from the time of this admission to 2023 14:16 EDT     Results from last 7 days   Lab Units 23  0549 23  0544 23  0552   SODIUM mmol/L 139 136 137   POTASSIUM mmol/L 3.6 4.1 3.9   CHLORIDE mmol/L 103 97* 97*   CO2 mmol/L 25.0 28.8 28.8   BUN mg/dL 13 12 14   CREATININE mg/dL 1.35* 1.46* 1.25   CALCIUM mg/dL 8.8 9.3 9.6   GLUCOSE mg/dL 119* 170* 126*         Estimated Creatinine Clearance: 64.3 mL/min (A) (by C-G formula based on SCr of 1.35 mg/dL (H)).    Results from last 7 days   Lab Units 23  0549   PHOSPHORUS mg/dL 2.4*               Results from last 7 days   Lab Units 23  0544  06/09/23  0832 06/07/23  0706   WBC 10*3/mm3 9.42 12.61* 16.29*   HEMOGLOBIN g/dL 11.0* 10.9* 10.7*   PLATELETS 10*3/mm3 318 345 325               Assessment / Plan     ASSESSMENT:  1. CKD III, baseline creatinine 1.2.  Peak 1.68.  Creatinine improved or at least at plateau .  Jardiance dc for now .  2. MR now sp MV replacement, tissue and TV repair, SU closure 5/24/23.  3. Chronic steroid use after COVID 19 PNA.  4. Anemia post op. Hg stable .  5. 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 Rehab .  6. DM2  7. Aflutter.Cardioversion 6/2/23. On eliquis and metoprolol.   PLAN:  1. Monitor chemistries .  Joan Batista MD  06/13/23  14:16 EDT    [facility]  [phone]

## 2023-06-13 NOTE — THERAPY TREATMENT NOTE
Inpatient Rehabilitation - Occupational Therapy Treatment Note    [facility]     Patient Name: Vernon Solorzano  : 1950  MRN: [id number]    Today's Date: 2023                 Admit Date: 2023         ICD-10-CM ICD-9-CM   1. Impaired functional mobility, balance, gait, and endurance  Z74.09 V49.89       Patient Active Problem List   Diagnosis   • DM (diabetes mellitus), type 2   • Mixed hyperlipidemia   • Mild intermittent asthma without complication   • New onset of congestive heart failure   • Nonrheumatic mitral valve regurgitation   • Chest pain   • Essential hypertension   • Mitral valve disease   • Acute ischemic left MCA stroke       Past Medical History:   Diagnosis Date   • Allergic rhinitis    • Arthritis    • BPH (benign prostatic hyperplasia)    • Diabetes mellitus     TYPE 2   • Foraminal stenosis of cervical region     C5-C6   • GERD (gastroesophageal reflux disease)    • Hemorrhoids    • History of urinary retention     S/P TURP   • Hyperlipidemia    • Macular degeneration    • PING (obstructive sleep apnea) 2016    MILD, SEES DR. AMARILIS MORGAN       Past Surgical History:   Procedure Laterality Date   • CARDIAC CATHETERIZATION N/A 2023    Procedure: Right Heart Cath;  Surgeon: Corey Gaffney MD;  Location: [facility] CATH INVASIVE LOCATION;  Service: Cardiology;  Laterality: N/A;   • CARDIAC CATHETERIZATION N/A 2023    Procedure: Left Heart Cath;  Surgeon: Corey Gaffney MD;  Location: [facility] CATH INVASIVE LOCATION;  Service: Cardiology;  Laterality: N/A;   • CARDIAC CATHETERIZATION N/A 2023    Procedure: Left ventriculography;  Surgeon: Corey Gaffney MD;  [facility];  Service: Cardiology;  Laterality: N/A;   • CARDIAC CATHETERIZATION N/A 2023    Procedure: Coronary angiography;  Surgeon: Corey Gaffney MD;  Location:  [facility] CATH INVASIVE LOCATION;  Service: Cardiology;  Laterality: N/A;   • COLONOSCOPY N/A     WNL  PER PT, NO RECORDS,    • COLONOSCOPY N/A 04/07/2009    DR. GORGE BENAVIDEZ AT [place]   • MITRAL VALVE REPAIR/REPLACEMENT N/A 5/24/2023    Procedure: MITRAL VALVE REPAIR/REPLACEMENT TRICUSPID VALVE REPAIR/REPLACEMENT TRANSESOPHAGEAL ECHOCARDIOGRAM WITH ANESTHESIA;  Surgeon: George Frey MD;  Location: [facility];  Service: Cardiothoracic;  Laterality: N/A;   • PROSTATE SURGERY N/A 11/12/2010    TURP, DR. BRIGHT COLLAZO AT [facility]   • SKIN TAG REMOVAL N/A 12/20/2017    ANAL SKIN TAG X2, PERFORMED IN OFFICE, DR. LISA ORANTES   • TURP / TRANSURETHRAL INCISION / DRAINAGE PROSTATE  2010    Dr. Goodrich             IRF OT ASSESSMENT FLOWSHEET (last 12 hours)     IRF OT Evaluation and Treatment     Row Name 06/13/23 1520          OT Time and Intention    Document Type daily treatment  -AF     Mode of Treatment occupational therapy  -AF     Patient Effort fair  -AF     Symptoms Noted During/After Treatment fatigue  -AF     Row Name 06/13/23 1520          General Information    Patient/Family/Caregiver Comments/Observations pt sitting on EOB in AM, pt did not look well, fatigued, SOA, kept eyes closed, c/o of headache, numbness/tingling/pain in BUEs, checked vitals all stable NSG and MD aware  -AF     General Observations of Patient sitting up in w/c in PM session after PT semi more alert than this AM  -AF     Existing Precautions/Restrictions fall;cardiac;sternal  -AF     Row Name 06/13/23 1520          Pain Assessment    Pretreatment Pain Rating 7/10  -AF     Posttreatment Pain Rating 0/10 - no pain  -AF     Pre/Posttreatment Pain Comment HA in AM 7/10 NSG gave pain medicaiton  -AF     Row Name 06/13/23 1520          Cognition/Psychosocial    Affect/Mental Status (Cognition) flat/blunted affect;low arousal/lethargic  -AF     Orientation Status (Cognition) oriented to;person;place;situation  -AF     Follows Commands (Cognition) follows one-step commands;delayed response/completion;increased processing time  needed;repetition of directions required;75-90% accuracy  -AF     Personal Safety Interventions fall prevention program maintained;gait belt;nonskid shoes/slippers when out of bed  -AF     Cognitive Function attention deficit;executive function deficit  -AF     Attention Deficit (Cognition) distractible in noisy environment;focused/sustained attention  -AF     Memory Deficit (Cognition) episodic memory;procedural memory;recall, biographical information  -AF     Row Name 06/13/23 1520          Bathing    [place] Level (Bathing) bathing skills;upper body;lower body;dependent (less than 25% patient effort);maximum assist (25% patient effort);verbal cues  -AF     Assistive Device (Bathing) grab bar/tub rail;hand held shower spray hose;shower chair  -AF     Position (Bathing) supported sitting  -AF     Comment (Bathing) very poor endurance  -AF     Row Name 06/13/23 1520          Upper Body Dressing    [place] Level (Upper Body Dressing) upper body dressing skills;moderate assist (50-74% patient effort);front opening garment  -AF     Position (Upper Body Dressing) supported sitting  -AF     Set-up Assistance (Upper Body Dressing) obtain clothing  -AF     Comment (Upper Body Dressing) lethargic/kept eyes closed  -AF     Row Name 06/13/23 1520          Lower Body Dressing    [place] Level (Lower Body Dressing) don;pants/bottoms;socks;dependent (less than 25% patient effort)  -AF     Position (Lower Body Dressing) supported sitting;supported standing  -AF     Comment (Lower Body Dressing) donned brief  -AF     Row Name 06/13/23 1520          Grooming    [place] Level (Grooming) grooming skills;moderate assist (50% patient effort)  -AF     Position (Grooming) supported sitting  -AF     Comment (Grooming) with deoderant and combing hair, washed hair with MAX vc's and rest breaks  -AF     Row Name 06/13/23 1520          Bed Mobility    Comment, (Bed Mobility) seated on EOB  -AF     Row Name 06/13/23 1520           Functional Mobility    Functional Mobility- Comment walked from EOB to shower chair with increased time RWX MIN A, vc's to keep eyes open SOA with heavy breathing  -AF     Row Name 06/13/23 1520          Shower Transfer    Type (Shower Transfer) stand-sit;sit-stand  -AF     [place] Level (Shower Transfer) moderate assist (50% patient effort)  -AF     Assistive Device (Shower Transfer) grab bar, tub/shower;shower chair;walker, front-wheeled;wheelchair  -AF     Comment, (Shower Transfer) heavily relies on the use of grab bars  -AF     Row Name 06/13/23 1520          Motor Skills    Motor Skills neuro-muscular function;motor control/coordination interventions  -AF     Functional Endurance poor - in AM session, poor in PM  -AF     Neuromuscular Function --  poor dual tasking noted with rote tasks and new learning  -AF     Row Name 06/13/23 1520          Elbow/Forearm (Therapeutic Exercise)    Elbow/Forearm Strengthening (Therapeutic Exercise) bilateral;flexion;extension;supination;pronation;sitting;2 lb free weight;10 repetitions;3 sets  mulitple vc's to stay awake and on task  -AF     Row Name 06/13/23 1520          Wrist (Therapeutic Exercise)    Wrist Strengthening (Therapeutic Exercise) bilateral;flexion;extension;2 lb free weight;10 repetitions;3 sets  -AF     Row Name 06/13/23 1520          Hand (Therapeutic Exercise)    Hand Strengthening (Therapeutic Exercise) bilateral; strengthening;hand gripper;10 repetitions;3 sets  -AF     Row Name 06/13/23 1520          Balance    Static Sitting Balance contact guard  -AF     Dynamic Sitting Balance minimal assist  -AF     Static Standing Balance minimal assist  with use of grab bar  -AF     Row Name 06/13/23 1520          Positioning and Restraints    Pre-Treatment Position --  seated on EOB in AM with wife present  -AF     Post Treatment Position wheelchair  -AF     In Wheelchair sitting;exit alarm on;with family/caregiver;with PT;with SLP  wtih wife and  PT in AM, with SLP in PM  -AF           User Key  (r) = Recorded By, (t) = Taken By, (c) = Cosigned By    Initials Name Effective Dates    AF Candy Davis, OTR 06/16/21 -                  Occupational Therapy Education     Title: PT OT SLP Therapies (In Progress)     Topic: Occupational Therapy (In Progress)     Point: ADL training (In Progress)     Description:   Instruct learner(s) on proper safety adaptation and remediation techniques during self care or transfers.   Instruct in proper use of assistive devices.              Learning Progress Summary           Patient Acceptance, E, NR by AF at 6/13/2023 1527    Comment: benefits of therapy, endurance   Family Acceptance, E, NR by AF at 6/13/2023 1527    Comment: benefits of therapy, endurance                   Point: Home exercise program (In Progress)     Description:   Instruct learner(s) on appropriate technique for monitoring, assisting and/or progressing therapeutic exercises/activities.              Learning Progress Summary           Patient Acceptance, E, NR by AF at 6/13/2023 1527    Comment: benefits of therapy, endurance   Family Acceptance, E, NR by AF at 6/13/2023 1527    Comment: benefits of therapy, endurance                   Point: Precautions (In Progress)     Description:   Instruct learner(s) on prescribed precautions during self-care and functional transfers.              Learning Progress Summary           Patient Acceptance, E, NR by AF at 6/13/2023 1527    Comment: benefits of therapy, endurance   Family Acceptance, E, NR by AF at 6/13/2023 1527    Comment: benefits of therapy, endurance                   Point: Body mechanics (In Progress)     Description:   Instruct learner(s) on proper positioning and spine alignment during self-care, functional mobility activities and/or exercises.              Learning Progress Summary           Patient Acceptance, E, NR by AF at 6/13/2023 1527    Comment: benefits of therapy, endurance   Family  Acceptance, E, NR by AF at 6/13/2023 1527    Comment: benefits of therapy, endurance                               User Key     Initials Effective Dates Name Provider Type Discipline    AF 06/16/21 -  Candy Davis, [place] Occupational Therapist OT                    OT Recommendation and Plan                         Time Calculation:      Time Calculation- OT     Row Name 06/13/23 1528 06/13/23 1527          Time Calculation- OT    OT Start Time 1330  -AF 0900  -AF     OT Stop Time 1400  -AF 0930  -AF     OT Time Calculation (min) 30 min  -AF 30 min  -AF           User Key  (r) = Recorded By, (t) = Taken By, (c) = Cosigned By    Initials Name Provider Type    AF Candy Davis, OTR Occupational Therapist              Therapy Charges for Today     Code Description Service Date Service Provider Modifiers Qty    46966056456 HC OT SELF CARE/MGMT/TRAIN EA 15 MIN 6/12/2023 Candy Davis, OTR GO 1    56276100651 HC OT THER PROC EA 15 MIN 6/12/2023 Candy Davis, OTR GO 2    99640006183 HC OT THERAPEUTIC ACT EA 15 MIN 6/12/2023 Candy Davis, OTR GO 1    20766618465 HC OT SELF CARE/MGMT/TRAIN EA 15 MIN 6/13/2023 Candy Davis, OTR GO 2    71287438455 HC OT THER PROC EA 15 MIN 6/13/2023 Candy Davis, OTR GO 2                   HI Wyman  6/13/2023

## 2023-06-13 NOTE — PLAN OF CARE
Goal Outcome Evaluation:  Plan of Care Reviewed With: patient        Progress: no change  Outcome Evaluation: Alert, cooperative with all care. Transfers assist of 1. No safety isssues noted. C/O SOA after toileting around 2am; expiratory wheezing noted. IS encouraged; done 2 times 3 repetitions so far tonight; tolerated fair. PRN DuoNeb breathing treatment per RT. Pillow used for sternal precautions when coughing; patient felt that he was congested but could not cough hard enough d/t pain r/t surgery. O2 sats WNL AAT when taken. Will c/t monitor.

## 2023-06-14 LAB
ANION GAP SERPL CALCULATED.3IONS-SCNC: 12.2 MMOL/L (ref 5–15)
BASOPHILS # BLD AUTO: 0.06 10*3/MM3 (ref 0–0.2)
BASOPHILS NFR BLD AUTO: 0.7 % (ref 0–1.5)
BUN SERPL-MCNC: 12 MG/DL (ref 8–23)
BUN/CREAT SERPL: 8.8 (ref 7–25)
CALCIUM SPEC-SCNC: 8.8 MG/DL (ref 8.6–10.5)
CHLORIDE SERPL-SCNC: 99 MMOL/L (ref 98–107)
CO2 SERPL-SCNC: 24.8 MMOL/L (ref 22–29)
CREAT SERPL-MCNC: 1.37 MG/DL (ref 0.76–1.27)
DEPRECATED RDW RBC AUTO: 43.9 FL (ref 37–54)
EGFRCR SERPLBLD CKD-EPI 2021: 54.1 ML/MIN/1.73
EOSINOPHIL # BLD AUTO: 0.87 10*3/MM3 (ref 0–0.4)
EOSINOPHIL NFR BLD AUTO: 9.6 % (ref 0.3–6.2)
ERYTHROCYTE [DISTWIDTH] IN BLOOD BY AUTOMATED COUNT: 14.3 % (ref 12.3–15.4)
GLUCOSE BLDC GLUCOMTR-MCNC: 129 MG/DL (ref 70–130)
GLUCOSE BLDC GLUCOMTR-MCNC: 150 MG/DL (ref 70–130)
GLUCOSE BLDC GLUCOMTR-MCNC: 184 MG/DL (ref 70–130)
GLUCOSE BLDC GLUCOMTR-MCNC: 271 MG/DL (ref 70–130)
GLUCOSE SERPL-MCNC: 130 MG/DL (ref 65–99)
HCT VFR BLD AUTO: 30 % (ref 37.5–51)
HGB BLD-MCNC: 10 G/DL (ref 13–17.7)
IMM GRANULOCYTES # BLD AUTO: 0.06 10*3/MM3 (ref 0–0.05)
IMM GRANULOCYTES NFR BLD AUTO: 0.7 % (ref 0–0.5)
LYMPHOCYTES # BLD AUTO: 1.13 10*3/MM3 (ref 0.7–3.1)
LYMPHOCYTES NFR BLD AUTO: 12.4 % (ref 19.6–45.3)
MCH RBC QN AUTO: 28.7 PG (ref 26.6–33)
MCHC RBC AUTO-ENTMCNC: 33.3 G/DL (ref 31.5–35.7)
MCV RBC AUTO: 86 FL (ref 79–97)
MONOCYTES # BLD AUTO: 1.07 10*3/MM3 (ref 0.1–0.9)
MONOCYTES NFR BLD AUTO: 11.8 % (ref 5–12)
NEUTROPHILS NFR BLD AUTO: 5.9 10*3/MM3 (ref 1.7–7)
NEUTROPHILS NFR BLD AUTO: 64.8 % (ref 42.7–76)
NRBC BLD AUTO-RTO: 0 /100 WBC (ref 0–0.2)
PLATELET # BLD AUTO: 297 10*3/MM3 (ref 140–450)
PMV BLD AUTO: 9.1 FL (ref 6–12)
POTASSIUM SERPL-SCNC: 3.9 MMOL/L (ref 3.5–5.2)
RBC # BLD AUTO: 3.49 10*6/MM3 (ref 4.14–5.8)
SODIUM SERPL-SCNC: 136 MMOL/L (ref 136–145)
WBC NRBC COR # BLD: 9.09 10*3/MM3 (ref 3.4–10.8)
ZINC SERPL-MCNC: 66 UG/DL (ref 44–115)

## 2023-06-14 NOTE — PLAN OF CARE
Goal Outcome Evaluation:  Plan of Care Reviewed With: patient        Progress: improving  Outcome Evaluation: Chest inc is intact. CGA with a walker. No pain today. LS clear and sats WNL.

## 2023-06-14 NOTE — THERAPY TREATMENT NOTE
Inpatient Rehabilitation - Speech Language Pathology Treatment Note    [facility]     Patient Name: Vernon Solorzano  : 1950  MRN: [id number]    Today's Date: 2023                   Admit Date: 2023       Visit Dx:      ICD-10-CM ICD-9-CM   1. Impaired functional mobility, balance, gait, and endurance  Z74.09 V49.89       Patient Active Problem List   Diagnosis    DM (diabetes mellitus), type 2    Mixed hyperlipidemia    Mild intermittent asthma without complication    New onset of congestive heart failure    Nonrheumatic mitral valve regurgitation    Chest pain    Essential hypertension    Mitral valve disease    Acute ischemic left MCA stroke       Past Medical History:   Diagnosis Date    Allergic rhinitis     Arthritis     BPH (benign prostatic hyperplasia)     Diabetes mellitus     TYPE 2    Foraminal stenosis of cervical region     C5-C6    GERD (gastroesophageal reflux disease)     Hemorrhoids     History of urinary retention     S/P TURP    Hyperlipidemia     Macular degeneration     PING (obstructive sleep apnea) 2016    MILD, SEES DR. AMARILIS MORGAN       Past Surgical History:   Procedure Laterality Date    CARDIAC CATHETERIZATION N/A 2023    Procedure: Right Heart Cath;  Surgeon: Corey Gaffney MD;  Location: [facility] CATH INVASIVE LOCATION;  Service: Cardiology;  Laterality: N/A;    CARDIAC CATHETERIZATION N/A 2023    Procedure: Left Heart Cath;  Surgeon: Corey Gaffney MD;  Location: [facility] CATH INVASIVE LOCATION;  Service: Cardiology;  Laterality: N/A;    CARDIAC CATHETERIZATION N/A 2023    Procedure: Left ventriculography;  Surgeon: Corey Gaffney MD;  Location: [facility] CATH INVASIVE LOCATION;  Service: Cardiology;  Laterality: N/A;    CARDIAC CATHETERIZATION N/A 2023    Procedure: Coronary angiography;  Surgeon: Corey Gaffney MD;  Location: [facility] CATH INVASIVE LOCATION;  Service: Cardiology;  Laterality: N/A;    COLONOSCOPY N/A     WNL PER  PT, NO RECORDS,     COLONOSCOPY N/A 04/07/2009    DR. GORGE BENAVIDEZ AT [place]    MITRAL VALVE REPAIR/REPLACEMENT N/A 5/24/2023    Procedure: MITRAL VALVE REPAIR/REPLACEMENT TRICUSPID VALVE REPAIR/REPLACEMENT TRANSESOPHAGEAL ECHOCARDIOGRAM WITH ANESTHESIA;  Surgeon: George Frey MD;  Location: [facility];  Service: Cardiothoracic;  Laterality: N/A;    PROSTATE SURGERY N/A 11/12/2010    TURP, DR. BRIGHT COLLAZO AT [facility]    SKIN TAG REMOVAL N/A 12/20/2017    ANAL SKIN TAG X2, PERFORMED IN OFFICE, DR. LISA ORANTES    TURP / TRANSURETHRAL INCISION / DRAINAGE PROSTATE  2010    Dr. Goodrich       SLP Recommendation and Plan                                                            SLP EVALUATION (last 72 hours)       SLP SLC Evaluation       Row Name 06/14/23 1230 06/14/23 0830 06/13/23 1400 06/12/23 1400 06/12/23 1000       Communication Assessment/Intervention    Document Type therapy note (daily note)  -CR therapy note (daily note)  -CR therapy note (daily note)  -SR therapy note (daily note)  -SR therapy note (daily note)  -SR    Subjective Information no complaints  -CR no complaints  -CR no complaints  -SR no complaints  -SR no complaints  -SR    Patient Observations alert;cooperative  -CR alert;cooperative  -CR alert;cooperative;agree to therapy  -SR alert;cooperative;agree to therapy  -SR lethargic;cooperative;agree to therapy  -SR    Patient/Family/Caregiver Comments/Observations -- -- -- -- Patient's spouse present for AM therapy session  -SR    Patient Effort adequate  -CR adequate  -CR adequate  -SR adequate  -SR adequate  -SR    Symptoms Noted During/After Treatment none  -CR none  -CR fatigue  -SR fatigue  -SR fatigue  -SR       General Information    Patient Profile Reviewed yes  -CR yes  -CR -- -- --       Pain Scale: Numbers Pre/Post-Treatment    Pretreatment Pain Rating 0/10 - no pain  -CR 0/10 - no pain  -CR 0/10 - no pain  -SR 0/10 - no pain  -SR 0/10 - no pain  -SR    Posttreatment  Pain Rating 0/10 - no pain  -CR 0/10 - no pain  -CR 0/10 - no pain  -SR 0/10 - no pain  -SR 0/10 - no pain  -SR              User Key  (r) = Recorded By, (t) = Taken By, (c) = Cosigned By      Initials Name Effective Dates    SR Latisha Morris, CCC-SLP 11/10/22 -     CR Mariely Bridges CF-SLP 05/31/23 -                        EDUCATION    The patient has been educated in the following areas:       Cognitive Impairment.             SLP GOALS       Row Name 06/14/23 1230 06/14/23 0830 06/14/23 0800       Orientation Goal 1 (SLP)    Improve Orientation Through Goal 1 (SLP) -- demonstrating orientation to day;demonstrating orientation to month;demonstrating orientation to year;demonstrating orientation to place;demonstrating orientation to disease/impairment;use environmental aids to assist with orientation;80%;with minimal cues (75-90%)  -CR --    Time Frame (Orientation Goal 1, SLP) -- by discharge  -CR --    Progress/Outcomes (Orientation Goal 1, SLP) -- goal ongoing  -CR --    Comment (Orientation Goal 1, SLP) -- Oriented to month, date, year, and place. Required MIN cues to identify correct time. Pt spontaneously utilized daily schedule and board in room to fully orient to month/date/year.  -CR --       Memory Skills Goal 1 (SLP)    Improve Memory Skills Through Goal 1 (SLP) use memory strategies;listen to a paragraph and answer questions;recall details of the day;80%;with moderate cues (50-74%)  -CR -- --    Time Frame (Memory Skills Goal 1, SLP) by discharge  -CR -- --    Progress/Outcomes (Memory Skills Goal 1, SLP) goal ongoing  -CR -- --    Comment (Memory Skills Goal 1, SLP) 3-item mental manipulation task targeting working memory and immediate memory completed with 100% when given MOD cues. Patient required multiple repetitions and extended time throughout task.  -CR -- --       Reasoning Goal 1 (SLP)    Improve Reasoning Through Goal 1 (SLP) complete deductive reasoning task;complete basic reasoning  task;complete mental flexibility task;50%;with moderate cues (50-74%)  -CR -- --    Time Frame (Reasoning Goal 1, SLP) by discharge  -CR -- --    Progress/Outcomes (Reasoning Goal 1, SLP) goal ongoing  -CR -- --    Comment (Reasoning Goal 1, SLP) Patient completed 12-cell mildly complex deductive reasoning task targeting attention and reasoning with 25% acc with MIN cues, increased to 75% acc when given MOD cues. Patient required extended time to complete task. When clinician instructed for patient to read aloud, processing times appeared faster.  -CR -- --       Executive Functional Skills Goal 1 (SLP)    Improve Executive Function Skills Goal 1 (SLP) -- demonstrate awareness of deficit;home management activity;80%;with moderate cues (50-74%)  -CR --  -CR    Time Frame (Executive Function Skills Goal 1, SLP) -- by discharge  -CR --  -CR    Progress/Outcomes (Executive Function Skills Goal 1, SLP) -- goal ongoing  -CR --  -CR    Comment (Executive Function Skills Goal 1, SLP) -- Patient answered questions about a monthly calendar with 40% acc given NO cues; 80% acc given MIN cues and extended time. Pt required around ~25 minutes to complete 6-question task.  -CR --  -CR      Row Name 06/13/23 1400 06/12/23 1400 06/12/23 1000       (LTG) Patient will demonstrate progress toward functional swallow for    Diet Texture (Demonstrate progress toward functional swallow) -- -- regular textures  -SR    Liquid viscosity (Demonstrate progress toward functional swallow) -- -- thin liquids  -SR    [place] (Demonstrate progress towards functional swallow) -- -- independently (over 90% accuracy)  -SR    Time Frame (Demonstrate progress toward functional swallow) -- -- by discharge  -SR    Comment (Demonstrate progress toward functional swallow) -- -- Reviewed VFSS results from 5/31 and 6/8 with patient and spouse. Provided education regarding diet recommendations, swallow strategies, and aspiration risk. Both patient and  spouse agreeable to recommendations.  -SR       Attention Goal 1 (SLP)    Improve Attention by Goal 1 (SLP) complete selective attention task;complete sustained attention task;70%;with moderate cues (50-74%)  -SR complete selective attention task;complete sustained attention task;70%;with moderate cues (50-74%)  -SR complete selective attention task;complete sustained attention task;70%;with moderate cues (50-74%)  -SR    Time Frame (Attention Goal 1, SLP) by discharge  -SR by discharge  -SR by discharge  -SR    Progress/Outcomes (Attention Goal 1, SLP) goal ongoing  -SR goal ongoing  -SR goal ongoing  -SR    Comment (Attention Goal 1, SLP) During the AM therapy session, patient demonstrated increased difficulty with sustained/selective attention. Accuracy increased with verbal cues. Required redirection to task x5 during 5 minute activity. He completed 3 stimulus items for money counting task due to increased distractability.  -SR Patient followed 1 step-2 component written directions with 40% acc given NO cues; 60% acc given MOD cues. Activity incomplete. Patient required extended time due to prolonged speed of processing. Increased difficulty with sustained attention noted during task.  -SR Increased fatigue noted throughout session. Required verbal cue x2 to keep eyes open.  -SR       Memory Skills Goal 1 (SLP)    Improve Memory Skills Through Goal 1 (SLP) -- use memory strategies;listen to a paragraph and answer questions;recall details of the day;80%;with moderate cues (50-74%)  -SR --    Time Frame (Memory Skills Goal 1, SLP) -- by discharge  -SR --    Progress/Outcomes (Memory Skills Goal 1, SLP) -- goal ongoing  -SR --    Comment (Memory Skills Goal 1, SLP) -- Short-term memory targeted with paragraph retention activity. Patient listened to short story and answered inference-based questions about the content with 50% acc given NO cues.  -SR --       Reasoning Goal 1 (SLP)    Improve Reasoning Through Goal 1  (SLP) complete deductive reasoning task;complete basic reasoning task;complete mental flexibility task;50%;with moderate cues (50-74%)  -SR -- complete deductive reasoning task;complete basic reasoning task;complete mental flexibility task;50%;with moderate cues (50-74%)  -SR    Time Frame (Reasoning Goal 1, SLP) by discharge  -SR -- by discharge  -SR    Progress/Outcomes (Reasoning Goal 1, SLP) good progress toward goal  -SR -- good progress toward goal  -SR    Comment (Reasoning Goal 1, SLP) During the PM therapy session, patient read written directions and used process of elimination to determine the solution to logic activity with 40% acc given NO cues; 80% acc given MOD cues. Prolonged processing speed. Required extended time to complete activity.  -SR -- Verbal sequencing targeted this date with task analysis activity. Patient provided 5-6 steps for activites of interest (i.e, fishing, gardening) given MOD cues. Slow processing speed observed. Reviewed plan of care and speech therapy goals with patient and spouse. Both verbalized understanding. Will continue to target speed of processing, judgement, attention, and problem solving/reasoning.  -SR       Executive Functional Skills Goal 1 (SLP)    Improve Executive Function Skills Goal 1 (SLP) demonstrate awareness of deficit;home management activity;80%;with moderate cues (50-74%)  -SR -- demonstrate awareness of deficit;home management activity;80%;with moderate cues (50-74%)  -SR    Time Frame (Executive Function Skills Goal 1, SLP) by discharge  -SR -- by discharge  -SR    Progress/Outcomes (Executive Function Skills Goal 1, SLP) goal ongoing  -SR -- goal ongoing  -SR    Comment (Executive Function Skills Goal 1, SLP) Patient answered questions about a monthly calendar/daily schedule with 50% acc given NO cues; 70% acc given MIN cues.  -SR -- --              User Key  (r) = Recorded By, (t) = Taken By, (c) = Cosigned By      Initials Name Provider Type    SR  Latisha Morris, CCC-SLP Speech and Language Pathologist    Mariely Corcoran CF-SLP Speech and Language Pathologist                                Time Calculation:        Time Calculation- SLP       Row Name 06/14/23 1344 06/14/23 0908          Time Calculation- SLP    SLP Start Time 1230  -CR 0830  -CR     SLP Stop Time 1300  -CR 0900  -CR     SLP Time Calculation (min) 30 min  -CR 30 min  -CR               User Key  (r) = Recorded By, (t) = Taken By, (c) = Cosigned By      Initials Name Provider Type    Mariely Corcoran CF-SLP Speech and Language Pathologist                      Therapy Charges for Today       Code Description Service Date Service Provider Modifiers Qty    60073524951 HC ST DEV OF COGN SKILLS INITIAL 15 MIN 6/14/2023 Mariely Bridges CF-SLP  1    18709450376 HC ST DEV OF COGN SKILLS EACH ADDT'L 15 MIN 6/14/2023 Mariely Bridges CF-SLP  3                             ZACH Gillette  6/14/2023

## 2023-06-14 NOTE — PROGRESS NOTES
Inpatient Rehabilitation Plan of Care Note    Plan of Care  Updated Problems/Interventions  Swallow Function    [ST] Swallowing(Active)  Current Status(06/14/2023): Regular (no mixed) thin by cup. precautions:  upright, small single sips, no straw, alternate solids with liquids  Weekly Goal(06/21/2023): Patient will tolerate the least restrictive diet  without s/sx of aspiration  Discharge Goal: Patient will tolerate the least restrictive diet without s/sx of  aspiration        Cognition    [ST] Attention(Active)  Current Status(06/14/2023): Moderate cognitive impairment; decreased  initiation/slow processing speed. Increased difficulty with attention, memory,  and executive function  Weekly Goal(06/21/2023): Patient will participate in functional therapy  activities given MOD cues  Discharge Goal: Functional cognition for home    Signed by: Latisha Morris, SLP

## 2023-06-14 NOTE — PROGRESS NOTES
Inpatient Rehabilitation Functional Measures Assessment and Plan of Care    Plan of Care  Updated Problems/Interventions  Self Care    [OT] Bathing(Active)  Current Status(06/14/2023): MIN  Weekly Goal(06/21/2023): CGA  Discharge Goal: CGA    [OT] Dressing (Lower)(Active)  Current Status(06/14/2023): MOD/MIN  Weekly Goal(06/21/2023): MIN  Discharge Goal: Min A    [OT] Dressing (Upper)(Active)  Current Status(06/14/2023): Min A  Weekly Goal(06/21/2023): Set up  Discharge Goal: Indep    [OT] Grooming(Active)  Current Status(06/14/2023): setup  Weekly Goal(06/21/2023): MOD I  Discharge Goal: Mod I    [OT] Toileting(Active)  Current Status(06/14/2023): MOD/MIN  Weekly Goal(06/21/2023): MIN  Discharge Goal: CGA/SBA        Mobility    [OT] Toilet Transfers(Active)  Current Status(06/14/2023): MIN  Weekly Goal(06/21/2023): CGA  Discharge Goal: SBA    [OT] Tub/Shower Transfers(Active)  Current Status(06/14/2023): Mod A/MIN  Weekly Goal(06/21/2023): Min A  Discharge Goal: CGA    Functional Measures  PERI Eating:  Branch  PERI Grooming: Branch  PERI Bathing:  Branch  PERI Upper Body Dressing:  Branch  PERI Lower Body Dressing:  Branch  PERI Toileting:  Branch    PERI Bladder Management  Level of Assistance:  Branch  Frequency/Number of Accidents this Shift:  Branch    PERI Bowel Management  Level of Assistance: Branch  Frequency/Number of Accidents this Shift: Branch    PERI Bed/Chair/Wheelchair Transfer:  Branch  PERI Toilet Transfer:  Branch  PERI Tub/Shower Transfer:  Branch    Previously Documented Mode of Locomotion at Discharge: Field  [facility] Expected Mode of Locomotion at Discharge: Branch  PERI Walk/Wheelchair:  Branch  PERI Stairs:  Branch    PERI Comprehension:  Branch  PERI Expression:  Branch  PERI Social Interaction:  Branch  PERI Problem Solving:  Branch  PERI Memory:  Branch    Therapy Mode Minutes  Occupational Therapy: Branch  Physical Therapy: Branch  Speech Language Pathology:  Branch    Signed by: Candy Davis OT

## 2023-06-14 NOTE — PROGRESS NOTES
LOS: 8 days   Patient Care Team:  Liza Oconnell III, NP-C as PCP - General (Family Medicine)  Corey Lao Jr., MD (Inactive) as Consulting Physician (Urology)      KRIS TAYLOR  1950      ADMITTING DIAGNOSIS:  Stroke  Valvular heart disease status post repair/replacement      Subjective       Reports comfortable with breathing.  No significant sternotomy discomfort.  Rash not itchy.  Strength improved on the right side.  Still with edema in the feet.         Objective     Vitals:    06/14/23 1049   BP:    Pulse:    Resp: 18   Temp:    SpO2:        PHYSICAL EXAM:   MENTAL STATUS -  AWAKE / ALERT  HEENT- NCAT, PUPILS EQUALLY ROUND, SCLERAE ANICTERIC, CONJUNCTIVAE PINK, OP MOIST, NO JVD, EARS UNREMARKABLE EXTERNALLY  LUNGS - CTA, NO WHEEZES, RALES OR RHONCHI  Sternotomy incision -dressed  HEART-sinus with occasional ectopy  ABD - NORMOACTIVE BOWEL SOUNDS, SOFT, NT.   EXT - + EDEMA    NEURO -oriented to person place time and situation.    Speech is fluent.    Skin: rash on left axilla and chest       MEDICATIONS  Scheduled Meds:apixaban, 5 mg, Oral, Q12H  aspirin, 81 mg, Oral, Daily  atorvastatin, 40 mg, Oral, Nightly  calcium 500 mg vitamin D 5 mcg (200 UT), 1 tablet, Oral, Daily  guaifenesin, 400 mg, Oral, Q8H  insulin glargine, 15 Units, Subcutaneous, Nightly  insulin glargine, 40 Units, Subcutaneous, Daily  insulin lispro, 3-14 Units, Subcutaneous, TID With Meals  ipratropium-albuterol, 3 mL, Nebulization, 4x Daily - RT  metoprolol succinate XL, 25 mg, Oral, Q24H  pantoprazole, 40 mg, Oral, Q AM  torsemide, 40 mg, Oral, Daily      Continuous Infusions:   PRN Meds:.  acetaminophen **OR** [DISCONTINUED] acetaminophen **OR** [DISCONTINUED] acetaminophen    bisacodyl    calcium carbonate    diphenhydrAMINE-zinc acetate    hydrocortisone-bacitracin-zinc oxide-nystatin    ipratropium-albuterol    senna-docusate sodium    traMADol      RESULTS  Glucose   Date/Time Value Ref Range  Status   06/14/2023 1058 184 (H) 70 - 130 mg/dL Final     Comment:     Meter: [id number] : [id number] Clark Cortés NA   06/14/2023 0713 129 70 - 130 mg/dL Final     Comment:     Meter: [id number] : [id number] Popeye Phelps NA   06/13/2023 2110 281 (H) 70 - 130 mg/dL Final     Comment:     Meter: [id number] : [id number] Kwasi Bangura NA   06/13/2023 1612 195 (H) 70 - 130 mg/dL Final     Comment:     Meter: [id number] : [id number] Summer Laia NA   06/13/2023 1131 135 (H) 70 - 130 mg/dL Final     Comment:     Meter: [id number] : [id number] Abatiffanie Laia NA   06/13/2023 0706 137 (H) 70 - 130 mg/dL Final     Comment:     Meter: [id number] : [id number] Abayedoris Laia NA   06/12/2023 2112 196 (H) 70 - 130 mg/dL Final     Comment:     Meter: [id number] : [id number] Del Bhatia NA   06/12/2023 1614 167 (H) 70 - 130 mg/dL Final     Comment:     Meter: [id number] : [id number] Summer Laia NA     Results from last 7 days   Lab Units 06/14/23  0602 06/12/23  0544 06/09/23  0832   WBC 10*3/mm3 9.09 9.42 12.61*   HEMOGLOBIN g/dL 10.0* 11.0* 10.9*   HEMATOCRIT % 30.0* 33.1* 32.4*   PLATELETS 10*3/mm3 297 318 345       Results from last 7 days   Lab Units 06/14/23  0602 06/13/23  0549 06/12/23  0544   SODIUM mmol/L 136 139 136   POTASSIUM mmol/L 3.9 3.6 4.1   CHLORIDE mmol/L 99 103 97*   CO2 mmol/L 24.8 25.0 28.8   BUN mg/dL 12 13 12   CREATININE mg/dL 1.37* 1.35* 1.46*   CALCIUM mg/dL 8.8 8.8 9.3   GLUCOSE mg/dL 130* 119* 170*        Latest Reference Range & Units 05/23/23 14:29   Hemoglobin A1C 4.80 - 5.60 % 9.20 (H)   Total Cholesterol 0 - 200 mg/dL 155   HDL Cholesterol 40 - 60 mg/dL 41   LDL Cholesterol  0 - 100 mg/dL 81   Triglycerides 0 - 150 mg/dL 197 (H)   (H): Data is abnormally high     Latest Reference Range & Units 05/31/23 08:03   25 Hydroxy, Vitamin D 30.0 - 100.0 ng/ml 20.0 (L)   (L): Data is abnormally low  ASSESSMENT and PLAN    Acute ischemic left MCA  stroke    Status post CVA-Scattered  punctate restricted diffusion acute infarct in the bilateral frontal and   left parietal occipital cortices.        Impaired Cognition/impaired mobility/impaired self-care     Aphasia-resolved     Right hand apraxia-resolved     Encephalopathy-improving     Dysphagia/nutrition-healthy heart 2-3 g sodium, consistent carb, no mixed consistency, regular texture, nectar thick liquid  June 7-videofluoroscopic swallow study planned tomorrow  June 8-swallow study today-advance to regular solid, no mixed consistency, thin liquids by cup.  No straws.     Stroke prophylaxis-Eliquis/aspirin/atorvastatin     History of severe mitral regurgitation and annular dilatation, moderate to severe tricuspid regurgitation-  status post May 24, 2023 - 1. Mitral valve repair with a 28 mm physio II ring annuloplasty with residual mild to moderate MR  2.  Mitral valve replacement with a 29 mm Schwartz II porcine prosthesis  3.  Tricuspid valve repair with 28 mm physio tricuspid ring  4.  Left atrial appendage endocardial closure     Atrial flutter-status post cardioversion June 2, 2023 anticoagulation with Eliquis  Metoprolol     Volume status-torsemide     Severe pulmonary hypertension  Obstructive sleep apnea     Interstitial lung disease status post COVID-19 infection-steroid taper per pulmonology  DuoNebs 4 times a day  Chronic steroid use with cushingoid syndrome  Prednisone 5 mg every other day     Leukocytosis-reactive/steroid administration     Postoperative anemia     Qlmvcsueerujisge-jyznjlptnbq-imxcjvmp     Chronic kidney disease stage III-baseline creatinine 1.2     Diabetes mellitus type 2-Jardiance/Lantus/sliding scale insulin-uncontrolled  June 7-on Jardiance and Lantus 40 units daily.  Elevated blood glucose.  Received 21 units on sliding scale insulin yesterday.  Increase Lantus by adding 10 units nightly.  June 14-increase Lantus from 10 to 15 units nightly.  Continue 40 units every  morning.  Off Jardiance secondary to renal function.     BPH/urinary retention-history of TURP  June 14-void 300 cc with postvoid residual 205 cc.    Renal insufficiency - June 9 - Creatinine increased to 1.46. Meds reviewed. Is on Jardiance - already received dose today. Will get input from Nephrology.  June 12-with the increase in his creatinine, Jardiance discontinued  June 14-torsemide added for edema     Pain management-tramadol-/Tylenol Dk report reviewed    Nutrition-addendum-June 9 6:43 PM-decreased appetite-would like to have zinc level checked in the morning    Rash on L axilla  June 13-does not wish to have anything as it is not causing patient discomfort     Sleep  June 13-offered melatonin, but declined       TEAM CONF - JUNE 8 - ENDURANCE POOR. BED MOD. TRANSFERS MOD. GAIT 25 FEET MOD ASSIST RW. SHOWER TRANSFERS MOD A.   BATH MOD ASSIST. LBD MAX. UBD MIN. TOILETING MAX.   Patient is currently on nectar liquids VFSS TODAY.   BIMS SUMMARY SCORE: 9 Moderately impaired   DIFFICULTY WITH SUSTAINED AND FOCAL ATTENTION.  CONTINENT / INCONTINENT  ELOS - 2 -3 WEEKS        Now admit for comprehensive acute inpatient rehabilitation .  This would be an interdisciplinary program with physical therapy 1 hour,  occupational therapy 1 hour, and speech therapy 1 hour, 5 days a week.  Rehabilitation nursing for carryover, monitoring of neurologic, cardiac, pulmonary, diabetes   status, bowel and bladder, and skin  Ongoing physician follow-up.  Weekly team conferences.  Goals are to achieve a level of contact-guard with  mobility and self-care and improved activity tolerance.   Rehabilitation prognosis fair.  Medical prognosis defer to cardiology.  Estimated length of stay is approximately 2 weeks, but is only an estimation.   The patient's functional status and clinical status is unchanged from preadmission assessment and the patient continues appropriate for acute inpatient rehabilitation.  Goal is for home with  outpatient cardiac rehab   therapies.  Barrier to discharge: Impaired cognition mobility self-care- work on follow, executive function, conditioning, transfers, progressive ambulation, ADLs to overcome.           Tu Silver MD, PGY-2   6/13/2023  12:10 PM       During rounds, used appropriate personal protective equipment including mask and gloves.  Additional gown if indicated.  Mask used was standard procedure mask. Appropriate PPE was worn during the entire visit.  Hand hygiene was completed before and after.      Patient has been staffed and discussed with attending Physician, Dr. Tu Silver.

## 2023-06-14 NOTE — PROGRESS NOTES
[facility] Progress Note      Patient Name: Vernon Solorzano  : 1950  MRN: [id number]  Primary Care Physician:  Liza Oconnell III, RACHAEL  Date of admission: 2023    Subjective     Interval History:   Follow up CKD III. Not oriented to place.  Says he slept well.  Eating. Need a standing scale weight. Legs swollen. Not soa .    Review of Systems:   As noted above    Objective     Vitals:   Temp:  [96.8 °F (36 °C)-97.8 °F (36.6 °C)] 97 °F (36.1 °C)  Heart Rate:  [] 101  Resp:  [18-20] 18  BP: (117-120)/(70-71) 117/70    Intake/Output Summary (Last 24 hours) at 2023 0741  Last data filed at 2023 0452  Gross per 24 hour   Intake 660 ml   Output 850 ml   Net -190 ml         Physical Exam:    General Appearance: alert, lying in bed. Not oriented to place, but when reminded he is in rehab, he agrees.   Skin: warm and dry  HEENT: oral mucosa normal, nonicteric sclera  Neck: supple, no JVD  Lungs: Coarse upper airway rhonchi. Nasal 02  Heart: RRR, normal S1 and S2  Abdomen: soft, nontender, + bs, distended. Body wall edema.   : external catheter .  Extremities 2+ lower ext edema . R>L.   Neuro: Not oriented to place or time .  occas perseverating .  Scheduled Meds:     apixaban, 5 mg, Oral, Q12H  aspirin, 81 mg, Oral, Daily  atorvastatin, 40 mg, Oral, Nightly  calcium 500 mg vitamin D 5 mcg (200 UT), 1 tablet, Oral, Daily  guaifenesin, 400 mg, Oral, Q8H  insulin glargine, 10 Units, Subcutaneous, Nightly  insulin glargine, 40 Units, Subcutaneous, Daily  insulin lispro, 3-14 Units, Subcutaneous, TID With Meals  ipratropium-albuterol, 3 mL, Nebulization, 4x Daily - RT  metoprolol succinate XL, 25 mg, Oral, Q24H  pantoprazole, 40 mg, Oral, Q AM      IV Meds:        Results Reviewed:   I have personally reviewed the results from the time of this admission to 2023 07:41 EDT     Results from last 7 days   Lab Units 23  0602 23  0549  06/12/23  0544   SODIUM mmol/L 136 139 136   POTASSIUM mmol/L 3.9 3.6 4.1   CHLORIDE mmol/L 99 103 97*   CO2 mmol/L 24.8 25.0 28.8   BUN mg/dL 12 13 12   CREATININE mg/dL 1.37* 1.35* 1.46*   CALCIUM mg/dL 8.8 8.8 9.3   GLUCOSE mg/dL 130* 119* 170*         Estimated Creatinine Clearance: 63.4 mL/min (A) (by C-G formula based on SCr of 1.37 mg/dL (H)).    Results from last 7 days   Lab Units 06/13/23  0549   PHOSPHORUS mg/dL 2.4*               Results from last 7 days   Lab Units 06/14/23  0602 06/12/23  0544 06/09/23  0832   WBC 10*3/mm3 9.09 9.42 12.61*   HEMOGLOBIN g/dL 10.0* 11.0* 10.9*   PLATELETS 10*3/mm3 297 318 345               Assessment / Plan     ASSESSMENT:  1. CKD III, baseline creatinine 1.2.  Peak 1.68.  Creatinine stable. Leave off  Jardiance dc for now . More peripheral edema. Add back diuretic. Recheck Phos in am .  2. MR now sp MV replacement, tissue and TV repair, SU closure 5/24/23.  3. Chronic steroid use after COVID 19 PNA.  4. Anemia post op. Hg range 10-11.  .  5. 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 Rehab .  6. DM2  7. Aflutter.Cardioversion 6/2/23. On eliquis and metoprolol.   PLAN:  1. Torsemide 40 mg daily.   Joan Batista MD  06/14/23  07:41 EDT    [facility]  [phone]

## 2023-06-14 NOTE — PLAN OF CARE
Problem: Rehabilitation (IRF) Plan of Care  Goal: Plan of Care Review  Outcome: Ongoing, Progressing  Flowsheets (Taken 6/14/2023 0349)  Progress: improving  Plan of Care Reviewed With: patient  Outcome Evaluation: A&Ox3 forgetful to just time mostly, needs frequent reminding of situation and what time it is, ambulated to bathroom with walker and standby assist, chest incision cdi pete with scab noted, benadryl cream applied to rash, BLE swelling noted, plan of care continues.     Problem: Rehabilitation (IRF) Plan of Care  Goal: Absence of New-Onset Illness or Injury  Intervention: Prevent Fall and Fall Injury  Recent Flowsheet Documentation  Taken 6/14/2023 0200 by Josefina Raygoza, RN  Safety Promotion/Fall Prevention:   fall prevention program maintained   room organization consistent   safety round/check completed  Taken 6/14/2023 0000 by Josefina Raygoza, RN  Safety Promotion/Fall Prevention:   fall prevention program maintained   safety round/check completed   room organization consistent  Taken 6/13/2023 2205 by Josefina Raygoza, RN  Safety Promotion/Fall Prevention:   fall prevention program maintained   room organization consistent   safety round/check completed  Taken 6/13/2023 2135 by Josefina Raygoza, RN  Safety Promotion/Fall Prevention:   fall prevention program maintained   safety round/check completed   room organization consistent  Taken 6/13/2023 2004 by Josefina Raygoza, RN  Safety Promotion/Fall Prevention:   fall prevention program maintained   room organization consistent   safety round/check completed

## 2023-06-14 NOTE — THERAPY PROGRESS REPORT/RE-CERT
Inpatient Rehabilitation - Physical Therapy Progress Note       [facility]     Patient Name: Vernon Solorzano  : 1950  MRN: [id number]    Today's Date: 2023                    Admit Date: 2023      Visit Dx:     ICD-10-CM ICD-9-CM   1. Impaired functional mobility, balance, gait, and endurance  Z74.09 V49.89       Patient Active Problem List   Diagnosis   • DM (diabetes mellitus), type 2   • Mixed hyperlipidemia   • Mild intermittent asthma without complication   • New onset of congestive heart failure   • Nonrheumatic mitral valve regurgitation   • Chest pain   • Essential hypertension   • Mitral valve disease   • Acute ischemic left MCA stroke       Past Medical History:   Diagnosis Date   • Allergic rhinitis    • Arthritis    • BPH (benign prostatic hyperplasia)    • Diabetes mellitus     TYPE 2   • Foraminal stenosis of cervical region     C5-C6   • GERD (gastroesophageal reflux disease)    • Hemorrhoids    • History of urinary retention 2010    S/P TURP   • Hyperlipidemia    • Macular degeneration    • PING (obstructive sleep apnea) 2016    MILD, SEES DR. AMRAILIS MORGAN       Past Surgical History:   Procedure Laterality Date   • CARDIAC CATHETERIZATION N/A 2023    Procedure: Right Heart Cath;  Surgeon: Corey Gaffney MD;  Location: [facility] CATH INVASIVE LOCATION;  Service: Cardiology;  Laterality: N/A;   • CARDIAC CATHETERIZATION N/A 2023    Procedure: Left Heart Cath;  Surgeon: Corey Gaffney MD;  [facility];  Service: Cardiology;  Laterality: N/A;   • CARDIAC CATHETERIZATION N/A 2023    Procedure: Left ventriculography;  Surgeon: Corey Gaffney MD;  Location:  [facility] CATH INVASIVE LOCATION;  Service: Cardiology;  Laterality: N/A;   • CARDIAC CATHETERIZATION N/A 2023    Procedure: Coronary angiography;  Surgeon: Corey Gaffney MD;  Location:  [facility] CATH INVASIVE LOCATION;  Service: Cardiology;  Laterality: N/A;   • COLONOSCOPY  N/A     WNL PER PT, NO RECORDS,    • COLONOSCOPY N/A 04/07/2009    DR. GORGE BENAVIDEZ AT [place]   • MITRAL VALVE REPAIR/REPLACEMENT N/A 5/24/2023    Procedure: MITRAL VALVE REPAIR/REPLACEMENT TRICUSPID VALVE REPAIR/REPLACEMENT TRANSESOPHAGEAL ECHOCARDIOGRAM WITH ANESTHESIA;  Surgeon: George Frey MD;  Location: [facility];  Service: Cardiothoracic;  Laterality: N/A;   • PROSTATE SURGERY N/A 11/12/2010    TURP, DR. BRIGHT OCLLAZO AT [facility]   • SKIN TAG REMOVAL N/A 12/20/2017    ANAL SKIN TAG X2, PERFORMED IN OFFICE, DR. LISA ORANTES   • TURP / TRANSURETHRAL INCISION / DRAINAGE PROSTATE  2010    Dr. Goodrich       PT ASSESSMENT (last 12 hours)     IRF PT Evaluation and Treatment     Row Name 06/14/23 2353          PT Time and Intention    Document Type daily treatment;progress note  -     Mode of Treatment physical therapy  -     Patient/Family/Caregiver Comments/Observations Pt up in chair followoing SLP.  Agreeable to PT but noted that he is tired from SLP.  -     Row Name 06/14/23 1313          General Information    Patient Profile Reviewed yes  -     General Observations of Patient min reports of SOA with activity as well as some chest soreness with rwx usage.  -     Existing Precautions/Restrictions fall;cardiac;sternal  -     Row Name 06/14/23 1313          Home Main Entrance    Stairs Comment, Main Entrance Wife had rail installed on R to enter.  Noted that if there was a second rail installlation, you would not be able to reach both rails.  -     Row Name 06/14/23 0873          Pain Assessment    Pretreatment Pain Rating 0/10 - no pain  -     Posttreatment Pain Rating 0/10 - no pain  -     Pre/Posttreatment Pain Comment Did note some soreness with walker use but unable to qunatify with number  -     Row Name 06/14/23 7383          Cognition/Psychosocial    Affect/Mental Status (Cognition) flat/blunted affect;low arousal/lethargic  -     Orientation Status (Cognition)  oriented to;person;place;situation  -     Follows Commands (Cognition) follows one-step commands;delayed response/completion;increased processing time needed;repetition of directions required;75-90% accuracy  -     Personal Safety Interventions fall prevention program maintained;gait belt;nonskid shoes/slippers when out of bed;supervised activity  -     Attention Deficit (Cognition) distractible in noisy environment;focused/sustained attention;arousal/alertness  -     Memory Deficit (Cognition) episodic memory;procedural memory;recall, biographical information  -     Safety Deficit (Cognition) insight into deficits/self-awareness;safety precautions follow-through/compliance  -     Comment, Cognition ddec carryover noted with safety cues.  -     Row Name 06/14/23 1313          Mobility    Advanced Gait Activity curb negotiation  -     Additional Documentation Advanced Gait Activity (Row)  -     Row Name 06/14/23 1313          Transfer Assessment/Treatment    Comment, (Transfers) VC for safety , hand placement and positioning with all transfers.  -     Row Name 06/14/23 1313          Bed-Chair Transfer    Bed-Chair [place] (Transfers) minimum assist (75% patient effort);contact guard;verbal cues;set up  -     Assistive Device (Bed-Chair Transfers) walker, front-wheeled;wheelchair  -     Row Name 06/14/23 1313          Sit-Stand Transfer    Sit-Stand [place] (Transfers) minimum assist (75% patient effort);contact guard;verbal cues;set up  -     Assistive Device (Sit-Stand Transfers) wheelchair;walker, front-wheeled  -     Row Name 06/14/23 1313          Stand-Sit Transfer    Stand-Sit [place] (Transfers) minimum assist (75% patient effort);contact guard;verbal cues;set up  -     Assistive Device (Stand-Sit Transfers) walker, front-wheeled;wheelchair  -     Row Name 06/14/23 1313          Gait/Stairs (Locomotion)    [place] Level (Gait) minimum assist (75% patient  effort);contact guard;verbal cues  -     Assistive Device (Gait) walker, front-wheeled  -     Distance in Feet (Gait) 80, 50, 80  -     Deviations/Abnormal Patterns (Gait) base of support, wide;festinating/shuffling;gait speed decreased;stride length decreased  -     Bilateral Gait Deviations forward flexed posture;heel strike decreased  -     Gait Assessment/Intervention WOrked in agility ladder to inc step length.  Immediate carryover with gait with dec shuffling and inc step length noted.  Minimal carryover in pm also.  when tp told he was done with therapy following walk, gait spped increased markedly with larger steps.  -     [place] Level (Stairs) minimum assist (75% patient effort);2 person assist  -     Handrail Location (Stairs) right side (ascending);left side (descending)  -     Number of Steps (Stairs) 4  -KP     Ascending Technique (Stairs) step-to-step  -KP     Descending Technique (Stairs) step-to-step  -KP     Stairs, Safety Issues balance decreased during turns;weight-shifting ability decreased  -     Stairs, Impairments strength decreased;impaired balance;sensation decreased;pain  -     Stairs Assessment/Intervention Struggled with foot placement on descent - may do better descending retro.  -     Row Name 06/14/23 1313          Safety Issues, Functional Mobility    Safety Issues Affecting Function (Mobility) insight into deficits/self-awareness;safety precautions follow-through/compliance  -     Impairments Affecting Function (Mobility) balance;cognition;coordination;endurance/activity tolerance;motor planning;shortness of breath;strength  -     Cognitive Impairments, Mobility Safety/Performance safety precaution follow-through;insight into deficits/self-awareness  -     Row Name 06/14/23 1313          Curb Negotiation (Mobility)    [place], Curb Negotiation minimal assist, 75% or more patient effort;2 person assist  -     Assistive Device (Curb Negotiation)  parallel bars  -     Comment, Curb Negotiation (Mobility) 6 inch curb x 3  -     Row Name 06/14/23 1313          Balance    Static Sitting Balance contact guard  -     Dynamic Sitting Balance minimal assist  -[facility] Name 06/14/23 1313          Motor Skills    Additional Documentation Advanced Stepping/Walking Interventions (Group)  -     Row Name 06/14/23 1313          Hip (Therapeutic Exercise)    Hip Strengthening (Therapeutic Exercise) sitting;bilateral;marching while seated;aBduction;aDduction;15 repititions;red  -[facility] Name 06/14/23 1313          Knee (Therapeutic Exercise)    Knee Strengthening (Therapeutic Exercise) sitting;bilateral;flexion;LAQ (long arc quad);red;15 repititions  -[facility] Name 06/14/23 1313          Ankle (Therapeutic Exercise)    Ankle Strengthening (Therapeutic Exercise) sitting;bilateral;dorsiflexion;plantarflexion;15 repititions  -[facility] Name 06/14/23 1313          Advanced Stepping/Walking Interventions    Stepping/Walking Interventions --  agility ladder - 8 x 3,Min at // bars  -[facility] Name 06/14/23 1313          Positioning and Restraints    Pre-Treatment Position in bed  -     Post Treatment Position wheelchair  -     In Wheelchair sitting;call light within reach;encouraged to call for assist;exit alarm on;notified nsg;with family/caregiver  -[facility] Name 06/14/23 1313          Weekly Progress Summary (PT)    Weekly Progress Summary (PT) Overall pt has made good progress this week.  He has gained more independence with transfers and gait and has increased gait distance.  Stair training has been safely initiated.  Balance is improveing.  Pt noted to have inc ability to follow commands and particpate in PT requiring fewere and shorter rest breaks.  Pt is progressing toward d/c goal of returning home with assist of spouse. Pt continues to struggle with balance, LE strength, acitivty tolerance, soreness in chest and reliance on assit for functional  mobility.  Pt woould benefit from continued skilled PT to address impairments and assist pt and family to return home with assist of spouse.  -     Row Name 06/14/23 1313          Bed Mobility Goal 1 (PT-IRF)    Activity/Assistive Device (Bed Mobility Goal 1, PT-IRF) bed mobility activities, all  -KP     [place] Level (Bed Mobility Goal 1, PT-IRF) minimum assist (75% or more patient effort)  -KP     Time Frame (Bed Mobility Goal 1, PT-IRF) short-term goal (STG);1 week  -KP     Strategies/Barriers (Bed Mobility Goal 1, PT-IRF) met for sit to supine but not supine to sit.  -KP     Progress/Outcomes (Bed Mobility Goal 1, PT-IRF) good progress toward goal;goal ongoing  -     Row Name 06/14/23 1313          Bed Mobility Goal 2 (PT-IRF)    Activity/Assistive Device (Bed Mobility Goal 2, PT-IRF) bed mobility activities, all  -KP     [place] Level (Bed Mobility Goal 2, PT-IRF) supervision required  -KP     Time Frame (Bed Mobility Goal 2, PT-IRF) long-term goal (LTG);3 weeks  -KP     Progress/Outcomes (Bed Mobility Goal 2, PT-IRF) good progress toward goal;goal ongoing  -     Row Name 06/14/23 1313          Transfer Goal 1 (PT-IRF)    Activity/Assistive Device (Transfer Goal 1, PT-IRF) all transfers;sit-to-stand/stand-to-sit;bed-to-chair/chair-to-bed;walker, rolling  -KP     [place] Level (Transfer Goal 1, PT-IRF) minimum assist (75% or more patient effort)  -KP     Time Frame (Transfer Goal 1, PT-IRF) short-term goal (STG);2 weeks  -KP     Strategies/Barriers (Transfer Goal 1, PT-IRF) occ req inc assist ot come to stand.  -KP     Progress/Outcomes (Transfer Goal 1, PT-IRF) good progress toward goal;goal ongoing  -     Row Name 06/14/23 1313          Transfer Goal 2 (PT-IRF)    Activity/Assistive Device (Transfer Goal 2, PT-IRF) all transfers  -KP     [place] Level (Transfer Goal 2, PT-IRF) contact guard required  -KP     Time Frame (Transfer Goal 2, PT-IRF) long-term goal (LTG);3 weeks  -KP      Progress/Outcomes (Transfer Goal 2, PT-IRF) goal ongoing;continuing progress toward goal;good progress toward goal  -KP     Row Name 06/14/23 1313          Gait/Walking Locomotion Goal 1 (PT-IRF)    Activity/Assistive Device (Gait/Walking Locomotion Goal 1, PT-IRF) gait (walking locomotion);walker, rolling  -KP     Gait/Walking Locomotion Distance Goal 1 (PT-IRF) 50  -KP     [place] Level (Gait/Walking Locomotion Goal 1, PT-IRF) minimum assist (75% or more patient effort)  -KP     Time Frame (Gait/Walking Locomotion Goal 1, PT-IRF) short-term goal (STG);2 weeks  -KP     Progress/Outcomes (Gait/Walking Locomotion Goal 1, PT-IRF) goal met  -KP     Row Name 06/14/23 1313          Gait/Walking Locomotion Goal 2 (PT-IRF)    Activity/Assistive Device (Gait/Walking Locomotion Goal 2, PT-IRF) assistive device use;walker, rolling  -KP     Gait/Walking Locomotion Distance Goal 2 (PT-IRF) 150  -KP     [place] Level (Gait/Walking Locomotion Goal 2, PT-IRF) contact guard required  -KP     Time Frame (Gait/Walking Locomotion Goal 2, PT-IRF) long-term goal (LTG);3 weeks  -KP     Progress/Outcomes (Gait/Walking Locomotion Goal 2, PT-IRF) good progress toward goal;goal ongoing  -     Row Name 06/14/23 1313          Stairs Goal 1 (PT-IRF)    Activity/Assistive Device (Stairs Goal 1, PT-IRF) stairs, all skills;using handrail, left  -KP     Number of Stairs (Stairs Goal 1, PT-IRF) 12  -KP     [place] Level (Stairs Goal 1, PT-IRF) contact guard required  -KP     Time Frame (Stairs Goal 1, PT-IRF) long-term goal (LTG);3 weeks  -KP     Progress/Outcomes (Stairs Goal 1, PT-IRF) continuing progress toward goal;goal ongoing  -     Row Name 06/14/23 1313          Caregiver Training Goal 1 (PT-IRF)    Caregiver Training Goal 1 (PT-IRF) Family to be indep with appropriate level of assist for pt.  -KP     Time Frame (Caregiver Training Goal 1, PT-IRF) long-term goal (LTG);3 weeks  -KP     Progress/Outcomes (Caregiver Training  Goal 1, PT-IRF) goal ongoing  -           User Key  (r) = Recorded By, (t) = Taken By, (c) = Cosigned By    Initials Name Provider Type     Elizabeth Levin, PT Physical Therapist              Wound 05/24/23 0737 sternal Incision (Active)   Dressing Appearance open to air 06/13/23 2135   Closure Approximated 06/13/23 2135   Base scab 06/14/23 0910   Periwound dry;intact 06/14/23 0910   Drainage Amount none 06/13/23 2135   Care, Wound cleansed with 06/14/23 0910   Dressing Care open to air 06/14/23 0910   Periwound Care dry periwound area maintained 06/13/23 2135     Physical Therapy Education     Title: PT OT SLP Therapies (In Progress)     Topic: Physical Therapy (In Progress)     Point: Mobility training (In Progress)     Learning Progress Summary           Patient Eager, E,D,TB, NR by  at 6/14/2023 1459    Acceptance, E, VU by MD at 6/13/2023 1012    Acceptance, E, VU by MD at 6/12/2023 1018    Acceptance, E, VU by MD at 6/10/2023 1040    Acceptance, E, VU by MD at 6/8/2023 1017    Acceptance, E,TB,D, VU,DU,NR by  at 6/7/2023 1513    Comment: POC, Goals, safety with mobility.   Significant Other Acceptance, E,TB,D, VU,DU,NR by  at 6/7/2023 1513    Comment: POC, Goals, safety with mobility.                   Point: Home exercise program (In Progress)     Learning Progress Summary           Patient Eager, E,D,TB, NR by  at 6/14/2023 1459    Acceptance, E,TB,D, VU,DU,NR by  at 6/7/2023 1513    Comment: POC, Goals, safety with mobility.   Significant Other Acceptance, E,TB,D, VU,DU,NR by  at 6/7/2023 1513    Comment: POC, Goals, safety with mobility.                               User Key     Initials Effective Dates Name Provider Type Discipline    MD 06/16/21 -  Damaris Francis, PT Physical Therapist PT     06/16/21 -  Elizabeth Levin, PT Physical Therapist PT                PT Recommendation and Plan    Planned Therapy Interventions (PT): balance training, bed mobility training, gait training, home  exercise program, neuromuscular re-education, patient/family education, strengthening, transfer training  Frequency of Treatment (PT): 5 times per week, 60 minutes per session  Anticipated Equipment Needs at Discharge (PT Eval): front wheeled walker                  Time Calculation:      PT Charges     Row Name 06/14/23 1458 06/14/23 1158          Time Calculation    Start Time 1300  -KP 0930  -KP     Stop Time 1330  -KP 1000  -KP     Time Calculation (min) 30 min  -KP 30 min  -KP     PT Received On -- 06/14/23  -     PT - Next Appointment -- 06/15/23  -     PT Goal Re-Cert Due Date -- 06/21/23  -           User Key  (r) = Recorded By, (t) = Taken By, (c) = Cosigned By    Initials Name Provider Type    Elizabeth Graham, PT Physical Therapist                Therapy Charges for Today     Code Description Service Date Service Provider Modifiers Qty    [id number]  PT THERAPEUTIC ACT EA 15 MIN 6/14/2023 Elizabeth Levin, PT GP 2    [id number]  PT THER PROC EA 15 MIN 6/14/2023 Elizabeth Levin, PT GP 2            PT G-Codes  AM-PAC 6 Clicks Score (PT): 14      Elizabeth Levin, PT  6/14/2023

## 2023-06-14 NOTE — PROGRESS NOTES
Inpatient Rehabilitation Plan of Care Note    Plan of Care  Care Plan Reviewed - No updates at this time.    Sphincter Control    [RN] Bladder Management(Active)  Current Status(06/15/2023): Patient may be continent or incontinent of urine.  Bladder scan q shift.  Weekly Goal(06/22/2023): Patient will be continent 50 %  Discharge Goal: Patient will be continent    Performed Intervention(s)  Offer toileting/ timed voids  Monitor I&O      Safety    [RN] Potential for Injury(Active)  Current Status(06/15/2023): Patient is at increased risk for falls/injury r/t  recent surgery and stroke.  Weekly Goal(06/22/2023): Patient will use the call light for assistance  Discharge Goal: Patient/ family will be aware of risk of fall and safety in the  home setting    Performed Intervention(s)  Safety monitoring during functional activities  Environmental set- upto reduce risk      Psychosocial    [RN] Coping/Adjustment(Active)  Current Status(06/15/2023): Patient has a supportive family  Weekly Goal(06/22/2023): Patient will express concerns regarding current status  Discharge Goal: Patient will demonstrate healthy coping strategies    Performed Intervention(s)  Allow patient to verbalize needs and concerns    Signed by: Sarah Lloyd RN

## 2023-06-14 NOTE — THERAPY TREATMENT NOTE
Inpatient Rehabilitation - Occupational Therapy Treatment Note    [facility]     Patient Name: Vernon Solorzano  : 1950  MRN: [id number]    Today's Date: 2023                 Admit Date: 2023         ICD-10-CM ICD-9-CM   1. Impaired functional mobility, balance, gait, and endurance  Z74.09 V49.89       Patient Active Problem List   Diagnosis    DM (diabetes mellitus), type 2    Mixed hyperlipidemia    Mild intermittent asthma without complication    New onset of congestive heart failure    Nonrheumatic mitral valve regurgitation    Chest pain    Essential hypertension    Mitral valve disease    Acute ischemic left MCA stroke       Past Medical History:   Diagnosis Date    Allergic rhinitis     Arthritis     BPH (benign prostatic hyperplasia)     Diabetes mellitus     TYPE 2    Foraminal stenosis of cervical region     C5-C6    GERD (gastroesophageal reflux disease)     Hemorrhoids     History of urinary retention     S/P TURP    Hyperlipidemia     Macular degeneration     PING (obstructive sleep apnea) 2016    MILD, SEES DR. AMARILIS MORGAN       Past Surgical History:   Procedure Laterality Date    CARDIAC CATHETERIZATION N/A 2023    Procedure: Right Heart Cath;  Surgeon: Corey Gaffney MD;  Location: [facility] CATH INVASIVE LOCATION;  Service: Cardiology;  Laterality: N/A;    CARDIAC CATHETERIZATION N/A 2023    Procedure: Left Heart Cath;  Surgeon: Corey Gaffney MD;  Location: [facility] CATH INVASIVE LOCATION;  Service: Cardiology;  Laterality: N/A;    CARDIAC CATHETERIZATION N/A 2023    Procedure: Left ventriculography;  Surgeon: Corey Gaffney MD;  Location: [facility] CATH INVASIVE LOCATION;  Service: Cardiology;  Laterality: N/A;    CARDIAC CATHETERIZATION N/A 2023    Procedure: Coronary angiography;  Surgeon: Corey Gaffney MD;  [facility];  Service: Cardiology;  Laterality: N/A;    COLONOSCOPY N/A     WNL PER PT, NO RECORDS,      COLONOSCOPY N/A 04/07/2009    DR. GORGE BENAVIDEZ AT [place]    MITRAL VALVE REPAIR/REPLACEMENT N/A 5/24/2023    Procedure: MITRAL VALVE REPAIR/REPLACEMENT TRICUSPID VALVE REPAIR/REPLACEMENT TRANSESOPHAGEAL ECHOCARDIOGRAM WITH ANESTHESIA;  Surgeon: George Frey MD;  Location: [facility];  Service: Cardiothoracic;  Laterality: N/A;    PROSTATE SURGERY N/A 11/12/2010    TURP, DR. BRIGHT COLLAZO AT [facility]    SKIN TAG REMOVAL N/A 12/20/2017    ANAL SKIN TAG X2, PERFORMED IN OFFICE, DR. LISA ORANTES    TURP / TRANSURETHRAL INCISION / DRAINAGE PROSTATE  2010    Dr. Goodrich             IRF OT ASSESSMENT FLOWSHEET (last 12 hours)       IRF OT Evaluation and Treatment       Row Name 06/14/23 1529          OT Time and Intention    Document Type daily treatment  -AF     Mode of Treatment occupational therapy  -AF     Patient Effort adequate  -AF       Row Name 06/14/23 1529          General Information    Patient/Family/Caregiver Comments/Observations pt sitting up on EOB in AM more alert than yesterday, in PM sitting up in w/c after PT session, complaining of fatigue and SOA, pt states SOA was new discussed that he was actually less SOA than yesterday, pt was not able to recall yesterday and not feeling well  -AF     General Observations of Patient kept eyes open in AM and only 50% of the time in PM session  -AF     Existing Precautions/Restrictions fall;cardiac;sternal  -AF       Row Name 06/14/23 1529          Pain Assessment    Pretreatment Pain Rating 0/10 - no pain  -AF     Posttreatment Pain Rating 0/10 - no pain  -AF     Pre/Posttreatment Pain Comment states has a headache in PM didn't rate when asked  -AF       Row Name 06/14/23 1529          Cognition/Psychosocial    Affect/Mental Status (Cognition) flat/blunted affect;low arousal/lethargic  -AF     Orientation Status (Cognition) oriented to;person;place;situation  -AF     Follows Commands (Cognition) follows one-step commands;delayed  response/completion;increased processing time needed;repetition of directions required;75-90% accuracy  -AF     Personal Safety Interventions fall prevention program maintained;gait belt;nonskid shoes/slippers when out of bed  -AF     Cognitive Function attention deficit;executive function deficit  -AF     Attention Deficit (Cognition) distractible in noisy environment;focused/sustained attention;arousal/alertness  -AF     Memory Deficit (Cognition) episodic memory;procedural memory;recall, biographical information  -AF     Safety Deficit (Cognition) insight into deficits/self-awareness;awareness of need for assistance  -AF     Comment, Cognition increased processing with all tasks and poor iniation  -AF       Row Name 06/14/23 1529          Upper Body Dressing    [place] Level (Upper Body Dressing) upper body dressing skills;minimum assist (75% or more patient effort)  -AF     Position (Upper Body Dressing) edge of bed sitting  -AF     Comment (Upper Body Dressing) with encouragement  -AF       Row Name 06/14/23 1529          Lower Body Dressing    [place] Level (Lower Body Dressing) don;pants/bottoms;socks;shoes/slippers;underwear;maximum assist (25% patient effort);moderate assist (50% patient effort);verbal cues  -AF     Position (Lower Body Dressing) edge of bed sitting  -AF     Set-up Assistance (Lower Body Dressing) obtain clothing  -AF     Comment (Lower Body Dressing) wore underwear today, A with JOBST stockings  -AF       Row Name 06/14/23 1529          Toileting    [place] Level (Toileting) toileting skills;minimum assist (75% patient effort)  -AF     Position (Toileting) supported standing  -AF     Comment (Toileting) stood to urinate  -AF       Row Name 06/14/23 1529          Transfer Assessment/Treatment    Comment, (Transfers) EOm <> w/c with RWX stand pivot CGA/MIN to come to stand from lower surface of mat, on and off scale CGA  -AF       Row Name 06/14/23 1529          Toilet  Transfer    Assistive Device (Toilet Transfer) --  stood to urinate  -AF       Row Name 06/14/23 1529          Motor Skills    Functional Endurance fair - in AM with ADL tasks, in PM session difficutl to keep on task and required more rest breaks  -AF       Row Name 06/14/23 1529          Elbow/Forearm (Therapeutic Exercise)    Elbow/Forearm Strengthening (Therapeutic Exercise) bilateral;flexion;extension;supination;pronation;sitting;3 lb free weight;10 repetitions;3 sets  completed seated unsupported on EOM  -AF       Row Name 06/14/23 1529          Hand (Therapeutic Exercise)    Hand Strengthening (Therapeutic Exercise) bilateral; strengthening;hand gripper;10 repetitions;3 sets  -AF       Row Name 06/14/23 1529          Balance    Comment, Balance sat on EOM unsupported to complete to increase his endurance with ADL tasks  -AF       Row Name 06/14/23 1529          Positioning and Restraints    Pre-Treatment Position sitting in chair/recliner  -AF     Post Treatment Position bed  -AF     In Bed fowlers;call light within reach;encouraged to call for assist;exit alarm on  in PM, seated on EOB in AM iwth wife and friend present  -AF               User Key  (r) = Recorded By, (t) = Taken By, (c) = Cosigned By      Initials Name Effective Dates    AF Candy Davis, OTR 06/16/21 -                      Occupational Therapy Education       Title: PT OT SLP Therapies (In Progress)       Topic: Occupational Therapy (In Progress)       Point: ADL training (In Progress)       Description:   Instruct learner(s) on proper safety adaptation and remediation techniques during self care or transfers.   Instruct in proper use of assistive devices.                  Learning Progress Summary             Patient Acceptance, E, NR by AF at 6/13/2023 1527    Comment: benefits of therapy, endurance   Family Acceptance, E, NR by AF at 6/13/2023 1527    Comment: benefits of therapy, endurance                         Point: Home  exercise program (In Progress)       Description:   Instruct learner(s) on appropriate technique for monitoring, assisting and/or progressing therapeutic exercises/activities.                  Learning Progress Summary             Patient Acceptance, E, NR by AF at 6/13/2023 1527    Comment: benefits of therapy, endurance   Family Acceptance, E, NR by AF at 6/13/2023 1527    Comment: benefits of therapy, endurance                         Point: Precautions (In Progress)       Description:   Instruct learner(s) on prescribed precautions during self-care and functional transfers.                  Learning Progress Summary             Patient Acceptance, E, NR by AF at 6/13/2023 1527    Comment: benefits of therapy, endurance   Family Acceptance, E, NR by AF at 6/13/2023 1527    Comment: benefits of therapy, endurance                         Point: Body mechanics (In Progress)       Description:   Instruct learner(s) on proper positioning and spine alignment during self-care, functional mobility activities and/or exercises.                  Learning Progress Summary             Patient Acceptance, E, NR by AF at 6/13/2023 1527    Comment: benefits of therapy, endurance   Family Acceptance, E, NR by AF at 6/13/2023 1527    Comment: benefits of therapy, endurance                                         User Key       Initials Effective Dates Name Provider Type Discipline     06/16/21 -  Candy Davis, OTR Occupational Therapist OT                        OT Recommendation and Plan                         Time Calculation:      Time Calculation- OT       Row Name 06/14/23 1537 06/14/23 1535          Time Calculation- OT    OT Start Time 1330  -AF 0900  -AF     OT Stop Time 1400  -AF 0930  -AF     OT Time Calculation (min) 30 min  -AF 30 min  -AF               User Key  (r) = Recorded By, (t) = Taken By, (c) = Cosigned By      Initials Name Provider Type     Candy Davis, OTR Occupational Therapist                   Therapy Charges for Today       Code Description Service Date Service Provider Modifiers Qty    72013148277 HC OT SELF CARE/MGMT/TRAIN EA 15 MIN 6/13/2023 Candy Davis, OTR GO 2    96172961232 HC OT THER PROC EA 15 MIN 6/13/2023 Candy Davis, OTR GO 2    83780773557 HC OT SELF CARE/MGMT/TRAIN EA 15 MIN 6/14/2023 Candy Davis, OTR GO 3    14168993822 HC OT THER PROC EA 15 MIN 6/14/2023 Candy Davis, OTR GO 1                     HI Wyman  6/14/2023

## 2023-06-15 LAB
ALBUMIN SERPL-MCNC: 2.9 G/DL (ref 3.5–5.2)
ANION GAP SERPL CALCULATED.3IONS-SCNC: 8.7 MMOL/L (ref 5–15)
BUN SERPL-MCNC: 12 MG/DL (ref 8–23)
BUN/CREAT SERPL: 9 (ref 7–25)
CALCIUM SPEC-SCNC: 8.8 MG/DL (ref 8.6–10.5)
CHLORIDE SERPL-SCNC: 100 MMOL/L (ref 98–107)
CO2 SERPL-SCNC: 28.3 MMOL/L (ref 22–29)
CREAT SERPL-MCNC: 1.34 MG/DL (ref 0.76–1.27)
EGFRCR SERPLBLD CKD-EPI 2021: 55.6 ML/MIN/1.73
GLUCOSE BLDC GLUCOMTR-MCNC: 141 MG/DL (ref 70–130)
GLUCOSE BLDC GLUCOMTR-MCNC: 146 MG/DL (ref 70–130)
GLUCOSE BLDC GLUCOMTR-MCNC: 223 MG/DL (ref 70–130)
GLUCOSE BLDC GLUCOMTR-MCNC: 378 MG/DL (ref 70–130)
GLUCOSE SERPL-MCNC: 124 MG/DL (ref 65–99)
MAGNESIUM SERPL-MCNC: 2.2 MG/DL (ref 1.6–2.4)
PHOSPHATE SERPL-MCNC: 4 MG/DL (ref 2.5–4.5)
POTASSIUM SERPL-SCNC: 3.5 MMOL/L (ref 3.5–5.2)
SODIUM SERPL-SCNC: 137 MMOL/L (ref 136–145)

## 2023-06-15 NOTE — THERAPY TREATMENT NOTE
Inpatient Rehabilitation - Speech Language Pathology Treatment Note    [facility]     Patient Name: Vernon Solorzano  : 1950  MRN: [id number]    Today's Date: 6/15/2023                   Admit Date: 2023       Visit Dx:      ICD-10-CM ICD-9-CM   1. Impaired functional mobility, balance, gait, and endurance  Z74.09 V49.89       Patient Active Problem List   Diagnosis    DM (diabetes mellitus), type 2    Mixed hyperlipidemia    Mild intermittent asthma without complication    New onset of congestive heart failure    Nonrheumatic mitral valve regurgitation    Chest pain    Essential hypertension    Mitral valve disease    Acute ischemic left MCA stroke       Past Medical History:   Diagnosis Date    Allergic rhinitis     Arthritis     BPH (benign prostatic hyperplasia)     Diabetes mellitus     TYPE 2    Foraminal stenosis of cervical region     C5-C6    GERD (gastroesophageal reflux disease)     Hemorrhoids     History of urinary retention     S/P TURP    Hyperlipidemia     Macular degeneration     PING (obstructive sleep apnea) 2016    MILD, SEES DR. AMARILIS MORGAN       Past Surgical History:   Procedure Laterality Date    CARDIAC CATHETERIZATION N/A 2023    Procedure: Right Heart Cath;  Surgeon: Corey Gaffney MD;  Location: [facility] CATH INVASIVE LOCATION;  Service: Cardiology;  Laterality: N/A;    CARDIAC CATHETERIZATION N/A 2023    Procedure: Left Heart Cath;  Surgeon: Corey Gaffney MD;  Location: [facility] LOC[facility];  Service: Cardiology;  Laterality: N/A;    CARDIAC CATHETERIZATION N/A 2023    Procedure: Left ventriculography;  Surgeon: Corey Gaffney MD;  Location: [facility];  Service: Cardiology;  Laterality: N/A;    CARDIAC CATHETERIZATION N/A 2023    Procedure: Coronary angiography;  Surgeon: Corey Gaffney MD;  Location: [facility];  Service: Cardiology;  Laterality: N/A;    COLONOSCOPY N/A     WNL PER  PT, NO RECORDS,     COLONOSCOPY N/A 04/07/2009    DR. GORGE BENAVIDEZ AT [place]    MITRAL VALVE REPAIR/REPLACEMENT N/A 5/24/2023    Procedure: MITRAL VALVE REPAIR/REPLACEMENT TRICUSPID VALVE REPAIR/REPLACEMENT TRANSESOPHAGEAL ECHOCARDIOGRAM WITH ANESTHESIA;  Surgeon: George Frey MD;  Location: [facility];  Service: Cardiothoracic;  Laterality: N/A;    PROSTATE SURGERY N/A 11/12/2010    TURP, DR. BRIGHT COLLAZO AT [facility]    SKIN TAG REMOVAL N/A 12/20/2017    ANAL SKIN TAG X2, PERFORMED IN OFFICE, DR. LISA ORANTES    TURP / TRANSURETHRAL INCISION / DRAINAGE PROSTATE  2010    Dr. Goodrich       SLP Recommendation and Plan                                                            SLP EVALUATION (last 72 hours)       SLP SLC Evaluation       Row Name 06/15/23 1500 06/15/23 1000 06/14/23 1230 06/14/23 0830 06/13/23 1400       Communication Assessment/Intervention    Document Type therapy note (daily note)  -SR therapy note (daily note)  -SR therapy note (daily note)  -CR therapy note (daily note)  -CR therapy note (daily note)  -SR    Subjective Information no complaints  -SR no complaints  -SR no complaints  -CR no complaints  -CR no complaints  -SR    Patient Observations alert;cooperative;agree to therapy  -SR alert;cooperative;agree to therapy  -SR alert;cooperative  -CR alert;cooperative  -CR alert;cooperative;agree to therapy  -SR    Patient Effort good  -SR good  -SR adequate  -CR adequate  -CR adequate  -SR    Symptoms Noted During/After Treatment none  -SR none  -SR none  -CR none  -CR fatigue  -SR       General Information    Patient Profile Reviewed -- -- yes  -CR yes  -CR --       Pain Scale: Numbers Pre/Post-Treatment    Pretreatment Pain Rating 0/10 - no pain  -SR 0/10 - no pain  -SR 0/10 - no pain  -CR 0/10 - no pain  -CR 0/10 - no pain  -SR    Posttreatment Pain Rating 0/10 - no pain  -SR 0/10 - no pain  -SR 0/10 - no pain  -CR 0/10 - no pain  -CR 0/10 - no pain  -SR              User Key   (r) = Recorded By, (t) = Taken By, (c) = Cosigned By      Initials Name Effective Dates    SR Latisha Morris CCC-SLP 11/10/22 -     Mariely Corcoran CF-SLP 05/31/23 -                        EDUCATION    The patient has been educated in the following areas:       Cognitive Impairment Communication Impairment Dysphagia (Swallowing Impairment).             SLP GOALS       Row Name 06/15/23 1500 06/15/23 1000 06/14/23 1230       ([facility]) Pharyngeal Strengthening Exercise Goal 1 (SLP)    Activity (Pharyngeal Strengthening Goal 1, SLP) increase timing;increase closure at entrance to airway/closure of airway at glottis  -SR -- --    Increase Timing hard effortful swallow;Mendelsohn  -SR -- --    Increase Closure at Entrance to Airway/Closure of Airway at Glottis hard effortful swallow;chin tuck against resistance (CTAR);sarita  -SR -- --    [place]/Accuracy (Pharyngeal Strengthening Goal 1, SLP) with minimal cues (75-90% accuracy)  -SR -- --    Time Frame (Pharyngeal Strengthening Goal 1, SLP) by discharge  -SR -- --    Progress/Outcomes (Pharyngeal Strengthening Goal 1, SLP) goal ongoing  -SR -- --    Comment (Pharyngeal Strengthening Goal 1, SLP) Patient required MIN cues to recall safe swallow strategies (i.e, small bites, slow rate, no straw, alternate solids with liquids). Participated in dysphagia exercises this date. Completed 20 repetitions of effortful swallow given MIN cues. Tolerated 8/10 sips of thin via cup with no overt s/sx of aspiration or penetration; throat clear x2. Completed 3 sets of 15 repetitions of chin tuck against resistance with CTAR ball.  -SR -- --       Memory Skills Goal 1 (SLP)    Improve Memory Skills Through Goal 1 (SLP) -- use memory strategies;listen to a paragraph and answer questions;recall details of the day;80%;with moderate cues (50-74%)  -SR use memory strategies;listen to a paragraph and answer questions;recall details of the day;80%;with moderate cues (50-74%)  -CR     Time Frame (Memory Skills Goal 1, SLP) -- by discharge  -SR by discharge  -CR    Progress/Outcomes (Memory Skills Goal 1, SLP) -- goal ongoing  -SR goal ongoing  -CR    Comment (Memory Skills Goal 1, SLP) -- Auditory memory with voicemail messages; patient listened to voicemail and answered questions about the content with 50% acc given NO cues; 90% acc given MIN cues.  -SR 3-item mental manipulation task targeting working memory and immediate memory completed with 100% when given MOD cues. Patient required multiple repetitions and extended time throughout task.  -CR       Reasoning Goal 1 (SLP)    Improve Reasoning Through Goal 1 (SLP) -- -- complete deductive reasoning task;complete basic reasoning task;complete mental flexibility task;50%;with moderate cues (50-74%)  -CR    Time Frame (Reasoning Goal 1, SLP) -- -- by discharge  -CR    Progress/Outcomes (Reasoning Goal 1, SLP) -- -- goal ongoing  -CR    Comment (Reasoning Goal 1, SLP) -- -- Patient completed 12-cell mildly complex deductive reasoning task targeting attention and reasoning with 25% acc with MIN cues, increased to 75% acc when given MOD cues. Patient required extended time to complete task. When clinician instructed for patient to read aloud, processing times appeared faster.  -CR       Executive Functional Skills Goal 1 (SLP)    Improve Executive Function Skills Goal 1 (SLP) -- demonstrate awareness of deficit;home management activity;80%;with moderate cues (50-74%)  -SR --    Time Frame (Executive Function Skills Goal 1, SLP) -- by discharge  -SR --    Progress/Outcomes (Executive Function Skills Goal 1, SLP) -- goal ongoing  -SR --    Comment (Executive Function Skills Goal 1, SLP) -- Simple medication management task completed during session. Patient followed verbal directions and sorted 5 mock medications by day/time with 80% acc given MOD cues. Repetition of instruction increased patient's accuracy throughout the activity.  -SR --      Row  Name 06/14/23 0830 06/14/23 0800 06/13/23 1400       Attention Goal 1 (SLP)    Improve Attention by Goal 1 (SLP) -- -- complete selective attention task;complete sustained attention task;70%;with moderate cues (50-74%)  -SR    Time Frame (Attention Goal 1, SLP) -- -- by discharge  -SR    Progress/Outcomes (Attention Goal 1, SLP) -- -- goal ongoing  -SR    Comment (Attention Goal 1, SLP) -- -- During the AM therapy session, patient demonstrated increased difficulty with sustained/selective attention. Accuracy increased with verbal cues. Required redirection to task x5 during 5 minute activity. He completed 3 stimulus items for money counting task due to increased distractability.  -SR       Orientation Goal 1 (SLP)    Improve Orientation Through Goal 1 (SLP) demonstrating orientation to day;demonstrating orientation to month;demonstrating orientation to year;demonstrating orientation to place;demonstrating orientation to disease/impairment;use environmental aids to assist with orientation;80%;with minimal cues (75-90%)  -CR -- --    Time Frame (Orientation Goal 1, SLP) by discharge  -CR -- --    Progress/Outcomes (Orientation Goal 1, SLP) goal ongoing  -CR -- --    Comment (Orientation Goal 1, SLP) Oriented to month, date, year, and place. Required MIN cues to identify correct time. Pt spontaneously utilized daily schedule and board in room to fully orient to month/date/year.  -CR -- --       Reasoning Goal 1 (SLP)    Improve Reasoning Through Goal 1 (SLP) -- -- complete deductive reasoning task;complete basic reasoning task;complete mental flexibility task;50%;with moderate cues (50-74%)  -SR    Time Frame (Reasoning Goal 1, SLP) -- -- by discharge  -SR    Progress/Outcomes (Reasoning Goal 1, SLP) -- -- good progress toward goal  -SR    Comment (Reasoning Goal 1, SLP) -- -- During the PM therapy session, patient read written directions and used process of elimination to determine the solution to logic activity with  40% acc given NO cues; 80% acc given MOD cues. Prolonged processing speed. Required extended time to complete activity.  -SR       Executive Functional Skills Goal 1 (SLP)    Improve Executive Function Skills Goal 1 (SLP) demonstrate awareness of deficit;home management activity;80%;with moderate cues (50-74%)  -CR --  -CR demonstrate awareness of deficit;home management activity;80%;with moderate cues (50-74%)  -SR    Time Frame (Executive Function Skills Goal 1, SLP) by discharge  -CR --  -CR by discharge  -SR    Progress/Outcomes (Executive Function Skills Goal 1, SLP) goal ongoing  -CR --  -CR goal ongoing  -SR    Comment (Executive Function Skills Goal 1, SLP) Patient answered questions about a monthly calendar with 40% acc given NO cues; 80% acc given MIN cues and extended time. Pt required around ~25 minutes to complete 6-question task.  -CR --  -CR Patient answered questions about a monthly calendar/daily schedule with 50% acc given NO cues; 70% acc given MIN cues.  -SR              User Key  (r) = Recorded By, (t) = Taken By, (c) = Cosigned By      Initials Name Provider Type    SR Latisha Morris CCC-SLP Speech and Language Pathologist    Mariely Corcoran CF-SLP Speech and Language Pathologist                                Time Calculation:        Time Calculation- SLP       Row Name 06/15/23 1523 06/15/23 1225          Time Calculation- SLP    SLP Start Time 1400  -SR 1000  -SR     SLP Stop Time 1430  -SR 1030  -SR     SLP Time Calculation (min) 30 min  -SR 30 min  -SR               User Key  (r) = Recorded By, (t) = Taken By, (c) = Cosigned By      Initials Name Provider Type    Latisha Haas CCC-SLP Speech and Language Pathologist                      Therapy Charges for Today       Code Description Service Date Service Provider Modifiers Qty    09783682229 HC ST TREATMENT SWALLOW 2 6/15/2023 Latisha Morris CCC-SLP GN 1    42833817690 HC ST DEV OF COGN SKILLS INITIAL 15 MIN 6/15/2023  Latisha Morris, CCC-SLP  1    [id number] [facility] DEV OF COGN SKILLS EACH ADDT'L 15 MIN 6/15/2023 Latisha Morris CCC-SLP  1                             JENNIFFER Hobbs  6/15/2023

## 2023-06-15 NOTE — PLAN OF CARE
Goal Outcome Evaluation:  Plan of Care Reviewed With: patient        Progress: improving  Outcome Evaluation: Extra potassium given per Dr Batista's orders. No c/o of SOA. Chest incision is intact. No pain.

## 2023-06-15 NOTE — PROGRESS NOTES
TEAM CONF - Kari 15 - BED MOD. TRANSFERS MIN. 4 STAIRS MIN X 2. SATS 97% OR HIGHER WITH GAIT WITH FATIGUE, GAIT 80 FEET MIN ASSIST RW. TOILET TRANSFERS MIN. SHOWER TRANSFERS MIN MOD. BATH MIN. LBD MIN MOD. UBD MIN. GROOMING SET UP. TOILETING MIN MOD. DRESSING TAKES 30 MINUTES WIETH SLOW PROCESSING. EDEMA - JOBST STOCKINGS. MILD DYSPHGAIA, REG THIN BY CUP, NO STRAWS. CLQT MODERATE DEFICITS, DIFFICULTY WITH ATTENTION AND MEMORY, PROLONGED PROCESSING SPEED. BNE FOLLOWING. VARIABLE INTAKE - DIETITIAN FOLLOWING. RASH ON ABD AND LEFT CHEST, POSSIBLY CONTACT DERMATITIS. TORSEMIDE ADDED.  ELOS - TWO WEEKS.

## 2023-06-15 NOTE — PROGRESS NOTES
Case Management  Inpatient Rehabilitation Team Conference    Conference Date/Time: 6/15/2023 8:14:51 AM    Team Conference Attendees:  Dr. Tu Montalvo, Pharmacist  Génesis Narayan, [facility], PT  Candy Davis, OT  Latisha Morris, SLP  Clarissa Marie, CTRS  Angelika Najera, RN  Sarah Lloyd, RN   Regi Stokes, Psychologist  Génesis Beaulieu, Dietician    Demographics            Age: 72Y            Gender: Male    Admission Date: 6/6/2023 4:36:00 PM  Rehabilitation Diagnosis:  No anup stroke  Past Medical History: Past Medical History:  DiagnosisDate  ?Allergic rhinitis  ?Arthritis  ?BPH (benign prostatic hyperplasia)  ?Diabetes mellitus   TYPE 2  ?Foraminal stenosis of cervical region   C5-C6  ?GERD (gastroesophageal reflux disease)  ?Hemorrhoids  ?History of urinary [id number]   S/P TURP  ?Hyperlipidemia  ?Macular degeneration  ?PING (obstructive sleep apnea)01/07/2016   MILD, SEES DR. AMARILIS MORGAN        Surgical History  Past Surgical History:  ProcedureLateralityDate  ?CARDIAC CATHETERIZATIONN/A5/11/2023   Procedure: Right Heart Cath;  Surgeon: Corey Gaffney MD;  [facility]:  [facility];  Service: Cardiology;  Laterality: N/A;  ?CARDIAC CATHETERIZATIONN/A5/11/2023   Procedure: Left Heart Cath;  Surgeon: Corey Gaffney MD;  Location:  [facility]  CATH [facility]IVE [facility];  Service: Cardiology;  Laterality: N/A;  ?CARDIAC CATHETERIZATIONN/A5/11/2023   Procedure: Left ventriculography;  Surgeon: Corey Gaffney MD;  Location:   [facility] CATH [facility]ASIVE [facility];  Service: Cardiology;  Laterality: N/A;  ?CARDIAC CATHETERIZATIONN/A5/11/2023   Procedure: Coronary angiography;  Surgeon: Corey Gaffney MD;  Location:   [facility] CATH [facility]VASIVE [facility];  Service: Cardiology;  Laterality: N/A;  ?COLONOSCOPYN/A   WNL PER PT, NO RECORDS,   ?COLONOSCOPYN/A04/07/2009   DR. GORGE BENAVIDEZ AT [place]  ?MITRAL VALVE REPAIR/REPLACEMENTN/A5/24/2023   Procedure: MITRAL VALVE REPAIR/REPLACEMENT  TRICUSPID VALVE REPAIR/REPLACEMENT  TRANSESOPHAGEAL ECHOCARDIOGRAM WITH ANESTHESIA;  Surgeon: George Frey MD;  Location: [facility];  Service: Cardiothoracic;  Laterality: N/A;  ?PROSTATE SURGERYN/A11/12/2010   TURP, DR. BRIGHT COLLAZO AT [facility]  ?SKIN TAG REMOVALN/A12/20/2017   ANAL SKIN TAG X2, PERFORMED IN OFFICE, DR. LIAS ORANTES  ?TURP / TRANSURETHRAL INCISION / DRAINAGE PROSTATE 2010   Dr. Goodrich      Plan of Care  Anticipated Discharge Date/Estimated Length of Stay: ELOS: 2-3 weeks  Anticipated Discharge Destination: Community discharge with assistance  Discharge Plan : Patient plans to d/c home with his wife who can provide 24 hour  assist.  Medical Necessity Expected Level Rationale: Goals are to achieve a level of  contact-guard with  mobility and self-care and improved activity tolerance.  Rehabilitation prognosis fair.  Medical prognosis defer to cardiology.  Intensity and Duration: an average of 3 hours/5 days per week  Medical Supervision and 24 Hour Rehab Nursing: x  Physical Therapy: x  PT Intensity/Duration: 1 hour / day, 5 days / week, for approximately 2 weeks  Occupational Therapy: x  OT Intensity/Duration: 1 hour / day, 5 days / week, for approximately 2 weeks  Speech and Language Therapy: x  SLP Intensity/Duration: 1 hour / day, 5 days / week, for approximately 2 weeks  Social Work: x  Therapeutic Recreation: x  Psychology: x  Registered Dietician: x  Updated (if changes indicated)    Anticipated Discharge Date/Estimated Length of Stay:   ELOS: 2 weeks    Based on the patient's medical and functional status, their prognosis and  expected level of functional improvement is: Goals are to achieve a level of  contact-guard with  mobility and self-care and improved activity tolerance.  Rehabilitation prognosis fair.  Medical prognosis defer to cardiology.      Interdisciplinary Problem/Goals/Status    All Rehab Problems:  Sphincter Control    [RN] Bladder Management(Active)  Current  Status(06/14/2023): Patient may be continent or incontinent of urine.  Bladder scan q shift.  Weekly Goal(06/22/2023): Patient will be continent 50 %  Discharge Goal: Patient will be continent    [RN] Bowel Management(Active)  Current Status(06/14/2023): Patietn is continent of bowels  Weekly Goal(06/20/2023): patient is continent of bowels  Discharge Goal: Patient is continent of bowels        Safety    [RN] Potential for Injury(Active)  Current Status(06/14/2023): Patient is at increased risk for falls/injury r/t  recent surgery and stroke.  Weekly Goal(06/22/2023): Patient will use the call light for assistance  Discharge Goal: Patient/ family will be aware of risk of fall and safety in the  home setting        Psychosocial    [RN] Coping/Adjustment(Active)  Current Status(06/14/2023): Patient has a supportive family  Weekly Goal(06/22/2023): Patient will express concerns regarding current status  Discharge Goal: Patient will demonstrate healthy coping strategies        Swallow Function    [ST] Swallowing(Active)  Current Status(06/14/2023): Regular (no mixed) thin by cup. precautions:  upright, small single sips, no straw, alternate solids with liquids  Weekly Goal(06/21/2023): Patient will tolerate the least restrictive diet  without s/sx of aspiration  Discharge Goal: Patient will tolerate the least restrictive diet without s/sx of  aspiration        Self Care    [OT] Bathing(Active)  Current Status(06/14/2023): MIN  Weekly Goal(06/21/2023): CGA  Discharge Goal: CGA    [OT] Dressing (Lower)(Active)  Current Status(06/14/2023): MOD/MIN  Weekly Goal(06/21/2023): MIN  Discharge Goal: Min A    [OT] Dressing (Upper)(Active)  Current Status(06/14/2023): Min A  Weekly Goal(06/21/2023): Set up  Discharge Goal: Indep    [OT] Grooming(Active)  Current Status(06/14/2023): setup  Weekly Goal(06/21/2023): MOD I  Discharge Goal: Mod I    [OT] Toileting(Active)  Current Status(06/14/2023): MOD/MIN  Weekly Goal(06/21/2023):  MIN  Discharge Goal: CGA/SBA        Mobility    [OT] Toilet Transfers(Active)  Current Status(06/14/2023): MIN  Weekly Goal(06/21/2023): CGA  Discharge Goal: SBA    [OT] Tub/Shower Transfers(Active)  Current Status(06/14/2023): Mod A/MIN  Weekly Goal(06/21/2023): Min A  Discharge Goal: CGA    [PT] Stairs(Active)  Current Status(06/14/2023): 4, rail, Min x 2  Weekly Goal(06/22/2023): curb, Min, rwx  Discharge Goal: 12, rail, Min/CG    [PT] Walk(Active)  Current Status(06/14/2023): 80, min, rwx  Weekly Goal(06/22/2023): 100, Min, rwx  Discharge Goal: 150, CG, rwx    [PT] Bed/Chair/Wheelchair(Active)  Current Status(06/14/2023): Min  Weekly Goal(06/22/2023): CG  Discharge Goal: CG    [PT] Bed Mobility(Active)  Current Status(06/14/2023): Mod  Weekly Goal(06/22/2023): Min  Discharge Goal: Sup        Cognition    [ST] Attention(Active)  Current Status(06/14/2023): Moderate cognitive impairment; decreased  initiation/slow processing speed. Increased difficulty with attention, memory,  and executive function  Weekly Goal(06/21/2023): Patient will participate in functional therapy  activities given MOD cues  Discharge Goal: Functional cognition for home        Comments: 6/8 Mod A bed mob, Mod A transfers, amb 25' Mod A + 1 to follow w/  rwx.  Mod A shower transfer.  Low endurance.  Max A toileting.  Mod A bathing,  Max A LBD.  VFSS today.  Cont / Incont urine, continent of bowel.    6/15 Bed mob Mod I, transfer Min A, can do 4 steps w/ rail Min A x 2, amb 80'  Min A rwx, Min A toilet transfer, Mod A / Min shower transfer.  Mod / Min  toileting.  Min A bathing.  Mod / Min LBD.  Upgraded to regular thin liquids by  cup, no straw.  Delayed processing.  Moderate deficits on CLQT.  Cont /Incont of  urine, cont of bowel.    Signed by: Angelika Najera RN    Physician CoSigned By: Tu Silver 06/15/2023 12:54:19

## 2023-06-15 NOTE — PLAN OF CARE
Goal Outcome Evaluation:      Slept fairly well. Med with water. Transfers x1 assist. Continent of B&B. Turns self. Incision in chest clean & dry.

## 2023-06-15 NOTE — PROGRESS NOTES
Inpatient Rehabilitation Plan of Care Note    Plan of Care  Care Plan Reviewed - Updates as Follows    Sphincter Control    [RN] Bladder Management(Active)  Current Status(06/14/2023): Patient may be continent or incontinent of urine.  Bladder scan q shift.  Weekly Goal(06/22/2023): Patient will be continent 50 %  Discharge Goal: Patient will be continent    [RN] Bowel Management(Active)  Current Status(06/14/2023): Patietn is continent of bowels  Weekly Goal(06/20/2023): patient is continent of bowels  Discharge Goal: Patient is continent of bowels    Performed Intervention(s)  Offer toileting/ timed voids  Monitor I&O      Safety    [RN] Potential for Injury(Active)  Current Status(06/14/2023): Patient is at increased risk for falls/injury r/t  recent surgery and stroke.  Weekly Goal(06/22/2023): Patient will use the call light for assistance  Discharge Goal: Patient/ family will be aware of risk of fall and safety in the  home setting    Performed Intervention(s)  Safety monitoring during functional activities  Environmental set- upto reduce risk      Psychosocial    [RN] Coping/Adjustment(Active)  Current Status(06/14/2023): Patient has a supportive family  Weekly Goal(06/22/2023): Patient will express concerns regarding current status  Discharge Goal: Patient will demonstrate healthy coping strategies    Performed Intervention(s)  Allow patient to verbalize needs and concerns    Signed by: Juli Perry RN

## 2023-06-15 NOTE — PROGRESS NOTES
[facility] Progress Note      Patient Name: Vernon Solorzano  : 1950  MRN: [id number]  Primary Care Physician:  Liza Oconnell III, RACHAEL  Date of admission: 2023    Subjective     Interval History:   Follow up CKD III. Clearer today.  Still some inappropriate word choices. Right hand feels better. HOLLY hose in place.  Not weighed today.     Review of Systems:   As noted above    Objective     Vitals:   Temp:  [97.1 °F (36.2 °C)-97.6 °F (36.4 °C)] 97.1 °F (36.2 °C)  Heart Rate:  [] 94  Resp:  [16-20] 20  BP: (108-121)/(69-75) 121/75    Intake/Output Summary (Last 24 hours) at 6/15/2023 1250  Last data filed at 6/15/2023 0844  Gross per 24 hour   Intake 480 ml   Output 570 ml   Net -90 ml       Physical Exam:    General Appearance: alert, sitting up in bed. Thoughts clearer and speech better.   Skin: warm and dry  HEENT: oral mucosa normal, nonicteric sclera  Neck: supple, no JVD  Lungs: Clear to auscultation.   Heart: RRR, normal S1 and S2  Abdomen: soft, nontender, + bs, distended.  Extremities 1+ lower ext edema, better . HOLLY hose    Scheduled Meds:     apixaban, 5 mg, Oral, Q12H  aspirin, 81 mg, Oral, Daily  atorvastatin, 40 mg, Oral, Nightly  calcium 500 mg vitamin D 5 mcg (200 UT), 1 tablet, Oral, Daily  guaifenesin, 400 mg, Oral, Q8H  insulin glargine, 15 Units, Subcutaneous, Nightly  insulin glargine, 40 Units, Subcutaneous, Daily  insulin lispro, 3-14 Units, Subcutaneous, TID With Meals  ipratropium-albuterol, 3 mL, Nebulization, 4x Daily - RT  metoprolol succinate XL, 25 mg, Oral, Q24H  pantoprazole, 40 mg, Oral, Q AM  potassium chloride, 40 mEq, Oral, Once  torsemide, 40 mg, Oral, Daily      IV Meds:        Results Reviewed:   I have personally reviewed the results from the time of this admission to 6/15/2023 12:50 EDT     Results from last 7 days   Lab Units 06/15/23  0550 23  0602 23  0549   SODIUM mmol/L 137 136 139   POTASSIUM  mmol/L 3.5 3.9 3.6   CHLORIDE mmol/L 100 99 103   CO2 mmol/L 28.3 24.8 25.0   BUN mg/dL 12 12 13   CREATININE mg/dL 1.34* 1.37* 1.35*   CALCIUM mg/dL 8.8 8.8 8.8   GLUCOSE mg/dL 124* 130* 119*       Estimated Creatinine Clearance: 64 mL/min (A) (by C-G formula based on SCr of 1.34 mg/dL (H)).    Results from last 7 days   Lab Units 06/15/23  0550 06/13/23  0549   MAGNESIUM mg/dL 2.2  --    PHOSPHORUS mg/dL 4.0 2.4*             Results from last 7 days   Lab Units 06/14/23  0602 06/12/23  0544 06/09/23  0832   WBC 10*3/mm3 9.09 9.42 12.61*   HEMOGLOBIN g/dL 10.0* 11.0* 10.9*   PLATELETS 10*3/mm3 297 318 345             Assessment / Plan     ASSESSMENT:  1. CKD III, baseline creatinine 1.2.  Peak 1.68.  Creatinine stable. Leave off  Jardiance dc for now . Edema better on po torsemide. Phos replete.   2. MR now sp MV replacement, tissue and TV repair, SU closure 5/24/23.  3. Chronic steroid use after COVID 19 PNA.  4. Anemia post op. Hg range 10-11.  .  5. 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 Rehab .  6. DM2  7. Aflutter.Cardioversion 6/2/23. On eliquis and metoprolol.   PLAN:  1. Continue Torsemide 40 mg daily.   Joan Batista MD  06/15/23  12:50 EDT    [facility]  [phone]

## 2023-06-15 NOTE — PROGRESS NOTES
Nutrition Services    Patient Name:  Vernon Solorzano  YOB: 1950  MRN: [id number]  Admit Date:  6/6/2023    Visited pt in room for follow-up on supplement acceptance (after adding Boost GC + mighty Shakes on 6/12). Pt reported better appetite, but still not good. Pt stated he tried part of a Boost supplement one time, but wasn't sure if he liked the magic cups or mighty shakes. Concerned about [employer] supply.   Will continue current supplements and check back next week on acceptance + intakes.     RD will continue to follow-up, per protocol.      Electronically signed by:  Génesis Beaulieu RD  06/15/23 14:16 EDT

## 2023-06-15 NOTE — PROGRESS NOTES
Reviewed goals and progress with patient after team conference. Patient's ELOS is 2 weeks. Patient seems to have more energy in the afternoon for therapy but is very tired in the mornings. He needs cues to remind to push up with his arms when standing. He has been a little SOA when walking. Video swallow completed and diet upgrade to regular thins with no straw. Will continue to follow for d/c needs.

## 2023-06-15 NOTE — THERAPY TREATMENT NOTE
Inpatient Rehabilitation - Physical Therapy Treatment Note       [facility]     Patient Name: Vernon Solorzano  : 1950  MRN: [id number]    Today's Date: 6/15/2023                    Admit Date: 2023      Visit Dx:     ICD-10-CM ICD-9-CM   1. Impaired functional mobility, balance, gait, and endurance  Z74.09 V49.89       Patient Active Problem List   Diagnosis    DM (diabetes mellitus), type 2    Mixed hyperlipidemia    Mild intermittent asthma without complication    New onset of congestive heart failure    Nonrheumatic mitral valve regurgitation    Chest pain    Essential hypertension    Mitral valve disease    Acute ischemic left MCA stroke       Past Medical History:   Diagnosis Date    Allergic rhinitis     Arthritis     BPH (benign prostatic hyperplasia)     Diabetes mellitus     TYPE 2    Foraminal stenosis of cervical region     C5-C6    GERD (gastroesophageal reflux disease)     Hemorrhoids     History of urinary retention     S/P TURP    Hyperlipidemia     Macular degeneration     PING (obstructive sleep apnea) 2016    MILD, SEES DR. AMARILIS MORGAN       Past Surgical History:   Procedure Laterality Date    CARDIAC CATHETERIZATION N/A 2023    Procedure: Right Heart Cath;  Surgeon: Corey Gaffney MD;  Location: [facility] CATH INVASIVE LOCATION;  Service: Cardiology;  Laterality: N/A;    CARDIAC CATHETERIZATION N/A 2023    Procedure: Left Heart Cath;  Surgeon: Corey Gaffney MD;  Location: [facility] CATH INVASIVE LOCATION;  Service: Cardiology;  Laterality: N/A;    CARDIAC CATHETERIZATION N/A 2023    Procedure: Left ventriculography;  Surgeon: Corey Gaffney MD;  [facility];  Service: Cardiology;  Laterality: N/A;    CARDIAC CATHETERIZATION N/A 2023    Procedure: Coronary angiography;  Surgeon: Corey Gaffney MD;  Location: [facility] CATH INVASIVE LOCATION;  Service: Cardiology;  Laterality: N/A;    COLONOSCOPY N/A     WNL PER PT, NO  RECORDS,     COLONOSCOPY N/A 04/07/2009    DR. GORGE BENAVIDEZ AT [place]    MITRAL VALVE REPAIR/REPLACEMENT N/A 5/24/2023    Procedure: MITRAL VALVE REPAIR/REPLACEMENT TRICUSPID VALVE REPAIR/REPLACEMENT TRANSESOPHAGEAL ECHOCARDIOGRAM WITH ANESTHESIA;  Surgeon: George Frey MD;  Location: [facility];  Service: Cardiothoracic;  Laterality: N/A;    PROSTATE SURGERY N/A 11/12/2010    TURP, DR. BRIGHT COLLAZO AT [facility]    SKIN TAG REMOVAL N/A 12/20/2017    ANAL SKIN TAG X2, PERFORMED IN OFFICE, DR. LISA ORANTES    TURP / TRANSURETHRAL INCISION / DRAINAGE PROSTATE  2010    Dr. Goodrich       PT ASSESSMENT (last 12 hours)       IRF PT Evaluation and Treatment       Row Name 06/15/23 1138          PT Time and Intention    Document Type daily treatment  -MD     Mode of Treatment physical therapy  -MD     Patient/Family/Caregiver Comments/Observations Pt sitting in BR on shower chair showing no signs of acute distress.  -MD       Row Name 06/15/23 1138          General Information    Patient Profile Reviewed yes  -MD     Existing Precautions/Restrictions fall;cardiac;sternal  -MD       Row Name 06/15/23 1138          Pain Assessment    Pretreatment Pain Rating 0/10 - no pain  -MD       Row Name 06/15/23 1138          Cognition/Psychosocial    Orientation Status (Cognition) oriented to;person;place;situation  -MD     Follows Commands (Cognition) follows one-step commands;delayed response/completion;increased processing time needed;repetition of directions required;75-90% accuracy  -MD     Personal Safety Interventions fall prevention program maintained;gait belt  -MD       Row Name 06/15/23 1138          Sit-Stand Transfer    Sit-Stand [place] (Transfers) verbal cues;contact guard;minimum assist (75% patient effort)  -MD     Assistive Device (Sit-Stand Transfers) wheelchair;walker, front-wheeled  -MD       Row Name 06/15/23 1138          Stand-Sit Transfer    Stand-Sit [place] (Transfers) verbal  cues;contact guard;minimum assist (75% patient effort)  -MD     Assistive Device (Stand-Sit Transfers) walker, front-wheeled;wheelchair  -MD       Row Name 06/15/23 1138          Gait/Stairs (Locomotion)    [place] Level (Gait) verbal cues;contact guard;minimum assist (75% patient effort)  -MD     Assistive Device (Gait) walker, front-wheeled  -MD     Distance in Feet (Gait) 80'x2  -MD     Deviations/Abnormal Patterns (Gait) base of support, wide;festinating/shuffling;gait speed decreased;stride length decreased  -MD     Bilateral Gait Deviations forward flexed posture;heel strike decreased  -MD     Comment, (Gait/Stairs) walking through floor ladder to increase step length w CGA-Min A x2 at [facility].  -MD       Row Name 06/15/23 1138          Positioning and Restraints    Pre-Treatment Position sitting in chair/recliner  -MD     Post Treatment Position wheelchair  -MD     In Wheelchair sitting;exit alarm on;with OT  -MD               User Key  (r) = Recorded By, (t) = Taken By, (c) = Cosigned By      Initials Name Provider Type    Damaris Farrell, PT Physical Therapist                  Wound 05/24/23 0737 sternal Incision (Active)   Base scab 06/15/23 0932   Periwound dry;intact 06/15/23 0932   Dressing Care open to air 06/15/23 0932     Physical Therapy Education       Title: PT OT SLP Therapies (In Progress)       Topic: Physical Therapy (In Progress)       Point: Mobility training (Done)       Learning Progress Summary             Patient Acceptance, E, VU by MD at 6/15/2023 1140    Eager, E,D,TB, NR by  at 6/14/2023 1459    Acceptance, E, VU by MD at 6/13/2023 1012    Acceptance, E, VU by MD at 6/12/2023 1018    Acceptance, E, VU by MD at 6/10/2023 1040    Acceptance, E, VU by MD at 6/8/2023 1017    Acceptance, E,TB,D, VU,DU,NR by  at 6/7/2023 1513    Comment: POC, Goals, safety with mobility.   Significant Other Acceptance, E,TB,D, VU,DU,NR by  at 6/7/2023 1513    Comment: POC, Goals, safety with mobility.                          Point: Home exercise program (In Progress)       Learning Progress Summary             Patient Eager, E,D,TB, NR by  at 6/14/2023 1459    Acceptance, E,TB,D, VU,DU,NR by  at 6/7/2023 1513    Comment: POC, Goals, safety with mobility.   Significant Other Acceptance, E,TB,D, VU,DU,NR by  at 6/7/2023 1513    Comment: POC, Goals, safety with mobility.                                         User Key       Initials Effective Dates Name Provider Type Discipline    MD 06/16/21 -  Damaris Francis, PT Physical Therapist PT     06/16/21 -  Elizabeth Levin PT Physical Therapist PT                    PT Recommendation and Plan                          Time Calculation:      PT Charges       Row Name 06/15/23 1316 06/15/23 1137          Time Calculation    Start Time 1300  -MD 0930  -MD     Stop Time 1330  -MD 1000  -MD     Time Calculation (min) 30 min  -MD 30 min  -MD     PT Received On -- 06/15/23  -MD     PT - Next Appointment -- 06/16/23  -MD               User Key  (r) = Recorded By, (t) = Taken By, (c) = Cosigned By      Initials Name Provider Type    Damaris Farrell, PT Physical Therapist                    Therapy Charges for Today       Code Description Service Date Service Provider Modifiers Qty    03051802419 HC PT THER PROC EA 15 MIN 6/15/2023 Damaris Francis, PT GP 1    63437032448 HC PT THERAPEUTIC ACT EA 15 MIN 6/15/2023 Damaris Francis, PT GP 1    [id number] HC GAIT TRAINING EA 15 MIN 6/15/2023 Damaris Francis, PT GP 2              PT G-Codes  AM-PAC 6 Clicks Score (PT): 14      Damaris Francis PT  6/15/2023

## 2023-06-15 NOTE — THERAPY TREATMENT NOTE
Inpatient Rehabilitation - Occupational Therapy Treatment Note    Psychiatric     Patient Name: Vernon Solorzano  : 1950  MRN: [id number]    Today's Date: 6/15/2023                 Admit Date: 2023         ICD-10-CM ICD-9-CM   1. Impaired functional mobility, balance, gait, and endurance  Z74.09 V49.89       Patient Active Problem List   Diagnosis    DM (diabetes mellitus), type 2    Mixed hyperlipidemia    Mild intermittent asthma without complication    New onset of congestive heart failure    Nonrheumatic mitral valve regurgitation    Chest pain    Essential hypertension    Mitral valve disease    Acute ischemic left MCA stroke       Past Medical History:   Diagnosis Date    Allergic rhinitis     Arthritis     BPH (benign prostatic hyperplasia)     Diabetes mellitus     TYPE 2    Foraminal stenosis of cervical region     C5-C6    GERD (gastroesophageal reflux disease)     Hemorrhoids     History of urinary retention     S/P TURP    Hyperlipidemia     Macular degeneration     PING (obstructive sleep apnea) 2016    MILD, SEES DR. AMARILIS MORGAN       Past Surgical History:   Procedure Laterality Date    CARDIAC CATHETERIZATION N/A 2023    Procedure: Right Heart Cath;  Surgeon: Corey Gaffney MD;  Location: [facility] CATH INVASIVE LOCATION;  Service: Cardiology;  Laterality: N/A;    CARDIAC CATHETERIZATION N/A 2023    Procedure: Left Heart Cath;  Surgeon: Corey Gaffney MD;  Location: [facility] CATH INVASIVE LOCATION;  Service: Cardiology;  Laterality: N/A;    CARDIAC CATHETERIZATION N/A 2023    Procedure: Left ventriculography;  Surgeon: Corey Gaffney MD;  Location: [facility] CATH INVASIVE LOCATION;  Service: Cardiology;  Laterality: N/A;    CARDIAC CATHETERIZATION N/A 2023    Procedure: Coronary angiography;  Surgeon: Corey Gaffney MD;  [facility];  Service: Cardiology;  Laterality: N/A;    COLONOSCOPY N/A     WNL PER PT, NO RECORDS,      COLONOSCOPY N/A 04/07/2009    DR. GORGE BENAVIDEZ AT [facility]    MITRAL VALVE REPAIR/REPLACEMENT N/A 5/24/2023    Procedure: MITRAL VALVE REPAIR/REPLACEMENT TRICUSPID VALVE REPAIR/REPLACEMENT TRANSESOPHAGEAL ECHOCARDIOGRAM WITH ANESTHESIA;  Surgeon: George Frey MD;  Location: [facility];  Service: Cardiothoracic;  Laterality: N/A;    PROSTATE SURGERY N/A 11/12/2010    TURP, DR. BRIGHT COLLAZO AT [facility]    SKIN TAG REMOVAL N/A 12/20/2017    ANAL SKIN TAG X2, PERFORMED IN OFFICE, DR. LISA ORANTES    TURP / TRANSURETHRAL INCISION / DRAINAGE PROSTATE  2010    Dr. Goodrich             IRF OT ASSESSMENT FLOWSHEET (last 12 hours)       IRF OT Evaluation and Treatment       Row Name 06/15/23 2094          OT Time and Intention    Document Type daily treatment  -AF     Mode of Treatment occupational therapy  -AF     Patient Effort adequate  -AF     Symptoms Noted During/After Treatment fatigue  -AF       Row Name 06/15/23 155          General Information    Patient/Family/Caregiver Comments/Observations pt sitting up in bed in AM max encouragement to get moving this morning, in PM sitting up in w/c after PT session  -AF     Existing Precautions/Restrictions fall;cardiac;sternal  -AF       Row Name 06/15/23 1553          Pain Assessment    Pretreatment Pain Rating 0/10 - no pain  -AF     Posttreatment Pain Rating 0/10 - no pain  -AF       Row Name 06/15/23 1550          Cognition/Psychosocial    Affect/Mental Status (Cognition) flat/blunted affect;low arousal/lethargic  -AF     Orientation Status (Cognition) oriented to;person;place;situation  -AF     Follows Commands (Cognition) follows one-step commands;delayed response/completion;increased processing time needed;repetition of directions required;75-90% accuracy  -AF     Personal Safety Interventions fall prevention program maintained;gait belt;nonskid shoes/slippers when out of bed  -AF     Cognitive Function attention deficit  -AF     Attention Deficit  (Cognition) distractible in noisy environment;focused/sustained attention;arousal/alertness  -AF     Memory Deficit (Cognition) episodic memory;procedural memory;recall, biographical information  -AF     Safety Deficit (Cognition) insight into deficits/self-awareness;judgment;problem-solving;awareness of need for assistance  -AF       Row Name 06/15/23 1550          Bathing    [place] Level (Bathing) bathing skills;upper body;contact guard assist;lower body;minimum assist (75% patient effort)  -AF     Assistive Device (Bathing) grab bar/tub rail;hand held shower spray hose;shower chair  -AF     Position (Bathing) supported sitting;supported standing  -AF     Comment (Bathing) able to stand and wash/dry reuben area and bottom with encouragement to complete by himself  -AF       Row Name 06/15/23 1550          Upper Body Dressing    [place] Level (Upper Body Dressing) upper body dressing skills;minimum assist (75% or more patient effort)  -AF     Position (Upper Body Dressing) supported sitting  -AF       Row Name 06/15/23 1550          Lower Body Dressing    [place] Level (Lower Body Dressing) don;pants/bottoms;shoes/slippers;socks;underwear;maximum assist (25% patient effort)  -AF     Position (Lower Body Dressing) supported sitting;supported standing  -AF     Comment (Lower Body Dressing) A with JOBST  -AF       Row Name 06/15/23 1550          Grooming    Comment (Grooming) deferred washing his hair  -AF       Row Name 06/15/23 1550          Toileting    [place] Level (Toileting) toileting skills;minimum assist (75% patient effort)  -AF     Position (Toileting) supported sitting  -AF     Set-up Assistance (Toileting) change pad/brief  -AF       Row Name 06/15/23 1550          Bed Mobility    Supine-Sit [place] (Bed Mobility) minimum assist (75% patient effort);verbal cues  tactile cues  -AF       Row Name 06/15/23 1550          Functional Mobility    Functional Mobility- Comment walked from  EOB to bathroom CGA with RWX  -AF       Row Name 06/15/23 1550          Sit-Stand Transfer    Sit-Stand [place] (Transfers) contact guard;verbal cues  -AF       Row Name 06/15/23 1550          Stand-Sit Transfer    Stand-Sit [place] (Transfers) verbal cues;contact guard  -AF       Row Name 06/15/23 1550          Toilet Transfer    Type (Toilet Transfer) stand-sit;sit-stand  -AF     [place] Level (Toilet Transfer) contact guard;verbal cues  -AF       Row Name 06/15/23 1550          Shower Transfer    Type (Shower Transfer) stand-sit;sit-stand  -AF     [place] Level (Shower Transfer) minimum assist (75% patient effort);contact guard;verbal cues  -AF     Assistive Device (Shower Transfer) grab bar, tub/shower;shower chair;walker, front-wheeled  -AF       Row Name 06/15/23 1550          Motor Skills    Functional Endurance fair - with ADLs, requires increased time and prompting  -AF       Row Name 06/15/23 1550          Shoulder (Therapeutic Exercise)    Shoulder Strengthening (Therapeutic Exercise) bilateral;scapular stabilization;flexion;extension;10 repetitions;3 sets  #2 dowel sha  -AF       Row Name 06/15/23 1550          Elbow/Forearm (Therapeutic Exercise)    Elbow/Forearm Strengthening (Therapeutic Exercise) bilateral;flexion;extension;supination;pronation;sitting;3 lb free weight;10 repetitions;3 sets  -AF       Row Name 06/15/23 1550          Hand (Therapeutic Exercise)    Hand Strengthening (Therapeutic Exercise) bilateral; strengthening;hand gripper;10 repetitions;3 sets  -AF       Row Name 06/15/23 1550          Balance    Static Sitting Balance contact guard  -AF     Dynamic Sitting Balance contact guard  -AF     Static Standing Balance minimal assist  -AF     Dynamic Standing Balance minimal assist  -AF       Row Name 06/15/23 1550          Positioning and Restraints    Pre-Treatment Position in bed  -AF     Post Treatment Position wheelchair  -AF     In Wheelchair sitting;with  SLP;exit alarm on  in PM  -AF     Bathroom sitting;with PT  in AM  -AF               User Key  (r) = Recorded By, (t) = Taken By, (c) = Cosigned By      Initials Name Effective Dates    AF Davis Candy CASIE, OTR 06/16/21 -                      Occupational Therapy Education       Title: PT OT SLP Therapies (In Progress)       Topic: Occupational Therapy (In Progress)       Point: ADL training (In Progress)       Description:   Instruct learner(s) on proper safety adaptation and remediation techniques during self care or transfers.   Instruct in proper use of assistive devices.                  Learning Progress Summary             Patient Acceptance, E, NR by AF at 6/13/2023 1527    Comment: benefits of therapy, endurance   Family Acceptance, E, NR by AF at 6/13/2023 1527    Comment: benefits of therapy, endurance                         Point: Home exercise program (In Progress)       Description:   Instruct learner(s) on appropriate technique for monitoring, assisting and/or progressing therapeutic exercises/activities.                  Learning Progress Summary             Patient Acceptance, E, NR by AF at 6/13/2023 1527    Comment: benefits of therapy, endurance   Family Acceptance, E, NR by AF at 6/13/2023 1527    Comment: benefits of therapy, endurance                         Point: Precautions (In Progress)       Description:   Instruct learner(s) on prescribed precautions during self-care and functional transfers.                  Learning Progress Summary             Patient Acceptance, E, NR by AF at 6/13/2023 1527    Comment: benefits of therapy, endurance   Family Acceptance, E, NR by AF at 6/13/2023 1527    Comment: benefits of therapy, endurance                         Point: Body mechanics (In Progress)       Description:   Instruct learner(s) on proper positioning and spine alignment during self-care, functional mobility activities and/or exercises.                  Learning Progress Summary              Patient Acceptance, E, NR by AF at 6/13/2023 1527    Comment: benefits of therapy, endurance   Family Acceptance, E, NR by AF at 6/13/2023 1527    Comment: benefits of therapy, endurance                                         User Key       Initials Effective Dates Name Provider Type [facility] 06/16/21 -  Candy Davis OTSIMONA Occupational Therapist OT                        OT Recommendation and Plan                         Time Calculation:      Time Calculation- OT       Row Name 06/15/23 1556 06/15/23 1555          Time Calculation- OT    OT Start Time 1330  -AF 0900  -AF     OT Stop Time 1400  -AF 0930  -AF     OT Time Calculation (min) 30 min  -AF 30 min  -AF               User Key  (r) = Recorded By, (t) = Taken By, (c) = Cosigned By      Initials Name Provider Type    AF Candy Davis, OTSIMONA Occupational Therapist                  Therapy Charges for Today       Code Description Service Date Service Provider Modifiers Qty    95659238305 HC OT SELF CARE/MGMT/TRAIN EA 15 MIN 6/14/2023 Candy Davis OTSIMONA GO 3    73564303969 HC OT THER PROC EA 15 MIN 6/14/2023 Candy Davis, OTSIMONA GO 1    20129683480 HC OT SELF CARE/MGMT/TRAIN EA 15 MIN 6/15/2023 Candy Davis OTSIMONA GO 2    94778415590 HC OT THER PROC EA 15 MIN 6/15/2023 Candy Davis, OTSIMONA GO 2                     HI Wyman  6/15/2023

## 2023-06-15 NOTE — PROGRESS NOTES
LOS: 9 days   Patient Care Team:  Liza Oconnell III, NP-C as PCP - General (Family Medicine)  Corey Lao Jr., MD (Inactive) as Consulting Physician (Urology)      KRIS TAYLOR  1950      ADMITTING DIAGNOSIS:  Stroke  Valvular heart disease status post repair/replacement      Subjective       Fatigue continues to be an issue.  Is participating in therapies.  He denies any chest pain.  Comfortable with breathing at rest.  Edema in the legs about the same.  Reports right hand function continues improved.         Objective     Vitals:    06/15/23 1034   BP:    Pulse: 104   Resp: 16   Temp:    SpO2: 96%       PHYSICAL EXAM:   MENTAL STATUS -  AWAKE / ALERT  HEENT- NCAT, PUPILS EQUALLY ROUND, SCLERAE ANICTERIC, CONJUNCTIVAE PINK, OP MOIST, NO JVD, EARS UNREMARKABLE EXTERNALLY  LUNGS - CTA, NO WHEEZES, RALES OR RHONCHI  Sternotomy incision -dressed  HEART-sinus with occasional ectopy  ABD - NORMOACTIVE BOWEL SOUNDS, SOFT, NT.   EXT - + EDEMA    NEURO -oriented to person place time and situation.    Speech is fluent.    Skin:        MEDICATIONS  Scheduled Meds:apixaban, 5 mg, Oral, Q12H  aspirin, 81 mg, Oral, Daily  atorvastatin, 40 mg, Oral, Nightly  calcium 500 mg vitamin D 5 mcg (200 UT), 1 tablet, Oral, Daily  guaifenesin, 400 mg, Oral, Q8H  insulin glargine, 15 Units, Subcutaneous, Nightly  insulin glargine, 40 Units, Subcutaneous, Daily  insulin lispro, 3-14 Units, Subcutaneous, TID With Meals  ipratropium-albuterol, 3 mL, Nebulization, 4x Daily - RT  metoprolol succinate XL, 25 mg, Oral, Q24H  pantoprazole, 40 mg, Oral, Q AM  torsemide, 40 mg, Oral, Daily      Continuous Infusions:   PRN Meds:.  acetaminophen **OR** [DISCONTINUED] acetaminophen **OR** [DISCONTINUED] acetaminophen    bisacodyl    calcium carbonate    diphenhydrAMINE-zinc acetate    hydrocortisone-bacitracin-zinc oxide-nystatin    ipratropium-albuterol    senna-docusate sodium    traMADol      RESULTS  Glucose    Date/Time Value Ref Range Status   06/15/2023 1053 223 (H) 70 - 130 mg/dL Final     Comment:     Meter: [id number] : [id number] Clark Cortés NA   06/15/2023 0720 146 (H) 70 - 130 mg/dL Final     Comment:     Meter: [id number] : [id number] Popeye Phelps NA   06/14/2023 2127 271 (H) 70 - 130 mg/dL Final     Comment:     Meter: [id number] : [id number] Kwasi Bangura NA   06/14/2023 1633 150 (H) 70 - 130 mg/dL Final     Comment:     Meter: [id number] : [id number] Gustavo Kamara NA   06/14/2023 1058 184 (H) 70 - 130 mg/dL Final     Comment:     Meter: [id number] : [id number] Clark Cortés NA   06/14/2023 0713 129 70 - 130 mg/dL Final     Comment:     Meter: [id number] : [id number] Popyee Phelps NA   06/13/2023 2110 281 (H) 70 - 130 mg/dL Final     Comment:     Meter: [id number] : [id number] Kwasi Bangura NA   06/13/2023 1612 195 (H) 70 - 130 mg/dL Final     Comment:     Meter: [id number] : [id number] Summer Rubi NA     Results from last 7 days   Lab Units 06/14/23  0602 06/12/23  0544 06/09/23  0832   WBC 10*3/mm3 9.09 9.42 12.61*   HEMOGLOBIN g/dL 10.0* 11.0* 10.9*   HEMATOCRIT % 30.0* 33.1* 32.4*   PLATELETS 10*3/mm3 297 318 345       Results from last 7 days   Lab Units 06/15/23  0550 06/14/23  0602 06/13/23  0549   SODIUM mmol/L 137 136 139   POTASSIUM mmol/L 3.5 3.9 3.6   CHLORIDE mmol/L 100 99 103   CO2 mmol/L 28.3 24.8 25.0   BUN mg/dL 12 12 13   CREATININE mg/dL 1.34* 1.37* 1.35*   CALCIUM mg/dL 8.8 8.8 8.8   GLUCOSE mg/dL 124* 130* 119*        Latest Reference Range & Units 05/23/23 14:29   Hemoglobin A1C 4.80 - 5.60 % 9.20 (H)   Total Cholesterol 0 - 200 mg/dL 155   HDL Cholesterol 40 - 60 mg/dL 41   LDL Cholesterol  0 - 100 mg/dL 81   Triglycerides 0 - 150 mg/dL 197 (H)   (H): Data is abnormally high     Latest Reference Range & Units 05/31/23 08:03   25 Hydroxy, Vitamin D 30.0 - 100.0 ng/ml 20.0 (L)   (L): Data is abnormally low  ASSESSMENT and PLAN     Acute ischemic left MCA stroke    Status post CVA-Scattered  punctate restricted diffusion acute infarct in the bilateral frontal and   left parietal occipital cortices.        Impaired Cognition/impaired mobility/impaired self-care     Aphasia-resolved     Right hand apraxia-resolved     Encephalopathy-improving     Dysphagia/nutrition-healthy heart 2-3 g sodium, consistent carb, no mixed consistency, regular texture, nectar thick liquid  June 7-videofluoroscopic swallow study planned tomorrow  June 8-swallow study today-advance to regular solid, no mixed consistency, thin liquids by cup.  No straws.     Stroke prophylaxis-Eliquis/aspirin/atorvastatin     History of severe mitral regurgitation and annular dilatation, moderate to severe tricuspid regurgitation-  status post May 24, 2023 - 1. Mitral valve repair with a 28 mm physio II ring annuloplasty with residual mild to moderate MR  2.  Mitral valve replacement with a 29 mm Schwartz II porcine prosthesis  3.  Tricuspid valve repair with 28 mm physio tricuspid ring  4.  Left atrial appendage endocardial closure     Atrial flutter-status post cardioversion June 2, 2023 anticoagulation with Eliquis  Metoprolol     Volume status-torsemide     Severe pulmonary hypertension  Obstructive sleep apnea     Interstitial lung disease status post COVID-19 infection-steroid taper per pulmonology  DuoNebs 4 times a day  Chronic steroid use with cushingoid syndrome  Prednisone 5 mg every other day     Leukocytosis-reactive/steroid administration     Postoperative anemia     Bqbudgorbbhdvnny-vtnuhoirvwn-hrzxtqck     Chronic kidney disease stage III-baseline creatinine 1.2     Diabetes mellitus type 2-Jardiance/Lantus/sliding scale insulin-uncontrolled  June 7-on Jardiance and Lantus 40 units daily.  Elevated blood glucose.  Received 21 units on sliding scale insulin yesterday.  Increase Lantus by adding 10 units nightly.  June 14-increase Lantus from 10 to 15 units nightly.   Continue 40 units every morning.  Off Jardiance secondary to renal function.     BPH/urinary retention-history of TURP  June 14-void 300 cc with postvoid residual 205 cc.    Renal insufficiency - June 9 - Creatinine increased to 1.46. Meds reviewed. Is on Jardiance - already received dose today. Will get input from Nephrology.  June 12-with the increase in his creatinine, Jardiance discontinued  June 14-torsemide added for edema     Pain management-tramadol-/Tylenol Dk report reviewed    Nutrition-addendum-June 9 6:43 PM-decreased appetite-would like to have zinc level checked in the morning    Rash on L axilla  June 13-does not wish to have anything as it is not causing patient discomfort     Sleep  June 13-offered melatonin, but declined       TEAM CONF - JUNE 8 - ENDURANCE POOR. BED MOD. TRANSFERS MOD. GAIT 25 FEET MOD ASSIST RW. SHOWER TRANSFERS MOD A.   BATH MOD ASSIST. LBD MAX. UBD MIN. TOILETING MAX.   Patient is currently on nectar liquids VFSS TODAY.   BIMS SUMMARY SCORE: 9 Moderately impaired   DIFFICULTY WITH SUSTAINED AND FOCAL ATTENTION.  CONTINENT / INCONTINENT  ELOS - 2 -3 WEEKS    TEAM CONF - Kari 15 - BED MOD. TRANSFERS MIN. 4 STAIRS MIN X 2. SATS 97% OR HIGHER WITH GAIT WITH FATIGUE, GAIT 80 FEET MIN ASSIST RW. TOILET TRANSFERS MIN. SHOWER TRANSFERS MIN MOD. BATH MIN. LBD MIN MOD. UBD MIN. GROOMING SET UP. TOILETING MIN MOD. DRESSING TAKES 30 MINUTES WIETH SLOW PROCESSING. EDEMA - JOBST STOCKINGS. MILD DYSPHGAIA, REG THIN BY CUP, NO STRAWS. CLQT MODERATE DEFICITS, DIFFICULTY WITH ATTENTION AND MEMORY, PROLONGED PROCESSING SPEED. BNE FOLLOWING. VARIABLE INTAKE - DIETITIAN FOLLOWING. RASH ON ABD AND LEFT CHEST, POSSIBLY CONTACT DERMATITIS. TORSEMIDE ADDED.  ELOS - TWO WEEKS.        Now admit for comprehensive acute inpatient rehabilitation .  This would be an interdisciplinary program with physical therapy 1 hour,  occupational therapy 1 hour, and speech therapy 1 hour, 5 days a week.   Rehabilitation nursing for carryover, monitoring of neurologic, cardiac, pulmonary, diabetes   status, bowel and bladder, and skin  Ongoing physician follow-up.  Weekly team conferences.  Goals are to achieve a level of contact-guard with  mobility and self-care and improved activity tolerance.   Rehabilitation prognosis fair.  Medical prognosis defer to cardiology.  Estimated length of stay is approximately 2 weeks, but is only an estimation.   The patient's functional status and clinical status is unchanged from preadmission assessment and the patient continues appropriate for acute inpatient rehabilitation.  Goal is for home with outpatient cardiac rehab   therapies.  Barrier to discharge: Impaired cognition mobility self-care- work on follow, executive function, conditioning, transfers, progressive ambulation, ADLs to overcome.           Tu Silver MD, PGY-2   6/13/2023  12:10 PM       During rounds, used appropriate personal protective equipment including mask and gloves.  Additional gown if indicated.  Mask used was standard procedure mask. Appropriate PPE was worn during the entire visit.  Hand hygiene was completed before and after.      Patient has been staffed and discussed with attending Physician, Dr. Tu Silver.

## 2023-06-16 LAB
ALBUMIN SERPL-MCNC: 3.4 G/DL (ref 3.5–5.2)
ANION GAP SERPL CALCULATED.3IONS-SCNC: 12.4 MMOL/L (ref 5–15)
BASOPHILS # BLD AUTO: 0.06 10*3/MM3 (ref 0–0.2)
BASOPHILS NFR BLD AUTO: 0.7 % (ref 0–1.5)
BUN SERPL-MCNC: 12 MG/DL (ref 8–23)
BUN/CREAT SERPL: 9.2 (ref 7–25)
CALCIUM SPEC-SCNC: 9 MG/DL (ref 8.6–10.5)
CHLORIDE SERPL-SCNC: 100 MMOL/L (ref 98–107)
CO2 SERPL-SCNC: 27.6 MMOL/L (ref 22–29)
CREAT SERPL-MCNC: 1.3 MG/DL (ref 0.76–1.27)
DEPRECATED RDW RBC AUTO: 43.7 FL (ref 37–54)
EGFRCR SERPLBLD CKD-EPI 2021: 57.6 ML/MIN/1.73
EOSINOPHIL # BLD AUTO: 0.84 10*3/MM3 (ref 0–0.4)
EOSINOPHIL NFR BLD AUTO: 9.8 % (ref 0.3–6.2)
ERYTHROCYTE [DISTWIDTH] IN BLOOD BY AUTOMATED COUNT: 14.1 % (ref 12.3–15.4)
GLUCOSE BLDC GLUCOMTR-MCNC: 130 MG/DL (ref 70–130)
GLUCOSE BLDC GLUCOMTR-MCNC: 151 MG/DL (ref 70–130)
GLUCOSE BLDC GLUCOMTR-MCNC: 382 MG/DL (ref 70–130)
GLUCOSE SERPL-MCNC: 122 MG/DL (ref 65–99)
HCT VFR BLD AUTO: 30.6 % (ref 37.5–51)
HGB BLD-MCNC: 10.4 G/DL (ref 13–17.7)
IMM GRANULOCYTES # BLD AUTO: 0.05 10*3/MM3 (ref 0–0.05)
IMM GRANULOCYTES NFR BLD AUTO: 0.6 % (ref 0–0.5)
LYMPHOCYTES # BLD AUTO: 1.08 10*3/MM3 (ref 0.7–3.1)
LYMPHOCYTES NFR BLD AUTO: 12.6 % (ref 19.6–45.3)
MCH RBC QN AUTO: 29.4 PG (ref 26.6–33)
MCHC RBC AUTO-ENTMCNC: 34 G/DL (ref 31.5–35.7)
MCV RBC AUTO: 86.4 FL (ref 79–97)
MONOCYTES # BLD AUTO: 0.89 10*3/MM3 (ref 0.1–0.9)
MONOCYTES NFR BLD AUTO: 10.4 % (ref 5–12)
NEUTROPHILS NFR BLD AUTO: 5.65 10*3/MM3 (ref 1.7–7)
NEUTROPHILS NFR BLD AUTO: 65.9 % (ref 42.7–76)
NRBC BLD AUTO-RTO: 0.1 /100 WBC (ref 0–0.2)
PHOSPHATE SERPL-MCNC: 3.2 MG/DL (ref 2.5–4.5)
PLATELET # BLD AUTO: 272 10*3/MM3 (ref 140–450)
PMV BLD AUTO: 8.9 FL (ref 6–12)
POTASSIUM SERPL-SCNC: 3.6 MMOL/L (ref 3.5–5.2)
RBC # BLD AUTO: 3.54 10*6/MM3 (ref 4.14–5.8)
SODIUM SERPL-SCNC: 140 MMOL/L (ref 136–145)
WBC NRBC COR # BLD: 8.57 10*3/MM3 (ref 3.4–10.8)

## 2023-06-16 NOTE — THERAPY TREATMENT NOTE
Inpatient Rehabilitation - Occupational Therapy Treatment Note    [facility]     Patient Name: Vernon Solorzano  : 1950  MRN: [id number]    Today's Date: 2023                 Admit Date: 2023         ICD-10-CM ICD-9-CM   1. Impaired functional mobility, balance, gait, and endurance  Z74.09 V49.89       Patient Active Problem List   Diagnosis    DM (diabetes mellitus), type 2    Mixed hyperlipidemia    Mild intermittent asthma without complication    New onset of congestive heart failure    Nonrheumatic mitral valve regurgitation    Chest pain    Essential hypertension    Mitral valve disease    Acute ischemic left MCA stroke       Past Medical History:   Diagnosis Date    Allergic rhinitis     Arthritis     BPH (benign prostatic hyperplasia)     Diabetes mellitus     TYPE 2    Foraminal stenosis of cervical region     C5-C6    GERD (gastroesophageal reflux disease)     Hemorrhoids     History of urinary retention     S/P TURP    Hyperlipidemia     Macular degeneration     PING (obstructive sleep apnea) 2016    MILD, SEES DR. AMARILIS MORGAN       Past Surgical History:   Procedure Laterality Date    CARDIAC CATHETERIZATION N/A 2023    Procedure: Right Heart Cath;  Surgeon: Corey Gaffney MD;  Location: [facility] CATH INVASIVE LOCATION;  Service: Cardiology;  Laterality: N/A;    CARDIAC CATHETERIZATION N/A 2023    Procedure: Left Heart Cath;  Surgeon: Corey Gaffney MD;  Location: [facility] CATH INVASIVE LOCATION;  Service: Cardiology;  Laterality: N/A;    CARDIAC CATHETERIZATION N/A 2023    Procedure: Left ventriculography;  Surgeon: Corey Gaffney MD;  Location: [facility] CATH INVASIVE LOCATION;  Service: Cardiology;  Laterality: N/A;    CARDIAC CATHETERIZATION N/A 2023    Procedure: Coronary angiography;  Surgeon: Corey Gaffney MD;  [facility];  Service: Cardiology;  Laterality: N/A;    COLONOSCOPY N/A     WNL PER PT, NO RECORDS,      COLONOSCOPY N/A 04/07/2009    DR. GORGE BENAVIDEZ AT [place]    MITRAL VALVE REPAIR/REPLACEMENT N/A 5/24/2023    Procedure: MITRAL VALVE REPAIR/REPLACEMENT TRICUSPID VALVE REPAIR/REPLACEMENT TRANSESOPHAGEAL ECHOCARDIOGRAM WITH ANESTHESIA;  Surgeon: George Frey MD;  Location: [facility];  Service: Cardiothoracic;  Laterality: N/A;    PROSTATE SURGERY N/A 11/12/2010    TURP, DR. BRIGHT COLLAZO AT [facility]    SKIN TAG REMOVAL N/A 12/20/2017    ANAL SKIN TAG X2, PERFORMED IN OFFICE, DR. LISA ORANTES    TURP / TRANSURETHRAL INCISION / DRAINAGE PROSTATE  2010    Dr. Goodrich             IRF OT ASSESSMENT FLOWSHEET (last 12 hours)       IRF OT Evaluation and Treatment       Row Name 06/16/23 1543 06/16/23 1524       OT Time and Intention    Document Type daily treatment  -CC daily treatment  -KP    Mode of Treatment individual therapy;occupational therapy  -CC individual therapy;occupational therapy  -KP    Patient Effort adequate  -CC good  -KP    Symptoms Noted During/After Treatment fatigue  -CC fatigue  -KP      Row Name 06/16/23 1524          General Information    General Observations of Patient pt sitting in wc in room. nsg present.  -KP     Existing Precautions/Restrictions cardiac;fall;sternal  -KP       Row Name 06/16/23 1543 06/16/23 1524       Pain Assessment    Pretreatment Pain Rating 0/10 - no pain  -CC 0/10 - no pain  -KP    Posttreatment Pain Rating 0/10 - no pain  -CC 0/10 - no pain  -KP      Row Name 06/16/23 1543 06/16/23 1524       Cognition/Psychosocial    Affect/Mental Status (Cognition) flat/blunted affect  -CC flat/blunted affect  -KP    Orientation Status (Cognition) oriented to;person;place;situation  -CC oriented to;person;place;situation  -KP    Follows Commands (Cognition) follows one-step commands;over 90% accuracy  -CC follows one-step commands;over 90% accuracy  -KP    Personal Safety Interventions fall prevention program maintained;gait belt;nonskid shoes/slippers when out  of bed  - fall prevention program maintained;gait belt;muscle strengthening facilitated;nonskid shoes/slippers when out of bed  -    Cognitive Function -- attention deficit  -    Attention Deficit (Cognition) -- distractible in noisy environment;focused/sustained attention;arousal/alertness  -      Row Name 06/16/23 1543          Bathing    [place] Level (Bathing) bathing skills;lower body;upper body;contact guard assist  -     Position (Bathing) supported sitting;supported standing;sink side  -[facility] Name 06/16/23 1543          Upper Body Dressing    [place] Level (Upper Body Dressing) upper body dressing skills;doff;don;front opening garment;buttons;minimum assist (75% or more patient effort)  -CC     Position (Upper Body Dressing) supported sitting  -CC     Set-up Assistance (Upper Body Dressing) obtain clothing  -[facility] Name 06/16/23 1543          Lower Body Dressing    [place] Level (Lower Body Dressing) don;shoes/slippers;socks;maximum assist (25% patient effort)  -CC     Position (Lower Body Dressing) supported sitting;supported standing  -     Set-up Assistance (Lower Body Dressing) obtain clothing  -[facility] Name 06/16/23 1543          Grooming    [place] Level (Grooming) grooming skills;deodorant application;hair care, combing/brushing;oral care regimen;wash face, hands;minimum assist (75% patient effort)  -CC     Position (Grooming) supported sitting  -CC     Set-up Assistance (Grooming) obtain supplies  -[facility] Name 06/16/23 1543 06/16/23 1524       Bed Mobility    Sit-Supine [place] (Bed Mobility) verbal cues;moderate assist (50% patient effort)  - --    Comment, (Bed Mobility) -- pt sitting EOB  -[facility] Name 06/16/23 1524          Functional Mobility    Functional Mobility- Ind. Level set up required;contact guard assist  -     Functional Mobility- Device walker, front-wheeled  -     Functional Mobility- Comment in room, to BR, to  bed  -KP       Row Name 06/16/23 1543          Chair-Bed Transfer    Chair-Bed [place] (Transfers) verbal cues;minimum assist (75% patient effort);contact guard  -CC     Assistive Device (Chair-Bed Transfers) wheelchair  -CC       Row Name 06/16/23 1543 06/16/23 1524       Sit-Stand Transfer    Sit-Stand [place] (Transfers) set up;contact guard  -CC set up;contact guard  -KP    Assistive Device (Sit-Stand Transfers) wheelchair  -CC wheelchair;walker, front-wheeled  -      Row Name 06/16/23 1543 06/16/23 1524       Stand-Sit Transfer    Stand-Sit [place] (Transfers) set up;contact guard  -CC set up;contact guard  -    Assistive Device (Stand-Sit Transfers) wheelchair  - wheelchair;walker, front-wheeled  [facility] Name 06/16/23 1524          Toilet Transfer    Type (Toilet Transfer) --  stood at toilet  -[facility] Name 06/16/23 1524          Shoulder (Therapeutic Exercise)    Shoulder Strengthening (Therapeutic Exercise) left;right;scapular stabilization;sitting;2 lb free weight;10 repetitions;2 sets  -[facility] Name 06/16/23 1524          Elbow/Forearm (Therapeutic Exercise)    Elbow/Forearm Strengthening (Therapeutic Exercise) right;left;flexion;extension;supination;pronation;sitting;2 lb free weight;10 repetitions;3 sets  -KP       Row Name 06/16/23 1524          Wrist (Therapeutic Exercise)    Wrist Strengthening (Therapeutic Exercise) right;left;flexion;extension;2 lb free weight;10 repetitions;3 sets  -KP       Row Name 06/16/23 1524          Hand (Therapeutic Exercise)    Hand Strengthening (Therapeutic Exercise) right;left;finger flexion;finger extension; strengthening;10 repetitions;3 sets  -[facility] Name 06/16/23 1524          Balance    Static Sitting Balance standby assist  -     Dynamic Sitting Balance standby assist  -     Static Standing Balance contact guard  -     Dynamic Standing Balance contact guard  -[facility] Name 06/16/23 1543 06/16/23 1524        Positioning and Restraints    Pre-Treatment Position sitting in chair/recliner  -CC sitting in chair/recliner  -KP    Post Treatment Position bed  -CC bed  -KP    In Bed fowlers;call light within reach;encouraged to call for assist;exit alarm on;with family/caregiver  -CC notified nsg;call light within reach;encouraged to call for assist;exit alarm on;sitting EOB  -KP      Row Name 06/16/23 1524          Daily Progress Summary (OT)    Overall Progress Toward Functional Goals (OT) progressing toward functional goals as expected  -KP               User Key  (r) = Recorded By, (t) = Taken By, (c) = Cosigned By      Initials Name Effective Dates    CC Kathrine Pinedo, OTR 06/16/21 -     KP Dolores Hilario, OTR 05/31/23 -                      Occupational Therapy Education       Title: PT OT SLP Therapies (In Progress)       Topic: Occupational Therapy (In Progress)       Point: ADL training (In Progress)       Description:   Instruct learner(s) on proper safety adaptation and remediation techniques during self care or transfers.   Instruct in proper use of assistive devices.                  Learning Progress Summary             Patient Acceptance, E, NR by AF at 6/13/2023 1527    Comment: benefits of therapy, endurance   Family Acceptance, E, NR by AF at 6/13/2023 1527    Comment: benefits of therapy, endurance                         Point: Home exercise program (In Progress)       Description:   Instruct learner(s) on appropriate technique for monitoring, assisting and/or progressing therapeutic exercises/activities.                  Learning Progress Summary             Patient Acceptance, E, NR by AF at 6/13/2023 1527    Comment: benefits of therapy, endurance   Family Acceptance, E, NR by AF at 6/13/2023 1527    Comment: benefits of therapy, endurance                         Point: Precautions (In Progress)       Description:   Instruct learner(s) on prescribed precautions during self-care and functional  transfers.                  Learning Progress Summary             Patient Acceptance, E, NR by AF at 6/13/2023 1527    Comment: benefits of therapy, endurance   Family Acceptance, E, NR by AF at 6/13/2023 1527    Comment: benefits of therapy, endurance                         Point: Body mechanics (In Progress)       Description:   Instruct learner(s) on proper positioning and spine alignment during self-care, functional mobility activities and/or exercises.                  Learning Progress Summary             Patient Acceptance, E, NR by AF at 6/13/2023 1527    Comment: benefits of therapy, endurance   Family Acceptance, E, NR by AF at 6/13/2023 1527    Comment: benefits of therapy, endurance                                         User Key       Initials Effective Dates Name Provider Type Discipline     06/16/21 -  Candy Davis, OTR Occupational Therapist OT                        OT Recommendation and Plan                         Time Calculation:      Time Calculation- OT       Row Name 06/16/23 1542 06/16/23 1529          Time Calculation- OT    OT Start Time 0900  -CC 1430  -KP     OT Stop Time 0930  -CC 1515  -KP     OT Time Calculation (min) 30 min  -CC 45 min  -KP               User Key  (r) = Recorded By, (t) = Taken By, (c) = Cosigned By      Initials Name Provider Type    CC Kathrine Pinedo OTR Occupational Therapist    Dolores Brown OTSIMONA Occupational Therapist                  Therapy Charges for Today       Code Description Service Date Service Provider Modifiers Qty    [id number]  OT SELF CARE/MGMT/TRAIN EA 15 MIN 6/16/2023 Kathrine Pinedo OTR GO 2                     HI Morgan  6/16/2023

## 2023-06-16 NOTE — THERAPY TREATMENT NOTE
Acute Care - Physical Therapy Treatment Note       [facility]     Patient Name: Vernon Solorzano  : 1950  MRN: [id number]    Today's Date: 2023                    Admit Date: 2023      Visit Dx:     ICD-10-CM ICD-9-CM   1. Impaired functional mobility, balance, gait, and endurance  Z74.09 V49.89       Patient Active Problem List   Diagnosis    DM (diabetes mellitus), type 2    Mixed hyperlipidemia    Mild intermittent asthma without complication    New onset of congestive heart failure    Nonrheumatic mitral valve regurgitation    Chest pain    Essential hypertension    Mitral valve disease    Acute ischemic left MCA stroke       Past Medical History:   Diagnosis Date    Allergic rhinitis     Arthritis     BPH (benign prostatic hyperplasia)     Diabetes mellitus     TYPE 2    Foraminal stenosis of cervical region     C5-C6    GERD (gastroesophageal reflux disease)     Hemorrhoids     History of urinary retention     S/P TURP    Hyperlipidemia     Macular degeneration     PING (obstructive sleep apnea) 2016    MILD, SEES DR. AMARILIS MORGAN       Past Surgical History:   Procedure Laterality Date    CARDIAC CATHETERIZATION N/A 2023    Procedure: Right Heart Cath;  Surgeon: Corey Gaffney MD;  Location: [facility] CATH INVASIVE LOCATION;  Service: Cardiology;  Laterality: N/A;    CARDIAC CATHETERIZATION N/A 2023    Procedure: Left Heart Cath;  Surgeon: Corey Gaffney MD;  Location: [facility] CATH INVASIVE LOCATION;  Service: Cardiology;  Laterality: N/A;    CARDIAC CATHETERIZATION N/A 2023    Procedure: Left ventriculography;  Surgeon: Corey Gaffney MD;  [facility];  Service: Cardiology;  Laterality: N/A;    CARDIAC CATHETERIZATION N/A 2023    Procedure: Coronary angiography;  Surgeon: Corey Gaffney MD;  Location: [facility] CATH INVASIVE LOCATION;  Service: Cardiology;  Laterality: N/A;    COLONOSCOPY N/A     WNL PER PT, NO RECORDS,      COLONOSCOPY N/A 04/07/2009    DR. GORGE BENAVIDEZ AT [place]    MITRAL VALVE REPAIR/REPLACEMENT N/A 5/24/2023    Procedure: MITRAL VALVE REPAIR/REPLACEMENT TRICUSPID VALVE REPAIR/REPLACEMENT TRANSESOPHAGEAL ECHOCARDIOGRAM WITH ANESTHESIA;  Surgeon: George Frey MD;  Location: [facility];  Service: Cardiothoracic;  Laterality: N/A;    PROSTATE SURGERY N/A 11/12/2010    TURP, DR. BRIGHT COLLAZO AT [facility]    SKIN TAG REMOVAL N/A 12/20/2017    ANAL SKIN TAG X2, PERFORMED IN OFFICE, DR. LISA ORANTES    TURP / TRANSURETHRAL INCISION / DRAINAGE PROSTATE  2010    Dr. Goodrich       PT ASSESSMENT (last 12 hours)       IRF PT Evaluation and Treatment       Row Name 06/16/23 1417 06/16/23 0917       PT Time and Intention    Document Type daily treatment  -LB daily treatment  -MD    Mode of Treatment physical therapy  -LB physical therapy  -MD    Patient/Family/Caregiver Comments/Observations pt seated edge of bed  -LB Pt sitting in bed showing no signs of acute distress.  -MD      Row Name 06/16/23 1417          General Information    Existing Precautions/Restrictions fall;cardiac;sternal  -LB       Row Name 06/16/23 1417 06/16/23 0917       Pain Assessment    Pretreatment Pain Rating 0/10 - no pain  -LB 0/10 - no pain  -MD    Posttreatment Pain Rating 0/10 - no pain  -LB --      Row Name 06/16/23 1417 06/16/23 0917       Cognition/Psychosocial    Affect/Mental Status (Cognition) flat/blunted affect;low arousal/lethargic  -LB --    Orientation Status (Cognition) -- oriented to;person;place;situation  -MD    Follows Commands (Cognition) -- follows one-step commands;delayed response/completion;increased processing time needed;repetition of directions required;75-90% accuracy  -MD    Personal Safety Interventions -- fall prevention program maintained;gait belt  -MD      Row Name 06/16/23 0917          Bed Mobility    Supine-Sit [place] (Bed Mobility) standby assist  -MD       Row Name 06/16/23 1417 06/16/23  0917       Bed-Chair Transfer    Bed-Chair [place] (Transfers) verbal cues;contact guard  -LB verbal cues;contact guard  -MD    Assistive Device (Bed-Chair Transfers) wheelchair  -LB walker, front-wheeled;wheelchair  -MD      Row Name 06/16/23 1417 06/16/23 0917       Sit-Stand Transfer    Sit-Stand [place] (Transfers) verbal cues;contact guard  -LB verbal cues;contact guard  -MD    Assistive Device (Sit-Stand Transfers) wheelchair  -LB wheelchair;walker, front-wheeled  -MD      Row Name 06/16/23 1417 06/16/23 0917       Stand-Sit Transfer    Stand-Sit [place] (Transfers) verbal cues;contact guard  -LB verbal cues;contact guard  -MD    Assistive Device (Stand-Sit Transfers) wheelchair  -LB walker, front-wheeled;wheelchair  -MD      Row Name 06/16/23 0917          Gait/Stairs (Locomotion)    [place] Level (Gait) verbal cues;contact guard  -MD     Assistive Device (Gait) walker, front-wheeled  -MD     Distance in Feet (Gait) 80'x3  -MD     Deviations/Abnormal Patterns (Gait) base of support, wide;festinating/shuffling;gait speed decreased;stride length decreased  -MD     Bilateral Gait Deviations forward flexed posture;heel strike decreased  -MD       Row Name 06/16/23 1417          Hip (Therapeutic Exercise)    Hip Strengthening (Therapeutic Exercise) bilateral;aBduction;aDduction;marching while standing;10 repetitions  -LB       Row Name 06/16/23 1417          Ankle (Therapeutic Exercise)    Ankle Strengthening (Therapeutic Exercise) --  alternating toe taps to 4 inch step with UE support  -LB       Row Name 06/16/23 1417 06/16/23 0917       Positioning and Restraints    Pre-Treatment Position sitting in chair/recliner  -LB sitting in chair/recliner  -MD    Post Treatment Position -- wheelchair  -MD    In Wheelchair -- sitting;exit alarm on;with SLP  -MD              User Key  (r) = Recorded By, (t) = Taken By, (c) = Cosigned By      Initials Name Provider Type    Lara Duenas, PT Physical  Therapist    Damaris Farrell, PT Physical Therapist                  Wound 05/24/23 0737 sternal Incision (Active)   Dressing Appearance open to air 06/15/23 2000   Closure Approximated 06/15/23 2000   Base scab 06/16/23 0924   Periwound dry;intact 06/16/23 0924   [place] Temperature warm 06/15/23 2000   [place] Skin Turgor soft 06/15/23 2000   Edges rolled/closed 06/15/23 2000   Drainage Amount none 06/15/23 2000   Care, Wound cleansed with 06/16/23 0924     Physical Therapy Education       Title: PT OT SLP Therapies (In Progress)       Topic: Physical Therapy (In Progress)       Point: Mobility training (Done)       Learning Progress Summary             Patient Acceptance, E, VU by MD at 6/16/2023 0919    Acceptance, E, VU by MD at 6/15/2023 1140    Eager, E,D,TB, NR by  at 6/14/2023 1459    Acceptance, E, VU by MD at 6/13/2023 1012    Acceptance, E, VU by MD at 6/12/2023 1018    Acceptance, E, VU by MD at 6/10/2023 1040    Acceptance, E, VU by MD at 6/8/2023 1017    Acceptance, E,TB,D, VU,DU,NR by  at 6/7/2023 1513    Comment: POC, Goals, safety with mobility.   Significant Other Acceptance, E,TB,D, VU,DU,NR by  at 6/7/2023 1513    Comment: POC, Goals, safety with mobility.                         Point: Home exercise program (In Progress)       Learning Progress Summary             Patient Eager, E,D,TB, NR by  at 6/14/2023 1459    Acceptance, E,TB,D, VU,DU,NR by  at 6/7/2023 1513    Comment: POC, Goals, safety with mobility.   Significant Other Acceptance, E,TB,D, VU,DU,NR by  at 6/7/2023 1513    Comment: POC, Goals, safety with mobility.                                         User Key       Initials Effective Dates Name Provider Type Discipline    MD 06/16/21 -  Damaris Francis, PT Physical Therapist PT     06/16/21 -  Elizabeth Levin PT Physical Therapist PT                    PT Recommendation and Plan                          Time Calculation:      PT Charges       Row Name 06/16/23 160  06/16/23 0917          Time Calculation    Start Time 1430  -LB 0830  -MD     Stop Time 1500  -LB 0900  -MD     Time Calculation (min) 30 min  -LB 30 min  -MD     PT Received On 06/16/23  -LB 06/16/23  -MD     PT - Next Appointment 06/17/23  -LB 06/17/23  -MD               User Key  (r) = Recorded By, (t) = Taken By, (c) = Cosigned By      Initials Name Provider Type    Lara Duenas PT Physical Therapist    MD TitoDamaris PT Physical Therapist                    Therapy Charges for Today       Code Description Service Date Service Provider Modifiers Qty    [id number] HC PT THER PROC EA 15 MIN 6/16/2023 Lara Cm PT GP 2              PT G-Codes  AM-PAC 6 Clicks Score (PT): 14      Lara Cm, PT  6/16/2023

## 2023-06-16 NOTE — PROGRESS NOTES
LOS: 10 days   Patient Care Team:  Liza Oconnell III, NP-C as PCP - General (Family Medicine)  Corey Lao Jr., MD (Inactive) as Consulting Physician (Urology)      KRIS TAYLOR  1950      ADMITTING DIAGNOSIS:  Stroke  Valvular heart disease status post repair/replacement      Subjective     Rash is better  No chest pain  Fatigue continues to be an issue.  He reports walking around in the gym couple of times but has to take a rest break.  Does not describe any chest pain.  He is not able to appreciate whether the edema is different in the legs.        Objective     Vitals:    06/16/23 0645   BP:    Pulse: 80   Resp: 18   Temp:    SpO2:        PHYSICAL EXAM:   MENTAL STATUS -  AWAKE / ALERT  HEENT- NCAT, PUPILS EQUALLY ROUND, SCLERAE ANICTERIC, CONJUNCTIVAE PINK, OP MOIST, NO JVD, EARS UNREMARKABLE EXTERNALLY  LUNGS - CTA, NO WHEEZES, RALES OR RHONCHI  Sternotomy incision -healing  HEART-sinus with occasional ectopy  ABD - NORMOACTIVE BOWEL SOUNDS, SOFT, NT.   EXT -1+ edema bilateral lower extremities  NEURO -oriented to person place time and situation.    Speech is fluent.    Skin: Faint rash left abdominal wall and left upper lateral chest-improved      MEDICATIONS  Scheduled Meds:apixaban, 5 mg, Oral, Q12H  aspirin, 81 mg, Oral, Daily  atorvastatin, 40 mg, Oral, Nightly  calcium 500 mg vitamin D 5 mcg (200 UT), 1 tablet, Oral, Daily  guaifenesin, 400 mg, Oral, Q8H  insulin glargine, 15 Units, Subcutaneous, Nightly  insulin glargine, 40 Units, Subcutaneous, Daily  insulin lispro, 3-14 Units, Subcutaneous, TID With Meals  metoprolol succinate XL, 25 mg, Oral, Q24H  pantoprazole, 40 mg, Oral, Q AM  torsemide, 40 mg, Oral, Daily      Continuous Infusions:   PRN Meds:.  acetaminophen **OR** [DISCONTINUED] acetaminophen **OR** [DISCONTINUED] acetaminophen    bisacodyl    calcium carbonate    diphenhydrAMINE-zinc acetate    hydrocortisone-bacitracin-zinc oxide-nystatin     ipratropium-albuterol    ipratropium-albuterol    senna-docusate sodium    traMADol      RESULTS  Glucose   Date/Time Value Ref Range Status   06/16/2023 0726 130 70 - 130 mg/dL Final     Comment:     Meter: [id number] : [id number] Victorino Luna NA   06/15/2023 2032 378 (H) 70 - 130 mg/dL Final     Comment:     Meter: [id number] : [id number] Kwasi Rodrigueziana NA   06/15/2023 1619 141 (H) 70 - 130 mg/dL Final     Comment:     Meter: [id number] : [id number] Summer Rodriguezyana NA   06/15/2023 1053 223 (H) 70 - 130 mg/dL Final     Comment:     Meter: [id number] : [id number] Clark Cortés NA   06/15/2023 0720 146 (H) 70 - 130 mg/dL Final     Comment:     Meter: [id number] : [id number] Popeye Phelps NA   06/14/2023 2127 271 (H) 70 - 130 mg/dL Final     Comment:     Meter: [id number] : [id number] Villalpando Sveta NA   06/14/2023 1633 150 (H) 70 - 130 mg/dL Final     Comment:     Meter: [id number] : [id number] Gustavo Corriganlucaszina NA   06/14/2023 1058 184 (H) 70 - 130 mg/dL Final     Comment:     Meter: [id number] : [id number] Clark Milana ESTEVAN     Results from last 7 days   Lab Units 06/16/23  0816 06/14/23  0602 06/12/23  0544   WBC 10*3/mm3 8.57 9.09 9.42   HEMOGLOBIN g/dL 10.4* 10.0* 11.0*   HEMATOCRIT % 30.6* 30.0* 33.1*   PLATELETS 10*3/mm3 272 297 318       Results from last 7 days   Lab Units 06/16/23  0816 06/15/23  0550 06/14/23  0602   SODIUM mmol/L 140 137 136   POTASSIUM mmol/L 3.6 3.5 3.9   CHLORIDE mmol/L 100 100 99   CO2 mmol/L 27.6 28.3 24.8   BUN mg/dL 12 12 12   CREATININE mg/dL 1.30* 1.34* 1.37*   CALCIUM mg/dL 9.0 8.8 8.8   GLUCOSE mg/dL 122* 124* 130*        Latest Reference Range & Units 05/23/23 14:29   Hemoglobin A1C 4.80 - 5.60 % 9.20 (H)   Total Cholesterol 0 - 200 mg/dL 155   HDL Cholesterol 40 - 60 mg/dL 41   LDL Cholesterol  0 - 100 mg/dL 81   Triglycerides 0 - 150 mg/dL 197 (H)   (H): Data is abnormally high     Latest Reference Range & Units 05/31/23 08:03    25 Hydroxy, Vitamin D 30.0 - 100.0 ng/ml 20.0 (L)   (L): Data is abnormally low  ASSESSMENT and PLAN    Acute ischemic left MCA stroke    Status post CVA-Scattered  punctate restricted diffusion acute infarct in the bilateral frontal and   left parietal occipital cortices.        Impaired Cognition/impaired mobility/impaired self-care     Aphasia-resolved     Right hand apraxia-resolved     Encephalopathy-improving     Dysphagia/nutrition-healthy heart 2-3 g sodium, consistent carb, no mixed consistency, regular texture, nectar thick liquid  June 7-videofluoroscopic swallow study planned tomorrow  June 8-swallow study today-advance to regular solid, no mixed consistency, thin liquids by cup.  No straws.     Stroke prophylaxis-Eliquis/aspirin/atorvastatin     History of severe mitral regurgitation and annular dilatation, moderate to severe tricuspid regurgitation-  status post May 24, 2023 - 1. Mitral valve repair with a 28 mm physio II ring annuloplasty with residual mild to moderate MR  2.  Mitral valve replacement with a 29 mm Schwartz II porcine prosthesis  3.  Tricuspid valve repair with 28 mm physio tricuspid ring  4.  Left atrial appendage endocardial closure     Atrial flutter-status post cardioversion June 2, 2023 anticoagulation with Eliquis  Metoprolol     Volume status-torsemide     Severe pulmonary hypertension  Obstructive sleep apnea     Interstitial lung disease status post COVID-19 infection-steroid taper per pulmonology  DuoNebs 4 times a day  Chronic steroid use with cushingoid syndrome  Prednisone 5 mg every other day     Leukocytosis-reactive/steroid administration     Postoperative anemia     Feongneqsiimjcfk-qhgtubckmvd-fdrkmrih     Chronic kidney disease stage III-baseline creatinine 1.2     Diabetes mellitus type 2-Jardiance/Lantus/sliding scale insulin-uncontrolled  June 7-on Jardiance and Lantus 40 units daily.  Elevated blood glucose.  Received 21 units on sliding scale insulin yesterday.   Increase Lantus by adding 10 units nightly.  June 14-increase Lantus from 10 to 15 units nightly.  Continue 40 units every morning.  Off Jardiance secondary to renal function.     BPH/urinary retention-history of TURP  June 14-void 300 cc with postvoid residual 205 cc.    Renal insufficiency - June 9 - Creatinine increased to 1.46. Meds reviewed. Is on Jardiance - already received dose today. Will get input from Nephrology.  June 12-with the increase in his creatinine, Jardiance discontinued  June 14-torsemide added for edema     Pain management-tramadol-/Tylenol Dk report reviewed    Nutrition-addendum-June 9 6:43 PM-decreased appetite-would like to have zinc level checked in the morning    Rash on L axilla  June 13-does not wish to have anything as it is not causing patient discomfort     Sleep  June 13-offered melatonin, but declined       TEAM CONF - JUNE 8 - ENDURANCE POOR. BED MOD. TRANSFERS MOD. GAIT 25 FEET MOD ASSIST RW. SHOWER TRANSFERS MOD A.   BATH MOD ASSIST. LBD MAX. UBD MIN. TOILETING MAX.   Patient is currently on nectar liquids VFSS TODAY.   BIMS SUMMARY SCORE: 9 Moderately impaired   DIFFICULTY WITH SUSTAINED AND FOCAL ATTENTION.  CONTINENT / INCONTINENT  ELOS - 2 -3 WEEKS    TEAM CONF - Kari 15 - BED MOD. TRANSFERS MIN. 4 STAIRS MIN X 2. SATS 97% OR HIGHER WITH GAIT WITH FATIGUE, GAIT 80 FEET MIN ASSIST RW. TOILET TRANSFERS MIN. SHOWER TRANSFERS MIN MOD. BATH MIN. LBD MIN MOD. UBD MIN. GROOMING SET UP. TOILETING MIN MOD. DRESSING TAKES 30 MINUTES WIETH SLOW PROCESSING. EDEMA - JOBST STOCKINGS. MILD DYSPHGAIA, REG THIN BY CUP, NO STRAWS. CLQT MODERATE DEFICITS, DIFFICULTY WITH ATTENTION AND MEMORY, PROLONGED PROCESSING SPEED. BNE FOLLOWING. VARIABLE INTAKE - DIETITIAN FOLLOWING. RASH ON ABD AND LEFT CHEST, POSSIBLY CONTACT DERMATITIS. TORSEMIDE ADDED.  ELOS - TWO WEEKS.        Now admit for comprehensive acute inpatient rehabilitation .  This would be an interdisciplinary program with physical  therapy 1 hour,  occupational therapy 1 hour, and speech therapy 1 hour, 5 days a week.  Rehabilitation nursing for carryover, monitoring of neurologic, cardiac, pulmonary, diabetes   status, bowel and bladder, and skin  Ongoing physician follow-up.  Weekly team conferences.  Goals are to achieve a level of contact-guard with  mobility and self-care and improved activity tolerance.   Rehabilitation prognosis fair.  Medical prognosis defer to cardiology.  Estimated length of stay is approximately 2 weeks, but is only an estimation.   The patient's functional status and clinical status is unchanged from preadmission assessment and the patient continues appropriate for acute inpatient rehabilitation.  Goal is for home with outpatient cardiac rehab   therapies.  Barrier to discharge: Impaired cognition mobility self-care- work on follow, executive function, conditioning, transfers, progressive ambulation, ADLs to overcome.           Tu Silver MD, PGY-2   6/13/2023  12:10 PM       During rounds, used appropriate personal protective equipment including mask and gloves.  Additional gown if indicated.  Mask used was standard procedure mask. Appropriate PPE was worn during the entire visit.  Hand hygiene was completed before and after.

## 2023-06-16 NOTE — THERAPY TREATMENT NOTE
Inpatient Rehabilitation - Physical Therapy Treatment Note       [facility]     Patient Name: Vernon Solorzano  : 1950  MRN: [id number]    Today's Date: 2023                    Admit Date: 2023      Visit Dx:     ICD-10-CM ICD-9-CM   1. Impaired functional mobility, balance, gait, and endurance  Z74.09 V49.89       Patient Active Problem List   Diagnosis    DM (diabetes mellitus), type 2    Mixed hyperlipidemia    Mild intermittent asthma without complication    New onset of congestive heart failure    Nonrheumatic mitral valve regurgitation    Chest pain    Essential hypertension    Mitral valve disease    Acute ischemic left MCA stroke       Past Medical History:   Diagnosis Date    Allergic rhinitis     Arthritis     BPH (benign prostatic hyperplasia)     Diabetes mellitus     TYPE 2    Foraminal stenosis of cervical region     C5-C6    GERD (gastroesophageal reflux disease)     Hemorrhoids     History of urinary retention     S/P TURP    Hyperlipidemia     Macular degeneration     PING (obstructive sleep apnea) 2016    MILD, SEES DR. AMARILIS MORGAN       Past Surgical History:   Procedure Laterality Date    CARDIAC CATHETERIZATION N/A 2023    Procedure: Right Heart Cath;  Surgeon: Corey Gaffney MD;  Location: [facility] CATH INVASIVE LOCATION;  Service: Cardiology;  Laterality: N/A;    CARDIAC CATHETERIZATION N/A 2023    Procedure: Left Heart Cath;  Surgeon: Corey Gaffney MD;  Location: [facility] CATH INVASIVE LOCATION;  Service: Cardiology;  Laterality: N/A;    CARDIAC CATHETERIZATION N/A 2023    Procedure: Left ventriculography;  Surgeon: Corey Gaffney MD;  [facility];  Service: Cardiology;  Laterality: N/A;    CARDIAC CATHETERIZATION N/A 2023    Procedure: Coronary angiography;  Surgeon: Corey Gaffney MD;  Location: [facility] CATH INVASIVE LOCATION;  Service: Cardiology;  Laterality: N/A;    COLONOSCOPY N/A     WNL PER PT, NO  RECORDS,     COLONOSCOPY N/A 04/07/2009    DR. GORGE BENAVIDEZ AT [place]    MITRAL VALVE REPAIR/REPLACEMENT N/A 5/24/2023    Procedure: MITRAL VALVE REPAIR/REPLACEMENT TRICUSPID VALVE REPAIR/REPLACEMENT TRANSESOPHAGEAL ECHOCARDIOGRAM WITH ANESTHESIA;  Surgeon: George Frey MD;  Location: [facility];  Service: Cardiothoracic;  Laterality: N/A;    PROSTATE SURGERY N/A 11/12/2010    [facility]    SKIN TAG REMOVAL N/A 12/20/2017    ANAL SKIN TAG X2, PERFORMED IN OFFICE, DR. LISA ORANTES    TURP / TRANSURETHRAL INCISION / DRAINAGE PROSTATE  2010    Dr. Goodrich       PT ASSESSMENT (last 12 hours)       IRF PT Evaluation and Treatment       Row Name 06/16/23 0917          PT Time and Intention    Document Type daily treatment  -MD     Mode of Treatment physical therapy  -MD     Patient/Family/Caregiver Comments/Observations Pt sitting in bed showing no signs of acute distress.  -MD       Row Name 06/16/23 0917          Pain Assessment    Pretreatment Pain Rating 0/10 - no pain  -MD       Row Name 06/16/23 0917          Cognition/Psychosocial    Orientation Status (Cognition) oriented to;person;place;situation  -MD     Follows Commands (Cognition) follows one-step commands;delayed response/completion;increased processing time needed;repetition of directions required;75-90% accuracy  -MD     Personal Safety Interventions fall prevention program maintained;gait belt  -MD       Row Name 06/16/23 0917          Bed Mobility    Supine-Sit [place] (Bed Mobility) standby assist  -MD       Row Name 06/16/23 0917          Bed-Chair Transfer    Bed-Chair [place] (Transfers) verbal cues;contact guard  -MD     Assistive Device (Bed-Chair Transfers) walker, front-wheeled;wheelchair  -MD       Row Name 06/16/23 0917          Sit-Stand Transfer    Sit-Stand [place] (Transfers) verbal cues;contact guard  -MD     Assistive Device (Sit-Stand Transfers) wheelchair;walker, front-wheeled  -MD        Row Name 06/16/23 0917          Stand-Sit Transfer    Stand-Sit [place] (Transfers) verbal cues;contact guard  -MD     Assistive Device (Stand-Sit Transfers) walker, front-wheeled;wheelchair  -MD       Row Name 06/16/23 0917          Gait/Stairs (Locomotion)    [place] Level (Gait) verbal cues;contact guard  -MD     Assistive Device (Gait) walker, front-wheeled  -MD     Distance in Feet (Gait) 80'x3  -MD     Deviations/Abnormal Patterns (Gait) base of support, wide;festinating/shuffling;gait speed decreased;stride length decreased  -MD     Bilateral Gait Deviations forward flexed posture;heel strike decreased  -MD       Row Name 06/16/23 0917          Positioning and Restraints    Pre-Treatment Position sitting in chair/recliner  -MD     Post Treatment Position wheelchair  -MD     In Wheelchair sitting;exit alarm on;with SLP  -MD               User Key  (r) = Recorded By, (t) = Taken By, (c) = Cosigned By      Initials Name Provider Type    Damaris Farrell, PT Physical Therapist                  Wound 05/24/23 0737 sternal Incision (Active)   Dressing Appearance open to air 06/15/23 2000   Closure Approximated 06/15/23 2000   Base scab 06/16/23 0924   Periwound dry;intact 06/16/23 0924   Periwound Temperature warm 06/15/23 2000   Periwound Skin Turgor soft 06/15/23 2000   Edges rolled/closed 06/15/23 2000   Drainage Amount none 06/15/23 2000   Care, Wound cleansed with 06/16/23 0924     Physical Therapy Education       Title: PT OT SLP Therapies (In Progress)       Topic: Physical Therapy (In Progress)       Point: Mobility training (Done)       Learning Progress Summary             Patient Acceptance, E, VU by MD at 6/16/2023 0919    Acceptance, E, VU by MD at 6/15/2023 1140    SUE Palomo,D,TB, NR by  at 6/14/2023 1459    Acceptance, E, VU by MD at 6/13/2023 1012    Acceptance, E, VU by MD at 6/12/2023 1018    Acceptance, E, VU by MD at 6/10/2023 1040    Acceptance, E, VU by MD at 6/8/2023 1017     Acceptance, E,TB,D, VU,DU,NR by  at 6/7/2023 1513    Comment: POC, Goals, safety with mobility.   Significant Other Acceptance, E,TB,D, VU,DU,NR by  at 6/7/2023 1513    Comment: POC, Goals, safety with mobility.                         Point: Home exercise program (In Progress)       Learning Progress Summary             Patient Eager, E,D,TB, NR by  at 6/14/2023 1459    Acceptance, E,TB,D, VU,DU,NR by  at 6/7/2023 1513    Comment: POC, Goals, safety with mobility.   Significant Other Acceptance, E,TB,D, VU,DU,NR by  at 6/7/2023 1513    Comment: POC, Goals, safety with mobility.                                         User Key       Initials Effective Dates Name Provider Type Ben COOPER 06/16/21 -  Damaris Francis, PT Physical Therapist PT     06/16/21 -  Elizabeth Levin PT Physical Therapist PT                    PT Recommendation and Plan                          Time Calculation:      PT Charges       Row Name 06/16/23 0917             Time Calculation    Start Time 0830  -MD      Stop Time 0900  -MD      Time Calculation (min) 30 min  -MD      PT Received On 06/16/23  -MD      PT - Next Appointment 06/17/23  -MD                User Key  (r) = Recorded By, (t) = Taken By, (c) = Cosigned By      Initials Name Provider Type    Damaris Farrell, PT Physical Therapist                    Therapy Charges for Today       Code Description Service Date Service Provider Modifiers Qty    35639375316 HC PT THER PROC EA 15 MIN 6/15/2023 Damaris Francis, PT GP 1    43513463632 HC PT THERAPEUTIC ACT EA 15 MIN 6/15/2023 Damaris Francis, PT GP 1    81259441863 HC GAIT TRAINING EA 15 MIN 6/15/2023 Damaris Francis, PT GP 2    03118867190 HC GAIT TRAINING EA 15 MIN 6/16/2023 Damaris Francis, PT GP 1    08701050810 HC PT THERAPEUTIC ACT EA 15 MIN 6/16/2023 Damaris Francis, PT GP 1              PT G-Codes  AM-PAC 6 Clicks Score (PT): 14      Damaris Francis PT  6/16/2023

## 2023-06-16 NOTE — PLAN OF CARE
Goal Outcome Evaluation:      Slept fair. Meds with water. Continent of B&B. No c/o pain. Transfers x1 assist. Follows commands, but slow responding at times.

## 2023-06-16 NOTE — PROGRESS NOTES
Inpatient Rehabilitation Plan of Care Note    Plan of Care  Care Plan Reviewed - Updates as Follows    Sphincter Control    Performed Intervention(s)  Offer toileting/ timed voids  Monitor I&O      Safety    Performed Intervention(s)  Safety monitoring during functional activities  Environmental set- upto reduce risk      Psychosocial    Performed Intervention(s)  Allow patient to verbalize needs and concerns    Signed by: Juli Perry RN

## 2023-06-16 NOTE — THERAPY TREATMENT NOTE
Inpatient Rehabilitation - Speech Language Pathology Treatment Note    [facility]     Patient Name: Vernon Solorzano  : 1950  MRN: [id number]    Today's Date: 2023                   Admit Date: 2023       Visit Dx:      ICD-10-CM ICD-9-CM   1. Impaired functional mobility, balance, gait, and endurance  Z74.09 V49.89       Patient Active Problem List   Diagnosis    DM (diabetes mellitus), type 2    Mixed hyperlipidemia    Mild intermittent asthma without complication    New onset of congestive heart failure    Nonrheumatic mitral valve regurgitation    Chest pain    Essential hypertension    Mitral valve disease    Acute ischemic left MCA stroke       Past Medical History:   Diagnosis Date    Allergic rhinitis     Arthritis     BPH (benign prostatic hyperplasia)     Diabetes mellitus     TYPE 2    Foraminal stenosis of cervical region     C5-C6    GERD (gastroesophageal reflux disease)     Hemorrhoids     History of urinary retention     S/P TURP    Hyperlipidemia     Macular degeneration     PING (obstructive sleep apnea) 2016    MILD, SEES DR. AMARILIS MORGAN       Past Surgical History:   Procedure Laterality Date    CARDIAC CATHETERIZATION N/A 2023    Procedure: Right Heart Cath;  Surgeon: Corey Gaffney MD;  Location: [facility] CATH INVASIVE LOCATION;  Service: Cardiology;  Laterality: N/A;    CARDIAC CATHETERIZATION N/A 2023    Procedure: Left Heart Cath;  Surgeon: Corey Gaffney MD;  [facility]IVE LOC[facility];  Service: Cardiology;  Laterality: N/A;    CARDIAC CATHETERIZATION N/A 2023    Procedure: Left ventriculography;  Surgeon: Corey Gaffney MD;  [facility];  Service: Cardiology;  Laterality: N/A;    CARDIAC CATHETERIZATION N/A 2023    Procedure: Coronary angiography;  Surgeon: Corey Gaffney MD;  [facility];  Service: Cardiology;  Laterality: N/A;    COLONOSCOPY N/A     WNL PER  PT, NO RECORDS,     COLONOSCOPY N/A 04/07/2009    DR. GORGE BENAVIDEZ AT [place]    MITRAL VALVE REPAIR/REPLACEMENT N/A 5/24/2023    Procedure: MITRAL VALVE REPAIR/REPLACEMENT TRICUSPID VALVE REPAIR/REPLACEMENT TRANSESOPHAGEAL ECHOCARDIOGRAM WITH ANESTHESIA;  Surgeon: George Frey MD;  Location: [facility];  Service: Cardiothoracic;  Laterality: N/A;    PROSTATE SURGERY N/A 11/12/2010    TURP, DR. BRIGHT COLLAZO AT [facility]    SKIN TAG REMOVAL N/A 12/20/2017    ANAL SKIN TAG X2, PERFORMED IN OFFICE, DR. LISA ORANTES    TURP / TRANSURETHRAL INCISION / DRAINAGE PROSTATE  2010    Dr. Goodrich       SLP Recommendation and Plan                                                            SLP EVALUATION (last 72 hours)       SLP SLC Evaluation       Row Name 06/16/23 1230 06/16/23 0900 06/15/23 1500 06/15/23 1000 06/14/23 1230       Communication Assessment/Intervention    Document Type therapy note (daily note)  -SR therapy note (daily note)  -SR therapy note (daily note)  -SR therapy note (daily note)  -SR therapy note (daily note)  -CR    Subjective Information no complaints  -SR no complaints  -SR no complaints  -SR no complaints  -SR no complaints  -CR    Patient Observations alert;cooperative;agree to therapy  -SR alert;cooperative;agree to therapy  -SR alert;cooperative;agree to therapy  -SR alert;cooperative;agree to therapy  -SR alert;cooperative  -CR    Patient/Family/Caregiver Comments/Observations -- Pt's spouse present for AM therapy session. Reviewed ST goals/plan of care.  -SR -- -- --    Patient Effort adequate  -SR adequate  -SR good  -SR good  -SR adequate  -CR    Symptoms Noted During/After Treatment none  -SR none  -SR none  -SR none  -SR none  -CR       General Information    Patient Profile Reviewed -- -- -- -- yes  -CR       Pain Scale: Numbers Pre/Post-Treatment    Pretreatment Pain Rating 0/10 - no pain  -SR 0/10 - no pain  -SR 0/10 - no pain  -SR 0/10 - no pain  -SR 0/10 - no pain  -CR     Posttreatment Pain Rating 0/10 - no pain  -SR 0/10 - no pain  -SR 0/10 - no pain  -SR 0/10 - no pain  -SR 0/10 - no pain  -CR      Row Name 06/14/23 0830                   Communication Assessment/Intervention    Document Type therapy note (daily note)  -CR        Subjective Information no complaints  -CR        Patient Observations alert;cooperative  -CR        Patient Effort adequate  -CR        Symptoms Noted During/After Treatment none  -CR           General Information    Patient Profile Reviewed yes  -CR           Pain Scale: Numbers Pre/Post-Treatment    Pretreatment Pain Rating 0/10 - no pain  -CR        Posttreatment Pain Rating 0/10 - no pain  -CR                  User Key  (r) = Recorded By, (t) = Taken By, (c) = Cosigned By      Initials Name Effective Dates    SR Latisha Morris CCC-SLP 11/10/22 -     Mariely Corcoran CF-SLP 05/31/23 -                        EDUCATION    The patient has been educated in the following areas:       Cognitive Impairment Dysphagia (Swallowing Impairment) Oral Care/Hydration.             SLP GOALS       Row Name 06/16/23 1230 06/16/23 0900 06/15/23 1500       (STG) Pharyngeal Strengthening Exercise Goal 1 (SLP)    Activity (Pharyngeal Strengthening Goal 1, SLP) increase timing;increase closure at entrance to airway/closure of airway at glottis  -SR -- increase timing;increase closure at entrance to airway/closure of airway at glottis  -SR    Increase Timing hard effortful swallow;Mendelsohn  -SR -- hard effortful swallow;Mendelsohn  -SR    Increase Closure at Entrance to Airway/Closure of Airway at Glottis hard effortful swallow;chin tuck against resistance (CTAR);sarita  -SR -- hard effortful swallow;chin tuck against resistance (CTAR);sarita  -SR    Reform/Accuracy (Pharyngeal Strengthening Goal 1, SLP) with minimal cues (75-90% accuracy)  -SR -- with minimal cues (75-90% accuracy)  -SR    Time Frame (Pharyngeal Strengthening Goal 1, SLP) by discharge  -SR --  by discharge  -SR    Progress/Outcomes (Pharyngeal Strengthening Goal 1, SLP) goal ongoing  -SR -- goal ongoing  -SR    Comment (Pharyngeal Strengthening Goal 1, SLP) Reviewed swallow exercises with patient and spouse. Verbalized understanding of exercises. Patient completed 3 sets of 20 repetitions and 1 set sustained (1 minute hold) of chin tuck against resistance. Completed 15 repetitions of effortful swallow and 10 repetitions of sarita exercise given MIN cues. Slow initiation noted. Patient tolerated 7/8 sips of thin via cup with no overt s/sx of aspiration or penetration; throat clear x1.  -SR -- Patient required MIN cues to recall safe swallow strategies (i.e, small bites, slow rate, no straw, alternate solids with liquids). Participated in dysphagia exercises this date. Completed 20 repetitions of effortful swallow given MIN cues. Tolerated 8/10 sips of thin via cup with no overt s/sx of aspiration or penetration; throat clear x2. Completed 3 sets of 15 repetitions of chin tuck against resistance with CTAR ball.  -SR       Attention Goal 1 (SLP)    Improve Attention by Goal 1 (SLP) -- complete selective attention task;complete sustained attention task;70%;with moderate cues (50-74%)  -SR --    Time Frame (Attention Goal 1, SLP) -- by discharge  -SR --    Progress/Outcomes (Attention Goal 1, SLP) -- goal ongoing  -SR --       Executive Functional Skills Goal 1 (SLP)    Improve Executive Function Skills Goal 1 (SLP) -- demonstrate awareness of deficit;home management activity;80%;with moderate cues (50-74%)  -SR --    Time Frame (Executive Function Skills Goal 1, SLP) -- by discharge  -SR --    Progress/Outcomes (Executive Function Skills Goal 1, SLP) -- goal ongoing  -SR --    Comment (Executive Function Skills Goal 1, SLP) -- Patient answered questions about medication dosage and determined the appropriate amount for target age/weight with 80% acc given MIN-MOD cues. Intermittent distractions present  throughout the task (ie, door open with background noise). Increased difficulty noted with sustained attention. Prolonged response time. Patient required about 25 minutes to complete 8 question task.  -SR --      Row Name 06/15/23 1000 06/14/23 1230 06/14/23 0830       Orientation Goal 1 (SLP)    Improve Orientation Through Goal 1 (SLP) -- -- demonstrating orientation to day;demonstrating orientation to month;demonstrating orientation to year;demonstrating orientation to place;demonstrating orientation to disease/impairment;use environmental aids to assist with orientation;80%;with minimal cues (75-90%)  -CR    Time Frame (Orientation Goal 1, SLP) -- -- by discharge  -CR    Progress/Outcomes (Orientation Goal 1, SLP) -- -- goal ongoing  -CR    Comment (Orientation Goal 1, SLP) -- -- Oriented to month, date, year, and place. Required MIN cues to identify correct time. Pt spontaneously utilized daily schedule and board in room to fully orient to month/date/year.  -CR       Memory Skills Goal 1 (SLP)    Improve Memory Skills Through Goal 1 (SLP) use memory strategies;listen to a paragraph and answer questions;recall details of the day;80%;with moderate cues (50-74%)  -SR use memory strategies;listen to a paragraph and answer questions;recall details of the day;80%;with moderate cues (50-74%)  -CR --    Time Frame (Memory Skills Goal 1, SLP) by discharge  -SR by discharge  -CR --    Progress/Outcomes (Memory Skills Goal 1, SLP) goal ongoing  -SR goal ongoing  -CR --    Comment (Memory Skills Goal 1, SLP) Auditory memory with voicemail messages; patient listened to voicemail and answered questions about the content with 50% acc given NO cues; 90% acc given MIN cues.  -SR 3-item mental manipulation task targeting working memory and immediate memory completed with 100% when given MOD cues. Patient required multiple repetitions and extended time throughout task.  -CR --       Reasoning Goal 1 (SLP)    Improve Reasoning  Through Goal 1 (SLP) -- complete deductive reasoning task;complete basic reasoning task;complete mental flexibility task;50%;with moderate cues (50-74%)  -CR --    Time Frame (Reasoning Goal 1, SLP) -- by discharge  -CR --    Progress/Outcomes (Reasoning Goal 1, SLP) -- goal ongoing  -CR --    Comment (Reasoning Goal 1, SLP) -- Patient completed 12-cell mildly complex deductive reasoning task targeting attention and reasoning with 25% acc with MIN cues, increased to 75% acc when given MOD cues. Patient required extended time to complete task. When clinician instructed for patient to read aloud, processing times appeared faster.  -CR --       Executive Functional Skills Goal 1 (SLP)    Improve Executive Function Skills Goal 1 (SLP) demonstrate awareness of deficit;home management activity;80%;with moderate cues (50-74%)  -SR -- demonstrate awareness of deficit;home management activity;80%;with moderate cues (50-74%)  -CR    Time Frame (Executive Function Skills Goal 1, SLP) by discharge  -SR -- by discharge  -CR    Progress/Outcomes (Executive Function Skills Goal 1, SLP) goal ongoing  -SR -- goal ongoing  -CR    Comment (Executive Function Skills Goal 1, SLP) Simple medication management task completed during session. Patient followed verbal directions and sorted 5 mock medications by day/time with 80% acc given MOD cues. Repetition of instruction increased patient's accuracy throughout the activity.  -SR -- Patient answered questions about a monthly calendar with 40% acc given NO cues; 80% acc given MIN cues and extended time. Pt required around ~25 minutes to complete 6-question task.  -CR      Row Name 06/14/23 0800             Executive Functional Skills Goal 1 (SLP)    Improve Executive Function Skills Goal 1 (SLP) --  -CR      Time Frame (Executive Function Skills Goal 1, SLP) --  -CR      Progress/Outcomes (Executive Function Skills Goal 1, SLP) --  -CR      Comment (Executive Function Skills Goal 1, SLP) --   -CR                User Key  (r) = Recorded By, (t) = Taken By, (c) = Cosigned By      Initials Name Provider Type     Latisha Morris CCC-SLP Speech and Language Pathologist    Mariely Corcoran CF-SLP Speech and Language Pathologist                                Time Calculation:        Time Calculation- SLP       Row Name 06/16/23 1434 06/16/23 1147          Time Calculation- SLP    SLP Start Time 1230  -SR 0900  -SR     SLP Stop Time 1300  -SR 0930  -SR     SLP Time Calculation (min) 30 min  -SR 30 min  -SR               User Key  (r) = Recorded By, (t) = Taken By, (c) = Cosigned By      Initials Name Provider Type    Latisha Haas CCC-SLP Speech and Language Pathologist                      Therapy Charges for Today       Code Description Service Date Service Provider Modifiers Qty    24241810358 HC ST TREATMENT SWALLOW 2 6/15/2023 Latisha Morris CCC-SLP GN 1    31868825863 HC ST DEV OF COGN SKILLS INITIAL 15 MIN 6/15/2023 Latisha Morris CCCSolangeSLP  1    49495087594 HC ST DEV OF COGN SKILLS EACH ADDT'L 15 MIN 6/15/2023 Latisha Morris CCCSolangeSLP  1    48313976018 HC ST TREATMENT SWALLOW 2 6/16/2023 Latisha Morris CCC-SLP GN 1    31427557579 HC ST DEV OF COGN SKILLS INITIAL 15 MIN 6/16/2023 Latisha Morris CCC-SLP  1    76447135312 HC ST DEV OF COGN SKILLS EACH ADDT'L 15 MIN 6/16/2023 Latisha Morris CCC-SLP  1                             JENNIFFER Hobbs  6/16/2023

## 2023-06-16 NOTE — THERAPY TREATMENT NOTE
Inpatient Rehabilitation - Occupational Therapy Treatment Note    [facility]     Patient Name: Vernon Solorzano  : 1950  MRN: [id number]    Today's Date: 2023                 Admit Date: 2023         ICD-10-CM ICD-9-CM   1. Impaired functional mobility, balance, gait, and endurance  Z74.09 V49.89       Patient Active Problem List   Diagnosis   • DM (diabetes mellitus), type 2   • Mixed hyperlipidemia   • Mild intermittent asthma without complication   • New onset of congestive heart failure   • Nonrheumatic mitral valve regurgitation   • Chest pain   • Essential hypertension   • Mitral valve disease   • Acute ischemic left MCA stroke       Past Medical History:   Diagnosis Date   • Allergic rhinitis    • Arthritis    • BPH (benign prostatic hyperplasia)    • Diabetes mellitus     TYPE 2   • Foraminal stenosis of cervical region     C5-C6   • GERD (gastroesophageal reflux disease)    • Hemorrhoids    • History of urinary retention     S/P TURP   • Hyperlipidemia    • Macular degeneration    • PING (obstructive sleep apnea) 2016    MILD, SEES DR. AMARILIS MORGAN       Past Surgical History:   Procedure Laterality Date   • CARDIAC CATHETERIZATION N/A 2023    Procedure: Right Heart Cath;  Surgeon: Corey Gaffney MD;  Location: [facility] CATH INVASIVE LOCATION;  Service: Cardiology;  Laterality: N/A;   • CARDIAC CATHETERIZATION N/A 2023    Procedure: Left Heart Cath;  Surgeon: Corey Gaffney MD;  Location: [facility] CATH INVASIVE LOCATION;  Service: Cardiology;  Laterality: N/A;   • CARDIAC CATHETERIZATION N/A 2023    Procedure: Left ventriculography;  Surgeon: Corey Gaffney MD;  [facility];  Service: Cardiology;  Laterality: N/A;   • CARDIAC CATHETERIZATION N/A 2023    Procedure: Coronary angiography;  Surgeon: Corey Gaffney MD;  Location:  [facility] CATH INVASIVE LOCATION;  Service: Cardiology;  Laterality: N/A;   • COLONOSCOPY N/A     WNL  PER PT, NO RECORDS,    • COLONOSCOPY N/A 04/07/2009    DR. GORGE BENAVIDEZ AT [place]   • MITRAL VALVE REPAIR/REPLACEMENT N/A 5/24/2023    Procedure: MITRAL VALVE REPAIR/REPLACEMENT TRICUSPID VALVE REPAIR/REPLACEMENT TRANSESOPHAGEAL ECHOCARDIOGRAM WITH ANESTHESIA;  Surgeon: George Frey MD;  Location: [facility];  Service: Cardiothoracic;  Laterality: N/A;   • PROSTATE SURGERY N/A 11/12/2010    TURP, DR. BRIGHT COLLAZO AT [facility]   • SKIN TAG REMOVAL N/A 12/20/2017    ANAL SKIN TAG X2, PERFORMED IN OFFICE, DR. LISA ORANTES   • TURP / TRANSURETHRAL INCISION / DRAINAGE PROSTATE  2010    Dr. Goodrich             IRF OT ASSESSMENT FLOWSHEET (last 12 hours)     IRF OT Evaluation and Treatment     Row Name 06/16/23 1524          OT Time and Intention    Document Type daily treatment  -     Mode of Treatment individual therapy;occupational therapy  -KP     Patient Effort good  -     Symptoms Noted During/After Treatment fatigue  -     Row Name 06/16/23 1524          General Information    General Observations of Patient pt sitting in wc in room. nsg present.  -     Existing Precautions/Restrictions cardiac;fall;sternal  -     Row Name 06/16/23 1524          Pain Assessment    Pretreatment Pain Rating 0/10 - no pain  -     Posttreatment Pain Rating 0/10 - no pain  -     Row Name 06/16/23 1524          Cognition/Psychosocial    Affect/Mental Status (Cognition) flat/blunted affect  -     Orientation Status (Cognition) oriented to;person;place;situation  -     Follows Commands (Cognition) follows one-step commands;over 90% accuracy  -     Personal Safety Interventions fall prevention program maintained;gait belt;muscle strengthening facilitated;nonskid shoes/slippers when out of bed  -     Cognitive Function attention deficit  -     Attention Deficit (Cognition) distractible in noisy environment;focused/sustained attention;arousal/alertness  -     Row Name 06/16/23 1524          Bed  Mobility    Comment, (Bed Mobility) pt sitting EOB  -KP     Row Name 06/16/23 1524          Functional Mobility    Functional Mobility- Ind. Level set up required;contact guard assist  -     Functional Mobility- Device walker, front-wheeled  -     Functional Mobility- Comment in room, to BR, to bed  -KP     Row Name 06/16/23 1524          Sit-Stand Transfer    Sit-Stand [place] (Transfers) set up;contact guard  -     Assistive Device (Sit-Stand Transfers) wheelchair;walker, front-wheeled  -KP     Row Name 06/16/23 1524          Stand-Sit Transfer    Stand-Sit [place] (Transfers) set up;contact guard  -     Assistive Device (Stand-Sit Transfers) wheelchair;walker, front-wheeled  -KP     Row Name 06/16/23 1524          Toilet Transfer    Type (Toilet Transfer) --  stood at toilet  -KP     Row Name 06/16/23 1524          Shoulder (Therapeutic Exercise)    Shoulder Strengthening (Therapeutic Exercise) left;right;scapular stabilization;sitting;2 lb free weight;10 repetitions;2 sets  -KP     Row Name 06/16/23 1524          Elbow/Forearm (Therapeutic Exercise)    Elbow/Forearm Strengthening (Therapeutic Exercise) right;left;flexion;extension;supination;pronation;sitting;2 lb free weight;10 repetitions;3 sets  -KP     Row Name 06/16/23 1524          Wrist (Therapeutic Exercise)    Wrist Strengthening (Therapeutic Exercise) right;left;flexion;extension;2 lb free weight;10 repetitions;3 sets  -KP     Row Name 06/16/23 1524          Hand (Therapeutic Exercise)    Hand Strengthening (Therapeutic Exercise) right;left;finger flexion;finger extension; strengthening;10 repetitions;3 sets  -KP     Row Name 06/16/23 1524          Balance    Static Sitting Balance standby assist  -     Dynamic Sitting Balance standby assist  -     Static Standing Balance contact guard  -     Dynamic Standing Balance contact guard  -KP     Row Name 06/16/23 1524          Positioning and Restraints    Pre-Treatment Position  sitting in chair/recliner  -KP     Post Treatment Position bed  -KP     In Bed notified nsg;call light within reach;encouraged to call for assist;exit alarm on;sitting EOB  -KP     Row Name 06/16/23 1524          Daily Progress Summary (OT)    Overall Progress Toward Functional Goals (OT) progressing toward functional goals as expected  -           User Key  (r) = Recorded By, (t) = Taken By, (c) = Cosigned By    Initials Name Effective Dates    Dolores Brown, OTR 05/31/23 -                  Occupational Therapy Education     Title: PT OT SLP Therapies (In Progress)     Topic: Occupational Therapy (In Progress)     Point: ADL training (In Progress)     Description:   Instruct learner(s) on proper safety adaptation and remediation techniques during self care or transfers.   Instruct in proper use of assistive devices.              Learning Progress Summary           Patient Acceptance, E, NR by AF at 6/13/2023 1527    Comment: benefits of therapy, endurance   Family Acceptance, E, NR by AF at 6/13/2023 1527    Comment: benefits of therapy, endurance                   Point: Home exercise program (In Progress)     Description:   Instruct learner(s) on appropriate technique for monitoring, assisting and/or progressing therapeutic exercises/activities.              Learning Progress Summary           Patient Acceptance, E, NR by AF at 6/13/2023 1527    Comment: benefits of therapy, endurance   Family Acceptance, E, NR by AF at 6/13/2023 1527    Comment: benefits of therapy, endurance                   Point: Precautions (In Progress)     Description:   Instruct learner(s) on prescribed precautions during self-care and functional transfers.              Learning Progress Summary           Patient Acceptance, E, NR by AF at 6/13/2023 1527    Comment: benefits of therapy, endurance   Family Acceptance, E, NR by AF at 6/13/2023 1527    Comment: benefits of therapy, endurance                   Point: Body  mechanics (In Progress)     Description:   Instruct learner(s) on proper positioning and spine alignment during self-care, functional mobility activities and/or exercises.              Learning Progress Summary           Patient Acceptance, E, NR by AF at 6/13/2023 1527    Comment: benefits of therapy, endurance   Family Acceptance, E, NR by AF at 6/13/2023 1527    Comment: benefits of therapy, endurance                               User Key     Initials Effective Dates Name Provider Type [facility] 06/16/21 -  Candy Davis OTR Occupational Therapist OT                    OT Recommendation and Plan            Daily Progress Summary (OT)  Overall Progress Toward Functional Goals (OT): progressing toward functional goals as expected            Time Calculation:      Time Calculation- OT     Row Name 06/16/23 1529             Time Calculation- OT    OT Start Time 1430  -      OT Stop Time 1515  -      OT Time Calculation (min) 45 min  -            User Key  (r) = Recorded By, (t) = Taken By, (c) = Cosigned By    Initials Name Provider Type     Dolores Hilario OTR Occupational Therapist              Therapy Charges for Today     Code Description Service Date Service Provider Modifiers Qty    [id number]  OT THER PROC EA 15 MIN 6/16/2023 Dolores Hilario OTR GO 3                   HI Denny  6/16/2023

## 2023-06-16 NOTE — PROGRESS NOTES
[facility] Progress Note      Patient Name: Vernon Solorzano  : 1950  MRN: [id number]  Primary Care Physician:  Liza Oconnell III, RACHAEL  Date of admission: 2023    Subjective     Interval History:   Follow up CKD III.   Complaining of mild shortness of breath.  Noted lower extremity edema  Review of Systems:   As noted above    Objective     Vitals:   Temp:  [96.4 °F (35.8 °C)-97.7 °F (36.5 °C)] 97.7 °F (36.5 °C)  Heart Rate:  [] 80  Resp:  [18-20] 18  BP: (121-122)/(75-76) 122/75    Intake/Output Summary (Last 24 hours) at 2023 1230  Last data filed at 2023 0900  Gross per 24 hour   Intake 360 ml   Output 300 ml   Net 60 ml         Physical Exam:    General Appearance: alert, sitting up in bed. Thoughts clearer and speech better.   Skin: warm and dry  HEENT: oral mucosa normal, nonicteric sclera  Neck: supple, no JVD  Lungs: Clear to auscultation.   Heart: RRR, normal S1 and S2  Abdomen: soft, nontender, + bs, distended.  Extremities 1+ lower ext edema, . HOLLY hose    Scheduled Meds:     apixaban, 5 mg, Oral, Q12H  aspirin, 81 mg, Oral, Daily  atorvastatin, 40 mg, Oral, Nightly  calcium 500 mg vitamin D 5 mcg (200 UT), 1 tablet, Oral, Daily  guaifenesin, 400 mg, Oral, Q8H  insulin glargine, 15 Units, Subcutaneous, Nightly  insulin glargine, 40 Units, Subcutaneous, Daily  insulin lispro, 3-14 Units, Subcutaneous, TID With Meals  metoprolol succinate XL, 25 mg, Oral, Q24H  pantoprazole, 40 mg, Oral, Q AM  torsemide, 40 mg, Oral, Daily      IV Meds:        Results Reviewed:   I have personally reviewed the results from the time of this admission to 2023 12:30 EDT     Results from last 7 days   Lab Units 23  0816 06/15/23  0550 23  0602   SODIUM mmol/L 140 137 136   POTASSIUM mmol/L 3.6 3.5 3.9   CHLORIDE mmol/L 100 100 99   CO2 mmol/L 27.6 28.3 24.8   BUN mg/dL 12 12 12   CREATININE mg/dL 1.30* 1.34* 1.37*   CALCIUM mg/dL 9.0 8.8  8.8   GLUCOSE mg/dL 122* 124* 130*         Estimated Creatinine Clearance: 67.6 mL/min (A) (by C-G formula based on SCr of 1.3 mg/dL (H)).    Results from last 7 days   Lab Units 06/16/23  0816 06/15/23  0550 06/13/23  0549   MAGNESIUM mg/dL  --  2.2  --    PHOSPHORUS mg/dL 3.2 4.0 2.4*               Results from last 7 days   Lab Units 06/16/23  0816 06/14/23  0602 06/12/23  0544   WBC 10*3/mm3 8.57 9.09 9.42   HEMOGLOBIN g/dL 10.4* 10.0* 11.0*   PLATELETS 10*3/mm3 272 297 318               Assessment / Plan     ASSESSMENT:  1.  Acute kidney injury on top of CKD III, baseline creatinine 1.2.  Peak 1.68.  Creatinine stable.   2. MR now sp MV replacement, tissue and TV repair, SU closure 5/24/23.  3. Chronic steroid use after COVID 19 PNA.  4. Anemia post op. Hg range 10-11.  .  5. 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 Rehab .  6. DM2  7. Aflutter.Cardioversion 6/2/23. On eliquis and metoprolol.   PLAN:  1.  He continues to be hypervolemic.  2.  We will increase torsemide to 60 mg p.o. daily.  3.  Repeat labs in a.m.  4.  Limit salt intake to less than 2 g per 24 hours  Ludwig Marquez MD  06/16/23  12:30 EDT    [facility]  [phone]

## 2023-06-17 LAB
GLUCOSE BLDC GLUCOMTR-MCNC: 101 MG/DL (ref 70–130)
GLUCOSE BLDC GLUCOMTR-MCNC: 154 MG/DL (ref 70–130)
GLUCOSE BLDC GLUCOMTR-MCNC: 218 MG/DL (ref 70–130)
GLUCOSE BLDC GLUCOMTR-MCNC: 317 MG/DL (ref 70–130)

## 2023-06-17 NOTE — PROGRESS NOTES
[facility] Progress Note      Patient Name: Vrenon Solorzano  : 1950  MRN: [id number]  Primary Care Physician:  Liza Oconnell III, RACHAEL  Date of admission: 2023    Subjective     Interval History:   Follow up CKD III.     Less short of breath today.  Denies any nausea or vomiting.  Edema seems to be improving.  No labs today.  Review of Systems:   As noted above    Objective     Vitals:   Temp:  [97.2 °F (36.2 °C)-97.6 °F (36.4 °C)] 97.6 °F (36.4 °C)  Heart Rate:  [88-98] 94  Resp:  [18] 18  BP: (110-124)/(70-77) 110/77    Intake/Output Summary (Last 24 hours) at 2023 0945  Last data filed at 2023 0400  Gross per 24 hour   Intake 480 ml   Output 900 ml   Net -420 ml         Physical Exam:    General Appearance: alert, sitting up in bed. Thoughts clearer and speech better.   Skin: warm and dry  HEENT: oral mucosa normal, nonicteric sclera  Neck: supple, no JVD  Lungs: Clear to auscultation.   Heart: RRR, normal S1 and S2  Abdomen: soft, nontender, + bs, distended.  Extremities 1+ lower ext edema, . HOLLY hose    Scheduled Meds:     apixaban, 5 mg, Oral, Q12H  aspirin, 81 mg, Oral, Daily  atorvastatin, 40 mg, Oral, Nightly  calcium 500 mg vitamin D 5 mcg (200 UT), 1 tablet, Oral, Daily  guaifenesin, 400 mg, Oral, Q8H  insulin glargine, 15 Units, Subcutaneous, Nightly  insulin glargine, 40 Units, Subcutaneous, Daily  insulin lispro, 3-14 Units, Subcutaneous, TID With Meals  metoprolol succinate XL, 25 mg, Oral, Q24H  pantoprazole, 40 mg, Oral, Q AM  torsemide, 60 mg, Oral, Daily      IV Meds:        Results Reviewed:   I have personally reviewed the results from the time of this admission to 2023 09:45 EDT     Results from last 7 days   Lab Units 23  0816 06/15/23  0550 06/14/23  0602   SODIUM mmol/L 140 137 136   POTASSIUM mmol/L 3.6 3.5 3.9   CHLORIDE mmol/L 100 100 99   CO2 mmol/L 27.6 28.3 24.8   BUN mg/dL 12 12 12   CREATININE mg/dL 1.30*  1.34* 1.37*   CALCIUM mg/dL 9.0 8.8 8.8   GLUCOSE mg/dL 122* 124* 130*         Estimated Creatinine Clearance: 66.8 mL/min (A) (by C-G formula based on SCr of 1.3 mg/dL (H)).    Results from last 7 days   Lab Units 06/16/23  0816 06/15/23  0550 06/13/23  0549   MAGNESIUM mg/dL  --  2.2  --    PHOSPHORUS mg/dL 3.2 4.0 2.4*               Results from last 7 days   Lab Units 06/16/23  0816 06/14/23  0602 06/12/23  0544   WBC 10*3/mm3 8.57 9.09 9.42   HEMOGLOBIN g/dL 10.4* 10.0* 11.0*   PLATELETS 10*3/mm3 272 297 318               Assessment / Plan     ASSESSMENT:  1.  Acute kidney injury on top of CKD III, baseline creatinine 1.2.  Peak 1.68.  Creatinine stable.   2. MR now sp MV replacement, tissue and TV repair, SU closure 5/24/23.  3. Chronic steroid use after COVID 19 PNA.  4. Anemia post op. Hg range 10-11.  .  5. 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 Rehab .  6. DM2  7. Aflutter.Cardioversion 6/2/23. On eliquis and metoprolol.   PLAN:  1.  No labs today.  We will continue current diuretic therapy for the time being awaiting repeat labs.  2.  Edema seems to be slightly better and his breathing is improved.  3.  Labs in a.m.  Ludwig Marquez MD  06/17/23  09:45 EDT    [facility]  [phone]

## 2023-06-17 NOTE — THERAPY TREATMENT NOTE
Inpatient Rehabilitation - Physical Therapy Treatment Note       [facility]     Patient Name: Vernon Solorzano  : 1950  MRN: [id number]    Today's Date: 2023                    Admit Date: 2023      Visit Dx:     ICD-10-CM ICD-9-CM   1. Impaired functional mobility, balance, gait, and endurance  Z74.09 V49.89       Patient Active Problem List   Diagnosis    DM (diabetes mellitus), type 2    Mixed hyperlipidemia    Mild intermittent asthma without complication    New onset of congestive heart failure    Nonrheumatic mitral valve regurgitation    Chest pain    Essential hypertension    Mitral valve disease    Acute ischemic left MCA stroke       Past Medical History:   Diagnosis Date    Allergic rhinitis     Arthritis     BPH (benign prostatic hyperplasia)     Diabetes mellitus     TYPE 2    Foraminal stenosis of cervical region     C5-C6    GERD (gastroesophageal reflux disease)     Hemorrhoids     History of urinary retention     S/P TURP    Hyperlipidemia     Macular degeneration     PING (obstructive sleep apnea) 2016    MILD, SEES DR. AMARILIS MORGAN       Past Surgical History:   Procedure Laterality Date    CARDIAC CATHETERIZATION N/A 2023    Procedure: Right Heart Cath;  Surgeon: Corey Gaffney MD;  Location: [facility] CATH INVASIVE LOCATION;  Service: Cardiology;  Laterality: N/A;    CARDIAC CATHETERIZATION N/A 2023    Procedure: Left Heart Cath;  Surgeon: Corey Gaffney MD;  Location: [facility] CATH INVASIVE LOCATION;  Service: Cardiology;  Laterality: N/A;    CARDIAC CATHETERIZATION N/A 2023    Procedure: Left ventriculography;  Surgeon: Corey Gaffney MD;  [facility];  Service: Cardiology;  Laterality: N/A;    CARDIAC CATHETERIZATION N/A 2023    Procedure: Coronary angiography;  Surgeon: Corey Gaffney MD;  Location: [facility] CATH INVASIVE LOCATION;  Service: Cardiology;  Laterality: N/A;    COLONOSCOPY N/A     WNL PER PT, NO  RECORDS,     COLONOSCOPY N/A 04/07/2009    DR. GORGE BENAVIDEZ AT [place]    MITRAL VALVE REPAIR/REPLACEMENT N/A 5/24/2023    Procedure: MITRAL VALVE REPAIR/REPLACEMENT TRICUSPID VALVE REPAIR/REPLACEMENT TRANSESOPHAGEAL ECHOCARDIOGRAM WITH ANESTHESIA;  Surgeon: George Frey MD;  Location: [facility];  Service: Cardiothoracic;  Laterality: N/A;    PROSTATE SURGERY N/A 11/12/2010    TURP, DR. BRIGHT COLLAZO AT [facility]    SKIN TAG REMOVAL N/A 12/20/2017    ANAL SKIN TAG X2, PERFORMED IN OFFICE, DR. LISA ORANTES    TURP / TRANSURETHRAL INCISION / DRAINAGE PROSTATE  2010    Dr. Goodrich       PT ASSESSMENT (last 12 hours)       IRF PT Evaluation and Treatment       Row Name 06/17/23 1235          PT Time and Intention    Document Type daily treatment  -TONI     Mode of Treatment physical therapy  -TONI     Patient/Family/Caregiver Comments/Observations pt up in w/c, wife present; pt c/o some fatigue  -       Row Name 06/17/23 1235          General Information    Existing Precautions/Restrictions cardiac;fall;sternal  -       Row Name 06/17/23 1235          Pain Assessment    Pretreatment Pain Rating 0/10 - no pain  -TONI     Posttreatment Pain Rating 0/10 - no pain  -       Row Name 06/17/23 1235          Cognition/Psychosocial    Affect/Mental Status (Cognition) flat/blunted affect  -     Orientation Status (Cognition) oriented to;person;place;situation  -TONI     Follows Commands (Cognition) follows one-step commands;over 90% accuracy  -     Personal Safety Interventions fall prevention program maintained;gait belt;muscle strengthening facilitated;nonskid shoes/slippers when out of bed  -TONI       Row Name 06/17/23 1235          Bed Mobility    Comment, (Bed Mobility) not tested, up in w/c  -TONI       Row Name 06/17/23 1235          Sit-Stand Transfer    Sit-Stand [place] (Transfers) verbal cues;contact guard  -     Assistive Device (Sit-Stand Transfers) walker, front-wheeled  -       Row Name  06/17/23 1235          Stand-Sit Transfer    Stand-Sit [place] (Transfers) verbal cues;contact guard  -     Assistive Device (Stand-Sit Transfers) walker, front-wheeled  -       Row Name 06/17/23 1235          Gait/Stairs (Locomotion)    [place] Level (Gait) verbal cues;contact guard  -TONI     Assistive Device (Gait) walker, front-wheeled  -TONI     Distance in Feet (Gait) 80' x 2  -     Deviations/Abnormal Patterns (Gait) base of support, wide;festinating/shuffling;gait speed decreased;stride length decreased  -     Bilateral Gait Deviations forward flexed posture;heel strike decreased  -     Gait Assessment/Intervention First walk, stopped and talked 2x and SOA noted; 2nd walk no stopping and better pace.  -       Row Name 06/17/23 1235          Balance    Static Standing Balance contact guard  stood with walker in front of him but did not hold onto it while tapping a balloon for 1 minute.  -       Row Name 06/17/23 1235          Hip (Therapeutic Exercise)    Hip Strengthening (Therapeutic Exercise) --  sidestep at hemibars CG each way  -       Row Name 06/17/23 1235          Ankle (Therapeutic Exercise)    Ankle Strengthening (Therapeutic Exercise) standing;plantarflexion;10 repetitions  B heel raises at hemibars  -       Row Name 06/17/23 1235          Positioning and Restraints    Pre-Treatment Position sitting in chair/recliner  -     Post Treatment Position wheelchair  -     In Bed sitting;with family/caregiver  wife pushed pt back in w/c  -               User Key  (r) = Recorded By, (t) = Taken By, (c) = Cosigned By      Initials Name Provider Type    Nubia Mixon, PT Physical Therapist                  Wound 05/24/23 0737 sternal Incision (Active)   Closure Open to air 06/16/23 2215   Base scab 06/16/23 2215   Periwound dry;intact 06/16/23 2215   Drainage Amount none 06/16/23 2215   Dressing Care open to air 06/16/23 2215     Physical Therapy Education       Title: PT  OT SLP Therapies (In Progress)       Topic: Physical Therapy (In Progress)       Point: Mobility training (Done)       Learning Progress Summary             Patient Acceptance, E,TB, VU,NR by TONI at 6/17/2023 1501    Acceptance, E, VU by MD at 6/16/2023 0919    Acceptance, E, VU by MD at 6/15/2023 1140    Eager, E,D,TB, NR by  at 6/14/2023 1459    Acceptance, E, VU by MD at 6/13/2023 1012    Acceptance, E, VU by MD at 6/12/2023 1018    Acceptance, E, VU by MD at 6/10/2023 1040    Acceptance, E, VU by MD at 6/8/2023 1017    Acceptance, E,TB,D, VU,DU,NR by  at 6/7/2023 1513    Comment: POC, Goals, safety with mobility.   Significant Other Acceptance, E,TB,D, VU,DU,NR by  at 6/7/2023 1513    Comment: POC, Goals, safety with mobility.                         Point: Home exercise program (In Progress)       Learning Progress Summary             Patient Eager, E,D,TB, NR by  at 6/14/2023 1459    Acceptance, E,TB,D, VU,DU,NR by  at 6/7/2023 1513    Comment: POC, Goals, safety with mobility.   Significant Other Acceptance, E,TB,D, VU,DU,NR by  at 6/7/2023 1513    Comment: POC, Goals, safety with mobility.                                         User Key       Initials Effective Dates Name Provider Type Discipline     06/16/21 -  Nubia Orellana, PT Physical Therapist PT    MD 06/16/21 -  Damairs Francis, PT Physical Therapist PT     06/16/21 -  Elizabeth Levin, PT Physical Therapist PT                    PT Recommendation and Plan                          Time Calculation:      PT Charges       Row Name 06/17/23 1501             Time Calculation    Start Time 1235  -      Stop Time 1305  -      Time Calculation (min) 30 min  -      PT Received On 06/17/23  -      PT - Next Appointment 06/19/23  -         Time Calculation- PT    Total Timed Code Minutes- PT 30 minute(s)  -                User Key  (r) = Recorded By, (t) = Taken By, (c) = Cosigned By      Initials Name Provider Type    TONI Orellana,  Nubia GODDARD, PT Physical Therapist                    Therapy Charges for Today       Code Description Service Date Service Provider Modifiers Qty    89446843799 HC PT THER PROC EA 15 MIN 6/17/2023 Nubia Orellana, PT GP 2              PT G-Codes  AM-PAC 6 Clicks Score (PT): 14      Nubia Orellana, PT  6/17/2023

## 2023-06-17 NOTE — PLAN OF CARE
Goal Outcome Evaluation:           Progress: no change  Outcome Evaluation: A&Ox4  but forgetful.  Assist x1 to W/C. +1 edema to ankles/+2 to bilateral feet. Bladders scan showed 400 cc but urinated per urinal 400 within a half hour after scan. Cont B/B. Sternal incision well healed/ laprascopic areas with scabs intact- areas clean/dry / KATHY. Wife at bedside most of am. NO SOB noted today but has been reported as having with activity. Has rested in bed most of day except for noon therapies. Daily weight 258 lbs today.  Lispro insuline needed at am and noon but not at dinner dose.

## 2023-06-17 NOTE — PROGRESS NOTES
Physical Medicine and Rehabilitation Progress Note    Assessment     Acute ischemic left MCA stroke [I63.512]    Subjective     Vernon Solorzano is a 72 y.o. male admitted to inpatient rehabilitation for Acute ischemic left MCA stroke [I63.512]. Chart, overnight events reviewed w/nursing. No complaint this morning. Denies sob, no cp, no fevers and/or chills.     The patient's medical records have been reviewed.    Result Review:  I have personally reviewed the results from the time of this admission to 9/18/2022 13:11 EDT and agree with these findings:  [x]  Laboratory list / accordion  []  Microbiology  []  Radiology  []  EKG/Telemetry   []  Cardiology/Vascular   []  Pathology  []  Old records  []  Other:    Objective     Wt Readings from Last 3 Encounters:   06/17/23 117 kg (258 lb 9.6 oz)   06/06/23 114 kg (252 lb 1.6 oz)   05/15/23 117 kg (257 lb 6.4 oz)     Temp Readings from Last 3 Encounters:   06/17/23 97.8 °F (36.6 °C) (Oral)   06/06/23 97.7 °F (36.5 °C) (Oral)   05/12/23 97.5 °F (36.4 °C) (Oral)     BP Readings from Last 3 Encounters:   06/17/23 113/79   06/06/23 99/66   05/15/23 120/70     Pulse Readings from Last 3 Encounters:   06/17/23 96   06/06/23 94   05/15/23 87       Physical Exam:   Gen: Pleasant, full sentences, NAD. Wife present. Walking w RW.   Pulm: CTAB; no r/r/w; nonlabored   Heart; RRR; no m/r/g appreciated   Abd: soft; NT/ND; +BS   Neuro: Alert; verbal; appropriate   Skin: no rashes on exposed skin   MSK: calves soft   : no Indwelling catheter     FL Video Swallow Single Contrast  VIDEO SWALLOWING EXAMINATION BY SPEECH PATHOLOGY     Clinical: Dysphasia     Video swallowing examination performed under the direction of speech  pathology. Imaging reviewed by radiologist who concurs with the  findings.     Speech pathology summary:VFSS completed with radiologist present, Dr. Montalvo. Patient presents with mild oropharyngeal dysphagia which is  an  improvement from the previous swallow study completed on 5/31. No  penetration/aspiration noted with nectar by spoon, cup, or straw (single  sips). Transient penetration during the swallow with initial trial of  thin by spoon. Progressed to deep penetration with trace aspiration with  further trials of thin by spoon. Sensed by patient with throat clear,  successful in expectorating material from airway. Exhibited penetration  with thin by spoon with chin tuck strategy. No penetration or aspiration  noted with thin by cup. Timely anterior posterior transit with puree.  Pre-spill to the pyriforms with mechanical soft solids. Exhibited  nontransient penetration of thin liquid portion of mixed consistency.  Prolonged mastication with regular solids with trace valleculae residue  post swallow. No penetration or aspiration with regular solids. No  penetration or aspiration with nectar by straw as liquid wash or with  final trials of thin by cup.        FLUOROSCOPY TIME: 1 minute 57 seconds, 3141 images.     This report was finalized on 6/8/2023 3:56 PM by Dr. Sander Montalvo M.D.         Labs  BMP:   Results from last 7 days   Lab Units 06/16/23  0816   SODIUM mmol/L 140   POTASSIUM mmol/L 3.6   CHLORIDE mmol/L 100   CO2 mmol/L 27.6   BUN mg/dL 12   CREATININE mg/dL 1.30*   CALCIUM mg/dL 9.0   GLUCOSE mg/dL 122*     CBC:  Results from last 7 days   Lab Units 06/16/23  0816   WBC 10*3/mm3 8.57   HEMOGLOBIN g/dL 10.4*   HEMATOCRIT % 30.6*   PLATELETS 10*3/mm3 272     UA: No results found for: URINECX    PTT:    Coagulation:                 Scheduled Meds:apixaban, 5 mg, Oral, Q12H  aspirin, 81 mg, Oral, Daily  atorvastatin, 40 mg, Oral, Nightly  calcium 500 mg vitamin D 5 mcg (200 UT), 1 tablet, Oral, Daily  guaifenesin, 400 mg, Oral, Q8H  insulin glargine, 15 Units, Subcutaneous, Nightly  insulin glargine, 40 Units, Subcutaneous, Daily  insulin lispro, 3-14 Units, Subcutaneous, TID With Meals  metoprolol succinate XL,  25 mg, Oral, Q24H  pantoprazole, 40 mg, Oral, Q AM  torsemide, 60 mg, Oral, Daily      Continuous Infusions:   PRN Meds:.  acetaminophen **OR** [DISCONTINUED] acetaminophen **OR** [DISCONTINUED] acetaminophen    bisacodyl    calcium carbonate    diphenhydrAMINE-zinc acetate    hydrocortisone-bacitracin-zinc oxide-nystatin    ipratropium-albuterol    ipratropium-albuterol    senna-docusate sodium    traMADol    Assessment and Plan:  Active Hospital Problems    Diagnosis     **Acute ischemic left MCA stroke    Remains stable.   Continues to meet criteria for IRF w/ care coordinating  Chart reviewed.  Continue therapies  Medical management and education. Renal following.   Ongoing active work on discharge planning

## 2023-06-17 NOTE — PLAN OF CARE
Goal Outcome Evaluation:      Pt is A/Ox4, calm/cooperative, OOB x 1 stand/pivot. Meds taken whole w thin liquids. Pt denied need for pain medication. Pt has been continent. Pt requested prn neb treatment during night.

## 2023-06-17 NOTE — PLAN OF CARE
Goal Outcome Evaluation:           Progress: no change  Outcome Evaluation: A&Ox4  but forgetful.  Assist x1 to W/C. +1 edema to ankles/+2 to bilateral feet. Bladders scan showed 400 cc but urinated per urinal 400 within a half hour after scan. Cont B/B. Sternal incision well healed/ laprascopic areas with scabs intact- areas clean/dry / KATHY. Wife at bedside most of am. NO SOB noted today but has been reported as having with activity. Has rested in bed most of day except for noon therapies. Daily weight 258 lbs today.  Lispro insuline needed at am and noon but not at dinner dose.  Last BM 6/16 at 1am.

## 2023-06-17 NOTE — PLAN OF CARE
Goal Outcome Evaluation:  Plan of Care Reviewed With: patient        Progress: improving  Outcome Evaluation: Chest incision is intact. Extra K given per MD order. Jobst to annabella LEs. No pain. No SOA.

## 2023-06-17 NOTE — PROGRESS NOTES
Inpatient Rehabilitation Plan of Care Note    Plan of Care  Care Plan Reviewed - No updates at this time.    Sphincter Control    Performed Intervention(s)  Offer toileting/ timed voids  Monitor I&O      Safety    Performed Intervention(s)  Safety monitoring during functional activities  Environmental set- upto reduce risk      Psychosocial    Performed Intervention(s)  Allow patient to verbalize needs and concerns    Signed by: Wandy Narayanan,

## 2023-06-18 LAB
ALBUMIN SERPL-MCNC: 3.4 G/DL (ref 3.5–5.2)
ANION GAP SERPL CALCULATED.3IONS-SCNC: 8.5 MMOL/L (ref 5–15)
BUN SERPL-MCNC: 12 MG/DL (ref 8–23)
BUN/CREAT SERPL: 9.2 (ref 7–25)
CALCIUM SPEC-SCNC: 9.1 MG/DL (ref 8.6–10.5)
CHLORIDE SERPL-SCNC: 99 MMOL/L (ref 98–107)
CO2 SERPL-SCNC: 32.5 MMOL/L (ref 22–29)
CREAT SERPL-MCNC: 1.31 MG/DL (ref 0.76–1.27)
EGFRCR SERPLBLD CKD-EPI 2021: 57.1 ML/MIN/1.73
GLUCOSE BLDC GLUCOMTR-MCNC: 121 MG/DL (ref 70–130)
GLUCOSE BLDC GLUCOMTR-MCNC: 121 MG/DL (ref 70–130)
GLUCOSE BLDC GLUCOMTR-MCNC: 212 MG/DL (ref 70–130)
GLUCOSE BLDC GLUCOMTR-MCNC: 282 MG/DL (ref 70–130)
GLUCOSE SERPL-MCNC: 110 MG/DL (ref 65–99)
PHOSPHATE SERPL-MCNC: 3.5 MG/DL (ref 2.5–4.5)
POTASSIUM SERPL-SCNC: 3.7 MMOL/L (ref 3.5–5.2)
SODIUM SERPL-SCNC: 140 MMOL/L (ref 136–145)

## 2023-06-18 NOTE — PROGRESS NOTES
[facility] Progress Note      Patient Name: Vernon Solorzano  : 1950  MRN: [id number]  Primary Care Physician:  Liza Oconnell III, RACHAEL  Date of admission: 2023    Subjective     Interval History:   Follow up CKD III.   Urine output has been around 1900 cc last 24 hours  Continues to be short of breath.  Review of Systems:   As noted above    Objective     Vitals:   Temp:  [96.7 °F (35.9 °C)-98.2 °F (36.8 °C)] 98.2 °F (36.8 °C)  Heart Rate:  [] 101  Resp:  [16-18] 16  BP: (105-125)/(68-83) 105/68    Intake/Output Summary (Last 24 hours) at 2023 1213  Last data filed at 2023 1015  Gross per 24 hour   Intake --   Output 1946.7 ml   Net -1946.7 ml         Physical Exam:    General Appearance: alert, sitting up in bed. Thoughts clearer and speech better.   Skin: warm and dry  HEENT: oral mucosa normal, nonicteric sclera  Neck: supple, no JVD  Lungs: Clear to auscultation.   Heart: RRR, normal S1 and S2  Abdomen: soft, nontender, + bs, distended.  Extremities 1+ lower ext edema  Scheduled Meds:     apixaban, 5 mg, Oral, Q12H  aspirin, 81 mg, Oral, Daily  atorvastatin, 40 mg, Oral, Nightly  calcium 500 mg vitamin D 5 mcg (200 UT), 1 tablet, Oral, Daily  guaifenesin, 400 mg, Oral, Q8H  insulin glargine, 15 Units, Subcutaneous, Nightly  insulin glargine, 40 Units, Subcutaneous, Daily  insulin lispro, 3-14 Units, Subcutaneous, TID With Meals  metoprolol succinate XL, 25 mg, Oral, Q24H  pantoprazole, 40 mg, Oral, Q AM  torsemide, 60 mg, Oral, Daily      IV Meds:        Results Reviewed:   I have personally reviewed the results from the time of this admission to 2023 12:13 EDT     Results from last 7 days   Lab Units 23  0626 23  0816 06/15/23  0550   SODIUM mmol/L 140 140 137   POTASSIUM mmol/L 3.7 3.6 3.5   CHLORIDE mmol/L 99 100 100   CO2 mmol/L 32.5* 27.6 28.3   BUN mg/dL 12 12 12   CREATININE mg/dL 1.31* 1.30* 1.34*   CALCIUM mg/dL  9.1 9.0 8.8   GLUCOSE mg/dL 110* 122* 124*         Estimated Creatinine Clearance: 65.1 mL/min (A) (by C-G formula based on SCr of 1.31 mg/dL (H)).    Results from last 7 days   Lab Units 06/18/23  0626 06/16/23  0816 06/15/23  0550   MAGNESIUM mg/dL  --   --  2.2   PHOSPHORUS mg/dL 3.5 3.2 4.0               Results from last 7 days   Lab Units 06/16/23  0816 06/14/23  0602 06/12/23  0544   WBC 10*3/mm3 8.57 9.09 9.42   HEMOGLOBIN g/dL 10.4* 10.0* 11.0*   PLATELETS 10*3/mm3 272 297 318               Assessment / Plan     ASSESSMENT:  1.  Acute kidney injury on top of CKD III, baseline creatinine 1.2.  Peak 1.68.  Creatinine stable.   2. MR now sp MV replacement, tissue and TV repair, SU closure 5/24/23.  3. Chronic steroid use after COVID 19 PNA.  4. Anemia post op. Hg range 10-11.  .  5. 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 Rehab .  6. DM2  7. Aflutter.Cardioversion 6/2/23. On eliquis and metoprolol.   PLAN:  1.  Patient continues to be hypervolemic.  I believe that he is not compliant with fluid restriction.  2.  We will increase torsemide to 80 mg daily  3.  Place patient on fluid restriction 1500 cc in 24 hours.  4.  Replace electrolytes  5.  Surveillance labs  Ludwig Marquez MD  06/18/23  12:13 EDT    [facility]  [phone]

## 2023-06-18 NOTE — PROGRESS NOTES
Physical Medicine and Rehabilitation Progress Note    Assessment     Acute ischemic left MCA stroke [I63.512]    Subjective     Vernon Solorzano is a 72 y.o. male admitted to inpatient rehabilitation for Acute ischemic left MCA stroke [I63.512]. Chart, overnight events reviewed w/nursing. No complaint this morning. Denies sob, no cp, no fevers and/or chills.     The patient's medical records have been reviewed.    Result Review:  I have personally reviewed the results from the time of this admission to 9/18/2022 13:11 EDT and agree with these findings:  [x]  Laboratory list / accordion  []  Microbiology  []  Radiology  []  EKG/Telemetry   []  Cardiology/Vascular   []  Pathology  []  Old records  []  Other:    Objective     Wt Readings from Last 3 Encounters:   06/18/23 113 kg (248 lb 3.2 oz)   06/06/23 114 kg (252 lb 1.6 oz)   05/15/23 117 kg (257 lb 6.4 oz)     Temp Readings from Last 3 Encounters:   06/18/23 98.2 °F (36.8 °C) (Oral)   06/06/23 97.7 °F (36.5 °C) (Oral)   05/12/23 97.5 °F (36.4 °C) (Oral)     BP Readings from Last 3 Encounters:   06/18/23 133/70   06/06/23 99/66   05/15/23 120/70     Pulse Readings from Last 3 Encounters:   06/18/23 80   06/06/23 94   05/15/23 87       Physical Exam:   Gen: Pleasant, full sentences, NAD.   Pulm: CTAB; no r/r/w; nonlabored   Heart; RRR; no m/r/g appreciated   Abd: soft; NT/ND; +BS   Neuro: Alert; verbal; appropriate   Skin: no rashes on exposed skin   : no Indwelling catheter     FL Video Swallow Single Contrast  VIDEO SWALLOWING EXAMINATION BY SPEECH PATHOLOGY     Clinical: Dysphasia     Video swallowing examination performed under the direction of speech  pathology. Imaging reviewed by radiologist who concurs with the  findings.     Speech pathology summary:VFSS completed with radiologist present, Dr. Montalvo. Patient presents with mild oropharyngeal dysphagia which is an  improvement from the previous swallow study  completed on 5/31. No  penetration/aspiration noted with nectar by spoon, cup, or straw (single  sips). Transient penetration during the swallow with initial trial of  thin by spoon. Progressed to deep penetration with trace aspiration with  further trials of thin by spoon. Sensed by patient with throat clear,  successful in expectorating material from airway. Exhibited penetration  with thin by spoon with chin tuck strategy. No penetration or aspiration  noted with thin by cup. Timely anterior posterior transit with puree.  Pre-spill to the pyriforms with mechanical soft solids. Exhibited  nontransient penetration of thin liquid portion of mixed consistency.  Prolonged mastication with regular solids with trace valleculae residue  post swallow. No penetration or aspiration with regular solids. No  penetration or aspiration with nectar by straw as liquid wash or with  final trials of thin by cup.        FLUOROSCOPY TIME: 1 minute 57 seconds, 3141 images.     This report was finalized on 6/8/2023 3:56 PM by Dr. Sander Montalvo M.D.         Labs  BMP:   Results from last 7 days   Lab Units 06/18/23  0626   SODIUM mmol/L 140   POTASSIUM mmol/L 3.7   CHLORIDE mmol/L 99   CO2 mmol/L 32.5*   BUN mg/dL 12   CREATININE mg/dL 1.31*   CALCIUM mg/dL 9.1   GLUCOSE mg/dL 110*       CBC:  Results from last 7 days   Lab Units 06/16/23  0816   WBC 10*3/mm3 8.57   HEMOGLOBIN g/dL 10.4*   HEMATOCRIT % 30.6*   PLATELETS 10*3/mm3 272       UA: No results found for: URINECX    PTT:    Coagulation:                 Scheduled Meds:apixaban, 5 mg, Oral, Q12H  aspirin, 81 mg, Oral, Daily  atorvastatin, 40 mg, Oral, Nightly  calcium 500 mg vitamin D 5 mcg (200 UT), 1 tablet, Oral, Daily  guaifenesin, 400 mg, Oral, Q8H  insulin glargine, 15 Units, Subcutaneous, Nightly  insulin glargine, 40 Units, Subcutaneous, Daily  insulin lispro, 3-14 Units, Subcutaneous, TID With Meals  metoprolol succinate XL, 25 mg, Oral, Q24H  pantoprazole, 40 mg, Oral,  Q AM  [START ON 6/19/2023] torsemide, 80 mg, Oral, Daily      Continuous Infusions:   PRN Meds:.  acetaminophen **OR** [DISCONTINUED] acetaminophen **OR** [DISCONTINUED] acetaminophen    bisacodyl    calcium carbonate    diphenhydrAMINE-zinc acetate    hydrocortisone-bacitracin-zinc oxide-nystatin    ipratropium-albuterol    ipratropium-albuterol    senna-docusate sodium    traMADol    Assessment and Plan:  Active Hospital Problems    Diagnosis     **Acute ischemic left MCA stroke    Remains stable.   Continues to meet criteria for IRF w/ care coordinating  Chart reviewed.  Continue therapies  Medical management and education. Renal following.   Ongoing active work on discharge planning

## 2023-06-18 NOTE — PROGRESS NOTES
Inpatient Rehabilitation Plan of Care Note    Plan of Care  Care Plan Reviewed - No updates at this time.    Sphincter Control    Performed Intervention(s)  Offer toileting/ timed voids  Monitor I&O      Safety    Performed Intervention(s)  Safety monitoring during functional activities  Environmental set- upto reduce risk      Psychosocial    Performed Intervention(s)  Allow patient to verbalize needs and concerns    Signed by: Mariah Stroud RN

## 2023-06-18 NOTE — PLAN OF CARE
Goal Outcome Evaluation:           Progress: improving  Outcome Evaluation: Alert and oriented, forgetful at times, generalized weakness, incision healed to chest and 2 small scabbed CT sites on abdomen, edema noted to bilateral feet, continent of bowel and bladder, wife at bedside this AM, and friend this afternoon, labored breathing at times, ate well today, blood sugar elevated at lunch time and 8 units of sliding scale insulin given as ordered, no unsafe behaivors this shift, calling out appropriately.

## 2023-06-18 NOTE — PLAN OF CARE
Goal Outcome Evaluation:         Pt is A/Ox4, calm/cooperative, OOB x 1 stand/pivot. Meds taken whole w thin liquids. Pt c/o pain to hands; prn Tylenol given x1. Pt has been continent.

## 2023-06-19 LAB
ALBUMIN SERPL-MCNC: 3.2 G/DL (ref 3.5–5.2)
ANION GAP SERPL CALCULATED.3IONS-SCNC: 11.8 MMOL/L (ref 5–15)
BASOPHILS # BLD AUTO: 0.07 10*3/MM3 (ref 0–0.2)
BASOPHILS NFR BLD AUTO: 0.9 % (ref 0–1.5)
BH CV LOW VAS RIGHT LESSER SAPH VESSEL: 1
BH CV LOWER VASCULAR LEFT COMMON FEMORAL AUGMENT: NORMAL
BH CV LOWER VASCULAR LEFT COMMON FEMORAL COMPETENT: NORMAL
BH CV LOWER VASCULAR LEFT COMMON FEMORAL COMPRESS: NORMAL
BH CV LOWER VASCULAR LEFT COMMON FEMORAL PHASIC: NORMAL
BH CV LOWER VASCULAR LEFT COMMON FEMORAL SPONT: NORMAL
BH CV LOWER VASCULAR RIGHT COMMON FEMORAL AUGMENT: NORMAL
BH CV LOWER VASCULAR RIGHT COMMON FEMORAL COMPETENT: NORMAL
BH CV LOWER VASCULAR RIGHT COMMON FEMORAL COMPRESS: NORMAL
BH CV LOWER VASCULAR RIGHT COMMON FEMORAL PHASIC: NORMAL
BH CV LOWER VASCULAR RIGHT COMMON FEMORAL SPONT: NORMAL
BH CV LOWER VASCULAR RIGHT DISTAL FEMORAL COMPRESS: NORMAL
BH CV LOWER VASCULAR RIGHT GASTRONEMIUS COMPRESS: NORMAL
BH CV LOWER VASCULAR RIGHT GREATER SAPH AK COMPRESS: NORMAL
BH CV LOWER VASCULAR RIGHT GREATER SAPH BK COMPRESS: NORMAL
BH CV LOWER VASCULAR RIGHT LESSER SAPH COMPRESS: NORMAL
BH CV LOWER VASCULAR RIGHT LESSER SAPH THROMBUS: NORMAL
BH CV LOWER VASCULAR RIGHT MID FEMORAL AUGMENT: NORMAL
BH CV LOWER VASCULAR RIGHT MID FEMORAL COMPETENT: NORMAL
BH CV LOWER VASCULAR RIGHT MID FEMORAL COMPRESS: NORMAL
BH CV LOWER VASCULAR RIGHT MID FEMORAL PHASIC: NORMAL
BH CV LOWER VASCULAR RIGHT MID FEMORAL SPONT: NORMAL
BH CV LOWER VASCULAR RIGHT PERONEAL COMPRESS: NORMAL
BH CV LOWER VASCULAR RIGHT POPLITEAL AUGMENT: NORMAL
BH CV LOWER VASCULAR RIGHT POPLITEAL COMPETENT: NORMAL
BH CV LOWER VASCULAR RIGHT POPLITEAL COMPRESS: NORMAL
BH CV LOWER VASCULAR RIGHT POPLITEAL PHASIC: NORMAL
BH CV LOWER VASCULAR RIGHT POPLITEAL SPONT: NORMAL
BH CV LOWER VASCULAR RIGHT POSTERIOR TIBIAL COMPRESS: NORMAL
BH CV LOWER VASCULAR RIGHT PROFUNDA FEMORAL COMPRESS: NORMAL
BH CV LOWER VASCULAR RIGHT PROXIMAL FEMORAL COMPRESS: NORMAL
BH CV LOWER VASCULAR RIGHT SAPHENOFEMORAL JUNCTION COMPRESS: NORMAL
BUN SERPL-MCNC: 13 MG/DL (ref 8–23)
BUN/CREAT SERPL: 9.6 (ref 7–25)
CALCIUM SPEC-SCNC: 9 MG/DL (ref 8.6–10.5)
CHLORIDE SERPL-SCNC: 100 MMOL/L (ref 98–107)
CO2 SERPL-SCNC: 28.2 MMOL/L (ref 22–29)
CREAT SERPL-MCNC: 1.36 MG/DL (ref 0.76–1.27)
DEPRECATED RDW RBC AUTO: 43.7 FL (ref 37–54)
EGFRCR SERPLBLD CKD-EPI 2021: 54.6 ML/MIN/1.73
EOSINOPHIL # BLD AUTO: 1.05 10*3/MM3 (ref 0–0.4)
EOSINOPHIL NFR BLD AUTO: 12.8 % (ref 0.3–6.2)
ERYTHROCYTE [DISTWIDTH] IN BLOOD BY AUTOMATED COUNT: 14.3 % (ref 12.3–15.4)
GLUCOSE BLDC GLUCOMTR-MCNC: 164 MG/DL (ref 70–130)
GLUCOSE BLDC GLUCOMTR-MCNC: 287 MG/DL (ref 70–130)
GLUCOSE BLDC GLUCOMTR-MCNC: 292 MG/DL (ref 70–130)
GLUCOSE BLDC GLUCOMTR-MCNC: 99 MG/DL (ref 70–130)
GLUCOSE SERPL-MCNC: 89 MG/DL (ref 65–99)
HCT VFR BLD AUTO: 31.4 % (ref 37.5–51)
HGB BLD-MCNC: 10.5 G/DL (ref 13–17.7)
IMM GRANULOCYTES # BLD AUTO: 0.03 10*3/MM3 (ref 0–0.05)
IMM GRANULOCYTES NFR BLD AUTO: 0.4 % (ref 0–0.5)
LYMPHOCYTES # BLD AUTO: 1.25 10*3/MM3 (ref 0.7–3.1)
LYMPHOCYTES NFR BLD AUTO: 15.2 % (ref 19.6–45.3)
MCH RBC QN AUTO: 28.5 PG (ref 26.6–33)
MCHC RBC AUTO-ENTMCNC: 33.4 G/DL (ref 31.5–35.7)
MCV RBC AUTO: 85.1 FL (ref 79–97)
MONOCYTES # BLD AUTO: 0.79 10*3/MM3 (ref 0.1–0.9)
MONOCYTES NFR BLD AUTO: 9.6 % (ref 5–12)
NEUTROPHILS NFR BLD AUTO: 5.01 10*3/MM3 (ref 1.7–7)
NEUTROPHILS NFR BLD AUTO: 61.1 % (ref 42.7–76)
NRBC BLD AUTO-RTO: 0 /100 WBC (ref 0–0.2)
PHOSPHATE SERPL-MCNC: 3.9 MG/DL (ref 2.5–4.5)
PLATELET # BLD AUTO: 242 10*3/MM3 (ref 140–450)
PMV BLD AUTO: 8.9 FL (ref 6–12)
POTASSIUM SERPL-SCNC: 3.3 MMOL/L (ref 3.5–5.2)
RBC # BLD AUTO: 3.69 10*6/MM3 (ref 4.14–5.8)
SODIUM SERPL-SCNC: 140 MMOL/L (ref 136–145)
WBC NRBC COR # BLD: 8.2 10*3/MM3 (ref 3.4–10.8)

## 2023-06-19 NOTE — THERAPY TREATMENT NOTE
Inpatient Rehabilitation - Speech Language Pathology Treatment Note    [facility]     Patient Name: Vernon Solorzano  : 1950  MRN: [id number]    Today's Date: 2023                   Admit Date: 2023       Visit Dx:      ICD-10-CM ICD-9-CM   1. Impaired functional mobility, balance, gait, and endurance  Z74.09 V49.89       Patient Active Problem List   Diagnosis    DM (diabetes mellitus), type 2    Mixed hyperlipidemia    Mild intermittent asthma without complication    New onset of congestive heart failure    Nonrheumatic mitral valve regurgitation    Chest pain    Essential hypertension    Mitral valve disease    Acute ischemic left MCA stroke       Past Medical History:   Diagnosis Date    Allergic rhinitis     Arthritis     BPH (benign prostatic hyperplasia)     Diabetes mellitus     TYPE 2    Foraminal stenosis of cervical region     C5-C6    GERD (gastroesophageal reflux disease)     Hemorrhoids     History of urinary retention     S/P TURP    Hyperlipidemia     Macular degeneration     PING (obstructive sleep apnea) 2016    MILD, SEES DR. AMARILIS MORGAN       Past Surgical History:   Procedure Laterality Date    CARDIAC CATHETERIZATION N/A 2023    Procedure: Right Heart Cath;  Surgeon: Corey Gaffney MD;  Location: [facility] CATH INVASIVE LOCATION;  Service: Cardiology;  Laterality: N/A;    CARDIAC CATHETERIZATION N/A 2023    Procedure: Left Heart Cath;  Surgeon: Corey Gaffney MD;  Location[facility];  Service: Cardiology;  Laterality: N/A;    CARDIAC CATHETERIZATION N/A 2023    Procedure: Left ventriculography;  Surgeon: Corey Gaffney MD;  [facility];  Service: Cardiology;  Laterality: N/A;    CARDIAC CATHETERIZATION N/A 2023    Procedure: Coronary angiography;  Surgeon: Corey Gaffney MD;  [facility];  Service: Cardiology;  Laterality: N/A;    COLONOSCOPY N/A     WNL PER  PT, NO RECORDS,     COLONOSCOPY N/A 04/07/2009    DR. GORGE BENAVIDEZ AT [facility]    MITRAL VALVE REPAIR/REPLACEMENT N/A 5/24/2023    Procedure: MITRAL VALVE REPAIR/REPLACEMENT TRICUSPID VALVE REPAIR/REPLACEMENT TRANSESOPHAGEAL ECHOCARDIOGRAM WITH ANESTHESIA;  Surgeon: George Frey MD;  Location: [facility];  Service: Cardiothoracic;  Laterality: N/A;    PROSTATE SURGERY N/A 11/12/2010    TURP, DR. BRIGHT COLLAZO AT [facility]    SKIN TAG REMOVAL N/A 12/20/2017    ANAL SKIN TAG X2, PERFORMED IN OFFICE, DR. LISA ORANTES    TURP / TRANSURETHRAL INCISION / DRAINAGE PROSTATE  2010    Dr. Goodrich       SLP Recommendation and Plan                                                            SLP EVALUATION (last 72 hours)       SLP SLC Evaluation       Row Name 06/19/23 1300 06/19/23 0900                Communication Assessment/Intervention    Document Type therapy note (daily note)  -SR therapy note (daily note)  -SR       Subjective Information no complaints  -SR no complaints  -SR       Patient Observations alert;cooperative;agree to therapy  -SR alert;agree to therapy;cooperative  -SR       Patient Effort adequate  -SR adequate  -SR       Symptoms Noted During/After Treatment none  -SR none  -SR          Pain Scale: Numbers Pre/Post-Treatment    Pretreatment Pain Rating 0/10 - no pain  -SR 0/10 - no pain  -SR       Posttreatment Pain Rating 0/10 - no pain  -SR 0/10 - no pain  -SR                 User Key  (r) = Recorded By, (t) = Taken By, (c) = Cosigned By      Initials Name Effective Dates    SR Latisha Morris CCC-SLP 11/10/22 -                        EDUCATION    The patient has been educated in the following areas:       Cognitive Impairment Dysphagia (Swallowing Impairment).             SLP GOALS       Row Name 06/19/23 1300 06/19/23 0900          (STG) Pharyngeal Strengthening Exercise Goal 1 (SLP)    Activity (Pharyngeal Strengthening Goal 1, SLP) increase timing;increase closure at entrance to  airway/closure of airway at glottis  -SR --     Increase Timing hard effortful swallow;Mendelsohn  -SR --     Increase Closure at Entrance to Airway/Closure of Airway at Glottis hard effortful swallow;chin tuck against resistance (CTAR);sarita  -SR --     [place]/Accuracy (Pharyngeal Strengthening Goal 1, SLP) with minimal cues (75-90% accuracy)  -SR --     Time Frame (Pharyngeal Strengthening Goal 1, SLP) by discharge  -SR --     Progress/Outcomes (Pharyngeal Strengthening Goal 1, SLP) goal ongoing  -SR --     Comment (Pharyngeal Strengthening Goal 1, SLP) Patient completed 15 repetitions of effortful swallow given MIN cues. Tolerated 5/5 sips of thin via cup and 4/5 sips of thin via straw with no overt s/sx of aspiration or penetration. Delayed throat clear x1 with thin by straw.  -SR --        Attention Goal 1 (SLP)    Improve Attention by Goal 1 (SLP) -- complete selective attention task;complete sustained attention task;80%;with minimal cues (75-90%)  -SR     Time Frame (Attention Goal 1, SLP) -- by discharge  -SR     Progress/Outcomes (Attention Goal 1, SLP) -- goal revised this date  -SR     Comment (Attention Goal 1, SLP) -- Prolonged response time noted during therapy tasks. Patient required extended time to complete activities during AM session.  -SR        Organizational Skills Goal 1 (SLP)    Improve Thought Organization Through Goal 1 (SLP) -- completing a divergent naming task;completing mental manipulation task;80%;with moderate cues (50-74%)  -SR     Time Frame (Thought Organization Skills Goal 1, SLP) -- by discharge  -SR     Progress/Outcomes (Thought Organization Skills Goal 1, SLP) -- goal ongoing  -SR     Comment (Thought Organization Skills Goal 1, SLP) -- Divergent naming; patient named avg of 8 items in one minute for concrete category given NO cues; increased to 15 items given MIN cues and extended time.  -SR        Reasoning Goal 1 (SLP)    Improve Reasoning Through Goal 1 (SLP)  complete deductive reasoning task;complete basic reasoning task;complete mental flexibility task;50%;with moderate cues (50-74%)  -SR --     Time Frame (Reasoning Goal 1, SLP) by discharge  -SR --     Progress/Outcomes (Reasoning Goal 1, SLP) goal ongoing  -SR --     Comment (Reasoning Goal 1, SLP) Visual reasoning task; patient followed written directions and used process of elimination to determine solution to logic activity with 50% acc given NO cues; 70% acc given MOD cues. Activity incomplete due to time constraint. Will complete activity next date.  -SR --        Executive Functional Skills Goal 1 (SLP)    Improve Executive Function Skills Goal 1 (SLP) demonstrate awareness of deficit;home management activity;80%;with moderate cues (50-74%)  -SR demonstrate awareness of deficit;home management activity;80%;with moderate cues (50-74%)  -SR     Time Frame (Executive Function Skills Goal 1, SLP) by discharge  -SR by discharge  -SR     Progress/Outcomes (Executive Function Skills Goal 1, SLP) goal ongoing  -SR goal ongoing  -SR     Comment (Executive Function Skills Goal 1, SLP) Reviewed progress/plan of care this date. Patient showed increased awareness of current deficits. He noted increased difficulty with thought organization and judgement with various tasks completed in speech therapy sessions.  -SR Patient answered questions about medication dosages and determined the appropriate amount for a target age with 80% acc given MOD cues. Increased difficulty noted with sustained attention. Prolonged response time. Patient required about 20 minutes to complete 6 question task.  -SR               User Key  (r) = Recorded By, (t) = Taken By, (c) = Cosigned By      Initials Name Provider Type    Latisha Haas CCC-SLP Speech and Language Pathologist                                Time Calculation:        Time Calculation- SLP       Row Name 06/19/23 1453 06/19/23 1241          Time Calculation- SLP    SLP Start  Time 1300  -SR 0900  -SR     SLP Stop Time 1330  -SR 0930  -SR     SLP Time Calculation (min) 30 min  -SR 30 min  -SR               User Key  (r) = Recorded By, (t) = Taken By, (c) = Cosigned By      Initials Name Provider Type    Latisha Haas CCC-SLP Speech and Language Pathologist                      Therapy Charges for Today       Code Description Service Date Service Provider Modifiers Qty    96935981595 HC ST DEV OF COGN SKILLS INITIAL 15 MIN 6/19/2023 Latisha Morris CCC-SLP  1    47958102362 HC ST DEV OF COGN SKILLS EACH ADDT'L 15 MIN 6/19/2023 Latisha Morris CCC-SLP  2    [id number] HC ST TREATMENT SWALLOW 1 6/19/2023 Latisha Morris CCC-SLP GN 1                             JENNIFFER Hobbs  6/19/2023

## 2023-06-19 NOTE — PROGRESS NOTES
[facility] Progress Note      Patient Name: Vernon Solorzano  : 1950  MRN: [id number]  Primary Care Physician:  Liza Oconnell III, RACHAEL  Date of admission: 2023    Subjective     Interval History:   Follow up CKD III.   Continues to complain of lower extremity edema.  Torsemide dose increased yesterday but did not receive the higher doses today.  Wearing compressive socks.    Review of Systems:   As noted above    Objective     Vitals:   Temp:  [97.8 °F (36.6 °C)-97.9 °F (36.6 °C)] 97.8 °F (36.6 °C)  Heart Rate:  [] 103  Resp:  [16-20] 16  BP: ()/(60-74) 116/74    Intake/Output Summary (Last 24 hours) at 2023 1002  Last data filed at 2023 0243  Gross per 24 hour   Intake 480 ml   Output 1100 ml   Net -620 ml         Physical Exam:    General Appearance: alert, sitting up in bed.  Pleasant.  Questions skin: warm and dry  HEENT: oral mucosa normal, nonicteric sclera  Neck: supple, no JVD  Lungs: Clear to auscultation.   Heart: RRR, normal S1 and S2  Abdomen: soft, nontender, + bs, distended.  Extremities 1-2+ lower ext edema  Scheduled Meds:     apixaban, 5 mg, Oral, Q12H  aspirin, 81 mg, Oral, Daily  atorvastatin, 40 mg, Oral, Nightly  calcium 500 mg vitamin D 5 mcg (200 UT), 1 tablet, Oral, Daily  guaifenesin, 400 mg, Oral, Q8H  insulin glargine, 15 Units, Subcutaneous, Nightly  insulin glargine, 40 Units, Subcutaneous, Daily  insulin lispro, 3-14 Units, Subcutaneous, TID With Meals  metoprolol succinate XL, 25 mg, Oral, Q24H  pantoprazole, 40 mg, Oral, Q AM  potassium chloride, 40 mEq, Oral, Daily  torsemide, 80 mg, Oral, Daily      IV Meds:        Results Reviewed:   I have personally reviewed the results from the time of this admission to 2023 10:02 EDT     Results from last 7 days   Lab Units 23  0557 23  0626 23  0816   SODIUM mmol/L 140 140 140   POTASSIUM mmol/L 3.3* 3.7 3.6   CHLORIDE mmol/L 100 99 100   CO2  mmol/L 28.2 32.5* 27.6   BUN mg/dL 13 12 12   CREATININE mg/dL 1.36* 1.31* 1.30*   CALCIUM mg/dL 9.0 9.1 9.0   GLUCOSE mg/dL 89 110* 122*         Estimated Creatinine Clearance: 62.8 mL/min (A) (by C-G formula based on SCr of 1.36 mg/dL (H)).    Results from last 7 days   Lab Units 06/19/23  0557 06/18/23  0626 06/16/23  0816 06/15/23  0550   MAGNESIUM mg/dL  --   --   --  2.2   PHOSPHORUS mg/dL 3.9 3.5 3.2 4.0               Results from last 7 days   Lab Units 06/19/23  0557 06/16/23  0816 06/14/23  0602   WBC 10*3/mm3 8.20 8.57 9.09   HEMOGLOBIN g/dL 10.5* 10.4* 10.0*   PLATELETS 10*3/mm3 242 272 297               Assessment / Plan     ASSESSMENT:  1.  Acute kidney injury on top of CKD III, baseline creatinine 1.2.  Peak 1.68.  Creatinine stable.   2. MR now sp MV replacement, tissue and TV repair, SU closure 5/24/23.  3. Chronic steroid use after COVID 19 PNA.  4. Anemia of CKD and postoperative anemia.  Hemoglobin is at goal  5. 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 Rehab .  6. DM2  7. Aflutter.Cardioversion 6/2/23. On eliquis and metoprolol.  Okay.  8.  Hypokalemia  PLAN:  1.  Patient continues to be hypervolemic.  I believe that he is not compliant with fluid restriction.  2.  Torsemide increased to 80 mg daily  3.  Continue fluid restriction  at 1500 cc in 24 hours.  4.  Replace potassium and electrolyte as needed  5.  Surveillance labs  6.  Limit salt intake to less than 2 g per 24 hours.  7.  Recommend compressive socks  Ludwig Marquez MD  06/19/23  10:02 EDT    [facility]  [phone]

## 2023-06-19 NOTE — NURSING NOTE
F/u with 73 y/o at bedside to see if he has had family bring in his home BG meter to assess if it works or not. Pt has not had family bring his meter in. (Pt reports his meter is ~1 yr old.)

## 2023-06-19 NOTE — THERAPY TREATMENT NOTE
Inpatient Rehabilitation - Occupational Therapy Treatment Note    [facility]     Patient Name: Vernon Solorzano  : 1950  MRN: [id number]    Today's Date: 2023                 Admit Date: 2023         ICD-10-CM ICD-9-CM   1. Impaired functional mobility, balance, gait, and endurance  Z74.09 V49.89       Patient Active Problem List   Diagnosis    DM (diabetes mellitus), type 2    Mixed hyperlipidemia    Mild intermittent asthma without complication    New onset of congestive heart failure    Nonrheumatic mitral valve regurgitation    Chest pain    Essential hypertension    Mitral valve disease    Acute ischemic left MCA stroke       Past Medical History:   Diagnosis Date    Allergic rhinitis     Arthritis     BPH (benign prostatic hyperplasia)     Diabetes mellitus     TYPE 2    Foraminal stenosis of cervical region     C5-C6    GERD (gastroesophageal reflux disease)     Hemorrhoids     History of urinary retention     S/P TURP    Hyperlipidemia     Macular degeneration     PING (obstructive sleep apnea) 2016    MILD, SEES DR. AMARILIS MORGAN       Past Surgical History:   Procedure Laterality Date    CARDIAC CATHETERIZATION N/A 2023    Procedure: Right Heart Cath;  Surgeon: Corey Gaffney MD;  Location: [facility] CATH INVASIVE LOCATION;  Service: Cardiology;  Laterality: N/A;    CARDIAC CATHETERIZATION N/A 2023    Procedure: Left Heart Cath;  Surgeon: Corey Gaffney MD;  Location: [facility] CATH INVASIVE LOCATION;  Service: Cardiology;  Laterality: N/A;    CARDIAC CATHETERIZATION N/A 2023    Procedure: Left ventriculography;  Surgeon: Corey Gaffney MD;  Location: [facility] CATH INVASIVE LOCATION;  Service: Cardiology;  Laterality: N/A;    CARDIAC CATHETERIZATION N/A 2023    Procedure: Coronary angiography;  Surgeon: Corey Gaffney MD;  [facility];  Service: Cardiology;  Laterality: N/A;    COLONOSCOPY N/A     WNL PER PT, NO RECORDS,      COLONOSCOPY N/A 04/07/2009    DR. GORGE BENAVIDEZ AT [place]    MITRAL VALVE REPAIR/REPLACEMENT N/A 5/24/2023    Procedure: MITRAL VALVE REPAIR/REPLACEMENT TRICUSPID VALVE REPAIR/REPLACEMENT TRANSESOPHAGEAL ECHOCARDIOGRAM WITH ANESTHESIA;  Surgeon: George Frey MD;  Location: [facility];  Service: Cardiothoracic;  Laterality: N/A;    PROSTATE SURGERY N/A 11/12/2010    TURP, DR. BRIGHT COLLAZO AT [facility]    SKIN TAG REMOVAL N/A 12/20/2017    ANAL SKIN TAG X2, PERFORMED IN OFFICE, DR. LISA ORANTES    TURP / TRANSURETHRAL INCISION / DRAINAGE PROSTATE  2010    Dr. Goodrich             IRF OT ASSESSMENT FLOWSHEET (last 12 hours)       IRF OT Evaluation and Treatment       Row Name 06/19/23 1620          OT Time and Intention    Document Type daily treatment  -CC     Mode of Treatment occupational therapy  -CC     Patient Effort good  -CC     Symptoms Noted During/After Treatment fatigue;shortness of breath  -CC       Row Name 06/19/23 1620          Pain Assessment    Pretreatment Pain Rating 0/10 - no pain  -CC     Posttreatment Pain Rating 0/10 - no pain  -CC       Row Name 06/19/23 1620          Cognition/Psychosocial    Affect/Mental Status (Cognition) flat/blunted affect  -CC     Orientation Status (Cognition) oriented to;person;place;situation  -CC     Follows Commands (Cognition) follows one-step commands;over 90% accuracy  -CC     Personal Safety Interventions fall prevention program maintained;gait belt;nonskid shoes/slippers when out of bed  -CC     Attention Deficit (Cognition) distractible in noisy environment;focused/sustained attention;arousal/alertness  -CC       Row Name 06/19/23 1620          Bathing    [place] Level (Bathing) bathing skills;lower body;upper body;verbal cues;contact guard assist;minimum assist (75% patient effort)  -CC     Assistive Device (Bathing) grab bar/tub rail;hand held shower spray hose;shower chair  -CC     Position (Bathing) supported sitting;supported  standing;sink side  -       Row Name 06/19/23 1620          Upper Body Dressing    [place] Level (Upper Body Dressing) don;doff;upper body dressing skills;pull over garment;minimum assist (75% or more patient effort);supervision  -     Position (Upper Body Dressing) supported sitting  -     Set-up Assistance (Upper Body Dressing) obtain clothing  -       Row Name 06/19/23 1620          Lower Body Dressing    [place] Level (Lower Body Dressing) doff;don;pants/bottoms;shoes/slippers;underwear;minimum assist (75% patient effort)  -     Position (Lower Body Dressing) supported sitting;supported standing  -     Set-up Assistance (Lower Body Dressing) obtain clothing  -       Row Name 06/19/23 1620          Grooming    [place] Level (Grooming) grooming skills;deodorant application;hair care, combing/brushing;oral care regimen;wash face, hands;set up  -     Position (Grooming) supported sitting  -     Set-up Assistance (Grooming) obtain supplies  -       Row Name 06/19/23 1620          Bed Mobility    Comment, (Bed Mobility) in w/c  -       Row Name 06/19/23 1620          Functional Mobility    Functional Mobility- Ind. Level contact guard assist;supervision required  -     Functional Mobility- Device walker, front-wheeled  -     Functional Mobility-Distance (Feet) --  in room  -       Row Name 06/19/23 1620          Shoulder (Therapeutic Exercise)    Shoulder Strengthening (Therapeutic Exercise) right;left;flexion;extension;sitting;2 lb free weight;10 repetitions  -       Row Name 06/19/23 1620          Elbow/Forearm (Therapeutic Exercise)    Elbow/Forearm (Therapeutic Exercise) strengthening exercise  -     Elbow/Forearm Strengthening (Therapeutic Exercise) flexion;extension;supination;pronation;sitting;bilateral;2 lb free weight;15 repititions;3 sets  -       Row Name 06/19/23 1620          Wrist (Therapeutic Exercise)    Wrist (Therapeutic Exercise) strengthening  exercise  -     Wrist Strengthening (Therapeutic Exercise) flexion;extension;right;left;2 lb free weight;15 repititions;3 sets  -       Row Name 06/19/23 1620          Hand (Therapeutic Exercise)    Hand (Therapeutic Exercise) strengthening exercise  -     Hand Strengthening (Therapeutic Exercise) bilateral; strengthening;hand gripper;20 repititions;2 sets  -       Row Name 06/19/23 1620          Balance    Static Sitting Balance supervision  -     Static Standing Balance standby assist;contact guard  -     Dynamic Standing Balance contact guard  -       Row Name 06/19/23 1620          Positioning and Restraints    Pre-Treatment Position sitting in chair/recliner  -     Post Treatment Position wheelchair  -     In Wheelchair sitting;notified nsg;call light within reach;encouraged to call for assist;exit alarm on  -               User Key  (r) = Recorded By, (t) = Taken By, (c) = Cosigned By      Initials Name Effective Dates    CC Kathrine Pinedo OTR 06/16/21 -                      Occupational Therapy Education       Title: PT OT SLP Therapies (In Progress)       Topic: Occupational Therapy (In Progress)       Point: ADL training (In Progress)       Description:   Instruct learner(s) on proper safety adaptation and remediation techniques during self care or transfers.   Instruct in proper use of assistive devices.                  Learning Progress Summary             Patient Acceptance, E, NR by AF at 6/13/2023 1527    Comment: benefits of therapy, endurance   Family Acceptance, E, NR by AF at 6/13/2023 1527    Comment: benefits of therapy, endurance                         Point: Home exercise program (In Progress)       Description:   Instruct learner(s) on appropriate technique for monitoring, assisting and/or progressing therapeutic exercises/activities.                  Learning Progress Summary             Patient Acceptance, E, NR by AF at 6/13/2023 1527    Comment: benefits of  therapy, endurance   Family Acceptance, E, NR by AF at 6/13/2023 1527    Comment: benefits of therapy, endurance                         Point: Precautions (In Progress)       Description:   Instruct learner(s) on prescribed precautions during self-care and functional transfers.                  Learning Progress Summary             Patient Acceptance, E, NR by AF at 6/13/2023 1527    Comment: benefits of therapy, endurance   Family Acceptance, E, NR by AF at 6/13/2023 1527    Comment: benefits of therapy, endurance                         Point: Body mechanics (In Progress)       Description:   Instruct learner(s) on proper positioning and spine alignment during self-care, functional mobility activities and/or exercises.                  Learning Progress Summary             Patient Acceptance, E, NR by AF at 6/13/2023 1527    Comment: benefits of therapy, endurance   Family Acceptance, E, NR by AF at 6/13/2023 1527    Comment: benefits of therapy, endurance                                         User Key       Initials Effective Dates Name Provider Type Discipline     06/16/21 -  Candy Davis OTSIMONA Occupational Therapist OT                        OT Recommendation and Plan                         Time Calculation:      Time Calculation- OT       Row Name 06/19/23 1330 06/19/23 1100          Time Calculation- OT    OT Start Time 1330  -CC 1100  -CC     OT Stop Time 1400  -CC 1145  -CC     OT Time Calculation (min) 30 min  -CC 45 min  -CC               User Key  (r) = Recorded By, (t) = Taken By, (c) = Cosigned By      Initials Name Provider Type    CC Kathrine Pinedo OTR Occupational Therapist                  Therapy Charges for Today       Code Description Service Date Service Provider Modifiers Qty    90376064218 HC OT SELF CARE/MGMT/TRAIN EA 15 MIN 6/19/2023 Kathrine Pinedo OTR GO 3    81599656542 HC OT THER PROC EA 15 MIN 6/19/2023 Kathrine Pinedo OTR GO 2                     Kathrine Pinedo  OTR  6/19/2023

## 2023-06-19 NOTE — PROGRESS NOTES
LOS: 13 days   Patient Care Team:  Liza Oconnell III, NP-C as PCP - General (Family Medicine)  Corey Lao Jr., MD (Inactive) as Consulting Physician (Urology)      KRIS TAYLOR  1950      ADMITTING DIAGNOSIS:  Stroke  Valvular heart disease status post repair/replacement      Subjective     Has any chest pain.  Comfortable with his breathing at rest.  He feels that his endurance is showing some improvement.  Continues with edema in the legs.  Had a venous duplex done today that was negative for acute DVT, showed chronic superficial thrombophlebitis..        Objective     Vitals:    06/19/23 1015   BP:    Pulse:    Resp: 16   Temp:    SpO2:        PHYSICAL EXAM:   MENTAL STATUS -  AWAKE / ALERT  HEENT- NCAT, PUPILS EQUALLY ROUND, SCLERAE ANICTERIC, CONJUNCTIVAE PINK, OP MOIST, NO JVD, EARS UNREMARKABLE EXTERNALLY  LUNGS - CTA, NO WHEEZES, RALES OR RHONCHI  Sternotomy incision -dressed  HEART-sinus with occasional ectopy  ABD - NORMOACTIVE BOWEL SOUNDS, SOFT, NT.   EXT -1-2+ pedal edema bilateral lower extremities  NEURO -oriented to person place time and situation.    Speech is fluent.            MEDICATIONS  Scheduled Meds:apixaban, 5 mg, Oral, Q12H  aspirin, 81 mg, Oral, Daily  atorvastatin, 40 mg, Oral, Nightly  calcium 500 mg vitamin D 5 mcg (200 UT), 1 tablet, Oral, Daily  guaifenesin, 400 mg, Oral, Q8H  insulin glargine, 15 Units, Subcutaneous, Nightly  insulin glargine, 40 Units, Subcutaneous, Daily  insulin lispro, 3-14 Units, Subcutaneous, TID With Meals  metoprolol succinate XL, 25 mg, Oral, Q24H  pantoprazole, 40 mg, Oral, Q AM  potassium chloride, 40 mEq, Oral, Daily  torsemide, 80 mg, Oral, Daily      Continuous Infusions:   PRN Meds:.  acetaminophen **OR** [DISCONTINUED] acetaminophen **OR** [DISCONTINUED] acetaminophen    bisacodyl    calcium carbonate    diphenhydrAMINE-zinc acetate    hydrocortisone-bacitracin-zinc oxide-nystatin    ipratropium-albuterol     ipratropium-albuterol    senna-docusate sodium    traMADol      RESULTS  Glucose   Date/Time Value Ref Range Status   06/19/2023 0809 99 70 - 130 mg/dL Final     Comment:     Meter: [id number] : [id number] Reyes Mayer NA   06/18/2023 2021 121 70 - 130 mg/dL Final     Comment:     Meter: [id number] : [id number] Dillon Coleman NA   06/18/2023 1611 212 (H) 70 - 130 mg/dL Final     Comment:     Meter: [id number] : [id number] Abayeva Rubi NA   06/18/2023 1109 282 (H) 70 - 130 mg/dL Final     Comment:     Meter: [id number] : [id number] Abayeva Rubi NA   06/18/2023 0707 121 70 - 130 mg/dL Final     Comment:     Meter: [id number] : [id number] Abayeva Rubi NA   06/17/2023 2043 317 (H) 70 - 130 mg/dL Final     Comment:     Meter: [id number] : [id number] Sheila Crandall NA   06/17/2023 1619 101 70 - 130 mg/dL Final     Comment:     Meter: [id number] : [id number] Abayeva Rubi NA   06/17/2023 1112 218 (H) 70 - 130 mg/dL Final     Comment:     Meter: [id number] : [id number] Abayeva Rubi NA     Results from last 7 days   Lab Units 06/19/23  0557 06/16/23  0816 06/14/23  0602   WBC 10*3/mm3 8.20 8.57 9.09   HEMOGLOBIN g/dL 10.5* 10.4* 10.0*   HEMATOCRIT % 31.4* 30.6* 30.0*   PLATELETS 10*3/mm3 242 272 297       Results from last 7 days   Lab Units 06/19/23  0557 06/18/23  0626 06/16/23  0816   SODIUM mmol/L 140 140 140   POTASSIUM mmol/L 3.3* 3.7 3.6   CHLORIDE mmol/L 100 99 100   CO2 mmol/L 28.2 32.5* 27.6   BUN mg/dL 13 12 12   CREATININE mg/dL 1.36* 1.31* 1.30*   CALCIUM mg/dL 9.0 9.1 9.0   GLUCOSE mg/dL 89 110* 122*        Latest Reference Range & Units 05/23/23 14:29   Hemoglobin A1C 4.80 - 5.60 % 9.20 (H)   Total Cholesterol 0 - 200 mg/dL 155   HDL Cholesterol 40 - 60 mg/dL 41   LDL Cholesterol  0 - 100 mg/dL 81   Triglycerides 0 - 150 mg/dL 197 (H)   (H): Data is abnormally high     Latest Reference Range & Units 05/31/23 08:03   25 Hydroxy, Vitamin D 30.0 - 100.0 ng/ml  20.0 (L)   (L): Data is abnormally low    Lower extremity venous duplex-June 19, 2023-Chronic right lower extremity superficial thrombophlebitis noted in the small saphenous.     ASSESSMENT and PLAN    Acute ischemic left MCA stroke    Status post CVA-Scattered  punctate restricted diffusion acute infarct in the bilateral frontal and   left parietal occipital cortices.        Impaired Cognition/impaired mobility/impaired self-care     Aphasia-resolved     Right hand apraxia-resolved     Encephalopathy-improving     Dysphagia/nutrition-healthy heart 2-3 g sodium, consistent carb, no mixed consistency, regular texture, nectar thick liquid  June 7-videofluoroscopic swallow study planned tomorrow  June 8-swallow study today-advance to regular solid, no mixed consistency, thin liquids by cup.  No straws.     Stroke prophylaxis-Eliquis/aspirin/atorvastatin     History of severe mitral regurgitation and annular dilatation, moderate to severe tricuspid regurgitation-  status post May 24, 2023 - 1. Mitral valve repair with a 28 mm physio II ring annuloplasty with residual mild to moderate MR  2.  Mitral valve replacement with a 29 mm Schwartz II porcine prosthesis  3.  Tricuspid valve repair with 28 mm physio tricuspid ring  4.  Left atrial appendage endocardial closure     Atrial flutter-status post cardioversion June 2, 2023 anticoagulation with Eliquis  Metoprolol     Volume status-torsemide     Severe pulmonary hypertension  Obstructive sleep apnea     Interstitial lung disease status post COVID-19 infection-steroid taper per pulmonology  DuoNebs 4 times a day  Chronic steroid use with cushingoid syndrome  Prednisone 5 mg every other day     Leukocytosis-reactive/steroid administration     Postoperative anemia     Mykbmdjlgulwetui-udvupqyygsh-aelepiip     Chronic kidney disease stage III-baseline creatinine 1.2     Diabetes mellitus type 2-Jardiance/Lantus/sliding scale insulin-uncontrolled  June 7-on Jardiance and Lantus  40 units daily.  Elevated blood glucose.  Received 21 units on sliding scale insulin yesterday.  Increase Lantus by adding 10 units nightly.  June 14-increase Lantus from 10 to 15 units nightly.  Continue 40 units every morning.  Off Jardiance secondary to renal function.  June 19-tends to run higher at lunchtime.  -will follow pattern through today and probably add short acting insulin with a.m. meal     BPH/urinary retention-history of TURP  June 14-void 300 cc with postvoid residual 205 cc.    Renal insufficiency - June 9 - Creatinine increased to 1.46. Meds reviewed. Is on Jardiance - already received dose today. Will get input from Nephrology.  June 12-with the increase in his creatinine, Jardiance discontinued  June 14-torsemide added for edema     Pain management-tramadol-/Tylenol Dk report reviewed    Nutrition-addendum-June 9 6:43 PM-decreased appetite-would like to have zinc level checked in the morning    Rash on L axilla  June 13-does not wish to have anything as it is not causing patient discomfort     Sleep  June 13-offered melatonin, but declined       TEAM CONF - JUNE 8 - ENDURANCE POOR. BED MOD. TRANSFERS MOD. GAIT 25 FEET MOD ASSIST RW. SHOWER TRANSFERS MOD A.   BATH MOD ASSIST. LBD MAX. UBD MIN. TOILETING MAX.   Patient is currently on nectar liquids VFSS TODAY.   BIMS SUMMARY SCORE: 9 Moderately impaired   DIFFICULTY WITH SUSTAINED AND FOCAL ATTENTION.  CONTINENT / INCONTINENT  ELOS - 2 -3 WEEKS    TEAM CONF - Kari 15 - BED MOD. TRANSFERS MIN. 4 STAIRS MIN X 2. SATS 97% OR HIGHER WITH GAIT WITH FATIGUE, GAIT 80 FEET MIN ASSIST RW. TOILET TRANSFERS MIN. SHOWER TRANSFERS MIN MOD. BATH MIN. LBD MIN MOD. UBD MIN. GROOMING SET UP. TOILETING MIN MOD. DRESSING TAKES 30 MINUTES WIETH SLOW PROCESSING. EDEMA - JOBST STOCKINGS. MILD DYSPHGAIA, REG THIN BY CUP, NO STRAWS. CLQT MODERATE DEFICITS, DIFFICULTY WITH ATTENTION AND MEMORY, PROLONGED PROCESSING SPEED. BNE FOLLOWING. VARIABLE INTAKE - DIETITIAN  FOLLOWING. RASH ON ABD AND LEFT CHEST, POSSIBLY CONTACT DERMATITIS. TORSEMIDE ADDED.  ELOS - TWO WEEKS.        Now admit for comprehensive acute inpatient rehabilitation .  This would be an interdisciplinary program with physical therapy 1 hour,  occupational therapy 1 hour, and speech therapy 1 hour, 5 days a week.  Rehabilitation nursing for carryover, monitoring of neurologic, cardiac, pulmonary, diabetes   status, bowel and bladder, and skin  Ongoing physician follow-up.  Weekly team conferences.  Goals are to achieve a level of contact-guard with  mobility and self-care and improved activity tolerance.   Rehabilitation prognosis fair.  Medical prognosis defer to cardiology.  Estimated length of stay is approximately 2 weeks, but is only an estimation.   The patient's functional status and clinical status is unchanged from preadmission assessment and the patient continues appropriate for acute inpatient rehabilitation.  Goal is for home with outpatient cardiac rehab   therapies.  Barrier to discharge: Impaired cognition mobility self-care- work on follow, executive function, conditioning, transfers, progressive ambulation, ADLs to overcome.           Tu Silver MD, PGY-2   6/13/2023  12:10 PM       During rounds, used appropriate personal protective equipment including mask and gloves.  Additional gown if indicated.  Mask used was standard procedure mask. Appropriate PPE was worn during the entire visit.  Hand hygiene was completed before and after.

## 2023-06-19 NOTE — PLAN OF CARE
Goal Outcome Evaluation:           Progress: improving  Outcome Evaluation: Dr. Solorzano is alert and oriented, forgetful at time, generalized weakness, BLE edema, incisions clean and intact to chest, no complaints of pain, took a PRN breathing treatment this afternoon, went over diet order with patient low NA, cc, and 1500 Ml fluid restriction, continent of bowel and bladder. K low this AM, MD notified and started daily K 40MEQ.

## 2023-06-19 NOTE — PROGRESS NOTES
Inpatient Rehabilitation Functional Measures Assessment and Plan of Care    Plan of Care  Updated Problems/Interventions  Self Care    [OT] Bathing(Active)  Current Status(06/19/2023): MIN/CGA  Weekly Goal(06/26/2023): CGA  Discharge Goal: CGA    [OT] Dressing (Lower)(Active)  Current Status(06/19/2023): MIN/CGA  Weekly Goal(06/27/2023): CGA/SBA  Discharge Goal: CGA/SBA    [OT] Dressing (Upper)(Active)  Current Status(06/19/2023): SBA  Weekly Goal(06/26/2023): Set up  Discharge Goal: set up    [OT] Grooming(Active)  Current Status(06/19/2023): set up  Weekly Goal(06/19/2023): set up  Discharge Goal: set up    [OT] Toileting(Active)  Current Status(06/19/2023): MIN/CGA  Weekly Goal(06/26/2023): CGA/SBA  Discharge Goal: CGA/SBA        Mobility    [OT] Toilet Transfers(Active)  Current Status(06/19/2023): CGA  Weekly Goal(06/26/2023): SBA  Discharge Goal: SBA    [OT] Tub/Shower Transfers(Active)  Current Status(06/19/2023): CGA  Weekly Goal(06/27/2023): SBA/CGA  Discharge Goal: CGA /SBA    Functional Measures  PERI Eating:  Branch  PERI Grooming: Branch  PERI Bathing:  Branch  PERI Upper Body Dressing:  Branch  PERI Lower Body Dressing:  Branch  PERI Toileting:  Branch    PERI Bladder Management  Level of Assistance:  Branch  Frequency/Number of Accidents this Shift:  Branch    PERI Bowel Management  Level of Assistance: Branch  Frequency/Number of Accidents this Shift: Branch    PERI Bed/Chair/Wheelchair Transfer:  Branch  PERI Toilet Transfer:  Branch  PERI Tub/Shower Transfer:  Branch    Previously Documented Mode of Locomotion at Discharge: Field  PERI Expected Mode of Locomotion at Discharge: Branch  PERI Walk/Wheelchair:  Branch  PERI Stairs:  Branch    PERI Comprehension:  Branch  PERI Expression:  Branch  PERI Social Interaction:  Branch  PERI Problem Solving:  Branch  PERI Memory:  Branch    Therapy Mode Minutes  Occupational Therapy: Branch  Physical Therapy: Branch  Speech Language Pathology:  Branch    Signed by: Kathrine  Khris, OTR/L

## 2023-06-19 NOTE — THERAPY TREATMENT NOTE
Inpatient Rehabilitation - Physical Therapy Treatment Note       [facility]     Patient Name: Vernon Solorzano  : 1950  MRN: [id number]    Today's Date: 2023                    Admit Date: 2023      Visit Dx:     ICD-10-CM ICD-9-CM   1. Impaired functional mobility, balance, gait, and endurance  Z74.09 V49.89       Patient Active Problem List   Diagnosis    DM (diabetes mellitus), type 2    Mixed hyperlipidemia    Mild intermittent asthma without complication    New onset of congestive heart failure    Nonrheumatic mitral valve regurgitation    Chest pain    Essential hypertension    Mitral valve disease    Acute ischemic left MCA stroke       Past Medical History:   Diagnosis Date    Allergic rhinitis     Arthritis     BPH (benign prostatic hyperplasia)     Diabetes mellitus     TYPE 2    Foraminal stenosis of cervical region     C5-C6    GERD (gastroesophageal reflux disease)     Hemorrhoids     History of urinary retention     S/P TURP    Hyperlipidemia     Macular degeneration     PING (obstructive sleep apnea) 2016    MILD, SEES DR. AMARILIS MORGAN       Past Surgical History:   Procedure Laterality Date    CARDIAC CATHETERIZATION N/A 2023    Procedure: Right Heart Cath;  Surgeon: Corey Gaffney MD;  Location: [facility] CATH INVASIVE LOCATION;  Service: Cardiology;  Laterality: N/A;    CARDIAC CATHETERIZATION N/A 2023    Procedure: Left Heart Cath;  Surgeon: Corey Gaffney MD;  Location: [facility] CATH INVASIVE LOCATION;  Service: Cardiology;  Laterality: N/A;    CARDIAC CATHETERIZATION N/A 2023    Procedure: Left ventriculography;  Surgeon: Corey Gaffney MD;  [facility];  Service: Cardiology;  Laterality: N/A;    CARDIAC CATHETERIZATION N/A 2023    Procedure: Coronary angiography;  Surgeon: Corey Gaffney MD;  Location: [facility] CATH INVASIVE LOCATION;  Service: Cardiology;  Laterality: N/A;    COLONOSCOPY N/A     WNL PER PT, NO  RECORDS,     COLONOSCOPY N/A 04/07/2009    DR. GORGE BENAVIDEZ AT [place]    MITRAL VALVE REPAIR/REPLACEMENT N/A 5/24/2023    Procedure: MITRAL VALVE REPAIR/REPLACEMENT TRICUSPID VALVE REPAIR/REPLACEMENT TRANSESOPHAGEAL ECHOCARDIOGRAM WITH ANESTHESIA;  Surgeon: George Frey MD;  Location: [facility];  Service: Cardiothoracic;  Laterality: N/A;    PROSTATE SURGERY N/A 11/12/2010    TURP, DR. BRIGHT COLLAZO AT [facility]    SKIN TAG REMOVAL N/A 12/20/2017    ANAL SKIN TAG X2, PERFORMED IN OFFICE, DR. LISA ORANTES    TURP / TRANSURETHRAL INCISION / DRAINAGE PROSTATE  2010    Dr. Goodrich       PT ASSESSMENT (last 12 hours)       IRF PT Evaluation and Treatment       Row Name 06/19/23 1056          PT Time and Intention    Document Type daily treatment  -MD     Mode of Treatment physical therapy  -MD     Patient/Family/Caregiver Comments/Observations Pt sitting EOB showing no signs of acute distress.  Still awaiting doppler results.  Per MD safe to proceed w PT treatment at this time.  -MD       Row Name 06/19/23 1056          Pain Assessment    Pretreatment Pain Rating 0/10 - no pain  -MD       Row Name 06/19/23 1056          Cognition/Psychosocial    Orientation Status (Cognition) oriented to;person;place;situation  -MD     Follows Commands (Cognition) follows one-step commands;over 90% accuracy  -MD     Personal Safety Interventions fall prevention program maintained;gait belt  -MD       Row Name 06/19/23 1056          Sit-Stand Transfer    Sit-Stand [place] (Transfers) verbal cues;contact guard  -MD     Assistive Device (Sit-Stand Transfers) walker, frontSolangewheeladele  -MD       Row Name 06/19/23 1056          Stand-Sit Transfer    Stand-Sit [place] (Transfers) verbal cues;contact guard  -MD     Assistive Device (Stand-Sit Transfers) walker, frontSolangewheeladele  -MD       Row Name 06/19/23 1056          Gait/Stairs (Locomotion)    [place] Level (Gait) verbal cues;contact guard;ivelisseby assist  -MD      Assistive Device (Gait) walker, front-wheeled  -MD     Distance in Feet (Gait) 60' and 80'x2  -MD     Deviations/Abnormal Patterns (Gait) base of support, wide;festinating/shuffling;gait speed decreased;stride length decreased  -MD     Bilateral Gait Deviations forward flexed posture;heel strike decreased  -MD     [place] Level (Stairs) verbal cues;minimum assist (75% patient effort)  -MD     Handrail Location (Stairs) both sides  -MD     Number of Steps (Stairs) 4  -MD     Ascending Technique (Stairs) step-over-step  -MD     Descending Technique (Stairs) step-to-step  -MD     Stairs, Impairments strength decreased  -MD       Row Name 06/19/23 1056          Positioning and Restraints    Pre-Treatment Position sitting in chair/recliner  -MD     Post Treatment Position wheelchair  -MD     In Wheelchair sitting;exit alarm on;with SLP  -MD               User Key  (r) = Recorded By, (t) = Taken By, (c) = Cosigned By      Initials Name Provider Type    Damaris Farrell, PT Physical Therapist                  Wound 05/24/23 0737 sternal Incision (Active)   Dressing Appearance open to air 06/18/23 2125   Closure Open to air 06/18/23 2125   Base dry;clean;other (see comments) 06/19/23 0808   Periwound dry;intact 06/18/23 2125   Drainage Amount none 06/18/23 2125   Dressing Care open to air 06/19/23 0808     Physical Therapy Education       Title: PT OT SLP Therapies (In Progress)       Topic: Physical Therapy (In Progress)       Point: Mobility training (Done)       Learning Progress Summary             Patient Acceptance, E, VU by MD at 6/19/2023 1058    Acceptance, E,TB, VU,NR by TONI at 6/17/2023 1501    Acceptance, E, VU by MD at 6/16/2023 0919    Acceptance, E, VU by MD at 6/15/2023 1140    ParamerSUE,D,TB, NR by YAQUELIN at 6/14/2023 1459    Acceptance, E, VU by MD at 6/13/2023 1012    Acceptance, E, VU by MD at 6/12/2023 1018    Acceptance, E, VU by MD at 6/10/2023 1040    Acceptance, E, VU by MD at 6/8/2023 1017     Acceptance, E,TB,D, VU,DU,NR by  at 6/7/2023 1513    Comment: POC, Goals, safety with mobility.   Significant Other Acceptance, E,TB,D, VU,DU,NR by  at 6/7/2023 1513    Comment: POC, Goals, safety with mobility.                         Point: Home exercise program (In Progress)       Learning Progress Summary             Patient Eager, E,D,TB, NR by  at 6/14/2023 1459    Acceptance, E,TB,D, VU,DU,NR by  at 6/7/2023 1513    Comment: POC, Goals, safety with mobility.   Significant Other Acceptance, E,TB,D, VU,DU,NR by  at 6/7/2023 1513    Comment: POC, Goals, safety with mobility.                                         User Key       Initials Effective Dates Name Provider Type Discipline     06/16/21 -  Nubia Orellana, PT Physical Therapist PT    MD 06/16/21 -  Damaris Francis PT Physical Therapist PT     06/16/21 -  Elizabeth Levin PT Physical Therapist PT                    PT Recommendation and Plan                          Time Calculation:      PT Charges       Row Name 06/19/23 1347 06/19/23 1056          Time Calculation    Start Time 1230  -MD 0930  -MD     Stop Time 1300  -MD 1000  -MD     Time Calculation (min) 30 min  -MD 30 min  -MD     PT Received On -- 06/19/23  -MD     PT - Next Appointment -- 06/20/23  -MD               User Key  (r) = Recorded By, (t) = Taken By, (c) = Cosigned By      Initials Name Provider Type    Damaris Farrell, PT Physical Therapist                    Therapy Charges for Today       Code Description Service Date Service Provider Modifiers Qty    46731396292 HC GAIT TRAINING EA 15 MIN 6/19/2023 Damaris Francis, PT GP 2    56506756753 HC PT THERAPEUTIC ACT EA 15 MIN 6/19/2023 Damaris Francis, PT GP 1    21378749286 HC PT THER PROC EA 15 MIN 6/19/2023 Damaris Francis, PT GP 1              PT G-Codes  AM-PAC 6 Clicks Score (PT): 14      Damaris Francis PT  6/19/2023

## 2023-06-19 NOTE — PLAN OF CARE
Goal Outcome Evaluation:   Pt is A/Ox4, calm/cooperative, OOB x 1 stand/pivot. Meds taken whole w thin liquids. Pt c/o arthritic pain to hands, as well as incisional tenderness; prn Tramadol given x1. Pt has been continent using urinal. Pt's R calf was red, warm, tender last evening. Dr. Lockett was notified. Ordered: stat duplex of RLE. Redness and warmth of R calf is improved this morning.

## 2023-06-19 NOTE — PROGRESS NOTES
Inpatient Rehabilitation Plan of Care Note    Plan of Care  Care Plan Reviewed - Updates as Follows    Sphincter Control    [RN] Bladder Management(Active)  Current Status(06/14/2023): Patient is continent of urine using urinal or  toilet.  Bladder scan q shift.  Weekly Goal(06/22/2023): Patient will be continent 90% of the time.  Discharge Goal: Patient will be continent    Performed Intervention(s)  Offer toileting/ timed voids  Monitor I&O      Psychosocial    [RN] Coping/Adjustment(Active)  Current Status(06/14/2023): Patient has a supportive family. Pt is at increased  risk of impaired coping r/t recent stroke and hospitalization. Pt is A/Ox4 but  may be forgetful.  Weekly Goal(06/22/2023): Patient will express concerns regarding current status  Discharge Goal: Patient will demonstrate healthy coping strategies    Performed Intervention(s)  Allow patient to verbalize needs and concerns      Safety    Performed Intervention(s)  Safety monitoring during functional activities  Environmental set- upto reduce risk    Signed by: Viviana Whitfield RN

## 2023-06-20 LAB
ALBUMIN SERPL-MCNC: 3.5 G/DL (ref 3.5–5.2)
ANION GAP SERPL CALCULATED.3IONS-SCNC: 6 MMOL/L (ref 5–15)
BUN SERPL-MCNC: 14 MG/DL (ref 8–23)
BUN/CREAT SERPL: 11.3 (ref 7–25)
CALCIUM SPEC-SCNC: 8.7 MG/DL (ref 8.6–10.5)
CHLORIDE SERPL-SCNC: 100 MMOL/L (ref 98–107)
CO2 SERPL-SCNC: 31 MMOL/L (ref 22–29)
CREAT SERPL-MCNC: 1.24 MG/DL (ref 0.76–1.27)
EGFRCR SERPLBLD CKD-EPI 2021: 61 ML/MIN/1.73
GLUCOSE BLDC GLUCOMTR-MCNC: 103 MG/DL (ref 70–130)
GLUCOSE BLDC GLUCOMTR-MCNC: 188 MG/DL (ref 70–130)
GLUCOSE BLDC GLUCOMTR-MCNC: 219 MG/DL (ref 70–130)
GLUCOSE BLDC GLUCOMTR-MCNC: 324 MG/DL (ref 70–130)
GLUCOSE SERPL-MCNC: 100 MG/DL (ref 65–99)
PHOSPHATE SERPL-MCNC: 3.6 MG/DL (ref 2.5–4.5)
POTASSIUM SERPL-SCNC: 3.5 MMOL/L (ref 3.5–5.2)
SODIUM SERPL-SCNC: 137 MMOL/L (ref 136–145)

## 2023-06-20 NOTE — PROGRESS NOTES
[facility] Progress Note      Patient Name: Vernon Solorzano  : 1950  MRN: [id number]  Primary Care Physician:  Liza Oconnell III, NP-C  Date of admission: 2023    Subjective     Interval History:   Follow up CKD III.   Has no issues today.  Lower extremity edema unchanged.  Urine output not properly recorded    Review of Systems:   As noted above    Objective     Vitals:   Temp:  [96.7 °F (35.9 °C)-98.7 °F (37.1 °C)] 98.3 °F (36.8 °C)  Heart Rate:  [] 97  Resp:  [16-18] 18  BP: (107-117)/(59-79) 115/59    Intake/Output Summary (Last 24 hours) at 2023 0930  Last data filed at 2023 0715  Gross per 24 hour   Intake 340 ml   Output 875 ml   Net -535 ml         Physical Exam:    General Appearance: alert, sitting up in bed.  Pleasant.  Questions skin: warm and dry  HEENT: oral mucosa normal, nonicteric sclera  Neck: supple, no JVD  Lungs: Clear to auscultation.   Heart: RRR, normal S1 and S2  Abdomen: soft, nontender, + bs, distended.  Extremities 1-2+ lower ext edema  Scheduled Meds:     apixaban, 5 mg, Oral, Q12H  aspirin, 81 mg, Oral, Daily  atorvastatin, 40 mg, Oral, Nightly  calcium 500 mg vitamin D 5 mcg (200 UT), 1 tablet, Oral, Daily  guaifenesin, 400 mg, Oral, Q8H  insulin glargine, 15 Units, Subcutaneous, Nightly  insulin glargine, 40 Units, Subcutaneous, Daily  insulin lispro, 3-14 Units, Subcutaneous, TID With Meals  metoprolol succinate XL, 25 mg, Oral, Q24H  pantoprazole, 40 mg, Oral, Q AM  potassium chloride, 40 mEq, Oral, Daily  torsemide, 80 mg, Oral, Daily      IV Meds:        Results Reviewed:   I have personally reviewed the results from the time of this admission to 2023 09:30 EDT     Results from last 7 days   Lab Units 23  0537 23  0557 23  0626   SODIUM mmol/L 137 140 140   POTASSIUM mmol/L 3.5 3.3* 3.7   CHLORIDE mmol/L 100 100 99   CO2 mmol/L 31.0* 28.2 32.5*   BUN mg/dL 14 13 12   CREATININE mg/dL  1.24 1.36* 1.31*   CALCIUM mg/dL 8.7 9.0 9.1   GLUCOSE mg/dL 100* 89 110*         Estimated Creatinine Clearance: 70.3 mL/min (by C-G formula based on SCr of 1.24 mg/dL).    Results from last 7 days   Lab Units 06/20/23  0537 06/19/23  0557 06/18/23  0626 06/16/23  0816 06/15/23  0550   MAGNESIUM mg/dL  --   --   --   --  2.2   PHOSPHORUS mg/dL 3.6 3.9 3.5   < > 4.0    < > = values in this interval not displayed.               Results from last 7 days   Lab Units 06/19/23  0557 06/16/23  0816 06/14/23  0602   WBC 10*3/mm3 8.20 8.57 9.09   HEMOGLOBIN g/dL 10.5* 10.4* 10.0*   PLATELETS 10*3/mm3 242 272 297               Assessment / Plan     ASSESSMENT:  1.  Acute kidney injury on top of CKD III, baseline creatinine 1.2.  Peak 1.68.  Creatinine stable.   2. MR now sp MV replacement, tissue and TV repair, SU closure 5/24/23.  3. Chronic steroid use after COVID 19 PNA.  4. Anemia of CKD and postoperative anemia.  Hemoglobin is at goal  5. 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 Rehab .  6. DM2  7. Aflutter.Cardioversion 6/2/23. On eliquis and metoprolol.  Okay.  8.  Hypokalemia  PLAN:  1.  Patient continues to be hypervolemic.  I believe that he is not compliant with fluid restriction.  2.  We will increase torsemide to 100 mg daily.  3.  Continue fluid restriction  4.  Surveillance labs  Ludwig Marquez MD  06/20/23  09:30 EDT    [facility]  [phone]

## 2023-06-20 NOTE — PLAN OF CARE
Goal Outcome Evaluation:  Plan of Care Reviewed With: patient        Progress: improving  Outcome Evaluation: No c/o pain. Betadine applied to midsternal incision and scabbed puncture sites upper abd. Sits up side of bed at times. Meds with thin liquids.

## 2023-06-20 NOTE — THERAPY TREATMENT NOTE
Inpatient Rehabilitation - Occupational Therapy Treatment Note    [facility]     Patient Name: Vernon Solorzano  : 1950  MRN: [id number]    Today's Date: 2023                 Admit Date: 2023         ICD-10-CM ICD-9-CM   1. Impaired functional mobility, balance, gait, and endurance  Z74.09 V49.89       Patient Active Problem List   Diagnosis    DM (diabetes mellitus), type 2    Mixed hyperlipidemia    Mild intermittent asthma without complication    New onset of congestive heart failure    Nonrheumatic mitral valve regurgitation    Chest pain    Essential hypertension    Mitral valve disease    Acute ischemic left MCA stroke       Past Medical History:   Diagnosis Date    Allergic rhinitis     Arthritis     BPH (benign prostatic hyperplasia)     Diabetes mellitus     TYPE 2    Foraminal stenosis of cervical region     C5-C6    GERD (gastroesophageal reflux disease)     Hemorrhoids     History of urinary retention     S/P TURP    Hyperlipidemia     Macular degeneration     PING (obstructive sleep apnea) 2016    MILD, SEES DR. AMARILIS MORGAN       Past Surgical History:   Procedure Laterality Date    CARDIAC CATHETERIZATION N/A 2023    Procedure: Right Heart Cath;  Surgeon: Corey Gaffney MD;  Location: [facility] CATH INVASIVE LOCATION;  Service: Cardiology;  Laterality: N/A;    CARDIAC CATHETERIZATION N/A 2023    Procedure: Left Heart Cath;  Surgeon: Corey Gaffney MD;  Location: [facility] CATH INVASIVE LOCATION;  Service: Cardiology;  Laterality: N/A;    CARDIAC CATHETERIZATION N/A 2023    Procedure: Left ventriculography;  Surgeon: Corey Gaffney MD;  Location: [facility] CATH INVASIVE LOCATION;  Service: Cardiology;  Laterality: N/A;    CARDIAC CATHETERIZATION N/A 2023    Procedure: Coronary angiography;  Surgeon: Corey Gaffney MD;  [facility];  Service: Cardiology;  Laterality: N/A;    COLONOSCOPY N/A     WNL PER PT, NO RECORDS,      COLONOSCOPY N/A 04/07/2009    DR. GORGE BENAVIDEZ AT [place]    MITRAL VALVE REPAIR/REPLACEMENT N/A 5/24/2023    Procedure: MITRAL VALVE REPAIR/REPLACEMENT TRICUSPID VALVE REPAIR/REPLACEMENT TRANSESOPHAGEAL ECHOCARDIOGRAM WITH ANESTHESIA;  Surgeon: George Frey MD;  Location: [facility];  Service: Cardiothoracic;  Laterality: N/A;    PROSTATE SURGERY N/A 11/12/2010    TURP, DR. BRIGHT COLLAZO AT [facility]    SKIN TAG REMOVAL N/A 12/20/2017    ANAL SKIN TAG X2, PERFORMED IN OFFICE, DR. LISA ORANTES    TURP / TRANSURETHRAL INCISION / DRAINAGE PROSTATE  2010    Dr. Goodrich             IRF OT ASSESSMENT FLOWSHEET (last 12 hours)       IRF OT Evaluation and Treatment       Row Name 06/20/23 1641          OT Time and Intention    Document Type daily treatment  -CC     Mode of Treatment occupational therapy  -CC     Patient Effort good  -CC     Symptoms Noted During/After Treatment fatigue  -CC       Row Name 06/20/23 1641          Pain Assessment    Pretreatment Pain Rating 0/10 - no pain  -CC     Posttreatment Pain Rating 0/10 - no pain  -CC       Row Name 06/20/23 1641          Cognition/Psychosocial    Affect/Mental Status (Cognition) flat/blunted affect  -CC     Orientation Status (Cognition) oriented to;person;place;situation  -CC     Follows Commands (Cognition) follows one-step commands;over 90% accuracy  -CC     Personal Safety Interventions fall prevention program maintained;gait belt;nonskid shoes/slippers when out of bed  -CC       Row Name 06/20/23 1641          Vision Assessment/Intervention    Visual Impairment/Limitations WFL  -CC       Row Name 06/20/23 1641          Bathing    [place] Level (Bathing) bathing skills;lower body;upper body;standby assist  -CC     Position (Bathing) sink side;supported sitting;supported standing  -CC     Set-up Assistance (Bathing) obtain supplies  -CC       Row Name 06/20/23 1641          Upper Body Dressing    [place] Level (Upper Body Dressing)  upper body dressing skills;don;doff;pull over garment;set up assistance;supervision  -     Position (Upper Body Dressing) supported sitting  -     Set-up Assistance (Upper Body Dressing) obtain clothing  -       Row Name 06/20/23 1641          Lower Body Dressing    [place] Level (Lower Body Dressing) doff;don;pants/bottoms;shoes/slippers;socks;underwear;minimum assist (75% patient effort)  -     Position (Lower Body Dressing) supported sitting;supported standing  -     Set-up Assistance (Lower Body Dressing) obtain clothing  -       Row Name 06/20/23 1641          Grooming    [place] Level (Grooming) grooming skills;deodorant application;hair care, combing/brushing;oral care regimen;wash face, hands;set up  -     Position (Grooming) supported sitting  -     Set-up Assistance (Grooming) obtain supplies  -       Row Name 06/20/23 1641          Bed Mobility    Comment, (Bed Mobility) seated EOB  -       Row Name 06/20/23 1641          Sit-Stand Transfer    Sit-Stand [place] (Transfers) verbal cues;standby assist  -     Assistive Device (Sit-Stand Transfers) wheelchair  -       Row Name 06/20/23 1641          Stand-Sit Transfer    Stand-Sit [place] (Transfers) verbal cues;standby assist  -     Assistive Device (Stand-Sit Transfers) wheelchair  -       Row Name 06/20/23 1641          Shoulder (Therapeutic Exercise)    Shoulder (Therapeutic Exercise) strengthening exercise  -     Shoulder Strengthening (Therapeutic Exercise) flexion;extension;sitting;2 lb free weight;10 repetitions;2 sets  -       Row Name 06/20/23 1641          Elbow/Forearm (Therapeutic Exercise)    Elbow/Forearm (Therapeutic Exercise) strengthening exercise  -     Elbow/Forearm Strengthening (Therapeutic Exercise) pronation;supination;extension;flexion;sitting;2 lb free weight;15 repititions;3 sets  -       Row Name 06/20/23 1641          Wrist (Therapeutic Exercise)    Wrist (Therapeutic  Exercise) strengthening exercise  -     Wrist Strengthening (Therapeutic Exercise) flexion;extension;2 lb free weight;15 repititions;3 sets  -       Row Name 06/20/23 1641          Balance    Static Sitting Balance supervision  -     Static Standing Balance standby assist  -       Row Name 06/20/23 1641          Positioning and Restraints    Pre-Treatment Position in bed  -     Post Treatment Position wheelchair  -     In Wheelchair sitting;exit alarm on;with PT  -CC               User Key  (r) = Recorded By, (t) = Taken By, (c) = Cosigned By      Initials Name Effective Dates    CC Kathrine Pinedo, OTR 06/16/21 -                      Occupational Therapy Education       Title: PT OT SLP Therapies (In Progress)       Topic: Occupational Therapy (In Progress)       Point: ADL training (In Progress)       Description:   Instruct learner(s) on proper safety adaptation and remediation techniques during self care or transfers.   Instruct in proper use of assistive devices.                  Learning Progress Summary             Patient Acceptance, E, NR by AF at 6/13/2023 1527    Comment: benefits of therapy, endurance   Family Acceptance, E, NR by AF at 6/13/2023 1527    Comment: benefits of therapy, endurance                         Point: Home exercise program (In Progress)       Description:   Instruct learner(s) on appropriate technique for monitoring, assisting and/or progressing therapeutic exercises/activities.                  Learning Progress Summary             Patient Acceptance, E, NR by AF at 6/13/2023 1527    Comment: benefits of therapy, endurance   Family Acceptance, E, NR by AF at 6/13/2023 1527    Comment: benefits of therapy, endurance                         Point: Precautions (In Progress)       Description:   Instruct learner(s) on prescribed precautions during self-care and functional transfers.                  Learning Progress Summary             Patient Acceptance, E, NR by AF at  6/13/2023 1527    Comment: benefits of therapy, endurance   Family Acceptance, E, NR by AF at 6/13/2023 1527    Comment: benefits of therapy, endurance                         Point: Body mechanics (In Progress)       Description:   Instruct learner(s) on proper positioning and spine alignment during self-care, functional mobility activities and/or exercises.                  Learning Progress Summary             Patient Acceptance, E, NR by AF at 6/13/2023 1527    Comment: benefits of therapy, endurance   Family Acceptance, E, NR by AF at 6/13/2023 1527    Comment: benefits of therapy, endurance                                         User Key       Initials Effective Dates Name Provider Type Discipline     06/16/21 -  Candy Davis OTSIMONA Occupational Therapist OT                        OT Recommendation and Plan                         Time Calculation:      Time Calculation- OT       Row Name 06/20/23 0800             Time Calculation- OT    OT Start Time 0800  -CC      OT Stop Time 0900  -CC      OT Time Calculation (min) 60 min  -CC                User Key  (r) = Recorded By, (t) = Taken By, (c) = Cosigned By      Initials Name Provider Type     Kathrine Pinedo OTR Occupational Therapist                  Therapy Charges for Today       Code Description Service Date Service Provider Modifiers Qty    24062817581 HC OT SELF CARE/MGMT/TRAIN EA 15 MIN 6/19/2023 Kathrine Pinedo OTR GO 3    94087477028 HC OT THER PROC EA 15 MIN 6/19/2023 Kathrine Pinedo OTR GO 2    99549359859 HC OT SELF CARE/MGMT/TRAIN EA 15 MIN 6/20/2023 Kathrine Pinedo OTR GO 2    49545811458 HC OT THER PROC EA 15 MIN 6/20/2023 Kathrine Pinedo OTR GO 2                     HI Morgan  6/20/2023

## 2023-06-20 NOTE — PROGRESS NOTES
Inpatient Rehabilitation Plan of Care Note    Plan of Care  Care Plan Reviewed - Updates as Follows    Sphincter Control    [RN] Bladder Management(Active)  Current Status(06/20/2023): Patient is continent of urine using urinal or  toilet.  Weekly Goal(06/27/2023): Patient will be continent 90% of the time.  Discharge Goal: Patient will be continent    [RN] Bowel Management(Active)  Current Status(06/20/2023): Patietn is continent of bowels  Weekly Goal(06/26/2023): patient is continent of bowels  Discharge Goal: Patient is continent of bowels    Performed Intervention(s)  Offer toileting/ timed voids  Monitor I&O      Safety    [RN] Potential for Injury(Active)  Current Status(06/20/2023): Patient is at increased risk for falls/injury r/t  recent surgery and stroke.  Weekly Goal(06/26/2023): Patient will use the call light for assistance  Discharge Goal: Patient/ family will be aware of risk of fall and safety in the  home setting    Performed Intervention(s)  Safety monitoring during functional activities  Environmental set- upto reduce risk      Psychosocial    [RN] Coping/Adjustment(Active)  Current Status(06/20/2023): Patient has a supportive family. Pt is at increased  risk of impaired coping r/t recent stroke and hospitalization. Pt is A/Ox4 but  may be forgetful.  Weekly Goal(06/27/2023): Patient will express concerns regarding current status  Discharge Goal: Patient will demonstrate healthy coping strategies    Performed Intervention(s)  Allow patient to verbalize needs and concerns    Signed by: Torrie Matias RN

## 2023-06-20 NOTE — THERAPY TREATMENT NOTE
Inpatient Rehabilitation - Physical Therapy Treatment Note       [facility]     Patient Name: Vernon Solorzano  : 1950  MRN: [id number]    Today's Date: 2023                    Admit Date: 2023      Visit Dx:     ICD-10-CM ICD-9-CM   1. Impaired functional mobility, balance, gait, and endurance  Z74.09 V49.89       Patient Active Problem List   Diagnosis    DM (diabetes mellitus), type 2    Mixed hyperlipidemia    Mild intermittent asthma without complication    New onset of congestive heart failure    Nonrheumatic mitral valve regurgitation    Chest pain    Essential hypertension    Mitral valve disease    Acute ischemic left MCA stroke       Past Medical History:   Diagnosis Date    Allergic rhinitis     Arthritis     BPH (benign prostatic hyperplasia)     Diabetes mellitus     TYPE 2    Foraminal stenosis of cervical region     C5-C6    GERD (gastroesophageal reflux disease)     Hemorrhoids     History of urinary retention     S/P TURP    Hyperlipidemia     Macular degeneration     PING (obstructive sleep apnea) 2016    MILD, SEES DR. AMARILIS MORGAN       Past Surgical History:   Procedure Laterality Date    CARDIAC CATHETERIZATION N/A 2023    Procedure: Right Heart Cath;  Surgeon: Corey Gaffney MD;  Location: [facility] CATH INVASIVE LOCATION;  Service: Cardiology;  Laterality: N/A;    CARDIAC CATHETERIZATION N/A 2023    Procedure: Left Heart Cath;  Surgeon: Corey Gaffney MD;  Location: [facility] CATH INVASIVE LOCATION;  Service: Cardiology;  Laterality: N/A;    CARDIAC CATHETERIZATION N/A 2023    Procedure: Left ventriculography;  Surgeon: Corey Gaffney MD;  Location: [facility] CATH INVASIVE LOCATION;  Service: Cardiology;  Laterality: N/A;    CARDIAC CATHETERIZATION N/A 2023    Procedure: Coronary angiography;  Surgeon: Corey Gaffney MD;  Location: [facility] CATH INVASIVE LOCATION;  Service: Cardiology;  Laterality: N/A;    COLONOSCOPY N/A     WNL PER PT, NO  RECORDS,     COLONOSCOPY N/A 04/07/2009    DR. GORGE BENAVIDEZ AT [place]    MITRAL VALVE REPAIR/REPLACEMENT N/A 5/24/2023    Procedure: MITRAL VALVE REPAIR/REPLACEMENT TRICUSPID VALVE REPAIR/REPLACEMENT TRANSESOPHAGEAL ECHOCARDIOGRAM WITH ANESTHESIA;  Surgeon: George Frey MD;  Location: [facility];  Service: Cardiothoracic;  Laterality: N/A;    PROSTATE SURGERY N/A 11/12/2010    TURP, DR. BRIGHT COLLAZO AT [facility]    SKIN TAG REMOVAL N/A 12/20/2017    ANAL SKIN TAG X2, PERFORMED IN OFFICE, DR. LISA ORANTES    TURP / TRANSURETHRAL INCISION / DRAINAGE PROSTATE  2010    Dr. Goodrich       PT ASSESSMENT (last 12 hours)       IRF PT Evaluation and Treatment       Row Name 06/20/23 1145          PT Time and Intention    Document Type daily treatment  -MD     Mode of Treatment physical therapy  -MD     Patient/Family/Caregiver Comments/Observations Pt sitting in WC showing no signs of acute distress.  -MD       Row Name 06/20/23 1145          Pain Assessment    Pretreatment Pain Rating 0/10 - no pain  -MD       Row Name 06/20/23 1145          Cognition/Psychosocial    Orientation Status (Cognition) oriented to;person;place;situation  -MD     Follows Commands (Cognition) follows one-step commands;over 90% accuracy  -MD     Personal Safety Interventions fall prevention program maintained;gait belt  -MD       Row Name 06/20/23 1145          Bed Mobility    Sit-Supine [place] (Bed Mobility) standby assist  -MD       Row Name 06/20/23 1145          Chair-Bed Transfer    Chair-Bed [place] (Transfers) verbal cues;contact guard;standby assist  -MD     Assistive Device (Chair-Bed Transfers) wheelchair;walker front-wheeladele  -MD       Row Name 06/20/23 1145          Sit-Stand Transfer    Sit-Stand [place] (Transfers) verbal cues;ivelisseby assist  -MD     Assistive Device (Sit-Stand Transfers) walker, front-wheeled  -MD       Row Name 06/20/23 1145          Stand-Sit Transfer    Stand-Sit [place]  (Transfers) verbal cues;standby assist  -MD     Assistive Device (Stand-Sit Transfers) walker, front-wheeled  -MD       Row Name 06/20/23 1145          Gait/Stairs (Locomotion)    [place] Level (Gait) verbal cues;contact guard;standby assist  -MD     Assistive Device (Gait) walker, front-wheeled  -MD     Distance in Feet (Gait) 80'x1 and 160'x1  -MD     Deviations/Abnormal Patterns (Gait) gait speed decreased;stride length decreased  -MD     Bilateral Gait Deviations forward flexed posture;heel strike decreased  -MD     [place] Level (Stairs) verbal cues;contact guard  -MD     Handrail Location (Stairs) right side (ascending);left side (descending)  -MD     Number of Steps (Stairs) 4  -MD     Ascending Technique (Stairs) step-to-step  -MD     Descending Technique (Stairs) step-to-step  -MD     Stairs, Impairments strength decreased  -MD       Row Name 06/20/23 1145          Positioning and Restraints    Pre-Treatment Position sitting in chair/recliner  -MD     Post Treatment Position wheelchair  -MD     In Wheelchair with SLP;exit alarm on  -MD               User Key  (r) = Recorded By, (t) = Taken By, (c) = Cosigned By      Initials Name Provider Type    Damaris Farrell, PT Physical Therapist                  Wound 05/24/23 0737 sternal Incision (Active)   Dressing Appearance open to air 06/20/23 1000   Closure Open to air 06/20/23 1000   Base scab 06/20/23 1000   Periwound dry;intact 06/20/23 1000   [place] Temperature warm 06/20/23 1000   [place] Skin Turgor soft 06/20/23 1000   Edges rolled/closed 06/20/23 1000   Drainage Amount none 06/20/23 0255   Care, Wound cleansed with;other (see comments) 06/20/23 0255     Physical Therapy Education       Title: PT OT SLP Therapies (In Progress)       Topic: Physical Therapy (In Progress)       Point: Mobility training (Done)       Learning Progress Summary             Patient Acceptance, E, VU by MD at 6/20/2023 1504    Acceptance, E, VU by MD at 6/19/2023  1058    Acceptance, E,TB, VU,NR by TONI at 6/17/2023 1501    Acceptance, E, VU by MD at 6/16/2023 0919    Acceptance, E, VU by MD at 6/15/2023 1140    Eager, E,D,TB, NR by  at 6/14/2023 1459    Acceptance, E, VU by MD at 6/13/2023 1012    Acceptance, E, VU by MD at 6/12/2023 1018    Acceptance, E, VU by MD at 6/10/2023 1040    Acceptance, E, VU by MD at 6/8/2023 1017    Acceptance, E,TB,D, VU,DU,NR by  at 6/7/2023 1513    Comment: POC, Goals, safety with mobility.   Significant Other Acceptance, E,TB,D, VU,DU,NR by  at 6/7/2023 1513    Comment: POC, Goals, safety with mobility.                         Point: Home exercise program (In Progress)       Learning Progress Summary             Patient Eager, E,D,TB, NR by  at 6/14/2023 1459    Acceptance, E,TB,D, VU,DU,NR by  at 6/7/2023 1513    Comment: POC, Goals, safety with mobility.   Significant Other Acceptance, E,TB,D, VU,DU,NR by  at 6/7/2023 1513    Comment: POC, Goals, safety with mobility.                                         User Key       Initials Effective Dates Name Provider Type Discipline     06/16/21 -  Nubia Orellana, PT Physical Therapist PT    MD 06/16/21 -  Damaris Francis, PT Physical Therapist PT     06/16/21 -  Elizabeth Levin, PT Physical Therapist PT                    PT Recommendation and Plan                          Time Calculation:      PT Charges       Row Name 06/20/23 1504 06/20/23 1144          Time Calculation    Start Time 1230  -MD 0930  -MD     Stop Time 1300  -MD 1000  -MD     Time Calculation (min) 30 min  -MD 30 min  -MD     PT Received On -- 06/20/23  -MD     PT - Next Appointment -- 06/21/23  -MD               User Key  (r) = Recorded By, (t) = Taken By, (c) = Cosigned By      Initials Name Provider Type    aDmaris Farrell, PT Physical Therapist                    Therapy Charges for Today       Code Description Service Date Service Provider Modifiers Qty    85144098615  GAIT TRAINING EA 15 MIN 6/19/2023 Tito,  Damaris, PT GP 2    94965138463 HC PT THERAPEUTIC ACT EA 15 MIN 6/19/2023 Damaris Francis, PT GP 1    82328607852 HC PT THER PROC EA 15 MIN 6/19/2023 Damaris Francis, PT GP 1    81891965546 HC GAIT TRAINING EA 15 MIN 6/20/2023 Damaris Francis, PT GP 2    81637642266 HC PT THERAPEUTIC ACT EA 15 MIN 6/20/2023 Damaris Francis, PT GP 2              PT G-Codes  AM-PAC 6 Clicks Score (PT): 14      Damaris Francis, PT  6/20/2023

## 2023-06-20 NOTE — PROGRESS NOTES
"Nutrition Services    Patient Name:  Vernon Solorzano  YOB: 1950  MRN: [id number]  Admit Date:  6/6/2023    Assessment Date:  06/20/23    FOLLOW UP NOTE - CLINICAL NUTRITION    Comments:   Visited pt in room who reported appetite somewhat improved. Per EMR review, PO % of recent meals. Electrolytes being replaced PRN; potassium now WNL. Pt was placed on fluid restriction of 1500 mL/d - watch adherence given multiple Dt. Coke's in room at one time. Pt reports a liking for Magic Cups over Mighty Shakes, will adjust ONS. Boost GC continues at breakfast, daily. 1-3+ edema noted.     Recommend:   D/c Mighty Shakes; Adding Magic Cups q D at lunch.     RD will continue to follow-up, per protocol.      Encounter Information        Reason for Encounter Follow-up   Current Issues S/p acute ischemic L MCA stroke     Current Nutrition Orders & Evaluation of Intake       Oral Nutrition     Current PO Diet Diet: Regular/House Diet, Diabetic Diets, Cardiac Diets, Renal Diets, Fluid Restriction (240 mL/tray) Diets; Healthy Heart (2-3 Na+); Consistent Carbohydrate; Low Sodium (2-3g); 1500 mL/day; No Mixed Consistencies; Texture: Regular Texture (IDDSI ...   Supplement Mighty Shakes BID, Boost GC q daily   PO Evaluation    % PO Intake/# of days %   Factors Affecting Intake  decreased appetite, taste changes     Anthropometrics         Height   Weight Height: 185.4 cm (73\")  Weight: 118 kg (260 lb 5.8 oz) (06/20/23 0520)   BMI kg/m2 Body mass index is 34.35 kg/m².  Obese, Class I (30 - 34.9)   Weight trend Gain     Physical Findings          Physical Appearance alert, flat affect, obese, oriented   Oral/Mouth Cavity tooth or teeth missing   Edema  1+ (trace), 2+ (mild)   Gastrointestinal normoactive, last bowel movement:6/18   Skin  surgical incision   Tubes/Drains/Lines none     Labs       Pertinent Labs Reviewed, listed below     Results from last 7 days   Lab Units 06/20/23  0537 06/19/23  0557 " 06/18/23  0626   SODIUM mmol/L 137 140 140   POTASSIUM mmol/L 3.5 3.3* 3.7   CHLORIDE mmol/L 100 100 99   CO2 mmol/L 31.0* 28.2 32.5*   BUN mg/dL 14 13 12   CREATININE mg/dL 1.24 1.36* 1.31*   CALCIUM mg/dL 8.7 9.0 9.1   GLUCOSE mg/dL 100* 89 110*       Results from last 7 days   Lab Units 06/20/23  0537 06/19/23  0557 06/16/23  0816 06/15/23  0550   MAGNESIUM mg/dL  --   --   --  2.2   PHOSPHORUS mg/dL 3.6 3.9   < > 4.0   HEMOGLOBIN g/dL  --  10.5*   < >  --    HEMATOCRIT %  --  31.4*   < >  --    WBC 10*3/mm3  --  8.20   < >  --     < > = values in this interval not displayed.       Results from last 7 days   Lab Units 06/19/23  0557 06/16/23  0816 06/14/23  0602   PLATELETS 10*3/mm3 242 272 297       COVID19   Date Value Ref Range Status   05/23/2023 Not Detected Not Detected - Ref. Range Final     Lab Results   Component Value Date    HGBA1C 9.20 (H) 05/23/2023          Medications           Scheduled Medications apixaban, 5 mg, Oral, Q12H  aspirin, 81 mg, Oral, Daily  atorvastatin, 40 mg, Oral, Nightly  calcium 500 mg vitamin D 5 mcg (200 UT), 1 tablet, Oral, Daily  guaifenesin, 400 mg, Oral, Q8H  insulin glargine, 15 Units, Subcutaneous, Nightly  insulin glargine, 40 Units, Subcutaneous, Daily  insulin lispro, 3-14 Units, Subcutaneous, TID With Meals  metoprolol succinate XL, 25 mg, Oral, Q24H  pantoprazole, 40 mg, Oral, Q AM  potassium chloride, 40 mEq, Oral, Daily  [START ON 6/21/2023] torsemide, 100 mg, Oral, Daily       Infusions     PRN Medications   acetaminophen **OR** [DISCONTINUED] acetaminophen **OR** [DISCONTINUED] acetaminophen    bisacodyl    calcium carbonate    diphenhydrAMINE-zinc acetate    hydrocortisone-bacitracin-zinc oxide-nystatin    ipratropium-albuterol    ipratropium-albuterol    senna-docusate sodium    traMADol     PLAN OF CARE  Intervention Goal        Intervention Goal(s) Maintain nutrition status, Improved nutrition related labs, Continue positive trend, Advance diet, No  significant weight loss, and Appropriate weight loss     Nutrition Intervention        RD Action Advise alternative selection, Advise available snack, Adjusted supplement, Encourage intake, Follow Tx Progress, and Care plan reviewed     Prescription        Diet Prescription    Supplement Prescription D/c Mighty Shakes, Adding magic Cup (chocolate) q daily at lunch. Continue Boost GC q daily at breakfast.    EN/PN Prescription    Prescription Ordered Yes   --  Monitor/Evaluation        Monitor Per protocol, PO intake, Supplement intake, Pertinent labs, Weight, Skin status, GI status, Symptoms   Discharge Plan Pending clinical course   Education Will educate as needed       Electronically signed by:  Génesis Beaulieu RD  06/20/23 13:51 EDT

## 2023-06-20 NOTE — PLAN OF CARE
Goal Outcome Evaluation:  Plan of Care Reviewed With: patient        Progress: improving  Outcome Evaluation: Pt. denies pain today. has requeste tums for gi upset. betadine applied to mid sternal incision and puncture sites.

## 2023-06-20 NOTE — THERAPY TREATMENT NOTE
Inpatient Rehabilitation - Speech Language Pathology Treatment Note    [facility]     Patient Name: Vernon Solorzano  : 1950  MRN: [id number]    Today's Date: 2023                   Admit Date: 2023       Visit Dx:      ICD-10-CM ICD-9-CM   1. Impaired functional mobility, balance, gait, and endurance  Z74.09 V49.89       Patient Active Problem List   Diagnosis   • DM (diabetes mellitus), type 2   • Mixed hyperlipidemia   • Mild intermittent asthma without complication   • New onset of congestive heart failure   • Nonrheumatic mitral valve regurgitation   • Chest pain   • Essential hypertension   • Mitral valve disease   • Acute ischemic left MCA stroke       Past Medical History:   Diagnosis Date   • Allergic rhinitis    • Arthritis    • BPH (benign prostatic hyperplasia)    • Diabetes mellitus     TYPE 2   • Foraminal stenosis of cervical region     C5-C6   • GERD (gastroesophageal reflux disease)    • Hemorrhoids    • History of urinary retention 2010    S/P TURP   • Hyperlipidemia    • Macular degeneration    • PING (obstructive sleep apnea) 2016    MILD, SEES DR. AMARILIS MORGAN       Past Surgical History:   Procedure Laterality Date   • CARDIAC CATHETERIZATION N/A 2023    Procedure: Right Heart Cath;  Surgeon: Corey Gaffney MD;  Location: [facility] CATH INVASIVE LOCATION;  Service: Cardiology;  Laterality: N/A;   • CARDIAC CATHETERIZATION N/A 2023    Procedure: Left Heart Cath;  Surgeon: Corey Gaffney MD;  [facility] LOC[facility];  Service: Cardiology;  Laterality: N/A;   • CARDIAC CATHETERIZATION N/A 2023    Procedure: Left ventriculography;  Surgeon: Corey Gaffney MD;  [facility];  Service: Cardiology;  Laterality: N/A;   • CARDIAC CATHETERIZATION N/A 2023    Procedure: Coronary angiography;  Surgeon: Corey Gaffney MD;  Location:  [facility] CATH INVASIVE LOCATION;  Service: Cardiology;  Laterality: N/A;   •  COLONOSCOPY N/A     WNL PER PT, NO RECORDS,    • COLONOSCOPY N/A 04/07/2009    DR. GORGE BENAVIDEZ AT [place]   • MITRAL VALVE REPAIR/REPLACEMENT N/A 5/24/2023    Procedure: MITRAL VALVE REPAIR/REPLACEMENT TRICUSPID VALVE REPAIR/REPLACEMENT TRANSESOPHAGEAL ECHOCARDIOGRAM WITH ANESTHESIA;  Surgeon: George Frey MD;  Location: [facility];  Service: Cardiothoracic;  Laterality: N/A;   • PROSTATE SURGERY N/A 11/12/2010    TURP, DR. BRIGHT COLLAZO AT [facility]   • SKIN TAG REMOVAL N/A 12/20/2017    ANAL SKIN TAG X2, PERFORMED IN OFFICE, DR. LISA ORANTES   • TURP / TRANSURETHRAL INCISION / DRAINAGE PROSTATE  2010    Dr. Godorich       SLP Recommendation and Plan                                                            SLP EVALUATION (last 72 hours)     SLP SLC Evaluation     Row Name 06/20/23 1300 06/20/23 0900 06/19/23 1300 06/19/23 0900          Communication Assessment/Intervention    Document Type therapy note (daily note)  -SR therapy note (daily note)  -SR therapy note (daily note)  -SR therapy note (daily note)  -SR     Subjective Information no complaints  -SR no complaints  -SR no complaints  -SR no complaints  -SR     Patient Observations alert;cooperative;agree to therapy  -SR alert;cooperative;agree to therapy  -SR alert;cooperative;agree to therapy  -SR alert;agree to therapy;cooperative  -SR     Patient Effort good  -SR good  -SR adequate  -SR adequate  -SR     Symptoms Noted During/After Treatment none  -SR none  -SR none  -SR none  -SR        Pain Scale: Numbers Pre/Post-Treatment    Pretreatment Pain Rating 0/10 - no pain  -SR 0/10 - no pain  -SR 0/10 - no pain  -SR 0/10 - no pain  -SR     Posttreatment Pain Rating 0/10 - no pain  -SR 0/10 - no pain  -SR 0/10 - no pain  -SR 0/10 - no pain  -SR           User Key  (r) = Recorded By, (t) = Taken By, (c) = Cosigned By    Initials Name Effective Dates    Latisha Haas CCC-SLP 11/10/22 -                    EDUCATION    The patient has been  educated in the following areas:       Cognitive Impairment Dysphagia (Swallowing Impairment).             SLP GOALS     Row Name 06/20/23 1300 06/20/23 0900 06/19/23 1300       ([facility]) Pharyngeal Strengthening Exercise Goal 1 (SLP)    Activity (Pharyngeal Strengthening Goal 1, SLP) increase timing;increase closure at entrance to airway/closure of airway at glottis  -SR -- increase timing;increase closure at entrance to airway/closure of airway at glottis  -SR    Increase Timing hard effortful swallow;Mendelsohn  -SR -- hard effortful swallow;Mendelsohn  -SR    Increase Closure at Entrance to Airway/Closure of Airway at Glottis hard effortful swallow;chin tuck against resistance (CTAR);sarita  -SR -- hard effortful swallow;chin tuck against resistance (CTAR);sarita  -SR    [place]/Accuracy (Pharyngeal Strengthening Goal 1, SLP) with minimal cues (75-90% accuracy)  -SR -- with minimal cues (75-90% accuracy)  -SR    Time Frame (Pharyngeal Strengthening Goal 1, SLP) by discharge  -SR -- by discharge  -SR    Progress/Outcomes (Pharyngeal Strengthening Goal 1, SLP) goal ongoing  -SR -- goal ongoing  -SR    Comment (Pharyngeal Strengthening Goal 1, SLP) Completed 15 repetitions of effortful swallow and 10 repetitions of sarita exercise given MIN cues. Tolerated 4/4 ice chips with no overt s/sx of aspiration or penetration. Completed 3 sets of 20 repetitions of CTAR  -SR -- Patient completed 15 repetitions of effortful swallow given MIN cues. Tolerated 5/5 sips of thin via cup and 4/5 sips of thin via straw with no overt s/sx of aspiration or penetration. Delayed throat clear x1 with thin by straw.  -SR       Attention Goal 1 (SLP)    Improve Attention by Goal 1 (SLP) -- complete selective attention task;complete sustained attention task;80%;with minimal cues (75-90%)  -SR --    Time Frame (Attention Goal 1, SLP) -- by discharge  -SR --    Progress/Outcomes (Attention Goal 1, SLP) -- continuing progress toward goal  -SR  --    Comment (Attention Goal 1, SLP) -- Patient easily distractable during AM therapy session. Required extended time to complete medication management task. Redirected with verbal cues.  -SR --       Reasoning Goal 1 (SLP)    Improve Reasoning Through Goal 1 (SLP) -- -- complete deductive reasoning task;complete basic reasoning task;complete mental flexibility task;50%;with moderate cues (50-74%)  -SR    Time Frame (Reasoning Goal 1, SLP) -- -- by discharge  -SR    Progress/Outcomes (Reasoning Goal 1, SLP) -- -- goal ongoing  -SR    Comment (Reasoning Goal 1, SLP) -- -- Visual reasoning task; patient followed written directions and used process of elimination to determine solution to logic activity with 50% acc given NO cues; 70% acc given MOD cues. Activity incomplete due to time constraint. Will complete activity next date.  -SR       Executive Functional Skills Goal 1 (SLP)    Improve Executive Function Skills Goal 1 (SLP) -- demonstrate awareness of deficit;home management activity;80%;with minimal cues (75-90%)  -SR demonstrate awareness of deficit;home management activity;80%;with moderate cues (50-74%)  -SR    Time Frame (Executive Function Skills Goal 1, SLP) -- by discharge  -SR by discharge  -SR    Progress/Outcomes (Executive Function Skills Goal 1, SLP) -- goal ongoing  -SR goal ongoing  -SR    Comment (Executive Function Skills Goal 1, SLP) -- Medication management; patient followed written directions and sorted x6 mock medications by day/time with 50% acc given NO cues; 100% acc given MIN-MOD cues. Extended time provided to complete activity due to prolonged response time.  -SR Reviewed progress/plan of care this date. Patient showed increased awareness of current deficits. He noted increased difficulty with thought organization and judgement with various tasks completed in speech therapy sessions.  -SR    Row Name 06/19/23 0900             Attention Goal 1 (SLP)    Improve Attention by Goal 1 (SLP)  complete selective attention task;complete sustained attention task;80%;with minimal cues (75-90%)  -SR      Time Frame (Attention Goal 1, SLP) by discharge  -SR      Progress/Outcomes (Attention Goal 1, SLP) goal revised this date  -SR      Comment (Attention Goal 1, SLP) Prolonged response time noted during therapy tasks. Patient required extended time to complete activities during AM session.  -SR         Organizational Skills Goal 1 (SLP)    Improve Thought Organization Through Goal 1 (SLP) completing a divergent naming task;completing mental manipulation task;80%;with moderate cues (50-74%)  -SR      Time Frame (Thought Organization Skills Goal 1, SLP) by discharge  -SR      Progress/Outcomes (Thought Organization Skills Goal 1, SLP) goal ongoing  -SR      Comment (Thought Organization Skills Goal 1, SLP) Divergent naming; patient named avg of 8 items in one minute for concrete category given NO cues; increased to 15 items given MIN cues and extended time.  -SR         Executive Functional Skills Goal 1 (SLP)    Improve Executive Function Skills Goal 1 (SLP) demonstrate awareness of deficit;home management activity;80%;with moderate cues (50-74%)  -SR      Time Frame (Executive Function Skills Goal 1, SLP) by discharge  -SR      Progress/Outcomes (Executive Function Skills Goal 1, SLP) goal ongoing  -SR      Comment (Executive Function Skills Goal 1, SLP) Patient answered questions about medication dosages and determined the appropriate amount for a target age with 80% acc given MOD cues. Increased difficulty noted with sustained attention. Prolonged response time. Patient required about 20 minutes to complete 6 question task.  -SR            User Key  (r) = Recorded By, (t) = Taken By, (c) = Cosigned By    Initials Name Provider Type    SR Latisha Morris CCC-SLP Speech and Language Pathologist                            Time Calculation:        Time Calculation- SLP     Row Name 06/20/23 1530 06/20/23 6989           Time Calculation- SLP    SLP Start Time 1300  -SR 0900  -SR     SLP Stop Time 1330  -SR 0930  -SR     SLP Time Calculation (min) 30 min  -SR 30 min  -SR           User Key  (r) = Recorded By, (t) = Taken By, (c) = Cosigned By    Initials Name Provider Type     Latisha Morris CCC-SLP Speech and Language Pathologist                  Therapy Charges for Today     Code Description Service Date Service Provider Modifiers Qty    03128432491 HC ST DEV OF COGN SKILLS INITIAL 15 MIN 6/19/2023 Latisha Morris CCC-SLP  1    84037180832 HC ST DEV OF COGN SKILLS EACH ADDT'L 15 MIN 6/19/2023 Latisha Morris CCC-SLP  2    60686980048 HC ST TREATMENT SWALLOW 1 6/19/2023 Latisha Morris CCC-SLP GN 1    65540227498 HC ST TREATMENT SWALLOW 2 6/20/2023 Latisha Morris CCC-SLP GN 1    92985177043 HC ST DEV OF COGN SKILLS INITIAL 15 MIN 6/20/2023 Latisha Morris CCC-SLP  1    24167348889 HC ST DEV OF COGN SKILLS EACH ADDT'L 15 MIN 6/20/2023 Latisha Morris CCC-SLP  1                           JENNIFFER Hobbs  6/20/2023

## 2023-06-20 NOTE — PROGRESS NOTES
LOS: 14 days   Patient Care Team:  Liza Oconnell III, NP-C as PCP - General (Family Medicine)  Corey Lao Jr., MD (Inactive) as Consulting Physician (Urology)      KRIS TAYLOR  1950      ADMITTING DIAGNOSIS:  Stroke  Valvular heart disease status post repair/replacement      Subjective     Comfortable with his breathing.  Feels he is tolerating therapies fairly well.  Endurance is showing some improvement.  No new weakness.  Rash resolved.  Continues with edema.  He like to do his breathing treatments scheduled-have changed to twice daily and as needed          Objective     Vitals:    06/20/23 1426   BP: 113/75   Pulse: 106   Resp: 18   Temp: 97.2 °F (36.2 °C)   SpO2: 96%       PHYSICAL EXAM:   MENTAL STATUS -  AWAKE / ALERT  HEENT- NCAT, PUPILS EQUALLY ROUND, SCLERAE ANICTERIC, CONJUNCTIVAE PINK, OP MOIST, NO JVD, EARS UNREMARKABLE EXTERNALLY  LUNGS - CTA, NO WHEEZES, RALES OR RHONCHI  Sternotomy incision -dressed  HEART-sinus with occasional ectopy  ABD - NORMOACTIVE BOWEL SOUNDS, SOFT, NT.   EXT -1-2+ pedal edema bilateral lower extremities  NEURO -oriented to person place time and situation.    Speech is fluent.            MEDICATIONS  Scheduled Meds:apixaban, 5 mg, Oral, Q12H  aspirin, 81 mg, Oral, Daily  atorvastatin, 40 mg, Oral, Nightly  calcium 500 mg vitamin D 5 mcg (200 UT), 1 tablet, Oral, Daily  guaifenesin, 400 mg, Oral, Q8H  insulin glargine, 15 Units, Subcutaneous, Nightly  insulin glargine, 40 Units, Subcutaneous, Daily  [START ON 6/21/2023] insulin lispro, 3 Units, Subcutaneous, Daily With Breakfast & Lunch  insulin lispro, 3-14 Units, Subcutaneous, TID With Meals  ipratropium-albuterol, 3 mL, Nebulization, BID - RT  metoprolol succinate XL, 25 mg, Oral, Q24H  pantoprazole, 40 mg, Oral, Q AM  potassium chloride, 40 mEq, Oral, Daily  [START ON 6/21/2023] torsemide, 100 mg, Oral, Daily      Continuous Infusions:   PRN Meds:.  acetaminophen **OR**  [DISCONTINUED] acetaminophen **OR** [DISCONTINUED] acetaminophen    bisacodyl    calcium carbonate    diphenhydrAMINE-zinc acetate    hydrocortisone-bacitracin-zinc oxide-nystatin    ipratropium-albuterol    senna-docusate sodium    traMADol      RESULTS  Glucose   Date/Time Value Ref Range Status   06/20/2023 1109 188 (H) 70 - 130 mg/dL Final     Comment:     Meter: [id number] : [id number] Denis Carbone NA   06/20/2023 0722 103 70 - 130 mg/dL Final     Comment:     Meter: [id number] : [id number] Denis Carbnoe NA   06/19/2023 2032 287 (H) 70 - 130 mg/dL Final     Comment:     Meter: [id number] : [id number] Marek Cardenas NA   06/19/2023 1619 292 (H) 70 - 130 mg/dL Final     Comment:     Meter: [id number] : [id number] Summer Venegas NA   06/19/2023 1154 164 (H) 70 - 130 mg/dL Final     Comment:     Meter: [id number] : [id number] Reyes Mayer NA   06/19/2023 0809 99 70 - 130 mg/dL Final     Comment:     Meter: [id number] : [id number] Reyes Quezadaona NA   06/18/2023 2021 121 70 - 130 mg/dL Final     Comment:     Meter: [id number] : [id number] Dillon Coleman NA   06/18/2023 1611 212 (H) 70 - 130 mg/dL Final     Comment:     Meter: [id number] : [id number] Summer Venegas NA     Results from last 7 days   Lab Units 06/19/23  0557 06/16/23  0816 06/14/23  0602   WBC 10*3/mm3 8.20 8.57 9.09   HEMOGLOBIN g/dL 10.5* 10.4* 10.0*   HEMATOCRIT % 31.4* 30.6* 30.0*   PLATELETS 10*3/mm3 242 272 297       Results from last 7 days   Lab Units 06/20/23  0537 06/19/23  0557 06/18/23  0626   SODIUM mmol/L 137 140 140   POTASSIUM mmol/L 3.5 3.3* 3.7   CHLORIDE mmol/L 100 100 99   CO2 mmol/L 31.0* 28.2 32.5*   BUN mg/dL 14 13 12   CREATININE mg/dL 1.24 1.36* 1.31*   CALCIUM mg/dL 8.7 9.0 9.1   GLUCOSE mg/dL 100* 89 110*        [facility] Reference Range & Units 05/23/23 14:29   Hemoglobin A1C 4.80 - 5.60 % 9.20 (H)   Total Cholesterol 0 - 200 mg/dL 155   HDL Cholesterol 40 - 60 mg/dL 41   LDL Cholesterol  0 -  100 mg/dL 81   Triglycerides 0 - 150 mg/dL 197 (H)   (H): Data is abnormally high     Latest Reference Range & Units 05/31/23 08:03   25 Hydroxy, Vitamin D 30.0 - 100.0 ng/ml 20.0 (L)   (L): Data is abnormally low    Lower extremity venous duplex-June 19, 2023-Chronic right lower extremity superficial thrombophlebitis noted in the small saphenous.     ASSESSMENT and PLAN    Acute ischemic left MCA stroke    Status post CVA-Scattered  punctate restricted diffusion acute infarct in the bilateral frontal and   left parietal occipital cortices.        Impaired Cognition/impaired mobility/impaired self-care     Aphasia-resolved     Right hand apraxia-resolved     Encephalopathy-improving     Dysphagia/nutrition-healthy heart 2-3 g sodium, consistent carb, no mixed consistency, regular texture, nectar thick liquid  June 7-videofluoroscopic swallow study planned tomorrow  June 8-swallow study today-advance to regular solid, no mixed consistency, thin liquids by cup.  No straws.     Stroke prophylaxis-Eliquis/aspirin/atorvastatin     History of severe mitral regurgitation and annular dilatation, moderate to severe tricuspid regurgitation-  status post May 24, 2023 - 1. Mitral valve repair with a 28 mm physio II ring annuloplasty with residual mild to moderate MR  2.  Mitral valve replacement with a 29 mm Schwartz II porcine prosthesis  3.  Tricuspid valve repair with 28 mm physio tricuspid ring  4.  Left atrial appendage endocardial closure     Atrial flutter-status post cardioversion June 2, 2023 anticoagulation with Eliquis  Metoprolol     Volume status-torsemide     Severe pulmonary hypertension  Obstructive sleep apnea     Interstitial lung disease status post COVID-19 infection-steroid taper per pulmonology  DuoNebs 2 times a day  Chronic steroid use with cushingoid syndrome  Prednisone 5 mg every other day     Leukocytosis-reactive/steroid administration     Postoperative anemia      Vujbuyxzgmsxmgbn-eihygqjtdsy-vbfemacw     Chronic kidney disease stage III-baseline creatinine 1.2     Diabetes mellitus type 2-Jardiance/Lantus/sliding scale insulin-uncontrolled  June 7-on Jardiance and Lantus 40 units daily.  Elevated blood glucose.  Received 21 units on sliding scale insulin yesterday.  Increase Lantus by adding 10 units nightly.  June 14-increase Lantus from 10 to 15 units nightly.  Continue 40 units every morning.  Off Jardiance secondary to renal function.  June 19-tends to run higher at lunchtime.  -will follow pattern through today and probably add short acting insulin with a.m. meal  June 20-add 3 units lispro with breakfast and lunch     BPH/urinary retention-history of TURP  June 14-void 300 cc with postvoid residual 205 cc.    Renal insufficiency - June 9 - Creatinine increased to 1.46. Meds reviewed. Is on Jardiance - already received dose today. Will get input from Nephrology.  June 12-with the increase in his creatinine, Jardiance discontinued  June 14-torsemide added for edema  June 20-torsemide increased     Pain management-tramadol-/Tylenol Dk report reviewed    Nutrition-addendum-June 9 6:43 PM-decreased appetite-would like to have zinc level checked in the morning    Rash on L axilla  June 13-does not wish to have anything as it is not causing patient discomfort     Sleep  June 13-offered melatonin, but declined       TEAM CONF - JUNE 8 - ENDURANCE POOR. BED MOD. TRANSFERS MOD. GAIT 25 FEET MOD ASSIST RW. SHOWER TRANSFERS MOD A.   BATH MOD ASSIST. LBD MAX. UBD MIN. TOILETING MAX.   Patient is currently on nectar liquids VFSS TODAY.   BIMS SUMMARY SCORE: 9 Moderately impaired   DIFFICULTY WITH SUSTAINED AND FOCAL ATTENTION.  CONTINENT / INCONTINENT  ELOS - 2 -3 WEEKS    TEAM CONF - Kari 15 - BED MOD. TRANSFERS MIN. 4 STAIRS MIN X 2. SATS 97% OR HIGHER WITH GAIT WITH FATIGUE, GAIT 80 FEET MIN ASSIST RW. TOILET TRANSFERS MIN. SHOWER TRANSFERS MIN MOD. BATH MIN. LBD MIN MOD.  UBD MIN. GROOMING SET UP. TOILETING MIN MOD. DRESSING TAKES 30 MINUTES WIETH SLOW PROCESSING. EDEMA - JOBST STOCKINGS. MILD DYSPHGAIA, REG THIN BY CUP, NO STRAWS. CLQT MODERATE DEFICITS, DIFFICULTY WITH ATTENTION AND MEMORY, PROLONGED PROCESSING SPEED. BNE FOLLOWING. VARIABLE INTAKE - DIETITIAN FOLLOWING. RASH ON ABD AND LEFT CHEST, POSSIBLY CONTACT DERMATITIS. TORSEMIDE ADDED.  ELOS - TWO WEEKS.        Now admit for comprehensive acute inpatient rehabilitation .  This would be an interdisciplinary program with physical therapy 1 hour,  occupational therapy 1 hour, and speech therapy 1 hour, 5 days a week.  Rehabilitation nursing for carryover, monitoring of neurologic, cardiac, pulmonary, diabetes   status, bowel and bladder, and skin  Ongoing physician follow-up.  Weekly team conferences.  Goals are to achieve a level of contact-guard with  mobility and self-care and improved activity tolerance.   Rehabilitation prognosis fair.  Medical prognosis defer to cardiology.  Estimated length of stay is approximately 2 weeks, but is only an estimation.   The patient's functional status and clinical status is unchanged from preadmission assessment and the patient continues appropriate for acute inpatient rehabilitation.  Goal is for home with outpatient cardiac rehab   therapies.  Barrier to discharge: Impaired cognition mobility self-care- work on follow, executive function, conditioning, transfers, progressive ambulation, ADLs to overcome.           Tu Silver MD, PGY-2   6/13/2023  12:10 PM       During rounds, used appropriate personal protective equipment including mask and gloves.  Additional gown if indicated.  Mask used was standard procedure mask. Appropriate PPE was worn during the entire visit.  Hand hygiene was completed before and after.

## 2023-06-21 LAB
ALBUMIN SERPL-MCNC: 3.4 G/DL (ref 3.5–5.2)
ANION GAP SERPL CALCULATED.3IONS-SCNC: 10.1 MMOL/L (ref 5–15)
BASOPHILS # BLD AUTO: 0.05 10*3/MM3 (ref 0–0.2)
BASOPHILS NFR BLD AUTO: 0.6 % (ref 0–1.5)
BUN SERPL-MCNC: 15 MG/DL (ref 8–23)
BUN/CREAT SERPL: 12.3 (ref 7–25)
CALCIUM SPEC-SCNC: 8.7 MG/DL (ref 8.6–10.5)
CHLORIDE SERPL-SCNC: 98 MMOL/L (ref 98–107)
CO2 SERPL-SCNC: 28.9 MMOL/L (ref 22–29)
CREAT SERPL-MCNC: 1.22 MG/DL (ref 0.76–1.27)
DEPRECATED RDW RBC AUTO: 43.8 FL (ref 37–54)
EGFRCR SERPLBLD CKD-EPI 2021: 62.2 ML/MIN/1.73
EOSINOPHIL # BLD AUTO: 0.69 10*3/MM3 (ref 0–0.4)
EOSINOPHIL NFR BLD AUTO: 8.2 % (ref 0.3–6.2)
ERYTHROCYTE [DISTWIDTH] IN BLOOD BY AUTOMATED COUNT: 13.9 % (ref 12.3–15.4)
GLUCOSE BLDC GLUCOMTR-MCNC: 112 MG/DL (ref 70–130)
GLUCOSE BLDC GLUCOMTR-MCNC: 151 MG/DL (ref 70–130)
GLUCOSE BLDC GLUCOMTR-MCNC: 256 MG/DL (ref 70–130)
GLUCOSE BLDC GLUCOMTR-MCNC: 364 MG/DL (ref 70–130)
GLUCOSE SERPL-MCNC: 109 MG/DL (ref 65–99)
HCT VFR BLD AUTO: 28.3 % (ref 37.5–51)
HGB BLD-MCNC: 9.6 G/DL (ref 13–17.7)
IMM GRANULOCYTES # BLD AUTO: 0.04 10*3/MM3 (ref 0–0.05)
IMM GRANULOCYTES NFR BLD AUTO: 0.5 % (ref 0–0.5)
LYMPHOCYTES # BLD AUTO: 1.13 10*3/MM3 (ref 0.7–3.1)
LYMPHOCYTES NFR BLD AUTO: 13.4 % (ref 19.6–45.3)
MCH RBC QN AUTO: 29.3 PG (ref 26.6–33)
MCHC RBC AUTO-ENTMCNC: 33.9 G/DL (ref 31.5–35.7)
MCV RBC AUTO: 86.3 FL (ref 79–97)
MONOCYTES # BLD AUTO: 0.9 10*3/MM3 (ref 0.1–0.9)
MONOCYTES NFR BLD AUTO: 10.7 % (ref 5–12)
NEUTROPHILS NFR BLD AUTO: 5.61 10*3/MM3 (ref 1.7–7)
NEUTROPHILS NFR BLD AUTO: 66.6 % (ref 42.7–76)
NRBC BLD AUTO-RTO: 0 /100 WBC (ref 0–0.2)
PHOSPHATE SERPL-MCNC: 3.1 MG/DL (ref 2.5–4.5)
PLATELET # BLD AUTO: 207 10*3/MM3 (ref 140–450)
PMV BLD AUTO: 8.9 FL (ref 6–12)
POTASSIUM SERPL-SCNC: 3.2 MMOL/L (ref 3.5–5.2)
RBC # BLD AUTO: 3.28 10*6/MM3 (ref 4.14–5.8)
SODIUM SERPL-SCNC: 137 MMOL/L (ref 136–145)
WBC NRBC COR # BLD: 8.42 10*3/MM3 (ref 3.4–10.8)

## 2023-06-21 NOTE — PROGRESS NOTES
Inpatient Rehabilitation Plan of Care Note    Plan of Care  Updated Problems/Interventions  Mobility    [PT] Stairs(Active)  Current Status(06/21/2023): 4, rail, Min x 2  Weekly Goal(06/29/2023): curb, Min, rwx  Discharge Goal: 12, rail, Min/CG    [PT] Walk(Active)  Current Status(06/21/2023): 80, CGA, rwx  Weekly Goal(06/29/2023): 100, Min, rwx  Discharge Goal: 150, CG, rwx    [PT] Bed/Chair/Wheelchair(Active)  Current Status(06/21/2023): CGA  Weekly Goal(06/29/2023): CG/SBA  Discharge Goal: CG/SBA    [PT] Bed Mobility(Active)  Current Status(06/21/2023): Ney  Weekly Goal(06/29/2023): CGA  Discharge Goal: Sup    Signed by: Gulshan Mcdonald, PT

## 2023-06-21 NOTE — PROGRESS NOTES
Inpatient Rehabilitation Plan of Care Note    Plan of Care  Care Plan Reviewed - No updates at this time.    Sphincter Control    Performed Intervention(s)  Offer toileting/ timed voids  Monitor I&O      Safety    Performed Intervention(s)  Safety monitoring during functional activities  Environmental set- upto reduce risk      Psychosocial    Performed Intervention(s)  Allow patient to verbalize needs and concerns    Signed by: Juli Perry RN

## 2023-06-21 NOTE — PROGRESS NOTES
[facility] Progress Note      Patient Name: Vernon Solorzano  : 1950  MRN: [id number]  Primary Care Physician:  Liza Oconnell III, RACHAEL  Date of admission: 2023    Subjective     Interval History:   Follow up CKD III.     Seen and examined.  Doing very well today.  No shortness of air or chest pain.    Review of Systems:   As noted above    Objective     Vitals:   Temp:  [97 °F (36.1 °C)-98.8 °F (37.1 °C)] 97 °F (36.1 °C)  Heart Rate:  [103-110] 110  Resp:  [18] 18  BP: (113-123)/(69-88) 123/88    Intake/Output Summary (Last 24 hours) at 2023 0743  Last data filed at 2023 0700  Gross per 24 hour   Intake 1200 ml   Output 2565 ml   Net -1365 ml       Physical Exam:    General Appearance: alert, sitting up in bed.  Pleasant.  Questions skin: warm and dry  HEENT: oral mucosa normal, nonicteric sclera  Neck: supple, no JVD  Lungs: Clear to auscultation.   Heart: RRR, normal S1 and S2  Abdomen: soft, nontender, + bs, distended.  Extremities 1-2+ lower ext edema  Scheduled Meds:     apixaban, 5 mg, Oral, Q12H  aspirin, 81 mg, Oral, Daily  atorvastatin, 40 mg, Oral, Nightly  calcium 500 mg vitamin D 5 mcg (200 UT), 1 tablet, Oral, Daily  guaifenesin, 400 mg, Oral, Q8H  insulin glargine, 15 Units, Subcutaneous, Nightly  insulin glargine, 40 Units, Subcutaneous, Daily  insulin lispro, 3 Units, Subcutaneous, Daily With Breakfast & Lunch  insulin lispro, 3-14 Units, Subcutaneous, TID With Meals  ipratropium-albuterol, 3 mL, Nebulization, BID - RT  metoprolol succinate XL, 25 mg, Oral, Q24H  pantoprazole, 40 mg, Oral, Q AM  potassium chloride, 40 mEq, Oral, Daily  torsemide, 100 mg, Oral, Daily      IV Meds:        Results Reviewed:   I have personally reviewed the results from the time of this admission to 2023 07:43 EDT     Results from last 7 days   Lab Units 23  0608 23  0537 23  0557   SODIUM mmol/L 137 137 140   POTASSIUM mmol/L 3.2*  3.5 3.3*   CHLORIDE mmol/L 98 100 100   CO2 mmol/L 28.9 31.0* 28.2   BUN mg/dL 15 14 13   CREATININE mg/dL 1.22 1.24 1.36*   CALCIUM mg/dL 8.7 8.7 9.0   GLUCOSE mg/dL 109* 100* 89       Estimated Creatinine Clearance: 69.4 mL/min (by C-G formula based on SCr of 1.22 mg/dL).    Results from last 7 days   Lab Units 06/21/23  0608 06/20/23  0537 06/19/23  0557 06/16/23  0816 06/15/23  0550   MAGNESIUM mg/dL  --   --   --   --  2.2   PHOSPHORUS mg/dL 3.1 3.6 3.9   < > 4.0    < > = values in this interval not displayed.             Results from last 7 days   Lab Units 06/21/23  0608 06/19/23  0557 06/16/23  0816   WBC 10*3/mm3 8.42 8.20 8.57   HEMOGLOBIN g/dL 9.6* 10.5* 10.4*   PLATELETS 10*3/mm3 207 242 272             Assessment / Plan     ASSESSMENT:  1.  Acute kidney injury on top of CKD III, baseline creatinine 1.2.  Peak 1.68.  Creatinine stable.   2. MR now sp MV replacement, tissue and TV repair, SU closure 5/24/23.  3. Chronic steroid use after COVID 19 PNA.  4. Anemia of CKD and postoperative anemia.  Hemoglobin is at goal  5. 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 Rehab .  6. DM2  7. Aflutter.Cardioversion 6/2/23. On eliquis and metoprolol.  Okay.  8.  Hypokalemia    PLAN:    1.  Continue torsemide to 100 mg daily.  2.  Continue fluid restriction  3..  Surveillance labs    Tae Lay MD  06/21/23  07:43 EDT    [facility]  [phone]

## 2023-06-21 NOTE — PLAN OF CARE
Goal Outcome Evaluation:  Plan of Care Reviewed With: patient, spouse        Progress: improving  Outcome Evaluation: Patient is not being very compliant with his 1500 cc fluid restriction. nurse has encouraged pt. numerous times to limit oral intake.

## 2023-06-21 NOTE — THERAPY TREATMENT NOTE
Inpatient Rehabilitation - Speech Language Pathology Treatment Note    [facility]     Patient Name: Vernon Solorzano  : 1950  MRN: [id number]    Today's Date: 2023                   Admit Date: 2023       Visit Dx:      ICD-10-CM ICD-9-CM   1. Impaired functional mobility, balance, gait, and endurance  Z74.09 V49.89       Patient Active Problem List   Diagnosis    DM (diabetes mellitus), type 2    Mixed hyperlipidemia    Mild intermittent asthma without complication    New onset of congestive heart failure    Nonrheumatic mitral valve regurgitation    Chest pain    Essential hypertension    Mitral valve disease    Acute ischemic left MCA stroke       Past Medical History:   Diagnosis Date    Allergic rhinitis     Arthritis     BPH (benign prostatic hyperplasia)     Diabetes mellitus     TYPE 2    Foraminal stenosis of cervical region     C5-C6    GERD (gastroesophageal reflux disease)     Hemorrhoids     History of urinary retention     S/P TURP    Hyperlipidemia     Macular degeneration     PING (obstructive sleep apnea) 2016    MILD, SEES DR. AMARILIS MORGAN       Past Surgical History:   Procedure Laterality Date    CARDIAC CATHETERIZATION N/A 2023    Procedure: Right Heart Cath;  Surgeon: Corey Gaffney MD;  Location: [facility] CATH INVASIVE LOCATION;  Service: Cardiology;  Laterality: N/A;    CARDIAC CATHETERIZATION N/A 2023    Procedure: Left Heart Cath;  Surgeon: Corey Gaffney MD;  Location: [facility] CATH INVASIVE LOCATION;  Service: Cardiology;  Laterality: N/A;    CARDIAC CATHETERIZATION N/A 2023    Procedure: Left ventriculography;  Surgeon: Corey Gaffney MD;  Location: [facility] CATH INVASIVE LOCATION;  Service: Cardiology;  Laterality: N/A;    CARDIAC CATHETERIZATION N/A 2023    Procedure: Coronary angiography;  Surgeon: Corey Gaffney MD;  Location: [facility] CATH INVASIVE LOCATION;  Service: Cardiology;  Laterality: N/A;    COLONOSCOPY N/A     WNL PER  PT, NO RECORDS,     COLONOSCOPY N/A 04/07/2009    DR. GORGE BENAVIDEZ AT [place]    MITRAL VALVE REPAIR/REPLACEMENT N/A 5/24/2023    Procedure: MITRAL VALVE REPAIR/REPLACEMENT TRICUSPID VALVE REPAIR/REPLACEMENT TRANSESOPHAGEAL ECHOCARDIOGRAM WITH ANESTHESIA;  Surgeon: George Frey MD;  Location: [facility];  Service: Cardiothoracic;  Laterality: N/A;    PROSTATE SURGERY N/A 11/12/2010    TURP, DR. BRIGHT COLLAZO AT [facility]    SKIN TAG REMOVAL N/A 12/20/2017    ANAL SKIN TAG X2, PERFORMED IN OFFICE, DR. LISA ORANTES    TURP / TRANSURETHRAL INCISION / DRAINAGE PROSTATE  2010    Dr. Goodrich       SLP Recommendation and Plan                                                            SLP EVALUATION (last 72 hours)       SLP SLC Evaluation       Row Name 06/21/23 1300 06/21/23 0900 06/20/23 1300 06/20/23 0900 06/19/23 1300       Communication Assessment/Intervention    Document Type therapy note (daily note)  -SR therapy note (daily note)  -SR therapy note (daily note)  -SR therapy note (daily note)  -SR therapy note (daily note)  -SR    Subjective Information no complaints  -SR no complaints  -SR no complaints  -SR no complaints  -SR no complaints  -SR    Patient Observations alert;cooperative;agree to therapy  -SR alert;cooperative;agree to therapy  -SR alert;cooperative;agree to therapy  -SR alert;cooperative;agree to therapy  -SR alert;cooperative;agree to therapy  -SR    Patient Effort good  -SR good  -SR good  -SR good  -SR adequate  -SR    Symptoms Noted During/After Treatment none  -SR none  -SR none  -SR none  -SR none  -SR       Pain Scale: Numbers Pre/Post-Treatment    Pretreatment Pain Rating 0/10 - no pain  -SR 0/10 - no pain  -SR 0/10 - no pain  -SR 0/10 - no pain  -SR 0/10 - no pain  -SR    Posttreatment Pain Rating 0/10 - no pain  -SR 0/10 - no pain  -SR 0/10 - no pain  -SR 0/10 - no pain  -SR 0/10 - no pain  -SR      Row Name 06/19/23 0900                   Communication  Assessment/Intervention    Document Type therapy note (daily note)  -SR        Subjective Information no complaints  -SR        Patient Observations alert;agree to therapy;cooperative  -SR        Patient Effort adequate  -SR        Symptoms Noted During/After Treatment none  -SR           Pain Scale: Numbers Pre/Post-Treatment    Pretreatment Pain Rating 0/10 - no pain  -SR        Posttreatment Pain Rating 0/10 - no pain  -SR                  User Key  (r) = Recorded By, (t) = Taken By, (c) = Cosigned By      Initials Name Effective Dates    SR Latisha Morris CCC-SLP 11/10/22 -                        EDUCATION    The patient has been educated in the following areas:       Cognitive Impairment.             SLP GOALS       Row Name 06/21/23 1300 06/21/23 0900 06/20/23 1300       (STG) Pharyngeal Strengthening Exercise Goal 1 (SLP)    Activity (Pharyngeal Strengthening Goal 1, SLP) -- -- increase timing;increase closure at entrance to airway/closure of airway at glottis  -SR    Increase Timing -- -- hard effortful swallow;Mendelsohn  -SR    Increase Closure at Entrance to Airway/Closure of Airway at Glottis -- -- hard effortful swallow;chin tuck against resistance (CTAR);sarita  -SR    Posey/Accuracy (Pharyngeal Strengthening Goal 1, SLP) -- -- with minimal cues (75-90% accuracy)  -SR    Time Frame (Pharyngeal Strengthening Goal 1, SLP) -- -- by discharge  -SR    Progress/Outcomes (Pharyngeal Strengthening Goal 1, SLP) -- -- goal ongoing  -SR    Comment (Pharyngeal Strengthening Goal 1, SLP) -- -- Completed 15 repetitions of effortful swallow and 10 repetitions of sarita exercise given MIN cues. Tolerated 4/4 ice chips with no overt s/sx of aspiration or penetration. Completed 3 sets of 20 repetitions of CTAR  -SR       Attention Goal 1 (SLP)    Improve Attention by Goal 1 (SLP) -- complete selective attention task;complete sustained attention task;80%;with minimal cues (75-90%)  -SR --    Time Frame  "(Attention Goal 1, SLP) -- by discharge  -SR --    Progress/Outcomes (Attention Goal 1, SLP) -- continuing progress toward goal  -SR --    Comment (Attention Goal 1, SLP) -- Increased difficulty noted with sustained/selective attn. Given ample time to read paragraph for memory task, patient reported \"I did not read anything for the story\". SLP encouraged patient to use horizontal scanning strategy. He read the sentence aloud and scanned the paragraph with his finger to track his progress.  -SR --       Memory Skills Goal 1 (SLP)    Improve Memory Skills Through Goal 1 (SLP) use memory strategies;listen to a paragraph and answer questions;recall details of the day;80%;with moderate cues (50-74%)  -SR use memory strategies;listen to a paragraph and answer questions;recall details of the day;80%;with moderate cues (50-74%)  -SR --    Time Frame (Memory Skills Goal 1, SLP) by discharge  -SR by discharge  -SR --    Progress/Outcomes (Memory Skills Goal 1, SLP) goal ongoing  -SR goal ongoing  -SR --    Comment (Memory Skills Goal 1, SLP) Patient recalled details from narrative read in the morning session following ~3 hour delay with 80% acc given MAX cues.  -SR Narrative retention; patient read short story and answered questions about the content with 80% acc given MOD cues.  -SR --       Organizational Skills Goal 1 (SLP)    Improve Thought Organization Through Goal 1 (SLP) completing a divergent naming task;completing mental manipulation task;80%;with moderate cues (50-74%)  -SR -- --    Time Frame (Thought Organization Skills Goal 1, SLP) by discharge  -SR -- --    Progress/Outcomes (Thought Organization Skills Goal 1, SLP) goal ongoing  -SR -- --       Reasoning Goal 1 (SLP)    Improve Reasoning Through Goal 1 (SLP) -- complete deductive reasoning task;complete basic reasoning task;complete mental flexibility task;80%;with moderate cues (50-74%)  -SR --    Time Frame (Reasoning Goal 1, SLP) -- by discharge  -SR --    " Progress/Outcomes (Reasoning Goal 1, SLP) -- goal ongoing  -SR --    Comment (Reasoning Goal 1, SLP) -- Patient followed verbal directions and determined target location on hospital map with 40% acc given NO cues; 80% acc given MIN cues.  -SR --       Executive Functional Skills Goal 1 (SLP)    Improve Executive Function Skills Goal 1 (SLP) demonstrate awareness of deficit;home management activity;80%;with minimal cues (75-90%)  -SR -- --    Time Frame (Executive Function Skills Goal 1, SLP) by discharge  -SR -- --    Progress/Outcomes (Executive Function Skills Goal 1, SLP) goal ongoing  -SR -- --    Comment (Executive Function Skills Goal 1, SLP) Fxnal math; patient answered questions about restaurant tips and shopping discounts with 50% acc given MOD cues. Extended time provided to complete task due to prolonged response time.  -SR -- --      Row Name 06/20/23 0900 06/19/23 1300 06/19/23 0900       ([facility]) Pharyngeal Strengthening Exercise Goal 1 (SLP)    Activity (Pharyngeal Strengthening Goal 1, SLP) -- increase timing;increase closure at entrance to airway/closure of airway at glottis  -SR --    Increase Timing -- hard effortful swallow;Mendelsohn  -SR --    Increase Closure at Entrance to Airway/Closure of Airway at Glottis -- hard effortful swallow;chin tuck against resistance (CTAR);sarita  -SR --    Morton/Accuracy (Pharyngeal Strengthening Goal 1, SLP) -- with minimal cues (75-90% accuracy)  -SR --    Time Frame (Pharyngeal Strengthening Goal 1, SLP) -- by discharge  -SR --    Progress/Outcomes (Pharyngeal Strengthening Goal 1, SLP) -- goal ongoing  -SR --    Comment (Pharyngeal Strengthening Goal 1, SLP) -- Patient completed 15 repetitions of effortful swallow given MIN cues. Tolerated 5/5 sips of thin via cup and 4/5 sips of thin via straw with no overt s/sx of aspiration or penetration. Delayed throat clear x1 with thin by straw.  -SR --       Attention Goal 1 (SLP)    Improve Attention by Goal 1  (SLP) complete selective attention task;complete sustained attention task;80%;with minimal cues (75-90%)  -SR -- complete selective attention task;complete sustained attention task;80%;with minimal cues (75-90%)  -SR    Time Frame (Attention Goal 1, SLP) by discharge  -SR -- by discharge  -SR    Progress/Outcomes (Attention Goal 1, SLP) continuing progress toward goal  -SR -- goal revised this date  -SR    Comment (Attention Goal 1, SLP) Patient easily distractable during AM therapy session. Required extended time to complete medication management task. Redirected with verbal cues.  -SR -- Prolonged response time noted during therapy tasks. Patient required extended time to complete activities during AM session.  -SR       Organizational Skills Goal 1 (SLP)    Improve Thought Organization Through Goal 1 (SLP) -- -- completing a divergent naming task;completing mental manipulation task;80%;with moderate cues (50-74%)  -SR    Time Frame (Thought Organization Skills Goal 1, SLP) -- -- by discharge  -SR    Progress/Outcomes (Thought Organization Skills Goal 1, SLP) -- -- goal ongoing  -SR    Comment (Thought Organization Skills Goal 1, SLP) -- -- Divergent naming; patient named avg of 8 items in one minute for concrete category given NO cues; increased to 15 items given MIN cues and extended time.  -SR       Reasoning Goal 1 (SLP)    Improve Reasoning Through Goal 1 (SLP) -- complete deductive reasoning task;complete basic reasoning task;complete mental flexibility task;50%;with moderate cues (50-74%)  -SR --    Time Frame (Reasoning Goal 1, SLP) -- by discharge  -SR --    Progress/Outcomes (Reasoning Goal 1, SLP) -- goal ongoing  -SR --    Comment (Reasoning Goal 1, SLP) -- Visual reasoning task; patient followed written directions and used process of elimination to determine solution to logic activity with 50% acc given NO cues; 70% acc given MOD cues. Activity incomplete due to time constraint. Will complete activity  next date.  -SR --       Executive Functional Skills Goal 1 (SLP)    Improve Executive Function Skills Goal 1 (SLP) demonstrate awareness of deficit;home management activity;80%;with minimal cues (75-90%)  -SR demonstrate awareness of deficit;home management activity;80%;with moderate cues (50-74%)  -SR demonstrate awareness of deficit;home management activity;80%;with moderate cues (50-74%)  -SR    Time Frame (Executive Function Skills Goal 1, SLP) by discharge  -SR by discharge  -SR by discharge  -SR    Progress/Outcomes (Executive Function Skills Goal 1, SLP) goal ongoing  -SR goal ongoing  -SR goal ongoing  -SR    Comment (Executive Function Skills Goal 1, SLP) Medication management; patient followed written directions and sorted x6 mock medications by day/time with 50% acc given NO cues; 100% acc given MIN-MOD cues. Extended time provided to complete activity due to prolonged response time.  -SR Reviewed progress/plan of care this date. Patient showed increased awareness of current deficits. He noted increased difficulty with thought organization and judgement with various tasks completed in speech therapy sessions.  -SR Patient answered questions about medication dosages and determined the appropriate amount for a target age with 80% acc given MOD cues. Increased difficulty noted with sustained attention. Prolonged response time. Patient required about 20 minutes to complete 6 question task.  -SR              User Key  (r) = Recorded By, (t) = Taken By, (c) = Cosigned By      Initials Name Provider Type    Latisha Haas CCC-SLP Speech and Language Pathologist                                Time Calculation:        Time Calculation- SLP       Row Name 06/21/23 1515 06/21/23 0930          Time Calculation- SLP    SLP Start Time 1300  -SR 0900  -SR     SLP Stop Time 1330  -SR 0930  -SR     SLP Time Calculation (min) 30 min  -SR 30 min  -SR               User Key  (r) = Recorded By, (t) = Taken By, (c) =  Cosigned By      Initials Name Provider Type     Latisha Morris CCC-SLP Speech and Language Pathologist                      Therapy Charges for Today       Code Description Service Date Service Provider Modifiers Qty    50732208960 HC ST TREATMENT SWALLOW 2 6/20/2023 Latisha Morris CCC-SLP GN 1    [id number]  ST DEV OF COGN SKILLS INITIAL 15 MIN 6/20/2023 Latisha Morris CCC-SLP  1    [id number]  ST DEV OF COGN SKILLS EACH ADDT'L 15 MIN 6/20/2023 Latisha Morris CCC-SLP  1    [id number]  ST DEV OF COGN SKILLS INITIAL 15 MIN 6/21/2023 Latisha Morris CCC-SLP  1    [id number]  ST DEV OF COGN SKILLS EACH ADDT'L 15 MIN 6/21/2023 Latisha Morris CCC-SLP  3                             JENNIFFER Hobbs  6/21/2023

## 2023-06-21 NOTE — THERAPY TREATMENT NOTE
Inpatient Rehabilitation - Occupational Therapy Treatment Note    [facility]     Patient Name: Vernon Solorzano  : 1950  MRN: [id number]    Today's Date: 2023                 Admit Date: 2023         ICD-10-CM ICD-9-CM   1. Impaired functional mobility, balance, gait, and endurance  Z74.09 V49.89       Patient Active Problem List   Diagnosis    DM (diabetes mellitus), type 2    Mixed hyperlipidemia    Mild intermittent asthma without complication    New onset of congestive heart failure    Nonrheumatic mitral valve regurgitation    Chest pain    Essential hypertension    Mitral valve disease    Acute ischemic left MCA stroke       Past Medical History:   Diagnosis Date    Allergic rhinitis     Arthritis     BPH (benign prostatic hyperplasia)     Diabetes mellitus     TYPE 2    Foraminal stenosis of cervical region     C5-C6    GERD (gastroesophageal reflux disease)     Hemorrhoids     History of urinary retention     S/P TURP    Hyperlipidemia     Macular degeneration     PING (obstructive sleep apnea) 2016    MILD, SEES DR. AMARILIS MORGAN       Past Surgical History:   Procedure Laterality Date    CARDIAC CATHETERIZATION N/A 2023    Procedure: Right Heart Cath;  Surgeon: Corey Gaffney MD;  Location: [facility] CATH INVASIVE LOCATION;  Service: Cardiology;  Laterality: N/A;    CARDIAC CATHETERIZATION N/A 2023    Procedure: Left Heart Cath;  Surgeon: Corey Gaffney MD;  Location: [facility] CATH INVASIVE LOCATION;  Service: Cardiology;  Laterality: N/A;    CARDIAC CATHETERIZATION N/A 2023    Procedure: Left ventriculography;  Surgeon: Corey Gaffney MD;  Location: [facility] CATH INVASIVE LOCATION;  Service: Cardiology;  Laterality: N/A;    CARDIAC CATHETERIZATION N/A 2023    Procedure: Coronary angiography;  Surgeon: Corey Gaffney MD;  Location[facility] CATH INVASIVE LOCATION;  Service: Cardiology;  Laterality: N/A;    COLONOSCOPY N/A     WNL PER PT, NO RECORDS,      COLONOSCOPY N/A 04/07/2009    DR. GORGE BENAVIDEZ AT [place]    MITRAL VALVE REPAIR/REPLACEMENT N/A 5/24/2023    Procedure: MITRAL VALVE REPAIR/REPLACEMENT TRICUSPID VALVE REPAIR/REPLACEMENT TRANSESOPHAGEAL ECHOCARDIOGRAM WITH ANESTHESIA;  Surgeon: George Frey MD;  Location: [facility];  Service: Cardiothoracic;  Laterality: N/A;    PROSTATE SURGERY N/A 11/12/2010    TURP, DR. BRIGHT COLLAZO AT [facility]    SKIN TAG REMOVAL N/A 12/20/2017    ANAL SKIN TAG X2, PERFORMED IN OFFICE, DR. LISA ORANTES    TURP / TRANSURETHRAL INCISION / DRAINAGE PROSTATE  2010    Dr. Goodrich             IRF OT ASSESSMENT FLOWSHEET (last 12 hours)       IRF OT Evaluation and Treatment       Row Name 06/21/23 1500          OT Time and Intention    Document Type daily treatment  -SM     Mode of Treatment individual therapy;occupational therapy  -SM     Patient Effort good  -       Row Name 06/21/23 1500          General Information    Patient/Family/Caregiver Comments/Observations Pt agreeable to complete UE strengthening session this afternoon  -       Row Name 06/21/23 1500          Pain Assessment    Pretreatment Pain Rating 0/10 - no pain  -     Posttreatment Pain Rating 0/10 - no pain  -       Row Name 06/21/23 1500          Shoulder (Therapeutic Exercise)    Shoulder (Therapeutic Exercise) strengthening exercise  -     Shoulder Strengthening (Therapeutic Exercise) bilateral;flexion;extension;external rotation;internal rotation;1 lb free weight;2 lb free weight;2 sets;10 repetitions  forward punch, increased difficulty with overhead activites  -       Row Name 06/21/23 1500          Elbow/Forearm (Therapeutic Exercise)    Elbow/Forearm Strengthening (Therapeutic Exercise) bilateral;flexion;extension;supination;pronation;2 lb free weight;2 sets;10 repetitions  -       Row Name 06/21/23 1500          Positioning and Restraints    Pre-Treatment Position sitting in chair/recliner  -     In Wheelchair exit  alarm on;with family/caregiver  -               User Key  (r) = Recorded By, (t) = Taken By, (c) = Cosigned By      Initials Name Effective Dates    Latisha Rhodes, GARY 04/02/20 -                      Occupational Therapy Education       Title: PT OT SLP Therapies (In Progress)       Topic: Occupational Therapy (In Progress)       Point: ADL training (In Progress)       Description:   Instruct learner(s) on proper safety adaptation and remediation techniques during self care or transfers.   Instruct in proper use of assistive devices.                  Learning Progress Summary             Patient Acceptance, E, NR by AF at 6/13/2023 1527    Comment: benefits of therapy, endurance   Family Acceptance, E, NR by AF at 6/13/2023 1527    Comment: benefits of therapy, endurance                         Point: Home exercise program (In Progress)       Description:   Instruct learner(s) on appropriate technique for monitoring, assisting and/or progressing therapeutic exercises/activities.                  Learning Progress Summary             Patient Acceptance, E, NR by AF at 6/13/2023 1527    Comment: benefits of therapy, endurance   Family Acceptance, E, NR by AF at 6/13/2023 1527    Comment: benefits of therapy, endurance                         Point: Precautions (In Progress)       Description:   Instruct learner(s) on prescribed precautions during self-care and functional transfers.                  Learning Progress Summary             Patient Acceptance, E, NR by AF at 6/13/2023 1527    Comment: benefits of therapy, endurance   Family Acceptance, E, NR by AF at 6/13/2023 1527    Comment: benefits of therapy, endurance                         Point: Body mechanics (In Progress)       Description:   Instruct learner(s) on proper positioning and spine alignment during self-care, functional mobility activities and/or exercises.                  Learning Progress Summary             Patient Acceptance, E, NR by AF  at 6/13/2023 1527    Comment: benefits of therapy, endurance   Family Acceptance, E, NR by AF at 6/13/2023 1527    Comment: benefits of therapy, endurance                                         User Key       Initials Effective Dates Name Provider Type Discipline     06/16/21 -  Candy Davis OTR Occupational Therapist OT                        OT Recommendation and Plan                         Time Calculation:      Time Calculation- OT       Row Name 06/21/23 1541             Time Calculation- OT    OT Start Time 1330  -      OT Stop Time 1345  -      OT Time Calculation (min) 15 min  -                User Key  (r) = Recorded By, (t) = Taken By, (c) = Cosigned By      Initials Name Provider Type     Latisha Kauffman, OT Occupational Therapist                  Therapy Charges for Today       Code Description Service Date Service Provider Modifiers Qty    01716244742 HC OT THER PROC GROUP 6/21/2023 Latisha Kauffman OT GO 1                     Latisha Kauffman OT  6/21/2023

## 2023-06-21 NOTE — PLAN OF CARE
Goal Outcome Evaluation:     Slept fairly well. Continent of B&B. Meds with water. Glucose 219 tonight, 15 units Lantus given as ordered. Transfers assist x1. 1500cc Fluid restrictions ordered.

## 2023-06-21 NOTE — PROGRESS NOTES
LOS: 15 days   Patient Care Team:  Liza Oconnell III, NP-C as PCP - General (Family Medicine)  Corey Lao Jr., MD (Inactive) as Consulting Physician (Urology)      KRIS TAYLOR  1950      ADMITTING DIAGNOSIS:  Stroke  Valvular heart disease status post repair/replacement      Subjective     Pt seen at bedside this AM, NAEON. Swelling decreased in B/L LE but having some SOB. Otherwise, denies chest pain, n/v/d.         Objective     Vitals:    06/21/23 0743   BP:    Pulse:    Resp: 18   Temp:    SpO2: 97%       PHYSICAL EXAM:   MENTAL STATUS -  AWAKE / ALERT  HEENT- NCAT, PUPILS EQUALLY ROUND, SCLERAE ANICTERIC, CONJUNCTIVAE PINK, OP MOIST, NO JVD, EARS UNREMARKABLE EXTERNALLY  LUNGS - CTA, diminshed breath sounds in left lung base.   Sternotomy incision -dressed  HEART-sinus with occasional ectopy  ABD - NORMOACTIVE BOWEL SOUNDS, SOFT, NT.   EXT -1-2+ pedal edema bilateral lower extremities, improved from yesterday   NEURO -oriented to person place time and situation.    Speech is fluent.            MEDICATIONS  Scheduled Meds:apixaban, 5 mg, Oral, Q12H  aspirin, 81 mg, Oral, Daily  atorvastatin, 40 mg, Oral, Nightly  calcium 500 mg vitamin D 5 mcg (200 UT), 1 tablet, Oral, Daily  guaifenesin, 400 mg, Oral, Q8H  insulin glargine, 15 Units, Subcutaneous, Nightly  insulin glargine, 40 Units, Subcutaneous, Daily  insulin lispro, 3 Units, Subcutaneous, Daily With Breakfast & Lunch  insulin lispro, 3-14 Units, Subcutaneous, TID With Meals  ipratropium-albuterol, 3 mL, Nebulization, BID - RT  metoprolol succinate XL, 25 mg, Oral, Q24H  pantoprazole, 40 mg, Oral, Q AM  potassium chloride, 40 mEq, Oral, Daily  potassium chloride, 40 mEq, Oral, Once  potassium chloride, 40 mEq, Oral, Once  torsemide, 100 mg, Oral, Daily      Continuous Infusions:   PRN Meds:.  acetaminophen **OR** [DISCONTINUED] acetaminophen **OR** [DISCONTINUED] acetaminophen    bisacodyl    calcium carbonate     diphenhydrAMINE-zinc acetate    hydrocortisone-bacitracin-zinc oxide-nystatin    ipratropium-albuterol    senna-docusate sodium    traMADol      RESULTS  Glucose   Date/Time Value Ref Range Status   06/21/2023 1115 256 (H) 70 - 130 mg/dL Final     Comment:     Meter: [id number] : [id number] Jamila Guerrier NA   06/21/2023 0709 112 70 - 130 mg/dL Final     Comment:     Meter: [id number] : [id number] Laura Kothari NA   06/20/2023 2117 219 (H) 70 - 130 mg/dL Final     Comment:     Meter: [id number] : [id number] Kwasi Bangura NA   06/20/2023 1639 324 (H) 70 - 130 mg/dL Final     Comment:     Meter: [id number] : [id number] Prema JASON RN   06/20/2023 1109 188 (H) 70 - 130 mg/dL Final     Comment:     Meter: [id number] : [id number] Denis Carbone NA   06/20/2023 0722 103 70 - 130 mg/dL Final     Comment:     Meter: [id number] : [id number] Denis Carbone NA   06/19/2023 2032 287 (H) 70 - 130 mg/dL Final     Comment:     Meter: [id number] : [id number] Marek Cardenas NA   06/19/2023 1619 292 (H) 70 - 130 mg/dL Final     Comment:     Meter: [id number] : [id number] Memotiffanie Rubi NA     Results from last 7 days   Lab Units 06/21/23  0608 06/19/23  0557 06/16/23  0816   WBC 10*3/mm3 8.42 8.20 8.57   HEMOGLOBIN g/dL 9.6* 10.5* 10.4*   HEMATOCRIT % 28.3* 31.4* 30.6*   PLATELETS 10*3/mm3 207 242 272     Results from last 7 days   Lab Units 06/21/23  0608 06/20/23  0537 06/19/23  0557   SODIUM mmol/L 137 137 140   POTASSIUM mmol/L 3.2* 3.5 3.3*   CHLORIDE mmol/L 98 100 100   CO2 mmol/L 28.9 31.0* 28.2   BUN mg/dL 15 14 13   CREATININE mg/dL 1.22 1.24 1.36*   CALCIUM mg/dL 8.7 8.7 9.0   GLUCOSE mg/dL 109* 100* 89      Latest Reference Range & Units 05/23/23 14:29   Hemoglobin A1C 4.80 - 5.60 % 9.20 (H)   Total Cholesterol 0 - 200 mg/dL 155   HDL Cholesterol 40 - 60 mg/dL 41   LDL Cholesterol  0 - 100 mg/dL 81   Triglycerides 0 - 150 mg/dL 197 (H)   (H): Data is abnormally high     Latest  Reference Range & Units 05/31/23 08:03   25 Hydroxy, Vitamin D 30.0 - 100.0 ng/ml 20.0 (L)   (L): Data is abnormally low    Lower extremity venous duplex-June 19, 2023-Chronic right lower extremity superficial thrombophlebitis noted in the small saphenous.     ASSESSMENT and PLAN    Acute ischemic left MCA stroke    Status post CVA-Scattered  punctate restricted diffusion acute infarct in the bilateral frontal and   left parietal occipital cortices.        Impaired Cognition/impaired mobility/impaired self-care     Aphasia-resolved     Right hand apraxia-resolved     Encephalopathy-improving     Dysphagia/nutrition-healthy heart 2-3 g sodium, consistent carb, no mixed consistency, regular texture, nectar thick liquid  June 7-videofluoroscopic swallow study planned tomorrow  June 8-swallow study today-advance to regular solid, no mixed consistency, thin liquids by cup.  No straws.     Stroke prophylaxis-Eliquis/aspirin/atorvastatin     History of severe mitral regurgitation and annular dilatation, moderate to severe tricuspid regurgitation-  status post May 24, 2023 - 1. Mitral valve repair with a 28 mm physio II ring annuloplasty with residual mild to moderate MR  2.  Mitral valve replacement with a 29 mm Schwartz II porcine prosthesis  3.  Tricuspid valve repair with 28 mm physio tricuspid ring  4.  Left atrial appendage endocardial closure     Atrial flutter-status post cardioversion June 2, 2023 anticoagulation with Eliquis  Metoprolol     Volume status-torsemide     Severe pulmonary hypertension  Obstructive sleep apnea     Interstitial lung disease status post COVID-19 infection-steroid taper per pulmonology  DuoNebs 4 times a day  Chronic steroid use with cushingoid syndrome  Prednisone 5 mg every other day     Leukocytosis-reactive/steroid administration     Postoperative anemia     Hnwshsintzozmhtr-jkvbtteenyp-rleltdmj     Chronic kidney disease stage III-baseline creatinine 1.2     Diabetes mellitus type  2-Jardiance/Lantus/sliding scale insulin-uncontrolled  June 7-on Jardiance and Lantus 40 units daily.  Elevated blood glucose.  Received 21 units on sliding scale insulin yesterday.  Increase Lantus by adding 10 units nightly.  June 14-increase Lantus from 10 to 15 units nightly.  Continue 40 units every morning.  Off Jardiance secondary to renal function.  June 19-tends to run higher at lunchtime.  -will follow pattern through today and probably add short acting insulin with a.m. meal     BPH/urinary retention-history of TURP  June 14-void 300 cc with postvoid residual 205 cc.    Renal insufficiency - June 9 - Creatinine increased to 1.46. Meds reviewed. Is on Jardiance - already received dose today. Will get input from Nephrology.  June 12-with the increase in his creatinine, Jardiance discontinued  June 14-torsemide added for edema  June 19- increased torsemide to 100mg daily per nephro  June 20- tolerating increased torsemide, but K+ 3.2 this AM and repleted.      Pain management-tramadol-/Tylenol Dk report reviewed    Nutrition-addendum-June 9 6:43 PM-decreased appetite-would like to have zinc level checked in the morning    Rash on L axilla  June 13-does not wish to have anything as it is not causing patient discomfort     Sleep  June 13-offered melatonin, but declined       TEAM CONF - JUNE 8 - ENDURANCE POOR. BED MOD. TRANSFERS MOD. GAIT 25 FEET MOD ASSIST RW. SHOWER TRANSFERS MOD A.   BATH MOD ASSIST. LBD MAX. UBD MIN. TOILETING MAX.   Patient is currently on nectar liquids VFSS TODAY.   BIMS SUMMARY SCORE: 9 Moderately impaired   DIFFICULTY WITH SUSTAINED AND FOCAL ATTENTION.  CONTINENT / INCONTINENT  ELOS - 2 -3 WEEKS    TEAM CONF - Kari 15 - BED MOD. TRANSFERS MIN. 4 STAIRS MIN X 2. SATS 97% OR HIGHER WITH GAIT WITH FATIGUE, GAIT 80 FEET MIN ASSIST RW. TOILET TRANSFERS MIN. SHOWER TRANSFERS MIN MOD. BATH MIN. LBD MIN MOD. UBD MIN. GROOMING SET UP. TOILETING MIN MOD. DRESSING TAKES 30 MINUTES BRENDA  SLOW PROCESSING. EDEMA - JOBST STOCKINGS. MILD DYSPHGAIA, REG THIN BY CUP, NO STRAWS. CLQT MODERATE DEFICITS, DIFFICULTY WITH ATTENTION AND MEMORY, PROLONGED PROCESSING SPEED. BNE FOLLOWING. VARIABLE INTAKE - DIETITIAN FOLLOWING. RASH ON ABD AND LEFT CHEST, POSSIBLY CONTACT DERMATITIS. TORSEMIDE ADDED.  ELOS - TWO WEEKS.        Now admit for comprehensive acute inpatient rehabilitation .  This would be an interdisciplinary program with physical therapy 1 hour,  occupational therapy 1 hour, and speech therapy 1 hour, 5 days a week.  Rehabilitation nursing for carryover, monitoring of neurologic, cardiac, pulmonary, diabetes   status, bowel and bladder, and skin  Ongoing physician follow-up.  Weekly team conferences.  Goals are to achieve a level of contact-guard with  mobility and self-care and improved activity tolerance.   Rehabilitation prognosis fair.  Medical prognosis defer to cardiology.  Estimated length of stay is approximately 2 weeks, but is only an estimation.   The patient's functional status and clinical status is unchanged from preadmission assessment and the patient continues appropriate for acute inpatient rehabilitation.  Goal is for home with outpatient cardiac rehab   therapies.  Barrier to discharge: Impaired cognition mobility self-care- work on follow, executive function, conditioning, transfers, progressive ambulation, ADLs to overcome.           Jeffery Dudley DO, PGY-2     During rounds, used appropriate personal protective equipment including mask and gloves.  Additional gown if indicated.  Mask used was standard procedure mask. Appropriate PPE was worn during the entire visit.  Hand hygiene was completed before and after.         Patient has been staffed and discussed with attending Physician, Dr. Tu Silver.

## 2023-06-21 NOTE — PROGRESS NOTES
Inpatient Rehabilitation Functional Measures Assessment and Plan of Care    Plan of Care  Updated Problems/Interventions  Self Care    [OT] Bathing(Active)  Current Status(06/21/2023): MIN/CGA  Weekly Goal(06/26/2023): CGA  Discharge Goal: CGA    [OT] Dressing (Lower)(Active)  Current Status(06/21/2023): MIN/CGA  Weekly Goal(06/27/2023): CGA/SBA  Discharge Goal: CGA/SBA    [OT] Dressing (Upper)(Active)  Current Status(06/21/2023): SBA  Weekly Goal(06/28/2023): Set up  Discharge Goal: set up    [OT] Grooming(Active)  Current Status(06/19/2023): set up  Weekly Goal(06/19/2023): set up  Discharge Goal: set up    [OT] Toileting(Active)  Current Status(06/21/2023): MIN/CGA  Weekly Goal(06/29/2023): CGA/SBA  Discharge Goal: CGA/SBA        Mobility    [OT] Toilet Transfers(Active)  Current Status(06/21/2023): CGA/SBA  Weekly Goal(07/03/2023): SBA  Discharge Goal: SBA    [OT] Tub/Shower Transfers(Active)  Current Status(06/21/2023): CGA  Weekly Goal(06/27/2023): SBA/CGA  Discharge Goal: CGA /SBA    Functional Measures  PERI Eating:  Branch  PERI Grooming: Branch  PERI Bathing:  Branch  PERI Upper Body Dressing:  Branch  PERI Lower Body Dressing:  Branch  PERI Toileting:  Branch    PERI Bladder Management  Level of Assistance:  Branch  Frequency/Number of Accidents this Shift:  Branch    PERI Bowel Management  Level of Assistance: Branch  Frequency/Number of Accidents this Shift: Branch    PERI Bed/Chair/Wheelchair Transfer:  Branch  PERI Toilet Transfer:  Branch  PERI Tub/Shower Transfer:  Branch    Previously Documented Mode of Locomotion at Discharge: Field  PERI Expected Mode of Locomotion at Discharge: Branch  PERI Walk/Wheelchair:  Branch  PERI Stairs:  Branch    PERI Comprehension:  Branch  PERI Expression:  Branch  PERI Social Interaction:  Branch  PERI Problem Solving:  Branch  PERI Memory:  Branch    Therapy Mode Minutes  Occupational Therapy: Branch  Physical Therapy: Branch  Speech Language Pathology:  Branch    Signed by: Kathrine  Khris, OTR/L

## 2023-06-21 NOTE — PROGRESS NOTES
Inpatient Rehabilitation Plan of Care Note    Plan of Care  Updated Problems/Interventions  Swallow Function    [ST] Swallowing(Active)  Current Status(06/21/2023): Regular (no mixed) thin by cup. precautions:  upright, small single sips, no straw, alternate solids with liquids  Weekly Goal(06/28/2023): Patient will tolerate the least restrictive diet  without s/sx of aspiration  Discharge Goal: Patient will tolerate the least restrictive diet without s/sx of  aspiration        Cognition    [ST] Attention(Active)  Current Status(06/21/2023): Mild-Moderate cognitive impairment; decreased  initiation/slow processing speed. Increased difficulty with attention, memory,  and executive function. Easily distracted, but redirects with verbal cues.  Increased awareness of deficits  Weekly Goal(06/28/2023): Patient will participate in functional therapy  activities given MOD cues  Discharge Goal: Functional cognition for home    Signed by: Latisha Morris, SLP

## 2023-06-21 NOTE — THERAPY PROGRESS REPORT/RE-CERT
Inpatient Rehabilitation - Physical Therapy Progress Note and Treatment Note       [facility]     Patient Name: Vernon Solorzano  : 1950  MRN: [id number]    Today's Date: 2023                    Admit Date: 2023      Visit Dx:     ICD-10-CM ICD-9-CM   1. Impaired functional mobility, balance, gait, and endurance  Z74.09 V49.89       Patient Active Problem List   Diagnosis    DM (diabetes mellitus), type 2    Mixed hyperlipidemia    Mild intermittent asthma without complication    New onset of congestive heart failure    Nonrheumatic mitral valve regurgitation    Chest pain    Essential hypertension    Mitral valve disease    Acute ischemic left MCA stroke       Past Medical History:   Diagnosis Date    Allergic rhinitis     Arthritis     BPH (benign prostatic hyperplasia)     Diabetes mellitus     TYPE 2    Foraminal stenosis of cervical region     C5-C6    GERD (gastroesophageal reflux disease)     Hemorrhoids     History of urinary retention     S/P TURP    Hyperlipidemia     Macular degeneration     PING (obstructive sleep apnea) 2016    MILD, SEES DR. AMARILIS MORGAN       Past Surgical History:   Procedure Laterality Date    CARDIAC CATHETERIZATION N/A 2023    Procedure: Right Heart Cath;  Surgeon: Corey Gaffney MD;  Location: [facility] CATH INVASIVE LOCATION;  Service: Cardiology;  Laterality: N/A;    CARDIAC CATHETERIZATION N/A 2023    Procedure: Left Heart Cath;  Surgeon: Corey Gaffney MD;  [facility];  Service: Cardiology;  Laterality: N/A;    CARDIAC CATHETERIZATION N/A 2023    Procedure: Left ventriculography;  Surgeon: Corey Gaffney MD;  [facility];  Service: Cardiology;  Laterality: N/A;    CARDIAC CATHETERIZATION N/A 2023    Procedure: Coronary angiography;  Surgeon: Corey Gaffney MD;  [facility];  Service: Cardiology;  Laterality: N/A;    COLONOSCOPY N/A      WNL PER PT, NO RECORDS,     COLONOSCOPY N/A 04/07/2009    DR. GORGE BENAVIDEZ AT [place]    MITRAL VALVE REPAIR/REPLACEMENT N/A 5/24/2023    Procedure: MITRAL VALVE REPAIR/REPLACEMENT TRICUSPID VALVE REPAIR/REPLACEMENT TRANSESOPHAGEAL ECHOCARDIOGRAM WITH ANESTHESIA;  Surgeon: George Frey MD;  Location: [facility];  Service: Cardiothoracic;  Laterality: N/A;    PROSTATE SURGERY N/A 11/12/2010    TURP, DR. BRIGHT COLLAZO AT [facility]    SKIN TAG REMOVAL N/A 12/20/2017    ANAL SKIN TAG X2, PERFORMED IN OFFICE, DR. LISA ORANTES    TURP / TRANSURETHRAL INCISION / DRAINAGE PROSTATE  2010    Dr. Goodrich       PT ASSESSMENT (last 12 hours)       IRF PT Evaluation and Treatment       Row Name 06/21/23 0758          PT Time and Intention    Document Type daily treatment;progress note  -DP     Mode of Treatment individual therapy;physical therapy  -DP     Patient/Family/Caregiver Comments/Observations Pt with SLP upon PT arrival in gym. PT's wife stated that he is a little more tired this morning since he took a shower. Wife stated that he has more energy in the afternoon  -DP       Row Name 06/21/23 0758          General Information    Patient Profile Reviewed yes  -DP     Existing Precautions/Restrictions cardiac;fall;sternal  -DP       Row Name 06/21/23 0758          Pain Assessment    Pretreatment Pain Rating 0/10 - no pain  -DP     Posttreatment Pain Rating 0/10 - no pain  -DP       Row Name 06/21/23 0758          Cognition/Psychosocial    Affect/Mental Status (Cognition) flat/blunted affect  -DP     Orientation Status (Cognition) oriented to;person;place;situation  -DP     Follows Commands (Cognition) follows one-step commands;over 90% accuracy  -DP     Personal Safety Interventions safety round/check completed;elopement precautions initiated;gait belt;fall prevention program maintained;muscle strengthening facilitated;supervised activity;nonskid shoes/slippers when out of bed  -DP       Row Name 06/21/23  0758          Bed Mobility    Supine-Sit [place] (Bed Mobility) standby assist  -DP     Sit-Supine [place] (Bed Mobility) minimum assist (75% patient effort)  -DP     Bed Mobility, Safety Issues decreased use of arms for pushing/pulling;decreased use of legs for bridging/pushing  -DP     Comment, (Bed Mobility) mat mobility. PT demo'd log roll for supine to SL to sit vs supine to sit  -DP       Row Name 06/21/23 0758          Bed-Chair Transfer    Bed-Chair [place] (Transfers) verbal cues;contact guard;standby assist  -DP     Assistive Device (Bed-Chair Transfers) wheelchair  -DP     Comment, (Bed-Chair Transfer) vc for hand placement  -DP       Row Name 06/21/23 0758          Chair-Bed Transfer    Chair-Bed [place] (Transfers) verbal cues;contact guard;standby assist  -DP     Assistive Device (Chair-Bed Transfers) wheelchair;walker, front-wheeled  -DP     Comment, (Chair-Bed Transfer) vc for hand placement  -DP       Row Name 06/21/23 0758          Sit-Stand Transfer    Sit-Stand [place] (Transfers) verbal cues;standby assist  -DP     Assistive Device (Sit-Stand Transfers) wheelchair;walker, front-wheeled  -DP       Row Name 06/21/23 0758          Stand-Sit Transfer    Stand-Sit [place] (Transfers) verbal cues;standby assist  -DP     Assistive Device (Stand-Sit Transfers) wheelchair  -DP       Row Name 06/21/23 0758          Gait/Stairs (Locomotion)    [place] Level (Gait) verbal cues;contact guard;standby assist  -DP     Assistive Device (Gait) walker, front-wheeled  -DP     Distance in Feet (Gait) 80'x1  -DP     Deviations/Abnormal Patterns (Gait) gait speed decreased;stride length decreased  -DP     Bilateral Gait Deviations forward flexed posture;heel strike decreased  -DP     Gait Assessment/Intervention demonstrated shuffiling gait but was able to correct with curing but reverts back if not cued again  -DP     Handrail Location (Stairs) right side (ascending);left side  (descending)  -DP     Number of Steps (Stairs) 4  -DP     Ascending Technique (Stairs) step-to-step  -DP     Descending Technique (Stairs) step-to-step  -DP     Stairs, Safety Issues balance decreased during turns  -DP     Stairs, Impairments strength decreased  -DP     Stairs Assessment/Intervention pt performed with side stepping with cuing for leaving enough room for  both feet on steps  -DP       Row Name 06/21/23 0758          Safety Issues, Functional Mobility    Impairments Affecting Function (Mobility) balance;cognition;coordination;endurance/activity tolerance;motor planning;shortness of breath;strength  -DP       Row Name 06/21/23 0758          Balance    Comment, Balance pt stepping onto aerobic step 10x with RUE on anup bar. CGA  -DP       Row Name 06/21/23 0758          Motor Skills    Therapeutic Exercise knee;core strength  -DP       Row Name 06/21/23 0758          Knee (Therapeutic Exercise)    Knee (Therapeutic Exercise) AROM (active range of motion)  -DP     Knee AROM (Therapeutic Exercise) bilateral;SLR (straight leg raise);5 repetitions;supine  -DP     Knee Strengthening (Therapeutic Exercise) bilateral;LAQ (long arc quad);1 lb free weight;10 repetitions;hamstring curls;red;resistance band;2 sets  -DP       Row Name 06/21/23 0758          Core Strength (Therapeutic Exercise)    Core Strength (Therapeutic Exercise) bridging, bilateral lower extremities  -DP     Comment (Core Strength Therapeutic Exercise) 10x  -DP       Row Name 06/21/23 0758          Positioning and Restraints    Pre-Treatment Position sitting in chair/recliner  -DP     Post Treatment Position wheelchair  -DP     In Wheelchair sitting;exit alarm on;with SLP  -DP       Row Name 06/21/23 0758          Weekly Progress Summary (PT)    Weekly Progress Summary (PT) Pt has demonstrated improvement in all areas as pt is SBA to [place] with bed mobility, CGA to SBA with transfers, CGA with ambulation for 80'-160' with FWW and navigating up  to 4 steps. Pt does require cuing at times for hand placement and energy conservation. Pt demonstrates decreased functional endurance during upright mobility, overall strength and decreased balance but expected to improved further with additonal skilled PT services.  -DP       Row Name 06/21/23 0758          Bed Mobility Goal 1 (PT-IRF)    Activity/Assistive Device (Bed Mobility Goal 1, PT-IRF) bed mobility activities, all  -DP     [place] Level (Bed Mobility Goal 1, PT-IRF) minimum assist (75% or more patient effort)  -DP     Time Frame (Bed Mobility Goal 1, PT-IRF) short-term goal (STG);1 week  -DP     Progress/Outcomes (Bed Mobility Goal 1, PT-IRF) goal met  -DP       Row Name 06/21/23 0758          Bed Mobility Goal 2 (PT-IRF)    Activity/Assistive Device (Bed Mobility Goal 2, PT-IRF) bed mobility activities, all  -DP     [place] Level (Bed Mobility Goal 2, PT-IRF) supervision required  -DP     Time Frame (Bed Mobility Goal 2, PT-IRF) long-term goal (LTG);3 weeks  -DP     Progress/Outcomes (Bed Mobility Goal 2, PT-IRF) good progress toward goal;goal ongoing  -DP       Row Name 06/21/23 0758          Transfer Goal 1 (PT-IRF)    Activity/Assistive Device (Transfer Goal 1, PT-IRF) all transfers;sit-to-stand/stand-to-sit;bed-to-chair/chair-to-bed;walker, rolling  -DP     [place] Level (Transfer Goal 1, PT-IRF) minimum assist (75% or more patient effort)  -DP     Time Frame (Transfer Goal 1, PT-IRF) short-term goal (STG);2 weeks  -DP     Progress/Outcomes (Transfer Goal 1, PT-IRF) goal met  -DP       Row Name 06/21/23 0758          Transfer Goal 2 (PT-IRF)    Activity/Assistive Device (Transfer Goal 2, PT-IRF) all transfers  -DP     [place] Level (Transfer Goal 2, PT-IRF) contact guard required  -DP     Time Frame (Transfer Goal 2, PT-IRF) long-term goal (LTG);3 weeks  -DP     Progress/Outcomes (Transfer Goal 2, PT-IRF) goal ongoing;continuing progress toward goal;good progress toward goal  -DP        Row Name 06/21/23 0758          Gait/Walking Locomotion Goal 1 (PT-IRF)    Activity/Assistive Device (Gait/Walking Locomotion Goal 1, PT-IRF) gait (walking locomotion);walker, rolling  -DP     Gait/Walking Locomotion Distance Goal 1 (PT-IRF) 50  -DP     [place] Level (Gait/Walking Locomotion Goal 1, PT-IRF) minimum assist (75% or more patient effort)  -DP     Time Frame (Gait/Walking Locomotion Goal 1, PT-IRF) short-term goal (STG);2 weeks  -DP     Progress/Outcomes (Gait/Walking Locomotion Goal 1, PT-IRF) goal met  -DP       Row Name 06/21/23 0758          Gait/Walking Locomotion Goal 2 (PT-IRF)    Activity/Assistive Device (Gait/Walking Locomotion Goal 2, PT-IRF) assistive device use;walker, rolling  -DP     Gait/Walking Locomotion Distance Goal 2 (PT-IRF) 150  -DP     [place] Level (Gait/Walking Locomotion Goal 2, PT-IRF) contact guard required  -DP     Time Frame (Gait/Walking Locomotion Goal 2, PT-IRF) long-term goal (LTG);3 weeks  -DP     Progress/Outcomes (Gait/Walking Locomotion Goal 2, PT-IRF) good progress toward goal;goal ongoing  -DP       Row Name 06/21/23 0758          Stairs Goal 1 (PT-IRF)    Activity/Assistive Device (Stairs Goal 1, PT-IRF) stairs, all skills;using handrail, left  -DP     Number of Stairs (Stairs Goal 1, PT-IRF) 12  -DP     [place] Level (Stairs Goal 1, PT-IRF) contact guard required  -DP     Time Frame (Stairs Goal 1, PT-IRF) long-term goal (LTG);3 weeks  -DP     Progress/Outcomes (Stairs Goal 1, PT-IRF) continuing progress toward goal;goal ongoing  -DP       Row Name 06/21/23 0758          Caregiver Training Goal 1 (PT-IRF)    Caregiver Training Goal 1 (PT-IRF) Family to be indep with appropriate level of assist for pt.  -DP     Time Frame (Caregiver Training Goal 1, PT-IRF) long-term goal (LTG);3 weeks  -DP     Progress/Outcomes (Caregiver Training Goal 1, PT-IRF) goal ongoing  -DP               User Key  (r) = Recorded By, (t) = Taken By, (c) = Cosigned By       Initials Name Provider Type    DP Gulshan Mcdonald, PT Physical Therapist                  Wound 05/24/23 0737 sternal Incision (Active)   Dressing Appearance open to air 06/21/23 0840   Closure Open to air 06/21/23 0840   Base scab;dry;clean 06/21/23 0840   Periwound dry;intact 06/21/23 0840   Periwound Temperature warm 06/21/23 0840   Periwound Skin Turgor soft 06/21/23 0840   Edges rolled/closed 06/21/23 0840   Drainage Amount none 06/20/23 2119   Care, Wound other (see comments) 06/21/23 0840   Dressing Care open to air 06/21/23 0840   Periwound Care dry periwound area maintained 06/21/23 0840     Physical Therapy Education       Title: PT OT SLP Therapies (In Progress)       Topic: Physical Therapy (Done)       Point: Mobility training (Done)       Learning Progress Summary             Patient Acceptance, E,D, VU,DU by DP at 6/21/2023 1007    Acceptance, E, VU by MD at 6/20/2023 1504    Acceptance, E, VU by MD at 6/19/2023 1058    Acceptance, E,TB, VU,NR by TONI at 6/17/2023 1501    Acceptance, E, VU by MD at 6/16/2023 0919    Acceptance, E, VU by MD at 6/15/2023 1140    Eager, E,D,TB, NR by  at 6/14/2023 1459    Acceptance, E, VU by MD at 6/13/2023 1012    Acceptance, E, VU by MD at 6/12/2023 1018    Acceptance, E, VU by MD at 6/10/2023 1040    Acceptance, E, VU by MD at 6/8/2023 1017    Acceptance, E,TB,D, VU,DU,NR by  at 6/7/2023 1513    Comment: POC, Goals, safety with mobility.   Significant Other Acceptance, E,TB,D, VU,DU,NR by  at 6/7/2023 1513    Comment: POC, Goals, safety with mobility.                         Point: Home exercise program (Done)       Learning Progress Summary             Patient Acceptance, E,D, VU,DU by MONA at 6/21/2023 1007    Eager, E,D,TB, NR by  at 6/14/2023 1459    Acceptance, E,TB,D, VU,DU,NR by  at 6/7/2023 1513    Comment: POC, Goals, safety with mobility.   Significant Other Acceptance, E,TB,D, VU,DU,NR by  at 6/7/2023 1513    Comment: POC, Goals, safety with  mobility.                                         User Key       Initials Effective Dates Name Provider Type Discipline    TONI 06/16/21 -  Nubia Orellana, PT Physical Therapist PT    MD 06/16/21 -  Damaris Francis, PT Physical Therapist PT    KP 06/16/21 -  Elizabeth Levin, PT Physical Therapist PT    DP 08/24/21 -  Gulshan Mcdonald PT Physical Therapist PT                    PT Recommendation and Plan                          Time Calculation:      PT Charges       Row Name 06/21/23 1314 06/21/23 1007          Time Calculation    Start Time 1230  -DP 0930  -DP     Stop Time 1300  -DP 1000  -DP     Time Calculation (min) 30 min  -DP 30 min  -DP     PT Received On 06/21/23  -DP 06/21/23  -DP     PT - Next Appointment 06/22/23  -DP 06/22/23  -DP     PT Goal Re-Cert Due Date 06/28/23  -DP --        Time Calculation- PT    Total Timed Code Minutes- PT 30 minute(s)  -DP 30 minute(s)  -DP               User Key  (r) = Recorded By, (t) = Taken By, (c) = Cosigned By      Initials Name Provider Type    DP Gulshan Mcdonald, PT Physical Therapist                    Therapy Charges for Today       Code Description Service Date Service Provider Modifiers Qty    03593964531 HC PT THERAPEUTIC ACT EA 15 MIN 6/21/2023 Gulshan Mcdonald, PT GP 1    20849823064 HC PT THER PROC EA 15 MIN 6/21/2023 Gulshan Mcdonald, PT GP 1    63942898747 HC PT THERAPEUTIC ACT EA 15 MIN 6/21/2023 Gulshan Mcdonald, PT GP 1    17399077853 HC PT THER PROC EA 15 MIN 6/21/2023 Gulshan Mcdonald, PT GP 1              PT G-Codes  AM-PAC 6 Clicks Score (PT): 14      Gulshan Mcdonald, PT  6/21/2023

## 2023-06-22 LAB
ALBUMIN SERPL-MCNC: 3.6 G/DL (ref 3.5–5.2)
ALBUMIN/GLOB SERPL: 1.1 G/DL
ALP SERPL-CCNC: 126 U/L (ref 39–117)
ALT SERPL W P-5'-P-CCNC: 9 U/L (ref 1–41)
ANION GAP SERPL CALCULATED.3IONS-SCNC: 11.4 MMOL/L (ref 5–15)
AST SERPL-CCNC: 10 U/L (ref 1–40)
BILIRUB SERPL-MCNC: 0.6 MG/DL (ref 0–1.2)
BUN SERPL-MCNC: 14 MG/DL (ref 8–23)
BUN/CREAT SERPL: 9.8 (ref 7–25)
CALCIUM SPEC-SCNC: 9.4 MG/DL (ref 8.6–10.5)
CHLORIDE SERPL-SCNC: 98 MMOL/L (ref 98–107)
CO2 SERPL-SCNC: 27.6 MMOL/L (ref 22–29)
CREAT SERPL-MCNC: 1.43 MG/DL (ref 0.76–1.27)
EGFRCR SERPLBLD CKD-EPI 2021: 51.4 ML/MIN/1.73
GLOBULIN UR ELPH-MCNC: 3.3 GM/DL
GLUCOSE BLDC GLUCOMTR-MCNC: 199 MG/DL (ref 70–130)
GLUCOSE BLDC GLUCOMTR-MCNC: 206 MG/DL (ref 70–130)
GLUCOSE BLDC GLUCOMTR-MCNC: 238 MG/DL (ref 70–130)
GLUCOSE BLDC GLUCOMTR-MCNC: 262 MG/DL (ref 70–130)
GLUCOSE SERPL-MCNC: 195 MG/DL (ref 65–99)
POTASSIUM SERPL-SCNC: 3.8 MMOL/L (ref 3.5–5.2)
PROT SERPL-MCNC: 6.9 G/DL (ref 6–8.5)
SODIUM SERPL-SCNC: 137 MMOL/L (ref 136–145)

## 2023-06-22 NOTE — PLAN OF CARE
Goal Outcome Evaluation:        Slept fair. Continent of B&B. Meds with water. Glucose 364 tonight, 15 units Lantus given as ordered. Transfers assist x1. 1500cc Fluid restrictions ordered. Tramadol given for Incisional pain, with some relief.

## 2023-06-22 NOTE — PROGRESS NOTES
Inpatient Rehabilitation Plan of Care Note    Plan of Care  Care Plan Reviewed - Updates as Follows    Sphincter Control    [RN] Bladder Management(Active)  Current Status(06/22/2023): Patient is continent of urine using urinal or  toilet.  Weekly Goal(06/27/2023): Patient will be continent 90% of the time.  Discharge Goal: Patient will be continent    [RN] Bowel Management(Active)  Current Status(06/22/2023): Patietn is continent of bowels  Weekly Goal(06/26/2023): patient is continent of bowels  Discharge Goal: Patient is continent of bowels    Performed Intervention(s)  Offer toileting/ timed voids  Monitor I&O      Safety    [RN] Potential for Injury(Active)  Current Status(06/22/2023): Patient is at increased risk for falls/injury r/t  recent surgery and stroke.  Weekly Goal(06/26/2023): Patient will use the call light for assistance  Discharge Goal: Patient/ family will be aware of risk of fall and safety in the  home setting    Performed Intervention(s)  Safety monitoring during functional activities  Environmental set- upto reduce risk      Psychosocial    [RN] Coping/Adjustment(Active)  Current Status(06/22/2023): Patient has a supportive family. Pt is at increased  risk of impaired coping r/t recent stroke and hospitalization. Pt is A/Ox4 but  may be forgetful.  Weekly Goal(06/27/2023): Patient will express concerns regarding current status  Discharge Goal: Patient will demonstrate healthy coping strategies    Performed Intervention(s)  Allow patient to verbalize needs and concerns    Signed by: Juli Perry RN

## 2023-06-22 NOTE — PROGRESS NOTES
LOS: 16 days   Patient Care Team:  Liza Oconnell III, NP-C as PCP - General (Family Medicine)  Corey Lao Jr., MD (Inactive) as Consulting Physician (Urology)      KRIS TAYLOR  1950      ADMITTING DIAGNOSIS:  Stroke  Valvular heart disease status post repair/replacement      Subjective     Pt seen at bedside this AM, NAEON. Swelling decreased in B/L LE but persisted so nephro increased torsemide to 100mg BID. Will monitor swelling, denies chest pain, sob, or difficulty with activity today.         Objective     Vitals:    06/22/23 1213   BP: 124/79   Pulse: 101   Resp: 18   Temp: 97.2 °F (36.2 °C)   SpO2: 98%       PHYSICAL EXAM:   MENTAL STATUS -  AWAKE / ALERT  HEENT- NCAT, PUPILS EQUALLY ROUND, SCLERAE ANICTERIC, CONJUNCTIVAE PINK, OP MOIST, NO JVD, EARS UNREMARKABLE EXTERNALLY  LUNGS - CTA, diminshed breath sounds in left lung base.   Sternotomy incision -dressed  HEART-sinus with occasional ectopy  ABD - NORMOACTIVE BOWEL SOUNDS, SOFT, NT.   EXT -1-2+ pedal edema bilateral lower extremities, improved from yesterday   NEURO -oriented to person place time and situation.    Speech is fluent.            MEDICATIONS  Scheduled Meds:apixaban, 5 mg, Oral, Q12H  aspirin, 81 mg, Oral, Daily  atorvastatin, 40 mg, Oral, Nightly  calcium 500 mg vitamin D 5 mcg (200 UT), 1 tablet, Oral, Daily  guaifenesin, 400 mg, Oral, Q8H  insulin glargine, 15 Units, Subcutaneous, Nightly  insulin glargine, 40 Units, Subcutaneous, Daily  insulin lispro, 3 Units, Subcutaneous, Daily With Breakfast & Lunch  insulin lispro, 3-14 Units, Subcutaneous, TID With Meals  ipratropium-albuterol, 3 mL, Nebulization, BID - RT  metoprolol succinate XL, 25 mg, Oral, Q24H  pantoprazole, 40 mg, Oral, Q AM  potassium chloride, 40 mEq, Oral, Daily  torsemide, 100 mg, Oral, BID      Continuous Infusions:   PRN Meds:.  acetaminophen **OR** [DISCONTINUED] acetaminophen **OR** [DISCONTINUED] acetaminophen     bisacodyl    calcium carbonate    diphenhydrAMINE-zinc acetate    hydrocortisone-bacitracin-zinc oxide-nystatin    ipratropium-albuterol    senna-docusate sodium    traMADol      RESULTS  Glucose   Date/Time Value Ref Range Status   06/22/2023 1113 199 (H) 70 - 130 mg/dL Final     Comment:     Meter: [id number] : [id number] Jamila Guerrier    06/22/2023 0713 206 (H) 70 - 130 mg/dL Final     Comment:     Meter: [id number] : [id number] Jamila Guerrier    06/21/2023 2036 364 (H) 70 - 130 mg/dL Final     Comment:     Meter: [id number] : [id number] Kwasi Bangura    06/21/2023 1631 151 (H) 70 - 130 mg/dL Final     Comment:     M[facility] [id number] : [id number] [facility]   06/21/2023 1115 256 (H) 70 - 130 mg/dL Final     Comment:     Meter: [id number] : [id number] Jamila Guerrier    06/21/2023 0709 112 70 - 130 mg/dL Final     Comment:     Meter: [id number] : [id number] Laura Kothari    06/20/2023 2117 219 (H) 70 - 130 mg/dL Final     Comment:     Meter: [id number] : [id number] Kwasi Bangura    06/20/2023 1639 324 (H) 70 - 130 mg/dL Final     Comment:     Meter: [id number] : [id number] Prema JASON RN     Results from last 7 days   Lab Units 06/21/23  0608 06/19/23  0557 06/16/23  0816   WBC 10*3/mm3 8.42 8.20 8.57   HEMOGLOBIN g/dL 9.6* 10.5* 10.4*   HEMATOCRIT % 28.3* 31.4* 30.6*   PLATELETS 10*3/mm3 207 242 272     Results from last 7 days   Lab Units 06/22/23  1146 06/21/23  0608 06/20/23  0537   SODIUM mmol/L 137 137 137   POTASSIUM mmol/L 3.8 3.2* 3.5   CHLORIDE mmol/L 98 98 100   CO2 mmol/L 27.6 28.9 31.0*   BUN mg/dL 14 15 14   CREATININE mg/dL 1.43* 1.22 1.24   CALCIUM mg/dL 9.4 8.7 8.7   BILIRUBIN mg/dL 0.6  --   --    ALK PHOS U/L 126*  --   --    ALT (SGPT) U/L 9  --   --    AST (SGOT) U/L 10  --   --    GLUCOSE mg/dL 195* 109* 100*      Latest Reference Range & Units 05/23/23 14:29   Hemoglobin A1C 4.80 - 5.60 % 9.20 (H)   Total  Cholesterol 0 - 200 mg/dL 155   HDL Cholesterol 40 - 60 mg/dL 41   LDL Cholesterol  0 - 100 mg/dL 81   Triglycerides 0 - 150 mg/dL 197 (H)   (H): Data is abnormally high     Latest Reference Range & Units 05/31/23 08:03   25 Hydroxy, Vitamin D 30.0 - 100.0 ng/ml 20.0 (L)   (L): Data is abnormally low    Lower extremity venous duplex-June 19, 2023-Chronic right lower extremity superficial thrombophlebitis noted in the small saphenous.     ASSESSMENT and PLAN    Acute ischemic left MCA stroke    Status post CVA-Scattered  punctate restricted diffusion acute infarct in the bilateral frontal and   left parietal occipital cortices.        Impaired Cognition/impaired mobility/impaired self-care     Aphasia-resolved     Right hand apraxia-resolved     Encephalopathy-improving     Dysphagia/nutrition-healthy heart 2-3 g sodium, consistent carb, no mixed consistency, regular texture, nectar thick liquid  June 7-videofluoroscopic swallow study planned tomorrow  June 8-swallow study today-advance to regular solid, no mixed consistency, thin liquids by cup.  No straws.     Stroke prophylaxis-Eliquis/aspirin/atorvastatin     History of severe mitral regurgitation and annular dilatation, moderate to severe tricuspid regurgitation-  status post May 24, 2023 - 1. Mitral valve repair with a 28 mm physio II ring annuloplasty with residual mild to moderate MR  2.  Mitral valve replacement with a 29 mm Schwartz II porcine prosthesis  3.  Tricuspid valve repair with 28 mm physio tricuspid ring  4.  Left atrial appendage endocardial closure     Atrial flutter-status post cardioversion June 2, 2023 anticoagulation with Eliquis  Metoprolol     Volume status-torsemide     Severe pulmonary hypertension  Obstructive sleep apnea     Interstitial lung disease status post COVID-19 infection-steroid taper per pulmonology  DuoNebs 4 times a day  Chronic steroid use with cushingoid syndrome  Prednisone 5 mg every other day      Leukocytosis-reactive/steroid administration     Postoperative anemia     Rakwafeixbwrsjij-zupqlmccahw-dishhgre     Chronic kidney disease stage III-baseline creatinine 1.2     Diabetes mellitus type 2-Jardiance/Lantus/sliding scale insulin-uncontrolled  June 7-on Jardiance and Lantus 40 units daily.  Elevated blood glucose.  Received 21 units on sliding scale insulin yesterday.  Increase Lantus by adding 10 units nightly.  June 14-increase Lantus from 10 to 15 units nightly.  Continue 40 units every morning.  Off Jardiance secondary to renal function.  June 19-tends to run higher at lunchtime.  -will follow pattern through today and probably add short acting insulin with a.m. meal     BPH/urinary retention-history of TURP  June 14-void 300 cc with postvoid residual 205 cc.    Renal insufficiency - June 9 - Creatinine increased to 1.46. Meds reviewed. Is on Jardiance - already received dose today. Will get input from Nephrology.  June 12-with the increase in his creatinine, Jardiance discontinued  June 14-torsemide added for edema  June 19- increased torsemide to 100mg daily per nephro  June 20- tolerating increased torsemide, but K+ 3.2 this AM and repleted.   June 22- nephro increased torsemide to 100mg BID, will monitor      Pain management-tramadol-/Tylenol Dk report reviewed    Nutrition-addendum-June 9 6:43 PM-decreased appetite-would like to have zinc level checked in the morning    Rash on L axilla  June 13-does not wish to have anything as it is not causing patient discomfort     Sleep  June 13-offered melatonin, but declined       TEAM CONF - JUNE 8 - ENDURANCE POOR. BED MOD. TRANSFERS MOD. GAIT 25 FEET MOD ASSIST RW. SHOWER TRANSFERS MOD A.   BATH MOD ASSIST. LBD MAX. UBD MIN. TOILETING MAX.   Patient is currently on nectar liquids VFSS TODAY.   BIMS SUMMARY SCORE: 9 Moderately impaired   DIFFICULTY WITH SUSTAINED AND FOCAL ATTENTION.  CONTINENT / INCONTINENT  ELOS - 2 -3 WEEKS    TEAM CONF - June  15 - BED MOD. TRANSFERS MIN. 4 STAIRS MIN X 2. SATS 97% OR HIGHER WITH GAIT WITH FATIGUE, GAIT 80 FEET MIN ASSIST RW. TOILET TRANSFERS MIN. SHOWER TRANSFERS MIN MOD. BATH MIN. LBD MIN MOD. UBD MIN. GROOMING SET UP. TOILETING MIN MOD. DRESSING TAKES 30 MINUTES WIETH SLOW PROCESSING. EDEMA - JOBST STOCKINGS. MILD DYSPHGAIA, REG THIN BY CUP, NO STRAWS. CLQT MODERATE DEFICITS, DIFFICULTY WITH ATTENTION AND MEMORY, PROLONGED PROCESSING SPEED. BNE FOLLOWING. VARIABLE INTAKE - DIETITIAN FOLLOWING. RASH ON ABD AND LEFT CHEST, POSSIBLY CONTACT DERMATITIS. TORSEMIDE ADDED.  ELOS - TWO WEEKS.        Now admit for comprehensive acute inpatient rehabilitation .  This would be an interdisciplinary program with physical therapy 1 hour,  occupational therapy 1 hour, and speech therapy 1 hour, 5 days a week.  Rehabilitation nursing for carryover, monitoring of neurologic, cardiac, pulmonary, diabetes   status, bowel and bladder, and skin  Ongoing physician follow-up.  Weekly team conferences.  Goals are to achieve a level of contact-guard with  mobility and self-care and improved activity tolerance.   Rehabilitation prognosis fair.  Medical prognosis defer to cardiology.  Estimated length of stay is approximately 2 weeks, but is only an estimation.   The patient's functional status and clinical status is unchanged from preadmission assessment and the patient continues appropriate for acute inpatient rehabilitation.  Goal is for home with outpatient cardiac rehab   therapies.  Barrier to discharge: Impaired cognition mobility self-care- work on follow, executive function, conditioning, transfers, progressive ambulation, ADLs to overcome.           Jeffery Dudley DO, PGY-2     During rounds, used appropriate personal protective equipment including mask and gloves.  Additional gown if indicated.  Mask used was standard procedure mask. Appropriate PPE was worn during the entire visit.  Hand hygiene was completed before and after.          Patient has been staffed and discussed with attending Physician, Dr. Tu Silver.

## 2023-06-22 NOTE — PLAN OF CARE
Goal Outcome Evaluation:  Plan of Care Reviewed With: patient        Progress: improving  Outcome Evaluation: Immobility syndrome. S/P cabg and MCA stroke. NIH=2. A&OX4. Delayed mental processing and response time. Forgetful. Short term memory loss. Midsternal incision and chest tube sites. Scabbed and KATHY. Hx. HTN, CAD, HLD, Seizures. Blood glucose checks ACHS. Meds whole with thins. Diet: Reg, thins. Up for meals. Assistx1 with walker and wheelchair. Participated fully in therapy. Tired and fatigued afterwards. Napped after sessions. Calm, cooperative, and flat. Ate better at meals. Continent and incontinent B&B. Last BM reported 6/18. PRN bowel meds given today. Full code. Spouse at bedside. BP WDL. Blood glucose WDL. Daily weight. Not sleeping well. Tramadol PRN for pain.

## 2023-06-22 NOTE — PROGRESS NOTES
Reviewed goals and progress with patient after team conference. Also spoke with wife on the phone. Patient's d/c date is scheduled for 6/29. Patient has struggled with low endurance and staying focused during therapy. He has worked on stairs in PT and is walking up 4 stairs at one time. Patient needs to walk up 14 stairs to get to his bed/bath at his home. Family conference is scheduled for 6/26 @2:30. Will continue to assess for d/c needs.

## 2023-06-22 NOTE — PROGRESS NOTES
[facility] Progress Note      Patient Name: Vernon Solorzano  : 1950  MRN: [id number]  Primary Care Physician:  Liza Oconnell III, RACHAEL  Date of admission: 2023    Subjective     Interval History:   Follow up CKD III.     Seen and examined.  Doing very well today.  No shortness of air or chest pain.    Review of Systems:   As noted above    Objective     Vitals:   Temp:  [97.3 °F (36.3 °C)-98 °F (36.7 °C)] 97.3 °F (36.3 °C)  Heart Rate:  [100-109] 109  Resp:  [18] 18  BP: ()/(69-85) 130/83    Intake/Output Summary (Last 24 hours) at 2023 0803  Last data filed at 2023 0023  Gross per 24 hour   Intake 740 ml   Output 1345 ml   Net -605 ml       Physical Exam:    General Appearance: alert, sitting up in bed.  Pleasant.  Questions skin: warm and dry  HEENT: oral mucosa normal, nonicteric sclera  Neck: supple, no JVD  Lungs: Clear to auscultation.   Heart: RRR, normal S1 and S2  Abdomen: soft, nontender, + bs, distended.  Extremities 1-2+ lower ext edema  Scheduled Meds:     apixaban, 5 mg, Oral, Q12H  aspirin, 81 mg, Oral, Daily  atorvastatin, 40 mg, Oral, Nightly  calcium 500 mg vitamin D 5 mcg (200 UT), 1 tablet, Oral, Daily  guaifenesin, 400 mg, Oral, Q8H  insulin glargine, 15 Units, Subcutaneous, Nightly  insulin glargine, 40 Units, Subcutaneous, Daily  insulin lispro, 3 Units, Subcutaneous, Daily With Breakfast & Lunch  insulin lispro, 3-14 Units, Subcutaneous, TID With Meals  ipratropium-albuterol, 3 mL, Nebulization, BID - RT  metoprolol succinate XL, 25 mg, Oral, Q24H  pantoprazole, 40 mg, Oral, Q AM  potassium chloride, 40 mEq, Oral, Daily  torsemide, 100 mg, Oral, Daily      IV Meds:        Results Reviewed:   I have personally reviewed the results from the time of this admission to 2023 08:03 EDT     Results from last 7 days   Lab Units 23  0608 23  0537 23  0557   SODIUM mmol/L 137 137 140   POTASSIUM mmol/L 3.2*  3.5 3.3*   CHLORIDE mmol/L 98 100 100   CO2 mmol/L 28.9 31.0* 28.2   BUN mg/dL 15 14 13   CREATININE mg/dL 1.22 1.24 1.36*   CALCIUM mg/dL 8.7 8.7 9.0   GLUCOSE mg/dL 109* 100* 89       Estimated Creatinine Clearance: 70 mL/min (by C-G formula based on SCr of 1.22 mg/dL).    Results from last 7 days   Lab Units 06/21/23  0608 06/20/23  0537 06/19/23  0557   PHOSPHORUS mg/dL 3.1 3.6 3.9             Results from last 7 days   Lab Units 06/21/23  0608 06/19/23  0557 06/16/23  0816   WBC 10*3/mm3 8.42 8.20 8.57   HEMOGLOBIN g/dL 9.6* 10.5* 10.4*   PLATELETS 10*3/mm3 207 242 272             Assessment / Plan     ASSESSMENT:  1.  Acute kidney injury on top of CKD III, baseline creatinine 1.2.  Peak 1.68.  Creatinine stable.   2. MR now sp MV replacement, tissue and TV repair, SU closure 5/24/23.  3. Chronic steroid use after COVID 19 PNA.  4. Anemia of CKD and postoperative anemia.  Hemoglobin is at goal  5. 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 Rehab .  6. DM2  7. Aflutter.Cardioversion 6/2/23. On eliquis and metoprolol.  Okay.  8.  Hypokalemia    PLAN:    1.  Increase torsemide to 100 mg bid  2.  Continue fluid restriction  3..  Surveillance labs    Tae Lay MD  06/22/23  08:03 EDT    [facility]  [phone]

## 2023-06-22 NOTE — THERAPY TREATMENT NOTE
Inpatient Rehabilitation - Speech Language Pathology Treatment Note    [facility]     Patient Name: Vernon Solorzano  : 1950  MRN: [id number]    Today's Date: 2023                   Admit Date: 2023       Visit Dx:      ICD-10-CM ICD-9-CM   1. Impaired functional mobility, balance, gait, and endurance  Z74.09 V49.89       Patient Active Problem List   Diagnosis    DM (diabetes mellitus), type 2    Mixed hyperlipidemia    Mild intermittent asthma without complication    New onset of congestive heart failure    Nonrheumatic mitral valve regurgitation    Chest pain    Essential hypertension    Mitral valve disease    Acute ischemic left MCA stroke       Past Medical History:   Diagnosis Date    Allergic rhinitis     Arthritis     BPH (benign prostatic hyperplasia)     Diabetes mellitus     TYPE 2    Foraminal stenosis of cervical region     C5-C6    GERD (gastroesophageal reflux disease)     Hemorrhoids     History of urinary retention     S/P TURP    Hyperlipidemia     Macular degeneration     PING (obstructive sleep apnea) 2016    MILD, SEES DR. AMARILIS MORGAN       Past Surgical History:   Procedure Laterality Date    CARDIAC CATHETERIZATION N/A 2023    Procedure: Right Heart Cath;  Surgeon: Corey Gaffney MD;  Location: [facility] CATH INVASIVE LOCATION;  Service: Cardiology;  Laterality: N/A;    CARDIAC CATHETERIZATION N/A 2023    Procedure: Left Heart Cath;  Surgeon: Corey Gaffney MD;  [facility] LOC[facility];  Service: Cardiology;  Laterality: N/A;    CARDIAC CATHETERIZATION N/A 2023    Procedure: Left ventriculography;  Surgeon: Corey Gaffney MD;  [facility];  Service: Cardiology;  Laterality: N/A;    CARDIAC CATHETERIZATION N/A 2023    Procedure: Coronary angiography;  Surgeon: Corey Gaffney MD;  [facility];  Service: Cardiology;  Laterality: N/A;    COLONOSCOPY N/A     WNL PER  PT, NO RECORDS,     COLONOSCOPY N/A 04/07/2009    DR. GORGE BENAVIDEZ AT [place]    MITRAL VALVE REPAIR/REPLACEMENT N/A 5/24/2023    Procedure: MITRAL VALVE REPAIR/REPLACEMENT TRICUSPID VALVE REPAIR/REPLACEMENT TRANSESOPHAGEAL ECHOCARDIOGRAM WITH ANESTHESIA;  Surgeon: George Frey MD;  Location: [facility];  Service: Cardiothoracic;  Laterality: N/A;    PROSTATE SURGERY N/A 11/12/2010    TURP, DR. BRIGHT COLLAZO AT [facility]    SKIN TAG REMOVAL N/A 12/20/2017    ANAL SKIN TAG X2, PERFORMED IN OFFICE, DR. LISA ORANTES    TURP / TRANSURETHRAL INCISION / DRAINAGE PROSTATE  2010    Dr. Goodrich       SLP Recommendation and Plan                                                            SLP EVALUATION (last 72 hours)       SLP SLC Evaluation       Row Name 06/22/23 1300 06/22/23 0900 06/21/23 1300 06/21/23 0900 06/20/23 1300       Communication Assessment/Intervention    Document Type therapy note (daily note)  -SR therapy note (daily note)  -SR therapy note (daily note)  -SR therapy note (daily note)  -SR therapy note (daily note)  -SR    Subjective Information no complaints  -SR no complaints  -SR no complaints  -SR no complaints  -SR no complaints  -SR    Patient Observations alert;cooperative;agree to therapy  -SR alert;cooperative;agree to therapy  -SR alert;cooperative;agree to therapy  -SR alert;cooperative;agree to therapy  -SR alert;cooperative;agree to therapy  -SR    Patient Effort good  -SR good  -SR good  -SR good  -SR good  -SR    Symptoms Noted During/After Treatment none  -SR none  -SR none  -SR none  -SR none  -SR       Pain Scale: Numbers Pre/Post-Treatment    Pretreatment Pain Rating 0/10 - no pain  -SR 0/10 - no pain  -SR 0/10 - no pain  -SR 0/10 - no pain  -SR 0/10 - no pain  -SR    Posttreatment Pain Rating 0/10 - no pain  -SR 0/10 - no pain  -SR 0/10 - no pain  -SR 0/10 - no pain  -SR 0/10 - no pain  -SR      Row Name 06/20/23 0900                   Communication Assessment/Intervention     Document Type therapy note (daily note)  -SR        Subjective Information no complaints  -SR        Patient Observations alert;cooperative;agree to therapy  -SR        Patient Effort good  -SR        Symptoms Noted During/After Treatment none  -SR           Pain Scale: Numbers Pre/Post-Treatment    Pretreatment Pain Rating 0/10 - no pain  -SR        Posttreatment Pain Rating 0/10 - no pain  -SR                  User Key  (r) = Recorded By, (t) = Taken By, (c) = Cosigned By      Initials Name Effective Dates    SR Latisha Morris CCC-SLP 11/10/22 -                        EDUCATION    The patient has been educated in the following areas:       Cognitive Impairment Dysphagia (Swallowing Impairment).             SLP GOALS       Row Name 06/22/23 1300 06/22/23 0900 06/21/23 1300       (STG) Pharyngeal Strengthening Exercise Goal 1 (SLP)    Activity (Pharyngeal Strengthening Goal 1, SLP) increase timing;increase closure at entrance to airway/closure of airway at glottis  -SR -- --    Increase Timing hard effortful swallow;Mendelsohn  -SR -- --    Increase Closure at Entrance to Airway/Closure of Airway at Glottis hard effortful swallow;chin tuck against resistance (CTAR);sarita  -SR -- --    [place]/Accuracy (Pharyngeal Strengthening Goal 1, SLP) with minimal cues (75-90% accuracy)  -SR -- --    Time Frame (Pharyngeal Strengthening Goal 1, SLP) by discharge  -SR -- --    Progress/Outcomes (Pharyngeal Strengthening Goal 1, SLP) goal ongoing  -SR -- --    Comment (Pharyngeal Strengthening Goal 1, SLP) Completed 20 repetitions of effortful swallow and 15 repetitions of sarita exercise given MIN cues. Completed 3 sets of 20 repetitions of CTAR. Patient tolerated 4/4 sips of thin via cup and 4/5 sips of thin via straw with no overt s/sx of aspiration or penetration. Throat clear x1 with initial trial of thin by straw  -SR -- --       Attention Goal 1 (SLP)    Improve Attention by Goal 1 (SLP) -- complete selective  attention task;complete sustained attention task;80%;with minimal cues (75-90%)  -SR --    Time Frame (Attention Goal 1, SLP) -- by discharge  -SR --    Progress/Outcomes (Attention Goal 1, SLP) -- continuing progress toward goal  -SR --    Comment (Attention Goal 1, SLP) -- Calendar task; patient scheduled events on a daily calendar given specific time and event with 80% acc given NO cues. Speed of processing increased when patient read the steps for the task aloud.  -SR --       Memory Skills Goal 1 (SLP)    Improve Memory Skills Through Goal 1 (SLP) -- -- use memory strategies;listen to a paragraph and answer questions;recall details of the day;80%;with moderate cues (50-74%)  -SR    Time Frame (Memory Skills Goal 1, SLP) -- -- by discharge  -SR    Progress/Outcomes (Memory Skills Goal 1, SLP) -- -- goal ongoing  -SR    Comment (Memory Skills Goal 1, SLP) -- -- Patient recalled details from narrative read in the morning session following ~3 hour delay with 80% acc given MAX cues.  -SR       Organizational Skills Goal 1 (SLP)    Improve Thought Organization Through Goal 1 (SLP) -- -- completing a divergent naming task;completing mental manipulation task;80%;with moderate cues (50-74%)  -SR    Time Frame (Thought Organization Skills Goal 1, SLP) -- -- by discharge  -SR    Progress/Outcomes (Thought Organization Skills Goal 1, SLP) -- -- goal ongoing  -SR       Reasoning Goal 1 (SLP)    Improve Reasoning Through Goal 1 (SLP) -- complete deductive reasoning task;complete basic reasoning task;complete mental flexibility task;80%;with moderate cues (50-74%)  -SR --    Time Frame (Reasoning Goal 1, SLP) -- by discharge  -SR --    Progress/Outcomes (Reasoning Goal 1, SLP) -- goal ongoing  -SR --    Comment (Reasoning Goal 1, SLP) -- Patient used clues from paragraph and labeled events of a daily schedule in timeline order with 50% acc given NO cues; 80% acc given MOD cues. Extended time provided to complete task. Patient  "reported that he \"zoned out\" while reading the paragraph x2. Accuracy increased when patient read the story aloud.  -SR --       Executive Functional Skills Goal 1 (SLP)    Improve Executive Function Skills Goal 1 (SLP) -- demonstrate awareness of deficit;home management activity;80%;with minimal cues (75-90%)  -SR demonstrate awareness of deficit;home management activity;80%;with minimal cues (75-90%)  -SR    Time Frame (Executive Function Skills Goal 1, SLP) -- by discharge  -SR by discharge  -SR    Progress/Outcomes (Executive Function Skills Goal 1, SLP) -- goal ongoing  -SR goal ongoing  -SR    Comment (Executive Function Skills Goal 1, SLP) -- -- Fxnal math; patient answered questions about restaurant tips and shopping discounts with 50% acc given MOD cues. Extended time provided to complete task due to prolonged response time.  -SR      Row Name 06/21/23 0900 06/20/23 1300 06/20/23 0900       ([facility]) Pharyngeal Strengthening Exercise Goal 1 (SLP)    Activity (Pharyngeal Strengthening Goal 1, SLP) -- increase timing;increase closure at entrance to airway/closure of airway at glottis  -SR --    Increase Timing -- hard effortful swallow;Mendelsohn  -SR --    Increase Closure at Entrance to Airway/Closure of Airway at Glottis -- hard effortful swallow;chin tuck against resistance (CTAR);sarita  -SR --    [place]/Accuracy (Pharyngeal Strengthening Goal 1, SLP) -- with minimal cues (75-90% accuracy)  -SR --    Time Frame (Pharyngeal Strengthening Goal 1, SLP) -- by discharge  -SR --    Progress/Outcomes (Pharyngeal Strengthening Goal 1, SLP) -- goal ongoing  -SR --    Comment (Pharyngeal Strengthening Goal 1, SLP) -- Completed 15 repetitions of effortful swallow and 10 repetitions of sarita exercise given MIN cues. Tolerated 4/4 ice chips with no overt s/sx of aspiration or penetration. Completed 3 sets of 20 repetitions of CTAR  -SR --       Attention Goal 1 (SLP)    Improve Attention by Goal 1 (SLP) complete " "selective attention task;complete sustained attention task;80%;with minimal cues (75-90%)  -SR -- complete selective attention task;complete sustained attention task;80%;with minimal cues (75-90%)  -SR    Time Frame (Attention Goal 1, SLP) by discharge  -SR -- by discharge  -SR    Progress/Outcomes (Attention Goal 1, SLP) continuing progress toward goal  -SR -- continuing progress toward goal  -SR    Comment (Attention Goal 1, SLP) Increased difficulty noted with sustained/selective attn. Given ample time to read paragraph for memory task, patient reported \"I did not read anything for the story\". SLP encouraged patient to use horizontal scanning strategy. He read the sentence aloud and scanned the paragraph with his finger to track his progress.  -SR -- Patient easily distractable during AM therapy session. Required extended time to complete medication management task. Redirected with verbal cues.  -SR       Memory Skills Goal 1 (SLP)    Improve Memory Skills Through Goal 1 (SLP) use memory strategies;listen to a paragraph and answer questions;recall details of the day;80%;with moderate cues (50-74%)  -SR -- --    Time Frame (Memory Skills Goal 1, SLP) by discharge  -SR -- --    Progress/Outcomes (Memory Skills Goal 1, SLP) goal ongoing  -SR -- --    Comment (Memory Skills Goal 1, SLP) Narrative retention; patient read short story and answered questions about the content with 80% acc given MOD cues.  -SR -- --       Reasoning Goal 1 (SLP)    Improve Reasoning Through Goal 1 (SLP) complete deductive reasoning task;complete basic reasoning task;complete mental flexibility task;80%;with moderate cues (50-74%)  -SR -- --    Time Frame (Reasoning Goal 1, SLP) by discharge  -SR -- --    Progress/Outcomes (Reasoning Goal 1, SLP) goal ongoing  -SR -- --    Comment (Reasoning Goal 1, SLP) Patient followed verbal directions and determined target location on hospital map with 40% acc given NO cues; 80% acc given MIN cues.  -SR " -- --       Executive Functional Skills Goal 1 (SLP)    Improve Executive Function Skills Goal 1 (SLP) -- -- demonstrate awareness of deficit;home management activity;80%;with minimal cues (75-90%)  -SR    Time Frame (Executive Function Skills Goal 1, SLP) -- -- by discharge  -SR    Progress/Outcomes (Executive Function Skills Goal 1, SLP) -- -- goal ongoing  -SR    Comment (Executive Function Skills Goal 1, SLP) -- -- Medication management; patient followed written directions and sorted x6 mock medications by day/time with 50% acc given NO cues; 100% acc given MIN-MOD cues. Extended time provided to complete activity due to prolonged response time.  -SR              User Key  (r) = Recorded By, (t) = Taken By, (c) = Cosigned By      Initials Name Provider Type    Latisha Haas CCC-SLP Speech and Language Pathologist                                Time Calculation:        Time Calculation- SLP       Row Name 06/22/23 1541 06/22/23 1119          Time Calculation- SLP    SLP Start Time 1300  -SR 0900  -SR     SLP Stop Time 1330  -SR 0930  -SR     SLP Time Calculation (min) 30 min  -SR 30 min  -SR               User Key  (r) = Recorded By, (t) = Taken By, (c) = Cosigned By      Initials Name Provider Type    Latisha Haas CCC-SLP Speech and Language Pathologist                      Therapy Charges for Today       Code Description Service Date Service Provider Modifiers Qty    54895202253 HC ST DEV OF COGN SKILLS INITIAL 15 MIN 6/21/2023 Latisha Morris CCC-SLP  1    74505275808 HC ST DEV OF COGN SKILLS EACH ADDT'L 15 MIN 6/21/2023 Latisha Morris CCC-SLP  3    51414487276 HC ST DEV OF COGN SKILLS INITIAL 15 MIN 6/22/2023 Latisha Morris CCC-SLP  1    29228890375 HC ST DEV OF COGN SKILLS EACH ADDT'L 15 MIN 6/22/2023 Latisha Morris CCC-SLP  1    61512803145 HC ST TREATMENT SWALLOW 2 6/22/2023 Latisha Morris CCC-SLP GN 1                             JENNIFFER Hobbs  6/22/2023

## 2023-06-22 NOTE — CONSULTS
Orientation: WNL  Attention: WNL to Mildly Impaired  Executive Functioning: Mildly Impaired  Abstract Reasoning: WNL  Arithmetic: Mildly Impaired  Visuospatial Perception: WNL  Visuospatial Praxis: WNL for Figure Copy; Severely Impaired Coding related to slowed processing speed.  Verbal Memory: Moderately to Severely Impaired  Visual Memory: Mildly Impaired  Emotional: Some frustration regarding current level of functioning, but minimal anxiety and depression symptoms reported on the GDS and BROOKE.    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  Word-finding difficulties are also present, but are more evident conversationally than during testing.  Ongoing outpatient speech/cognitive therapies are recommended after discharge from the rehab unit, particularly given noted improvements while on the unit and Dr. Solorzano's motivation to continue to make gains in functioning.  If memory and processing speed deficits persist or worsen, more comprehensive neuropsychological evaluation in 6-12 months may aid in further treatment planning.

## 2023-06-22 NOTE — THERAPY PROGRESS REPORT/RE-CERT
Inpatient Rehabilitation - Occupational Therapy Progress Note    [facility]     Patient Name: Vernon Solorzano  : 1950  MRN: [id number]    Today's Date: 2023                 Admit Date: 2023         ICD-10-CM ICD-9-CM   1. Impaired functional mobility, balance, gait, and endurance  Z74.09 V49.89       Patient Active Problem List   Diagnosis    DM (diabetes mellitus), type 2    Mixed hyperlipidemia    Mild intermittent asthma without complication    New onset of congestive heart failure    Nonrheumatic mitral valve regurgitation    Chest pain    Essential hypertension    Mitral valve disease    Acute ischemic left MCA stroke       Past Medical History:   Diagnosis Date    Allergic rhinitis     Arthritis     BPH (benign prostatic hyperplasia)     Diabetes mellitus     TYPE 2    Foraminal stenosis of cervical region     C5-C6    GERD (gastroesophageal reflux disease)     Hemorrhoids     History of urinary retention     S/P TURP    Hyperlipidemia     Macular degeneration     PING (obstructive sleep apnea) 2016    MILD, SEES DR. AMARILIS MORGAN       Past Surgical History:   Procedure Laterality Date    CARDIAC CATHETERIZATION N/A 2023    Procedure: Right Heart Cath;  Surgeon: Corey Gaffney MD;  Location: [facility] CATH INVASIVE LOCATION;  Service: Cardiology;  Laterality: N/A;    CARDIAC CATHETERIZATION N/A 2023    Procedure: Left Heart Cath;  Surgeon: Corey Gaffney MD;  Location: [facility] CATH INVASIVE LOCATION;  Service: Cardiology;  Laterality: N/A;    CARDIAC CATHETERIZATION N/A 2023    Procedure: Left ventriculography;  Surgeon: Corey Gaffney MD;  Location: [facility] CATH INVASIVE LOCATION;  Service: Cardiology;  Laterality: N/A;    CARDIAC CATHETERIZATION N/A 2023    Procedure: Coronary angiography;  Surgeon: Corey aGffney MD;  Location[facility] CATH INVASIVE LOC[facility];  Service: Cardiology;  Laterality: N/A;    COLONOSCOPY N/A     WNL PER PT, NO RECORDS,      COLONOSCOPY N/A 04/07/2009    DR. GORGE BENAVIDEZ AT [place]    MITRAL VALVE REPAIR/REPLACEMENT N/A 5/24/2023    Procedure: MITRAL VALVE REPAIR/REPLACEMENT TRICUSPID VALVE REPAIR/REPLACEMENT TRANSESOPHAGEAL ECHOCARDIOGRAM WITH ANESTHESIA;  Surgeon: George Frey MD;  Location: [facility];  Service: Cardiothoracic;  Laterality: N/A;    PROSTATE SURGERY N/A 11/12/2010    TURP, DR. BRIGHT COLLAZO AT [facility]    SKIN TAG REMOVAL N/A 12/20/2017    ANAL SKIN TAG X2, PERFORMED IN OFFICE, DR. LISA ORANTES    TURP / TRANSURETHRAL INCISION / DRAINAGE PROSTATE  2010    Dr. Goodrich             IRF OT ASSESSMENT FLOWSHEET (last 12 hours)       IRF OT Evaluation and Treatment       Row Name 06/22/23 1600          OT Time and Intention    Document Type progress note  -CC     Mode of Treatment occupational therapy;individual therapy  -CC     Patient Effort good  -CC     Symptoms Noted During/After Treatment fatigue  -CC       Row Name 06/22/23 1600          Pain Assessment    Pretreatment Pain Rating 0/10 - no pain  -CC     Posttreatment Pain Rating 0/10 - no pain  -CC       Row Name 06/22/23 1600          Cognition/Psychosocial    Affect/Mental Status (Cognition) flat/blunted affect  -CC     Orientation Status (Cognition) oriented x 3  -CC     Follows Commands (Cognition) follows one-step commands;over 90% accuracy  -CC     Cognitive Function attention deficit  -CC       Row Name 06/22/23 1600          Right Shoulder (Manual Muscle Testing)    MMT, Gross Movement: Right Shoulder Flexion (4-/5) good minus  -CC       Row Name 06/22/23 1600          Left Elbow/Forearm (Manual Muscle Testing)    MMT, Gross Movement: Left Elbow Flexion (4/5) good  -CC     MMT, Gross Movement: Left Elbow Extension (4/5) good  -CC       Row Name 06/22/23 1600          Right Elbow/Forearm (Manual Muscle Testing)    MMT, Gross Movement: Right Elbow Flexion (4-/5) good minus  -CC     MMT, Gross Movement: Right Elbow Extension (4-/5) good minus   -       Row Name 06/22/23 1600          Vision Assessment/Intervention    Visual Impairment/Limitations WFL  -       Row Name 06/22/23 1600          Bathing    [place] Level (Bathing) bathing skills;lower body;upper body;standby assist;contact guard assist  -     Position (Bathing) sink side;supported sitting  -       Row Name 06/22/23 1600          Upper Body Dressing    [place] Level (Upper Body Dressing) upper body dressing skills;doff;don;pull over garment;set up assistance;supervision  -     Position (Upper Body Dressing) supported sitting  -     Set-up Assistance (Upper Body Dressing) obtain clothing  -       Row Name 06/22/23 1600          Lower Body Dressing    [place] Level (Lower Body Dressing) doff;don;pants/bottoms;shoes/slippers;socks;underwear;minimum assist (75% patient effort)  -     Position (Lower Body Dressing) supported sitting;supported standing  -     Set-up Assistance (Lower Body Dressing) obtain clothing;anti-embolic stockings  -[facility] Name 06/22/23 1600          Grooming    [place] Level (Grooming) grooming skills;deodorant application;hair care, combing/brushing;oral care regimen;wash face, hands;set up  -     Position (Grooming) supported sitting  -     Set-up Assistance (Grooming) obtain supplies  -       Row Name 06/22/23 1600          Bed Mobility    Supine-Sit [place] (Bed Mobility) standby assist  -[facility] Name 06/22/23 1600          Functional Mobility    Functional Mobility- Ind. Level contact guard assist;supervision required  -     Functional Mobility- Device walker, front-wheeled  -     Functional Mobility-Distance (Feet) --  to BR  -[facility] Name 06/22/23 1600          Sit-Stand Transfer    Sit-Stand [place] (Transfers) verbal cues;standby assist  -     Assistive Device (Sit-Stand Transfers) wheelchair;walker, front-wheeled  -       Row Name 06/22/23 1600          Stand-Sit Transfer    Stand-Sit  [place] (Transfers) verbal cues;standby assist  -     Assistive Device (Stand-Sit Transfers) wheelchair  -       Row Name 06/22/23 1600          Shoulder (Therapeutic Exercise)    Shoulder (Therapeutic Exercise) strengthening exercise  -     Shoulder Strengthening (Therapeutic Exercise) bilateral;flexion;extension;sitting;resistance band;red;10 repetitions;2 sets  -       Row Name 06/22/23 1600          Elbow/Forearm (Therapeutic Exercise)    Elbow/Forearm (Therapeutic Exercise) strengthening exercise  -     Elbow/Forearm Strengthening (Therapeutic Exercise) bilateral;extension;supination;pronation;sitting;flexion;2 lb free weight;15 repititions;3 sets  -       Row Name 06/22/23 1600          Wrist (Therapeutic Exercise)    Wrist (Therapeutic Exercise) strengthening exercise  -     Wrist Strengthening (Therapeutic Exercise) bilateral;flexion;extension;2 lb free weight;15 repititions;3 sets  -       Row Name 06/22/23 1600          Aerobic Exercise    Type (Aerobic Exercise) arm bike;for 1 minute  -     Time Performed (Aerobic Exercise) 1 min x 2 seated and 1 min standing. Low resistance  -       Row Name 06/22/23 1600          Balance    Static Sitting Balance independent  -     Static Standing Balance standby assist  -     Dynamic Standing Balance contact guard  -       Row Name 06/22/23 1600          Positioning and Restraints    Pre-Treatment Position in bed  -     Post Treatment Position wheelchair  -     In Wheelchair call light within reach;sitting;encouraged to call for assist;exit alarm on  -       Row Name 06/22/23 1600          Weekly Progress Summary (OT)    Overall Progress Toward Functional Goals (OT) progressing toward functional goals as expected  -     Weekly Progress Summary (OT) Pt showing steady progress toward ADLs and functional mobility. Strength and endurance slowly improving  -     Barriers to Overall Progress (OT) endurance  -       Row Name 06/22/23  1600          Transfer Goal 1 (OT-IRF)    Progress/Outcomes (Transfer Goal 1, OT-IRF) goal met  -       Row Name 06/22/23 1600          Transfer Goal 2 (OT-IRF)    Activity/Assistive Device (Transfer Goal 2, OT-IRF) toilet;shower chair  -CC     [place] Level (Transfer Goal 2, OT-IRF) supervision required  -CC     Time Frame (Transfer Goal 2, OT-IRF) long-term goal (LTG)  -CC     Progress/Outcomes (Transfer Goal 2, OT-IRF) goal revised this date  -       Row Name 06/22/23 1600          Bathing Goal 1 (OT-IRF)    Progress/Outcomes (Bathing Goal 1, OT-IRF) goal met  -       Row Name 06/22/23 1600          Bathing Goal 2 (OT-IRF)    Progress/Outcomes (Bathing Goal 2, OT-IRF) continuing progress toward goal  -       Row Name 06/22/23 1600          UB Dressing Goal 1 (OT-IRF)    Progress/Outcomes (UB Dressing Goal 1, OT-IRF) goal met  -       Row Name 06/22/23 1600          UB Dressing Goal 2 (OT-IRF)    Progress/Outcomes (UB Dressing Goal 2, OT-IRF) goal no longer appropriate  -       Row Name 06/22/23 1600          LB Dressing Goal 1 (OT-IRF)    Progress/Outcomes (LB Dressing Goal 1, OT-IRF) goal met  -       Row Name 06/22/23 1600          LB Dressing Goal 2 (OT-IRF)    Progress/Outcomes (LB Dressing Goal 2, OT-IRF) goal met  -       Row Name 06/22/23 1600          Grooming Goal 1 (OT-IRF)    Progress/Outcomes (Grooming Goal 1, OT-IRF) goal met  -       Row Name 06/22/23 1600          Grooming Goal 2 (OT-IRF)    Progress/Outcomes (Grooming Goal 2, OT-IRF) goal met  -       Row Name 06/22/23 1600          Toileting Goal 1 (OT-IRF)    Progress/Outcomes (Toileting Goal 1, OT-IRF) goal met  -       Row Name 06/22/23 1600          Toileting Goal 2 (OT-IRF)    Progress/Outcomes (Toileting Goal 2, OT-IRF) goal ongoing  -       Row Name 06/22/23 1600          Strength Goal 1 (OT-IRF)    Progress/Outcomes (Strength Goal 1, OT-IRF) goal ongoing  -       Row Name 06/22/23 1600           Endurance  Goal 1 (OT)    Progress/Outcome (Endurance Goal 1, OT) goal ongoing  -CC       Row Name 06/22/23 1600          Coordination Goal 1 (OT)    Progress/Outcomes (Coordination Goal 1, OT) goal met  -CC       Row Name 06/22/23 1600          Caregiver Training Goal 1 (OT-IRF)    Progress/Outcomes (Caregiver Training Goal 1, OT-IRF) goal ongoing  -CC               User Key  (r) = Recorded By, (t) = Taken By, (c) = Cosigned By      Initials Name Effective Dates    CC Kathrine Pinedo OTR 06/16/21 -                      Occupational Therapy Education       Title: PT OT SLP Therapies (In Progress)       Topic: Occupational Therapy (In Progress)       Point: ADL training (In Progress)       Description:   Instruct learner(s) on proper safety adaptation and remediation techniques during self care or transfers.   Instruct in proper use of assistive devices.                  Learning Progress Summary             Patient Acceptance, E, NR by AF at 6/13/2023 1527    Comment: benefits of therapy, endurance   Family Acceptance, E, NR by AF at 6/13/2023 1527    Comment: benefits of therapy, endurance                         Point: Home exercise program (In Progress)       Description:   Instruct learner(s) on appropriate technique for monitoring, assisting and/or progressing therapeutic exercises/activities.                  Learning Progress Summary             Patient Acceptance, E, NR by AF at 6/13/2023 1527    Comment: benefits of therapy, endurance   Family Acceptance, E, NR by AF at 6/13/2023 1527    Comment: benefits of therapy, endurance                         Point: Precautions (In Progress)       Description:   Instruct learner(s) on prescribed precautions during self-care and functional transfers.                  Learning Progress Summary             Patient Acceptance, E, NR by AF at 6/13/2023 1527    Comment: benefits of therapy, endurance   Family Acceptance, E, NR by AF at 6/13/2023 1527    Comment: benefits of  therapy, endurance                         Point: Body mechanics (In Progress)       Description:   Instruct learner(s) on proper positioning and spine alignment during self-care, functional mobility activities and/or exercises.                  Learning Progress Summary             Patient Acceptance, E, NR by AF at 6/13/2023 1527    Comment: benefits of therapy, endurance   Family Acceptance, E, NR by AF at 6/13/2023 1527    Comment: benefits of therapy, endurance                                         User Key       Initials Effective Dates Name Provider Type Discipline     06/16/21 -  Candy Dvais OTSIMONA Occupational Therapist OT                        OT Recommendation and Plan                         Time Calculation:      Time Calculation- OT       Row Name 06/22/23 0800             Time Calculation- OT    OT Start Time 0800  -CC      OT Stop Time 0900  -CC      OT Time Calculation (min) 60 min  -CC                User Key  (r) = Recorded By, (t) = Taken By, (c) = Cosigned By      Initials Name Provider Type    CC Kathrine Pinedo OTR Occupational Therapist                  Therapy Charges for Today       Code Description Service Date Service Provider Modifiers Qty    69786642663 HC OT SELF CARE/MGMT/TRAIN EA 15 MIN 6/21/2023 Kathrine Pinedo OTR GO 3    67262945438 HC OT SELF CARE/MGMT/TRAIN EA 15 MIN 6/22/2023 Kathrine Pinedo OTR GO 2    67684856168 HC OT THER PROC EA 15 MIN 6/22/2023 Kathrine Pinedo OTR GO 2                     HI Morgan  6/22/2023

## 2023-06-22 NOTE — PROGRESS NOTES
TEAM CONF - June 22- DIURETIC ADJUSTED BY NEPRHOLOGY. PATIENT DOES NOT FOLLOW 1500 CC FLUID RESTRICTION. TRANSFERS CTG. 4 STAIRS MIN X 2. GAIT 80 FEET CTG RW.   TOILET AND SHOWER TRANSFERS CTG. BATH MIN-CTG. LBD MIN-CTG FOR COMPRESSION STOCKINGS. UBD SBA. TOILETING CTG AS ASKS FOR HELP.   SWALLOW - REGULAR SOLID, THIN LIQUID BY CUP. MOSTLY CONTINENT.   Orientation: WNL  Attention: WNL to Mildly Impaired  Executive Functioning: Mildly Impaired  Abstract Reasoning: WNL  Arithmetic: Mildly Impaired  Visuospatial Perception: WNL  Visuospatial Praxis: WNL for Figure Copy; Severely Impaired Coding related to slowed processing speed.  Verbal Memory: Moderately to Severely Impaired  Visual Memory: Mildly Impaired  Emotional: Some frustration regarding current level of functioning, but minimal anxiety and depression symptoms reported on the GDS and BROOKE.  -demonstrating moderate to severe verbal memory deficits and slowed processing speed that are consistent with the location of his stroke.  Word-finding difficulties are also present, but are more evident conversationally than during testing.  ELOS - ONE WEEK- June 29

## 2023-06-22 NOTE — THERAPY TREATMENT NOTE
Inpatient Rehabilitation - Physical Therapy Treatment Note       [facility]     Patient Name: Vernon Solorzano  : 1950  MRN: [id number]    Today's Date: 2023                    Admit Date: 2023      Visit Dx:     ICD-10-CM ICD-9-CM   1. Impaired functional mobility, balance, gait, and endurance  Z74.09 V49.89       Patient Active Problem List   Diagnosis    DM (diabetes mellitus), type 2    Mixed hyperlipidemia    Mild intermittent asthma without complication    New onset of congestive heart failure    Nonrheumatic mitral valve regurgitation    Chest pain    Essential hypertension    Mitral valve disease    Acute ischemic left MCA stroke       Past Medical History:   Diagnosis Date    Allergic rhinitis     Arthritis     BPH (benign prostatic hyperplasia)     Diabetes mellitus     TYPE 2    Foraminal stenosis of cervical region     C5-C6    GERD (gastroesophageal reflux disease)     Hemorrhoids     History of urinary retention     S/P TURP    Hyperlipidemia     Macular degeneration     PING (obstructive sleep apnea) 2016    MILD, SEES DR. AMARILIS MORGAN       Past Surgical History:   Procedure Laterality Date    CARDIAC CATHETERIZATION N/A 2023    Procedure: Right Heart Cath;  Surgeon: Corey Gaffney MD;  Location: [facility] CATH INVASIVE LOCATION;  Service: Cardiology;  Laterality: N/A;    CARDIAC CATHETERIZATION N/A 2023    Procedure: Left Heart Cath;  Surgeon: Corey Gaffney MD;  Location: [facility] CATH INVASIVE LOCATION;  Service: Cardiology;  Laterality: N/A;    CARDIAC CATHETERIZATION N/A 2023    Procedure: Left ventriculography;  Surgeon: Corey Gaffney MD;  [facility]IVE [facility];  Service: Cardiology;  Laterality: N/A;    CARDIAC CATHETERIZATION N/A 2023    Procedure: Coronary angiography;  Surgeon: Corey Gaffney MD;  Location: [facility] CATH INVASIVE LOCATION;  Service: Cardiology;  Laterality: N/A;    COLONOSCOPY N/A     WNL PER PT, NO  RECORDS,     COLONOSCOPY N/A 04/07/2009    DR. GORGE BENAVIDEZ AT [place]    MITRAL VALVE REPAIR/REPLACEMENT N/A 5/24/2023    Procedure: MITRAL VALVE REPAIR/REPLACEMENT TRICUSPID VALVE REPAIR/REPLACEMENT TRANSESOPHAGEAL ECHOCARDIOGRAM WITH ANESTHESIA;  Surgeon: George Frey MD;  Location: [facility];  Service: Cardiothoracic;  Laterality: N/A;    PROSTATE SURGERY N/A 11/12/2010    TURP, DR. BRIGHT COLLAZO AT [facility]    SKIN TAG REMOVAL N/A 12/20/2017    ANAL SKIN TAG X2, PERFORMED IN OFFICE, DR. LISA ORANTES    TURP / TRANSURETHRAL INCISION / DRAINAGE PROSTATE  2010    Dr. Goodrich       PT ASSESSMENT (last 12 hours)       IRF PT Evaluation and Treatment       Row Name 06/22/23 0757          PT Time and Intention    Document Type daily treatment  -DP     Mode of Treatment individual therapy;physical therapy  -DP     Patient/Family/Caregiver Comments/Observations Pt sitting up in w/c  -DP       Row Name 06/22/23 0757          General Information    Patient Profile Reviewed yes  -DP     General Observations of Patient decrease response time to questions  -DP     Existing Precautions/Restrictions cardiac;fall;sternal  -DP       Row Name 06/22/23 0757          Pain Assessment    Pretreatment Pain Rating 0/10 - no pain  -DP     Posttreatment Pain Rating 0/10 - no pain  -DP       Row Name 06/22/23 0757          Cognition/Psychosocial    Affect/Mental Status (Cognition) flat/blunted affect  -DP     Orientation Status (Cognition) oriented to;person;place;situation  -DP     Follows Commands (Cognition) follows one-step commands;over 90% accuracy  -DP     Personal Safety Interventions safety round/check completed;elopement precautions initiated;fall prevention program maintained;muscle strengthening facilitated;gait belt;nonskid shoes/slippers when out of bed;supervised activity  -DP       Row Name 06/22/23 0757          Bed Mobility    Sit-Supine [place] (Bed Mobility) minimum assist (75% patient effort)  -DP      Comment, (Bed Mobility) pt lied down with CGA but required Teofilo to adjust LE's in bed  -DP       Row Name 06/22/23 0757          Bed-Chair Transfer    Bed-Chair [place] (Transfers) verbal cues;contact guard;standby assist  -DP     Assistive Device (Bed-Chair Transfers) wheelchair;walker, front-wheeled  -DP       Row Name 06/22/23 0757          Chair-Bed Transfer    Chair-Bed [place] (Transfers) verbal cues;standby assist;contact guard  -DP     Assistive Device (Chair-Bed Transfers) wheelchair;walker, front-wheeled  -DP       Row Name 06/22/23 0757          Sit-Stand Transfer    Sit-Stand [place] (Transfers) verbal cues;standby assist  -DP     Assistive Device (Sit-Stand Transfers) wheelchair;walker, front-wheeled  -DP       Row Name 06/22/23 0757          Stand-Sit Transfer    Stand-Sit [place] (Transfers) verbal cues;standby assist  -DP     Assistive Device (Stand-Sit Transfers) wheelchair  -DP       Row Name 06/22/23 0757          Gait/Stairs (Locomotion)    [place] Level (Gait) verbal cues;contact guard;standby assist  -DP     Assistive Device (Gait) walker, front-wheeled  -DP     Distance in Feet (Gait) 80'x2  -DP     Deviations/Abnormal Patterns (Gait) gait speed decreased;stride length decreased  -DP     Bilateral Gait Deviations forward flexed posture;heel strike decreased  -DP     Gait Assessment/Intervention demonstrated shuffiling gait but was able to correct with curing but reverts back if not cued again  -DP     [place] Level (Stairs) verbal cues;contact guard  -DP     Handrail Location (Stairs) right side (ascending);left side (descending)  -DP     Number of Steps (Stairs) 4x2  -DP     Ascending Technique (Stairs) step-to-step  -DP     Descending Technique (Stairs) step-to-step  -DP     Stairs, Safety Issues balance decreased during turns  -DP     Stairs, Impairments strength decreased  -DP     Stairs Assessment/Intervention pt performed with side stepping with cuing  for leaving enough room for both feet on steps  -DP       Row Name 06/22/23 0757          Safety Issues, Functional Mobility    Impairments Affecting Function (Mobility) balance;cognition;coordination;endurance/activity tolerance;motor planning;shortness of breath;strength  -DP       Row Name 06/22/23 0757          Balance    Comment, Balance Pt stood for 1.5 mins while urinating in urinal with no UE support and SBA. Pt also performed stepping on 6 inch aerobic step and then going down fwd with 1UE suppport 6 times and CGA.  Decreaed eccentric control with descent  -DP       Row Name 06/22/23 0757          Knee (Therapeutic Exercise)    Knee Strengthening (Therapeutic Exercise) LAQ (long arc quad);1 lb free weight;hamstring curls;red;resistance band;10 repetitions  -DP       Row Name 06/22/23 0757          Positioning and Restraints    Pre-Treatment Position sitting in chair/recliner  -DP     Post Treatment Position wheelchair  -DP     In Wheelchair sitting;exit alarm on;with SLP  -DP               User Key  (r) = Recorded By, (t) = Taken By, (c) = Cosigned By      Initials Name Provider Type    Gulshan Parker PT Physical Therapist                  Wound 05/24/23 0737 sternal Incision (Active)   Dressing Appearance open to air 06/22/23 0707   Closure Approximated 06/22/23 0707   Base scab 06/22/23 0707   Periwound dry;intact 06/22/23 0707   Drainage Amount none 06/22/23 0707   Dressing Care open to air 06/22/23 0707     Physical Therapy Education       Title: PT OT SLP Therapies (In Progress)       Topic: Physical Therapy (In Progress)       Point: Mobility training (In Progress)       Learning Progress Summary             Patient Acceptance, E,D, NR by MONA at 6/22/2023 1309    Comment: hand placement    Acceptance, E,D, VU,DU by MONA at 6/21/2023 1007    Acceptance, E, VU by MD at 6/20/2023 1504    Acceptance, E, VU by MD at 6/19/2023 1058    Acceptance, E,TB, VU,NR by TONI at 6/17/2023 1501    Acceptance, E, VU by  MD at 6/16/2023 0919    Acceptance, E, VU by MD at 6/15/2023 1140    Eager, E,D,TB, NR by  at 6/14/2023 1459    Acceptance, E, VU by MD at 6/13/2023 1012    Acceptance, E, VU by MD at 6/12/2023 1018    Acceptance, E, VU by MD at 6/10/2023 1040    Acceptance, E, VU by MD at 6/8/2023 1017    Acceptance, E,TB,D, VU,DU,NR by  at 6/7/2023 1513    Comment: POC, Goals, safety with mobility.   Significant Other Acceptance, E,TB,D, VU,DU,NR by  at 6/7/2023 1513    Comment: POC, Goals, safety with mobility.                         Point: Home exercise program (In Progress)       Learning Progress Summary             Patient Acceptance, E,D, NR by DP at 6/22/2023 1309    Comment: hand placement    Acceptance, E,D, VU,DU by DP at 6/21/2023 1007    Eager, E,D,TB, NR by  at 6/14/2023 1459    Acceptance, E,TB,D, VU,DU,NR by  at 6/7/2023 1513    Comment: POC, Goals, safety with mobility.   Significant Other Acceptance, E,TB,D, VU,DU,NR by  at 6/7/2023 1513    Comment: POC, Goals, safety with mobility.                                         User Key       Initials Effective Dates Name Provider Type Discipline     06/16/21 -  Nubia Orellana, PT Physical Therapist PT    MD 06/16/21 -  Damaris Francis, PT Physical Therapist PT     06/16/21 -  Elizabeth Levin, PT Physical Therapist PT    DP 08/24/21 -  Gulshan Mcdonald PT Physical Therapist PT                    PT Recommendation and Plan                          Time Calculation:      PT Charges       Row Name 06/22/23 1310 06/22/23 1024          Time Calculation    Start Time 1230  -DP 0930  -DP     Stop Time 1300  -DP 1000  -DP     Time Calculation (min) 30 min  -DP 30 min  -DP     PT Received On -- 06/22/23  -DP     PT - Next Appointment -- 06/23/23  -DP        Time Calculation- PT    Total Timed Code Minutes- PT 30 minute(s)  -DP 30 minute(s)  -DP               User Key  (r) = Recorded By, (t) = Taken By, (c) = Cosigned By      Initials Name Provider Type    DP  Gulshan Mcdonald, PT Physical Therapist                    Therapy Charges for Today       Code Description Service Date Service Provider Modifiers Qty    19666300245 HC PT THERAPEUTIC ACT EA 15 MIN 6/21/2023 Gulsahn Mcdonald, PT GP 1    52258638514 HC PT THER PROC EA 15 MIN 6/21/2023 HarryGulshan felton, PT GP 1    57510925339 HC PT THERAPEUTIC ACT EA 15 MIN 6/21/2023 Gulshan Mcdonald, PT GP 1    27598072926 HC PT THER PROC EA 15 MIN 6/21/2023 Gulshan Mcdonald, PT GP 1    90139735398 HC PT THERAPEUTIC ACT EA 15 MIN 6/22/2023 Gulshan Mcdonald, PT GP 2    27184632388 HC PT THERAPEUTIC ACT EA 15 MIN 6/22/2023 Gulshan Mcdonald, PT GP 1    38408170806 HC PT THER PROC EA 15 MIN 6/22/2023 Gulshan Mcdonald, PT GP 1              PT G-Codes  AM-PAC 6 Clicks Score (PT): 14      Gulshan Mcdonald, PT  6/22/2023

## 2023-06-22 NOTE — PROGRESS NOTES
Case Management  Inpatient Rehabilitation Team Conference    Conference Date/Time: 6/22/2023 8:26:30 AM    Team Conference Attendees:  Dr. Tu Montalvo, Pharmacist  Génesis Narayan, TRUDY Mcdonald, PT  Kathrine Pinedo, OT  Latisha Morris, SLP  Clarissa Marie, CTRS  Angelika Najera, RN  Joan Mckenzie, RN   6/22 Regi Stokes, Psychologist  Génesis Beaulieu, Dietician    Demographics            Age: 72Y            Gender: Male    Admission Date: 6/6/2023 4:36:00 PM  Rehabilitation Diagnosis:  No anup stroke  Past Medical History: Past Medical History:  DiagnosisDate  ?Allergic rhinitis  ?Arthritis  ?BPH (benign prostatic hyperplasia)  ?Diabetes mellitus   TYPE 2  ?Foraminal stenosis of cervical region   C5-C6  ?GERD (gastroesophageal reflux disease)  ?Hemorrhoids  ?History of urinary [id number]   S/P TURP  ?Hyperlipidemia  ?Macular degeneration  ?PING (obstructive sleep apnea)01/07/2016   MILD, SEES DR. AMARILIS MORGAN        Surgical History  Past Surgical History:  ProcedureLateralityDate  ?CARDIAC CATHETERIZATIONN/A5/11/2023   Procedure: Right Heart Cath;  Surgeon: Corey Gaffney MD;  Location:  [facility]  CATH INVASIVE LOCATION;  Service: Cardiology;  Laterality: N/A;  ?CARDIAC CATHETERIZATIONN/A5/11/2023   Procedure: Left Heart Cath;  Surgeon: Corey Gaffney MD;  Location:  [facility]  CATH INVASIVE LOCATION;  Service: Cardiology;  Laterality: N/A;  ?CARDIAC CATHETERIZATIONN/A5/11/2023   Procedure: Left ventriculography;  Surgeon: Corey Gaffney MD;  Location:   [facility] CATH INVASIVE LOCATION;  Service: Cardiology;  Laterality: N/A;  ?CARDIAC CATHETERIZATIONN/A5/11/2023   Procedure: Coronary angiography;  Surgeon: Corey Gaffney MD;  Location:   [facility] CATH INVASIVE LOCATION;  Service: Cardiology;  Laterality: N/A;  ?COLONOSCOPYN/A   WNL PER PT, NO RECORDS,   ?COLONOSCOPYN/A04/07/2009   DR. GORGE BENAVIDEZ AT [place]  ?MITRAL VALVE REPAIR/REPLACEMENTN/A5/24/2023   Procedure: MITRAL VALVE  REPAIR/REPLACEMENT TRICUSPID VALVE REPAIR/REPLACEMENT  TRANSESOPHAGEAL ECHOCARDIOGRAM WITH ANESTHESIA;  Surgeon: George Frey MD;  Location: [facility];  Service: Cardiothoracic;  Laterality: N/A;  ?PROSTATE SURGERYN/A11/12/2010   TURP, DR. BRIGHT COLLAZO AT [facility]  ?SKIN TAG REMOVALN/A12/20/2017   ANAL SKIN TAG X2, PERFORMED IN OFFICE, DR. LISA ORANTES  ?TURP / TRANSURETHRAL INCISION / DRAINAGE PROSTATE 2010   Dr. Goodrich      Plan of Care  Anticipated Discharge Date/Estimated Length of Stay: ELOS: 2 weeks  Anticipated Discharge Destination: Community discharge with assistance  Discharge Plan : Patient plans to d/c home with his wife who can provide 24 hour  assist.  Medical Necessity Expected Level Rationale: Goals are to achieve a level of  contact-guard with  mobility and self-care and improved activity tolerance.  Rehabilitation prognosis fair.  Medical prognosis defer to cardiology.  Intensity and Duration: an average of 3 hours/5 days per week  Medical Supervision and 24 Hour Rehab Nursing: x  Physical Therapy: x  PT Intensity/Duration: 1 hour / day, 5 days / week, for approximately 2 weeks  Occupational Therapy: x  OT Intensity/Duration: 1 hour / day, 5 days / week, for approximately 2 weeks  Speech and Language Therapy: x  SLP Intensity/Duration: 1 hour / day, 5 days / week, for approximately 2 weeks  Social Work: x  Therapeutic Recreation: x  Psychology: x  Registered Dietician: x  Updated (if changes indicated)    Anticipated Discharge Date/Estimated Length of Stay:   ELOS: 6/29    Based on the patient's medical and functional status, their prognosis and  expected level of functional improvement is: Goals are to achieve a level of  contact-guard with  mobility and self-care and improved activity tolerance.  Rehabilitation prognosis fair.  Medical prognosis defer to cardiology.      Interdisciplinary Problem/Goals/Status    All Rehab Problems:  Sphincter Control    [RN] Bladder  Management(Active)  Current Status(06/22/2023): Patient is continent of urine using urinal or  toilet.  Weekly Goal(06/27/2023): Patient will be continent 90% of the time.  Discharge Goal: Patient will be continent    [RN] Bowel Management(Active)  Current Status(06/22/2023): Nini is continent of bowels  Weekly Goal(06/26/2023): patient is continent of bowels  Discharge Goal: Patient is continent of bowels        Safety    [RN] Potential for Injury(Active)  Current Status(06/22/2023): Patient is at increased risk for falls/injury r/t  recent surgery and stroke.  Weekly Goal(06/26/2023): Patient will use the call light for assistance  Discharge Goal: Patient/ family will be aware of risk of fall and safety in the  home setting        Psychosocial    [RN] Coping/Adjustment(Active)  Current Status(06/22/2023): Patient has a supportive family. Pt is at increased  risk of impaired coping r/t recent stroke and hospitalization. Pt is A/Ox4 but  may be forgetful.  Weekly Goal(06/27/2023): Patient will express concerns regarding current status  Discharge Goal: Patient will demonstrate healthy coping strategies        Swallow Function    [ST] Swallowing(Active)  Current Status(06/21/2023): Regular (no mixed) thin by cup. precautions:  upright, small single sips, no straw, alternate solids with liquids  Weekly Goal(06/28/2023): Patient will tolerate the least restrictive diet  without s/sx of aspiration  Discharge Goal: Patient will tolerate the least restrictive diet without s/sx of  aspiration        Self Care    [OT] Bathing(Active)  Current Status(06/21/2023): MIN/CGA  Weekly Goal(06/26/2023): CGA  Discharge Goal: CGA    [OT] Dressing (Lower)(Active)  Current Status(06/21/2023): MIN/CGA  Weekly Goal(06/27/2023): CGA/SBA  Discharge Goal: CGA/SBA    [OT] Dressing (Upper)(Active)  Current Status(06/21/2023): SBA  Weekly Goal(06/28/2023): Set up  Discharge Goal: set up    [OT] Grooming(Active)  Current Status(06/19/2023):  set up  Weekly Goal(06/19/2023): set up  Discharge Goal: set up    [OT] Toileting(Active)  Current Status(06/21/2023): MIN/CGA  Weekly Goal(06/29/2023): CGA/SBA  Discharge Goal: CGA/SBA        Mobility    [OT] Toilet Transfers(Active)  Current Status(06/21/2023): CGA/SBA  Weekly Goal(07/03/2023): SBA  Discharge Goal: SBA    [OT] Tub/Shower Transfers(Active)  Current Status(06/21/2023): CGA  Weekly Goal(06/27/2023): SBA/CGA  Discharge Goal: CGA /SBA    [PT] Stairs(Active)  Current Status(06/21/2023): 4, rail, Min x 2  Weekly Goal(06/29/2023): curb, Min, rwx  Discharge Goal: 12, rail, Min/CG    [PT] Walk(Active)  Current Status(06/21/2023): 80, CGA, rwx  Weekly Goal(06/29/2023): 100, Min, rwx  Discharge Goal: 150, CG, rwx    [PT] Bed/Chair/Wheelchair(Active)  Current Status(06/21/2023): CGA  Weekly Goal(06/29/2023): CG/SBA  Discharge Goal: CG/SBA    [PT] Bed Mobility(Active)  Current Status(06/21/2023): Ney  Weekly Goal(06/29/2023): CGA  Discharge Goal: Sup        Cognition    [ST] Attention(Active)  Current Status(06/21/2023): Mild-Moderate cognitive impairment; decreased  initiation/slow processing speed. Increased difficulty with attention, memory,  and executive function. Easily distracted, but redirects with verbal cues.  Increased awareness of deficits  Weekly Goal(06/28/2023): Patient will participate in functional therapy  activities given MOD cues  Discharge Goal: Functional cognition for home        Comments: 6/8 Mod A bed mob, Mod A transfers, amb 25' Mod A + 1 to follow w/  rwx.  Mod A shower transfer.  Low endurance.  Max A toileting.  Mod A bathing,  Max A LBD.  VFSS today.  Cont / Incont urine, continent of bowel.    6/15 Bed mob Mod I, transfer Min A, can do 4 steps w/ rail Min A x 2, amb 80'  Min A rwx, Min A toilet transfer, Mod A / Min shower transfer.  Mod / Min  toileting.  Min A bathing.  Mod / Min LBD.  Upgraded to regular thin liquids by  cup, no straw.  Delayed processing.  Moderate deficits  on CLQT.  Cont /Incont of  urine, cont of bowel.    6/23 Working on endurance.  Min A bed mob, CGA transfers, amb 80' CGA rwx, can  do  4 steps w/ rail Min A x 2.  CGA / SBA toilet transfer, CGA shower transfer.  Min / CGA toileting.  Min / CGA LBD.  Mild - Mod cognitive impairment.  Easily  distracted.  Tolerating regular diet, no mixed consistencies, thin liquids by  cup.  Cont bowel and bladder.    Signed by: Angelika Najera RN    Physician CoSigned By: Tu Silver 06/22/2023 08:34:43

## 2023-06-23 LAB
ALBUMIN SERPL-MCNC: 3.4 G/DL (ref 3.5–5.2)
ALBUMIN/GLOB SERPL: 1.1 G/DL
ALP SERPL-CCNC: 120 U/L (ref 39–117)
ALT SERPL W P-5'-P-CCNC: 9 U/L (ref 1–41)
ANION GAP SERPL CALCULATED.3IONS-SCNC: 11.1 MMOL/L (ref 5–15)
AST SERPL-CCNC: 14 U/L (ref 1–40)
BASOPHILS # BLD AUTO: 0.06 10*3/MM3 (ref 0–0.2)
BASOPHILS NFR BLD AUTO: 0.8 % (ref 0–1.5)
BILIRUB SERPL-MCNC: 0.7 MG/DL (ref 0–1.2)
BUN SERPL-MCNC: 13 MG/DL (ref 8–23)
BUN/CREAT SERPL: 8.9 (ref 7–25)
CALCIUM SPEC-SCNC: 8.9 MG/DL (ref 8.6–10.5)
CHLORIDE SERPL-SCNC: 94 MMOL/L (ref 98–107)
CO2 SERPL-SCNC: 27.9 MMOL/L (ref 22–29)
CREAT SERPL-MCNC: 1.46 MG/DL (ref 0.76–1.27)
DEPRECATED RDW RBC AUTO: 44.3 FL (ref 37–54)
EGFRCR SERPLBLD CKD-EPI 2021: 50.2 ML/MIN/1.73
EOSINOPHIL # BLD AUTO: 0.62 10*3/MM3 (ref 0–0.4)
EOSINOPHIL NFR BLD AUTO: 7.8 % (ref 0.3–6.2)
ERYTHROCYTE [DISTWIDTH] IN BLOOD BY AUTOMATED COUNT: 14.2 % (ref 12.3–15.4)
GLOBULIN UR ELPH-MCNC: 3.1 GM/DL
GLUCOSE BLDC GLUCOMTR-MCNC: 169 MG/DL (ref 70–130)
GLUCOSE BLDC GLUCOMTR-MCNC: 183 MG/DL (ref 70–130)
GLUCOSE BLDC GLUCOMTR-MCNC: 206 MG/DL (ref 70–130)
GLUCOSE BLDC GLUCOMTR-MCNC: 296 MG/DL (ref 70–130)
GLUCOSE SERPL-MCNC: 151 MG/DL (ref 65–99)
HCT VFR BLD AUTO: 31.6 % (ref 37.5–51)
HGB BLD-MCNC: 10.5 G/DL (ref 13–17.7)
IMM GRANULOCYTES # BLD AUTO: 0.03 10*3/MM3 (ref 0–0.05)
IMM GRANULOCYTES NFR BLD AUTO: 0.4 % (ref 0–0.5)
LYMPHOCYTES # BLD AUTO: 1.24 10*3/MM3 (ref 0.7–3.1)
LYMPHOCYTES NFR BLD AUTO: 15.7 % (ref 19.6–45.3)
MCH RBC QN AUTO: 28.5 PG (ref 26.6–33)
MCHC RBC AUTO-ENTMCNC: 33.2 G/DL (ref 31.5–35.7)
MCV RBC AUTO: 85.9 FL (ref 79–97)
MONOCYTES # BLD AUTO: 0.82 10*3/MM3 (ref 0.1–0.9)
MONOCYTES NFR BLD AUTO: 10.4 % (ref 5–12)
NEUTROPHILS NFR BLD AUTO: 5.14 10*3/MM3 (ref 1.7–7)
NEUTROPHILS NFR BLD AUTO: 64.9 % (ref 42.7–76)
NRBC BLD AUTO-RTO: 0 /100 WBC (ref 0–0.2)
PHOSPHATE SERPL-MCNC: 3.2 MG/DL (ref 2.5–4.5)
PLATELET # BLD AUTO: 247 10*3/MM3 (ref 140–450)
PMV BLD AUTO: 9.3 FL (ref 6–12)
POTASSIUM SERPL-SCNC: 3.5 MMOL/L (ref 3.5–5.2)
PROT SERPL-MCNC: 6.5 G/DL (ref 6–8.5)
RBC # BLD AUTO: 3.68 10*6/MM3 (ref 4.14–5.8)
SODIUM SERPL-SCNC: 133 MMOL/L (ref 136–145)
WBC NRBC COR # BLD: 7.91 10*3/MM3 (ref 3.4–10.8)

## 2023-06-23 NOTE — PROGRESS NOTES
[facility] Progress Note      Patient Name: Vernon Solorzano  : 1950  MRN: [id number]  Primary Care Physician:  Liza Oconnell III, NP-C  Date of admission: 2023    Subjective     Interval History:   Follow up CKD III.     Seen and examined.  Doing very well today.  No shortness of air or chest pain.  No issues overnight    Review of Systems:   As noted above    Objective     Vitals:   Temp:  [97.2 °F (36.2 °C)-97.9 °F (36.6 °C)] 97.9 °F (36.6 °C)  Heart Rate:  [100-119] 103  Resp:  [18-24] 19  BP: (120-127)/(79-83) 120/79    Intake/Output Summary (Last 24 hours) at 2023 0609  Last data filed at 2023 0600  Gross per 24 hour   Intake 1090 ml   Output 2205 ml   Net -1115 ml       Physical Exam:    General Appearance: alert, sitting up in bed.  Pleasant.  Questions skin: warm and dry  HEENT: oral mucosa normal, nonicteric sclera  Neck: supple, no JVD  Lungs: Clear to auscultation.   Heart: RRR, normal S1 and S2  Abdomen: soft, nontender, + bs, distended.  Extremities 1-2+ lower ext edema  Scheduled Meds:     apixaban, 5 mg, Oral, Q12H  aspirin, 81 mg, Oral, Daily  atorvastatin, 40 mg, Oral, Nightly  calcium 500 mg vitamin D 5 mcg (200 UT), 1 tablet, Oral, Daily  guaifenesin, 400 mg, Oral, Q8H  insulin glargine, 15 Units, Subcutaneous, Nightly  insulin glargine, 40 Units, Subcutaneous, Daily  insulin lispro, 3-14 Units, Subcutaneous, TID With Meals  insulin lispro, 5 Units, Subcutaneous, Daily With Breakfast & Lunch  ipratropium-albuterol, 3 mL, Nebulization, BID - RT  metoprolol succinate XL, 25 mg, Oral, Q24H  pantoprazole, 40 mg, Oral, Q AM  potassium chloride, 40 mEq, Oral, Daily  torsemide, 100 mg, Oral, BID      IV Meds:        Results Reviewed:   I have personally reviewed the results from the time of this admission to 2023 06:09 EDT     Results from last 7 days   Lab Units 23  1146 23  0608 23  0537   SODIUM mmol/L 137 137  137   POTASSIUM mmol/L 3.8 3.2* 3.5   CHLORIDE mmol/L 98 98 100   CO2 mmol/L 27.6 28.9 31.0*   BUN mg/dL 14 15 14   CREATININE mg/dL 1.43* 1.22 1.24   CALCIUM mg/dL 9.4 8.7 8.7   BILIRUBIN mg/dL 0.6  --   --    ALK PHOS U/L 126*  --   --    ALT (SGPT) U/L 9  --   --    AST (SGOT) U/L 10  --   --    GLUCOSE mg/dL 195* 109* 100*       Estimated Creatinine Clearance: 59.7 mL/min (A) (by C-G formula based on SCr of 1.43 mg/dL (H)).    Results from last 7 days   Lab Units 06/21/23  0608 06/20/23  0537 06/19/23  0557   PHOSPHORUS mg/dL 3.1 3.6 3.9             Results from last 7 days   Lab Units 06/21/23  0608 06/19/23  0557 06/16/23  0816   WBC 10*3/mm3 8.42 8.20 8.57   HEMOGLOBIN g/dL 9.6* 10.5* 10.4*   PLATELETS 10*3/mm3 207 242 272             Assessment / Plan     ASSESSMENT:  1.  Acute kidney injury on top of CKD III, baseline creatinine 1.2.  Peak 1.68.  Creatinine slightly up to 1.4 mg/dL.  Awaiting morning labs  2. MR now sp MV replacement, tissue and TV repair, SU closure 5/24/23.  3. Chronic steroid use after COVID 19 PNA.  4. Anemia of CKD and postoperative anemia.  Hemoglobin is at goal  5. 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 Rehab .  6. DM2  7. Aflutter.Cardioversion 6/2/23. On eliquis and metoprolol.   8.  Hypokalemia    PLAN:    1.  Continue torsemide to 100 mg bid.  Awaiting for morning lab.  Plan to scale down on torsemide in the next coming days  2.  Continue fluid restriction  3..  Surveillance labs    Tae Lay MD  06/23/23  06:09 EDT    [facility]  [phone]

## 2023-06-23 NOTE — PLAN OF CARE
Goal Outcome Evaluation:      Pt is A/Ox4, may be forgetful at times, calm/cooperative, OOB x 1 stand/pivot. Meds taken whole w thin liquids. No c/o pain overnight. Pt has been continent.

## 2023-06-23 NOTE — THERAPY TREATMENT NOTE
Inpatient Rehabilitation - Physical Therapy Treatment Note       [facility]     Patient Name: Vernon Solorzano  : 1950  MRN: [id number]    Today's Date: 2023                    Admit Date: 2023      Visit Dx:     ICD-10-CM ICD-9-CM   1. Impaired functional mobility, balance, gait, and endurance  Z74.09 V49.89       Patient Active Problem List   Diagnosis    DM (diabetes mellitus), type 2    Mixed hyperlipidemia    Mild intermittent asthma without complication    New onset of congestive heart failure    Nonrheumatic mitral valve regurgitation    Chest pain    Essential hypertension    Mitral valve disease    Acute ischemic left MCA stroke       Past Medical History:   Diagnosis Date    Allergic rhinitis     Arthritis     BPH (benign prostatic hyperplasia)     Diabetes mellitus     TYPE 2    Foraminal stenosis of cervical region     C5-C6    GERD (gastroesophageal reflux disease)     Hemorrhoids     History of urinary retention     S/P TURP    Hyperlipidemia     Macular degeneration     PING (obstructive sleep apnea) 2016    MILD, SEES DR. AMARILIS MORGAN       Past Surgical History:   Procedure Laterality Date    CARDIAC CATHETERIZATION N/A 2023    Procedure: Right Heart Cath;  Surgeon: Corey Gaffney MD;  Location: [facility] CATH INVASIVE LOCATION;  Service: Cardiology;  Laterality: N/A;    CARDIAC CATHETERIZATION N/A 2023    Procedure: Left Heart Cath;  Surgeon: Corey Gaffney MD;  Location: [facility] CATH INVASIVE LOCATION;  Service: Cardiology;  Laterality: N/A;    CARDIAC CATHETERIZATION N/A 2023    Procedure: Left ventriculography;  Surgeon: Corey Gaffney MD;  [facility]VASIVE LOC[facility];  Service: Cardiology;  Laterality: N/A;    CARDIAC CATHETERIZATION N/A 2023    Procedure: Coronary angiography;  Surgeon: Corey Gaffney MD;  Location: [facility] CATH INVASIVE LOCATION;  Service: Cardiology;  Laterality: N/A;    COLONOSCOPY N/A     WNL PER PT, NO  RECORDS,     COLONOSCOPY N/A 04/07/2009    DR. GORGE BENAVIDEZ AT [place]    MITRAL VALVE REPAIR/REPLACEMENT N/A 5/24/2023    Procedure: MITRAL VALVE REPAIR/REPLACEMENT TRICUSPID VALVE REPAIR/REPLACEMENT TRANSESOPHAGEAL ECHOCARDIOGRAM WITH ANESTHESIA;  Surgeon: George Frey MD;  Location: [facility];  Service: Cardiothoracic;  Laterality: N/A;    PROSTATE SURGERY N/A 11/12/2010    TURP, DR. BRIGHT COLLAZO AT [facility]    SKIN TAG REMOVAL N/A 12/20/2017    ANAL SKIN TAG X2, PERFORMED IN OFFICE, DR. LISA ORANTES    TURP / TRANSURETHRAL INCISION / DRAINAGE PROSTATE  2010    Dr. Goodrich       PT ASSESSMENT (last 12 hours)       IRF PT Evaluation and Treatment       Row Name 06/23/23 0916          PT Time and Intention    Document Type daily treatment (P)   -     Mode of Treatment physical therapy (P)   -     Patient/Family/Caregiver Comments/Observations Pt sitting EOB with spouse in room showing no acute signs of distress. Spouse noted that pt was fatigued from showering (P)   -       Row Name 06/23/23 0916          General Information    Patient Profile Reviewed yes (P)   -       Row Name 06/23/23 0916          Pain Assessment    Pretreatment Pain Rating 0/10 - no pain (P)   -CH     Posttreatment Pain Rating 0/10 - no pain (P)   -       Row Name 06/23/23 0916          Cognition/Psychosocial    Affect/Mental Status (Cognition) flat/blunted affect (P)   -     Orientation Status (Cognition) oriented x 3 (P)   -     Follows Commands (Cognition) follows one-step commands;over 90% accuracy (P)   -     Personal Safety Interventions fall prevention program maintained;gait belt (P)   -       Row Name 06/23/23 0916          Bed-Chair Transfer    Bed-Chair [place] (Transfers) verbal cues;standby assist (P)   -     Assistive Device (Bed-Chair Transfers) wheelchair;walker, front-wheeled (P)   -       Row Name 06/23/23 0916          Sit-Stand Transfer    Sit-Stand [place] (Transfers)  standby assist (P)   -     Assistive Device (Sit-Stand Transfers) wheelchair;walker, front-wheeled (P)   -       Row Name 06/23/23 0916          Stand-Sit Transfer    Stand-Sit [place] (Transfers) verbal cues;standby assist (P)   -     Assistive Device (Stand-Sit Transfers) wheelchair;walker, front-wheeled (P)   -CH       Row Name 06/23/23 0916          Gait/Stairs (Locomotion)    [place] Level (Gait) verbal cues;contact guard;standby assist (P)   -     Assistive Device (Gait) walker, front-wheeled (P)   -     Distance in Feet (Gait) 160'x1, 100'x1 (P)   -CH     Pattern (Gait) step-through (P)   -CH     Deviations/Abnormal Patterns (Gait) gait speed decreased;stride length decreased;base of support, narrow (P)   -CH     Bilateral Gait Deviations forward flexed posture;heel strike decreased (P)   -CH     [place] Level (Stairs) verbal cues;contact guard (P)   -     Assistive Device (Stairs) cane, quad (P)   -     Handrail Location (Stairs) both sides;left side (ascending);right side (descending) (P)   -CH     Number of Steps (Stairs) 4x2 (P)   -CH     Ascending Technique (Stairs) step-over-step;step-to-step (P)   -CH     Descending Technique (Stairs) step-to-step (P)   -     Comment, (Gait/Stairs) Pt ascending stairs with UEs on both handrails. Descended by facing R handrail and side stepping down. Needed VC for foot placement. Trialed ascending/descending stairs w/ SBQC using L HR to ascend and R HR to descend in PM. Pt demonstrated good technique with new device. (P)   -       Row Name 06/23/23 0916          Safety Issues, Functional Mobility    Impairments Affecting Function (Mobility) endurance/activity tolerance;cognition;balance;strength (P)   -       Row Name 06/23/23 0916          Balance    Balance Interventions -- (P)   Fwd 6-inch step ups/downs (x6 reps) @HB, CGA  -       Row Name 06/23/23 0916          Hip (Therapeutic Exercise)    Hip Strengthening (Therapeutic  Exercise) -- (P)   -CH       Row Name 06/23/23 0916          Positioning and Restraints    Pre-Treatment Position -- (P)   Sitting EOB  -CH     Post Treatment Position wheelchair (P)   -CH     In Wheelchair sitting;exit alarm on;with SLP (P)   -CH               User Key  (r) = Recorded By, (t) = Taken By, (c) = Cosigned By      Initials Name Provider Type     Nicolas Suero, PT Student PT Student                  Wound 05/24/23 0737 sternal Incision (Active)   Dressing Appearance open to air 06/23/23 0702   Closure Approximated 06/23/23 0702   Base scab 06/23/23 0702   Periwound dry;intact 06/23/23 0702   Drainage Amount none 06/23/23 0702   Dressing Care open to air 06/23/23 0702     Physical Therapy Education       Title: PT OT SLP Therapies (In Progress)       Topic: Physical Therapy (In Progress)       Point: Mobility training (Done)       Learning Progress Summary             Patient Acceptance, E,D, VU,DU,NR by  at 6/23/2023 1456    Acceptance, E,D, NR by DP at 6/22/2023 1309    Comment: hand placement    Acceptance, E,D, VU,DU by MONA at 6/21/2023 1007    Acceptance, E, VU by MD at 6/20/2023 1504    Acceptance, E, VU by MD at 6/19/2023 1058    Acceptance, E,TB, VU,NR by TONI at 6/17/2023 1501    Acceptance, E, VU by MD at 6/16/2023 0919    Acceptance, E, VU by MD at 6/15/2023 1140    Eager, E,D,TB, NR by YAQUELIN at 6/14/2023 1459    Acceptance, E, VU by MD at 6/13/2023 1012    Acceptance, E, VU by MD at 6/12/2023 1018    Acceptance, E, VU by MD at 6/10/2023 1040    Acceptance, E, VU by MD at 6/8/2023 1017    Acceptance, E,TB,D, VU,DU,NR by  at 6/7/2023 1513    Comment: POC, Goals, safety with mobility.   Significant Other Acceptance, E,TB,D, VU,DU,NR by  at 6/7/2023 1513    Comment: POC, Goals, safety with mobility.                         Point: Home exercise program (In Progress)       Learning Progress Summary             Patient Acceptance, E,D, NR by DP at 6/22/2023 9828    Comment: hand placement     Acceptance, E,D, VU,DU by DP at 6/21/2023 1007    Eager, E,D,TB, NR by  at 6/14/2023 1459    Acceptance, E,TB,D, VU,DU,NR by  at 6/7/2023 1513    Comment: POC, Goals, safety with mobility.   Significant Other Acceptance, E,TB,D, VU,DU,NR by  at 6/7/2023 1513    Comment: POC, Goals, safety with mobility.                                         User Key       Initials Effective Dates Name Provider Type Discipline     06/16/21 -  Nubia Orellana, PT Physical Therapist PT    MD 06/16/21 -  Damaris Francis, PT Physical Therapist PT     06/16/21 -  Elizabeth Levin, PT Physical Therapist PT    DP 08/24/21 -  Gulshan Mcdonald, PT Physical Therapist PT     06/01/23 -  Nicolas Suero, PT Student PT Student PT                    PT Recommendation and Plan                          Time Calculation:      PT Charges       Row Name 06/23/23 1450 06/23/23 0915          Time Calculation    Start Time 1230 (P)   - 0900 (P)   -     Stop Time 1300 (P)   - 0930 (P)   -     Time Calculation (min) 30 min (P)   - 30 min (P)   -     PT Received On -- 06/23/23 (P)   -     PT - Next Appointment -- 06/24/23 (P)   -        Time Calculation- PT    Total Timed Code Minutes- PT 30 minute(s) (P)   -CH 30 minute(s) (P)   -               User Key  (r) = Recorded By, (t) = Taken By, (c) = Cosigned By      Initials Name Provider Type     Nicolas Suero, PT Student PT Student                    Therapy Charges for Today       Code Description Service Date Service Provider Modifiers Qty    83947625609 HC GAIT TRAINING EA 15 MIN 6/23/2023 Nicolas Suero, PT Student GP 2    02579033838 HC PT THERAPEUTIC ACT EA 15 MIN 6/23/2023 Nicolas Suero, PT Student GP 2              PT G-Codes  AM-PAC 6 Clicks Score (PT): 14      Nicolas Suero PT Student  6/23/2023

## 2023-06-23 NOTE — PLAN OF CARE
Goal Outcome Evaluation:  Plan of Care Reviewed With: patient        Progress: improving  Outcome Evaluation: Immobility syndrome. S/P cabg and MCA stroke. NIH=2. A&OX3. Delayed mental processing and response time. Forgetful. Short term memory loss. Midsternal incision and chest tube sites. Scabbed and KATHY. Hx. HTN, CAD, HLD, Seizures. Blood glucose checks ACHS. Meds whole with thins. Diet: Reg, thins. Up for meals. Assistx1 with walker and wheelchair. Participated fully in therapy. Tired and fatigued afterwards. Napped after sessions. Calm, cooperative, and flat. Ate better at meals. Continent and incontinent B&B. Last BM reported 6/18. PRN bowel meds given today. Full code. Spouse at bedside. BP WDL. Blood glucose WDL. Daily weight. Not sleeping well. Tramadol PRN for pain.

## 2023-06-23 NOTE — THERAPY TREATMENT NOTE
Inpatient Rehabilitation - Speech Language Pathology Treatment Note    [facility]     Patient Name: Vernon Solorzano  : 1950  MRN: [id number]    Today's Date: 2023                   Admit Date: 2023       Visit Dx:      ICD-10-CM ICD-9-CM   1. Impaired functional mobility, balance, gait, and endurance  Z74.09 V49.89       Patient Active Problem List   Diagnosis    DM (diabetes mellitus), type 2    Mixed hyperlipidemia    Mild intermittent asthma without complication    New onset of congestive heart failure    Nonrheumatic mitral valve regurgitation    Chest pain    Essential hypertension    Mitral valve disease    Acute ischemic left MCA stroke       Past Medical History:   Diagnosis Date    Allergic rhinitis     Arthritis     BPH (benign prostatic hyperplasia)     Diabetes mellitus     TYPE 2    Foraminal stenosis of cervical region     C5-C6    GERD (gastroesophageal reflux disease)     Hemorrhoids     History of urinary retention     S/P TURP    Hyperlipidemia     Macular degeneration     PING (obstructive sleep apnea) 2016    MILD, SEES DR. AMARILIS MORGAN       Past Surgical History:   Procedure Laterality Date    CARDIAC CATHETERIZATION N/A 2023    Procedure: Right Heart Cath;  Surgeon: Corey Gaffney MD;  Location: [facility] CATH INVASIVE LOCATION;  Service: Cardiology;  Laterality: N/A;    CARDIAC CATHETERIZATION N/A 2023    Procedure: Left Heart Cath;  Surgeon: Corey Gaffney MD;  Location: [facility] CATH INVASIVE LOC[facility];  Service: Cardiology;  Laterality: N/A;    CARDIAC CATHETERIZATION N/A 2023    Procedure: Left ventriculography;  Surgeon: oCrey Gaffney MD;  Location: [facility] C[facility] LOC[facility];  Service: Cardiology;  Laterality: N/A;    CARDIAC CATHETERIZATION N/A 2023    Procedure: Coronary angiography;  Surgeon: Corey Gaffney MD;  Location: [facility] C[facility]ASIVE LOC[facility];  Service: Cardiology;  Laterality: N/A;    COLONOSCOPY N/A     WNL PER  PT, NO RECORDS,     COLONOSCOPY N/A 04/07/2009    DR. GORGE BENAVIDEZ AT [place]    MITRAL VALVE REPAIR/REPLACEMENT N/A 5/24/2023    Procedure: MITRAL VALVE REPAIR/REPLACEMENT TRICUSPID VALVE REPAIR/REPLACEMENT TRANSESOPHAGEAL ECHOCARDIOGRAM WITH ANESTHESIA;  Surgeon: George Frey MD;  Location: [facility];  Service: Cardiothoracic;  Laterality: N/A;    PROSTATE SURGERY N/A 11/12/2010    TURP, DR. BRIGHT COLLAZO AT [facility]    SKIN TAG REMOVAL N/A 12/20/2017    ANAL SKIN TAG X2, PERFORMED IN OFFICE, DR. LISA ORANTES    TURP / TRANSURETHRAL INCISION / DRAINAGE PROSTATE  2010    Dr. Goodrich       SLP Recommendation and Plan                                                            SLP EVALUATION (last 72 hours)       SLP SLC Evaluation       Row Name 06/23/23 1300 06/23/23 0930 06/22/23 1300 06/22/23 0900 06/21/23 1300       Communication Assessment/Intervention    Document Type therapy note (daily note)  -SR therapy note (daily note)  -SR therapy note (daily note)  -SR therapy note (daily note)  -SR therapy note (daily note)  -SR    Subjective Information no complaints  -SR no complaints  -SR no complaints  -SR no complaints  -SR no complaints  -SR    Patient Observations alert;cooperative;agree to therapy  -SR alert;cooperative;agree to therapy  -SR alert;cooperative;agree to therapy  -SR alert;cooperative;agree to therapy  -SR alert;cooperative;agree to therapy  -SR    Patient/Family/Caregiver Comments/Observations -- Pt spouse present for therapy session in ST office  -SR -- -- --    Patient Effort good  -SR good  -SR good  -SR good  -SR good  -SR    Symptoms Noted During/After Treatment none  -SR none  -SR none  -SR none  -SR none  -SR       Pain Scale: Numbers Pre/Post-Treatment    Pretreatment Pain Rating 0/10 - no pain  -SR 0/10 - no pain  -SR 0/10 - no pain  -SR 0/10 - no pain  -SR 0/10 - no pain  -SR    Posttreatment Pain Rating 0/10 - no pain  -SR 0/10 - no pain  -SR 0/10 - no pain  -SR 0/10 -  no pain  -SR 0/10 - no pain  -SR      Row Name 06/21/23 0900                   Communication Assessment/Intervention    Document Type therapy note (daily note)  -SR        Subjective Information no complaints  -SR        Patient Observations alert;cooperative;agree to therapy  -SR        Patient Effort good  -SR        Symptoms Noted During/After Treatment none  -SR           Pain Scale: Numbers Pre/Post-Treatment    Pretreatment Pain Rating 0/10 - no pain  -SR        Posttreatment Pain Rating 0/10 - no pain  -SR                  User Key  (r) = Recorded By, (t) = Taken By, (c) = Cosigned By      Initials Name Effective Dates    SR Latisha Morris CCC-SLP 11/10/22 -                        EDUCATION    The patient has been educated in the following areas:       Cognitive Impairment Dysphagia (Swallowing Impairment).             SLP GOALS       Row Name 06/23/23 1300 06/23/23 0930 06/22/23 1300       (STG) Pharyngeal Strengthening Exercise Goal 1 (SLP)    Activity (Pharyngeal Strengthening Goal 1, SLP) -- -- increase timing;increase closure at entrance to airway/closure of airway at glottis  -SR    Increase Timing -- -- hard effortful swallow;[place]  -SR    Increase Closure at Entrance to Airway/Closure of Airway at Glottis -- -- hard effortful swallow;chin tuck against resistance (CTAR);sarita  -SR    [place]/Accuracy (Pharyngeal Strengthening Goal 1, SLP) -- -- with minimal cues (75-90% accuracy)  -SR    Time Frame (Pharyngeal Strengthening Goal 1, SLP) -- -- by discharge  -SR    Progress/Outcomes (Pharyngeal Strengthening Goal 1, SLP) -- -- goal ongoing  -SR    Comment (Pharyngeal Strengthening Goal 1, SLP) -- -- Completed 20 repetitions of effortful swallow and 15 repetitions of sarita exercise given MIN cues. Completed 3 sets of 20 repetitions of CTAR. Patient tolerated 4/4 sips of thin via cup and 4/5 sips of thin via straw with no overt s/sx of aspiration or penetration. Throat clear x1 with initial  trial of thin by straw  -SR       Attention Goal 1 (SLP)    Improve Attention by Goal 1 (SLP) -- complete selective attention task;complete sustained attention task;80%;with minimal cues (75-90%)  -SR --    Time Frame (Attention Goal 1, SLP) -- by discharge  -SR --    Progress/Outcomes (Attention Goal 1, SLP) -- continuing progress toward goal  -SR --       Memory Skills Goal 1 (SLP)    Improve Memory Skills Through Goal 1 (SLP) use memory strategies;listen to a paragraph and answer questions;recall details of the day;80%;with moderate cues (50-74%)  -SR -- --    Time Frame (Memory Skills Goal 1, SLP) by discharge  -SR -- --    Progress/Outcomes (Memory Skills Goal 1, SLP) goal ongoing  -SR -- --       Organizational Skills Goal 1 (SLP)    Improve Thought Organization Through Goal 1 (SLP) completing a divergent naming task;completing mental manipulation task;80%;with moderate cues (50-74%)  -SR -- --    Time Frame (Thought Organization Skills Goal 1, SLP) by discharge  -SR -- --    Progress/Outcomes (Thought Organization Skills Goal 1, SLP) goal ongoing  -SR -- --       Reasoning Goal 1 (SLP)    Improve Reasoning Through Goal 1 (SLP) complete deductive reasoning task;complete basic reasoning task;complete mental flexibility task;80%;with moderate cues (50-74%)  -SR -- --    Time Frame (Reasoning Goal 1, SLP) by discharge  -SR -- --    Progress/Outcomes (Reasoning Goal 1, SLP) goal ongoing  -SR -- --       Executive Functional Skills Goal 1 (SLP)    Improve Executive Function Skills Goal 1 (SLP) demonstrate awareness of deficit;home management activity;80%;with minimal cues (75-90%)  -SR demonstrate awareness of deficit;home management activity;80%;with minimal cues (75-90%)  -SR --    Time Frame (Executive Function Skills Goal 1, SLP) by discharge  -SR by discharge  -SR --    Progress/Outcomes (Executive Function Skills Goal 1, SLP) goal ongoing  -SR goal ongoing  -SR --    Comment (Executive Function Skills Goal 1,  "SLP) Medication management task completed during PM therapy session. Patient followed written directions and sorted mock medications by target day/time using pill organizer (morning, noon, evening, and bedtime sections) for 3/5 opportunities given MOD cues. Extended time provided to complete task. Patient took entire session to sort 5 medications.  -SR Patient participated in goal setting activity during AM session. Activity targeted decision making, thought organization, and reasoning skills. With assistance from his spouse and clinician, patient created 4 \"physical\" goals (i.e, maintaining his pond in the backyard) and 3 \"cognitive\" goals (ie, managing his own medications). Motivational intervewing utilized as a technique to discuss importance of goal setting and plans following discharge. Patient required extended time to complete task. At the end of the session, clinician reviewed speech therapy goals/plan of care. Patient agreeable to continue to target attention, memory, and executive function in therapy to increase his independence following discharge.  -SR --      Row Name 06/22/23 0900 06/21/23 1300 06/21/23 0900       Attention Goal 1 (SLP)    Improve Attention by Goal 1 (SLP) complete selective attention task;complete sustained attention task;80%;with minimal cues (75-90%)  -SR -- complete selective attention task;complete sustained attention task;80%;with minimal cues (75-90%)  -SR    Time Frame (Attention Goal 1, SLP) by discharge  -SR -- by discharge  -SR    Progress/Outcomes (Attention Goal 1, SLP) continuing progress toward goal  -SR -- continuing progress toward goal  -SR    Comment (Attention Goal 1, SLP) Calendar task; patient scheduled events on a daily calendar given specific time and event with 80% acc given NO cues. Speed of processing increased when patient read the steps for the task aloud.  -SR -- Increased difficulty noted with sustained/selective attn. Given ample time to read paragraph " "for memory task, patient reported \"I did not read anything for the story\". SLP encouraged patient to use horizontal scanning strategy. He read the sentence aloud and scanned the paragraph with his finger to track his progress.  -SR       Memory Skills Goal 1 (SLP)    Improve Memory Skills Through Goal 1 (SLP) -- use memory strategies;listen to a paragraph and answer questions;recall details of the day;80%;with moderate cues (50-74%)  -SR use memory strategies;listen to a paragraph and answer questions;recall details of the day;80%;with moderate cues (50-74%)  -SR    Time Frame (Memory Skills Goal 1, SLP) -- by discharge  -SR by discharge  -SR    Progress/Outcomes (Memory Skills Goal 1, SLP) -- goal ongoing  -SR goal ongoing  -SR    Comment (Memory Skills Goal 1, SLP) -- Patient recalled details from narrative read in the morning session following ~3 hour delay with 80% acc given MAX cues.  -SR Narrative retention; patient read short story and answered questions about the content with 80% acc given MOD cues.  -SR       Organizational Skills Goal 1 (SLP)    Improve Thought Organization Through Goal 1 (SLP) -- completing a divergent naming task;completing mental manipulation task;80%;with moderate cues (50-74%)  -SR --    Time Frame (Thought Organization Skills Goal 1, SLP) -- by discharge  -SR --    Progress/Outcomes (Thought Organization Skills Goal 1, SLP) -- goal ongoing  -SR --       Reasoning Goal 1 (SLP)    Improve Reasoning Through Goal 1 (SLP) complete deductive reasoning task;complete basic reasoning task;complete mental flexibility task;80%;with moderate cues (50-74%)  -SR -- complete deductive reasoning task;complete basic reasoning task;complete mental flexibility task;80%;with moderate cues (50-74%)  -SR    Time Frame (Reasoning Goal 1, SLP) by discharge  -SR -- by discharge  -SR    Progress/Outcomes (Reasoning Goal 1, SLP) goal ongoing  -SR -- goal ongoing  -SR    Comment (Reasoning Goal 1, SLP) Patient " "used clues from paragraph and labeled events of a daily schedule in timeline order with 50% acc given NO cues; 80% acc given MOD cues. Extended time provided to complete task. Patient reported that he \"zoned out\" while reading the paragraph x2. Accuracy increased when patient read the story aloud.  -SR -- Patient followed verbal directions and determined target location on hospital map with 40% acc given NO cues; 80% acc given MIN cues.  -SR       Executive Functional Skills Goal 1 (SLP)    Improve Executive Function Skills Goal 1 (SLP) demonstrate awareness of deficit;home management activity;80%;with minimal cues (75-90%)  -SR demonstrate awareness of deficit;home management activity;80%;with minimal cues (75-90%)  -SR --    Time Frame (Executive Function Skills Goal 1, SLP) by discharge  -SR by discharge  -SR --    Progress/Outcomes (Executive Function Skills Goal 1, SLP) goal ongoing  -SR goal ongoing  -SR --    Comment (Executive Function Skills Goal 1, SLP) -- Fxnal math; patient answered questions about restaurant tips and shopping discounts with 50% acc given MOD cues. Extended time provided to complete task due to prolonged response time.  -SR --              User Key  (r) = Recorded By, (t) = Taken By, (c) = Cosigned By      Initials Name Provider Type    Latisha Haas CCC-SLP Speech and Language Pathologist                                Time Calculation:        Time Calculation- SLP       Row Name 06/23/23 1513 06/23/23 1250          Time Calculation- SLP    SLP Start Time 1300  -SR 0930  -SR     SLP Stop Time 1330  -SR 1000  -SR     SLP Time Calculation (min) 30 min  -SR 30 min  -SR               User Key  (r) = Recorded By, (t) = Taken By, (c) = Cosigned By      Initials Name Provider Type    Latisha Haas CCC-SLP Speech and Language Pathologist                      Therapy Charges for Today       Code Description Service Date Service Provider Modifiers Qty    30382416919  ST DEV OF COGN " SKILLS INITIAL 15 MIN 6/22/2023 Latisha Morris CCC-SLP  1    86097056447 HC ST DEV OF COGN SKILLS EACH ADDT'L 15 MIN 6/22/2023 Latisha Morris CCC-SLP  1    [id number]  ST TREATMENT SWALLOW 2 6/22/2023 Latisha Morris CCC-SLP GN 1    [id number] HC ST DEV OF COGN SKILLS INITIAL 15 MIN 6/23/2023 Latisha Morris CCC-SLP  1    [id number]  ST DEV OF COGN SKILLS EACH ADDT'L 15 MIN 6/23/2023 Latisha Morris CCC-SLP  3                             JENNIFFER Hobbs  6/23/2023

## 2023-06-23 NOTE — PROGRESS NOTES
LOS: 17 days   Patient Care Team:  Liza Oconnell III, NP-C as PCP - General (Family Medicine)  Corey Lao Jr., MD (Inactive) as Consulting Physician (Urology)      KRIS TAYLOR  1950      ADMITTING DIAGNOSIS:  Stroke  Valvular heart disease status post repair/replacement      Subjective       He reports he is comfortable with his breathing.  Still with fatigue but is making progress.  Discussed changing back to his home inhaler regimen today to see if he does better on that.   Continues with edema in the lower extremities.  Wearing compression socks.  Nephrology planning to scale back diuretics soon.     Objective     Vitals:    06/23/23 1217   BP: 141/74   Pulse: 102   Resp: 20   Temp: 98.7 °F (37.1 °C)   SpO2: 98%       PHYSICAL EXAM:   MENTAL STATUS -  AWAKE / ALERT  HEENT- NCAT, PUPILS EQUALLY ROUND, SCLERAE ANICTERIC, CONJUNCTIVAE PINK, OP MOIST, NO JVD, EARS UNREMARKABLE EXTERNALLY  LUNGS - CTA,    Sternotomy incision -dressed  HEART-sinus with occasional ectopy  ABD - NORMOACTIVE BOWEL SOUNDS, SOFT, NT.   EXT -1-2+ pedal edema bilateral lower extremities, improved from yesterday   NEURO -oriented to person place time and situation.    Speech is fluent.            MEDICATIONS  Scheduled Meds:apixaban, 5 mg, Oral, Q12H  aspirin, 81 mg, Oral, Daily  atorvastatin, 40 mg, Oral, Nightly  budesonide-formoterol, 2 puff, Inhalation, BID - RT  calcium 500 mg vitamin D 5 mcg (200 UT), 1 tablet, Oral, Daily  guaifenesin, 400 mg, Oral, Q8H  insulin glargine, 15 Units, Subcutaneous, Nightly  insulin glargine, 40 Units, Subcutaneous, Daily  insulin lispro, 3-14 Units, Subcutaneous, TID With Meals  insulin lispro, 5 Units, Subcutaneous, Daily With Breakfast & Lunch  metoprolol succinate XL, 25 mg, Oral, Q24H  pantoprazole, 40 mg, Oral, Q AM  potassium chloride, 40 mEq, Oral, Daily  [START ON 6/24/2023] tiotropium bromide monohydrate, 2 puff, Inhalation, Daily - RT  torsemide, 100 mg,  Oral, BID      Continuous Infusions:   PRN Meds:.  acetaminophen **OR** [DISCONTINUED] acetaminophen **OR** [DISCONTINUED] acetaminophen    bisacodyl    calcium carbonate    diphenhydrAMINE-zinc acetate    hydrocortisone-bacitracin-zinc oxide-nystatin    ipratropium-albuterol    senna-docusate sodium    traMADol      RESULTS  Glucose   Date/Time Value Ref Range Status   06/23/2023 1118 183 (H) 70 - 130 mg/dL Final     Comment:     Meter: [id number] : [id number] Summer Rubi NA   06/23/2023 0715 169 (H) 70 - 130 mg/dL Final     Comment:     Meter: [id number] : [id number] Noava Rubi NA   06/22/2023 2007 262 (H) 70 - 130 mg/dL Final     Comment:     Meter: [id number] : [id number] Kwasi Rodrigueziana NA   06/22/2023 1608 238 (H) 70 - 130 mg/dL Final     Comment:     Meter: [id number] : [id number] Garba-Gambari Chey NA   06/22/2023 1113 199 (H) 70 - 130 mg/dL Final     Comment:     Meter: [id number] : [id number] Garba-Gambari Chey NA   06/22/2023 0713 206 (H) 70 - 130 mg/dL Final     Comment:     Meter: [id number] : [id number] Garba-Gambari Chey NA   06/21/2023 2036 364 (H) 70 - 130 mg/dL Final     Comment:     Meter: [id number] : [id number] Villalpando Sveta NA   06/21/2023 1631 151 (H) 70 - 130 mg/dL Final     Comment:     Meter: [id number] : [id number] Garba-Gambari Chey NA     Results from last 7 days   Lab Units 06/23/23  0812 06/21/23  0608 06/19/23  0557   WBC 10*3/mm3 7.91 8.42 8.20   HEMOGLOBIN g/dL 10.5* 9.6* 10.5*   HEMATOCRIT % 31.6* 28.3* 31.4*   PLATELETS 10*3/mm3 247 207 242       Results from last 7 days   Lab Units 06/23/23  0812 06/22/23  1146 06/21/23  0608   SODIUM mmol/L 133* 137 137   POTASSIUM mmol/L 3.5 3.8 3.2*   CHLORIDE mmol/L 94* 98 98   CO2 mmol/L 27.9 27.6 28.9   BUN mg/dL 13 14 15   CREATININE mg/dL 1.46* 1.43* 1.22   CALCIUM mg/dL 8.9 9.4 8.7   BILIRUBIN mg/dL 0.7 0.6  --    ALK PHOS U/L 120* 126*  --    ALT (SGPT) U/L 9 9  --    AST (SGOT) U/L 14 10   --    GLUCOSE mg/dL 151* 195* 109*        Latest Reference Range & Units 05/23/23 14:29   Hemoglobin A1C 4.80 - 5.60 % 9.20 (H)   Total Cholesterol 0 - 200 mg/dL 155   HDL Cholesterol 40 - 60 mg/dL 41   LDL Cholesterol  0 - 100 mg/dL 81   Triglycerides 0 - 150 mg/dL 197 (H)   (H): Data is abnormally high     Latest Reference Range & Units 05/31/23 08:03   25 Hydroxy, Vitamin D 30.0 - 100.0 ng/ml 20.0 (L)   (L): Data is abnormally low    Lower extremity venous duplex-June 19, 2023-Chronic right lower extremity superficial thrombophlebitis noted in the small saphenous.     ASSESSMENT and PLAN    Acute ischemic left MCA stroke    Status post CVA-Scattered  punctate restricted diffusion acute infarct in the bilateral frontal and   left parietal occipital cortices.        Impaired Cognition/impaired mobility/impaired self-care     Aphasia-resolved     Right hand apraxia-resolved     Encephalopathy-improving     Dysphagia/nutrition-healthy heart 2-3 g sodium, consistent carb, no mixed consistency, regular texture, nectar thick liquid  June 7-videofluoroscopic swallow study planned tomorrow  June 8-swallow study today-advance to regular solid, no mixed consistency, thin liquids by cup.  No straws.     Stroke prophylaxis-Eliquis/aspirin/atorvastatin     History of severe mitral regurgitation and annular dilatation, moderate to severe tricuspid regurgitation-  status post May 24, 2023 - 1. Mitral valve repair with a 28 mm physio II ring annuloplasty with residual mild to moderate MR  2.  Mitral valve replacement with a 29 mm Schwartz II porcine prosthesis  3.  Tricuspid valve repair with 28 mm physio tricuspid ring  4.  Left atrial appendage endocardial closure     Atrial flutter-status post cardioversion June 2, 2023 anticoagulation with Eliquis  Metoprolol     Volume status-torsemide     Severe pulmonary hypertension  Obstructive sleep apnea     Interstitial lung disease status post COVID-19 infection-steroid taper per  pulmonology  Elena  Chronic steroid use with cushingoid syndrome  Prednisone 5 mg every other day  June 23-change back to his home regimen Spiriva 2 puffs once a day and Symbicort 160/4.5 two puffs twice a day       Leukocytosis-reactive/steroid administration     Postoperative anemia     Kzeiemiaelhvsuun-pszhexvitbh-zprvibcr     Chronic kidney disease stage III-baseline creatinine 1.2     Diabetes mellitus type 2-Jardiance/Lantus/sliding scale insulin-uncontrolled  June 7-on Jardiance and Lantus 40 units daily.  Elevated blood glucose.  Received 21 units on sliding scale insulin yesterday.  Increase Lantus by adding 10 units nightly.  June 14-increase Lantus from 10 to 15 units nightly.  Continue 40 units every morning.  Off Jardiance secondary to renal function.  June 19-tends to run higher at lunchtime.  -will follow pattern through today and probably add short acting insulin with a.m. meal  June 22-increase short acting insulin to 5 units with breakfast and lunch.  Continue Lantus 40 units a.m. and 15 units p.m.     BPH/urinary retention-history of TURP  June 14-void 300 cc with postvoid residual 205 cc.    Renal insufficiency - June 9 - Creatinine increased to 1.46. Meds reviewed. Is on Jardiance - already received dose today. Will get input from Nephrology.  June 12-with the increase in his creatinine, Jardiance discontinued  June 14-torsemide added for edema  June 19- increased torsemide to 100mg daily per nephro  June 20- tolerating increased torsemide, but K+ 3.2 this AM and repleted.   June 22- nephro increased torsemide to 100mg BID, will monitor      Pain management-tramadol-/Tylenol Dk report reviewed    Nutrition-addendum-June 9 6:43 PM-decreased appetite-would like to have zinc level checked in the morning          Sleep  June 13-offered melatonin, but declined       TEAM CONF - JUNE 8 - ENDURANCE POOR. BED MOD. TRANSFERS MOD. GAIT 25 FEET MOD ASSIST RW. SHOWER TRANSFERS MOD A.   BATH MOD  ASSIST. LBD MAX. UBD MIN. TOILETING MAX.   Patient is currently on nectar liquids VFSS TODAY.   BIMS SUMMARY SCORE: 9 Moderately impaired   DIFFICULTY WITH SUSTAINED AND FOCAL ATTENTION.  CONTINENT / INCONTINENT  ELOS - 2 -3 WEEKS    TEAM CONF - Kari 15 - BED MOD. TRANSFERS MIN. 4 STAIRS MIN X 2. SATS 97% OR HIGHER WITH GAIT WITH FATIGUE, GAIT 80 FEET MIN ASSIST RW. TOILET TRANSFERS MIN. SHOWER TRANSFERS MIN MOD. BATH MIN. LBD MIN MOD. UBD MIN. GROOMING SET UP. TOILETING MIN MOD. DRESSING TAKES 30 MINUTES WIETH SLOW PROCESSING. EDEMA - JOBST STOCKINGS. MILD DYSPHGAIA, REG THIN BY CUP, NO STRAWS. CLQT MODERATE DEFICITS, DIFFICULTY WITH ATTENTION AND MEMORY, PROLONGED PROCESSING SPEED. BNE FOLLOWING. VARIABLE INTAKE - DIETITIAN FOLLOWING. RASH ON ABD AND LEFT CHEST, POSSIBLY CONTACT DERMATITIS. TORSEMIDE ADDED.  ELOS - TWO WEEKS.    TEAM CONF - June 22- DIURETIC ADJUSTED BY NEPRHOLOGY. PATIENT DOES NOT FOLLOW 1500 CC FLUID RESTRICTION. TRANSFERS CTG. 4 STAIRS MIN X 2. GAIT 80 FEET CTG RW.   TOILET AND SHOWER TRANSFERS CTG. BATH MIN-CTG. LBD MIN-CTG FOR COMPRESSION STOCKINGS. UBD SBA. TOILETING CTG AS ASKS FOR HELP.   SWALLOW - REGULAR SOLID, THIN LIQUID BY CUP. MOSTLY CONTINENT.   Orientation: WNL  Attention: WNL to Mildly Impaired  Executive Functioning: Mildly Impaired  Abstract Reasoning: WNL  Arithmetic: Mildly Impaired  Visuospatial Perception: WNL  Visuospatial Praxis: WNL for Figure Copy; Severely Impaired Coding related to slowed processing speed.  Verbal Memory: Moderately to Severely Impaired  Visual Memory: Mildly Impaired  Emotional: Some frustration regarding current level of functioning, but minimal anxiety and depression symptoms reported on the GDS and BROOKE.  -demonstrating moderate to severe verbal memory deficits and slowed processing speed that are consistent with the location of his stroke.  Word-finding difficulties are also present, but are more evident conversationally than during testing.  HANK  - ONE WEEK- June 29            Goal is for home with outpatient cardiac rehab   therapies.  Barrier to discharge: Impaired cognition mobility self-care- work on follow, executive function, conditioning, transfers, progressive ambulation, ADLs to overcome.           Tu Silver MD, PGY-2     During rounds, used appropriate personal protective equipment including mask and gloves.  Additional gown if indicated.  Mask used was standard procedure mask. Appropriate PPE was worn during the entire visit.  Hand hygiene was completed before and after.

## 2023-06-23 NOTE — THERAPY TREATMENT NOTE
Inpatient Rehabilitation - Occupational Therapy Treatment Note    [facility]     Patient Name: Vernon Solorzano  : 1950  MRN: [id number]    Today's Date: 2023                 Admit Date: 2023         ICD-10-CM ICD-9-CM   1. Impaired functional mobility, balance, gait, and endurance  Z74.09 V49.89       Patient Active Problem List   Diagnosis    DM (diabetes mellitus), type 2    Mixed hyperlipidemia    Mild intermittent asthma without complication    New onset of congestive heart failure    Nonrheumatic mitral valve regurgitation    Chest pain    Essential hypertension    Mitral valve disease    Acute ischemic left MCA stroke       Past Medical History:   Diagnosis Date    Allergic rhinitis     Arthritis     BPH (benign prostatic hyperplasia)     Diabetes mellitus     TYPE 2    Foraminal stenosis of cervical region     C5-C6    GERD (gastroesophageal reflux disease)     Hemorrhoids     History of urinary retention     S/P TURP    Hyperlipidemia     Macular degeneration     PING (obstructive sleep apnea) 2016    MILD, SEES DR. AMARILIS MORGAN       Past Surgical History:   Procedure Laterality Date    CARDIAC CATHETERIZATION N/A 2023    Procedure: Right Heart Cath;  Surgeon: Corey Gaffney MD;  Location: [facility] CATH INVASIVE LOCATION;  Service: Cardiology;  Laterality: N/A;    CARDIAC CATHETERIZATION N/A 2023    Procedure: Left Heart Cath;  Surgeon: Corey Gaffney MD;  Location: [facility] CATH INVASIVE LOCATION;  Service: Cardiology;  Laterality: N/A;    CARDIAC CATHETERIZATION N/A 2023    Procedure: Left ventriculography;  Surgeon: Corey Gaffney MD;  Location: [facility] CATH INVASIVE LOCATION;  Service: Cardiology;  Laterality: N/A;    CARDIAC CATHETERIZATION N/A 2023    Procedure: Coronary angiography;  Surgeon: Corey Gaffney MD;  Location: [facility] CATH INVASIVE LOCATION;  Service: Cardiology;  Laterality: N/A;    COLONOSCOPY N/A     WNL PER PT, NO RECORDS,      COLONOSCOPY N/A 04/07/2009    DR. GORGE BENAVIDEZ AT [place]    MITRAL VALVE REPAIR/REPLACEMENT N/A 5/24/2023    Procedure: MITRAL VALVE REPAIR/REPLACEMENT TRICUSPID VALVE REPAIR/REPLACEMENT TRANSESOPHAGEAL ECHOCARDIOGRAM WITH ANESTHESIA;  Surgeon: George Frey MD;  Location: [facility];  Service: Cardiothoracic;  Laterality: N/A;    PROSTATE SURGERY N/A 11/12/2010    TURP, DR. BRIGHT COLLAZO AT [facility]    SKIN TAG REMOVAL N/A 12/20/2017    ANAL SKIN TAG X2, PERFORMED IN OFFICE, DR. LISA ORANTES    TURP / TRANSURETHRAL INCISION / DRAINAGE PROSTATE  2010    Dr. Goodrich             IRF OT ASSESSMENT FLOWSHEET (last 12 hours)       IRF OT Evaluation and Treatment       Row Name 06/23/23 0851          OT Time and Intention    Document Type daily treatment  -KA     Mode of Treatment individual therapy;occupational therapy  -KA     Patient Effort good  -KA     Symptoms Noted During/After Treatment fatigue  -KA       Row Name 06/23/23 0851          General Information    Patient Profile Reviewed yes  -KA     Patient/Family/Caregiver Comments/Observations Supine in bed upon arrival. Pt confused stating he thought OT started at 7am. Thought we were running late  -KA     Existing Precautions/Restrictions cardiac;fall;sternal  -KA       Row Name 06/23/23 0851          Pain Assessment    Pretreatment Pain Rating 0/10 - no pain  -KA     Posttreatment Pain Rating 0/10 - no pain  -KA       Row Name 06/23/23 0851          Cognition/Psychosocial    Affect/Mental Status (Cognition) flat/blunted affect  -KA     Orientation Status (Cognition) oriented x 3  -KA     Follows Commands (Cognition) follows one-step commands;over 90% accuracy  -KA     Personal Safety Interventions fall prevention program maintained;gait belt;nonskid shoes/slippers when out of bed  -KA     Cognitive Function attention deficit  -KA     Attention Deficit (Cognition) distractible in noisy environment;focused/sustained attention;arousal/alertness   -       Row Name 06/23/23 0851          Bathing    [place] Level (Bathing) bathing skills;lower body;upper body;standby assist;contact guard assist  -     Assistive Device (Bathing) hand held shower spray hose;grab bar/tub rail;shower chair  -     Position (Bathing) supported sitting;supported standing  -     Set-up Assistance (Bathing) obtain supplies  -     Comment (Bathing) fatigues very quickly with shower this AM. Rest breaks required throughout and increased time. Able to stand to wash bottom  -       Row Name 06/23/23 0851          Upper Body Dressing    [place] Level (Upper Body Dressing) upper body dressing skills;doff;don;pull over garment;set up assistance;supervision  -     Position (Upper Body Dressing) supported sitting  -     Set-up Assistance (Upper Body Dressing) obtain clothing  -       Row Name 06/23/23 0851          Lower Body Dressing    [place] Level (Lower Body Dressing) doff;don;pants/bottoms;shoes/slippers;socks;underwear;minimum assist (75% patient effort)  -     Position (Lower Body Dressing) supported sitting;supported standing  -     Set-up Assistance (Lower Body Dressing) obtain clothing;anti-embolic stockings  -     Comment (Lower Body Dressing) dep with compression stockings  -[facility] Name 06/23/23 0851          Self-Feeding    Comment (Self-Feeding) Seated EOB at the end of session eating breakfast with wife present  -[facility] Name 06/23/23 0851          Bed Mobility    Supine-Sit [place] (Bed Mobility) standby assist  -       Row Name 06/23/23 0851          Bed-Chair Transfer    Bed-Chair [place] (Transfers) verbal cues;standby assist  -     Assistive Device (Bed-Chair Transfers) wheelchair;walker, front-wheeled  -       Row Name 06/23/23 0851          Sit-Stand Transfer    Sit-Stand [place] (Transfers) verbal cues;standby assist  -       Row Name 06/23/23 0851          Stand-Sit Transfer    Stand-Sit  [place] (Transfers) verbal cues;standby assist  -       Row Name 06/23/23 0851          Shower Transfer    Type (Shower Transfer) stand-sit;sit-stand  -     [place] Level (Shower Transfer) stand by assist;contact guard  -     Assistive Device (Shower Transfer) grab bar, tub/shower;shower chair;walker, front-wheeled  -NBA       Row Name 06/23/23 0851          Positioning and Restraints    Pre-Treatment Position in bed  -KA     Post Treatment Position bed  -KA     In Bed sitting EOB;call light within reach;encouraged to call for assist;with family/caregiver;[place] [facility]  -               User Key  (r) = Recorded By, (t) = Taken By, (c) = Cosigned By      Initials Name Effective Dates    Nazanin Brooke, GARY 09/22/22 -                      Occupational Therapy Education       Title: PT OT SLP Therapies (In Progress)       Topic: Occupational Therapy (In Progress)       Point: ADL training (In Progress)       Description:   Instruct learner(s) on proper safety adaptation and remediation techniques during self care or transfers.   Instruct in proper use of assistive devices.                  Learning Progress Summary             Patient Acceptance, E, NR by AF at 6/13/2023 1527    Comment: benefits of therapy, endurance   Family Acceptance, E, NR by AF at 6/13/2023 1527    Comment: benefits of therapy, endurance                         Point: Home exercise program (In Progress)       Description:   Instruct learner(s) on appropriate technique for monitoring, assisting and/or progressing therapeutic exercises/activities.                  Learning Progress Summary             Patient Acceptance, E, NR by AF at 6/13/2023 1527    Comment: benefits of therapy, endurance   Family Acceptance, E, NR by AF at 6/13/2023 1527    Comment: benefits of therapy, endurance                         Point: Precautions (In Progress)       Description:   Instruct learner(s) on prescribed precautions during self-care and  functional transfers.                  Learning Progress Summary             Patient Acceptance, E, NR by AF at 6/13/2023 1527    Comment: benefits of therapy, endurance   Family Acceptance, E, NR by AF at 6/13/2023 1527    Comment: benefits of therapy, endurance                         Point: Body mechanics (In Progress)       Description:   Instruct learner(s) on proper positioning and spine alignment during self-care, functional mobility activities and/or exercises.                  Learning Progress Summary             Patient Acceptance, E, NR by AF at 6/13/2023 1527    Comment: benefits of therapy, endurance   Family Acceptance, E, NR by AF at 6/13/2023 1527    Comment: benefits of therapy, endurance                                         User Key       Initials Effective Dates Name Provider Type Discipline     06/16/21 -  Candy Davis OTR Occupational Therapist OT                        OT Recommendation and Plan    Planned Therapy Interventions (OT): activity tolerance training, adaptive equipment training, BADL retraining, cognitive/visual perception retraining, neuromuscular control/coordination retraining, occupation/activity based interventions, patient/caregiver education/training, ROM/therapeutic exercise, strengthening exercise, transfer/mobility retraining                    Time Calculation:      Time Calculation- OT       Row Name 06/23/23 1211             Time Calculation- OT    OT Start Time 0800  -KA      OT Stop Time 0900  -KA      OT Time Calculation (min) 60 min  -KA                User Key  (r) = Recorded By, (t) = Taken By, (c) = Cosigned By      Initials Name Provider Type    Nazanin Brooke OT Occupational Therapist                  Therapy Charges for Today       Code Description Service Date Service Provider Modifiers Qty    [id number] HC OT SELF CARE/MGMT/TRAIN EA 15 MIN 6/23/2023 Nazanin Espinoza OT GO 4                     Nazanin Espinoza OT  6/23/2023

## 2023-06-24 ENCOUNTER — APPOINTMENT (OUTPATIENT)
Dept: GENERAL RADIOLOGY | Facility: HOSPITAL | Age: 73
DRG: 314 | End: 2023-06-24
Payer: MEDICARE

## 2023-06-24 ENCOUNTER — APPOINTMENT (OUTPATIENT)
Dept: CARDIOLOGY | Facility: HOSPITAL | Age: 73
DRG: 314 | End: 2023-06-24
Payer: MEDICARE

## 2023-06-24 ENCOUNTER — HOSPITAL ENCOUNTER (INPATIENT)
Facility: HOSPITAL | Age: 73
LOS: 2 days | Discharge: NURSING, REHAB OR LONG-TERM CARE | DRG: 314 | End: 2023-06-26
Attending: INTERNAL MEDICINE | Admitting: INTERNAL MEDICINE
Payer: MEDICARE

## 2023-06-24 ENCOUNTER — APPOINTMENT (OUTPATIENT)
Dept: CT IMAGING | Facility: HOSPITAL | Age: 73
DRG: 314 | End: 2023-06-24
Payer: MEDICARE

## 2023-06-24 PROBLEM — N18.31 STAGE 3A CHRONIC KIDNEY DISEASE: Status: ACTIVE | Noted: 2023-06-24

## 2023-06-24 PROBLEM — D64.9 ANEMIA: Status: ACTIVE | Noted: 2023-06-24

## 2023-06-24 PROBLEM — I50.32 CHRONIC DIASTOLIC CHF (CONGESTIVE HEART FAILURE): Status: ACTIVE | Noted: 2023-06-24

## 2023-06-24 PROBLEM — E66.9 OBESE: Status: ACTIVE | Noted: 2023-06-24

## 2023-06-24 PROBLEM — R53.81 PHYSICAL DEBILITY: Status: ACTIVE | Noted: 2023-06-24

## 2023-06-24 PROBLEM — I30.9 PERICARDIAL EFFUSION, ACUTE: Status: ACTIVE | Noted: 2023-06-24

## 2023-06-24 PROBLEM — Z95.4 H/O MITRAL VALVE REPLACEMENT WITH TISSUE GRAFT: Status: ACTIVE | Noted: 2023-06-24

## 2023-06-24 PROBLEM — J98.11 ATELECTASIS OF BOTH LUNGS: Status: ACTIVE | Noted: 2023-06-24

## 2023-06-24 PROBLEM — J18.9 PNEUMONIA: Status: ACTIVE | Noted: 2023-06-24

## 2023-06-24 PROBLEM — I48.92 PAROXYSMAL ATRIAL FLUTTER: Status: ACTIVE | Noted: 2023-06-24

## 2023-06-24 PROBLEM — Z86.73 HISTORY OF ISCHEMIC STROKE: Status: ACTIVE | Noted: 2023-06-24

## 2023-06-24 LAB
ALBUMIN SERPL-MCNC: 3.7 G/DL (ref 3.5–5.2)
ALBUMIN/GLOB SERPL: 1.2 G/DL
ALP SERPL-CCNC: 122 U/L (ref 39–117)
ALT SERPL W P-5'-P-CCNC: 12 U/L (ref 1–41)
ANION GAP SERPL CALCULATED.3IONS-SCNC: 13 MMOL/L (ref 5–15)
AST SERPL-CCNC: 9 U/L (ref 1–40)
BH CV ECHO MEAS - EDV(MOD-SP2): 118 ML
BH CV ECHO MEAS - EDV(MOD-SP4): 158 ML
BH CV ECHO MEAS - EF(MOD-BP): 50.1 %
BH CV ECHO MEAS - EF(MOD-SP2): 50.8 %
BH CV ECHO MEAS - EF(MOD-SP4): 50 %
BH CV ECHO MEAS - ESV(MOD-SP2): 58 ML
BH CV ECHO MEAS - ESV(MOD-SP4): 79 ML
BH CV ECHO MEAS - SV(MOD-SP2): 60 ML
BH CV ECHO MEAS - SV(MOD-SP4): 79 ML
BILIRUB SERPL-MCNC: 0.4 MG/DL (ref 0–1.2)
BUN SERPL-MCNC: 14 MG/DL (ref 8–23)
BUN/CREAT SERPL: 9.3 (ref 7–25)
CALCIUM SPEC-SCNC: 9.1 MG/DL (ref 8.6–10.5)
CHLORIDE SERPL-SCNC: 96 MMOL/L (ref 98–107)
CK MB SERPL-CCNC: 1.09 NG/ML
CO2 SERPL-SCNC: 30 MMOL/L (ref 22–29)
CREAT SERPL-MCNC: 1.5 MG/DL (ref 0.76–1.27)
D DIMER PPP FEU-MCNC: 1.96 MCGFEU/ML (ref 0–0.74)
DEPRECATED RDW RBC AUTO: 42.6 FL (ref 37–54)
EGFRCR SERPLBLD CKD-EPI 2021: 48.6 ML/MIN/1.73
ERYTHROCYTE [DISTWIDTH] IN BLOOD BY AUTOMATED COUNT: 13.8 % (ref 12.3–15.4)
GEN 5 2HR TROPONIN T REFLEX: 101 NG/L
GLOBULIN UR ELPH-MCNC: 3.1 GM/DL
GLUCOSE BLDC GLUCOMTR-MCNC: 149 MG/DL (ref 70–130)
GLUCOSE BLDC GLUCOMTR-MCNC: 154 MG/DL (ref 70–130)
GLUCOSE BLDC GLUCOMTR-MCNC: 161 MG/DL (ref 70–130)
GLUCOSE BLDC GLUCOMTR-MCNC: 374 MG/DL (ref 70–130)
GLUCOSE SERPL-MCNC: 149 MG/DL (ref 65–99)
HCT VFR BLD AUTO: 31.7 % (ref 37.5–51)
HGB BLD-MCNC: 10.6 G/DL (ref 13–17.7)
MAGNESIUM SERPL-MCNC: 1.8 MG/DL (ref 1.6–2.4)
MAGNESIUM SERPL-MCNC: 2 MG/DL (ref 1.6–2.4)
MCH RBC QN AUTO: 28.3 PG (ref 26.6–33)
MCHC RBC AUTO-ENTMCNC: 33.4 G/DL (ref 31.5–35.7)
MCV RBC AUTO: 84.8 FL (ref 79–97)
NT-PROBNP SERPL-MCNC: 1600 PG/ML (ref 0–900)
PHOSPHATE SERPL-MCNC: 3.9 MG/DL (ref 2.5–4.5)
PLATELET # BLD AUTO: 227 10*3/MM3 (ref 140–450)
PMV BLD AUTO: 9.1 FL (ref 6–12)
POTASSIUM SERPL-SCNC: 3.4 MMOL/L (ref 3.5–5.2)
POTASSIUM SERPL-SCNC: 3.9 MMOL/L (ref 3.5–5.2)
PROCALCITONIN SERPL-MCNC: 0.08 NG/ML (ref 0–0.25)
PROT SERPL-MCNC: 6.8 G/DL (ref 6–8.5)
QT INTERVAL: 384 MS
QT INTERVAL: 398 MS
RBC # BLD AUTO: 3.74 10*6/MM3 (ref 4.14–5.8)
SODIUM SERPL-SCNC: 139 MMOL/L (ref 136–145)
TROPONIN T DELTA: 5 NG/L
TROPONIN T SERPL HS-MCNC: 103 NG/L
TROPONIN T SERPL HS-MCNC: 96 NG/L
WBC NRBC COR # BLD: 8.15 10*3/MM3 (ref 3.4–10.8)

## 2023-06-24 PROCEDURE — 93010 ELECTROCARDIOGRAM REPORT: CPT | Performed by: INTERNAL MEDICINE

## 2023-06-24 PROCEDURE — 93308 TTE F-UP OR LMTD: CPT

## 2023-06-24 PROCEDURE — 93325 DOPPLER ECHO COLOR FLOW MAPG: CPT | Performed by: INTERNAL MEDICINE

## 2023-06-24 PROCEDURE — 93321 DOPPLER ECHO F-UP/LMTD STD: CPT | Performed by: INTERNAL MEDICINE

## 2023-06-24 PROCEDURE — 71275 CT ANGIOGRAPHY CHEST: CPT

## 2023-06-24 PROCEDURE — 82948 REAGENT STRIP/BLOOD GLUCOSE: CPT

## 2023-06-24 PROCEDURE — 63710000001 INSULIN LISPRO (HUMAN) PER 5 UNITS: Performed by: INTERNAL MEDICINE

## 2023-06-24 PROCEDURE — 94799 UNLISTED PULMONARY SVC/PX: CPT

## 2023-06-24 PROCEDURE — 93005 ELECTROCARDIOGRAM TRACING: CPT | Performed by: INTERNAL MEDICINE

## 2023-06-24 PROCEDURE — 93321 DOPPLER ECHO F-UP/LMTD STD: CPT

## 2023-06-24 PROCEDURE — 84484 ASSAY OF TROPONIN QUANT: CPT | Performed by: INTERNAL MEDICINE

## 2023-06-24 PROCEDURE — 85379 FIBRIN DEGRADATION QUANT: CPT | Performed by: HOSPITALIST

## 2023-06-24 PROCEDURE — 25010000002 MORPHINE PER 10 MG: Performed by: INTERNAL MEDICINE

## 2023-06-24 PROCEDURE — 93308 TTE F-UP OR LMTD: CPT | Performed by: INTERNAL MEDICINE

## 2023-06-24 PROCEDURE — 25510000001 IOPAMIDOL PER 1 ML: Performed by: INTERNAL MEDICINE

## 2023-06-24 PROCEDURE — 83735 ASSAY OF MAGNESIUM: CPT | Performed by: INTERNAL MEDICINE

## 2023-06-24 PROCEDURE — 99221 1ST HOSP IP/OBS SF/LOW 40: CPT | Performed by: INTERNAL MEDICINE

## 2023-06-24 PROCEDURE — 25510000001 PERFLUTREN (DEFINITY) 8.476 MG IN SODIUM CHLORIDE (PF) 0.9 % 10 ML INJECTION: Performed by: INTERNAL MEDICINE

## 2023-06-24 PROCEDURE — 93325 DOPPLER ECHO COLOR FLOW MAPG: CPT

## 2023-06-24 PROCEDURE — 84132 ASSAY OF SERUM POTASSIUM: CPT | Performed by: INTERNAL MEDICINE

## 2023-06-24 PROCEDURE — 71045 X-RAY EXAM CHEST 1 VIEW: CPT

## 2023-06-24 PROCEDURE — 84145 PROCALCITONIN (PCT): CPT | Performed by: HOSPITALIST

## 2023-06-24 RX ORDER — GUAIFENESIN 200 MG/10ML
400 LIQUID ORAL EVERY 8 HOURS
COMMUNITY
End: 2023-07-05 | Stop reason: HOSPADM

## 2023-06-24 RX ORDER — NITROGLYCERIN 0.4 MG/1
0.4 TABLET SUBLINGUAL
Status: DISCONTINUED | OUTPATIENT
Start: 2023-06-24 | End: 2023-06-26 | Stop reason: HOSPADM

## 2023-06-24 RX ORDER — ATORVASTATIN CALCIUM 20 MG/1
40 TABLET, FILM COATED ORAL NIGHTLY
Status: DISCONTINUED | OUTPATIENT
Start: 2023-06-24 | End: 2023-06-26 | Stop reason: HOSPADM

## 2023-06-24 RX ORDER — ACETAMINOPHEN 650 MG/1
650 SUPPOSITORY RECTAL EVERY 4 HOURS PRN
Status: DISCONTINUED | OUTPATIENT
Start: 2023-06-24 | End: 2023-06-26 | Stop reason: HOSPADM

## 2023-06-24 RX ORDER — METOPROLOL SUCCINATE 25 MG/1
25 TABLET, EXTENDED RELEASE ORAL
Status: DISCONTINUED | OUTPATIENT
Start: 2023-06-24 | End: 2023-06-26 | Stop reason: HOSPADM

## 2023-06-24 RX ORDER — IBUPROFEN 600 MG/1
1 TABLET ORAL
Status: DISCONTINUED | OUTPATIENT
Start: 2023-06-24 | End: 2023-06-26 | Stop reason: HOSPADM

## 2023-06-24 RX ORDER — NICOTINE POLACRILEX 4 MG
15 LOZENGE BUCCAL
Status: DISCONTINUED | OUTPATIENT
Start: 2023-06-24 | End: 2023-06-26 | Stop reason: HOSPADM

## 2023-06-24 RX ORDER — DOCUSATE SODIUM 100 MG/1
100 CAPSULE, LIQUID FILLED ORAL 2 TIMES DAILY PRN
Status: DISCONTINUED | OUTPATIENT
Start: 2023-06-24 | End: 2023-06-26 | Stop reason: HOSPADM

## 2023-06-24 RX ORDER — UREA 10 %
3 LOTION (ML) TOPICAL NIGHTLY PRN
Status: DISCONTINUED | OUTPATIENT
Start: 2023-06-24 | End: 2023-06-26 | Stop reason: HOSPADM

## 2023-06-24 RX ORDER — SODIUM CHLORIDE 9 MG/ML
75 INJECTION, SOLUTION INTRAVENOUS CONTINUOUS
Status: ACTIVE | OUTPATIENT
Start: 2023-06-24 | End: 2023-06-24

## 2023-06-24 RX ORDER — TRAMADOL HYDROCHLORIDE 50 MG/1
50 TABLET ORAL EVERY 6 HOURS PRN
COMMUNITY
End: 2023-07-10

## 2023-06-24 RX ORDER — BUDESONIDE AND FORMOTEROL FUMARATE DIHYDRATE 160; 4.5 UG/1; UG/1
2 AEROSOL RESPIRATORY (INHALATION)
Status: DISCONTINUED | OUTPATIENT
Start: 2023-06-24 | End: 2023-06-26 | Stop reason: HOSPADM

## 2023-06-24 RX ORDER — MORPHINE SULFATE 2 MG/ML
2 INJECTION, SOLUTION INTRAMUSCULAR; INTRAVENOUS EVERY 4 HOURS PRN
Status: DISCONTINUED | OUTPATIENT
Start: 2023-06-24 | End: 2023-06-25

## 2023-06-24 RX ORDER — SODIUM CHLORIDE 9 MG/ML
40 INJECTION, SOLUTION INTRAVENOUS AS NEEDED
Status: DISCONTINUED | OUTPATIENT
Start: 2023-06-24 | End: 2023-06-26 | Stop reason: HOSPADM

## 2023-06-24 RX ORDER — UREA 10 %
3 LOTION (ML) TOPICAL NIGHTLY
Status: DISCONTINUED | OUTPATIENT
Start: 2023-06-24 | End: 2023-06-26 | Stop reason: HOSPADM

## 2023-06-24 RX ORDER — TORSEMIDE 100 MG/1
100 TABLET ORAL 2 TIMES DAILY
COMMUNITY
End: 2023-07-05 | Stop reason: HOSPADM

## 2023-06-24 RX ORDER — ONDANSETRON 4 MG/1
4 TABLET, FILM COATED ORAL EVERY 6 HOURS PRN
Status: DISCONTINUED | OUTPATIENT
Start: 2023-06-24 | End: 2023-06-26 | Stop reason: HOSPADM

## 2023-06-24 RX ORDER — GUAIFENESIN 200 MG/10ML
400 LIQUID ORAL 3 TIMES DAILY
Status: DISCONTINUED | OUTPATIENT
Start: 2023-06-24 | End: 2023-06-26 | Stop reason: HOSPADM

## 2023-06-24 RX ORDER — DEXTROSE MONOHYDRATE 25 G/50ML
25 INJECTION, SOLUTION INTRAVENOUS
Status: DISCONTINUED | OUTPATIENT
Start: 2023-06-24 | End: 2023-06-26 | Stop reason: HOSPADM

## 2023-06-24 RX ORDER — ACETAMINOPHEN 160 MG/5ML
650 SOLUTION ORAL EVERY 4 HOURS PRN
Status: DISCONTINUED | OUTPATIENT
Start: 2023-06-24 | End: 2023-06-26 | Stop reason: HOSPADM

## 2023-06-24 RX ORDER — ACETAMINOPHEN 325 MG/1
650 TABLET ORAL EVERY 4 HOURS PRN
Status: DISCONTINUED | OUTPATIENT
Start: 2023-06-24 | End: 2023-06-26 | Stop reason: HOSPADM

## 2023-06-24 RX ORDER — MORPHINE SULFATE 2 MG/ML
1 INJECTION, SOLUTION INTRAMUSCULAR; INTRAVENOUS EVERY 4 HOURS PRN
Status: DISCONTINUED | OUTPATIENT
Start: 2023-06-24 | End: 2023-06-24

## 2023-06-24 RX ORDER — SODIUM CHLORIDE 0.9 % (FLUSH) 0.9 %
3 SYRINGE (ML) INJECTION EVERY 12 HOURS SCHEDULED
Status: DISCONTINUED | OUTPATIENT
Start: 2023-06-24 | End: 2023-06-26 | Stop reason: HOSPADM

## 2023-06-24 RX ORDER — PANTOPRAZOLE SODIUM 40 MG/1
40 TABLET, DELAYED RELEASE ORAL
Status: DISCONTINUED | OUTPATIENT
Start: 2023-06-24 | End: 2023-06-26 | Stop reason: HOSPADM

## 2023-06-24 RX ORDER — PANTOPRAZOLE SODIUM 40 MG/1
40 TABLET, DELAYED RELEASE ORAL DAILY
Status: ON HOLD | COMMUNITY
End: 2023-07-05 | Stop reason: SDUPTHER

## 2023-06-24 RX ORDER — MORPHINE SULFATE 2 MG/ML
2 INJECTION, SOLUTION INTRAMUSCULAR; INTRAVENOUS ONCE
Status: COMPLETED | OUTPATIENT
Start: 2023-06-24 | End: 2023-06-24

## 2023-06-24 RX ORDER — HYDROCODONE BITARTRATE AND ACETAMINOPHEN 5; 325 MG/1; MG/1
1 TABLET ORAL EVERY 4 HOURS PRN
Status: DISCONTINUED | OUTPATIENT
Start: 2023-06-24 | End: 2023-06-26 | Stop reason: HOSPADM

## 2023-06-24 RX ORDER — INSULIN LISPRO 100 [IU]/ML
4 INJECTION, SOLUTION INTRAVENOUS; SUBCUTANEOUS
Status: DISCONTINUED | OUTPATIENT
Start: 2023-06-24 | End: 2023-06-26 | Stop reason: HOSPADM

## 2023-06-24 RX ORDER — ONDANSETRON 2 MG/ML
4 INJECTION INTRAMUSCULAR; INTRAVENOUS EVERY 6 HOURS PRN
Status: DISCONTINUED | OUTPATIENT
Start: 2023-06-24 | End: 2023-06-26 | Stop reason: HOSPADM

## 2023-06-24 RX ORDER — SODIUM CHLORIDE 0.9 % (FLUSH) 0.9 %
3-10 SYRINGE (ML) INJECTION AS NEEDED
Status: DISCONTINUED | OUTPATIENT
Start: 2023-06-24 | End: 2023-06-26 | Stop reason: HOSPADM

## 2023-06-24 RX ORDER — ASPIRIN 81 MG/1
81 TABLET ORAL DAILY
Status: DISCONTINUED | OUTPATIENT
Start: 2023-06-24 | End: 2023-06-26 | Stop reason: HOSPADM

## 2023-06-24 RX ORDER — TORSEMIDE 100 MG/1
100 TABLET ORAL
Status: DISCONTINUED | OUTPATIENT
Start: 2023-06-24 | End: 2023-06-24

## 2023-06-24 RX ORDER — INSULIN LISPRO 100 [IU]/ML
3-14 INJECTION, SOLUTION INTRAVENOUS; SUBCUTANEOUS
Status: DISCONTINUED | OUTPATIENT
Start: 2023-06-24 | End: 2023-06-26 | Stop reason: HOSPADM

## 2023-06-24 RX ADMIN — INSULIN GLARGINE-YFGN 20 UNITS: 100 INJECTION, SOLUTION SUBCUTANEOUS at 21:09

## 2023-06-24 RX ADMIN — GUAIFENESIN 400 MG: 200 SOLUTION ORAL at 12:21

## 2023-06-24 RX ADMIN — PANTOPRAZOLE SODIUM 40 MG: 40 TABLET, DELAYED RELEASE ORAL at 12:21

## 2023-06-24 RX ADMIN — GUAIFENESIN 400 MG: 200 SOLUTION ORAL at 21:10

## 2023-06-24 RX ADMIN — Medication 3 ML: at 12:32

## 2023-06-24 RX ADMIN — METOPROLOL SUCCINATE 25 MG: 25 TABLET, EXTENDED RELEASE ORAL at 12:21

## 2023-06-24 RX ADMIN — Medication 3 ML: at 21:10

## 2023-06-24 RX ADMIN — PERFLUTREN 3 ML: 6.52 INJECTION, SUSPENSION INTRAVENOUS at 16:22

## 2023-06-24 RX ADMIN — INSULIN LISPRO 12 UNITS: 100 INJECTION, SOLUTION INTRAVENOUS; SUBCUTANEOUS at 21:09

## 2023-06-24 RX ADMIN — BUDESONIDE AND FORMOTEROL FUMARATE DIHYDRATE 2 PUFF: 160; 4.5 AEROSOL RESPIRATORY (INHALATION) at 15:34

## 2023-06-24 RX ADMIN — MORPHINE SULFATE 2 MG: 2 INJECTION, SOLUTION INTRAMUSCULAR; INTRAVENOUS at 12:27

## 2023-06-24 RX ADMIN — GUAIFENESIN 400 MG: 200 SOLUTION ORAL at 16:43

## 2023-06-24 RX ADMIN — Medication 3 MG: at 21:08

## 2023-06-24 RX ADMIN — BUDESONIDE AND FORMOTEROL FUMARATE DIHYDRATE 2 PUFF: 160; 4.5 AEROSOL RESPIRATORY (INHALATION) at 20:54

## 2023-06-24 RX ADMIN — INSULIN LISPRO 3 UNITS: 100 INJECTION, SOLUTION INTRAVENOUS; SUBCUTANEOUS at 18:36

## 2023-06-24 RX ADMIN — MORPHINE SULFATE 1 MG: 2 INJECTION, SOLUTION INTRAMUSCULAR; INTRAVENOUS at 09:41

## 2023-06-24 RX ADMIN — APIXABAN 5 MG: 5 TABLET, FILM COATED ORAL at 12:22

## 2023-06-24 RX ADMIN — MORPHINE SULFATE 2 MG: 2 INJECTION, SOLUTION INTRAMUSCULAR; INTRAVENOUS at 16:43

## 2023-06-24 RX ADMIN — INSULIN LISPRO 4 UNITS: 100 INJECTION, SOLUTION INTRAVENOUS; SUBCUTANEOUS at 18:36

## 2023-06-24 RX ADMIN — NITROGLYCERIN 0.4 MG: 0.4 TABLET SUBLINGUAL at 07:58

## 2023-06-24 RX ADMIN — ATORVASTATIN CALCIUM 40 MG: 20 TABLET, FILM COATED ORAL at 21:09

## 2023-06-24 RX ADMIN — SODIUM CHLORIDE 75 ML/HR: 9 INJECTION, SOLUTION INTRAVENOUS at 11:28

## 2023-06-24 RX ADMIN — TIOTROPIUM BROMIDE INHALATION SPRAY 2 PUFF: 3.12 SPRAY, METERED RESPIRATORY (INHALATION) at 15:34

## 2023-06-24 RX ADMIN — ASPIRIN 81 MG: 81 TABLET, COATED ORAL at 12:21

## 2023-06-24 RX ADMIN — IOPAMIDOL 95 ML: 755 INJECTION, SOLUTION INTRAVENOUS at 13:38

## 2023-06-24 NOTE — NURSING NOTE
Patient awoke and reported 10/10 L sided chest pain at 0209. V/s and blood sugar obtained, stat EKG ordered. O2 at 2L via nasal cannula applied. Called rapid response team. Notified Dr. Hickey. Orders placed for stat CBC, CMP, mag, phos, troponin, CKMB, CXR and prn Nitro. Nitro given x2 without relief. [facility] consult ordered and called at 0245. Orders were placed for cardiology consult and one time dose of IV morphine. Cardiology consult called at 0309. Notified cardiology of critical troponin. Pain persisted (8/10) after IV morphine given. Dr. Oliveira ordered transfer to [facility]. Report called to [facility] RN at 0421.

## 2023-06-24 NOTE — PROGRESS NOTES
Inpatient Rehabilitation Plan of Care Note    Plan of Care  Care Plan Reviewed - No updates at this time.    Sphincter Control    Performed Intervention(s)  Offer toileting/ timed voids  Monitor I&O      Safety    Performed Intervention(s)  Safety monitoring during functional activities  Environmental set- upto reduce risk      Psychosocial    Performed Intervention(s)  Allow patient to verbalize needs and concerns    Signed by: Sasha Preciado RN

## 2023-06-24 NOTE — PLAN OF CARE
Goal Outcome Evaluation:         Orders received for BSE, patient being taken for CTA.  Will continue to follow.

## 2023-06-24 NOTE — PROGRESS NOTES
SECTION GG    Eating Performance Discharge: Patient completed the activities by themself with  no assistance from a helper.    Section B. Health Literacy  Frequency of Needing Assistance Reading:  Rarely    Section D. Mood  Presence of little interest or pleasure in doing things:   No  Frequency of having little interest or pleasure in doing things:   Never or 1  day  Presence of feeling down, depressed, or hopeless:   No  Frequency of feeling down, depressed, or hopeless:   Never or 1 day   Interview Ended. Above responses do not meet criteria to continue  Total Severity Score:   0    Section D. Social Isolation  Frequency of Feeling Lonely or Isolated:  Never    Section J. Health Conditions (Pain Effect on Sleep)  Pain Effect on Sleep:   Occasionally    Signed by: Sasha Preciado RN

## 2023-06-24 NOTE — PLAN OF CARE
Goal Outcome Evaluation:      BSE orders received, patient passed Dysphagia screen per RNFreddy and is on regular diet/thin liquids.  ST to sign off, please reconsult, if needed.

## 2023-06-24 NOTE — CODE DOCUMENTATION
RR called d/t pt having CP. Pt states it is a 10 out of 10, left upper chest and is aching. Pt states it is worse when he breaths in. Pt denies pain radiating, nausea, or SOA. Pt is not diaphoretic. Dr. Hickey called. Received order for labs, EKG, CXR and nitro. Two nitro given. Pt states he has no pain relief. Consult for [facility] to see. Primary Rn to notify and update surgeon.

## 2023-06-24 NOTE — NURSING NOTE
Call placed to Maricel Solorzano/spouse to notify of change in condition and transfer to room 3105.

## 2023-06-24 NOTE — PROGRESS NOTES
Chest pain. Posterior pericardial effusion around LV. No fluid around RV. Probably Dresslers.I would not tap, I'll let Dr. Frey know.

## 2023-06-24 NOTE — PROGRESS NOTES
[facility] Progress Note      Patient Name: Vernon Solorzano  : 1950  MRN: [id number]  Primary Care Physician:  Liza Oconnell III, NP-C  Date of admission: 2023    Subjective     Interval History:   Follow up CKD III.   Was transferred to higher level of care this morning due to chest pain.  Pleuritic in nature.  Complaining of mild shortness of breath.  Denies any nausea or vomiting.  No fever no chills.  Urine output has been acceptable around 1400 cc last 24 hours.  Chest x-ray reviewed suboptimal quality.    Review of Systems:   As noted above    Objective     Vitals:   Temp:  [97.8 °F (36.6 °C)-98.7 °F (37.1 °C)] 97.8 °F (36.6 °C)  Heart Rate:  [101-110] 107  Resp:  [18-20] 18  BP: ()/(64-86) 98/74  Flow (L/min):  [2] 2    Intake/Output Summary (Last 24 hours) at 2023 0949  Last data filed at 2023 0900  Gross per 24 hour   Intake 960 ml   Output 1450 ml   Net -490 ml         Physical Exam:    General Appearance: Patient is alert, oriented.  Ill looking.  Questions skin: warm and dry  HEENT: oral mucosa normal, nonicteric sclera  Neck: supple, no JVD  Lungs: Clear to auscultation.   Heart: RRR, normal S1 and S2  Abdomen: soft, nontender, + bs, distended.  Extremities 1-2+ lower ext edema  Scheduled Meds:     [MAR Hold - Suspended Admission] apixaban, 5 mg, Oral, Q12H  [MAR Hold - Suspended Admission] aspirin, 81 mg, Oral, Daily  [MAR Hold - Suspended Admission] atorvastatin, 40 mg, Oral, Nightly  [MAR Hold - Suspended Admission] budesonide-formoterol, 2 puff, Inhalation, BID - RT  [MAR Hold - Suspended Admission] calcium 500 mg vitamin D 5 mcg (200 UT), 1 tablet, Oral, Daily  [MAR Hold - Suspended Admission] guaifenesin, 400 mg, Oral, Q8H  [MAR Hold - Suspended Admission] insulin glargine, 15 Units, Subcutaneous, Nightly  [MAR Hold - Suspended Admission] insulin glargine, 40 Units, Subcutaneous, Daily  [MAR Hold - Suspended Admission]  insulin lispro, 3-14 Units, Subcutaneous, TID With Meals  [MAR Hold - Suspended Admission] insulin lispro, 5 Units, Subcutaneous, Daily With Breakfast & Lunch  [MAR Hold - Suspended Admission] metoprolol succinate XL, 25 mg, Oral, Q24H  [MAR Hold - Suspended Admission] pantoprazole, 40 mg, Oral, Q AM  [MAR Hold - Suspended Admission] potassium chloride, 40 mEq, Oral, Daily  [MAR Hold - Suspended Admission] tiotropium bromide monohydrate, 2 puff, Inhalation, Daily - RT  [MAR Hold - Suspended Admission] torsemide, 100 mg, Oral, BID      IV Meds:        Results Reviewed:   I have personally reviewed the results from the time of this admission to 6/24/2023 09:49 EDT     Results from last 7 days   Lab Units 06/24/23 0228 06/23/23  0812 06/22/23  1146   SODIUM mmol/L 139 133* 137   POTASSIUM mmol/L 3.4* 3.5 3.8   CHLORIDE mmol/L 96* 94* 98   CO2 mmol/L 30.0* 27.9 27.6   BUN mg/dL 14 13 14   CREATININE mg/dL 1.50* 1.46* 1.43*   CALCIUM mg/dL 9.1 8.9 9.4   BILIRUBIN mg/dL 0.4 0.7 0.6   ALK PHOS U/L 122* 120* 126*   ALT (SGPT) U/L 12 9 9   AST (SGOT) U/L 9 14 10   GLUCOSE mg/dL 149* 151* 195*         Estimated Creatinine Clearance: 56.7 mL/min (A) (by C-G formula based on SCr of 1.5 mg/dL (H)).    Results from last 7 days   Lab Units 06/24/23 0228 06/23/23  0812 06/21/23  0608   MAGNESIUM mg/dL 2.0  --   --    PHOSPHORUS mg/dL 3.9 3.2 3.1               Results from last 7 days   Lab Units 06/24/23 0228 06/23/23  0812 06/21/23  0608 06/19/23  0557   WBC 10*3/mm3 8.15 7.91 8.42 8.20   HEMOGLOBIN g/dL 10.6* 10.5* 9.6* 10.5*   PLATELETS 10*3/mm3 227 247 207 242               Assessment / Plan     ASSESSMENT:  1.  Acute kidney injury on top of CKD III, baseline creatinine 1.2.  Peak 1.68.  Creatinine basically stable around 1.4 currently.  2. MR now sp MV replacement, tissue and TV repair, SU closure 5/24/23.  3. Chronic steroid use after COVID 19 PNA.  4. Anemia of CKD and postoperative anemia.  Hemoglobin is at goal  5.  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 Rehab .  6. DM2  7. Aflutter.Cardioversion 6/2/23. On eliquis and metoprolol.   8.  Hypokalemia  9.  Pleuritic chest pain with x-ray with presence of questionable left lower lobe infiltrate not very impressive.   Positive troponin followed by cardiology.  PE is on differential diagnosis.   10.  Positive troponin    PLAN:    1.  Continue current diuretic dose of torsemide 100 mg twice daily for now.  2.  If PE  is suspected will recommend VQ scan instead of CTA given risk of contrast nephropathy.  If CTA is ordered we will hold tonight dose of diuretic therapy and give IV fluids.  3.  We will check D-dimer  4.  Awaiting cardiology input    Addendum:   Discussed with Dr Oliveira. CTA ordered  we will D/C torsemide for now.  We will give a small bolus of IV fluid as to prevent contrast-induced nephropathy.  We will follow along    Ludwig Marquez MD  06/24/23  09:49 EDT    [facility]  [phone]

## 2023-06-24 NOTE — H&P
[facility] Medicine Services  HISTORY AND PHYSICAL    Patient Name: Vernon Solorzano  : 1950  MRN: [id number]  Primary Care Physician: Lzia Oconnell III, NP-C  Date of admission: 2023    Subjective   Subjective   Chief Complaint:  Chest pain    HPI:  Vernon Solorzano is a 72 y.o. male who was recently hospitalized for multiple medical issues presents from [facility] with 1 day history of severe left-sided chest pain the patient describes the chest pain as 1 local area in the upper left region of the chest that is worse with deep inspiration.  It does not radiate.  He does not necessarily reported being worse with exertion.  He denies any cough.  He denies any worsening of peripheral edema but does note he has chronic peripheral edema that is stable.  He denies any worsening features but does note some improvement with morphine.  Patient wife says regarding his recent stroke he no longer has any unilateral weakness and his cognitive deficits are improving.  He denies any issues swallowing recently      Review of Systems   Constitutional: Positive for fatigue. Negative for fever.   HENT: Negative.    Eyes: Negative.    Respiratory: Negative for cough and wheezing.    Cardiovascular: Positive for chest pain and leg swelling. Negative for palpitations.   Gastrointestinal: Negative.    Endocrine: Negative.    Genitourinary: Negative.    Musculoskeletal: Negative.    Skin: Negative.    Allergic/Immunologic: Negative.    Neurological: Negative.    Hematological: Negative.    Psychiatric/Behavioral: Negative.         All other systems reviewed and are negative.     Personal History     Past Medical History:   Diagnosis Date   • Allergic rhinitis    • Arthritis    • BPH (benign prostatic hyperplasia)    • Diabetes mellitus     TYPE 2   • Foraminal stenosis of cervical region     C5-C6   • GERD (gastroesophageal reflux disease)    • Hemorrhoids    • History of urinary  retention 2010    S/P TURP   • Hyperlipidemia    • Macular degeneration    • PING (obstructive sleep apnea) 01/07/2016    MILD, SEES DR. AMARILIS MORGAN       Past Surgical History:   Procedure Laterality Date   • CARDIAC CATHETERIZATION N/A 5/11/2023    Procedure: Right Heart Cath;  Surgeon: Corey Gaffney MD;  [facility];  Service: Cardiology;  Laterality: N/A;   • CARDIAC CATHETERIZATION N/A 5/11/2023    Procedure: Left Heart Cath;  Surgeon: Corey Gaffney MD;  Location:  [facility];  Service: Cardiology;  Laterality: N/A;   • CARDIAC CATHETERIZATION N/A 5/11/2023    Procedure: Left ventriculography;  Surgeon: Corey Gaffney MD;  [facility]:  [facility];  Service: Cardiology;  Laterality: N/A;   • CARDIAC CATHETERIZATION N/A 5/11/2023    Procedure: Coronary angiography;  Surgeon: Corey Gaffney MD;  Location:  [facility];  Service: Cardiology;  Laterality: N/A;   • COLONOSCOPY N/A     WNL PER PT, NO RECORDS,    • COLONOSCOPY N/A 04/07/2009    DR. GORGE BENAVIDEZ AT [place]   • MITRAL VALVE REPAIR/REPLACEMENT N/A 5/24/2023    Procedure: MITRAL VALVE REPAIR/REPLACEMENT TRICUSPID VALVE REPAIR/REPLACEMENT TRANSESOPHAGEAL ECHOCARDIOGRAM WITH ANESTHESIA;  Surgeon: George Frey MD;  Location: [facility];  Service: Cardiothoracic;  Laterality: N/A;   • PROSTATE SURGERY N/A 11/12/2010    TURP, DR. COREY COLLAZO AT [facility]   • SKIN TAG REMOVAL N/A 12/20/2017    ANAL SKIN TAG X2, PERFORMED IN OFFICE, DR. LISA ORANTES   • TURP / TRANSURETHRAL INCISION / DRAINAGE PROSTATE  2010    Dr. Goodrich       Family History: family history includes Colon polyps in his brother and brother; Dementia in his father; Diabetes in his brother; Heart disease in his brother; Hyperlipidemia in his brother; Hypertension in his brother; Lung cancer in his mother; Stroke in his father. Other pertinent FHx was reviewed and unremarkable.     Social  History:  reports that he has never smoked. He has never been exposed to tobacco smoke. He has never used smokeless tobacco. He reports current alcohol use of about 1.0 standard drink per week. He reports that he does not use drugs.  Social History     Social History Narrative   • Not on file       Medications:  Available home medication information reviewed.    No Known Allergies    Objective   Objective   Vital Signs:   Temp:  [97.8 °F (36.6 °C)-98.4 °F (36.9 °C)] 98 °F (36.7 °C)  Heart Rate:  [101-110] 102  Resp:  [16-20] 16  BP: ()/(64-86) 112/83        Physical Exam   Constitutional: Awake, alert, elderly  Eyes: PERRLA, sclerae anicteric, no conjunctival injection  HENT: NCAT, mucous membranes moist  Neck: Supple, no thyromegaly, no lymphadenopathy, trachea midline  Respiratory: No cough or wheezes, moderate inspiration, and normal respiratory rate nonlabored breathing  Cardiovascular: Pulse rate is borderline tachycardic, palpable radial pulses bilaterally  Gastrointestinal: Obese, soft, nontender, nondistended  Musculoskeletal: Left chest pain is not reproducible on examination with deep palpation to the chest region, BMI is 32.5, moderate pitting bilateral ankle edema, no clubbing or cyanosis to extremities  Psychiatric: Appropriate affect, cooperative  Neurologic: Oriented x 3, no facial droop, strength symmetric in all extremities, Cranial Nerves grossly intact to confrontation, speech clear  Skin: No rashes or jaundice      Results from last 7 days   Lab Units 06/24/23  0228   WBC 10*3/mm3 8.15   HEMOGLOBIN g/dL 10.6*   HEMATOCRIT % 31.7*   PLATELETS 10*3/mm3 227     Results from last 7 days   Lab Units 06/24/23  0538 06/24/23  0434 06/24/23  0228   SODIUM mmol/L  --   --  139   POTASSIUM mmol/L  --   --  3.4*   CHLORIDE mmol/L  --   --  96*   CO2 mmol/L  --   --  30.0*   BUN mg/dL  --   --  14   CREATININE mg/dL  --   --  1.50*   GLUCOSE mg/dL  --   --  149*   CALCIUM mg/dL  --   --  9.1   ALT  (SGPT) U/L  --   --  12   AST (SGOT) U/L  --   --  9   HSTROP T ng/L 103* 101* 96*   PROBNP pg/mL  --   --  1,600.0*   PROCALCITONIN ng/mL 0.08  --   --      Estimated Creatinine Clearance: 56.7 mL/min (A) (by C-G formula based on SCr of 1.5 mg/dL (H)).  Brief Urine Lab Results  (Last result in the past 365 days)      Color   Clarity   Blood   Leuk Est   Nitrite   Protein   CREAT   Urine HCG        06/07/23 1841 Yellow   Clear   Negative   Negative   Negative   Negative               Imaging Results (Last 24 Hours)     Procedure Component Value Units Date/Time    CT Angiogram Chest [[id number]] Collected: 06/24/23 1423     Updated: 06/24/23 1501    Narrative:      CT ANGIOGRAPHY OF THE CHEST WITH INTRAVENOUS CONTRAST AND 3-D  RECONSTRUCTIONS     HISTORY: Recent mitral valve and tricuspid valve replacement surgery.  Severe upper left chest pain last night. Evaluate for pulmonary embolus.     The CT scan was performed as an emergency procedure through the chest  with CT angiography protocol using intervenous contrast and 3-D  reconstructions. The following findings are present:  1. The pulmonary arteries are well-opacified and there is no evidence of  pulmonary embolus. The thoracic aorta shows no evidence of aneurysm or  dissection.  2. There is some minimal strand-like atelectasis at both bases, left  greater than right and the lungs are otherwise clear. There is no  mediastinal or hilar or axillary adenopathy. There is a moderately large  pericardial effusion particularly posteriorly where it measures up to 4  cm in thickness. This is new since the preoperative CT scan dated  09/22/2022. The patient has had recent mitral valve and tricuspid valve  replacements.  3. The median sternotomy demonstrates minimal postsurgical change in the  retrosternal space. There is no abnormal substernal fluid collection.  The CT images through the upper liver, spleen, and both adrenal glands  are unremarkable. There is a cyst  involving the upper pole of the  partially visualized right kidney that is unchanged.                 Radiation dose reduction techniques were utilized, including automated  exposure control and exposure modulation based on body size.     This report was finalized on 6/24/2023 2:58 PM by Dr. Ignacio Teague M.D.       XR Chest 1 View [[id number]] Collected: 06/24/23 1039     Updated: 06/24/23 1044    Narrative:      ONE VIEW PORTABLE CHEST AT 9:53 AM     HISTORY: Mitral valve replacement 1 month ago. Upper left chest pain.     FINDINGS: There is mild-to-moderate cardiomegaly with a mitral valve  prosthesis in place and there is mild generalized vascular congestion  showing perhaps minimal worsening compared to the study performed 7  hours ago. However it shows improvement as well as improved lung  expansion when compared to an earlier exam dated 05/30/2023.     This report was finalized on 6/24/2023 10:41 AM by Dr. Ignacio Teague M.D.           Results for orders placed during the hospital encounter of 05/23/23    Adult Transthoracic Echo Limited W/ Cont if Necessary Per Protocol    Interpretation Summary  •  Limited echocardiogram for left ventricular function, right ventricular function, and left atrial assessment.  •  The left ventricular cavity is mildly dilated.  •  Left ventricular ejection fraction appears to be 51 - 55%.  •  The right ventricular cavity is mildly dilated. Normal right ventricular systolic function noted.  •  The left atrial cavity is moderately dilated.      Assessment & Plan   Assessment & Plan     Active Hospital Problems    Diagnosis  POA   • **Left-sided chest pain [R07.9]  Yes   • Atelectasis of both lungs [J98.11]  Yes   • Pericardial effusion, acute [I30.9]  Yes   • History of ischemic stroke [Z86.73]  Not Applicable   • Obese [E66.9]  Yes   • Physical debility [R53.81]  Yes   • Chronic diastolic CHF (congestive heart failure) [I50.32]  Yes   • Anemia [D64.9]  Yes   • Stage 3a chronic  kidney disease [N18.31]  Yes   • Paroxysmal atrial flutter [I48.92]  Yes   • H/O mitral valve replacement with tissue graft [Z95.4]  Not Applicable   • Essential hypertension [I10]  Yes   • Mild intermittent asthma without complication [J45.20]  Yes   • Mixed hyperlipidemia [E78.2]  Yes   • DM (diabetes mellitus), type 2 [E11.9]  Yes     5/2022:   Glucose Monitor: OneTouch Verio Flex        72-year-old male presents from [facility] with acute left-sided chest pain.    Postoperative left-sided chest pain with new large pericardial effusion seen on CT scan:  History of moderate to severe mitral regurgitation s/p MVR/TV repair/SU closure  CT scan ordered earlier this morning.  Images reviewed and pericardial effusion noted.  Patient also with atelectasis but no pneumonia.  Plan for supportive care and symptom treatment.  Monitor while requiring IV morphine.  I have discussed CT results with cardiology team and have ordered a stat bedside echocardiogram.  I will also consult cardiothoracic surgery for postoperative effusion.  Patient currently is normotensive.  Careful telemetry monitoring for any signs of changes.  Temporarily discontinue Eliquis for now.    Atelectasis:  Noted on CT images.  Flutter valve ordered.    Paroxysmal atrial flutter:  Noted per history.  Continue beta-blocker.  Temporarily discontinue Eliquis until further evaluation of pericardial effusion    Elevated D-dimer:  Likely consistent with recent surgical intervention.  CT angiogram is negative for PE.    Type 2 diabetes mellitus with hyperglycemia  Monitor glucose and adjust insulin as needed.    Insomnia: Added melatonin.    Hyperlipidemia: Records reviewed and most recent LDL is 81 and triglycerides 197.  Statin    CKD 3A: History of baseline creatinine 1.2.  Above baseline at admission.  Consult nephrology to follow.  Case discussed with nephrology on 6/24.    Asthma history and interstitial lung disease history: Continue  Symbicort and Spiriva.  Albuterol nebs as needed.    Anemia: Monitor.  No active bleeding reported.  Likely related to recent surgical intervention.    Treatment plan discussed with patient and his wife who are in agreement.    DVT prophylaxis: Ambulatory    Code Status and Medical Interventions:   Ordered at: 06/24/23 1325     Level Of Support Discussed With:    Patient     Code Status (Patient has no pulse and is not breathing):    CPR (Attempt to Resuscitate)     Medical Interventions (Patient has pulse or is breathing):    Full Support       Roque Veras MD  06/24/23

## 2023-06-24 NOTE — CONSULTS
[facility]  Cardiology Consult Note    Patient Identification:  Name: Vernon Solorzano  Age: 72 y.o.  Sex: male  :  1950  MRN: [id number]             Requesting Physician: Chest pain  Reason for Consultation / Chief Complaint:   cp  History of Present Illness:   72-year-old male admitted to the rehab with significant chest pain left-sided localized worse with deep inspiration    Comorbid cardiac risk factors:     Past Medical History:  Past Medical History:   Diagnosis Date    Allergic rhinitis     Arthritis     BPH (benign prostatic hyperplasia)     Diabetes mellitus     TYPE 2    Foraminal stenosis of cervical region     C5-C6    GERD (gastroesophageal reflux disease)     Hemorrhoids     History of urinary retention     S/P TURP    Hyperlipidemia     Macular degeneration     PING (obstructive sleep apnea) 2016    MILD, SEES DR. AMARILIS MORGAN     Past Surgical History:  Past Surgical History:   Procedure Laterality Date    CARDIAC CATHETERIZATION N/A 2023    Procedure: Right Heart Cath;  Surgeon: Corey Gaffney MD;  [facility]:  [facility];  Service: Cardiology;  Laterality: N/A;    CARDIAC CATHETERIZATION N/A 2023    Procedure: Left Heart Cath;  Surgeon: Corey Gaffney MD;  [facility]:  [facility];  Service: Cardiology;  Laterality: N/A;    CARDIAC CATHETERIZATION N/A 2023    Procedure: Left ventriculography;  Surgeon: Corey Gaffney MD;  Location:  [facility];  Service: Cardiology;  Laterality: N/A;    CARDIAC CATHETERIZATION N/A 2023    Procedure: Coronary angiography;  Surgeon: Corey Gaffney MD;  Location:  [facility];  Service: Cardiology;  Laterality: N/A;    COLONOSCOPY N/A     WNL PER PT, NO RECORDS,     COLONOSCOPY N/A 2009    DR. GORGE BENAVIDEZ AT [place]    MITRAL VALVE REPAIR/REPLACEMENT N/A 2023    Procedure: MITRAL VALVE REPAIR/REPLACEMENT TRICUSPID VALVE  REPAIR/REPLACEMENT TRANSESOPHAGEAL ECHOCARDIOGRAM WITH ANESTHESIA;  Surgeon: George Frey MD;  Location: [facility];  Service: Cardiothoracic;  Laterality: N/A;    PROSTATE SURGERY N/A 11/12/2010    TURP, DR. BRIGHT COLLAZO AT [facility]    SKIN TAG REMOVAL N/A 12/20/2017    ANAL SKIN TAG X2, PERFORMED IN OFFICE, DR. LISA ORANTES    TURP / TRANSURETHRAL INCISION / DRAINAGE PROSTATE  2010    Dr. Goodrich      Allergies:  No Known Allergies  Home Meds:  Medications Prior to Admission   Medication Sig Dispense Refill Last Dose    aspirin 81 MG tablet Take 1 tablet by mouth Daily.   6/23/2023 at 0720    atorvastatin (LIPITOR) 10 MG tablet Take 1 tablet by mouth Every Night.   6/23/2023 at 2000    Calcium Carbonate-Vitamin D (calcium 500 mg vitamin D 5 mcg, 200 UT,) 500-5 MG-MCG tablet per tablet Take 1 tablet by mouth 2 (Two) Times a Day.   6/23/2023 at 0720    guaifenesin (ROBITUSSIN) 100 MG/5ML liquid Take 20 mL by mouth Every 8 (Eight) Hours.   6/23/2023 at 2000    pantoprazole (PROTONIX) 40 MG EC tablet Take 1 tablet by mouth Daily.   6/23/2023 at 0720    potassium chloride (MICRO-K) 10 MEQ CR capsule Take 2 capsules by mouth Daily.   6/23/2023 at 720    torsemide (DEMADEX) 100 MG tablet Take 1 tablet by mouth 2 (Two) Times a Day.   6/23/2023 at 1702    traMADol (ULTRAM) 50 MG tablet Take 1 tablet by mouth Every 6 (Six) Hours As Needed for Moderate Pain.   6/23/2023 at 2326    furosemide (LASIX) 40 MG tablet Take 1 tablet by mouth 2 (Two) Times a Day. 60 tablet 1 Unknown    glucose blood (OneTouch Verio) test strip 1 each by Other route As Needed. Use as instructed       insulin degludec (Tresiba FlexTouch) 100 UNIT/ML solution pen-injector injection Inject 50 Units under the skin into the appropriate area as directed Daily.       irbesartan (AVAPRO) 75 MG tablet Take 1 tablet by mouth Daily.   Unknown    Jardiance 25 MG tablet tablet Take 1 tablet by mouth Daily.   Unknown    metoclopramide (REGLAN) 10 MG  tablet Take 1 tablet by mouth Every Night.   Unknown    montelukast (SINGULAIR) 10 MG tablet Take 1 tablet by mouth Every Night.   Unknown    Multiple Vitamins-Minerals (PRESERVISION AREDS 2+MULTI VIT PO) Take 1 tablet by mouth 2 (Two) Times a Day.   Unknown    omeprazole (priLOSEC) 40 MG capsule Take 1 capsule by mouth Every Night.   Unknown    predniSONE (DELTASONE) 1 MG tablet Take 9 tablets by mouth Daily.   Unknown    SITagliptin-metFORMIN HCl ER (Janumet XR) 100-1000 MG tablet Take 1 tablet by mouth Daily.   Unknown    Symbicort 160-4.5 MCG/ACT inhaler Inhale 2 puffs 2 (Two) Times a Day.       tiotropium bromide monohydrate (SPIRIVA RESPIMAT) 2.5 MCG/ACT aerosol solution inhaler Inhale 2 puffs Daily.   Unknown     Current Meds:   [unfilled]  Social History:   Social History     Tobacco Use    Smoking status: Never     Passive exposure: Never    Smokeless tobacco: Never   Substance Use Topics    Alcohol use: Yes     Alcohol/week: 1.0 standard drink     Types: 1 Drinks containing 0.5 oz of alcohol per week     Comment: RARELY      Family History:  Family History   Problem Relation Age of Onset    Lung cancer Mother     Stroke Father     Dementia Father     Hyperlipidemia Brother     Hypertension Brother     Heart disease Brother     Diabetes Brother     Colon polyps Brother     Colon polyps Brother         Review of Systems    Constitutional: No weakness,fatigue, fever, rigors, chills   Eyes: No vision changes, eye pain   ENT/oropharynx: No difficulty swallowing, sore throat, epistaxis, changes in hearing   Cardiovascular: cp   Respiratory: No shortness of breath, dyspnea on exertion, cough, wheezing hemoptysis   Gastrointestinal: No abdominal pain, nausea, vomiting, diarrhea, bloody stools   Genitourinary: No hematuria, dysuria   Neurological: No headache, tremors, numbness,  one-sided weakness    Musculoskeletal: No cramps, myalgias,  joint pain, joint swelling   Integument: No rash, edema            Constitutional:  Temp:  [97.8 °F (36.6 °C)-98.7 °F (37.1 °C)] 97.8 °F (36.6 °C)  Heart Rate:  [101-110] 107  Resp:  [18-20] 18  BP: ()/(64-86) 98/74    Physical Exam   General:  Appears in no acute distress  Eyes: PERTL,  HEENT:  No JVD. Thyroid not visibly enlarged. No mucosal pallor or cyanosis  Respiratory: Respirations regular and unlabored at rest. BBS with good air entry in all fields. No crackles, rubs or wheezes auscultated  Cardiovascular: S1S2 Regular rate and rhythm. No murmur, rub or gallop auscultated. No carotid bruits. DP/PT pulses    . No pretibial pitting edema  Gastrointestinal: Abdomen soft, flat, non tender. Bowel sounds present. No hepatosplenomegaly. No ascites  Musculoskeletal: ZAVALA x4. No abnormal movements  Extremities: No digital clubbing or cyanosis  Skin: Color pink. Skin warm and dry to touch. No rashes  No xanthoma  Neuro: AAO x3 CN II-XII grossly intact              Cardiographics  ECG:     Telemetry:    Echocardiogram:     Imaging  Chest X-ray:     Lab Review   Results from last 7 days   Lab Units 06/24/23  0538 06/24/23  0434 06/24/23  0228   HSTROP T ng/L 103* 101* 96*     Results from last 7 days   Lab Units 06/24/23  0228   MAGNESIUM mg/dL 2.0     Results from last 7 days   Lab Units 06/24/23  0228   SODIUM mmol/L 139   POTASSIUM mmol/L 3.4*   BUN mg/dL 14   CREATININE mg/dL 1.50*   CALCIUM mg/dL 9.1     @[id number]@  Results from last 7 days   Lab Units 06/24/23  0228 06/23/23  0812 06/21/23  0608   WBC 10*3/mm3 8.15 7.91 8.42   HEMOGLOBIN g/dL 10.6* 10.5* 9.6*   HEMATOCRIT % 31.7* 31.6* 28.3*   PLATELETS 10*3/mm3 227 247 207             Assessment:  Atypical chest pain  Paroxysmal atrial fibrillation  Rule out pulmonary embolism  Recommendations / Plan:   Patient chest pains worsening with a deep inspiration appears to be noncardiac  Elevated troponin could be multifactorial  CTA  We will have the repeat EKGs as well as enzymes    Labs/tests ordered for  am      Mitzi Reinoso MD  6/24/2023, 10:36 EDT      EMR Dragon/Transcription:   Dictated utilizing Dragon dictation

## 2023-06-24 NOTE — DISCHARGE SUMMARY
DISCHARGE DATE: 6/24/23    HOSPITAL COURSE:  Patient was participating in comprehensive rehab program. Around 2 AM on 6/24 patient developed 10/10 chest pain with radiation to left shoulder. Vitals were stable, rapid response was called. Patient was placed on oxygen and given sublingual nitro x 2 without improvement in symptoms. EKG, CXR and stat labs including cardiac enzymes were ordered. Case was discussed with [facility] and patient's surgical team and decision was made to transfer to higher level of care for cardiac monitoring.     DISCHARGE CONDITION: Stable    DISCHARGE DISPO: Acute [facility]

## 2023-06-25 PROBLEM — I24.1 DRESSLER SYNDROME: Status: ACTIVE | Noted: 2023-06-25

## 2023-06-25 LAB
ANION GAP SERPL CALCULATED.3IONS-SCNC: 12 MMOL/L (ref 5–15)
BUN SERPL-MCNC: 21 MG/DL (ref 8–23)
BUN/CREAT SERPL: 14.3 (ref 7–25)
CALCIUM SPEC-SCNC: 9.5 MG/DL (ref 8.6–10.5)
CHLORIDE SERPL-SCNC: 96 MMOL/L (ref 98–107)
CO2 SERPL-SCNC: 28 MMOL/L (ref 22–29)
CREAT SERPL-MCNC: 1.47 MG/DL (ref 0.76–1.27)
DEPRECATED RDW RBC AUTO: 43.7 FL (ref 37–54)
EGFRCR SERPLBLD CKD-EPI 2021: 49.7 ML/MIN/1.73
ERYTHROCYTE [DISTWIDTH] IN BLOOD BY AUTOMATED COUNT: 14.1 % (ref 12.3–15.4)
GEN 5 2HR TROPONIN T REFLEX: 95 NG/L
GLUCOSE BLDC GLUCOMTR-MCNC: 149 MG/DL (ref 70–130)
GLUCOSE BLDC GLUCOMTR-MCNC: 177 MG/DL (ref 70–130)
GLUCOSE BLDC GLUCOMTR-MCNC: 216 MG/DL (ref 70–130)
GLUCOSE BLDC GLUCOMTR-MCNC: 332 MG/DL (ref 70–130)
GLUCOSE SERPL-MCNC: 151 MG/DL (ref 65–99)
HCT VFR BLD AUTO: 31.3 % (ref 37.5–51)
HGB BLD-MCNC: 10.3 G/DL (ref 13–17.7)
MCH RBC QN AUTO: 28 PG (ref 26.6–33)
MCHC RBC AUTO-ENTMCNC: 32.9 G/DL (ref 31.5–35.7)
MCV RBC AUTO: 85.1 FL (ref 79–97)
PLATELET # BLD AUTO: 259 10*3/MM3 (ref 140–450)
PMV BLD AUTO: 9.3 FL (ref 6–12)
POTASSIUM SERPL-SCNC: 3.5 MMOL/L (ref 3.5–5.2)
RBC # BLD AUTO: 3.68 10*6/MM3 (ref 4.14–5.8)
SODIUM SERPL-SCNC: 136 MMOL/L (ref 136–145)
TROPONIN T DELTA: -2 NG/L
TROPONIN T SERPL HS-MCNC: 97 NG/L
WBC NRBC COR # BLD: 11.08 10*3/MM3 (ref 3.4–10.8)

## 2023-06-25 PROCEDURE — 80048 BASIC METABOLIC PNL TOTAL CA: CPT | Performed by: INTERNAL MEDICINE

## 2023-06-25 PROCEDURE — 84484 ASSAY OF TROPONIN QUANT: CPT | Performed by: INTERNAL MEDICINE

## 2023-06-25 PROCEDURE — 94799 UNLISTED PULMONARY SVC/PX: CPT

## 2023-06-25 PROCEDURE — 94664 DEMO&/EVAL PT USE INHALER: CPT

## 2023-06-25 PROCEDURE — 93005 ELECTROCARDIOGRAM TRACING: CPT | Performed by: INTERNAL MEDICINE

## 2023-06-25 PROCEDURE — 97530 THERAPEUTIC ACTIVITIES: CPT

## 2023-06-25 PROCEDURE — 87040 BLOOD CULTURE FOR BACTERIA: CPT | Performed by: INTERNAL MEDICINE

## 2023-06-25 PROCEDURE — 97162 PT EVAL MOD COMPLEX 30 MIN: CPT

## 2023-06-25 PROCEDURE — 85027 COMPLETE CBC AUTOMATED: CPT | Performed by: INTERNAL MEDICINE

## 2023-06-25 PROCEDURE — 82948 REAGENT STRIP/BLOOD GLUCOSE: CPT

## 2023-06-25 PROCEDURE — 94761 N-INVAS EAR/PLS OXIMETRY MLT: CPT

## 2023-06-25 PROCEDURE — 63710000001 INSULIN LISPRO (HUMAN) PER 5 UNITS: Performed by: INTERNAL MEDICINE

## 2023-06-25 PROCEDURE — 93010 ELECTROCARDIOGRAM REPORT: CPT | Performed by: INTERNAL MEDICINE

## 2023-06-25 RX ORDER — COLCHICINE 0.6 MG/1
0.6 TABLET ORAL DAILY
Status: DISCONTINUED | OUTPATIENT
Start: 2023-06-26 | End: 2023-06-26 | Stop reason: HOSPADM

## 2023-06-25 RX ORDER — TRAMADOL HYDROCHLORIDE 50 MG/1
50 TABLET ORAL EVERY 6 HOURS PRN
Status: DISCONTINUED | OUTPATIENT
Start: 2023-06-25 | End: 2023-06-26 | Stop reason: HOSPADM

## 2023-06-25 RX ORDER — COLCHICINE 0.6 MG/1
0.6 TABLET ORAL EVERY 12 HOURS SCHEDULED
Status: DISCONTINUED | OUTPATIENT
Start: 2023-06-25 | End: 2023-06-25

## 2023-06-25 RX ORDER — POTASSIUM CHLORIDE 750 MG/1
40 TABLET, FILM COATED, EXTENDED RELEASE ORAL ONCE
Status: COMPLETED | OUTPATIENT
Start: 2023-06-25 | End: 2023-06-25

## 2023-06-25 RX ORDER — TORSEMIDE 100 MG/1
100 TABLET ORAL DAILY
Status: DISCONTINUED | OUTPATIENT
Start: 2023-06-25 | End: 2023-06-26

## 2023-06-25 RX ADMIN — ASPIRIN 81 MG: 81 TABLET, COATED ORAL at 09:12

## 2023-06-25 RX ADMIN — COLCHICINE 0.6 MG: 0.6 TABLET, FILM COATED ORAL at 11:03

## 2023-06-25 RX ADMIN — ATORVASTATIN CALCIUM 40 MG: 20 TABLET, FILM COATED ORAL at 21:23

## 2023-06-25 RX ADMIN — Medication 3 ML: at 11:03

## 2023-06-25 RX ADMIN — GUAIFENESIN 400 MG: 200 SOLUTION ORAL at 21:28

## 2023-06-25 RX ADMIN — INSULIN LISPRO 4 UNITS: 100 INJECTION, SOLUTION INTRAVENOUS; SUBCUTANEOUS at 18:20

## 2023-06-25 RX ADMIN — TIOTROPIUM BROMIDE INHALATION SPRAY 2 PUFF: 3.12 SPRAY, METERED RESPIRATORY (INHALATION) at 08:19

## 2023-06-25 RX ADMIN — INSULIN LISPRO 3 UNITS: 100 INJECTION, SOLUTION INTRAVENOUS; SUBCUTANEOUS at 07:52

## 2023-06-25 RX ADMIN — INSULIN LISPRO 10 UNITS: 100 INJECTION, SOLUTION INTRAVENOUS; SUBCUTANEOUS at 21:28

## 2023-06-25 RX ADMIN — Medication 3 MG: at 21:23

## 2023-06-25 RX ADMIN — METOPROLOL SUCCINATE 25 MG: 25 TABLET, EXTENDED RELEASE ORAL at 09:12

## 2023-06-25 RX ADMIN — BUDESONIDE AND FORMOTEROL FUMARATE DIHYDRATE 2 PUFF: 160; 4.5 AEROSOL RESPIRATORY (INHALATION) at 08:19

## 2023-06-25 RX ADMIN — GUAIFENESIN 400 MG: 200 SOLUTION ORAL at 09:12

## 2023-06-25 RX ADMIN — INSULIN LISPRO 5 UNITS: 100 INJECTION, SOLUTION INTRAVENOUS; SUBCUTANEOUS at 18:20

## 2023-06-25 RX ADMIN — INSULIN GLARGINE-YFGN 20 UNITS: 100 INJECTION, SOLUTION SUBCUTANEOUS at 21:24

## 2023-06-25 RX ADMIN — POTASSIUM CHLORIDE 40 MEQ: 750 TABLET, EXTENDED RELEASE ORAL at 18:18

## 2023-06-25 RX ADMIN — PANTOPRAZOLE SODIUM 40 MG: 40 TABLET, DELAYED RELEASE ORAL at 07:37

## 2023-06-25 RX ADMIN — INSULIN LISPRO 4 UNITS: 100 INJECTION, SOLUTION INTRAVENOUS; SUBCUTANEOUS at 07:52

## 2023-06-25 RX ADMIN — GUAIFENESIN 400 MG: 200 SOLUTION ORAL at 18:18

## 2023-06-25 RX ADMIN — TORSEMIDE 100 MG: 100 TABLET ORAL at 18:18

## 2023-06-25 RX ADMIN — INSULIN LISPRO 4 UNITS: 100 INJECTION, SOLUTION INTRAVENOUS; SUBCUTANEOUS at 13:07

## 2023-06-25 RX ADMIN — BUDESONIDE AND FORMOTEROL FUMARATE DIHYDRATE 2 PUFF: 160; 4.5 AEROSOL RESPIRATORY (INHALATION) at 20:36

## 2023-06-25 RX ADMIN — Medication 3 ML: at 21:24

## 2023-06-25 NOTE — PLAN OF CARE
Goal Outcome Evaluation:  Plan of Care Reviewed With: patient              Patient is a 72 y.o. male sent to [facility] from acute rehab at [facility] with chest pain on 6/24/2023. PMHx includes L MCA CVA, DM, CKD, MVR/TV. Patient has been ambulating about 160ft and 100ft using rwx with SBA-CGA at rehab per last note. He lives with his spouse at baseline with bedroom on second level.Today, patient required Teofilo for transfers and ambulated 50ft using rwx requiring Teofilo. Cues to navigate obstacles in room, to maintain safe distance to rwx, and to maintain BLE in walker during turns. Safety, delayed processing/response noted, balance, activity tolerance, and strength deficits noted. Patient may benefit from skilled PT services to address functional deficits and improve level of independence prior to discharge. Anticipate acute rehab upon DC.

## 2023-06-25 NOTE — PROGRESS NOTES
[facility] Medicine Services  PROGRESS NOTE    Patient Name: Vernon Solorzano  : 1950  MRN: [id number]    Date of Admission: 2023  Primary Care Physician: Liza Oconnell III, YUMIKO-C    Subjective   Subjective     CC:  Follow-up pericardial effusion    Subjective:  Nursing report patient with persistent intermittent confusion.  Patient is confused this morning and has some difficulty remembering yesterday.  Patient is still complaining of some chest pain left-sided moderately deep in the chest.  No radiation.  Patient denies any new complaints.    Review of Systems  No current fevers or chills  No current shortness of breath or cough  No current nausea, vomiting, or diarrhea      Objective   Objective     Vital Signs:   Temp:  [98 °F (36.7 °C)-99.4 °F (37.4 °C)] 99.3 °F (37.4 °C)  Heart Rate:  [] 106  Resp:  [16-28] 18  BP: ()/(75-96) 126/77        Physical Exam:  Constitutional:Awake, alert  HENT: NCAT, mucous membranes moist, neck supple  Respiratory: No cough or wheezing, normal respiratory rate, moderately good inspiration, nonlabored breathing  Cardiovascular: Pulse rate is borderline tachycardic, normal radial pulses  Gastrointestinal: soft, nontender, nondistended  Musculoskeletal: Somewhat debilitated in appearance, obese BMI is 32.5, bilateral lower extremity edema  Psychiatric: Mildly anxious affect, cooperative, conversational  Neurologic: Poor historian, partially oriented, no slurred speech or facial droop, follows commands  Skin: No rashes or jaundice, warm      Results Reviewed:  Results from last 7 days   Lab Units 23  0739 2338 23   WBC 10*3/mm3 11.08*  --  8.15 7.91   HEMOGLOBIN g/dL 10.3*  --  10.6* 10.5*   HEMATOCRIT % 31.3*  --  31.7* 31.6*   PLATELETS 10*3/mm3 259  --  227 247   PROCALCITONIN ng/mL  --  0.08  --   --      Results from last 7 days   Lab Units 23  0739 23  06/24/23  0434 06/24/23  0228 06/23/23  0812 06/22/23  1146   SODIUM mmol/L 136  --   --   --  139 133* 137   POTASSIUM mmol/L 3.5 3.9  --   --  3.4* 3.5 3.8   CHLORIDE mmol/L 96*  --   --   --  96* 94* 98   CO2 mmol/L 28.0  --   --   --  30.0* 27.9 27.6   BUN mg/dL 21  --   --   --  14 13 14   CREATININE mg/dL 1.47*  --   --   --  1.50* 1.46* 1.43*   GLUCOSE mg/dL 151*  --   --   --  149* 151* 195*   CALCIUM mg/dL 9.5  --   --   --  9.1 8.9 9.4   ALT (SGPT) U/L  --   --   --   --  12 9 9   AST (SGOT) U/L  --   --   --   --  9 14 10   HSTROP T ng/L 97*  --  103* 101* 96*  --   --    PROBNP pg/mL  --   --   --   --  1,600.0*  --   --      Estimated Creatinine Clearance: 57.8 mL/min (A) (by C-G formula based on SCr of 1.47 mg/dL (H)).    Microbiology Results Abnormal       None            Imaging Results (Last 24 Hours)       Procedure Component Value Units Date/Time    CT Angiogram Chest [[id number]] Collected: 06/24/23 1423     Updated: 06/24/23 1501    Narrative:      CT ANGIOGRAPHY OF THE CHEST WITH INTRAVENOUS CONTRAST AND 3-D  RECONSTRUCTIONS     HISTORY: Recent mitral valve and tricuspid valve replacement surgery.  Severe upper left chest pain last night. Evaluate for pulmonary embolus.     The CT scan was performed as an emergency procedure through the chest  with CT angiography protocol using intervenous contrast and 3-D  reconstructions. The following findings are present:  1. The pulmonary arteries are well-opacified and there is no evidence of  pulmonary embolus. The thoracic aorta shows no evidence of aneurysm or  dissection.  2. There is some minimal strand-like atelectasis at both bases, left  greater than right and the lungs are otherwise clear. There is no  mediastinal or hilar or axillary adenopathy. There is a moderately large  pericardial effusion particularly posteriorly where it measures up to 4  cm in thickness. This is new since the preoperative CT scan dated  09/22/2022. The patient has had  recent mitral valve and tricuspid valve  replacements.  3. The median sternotomy demonstrates minimal postsurgical change in the  retrosternal space. There is no abnormal substernal fluid collection.  The CT images through the upper liver, spleen, and both adrenal glands  are unremarkable. There is a cyst involving the upper pole of the  partially visualized right kidney that is unchanged.                 Radiation dose reduction techniques were utilized, including automated  exposure control and exposure modulation based on body size.     This report was finalized on 6/24/2023 2:58 PM by Dr. Ignacio Teague M.D.       XR Chest 1 View [[id number]] Collected: 06/24/23 1039     Updated: 06/24/23 1044    Narrative:      ONE VIEW PORTABLE CHEST AT 9:53 AM     HISTORY: Mitral valve replacement 1 month ago. Upper left chest pain.     FINDINGS: There is mild-to-moderate cardiomegaly with a mitral valve  prosthesis in place and there is mild generalized vascular congestion  showing perhaps minimal worsening compared to the study performed 7  hours ago. However it shows improvement as well as improved lung  expansion when compared to an earlier exam dated 05/30/2023.     This report was finalized on 6/24/2023 10:41 AM by Dr. Ignacio Teague M.D.               Results for orders placed during the hospital encounter of 06/24/23    Adult Transthoracic Echo Limited W/ Cont if Necessary Per Protocol    Interpretation Summary    Left ventricular systolic function is normal. Calculated left ventricular EF = 50.1%    Left ventricular diastolic function was not assessed.    There is a prosthetic mitral valve present.    There is a moderate (1-2cm) pericardial effusion adjacent to the left ventricle.    This was limited study. Discussed with Dr. Mccormick. Effusion does not appear to be causing tamponade. It is mostly around the left ventricle and cannot be easily approached percutaneously.      I have reviewed the medications:  Scheduled  Meds:aspirin, 81 mg, Oral, Daily  atorvastatin, 40 mg, Oral, Nightly  budesonide-formoterol, 2 puff, Inhalation, BID - RT  colchicine, 0.6 mg, Oral, Q12H  guaifenesin, 400 mg, Oral, TID  insulin glargine, 20 Units, Subcutaneous, Nightly  insulin lispro, 3-14 Units, Subcutaneous, 4x Daily AC & at Bedtime  insulin lispro, 4 Units, Subcutaneous, TID With Meals  melatonin, 3 mg, Oral, Nightly  metoprolol succinate XL, 25 mg, Oral, Q24H  pantoprazole, 40 mg, Oral, Q AM  sodium chloride, 3 mL, Intravenous, Q12H  tiotropium bromide monohydrate, 2 puff, Inhalation, Daily - RT      Continuous Infusions:   PRN Meds:.  acetaminophen **OR** acetaminophen **OR** acetaminophen    dextrose    dextrose    dextrose    dextrose    docusate sodium    glucagon (human recombinant)    glucagon (human recombinant)    HYDROcodone-acetaminophen    melatonin    Morphine    nitroglycerin    nitroglycerin    ondansetron **OR** ondansetron    sodium chloride    sodium chloride    traMADol    Assessment & Plan   Assessment & Plan     Active Hospital Problems    Diagnosis  POA    **Left-sided chest pain [R07.9]  Yes    Atelectasis of both lungs [J98.11]  Yes    Pericardial effusion, acute [I30.9]  Yes    History of ischemic stroke [Z86.73]  Not Applicable    Obese [E66.9]  Yes    Physical debility [R53.81]  Yes    Chronic diastolic CHF (congestive heart failure) [I50.32]  Yes    Anemia [D64.9]  Yes    Stage 3a chronic kidney disease [N18.31]  Yes    Paroxysmal atrial flutter [I48.92]  Yes    H/O mitral valve replacement with tissue graft [Z95.4]  Not Applicable    Essential hypertension [I10]  Yes    Mild intermittent asthma without complication [J45.20]  Yes    Mixed hyperlipidemia [E78.2]  Yes    DM (diabetes mellitus), type 2 [E11.9]  Yes      Resolved Hospital Problems   No resolved problems to display.        Brief Hospital Course to date:  Vernon Solorzano is a 72 y.o. male with recent valve replacement surgery presents from [facility]  rehabilitation with acute left-sided chest pain.  CT scan showed 4 cm right-sided pericardial effusion.  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  I have personally reviewed echocardiogram images as well.  No current sign of tamponade.  Plan to try colchicine for possible Dressler and monitor response.  I discussed with cardiothoracic surgery team Dr. Anderson today and he has requested we hold anticoagulation for now in case this is hemorrhagic.   Plan for supportive care and symptom treatment.  Monitor while requiring IV morphine.  Discontinue morphine due to confusion and tried Tylenol and tramadol.  Monitor borderline leukocytosis and check blood cultures.  Careful telemetry monitoring for any signs of changes.       Atelectasis:  Noted on CT images.  Flutter valve.     Paroxysmal atrial flutter:  Noted per history.  Continue beta-blocker.  Temporarily discontinue Eliquis until further evaluation of pericardial effusion per my discussion with CT surgery     Elevated D-dimer:  Likely consistent with recent surgical intervention.  CT angiogram is negative for PE.     Type 2 diabetes mellitus with hyperglycemia  Monitor glucose and adjust insulin as needed.     Insomnia: Added melatonin.     Hyperlipidemia: Records reviewed and most recent LDL is 81 and triglycerides 197.  Statin     CKD 3A: History of baseline creatinine 1.2.  Above baseline at admission.  Consult nephrology to follow.  Case discussed with nephrology on 6/24.     Asthma history and interstitial lung disease  history: Continue Symbicort and Spiriva.  Albuterol nebs as needed.     Anemia: Monitor.  No active bleeding reported.  Likely related to recent surgical intervention.    DVT Prophylaxis: Mechanical    Disposition: Likely return to [facility] acute rehab    CODE STATUS:   Code Status and Medical Interventions:   Ordered at: 06/24/23 1325     Level Of Support Discussed With:    Patient     Code Status (Patient has no pulse and is not breathing):    CPR (Attempt to Resuscitate)     Medical Interventions (Patient has pulse or is breathing):    Full Support       Roque Veras MD  06/25/23

## 2023-06-25 NOTE — PROGRESS NOTES
[facility] Progress Note      Patient Name: Vernon Solorzano  : 1950  MRN: [id number]  Primary Care Physician:  Liza Oconnell III, NP-C  Date of admission: 2023    Subjective     Interval History:   Follow up CKD III.   Chest pain seems to be improving.  Creatinine remains stable.  CTA performed yesterday reviewed with no PE and presence of pericardial effusion without tamponade.  Complaining of lower extremity swelling.      Review of Systems:   As noted above    Objective     Vitals:   Temp:  [97.6 °F (36.4 °C)-99.4 °F (37.4 °C)] 97.6 °F (36.4 °C)  Heart Rate:  [] 101  Resp:  [18-28] 18  BP: ()/(75-96) 103/86    Intake/Output Summary (Last 24 hours) at 2023 1626  Last data filed at 2023 0851  Gross per 24 hour   Intake 600 ml   Output 550 ml   Net 50 ml         Physical Exam:    General Appearance: Patient is alert, oriented.  Ill looking.  Questions skin: warm and dry  HEENT: oral mucosa normal, nonicteric sclera  Neck: supple, no JVD  Lungs: Clear to auscultation.   Heart: RRR, normal S1 and S2  Abdomen: soft, nontender, + bs, distended.  Extremities 1-2+ lower ext edema.  Erythematous rash noted on the anterior aspect of right leg  Scheduled Meds:     aspirin, 81 mg, Oral, Daily  atorvastatin, 40 mg, Oral, Nightly  budesonide-formoterol, 2 puff, Inhalation, BID - RT  colchicine, 0.6 mg, Oral, Q12H  guaifenesin, 400 mg, Oral, TID  insulin glargine, 20 Units, Subcutaneous, Nightly  insulin lispro, 3-14 Units, Subcutaneous, 4x Daily AC & at Bedtime  insulin lispro, 4 Units, Subcutaneous, TID With Meals  melatonin, 3 mg, Oral, Nightly  metoprolol succinate XL, 25 mg, Oral, Q24H  pantoprazole, 40 mg, Oral, Q AM  sodium chloride, 3 mL, Intravenous, Q12H  tiotropium bromide monohydrate, 2 puff, Inhalation, Daily - RT      IV Meds:        Results Reviewed:   I have personally reviewed the results from the time of this admission to 2023  16:26 EDT     Results from last 7 days   Lab Units 06/25/23  0739 06/24/23  1946 06/24/23 0228 06/23/23  0812 06/22/23  1146   SODIUM mmol/L 136  --  139 133* 137   POTASSIUM mmol/L 3.5 3.9 3.4* 3.5 3.8   CHLORIDE mmol/L 96*  --  96* 94* 98   CO2 mmol/L 28.0  --  30.0* 27.9 27.6   BUN mg/dL 21  --  14 13 14   CREATININE mg/dL 1.47*  --  1.50* 1.46* 1.43*   CALCIUM mg/dL 9.5  --  9.1 8.9 9.4   BILIRUBIN mg/dL  --   --  0.4 0.7 0.6   ALK PHOS U/L  --   --  122* 120* 126*   ALT (SGPT) U/L  --   --  12 9 9   AST (SGOT) U/L  --   --  9 14 10   GLUCOSE mg/dL 151*  --  149* 151* 195*         Estimated Creatinine Clearance: 57.8 mL/min (A) (by C-G formula based on SCr of 1.47 mg/dL (H)).    Results from last 7 days   Lab Units 06/24/23 1946 06/24/23 0228 06/23/23  0812 06/21/23  0608   MAGNESIUM mg/dL 1.8 2.0  --   --    PHOSPHORUS mg/dL  --  3.9 3.2 3.1               Results from last 7 days   Lab Units 06/25/23  0739 06/24/23 0228 06/23/23  0812 06/21/23  0608 06/19/23  0557   WBC 10*3/mm3 11.08* 8.15 7.91 8.42 8.20   HEMOGLOBIN g/dL 10.3* 10.6* 10.5* 9.6* 10.5*   PLATELETS 10*3/mm3 259 227 247 207 242               Assessment / Plan     ASSESSMENT:  1.  Acute kidney injury on top of CKD III, baseline creatinine 1.2.  Peak 1.68.  Creatinine basically stable around 1.4 currently.  2. MR now sp MV replacement, tissue and TV repair, SU closure 5/24/23.  3. Chronic steroid use after COVID 19 PNA.  4. Anemia of CKD and postoperative anemia.  Hemoglobin is at goal  5. 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 Rehab .  6. DM2  7. Aflutter.Cardioversion 6/2/23. On eliquis and metoprolol.   8.  Hypokalemia  9.  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  Positive troponin    PLAN:    1.  Diuretics were stopped given need for CTA.  2.  Creatinine remains stable.  We will start patient on torsemide 100 mg p.o. daily  3.  Labs in a.m.    Ludwig Marquez,  MD  06/25/23  16:26 EDT    [facility]  [phone]

## 2023-06-25 NOTE — PROGRESS NOTES
" LOS: 1 day   Patient Care Team:  Liza Oconnell III, NP-C as PCP - General (Family Medicine)  Corey Lao Jr., MD (Inactive) as Consulting Physician (Urology)    Chief Complaint: post op/chest pain    Subjective:  Symptoms:  No shortness of breath or chest pain.    Diet:  Adequate intake.  No nausea or vomiting.    Activity level: Returning to normal.      Chest pain improved today. Nursing reports confusion overnight    Vital Signs  Temp:  [98 °F (36.7 °C)-99.4 °F (37.4 °C)] 99.3 °F (37.4 °C)  Heart Rate:  [] 106  Resp:  [16-28] 18  BP: ()/(75-96) 126/77  Body mass index is 32.46 kg/m².    Intake/Output Summary (Last 24 hours) at 6/25/2023 0940  Last data filed at 6/25/2023 0851  Gross per 24 hour   Intake 600 ml   Output 550 ml   Net 50 ml     I/O this shift:  In: 240 [P.O.:240]  Out: -           06/24/23  0546 06/24/23  1620 06/25/23  0527   Weight: 112 kg (246 lb) 112 kg (246 lb) (refused)         Objective:  General Appearance:  Comfortable and in no acute distress.    Vital signs: (most recent): Blood pressure 126/77, pulse 106, temperature 99.3 °F (37.4 °C), temperature source Oral, resp. rate 18, height 185.4 cm (73\"), weight 112 kg (246 lb), SpO2 97 %.  Vital signs are normal.  No fever.    Output: Producing urine and producing stool.    Lungs:  Normal effort and normal respiratory rate.    Heart: Tachycardia.  Regular rhythm.    Abdomen: Abdomen is soft.  Bowel sounds are normal.     Extremities: There is dependent edema (trace bilateral ankles).    Pulses: Distal pulses are intact.    Neurological: Patient is alert and oriented to person, place and time.    Skin:  Warm and dry.  (Sternal incision is well healed)        Results Review:        WBC WBC   Date Value Ref Range Status   06/25/2023 11.08 (H) 3.40 - 10.80 10*3/mm3 Final   06/24/2023 8.15 3.40 - 10.80 10*3/mm3 Final   06/23/2023 7.91 3.40 - 10.80 10*3/mm3 Final      HGB Hemoglobin   Date Value Ref Range " Status   06/25/2023 10.3 (L) 13.0 - 17.7 g/dL Final   06/24/2023 10.6 (L) 13.0 - 17.7 g/dL Final   06/23/2023 10.5 (L) 13.0 - 17.7 g/dL Final      HCT Hematocrit   Date Value Ref Range Status   06/25/2023 31.3 (L) 37.5 - 51.0 % Final   06/24/2023 31.7 (L) 37.5 - 51.0 % Final   06/23/2023 31.6 (L) 37.5 - 51.0 % Final      Platelets Platelets   Date Value Ref Range Status   06/25/2023 259 140 - 450 10*3/mm3 Final   06/24/2023 227 140 - 450 10*3/mm3 Final   06/23/2023 247 140 - 450 10*3/mm3 Final        PT/INR:  No results found for: PROTIME/No results found for: INR    Sodium Sodium   Date Value Ref Range Status   06/25/2023 136 136 - 145 mmol/L Final   06/24/2023 139 136 - 145 mmol/L Final   06/23/2023 133 (L) 136 - 145 mmol/L Final   06/22/2023 137 136 - 145 mmol/L Final      Potassium Potassium   Date Value Ref Range Status   06/25/2023 3.5 3.5 - 5.2 mmol/L Final   06/24/2023 3.9 3.5 - 5.2 mmol/L Final     Comment:     Slight hemolysis detected by analyzer. Results may be affected.   06/24/2023 3.4 (L) 3.5 - 5.2 mmol/L Final   06/23/2023 3.5 3.5 - 5.2 mmol/L Final   06/22/2023 3.8 3.5 - 5.2 mmol/L Final      Chloride Chloride   Date Value Ref Range Status   06/25/2023 96 (L) 98 - 107 mmol/L Final   06/24/2023 96 (L) 98 - 107 mmol/L Final   06/23/2023 94 (L) 98 - 107 mmol/L Final   06/22/2023 98 98 - 107 mmol/L Final      Bicarbonate CO2   Date Value Ref Range Status   06/25/2023 28.0 22.0 - 29.0 mmol/L Final   06/24/2023 30.0 (H) 22.0 - 29.0 mmol/L Final   06/23/2023 27.9 22.0 - 29.0 mmol/L Final   06/22/2023 27.6 22.0 - 29.0 mmol/L Final      BUN BUN   Date Value Ref Range Status   06/25/2023 21 8 - 23 mg/dL Final   06/24/2023 14 8 - 23 mg/dL Final   06/23/2023 13 8 - 23 mg/dL Final   06/22/2023 14 8 - 23 mg/dL Final      Creatinine Creatinine   Date Value Ref Range Status   06/25/2023 1.47 (H) 0.76 - 1.27 mg/dL Final   06/24/2023 1.50 (H) 0.76 - 1.27 mg/dL Final   06/23/2023 1.46 (H) 0.76 - 1.27 mg/dL Final    06/22/2023 1.43 (H) 0.76 - 1.27 mg/dL Final      Calcium Calcium   Date Value Ref Range Status   06/25/2023 9.5 8.6 - 10.5 mg/dL Final   06/24/2023 9.1 8.6 - 10.5 mg/dL Final   06/23/2023 8.9 8.6 - 10.5 mg/dL Final   06/22/2023 9.4 8.6 - 10.5 mg/dL Final      Magnesium Magnesium   Date Value Ref Range Status   06/24/2023 1.8 1.6 - 2.4 mg/dL Final   06/24/2023 2.0 1.6 - 2.4 mg/dL Final          aspirin, 81 mg, Oral, Daily  atorvastatin, 40 mg, Oral, Nightly  budesonide-formoterol, 2 puff, Inhalation, BID - RT  colchicine, 0.6 mg, Oral, Q12H  guaifenesin, 400 mg, Oral, TID  insulin glargine, 20 Units, Subcutaneous, Nightly  insulin lispro, 3-14 Units, Subcutaneous, 4x Daily AC & at Bedtime  insulin lispro, 4 Units, Subcutaneous, TID With Meals  melatonin, 3 mg, Oral, Nightly  metoprolol succinate XL, 25 mg, Oral, Q24H  pantoprazole, 40 mg, Oral, Q AM  sodium chloride, 3 mL, Intravenous, Q12H  tiotropium bromide monohydrate, 2 puff, Inhalation, Daily - RT           Patient Active Problem List   Diagnosis Code    DM (diabetes mellitus), type 2 E11.9    Mixed hyperlipidemia E78.2    Mild intermittent asthma without complication J45.20    New onset of congestive heart failure I50.9    Nonrheumatic mitral valve regurgitation I34.0    Left-sided chest pain R07.9    Essential hypertension I10    Mitral valve disease I05.9    Acute ischemic left MCA stroke I63.512    Atelectasis of both lungs J98.11    Pericardial effusion, acute I30.9    History of ischemic stroke Z86.73    Obese E66.9    Physical debility R53.81    Chronic diastolic CHF (congestive heart failure) I50.32    Anemia D64.9    Stage 3a chronic kidney disease N18.31    Paroxysmal atrial flutter I48.92    H/O mitral valve replacement with tissue graft Z95.4    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       Assessment & Plan  - 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 Eliquis on hold  - acute CVA post op--bilateral frontal and left parietal occipital cortices    Patient denies chest pain at this time  CT scan reviewed by Dr. Vallejo and Dr. Frey. Recommend holding Eliquis and starting course of colchicine and see how he does  Sternal incision is healing well  Continue PT/OT  Will continue to follow along    DESTIN Jin  06/25/23  09:40 EDT

## 2023-06-25 NOTE — PROGRESS NOTES
[facility]  Cardiology Progress Note    Patient Identification:  Name: Vernon Solorzano  Age: 72 y.o.  Sex: male  :  1950  MRN: [id number]                 Follow Up / Chief Complaint: Chest pain    Interval History:  Patient continues to have the chest pain     Subjective:  Chest pains are much better    Objective:    Past Medical History:  Past Medical History:   Diagnosis Date   • Allergic rhinitis    • Arthritis    • BPH (benign prostatic hyperplasia)    • Diabetes mellitus     TYPE 2   • Foraminal stenosis of cervical region     C5-C6   • GERD (gastroesophageal reflux disease)    • Hemorrhoids    • History of urinary retention     S/P TURP   • Hyperlipidemia    • Macular degeneration    • PING (obstructive sleep apnea) 2016    MILD, SEES DR. AMARILIS MORGAN     Past Surgical History:  Past Surgical History:   Procedure Laterality Date   • CARDIAC CATHETERIZATION N/A 2023    Procedure: Right Heart Cath;  Surgeon: Corey Gaffney MD;  Location:  [facility];  Service: Cardiology;  Laterality: N/A;   • CARDIAC CATHETERIZATION N/A 2023    Procedure: Left Heart Cath;  Surgeon: Corey Gaffney MD;  Location:  [facility];  Service: Cardiology;  Laterality: N/A;   • CARDIAC CATHETERIZATION N/A 2023    Procedure: Left ventriculography;  Surgeon: Corey Gaffney MD;  Location:  [facility];  Service: Cardiology;  Laterality: N/A;   • CARDIAC CATHETERIZATION N/A 2023    Procedure: Coronary angiography;  Surgeon: Corey Gaffney MD;  Location:  [facility];  Service: Cardiology;  Laterality: N/A;   • COLONOSCOPY N/A     WNL PER PT, NO RECORDS,    • COLONOSCOPY N/A 2009    DR. GORGE BENAVIDEZ AT [place]   • MITRAL VALVE REPAIR/REPLACEMENT N/A 2023    Procedure: MITRAL VALVE REPAIR/REPLACEMENT TRICUSPID VALVE REPAIR/REPLACEMENT TRANSESOPHAGEAL ECHOCARDIOGRAM WITH ANESTHESIA;  Surgeon:  George Frey MD;  Location: [facility];  Service: Cardiothoracic;  Laterality: N/A;   • PROSTATE SURGERY N/A 11/12/2010    TURP, DR. BRIGHT COLLAZO AT [facility]   • SKIN TAG REMOVAL N/A 12/20/2017    ANAL SKIN TAG X2, PERFORMED IN OFFICE, DR. LISA ORANTES   • TURP / TRANSURETHRAL INCISION / DRAINAGE PROSTATE  2010    Dr. Goodrich        Social History:   Social History     Tobacco Use   • Smoking status: Never     Passive exposure: Never   • Smokeless tobacco: Never   Substance Use Topics   • Alcohol use: Yes     Alcohol/week: 1.0 standard drink     Types: 1 Drinks containing 0.5 oz of alcohol per week     Comment: RARELY      Family History:  Family History   Problem Relation Age of Onset   • Lung cancer Mother    • Stroke Father    • Dementia Father    • Hyperlipidemia Brother    • Hypertension Brother    • Heart disease Brother    • Diabetes Brother    • Colon polyps Brother    • Colon polyps Brother           Allergies:  No Known Allergies  Scheduled Meds:  [MAR Hold - Suspended Admission] apixaban, 5 mg, Q12H  [MAR Hold - Suspended Admission] aspirin, 81 mg, Daily  [MAR Hold - Suspended Admission] atorvastatin, 40 mg, Nightly  [MAR Hold - Suspended Admission] budesonide-formoterol, 2 puff, BID - RT  [MAR Hold - Suspended Admission] calcium 500 mg vitamin D 5 mcg (200 UT), 1 tablet, Daily  [MAR Hold - Suspended Admission] guaifenesin, 400 mg, Q8H  [MAR Hold - Suspended Admission] insulin glargine, 15 Units, Nightly  [MAR Hold - Suspended Admission] insulin glargine, 40 Units, Daily  [MAR Hold - Suspended Admission] insulin lispro, 3-14 Units, TID With Meals  [MAR Hold - Suspended Admission] insulin lispro, 5 Units, Daily With Breakfast & Lunch  [MAR Hold - Suspended Admission] metoprolol succinate XL, 25 mg, Q24H  [MAR Hold - Suspended Admission] pantoprazole, 40 mg, Q AM  [MAR Hold - Suspended Admission] potassium chloride, 40 mEq, Daily  [MAR Hold - Suspended Admission] tiotropium bromide monohydrate, 2  puff, Daily - RT  [MAR Hold - Suspended Admission] torsemide, 100 mg, BID            INTAKE AND OUTPUT:    Intake/Output Summary (Last 24 hours) at 6/25/2023 0839  Last data filed at 6/24/2023 2000  Gross per 24 hour   Intake 360 ml   Output 550 ml   Net -190 ml       Review of Systems:   GI:  Cardiac: Chest pain much better  Pulmonary:    Constitutional:  Temp:  [98 °F (36.7 °C)-99.4 °F (37.4 °C)] 99.3 °F (37.4 °C)  Heart Rate:  [] 106  Resp:  [16-28] 18  BP: ()/(75-96) 126/77    Physical Exam:  General:  Appears in no acute distress  Eyes: PERTL,  HEENT:  No JVD. Thyroid not visibly enlarged. No mucosal pallor or cyanosis  Respiratory: Respirations regular and unlabored at rest. BBS with good air entry in all fields. No crackles, rubs or wheezes auscultated  Cardiovascular: S1S2 Regular rate and rhythm. No murmur, rub or gallop. No carotid bruits. DP/PT pulses     . No pretibial pitting edema  Gastrointestinal: Abdomen soft, flat, non tender. Bowel sounds present. No hepatosplenomegaly. No ascites  Musculoskeletal: ZAVALA x4. No abnormal movements  Extremities: No digital clubbing or cyanosis  Skin: Color pink. Skin warm and dry to touch. No rashes    Neuro: AAO x3 CN II-XII grossly intact  Psych: Mood and affect normal, pleasant and cooperative          Cardiographics  Telemetry:     ECG:     Echocardiogram:     Lab Review   Results from last 7 days   Lab Units 06/24/23  0538 06/24/23  0434 06/24/23 0228   HSTROP T ng/L 103* 101* 96*     Results from last 7 days   Lab Units 06/24/23 1946   MAGNESIUM mg/dL 1.8     Results from last 7 days   Lab Units 06/24/23 1946 06/24/23 0228   SODIUM mmol/L  --  139   POTASSIUM mmol/L 3.9 3.4*   BUN mg/dL  --  14   CREATININE mg/dL  --  1.50*   CALCIUM mg/dL  --  9.1     @[id number]@  Results from last 7 days   Lab Units 06/25/23  0739 06/24/23 0228 06/23/23  0812   WBC 10*3/mm3 11.08* 8.15 7.91   HEMOGLOBIN g/dL 10.3* 10.6* 10.5*   HEMATOCRIT % 31.3* 31.7*  "31.6*   PLATELETS 10*3/mm3 259 227 247             Assessment:  Chest pain  Pericardial effusion  History of paroxysmal atrial fibrillation  Prosthetic mitral valve  Plan:  Pains are noncardiac  Pericardial effusion is small with no tamponade  Given that the patient's pains are atypical because of the elevated troponin will consider ischemic work-up  Elevated troponin could be due to renal  Patient still has 2+ pedal edema  May consider small dose of Lasix    )6/25/2023  MD ARTIE Pineda Dragon/Transcription:   \"Dictated utilizing Dragon dictation\".     "

## 2023-06-25 NOTE — THERAPY EVALUATION
Patient Name: Vernon Solorzano  : 1950    MRN: [id number]                              Today's Date: 2023       Admit Date: 2023    Visit Dx: No diagnosis found.  Patient Active Problem List   Diagnosis   • DM (diabetes mellitus), type 2   • Mixed hyperlipidemia   • Mild intermittent asthma without complication   • New onset of congestive heart failure   • Nonrheumatic mitral valve regurgitation   • Left-sided chest pain   • Essential hypertension   • Mitral valve disease   • Acute ischemic left MCA stroke   • Atelectasis of both lungs   • Pericardial effusion, acute   • History of ischemic stroke   • Obese   • Physical debility   • Chronic diastolic CHF (congestive heart failure)   • Anemia   • Stage 3a chronic kidney disease   • Paroxysmal atrial flutter   • H/O mitral valve replacement with tissue graft   • 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 AMARILIS MORGAN     Past Surgical History:   Procedure Laterality Date   • CARDIAC CATHETERIZATION N/A 2023    Procedure: Right Heart Cath;  Surgeon: oCrey Gaffney MD;  Location:  [facility];  Service: Cardiology;  Laterality: N/A;   • CARDIAC CATHETERIZATION N/A 2023    Procedure: Left Heart Cath;  Surgeon: Corey Gaffney MD;  Location:  [facility];  Service: Cardiology;  Laterality: N/A;   • CARDIAC CATHETERIZATION N/A 2023    Procedure: Left ventriculography;  Surgeon: Corey Gaffney MD;  Location:  [facility];  Service: Cardiology;  Laterality: N/A;   • CARDIAC  CATHETERIZATION N/A 5/11/2023    Procedure: Coronary angiography;  Surgeon: Corey Gaffney MD;  Location: [facility] CATH INVASIVE LOCATION;  Service: Cardiology;  Laterality: N/A;   • COLONOSCOPY N/A     WNL PER PT, NO RECORDS,    • COLONOSCOPY N/A 04/07/2009    DR. GORGE BENAVIDEZ AT [place]   • MITRAL VALVE REPAIR/REPLACEMENT N/A 5/24/2023    Procedure: MITRAL VALVE REPAIR/REPLACEMENT TRICUSPID VALVE REPAIR/REPLACEMENT TRANSESOPHAGEAL ECHOCARDIOGRAM WITH ANESTHESIA;  Surgeon: George Frey MD;  Location: [facility] CVOR;  Service: Cardiothoracic;  Laterality: N/A;   • PROSTATE SURGERY N/A 11/12/2010    TURP, DR. COREY COLLAZO AT [facility]   • SKIN TAG REMOVAL N/A 12/20/2017    ANAL SKIN TAG X2, PERFORMED IN OFFICE, DR. LISA ORANTES   • TURP / TRANSURETHRAL INCISION / DRAINAGE PROSTATE  2010    Dr. Goodrich      General Information     Row Name 06/25/23 1010          Physical Therapy Time and Intention    Document Type evaluation  -CB     Mode of Treatment individual therapy;physical therapy  -CB     Row Name 06/25/23 1010          General Information    Patient Profile Reviewed yes  -CB     Prior Level of Function --  ambulated 160ft and 100ft with SBA-CGA at acute rehab prior to readmission to hospital  -CB     Existing Precautions/Restrictions cardiac;fall;sternal  -CB     Row Name 06/25/23 1010          Living Environment    People in Home spouse  -CB     Row Name 06/25/23 1010          Home Main Entrance    Number of Stairs, Main Entrance three  -CB     Stair Railings, Main Entrance none  -CB     Row Name 06/25/23 1010          Stairs Within Home, Primary    Number of Stairs, Within Home, Primary twelve  -CB     Stair Railings, Within Home, Primary railing on left side (ascending)  -CB     Row Name 06/25/23 1010          Cognition    Orientation Status (Cognition) oriented x 3  -CB     Row Name 06/25/23 1010          Safety Issues, Functional Mobility    Safety Issues Affecting Function (Mobility) insight  into deficits/self-awareness;positioning of assistive device;problem-solving;safety precautions follow-through/compliance  -CB     Impairments Affecting Function (Mobility) endurance/activity tolerance;cognition;balance;strength  -CB           User Key  (r) = Recorded By, (t) = Taken By, (c) = Cosigned By    Initials Name Provider Type    CB Abigail Santiago, PT Physical Therapist               Mobility     Row Name 06/25/23 1011          Bed Mobility    Comment, (Bed Mobility) sitting EOB  -CB     Row Name 06/25/23 1011          Sit-Stand Transfer    Sit-Stand Shannon (Transfers) minimum assist (75% patient effort);verbal cues  -CB     Assistive Device (Sit-Stand Transfers) walker, front-wheeled  -CB     Row Name 06/25/23 1011          Gait/Stairs (Locomotion)    Shannon Level (Gait) minimum assist (75% patient effort);verbal cues  -CB     Assistive Device (Gait) walker, front-wheeled  -CB     Distance in Feet (Gait) 50ft  -CB     Deviations/Abnormal Patterns (Gait) gait speed decreased;stride length decreased;base of support, narrow  -CB     Bilateral Gait Deviations forward flexed posture;heel strike decreased  -CB     Comment, (Gait/Stairs) cues to navigate obstacles as he runs into objects in room as well as to maintain BLE inside rwx/safe distnace to rwx when ambulating; decreased safety awareness  -CB           User Key  (r) = Recorded By, (t) = Taken By, (c) = Cosigned By    Initials Name Provider Type    Abigail Stinson, PT Physical Therapist               Obj/Interventions     Row Name 06/25/23 1013          Range of Motion Comprehensive    General Range of Motion bilateral lower extremity ROM WFL  -CB     Row Name 06/25/23 1013          Strength Comprehensive (MMT)    Comment, General Manual Muscle Testing (MMT) Assessment B hips 3+/5, knee ext 4+/5; DF/PF 5/5  -CB     Row Name 06/25/23 1013          Balance    Balance Assessment standing static balance;standing dynamic balance  -CB     Static  Standing Balance contact guard  -CB     Dynamic Standing Balance minimal assist  -CB     Position/Device Used, Standing Balance supported;walker, front-wheeled  -CB     Balance Interventions sitting;standing;sit to stand;supported;static;dynamic;minimal challenge  -CB           User Key  (r) = Recorded By, (t) = Taken By, (c) = Cosigned By    Initials Name Provider Type    CB Abigail Santiago, PT Physical Therapist               Goals/Plan     Row Name 06/25/23 1014          Bed Mobility Goal 1 (PT)    Activity/Assistive Device (Bed Mobility Goal 1, PT) bed mobility activities, all  -CB     Carver Level/Cues Needed (Bed Mobility Goal 1, PT) standby assist  -CB     Time Frame (Bed Mobility Goal 1, PT) long term goal (LTG);2 weeks  -CB     Row Name 06/25/23 1014          Transfer Goal 1 (PT)    Activity/Assistive Device (Transfer Goal 1, PT) sit-to-stand/stand-to-sit;bed-to-chair/chair-to-bed;walker, rolling  -CB     Carver Level/Cues Needed (Transfer Goal 1, PT) standby assist  -CB     Time Frame (Transfer Goal 1, PT) long term goal (LTG);2 weeks  -CB     Row Name 06/25/23 1014          Gait Training Goal 1 (PT)    Activity/Assistive Device (Gait Training Goal 1, PT) gait (walking locomotion);assistive device use;improve balance and speed;increase endurance/gait distance;decrease fall risk;diminish gait deviation;walker, rolling  -CB     Carver Level (Gait Training Goal 1, PT) contact guard required;verbal cues required  -CB     Distance (Gait Training Goal 1, PT) 150ft  -CB     Time Frame (Gait Training Goal 1, PT) long term goal (LTG);2 weeks  -CB     Row Name 06/25/23 1014          Therapy Assessment/Plan (PT)    Planned Therapy Interventions (PT) balance training;bed mobility training;gait training;home exercise program;patient/family education;strengthening;transfer training  -CB           User Key  (r) = Recorded By, (t) = Taken By, (c) = Cosigned By    Initials Name Provider Type    EUGENIE Santiago  Abigail, PT Physical Therapist               Clinical Impression     Row Name 06/25/23 1013          Pain    Pretreatment Pain Rating 0/10 - no pain  -CB     Posttreatment Pain Rating 0/10 - no pain  -CB     Row Name 06/25/23 1013          Plan of Care Review    Plan of Care Reviewed With patient  -CB     Outcome Evaluation Patient is a 72 y.o. male sent to [facility] from acute rehab [facility] with chest pain on 6/24/2023. PMHx includes L MCA CVA, DM, CKD, MVR/TV. Patient has been ambulating about 160ft and 100ft using rwx with SBA-CGA at rehab per last note. He lives with his spouse at baseline with bedroom on second level.Today, patient required Teofilo for transfers and ambulated 50ft using rwx requiring Teofilo. Cues to navigate obstacles in room, to maintain safe distance to rwx, and to maintain BLE in walker during turns. Safety, delayed processing/response noted, balance, activity tolerance, and strength deficits noted. Patient may benefit from skilled PT services to address functional deficits and improve level of independence prior to discharge. Anticipate acute rehab upon DC.  -CB     Row Name 06/25/23 1013          Therapy Assessment/Plan (PT)    Patient/Family Therapy Goals Statement (PT) return home  -CB     Rehab Potential (PT) good, to achieve stated therapy goals  -CB     Criteria for Skilled Interventions Met (PT) yes  -CB     Therapy Frequency (PT) 6 times/wk  -CB     Row Name 06/25/23 1013          Positioning and Restraints    Pre-Treatment Position in bed  -CB     Post Treatment Position chair  -CB     In Chair notified nsg;reclined;call light within reach;encouraged to call for assist;exit alarm on;legs elevated  -CB           User Key  (r) = Recorded By, (t) = Taken By, (c) = Cosigned By    Initials Name Provider Type    Abigail Stinson, PT Physical Therapist               Outcome Measures     Row Name 06/25/23 1015          How much help from another person do you currently need...    Turning from your  back to your side while in flat bed without using bedrails? 3  -CB     Moving from lying on back to sitting on the side of a flat bed without bedrails? 3  -CB     Moving to and from a bed to a chair (including a wheelchair)? 3  -CB     Standing up from a chair using your arms (e.g., wheelchair, bedside chair)? 3  -CB     Climbing 3-5 steps with a railing? 2  -CB     To walk in hospital room? 3  -CB     AM-PAC 6 Clicks Score (PT) 17  -CB     Highest level of mobility 5 --> Static standing  -CB     Row Name 06/25/23 1015          Functional Assessment    Outcome Measure Options AM-PAC 6 Clicks Basic Mobility (PT)  -CB           User Key  (r) = Recorded By, (t) = Taken By, (c) = Cosigned By    Initials Name Provider Type    CB Abigail Santiago, PT Physical Therapist                             Physical Therapy Education     Title: PT OT SLP Therapies (In Progress)     Topic: Physical Therapy (In Progress)     Point: Mobility training (In Progress)     Learning Progress Summary           Patient Acceptance, E,TB, NR by CB at 6/25/2023 1015                   Point: Home exercise program (Not Started)     Learner Progress:  Not documented in this visit.          Point: Body mechanics (In Progress)     Learning Progress Summary           Patient Acceptance, E,TB, NR by CB at 6/25/2023 1015                   Point: Precautions (In Progress)     Learning Progress Summary           Patient Acceptance, E,TB, NR by CB at 6/25/2023 1015                               User Key     Initials Effective Dates Name Provider Type Discipline    CB 10/22/21 -  Abigail Santiago, NANETTE Physical Therapist PT              PT Recommendation and Plan  Planned Therapy Interventions (PT): balance training, bed mobility training, gait training, home exercise program, patient/family education, strengthening, transfer training  Plan of Care Reviewed With: patient  Outcome Evaluation: Patient is a 72 y.o. male sent to [facility] from acute rehab at [facility] with chest  pain on 6/24/2023. PMHx includes L MCA CVA, DM, CKD, MVR/TV. Patient has been ambulating about 160ft and 100ft using rwx with SBA-CGA at rehab per last note. He lives with his spouse at baseline with bedroom on second level.Today, patient required Teofilo for transfers and ambulated 50ft using rwx requiring Teofilo. Cues to navigate obstacles in room, to maintain safe distance to rwx, and to maintain BLE in walker during turns. Safety, delayed processing/response noted, balance, activity tolerance, and strength deficits noted. Patient may benefit from skilled PT services to address functional deficits and improve level of independence prior to discharge. Anticipate acute rehab upon DC.     Time Calculation:    PT Charges     Row Name 06/25/23 1020             Time Calculation    Start Time 0834  -CB      Stop Time 0852  -CB      Time Calculation (min) 18 min  -CB      PT Received On 06/25/23  -CB      PT - Next Appointment 06/26/23  -CB      PT Goal Re-Cert Due Date 07/09/23  -CB         Time Calculation- PT    Total Timed Code Minutes- PT 10 minute(s)  -CB         Timed Charges    64194 - PT Therapeutic Activity Minutes 10  -CB         Total Minutes    Timed Charges Total Minutes 10  -CB       Total Minutes 10  -CB            User Key  (r) = Recorded By, (t) = Taken By, (c) = Cosigned By    Initials Name Provider Type    CB Abigail Santiago, PT Physical Therapist              Therapy Charges for Today     Code Description Service Date Service Provider Modifiers Qty    85570446880 HC PT THERAPEUTIC ACT EA 15 MIN 6/25/2023 Abigail Santiago, PT GP 1    12204677514 HC PT EVAL MOD COMPLEXITY 3 6/25/2023 Abigail Santiago, PT GP 1          PT G-Codes  Outcome Measure Options: AM-PAC 6 Clicks Basic Mobility (PT)  AM-PAC 6 Clicks Score (PT): 17  PT Discharge Summary  Anticipated Discharge Disposition (PT): inpatient rehabilitation facility    Abigail Santiago PT  6/25/2023

## 2023-06-25 NOTE — NURSING NOTE
No c/o pain or discomfort during shift. Oriented x4. One unmeasured urinary output, bladder scan at approx 1800 showed 397. Night shift aware. Vitals stable with sinus tach in low 100s.

## 2023-06-25 NOTE — PLAN OF CARE
Goal Outcome Evaluation:                   Patient noncompliance refusing care, taking all his heart monitor and oxygen off, refused lab, EKG, pt refused to lay down sitting up in bed, pt pull his oxygen off pt on room air, sat 96 %,,pt 1500 fluid restriction, bed alarm intact,  consult thoracic surgeon to see, pt reorient and educate to call for assist, call light in reach cont to monitor.

## 2023-06-26 VITALS
HEIGHT: 73 IN | TEMPERATURE: 98.4 F | RESPIRATION RATE: 18 BRPM | WEIGHT: 246 LBS | HEART RATE: 100 BPM | DIASTOLIC BLOOD PRESSURE: 92 MMHG | SYSTOLIC BLOOD PRESSURE: 112 MMHG | BODY MASS INDEX: 32.6 KG/M2 | OXYGEN SATURATION: 95 %

## 2023-06-26 LAB
ANION GAP SERPL CALCULATED.3IONS-SCNC: 10.7 MMOL/L (ref 5–15)
BUN SERPL-MCNC: 19 MG/DL (ref 8–23)
BUN/CREAT SERPL: 12.3 (ref 7–25)
CALCIUM SPEC-SCNC: 8.9 MG/DL (ref 8.6–10.5)
CHLORIDE SERPL-SCNC: 100 MMOL/L (ref 98–107)
CO2 SERPL-SCNC: 28.3 MMOL/L (ref 22–29)
CREAT SERPL-MCNC: 1.55 MG/DL (ref 0.76–1.27)
DEPRECATED RDW RBC AUTO: 43.5 FL (ref 37–54)
EGFRCR SERPLBLD CKD-EPI 2021: 46.7 ML/MIN/1.73
ERYTHROCYTE [DISTWIDTH] IN BLOOD BY AUTOMATED COUNT: 14.1 % (ref 12.3–15.4)
GLUCOSE BLDC GLUCOMTR-MCNC: 142 MG/DL (ref 70–130)
GLUCOSE BLDC GLUCOMTR-MCNC: 185 MG/DL (ref 70–130)
GLUCOSE BLDC GLUCOMTR-MCNC: 243 MG/DL (ref 70–130)
GLUCOSE BLDC GLUCOMTR-MCNC: 247 MG/DL (ref 70–130)
GLUCOSE BLDC GLUCOMTR-MCNC: 270 MG/DL (ref 70–130)
GLUCOSE SERPL-MCNC: 110 MG/DL (ref 65–99)
HCT VFR BLD AUTO: 30.5 % (ref 37.5–51)
HGB BLD-MCNC: 9.9 G/DL (ref 13–17.7)
MCH RBC QN AUTO: 27.7 PG (ref 26.6–33)
MCHC RBC AUTO-ENTMCNC: 32.5 G/DL (ref 31.5–35.7)
MCV RBC AUTO: 85.2 FL (ref 79–97)
PLATELET # BLD AUTO: 246 10*3/MM3 (ref 140–450)
PMV BLD AUTO: 9.3 FL (ref 6–12)
POTASSIUM SERPL-SCNC: 3.5 MMOL/L (ref 3.5–5.2)
RBC # BLD AUTO: 3.58 10*6/MM3 (ref 4.14–5.8)
SODIUM SERPL-SCNC: 139 MMOL/L (ref 136–145)
WBC NRBC COR # BLD: 9.69 10*3/MM3 (ref 3.4–10.8)

## 2023-06-26 PROCEDURE — 99232 SBSQ HOSP IP/OBS MODERATE 35: CPT | Performed by: INTERNAL MEDICINE

## 2023-06-26 PROCEDURE — 94799 UNLISTED PULMONARY SVC/PX: CPT

## 2023-06-26 PROCEDURE — 85027 COMPLETE CBC AUTOMATED: CPT | Performed by: INTERNAL MEDICINE

## 2023-06-26 PROCEDURE — 80048 BASIC METABOLIC PNL TOTAL CA: CPT | Performed by: INTERNAL MEDICINE

## 2023-06-26 PROCEDURE — 25010000002 MAGNESIUM SULFATE 2 GM/50ML SOLUTION: Performed by: NURSE PRACTITIONER

## 2023-06-26 PROCEDURE — 82948 REAGENT STRIP/BLOOD GLUCOSE: CPT

## 2023-06-26 PROCEDURE — 94664 DEMO&/EVAL PT USE INHALER: CPT

## 2023-06-26 PROCEDURE — 25010000002 FUROSEMIDE PER 20 MG: Performed by: INTERNAL MEDICINE

## 2023-06-26 PROCEDURE — 99024 POSTOP FOLLOW-UP VISIT: CPT | Performed by: THORACIC SURGERY (CARDIOTHORACIC VASCULAR SURGERY)

## 2023-06-26 PROCEDURE — 63710000001 INSULIN LISPRO (HUMAN) PER 5 UNITS: Performed by: INTERNAL MEDICINE

## 2023-06-26 RX ORDER — ONDANSETRON 2 MG/ML
4 INJECTION INTRAMUSCULAR; INTRAVENOUS EVERY 6 HOURS PRN
Status: CANCELLED | OUTPATIENT
Start: 2023-06-26

## 2023-06-26 RX ORDER — UREA 10 %
3 LOTION (ML) TOPICAL NIGHTLY PRN
Status: CANCELLED | OUTPATIENT
Start: 2023-06-26

## 2023-06-26 RX ORDER — TRAMADOL HYDROCHLORIDE 50 MG/1
50 TABLET ORAL EVERY 6 HOURS PRN
Status: CANCELLED | OUTPATIENT
Start: 2023-06-26 | End: 2023-07-02

## 2023-06-26 RX ORDER — FUROSEMIDE 10 MG/ML
80 INJECTION INTRAMUSCULAR; INTRAVENOUS ONCE
Status: COMPLETED | OUTPATIENT
Start: 2023-06-26 | End: 2023-06-26

## 2023-06-26 RX ORDER — INSULIN LISPRO 100 [IU]/ML
4 INJECTION, SOLUTION INTRAVENOUS; SUBCUTANEOUS
Status: CANCELLED | OUTPATIENT
Start: 2023-06-26

## 2023-06-26 RX ORDER — DEXTROSE MONOHYDRATE 25 G/50ML
25 INJECTION, SOLUTION INTRAVENOUS
Status: CANCELLED | OUTPATIENT
Start: 2023-06-26

## 2023-06-26 RX ORDER — BUDESONIDE AND FORMOTEROL FUMARATE DIHYDRATE 160; 4.5 UG/1; UG/1
2 AEROSOL RESPIRATORY (INHALATION)
Status: CANCELLED | OUTPATIENT
Start: 2023-06-26

## 2023-06-26 RX ORDER — MAGNESIUM SULFATE HEPTAHYDRATE 40 MG/ML
2 INJECTION, SOLUTION INTRAVENOUS ONCE
Status: COMPLETED | OUTPATIENT
Start: 2023-06-26 | End: 2023-06-26

## 2023-06-26 RX ORDER — COLCHICINE 0.6 MG/1
0.6 TABLET ORAL DAILY
Status: CANCELLED | OUTPATIENT
Start: 2023-06-27

## 2023-06-26 RX ORDER — SODIUM CHLORIDE 0.9 % (FLUSH) 0.9 %
3-10 SYRINGE (ML) INJECTION AS NEEDED
Status: CANCELLED | OUTPATIENT
Start: 2023-06-26

## 2023-06-26 RX ORDER — ATORVASTATIN CALCIUM 20 MG/1
40 TABLET, FILM COATED ORAL NIGHTLY
Status: CANCELLED | OUTPATIENT
Start: 2023-06-26

## 2023-06-26 RX ORDER — ACETAMINOPHEN 650 MG/1
650 SUPPOSITORY RECTAL EVERY 4 HOURS PRN
Status: CANCELLED | OUTPATIENT
Start: 2023-06-26

## 2023-06-26 RX ORDER — IBUPROFEN 600 MG/1
1 TABLET ORAL
Status: CANCELLED | OUTPATIENT
Start: 2023-06-26

## 2023-06-26 RX ORDER — UREA 10 %
3 LOTION (ML) TOPICAL NIGHTLY
Status: CANCELLED | OUTPATIENT
Start: 2023-06-26

## 2023-06-26 RX ORDER — METOPROLOL SUCCINATE 25 MG/1
25 TABLET, EXTENDED RELEASE ORAL
Status: CANCELLED | OUTPATIENT
Start: 2023-06-27

## 2023-06-26 RX ORDER — PANTOPRAZOLE SODIUM 40 MG/1
40 TABLET, DELAYED RELEASE ORAL
Status: CANCELLED | OUTPATIENT
Start: 2023-06-27

## 2023-06-26 RX ORDER — ACETAMINOPHEN 160 MG/5ML
650 SOLUTION ORAL EVERY 4 HOURS PRN
Status: CANCELLED | OUTPATIENT
Start: 2023-06-26

## 2023-06-26 RX ORDER — ONDANSETRON 4 MG/1
4 TABLET, FILM COATED ORAL EVERY 6 HOURS PRN
Status: CANCELLED | OUTPATIENT
Start: 2023-06-26

## 2023-06-26 RX ORDER — NITROGLYCERIN 0.4 MG/1
0.4 TABLET SUBLINGUAL
Status: CANCELLED | OUTPATIENT
Start: 2023-06-26

## 2023-06-26 RX ORDER — NICOTINE POLACRILEX 4 MG
15 LOZENGE BUCCAL
Status: CANCELLED | OUTPATIENT
Start: 2023-06-26

## 2023-06-26 RX ORDER — SODIUM CHLORIDE 0.9 % (FLUSH) 0.9 %
3 SYRINGE (ML) INJECTION EVERY 12 HOURS SCHEDULED
Status: CANCELLED | OUTPATIENT
Start: 2023-06-26

## 2023-06-26 RX ORDER — INSULIN LISPRO 100 [IU]/ML
3-14 INJECTION, SOLUTION INTRAVENOUS; SUBCUTANEOUS
Status: CANCELLED | OUTPATIENT
Start: 2023-06-26

## 2023-06-26 RX ORDER — HYDROCODONE BITARTRATE AND ACETAMINOPHEN 5; 325 MG/1; MG/1
1 TABLET ORAL EVERY 4 HOURS PRN
Status: CANCELLED | OUTPATIENT
Start: 2023-06-26 | End: 2023-07-01

## 2023-06-26 RX ORDER — ACETAMINOPHEN 325 MG/1
650 TABLET ORAL EVERY 4 HOURS PRN
Status: CANCELLED | OUTPATIENT
Start: 2023-06-26

## 2023-06-26 RX ORDER — DOCUSATE SODIUM 100 MG/1
100 CAPSULE, LIQUID FILLED ORAL 2 TIMES DAILY PRN
Status: CANCELLED | OUTPATIENT
Start: 2023-06-26

## 2023-06-26 RX ORDER — GUAIFENESIN 200 MG/10ML
400 LIQUID ORAL 3 TIMES DAILY
Status: CANCELLED | OUTPATIENT
Start: 2023-06-26

## 2023-06-26 RX ORDER — ASPIRIN 81 MG/1
81 TABLET ORAL DAILY
Status: CANCELLED | OUTPATIENT
Start: 2023-06-27

## 2023-06-26 RX ADMIN — GUAIFENESIN 400 MG: 200 SOLUTION ORAL at 08:27

## 2023-06-26 RX ADMIN — TIOTROPIUM BROMIDE INHALATION SPRAY 2 PUFF: 3.12 SPRAY, METERED RESPIRATORY (INHALATION) at 07:57

## 2023-06-26 RX ADMIN — INSULIN LISPRO 4 UNITS: 100 INJECTION, SOLUTION INTRAVENOUS; SUBCUTANEOUS at 08:28

## 2023-06-26 RX ADMIN — ASPIRIN 81 MG: 81 TABLET, COATED ORAL at 08:27

## 2023-06-26 RX ADMIN — INSULIN LISPRO 3 UNITS: 100 INJECTION, SOLUTION INTRAVENOUS; SUBCUTANEOUS at 11:37

## 2023-06-26 RX ADMIN — Medication 3 ML: at 08:28

## 2023-06-26 RX ADMIN — INSULIN LISPRO 4 UNITS: 100 INJECTION, SOLUTION INTRAVENOUS; SUBCUTANEOUS at 11:38

## 2023-06-26 RX ADMIN — PANTOPRAZOLE SODIUM 40 MG: 40 TABLET, DELAYED RELEASE ORAL at 06:48

## 2023-06-26 RX ADMIN — MAGNESIUM SULFATE HEPTAHYDRATE 2 G: 2 INJECTION, SOLUTION INTRAVENOUS at 11:38

## 2023-06-26 RX ADMIN — BUDESONIDE AND FORMOTEROL FUMARATE DIHYDRATE 2 PUFF: 160; 4.5 AEROSOL RESPIRATORY (INHALATION) at 07:55

## 2023-06-26 RX ADMIN — COLCHICINE 0.6 MG: 0.6 TABLET, FILM COATED ORAL at 08:27

## 2023-06-26 RX ADMIN — MAGNESIUM OXIDE 400 MG (241.3 MG MAGNESIUM) TABLET 400 MG: TABLET at 13:30

## 2023-06-26 RX ADMIN — METOPROLOL SUCCINATE 25 MG: 25 TABLET, EXTENDED RELEASE ORAL at 08:27

## 2023-06-26 RX ADMIN — FUROSEMIDE 80 MG: 10 INJECTION, SOLUTION INTRAMUSCULAR; INTRAVENOUS at 09:28

## 2023-06-26 NOTE — CASE MANAGEMENT/SOCIAL WORK
"Discharge Planning Assessment  [facility]     Patient Name: Vernon Solorzano  MRN: [id number]  Today's Date: 6/26/2023    Admit Date: 6/24/2023    Plan: Return to [facility];   Discharge Needs Assessment    No documentation.                  Discharge Plan       Row Name 06/26/23 1146       Plan    Plan Return to [facility];    Plan Comments [facility].  CCP spoke with Mrs. Solorzano via phone to discuss Pt d/c plan.  Spouse advised, she wants Pt to return to [facility] upon discharge.  CCP spoke with Joan/MICK and she advised Pt may return by midnight tonight without need a whole new acute rehab evaluation.  CCP relayed this to Dr. Berry.  Dr. Berry plans to \"D/C RE-ADMIT\", Pt back to [facility] today.  CCP following.................Vidhya COX/LAZARUS CORREA                  Continued Care and Services - Admitted Since 6/24/2023       Destination Coordination complete.      Service Provider Request Status Selected Services Address Phone Fax Patient Preferred    [facility] REHAB PROGRAM  Selected Inpatient Rehabilitation [address] [place] [address] [phone] -- --                  Selected Continued Care - Prior Encounters Includes continued care and service providers with selected services from prior encounters from 3/26/2023 to 6/26/2023      Discharged on 6/6/2023 Admission date: 5/23/2023 - Discharge disposition: Rehab Facility or Unit (DC - External)      Destination       Service Provider Selected Services Address Phone Fax Patient Preferred    [facility] ACUTE REHAB PROGRAM Inpatient Rehabilitation [address] [place] [address] [phone] -- --              Home Medical Care       Service Provider Selected Services Address Phone Fax Patient Preferred    [facility] [address] [address] [phone] --                             Demographic " Summary    No documentation.                  Functional Status    No documentation.                  Psychosocial    No documentation.                  Abuse/Neglect    No documentation.                  Legal    No documentation.                  Substance Abuse    No documentation.                  Patient Forms    No documentation.                     Vidhya Ewing RN

## 2023-06-26 NOTE — PROGRESS NOTES
Inpatient Rehabilitation Plan of Care Note    Plan of Care  Care Plan Reviewed - Updates as Follows    Sphincter Control    [RN] Bladder Management(Active)  Current Status(06/26/2023): Patient is continent of urine using urinal or  toilet.  Weekly Goal(07/03/2023): Patient will be continent 90% of the time.  Discharge Goal: Patient will be continent    Performed Intervention(s)  Offer toileting/ timed voids  Monitor I&O      Safety    [RN] Potential for Injury(Active)  Current Status(06/26/2023): Patient is at increased risk for falls/injury r/t  recent surgery and stroke.  Weekly Goal(07/03/2023): Patient will use the call light for assistance  Discharge Goal: Patient/ family will be aware of risk of fall and safety in the  home setting    Performed Intervention(s)  Safety monitoring during functional activities  Environmental set- upto reduce risk      Psychosocial    [RN] Coping/Adjustment(Active)  Current Status(06/22/2023): Patient has a supportive family. Pt is at increased  risk of impaired coping r/t recent stroke and hospitalization. Pt is A/Ox4 but  may be forgetful.  Weekly Goal(07/03/2023): Patient will express concerns regarding current status  Discharge Goal: Patient will demonstrate healthy coping strategies    Performed Intervention(s)  Allow patient to verbalize needs and concerns      Body Systems    [RN] Endocrine(Active)  Current Status(06/26/2023): Unontolled BS  Weekly Goal(07/03/2023): BS WNL  Discharge Goal: Diabetes education, BS WNL    Performed Intervention(s)  ACCU check ACHS    Signed by: Callie Yadav RN

## 2023-06-26 NOTE — PROGRESS NOTES
" LOS: 2 days   Patient Care Team:  Liza Oconnell III, NP-C as PCP - General (Family Medicine)  Corey Lao Jr., MD (Inactive) as Consulting Physician (Urology)    Chief Complaint: post op/chest pain    Subjective:  Symptoms:  No shortness of breath or chest pain.    Diet:  Adequate intake.  No nausea or vomiting.    Activity level: Returning to normal.      Chest pain improved today. Nursing reports confusion overnight    Vital Signs  Temp:  [97.6 °F (36.4 °C)-99.4 °F (37.4 °C)] 99.4 °F (37.4 °C)  Heart Rate:  [101-109] 106  Resp:  [18-20] 18  BP: ()/(57-86) 97/84  Body mass index is 32.46 kg/m².    Intake/Output Summary (Last 24 hours) at 6/26/2023 0951  Last data filed at 6/26/2023 0815  Gross per 24 hour   Intake 600 ml   Output --   Net 600 ml       I/O this shift:  In: 240 [P.O.:240]  Out: -           06/24/23  0546 06/24/23  1620 06/25/23  0527   Weight: 112 kg (246 lb) 112 kg (246 lb) (refused)         Objective:  General Appearance:  Comfortable and in no acute distress.    Vital signs: (most recent): Blood pressure 97/84, pulse 106, temperature 99.4 °F (37.4 °C), temperature source Oral, resp. rate 18, height 185.4 cm (73\"), weight 112 kg (246 lb), SpO2 95 %.  Vital signs are normal.  No fever.    Output: Producing urine and producing stool.    Lungs:  Normal effort and normal respiratory rate.    Heart: Tachycardia.  Regular rhythm.    Abdomen: Abdomen is soft.  Bowel sounds are normal.     Extremities: There is dependent edema (trace bilateral ankles).    Pulses: Distal pulses are intact.    Neurological: Patient is alert and oriented to person, place and time.    Skin:  Warm and dry.  (Sternal incision is well healed)        Results Review:        WBC WBC   Date Value Ref Range Status   06/26/2023 9.69 3.40 - 10.80 10*3/mm3 Final   06/25/2023 11.08 (H) 3.40 - 10.80 10*3/mm3 Final   06/24/2023 8.15 3.40 - 10.80 10*3/mm3 Final      HGB Hemoglobin   Date Value Ref Range " Status   06/26/2023 9.9 (L) 13.0 - 17.7 g/dL Final   06/25/2023 10.3 (L) 13.0 - 17.7 g/dL Final   06/24/2023 10.6 (L) 13.0 - 17.7 g/dL Final      HCT Hematocrit   Date Value Ref Range Status   06/26/2023 30.5 (L) 37.5 - 51.0 % Final   06/25/2023 31.3 (L) 37.5 - 51.0 % Final   06/24/2023 31.7 (L) 37.5 - 51.0 % Final      Platelets Platelets   Date Value Ref Range Status   06/26/2023 246 140 - 450 10*3/mm3 Final   06/25/2023 259 140 - 450 10*3/mm3 Final   06/24/2023 227 140 - 450 10*3/mm3 Final        PT/INR:  No results found for: PROTIME/No results found for: INR    Sodium Sodium   Date Value Ref Range Status   06/26/2023 139 136 - 145 mmol/L Final   06/25/2023 136 136 - 145 mmol/L Final   06/24/2023 139 136 - 145 mmol/L Final      Potassium Potassium   Date Value Ref Range Status   06/26/2023 3.5 3.5 - 5.2 mmol/L Final   06/25/2023 3.5 3.5 - 5.2 mmol/L Final   06/24/2023 3.9 3.5 - 5.2 mmol/L Final     Comment:     Slight hemolysis detected by analyzer. Results may be affected.   06/24/2023 3.4 (L) 3.5 - 5.2 mmol/L Final      Chloride Chloride   Date Value Ref Range Status   06/26/2023 100 98 - 107 mmol/L Final   06/25/2023 96 (L) 98 - 107 mmol/L Final   06/24/2023 96 (L) 98 - 107 mmol/L Final      Bicarbonate CO2   Date Value Ref Range Status   06/26/2023 28.3 22.0 - 29.0 mmol/L Final   06/25/2023 28.0 22.0 - 29.0 mmol/L Final   06/24/2023 30.0 (H) 22.0 - 29.0 mmol/L Final      BUN BUN   Date Value Ref Range Status   06/26/2023 19 8 - 23 mg/dL Final   06/25/2023 21 8 - 23 mg/dL Final   06/24/2023 14 8 - 23 mg/dL Final      Creatinine Creatinine   Date Value Ref Range Status   06/26/2023 1.55 (H) 0.76 - 1.27 mg/dL Final   06/25/2023 1.47 (H) 0.76 - 1.27 mg/dL Final   06/24/2023 1.50 (H) 0.76 - 1.27 mg/dL Final      Calcium Calcium   Date Value Ref Range Status   06/26/2023 8.9 8.6 - 10.5 mg/dL Final   06/25/2023 9.5 8.6 - 10.5 mg/dL Final   06/24/2023 9.1 8.6 - 10.5 mg/dL Final      Magnesium Magnesium   Date Value  Ref Range Status   06/24/2023 1.8 1.6 - 2.4 mg/dL Final   06/24/2023 2.0 1.6 - 2.4 mg/dL Final          aspirin, 81 mg, Oral, Daily  atorvastatin, 40 mg, Oral, Nightly  budesonide-formoterol, 2 puff, Inhalation, BID - RT  colchicine, 0.6 mg, Oral, Daily  guaifenesin, 400 mg, Oral, TID  insulin glargine, 20 Units, Subcutaneous, Nightly  insulin lispro, 3-14 Units, Subcutaneous, 4x Daily AC & at Bedtime  insulin lispro, 4 Units, Subcutaneous, TID With Meals  melatonin, 3 mg, Oral, Nightly  metoprolol succinate XL, 25 mg, Oral, Q24H  pantoprazole, 40 mg, Oral, Q AM  sodium chloride, 3 mL, Intravenous, Q12H  tiotropium bromide monohydrate, 2 puff, Inhalation, Daily - RT           Patient Active Problem List   Diagnosis Code    DM (diabetes mellitus), type 2 E11.9    Mixed hyperlipidemia E78.2    Mild intermittent asthma without complication J45.20    New onset of congestive heart failure I50.9    Nonrheumatic mitral valve regurgitation I34.0    Left-sided chest pain R07.9    Essential hypertension I10    Mitral valve disease I05.9    Acute ischemic left MCA stroke I63.512    Atelectasis of both lungs J98.11    Pericardial effusion, acute I30.9    History of ischemic stroke Z86.73    Obese E66.9    Physical debility R53.81    Chronic diastolic CHF (congestive heart failure) I50.32    Anemia D64.9    Stage 3a chronic kidney disease N18.31    Paroxysmal atrial flutter I48.92    H/O mitral valve replacement with tissue graft Z95.4    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       Assessment & Plan    - 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 Eliquis on hold  - acute CVA post op--bilateral frontal and left parietal occipital cortices    Patient denies chest pain at this time  CT scan reviewed by Dr. Vallejo and Dr. Frey. Recommend holding Eliquis and starting course of colchicine and see how he does  Sternal incision is healing well  Continue PT/OT  IV diuresed this morning-- resume PO tomorrow--okay for discharge back to rehab from our standpoint, discussed with Dr. Hobson he is okay with this plan and he will see him in rehab tomorrow.  Will continue to follow along    DESTIN Vicente  06/26/23  09:51 EDT

## 2023-06-26 NOTE — PROGRESS NOTES
LOS: 2 days   Patient Care Team:  Liza Oconnell III, NP-C as PCP - General (Family Medicine)  Corey Lao Jr., MD (Inactive) as Consulting Physician (Urology)    Chief Complaint: Follow-up pericardial effusion, Dressler's syndrome.    Interval History: Feels better.  Chest pain has resolved.  Still has lower extremity edema.  No shortness of breath.    Vital Signs:  Temp:  [97.6 °F (36.4 °C)-99.4 °F (37.4 °C)] 98.4 °F (36.9 °C)  Heart Rate:  [100-109] 100  Resp:  [18-20] 18  BP: ()/(57-92) 112/92    Intake/Output Summary (Last 24 hours) at 6/26/2023 1613  Last data filed at 6/26/2023 1205  Gross per 24 hour   Intake 720 ml   Output --   Net 720 ml       Physical Exam:   General Appearance:    No acute distress, alert and oriented x4   Lungs:     Clear to auscultation bilaterally     Heart:    Regular rhythm and normal rate.  No murmurs, gallops, or       rubs.   Abdomen:     Soft, nontender, nondistended.    Extremities:   1-2+ edema bilaterally.     Results Review:    Results from last 7 days   Lab Units 06/26/23  0322   SODIUM mmol/L 139   POTASSIUM mmol/L 3.5   CHLORIDE mmol/L 100   CO2 mmol/L 28.3   BUN mg/dL 19   CREATININE mg/dL 1.55*   GLUCOSE mg/dL 110*   CALCIUM mg/dL 8.9     Results from last 7 days   Lab Units 06/25/23  0944 06/25/23  0739 06/24/23  0538   HSTROP T ng/L 95* 97* 103*     Results from last 7 days   Lab Units 06/26/23  0322   WBC 10*3/mm3 9.69   HEMOGLOBIN g/dL 9.9*   HEMATOCRIT % 30.5*   PLATELETS 10*3/mm3 246             Results from last 7 days   Lab Units 06/24/23  1946   MAGNESIUM mg/dL 1.8           I reviewed the patient's new clinical results.        Assessment:  1.  Moderate posterior pericardial effusion and pleuritic chest pain, consistent with Dressler's syndrome  2.  Acute on chronic diastolic CHF and lower extremity edema  3.  Status post tissue mitral valve replacement and tricuspid valve repair (and left atrial appendage closure) on  5/24/2023 by Dr. Frey  4.  Acute kidney injury with stage IIIa chronic kidney disease   5.  History of COVID-19 pneumonia with residual dyspnea and possible interstitial lung disease  6.  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  Expected postoperative anemia  8.  Postoperative CVA following cardiac surgery  9.  Postoperative junctional bradycardia, complete heart block, and typical atrial flutter (status post DCCV on 6/2/2023).  10.  LBBB and first degree AV block  11.  Diabetes    Plan:  -Discussed with Dr. Berry and cardiovascular surgery.  He did have a moderate-sized posterior pericardial effusion.  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  Holding Eliquis for now and will treat this with colchicine empirically.    -Needs diuretics.  I gave him 80 mg of IV Lasix earlier today.  Continue to monitor creatinine.  Transition back to torsemide potentially tomorrow.    -He is okay to discharge back to rehab today.  I will follow him in rehab as well for the next several days.    Corwin Hobson MD  06/26/23  16:13 EDT

## 2023-06-26 NOTE — DISCHARGE SUMMARY
Patient Name: Vernon Solorzano  : 1950  MRN: [id number]    Date of Admission: 2023  Date of Discharge:  2023  Primary Care Physician: Liza Oconnell III, NP-C      Chief Complaint:   No chief complaint on file.      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 [I24.1]  Yes    Atelectasis of both lungs [J98.11]  Yes    Pericardial effusion, acute [I30.9]  Yes    History of ischemic stroke [Z86.73]  Not Applicable    Obese [E66.9]  Yes    Physical debility [R53.81]  Yes    Chronic diastolic CHF (congestive heart failure) [I50.32]  Yes    Anemia [D64.9]  Yes    Stage 3a chronic kidney disease [N18.31]  Yes    Paroxysmal atrial flutter [I48.92]  Yes    H/O mitral valve replacement with tissue graft [Z95.4]  Not Applicable    Essential hypertension [I10]  Yes    Mild intermittent asthma without complication [J45.20]  Yes    Mixed hyperlipidemia [E78.2]  Yes    DM (diabetes mellitus), type 2 [E11.9]  Yes      Resolved Hospital Problems   No resolved problems to display.        Hospital Course     Mr. Solorzano is a 72 y.o. male with a history of mitral valve replacement and tricuspid valve repair and left atrial appendage closure on  with Dr. Frey, CKD 3A, postoperative CVA after surgery, diabetes type 2 readmitted from acute [facility] found to have moderate pericardial effusion.  Cardiology was consulted, most consistent with Dressler syndrome.  Patient was started on colchicine which immensely helped his chest pain.  Continue colchicine 0.6 mg, for initial pericarditis the treatment length is 3 months.  Given IV Lasix this morning, but plan to transition to oral diuretics tomorrow.  Discussed with Dr. Hobson and cardiovascular surgery and patient to transfer back to [facility] acute rehab.  Dr. Hobson plans to follow patient for the next further couple days to monitor his diuresis  and kidney function.    At the time of discharge patient was told to take all medications as prescribed, keep all follow-up appointments, and call their doctor or return to the hospital with any worsening or concerning symptoms.    Day of Discharge     Subjective:  Patient is seen up in bed.  Patient without any further chest pain after addition of colchicine.  Wife at bedside, discussed discharging, they are very eager to get back to [facility] acute rehab soon as possible.  Is having some lower extremity edema, right greater than left, has had a duplex of right extremity showing chronic superficial thrombophlebitis without any evidence of DVT.  Wife states they have had a difficult time getting some of this edema off of him as an outpatient and they have been titrating his diuretics for some time.    Review of Systems   Constitutional:  Negative for chills and fever.   Respiratory:  Negative for cough and shortness of breath.    Cardiovascular:  Positive for leg swelling. Negative for chest pain and palpitations.   Gastrointestinal:  Negative for abdominal pain, diarrhea, nausea and vomiting.     Physical Exam:  Temp:  [97.6 °F (36.4 °C)-99.4 °F (37.4 °C)] 98.4 °F (36.9 °C)  Heart Rate:  [100-109] 100  Resp:  [18-20] 18  BP: ()/(57-92) 112/92  Body mass index is 32.46 kg/m².  Physical Exam  Vitals and nursing note reviewed.   Constitutional:       General: He is not in acute distress.  Cardiovascular:      Rate and Rhythm: Normal rate and regular rhythm.   Pulmonary:      Effort: Pulmonary effort is normal. No respiratory distress.   Abdominal:      General: Abdomen is flat. There is no distension.      Tenderness: There is no abdominal tenderness.   Musculoskeletal:         General: No swelling or deformity.      Comments: Right greater than left lower extremity swelling.  Right leg venous stasis dermatitis.   Skin:     General: Skin is warm and dry.   Neurological:      General: No focal deficit present.       Mental Status: He is alert. Mental status is at baseline.       Consultants     Consult Orders (all) (From admission, onward)      Orders from this encounter       Start     Ordered    06/24/23 1522  Inpatient Cardiothoracic Surgery Consult  Once        Specialty:  Cardiothoracic Surgery  Provider:  George Frey MD    06/24/23 1522    06/24/23 0917  Inpatient Nephrology Consult  Once        Specialty:  Nephrology  Provider:  Tae Lay MD    06/24/23 0916    06/24/23 0833  Inpatient Internal Medicine Consult  Once        Specialty:  Internal Medicine  Provider:  Roque Veras MD    06/24/23 0833    Signed and Held  Inpatient Nephrology Consult  Once        Specialty:  Nephrology  Provider:  Tae Lay MD    Signed and Held            Orders from suspended admission ([facility] - Corey Hudson MD)       Start     Ordered    06/24/23 0259  [Order Hold - Suspended Admission]  Inpatient Cardiology Consult  Once   (On hold at [facility] since 06/24/23 0445)   Specialty:  Cardiology  Provider:  Corwin Hobson MD    06/24/23 0259    06/24/23 0245  [Order Hold - Suspended Admission]  Inpatient Hospitalist Consult  STAT   (On hold at [facility] since 06/24/23 0445)   Specialty:  Hospitalist  Provider:  Drake Robin MD    06/24/23 0245    06/09/23 1000  [Order Hold - Suspended Admission]  Inpatient Nephrology Consult  Once   (On hold at [facility] since 06/24/23 0445)   Specialty:  Nephrology  Provider:  Ollie Bardales MD    06/09/23 1001    06/06/23 1755  [Order Hold - Suspended Admission]  Inpatient Case Management  Consult  Once   (On hold at [facility] since 06/24/23 0445)   Provider:  (Not yet assigned)    06/06/23 1756    06/06/23 1640  [Order Hold - Suspended Admission]  Inpatient Diabetes  Educator Consult  Once   (On hold at [facility] since 06/24/23 4622)   Provider:  (Not yet assigned)    06/06/23 1639                  Procedures     Imaging Results (All)       Procedure Component Value Units Date/Time    CT Angiogram Chest [[id number]] Collected: 06/24/23 1423     Updated: 06/24/23 1501    Narrative:      CT ANGIOGRAPHY OF THE CHEST WITH INTRAVENOUS CONTRAST AND 3-D  RECONSTRUCTIONS     HISTORY: Recent mitral valve and tricuspid valve replacement surgery.  Severe upper left chest pain last night. Evaluate for pulmonary embolus.     The CT scan was performed as an emergency procedure through the chest  with CT angiography protocol using intervenous contrast and 3-D  reconstructions. The following findings are present:  1. The pulmonary arteries are well-opacified and there is no evidence of  pulmonary embolus. The thoracic aorta shows no evidence of aneurysm or  dissection.  2. There is some minimal strand-like atelectasis at both bases, left  greater than right and the lungs are otherwise clear. There is no  mediastinal or hilar or axillary adenopathy. There is a moderately large  pericardial effusion particularly posteriorly where it measures up to 4  cm in thickness. This is new since the preoperative CT scan dated  09/22/2022. The patient has had recent mitral valve and tricuspid valve  replacements.  3. The median sternotomy demonstrates minimal postsurgical change in the  retrosternal space. There is no abnormal substernal fluid collection.  The CT images through the upper liver, spleen, and both adrenal glands  are unremarkable. There is a cyst involving the upper pole of the  partially visualized right kidney that is unchanged.                 Radiation dose reduction techniques were utilized, including automated  exposure control and exposure modulation based on body size.     This report was finalized on 6/24/2023 2:58 PM by Dr. Ignacio Teague M.D.       XR  Chest 1 View [[id number]] Collected: 06/24/23 1039     Updated: 06/24/23 1044    Narrative:      ONE VIEW PORTABLE CHEST AT 9:53 AM     HISTORY: Mitral valve replacement 1 month ago. Upper left chest pain.     FINDINGS: There is mild-to-moderate cardiomegaly with a mitral valve  prosthesis in place and there is mild generalized vascular congestion  showing perhaps minimal worsening compared to the study performed 7  hours ago. However it shows improvement as well as improved lung  expansion when compared to an earlier exam dated 05/30/2023.     This report was finalized on 6/24/2023 10:41 AM by Dr. Ignacio Teague M.D.               Pertinent Labs     Results from last 7 days   Lab Units 06/26/23 0322 06/25/23  0739 06/24/23 0228 06/23/23  0812   WBC 10*3/mm3 9.69 11.08* 8.15 7.91   HEMOGLOBIN g/dL 9.9* 10.3* 10.6* 10.5*   PLATELETS 10*3/mm3 246 259 227 247     Results from last 7 days   Lab Units 06/26/23  0322 06/25/23  0739 06/24/23  1946 06/24/23 0228 06/23/23  0812   SODIUM mmol/L 139 136  --  139 133*   POTASSIUM mmol/L 3.5 3.5 3.9 3.4* 3.5   CHLORIDE mmol/L 100 96*  --  96* 94*   CO2 mmol/L 28.3 28.0  --  30.0* 27.9   BUN mg/dL 19 21  --  14 13   CREATININE mg/dL 1.55* 1.47*  --  1.50* 1.46*   GLUCOSE mg/dL 110* 151*  --  149* 151*   Estimated Creatinine Clearance: 54.8 mL/min (A) (by C-G formula based on SCr of 1.55 mg/dL (H)).  Results from last 7 days   Lab Units 06/24/23 0228 06/23/23  0812 06/22/23  1146 06/21/23  0608   ALBUMIN g/dL 3.7 3.4* 3.6 3.4*   BILIRUBIN mg/dL 0.4 0.7 0.6  --    ALK PHOS U/L 122* 120* 126*  --    AST (SGOT) U/L 9 14 10  --    ALT (SGPT) U/L 12 9 9  --      Results from last 7 days   Lab Units 06/26/23  0322 06/25/23  0739 06/24/23  1946 06/24/23  0228 06/23/23  0812 06/22/23  1146 06/21/23  0608 06/20/23  0537   CALCIUM mg/dL 8.9 9.5  --  9.1 8.9 9.4 8.7 8.7   ALBUMIN g/dL  --   --   --  3.7 3.4* 3.6 3.4* 3.5   MAGNESIUM mg/dL  --   --  1.8 2.0  --   --   --   --     PHOSPHORUS mg/dL  --   --   --  3.9 3.2  --  3.1 3.6       Results from last 7 days   Lab Units 06/25/23  0944 06/25/23  0739 06/24/23  1018 06/24/23  0538 06/24/23  0434 06/24/23  0228   HSTROP T ng/L 95* 97*  --  103* 101* 96*   PROBNP pg/mL  --   --   --   --   --  1,600.0*   D DIMER QUANT MCGFEU/mL  --   --  1.96*  --   --   --            Invalid input(s): LDLCALC  Results from last 7 days   Lab Units 06/25/23  0955 06/25/23  0944   BLOODCX  No growth at 24 hours No growth at 24 hours       Test Results Pending at Discharge     Pending Labs       Order Current Status    Blood Culture - Blood, Arm, Left Preliminary result    Blood Culture - Blood, Arm, Right Preliminary result            Discharge Details        Discharge Medications        ASK your doctor about these medications        Instructions Start Date   aspirin 81 MG tablet   81 mg, Oral, Daily      atorvastatin 10 MG tablet  Commonly known as: LIPITOR   10 mg, Oral, Nightly      calcium 500 mg vitamin D 5 mcg (200 UT) 500-5 MG-MCG tablet per tablet   1 tablet, Oral, 2 Times Daily      furosemide 40 MG tablet  Commonly known as: LASIX   40 mg, Oral, 2 Times Daily      guaifenesin 100 MG/5ML liquid  Commonly known as: ROBITUSSIN   400 mg, Oral, Every 8 Hours      irbesartan 75 MG tablet  Commonly known as: AVAPRO   1 tablet, Oral, Daily      Janumet -1000 MG tablet  Generic drug: SITagliptin-metFORMIN HCl ER   1 tablet, Oral, Daily      Jardiance 25 MG tablet tablet  Generic drug: empagliflozin   1 tablet, Oral, Daily      metoclopramide 10 MG tablet  Commonly known as: REGLAN   10 mg, Oral, Nightly      montelukast 10 MG tablet  Commonly known as: SINGULAIR   10 mg, Oral, Nightly      omeprazole 40 MG capsule  Commonly known as: priLOSEC   40 mg, Oral, Nightly      OneTouch Verio test strip  Generic drug: glucose blood   1 each, Other, As Needed, Use as instructed      pantoprazole 40 MG EC tablet  Commonly known as: PROTONIX   40 mg, Oral,  Daily      potassium chloride 10 MEQ CR capsule  Commonly known as: MICRO-K   2 capsules, Oral, Daily      predniSONE 1 MG tablet  Commonly known as: DELTASONE   9 mg, Oral, Daily      PRESERVISION AREDS 2+MULTI VIT PO   1 tablet, Oral, 2 Times Daily      Symbicort 160-4.5 MCG/ACT inhaler  Generic drug: budesonide-formoterol   2 puffs, Inhalation, 2 Times Daily      tiotropium bromide monohydrate 2.5 MCG/ACT aerosol solution inhaler  Commonly known as: SPIRIVA RESPIMAT   2 puffs, Inhalation, Daily      torsemide 100 MG tablet  Commonly known as: DEMADEX   100 mg, Oral, 2 Times Daily      traMADol 50 MG tablet  Commonly known as: ULTRAM   50 mg, Oral, Every 6 Hours PRN      Tresiba FlexTouch 100 UNIT/ML solution pen-injector injection  Generic drug: insulin degludec   50 Units, Subcutaneous, Daily               No Known Allergies      Discharge Disposition:  Rehab Facility or Unit ([facility] - [facility])    Discharge Diet:  Diet Order   Procedures    Diet: Cardiac Diets; Healthy Heart (2-3 Na+); Texture: Regular Texture (IDDSI 7); Fluid Consistency: Thin (IDDSI 0)       Discharge Activity:   As tolerated    CODE STATUS:    Code Status and Medical Interventions:   Ordered at: 06/24/23 1325     Level Of Support Discussed With:    Patient     Code Status (Patient has no pulse and is not breathing):    CPR (Attempt to Resuscitate)     Medical Interventions (Patient has pulse or is breathing):    Full Support       Future Appointments   Date Time Provider Department Center   6/27/2023 11:15 AM George Frey MD [facility] [place]   9/11/2023  8:30 AM Jim Sage MD [facility]      Contact information for follow-up providers       Liza Oconnell III, NP-C .    Specialty: Internal Medicine  Contact information:  [address] [place] [address]  [phone]                       Contact information for after-discharge care       Destination       [facility] .    Service: Inpatient Rehabilitation  Contact information:  [address] [address]  [phone]                                   Time Spent on Discharge:  Greater than 30 minutes      James Berry MD  [facility]  06/26/23  13:35 EDT

## 2023-06-26 NOTE — PROGRESS NOTES
SECTION GG      Self Care Performance Discharge:   Oral Hygiene: [place] sets up or cleans up; patient completes activity. [place]  assists only prior to or following the activity.   Toileting Hygiene: : [place] does less than half the effort. [place] lifts, holds  or supports trunk or limbs but provides less than half the effort.   Shower/Bathe Self: [place] provides verbal cues and/or touching/steadying and/or  contact guard assistance as patient completes activity.   Upper Body Dressing: [place] provides verbal cues and/or touching/steadying  and/or contact guard assistance as patient completes activity.   Lower Body Dressing: [place] provides verbal cues and/or touching/steadying  and/or contact guard assistance as patient completes activity.   Putting On/Taking Off Footwear: [place] does less than half the effort. [place]  lifts, holds or supports trunk or limbs but provides less than half the effort.    Mobility Toilet Transfer Discharge: [place] provides verbal cues or  touching/steadying assistance as patient completes activity.    Signed by: HI Morgan/CASIE

## 2023-06-26 NOTE — PLAN OF CARE
Goal Outcome Evaluation:  Plan of Care Reviewed With: patient        Progress: no change  Outcome Evaluation: Pt returned from [facility] this afternoon. Pt is A&OX4, forgetful at times. Meds whole with water. Assist X1. Pt educatedo on use of call light for assistance. Con of B&B, uses urinal. ACCU check ACHS. Edema in BL lower extremities, hands and eyes noted. Sternal incision healed, pink and intact. Cardiac precautions maintained. No c/o pain thus far. S/O at bedside.

## 2023-06-26 NOTE — PROGRESS NOTES
SECTION GG    Eating Performance: [place] sets up or cleans up; patient completes activity.  [place] assists only prior to or following the activity.    Eating Discharge Goals: Patient completed the activities by themself with no  assistance from a helper.    Section B. Hearing and Vision  Ability to Hear:  Adequate - no difficulty in normal conversation, social  interaction, listening to TV  Ability to See in Adequate Light:  Adequate - sees fine detail, such as regular  print in newspapers/books    Section B. Health Literacy  Frequency of Needing Assistance Reading:  Sometimes    Section D. Mood  Presence of little interest or pleasure in doing things:   No  Frequency of having little interest or pleasure in doing things:   Never or 1  day  Presence of feeling down, depressed, or hopeless:   No  Frequency of feeling down, depressed, or hopeless:   Never or 1 day   Interview Ended. Above responses do not meet criteria to continue.  Total Severity Score:   0    Section D. Social Isolation  [place] of Feeling Lonely or Isolated:  Never    Section J. Health Conditions (Pain Effect on Sleep)  Pain Effect on Sleep:   Does not apply - Patient has not had any pain or hurting  in the past 5 days    Signed by: Callie Yadav RN

## 2023-06-26 NOTE — PROGRESS NOTES
[facility] Progress Note      Patient Name: Vernon Solorzano  : 1950  MRN: [id number]  Primary Care Physician:  Liza Oconnell III, RACHAEL  Date of admission: 2023    Subjective     Interval History:   Follow up CKD III.     Continues to have intermittent chest discomfort  Some dyspnea  Tolerating oral intake  Persistent lower extremity edema but improving    Urinating spontaneously        Review of Systems:   As noted above    Objective     Vitals:   Temp:  [97.6 °F (36.4 °C)-99.4 °F (37.4 °C)] 99.4 °F (37.4 °C)  Heart Rate:  [101-109] 106  Resp:  [18-20] 18  BP: ()/(57-86) 97/84    Intake/Output Summary (Last 24 hours) at 2023 1009  Last data filed at 2023 0815  Gross per 24 hour   Intake 600 ml   Output --   Net 600 ml         Physical Exam:    General Appearance: Patient is alert, oriented.  Ill looking.  Questions skin: warm and dry  HEENT: oral mucosa normal, nonicteric sclera  Neck: supple, no JVD  Lungs: Clear to auscultation.   Heart: RRR, normal S1 and S2  Abdomen: soft, nontender, + bs, distended.  Extremities 1-2+ lower ext edema.  Erythematous rash noted on the anterior aspect of right leg  Scheduled Meds:     [MAR Hold - Suspended Admission] apixaban, 5 mg, Oral, Q12H  [MAR Hold - Suspended Admission] aspirin, 81 mg, Oral, Daily  [MAR Hold - Suspended Admission] atorvastatin, 40 mg, Oral, Nightly  [MAR Hold - Suspended Admission] budesonide-formoterol, 2 puff, Inhalation, BID - RT  [MAR Hold - Suspended Admission] calcium 500 mg vitamin D 5 mcg (200 UT), 1 tablet, Oral, Daily  [MAR Hold - Suspended Admission] guaifenesin, 400 mg, Oral, Q8H  [MAR Hold - Suspended Admission] insulin glargine, 15 Units, Subcutaneous, Nightly  [MAR Hold - Suspended Admission] insulin glargine, 40 Units, Subcutaneous, Daily  [MAR Hold - Suspended Admission] insulin lispro, 3-14 Units, Subcutaneous, TID With Meals  [MAR Hold - Suspended Admission] insulin  lispro, 5 Units, Subcutaneous, Daily With Breakfast & Lunch  [MAR Hold - Suspended Admission] metoprolol succinate XL, 25 mg, Oral, Q24H  [MAR Hold - Suspended Admission] pantoprazole, 40 mg, Oral, Q AM  [MAR Hold - Suspended Admission] potassium chloride, 40 mEq, Oral, Daily  [MAR Hold - Suspended Admission] tiotropium bromide monohydrate, 2 puff, Inhalation, Daily - RT  [MAR Hold - Suspended Admission] torsemide, 100 mg, Oral, BID      IV Meds:        Results Reviewed:   I have personally reviewed the results from the time of this admission to 6/26/2023 10:09 EDT     Results from last 7 days   Lab Units 06/26/23  0322 06/25/23  0739 06/24/23 1946 06/24/23 0228 06/23/23  0812 06/22/23  1146   SODIUM mmol/L 139 136  --  139 133* 137   POTASSIUM mmol/L 3.5 3.5 3.9 3.4* 3.5 3.8   CHLORIDE mmol/L 100 96*  --  96* 94* 98   CO2 mmol/L 28.3 28.0  --  30.0* 27.9 27.6   BUN mg/dL 19 21  --  14 13 14   CREATININE mg/dL 1.55* 1.47*  --  1.50* 1.46* 1.43*   CALCIUM mg/dL 8.9 9.5  --  9.1 8.9 9.4   BILIRUBIN mg/dL  --   --   --  0.4 0.7 0.6   ALK PHOS U/L  --   --   --  122* 120* 126*   ALT (SGPT) U/L  --   --   --  12 9 9   AST (SGOT) U/L  --   --   --  9 14 10   GLUCOSE mg/dL 110* 151*  --  149* 151* 195*         Estimated Creatinine Clearance: 54.8 mL/min (A) (by C-G formula based on SCr of 1.55 mg/dL (H)).    Results from last 7 days   Lab Units 06/24/23 1946 06/24/23 0228 06/23/23 0812 06/21/23  0608   MAGNESIUM mg/dL 1.8 2.0  --   --    PHOSPHORUS mg/dL  --  3.9 3.2 3.1               Results from last 7 days   Lab Units 06/26/23  0322 06/25/23  0739 06/24/23  0228 06/23/23  0812 06/21/23  0608   WBC 10*3/mm3 9.69 11.08* 8.15 7.91 8.42   HEMOGLOBIN g/dL 9.9* 10.3* 10.6* 10.5* 9.6*   PLATELETS 10*3/mm3 246 259 227 247 207               Assessment / Plan     ASSESSMENT:  1.  Acute kidney injury on top of CKD III, baseline creatinine 1.2.  Peak 1.68.  Creatinine  stable around 1.5   2. MR now sp MV replacement, tissue  and TV repair, SU closure 5/24/23.  3. Chronic steroid use after COVID 19 PNA.  4. Anemia of CKD and postoperative anemia.  Hemoglobin is 9.9  5. 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 Rehab .  6. DM2  7. Aflutter.Cardioversion 6/2/23. On eliquis and metoprolol.   8.  Hypokalemia  9.  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  Positive troponin followed closely by cardiology    PLAN:    1.  Given IV diuretics and transition to oral torsemide, will continue to titrate diuretics to optimize volume status  We will continue surveillance labs2.     Jesús Braxton MD  06/26/23  10:09 EDT    [facility]  [phone]

## 2023-06-26 NOTE — PLAN OF CARE
Goal Outcome Evaluation:                      Patient alert and orient forgetful at times, vital signs stable , SR on the monitor, pt on room air, up with walker standby assist, pt denies chest pain at this time, educate to call for assist, call light in reach possible rehab when able cont to monitor.

## 2023-06-27 LAB
ANION GAP SERPL CALCULATED.3IONS-SCNC: 11 MMOL/L (ref 5–15)
BUN SERPL-MCNC: 18 MG/DL (ref 8–23)
BUN/CREAT SERPL: 13.7 (ref 7–25)
CALCIUM SPEC-SCNC: 8.9 MG/DL (ref 8.6–10.5)
CHLORIDE SERPL-SCNC: 98 MMOL/L (ref 98–107)
CO2 SERPL-SCNC: 29 MMOL/L (ref 22–29)
CREAT SERPL-MCNC: 1.31 MG/DL (ref 0.76–1.27)
DEPRECATED RDW RBC AUTO: 42.3 FL (ref 37–54)
EGFRCR SERPLBLD CKD-EPI 2021: 57.1 ML/MIN/1.73
ERYTHROCYTE [DISTWIDTH] IN BLOOD BY AUTOMATED COUNT: 14 % (ref 12.3–15.4)
GLUCOSE BLDC GLUCOMTR-MCNC: 156 MG/DL (ref 70–130)
GLUCOSE BLDC GLUCOMTR-MCNC: 217 MG/DL (ref 70–130)
GLUCOSE BLDC GLUCOMTR-MCNC: 227 MG/DL (ref 70–130)
GLUCOSE BLDC GLUCOMTR-MCNC: 234 MG/DL (ref 70–130)
GLUCOSE BLDC GLUCOMTR-MCNC: 428 MG/DL (ref 70–130)
GLUCOSE SERPL-MCNC: 128 MG/DL (ref 65–99)
HCT VFR BLD AUTO: 28 % (ref 37.5–51)
HGB BLD-MCNC: 9.3 G/DL (ref 13–17.7)
MCH RBC QN AUTO: 27.8 PG (ref 26.6–33)
MCHC RBC AUTO-ENTMCNC: 33.2 G/DL (ref 31.5–35.7)
MCV RBC AUTO: 83.6 FL (ref 79–97)
PLATELET # BLD AUTO: 255 10*3/MM3 (ref 140–450)
PMV BLD AUTO: 9.3 FL (ref 6–12)
POTASSIUM SERPL-SCNC: 3.1 MMOL/L (ref 3.5–5.2)
QT INTERVAL: 373 MS
QT INTERVAL: 386 MS
RBC # BLD AUTO: 3.35 10*6/MM3 (ref 4.14–5.8)
SODIUM SERPL-SCNC: 138 MMOL/L (ref 136–145)
WBC NRBC COR # BLD: 7.07 10*3/MM3 (ref 3.4–10.8)

## 2023-06-27 NOTE — PLAN OF CARE
Immobility syndrome. S/P cabg and MCA stroke. NIH=2. A&OX3. Delayed mental processing and response time. Forgetful. Short term memory loss. Midsternal incision and chest tube sites. Scabbed and KATHY. Hx. HTN, CAD, HLD, Seizures. Blood glucose checks ACHS. Meds whole with thins. Diet: Reg, thins. Up for meals. Assistx1 with walker and wheelchair. Participated fully in therapy. Tired and fatigued afterwards. Napped after sessions. Calm, cooperative, and flat. Ate better at meals. Continent and incontinent B&B. Last BM reported 6/26. PRN bowel meds given today. Full code. Spouse at bedside. BP WDL. Blood glucose WDL. Daily weight. Slept well last night. Has PRN tylenol, tramadol, and Norco for pain.

## 2023-06-27 NOTE — PLAN OF CARE
Goal Outcome Evaluation:      Pt is A/Ox4, calm/cooperative, OOB x 1 stand/pivot. Meds taken whole w thin liquids. No c/o pain overnight. Pt has been continent.

## 2023-06-27 NOTE — PROGRESS NOTES
[facility] Progress Note      Patient Name: Vernon Solorzano  : 1950  MRN: [id number]  Primary Care Physician:  Liza Oconnell III, RACHAEL  Date of admission: 2023    Subjective     Interval History:   Follow up CKD III. Back to rehab .  In PT gym.  RN reports drinking a lot of fluids. Will re-institute Fluid restriction.  IV lasix this am.    No chest pain .    Review of Systems:   As noted above    Objective     Vitals:   Temp:  [97.3 °F (36.3 °C)-98.8 °F (37.1 °C)] 98.2 °F (36.8 °C)  Heart Rate:  [] 97  Resp:  [18-20] 18  BP: ()/(62-92) 106/66    Intake/Output Summary (Last 24 hours) at 2023 1026  Last data filed at 2023 0800  Gross per 24 hour   Intake 990 ml   Output 400 ml   Net 590 ml         Physical Exam:    General Appearance: standing with walker in PT gym .Thoughts clearer and speech better.   Skin: warm and dry  HEENT: oral mucosa normal, nonicteric sclera  Neck: supple, no JVD  Lungs: Clear to auscultation.   Heart: RRR, normal S1 and S2  Abdomen: soft, nontender, + bs, distended.  Extremities 1+ lower ext edema.      Scheduled Meds:     aspirin, 81 mg, Oral, Daily  atorvastatin, 40 mg, Oral, Nightly  budesonide-formoterol, 2 puff, Inhalation, BID - RT  calcium 500 mg vitamin D 5 mcg (200 UT), 1 tablet, Oral, Daily  colchicine, 0.6 mg, Oral, Daily  guaifenesin, 400 mg, Oral, TID  insulin glargine, 15 Units, Subcutaneous, Daily  insulin glargine, 20 Units, Subcutaneous, Nightly  insulin lispro, 3-14 Units, Subcutaneous, 4x Daily AC & at Bedtime  insulin lispro, 4 Units, Subcutaneous, TID With Meals  magnesium oxide, 400 mg, Oral, Daily  melatonin, 3 mg, Oral, Nightly  metoprolol succinate XL, 25 mg, Oral, Q24H  pantoprazole, 40 mg, Oral, Q AM  sodium chloride, 3 mL, Intravenous, Q12H  tiotropium bromide monohydrate, 2 puff, Inhalation, Daily - RT      IV Meds:        Results Reviewed:   I have personally reviewed the results  from the time of this admission to 6/27/2023 10:26 EDT     Results from last 7 days   Lab Units 06/27/23  0710 06/26/23 0322 06/25/23  0739 06/24/23 1946 06/24/23 0228 06/23/23  0812 06/22/23  1146   SODIUM mmol/L 138 139 136  --  139 133* 137   POTASSIUM mmol/L 3.1* 3.5 3.5   < > 3.4* 3.5 3.8   CHLORIDE mmol/L 98 100 96*  --  96* 94* 98   CO2 mmol/L 29.0 28.3 28.0  --  30.0* 27.9 27.6   BUN mg/dL 18 19 21  --  14 13 14   CREATININE mg/dL 1.31* 1.55* 1.47*  --  1.50* 1.46* 1.43*   CALCIUM mg/dL 8.9 8.9 9.5  --  9.1 8.9 9.4   BILIRUBIN mg/dL  --   --   --   --  0.4 0.7 0.6   ALK PHOS U/L  --   --   --   --  122* 120* 126*   ALT (SGPT) U/L  --   --   --   --  12 9 9   AST (SGOT) U/L  --   --   --   --  9 14 10   GLUCOSE mg/dL 128* 110* 151*  --  149* 151* 195*    < > = values in this interval not displayed.         Estimated Creatinine Clearance: 65.1 mL/min (A) (by C-G formula based on SCr of 1.31 mg/dL (H)).    Results from last 7 days   Lab Units 06/24/23 1946 06/24/23 0228 06/23/23 0812 06/21/23  0608   MAGNESIUM mg/dL 1.8 2.0  --   --    PHOSPHORUS mg/dL  --  3.9 3.2 3.1               Results from last 7 days   Lab Units 06/27/23  0710 06/26/23 0322 06/25/23 0739 06/24/23 0228 06/23/23  0812   WBC 10*3/mm3 7.07 9.69 11.08* 8.15 7.91   HEMOGLOBIN g/dL 9.3* 9.9* 10.3* 10.6* 10.5*   PLATELETS 10*3/mm3 255 246 259 227 247               Assessment / Plan     ASSESSMENT:  1. CKD III, baseline creatinine 1.2.  Creatinine stable. Agree with IV diuretic today. Will check back on urine output.   Po fluid restrict.    2. MR now sp MV replacement, tissue and TV repair, SU closure 5/24/23. 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   3. Chronic steroid use after COVID 19 PNA.  4. Anemia post op. Hg stable.   5. 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 Rehab .  6. DM2  7. Aflutter.Cardioversion 6/2/23. On eliquis and metoprolol.   PLAN:  1. Cardiology managing IV diuretic  2. Po fluid restriction 1200 cc daily  3. [place].   4. Chris K.   Joan Batista MD  06/27/23  10:26 EDT    [facility]  [phone]

## 2023-06-27 NOTE — THERAPY RE-EVALUATION
Inpatient Rehabilitation - Physical Therapy Re-Evaluation       [facility]     Patient Name: Vernon Solorzano  : 1950  MRN: [id number]    Today's Date: 2023                    Admit Date: 2023      Visit Dx:     ICD-10-CM ICD-9-CM   1. Impaired functional mobility, balance, gait, and endurance  Z74.09 V49.89       Patient Active Problem List   Diagnosis    DM (diabetes mellitus), type 2    Mixed hyperlipidemia    Mild intermittent asthma without complication    New onset of congestive heart failure    Nonrheumatic mitral valve regurgitation    Left-sided chest pain    Essential hypertension    Mitral valve disease    Acute ischemic left MCA stroke    Atelectasis of both lungs    Pericardial effusion, acute    History of ischemic stroke    Obese    Physical debility    Chronic diastolic CHF (congestive heart failure)    Anemia    Stage 3a chronic kidney disease    Paroxysmal atrial flutter    H/O mitral valve replacement with tissue graft    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 AMARILIS MORGAN       Past Surgical History:   Procedure Laterality Date    CARDIAC CATHETERIZATION N/A 2023    Procedure: Right Heart Cath;  Surgeon: Corey Gaffney MD;  Location:  [facility] CATH [facility]IVE [facility];  Service: Cardiology;  Laterality: N/A;    CARDIAC CATHETERIZATION N/A 2023    Procedure: Left Heart Cath;  Surgeon: Corey Gaffney MD;  Location:  [facility] CATH [facility]VASIVE [facility];  Service: Cardiology;  Laterality: N/A;    CARDIAC CATHETERIZATION N/A 2023    Procedure: Left ventriculography;   Surgeon: Corey Gaffney MD;  Location: [facility] CATH INVASIVE LOCATION;  Service: Cardiology;  Laterality: N/A;    CARDIAC CATHETERIZATION N/A 5/11/2023    Procedure: Coronary angiography;  Surgeon: Corey Gaffney MD;  Location: [facility] CATH INVASIVE LOCATION;  Service: Cardiology;  Laterality: N/A;    COLONOSCOPY N/A     WNL PER PT, NO RECORDS,     COLONOSCOPY N/A 04/07/2009    DR. GORGE BENAVIDEZ AT [place]    MITRAL VALVE REPAIR/REPLACEMENT N/A 5/24/2023    Procedure: MITRAL VALVE REPAIR/REPLACEMENT TRICUSPID VALVE REPAIR/REPLACEMENT TRANSESOPHAGEAL ECHOCARDIOGRAM WITH ANESTHESIA;  Surgeon: George Frey MD;  Location: [facility] CVOR;  Service: Cardiothoracic;  Laterality: N/A;    PROSTATE SURGERY N/A 11/12/2010    TURP, DR. COREY COLLAZO AT [facility]    SKIN TAG REMOVAL N/A 12/20/2017    ANAL SKIN TAG X2, PERFORMED IN OFFICE, DR. LISA ORANTES    TURP / TRANSURETHRAL INCISION / DRAINAGE PROSTATE  2010    Dr. Goodrich       PT ASSESSMENT (last 12 hours)       IRF PT Evaluation and Treatment       Row Name 06/27/23 1129          PT Time and Intention    Document Type re-evaluation  -MD     Mode of Treatment physical therapy  -MD     Patient/Family/Caregiver Comments/Observations Pt sitting in WC showing no signs of acute distress.  -MD       Row Name 06/27/23 1129          General Information    Patient Profile Reviewed yes  -MD     Existing Precautions/Restrictions cardiac;fall;sternal  -MD       Row Name 06/27/23 1129          Pain Assessment    Pretreatment Pain Rating 0/10 - no pain  -MD       Row Name 06/27/23 1129          Cognition/Psychosocial    Orientation Status (Cognition) oriented x 3  -MD     Follows Commands (Cognition) follows one-step commands;over 90% accuracy;repetition of directions required  -MD     Personal Safety Interventions fall prevention program maintained;gait belt  -MD       Row Name 06/27/23 1129          Transfer Assessment/Treatment    Transfers car transfer  -MD       Row  Name 06/27/23 1129          Sit-Stand Transfer    Sit-Stand [place] (Transfers) verbal cues;standby assist  -MD     Assistive Device (Sit-Stand Transfers) walker, front-wheeled;wheelchair  -MD       Row Name 06/27/23 1129          Stand-Sit Transfer    Stand-Sit [place] (Transfers) verbal cues;standby assist  -MD     Assistive Device (Stand-Sit Transfers) walker, front-wheeled;wheelchair  -MD       Row Name 06/27/23 1129          Car Transfer    Type (Car Transfer) stand pivot/stand step  -MD     [place] Level (Car Transfer) verbal cues;contact guard  -MD     Assistive Device (Car Transfer) walker, front-wheeled  -MD       Row Name 06/27/23 1129          Gait/Stairs (Locomotion)    [place] Level (Gait) verbal cues;standby assist  -MD     Assistive Device (Gait) walker, front-wheeled  -MD     Distance in Feet (Gait) 160'x2 and 80'x1  -MD     Pattern (Gait) step-through  -MD     Deviations/Abnormal Patterns (Gait) gait speed decreased;stride length decreased;angie decreased;festinating/shuffling  -MD     Bilateral Gait Deviations forward flexed posture  -MD     [place] Level (Stairs) verbal cues;contact guard;minimum assist (75% patient effort)  -MD     Handrail Location (Stairs) both sides  -MD     Number of Steps (Stairs) 4  -MD     Ascending Technique (Stairs) step-over-step  -MD     Descending Technique (Stairs) step-to-step  -MD     Comment, (Gait/Stairs) In PM pt went up and down 8 steps w 1 HR on the L.  PT placed chair w 2 HR on landing as will be pt's set up at home.  Pt was able to go up and down steps side ways holding on HR w B UE.  Pt required CGA.  -MD       Row Name 06/27/23 1129          Positioning and Restraints    Pre-Treatment Position sitting in chair/recliner  -MD     Post Treatment Position other  -MD     Other Position return to room with caregiver  -MD       Row Name 06/27/23 1129          Therapy Assessment/Plan (PT)    Comment, Therapy Assessment/Plan (PT) Pt  re-admitted to rehab following cardiac event leading to short acute care stay.  Upon re-evaluation pt presents w decreased endurance as well as decreased strength made most evident through pt difficulty w stairs this date.  Pt required min A x1 w going up and down 4 steps using 2 HR.  Pt reported L knee slightly buckling when going up first step.  Due to the above mentioned weakness pt will benifit from skilled PT to increase strength and allow pt greater independence w safety w steps.  -MD       Row Name 06/27/23 1129          Bed Mobility Goal 2 (PT-IRF)    Activity/Assistive Device (Bed Mobility Goal 2, PT-IRF) bed mobility activities, all  -MD     [place] Level (Bed Mobility Goal 2, PT-IRF) supervision required  -MD     Time Frame (Bed Mobility Goal 2, PT-IRF) 1 week  -MD       Row Name 06/27/23 1129          Transfer Goal 2 (PT-IRF)    Activity/Assistive Device (Transfer Goal 2, PT-IRF) sit-to-stand/stand-to-sit;car transfer;walker, rolling  -MD     [place] Level (Transfer Goal 2, PT-IRF) standby assist  -MD     Time Frame (Transfer Goal 2, PT-IRF) 1 week  -MD       Row Name 06/27/23 1129          Gait/Walking Locomotion Goal 2 (PT-IRF)    Progress/Outcomes (Gait/Walking Locomotion Goal 2, PT-IRF) goal met  -MD       Row Name 06/27/23 1129          Stairs Goal 1 (PT-IRF)    Activity/Assistive Device (Stairs Goal 1, PT-IRF) stairs, all skills  -MD     Number of Stairs (Stairs Goal 1, PT-IRF) 12  -MD     [place] Level (Stairs Goal 1, PT-IRF) contact guard required  -MD     Time Frame (Stairs Goal 1, PT-IRF) 1 week  -MD               User Key  (r) = Recorded By, (t) = Taken By, (c) = Cosigned By      Initials Name Provider Type    Damaris Farrell, PT Physical Therapist                  Wound 05/24/23 0737 sternal Incision (Active)   Dressing Appearance open to air 06/27/23 0832   Closure Approximated 06/27/23 0832   Base scab 06/27/23 0832   Periwound dry;intact 06/27/23 0832   Periwound Temperature  warm 06/26/23 1610   Periwound Skin Turgor soft 06/26/23 1610   Drainage Amount none 06/27/23 0832   Dressing Care open to air 06/27/23 0832   Periwound Care dry periwound area maintained 06/26/23 1610     Physical Therapy Education       Title: PT OT SLP Therapies (In Progress)       Topic: Physical Therapy (In Progress)       Point: Mobility training (Done)       Learning Progress Summary             Patient Acceptance, E,D, VU,DU,NR by CH at 6/23/2023 1456    Acceptance, E,D, NR by DP at 6/22/2023 1309    Comment: hand placement    Acceptance, E,D, VU,DU by DP at 6/21/2023 1007    Acceptance, E, VU by MD at 6/20/2023 1504    Acceptance, E, VU by MD at 6/19/2023 1058    Acceptance, E,TB, VU,NR by TONI at 6/17/2023 1501    Acceptance, E, VU by MD at 6/16/2023 0919    Acceptance, E, VU by MD at 6/15/2023 1140    Eager, E,D,TB, NR by  at 6/14/2023 1459    Acceptance, E, VU by MD at 6/13/2023 1012    Acceptance, E, VU by MD at 6/12/2023 1018    Acceptance, E, VU by MD at 6/10/2023 1040    Acceptance, E, VU by MD at 6/8/2023 1017    Acceptance, E,TB,D, VU,DU,NR by  at 6/7/2023 1513    Comment: POC, Goals, safety with mobility.   Family Acceptance, E,D, VU by MD at 6/27/2023 1517    Comment: Pt spouse participated in family teaching for ambulation and observed car transfer and stairs.   Significant Other Acceptance, E,TB,D, VU,DU,NR by  at 6/7/2023 1513    Comment: POC, Goals, safety with mobility.                         Point: Home exercise program (In Progress)       Learning Progress Summary             Patient Acceptance, E,D, NR by DP at 6/22/2023 1309    Comment: hand placement    Acceptance, E,D, VU,DU by DP at 6/21/2023 1007    Eager, E,D,TB, NR by KP at 6/14/2023 1459    Acceptance, E,TB,D, VU,DU,NR by  at 6/7/2023 1513    Comment: POC, Goals, safety with mobility.   Significant Other Acceptance, E,TB,D, VU,DU,NR by  at 6/7/2023 1513    Comment: POC, Goals, safety with mobility.                          Point: Body mechanics (Not Started)       Learner Progress:  Not documented in this visit.              Point: Precautions (Done)       Learning Progress Summary             Patient Acceptance, E, VU by MD at 6/27/2023 1517                                         User Key       Initials Effective Dates Name Provider Type Discipline    TONI 06/16/21 -  Nubia Orellana, PT Physical Therapist PT    MD 06/16/21 -  Damaris Francis, PT Physical Therapist PT    KP 06/16/21 -  Elizabeth Levin, PT Physical Therapist PT    DP 08/24/21 -  Gulshan Mcdonald, PT Physical Therapist PT    CH 06/01/23 -  Nicolas Suero PT Student PT Student PT                    PT Recommendation and Plan                          Time Calculation:      PT Charges       Row Name 06/27/23 1512 06/27/23 1128          Time Calculation    Start Time 1430  -MD 1000  -MD     Stop Time 1500  -MD 1030  -MD     Time Calculation (min) 30 min  -MD 30 min  -MD     PT Received On -- 06/27/23  -MD     PT - Next Appointment -- 06/28/23  -MD     PT Goal Re-Cert Due Date -- 07/04/23  -MD               User Key  (r) = Recorded By, (t) = Taken By, (c) = Cosigned By      Initials Name Provider Type    Damaris Farrell, PT Physical Therapist                    Therapy Charges for Today       Code Description Service Date Service Provider Modifiers Qty    21242455901 HC GAIT TRAINING EA 15 MIN 6/27/2023 Damaris Francis, PT GP 1    71255473402 HC PT THERAPEUTIC ACT EA 15 MIN 6/27/2023 Damaris Francis, PT GP 2    27204480335 HC PT THER PROC EA 15 MIN 6/27/2023 Damaris Francis, PT GP 1              PT G-Codes  AM-PAC 6 Clicks Score (PT): 14      Damaris Francis PT  6/27/2023

## 2023-06-27 NOTE — PROGRESS NOTES
LOS: 21 days   Patient Care Team:  Liza Oconnell III, NP-C as PCP - General (Family Medicine)  Corey Lao Jr., MD (Inactive) as Consulting Physician (Urology)    Chief Complaint: Follow-up pericardial effusion, Dressler's syndrome.    Interval History: Feels much better.  He actually looked good today.  Edema is improved.  No shortness of breath or chest pain.    Vital Signs:  Temp:  [98 °F (36.7 °C)-98.8 °F (37.1 °C)] 98 °F (36.7 °C)  Heart Rate:  [] 100  Resp:  [18-20] 20  BP: (102-129)/(65-67) 102/67    Intake/Output Summary (Last 24 hours) at 6/27/2023 1631  Last data filed at 6/27/2023 1500  Gross per 24 hour   Intake 1170 ml   Output 1050 ml   Net 120 ml       Physical Exam:   General Appearance:    No acute distress, alert and oriented x4   Lungs:     Clear to auscultation bilaterally     Heart:    Regular rhythm and normal rate.  No murmurs, gallops, or       rubs.   Abdomen:     Soft, nontender, nondistended.    Extremities:   1+ edema of the lower extremities.     Results Review:    Results from last 7 days   Lab Units 06/27/23  0710   SODIUM mmol/L 138   POTASSIUM mmol/L 3.1*   CHLORIDE mmol/L 98   CO2 mmol/L 29.0   BUN mg/dL 18   CREATININE mg/dL 1.31*   GLUCOSE mg/dL 128*   CALCIUM mg/dL 8.9     Results from last 7 days   Lab Units 06/25/23  0944 06/25/23  0739 06/24/23  0538   HSTROP T ng/L 95* 97* 103*     Results from last 7 days   Lab Units 06/27/23  0710   WBC 10*3/mm3 7.07   HEMOGLOBIN g/dL 9.3*   HEMATOCRIT % 28.0*   PLATELETS 10*3/mm3 255             Results from last 7 days   Lab Units 06/24/23  1946   MAGNESIUM mg/dL 1.8           I reviewed the patient's new clinical results.        Assessment:  1.  Moderate posterior pericardial effusion and pleuritic chest pain, consistent with Dressler's syndrome  2.  Acute on chronic diastolic CHF and lower extremity edema  3.  Status post tissue mitral valve replacement and tricuspid valve repair (and left atrial  appendage closure) on 5/24/2023 by Dr. Frey  4.  Acute kidney injury with stage IIIa chronic kidney disease   5.  History of COVID-19 pneumonia with residual dyspnea and possible interstitial lung disease  6.  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  Expected postoperative anemia  8.  Postoperative CVA following cardiac surgery  9.  Postoperative junctional bradycardia, complete heart block, and typical atrial flutter (status post DCCV on 6/2/2023).  10.  LBBB and first degree AV block  11.  Diabetes    Plan:  -Renal function actually improved with diuretics.  I am going to give him another dose of Lasix 80 mg IV today and reevaluate tomorrow.    -Holding Eliquis with pericardial effusion.  Watch for any recurrent atrial fibrillation or atrial flutter.    -Continue colchicine.  Watch for any diarrhea.    -He will need a repeat limited echocardiogram in approximately 2 weeks to evaluate the pericardial effusion.    Corwin Hobson MD  06/27/23  16:31 EDT

## 2023-06-27 NOTE — CASE MANAGEMENT/SOCIAL WORK
Case Management Discharge Note      Final Note: Pt discharged back to [facility], 6/26/2023……..Vidhya COX/RN SUNNY         Selected Continued Care - Discharged on 6/26/2023 Admission date: 6/24/2023 - Discharge disposition: Rehab Facility or Unit ([facility])      Destination Coordination complete.      Service Provider Selected Services Address Phone Fax Patient Preferred    [facility] ACUTE REHAB PROGRAM Inpatient Rehabilitation [address] [address] [phone] -- --              Durable Medical Equipment    No services have been selected for the patient.                Dialysis/Infusion    No services have been selected for the patient.                Home Medical Care    No services have been selected for the patient.                Therapy    No services have been selected for the patient.                Community Resources    No services have been selected for the patient.                Community & DME    No services have been selected for the patient.                    Selected Continued Care - Prior Encounters Includes continued care and service providers with selected services from prior encounters from 3/26/2023 to 6/26/2023      Discharged on 6/6/2023 Admission date: 5/23/2023 - Discharge disposition: Rehab Facility or Unit (DC - External)      Destination       Service Provider Selected Services Address Phone Fax Patient Preferred    [facility] ACUTE REHAB [facility] Inpatient Rehabilitation [address] [place] [address] [phone] -- --              Home Medical Care       Service Provider Selected Services Address Phone Fax Patient Preferred    [facility] [address] [address] [phone] --                               Final Discharge Disposition Code: 62 - inpatient rehab facility

## 2023-06-27 NOTE — THERAPY TREATMENT NOTE
Inpatient Rehabilitation - Speech Language Pathology Treatment Note    [facility]     Patient Name: Vernon Solorzano  : 1950  MRN: [id number]    Today's Date: 2023                   Admit Date: 2023       Visit Dx:      ICD-10-CM ICD-9-CM   1. Impaired functional mobility, balance, gait, and endurance  Z74.09 V49.89       Patient Active Problem List   Diagnosis    DM (diabetes mellitus), type 2    Mixed hyperlipidemia    Mild intermittent asthma without complication    New onset of congestive heart failure    Nonrheumatic mitral valve regurgitation    Left-sided chest pain    Essential hypertension    Mitral valve disease    Acute ischemic left MCA stroke    Atelectasis of both lungs    Pericardial effusion, acute    History of ischemic stroke    Obese    Physical debility    Chronic diastolic CHF (congestive heart failure)    Anemia    Stage 3a chronic kidney disease    Paroxysmal atrial flutter    H/O mitral valve replacement with tissue graft    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 AMARILIS MORGAN       Past Surgical History:   Procedure Laterality Date    CARDIAC CATHETERIZATION N/A 2023    Procedure: Right Heart Cath;  Surgeon: Corey Gaffney MD;  Location:  [facility] CATH INVASIVE LOCATION;  Service: Cardiology;  Laterality: N/A;    CARDIAC CATHETERIZATION N/A 2023    Procedure: Left Heart Cath;  Surgeon: Corey Gaffney MD;  Location:  [facility] CATH INVASIVE LOCATION;  Service: Cardiology;  Laterality: N/A;    CARDIAC CATHETERIZATION N/A 2023    Procedure: Left  ventriculography;  Surgeon: Corey Gaffney MD;  Location: [facility] CATH INVASIVE LOCATION;  Service: Cardiology;  Laterality: N/A;    CARDIAC CATHETERIZATION N/A 5/11/2023    Procedure: Coronary angiography;  Surgeon: Corey Gaffney MD;  Location:  [facility] CATH INVASIVE LOCATION;  Service: Cardiology;  Laterality: N/A;    COLONOSCOPY N/A     WNL PER PT, NO RECORDS,     COLONOSCOPY N/A 04/07/2009    DR. GORGE BENAVIDEZ AT [place]    MITRAL VALVE REPAIR/REPLACEMENT N/A 5/24/2023    Procedure: MITRAL VALVE REPAIR/REPLACEMENT TRICUSPID VALVE REPAIR/REPLACEMENT TRANSESOPHAGEAL ECHOCARDIOGRAM WITH ANESTHESIA;  Surgeon: George Frey MD;  Location: [facility] CVOR;  Service: Cardiothoracic;  Laterality: N/A;    PROSTATE SURGERY N/A 11/12/2010    TURP, DR. COREY COLLAZO AT [facility]    SKIN TAG REMOVAL N/A 12/20/2017    ANAL SKIN TAG X2, PERFORMED IN OFFICE, DR. LISA ORANTES    TURP / TRANSURETHRAL INCISION / DRAINAGE PROSTATE  2010    Dr. Goodrich       SLP Recommendation and Plan                                                            SLP EVALUATION (last 72 hours)       SLP SLC Evaluation       Row Name 06/27/23 1230 06/27/23 0900                Communication Assessment/Intervention    Document Type therapy note (daily note)  -CR therapy note (daily note)  -CR       Subjective Information -- no complaints  -CR       Patient Observations alert;cooperative  -CR alert;cooperative  -CR       Patient Effort good  -CR good  -CR       Symptoms Noted During/After Treatment none  -CR none  -CR          General Information    Patient Profile Reviewed yes  -CR yes  -CR          Pain Scale: Numbers Pre/Post-Treatment    Pretreatment Pain Rating 0/10 - no pain  -CR 0/10 - no pain  -CR       Posttreatment Pain Rating 0/10 - no pain  -CR 0/10 - no pain  -CR                 User Key  (r) = Recorded By, (t) = Taken By, (c) = Cosigned By      Initials Name Effective Dates    Mariely Corcoran CF-SLP 05/31/23 -                         EDUCATION    The patient has been educated in the following areas:       Cognitive Impairment.             SLP GOALS       Row Name 06/27/23 1230 06/27/23 0900          Memory Skills Goal 1 (SLP)    Improve Memory Skills Through Goal 1 (SLP) use memory strategies;listen to a paragraph and answer questions;recall details of the day;80%;with moderate cues (50-74%)  -CR --     Time Frame (Memory Skills Goal 1, SLP) by discharge  -CR --     Progress/Outcomes (Memory Skills Goal 1, SLP) goal ongoing  -CR --     Comment (Memory Skills Goal 1, SLP) Functional memory of x4 items following a 10-minute delay exhibited with 50% accuracy given NO cues, increased to 100% accuracy when given MOD visual cues.  -CR --        Organizational Skills Goal 1 (SLP)    Improve Thought Organization Through Goal 1 (SLP) completing a divergent naming task;completing mental manipulation task;80%;with moderate cues (50-74%)  -CR --     Time Frame (Thought Organization Skills Goal 1, SLP) by discharge  -CR --     Comment (Thought Organization Skills Goal 1, SLP) Mental manipulation of 3 items completed with 60% accuracy given NO cues, increased to 80% accuracy with repetition.  -CR --        Reasoning Goal 1 (SLP)    Improve Reasoning Through Goal 1 (SLP) -- complete deductive reasoning task;complete basic reasoning task;complete mental flexibility task;80%;with moderate cues (50-74%)  -CR     Time Frame (Reasoning Goal 1, SLP) -- by discharge  -CR     Progress/Outcomes (Reasoning Goal 1, SLP) -- good progress toward goal  -CR     Comment (Reasoning Goal 1, SLP) -- Patient completed a 10-cell closet organization task targeting reasoning and executive function skills with 70% accuracy given NO cues, increased to 80% accuracy when given MIN cues.  -CR               User Key  (r) = Recorded By, (t) = Taken By, (c) = Cosigned By      Initials Name Provider Type    Mariely Corcoran CF-SLP Speech and Language Pathologist                                 Time Calculation:        Time Calculation- SLP       Row Name 06/27/23 1301 06/27/23 1004          Time Calculation- SLP    SLP Start Time 1230  -CR 0900  -CR     SLP Stop Time 1300  -CR 0930  -CR     SLP Time Calculation (min) 30 min  -CR 30 min  -CR               User Key  (r) = Recorded By, (t) = Taken By, (c) = Cosigned By      Initials Name Provider Type    Mariely Corcoran CF-SLP Speech and Language Pathologist                      Therapy Charges for Today       Code Description Service Date Service Provider Modifiers Qty    07236090939  ST DEV OF COGN SKILLS INITIAL 15 MIN 6/27/2023 Mariely Bridges CF-SLP  1    64803057807 HC ST DEV OF COGN SKILLS EACH ADDT'L 15 MIN 6/27/2023 Mariely Bridges CF-SLP  3                             ZACH [place]  6/27/2023

## 2023-06-27 NOTE — THERAPY TREATMENT NOTE
Inpatient Rehabilitation - Occupational Therapy Treatment Note    [facility]     Patient Name: Vernon Solorzano  : 1950  MRN: [id number]    Today's Date: 2023                 Admit Date: 2023         ICD-10-CM ICD-9-CM   1. Impaired functional mobility, balance, gait, and endurance  Z74.09 V49.89       Patient Active Problem List   Diagnosis    DM (diabetes mellitus), type 2    Mixed hyperlipidemia    Mild intermittent asthma without complication    New onset of congestive heart failure    Nonrheumatic mitral valve regurgitation    Left-sided chest pain    Essential hypertension    Mitral valve disease    Acute ischemic left MCA stroke    Atelectasis of both lungs    Pericardial effusion, acute    History of ischemic stroke    Obese    Physical debility    Chronic diastolic CHF (congestive heart failure)    Anemia    Stage 3a chronic kidney disease    Paroxysmal atrial flutter    H/O mitral valve replacement with tissue graft    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 AMARILIS MORGAN       Past Surgical History:   Procedure Laterality Date    CARDIAC CATHETERIZATION N/A 2023    Procedure: Right Heart Cath;  Surgeon: Corey Gaffney MD;  Location:  [facility] CATH [facility]VASIVE [facility];  Service: Cardiology;  Laterality: N/A;    CARDIAC CATHETERIZATION N/A 2023    Procedure: Left Heart Cath;  Surgeon: Corey Gaffney MD;  Location:  [facility] CATH [facility]IVE [facility];  Service: Cardiology;  Laterality: N/A;    CARDIAC CATHETERIZATION N/A 2023    Procedure: Left ventriculography;  Surgeon: Gulshan  MD Corey;  Location: [facility] CATH INVASIVE LOCATION;  Service: Cardiology;  Laterality: N/A;    CARDIAC CATHETERIZATION N/A 5/11/2023    Procedure: Coronary angiography;  Surgeon: Corey Gaffney MD;  Location: [facility] CATH INVASIVE LOCATION;  Service: Cardiology;  Laterality: N/A;    COLONOSCOPY N/A     WNL PER PT, NO RECORDS,     COLONOSCOPY N/A 04/07/2009    DR. GORGE BENAVIDEZ AT [place]    MITRAL VALVE REPAIR/REPLACEMENT N/A 5/24/2023    Procedure: MITRAL VALVE REPAIR/REPLACEMENT TRICUSPID VALVE REPAIR/REPLACEMENT TRANSESOPHAGEAL ECHOCARDIOGRAM WITH ANESTHESIA;  Surgeon: George Frey MD;  Location: [facility] CVOR;  Service: Cardiothoracic;  Laterality: N/A;    PROSTATE SURGERY N/A 11/12/2010    TURP, DR. COREY COLLAZO AT [facility]    SKIN TAG REMOVAL N/A 12/20/2017    ANAL SKIN TAG X2, PERFORMED IN OFFICE, DR. LISA ORANTES    TURP / TRANSURETHRAL INCISION / DRAINAGE PROSTATE  2010    Dr. Goodrich             IRF OT ASSESSMENT FLOWSHEET (last 12 hours)       IRF OT Evaluation and Treatment       Row Name 06/27/23 1450 06/27/23 0800       OT Time and Intention    Document Type daily treatment  -AF daily treatment  -KN    Mode of Treatment occupational therapy  -AF individual therapy;occupational therapy  -KN    Patient Effort adequate  -AF good  -KN    Symptoms Noted During/After Treatment -- shortness of breath;fatigue  -KN      Row Name 06/27/23 1450 06/27/23 0800       General Information    Patient Profile Reviewed -- yes  -KN    Patient/Family/Caregiver Comments/Observations pt sittng up in w.c in room  -AF seated EOB  -KN    General Observations of Patient pt required increased time to relay events of transfer back to the hospital then back to rehab  -AF --    Existing Precautions/Restrictions cardiac;fall;sternal  -AF cardiac;fall;sternal  -KN      Row Name 06/27/23 1450 06/27/23 0800       Pain Assessment    Pretreatment Pain Rating 0/10 - no pain  -AF 0/10 - no pain  -KN    Posttreatment Pain  Rating 0/10 - no pain  -AF 0/10 - no pain  -KN      Row Name 06/27/23 1450 06/27/23 0800       Cognition/Psychosocial    Affect/Mental Status (Cognition) WNL  -AF WNL  -KN    Orientation Status (Cognition) oriented x 3  -AF oriented x 3  -KN    Follows Commands (Cognition) follows one-step commands;over 90% accuracy;repetition of directions required  -AF follows one-step commands;over 90% accuracy  -KN    Personal Safety Interventions fall prevention program maintained;gait belt;nonskid shoes/slippers when out of bed  -AF safety round/check completed;fall prevention program maintained  -KN    Cognitive Function -- attention deficit  -KN    Attention Deficit (Cognition) focused/sustained attention  -AF focused/sustained attention  -KN    Comment, Cognition easily distracted  -AF --      Row Name 06/27/23 0800          Bathing    [place] Level (Bathing) bathing skills;lower body;upper body;contact guard assist  -KN     Assistive Device (Bathing) hand held shower spray hose;shower chair;grab bar/tub rail  -KN     Position (Bathing) supported sitting;supported standing  -KN     Set-up Assistance (Bathing) obtain supplies  -KN     Comment (Bathing) fatigues and noted to be SOA during standing  -KN       Row Name 06/27/23 0800          Upper Body Dressing    [place] Level (Upper Body Dressing) upper body dressing skills;buttons;set up assistance;verbal cues  -KN     Position (Upper Body Dressing) supported sitting  -KN     Set-up Assistance (Upper Body Dressing) obtain clothing  -KN       Row Name 06/27/23 0800          Lower Body Dressing    [place] Level (Lower Body Dressing) doff;don;pants/bottoms;shoes/slippers;socks;underwear;minimum assist (75% patient effort);contact guard assist  -KN     Position (Lower Body Dressing) supported standing  -KN     Comment (Lower Body Dressing) standing using grab bars  -KN       Row Name 06/27/23 0800          Grooming    [place] Level (Grooming) grooming  skills;deodorant application;hair care, combing/brushing;oral care regimen;wash face, hands;set up  -KN     Position (Grooming) supported sitting  -KN     Set-up Assistance (Grooming) obtain supplies;adjust water temperature/flow  -KN       Row Name 06/27/23 0800          Sit-Stand Transfer    Sit-Stand [place] (Transfers) contact guard;1 person assist  -KN     Assistive Device (Sit-Stand Transfers) walker, front-wheeled  -KN     Comment, (Sit-Stand Transfer) from EOB  -KN       Row Name 06/27/23 0800          Shower Transfer    Type (Shower Transfer) stand-sit;sit-stand  -KN     [place] Level (Shower Transfer) contact guard;1 person assist  -KN     Assistive Device (Shower Transfer) grab bar, tub/shower;shower chair;walker, front-wheeled  -KN     Comment, (Shower Transfer) utilized GBs  -KN       Row Name 06/27/23 1450          Shoulder (Therapeutic Exercise)    Shoulder Strengthening (Therapeutic Exercise) bilateral;flexion;extension;scapular stabilization;sitting;10 repetitions;3 sets  #2 dowel sha  -AF       Row Name 06/27/23 1450          Elbow/Forearm (Therapeutic Exercise)    Elbow/Forearm Strengthening (Therapeutic Exercise) bilateral;flexion;extension;supination;pronation;sitting;2 lb free weight;3 lb free weight;10 repetitions;15 repititions;3 sets  -AF       Row Name 06/27/23 1450          Wrist (Therapeutic Exercise)    Wrist Strengthening (Therapeutic Exercise) bilateral;flexion;extension;2 lb free weight;3 lb free weight;15 repititions;10 repetitions;3 sets  -AF       Row Name 06/27/23 1450          Hand (Therapeutic Exercise)    Hand Strengthening (Therapeutic Exercise) bilateral; strengthening;10 repetitions;3 sets  -AF       [facility] Name 06/27/23 1450 06/27/23 0800       Positioning and Restraints    Pre-Treatment Position sitting in chair/recliner  -AF in bed  -KN    Post Treatment Position wheelchair  -AF other  seated EOB  -KN    In Bed -- sitting EOB;with nsg  -KN    In Wheelchair  sitting;exit alarm on;with PT  -AF --              User Key  (r) = Recorded By, (t) = Taken By, (c) = Cosigned By      Initials Name Effective Dates    Candy Clark CASIE, OTR 06/16/21 -     Shon Cramer, GARY 08/02/22 -                      Occupational Therapy Education       Title: PT OT SLP Therapies (Resolved)       Topic: Occupational Therapy (Resolved)       Point: ADL training (Resolved)       Description:   Instruct learner(s) on proper safety adaptation and remediation techniques during self care or transfers.   Instruct in proper use of assistive devices.                  Learning Progress Summary             Patient Acceptance, E, NR by AF at 6/13/2023 1527    Comment: benefits of therapy, endurance   Family Acceptance, E, NR by AF at 6/13/2023 1527    Comment: benefits of therapy, endurance                         Point: Home exercise program (Resolved)       Description:   Instruct learner(s) on appropriate technique for monitoring, assisting and/or progressing therapeutic exercises/activities.                  Learning Progress Summary             Patient Acceptance, E, NR by AF at 6/13/2023 1527    Comment: benefits of therapy, endurance   Family Acceptance, E, NR by AF at 6/13/2023 1527    Comment: benefits of therapy, endurance                         Point: Precautions (Resolved)       Description:   Instruct learner(s) on prescribed precautions during self-care and functional transfers.                  Learning Progress Summary             Patient Acceptance, E, NR by AF at 6/13/2023 1527    Comment: benefits of therapy, endurance   Family Acceptance, E, NR by AF at 6/13/2023 1527    Comment: benefits of therapy, endurance                         Point: Body mechanics (Resolved)       Description:   Instruct learner(s) on proper positioning and spine alignment during self-care, functional mobility activities and/or exercises.                  Learning Progress Summary             Patient  Acceptance, E, NR by AF at 6/13/2023 1527    Comment: benefits of therapy, endurance   Family Acceptance, E, NR by AF at 6/13/2023 1527    Comment: benefits of therapy, endurance                                         User Key       Initials Effective Dates Name Provider Type Discipline     06/16/21 -  Candy Davis OTSIMONA Occupational Therapist OT                        OT Recommendation and Plan                         Time Calculation:      Time Calculation- OT       Row Name 06/27/23 1453 06/27/23 0850          Time Calculation- OT    OT Start Time 1400  -AF 0800  -KN     OT Stop Time 1430  -AF 0830  -KN     OT Time Calculation (min) 30 min  -AF 30 min  -KN     OT Received On -- 06/27/23  -KN        Timed Charges    25233 - OT Self Care/Mgmt Minutes -- 30  -KN        Total Minutes    Timed Charges Total Minutes -- 30  -KN      Total Minutes -- 30  -KN               User Key  (r) = Recorded By, (t) = Taken By, (c) = Cosigned By      Initials Name Provider Type    AF Candy Davis OTSIMONA Occupational Therapist    Shon Cramer OT Occupational Therapist                  Therapy Charges for Today       Code Description Service Date Service Provider Modifiers Qty    16453803088 HC OT THER PROC EA 15 MIN 6/27/2023 Candy Davis OTR GO 2                     HI Wyman  6/27/2023

## 2023-06-27 NOTE — PROGRESS NOTES
Inpatient Rehabilitation Plan of Care Note    Plan of Care  Care Plan Reviewed - No updates at this time.    Sphincter Control    Performed Intervention(s)  Offer toileting/ timed voids  Monitor I&O      Safety    Performed Intervention(s)  Safety monitoring during functional activities  Environmental set- upto reduce risk      Psychosocial    Performed Intervention(s)  Allow patient to verbalize needs and concerns      Body Systems    Performed Intervention(s)  ACCU check ACHS    Signed by: Joan Mckenzie RN

## 2023-06-27 NOTE — THERAPY TREATMENT NOTE
Inpatient Rehabilitation - Occupational Therapy Treatment Note    [facility]     Patient Name: Vernon Solorzano  : 1950  MRN: [id number]    Today's Date: 2023                 Admit Date: 2023         ICD-10-CM ICD-9-CM   1. Impaired functional mobility, balance, gait, and endurance  Z74.09 V49.89       Patient Active Problem List   Diagnosis    DM (diabetes mellitus), type 2    Mixed hyperlipidemia    Mild intermittent asthma without complication    New onset of congestive heart failure    Nonrheumatic mitral valve regurgitation    Left-sided chest pain    Essential hypertension    Mitral valve disease    Acute ischemic left MCA stroke    Atelectasis of both lungs    Pericardial effusion, acute    History of ischemic stroke    Obese    Physical debility    Chronic diastolic CHF (congestive heart failure)    Anemia    Stage 3a chronic kidney disease    Paroxysmal atrial flutter    H/O mitral valve replacement with tissue graft    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 AMARILIS MORGAN       Past Surgical History:   Procedure Laterality Date    CARDIAC CATHETERIZATION N/A 2023    Procedure: Right Heart Cath;  Surgeon: Corey Gaffney MD;  Location:  [facility] CATH [facility]VASIVE [facility];  Service: Cardiology;  Laterality: N/A;    CARDIAC CATHETERIZATION N/A 2023    Procedure: Left Heart Cath;  Surgeon: Corey Gaffney MD;  Location:  [facility] CATH [facility];  Service: Cardiology;  Laterality: N/A;    CARDIAC CATHETERIZATION N/A 2023    Procedure: Left ventriculography;  Surgeon: Gulshan  MD Corey;  Location: [facility] CATH INVASIVE LOCATION;  Service: Cardiology;  Laterality: N/A;    CARDIAC CATHETERIZATION N/A 5/11/2023    Procedure: Coronary angiography;  Surgeon: Corey Gaffney MD;  Location: [facility] CATH INVASIVE LOCATION;  Service: Cardiology;  Laterality: N/A;    COLONOSCOPY N/A     WNL PER PT, NO RECORDS,     COLONOSCOPY N/A 04/07/2009    DR. GORGE BENAVIDEZ AT [place]    MITRAL VALVE REPAIR/REPLACEMENT N/A 5/24/2023    Procedure: MITRAL VALVE REPAIR/REPLACEMENT TRICUSPID VALVE REPAIR/REPLACEMENT TRANSESOPHAGEAL ECHOCARDIOGRAM WITH ANESTHESIA;  Surgeon: George Frey MD;  Location: [facility] CVOR;  Service: Cardiothoracic;  Laterality: N/A;    PROSTATE SURGERY N/A 11/12/2010    TURP, DR. COREY COLLAZO AT [facility]    SKIN TAG REMOVAL N/A 12/20/2017    ANAL SKIN TAG X2, PERFORMED IN OFFICE, DR. LISA ORANTES    TURP / TRANSURETHRAL INCISION / DRAINAGE PROSTATE  2010    Dr. Goodrich             IRF OT ASSESSMENT FLOWSHEET (last 12 hours)       IRF OT Evaluation and Treatment       Row Name 06/27/23 0800          OT Time and Intention    Document Type daily treatment  -KN     Mode of Treatment individual therapy;occupational therapy  -KN     Patient Effort good  -KN     Symptoms Noted During/After Treatment shortness of breath;fatigue  -KN       Row Name 06/27/23 0800          General Information    Patient Profile Reviewed yes  -KN     Patient/Family/Caregiver Comments/Observations seated EOB  -KN     Existing Precautions/Restrictions cardiac;fall;sternal  -KN       Row Name 06/27/23 0800          Pain Assessment    Pretreatment Pain Rating 0/10 - no pain  -KN     Posttreatment Pain Rating 0/10 - no pain  -KN       Row Name 06/27/23 0800          Cognition/Psychosocial    Affect/Mental Status (Cognition) WNL  -KN     Orientation Status (Cognition) oriented x 3  -KN     Follows Commands (Cognition) follows one-step commands;over 90% accuracy  -KN     Personal Safety Interventions safety  round/check completed;fall prevention program maintained  -KN     Cognitive Function attention deficit  -KN     Attention Deficit (Cognition) focused/sustained attention  -KN       Row Name 06/27/23 0800          Bathing    [place] Level (Bathing) bathing skills;lower body;upper body;contact guard assist  -KN     Assistive Device (Bathing) hand held shower spray hose;shower chair;grab bar/tub rail  -KN     Position (Bathing) supported sitting;supported standing  -KN     Set-up Assistance (Bathing) obtain supplies  -KN     Comment (Bathing) fatigues and noted to be SOA during standing  -KN       Row Name 06/27/23 0800          Upper Body Dressing    [place] Level (Upper Body Dressing) upper body dressing skills;buttons;set up assistance;verbal cues  -KN     Position (Upper Body Dressing) supported sitting  -KN     Set-up Assistance (Upper Body Dressing) obtain clothing  -KN       Row Name 06/27/23 0800          Lower Body Dressing    [place] Level (Lower Body Dressing) doff;don;pants/bottoms;shoes/slippers;socks;underwear;minimum assist (75% patient effort);contact guard assist  -KN     Position (Lower Body Dressing) supported standing  -KN     Comment (Lower Body Dressing) standing using grab bars  -KN       Row Name 06/27/23 0800          Grooming    [place] Level (Grooming) grooming skills;deodorant application;hair care, combing/brushing;oral care regimen;wash face, hands;set up  -KN     Position (Grooming) supported sitting  -KN     Set-up Assistance (Grooming) obtain supplies;adjust water temperature/flow  -KN       Row Name 06/27/23 0800          Sit-Stand Transfer    Sit-Stand [place] (Transfers) contact guard;1 person assist  -KN     Assistive Device (Sit-Stand Transfers) walker, front-wheeled  -KN     Comment, (Sit-Stand Transfer) from EOB  -KN       Row Name 06/27/23 0800          Shower Transfer    Type (Shower Transfer) stand-sit;sit-stand  -KN     [place] Level (Shower  Transfer) contact guard;1 person assist  -KN     Assistive Device (Shower Transfer) grab bar, tub/shower;shower chair;walker, front-wheeled  -KN     Comment, (Shower Transfer) utilized GBs  -KN       Row Name 06/27/23 0800          Positioning and Restraints    Pre-Treatment Position in bed  -KN     Post Treatment Position other  seated EOB  -KN     In Bed sitting EOB;with nsg  -KN               User Key  (r) = Recorded By, (t) = Taken By, (c) = Cosigned By      Initials Name Effective Dates    Shon Cramer, OT 08/02/22 -                      Occupational Therapy Education       Title: PT OT SLP Therapies (Resolved)       Topic: Occupational Therapy (Resolved)       Point: ADL training (Resolved)       Description:   Instruct learner(s) on proper safety adaptation and remediation techniques during self care or transfers.   Instruct in proper use of assistive devices.                  Learning Progress Summary             Patient Acceptance, E, NR by AF at 6/13/2023 1527    Comment: benefits of therapy, endurance   Family Acceptance, E, NR by AF at 6/13/2023 1527    Comment: benefits of therapy, endurance                         Point: Home exercise program (Resolved)       Description:   Instruct learner(s) on appropriate technique for monitoring, assisting and/or progressing therapeutic exercises/activities.                  Learning Progress Summary             Patient Acceptance, E, NR by AF at 6/13/2023 1527    Comment: benefits of therapy, endurance   Family Acceptance, E, NR by AF at 6/13/2023 1527    Comment: benefits of therapy, endurance                         Point: Precautions (Resolved)       Description:   Instruct learner(s) on prescribed precautions during self-care and functional transfers.                  Learning Progress Summary             Patient Acceptance, E, NR by AF at 6/13/2023 1527    Comment: benefits of therapy, endurance   Family Acceptance, E, NR by AF at 6/13/2023 1527     Comment: benefits of therapy, endurance                         Point: Body mechanics (Resolved)       Description:   Instruct learner(s) on proper positioning and spine alignment during self-care, functional mobility activities and/or exercises.                  Learning Progress Summary             Patient Acceptance, E, NR by AF at 6/13/2023 1527    Comment: benefits of therapy, endurance   Family Acceptance, E, NR by AF at 6/13/2023 1527    Comment: benefits of therapy, endurance                                         User Key       Initials Effective Dates Name Provider Type Discipline     06/16/21 -  Candy Davis OTR Occupational Therapist OT                        OT Recommendation and Plan                         Time Calculation:      Time Calculation- OT       Row Name 06/27/23 0850             Time Calculation- OT    OT Start Time 0800  -KN      OT Stop Time 0830  -KN      OT Time Calculation (min) 30 min  -KN      OT Received On 06/27/23  -KN         Timed Charges    65622 - OT Self Care/Mgmt Minutes 30  -KN         Total Minutes    Timed Charges Total Minutes 30  -KN       Total Minutes 30  -KN                User Key  (r) = Recorded By, (t) = Taken By, (c) = Cosigned By      Initials Name Provider Type    Shon Cramer OT Occupational Therapist                  Therapy Charges for Today       Code Description Service Date Service Provider Modifiers Qty    47163398811 HC OT SELF CARE/MGMT/TRAIN EA 15 MIN 6/27/2023 Shon Sargent OT GO 2                     Shon Sargent OT  6/27/2023

## 2023-06-27 NOTE — PROGRESS NOTES
LOS: 21 days   Patient Care Team:  Liza Oconnell III, NP-C as PCP - General (Family Medicine)  Corey Lao Jr., MD (Inactive) as Consulting Physician (Urology)      KRIS TAYLOR  1950      ADMITTING DIAGNOSIS:  Stroke  Valvular heart disease status post repair/replacement      Subjective       He reports he is comfortable with his breathing.  Still with fatigue but is making progress.   Chest pain much better on colchicine. No muscle aches on statin.    Objective     Vitals:    06/27/23 1500   BP: 102/67   Pulse: 100   Resp: 20   Temp: 98 °F (36.7 °C)   SpO2: 98%       PHYSICAL EXAM:   MENTAL STATUS -  AWAKE / ALERT  HEENT- NCAT, PUPILS EQUALLY ROUND, SCLERAE ANICTERIC, CONJUNCTIVAE PINK, OP MOIST, NO JVD, EARS UNREMARKABLE EXTERNALLY  LUNGS - CTA,    Sternotomy incision -dressed  HEART-sinus with occasional ectopy  ABD - NORMOACTIVE BOWEL SOUNDS, SOFT, NT.   EXT -1+ pedal edema bilateral lower extremities, improved from yesterday   NEURO -oriented to person place time and situation.    Speech is fluent.            MEDICATIONS  Scheduled Meds:aspirin, 81 mg, Oral, Daily  atorvastatin, 40 mg, Oral, Nightly  budesonide-formoterol, 2 puff, Inhalation, BID - RT  calcium 500 mg vitamin D 5 mcg (200 UT), 1 tablet, Oral, Daily  colchicine, 0.6 mg, Oral, Daily  guaifenesin, 400 mg, Oral, TID  insulin glargine, 15 Units, Subcutaneous, Daily  insulin glargine, 20 Units, Subcutaneous, Nightly  insulin lispro, 3-14 Units, Subcutaneous, 4x Daily AC & at Bedtime  insulin lispro, 4 Units, Subcutaneous, TID With Meals  magnesium oxide, 400 mg, Oral, Daily  melatonin, 3 mg, Oral, Nightly  metoprolol succinate XL, 25 mg, Oral, Q24H  pantoprazole, 40 mg, Oral, Q AM  sodium chloride, 3 mL, Intravenous, Q12H  tiotropium bromide monohydrate, 2 puff, Inhalation, Daily - RT      Continuous Infusions:   PRN Meds:.•  acetaminophen **OR** [DISCONTINUED] acetaminophen **OR** [DISCONTINUED]  acetaminophen  •  bisacodyl  •  calcium carbonate  •  dextrose  •  dextrose  •  docusate sodium  •  glucagon (human recombinant)  •  hydrocortisone-bacitracin-zinc oxide-nystatin  •  ipratropium-albuterol  •  melatonin  •  ondansetron **OR** ondansetron  •  senna  •  sodium chloride  •  traMADol      RESULTS  Glucose   Date/Time Value Ref Range Status   06/27/2023 1630 227 (H) 70 - 130 mg/dL Final     Comment:     SHAR NOTIFIED Meter: [id number] : [id number] Roe Herman NA   06/27/2023 1122 217 (H) 70 - 130 mg/dL Final     Comment:     Meter: [id number] : [id number] Roe Herman    06/27/2023 0738 156 (H) 70 - 130 mg/dL Final     Comment:     Meter: [id number] : [id number] Roe Herman NA   06/26/2023 2019 243 (H) 70 - 130 mg/dL Final     Comment:     Meter: [id number] : [id number] Summer Venegas NA   06/26/2023 1631 247 (H) 70 - 130 mg/dL Final     Comment:     Meter: [id number] : [id number] Jamila Guerrier NA   06/26/2023 1546 270 (H) 70 - 130 mg/dL Final     Comment:     Meter: [id number] : [id number] Vinicio Davila MARY   06/26/2023 1039 185 (H) 70 - 130 mg/dL Final     Comment:     Meter: [id number] : [id number] Odin Stephani NA   06/26/2023 0628 142 (H) 70 - 130 mg/dL Final     Comment:     Meter: [id number] : [id number] Kathie BARRETO     Results from last 7 days   Lab Units 06/27/23  0710 06/26/23  0322 06/25/23  0739   WBC 10*3/mm3 7.07 9.69 11.08*   HEMOGLOBIN g/dL 9.3* 9.9* 10.3*   HEMATOCRIT % 28.0* 30.5* 31.3*   PLATELETS 10*3/mm3 255 246 259     Results from last 7 days   Lab Units 06/27/23  0710 06/26/23  0322 06/25/23  0739 06/24/23  1946 06/24/23  0228 06/23/23  0812 06/22/23  1146   SODIUM mmol/L 138 139 136  --  139 133* 137   POTASSIUM mmol/L 3.1* 3.5 3.5   < > 3.4* 3.5 3.8   CHLORIDE mmol/L 98 100 96*  --  96* 94* 98   CO2 mmol/L 29.0 28.3 28.0  --  30.0* 27.9 27.6   BUN mg/dL 18 19 21  --  14 13 14   CREATININE mg/dL 1.31* 1.55* 1.47*   --  1.50* 1.46* 1.43*   CALCIUM mg/dL 8.9 8.9 9.5  --  9.1 8.9 9.4   BILIRUBIN mg/dL  --   --   --   --  0.4 0.7 0.6   ALK PHOS U/L  --   --   --   --  122* 120* 126*   ALT (SGPT) U/L  --   --   --   --  12 9 9   AST (SGOT) U/L  --   --   --   --  9 14 10   GLUCOSE mg/dL 128* 110* 151*  --  149* 151* 195*    < > = values in this interval not displayed.      Latest Reference Range & Units 05/23/23 14:29   Hemoglobin A1C 4.80 - 5.60 % 9.20 (H)   Total Cholesterol 0 - 200 mg/dL 155   HDL Cholesterol 40 - 60 mg/dL 41   LDL Cholesterol  0 - 100 mg/dL 81   Triglycerides 0 - 150 mg/dL 197 (H)   (H): Data is abnormally high     Latest Reference Range & Units 05/31/23 08:03   25 Hydroxy, Vitamin D 30.0 - 100.0 ng/ml 20.0 (L)   (L): Data is abnormally low    Lower extremity venous duplex-June 19, 2023-Chronic right lower extremity superficial thrombophlebitis noted in the small saphenous.     ASSESSMENT and PLAN    Acute ischemic left MCA stroke    Status post CVA-Scattered  punctate restricted diffusion acute infarct in the bilateral frontal and   left parietal occipital cortices.        Impaired Cognition/impaired mobility/impaired self-care     Aphasia-resolved     Right hand apraxia-resolved     Encephalopathy-improving     Dysphagia/nutrition-healthy heart 2-3 g sodium, consistent carb, no mixed consistency, regular texture, nectar thick liquid  June 7-videofluoroscopic swallow study planned tomorrow  June 8-swallow study today-advance to regular solid, no mixed consistency, thin liquids by cup.  No straws.     Stroke prophylaxis-Eliquis/aspirin/atorvastatin     History of severe mitral regurgitation and annular dilatation, moderate to severe tricuspid regurgitation-  status post May 24, 2023 - 1. Mitral valve repair with a 28 mm physio II ring annuloplasty with residual mild to moderate MR  2.  Mitral valve replacement with a 29 mm Schwartz II porcine prosthesis  3.  Tricuspid valve repair with 28 mm physio tricuspid  ring  4.  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 1200 cc fluid restriction     Severe pulmonary hypertension  Obstructive sleep apnea     Interstitial lung disease status post COVID-19 infection-steroid taper per pulmonology  DuoNebs  Chronic steroid use with cushingoid syndrome  Prednisone 5 mg every other day  June 23-change back to his home regimen Spiriva 2 puffs once a day and Symbicort 160/4.5 two puffs twice a day       Leukocytosis-reactive/steroid administration     Postoperative anemia     Zqsuwcamfejpfaka-kejezgwfenr-domsqtrw     Chronic kidney disease stage III-baseline creatinine 1.2     Diabetes mellitus type 2-Jardiance/Lantus/sliding scale insulin-uncontrolled  Off Jardiance secondary to renal function.  Adjusting short and long acting insulin.     BPH/urinary retention-history of TURP  June 14-void 300 cc with postvoid residual 205 cc.    Renal insufficiency -   Nephrology following     Pain management-tramadol-/Tylenol Dk report reviewed              TEAM CONF - JUNE 8 - ENDURANCE POOR. BED MOD. TRANSFERS MOD. GAIT 25 FEET MOD ASSIST RW. SHOWER TRANSFERS MOD A.   BATH MOD ASSIST. LBD MAX. UBD MIN. TOILETING MAX.   Patient is currently on nectar liquids VFSS TODAY.   BIMS SUMMARY SCORE: 9 Moderately impaired   DIFFICULTY WITH SUSTAINED AND FOCAL ATTENTION.  CONTINENT / INCONTINENT  ELOS - 2 -3 WEEKS    TEAM CONF - Kari 15 - BED MOD. TRANSFERS MIN. 4 STAIRS MIN X 2. SATS 97% OR HIGHER WITH GAIT WITH FATIGUE, GAIT 80 FEET MIN ASSIST RW. TOILET TRANSFERS MIN. SHOWER TRANSFERS MIN MOD. BATH MIN. LBD MIN MOD. UBD MIN. GROOMING SET UP. TOILETING MIN MOD. DRESSING TAKES 30 MINUTES WIETH SLOW PROCESSING. EDEMA - JOBST STOCKINGS. MILD DYSPHGAIA, REG THIN BY CUP, NO STRAWS. CLQT MODERATE DEFICITS, DIFFICULTY WITH  ATTENTION AND MEMORY, PROLONGED PROCESSING SPEED. BNE FOLLOWING. VARIABLE INTAKE - DIETITIAN FOLLOWING. RASH ON ABD AND LEFT CHEST, POSSIBLY CONTACT DERMATITIS. TORSEMIDE ADDED.  ELOS - TWO WEEKS.    TEAM CONF - June 22- DIURETIC ADJUSTED BY NEPRHOLOGY. PATIENT DOES NOT FOLLOW 1500 CC FLUID RESTRICTION. TRANSFERS CTG. 4 STAIRS MIN X 2. GAIT 80 FEET CTG RW.   TOILET AND SHOWER TRANSFERS CTG. BATH MIN-CTG. LBD MIN-CTG FOR COMPRESSION STOCKINGS. UBD SBA. TOILETING CTG AS ASKS FOR HELP.   SWALLOW - REGULAR SOLID, THIN LIQUID BY CUP. MOSTLY CONTINENT.   Orientation: WNL  Attention: WNL to Mildly Impaired  Executive Functioning: Mildly Impaired  Abstract Reasoning: WNL  Arithmetic: Mildly Impaired  Visuospatial Perception: WNL  Visuospatial Praxis: WNL for Figure Copy; Severely Impaired Coding related to slowed processing speed.  Verbal Memory: Moderately to Severely Impaired  Visual Memory: Mildly Impaired  Emotional: Some frustration regarding current level of functioning, but minimal anxiety and depression symptoms reported on the GDS and BROOKE.  -demonstrating moderate to severe verbal memory deficits and slowed processing speed that are consistent with the location of his stroke.  Word-finding difficulties are also present, but are more evident conversationally than during testing.  ELOS - ONE WEEK- June 29            Goal is for home with outpatient cardiac rehab   therapies.  Barrier to discharge: Impaired cognition mobility self-care- work on follow, executive function, conditioning, transfers, progressive ambulation, ADLs to overcome.           Tu Silver MD,     During rounds, used appropriate personal protective equipment including mask and gloves.  Additional gown if indicated.  Mask used was standard procedure mask. Appropriate PPE was worn during the entire visit.  Hand hygiene was completed before and after.

## 2023-06-27 NOTE — PROGRESS NOTES
Inpatient Rehabilitation Plan of Care Note    Plan of Care  Care Plan Reviewed - Updates as Follows    Sphincter Control    [RN] Bladder Management(Active)  Current Status(06/27/2023): Patient is continent of urine using urinal or toilet  but may have urgency.  Weekly Goal(07/03/2023): Patient will be continent 90% of the time.  Discharge Goal: Patient will be continent    [RN] Bowel Management(Active)  Current Status(06/27/2023): Patietn is continent of bowels  Weekly Goal(07/04/2023): patient is continent of bowels  Discharge Goal: Patient is continent of bowels    Performed Intervention(s)  Offer toileting/ timed voids  Monitor I&O      Safety    [RN] Potential for Injury(Active)  Current Status(06/27/2023): Patient is at increased risk for falls/injury r/t  recent surgery and stroke.  Weekly Goal(07/03/2023): Patient will use the call light for assistance  Discharge Goal: Patient/ family will be aware of risk of fall and safety in the  home setting    Performed Intervention(s)  Safety monitoring during functional activities  Environmental set- upto reduce risk      Psychosocial    [RN] Coping/Adjustment(Active)  Current Status(06/27/2023): Patient has a supportive family. Pt is at increased  risk of impaired coping r/t recent stroke and hospitalization. Pt is A/Ox4 but  may be forgetful.  Weekly Goal(07/03/2023): Patient will express concerns regarding current status  Discharge Goal: Patient will demonstrate healthy coping strategies    Performed Intervention(s)  Allow patient to verbalize needs and concerns      Body Systems    [RN] Endocrine(Active)  Current Status(06/27/2023): Uncontrolled BS  Weekly Goal(07/03/2023): BS WNL  Discharge Goal: Diabetes education, BS WNL    Performed Intervention(s)  ACCU check ACHS    Signed by: Viviana Whitfield RN

## 2023-06-28 LAB
ALBUMIN SERPL-MCNC: 3.6 G/DL (ref 3.5–5.2)
ANION GAP SERPL CALCULATED.3IONS-SCNC: 9.4 MMOL/L (ref 5–15)
BUN SERPL-MCNC: 16 MG/DL (ref 8–23)
BUN/CREAT SERPL: 12.7 (ref 7–25)
CALCIUM SPEC-SCNC: 9.5 MG/DL (ref 8.6–10.5)
CHLORIDE SERPL-SCNC: 101 MMOL/L (ref 98–107)
CO2 SERPL-SCNC: 29.6 MMOL/L (ref 22–29)
CREAT SERPL-MCNC: 1.26 MG/DL (ref 0.76–1.27)
DEPRECATED RDW RBC AUTO: 41.3 FL (ref 37–54)
EGFRCR SERPLBLD CKD-EPI 2021: 59.8 ML/MIN/1.73
ERYTHROCYTE [DISTWIDTH] IN BLOOD BY AUTOMATED COUNT: 13.8 % (ref 12.3–15.4)
GLUCOSE BLDC GLUCOMTR-MCNC: 126 MG/DL (ref 70–130)
GLUCOSE BLDC GLUCOMTR-MCNC: 218 MG/DL (ref 70–130)
GLUCOSE BLDC GLUCOMTR-MCNC: 237 MG/DL (ref 70–130)
GLUCOSE BLDC GLUCOMTR-MCNC: 320 MG/DL (ref 70–130)
GLUCOSE SERPL-MCNC: 127 MG/DL (ref 65–99)
HCT VFR BLD AUTO: 28.3 % (ref 37.5–51)
HGB BLD-MCNC: 9.5 G/DL (ref 13–17.7)
MAGNESIUM SERPL-MCNC: 2.1 MG/DL (ref 1.6–2.4)
MCH RBC QN AUTO: 27.8 PG (ref 26.6–33)
MCHC RBC AUTO-ENTMCNC: 33.6 G/DL (ref 31.5–35.7)
MCV RBC AUTO: 82.7 FL (ref 79–97)
PHOSPHATE SERPL-MCNC: 3.3 MG/DL (ref 2.5–4.5)
PLATELET # BLD AUTO: 290 10*3/MM3 (ref 140–450)
PMV BLD AUTO: 9.3 FL (ref 6–12)
POTASSIUM SERPL-SCNC: 3.8 MMOL/L (ref 3.5–5.2)
RBC # BLD AUTO: 3.42 10*6/MM3 (ref 4.14–5.8)
SODIUM SERPL-SCNC: 140 MMOL/L (ref 136–145)
WBC NRBC COR # BLD: 8.52 10*3/MM3 (ref 3.4–10.8)

## 2023-06-28 NOTE — PROGRESS NOTES
Inpatient Rehabilitation Functional Measures Assessment and Plan of Care    Plan of Care  Updated Problems/Interventions  Self Care    [OT] Bathing(Active)  Current Status(06/28/2023): SBA/CGA  Weekly Goal(07/06/2023): SBA  Discharge Goal: SBA    [OT] Dressing (Lower)(Active)  Current Status(06/28/2023): MIN  Weekly Goal(07/05/2023): CGA/SBA  Discharge Goal: CGA/SBA    [OT] Dressing (Upper)(Active)  Current Status(06/28/2023): SBA  Weekly Goal(06/28/2023): Set up  Discharge Goal: set up    [OT] Grooming(Active)  Current Status(06/19/2023): set up  Weekly Goal(06/19/2023): set up  Discharge Goal: set up    [OT] Toileting(Active)  Current Status(06/19/2023): MIN/CGA  Weekly Goal(07/06/2023): CGA/SBA  Discharge Goal: CGA/SBA        Mobility    [OT] Toilet Transfers(Active)  Current Status(06/28/2023): CGA/SBA  Weekly Goal(07/06/2023): SBA  Discharge Goal: SBA    [OT] Tub/Shower Transfers(Active)  Current Status(06/28/2023): CGA  Weekly Goal(07/05/2023): SBA/CGA  Discharge Goal: CGA /SBA    Functional Measures  PERI Eating:  Branch  PERI Grooming: Branch  PERI Bathing:  Branch  PERI Upper Body Dressing:  Branch  PERI Lower Body Dressing:  Branch  PERI Toileting:  Branch    PERI Bladder Management  Level of Assistance:  Branch  Frequency/Number of Accidents this Shift:  Branch    PERI Bowel Management  Level of Assistance: Branch  Frequency/Number of Accidents this Shift: Branch    PERI Bed/Chair/Wheelchair Transfer:  Branch  PERI Toilet Transfer:  Branch  PERI Tub/Shower Transfer:  Branch    Previously Documented Mode of Locomotion at Discharge: Field  PERI Expected Mode of Locomotion at Discharge: Branch  PERI Walk/Wheelchair:  Branch  PERI Stairs:  Branch    PERI Comprehension:  Branch  PERI Expression:  Branch  PERI Social Interaction:  Branch  PERI Problem Solving:  Branch  PERI Memory:  Branch    Therapy Mode Minutes  Occupational Therapy: Branch  Physical Therapy: Branch  Speech Language Pathology:  Branch    Signed by: Kathrine  Khris, OTR/L

## 2023-06-28 NOTE — PLAN OF CARE
Goal Outcome Evaluation:  Plan of Care Reviewed With: patient        Progress: improving  Outcome Evaluation: Immobility syndrome. S/P cabg and MCA stroke. NIH=2. A&OX4. Delayed mental processing and response time. Forgetful. Short term memory loss. Midsternal incision and chest tube sites. Scabbed and KATHY. Hx. HTN, CAD, HLD, Seizures. Blood glucose checks ACHS. Meds whole with thins. Diet: Reg, thins. Up for meals. Assistx1 with walker and wheelchair. Participated fully in therapy. Tired and fatigued afterwards. Napped after sessions. Calm, cooperative, and flat. Ate better at meals. Continent and incontinent B&B. Last BM reported 6/28. Full code. Spouse at bedside. BP WDL. Blood glucose higher today. Daily standing weight. Slept not very well last night. Has PRN tylenol, tramadol, and Norco for pain. Given additional IV lasix for edema. Not following daily fluid restriction of 1200ml/daily. Also removes the hat from the toilet and we have not been able to measure urine output.

## 2023-06-28 NOTE — PROGRESS NOTES
[facility] Progress Note      Patient Name: Vernon Solorzano  : 1950  MRN: [id number]  Primary Care Physician:  Liza Oconnell III, RACHAEL  Date of admission: 2023    Subjective     Interval History:   Follow up CKD III.     Patient working with PT and OT   Some dyspnea on exertion   Tolerating oral intake  Persistent lower extremity edema but improving    Urinating spontaneously        Review of Systems:   As noted above    Objective     Vitals:   Temp:  [98 °F (36.7 °C)-98.4 °F (36.9 °C)] 98.4 °F (36.9 °C)  Heart Rate:  [] 99  Resp:  [20] 20  BP: (102-131)/(66-74) 129/74    Intake/Output Summary (Last 24 hours) at 2023 0910  Last data filed at 2023 2100  Gross per 24 hour   Intake 620 ml   Output 950 ml   Net -330 ml         Physical Exam:    General Appearance: Patient is alert, oriented.  Ill looking.  Questions skin: warm and dry  HEENT: oral mucosa normal, nonicteric sclera  Neck: supple, no JVD  Lungs: Clear to auscultation.   Heart: RRR, normal S1 and S2  Abdomen: soft, nontender, + bs, distended.  Extremities 1-2+ lower ext edema.  Erythematous rash noted on the anterior aspect of right leg    Scheduled Meds:     aspirin, 81 mg, Oral, Daily  atorvastatin, 40 mg, Oral, Nightly  budesonide-formoterol, 2 puff, Inhalation, BID - RT  calcium 500 mg vitamin D 5 mcg (200 UT), 1 tablet, Oral, Daily  colchicine, 0.6 mg, Oral, Daily  guaifenesin, 400 mg, Oral, TID  insulin glargine, 15 Units, Subcutaneous, Daily  insulin glargine, 20 Units, Subcutaneous, Nightly  insulin lispro, 3-14 Units, Subcutaneous, 4x Daily AC & at Bedtime  insulin lispro, 4 Units, Subcutaneous, TID With Meals  magnesium oxide, 400 mg, Oral, Daily  melatonin, 3 mg, Oral, Nightly  metoprolol succinate XL, 25 mg, Oral, Q24H  pantoprazole, 40 mg, Oral, Q AM  sodium chloride, 3 mL, Intravenous, Q12H  tiotropium bromide monohydrate, 2 puff, Inhalation, Daily - RT      IV Meds:         Results Reviewed:   I have personally reviewed the results from the time of this admission to 6/28/2023 09:10 EDT     Results from last 7 days   Lab Units 06/28/23  0753 06/27/23  0710 06/26/23 0322 06/24/23 1946 06/24/23 0228 06/23/23  0812 06/22/23  1146   SODIUM mmol/L 140 138 139   < > 139 133* 137   POTASSIUM mmol/L 3.8 3.1* 3.5   < > 3.4* 3.5 3.8   CHLORIDE mmol/L 101 98 100   < > 96* 94* 98   CO2 mmol/L 29.6* 29.0 28.3   < > 30.0* 27.9 27.6   BUN mg/dL 16 18 19   < > 14 13 14   CREATININE mg/dL 1.26 1.31* 1.55*   < > 1.50* 1.46* 1.43*   CALCIUM mg/dL 9.5 8.9 8.9   < > 9.1 8.9 9.4   BILIRUBIN mg/dL  --   --   --   --  0.4 0.7 0.6   ALK PHOS U/L  --   --   --   --  122* 120* 126*   ALT (SGPT) U/L  --   --   --   --  12 9 9   AST (SGOT) U/L  --   --   --   --  9 14 10   GLUCOSE mg/dL 127* 128* 110*   < > 149* 151* 195*    < > = values in this interval not displayed.         Estimated Creatinine Clearance: 68 mL/min (by C-G formula based on SCr of 1.26 mg/dL).    Results from last 7 days   Lab Units 06/28/23 0753 06/24/23 1946 06/24/23 0228 06/23/23 0812   MAGNESIUM mg/dL 2.1 1.8 2.0  --    PHOSPHORUS mg/dL 3.3  --  3.9 3.2               Results from last 7 days   Lab Units 06/28/23  0753 06/27/23  0710 06/26/23 0322 06/25/23  0739 06/24/23 0228   WBC 10*3/mm3 8.52 7.07 9.69 11.08* 8.15   HEMOGLOBIN g/dL 9.5* 9.3* 9.9* 10.3* 10.6*   PLATELETS 10*3/mm3 290 255 246 259 227               Assessment / Plan     ASSESSMENT:  1.  Acute kidney injury on top of CKD III, baseline creatinine 1.2.  Peak 1.68.  Creatinine  stable around 1.5  , most recent down to 1.2  2. MR now sp MV replacement, tissue and TV repair, SU closure 5/24/23.  3. Chronic steroid use after COVID 19 PNA.  4. Anemia of CKD and postoperative anemia.  Hemoglobin is 9.9  5. 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 Rehab .  6. diabetes mellitus type 2. On diabetic diet and insulin regimen   7. Aflutter.Cardioversion 6/2/23. On  eliquis and metoprolol.   8.  Hypokalemia on KCL replacement protocol   9.  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  Positive troponin followed closely by cardiology    PLAN:    -  Will increased furosemide to 80 mg IV TID x 3 doses and hope to transition to oral tomorrow  - Will follow labs closely     Discussed with nursing staff     Jesús Braxton MD  06/28/23  09:10 EDT    [facility]  [phone]

## 2023-06-28 NOTE — THERAPY TREATMENT NOTE
Inpatient Rehabilitation - Occupational Therapy Treatment Note    [facility]     Patient Name: Vernon Solorzano  : 1950  MRN: [id number]    Today's Date: 2023                 Admit Date: 2023         ICD-10-CM ICD-9-CM   1. Impaired functional mobility, balance, gait, and endurance  Z74.09 V49.89       Patient Active Problem List   Diagnosis    DM (diabetes mellitus), type 2    Mixed hyperlipidemia    Mild intermittent asthma without complication    New onset of congestive heart failure    Nonrheumatic mitral valve regurgitation    Left-sided chest pain    Essential hypertension    Mitral valve disease    Acute ischemic left MCA stroke    Atelectasis of both lungs    Pericardial effusion, acute    History of ischemic stroke    Obese    Physical debility    Chronic diastolic CHF (congestive heart failure)    Anemia    Stage 3a chronic kidney disease    Paroxysmal atrial flutter    H/O mitral valve replacement with tissue graft    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 AMARILIS MORGAN       Past Surgical History:   Procedure Laterality Date    CARDIAC CATHETERIZATION N/A 2023    Procedure: Right Heart Cath;  Surgeon: Corey Gaffney MD;  Location:  [facility] CATH [facility]VASIVE LOC[facility];  Service: Cardiology;  Laterality: N/A;    CARDIAC CATHETERIZATION N/A 2023    Procedure: Left Heart Cath;  Surgeon: Corey Gaffney MD;  Location:  [facility]ATH [facility];  Service: Cardiology;  Laterality: N/A;    CARDIAC CATHETERIZATION N/A 2023    Procedure: Left ventriculography;  Surgeon: Gulshan  MD Corey;  Location: [facility] CATH INVASIVE LOCATION;  Service: Cardiology;  Laterality: N/A;    CARDIAC CATHETERIZATION N/A 5/11/2023    Procedure: Coronary angiography;  Surgeon: Corey Gaffney MD;  Location: [facility] CATH INVASIVE LOCATION;  Service: Cardiology;  Laterality: N/A;    COLONOSCOPY N/A     WNL PER PT, NO RECORDS,     COLONOSCOPY N/A 04/07/2009    DR. GORGE BENAVIDEZ AT [facility]    MITRAL VALVE REPAIR/REPLACEMENT N/A 5/24/2023    Procedure: MITRAL VALVE REPAIR/REPLACEMENT TRICUSPID VALVE REPAIR/REPLACEMENT TRANSESOPHAGEAL ECHOCARDIOGRAM WITH ANESTHESIA;  Surgeon: George Frey MD;  Location: [facility] CVOR;  Service: Cardiothoracic;  Laterality: N/A;    PROSTATE SURGERY N/A 11/12/2010    TURP, DR. COREY COLLAZO AT [facility]    SKIN TAG REMOVAL N/A 12/20/2017    ANAL SKIN TAG X2, PERFORMED IN OFFICE, DR. LISA ORANTES    TURP / TRANSURETHRAL INCISION / DRAINAGE PROSTATE  2010    Dr. Goodrich             IRF OT ASSESSMENT FLOWSHEET (last 12 hours)       IRF OT Evaluation and Treatment       Row Name 06/28/23 1416          OT Time and Intention    Document Type daily treatment  -CC     Mode of Treatment occupational therapy  -CC     Patient Effort good  -CC     Symptoms Noted During/After Treatment shortness of breath  md whitaker  -CC       Row Name 06/28/23 1416          Pain Assessment    Pretreatment Pain Rating 0/10 - no pain  -CC     Posttreatment Pain Rating 0/10 - no pain  -CC       Row Name 06/28/23 1416          Cognition/Psychosocial    Affect/Mental Status (Cognition) WNL  -CC     Orientation Status (Cognition) oriented x 3  -CC     Follows Commands (Cognition) follows one-step commands;over 90% accuracy;repetition of directions required  -CC     Personal Safety Interventions fall prevention program maintained;gait belt;nonskid shoes/slippers when out of bed  -CC       Row Name 06/28/23 1416          Bathing    [place] Level (Bathing) bathing skills;lower body;upper body;contact  guard assist;supervision  -     Assistive Device (Bathing) grab bar/tub rail;hand held shower spray hose;shower chair  -     Position (Bathing) supported sitting;supported standing  -     Set-up Assistance (Bathing) adjust water temperature;obtain supplies;adaptive equipment set-up  -       Row Name 06/28/23 1416          Upper Body Dressing    [place] Level (Upper Body Dressing) upper body dressing skills;doff;don;pull over garment;set up assistance;supervision  -     Position (Upper Body Dressing) supported sitting  -     Set-up Assistance (Upper Body Dressing) obtain clothing  -       Row Name 06/28/23 1416          Lower Body Dressing    [place] Level (Lower Body Dressing) doff;don;pants/bottoms;socks;underwear;minimum assist (75% patient effort)  -     Position (Lower Body Dressing) supported standing  -     Set-up Assistance (Lower Body Dressing) obtain clothing  -       Row Name 06/28/23 1416          Grooming    [place] Level (Grooming) grooming skills;deodorant application;hair care, combing/brushing;standby assist  -     Position (Grooming) supported sitting;sink side  -     Set-up Assistance (Grooming) obtain supplies  -       Row Name 06/28/23 1416          Bed Mobility    Comment, (Bed Mobility) seated EOB  -       Row Name 06/28/23 1416          Functional Mobility    Functional Mobility- Ind. Level contact guard assist;supervision required  -     Functional Mobility- Device walker, front-wheeled  -     Functional Mobility-Distance (Feet) --  to BR  -       Row Name 06/28/23 1416          Sit-Stand Transfer    Sit-Stand [place] (Transfers) verbal cues;standby assist  -     Assistive Device (Sit-Stand Transfers) walker, front-wheeled;wheelchair  -       Row Name 06/28/23 1416          Stand-Sit Transfer    Stand-Sit [place] (Transfers) verbal cues;standby assist  -     Assistive Device (Stand-Sit Transfers) walker, front-wheeled;wheelchair   -       Row Name 06/28/23 1416          Shower Transfer    Type (Shower Transfer) stand-sit;sit-stand  -     [place] Level (Shower Transfer) contact guard;stand by assist  -     Assistive Device (Shower Transfer) tub bench;grab bar, tub/shower;walker, front-wheeled  -       Row Name 06/28/23 1416          Shoulder (Therapeutic Exercise)    Shoulder (Therapeutic Exercise) strengthening exercise  -     Shoulder Strengthening (Therapeutic Exercise) bilateral;flexion;extension;sitting;2 lb free weight;10 repetitions;2 sets  -       Row Name 06/28/23 1416          Elbow/Forearm (Therapeutic Exercise)    Elbow/Forearm Strengthening (Therapeutic Exercise) bilateral;flexion;extension;sitting;2 lb free weight;15 repititions;3 sets  -       Row Name 06/28/23 1416          Wrist (Therapeutic Exercise)    Wrist (Therapeutic Exercise) strengthening exercise  -     Wrist Strengthening (Therapeutic Exercise) bilateral;flexion;extension;2 lb free weight;15 repititions;3 sets  -       Row Name 06/28/23 1416          Hand (Therapeutic Exercise)    Hand (Therapeutic Exercise) strengthening exercise  -     Hand Strengthening (Therapeutic Exercise) bilateral; strengthening;hand gripper;30 repititions  -       Row Name 06/28/23 1416          Positioning and Restraints    Pre-Treatment Position in bed  -     Post Treatment Position wheelchair  -     In Chair sitting;exit alarm on;with SLP  -CC               User Key  (r) = Recorded By, (t) = Taken By, (c) = Cosigned By      Initials Name Effective Dates    CC Kathrine Pinedo, HI 06/16/21 -                      Occupational Therapy Education       Title: PT OT SLP Therapies (In Progress)       Topic: Occupational Therapy (In Progress)       Point: ADL training (In Progress)       Description:   Instruct learner(s) on proper safety adaptation and remediation techniques during self care or transfers.   Instruct in proper use of assistive devices.                   Learning Progress Summary             Patient Acceptance, E, NR by AF at 6/13/2023 1527    Comment: benefits of therapy, endurance   Family Acceptance, E, NR by AF at 6/13/2023 1527    Comment: benefits of therapy, endurance                         Point: Home exercise program (In Progress)       Description:   Instruct learner(s) on appropriate technique for monitoring, assisting and/or progressing therapeutic exercises/activities.                  Learning Progress Summary             Patient Acceptance, E, NR by AF at 6/13/2023 1527    Comment: benefits of therapy, endurance   Family Acceptance, E, NR by AF at 6/13/2023 1527    Comment: benefits of therapy, endurance                         Point: Precautions (In Progress)       Description:   Instruct learner(s) on prescribed precautions during self-care and functional transfers.                  Learning Progress Summary             Patient Acceptance, E, NR by AF at 6/13/2023 1527    Comment: benefits of therapy, endurance   Family Acceptance, E, NR by AF at 6/13/2023 1527    Comment: benefits of therapy, endurance                         Point: Body mechanics (In Progress)       Description:   Instruct learner(s) on proper positioning and spine alignment during self-care, functional mobility activities and/or exercises.                  Learning Progress Summary             Patient Acceptance, E, NR by AF at 6/13/2023 1527    Comment: benefits of therapy, endurance   Family Acceptance, E, NR by AF at 6/13/2023 1527    Comment: benefits of therapy, endurance                                         User Key       Initials Effective Dates Name Provider Type Discipline     06/16/21 -  Candy Davsi OTR Occupational Therapist OT                        OT Recommendation and Plan                         Time Calculation:      Time Calculation- OT       Row Name 06/28/23 1300 06/28/23 0800          Time Calculation- OT    OT Start Time 1300  -CC 0800   -     OT Stop Time 1330  - 0830  -     OT Time Calculation (min) 30 min  -CC 30 min  -     OT Goal Re-Cert Due Date -- 07/04/23  -               User Key  (r) = Recorded By, (t) = Taken By, (c) = Cosigned By      Initials Name Provider Type    CC Kathrine Pinedo OTR Occupational Therapist                  Therapy Charges for Today       Code Description Service Date Service Provider Modifiers Qty    [id number]  OT SELF CARE/MGMT/TRAIN EA 15 MIN 6/28/2023 Kathrine Pinedo OTR GO 2    [id number]  OT THER PROC EA 15 MIN 6/28/2023 Kathrine Pinedo OTR GO 2                     HI Morgan  6/28/2023   No

## 2023-06-28 NOTE — PROGRESS NOTES
LOS: 22 days   Patient Care Team:  Liaz Oconnell III, NP-C as PCP - General (Family Medicine)  Corey Lao Jr., MD (Inactive) as Consulting Physician (Urology)      KRIS TAYLOR  1950      ADMITTING DIAGNOSIS:  Stroke  Valvular heart disease status post repair/replacement      Subjective   Fatigue continues to be an issue.  Occasional chest discomfort  Edema is little bit better in the legs  Getting Lasix 80 mg IV x3 doses today      Objective     Vitals:    06/28/23 0523   BP: 129/74   Pulse: 99   Resp: 20   Temp: 98.4 °F (36.9 °C)   SpO2: 98%       PHYSICAL EXAM:   MENTAL STATUS -  AWAKE / ALERT  HEENT-   LUNGS - CTA,    Sternotomy incision -dressed  HEART-sinus with occasional ectopy  ABD - NORMOACTIVE BOWEL SOUNDS, SOFT, NT.   EXT -1+ pedal edema bilateral lower extremities, improved from yesterday   NEURO -oriented to person place time and situation.    Speech is fluent.    Good strength bilaterally          MEDICATIONS  Scheduled Meds:aspirin, 81 mg, Oral, Daily  atorvastatin, 40 mg, Oral, Nightly  budesonide-formoterol, 2 puff, Inhalation, BID - RT  calcium 500 mg vitamin D 5 mcg (200 UT), 1 tablet, Oral, Daily  colchicine, 0.6 mg, Oral, Daily  guaifenesin, 400 mg, Oral, TID  insulin glargine, 15 Units, Subcutaneous, Daily  insulin glargine, 20 Units, Subcutaneous, Nightly  insulin lispro, 3-14 Units, Subcutaneous, 4x Daily AC & at Bedtime  insulin lispro, 4 Units, Subcutaneous, TID With Meals  magnesium oxide, 400 mg, Oral, Daily  melatonin, 3 mg, Oral, Nightly  metoprolol succinate XL, 25 mg, Oral, Q24H  pantoprazole, 40 mg, Oral, Q AM  sodium chloride, 3 mL, Intravenous, Q12H  tiotropium bromide monohydrate, 2 puff, Inhalation, Daily - RT      Continuous Infusions:   PRN Meds:.  acetaminophen **OR** [DISCONTINUED] acetaminophen **OR** [DISCONTINUED] acetaminophen    bisacodyl    calcium carbonate    dextrose    dextrose    docusate sodium    glucagon (human  recombinant)    hydrocortisone-bacitracin-zinc oxide-nystatin    ipratropium-albuterol    melatonin    ondansetron **OR** ondansetron    senna    sodium chloride    traMADol      RESULTS  Glucose   Date/Time Value Ref Range Status   06/28/2023 0731 126 70 - 130 mg/dL Final     Comment:     Meter: [id number] : [id number] Roe Herman    06/27/2023 2317 428 (C) 70 - 130 mg/dL Final     Comment:     Result Not Confirmed Meter: [id number] : [id number] Summer Venegas    06/27/2023 2024 234 (H) 70 - 130 mg/dL Final     Comment:     Meter: [id number] : [id number] Summer Venegas    06/27/2023 1630 227 (H) 70 - 130 mg/dL Final     Comment:     SHAR NOTIFIED Meter: [id number] : [id number] Roe Herman    06/27/2023 1122 217 (H) 70 - 130 mg/dL Final     Comment:     Meter: [id number] : [id number] Roe Herman    06/27/2023 0738 156 (H) 70 - 130 mg/dL Final     Comment:     Meter: [id number] : [id number] Roe Herman    06/26/2023 2019 243 (H) 70 - 130 mg/dL Final     Comment:     Meter: [id number] : [id number] Summer Venegas    06/26/2023 1631 247 (H) 70 - 130 mg/dL Final     Comment:     Meter: [id number] : [id number] Jamila BARRETO     Results from last 7 days   Lab Units 06/28/23  0753 06/27/23  0710 06/26/23  0322   WBC 10*3/mm3 8.52 7.07 9.69   HEMOGLOBIN g/dL 9.5* 9.3* 9.9*   HEMATOCRIT % 28.3* 28.0* 30.5*   PLATELETS 10*3/mm3 290 255 246       Results from last 7 days   Lab Units 06/28/23  0753 06/27/23  0710 06/26/23  0322 06/24/23  1946 06/24/23  0228 06/23/23  0812 06/22/23  1146   SODIUM mmol/L 140 138 139   < > 139 133* 137   POTASSIUM mmol/L 3.8 3.1* 3.5   < > 3.4* 3.5 3.8   CHLORIDE mmol/L 101 98 100   < > 96* 94* 98   CO2 mmol/L 29.6* 29.0 28.3   < > 30.0* 27.9 27.6   BUN mg/dL 16 18 19   < > 14 13 14   CREATININE mg/dL 1.26 1.31* 1.55*   < > 1.50* 1.46* 1.43*   CALCIUM mg/dL 9.5 8.9 8.9   < > 9.1 8.9 9.4   BILIRUBIN mg/dL  --   --    --   --  0.4 0.7 0.6   ALK PHOS U/L  --   --   --   --  122* 120* 126*   ALT (SGPT) U/L  --   --   --   --  12 9 9   AST (SGOT) U/L  --   --   --   --  9 14 10   GLUCOSE mg/dL 127* 128* 110*   < > 149* 151* 195*    < > = values in this interval not displayed.        Latest Reference Range & Units 05/23/23 14:29   Hemoglobin A1C 4.80 - 5.60 % 9.20 (H)   Total Cholesterol 0 - 200 mg/dL 155   HDL Cholesterol 40 - 60 mg/dL 41   LDL Cholesterol  0 - 100 mg/dL 81   Triglycerides 0 - 150 mg/dL 197 (H)   (H): Data is abnormally high     Latest Reference Range & Units 05/31/23 08:03   25 Hydroxy, Vitamin D 30.0 - 100.0 ng/ml 20.0 (L)   (L): Data is abnormally low    Lower extremity venous duplex-June 19, 2023-Chronic right lower extremity superficial thrombophlebitis noted in the small saphenous.     ASSESSMENT and PLAN    Acute ischemic left MCA stroke    Status post CVA-Scattered  punctate restricted diffusion acute infarct in the bilateral frontal and   left parietal occipital cortices.        Impaired Cognition/impaired mobility/impaired self-care     Aphasia-resolved     Right hand apraxia-resolved     Encephalopathy-improving     Dysphagia/nutrition-healthy heart 2-3 g sodium, consistent carb, no mixed consistency, regular texture, nectar thick liquid  June 7-videofluoroscopic swallow study planned tomorrow  June 8-swallow study today-advance to regular solid, no mixed consistency, thin liquids by cup.  No straws.     Stroke prophylaxis-Eliquis/aspirin/atorvastatin     History of severe mitral regurgitation and annular dilatation, moderate to severe tricuspid regurgitation-  status post May 24, 2023 - 1. Mitral valve repair with a 28 mm physio II ring annuloplasty with residual mild to moderate MR  2.  Mitral valve replacement with a 29 mm Schwartz II porcine prosthesis  3.  Tricuspid valve repair with 28 mm physio tricuspid ring  4.  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 1200 cc fluid restriction     Severe pulmonary hypertension  Obstructive sleep apnea     Interstitial lung disease status post COVID-19 infection-steroid taper per pulmonology  DuoNebs  Chronic steroid use with cushingoid syndrome  Prednisone 5 mg every other day  June 23-change back to his home regimen Spiriva 2 puffs once a day and Symbicort 160/4.5 two puffs twice a day       Leukocytosis-reactive/steroid administration     Postoperative anemia     Lspeicqouhvnlkax-gkmnigekvcy-xejnfoaa     Chronic kidney disease stage III-baseline creatinine 1.2     Diabetes mellitus type 2-Jardiance/Lantus/sliding scale insulin-uncontrolled  Off Jardiance secondary to renal function.  Adjusting short and long acting insulin.       BPH/urinary retention-history of TURP  June 14-void 300 cc with postvoid residual 205 cc.    Renal insufficiency -   Nephrology following     Pain management-tramadol-/Tylenol Dk report reviewed              TEAM CONF - JUNE 8 - ENDURANCE POOR. BED MOD. TRANSFERS MOD. GAIT 25 FEET MOD ASSIST RW. SHOWER TRANSFERS MOD A.   BATH MOD ASSIST. LBD MAX. UBD MIN. TOILETING MAX.   Patient is currently on nectar liquids VFSS TODAY.   BIMS SUMMARY SCORE: 9 Moderately impaired   DIFFICULTY WITH SUSTAINED AND FOCAL ATTENTION.  CONTINENT / INCONTINENT  ELOS - 2 -3 WEEKS    TEAM CONF - Kari 15 - BED MOD. TRANSFERS MIN. 4 STAIRS MIN X 2. SATS 97% OR HIGHER WITH GAIT WITH FATIGUE, GAIT 80 FEET MIN ASSIST RW. TOILET TRANSFERS MIN. SHOWER TRANSFERS MIN MOD. BATH MIN. LBD MIN MOD. UBD MIN. GROOMING SET UP. TOILETING MIN MOD. DRESSING TAKES 30 MINUTES BRENDA SLOW PROCESSING. EDEMA - JOBST STOCKINGS. MILD DYSPHGAIA, REG THIN BY CUP, NO STRAWS. CLQT MODERATE DEFICITS, DIFFICULTY WITH ATTENTION AND MEMORY, PROLONGED PROCESSING SPEED. BNE FOLLOWING. VARIABLE INTAKE - DIETITIAN  FOLLOWING. RASH ON ABD AND LEFT CHEST, POSSIBLY CONTACT DERMATITIS. TORSEMIDE ADDED.  ELOS - TWO WEEKS.    TEAM CONF - June 22- DIURETIC ADJUSTED BY NEPRHOLOGY. PATIENT DOES NOT FOLLOW 1500 CC FLUID RESTRICTION. TRANSFERS CTG. 4 STAIRS MIN X 2. GAIT 80 FEET CTG RW.   TOILET AND SHOWER TRANSFERS CTG. BATH MIN-CTG. LBD MIN-CTG FOR COMPRESSION STOCKINGS. UBD SBA. TOILETING CTG AS ASKS FOR HELP.   SWALLOW - REGULAR SOLID, THIN LIQUID BY CUP. MOSTLY CONTINENT.   Orientation: WNL  Attention: WNL to Mildly Impaired  Executive Functioning: Mildly Impaired  Abstract Reasoning: WNL  Arithmetic: Mildly Impaired  Visuospatial Perception: WNL  Visuospatial Praxis: WNL for Figure Copy; Severely Impaired Coding related to slowed processing speed.  Verbal Memory: Moderately to Severely Impaired  Visual Memory: Mildly Impaired  Emotional: Some frustration regarding current level of functioning, but minimal anxiety and depression symptoms reported on the GDS and BROOKE.  -demonstrating moderate to severe verbal memory deficits and slowed processing speed that are consistent with the location of his stroke.  Word-finding difficulties are also present, but are more evident conversationally than during testing.  ELOS - ONE WEEK- June 29            Goal is for home with outpatient cardiac rehab   therapies.  Barrier to discharge: Impaired cognition mobility self-care- work on follow, executive function, conditioning, transfers, progressive ambulation, ADLs to overcome.           Tu Silver MD,     During rounds, used appropriate personal protective equipment including mask and gloves.  Additional gown if indicated.  Mask used was standard procedure mask. Appropriate PPE was worn during the entire visit.  Hand hygiene was completed before and after.

## 2023-06-28 NOTE — PROGRESS NOTES
LOS: 22 days   Patient Care Team:  Liza Oconnell III, NP-C as PCP - General (Family Medicine)  Corey Lao Jr., MD (Inactive) as Consulting Physician (Urology)    Chief Complaint: Follow-up pericardial effusion, Dressler's syndrome.    Interval History: Some shortness of breath with walking, but not severe.  Some mild right-sided chest pain with inspiration.    Vital Signs:  Temp:  [98 °F (36.7 °C)-98.4 °F (36.9 °C)] 98.4 °F (36.9 °C)  Heart Rate:  [] 99  Resp:  [20] 20  BP: (102-131)/(66-74) 129/74    Intake/Output Summary (Last 24 hours) at 6/28/2023 1221  Last data filed at 6/28/2023 0900  Gross per 24 hour   Intake 530 ml   Output 550 ml   Net -20 ml       Physical Exam:   General Appearance:    No acute distress, alert and oriented x4   Lungs:     Clear to auscultation bilaterally     Heart:    Regular rhythm and normal rate.  No murmurs, gallops, or   rubs.   Abdomen:     Soft, nontender, nondistended.    Extremities:   1+ edema of the lower extremities.     Results Review:    Results from last 7 days   Lab Units 06/28/23  0753   SODIUM mmol/L 140   POTASSIUM mmol/L 3.8   CHLORIDE mmol/L 101   CO2 mmol/L 29.6*   BUN mg/dL 16   CREATININE mg/dL 1.26   GLUCOSE mg/dL 127*   CALCIUM mg/dL 9.5     Results from last 7 days   Lab Units 06/25/23  0944 06/25/23  0739 06/24/23  0538   HSTROP T ng/L 95* 97* 103*     Results from last 7 days   Lab Units 06/28/23  0753   WBC 10*3/mm3 8.52   HEMOGLOBIN g/dL 9.5*   HEMATOCRIT % 28.3*   PLATELETS 10*3/mm3 290             Results from last 7 days   Lab Units 06/28/23  0753   MAGNESIUM mg/dL 2.1           I reviewed the patient's new clinical results.        Assessment:  1.  Moderate posterior pericardial effusion and pleuritic chest pain, consistent with Dressler's syndrome  2.  Acute on chronic diastolic CHF and lower extremity edema  3.  Status post tissue mitral valve replacement and tricuspid valve repair (and left atrial appendage  closure) on 5/24/2023 by Dr. Frey  4.  Acute kidney injury with stage IIIa chronic kidney disease   5.  History of COVID-19 pneumonia with residual dyspnea and possible interstitial lung disease  6.  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  Expected postoperative anemia  8.  Postoperative CVA following cardiac surgery  9.  Postoperative junctional bradycardia, complete heart block, and typical atrial flutter (status post DCCV on 6/2/2023).  10.  LBBB and first degree AV block  11.  Diabetes    Plan:  -Discussed with Dr. Braxton.  His renal function is actually improved with diuretics.  I still feel he is volume overloaded and wanted to give him 1 more day of IV diuretics.  We are going to diurese him today with Lasix 80 IV q8h today.  We will reevaluate his volume status and renal function tomorrow.  Then she will transition back to oral diuretics tomorrow.    -Holding Eliquis with pericardial effusion.  Watch for any recurrent atrial fibrillation or atrial flutter.    -Continue colchicine.  Only very mild right-sided pleuritic chest pain.  Watch for any diarrhea.    -He will need a repeat limited echocardiogram in approximately 2 weeks to evaluate the pericardial effusion.    Corwin Hobson MD  06/28/23  12:21 EDT

## 2023-06-28 NOTE — THERAPY TREATMENT NOTE
Inpatient Rehabilitation - Physical Therapy Treatment Note       [facility]     Patient Name: Vernon Solorzano  : 1950  MRN: [id number]    Today's Date: 2023                    Admit Date: 2023      Visit Dx:     ICD-10-CM ICD-9-CM   1. Impaired functional mobility, balance, gait, and endurance  Z74.09 V49.89       Patient Active Problem List   Diagnosis    DM (diabetes mellitus), type 2    Mixed hyperlipidemia    Mild intermittent asthma without complication    New onset of congestive heart failure    Nonrheumatic mitral valve regurgitation    Left-sided chest pain    Essential hypertension    Mitral valve disease    Acute ischemic left MCA stroke    Atelectasis of both lungs    Pericardial effusion, acute    History of ischemic stroke    Obese    Physical debility    Chronic diastolic CHF (congestive heart failure)    Anemia    Stage 3a chronic kidney disease    Paroxysmal atrial flutter    H/O mitral valve replacement with tissue graft    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 AMARILIS MORGAN       Past Surgical History:   Procedure Laterality Date    CARDIAC CATHETERIZATION N/A 2023    Procedure: Right Heart Cath;  Surgeon: Corey Gaffney MD;  Location:  [facility] CATH INVASIVE LOCATION;  Service: Cardiology;  Laterality: N/A;    CARDIAC CATHETERIZATION N/A 2023    Procedure: Left Heart Cath;  Surgeon: Corey Gaffney MD;  Location:  [facility] CATH [facility]IVE [facility];  Service: Cardiology;  Laterality: N/A;    CARDIAC CATHETERIZATION N/A 2023    Procedure: Left ventriculography;   Surgeon: Corey Gaffney MD;  Location: [facility] CATH INVASIVE LOCATION;  Service: Cardiology;  Laterality: N/A;    CARDIAC CATHETERIZATION N/A 5/11/2023    Procedure: Coronary angiography;  Surgeon: Corey Gaffney MD;  Location: [facility] CATH INVASIVE LOCATION;  Service: Cardiology;  Laterality: N/A;    COLONOSCOPY N/A     WNL PER PT, NO RECORDS,     COLONOSCOPY N/A 04/07/2009    DR. GORGE BENAVIDEZ AT [place]    MITRAL VALVE REPAIR/REPLACEMENT N/A 5/24/2023    Procedure: MITRAL VALVE REPAIR/REPLACEMENT TRICUSPID VALVE REPAIR/REPLACEMENT TRANSESOPHAGEAL ECHOCARDIOGRAM WITH ANESTHESIA;  Surgeon: George Frey MD;  Location: [facility] CVOR;  Service: Cardiothoracic;  Laterality: N/A;    PROSTATE SURGERY N/A 11/12/2010    TURP, DR. COREY COLLAZO AT [facility]    SKIN TAG REMOVAL N/A 12/20/2017    ANAL SKIN TAG X2, PERFORMED IN OFFICE, DR. LISA ORANTES    TURP / TRANSURETHRAL INCISION / DRAINAGE PROSTATE  2010    Dr. Goodrich       PT ASSESSMENT (last 12 hours)       IR[facility] PT Evaluation and Treatment       Row Name 06/28/23 1030          PT Time and Intention    Document Type daily treatment  -LB     Mode of Treatment physical therapy  -LB     Patient/Family/Caregiver Comments/Observations pt agreeable, not a great morning  -LB       Row Name 06/28/23 1030          General Information    Patient Profile Reviewed yes  -LB     Existing Precautions/Restrictions cardiac;fall;sternal  -LB       Row Name 06/28/23 1030          Pain Assessment    Pain Location - neck  and ear  -LB       Row Name 06/28/23 1030          Sit-Stand Transfer    Sit-Stand [place] (Transfers) verbal cues;standby assist  -LB     Assistive Device (Sit-Stand Transfers) walker, front-wheeled;wheelchair  -LB       Row Name 06/28/23 1030          Stand-Sit Transfer    Stand-Sit [place] (Transfers) verbal cues;standby assist  -LB     Assistive Device (Stand-Sit Transfers) walker, front-wheeled;wheelchair  -LB       Row Name 06/28/23 1030           Gait/Stairs (Locomotion)    [place] Level (Gait) verbal cues;standby assist  -LB     Assistive Device (Gait) walker, front-wheeled  -LB     Distance in Feet (Gait) 80;160; 100  -LB     Pattern (Gait) step-through  -LB     Deviations/Abnormal Patterns (Gait) gait speed decreased;stride length decreased;angie decreased;festinating/shuffling  -LB     Bilateral Gait Deviations forward flexed posture  -LB     Left Sided Gait Deviations heel strike decreased  -LB     Right Sided Gait Deviations heel strike decreased  -LB     [place] Level (Stairs) verbal cues;contact guard  -LB     Handrail Location (Stairs) left side (ascending);right side (descending)  -LB     Number of Steps (Stairs) 4; performed again inthe PM with left handrail and CGA  -LB     Ascending Technique (Stairs) step-over-step  -LB     Descending Technique (Stairs) step-to-step  -LB     Comment, (Gait/Stairs) On the first step the patient was holding onto the right railing and needed Min A for ascend one step;  CGA once using handrail on the left.  The patient needed 3-4 mins to recover stair negogiation  -LB       Row Name 06/28/23 1030          Safety Issues, Functional Mobility    Impairments Affecting Function (Mobility) endurance/activity tolerance;balance;strength  -LB       Row Name 06/28/23 1030          Hip (Therapeutic Exercise)    Hip Strengthening (Therapeutic Exercise) bilateral;marching while standing;10 repetitions  -LB       Row Name 06/28/23 1030          Knee (Therapeutic Exercise)    Knee Strengthening (Therapeutic Exercise) bilateral;hamstring curls;standing  -LB       Row Name 06/28/23 1030          Positioning and Restraints    Pre-Treatment Position sitting in chair/recliner  -LB     Post Treatment Position wheelchair  -LB     Other Position return to room with caregiver  -LB               User Key  (r) = Recorded By, (t) = Taken By, (c) = Cosigned By      Initials Name Provider Type    LB Lara Cm, PT Physical  Therapist                  Wound 05/24/23 0737 sternal Incision (Active)   Dressing Appearance open to air 06/28/23 0741   Closure Approximated 06/28/23 0741   Base scab 06/28/23 0741   Periwound dry;intact 06/28/23 0741   Periwound Temperature warm 06/27/23 2030   Periwound Skin Turgor soft 06/27/23 2030   Edges rolled/closed 06/27/23 2030   Drainage Amount none 06/28/23 0741   Dressing Care open to air 06/28/23 0741   Periwound Care dry periwound area maintained 06/27/23 2030     Physical Therapy Education       Title: PT OT SLP Therapies (In Progress)       Topic: Physical Therapy (In Progress)       Point: Mobility training (Done)       Learning Progress Summary             Patient Acceptance, E,TB, VU by LB at 6/28/2023 1051    Comment: stairs    Acceptance, E,D, VU,DU,NR by LA at 6/23/2023 1456    Acceptance, E,D, NR by DP at 6/22/2023 1309    Comment: hand placement    Acceptance, E,D, VU,DU by MONA at 6/21/2023 1007    Acceptance, E, VU by MD at 6/20/2023 1504    Acceptance, E, VU by MD at 6/19/2023 1058    Acceptance, E,TB, VU,NR by TONI at 6/17/2023 1501    Acceptance, E, VU by MD at 6/16/2023 0919    Acceptance, E, VU by MD at 6/15/2023 1140    Eager, E,D,TB, NR by YAQUELIN at 6/14/2023 1459    Acceptance, E, VU by MD at 6/13/2023 1012    Acceptance, E, VU by MD at 6/12/2023 1018    Acceptance, E, VU by MD at 6/10/2023 1040    Acceptance, E, VU by MD at 6/8/2023 1017    Acceptance, E,TB,D, VU,DU,NR by KP at 6/7/2023 1513    Comment: POC, Goals, safety with mobility.   Family Acceptance, E,D, VU by MD at 6/27/2023 1517    Comment: Pt spouse participated in family teaching for ambulation and observed car transfer and stairs.   Significant Other Acceptance, E,TB,D, VU,DU,NR by YAQUELIN at 6/7/2023 8371    Comment: POC, Goals, safety with mobility.                         Point: Home exercise program (In Progress)       Learning Progress Summary             Patient Acceptance, E,D, NR by DP at 6/22/2023 1308    Comment: hand  placement    Acceptance, E,D, VU,DU by DP at 6/21/2023 1007    Eager, E,D,TB, NR by  at 6/14/2023 1459    Acceptance, E,TB,D, VU,DU,NR by  at 6/7/2023 1513    Comment: POC, Goals, safety with mobility.   Significant Other Acceptance, E,TB,D, VU,DU,NR by  at 6/7/2023 1513    Comment: POC, Goals, safety with mobility.                         Point: Body mechanics (Not Started)       Learner Progress:  Not documented in this visit.              Point: Precautions (Done)       Learning Progress Summary             Patient Acceptance, E, VU by MD at 6/27/2023 1517                                         User Key       Initials Effective Dates Name Provider Type Discipline    TONI 06/16/21 -  Nubia Orellana, PT Physical Therapist PT    LB 06/16/21 -  Lara Cm, PT Physical Therapist PT    MD 06/16/21 -  Damaris Francis, PT Physical Therapist PT    KP 06/16/21 -  Elizabeth Levin, PT Physical Therapist PT    DP 08/24/21 -  Gulshan Mcdonald, PT Physical Therapist PT     06/01/23 -  Nicolas Suero, PT Student PT Student PT                    PT Recommendation and Plan                          Time Calculation:      PT Charges       Row Name 06/28/23 1447 06/28/23 1051          Time Calculation    Start Time 1230  -LB 1000  -LB     Stop Time 1300  -LB 1030  -LB     Time Calculation (min) 30 min  -LB 30 min  -LB     PT Received On 06/28/23  -LB 06/28/23  -LB     PT - Next Appointment 06/29/23  -LB --               User Key  (r) = Recorded By, (t) = Taken By, (c) = Cosigned By      Initials Name Provider Type    LB Soila Lara B, PT Physical Therapist                    Therapy Charges for Today       Code Description Service Date Service Provider Modifiers Qty    44653196991 HC PT THERAPEUTIC ACT EA 15 MIN 6/28/2023 Lara Cm B, PT GP 2    07097012987 HC PT THER PROC EA 15 MIN 6/28/2023 Bafranky Lara B, PT GP 1    61654251783 HC PT NEUROMUSC RE EDUCATION EA 15 MIN 6/28/2023 Lara Cm B, PT GP 1              PT  G-Codes  [facility] 6 Clicks Score (PT): 14      Lara Cm, PT  6/28/2023

## 2023-06-28 NOTE — PROGRESS NOTES
Inpatient Rehabilitation Plan of Care Note    Plan of Care  Updated Problems/Interventions  Mobility    [PT] Stairs(Active)  Current Status(06/28/2023): 4 left handrail and CGA  Weekly Goal(07/06/2023): PT only  Discharge Goal: 12, rail, Min/CG    [PT] Walk(Active)  Current Status(06/28/2023):  Rwx CGA/SBA  Weekly Goal(07/06/2023): BR Rwx CGA  Discharge Goal: 150, CG, rwx    [PT] Bed/Chair/Wheelchair(Active)  Current Status(06/28/2023): CGA  Weekly Goal(07/06/2023): CG/SBA  Discharge Goal: CG/SBA    Signed by: Lara Cm, PT

## 2023-06-28 NOTE — PROGRESS NOTES
"Nutrition Services    Patient Name:  Vernon Solorzano  YOB: 1950  MRN: [id number]  Admit Date:  6/6/2023    Assessment Date:  06/28/23    FOLLOW UP NOTE - CLINICAL NUTRITION    Comments: Follow up:  Visited pt at lunch today, states good appetite and loved lunch meal today. 76% average po intake x 16 meals of HH diet with 1.2L fluid restriction. Pt not happy about fluid restriction and asking for root beer under his bed. Informed pt he would need to ask RN regarding fluids. Pt not getting Mighty shakes or Boost GC as ordered but will d/c d/t fluid restriction and adequate po intake. IV lasix increased per MD notes. Gluc 237/320/127/126/428, A1C 9.2% (5/23). Last BM 6/28    Recommend:   Will d/c Mighty Shakes and Boost GC.  Will add Consistent CHO to diet order d/t elevated BS.    RD will continue to follow-up, per protocol.    Encounter Information        Reason for Encounter Follow-up   Current Issues S/p acute ischemic L MCA stroke     Current Nutrition Orders & Evaluation of Intake       Oral Nutrition     Current PO Diet Diet: Cardiac Diets, Fluid Restriction (240 mL/tray) Diets; Healthy Heart (2-3 Na+); Other (Specify mL/day) (1200); Texture: Regular Texture (IDDSI 7); Fluid Consistency: Thin (IDDSI 0)   Supplement Mighty Shakes BID, Boost GC q daily   PO Evaluation    % PO Intake/# of days 76% average x 16 meals   Factors Affecting Intake  No factors at this time     Anthropometrics         Height   Weight Height: 185.4 cm (73\")  Weight: 114 kg (251 lb) (06/28/23 0523)   BMI kg/m2 Body mass index is 33.12 kg/m².  Obese, Class I (30 - 34.9)   Weight trend Stable     Physical Findings          Physical Appearance alert, disheveled , flat affect, obese, oriented, room air   Oral/Mouth Cavity tooth or teeth missing   Edema  lower extremity , upper extremity, 2+ (mild), 3+ (moderate)   Gastrointestinal normoactive, last bowel movement:6/28   Skin  bruising, pale, surgical incision "   Tubes/Drains/Lines none     Labs       Pertinent Labs Reviewed, listed below     Results from last 7 days   Lab Units 06/28/23  0753 06/27/23  0710 06/26/23 0322 06/24/23 1946 06/24/23 0228 06/23/23  0812 06/22/23  1146   SODIUM mmol/L 140 138 139   < > 139 133* 137   POTASSIUM mmol/L 3.8 3.1* 3.5   < > 3.4* 3.5 3.8   CHLORIDE mmol/L 101 98 100   < > 96* 94* 98   CO2 mmol/L 29.6* 29.0 28.3   < > 30.0* 27.9 27.6   BUN mg/dL 16 18 19   < > 14 13 14   CREATININE mg/dL 1.26 1.31* 1.55*   < > 1.50* 1.46* 1.43*   CALCIUM mg/dL 9.5 8.9 8.9   < > 9.1 8.9 9.4   BILIRUBIN mg/dL  --   --   --   --  0.4 0.7 0.6   ALK PHOS U/L  --   --   --   --  122* 120* 126*   ALT (SGPT) U/L  --   --   --   --  12 9 9   AST (SGOT) U/L  --   --   --   --  9 14 10   GLUCOSE mg/dL 127* 128* 110*   < > 149* 151* 195*    < > = values in this interval not displayed.       Results from last 7 days   Lab Units 06/28/23  0753 06/25/23  0739 06/24/23 1946 06/24/23 0228   MAGNESIUM mg/dL 2.1  --  1.8 2.0   PHOSPHORUS mg/dL 3.3  --   --  3.9   HEMOGLOBIN g/dL 9.5*   < >  --  10.6*   HEMATOCRIT % 28.3*   < >  --  31.7*   WBC 10*3/mm3 8.52   < >  --  8.15    < > = values in this interval not displayed.       Results from last 7 days   Lab Units 06/28/23  0753 06/27/23  0710 06/26/23 0322 06/25/23 0739 06/24/23 0228   PLATELETS 10*3/mm3 290 255 246 259 227       COVID19   Date Value Ref Range Status   05/23/2023 Not Detected Not Detected - Ref. Range Final     Lab Results   Component Value Date    HGBA1C 9.20 (H) 05/23/2023          Medications           Scheduled Medications aspirin, 81 mg, Oral, Daily  atorvastatin, 40 mg, Oral, Nightly  budesonide-formoterol, 2 puff, Inhalation, BID - RT  calcium 500 mg vitamin D 5 mcg (200 UT), 1 tablet, Oral, Daily  colchicine, 0.6 mg, Oral, Daily  furosemide, 80 mg, Intravenous, TID  guaifenesin, 400 mg, Oral, TID  insulin glargine, 20 Units, Subcutaneous, Nightly  [START ON 6/29/2023] insulin glargine, 20  Units, Subcutaneous, Daily  insulin lispro, 3-14 Units, Subcutaneous, 4x Daily AC & at Bedtime  insulin lispro, 4 Units, Subcutaneous, TID With Meals  magnesium oxide, 400 mg, Oral, Daily  melatonin, 3 mg, Oral, Nightly  metoprolol succinate XL, 25 mg, Oral, Q24H  pantoprazole, 40 mg, Oral, Q AM  sodium chloride, 3 mL, Intravenous, Q12H  tiotropium bromide monohydrate, 2 puff, Inhalation, Daily - RT       Infusions     PRN Medications   acetaminophen **OR** [DISCONTINUED] acetaminophen **OR** [DISCONTINUED] acetaminophen    bisacodyl    calcium carbonate    dextrose    dextrose    docusate sodium    glucagon (human recombinant)    hydrocortisone-bacitracin-zinc oxide-nystatin    ipratropium-albuterol    melatonin    ondansetron **OR** ondansetron    senna    sodium chloride    traMADol     PLAN OF CARE  Intervention Goal        Intervention Goal(s) Maintain nutrition status, Improved nutrition related labs, Continue positive trend, Advance diet, No significant weight loss, and Appropriate weight loss     Nutrition Intervention        RD Action Advise alternative selection, Advise available snack, Adjusted supplement, Encourage intake, Follow Tx Progress, and Care plan reviewed     Prescription        Diet Prescription Add Consistent CHO to diet order.   Supplement Prescription D/c Mighty Shakes and Boost GC    EN/PN Prescription    Prescription Ordered Yes   --  Monitor/Evaluation        Monitor Per protocol, PO intake, Supplement intake, Pertinent labs, Weight, Skin status, GI status, Symptoms   Discharge Plan Pending clinical course   Education Will educate as needed       Electronically signed by:  Payton Santiago RD  06/28/23 17:07 EDT

## 2023-06-28 NOTE — PROGRESS NOTES
Inpatient Rehabilitation Plan of Care Note    Plan of Care  Updated Problems/Interventions  Swallow Function    [ST] Swallowing(Active)  Current Status(06/28/2023): Regular thin by cup or straw. Precautions: upright,  small single sips, alternate solids with liquids  Weekly Goal(07/05/2023): Patient will tolerate the least restrictive diet  without s/sx of aspiration  Discharge Goal: Patient will tolerate the least restrictive diet without s/sx of  aspiration        Cognition    [ST] Attention(Active)  Current Status(06/28/2023): Mild-Moderate cognitive impairment; decreased  initiation/slow processing speed (improved from initial eval) . Increased  difficulty with attention, memory, and executive function. Easily distracted  Weekly Goal(07/05/2023): Patient will participate in functional therapy  activities given MOD cues  Discharge Goal: Functional cognition for home    Signed by: Latisha Morris, SLP

## 2023-06-28 NOTE — THERAPY TREATMENT NOTE
Inpatient Rehabilitation - Speech Language Pathology Treatment Note    [facility]     Patient Name: Vernon Solorzano  : 1950  MRN: [id number]    Today's Date: 2023                   Admit Date: 2023       Visit Dx:      ICD-10-CM ICD-9-CM   1. Impaired functional mobility, balance, gait, and endurance  Z74.09 V49.89       Patient Active Problem List   Diagnosis    DM (diabetes mellitus), type 2    Mixed hyperlipidemia    Mild intermittent asthma without complication    New onset of congestive heart failure    Nonrheumatic mitral valve regurgitation    Left-sided chest pain    Essential hypertension    Mitral valve disease    Acute ischemic left MCA stroke    Atelectasis of both lungs    Pericardial effusion, acute    History of ischemic stroke    Obese    Physical debility    Chronic diastolic CHF (congestive heart failure)    Anemia    Stage 3a chronic kidney disease    Paroxysmal atrial flutter    H/O mitral valve replacement with tissue graft    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 AMARILIS MORGAN       Past Surgical History:   Procedure Laterality Date    CARDIAC CATHETERIZATION N/A 2023    Procedure: Right Heart Cath;  Surgeon: Corey Gaffney MD;  Location:  [facility] CATH INVASIVE LOCATION;  Service: Cardiology;  Laterality: N/A;    CARDIAC CATHETERIZATION N/A 2023    Procedure: Left Heart Cath;  Surgeon: Corey Gaffney MD;  Location:  [facility] CATH [facility]ASIVE [facility];  Service: Cardiology;  Laterality: N/A;    CARDIAC CATHETERIZATION N/A 2023    Procedure: Left  ventriculography;  Surgeon: Corey Gaffney MD;  Location: [facility] CATH INVASIVE LOCATION;  Service: Cardiology;  Laterality: N/A;    CARDIAC CATHETERIZATION N/A 5/11/2023    Procedure: Coronary angiography;  Surgeon: Corey Gaffney MD;  Location: [facility] CATH INVASIVE LOCATION;  Service: Cardiology;  Laterality: N/A;    COLONOSCOPY N/A     WNL PER PT, NO RECORDS,     COLONOSCOPY N/A 04/07/2009    DR. GORGE BENAVIDEZ AT [facility]    MITRAL VALVE REPAIR/REPLACEMENT N/A 5/24/2023    Procedure: MITRAL VALVE REPAIR/REPLACEMENT TRICUSPID VALVE REPAIR/REPLACEMENT TRANSESOPHAGEAL ECHOCARDIOGRAM WITH ANESTHESIA;  Surgeon: George Frey MD;  Location: [facility] CVOR;  Service: Cardiothoracic;  Laterality: N/A;    PROSTATE SURGERY N/A 11/12/2010    TURP, DR. COREY COLLAZO AT [facility]    SKIN TAG REMOVAL N/A 12/20/2017    ANAL SKIN TAG X2, PERFORMED IN OFFICE, DR. LISA ORANTES    TURP / TRANSURETHRAL INCISION / DRAINAGE PROSTATE  2010    Dr. Goodrich       SLP Recommendation and Plan                                                            SLP EVALUATION (last 72 hours)       SLP SLC Evaluation       Row Name 06/28/23 1330 06/28/23 0830 06/27/23 1230 06/27/23 0900          Communication Assessment/Intervention    Document Type therapy note (daily note)  -AL therapy note (daily note)  -AL therapy note (daily note)  -CR therapy note (daily note)  -CR     Subjective Information -- -- -- no complaints  -CR     Patient Observations -- -- alert;cooperative  -CR alert;cooperative  -CR     Patient/Family/Caregiver Comments/Observations Pt pleasant and cooperative.  -AL Pt participated well. Wife present for portion of session.  -AL -- --     Patient Effort -- -- good  -CR good  -CR     Symptoms Noted During/After Treatment -- -- none  -CR none  -CR        General Information    Patient Profile Reviewed -- -- yes  -CR yes  -CR        Pain Scale: Numbers Pre/Post-Treatment    Pretreatment Pain Rating 0/10 - no pain  -AL 0/10 - no  pain  -AL 0/10 - no pain  -CR 0/10 - no pain  -CR     Posttreatment Pain Rating -- -- 0/10 - no pain  -CR 0/10 - no pain  -CR               User Key  (r) = Recorded By, (t) = Taken By, (c) = Cosigned By      Initials Name Effective Dates    Charmaine Carreon, MS CCC-SLP 06/16/21 -     CR Mariely Bridges, -SLP 05/31/23 -                        EDUCATION    The patient has been educated in the following areas:       Cognitive Impairment.             SLP GOALS       Row Name 06/28/23 1330 06/28/23 0830 06/27/23 1230       Attention Goal 1 (SLP)    Improve Attention by Goal 1 (SLP) -- complete selective attention task;complete sustained attention task;80%;with minimal cues (75-90%)  -AL --    Time Frame (Attention Goal 1, SLP) -- by discharge  -AL --    Progress/Outcomes (Attention Goal 1, SLP) -- continuing progress toward goal  -AL --    Comment (Attention Goal 1, SLP) -- Initially required NO cues for selective attention; however, as session progressed, pt required MIN cues due to being distracted by wife in room. Mildly reduced processing speed noted.  -AL --       Memory Skills Goal 1 (SLP)    Improve Memory Skills Through Goal 1 (SLP) -- -- use memory strategies;listen to a paragraph and answer questions;recall details of the day;80%;with moderate cues (50-74%)  -CR    Time Frame (Memory Skills Goal 1, SLP) -- -- by discharge  -CR    Progress/Outcomes (Memory Skills Goal 1, SLP) -- -- goal ongoing  -CR    Comment (Memory Skills Goal 1, SLP) -- -- Functional memory of x4 items following a 10-minute delay exhibited with 50% accuracy given NO cues, increased to 100% accuracy when given MOD visual cues.  -CR       Organizational Skills Goal 1 (SLP)    Improve Thought Organization Through Goal 1 (SLP) -- -- completing a divergent naming task;completing mental manipulation task;80%;with moderate cues (50-74%)  -CR    Time Frame (Thought Organization Skills Goal 1, SLP) -- -- by discharge  -CR    Comment (Thought  Organization Skills Goal 1, SLP) -- -- Mental manipulation of 3 items completed with 60% accuracy given NO cues, increased to 80% accuracy with repetition.  -CR       Reasoning Goal 1 (SLP)    Improve Reasoning Through Goal 1 (SLP) complete deductive reasoning task;complete basic reasoning task;complete mental flexibility task;80%;with moderate cues (50-74%)  -AL -- --    Time Frame (Reasoning Goal 1, SLP) by discharge  -AL -- --    Comment (Reasoning Goal 1, SLP) Acrostics task: Word deduction- 50% with NO cues, 100% with MIN-MOD cues. Required MAX cues to copy letters to correct boxes.  -AL -- --       Executive Functional Skills Goal 1 (SLP)    Improve Executive Function Skills Goal 1 (SLP) demonstrate awareness of deficit;home management activity;80%;with minimal cues (75-90%)  -AL demonstrate awareness of deficit;home management activity;80%;with minimal cues (75-90%)  -AL --    Time Frame (Executive Function Skills Goal 1, SLP) by discharge  -AL by discharge  -AL --    Progress/Outcomes (Executive Function Skills Goal 1, SLP) good progress toward goal  -AL good progress toward goal  -AL --    Comment (Executive Function Skills Goal 1, SLP) Completed medicine management task: overall 7/9 with NO cues, 9/9 with MIN cues.  -AL Medication management with pill box using pt's medicine list: 5/7 with NO cues, 7/7 with MIN cues. Error x1 when scheduling (AM vs. PM) and error x1 when forgetting which medication he was placing in box.  -AL --      Row Name 06/27/23 0900             Reasoning Goal 1 (SLP)    Improve Reasoning Through Goal 1 (SLP) complete deductive reasoning task;complete basic reasoning task;complete mental flexibility task;80%;with moderate cues (50-74%)  -CR      Time Frame (Reasoning Goal 1, SLP) by discharge  -CR      Progress/Outcomes (Reasoning Goal 1, SLP) good progress toward goal  -CR      Comment (Reasoning Goal 1, SLP) Patient completed a 10-cell closet organization task targeting reasoning  and executive function skills with 70% accuracy given NO cues, increased to 80% accuracy when given MIN cues.  -CR                User Key  (r) = Recorded By, (t) = Taken By, (c) = Cosigned By      Initials Name Provider Type    Charmaine Carreon MS CCC-SLP Speech and Language Pathologist    Mariely Corcoran CF-SLP Speech and Language Pathologist                                Time Calculation:        Time Calculation- SLP       Row Name 06/28/23 1356 06/28/23 0912          Time Calculation- SLP    SLP Start Time 1330  -AL 0830  -AL     SLP Stop Time 1400  -AL 0900  -AL     SLP Time Calculation (min) 30 min  -AL 30 min  -AL     SLP Received On -- 06/28/23  -AL               User Key  (r) = Recorded By, (t) = Taken By, (c) = Cosigned By      Initials Name Provider Type    Charmaine Carreon MS CCC-SLP Speech and Language Pathologist                      Therapy Charges for Today       Code Description Service Date Service Provider Modifiers Qty    98823201143 HC ST DEV OF COGN SKILLS INITIAL 15 MIN 6/28/2023 Charmaine Sheth MS CCC-SLP  1    13273449716 HC ST DEV OF COGN SKILLS EACH ADDT'L 15 MIN 6/28/2023 Charmaine Sheth MS CCC-SLP  3                             Charmaine Sheth MS CCC-SLP  6/28/2023

## 2023-06-29 LAB
DEPRECATED RDW RBC AUTO: 43.4 FL (ref 37–54)
ERYTHROCYTE [DISTWIDTH] IN BLOOD BY AUTOMATED COUNT: 14.1 % (ref 12.3–15.4)
GLUCOSE BLDC GLUCOMTR-MCNC: 150 MG/DL (ref 70–130)
GLUCOSE BLDC GLUCOMTR-MCNC: 152 MG/DL (ref 70–130)
GLUCOSE BLDC GLUCOMTR-MCNC: 161 MG/DL (ref 70–130)
GLUCOSE BLDC GLUCOMTR-MCNC: 177 MG/DL (ref 70–130)
HCT VFR BLD AUTO: 26.2 % (ref 37.5–51)
HGB BLD-MCNC: 8.9 G/DL (ref 13–17.7)
MCH RBC QN AUTO: 28.9 PG (ref 26.6–33)
MCHC RBC AUTO-ENTMCNC: 34 G/DL (ref 31.5–35.7)
MCV RBC AUTO: 85.1 FL (ref 79–97)
PLATELET # BLD AUTO: 277 10*3/MM3 (ref 140–450)
PMV BLD AUTO: 9.1 FL (ref 6–12)
QT INTERVAL: 447 MS
RBC # BLD AUTO: 3.08 10*6/MM3 (ref 4.14–5.8)
WBC NRBC COR # BLD: 8.23 10*3/MM3 (ref 3.4–10.8)

## 2023-06-29 NOTE — PROGRESS NOTES
LOS: 23 days   Patient Care Team:  Liza Oconnell III, NP-C as PCP - General (Family Medicine)  Corey Lao Jr., MD (Inactive) as Consulting Physician (Urology)      KRIS TAYLOR  1950      ADMITTING DIAGNOSIS:  Stroke  Valvular heart disease status post repair/replacement      Subjective   Pt seen in therapy this AM, NAEON. Denies chest pain, sob, n/v/d. Continues to have LE swelling but improving.       Objective     Vitals:    06/29/23 1231   BP: 94/68   Pulse: 96   Resp: 18   Temp: 98.5 °F (36.9 °C)   SpO2: 95%       PHYSICAL EXAM:   MENTAL STATUS -  AWAKE / ALERT  HEENT-   LUNGS - CTA,    Sternotomy incision -dressed  HEART-sinus with occasional ectopy  ABD - NORMOACTIVE BOWEL SOUNDS, SOFT, NT.   EXT -1+ pedal edema bilateral lower extremities, improved from yesterday   NEURO -oriented to person place time and situation.    Speech is fluent.    Good strength bilaterally          MEDICATIONS  Scheduled Meds:aspirin, 81 mg, Oral, Daily  atorvastatin, 40 mg, Oral, Nightly  budesonide-formoterol, 2 puff, Inhalation, BID - RT  calcium 500 mg vitamin D 5 mcg (200 UT), 1 tablet, Oral, Daily  colchicine, 0.6 mg, Oral, Daily  furosemide, 80 mg, Intravenous, Q6H  guaifenesin, 400 mg, Oral, TID  insulin glargine, 20 Units, Subcutaneous, Nightly  insulin glargine, 20 Units, Subcutaneous, Daily  insulin lispro, 3-14 Units, Subcutaneous, 4x Daily AC & at Bedtime  insulin lispro, 4 Units, Subcutaneous, TID With Meals  magnesium oxide, 400 mg, Oral, Daily  melatonin, 3 mg, Oral, Nightly  metoprolol succinate XL, 25 mg, Oral, Q24H  pantoprazole, 40 mg, Oral, Q AM  sodium chloride, 3 mL, Intravenous, Q12H  tiotropium bromide monohydrate, 2 puff, Inhalation, Daily - RT      Continuous Infusions:   PRN Meds:.  acetaminophen **OR** [DISCONTINUED] acetaminophen **OR** [DISCONTINUED] acetaminophen    bisacodyl    calcium carbonate    dextrose    dextrose    docusate sodium    glucagon (human  recombinant)    hydrocortisone-bacitracin-zinc oxide-nystatin    ipratropium-albuterol    melatonin    ondansetron **OR** ondansetron    senna    sodium chloride    traMADol      RESULTS  Glucose   Date/Time Value Ref Range Status   06/29/2023 1123 177 (H) 70 - 130 mg/dL Final     Comment:     Meter: [id number] : [id number] Jamila Guerrier NA   06/29/2023 0713 150 (H) 70 - 130 mg/dL Final     Comment:     Meter: [id number] : [id number] Jamila Guerrier NA   06/28/2023 2057 218 (H) 70 - 130 mg/dL Final     Comment:     Meter: [id number] : [id number] Heronradu Kimta    06/28/2023 1622 237 (H) 70 - 130 mg/dL Final     Comment:     Meter: [id number] : [id number] Roe Batsheva    06/28/2023 1133 320 (H) 70 - 130 mg/dL Final     Comment:     ROBLES NOTIFIED Meter: [id number] : [id number] Roe Batsheva    06/28/2023 0731 126 70 - 130 mg/dL Final     Comment:     Meter: [id number] : [id number] Roe Torowakristen    06/27/2023 2317 428 (C) 70 - 130 mg/dL Final     Comment:     Result Not Confirmed Meter: [id number] : [id number] Summer Venegas NA   06/27/2023 2024 234 (H) 70 - 130 mg/dL Final     Comment:     Meter: [id number] : [id number] Summer Venegas NA     Results from last 7 days   Lab Units 06/29/23  0705 06/28/23  0753 06/27/23  0710   WBC 10*3/mm3 8.23 8.52 7.07   HEMOGLOBIN g/dL 8.9* 9.5* 9.3*   HEMATOCRIT % 26.2* 28.3* 28.0*   PLATELETS 10*3/mm3 277 290 255     Results from last 7 days   Lab Units 06/28/23  0753 06/27/23  0710 06/26/23  0322 06/24/23  1946 06/24/23  0228 06/23/23  0812   SODIUM mmol/L 140 138 139   < > 139 133*   POTASSIUM mmol/L 3.8 3.1* 3.5   < > 3.4* 3.5   CHLORIDE mmol/L 101 98 100   < > 96* 94*   CO2 mmol/L 29.6* 29.0 28.3   < > 30.0* 27.9   BUN mg/dL 16 18 19   < > 14 13   CREATININE mg/dL 1.26 1.31* 1.55*   < > 1.50* 1.46*   CALCIUM mg/dL 9.5 8.9 8.9   < > 9.1 8.9   BILIRUBIN mg/dL  --   --   --   --  0.4 0.7   ALK PHOS U/L  --   --   --   --   122* 120*   ALT (SGPT) U/L  --   --   --   --  12 9   AST (SGOT) U/L  --   --   --   --  9 14   GLUCOSE mg/dL 127* 128* 110*   < > 149* 151*    < > = values in this interval not displayed.      Latest Reference Range & Units 05/23/23 14:29   Hemoglobin A1C 4.80 - 5.60 % 9.20 (H)   Total Cholesterol 0 - 200 mg/dL 155   HDL Cholesterol 40 - 60 mg/dL 41   LDL Cholesterol  0 - 100 mg/dL 81   Triglycerides 0 - 150 mg/dL 197 (H)   (H): Data is abnormally high     Latest Reference Range & Units 05/31/23 08:03   25 Hydroxy, Vitamin D 30.0 - 100.0 ng/ml 20.0 (L)   (L): Data is abnormally low    Lower extremity venous duplex-June 19, 2023-Chronic right lower extremity superficial thrombophlebitis noted in the small saphenous.     ASSESSMENT and PLAN    Acute ischemic left MCA stroke    Status post CVA-Scattered  punctate restricted diffusion acute infarct in the bilateral frontal and   left parietal occipital cortices.        Impaired Cognition/impaired mobility/impaired self-care     Aphasia-resolved     Right hand apraxia-resolved     Encephalopathy-improving     Dysphagia/nutrition-healthy heart 2-3 g sodium, consistent carb, no mixed consistency, regular texture, nectar thick liquid  June 7-videofluoroscopic swallow study planned tomorrow  June 8-swallow study today-advance to regular solid, no mixed consistency, thin liquids by cup.  No straws.     Stroke prophylaxis-Eliquis/aspirin/atorvastatin     History of severe mitral regurgitation and annular dilatation, moderate to severe tricuspid regurgitation-  status post May 24, 2023 - 1. Mitral valve repair with a 28 mm physio II ring annuloplasty with residual mild to moderate MR  2.  Mitral valve replacement with a 29 mm Schwartz II porcine prosthesis  3.  Tricuspid valve repair with 28 mm physio tricuspid ring  4.  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 1200 cc fluid restriction     Severe pulmonary hypertension  Obstructive sleep apnea     Interstitial lung disease status post COVID-19 infection-steroid taper per pulmonology  DuoNeradha  Chronic steroid use with cushingoid syndrome  Prednisone 5 mg every other day  June 23-change back to his home regimen Spiriva 2 puffs once a day and Symbicort 160/4.5 two puffs twice a day       Leukocytosis-reactive/steroid administration     Postoperative anemia     Bdklalzblzmzurlg-ntgknpycrrl-gjkmtypi     Chronic kidney disease stage III-baseline creatinine 1.2  June 29- nephro following, plan to continue IV diuretics for now      Diabetes mellitus type 2-Jardiance/Lantus/sliding scale insulin-uncontrolled  Off Jardiance secondary to renal function.  Adjusting short and long acting insulin.       BPH/urinary retention-history of TURP  June 14-void 300 cc with postvoid residual 205 cc.    Renal insufficiency -   Nephrology following     Pain management-tramadol-/Tylenol Dk report reviewed              TEAM CONF - JUNE 8 - ENDURANCE POOR. BED MOD. TRANSFERS MOD. GAIT 25 FEET MOD ASSIST RW. SHOWER TRANSFERS MOD A.   BATH MOD ASSIST. LBD MAX. UBD MIN. TOILETING MAX.   Patient is currently on nectar liquids VFSS TODAY.   BIMS SUMMARY SCORE: 9 Moderately impaired   DIFFICULTY WITH SUSTAINED AND FOCAL ATTENTION.  CONTINENT / INCONTINENT  ELOS - 2 -3 WEEKS    TEAM CONF - Kari 15 - BED MOD. TRANSFERS MIN. 4 STAIRS MIN X 2. SATS 97% OR HIGHER WITH GAIT WITH FATIGUE, GAIT 80 FEET MIN ASSIST RW. TOILET TRANSFERS MIN. SHOWER TRANSFERS MIN MOD. BATH MIN. LBD MIN MOD. UBD MIN. GROOMING SET UP. TOILETING MIN MOD. DRESSING TAKES 30 MINUTES WIETH SLOW PROCESSING. EDEMA - JOBST STOCKINGS. MILD DYSPHGAIA, REG THIN BY CUP, NO STRAWS. CLQT MODERATE DEFICITS, DIFFICULTY WITH ATTENTION AND MEMORY, PROLONGED PROCESSING SPEED. BNE FOLLOWING. VARIABLE INTAKE - DIETITIAN  FOLLOWING. RASH ON ABD AND LEFT CHEST, POSSIBLY CONTACT DERMATITIS. TORSEMIDE ADDED.  ELOS - TWO WEEKS.    TEAM CONF - June 22- DIURETIC ADJUSTED BY NEPRHOLOGY. PATIENT DOES NOT FOLLOW 1500 CC FLUID RESTRICTION. TRANSFERS CTG. 4 STAIRS MIN X 2. GAIT 80 FEET CTG RW.   TOILET AND SHOWER TRANSFERS CTG. BATH MIN-CTG. LBD MIN-CTG FOR COMPRESSION STOCKINGS. UBD SBA. TOILETING CTG AS ASKS FOR HELP.   SWALLOW - REGULAR SOLID, THIN LIQUID BY CUP. MOSTLY CONTINENT.   Orientation: WNL  Attention: WNL to Mildly Impaired  Executive Functioning: Mildly Impaired  Abstract Reasoning: WNL  Arithmetic: Mildly Impaired  Visuospatial Perception: WNL  Visuospatial Praxis: WNL for Figure Copy; Severely Impaired Coding related to slowed processing speed.  Verbal Memory: Moderately to Severely Impaired  Visual Memory: Mildly Impaired  Emotional: Some frustration regarding current level of functioning, but minimal anxiety and depression symptoms reported on the GDS and BROOKE.  -demonstrating moderate to severe verbal memory deficits and slowed processing speed that are consistent with the location of his stroke.  Word-finding difficulties are also present, but are more evident conversationally than during testing.  ELOS - ONE WEEK- June 29          Goal is for home with outpatient cardiac rehab   therapies.  Barrier to discharge: Impaired cognition mobility self-care- work on follow, executive function, conditioning, transfers, progressive ambulation, ADLs to overcome.           Jeffery Dudley DO,     During rounds, used appropriate personal protective equipment including mask and gloves.  Additional gown if indicated.  Mask used was standard procedure mask. Appropriate PPE was worn during the entire visit.  Hand hygiene was completed before and after.      Patient has been staffed and discussed with attending Physician, Dr. Tu Silver.

## 2023-06-29 NOTE — PROGRESS NOTES
[facility] Progress Note      Patient Name: Vernon Solorzano  : 1950  MRN: [id number]  Primary Care Physician:  Liza Oconnell III, RACHAEL  Date of admission: 2023    Subjective     Interval History:   Follow up CKD III. Not allowing urine measurement because using bathroom.  Discussed taking urinal in with him for accurate measurement . Weight down.  Not adherent to fluid restriction 1600 cc in yesterday . 2 bottles of soda almost gone on table.  Some intermittent left sharp chest pain last night ( EKG unchanged) and once while I was in the room, but resolved quickly.  Eating.  Asking when he will be dc.    Still with significant edema .  Bowels moving, not loose.     Review of Systems:   As noted above    Objective     Vitals:   Temp:  [97 °F (36.1 °C)-98.2 °F (36.8 °C)] 98.2 °F (36.8 °C)  Heart Rate:  [] 97  Resp:  [18-20] 18  BP: (112-140)/(70-79) 140/79  Flow (L/min):  [2] 2    Intake/Output Summary (Last 24 hours) at 2023 0806  Last data filed at 2023 0600  Gross per 24 hour   Intake 1600 ml   Output 975 ml   Net 625 ml         Physical Exam:    General Appearance: Awake, alert. Periorbital edema. Just woke up.  Some repetition of phrases.  Speech more fluent.   Skin: warm and dry  HEENT: oral mucosa normal, nonicteric sclera  Neck: supple, no JVD  Lungs: Clear to auscultation.   Heart: RRR, normal S1 and S2  Abdomen: soft, nontender, + bs, distended.  Extremities 2+ lower ext edema.      Scheduled Meds:     aspirin, 81 mg, Oral, Daily  atorvastatin, 40 mg, Oral, Nightly  budesonide-formoterol, 2 puff, Inhalation, BID - RT  calcium 500 mg vitamin D 5 mcg (200 UT), 1 tablet, Oral, Daily  colchicine, 0.6 mg, Oral, Daily  guaifenesin, 400 mg, Oral, TID  insulin glargine, 20 Units, Subcutaneous, Nightly  insulin glargine, 20 Units, Subcutaneous, Daily  insulin lispro, 3-14 Units, Subcutaneous, 4x Daily AC & at Bedtime  insulin lispro, 4 Units,  Subcutaneous, TID With Meals  magnesium oxide, 400 mg, Oral, Daily  melatonin, 3 mg, Oral, Nightly  metoprolol succinate XL, 25 mg, Oral, Q24H  pantoprazole, 40 mg, Oral, Q AM  sodium chloride, 3 mL, Intravenous, Q12H  tiotropium bromide monohydrate, 2 puff, Inhalation, Daily - RT      IV Meds:        Results Reviewed:   I have personally reviewed the results from the time of this admission to 6/29/2023 08:06 EDT     Results from last 7 days   Lab Units 06/28/23  0753 06/27/23  0710 06/26/23  0322 06/24/23  1946 06/24/23  0228 06/23/23  0812 06/22/23  1146   SODIUM mmol/L 140 138 139   < > 139 133* 137   POTASSIUM mmol/L 3.8 3.1* 3.5   < > 3.4* 3.5 3.8   CHLORIDE mmol/L 101 98 100   < > 96* 94* 98   CO2 mmol/L 29.6* 29.0 28.3   < > 30.0* 27.9 27.6   BUN mg/dL 16 18 19   < > 14 13 14   CREATININE mg/dL 1.26 1.31* 1.55*   < > 1.50* 1.46* 1.43*   CALCIUM mg/dL 9.5 8.9 8.9   < > 9.1 8.9 9.4   BILIRUBIN mg/dL  --   --   --   --  0.4 0.7 0.6   ALK PHOS U/L  --   --   --   --  122* 120* 126*   ALT (SGPT) U/L  --   --   --   --  12 9 9   AST (SGOT) U/L  --   --   --   --  9 14 10   GLUCOSE mg/dL 127* 128* 110*   < > 149* 151* 195*    < > = values in this interval not displayed.         Estimated Creatinine Clearance: 67.7 mL/min (by C-G formula based on SCr of 1.26 mg/dL).    Results from last 7 days   Lab Units 06/28/23  0753 06/24/23  1946 06/24/23 0228 06/23/23  0812   MAGNESIUM mg/dL 2.1 1.8 2.0  --    PHOSPHORUS mg/dL 3.3  --  3.9 3.2               Results from last 7 days   Lab Units 06/29/23  0705 06/28/23  0753 06/27/23  0710 06/26/23  0322 06/25/23  0739   WBC 10*3/mm3 8.23 8.52 7.07 9.69 11.08*   HEMOGLOBIN g/dL 8.9* 9.5* 9.3* 9.9* 10.3*   PLATELETS 10*3/mm3 277 290 255 246 259               Assessment / Plan     ASSESSMENT:  1. CKD III, baseline creatinine 1.2.  Creatinine improved yesterday.  Next labs tomorrow.  Continue with IV diuretic today. Reiterated need for measuring urine.  Stressed fluid restriction  .  2. MR now sp MV replacement( tissue )and TV repair, SU closure 5/24/23. 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   3. Chronic steroid use after COVID 19 PNA.  4. Anemia post op. Hg down some, but also has significant fluid excess.   5. 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 Rehab .  6. DM2  7. Aflutter.Cardioversion 6/2/23. On eliquis and metoprolol.   PLAN:  1. Continue  IV diuretic  2. Po fluid restriction 1200 cc daily  3. HOLLY YUN.     Joan Batista MD  06/29/23  08:06 EDT    [facility]  [phone]

## 2023-06-29 NOTE — PROGRESS NOTES
Case Management  Inpatient Rehabilitation Team Conference    Conference Date/Time: 6/29/2023 8:07:53 AM    Team Conference Attendees:  Dr. Tu Montalvo, Pharmacist  Génesis Narayan,   Damaris Tito, PT  Candy Davis, OT  Charmaine Sheth, SLP  Clarissa Marie, CTRS  Angelika Najera, RN  Joan Mckenzie, RN   Regi Stokes, Psychologist    Demographics            Age: 72Y            Gender: Male    Admission Date: 6/6/2023 4:36:00 PM  Rehabilitation Diagnosis:  No anup stroke  Past Medical History: Past Medical History:  DiagnosisDate  ?Allergic rhinitis  ?Arthritis  ?BPH (benign prostatic hyperplasia)  ?Diabetes mellitus   TYPE 2  ?Foraminal stenosis of cervical region   C5-C6  ?GERD (gastroesophageal reflux disease)  ?Hemorrhoids  ?History of urinary [id number]   S/P TURP  ?Hyperlipidemia  ?Macular degeneration  ?PING (obstructive sleep apnea)01/07/2016   MILD, SEES DR. AMARILIS MORGAN        Surgical History  Past Surgical History:  ProcedureLateralityDate  ?CARDIAC CATHETERIZATIONN/A5/11/2023   Procedure: Right Heart Cath;  Surgeon: Corey Gaffney MD;  [facility]:  [facility];  Service: Cardiology;  Laterality: N/A;  ?CARDIAC CATHETERIZATIONN/A5/11/2023   Procedure: Left Heart Cath;  Surgeon: Corey Gaffney MD;  Location:  [facility];  Service: Cardiology;  Laterality: N/A;  ?CARDIAC CATHETERIZATIONN/A5/11/2023   Procedure: Left ventriculography;  Surgeon: Corey Gaffney MD;  Location:   [facility];  Service: Cardiology;  Laterality: N/A;  ?CARDIAC CATHETERIZATIONN/A5/11/2023   Procedure: Coronary angiography;  Surgeon: Corey Gaffney MD;  Location:   [facility];  Service: Cardiology;  Laterality: N/A;  ?COLONOSCOPYN/A   WNL PER PT, NO RECORDS,   ?COLONOSCOPYN/A04/07/2009   DR. GORGE BENAVIDEZ AT [place]  ?MITRAL VALVE REPAIR/REPLACEMENTN/A5/24/2023   Procedure: MITRAL VALVE REPAIR/REPLACEMENT TRICUSPID VALVE  REPAIR/REPLACEMENT  TRANSESOPHAGEAL ECHOCARDIOGRAM WITH ANESTHESIA;  Surgeon: George Frey MD;  Location: [facility];  Service: Cardiothoracic;  Laterality: N/A;  ?PROSTATE SURGERYN/A11/12/2010   TURP, DR. BRIGHT COLLAZO AT [facility]  ?SKIN TAG REMOVALN/A12/20/2017   ANAL SKIN TAG X2, PERFORMED IN OFFICE, DR. LISA ORANTES  ?TURP / TRANSURETHRAL INCISION / DRAINAGE PROSTATE 2010   Dr. Goodrich      Plan of Care  Anticipated Discharge Date/Estimated Length of Stay: ELOS: 6/29  Anticipated Discharge Destination: Community discharge with assistance  Discharge Plan : Patient plans to d/c home with his wife who can provide 24 hour  assist.  Medical Necessity Expected Level Rationale: Goals are to achieve a level of  contact-guard with  mobility and self-care and improved activity tolerance.  Rehabilitation prognosis fair.  Medical prognosis defer to cardiology.  Intensity and Duration: an average of 3 hours/5 days per week  Medical Supervision and 24 Hour Rehab Nursing: x  Physical Therapy: x  PT Intensity/Duration: 1 hour / day, 5 days / week, for approximately 2 weeks  Occupational Therapy: x  OT Intensity/Duration: 1 hour / day, 5 days / week, for approximately 2 weeks  Speech and Language Therapy: x  SLP Intensity/Duration: 1 hour / day, 5 days / week, for approximately 2 weeks  Social Work: x  Therapeutic Recreation: x  Psychology: x  Registered Dietician: x  Updated (if changes indicated)    Anticipated Discharge Date/Estimated Length of Stay:   ELOS: Wed 7/5    Based on the patient's medical and functional status, their prognosis and  expected level of functional improvement is: Goals are to achieve a level of  contact-guard with  mobility and self-care and improved activity tolerance.  Rehabilitation prognosis fair.  Medical prognosis defer to cardiology.      Interdisciplinary Problem/Goals/Status    All Rehab Problems:  Sphincter Control    [RN] Bladder Management(Active)  Current Status(06/29/2023):  Patient is continent of urine using urinal or toilet  but may have urgency.  Weekly Goal(07/05/2023): Patient will be continent 90% of the time.  Discharge Goal: Patient will be continent    [RN] Bowel Management(Active)  Current Status(06/29/2023): Virgenn is continent of bowels  Weekly Goal(07/06/2023): patient is continent of bowels  Discharge Goal: Patient is continent of bowels        Safety    [RN] Potential for Injury(Active)  Current Status(06/29/2023): Patient is at increased risk for falls/injury r/t  recent surgery and stroke.  Weekly Goal(07/06/2023): Patient will use the call light for assistance  Discharge Goal: Patient/ family will be aware of risk of fall and safety in the  home setting        Psychosocial    [RN] Coping/Adjustment(Active)  Current Status(06/29/2023): Patient has a supportive family. Pt is at increased  risk of impaired coping r/t recent stroke and hospitalization. Pt is A/Ox4 but  may be forgetful.  Weekly Goal(07/05/2023): Patient will express concerns regarding current status  Discharge Goal: Patient will demonstrate healthy coping strategies        Swallow Function    [ST] Swallowing(Active)  Current Status(06/28/2023): Regular thin by cup or straw. Precautions: upright,  small single sips, alternate solids with liquids  Weekly Goal(07/05/2023): Patient will tolerate the least restrictive diet  without s/sx of aspiration  Discharge Goal: Patient will tolerate the least restrictive diet without s/sx of  aspiration        Self Care    [OT] Bathing(Active)  Current Status(06/28/2023): SBA/CGA  Weekly Goal(07/06/2023): SBA  Discharge Goal: SBA    [OT] Dressing (Lower)(Active)  Current Status(06/28/2023): MIN  Weekly Goal(07/05/2023): CGA/SBA  Discharge Goal: CGA/SBA    [OT] Dressing (Upper)(Active)  Current Status(06/28/2023): SBA  Weekly Goal(06/28/2023): Set up  Discharge Goal: set up    [OT] Grooming(Active)  Current Status(06/19/2023): set up  Weekly Goal(06/19/2023): set  up  Discharge Goal: set up    [OT] Toileting(Active)  Current Status(06/19/2023): MIN/CGA  Weekly Goal(07/06/2023): CGA/SBA  Discharge Goal: CGA/SBA        Mobility    [OT] Toilet Transfers(Active)  Current Status(06/28/2023): CGA/SBA  Weekly Goal(07/06/2023): SBA  Discharge Goal: SBA    [OT] Tub/Shower Transfers(Active)  Current Status(06/28/2023): CGA  Weekly Goal(07/05/2023): SBA/CGA  Discharge Goal: CGA /SBA    [PT] Stairs(Active)  Current Status(06/28/2023): 4 left handrail and CGA  Weekly Goal(07/06/2023): PT only  Discharge Goal: 12, rail, Min/CG    [PT] Walk(Active)  Current Status(06/28/2023):  Rwx CGA/SBA  Weekly Goal(07/06/2023): BR Rwx CGA  Discharge Goal: 150, CG, rwx    [PT] Bed/Chair/Wheelchair(Active)  Current Status(06/28/2023): CGA  Weekly Goal(07/06/2023): CG/SBA  Discharge Goal: CG/SBA    [PT] Bed Mobility(Active)  Current Status(06/21/2023): Ney  Weekly Goal(06/29/2023): CGA  Discharge Goal: Sup        Cognition    [ST] Attention(Active)  Current Status(06/28/2023): Mild-Moderate cognitive impairment; decreased  initiation/slow processing speed (improved from initial eval) . Increased  difficulty with attention, memory, and executive function. Easily distracted  Weekly Goal(07/05/2023): Patient will participate in functional therapy  activities given MOD cues  Discharge Goal: Functional cognition for home        Body Systems    [RN] Endocrine(Active)  Current Status(06/29/2023): Uncontrolled BS  Weekly Goal(07/06/2023): BS WNL  Discharge Goal: Diabetes education, BS WNL        Comments: 6/8 Mod A bed mob, Mod A transfers, amb 25' Mod A + 1 to follow w/  rwx.  Mod A shower transfer.  Low endurance.  Max A toileting.  Mod A bathing,  Max A LBD.  VFSS today.  Cont / Incont urine, continent of bowel.    6/15 Bed mob Mod I, transfer Min A, can do 4 steps w/ rail Min A x 2, amb 80'  Min A rwx, Min A toilet transfer, Mod A / Min shower transfer.  Mod / Min  toileting.  Min A bathing.  Mod / Min  LBD.  Upgraded to regular thin liquids by  cup, no straw.  Delayed processing.  Moderate deficits on CLQT.  Cont /Incont of  urine, cont of bowel.    6/23 Working on endurance.  Min A bed mob, CGA transfers, amb 80' CGA rwx, can  do  4 steps w/ rail Min A x 2.  CGA / SBA toilet transfer, CGA shower transfer.  Min / CGA toileting.  Min / CGA LBD.  Mild - Mod cognitive impairment.  Easily  distracted.  Tolerating regular diet, no mixed consistencies, thin liquids by  cup.  Cont bowel and bladder.    6/29 Min A bed mob, CGA transfers, amb ' CGA / SBA rwx, can do 4 steps w/  left handrail CGA, CGA / SBA toilet transfer, CGA shower transfer.  Min / CGA  toileting, SBA / CGA bathing, Min A LBD.  Mild - Mod cog impairment.  Regular  diet with thin by cup or straw.  Cont bowel and bladder.  On Lasix.    Signed by: Angelika Najera RN    Physician CoSigned By: Tu Silver 06/29/2023 08:27:39

## 2023-06-29 NOTE — PROGRESS NOTES
TEAM CONF - June 29 - TRANSFERS CTG. 4 STAIRS CTG . DID 8 ON Tuesday. AT HOME 14 STAIRS, LANDING, THEN 8 STAIRS TO SECOND FLOOR. GAIT  RW CTG SBA. TOILET TRANSFERS CTG SBA. SHOWER TRANSFERS CTG  BATH SBA CTG. LBD MIN. UBD SBA. GROOMING SET UP. TOILETING MIN CTG. EASILY DISTRACTED ON TASK. PROCESSING SPEED IMPROVING, BUT STILL SLOWED WHEN TIRED.   DRESSLER SYNDROME - TRAMADOL HELPED PAIN. ON COLCHICINE.   ELOS - WED

## 2023-06-29 NOTE — THERAPY TREATMENT NOTE
Inpatient Rehabilitation - Speech Language Pathology Treatment Note    [facility]     Patient Name: Vernon Solorzano  : 1950  MRN: [id number]    Today's Date: 2023                   Admit Date: 2023       Visit Dx:      ICD-10-CM ICD-9-CM   1. Impaired functional mobility, balance, gait, and endurance  Z74.09 V49.89       Patient Active Problem List   Diagnosis    DM (diabetes mellitus), type 2    Mixed hyperlipidemia    Mild intermittent asthma without complication    New onset of congestive heart failure    Nonrheumatic mitral valve regurgitation    Left-sided chest pain    Essential hypertension    Mitral valve disease    Acute ischemic left MCA stroke    Atelectasis of both lungs    Pericardial effusion, acute    History of ischemic stroke    Obese    Physical debility    Chronic diastolic CHF (congestive heart failure)    Anemia    Stage 3a chronic kidney disease    Paroxysmal atrial flutter    H/O mitral valve replacement with tissue graft    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 AMARILIS MORGAN       Past Surgical History:   Procedure Laterality Date    CARDIAC CATHETERIZATION N/A 2023    Procedure: Right Heart Cath;  Surgeon: Corey Gaffney MD;  Location:  [facility];  Service: Cardiology;  Laterality: N/A;    CARDIAC CATHETERIZATION N/A 2023    Procedure: Left Heart Cath;  Surgeon: Corey Gaffney MD;  Location:  [facility] CATH [facility];  Service: Cardiology;  Laterality: N/A;    CARDIAC CATHETERIZATION N/A 2023    Procedure: Left  ventriculography;  Surgeon: Corey Gaffney MD;  Location: [facility] CATH INVASIVE LOCATION;  Service: Cardiology;  Laterality: N/A;    CARDIAC CATHETERIZATION N/A 5/11/2023    Procedure: Coronary angiography;  Surgeon: Corey Gaffney MD;  [facility];  Service: Cardiology;  Laterality: N/A;    COLONOSCOPY N/A     WNL PER PT, NO RECORDS,     COLONOSCOPY N/A 04/07/2009    DR. GORGE BENAVIDEZ AT [place]    MITRAL VALVE REPAIR/REPLACEMENT N/A 5/24/2023    Procedure: MITRAL VALVE REPAIR/REPLACEMENT TRICUSPID VALVE REPAIR/REPLACEMENT TRANSESOPHAGEAL ECHOCARDIOGRAM WITH ANESTHESIA;  Surgeon: George Frey MD;  Location: [facility] CVOR;  Service: Cardiothoracic;  Laterality: N/A;    PROSTATE SURGERY N/A 11/12/2010    TURP, DR. COREY COLLAZO AT [facility]    SKIN TAG REMOVAL N/A 12/20/2017    ANAL SKIN TAG X2, PERFORMED IN OFFICE, DR. LISA ORANTES    TURP / TRANSURETHRAL INCISION / DRAINAGE PROSTATE  2010    Dr. Goodrich       SLP Recommendation and Plan                                                            SLP EVALUATION (last 72 hours)       SLP SLC Evaluation       Row Name 06/29/23 1330 06/29/23 1200 06/28/23 1330 06/28/23 0830 06/27/23 1230       Communication Assessment/Intervention    Document Type therapy note (daily note)  -SR therapy note (daily note)  -SR therapy note (daily note)  -AL therapy note (daily note)  -AL therapy note (daily note)  -CR    Subjective Information no complaints  -SR no complaints  -SR -- -- --    Patient Observations alert;cooperative;agree to therapy  -SR alert;cooperative;agree to therapy  -SR -- -- alert;cooperative  -CR    Patient/Family/Caregiver Comments/Observations -- -- Pt pleasant and cooperative.  -AL Pt participated well. Wife present for portion of session.  -AL --    Patient Effort good  -SR good  -SR -- -- good  -CR    Symptoms Noted During/After Treatment none  -SR none  -SR -- -- none  -CR       General Information    Patient Profile  Reviewed -- -- -- -- yes  -CR       Pain Scale: Numbers Pre/Post-Treatment    Pretreatment Pain Rating 0/10 - no pain  -SR 0/10 - no pain  -SR 0/10 - no pain  -AL 0/10 - no pain  -AL 0/10 - no pain  -CR    Posttreatment Pain Rating 0/10 - no pain  -SR 0/10 - no pain  -SR -- -- 0/10 - no pain  -CR      Row Name 06/27/23 0900                   Communication Assessment/Intervention    Document Type therapy note (daily note)  -CR        Subjective Information no complaints  -CR        Patient Observations alert;cooperative  -CR        Patient Effort good  -CR        Symptoms Noted During/After Treatment none  -CR           General Information    Patient Profile Reviewed yes  -CR           Pain Scale: Numbers Pre/Post-Treatment    Pretreatment Pain Rating 0/10 - no pain  -CR        Posttreatment Pain Rating 0/10 - no pain  -CR                  User Key  (r) = Recorded By, (t) = Taken By, (c) = Cosigned By      Initials Name Effective Dates    Charmaine Carreon, MS CCC-[facility] 06/16/21 -     SR Latisha Morris CCC-[facility] 11/10/22 -     CR Mariely Bridges CF-SLP 05/31/23 -                        EDUCATION    The patient has been educated in the following areas:       Cognitive Impairment.             SLP GOALS       Row Name 06/29/23 1330 06/29/23 1200 06/28/23 1330       Attention Goal 1 (SLP)    Improve Attention by Goal 1 (SLP) complete selective attention task;complete sustained attention task;80%;with minimal cues (75-90%)  -SR complete selective attention task;complete sustained attention task;80%;with minimal cues (75-90%)  -SR --    Time Frame (Attention Goal 1, SLP) by discharge  -SR by discharge  -SR --    Progress/Outcomes (Attention Goal 1, SLP) continuing progress toward goal  -SR continuing progress toward goal  -SR --    Comment (Attention Goal 1, SLP) Visual scanning/visual reasoning activity completed during PM therapy session. Patient labeled visual pictures in alphabetical order using connecting line  with 50% acc given MIN cues; 80% acc given MOD cues. Extended time provided to complete activity due to slow processing.  -SR Demonstrated increased difficulty with sustained attention during therapy session. Required entire 30 minutes to complete one therapy task.  -SR --       Memory Skills Goal 1 (SLP)    Improve Memory Skills Through Goal 1 (SLP) use memory strategies;listen to a paragraph and answer questions;recall details of the day;80%;with moderate cues (50-74%)  -SR -- --    Time Frame (Memory Skills Goal 1, SLP) by discharge  -SR -- --    Progress/Outcomes (Memory Skills Goal 1, SLP) goal ongoing  -SR -- --    Comment (Memory Skills Goal 1, SLP) Short-term memory; Patient listened to audio recording of voicemail message and answered questions about the content with 50% acc given NO cues; 80% acc given MIN cues.  -SR -- --       Reasoning Goal 1 (SLP)    Improve Reasoning Through Goal 1 (SLP) -- complete deductive reasoning task;complete basic reasoning task;complete mental flexibility task;80%;with moderate cues (50-74%)  -SR complete deductive reasoning task;complete basic reasoning task;complete mental flexibility task;80%;with moderate cues (50-74%)  -AL    Time Frame (Reasoning Goal 1, SLP) -- by discharge  -SR by discharge  -AL    Progress/Outcomes (Reasoning Goal 1, SLP) -- good progress toward goal  -SR --    Comment (Reasoning Goal 1, SLP) -- Attempted to complete acrostic word puzzle from yesterday's session. Patient required MOD cues to transfer target letter and its associated number to the chart to determine the target phrase. Patient completed 4 stimulus items given MOD cues. Task incomplete due to time constraint.  -SR Acrostics task: Word deduction- 50% with NO cues, 100% with MIN-MOD cues. Required MAX cues to copy letters to correct boxes.  -AL       Executive Functional Skills Goal 1 (SLP)    Improve Executive Function Skills Goal 1 (SLP) -- -- demonstrate awareness of deficit;home  management activity;80%;with minimal cues (75-90%)  -AL    Time Frame (Executive Function Skills Goal 1, SLP) -- -- by discharge  -AL    Progress/Outcomes (Executive Function Skills Goal 1, SLP) -- -- good progress toward goal  -AL    Comment (Executive Function Skills Goal 1, SLP) -- -- Completed medicine management task: overall 7/9 with NO cues, 9/9 with MIN cues.  -AL      Row Name 06/28/23 0830 06/27/23 1230 06/27/23 0900       Attention Goal 1 (SLP)    Improve Attention by Goal 1 (SLP) complete selective attention task;complete sustained attention task;80%;with minimal cues (75-90%)  -AL -- --    Time Frame (Attention Goal 1, SLP) by discharge  -AL -- --    Progress/Outcomes (Attention Goal 1, SLP) continuing progress toward goal  -AL -- --    Comment (Attention Goal 1, SLP) Initially required NO cues for selective attention; however, as session progressed, pt required MIN cues due to being distracted by wife in room. Mildly reduced processing speed noted.  -AL -- --       Memory Skills Goal 1 (SLP)    Improve Memory Skills Through Goal 1 (SLP) -- use memory strategies;listen to a paragraph and answer questions;recall details of the day;80%;with moderate cues (50-74%)  -CR --    Time Frame (Memory Skills Goal 1, SLP) -- by discharge  -CR --    Progress/Outcomes (Memory Skills Goal 1, SLP) -- goal ongoing  -CR --    Comment (Memory Skills Goal 1, SLP) -- Functional memory of x4 items following a 10-minute delay exhibited with 50% accuracy given NO cues, increased to 100% accuracy when given MOD visual cues.  -CR --       Organizational Skills Goal 1 (SLP)    Improve Thought Organization Through Goal 1 (SLP) -- completing a divergent naming task;completing mental manipulation task;80%;with moderate cues (50-74%)  -CR --    Time Frame (Thought Organization Skills Goal 1, SLP) -- by discharge  -CR --    Comment (Thought Organization Skills Goal 1, SLP) -- Mental manipulation of 3 items completed with 60% accuracy  given NO cues, increased to 80% accuracy with repetition.  -CR --       Reasoning Goal 1 (SLP)    Improve Reasoning Through Goal 1 (SLP) -- -- complete deductive reasoning task;complete basic reasoning task;complete mental flexibility task;80%;with moderate cues (50-74%)  -CR    Time Frame (Reasoning Goal 1, SLP) -- -- by discharge  -CR    Progress/Outcomes (Reasoning Goal 1, SLP) -- -- good progress toward goal  -CR    Comment (Reasoning Goal 1, SLP) -- -- Patient completed a 10-cell closet organization task targeting reasoning and executive function skills with 70% accuracy given NO cues, increased to 80% accuracy when given MIN cues.  -CR       Executive Functional Skills Goal 1 (SLP)    Improve Executive Function Skills Goal 1 (SLP) demonstrate awareness of deficit;home management activity;80%;with minimal cues (75-90%)  -AL -- --    Time Frame (Executive Function Skills Goal 1, SLP) by discharge  -AL -- --    Progress/Outcomes (Executive Function Skills Goal 1, SLP) good progress toward goal  -AL -- --    Comment (Executive Function Skills Goal 1, SLP) Medication management with pill box using pt's medicine list: 5/7 with NO cues, 7/7 with MIN cues. Error x1 when scheduling (AM vs. PM) and error x1 when forgetting which medication he was placing in box.  -AL -- --              User Key  (r) = Recorded By, (t) = Taken By, (c) = Cosigned By      Initials Name Provider Type    Charmaine Carreon MS CCC-SLP Speech and Language Pathologist    Latisha Haas CCC-SLP Speech and Language Pathologist    Mariely Corcoran, -SLP Speech and Language Pathologist                                Time Calculation:        Time Calculation- SLP       Row Name 06/29/23 1533 06/29/23 1217          Time Calculation- SLP    SLP Start Time 1330  -SR 1030  -SR     SLP Stop Time 1400  -SR 1100  -SR     SLP Time Calculation (min) 30 min  -SR 30 min  -SR               User Key  (r) = Recorded By, (t) = Taken By, (c) =  Cosigned By      Initials Name Provider Type    SR Latisha Morris CCC-SLP Speech and Language Pathologist                      Therapy Charges for Today       Code Description Service Date Service Provider Modifiers Qty    75054924271 HC ST DEV OF COGN SKILLS INITIAL 15 MIN 6/29/2023 Latisha Morris CCC-SLP  1    [id number]  ST DEV OF COGN SKILLS EACH ADDT'L 15 MIN 6/29/2023 Latisha Morris CCC-SLP  3                             JENNIFFER Hobbs  6/29/2023

## 2023-06-29 NOTE — THERAPY TREATMENT NOTE
Inpatient Rehabilitation - Occupational Therapy Treatment Note    [facility]     Patient Name: Vernon Solorzano  : 1950  MRN: [id number]    Today's Date: 2023                 Admit Date: 2023         ICD-10-CM ICD-9-CM   1. Impaired functional mobility, balance, gait, and endurance  Z74.09 V49.89       Patient Active Problem List   Diagnosis    DM (diabetes mellitus), type 2    Mixed hyperlipidemia    Mild intermittent asthma without complication    New onset of congestive heart failure    Nonrheumatic mitral valve regurgitation    Left-sided chest pain    Essential hypertension    Mitral valve disease    Acute ischemic left MCA stroke    Atelectasis of both lungs    Pericardial effusion, acute    History of ischemic stroke    Obese    Physical debility    Chronic diastolic CHF (congestive heart failure)    Anemia    Stage 3a chronic kidney disease    Paroxysmal atrial flutter    H/O mitral valve replacement with tissue graft    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 AMARILIS MORGAN       Past Surgical History:   Procedure Laterality Date    CARDIAC CATHETERIZATION N/A 2023    Procedure: Right Heart Cath;  Surgeon: Corey Gaffney MD;  Location:  [facility];  Service: Cardiology;  Laterality: N/A;    CARDIAC CATHETERIZATION N/A 2023    Procedure: Left Heart Cath;  Surgeon: Corey Gaffney MD;  Location:  [facility];  Service: Cardiology;  Laterality: N/A;    CARDIAC CATHETERIZATION N/A 2023    Procedure: Left ventriculography;  Surgeon: Gulshan  MD Corey;  Location: [facility] CATH INVASIVE LOCATION;  Service: Cardiology;  Laterality: N/A;    CARDIAC CATHETERIZATION N/A 5/11/2023    Procedure: Coronary angiography;  Surgeon: Corey Gaffney MD;  Location: [facility] CATH INVASIVE LOCATION;  Service: Cardiology;  Laterality: N/A;    COLONOSCOPY N/A     WNL PER PT, NO RECORDS,     COLONOSCOPY N/A 04/07/2009    DR. GORGE BENAVIDEZ AT [place]    MITRAL VALVE REPAIR/REPLACEMENT N/A 5/24/2023    Procedure: MITRAL VALVE REPAIR/REPLACEMENT TRICUSPID VALVE REPAIR/REPLACEMENT TRANSESOPHAGEAL ECHOCARDIOGRAM WITH ANESTHESIA;  Surgeon: George Frey MD;  Location: [facility] CVOR;  Service: Cardiothoracic;  Laterality: N/A;    PROSTATE SURGERY N/A 11/12/2010    TURP, DR. COREY COLLAZO AT [facility]    SKIN TAG REMOVAL N/A 12/20/2017    ANAL SKIN TAG X2, PERFORMED IN OFFICE, DR. LISA ORANTES    TURP / TRANSURETHRAL INCISION / DRAINAGE PROSTATE  2010    Dr. Goodrich             IRF OT ASSESSMENT FLOWSHEET (last 12 hours)       IRF OT Evaluation and Treatment       Row Name 06/29/23 1524          OT Time and Intention    Document Type daily treatment  -KA     Mode of Treatment individual therapy;occupational therapy  -KA     Patient Effort good  -KA       Row Name 06/29/23 1524          General Information    Patient Profile Reviewed yes  -KA     Patient/Family/Caregiver Comments/Observations Pt seated EOB upon arrival for AM session and sitting up in wc for PM session  -KA     Existing Precautions/Restrictions cardiac;fall;sternal  -KA       Row Name 06/29/23 1524          Pain Assessment    Pretreatment Pain Rating 0/10 - no pain  -KA     Posttreatment Pain Rating 0/10 - no pain  -KA       Row Name 06/29/23 1524          Cognition/Psychosocial    Affect/Mental Status (Cognition) WNL  -KA     Orientation Status (Cognition) oriented x 3  -KA     Follows Commands (Cognition) follows one-step commands;over 90% accuracy;repetition of directions required  -KA     Personal  Safety Interventions fall prevention program maintained;gait belt;nonskid shoes/slippers when out of bed  -     Cognitive Function attention deficit;safety deficit  -     Attention Deficit (Cognition) distractible in noisy environment;focused/sustained attention  -     Safety Deficit (Cognition) impulsivity;insight into deficits/self-awareness  -       Row Name 06/29/23 1524          Bathing    Comment (Bathing) Pt requested not to bathe this AM. He only wanted to change clothes  -       Row Name 06/29/23 1524          Upper Body Dressing    [place] Level (Upper Body Dressing) upper body dressing skills;doff;don;pull over garment;set up assistance;supervision  -     Position (Upper Body Dressing) supported sitting  -     Set-up Assistance (Upper Body Dressing) obtain clothing  -       Row Name 06/29/23 1524          Lower Body Dressing    [place] Level (Lower Body Dressing) doff;don;pants/bottoms;socks;underwear;minimum assist (75% patient effort);contact guard assist  -     Position (Lower Body Dressing) supported sitting;supported standing  -     Set-up Assistance (Lower Body Dressing) obtain clothing  -     Comment (Lower Body Dressing) Dep with HOLLY hose  -[facility] Name 06/29/23 1524          Grooming    [place] Level (Grooming) grooming skills;deodorant application;hair care, combing/brushing;standby assist  -     Position (Grooming) supported standing;sink side  -     Set-up Assistance (Grooming) obtain supplies  -     Comment (Grooming) Walked into bathroom with rwx and CGA and stood at the sink to brush his teeth  -[facility] Name 06/29/23 1524          Functional Mobility    Functional Mobility- Ind. Level contact guard assist;supervision required  -     Functional Mobility- Device walker, front-wheeled  -     Functional Mobility-Distance (Feet) --  to BR  -     Functional Mobility- Comment In room to bathroom, stood at the sink for grooming and walked  back to EOB  -NBA       Row Name 06/29/23 1524          Sit-Stand Transfer    Sit-Stand [place] (Transfers) verbal cues;standby assist  -     Assistive Device (Sit-Stand Transfers) walker, front-wheeled  -NBA       Row Name 06/29/23 1524          Stand-Sit Transfer    Stand-Sit [place] (Transfers) verbal cues;standby assist  -KA     Assistive Device (Stand-Sit Transfers) walker, front-wheeled  -NBA       Row Name 06/29/23 1524          Shoulder (Therapeutic Exercise)    Shoulder Strengthening (Therapeutic Exercise) bilateral;flexion;extension;2 lb free weight;10 repetitions;3 sets  forward press with rest breaks  -NBA       Row Name 06/29/23 1524          Elbow/Forearm (Therapeutic Exercise)    Elbow/Forearm Strengthening (Therapeutic Exercise) bilateral;flexion;extension;supination;pronation;sitting;2 lb free weight;10 repetitions;3 sets  -NBA       Row Name 06/29/23 1524          Hand (Therapeutic Exercise)    Hand Strengthening (Therapeutic Exercise) bilateral; strengthening;10 repetitions;3 sets  -NBA       Row Name 06/29/23 1524          Balance    Static Standing Balance standby assist  -     Balance Interventions --  Worked on standing tolerance while participating in [employer] game. pt with delayed processing and required increased processing time to plan next move in the game.  -NBA       Row Name 06/29/23 1524          Positioning and Restraints    Pre-Treatment Position in bed  -KA     Post Treatment Position wheelchair  -KA     In Wheelchair sitting;with PT  with PT in PM session and seated EOB after AM session  -               User Key  (r) = Recorded By, (t) = Taken By, (c) = Cosigned By      Initials Name Effective Dates    Nazanin Brooke OT 09/22/22 -                      Occupational Therapy Education       Title: PT OT SLP Therapies (In Progress)       Topic: Occupational Therapy (In Progress)       Point: ADL training (In Progress)       Description:   Instruct learner(s) on  proper safety adaptation and remediation techniques during self care or transfers.   Instruct in proper use of assistive devices.                  Learning Progress Summary             Patient Acceptance, E, NR by AF at 6/13/2023 1527    Comment: benefits of therapy, endurance   Family Acceptance, E, NR by AF at 6/13/2023 1527    Comment: benefits of therapy, endurance                         Point: Home exercise program (In Progress)       Description:   Instruct learner(s) on appropriate technique for monitoring, assisting and/or progressing therapeutic exercises/activities.                  Learning Progress Summary             Patient Acceptance, E, NR by AF at 6/13/2023 1527    Comment: benefits of therapy, endurance   Family Acceptance, E, NR by AF at 6/13/2023 1527    Comment: benefits of therapy, endurance                         Point: Precautions (In Progress)       Description:   Instruct learner(s) on prescribed precautions during self-care and functional transfers.                  Learning Progress Summary             Patient Acceptance, E, NR by AF at 6/13/2023 1527    Comment: benefits of therapy, endurance   Family Acceptance, E, NR by AF at 6/13/2023 1527    Comment: benefits of therapy, endurance                         Point: Body mechanics (In Progress)       Description:   Instruct learner(s) on proper positioning and spine alignment during self-care, functional mobility activities and/or exercises.                  Learning Progress Summary             Patient Acceptance, E, NR by AF at 6/13/2023 1527    Comment: benefits of therapy, endurance   Family Acceptance, E, NR by AF at 6/13/2023 1527    Comment: benefits of therapy, endurance                                         User Key       Initials Effective Dates Name Provider Type Discipline     06/16/21 -  Candy Davis OTR Occupational Therapist OT                        OT Recommendation and Plan    Planned Therapy Interventions (OT):  activity tolerance training, adaptive equipment training, BADL retraining, cognitive/visual perception retraining, neuromuscular control/coordination retraining, occupation/activity based interventions, patient/caregiver education/training, ROM/therapeutic exercise, strengthening exercise, transfer/mobility retraining                    Time Calculation:      Time Calculation- OT       Row Name 06/29/23 1536 06/29/23 1535          Time Calculation- OT    OT Start Time 1300  -KA 0800  -KA     OT Stop Time 1330  -KA 0830  -KA     OT Time Calculation (min) 30 min  -KA 30 min  -KA               User Key  (r) = Recorded By, (t) = Taken By, (c) = Cosigned By      Initials Name Provider Type    Nazanin Brooke OT Occupational Therapist                  Therapy Charges for Today       Code Description Service Date Service Provider Modifiers Qty    [id number] HC OT SELF CARE/MGMT/TRAIN EA 15 MIN 6/29/2023 Nazanin Espinoza OT GO 2    [id number]  OT THER PROC EA 15 MIN 6/29/2023 Nazanin Espinoza OT GO 1    [id number]  OT THERAPEUTIC ACT EA 15 MIN 6/29/2023 Nazanin Espinoza OT GO 1                     Nazanin Espinoza OT  6/29/2023

## 2023-06-29 NOTE — PLAN OF CARE
Goal Outcome Evaluation:  Plan of Care Reviewed With: patient        Progress: improving  Outcome Evaluation: Immobility syndrome. S/P cabg and MCA stroke. NIH=2. A&OX4. Delayed mental processing and response time. Forgetful. Short term memory loss. Midsternal incision and chest tube sites. Scabbed and KATHY. Hx. HTN, CAD, HLD, Seizures. Blood glucose checks ACHS. Meds whole with thins. Diet: CC, cardiac, thins. Up for meals. Assistx1 with walker or gait belt. Participated fully in therapy. Tired and fatigued afterwards. Napped after sessions. Calm, cooperative, and flat. Ate better at meals. Continent and incontinent B&B. Last BM reported 6/28. Full code. Spouse at bedside. BP WDL. Blood glucose higher today. Daily standing weight. Slept not very well last night. Has PRN tylenol and tramadol for pain. Given additional IV lasix for edema. Not following daily fluid restriction of 1200ml/daily. Using the urinal to measure urine output. D/C Wednesday, 7/05.

## 2023-06-29 NOTE — PROGRESS NOTES
Inpatient Rehabilitation Plan of Care Note    Plan of Care  Care Plan Reviewed - No updates at this time.    Sphincter Control    [RN] Bladder Management(Active)  Current Status(06/29/2023): Patient is continent of urine using urinal or toilet  but may have urgency.  Weekly Goal(07/05/2023): Patient will be continent 90% of the time.  Discharge Goal: Patient will be continent    [RN] Bowel Management(Active)  Current Status(06/29/2023): Patietn is continent of bowels  Weekly Goal(07/06/2023): patient is continent of bowels  Discharge Goal: Patient is continent of bowels    Performed Intervention(s)  Offer toileting/ timed voids  Monitor I&O      Safety    [RN] Potential for Injury(Active)  Current Status(06/29/2023): Patient is at increased risk for falls/injury r/t  recent surgery and stroke.  Weekly Goal(07/06/2023): Patient will use the call light for assistance  Discharge Goal: Patient/ family will be aware of risk of fall and safety in the  home setting    Performed Intervention(s)  Safety monitoring during functional activities  Environmental set- upto reduce risk      Psychosocial    [RN] Coping/Adjustment(Active)  Current Status(06/29/2023): Patient has a supportive family. Pt is at increased  risk of impaired coping r/t recent stroke and hospitalization. Pt is A/Ox4 but  may be forgetful.  Weekly Goal(07/05/2023): Patient will express concerns regarding current status  Discharge Goal: Patient will demonstrate healthy coping strategies    Performed Intervention(s)  Allow patient to verbalize needs and concerns      Body Systems    [RN] Endocrine(Active)  Current Status(06/29/2023): Uncontrolled BS  Weekly Goal(07/06/2023): BS WNL  Discharge Goal: Diabetes education, BS WNL    Performed Intervention(s)  ACCU check ACHS    Signed by: Viviana Whitfield RN

## 2023-06-29 NOTE — PROGRESS NOTES
LOS: 23 days   Patient Care Team:  Liza Oconnell III, NP-C as PCP - General (Family Medicine)  Corey Lao Jr., MD (Inactive) as Consulting Physician (Urology)    Chief Complaint: Follow-up pericardial effusion, Dressler's syndrome.    Interval History: Still with some right-sided pleuritic chest pain.  Edema is marginally better, but still significant.    Vital Signs:  Temp:  [97 °F (36.1 °C)-98.5 °F (36.9 °C)] 98.5 °F (36.9 °C)  Heart Rate:  [] 96  Resp:  [18-20] 18  BP: ()/(68-79) 94/68    Intake/Output Summary (Last 24 hours) at 6/29/2023 1542  Last data filed at 6/29/2023 1300  Gross per 24 hour   Intake 1280 ml   Output 1225 ml   Net 55 ml       Physical Exam:   General Appearance:    No acute distress, alert and oriented x4   Lungs:     Clear to auscultation bilaterally     Heart:    Regular rhythm and normal rate.  No murmurs, gallops, or   rubs.   Abdomen:     Soft, nontender, nondistended.    Extremities:   1+ edema of the lower extremities.     Results Review:    Results from last 7 days   Lab Units 06/28/23  0753   SODIUM mmol/L 140   POTASSIUM mmol/L 3.8   CHLORIDE mmol/L 101   CO2 mmol/L 29.6*   BUN mg/dL 16   CREATININE mg/dL 1.26   GLUCOSE mg/dL 127*   CALCIUM mg/dL 9.5     Results from last 7 days   Lab Units 06/25/23  0944 06/25/23  0739 06/24/23  0538   HSTROP T ng/L 95* 97* 103*     Results from last 7 days   Lab Units 06/29/23  0705   WBC 10*3/mm3 8.23   HEMOGLOBIN g/dL 8.9*   HEMATOCRIT % 26.2*   PLATELETS 10*3/mm3 277             Results from last 7 days   Lab Units 06/28/23  0753   MAGNESIUM mg/dL 2.1           I reviewed the patient's new clinical results.        Assessment:  1.  Moderate posterior pericardial effusion and pleuritic chest pain, consistent with Dressler's syndrome  2.  Acute on chronic diastolic CHF and lower extremity edema  3.  Status post tissue mitral valve replacement and tricuspid valve repair (and left atrial appendage  closure) on 5/24/2023 by Dr. Frey  4.  Acute kidney injury with stage IIIa chronic kidney disease   5.  History of COVID-19 pneumonia with residual dyspnea and possible interstitial lung disease  6.  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  Expected postoperative anemia  8.  Postoperative CVA following cardiac surgery  9.  Postoperative junctional bradycardia, complete heart block, and typical atrial flutter (status post DCCV on 6/2/2023).  10.  LBBB and first degree AV block  11.  Diabetes    Plan:  -Discussed with Dr. Batista.  We are going to continue to diurese him with Lasix 80 mg IV every 6 hours for now.  Recheck renal function tomorrow.  She also discussed with him about taking the urinal into the restroom for accurate urine measurement, as well as adhering to the fluid restriction.    -Holding Eliquis with pericardial effusion.  Watch for any recurrent atrial fibrillation or atrial flutter.    -Right-sided pleuritic chest pain is not severe, but has been somewhat recurrent.  Suspect this is from Dressler's syndrome.  Continue colchicine.  Watch for any diarrhea.    -He will need a repeat limited echocardiogram proximately 2 weeks from the previous study to re-evaluate the pericardial effusion.    Corwin Hobson MD  06/29/23  15:42 EDT

## 2023-06-29 NOTE — THERAPY TREATMENT NOTE
Inpatient Rehabilitation - Physical Therapy Treatment Note       [facility]     Patient Name: Vernon Solorzano  : 1950  MRN: [id number]    Today's Date: 2023                    Admit Date: 2023      Visit Dx:     ICD-10-CM ICD-9-CM   1. Impaired functional mobility, balance, gait, and endurance  Z74.09 V49.89       Patient Active Problem List   Diagnosis    DM (diabetes mellitus), type 2    Mixed hyperlipidemia    Mild intermittent asthma without complication    New onset of congestive heart failure    Nonrheumatic mitral valve regurgitation    Left-sided chest pain    Essential hypertension    Mitral valve disease    Acute ischemic left MCA stroke    Atelectasis of both lungs    Pericardial effusion, acute    History of ischemic stroke    Obese    Physical debility    Chronic diastolic CHF (congestive heart failure)    Anemia    Stage 3a chronic kidney disease    Paroxysmal atrial flutter    H/O mitral valve replacement with tissue graft    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 AMARILIS MORGAN       Past Surgical History:   Procedure Laterality Date    CARDIAC CATHETERIZATION N/A 2023    Procedure: Right Heart Cath;  Surgeon: Corey Gaffney MD;  Location:  [facility] CATH [facility]VASIVE [facility];  Service: Cardiology;  Laterality: N/A;    CARDIAC CATHETERIZATION N/A 2023    Procedure: Left Heart Cath;  Surgeon: Corey Gaffney MD;  Location:  [facility] CATH [facility];  Service: Cardiology;  Laterality: N/A;    CARDIAC CATHETERIZATION N/A 2023    Procedure: Left ventriculography;   Surgeon: Corey Gaffney MD;  Location:  [facility] CATH [facility]IVE [facility];  Service: Cardiology;  Laterality: N/A;    CARDIAC CATHETERIZATION N/A 5/11/2023    Procedure: Coronary angiography;  Surgeon: Corey Gaffney MD;  Location: [facility] CATH [facility]VASIVE [facility];  Service: Cardiology;  Laterality: N/A;    COLONOSCOPY N/A     WNL PER PT, NO RECORDS,     COLONOSCOPY N/A 04/07/2009    DR. GORGE BENAVIDEZ AT [place]    MITRAL VALVE REPAIR/REPLACEMENT N/A 5/24/2023    Procedure: MITRAL VALVE REPAIR/REPLACEMENT TRICUSPID VALVE REPAIR/REPLACEMENT TRANSESOPHAGEAL ECHOCARDIOGRAM WITH ANESTHESIA;  Surgeon: George Frey MD;  Location: [facility];  Service: Cardiothoracic;  Laterality: N/A;    PROSTATE SURGERY N/A 11/12/2010    TURP, DR. COREY COLLAZO AT [facility]    SKIN TAG REMOVAL N/A 12/20/2017    ANAL SKIN TAG X2, PERFORMED IN OFFICE, DR. LISA ORANTES    TURP / TRANSURETHRAL INCISION / DRAINAGE PROSTATE  2010    Dr. Goodrich       PT ASSESSMENT (last 12 hours)       IRF PT Evaluation and Treatment       Row Name 06/29/23 1019          PT Time and Intention    Document Type daily treatment  -MD     Mode of Treatment physical therapy  -MD     Patient/Family/Caregiver Comments/Observations Pt supine in bed showing no signs of acute distress.  -MD       Row Name 06/29/23 1019          Pain Assessment    Pretreatment Pain Rating 0/10 - no pain  -MD       Row Name 06/29/23 1019          Cognition/Psychosocial    Orientation Status (Cognition) oriented x 3  -MD     Follows Commands (Cognition) follows one-step commands;over 90% accuracy;repetition of directions required  -MD     Personal Safety Interventions fall prevention program maintained;gait belt  -MD     Cognitive Function attention deficit;safety deficit  -MD     Attention Deficit (Cognition) distractible in noisy environment;focused/sustained attention  -MD     Safety Deficit (Cognition) impulsivity;insight into deficits/self-awareness  -MD       Row Name  06/29/23 1019          Bed Mobility    Supine-Sit [place] (Bed Mobility) verbal cues;moderate assist (50% patient effort)  -MD       Row Name 06/29/23 1019          Bed-Chair Transfer    Bed-Chair [place] (Transfers) verbal cues;contact guard  -MD     Assistive Device (Bed-Chair Transfers) wheelchair;walker, front-wheeled  -MD       Row Name 06/29/23 1019          Sit-Stand Transfer    Sit-Stand [place] (Transfers) verbal cues;standby assist  -MD     Assistive Device (Sit-Stand Transfers) walker, front-wheeled  -MD       Row Name 06/29/23 1019          Stand-Sit Transfer    Stand-Sit [place] (Transfers) verbal cues;standby assist  -MD     Assistive Device (Stand-Sit Transfers) walker, front-wheeled;wheelchair  -MD       Row Name 06/29/23 1019          Gait/Stairs (Locomotion)    [place] Level (Gait) verbal cues;standby assist;contact guard  -MD     Assistive Device (Gait) walker, front-wheeled  -MD     Distance in Feet (Gait) 80'x2 and 160'x1 and 200'x1  -MD     Pattern (Gait) step-through  -MD     Deviations/Abnormal Patterns (Gait) gait speed decreased;stride length decreased;angie decreased;festinating/shuffling  -MD     Bilateral Gait Deviations forward flexed posture  -MD     Gait Assessment/Intervention Pt very distactable and perseverative with regards to medication this date limiting his participation in PT this am.  -MD     [place] Level (Stairs) verbal cues;contact guard  -MD     Handrail Location (Stairs) left side (ascending);right side (descending)  -MD     Number of Steps (Stairs) 4  -MD     Ascending Technique (Stairs) step-to-step  -MD     Descending Technique (Stairs) step-to-step  -MD       Row Name 06/29/23 1019          Positioning and Restraints    Pre-Treatment Position in bed  -MD     Post Treatment Position other  -MD     Other Position return to room with caregiver  -MD       Row Name 06/29/23 1019          Daily Progress Summary (PT)    Daily Progress Summary  (PT) PT notified RN of pts change in cognitive status as pt is more impulsive, decreased awearness of his deficits and more perseverative/distractable this date.  -MD               User Key  (r) = Recorded By, (t) = Taken By, (c) = Cosigned By      Initials Name Provider Type    Damaris Farrell, PT Physical Therapist                  Wound 05/24/23 0737 sternal Incision (Active)   Dressing Appearance open to air 06/29/23 0703   Closure Approximated 06/29/23 0703   Base scab 06/29/23 0703   Periwound dry;intact 06/29/23 0703   Drainage Amount none 06/29/23 0703   Dressing Care open to air 06/29/23 0703     Physical Therapy Education       Title: PT OT SLP Therapies (In Progress)       Topic: Physical Therapy (In Progress)       Point: Mobility training (Done)       Learning Progress Summary             Patient Acceptance, E,TB, VU by LB at 6/28/2023 1051    Comment: stairs    Acceptance, E,D, VU,DU,NR by CH at 6/23/2023 1456    Acceptance, E,D, NR by DP at 6/22/2023 1309    Comment: hand placement    Acceptance, E,D, VU,DU by MONA at 6/21/2023 1007    Acceptance, E, VU by MD at 6/20/2023 1504    Acceptance, E, VU by MD at 6/19/2023 1058    Acceptance, E,TB, VU,NR by TONI at 6/17/2023 1501    Acceptance, E, VU by MD at 6/16/2023 0919    Acceptance, E, VU by MD at 6/15/2023 1140    Eager, E,D,TB, NR by YAQUELIN at 6/14/2023 1459    Acceptance, E, VU by MD at 6/13/2023 1012    Acceptance, E, VU by MD at 6/12/2023 1018    Acceptance, E, VU by MD at 6/10/2023 1040    Acceptance, E, VU by MD at 6/8/2023 1017    Acceptance, E,TB,D, VU,DU,NR by YAQUELIN at 6/7/2023 1513    Comment: POC, Goals, safety with mobility.   Family Acceptance, E,D, VU by MD at 6/27/2023 1517    Comment: Pt spouse participated in family teaching for ambulation and observed car transfer and stairs.   Significant Other Acceptance, E,TB,D, VU,DU,NR by  at 6/7/2023 1513    Comment: POC, Goals, safety with mobility.                         Point: Home exercise program  (In Progress)       Learning Progress Summary             Patient Acceptance, E,D, NR by DP at 6/22/2023 1309    Comment: hand placement    Acceptance, E,D, VU,DU by DP at 6/21/2023 1007    Eager, E,D,TB, NR by  at 6/14/2023 1459    Acceptance, E,TB,D, VU,DU,NR by  at 6/7/2023 1513    Comment: POC, Goals, safety with mobility.   Significant Other Acceptance, E,TB,D, VU,DU,NR by  at 6/7/2023 1513    Comment: POC, Goals, safety with mobility.                         Point: Body mechanics (Not Started)       Learner Progress:  Not documented in this visit.              Point: Precautions (Done)       Learning Progress Summary             Patient Acceptance, E, VU by MD at 6/29/2023 1420    Acceptance, E, VU by MD at 6/27/2023 1517                                         User Key       Initials Effective Dates Name Provider Type Discipline    TONI 06/16/21 -  Nubia Orellana, PT Physical Therapist PT    LB 06/16/21 -  Lara Cm, PT Physical Therapist PT    MD 06/16/21 -  Damaris Francis, PT Physical Therapist PT    KP 06/16/21 -  Elizabeth Levin, PT Physical Therapist PT    DP 08/24/21 -  Gulshan Mcdonald, PT Physical Therapist PT     06/01/23 -  Nicolas Suero, PT Student PT Student PT                    PT Recommendation and Plan          Daily Progress Summary (PT)  Daily Progress Summary (PT): PT notified RN of pts change in cognitive status as pt is more impulsive, decreased awearness of his deficits and more perseverative/distractable this date.               Time Calculation:      PT Charges       Row Name 06/29/23 1418 06/29/23 1019          Time Calculation    Start Time 1400  -MD 1000  -MD     Stop Time 1430  -MD 1030  -MD     Time Calculation (min) 30 min  -MD 30 min  -MD     PT Received On -- 06/29/23  -MD     PT - Next Appointment -- 06/30/23  -MD               User Key  (r) = Recorded By, (t) = Taken By, (c) = Cosigned By      Initials Name Provider Type    Damaris Farrell, PT Physical Therapist                     Therapy Charges for Today       Code Description Service Date Service Provider Modifiers Qty    52239943614  PT THERAPEUTIC ACT EA 15 MIN 6/29/2023 Damaris Francis, PT GP 2    46223750949 HC GAIT TRAINING EA 15 MIN 6/29/2023 Damaris Francis, PT GP 2              PT G-Codes  AM-PAC 6 Clicks Score (PT): 14      Damaris Francis, PT  6/29/2023

## 2023-06-29 NOTE — NURSING NOTE
"Patient reported moderate, intermittent, \"dull\" L sided chest pain at 2210. Stat EKG ordered. O2 at 2L via nasal cannula applied. V/s WNL. Notified Marisol WEIR with cardiology, who reviewed EKG result. Prn Tramadol given for pain. No new orders at this time. Pt reported resolution of chest pain thereafter. No further c/o pain.   "

## 2023-06-30 LAB
ALBUMIN SERPL-MCNC: 3.6 G/DL (ref 3.5–5.2)
ALBUMIN/GLOB SERPL: 1 G/DL
ALP SERPL-CCNC: 115 U/L (ref 39–117)
ALT SERPL W P-5'-P-CCNC: 11 U/L (ref 1–41)
ANION GAP SERPL CALCULATED.3IONS-SCNC: 12.4 MMOL/L (ref 5–15)
AST SERPL-CCNC: 9 U/L (ref 1–40)
BACTERIA SPEC AEROBE CULT: NORMAL
BACTERIA SPEC AEROBE CULT: NORMAL
BILIRUB SERPL-MCNC: 0.5 MG/DL (ref 0–1.2)
BUN SERPL-MCNC: 15 MG/DL (ref 8–23)
BUN/CREAT SERPL: 11.5 (ref 7–25)
CALCIUM SPEC-SCNC: 9.2 MG/DL (ref 8.6–10.5)
CHLORIDE SERPL-SCNC: 98 MMOL/L (ref 98–107)
CO2 SERPL-SCNC: 29.6 MMOL/L (ref 22–29)
CREAT SERPL-MCNC: 1.31 MG/DL (ref 0.76–1.27)
EGFRCR SERPLBLD CKD-EPI 2021: 57.1 ML/MIN/1.73
GLOBULIN UR ELPH-MCNC: 3.5 GM/DL
GLUCOSE BLDC GLUCOMTR-MCNC: 142 MG/DL (ref 70–130)
GLUCOSE BLDC GLUCOMTR-MCNC: 146 MG/DL (ref 70–130)
GLUCOSE BLDC GLUCOMTR-MCNC: 177 MG/DL (ref 70–130)
GLUCOSE BLDC GLUCOMTR-MCNC: 210 MG/DL (ref 70–130)
GLUCOSE SERPL-MCNC: 135 MG/DL (ref 65–99)
MAGNESIUM SERPL-MCNC: 2 MG/DL (ref 1.6–2.4)
PHOSPHATE SERPL-MCNC: 4 MG/DL (ref 2.5–4.5)
POTASSIUM SERPL-SCNC: 3.2 MMOL/L (ref 3.5–5.2)
PROT SERPL-MCNC: 7.1 G/DL (ref 6–8.5)
SODIUM SERPL-SCNC: 140 MMOL/L (ref 136–145)
TSH SERPL DL<=0.05 MIU/L-ACNC: 1.83 UIU/ML (ref 0.27–4.2)

## 2023-06-30 NOTE — PLAN OF CARE
Goal Outcome Evaluation:  Plan of Care Reviewed With: patient        Progress: no change  Outcome Evaluation: Pt is A&OX4, can be forgetful. Flat affect, delayed response. CGA X1. Meds whole with water. IV in L FA, IV lasix decreased to BID. ACCU check ACHS. Family conference today, wife attended. Planned DC 7/5. Edema in BL lower extremities. Sternal incision CDI and KATHY. Pt educated on use of call light for assistance, pt can be impulsive at times.

## 2023-06-30 NOTE — THERAPY TREATMENT NOTE
Inpatient Rehabilitation - Occupational Therapy Treatment Note    [facility]     Patient Name: Vernon Solorzano  : 1950  MRN: [id number]    Today's Date: 2023                 Admit Date: 2023         ICD-10-CM ICD-9-CM   1. Impaired functional mobility, balance, gait, and endurance  Z74.09 V49.89       Patient Active Problem List   Diagnosis    DM (diabetes mellitus), type 2    Mixed hyperlipidemia    Mild intermittent asthma without complication    New onset of congestive heart failure    Nonrheumatic mitral valve regurgitation    Left-sided chest pain    Essential hypertension    Mitral valve disease    Acute ischemic left MCA stroke    Atelectasis of both lungs    Pericardial effusion, acute    History of ischemic stroke    Obese    Physical debility    Chronic diastolic CHF (congestive heart failure)    Anemia    Stage 3a chronic kidney disease    Paroxysmal atrial flutter    H/O mitral valve replacement with tissue graft    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 AMARILIS MORGAN       Past Surgical History:   Procedure Laterality Date    CARDIAC CATHETERIZATION N/A 2023    Procedure: Right Heart Cath;  Surgeon: Corey Gaffney MD;  Location:  [facility] CATH [facility]VASIVE [facility];  Service: Cardiology;  Laterality: N/A;    CARDIAC CATHETERIZATION N/A 2023    Procedure: Left Heart Cath;  Surgeon: Corey Gaffney MD;  Location:  [facility] CATH [facility];  Service: Cardiology;  Laterality: N/A;    CARDIAC CATHETERIZATION N/A 2023    Procedure: Left ventriculography;  Surgeon: Gulshan  MD Corey;  Location: [facility] CATH INVASIVE LOCATION;  Service: Cardiology;  Laterality: N/A;    CARDIAC CATHETERIZATION N/A 5/11/2023    Procedure: Coronary angiography;  Surgeon: Corey Gaffney MD;  Location: [facility] CATH INVASIVE LOCATION;  Service: Cardiology;  Laterality: N/A;    COLONOSCOPY N/A     WNL PER PT, NO RECORDS,     COLONOSCOPY N/A 04/07/2009    DR. GORGE BENAVIDEZ AT [place]    MITRAL VALVE REPAIR/REPLACEMENT N/A 5/24/2023    Procedure: MITRAL VALVE REPAIR/REPLACEMENT TRICUSPID VALVE REPAIR/REPLACEMENT TRANSESOPHAGEAL ECHOCARDIOGRAM WITH ANESTHESIA;  Surgeon: George Frey MD;  Location: [facility] CV[facility];  Service: Cardiothoracic;  Laterality: N/A;    PROSTATE SURGERY N/A 11/12/2010    TURP, DR. COREY COLLAZO AT [facility]    SKIN TAG REMOVAL N/A 12/20/2017    ANAL SKIN TAG X2, PERFORMED IN OFFICE, DR. LISA ORANTES    TURP / TRANSURETHRAL INCISION / DRAINAGE PROSTATE  2010    Dr. Goodrihc             IRF OT ASSESSMENT FLOWSHEET (last 12 hours)       IRF OT Evaluation and Treatment       Row Name 06/30/23 1546 06/30/23 1545       OT Time and Intention    Document Type daily treatment  -DN daily treatment  -CC    Mode of Treatment occupational therapy  -DN occupational therapy  -CC    Patient Effort good  -DN good  -CC      Row Name 06/30/23 1545          Pain Assessment    Pretreatment Pain Rating 0/10 - no pain  -CC     Posttreatment Pain Rating 0/10 - no pain  -CC       Row Name 06/30/23 1545          Cognition/Psychosocial    Affect/Mental Status (Cognition) WNL  -CC     Orientation Status (Cognition) oriented x 3  -CC     Follows Commands (Cognition) follows one-step commands;over 90% accuracy;repetition of directions required  -CC     Personal Safety Interventions fall prevention program maintained;gait belt;nonskid shoes/slippers when out of bed  -CC       Row Name 06/30/23 1545          Bathing    [place] Level (Bathing) bathing skills;lower body;upper body;supervision  -CC      Assistive Device (Bathing) grab bar/tub rail;hand held shower spray hose;shower chair  -     Position (Bathing) supported sitting;supported standing  -     Set-up Assistance (Bathing) adjust water temperature;obtain supplies  -       Row Name 06/30/23 1545          Upper Body Dressing    [place] Level (Upper Body Dressing) upper body dressing skills;doff;don;front opening garment;buttons;supervision;set up assistance  -     Position (Upper Body Dressing) supported sitting;supported standing  -     Set-up Assistance (Upper Body Dressing) obtain clothing  -       Row Name 06/30/23 1545          Lower Body Dressing    [place] Level (Lower Body Dressing) doff;don;pants/bottoms;socks;underwear;moderate assist (50% patient effort)  -     Position (Lower Body Dressing) supported sitting;supported standing  -     Set-up Assistance (Lower Body Dressing) anti-embolic stockings;obtain clothing  -       Row Name 06/30/23 1545          Grooming    [place] Level (Grooming) grooming skills;deodorant application;hair care, combing/brushing;oral care regimen;set up  -     Position (Grooming) supported standing;sink side  -     Set-up Assistance (Grooming) obtain supplies  -       Row Name 06/30/23 1545          Bed Mobility    Comment, (Bed Mobility) seated EOB  -       Row Name 06/30/23 1545          Sit-Stand Transfer    Sit-Stand [place] (Transfers) standby assist  -     Assistive Device (Sit-Stand Transfers) wheelchair;walker, front-wheeled  -       Row Name 06/30/23 1545          Stand-Sit Transfer    Stand-Sit [place] (Transfers) standby assist  -     Assistive Device (Stand-Sit Transfers) walker, front-wheeled;wheelchair  -       Row Name 06/30/23 1544          Shower Transfer    Type (Shower Transfer) stand-sit;sit-stand  -     [place] Level (Shower Transfer) stand by assist  -     Assistive Device (Shower Transfer) tub bench;walker, front-wheeled  -        Row Name 06/30/23 1546          Shoulder (Therapeutic Exercise)    Shoulder (Therapeutic Exercise) strengthening exercise;wand exercises  -DN     Shoulder AROM (Therapeutic Exercise) bilateral;5 repetitions;3 sets  -DN     Shoulder Wand Exercises (Therapeutic Exercise) --  pt able to stand 2 minutes during arm bike exercises on lowest cardiac level  -DN       Row Name 06/30/23 1546          Elbow/Forearm (Therapeutic Exercise)    Elbow/Forearm (Therapeutic Exercise) strengthening exercise  -DN     Elbow/Forearm AROM (Therapeutic Exercise) bilateral;10 repetitions;3 sets  -DN     Elbow/Forearm Strengthening (Therapeutic Exercise) 2 lb free weight  -DN       Row Name 06/30/23 1546          Wrist (Therapeutic Exercise)    Wrist (Therapeutic Exercise) strengthening exercise  -DN     Wrist AROM (Therapeutic Exercise) 10 repetitions;3 sets  -DN     Wrist Strengthening (Therapeutic Exercise) bilateral  -DN       Row Name 06/30/23 1546          Hand (Therapeutic Exercise)    Hand (Therapeutic Exercise) strengthening exercise  -DN     Hand AROM/AAROM (Therapeutic Exercise) 30 repititions  -DN     Hand Strengthening (Therapeutic Exercise) bilateral  -DN       Row Name 06/30/23 1545          Balance    Static Sitting Balance independent  -CC     Static Standing Balance standby assist;supervision  -CC       Row Name 06/30/23 1546 06/30/23 1545       Positioning and Restraints    Pre-Treatment Position sitting in chair/recliner  -DN in bed  -CC    Post Treatment Position wheelchair  -DN wheelchair  -CC    In Bed sitting;call light within reach;encouraged to call for assist;exit alarm on  -DN --    In Wheelchair -- sitting;exit alarm on;with PT  -CC              User Key  (r) = Recorded By, (t) = Taken By, (c) = Cosigned By      Initials Name Effective Dates    CC Kathrine Pinedo, OTR 06/16/21 -     DN Teofilo Taylor, GARY 06/16/21 -                      Occupational Therapy Education       Title: PT OT SLP Therapies (In Progress)        Topic: Occupational Therapy (In Progress)       Point: ADL training (In Progress)       Description:   Instruct learner(s) on proper safety adaptation and remediation techniques during self care or transfers.   Instruct in proper use of assistive devices.                  Learning Progress Summary             Patient Acceptance, E, VU by WN at 6/29/2023 2344    Acceptance, E, NR by AF at 6/13/2023 1527    Comment: benefits of therapy, endurance   Family Acceptance, E, NR by AF at 6/13/2023 1527    Comment: benefits of therapy, endurance                         Point: Home exercise program (In Progress)       Description:   Instruct learner(s) on appropriate technique for monitoring, assisting and/or progressing therapeutic exercises/activities.                  Learning Progress Summary             Patient Acceptance, E, VU by WN at 6/29/2023 2344    Acceptance, E, NR by AF at 6/13/2023 1527    Comment: benefits of therapy, endurance   Family Acceptance, E, NR by AF at 6/13/2023 1527    Comment: benefits of therapy, endurance                         Point: Precautions (In Progress)       Description:   Instruct learner(s) on prescribed precautions during self-care and functional transfers.                  Learning Progress Summary             Patient Acceptance, E, VU by WN at 6/29/2023 2344    Acceptance, E, NR by AF at 6/13/2023 1527    Comment: benefits of therapy, endurance   Family Acceptance, E, NR by AF at 6/13/2023 1527    Comment: benefits of therapy, endurance                         Point: Body mechanics (In Progress)       Description:   Instruct learner(s) on proper positioning and spine alignment during self-care, functional mobility activities and/or exercises.                  Learning Progress Summary             Patient Acceptance, E, VU by WN at 6/29/2023 2344    Acceptance, E, NR by AF at 6/13/2023 1527    Comment: benefits of therapy, endurance   Family Acceptance, E, NR by AF at  6/13/2023 1527    Comment: benefits of therapy, endurance                                         User Key       Initials Effective Dates Name Provider Type Discipline    AF 06/16/21 -  Candy Davis OTR Occupational Therapist OT    WN 08/23/22 -  Delvin Jansen, RN Registered Nurse Nurse                        OT Recommendation and Plan                         Time Calculation:      Time Calculation- OT       Row Name 06/30/23 1430 06/30/23 0800          Time Calculation- OT    OT Start Time 1430  -DN 0800  -CC     OT Stop Time 1500  -DN 0830  -CC     OT Time Calculation (min) 30 min  -DN 30 min  -CC     OT Non-Billable Time (min) -- 30 min  family conf  -CC               User Key  (r) = Recorded By, (t) = Taken By, (c) = Cosigned By      Initials Name Provider Type    CC Kathrine Pinedo OTR Occupational Therapist    Teofilo Melvin OT Occupational Therapist                  Therapy Charges for Today       Code Description Service Date Service Provider Modifiers Qty    [id number] HC OT THER PROC EA 15 MIN 6/30/2023 Teofilo Taylor OT GO 2                     Teofilo Taylor OT  6/30/2023

## 2023-06-30 NOTE — PROGRESS NOTES
Inpatient Rehabilitation Plan of Care Note    Plan of Care  Care Plan Reviewed - No updates at this time.    Sphincter Control    Performed Intervention(s)  Offer toileting/ timed voids  Monitor I&O      Safety    Performed Intervention(s)  Safety monitoring during functional activities  Environmental set- upto reduce risk      Psychosocial    Performed Intervention(s)  Allow patient to verbalize needs and concerns      Body Systems    Performed Intervention(s)  ACCU check ACHS    Signed by: Delvin Jansen RN

## 2023-06-30 NOTE — THERAPY TREATMENT NOTE
Inpatient Rehabilitation - Physical Therapy Treatment Note       [facility]     Patient Name: Vernon Solorzano  : 1950  MRN: [id number]    Today's Date: 2023                    Admit Date: 2023      Visit Dx:     ICD-10-CM ICD-9-CM   1. Impaired functional mobility, balance, gait, and endurance  Z74.09 V49.89       Patient Active Problem List   Diagnosis    DM (diabetes mellitus), type 2    Mixed hyperlipidemia    Mild intermittent asthma without complication    New onset of congestive heart failure    Nonrheumatic mitral valve regurgitation    Left-sided chest pain    Essential hypertension    Mitral valve disease    Acute ischemic left MCA stroke    Atelectasis of both lungs    Pericardial effusion, acute    History of ischemic stroke    Obese    Physical debility    Chronic diastolic CHF (congestive heart failure)    Anemia    Stage 3a chronic kidney disease    Paroxysmal atrial flutter    H/O mitral valve replacement with tissue graft    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 AMARILIS MORGAN       Past Surgical History:   Procedure Laterality Date    CARDIAC CATHETERIZATION N/A 2023    Procedure: Right Heart Cath;  Surgeon: Corey Gaffney MD;  Location:  [facility] CATH INVASIVE LOCATION;  Service: Cardiology;  Laterality: N/A;    CARDIAC CATHETERIZATION N/A 2023    Procedure: Left Heart Cath;  Surgeon: Corey Gaffney MD;  Location:  [facility] CATH [facility]VASIVE [facility];  Service: Cardiology;  Laterality: N/A;    CARDIAC CATHETERIZATION N/A 2023    Procedure: Left ventriculography;   Surgeon: Corey Gaffney MD;  Location: [facility] CATH INVASIVE LOCATION;  Service: Cardiology;  Laterality: N/A;    CARDIAC CATHETERIZATION N/A 5/11/2023    Procedure: Coronary angiography;  Surgeon: Corey Gaffney MD;  [facility];  Service: Cardiology;  Laterality: N/A;    COLONOSCOPY N/A     WNL PER PT, NO RECORDS,     COLONOSCOPY N/A 04/07/2009    DR. GORGE BENAVIDEZ AT [facility]    MITRAL VALVE REPAIR/REPLACEMENT N/A 5/24/2023    Procedure: MITRAL VALVE REPAIR/REPLACEMENT TRICUSPID VALVE REPAIR/REPLACEMENT TRANSESOPHAGEAL ECHOCARDIOGRAM WITH ANESTHESIA;  Surgeon: George Frey MD;  Location: [facility] CVOR;  Service: Cardiothoracic;  Laterality: N/A;    PROSTATE SURGERY N/A 11/12/2010    TURP, DR. COREY COLLAZO AT [facility]    SKIN TAG REMOVAL N/A 12/20/2017    ANAL SKIN TAG X2, PERFORMED IN OFFICE, DR. LISA ORANTES    TURP / TRANSURETHRAL INCISION / DRAINAGE PROSTATE  2010    Dr. Goodrich       PT ASSESSMENT (last 12 hours)       IRF PT Evaluation and Treatment       Row Name 06/30/23 1052          PT Time and Intention    Document Type daily treatment  -MD     Mode of Treatment physical therapy  -MD     Patient/Family/Caregiver Comments/Observations Pt sidelying in bed showing no signs of acute distress.  -MD       Row Name 06/30/23 1052          Pain Assessment    Pretreatment Pain Rating 0/10 - no pain  -MD       Row Name 06/30/23 1052          Cognition/Psychosocial    Orientation Status (Cognition) oriented x 3  -MD     Follows Commands (Cognition) follows one-step commands;over 90% accuracy;repetition of directions required  -MD     Personal Safety Interventions fall prevention program maintained;gait belt  -MD       Row Name 06/30/23 1052          Bed Mobility    Supine-Sit [place] (Bed Mobility) supervision  -MD     Assistive Device (Bed Mobility) head of bed elevated  -MD       Row Name 06/30/23 1052          Bed-Chair Transfer    Bed-Chair [place] (Transfers)  standby assist  -MD     Assistive Device (Bed-Chair Transfers) wheelchair;walker, front-wheeled  -MD       Row Name 06/30/23 1052          Sit-Stand Transfer    Sit-Stand [place] (Transfers) standby assist  -MD     Assistive Device (Sit-Stand Transfers) walker, front-wheeled  -MD       Row Name 06/30/23 1052          Stand-Sit Transfer    Stand-Sit [place] (Transfers) standby assist  -MD     Assistive Device (Stand-Sit Transfers) walker, front-wheeled  -MD       Row Name 06/30/23 1052          Car Transfer    Type (Car Transfer) stand pivot/stand step  -MD     [place] Level (Car Transfer) standby assist  -MD     Assistive Device (Car Transfer) walker, front-wheeled  -MD       Row Name 06/30/23 1052          Gait/Stairs (Locomotion)    [place] Level (Gait) standby assist  -MD     Assistive Device (Gait) walker, front-wheeled  -MD     Distance in Feet (Gait) 160'x2 and 80'x2  -MD     Pattern (Gait) step-through  -MD     Deviations/Abnormal Patterns (Gait) gait speed decreased;stride length decreased;angie decreased;festinating/shuffling  -MD     Bilateral Gait Deviations forward flexed posture  -MD     [place] Level (Stairs) contact guard;verbal cues  -MD     Handrail Location (Stairs) left side (ascending);right side (descending)  -MD     Number of Steps (Stairs) 8  -MD     Ascending Technique (Stairs) step-to-step  -MD     Descending Technique (Stairs) step-to-step  -MD     Comment, (Gait/Stairs) Chair at the top of 8 steps to allow pt seated rest break prior to descending the stairs  -MD       Row Name 06/30/23 1052          Positioning and Restraints    Pre-Treatment Position in bed  -MD     Post Treatment Position wheelchair  -MD     In Wheelchair sitting;exit alarm on  in family confrence w family and   -MD               User Key  (r) = Recorded By, (t) = Taken By, (c) = Cosigned By      Initials Name Provider Type    Damaris Farrell, PT Physical Therapist                   Wound 05/24/23 0737 sternal Incision (Active)   Dressing Appearance open to air 06/30/23 0735   Closure Approximated 06/30/23 0735   Base clean;pink;scab 06/30/23 0735   Periwound intact;dry 06/30/23 0735   Periwound Temperature warm 06/30/23 0735   Periwound Skin Turgor soft 06/30/23 0735   Edges rolled/closed 06/29/23 2038   Drainage Amount none 06/30/23 0735   Care, Wound cleansed with;soap and water 06/30/23 0735   Dressing Care open to air 06/30/23 0735   Periwound Care dry periwound area maintained 06/30/23 0735     Physical Therapy Education       Title: PT OT SLP Therapies (In Progress)       Topic: Physical Therapy (Done)       Point: Mobility training (Done)       Learning Progress Summary             Patient Acceptance, E, VU by WN at 6/29/2023 2344    Acceptance, E,TB, VU by LB at 6/28/2023 1051    Comment: stairs    Acceptance, E,D, VU,DU,NR by CH at 6/23/2023 1456    Acceptance, E,D, NR by DP at 6/22/2023 1309    Comment: hand placement    Acceptance, E,D, VU,DU by DP at 6/21/2023 1007    Acceptance, E, VU by MD at 6/20/2023 1504    Acceptance, E, VU by MD at 6/19/2023 1058    Acceptance, E,TB, VU,NR by TONI at 6/17/2023 1501    Acceptance, E, VU by MD at 6/16/2023 0919    Acceptance, E, VU by MD at 6/15/2023 1140    Eager, E,D,TB, NR by YAQUELIN at 6/14/2023 1459    Acceptance, E, VU by MD at 6/13/2023 1012    Acceptance, E, VU by MD at 6/12/2023 1018    Acceptance, E, VU by MD at 6/10/2023 1040    Acceptance, E, VU by MD at 6/8/2023 1017    Acceptance, E,TB,D, VU,DU,NR by YAQUELIN at 6/7/2023 1513    Comment: POC, Goals, safety with mobility.   Family Acceptance, E,D, VU by MD at 6/27/2023 1813    Comment: Pt spouse participated in family teaching for ambulation and observed car transfer and stairs.   Significant Other Acceptance, E,TB,D, VU,DU,NR by  at 6/7/2023 8125    Comment: POC, Goals, safety with mobility.                         Point: Home exercise program (Done)       Learning Progress Summary              Patient Acceptance, E, VU by WN at 6/29/2023 2344    Acceptance, E,D, NR by DP at 6/22/2023 1309    Comment: hand placement    Acceptance, E,D, VU,DU by DP at 6/21/2023 1007    Eager, E,D,TB, NR by KP at 6/14/2023 1459    Acceptance, E,TB,D, VU,DU,NR by  at 6/7/2023 1513    Comment: POC, Goals, safety with mobility.   Significant Other Acceptance, E,TB,D, VU,DU,NR by  at 6/7/2023 1513    Comment: POC, Goals, safety with mobility.                         Point: Body mechanics (Done)       Learning Progress Summary             Patient Acceptance, E, VU by WN at 6/29/2023 2344                         Point: Precautions (Done)       Learning Progress Summary             Patient Acceptance, E, VU by MD at 6/30/2023 1055    Acceptance, E, VU by WN at 6/29/2023 2344    Acceptance, E, VU by MD at 6/29/2023 1420    Acceptance, E, VU by MD at 6/27/2023 1517                                         User Key       Initials Effective Dates Name Provider Type Discipline    TONI 06/16/21 -  Nubia Orellana, PT Physical Therapist PT    LB 06/16/21 -  Lara Cm, PT Physical Therapist PT    MD 06/16/21 -  Damaris Francis, PT Physical Therapist PT     06/16/21 -  Elizabeth Levin, PT Physical Therapist PT    WN 08/23/22 -  Delvin Jansen, RN Registered Nurse Nurse    DP 08/24/21 -  Gulshan Mcdonald, PT Physical Therapist PT     06/01/23 -  Nicolas Suero, NANETTE Student PT Student PT                    PT Recommendation and Plan          Daily Progress Summary (PT)  Daily Progress Summary (PT): PT notified RN of pts change in cognitive status as pt is more impulsive, decreased awearness of his deficits and more perseverative/distractable this date.               Time Calculation:      PT Charges       Row Name 06/30/23 1538 06/30/23 1052          Time Calculation    Start Time 1230  -MD 1000  -MD     Stop Time 1300  -MD 1030  -MD     Time Calculation (min) 30 min  -MD 30 min  -MD     PT Received On -- 06/30/23  -MD      PT - Next Appointment -- 07/01/23  -MD               User Key  (r) = Recorded By, (t) = Taken By, (c) = Cosigned By      Initials Name Provider Type    Damaris Farrell, PT Physical Therapist                    Therapy Charges for Today       Code Description Service Date Service Provider Modifiers Qty    06764859248 HC PT THERAPEUTIC ACT EA 15 MIN 6/29/2023 Damaris Francis, PT GP 2    62852511687 HC GAIT TRAINING EA 15 MIN 6/29/2023 Damaris Francis, PT GP 2    94879412717 HC GAIT TRAINING EA 15 MIN 6/30/2023 Damaris Francis, PT GP 2    39735390967 HC PT THER PROC EA 15 MIN 6/30/2023 Damaris Francis, PT GP 1    82177000457 HC PT THERAPEUTIC ACT EA 15 MIN 6/30/2023 Damaris Francis, PT GP 1              PT G-Codes  AM-PAC 6 Clicks Score (PT): 14      Damaris Francis, PT  6/30/2023

## 2023-06-30 NOTE — PROGRESS NOTES
Family conference held with patient and patient's wife. PT/OT/ST and nursing also present to discuss goals and d/c recommendations. Patient set to d/c on 7/5.    PT: PT reported that patient is walking 160-200 ft SBA with a walker. He has had no loss of balance. He has to walk several steps (24) to get up to his bedroom at home. Therapy will continue to work on this until d/c. Patient can also sleep in a recliner on the main level that also has a bathroom until he is stronger to walk up the stairs.     OT: OT reported that patient has made good progress. Someone needs to be close by when patient is coming out of the shower at home. He needs a little help with getting shoes on and needs to pace himself when performing ADL's so he doesn't get worn out.     ST: Patient needs min-mod cues with processing, requires lots of time when responding. ST recommends supervision with finances and medication management when he goes home. Patient's swallow is WNL.    Nursing went over current meds and follow up appts with PCP, neurology, cardiology, vascular and nephrology.     Therapy will order patient a walker for home use from [facility] and they will deliver to his hospital room. Patient has a shower chair at home which is needed due to low endurance. Outpatient PT and ST is recommended and patient would like to come to [facility] for that. SW will continue to follow for d/c needs.

## 2023-06-30 NOTE — THERAPY TREATMENT NOTE
Inpatient Rehabilitation - Occupational Therapy Treatment Note    [facility]     Patient Name: Vernon Solorzano  : 1950  MRN: [id number]    Today's Date: 2023                 Admit Date: 2023         ICD-10-CM ICD-9-CM   1. Impaired functional mobility, balance, gait, and endurance  Z74.09 V49.89       Patient Active Problem List   Diagnosis    DM (diabetes mellitus), type 2    Mixed hyperlipidemia    Mild intermittent asthma without complication    New onset of congestive heart failure    Nonrheumatic mitral valve regurgitation    Left-sided chest pain    Essential hypertension    Mitral valve disease    Acute ischemic left MCA stroke    Atelectasis of both lungs    Pericardial effusion, acute    History of ischemic stroke    Obese    Physical debility    Chronic diastolic CHF (congestive heart failure)    Anemia    Stage 3a chronic kidney disease    Paroxysmal atrial flutter    H/O mitral valve replacement with tissue graft    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 AMARILIS MORGAN       Past Surgical History:   Procedure Laterality Date    CARDIAC CATHETERIZATION N/A 2023    Procedure: Right Heart Cath;  Surgeon: Corey Gaffney MD;  Location:  [facility] CATH INVASIVE LOCATION;  Service: Cardiology;  Laterality: N/A;    CARDIAC CATHETERIZATION N/A 2023    Procedure: Left Heart Cath;  Surgeon: Corey Gaffney MD;  Location:  [facility] CATH INVASIVE LOCATION;  Service: Cardiology;  Laterality: N/A;    CARDIAC CATHETERIZATION N/A 2023    Procedure: Left ventriculography;  Surgeon: Gulshan  MD Corey;  Location: [facility] CATH INVASIVE LOCATION;  Service: Cardiology;  Laterality: N/A;    CARDIAC CATHETERIZATION N/A 5/11/2023    Procedure: Coronary angiography;  Surgeon: Corey Gaffney MD;  Location: [facility] CATH INVASIVE LOCATION;  Service: Cardiology;  Laterality: N/A;    COLONOSCOPY N/A     WNL PER PT, NO RECORDS,     COLONOSCOPY N/A 04/07/2009    DR. GORGE BENAVIDEZ AT [facility]    MITRAL VALVE REPAIR/REPLACEMENT N/A 5/24/2023    Procedure: MITRAL VALVE REPAIR/REPLACEMENT TRICUSPID VALVE REPAIR/REPLACEMENT TRANSESOPHAGEAL ECHOCARDIOGRAM WITH ANESTHESIA;  Surgeon: George Frey MD;  Location: [facility] CVOR;  Service: Cardiothoracic;  Laterality: N/A;    PROSTATE SURGERY N/A 11/12/2010    TURP, DR. COREY COLLAZO AT [facility]    SKIN TAG REMOVAL N/A 12/20/2017    ANAL SKIN TAG X2, PERFORMED IN OFFICE, DR. LISA ORANTES    TURP / TRANSURETHRAL INCISION / DRAINAGE PROSTATE  2010    Dr. Goodrich             IRF OT ASSESSMENT FLOWSHEET (last 12 hours)       IRF OT Evaluation and Treatment       Row Name 06/30/23 1545          OT Time and Intention    Document Type daily treatment  -CC     Mode of Treatment occupational therapy  -CC     Patient Effort good  -CC       Row Name 06/30/23 1545          Pain Assessment    Pretreatment Pain Rating 0/10 - no pain  -CC     Posttreatment Pain Rating 0/10 - no pain  -CC       Row Name 06/30/23 1545          Cognition/Psychosocial    Affect/Mental Status (Cognition) WNL  -CC     Orientation Status (Cognition) oriented x 3  -CC     Follows Commands (Cognition) follows one-step commands;over 90% accuracy;repetition of directions required  -CC     Personal Safety Interventions fall prevention program maintained;gait belt;nonskid shoes/slippers when out of bed  -CC       Row Name 06/30/23 1545          Bathing    [place] Level (Bathing) bathing skills;lower body;upper body;supervision  -CC     Assistive Device (Bathing) grab bar/tub rail;hand held shower spray  hose;shower chair  -     Position (Bathing) supported sitting;supported standing  -     Set-up Assistance (Bathing) adjust water temperature;obtain supplies  -       Row Name 06/30/23 1545          Upper Body Dressing    [place] Level (Upper Body Dressing) upper body dressing skills;doff;don;front opening garment;buttons;supervision;set up assistance  -CC     Position (Upper Body Dressing) supported sitting;supported standing  -     Set-up Assistance (Upper Body Dressing) obtain clothing  -       Row Name 06/30/23 1545          Lower Body Dressing    [place] Level (Lower Body Dressing) doff;don;pants/bottoms;socks;underwear;moderate assist (50% patient effort)  -     Position (Lower Body Dressing) supported sitting;supported standing  -     Set-up Assistance (Lower Body Dressing) anti-embolic stockings;obtain clothing  -       Row Name 06/30/23 1545          Grooming    [place] Level (Grooming) grooming skills;deodorant application;hair care, combing/brushing;oral care regimen;set up  -     Position (Grooming) supported standing;sink side  -     Set-up Assistance (Grooming) obtain supplies  -       Row Name 06/30/23 1545          Bed Mobility    Comment, (Bed Mobility) seated EOB  -       Row Name 06/30/23 1545          Sit-Stand Transfer    Sit-Stand [place] (Transfers) standby assist  -     Assistive Device (Sit-Stand Transfers) wheelchair;walker, front-wheeled  -       Row Name 06/30/23 1545          Stand-Sit Transfer    Stand-Sit [place] (Transfers) standby assist  -     Assistive Device (Stand-Sit Transfers) walker, front-wheeled;wheelchair  -       Row Name 06/30/23 1545          Shower Transfer    Type (Shower Transfer) stand-sit;sit-stand  -     [place] Level (Shower Transfer) stand by assist  -     Assistive Device (Shower Transfer) tub bench;walker, front-wheeled  -       Row Name 06/30/23 1545          Balance    Static Sitting Balance  independent  -CC     Static Standing Balance standby assist;supervision  -CC       Row Name 06/30/23 1545          Positioning and Restraints    Pre-Treatment Position in bed  -CC     Post Treatment Position wheelchair  -CC     In Wheelchair sitting;exit alarm on;with PT  -CC               User Key  (r) = Recorded By, (t) = Taken By, (c) = Cosigned By      Initials Name Effective Dates    CC Kathrine Pinedo, OTR 06/16/21 -                      Occupational Therapy Education       Title: PT OT SLP Therapies (In Progress)       Topic: Occupational Therapy (In Progress)       Point: ADL training (In Progress)       Description:   Instruct learner(s) on proper safety adaptation and remediation techniques during self care or transfers.   Instruct in proper use of assistive devices.                  Learning Progress Summary             Patient Acceptance, E, VU by WN at 6/29/2023 2344    Acceptance, E, NR by AF at 6/13/2023 1527    Comment: benefits of therapy, endurance   Family Acceptance, E, NR by AF at 6/13/2023 1527    Comment: benefits of therapy, endurance                         Point: Home exercise program (In Progress)       Description:   Instruct learner(s) on appropriate technique for monitoring, assisting and/or progressing therapeutic exercises/activities.                  Learning Progress Summary             Patient Acceptance, E, VU by WN at 6/29/2023 2344    Acceptance, E, NR by AF at 6/13/2023 1527    Comment: benefits of therapy, endurance   Family Acceptance, E, NR by AF at 6/13/2023 1527    Comment: benefits of therapy, endurance                         Point: Precautions (In Progress)       Description:   Instruct learner(s) on prescribed precautions during self-care and functional transfers.                  Learning Progress Summary             Patient Acceptance, E, VU by WN at 6/29/2023 2344    Acceptance, E, NR by AF at 6/13/2023 1527    Comment: benefits of therapy, endurance   Family  Acceptance, E, NR by AF at 6/13/2023 1527    Comment: benefits of therapy, endurance                         Point: Body mechanics (In Progress)       Description:   Instruct learner(s) on proper positioning and spine alignment during self-care, functional mobility activities and/or exercises.                  Learning Progress Summary             Patient Acceptance, E, VU by WN at 6/29/2023 2344    Acceptance, E, NR by AF at 6/13/2023 1527    Comment: benefits of therapy, endurance   Family Acceptance, E, NR by AF at 6/13/2023 1527    Comment: benefits of therapy, endurance                                         User Key       Initials Effective Dates Name Provider Type Discipline    AF 06/16/21 -  Candy Davis OTR Occupational Therapist OT     08/23/22 -  Delvin Jansen RN Registered Nurse Nurse                        OT Recommendation and Plan                         Time Calculation:      Time Calculation- OT       Row Name 06/30/23 0800             Time Calculation- OT    OT Start Time 0800  -CC      OT Stop Time 0830  -CC      OT Time Calculation (min) 30 min  -CC      OT Non-Billable Time (min) 30 min  family conf  -CC                User Key  (r) = Recorded By, (t) = Taken By, (c) = Cosigned By      Initials Name Provider Type    CC Kathrine Pinedo OTR Occupational Therapist                  Therapy Charges for Today       Code Description Service Date Service Provider Modifiers Qty    87524752954 HC OT SELF CARE/MGMT/TRAIN EA 15 MIN 6/30/2023 Kathrine Pinedo OTR GO 2                     HI Morgan  6/30/2023

## 2023-06-30 NOTE — PROGRESS NOTES
LOS: 24 days   Patient Care Team:  Liza Oconnell III, NP-C as PCP - General (Family Medicine)  Corey Lao Jr., MD (Inactive) as Consulting Physician (Urology)    Chief Complaint: Follow-up pericardial effusion, Dressler's syndrome.    Interval History: Still with some intermittent right-sided pleuritic chest pain.  Edema is better, and he is wearing compression socks.    Vital Signs:  Temp:  [96.8 °F (36 °C)-97.6 °F (36.4 °C)] 97.6 °F (36.4 °C)  Heart Rate:  [] 92  Resp:  [18-20] 18  BP: ()/(56-79) 93/56    Intake/Output Summary (Last 24 hours) at 6/30/2023 1249  Last data filed at 6/30/2023 1232  Gross per 24 hour   Intake 900 ml   Output 2350 ml   Net -1450 ml       Physical Exam:   General Appearance:    No acute distress, alert and oriented x4   Lungs:     Clear to auscultation bilaterally     Heart:    Regular rhythm and normal rate.  No murmurs, gallops, or   rubs.   Abdomen:     Soft, nontender, nondistended.    Extremities:   1+ edema of the lower extremities.  Compression socks in place.     Results Review:    Results from last 7 days   Lab Units 06/30/23  0719   SODIUM mmol/L 140   POTASSIUM mmol/L 3.2*   CHLORIDE mmol/L 98   CO2 mmol/L 29.6*   BUN mg/dL 15   CREATININE mg/dL 1.31*   GLUCOSE mg/dL 135*   CALCIUM mg/dL 9.2     Results from last 7 days   Lab Units 06/25/23  0944 06/25/23  0739 06/24/23  0538   HSTROP T ng/L 95* 97* 103*     Results from last 7 days   Lab Units 06/29/23  0705   WBC 10*3/mm3 8.23   HEMOGLOBIN g/dL 8.9*   HEMATOCRIT % 26.2*   PLATELETS 10*3/mm3 277             Results from last 7 days   Lab Units 06/30/23  0719   MAGNESIUM mg/dL 2.0           I reviewed the patient's new clinical results.        Assessment:  1.  Moderate posterior pericardial effusion and pleuritic chest pain, consistent with Dressler's syndrome  2.  Acute on chronic diastolic CHF and lower extremity edema  3.  Status post tissue mitral valve replacement and tricuspid  valve repair (and left atrial appendage closure) on 5/24/2023 by Dr. Frey  4.  Acute kidney injury with stage IIIa chronic kidney disease   5.  History of COVID-19 pneumonia with residual dyspnea and possible interstitial lung disease  6.  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  Expected postoperative anemia  8.  Postoperative CVA following cardiac surgery  9.  Postoperative junctional bradycardia, complete heart block, and typical atrial flutter (status post DCCV on 6/2/2023).  10.  LBBB and first degree AV block  11.  Diabetes    Plan:  -Decreased IV Lasix to 80 mg IV twice daily for today.  Potentially transition back to torsemide tomorrow.  Coordinating care with nephrology.    -Continue to wear compression socks.  He is trying to do better with his fluid restriction and dietary restrictions.    -Holding Eliquis with pericardial effusion.  Watch for any recurrent atrial fibrillation or atrial flutter.  In sinus rhythm today.    -Right-sided pleuritic chest pain is not severe, but has been somewhat recurrent.  Suspect this is from Dressler's syndrome.  Continue colchicine.  Watch for any diarrhea.    -He will need a repeat limited echocardiogram proximately 2 weeks from the previous study to re-evaluate the pericardial effusion.    Corwin Hobson MD  06/30/23  12:49 EDT

## 2023-06-30 NOTE — PROGRESS NOTES
LOS: 24 days   Patient Care Team:  Liza Oconnell III, NP-C as PCP - General (Family Medicine)  Corey Lao Jr., MD (Inactive) as Consulting Physician (Urology)      KRIS TAYLOR  1950      ADMITTING DIAGNOSIS:  Stroke  Valvular heart disease status post repair/replacement      Subjective     Patient reports tolerating therapies.  Edema is little bit better.  Comfortable with his breathing.  Still gets some pleuritic pain on the left.  Does feel that colchicine has been helpful      Objective     Vitals:    06/30/23 1203   BP: 93/56   Pulse: 92   Resp: 18   Temp: 97.6 °F (36.4 °C)   SpO2: 96%       PHYSICAL EXAM:   MENTAL STATUS -  AWAKE / ALERT  HEENT-   LUNGS - CTA,    Sternotomy incision -dressed  HEART-sinus with occasional ectopy  ABD - NORMOACTIVE BOWEL SOUNDS, SOFT, NT.   EXT -1+ pedal edema bilateral lower extremities,   NEURO -oriented to person place time and situation.    Speech is fluent.    Good strength bilaterally          MEDICATIONS  Scheduled Meds:aspirin, 81 mg, Oral, Daily  atorvastatin, 40 mg, Oral, Nightly  budesonide-formoterol, 2 puff, Inhalation, BID - RT  calcium 500 mg vitamin D 5 mcg (200 UT), 1 tablet, Oral, Daily  colchicine, 0.6 mg, Oral, Daily  furosemide, 80 mg, Intravenous, BID  guaifenesin, 400 mg, Oral, TID  insulin glargine, 20 Units, Subcutaneous, Nightly  insulin glargine, 20 Units, Subcutaneous, Daily  insulin lispro, 3-14 Units, Subcutaneous, 4x Daily AC & at Bedtime  insulin lispro, 4 Units, Subcutaneous, TID With Meals  magnesium oxide, 400 mg, Oral, Daily  melatonin, 3 mg, Oral, Nightly  metoprolol succinate XL, 25 mg, Oral, Q24H  pantoprazole, 40 mg, Oral, Q AM  sodium chloride, 3 mL, Intravenous, Q12H  tiotropium bromide monohydrate, 2 puff, Inhalation, Daily - RT      Continuous Infusions:   PRN Meds:.  acetaminophen **OR** [DISCONTINUED] acetaminophen **OR** [DISCONTINUED] acetaminophen    bisacodyl    calcium carbonate     dextrose    dextrose    docusate sodium    glucagon (human recombinant)    hydrocortisone-bacitracin-zinc oxide-nystatin    ipratropium-albuterol    melatonin    ondansetron **OR** ondansetron    senna    sodium chloride    traMADol      RESULTS  Glucose   Date/Time Value Ref Range Status   06/30/2023 1110 142 (H) 70 - 130 mg/dL Final     Comment:     Meter: [id number] : [id number] Laura Kothari NA   06/30/2023 0707 146 (H) 70 - 130 mg/dL Final     Comment:     Meter: [id number] : [id number] Laura Kothari NA   06/29/2023 2042 161 (H) 70 - 130 mg/dL Final     Comment:     Meter: [id number] : [id number] Dillon Coleman NA   06/29/2023 1623 152 (H) 70 - 130 mg/dL Final     Comment:     Meter: [id number] : [id number] Laura Kothari NA   06/29/2023 1123 177 (H) 70 - 130 mg/dL Final     Comment:     Meter: [id number] : [id number] Jamila Guerrier NA   06/29/2023 0713 150 (H) 70 - 130 mg/dL Final     Comment:     Meter: [id number] : [id number] Jamila Cani NA   06/28/2023 2057 218 (H) 70 - 130 mg/dL Final     Comment:     Meter: [id number] : [id number] Heron Lund NA   06/28/2023 1622 237 (H) 70 - 130 mg/dL Final     Comment:     Meter: [id number] : [id number] Roe BARRETO     Results from last 7 days   Lab Units 06/29/23  0705 06/28/23  0753 06/27/23  0710   WBC 10*3/mm3 8.23 8.52 7.07   HEMOGLOBIN g/dL 8.9* 9.5* 9.3*   HEMATOCRIT % 26.2* 28.3* 28.0*   PLATELETS 10*3/mm3 277 290 255       Results from last 7 days   Lab Units 06/30/23  0719 06/28/23  0753 06/27/23  0710 06/24/23  1946 06/24/23  0228   SODIUM mmol/L 140 140 138   < > 139   POTASSIUM mmol/L 3.2* 3.8 3.1*   < > 3.4*   CHLORIDE mmol/L 98 101 98   < > 96*   CO2 mmol/L 29.6* 29.6* 29.0   < > 30.0*   BUN mg/dL 15 16 18   < > 14   CREATININE mg/dL 1.31* 1.26 1.31*   < > 1.50*   CALCIUM mg/dL 9.2 9.5 8.9   < > 9.1   BILIRUBIN mg/dL 0.5  --   --   --  0.4   ALK PHOS U/L 115  --   --   --  122*   ALT  (SGPT) U/L 11  --   --   --  12   AST (SGOT) U/L 9  --   --   --  9   GLUCOSE mg/dL 135* 127* 128*   < > 149*    < > = values in this interval not displayed.        Latest Reference Range & Units 05/23/23 14:29   Hemoglobin A1C 4.80 - 5.60 % 9.20 (H)   Total Cholesterol 0 - 200 mg/dL 155   HDL Cholesterol 40 - 60 mg/dL 41   LDL Cholesterol  0 - 100 mg/dL 81   Triglycerides 0 - 150 mg/dL 197 (H)   (H): Data is abnormally high     Latest Reference Range & Units 05/31/23 08:03   25 Hydroxy, Vitamin D 30.0 - 100.0 ng/ml 20.0 (L)   (L): Data is abnormally low    Lower extremity venous duplex-June 19, 2023-Chronic right lower extremity superficial thrombophlebitis noted in the small saphenous.     ASSESSMENT and PLAN    Acute ischemic left MCA stroke    Status post CVA-Scattered  punctate restricted diffusion acute infarct in the bilateral frontal and   left parietal occipital cortices.        Impaired Cognition/impaired mobility/impaired self-care     Aphasia-resolved     Right hand apraxia-resolved     Encephalopathy-improving     Dysphagia/nutrition-healthy heart 2-3 g sodium, consistent carb, no mixed consistency, regular texture, nectar thick liquid  June 7-videofluoroscopic swallow study planned tomorrow  June 8-swallow study today-advance to regular solid, no mixed consistency, thin liquids by cup.  No straws.     Stroke prophylaxis-Eliquis/aspirin/atorvastatin     History of severe mitral regurgitation and annular dilatation, moderate to severe tricuspid regurgitation-  status post May 24, 2023 - 1. Mitral valve repair with a 28 mm physio II ring annuloplasty with residual mild to moderate MR  2.  Mitral valve replacement with a 29 mm Schwartz II porcine prosthesis  3.  Tricuspid valve repair with 28 mm physio tricuspid ring  4.  Left atrial appendage endocardial closure     Atrial flutter-status post cardioversion June 2, 2023 anticoagulation with Eliquis  Metoprolol    Dressler syndrome -transferred off rehab  unit June 24, returned June 26-better on colchicine     Volume status-diuretics per Cardiology/nephrology  June 30-Per nephrology-[continue furosemide 80 mg IV twice a day, hope to transition to oral diuretics during his admission   ]     Severe pulmonary hypertension  Obstructive sleep apnea     Interstitial lung disease status post COVID-19 infection-steroid tapered off per pulmonology June 7  Home inhaler regimen  Chronic steroid use with cushingoid syndrome-steroids now tapered off   June 23-change back to his home regimen Spiriva 2 puffs once a day and Symbicort 160/4.5 two puffs twice a day       Leukocytosis-reactive/steroid administration     Postoperative anemia     Hyzqzvibeodjabwj-iabkemewwud-wezvqwih     Chronic kidney disease stage III-baseline creatinine 1.2  June 29- nephro following, plan to continue IV diuretics for now      Diabetes mellitus type 2-Jardiance/Lantus/sliding scale insulin-uncontrolled  Off Jardiance secondary to renal function.  Adjusting short and long acting insulin.       BPH/urinary retention-history of TURP  June 14-void 300 cc with postvoid residual 205 cc.    Renal insufficiency -   Nephrology following     Pain management-tramadol-/Tylenol Dk report reviewed              TEAM CONF - JUNE 8 - ENDURANCE POOR. BED MOD. TRANSFERS MOD. GAIT 25 FEET MOD ASSIST RW. SHOWER TRANSFERS MOD A.   BATH MOD ASSIST. LBD MAX. UBD MIN. TOILETING MAX.   Patient is currently on nectar liquids VFSS TODAY.   BIMS SUMMARY SCORE: 9 Moderately impaired   DIFFICULTY WITH SUSTAINED AND FOCAL ATTENTION.  CONTINENT / INCONTINENT  ELOS - 2 -3 WEEKS    TEAM CONF - Kari 15 - BED MOD. TRANSFERS MIN. 4 STAIRS MIN X 2. SATS 97% OR HIGHER WITH GAIT WITH FATIGUE, GAIT 80 FEET MIN ASSIST RW. TOILET TRANSFERS MIN. SHOWER TRANSFERS MIN MOD. BATH MIN. LBD MIN MOD. UBD MIN. GROOMING SET UP. TOILETING MIN MOD. DRESSING TAKES 30 MINUTES WIETH SLOW PROCESSING. EDEMA - JOBST STOCKINGS. MILD DYSPHGAIA, REG THIN BY  CUP, NO STRAWS. CLQT MODERATE DEFICITS, DIFFICULTY WITH ATTENTION AND MEMORY, PROLONGED PROCESSING SPEED. BNE FOLLOWING. VARIABLE INTAKE - DIETITIAN FOLLOWING. RASH ON ABD AND LEFT CHEST, POSSIBLY CONTACT DERMATITIS. TORSEMIDE ADDED.  ELOS - TWO WEEKS.    TEAM CONF - June 22- DIURETIC ADJUSTED BY NEPRHOLOGY. PATIENT DOES NOT FOLLOW 1500 CC FLUID RESTRICTION. TRANSFERS CTG. 4 STAIRS MIN X 2. GAIT 80 FEET CTG RW.   TOILET AND SHOWER TRANSFERS CTG. BATH MIN-CTG. LBD MIN-CTG FOR COMPRESSION STOCKINGS. UBD SBA. TOILETING CTG AS ASKS FOR HELP.   SWALLOW - REGULAR SOLID, THIN LIQUID BY CUP. MOSTLY CONTINENT.   Orientation: WNL  Attention: WNL to Mildly Impaired  Executive Functioning: Mildly Impaired  Abstract Reasoning: WNL  Arithmetic: Mildly Impaired  Visuospatial Perception: WNL  Visuospatial Praxis: WNL for Figure Copy; Severely Impaired Coding related to slowed processing speed.  Verbal Memory: Moderately to Severely Impaired  Visual Memory: Mildly Impaired  Emotional: Some frustration regarding current level of functioning, but minimal anxiety and depression symptoms reported on the GDS and BROOKE.  -demonstrating moderate to severe verbal memory deficits and slowed processing speed that are consistent with the location of his stroke.  Word-finding difficulties are also present, but are more evident conversationally than during testing.  ELOS - ONE WEEK- June 29       TEAM CONF - June 29 - TRANSFERS CTG. 4 STAIRS CTG . DID 8 ON Tuesday. AT HOME 14 STAIRS, LANDING, THEN 8 STAIRS TO SECOND FLOOR. GAIT  RW CTG SBA. TOILET TRANSFERS CTG SBA. SHOWER TRANSFERS CTG  BATH SBA CTG. LBD MIN. UBD SBA. GROOMING SET UP. TOILETING MIN CTG. EASILY DISTRACTED ON TASK. PROCESSING SPEED IMPROVING, BUT STILL SLOWED WHEN TIRED.   DRESSLER SYNDROME - TRAMADOL HELPED PAIN. ON COLCHICINE.   ELOS - WED (discharge was delayed given transfer off the unit for Dressler syndrome)       Goal is for home with outpatient cardiac rehab    therapies.  Barrier to discharge: Impaired cognition mobility self-care- work on follow, executive function, conditioning, transfers, progressive ambulation, ADLs to overcome.           Tu Silver MD,     During rounds, used appropriate personal protective equipment including mask and gloves.  Additional gown if indicated.  Mask used was standard procedure mask. Appropriate PPE was worn during the entire visit.  Hand hygiene was completed before and after.      Patient has been staffed and discussed with attending Physician, Dr. Tu Silver.

## 2023-06-30 NOTE — THERAPY TREATMENT NOTE
Inpatient Rehabilitation - Speech Language Pathology Treatment Note    [facility]     Patient Name: Vernon Solorzano  : 1950  MRN: [id number]    Today's Date: 2023                   Admit Date: 2023       Visit Dx:      ICD-10-CM ICD-9-CM   1. Impaired functional mobility, balance, gait, and endurance  Z74.09 V49.89       Patient Active Problem List   Diagnosis    DM (diabetes mellitus), type 2    Mixed hyperlipidemia    Mild intermittent asthma without complication    New onset of congestive heart failure    Nonrheumatic mitral valve regurgitation    Left-sided chest pain    Essential hypertension    Mitral valve disease    Acute ischemic left MCA stroke    Atelectasis of both lungs    Pericardial effusion, acute    History of ischemic stroke    Obese    Physical debility    Chronic diastolic CHF (congestive heart failure)    Anemia    Stage 3a chronic kidney disease    Paroxysmal atrial flutter    H/O mitral valve replacement with tissue graft    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 AMARILIS MORGAN       Past Surgical History:   Procedure Laterality Date    CARDIAC CATHETERIZATION N/A 2023    Procedure: Right Heart Cath;  Surgeon: Corey Gaffney MD;  [facility]  [facility];  Service: Cardiology;  Laterality: N/A;    CARDIAC CATHETERIZATION N/A 2023    Procedure: Left Heart Cath;  Surgeon: Corey Gaffney MD;  [facility];  Service: Cardiology;  Laterality: N/A;    CARDIAC CATHETERIZATION N/A 2023    Procedure: Left  ventriculography;  Surgeon: Corey Gaffney MD;  [facility]:  [facility];  Service: Cardiology;  Laterality: N/A;    CARDIAC CATHETERIZATION N/A 5/11/2023    Procedure: Coronary angiography;  Surgeon: Corey Gaffney MD;  [facility]:  [facility];  Service: Cardiology;  Laterality: N/A;    COLONOSCOPY N/A     WNL PER PT, NO RECORDS,     COLONOSCOPY N/A 04/07/2009    DR. GORGE BENAVIDEZ AT [place]    MITRAL VALVE REPAIR/REPLACEMENT N/A 5/24/2023    Procedure: MITRAL VALVE REPAIR/REPLACEMENT TRICUSPID VALVE REPAIR/REPLACEMENT TRANSESOPHAGEAL ECHOCARDIOGRAM WITH ANESTHESIA;  Surgeon: George Frey MD;  Location: [facility];  Service: Cardiothoracic;  Laterality: N/A;    PROSTATE SURGERY N/A 11/12/2010    TURP, DR. COREY COLLAZO AT [facility]    SKIN TAG REMOVAL N/A 12/20/2017    ANAL SKIN TAG X2, PERFORMED IN OFFICE, DR. LISA ORANTES    TURP / TRANSURETHRAL INCISION / DRAINAGE PROSTATE  2010    Dr. Goodrich       SLP Recommendation and Plan                                                            SLP EVALUATION (last 72 hours)       SLP SLC Evaluation       Row Name 06/30/23 1400 06/30/23 0830 06/29/23 1330 06/29/23 1200 06/28/23 1330       Communication Assessment/Intervention    Document Type therapy note (daily note)  -SR therapy note (daily note)  -SR therapy note (daily note)  -SR therapy note (daily note)  -SR therapy note (daily note)  -AL    Subjective Information no complaints  -SR no complaints  -SR no complaints  -SR no complaints  -SR --    Patient Observations alert;cooperative;agree to therapy  -SR alert;cooperative;agree to therapy  -SR alert;cooperative;agree to therapy  -SR alert;cooperative;agree to therapy  -SR --    Patient/Family/Caregiver Comments/Observations -- -- -- -- Pt pleasant and cooperative.  -AL    Patient Effort good  -SR good  -SR good  -SR good  -SR --    Symptoms Noted During/After Treatment none  -SR none  -SR none  -SR none  -SR --        Pain Scale: Numbers Pre/Post-Treatment    Pretreatment Pain Rating 0/10 - no pain  -SR 0/10 - no pain  -SR 0/10 - no pain  -SR 0/10 - no pain  -SR 0/10 - no pain  -AL    Posttreatment Pain Rating 0/10 - no pain  -SR 0/10 - no pain  -SR 0/10 - no pain  -SR 0/10 - no pain  -SR --      Row Name 06/28/23 0830                   Communication Assessment/Intervention    Document Type therapy note (daily note)  -AL        Patient/Family/Caregiver Comments/Observations Pt participated well. Wife present for portion of session.  -AL           Pain Scale: Numbers Pre/Post-Treatment    Pretreatment Pain Rating 0/10 - no pain  -AL                  User Key  (r) = Recorded By, (t) = Taken By, (c) = Cosigned By      Initials Name Effective Dates    Charmaine Carreon, MS CCC-SLP 06/16/21 -     SR Latisha Morris CCC-SLP 11/10/22 -                        EDUCATION    The patient has been educated in the following areas:       Cognitive Impairment.             SLP GOALS       Row Name 06/30/23 1400 06/30/23 0830 06/29/23 1330       Attention Goal 1 (SLP)    Improve Attention by Goal 1 (SLP) complete selective attention task;complete sustained attention task;80%;with minimal cues (75-90%)  -SR complete selective attention task;complete sustained attention task;80%;with minimal cues (75-90%)  -SR complete selective attention task;complete sustained attention task;80%;with minimal cues (75-90%)  -SR    Time Frame (Attention Goal 1, SLP) by discharge  -SR by discharge  -SR by discharge  -SR    Progress/Outcomes (Attention Goal 1, SLP) continuing progress toward goal  -SR continuing progress toward goal  -SR continuing progress toward goal  -SR    Comment (Attention Goal 1, SLP) Sustained/selective attention targeted with written directions task. Patient followed 2 step directions given MOD verbal/visual cues. Prolonged response time noted. Activity incomplete due to time constraint. Patient completed 3/10 stimulus items.  -SR  Increased difficulty with sustained attention during AM session. Required verbal cues to redirect to therapy tasks. Patient distracted by the events from the morning.  -SR Visual scanning/visual reasoning activity completed during PM therapy session. Patient labeled visual pictures in alphabetical order using connecting line with 50% acc given MIN cues; 80% acc given MOD cues. Extended time provided to complete activity due to slow processing.  -SR       Memory Skills Goal 1 (SLP)    Improve Memory Skills Through Goal 1 (SLP) -- use memory strategies;listen to a paragraph and answer questions;recall details of the day;80%;with moderate cues (50-74%)  -SR use memory strategies;listen to a paragraph and answer questions;recall details of the day;80%;with moderate cues (50-74%)  -SR    Time Frame (Memory Skills Goal 1, SLP) -- by discharge  -SR by discharge  -SR    Progress/Outcomes (Memory Skills Goal 1, SLP) -- goal ongoing  -SR goal ongoing  -SR    Comment (Memory Skills Goal 1, SLP) -- Memory and mental manipulation; patient listened to 3 related words and repeated the list back in sequential order with 80% acc given MOD cues. Accuracy increased with written cues and repetition. Slow response time noted. Reviewed memory strategies with patient. He reported that he uses a schedule book to keep track of therapy appointments/important events. Encouraged patient to utilize writing information down as a memory strategy.  -SR Short-term memory; Patient listened to audio recording of voicemail message and answered questions about the content with 50% acc given NO cues; 80% acc given MIN cues.  -SR       Executive Functional Skills Goal 1 (SLP)    Improve Executive Function Skills Goal 1 (SLP) demonstrate awareness of deficit;home management activity;80%;with minimal cues (75-90%)  -SR -- --    Time Frame (Executive Function Skills Goal 1, SLP) by discharge  -SR -- --    Progress/Outcomes (Executive Function Skills Goal 1,  SLP) good progress toward goal  -SR -- --    Comment (Executive Function Skills Goal 1, SLP) Safety/judgement scenerios discussed during session. Reviewed current goals and plans for home. Patient eager to drive and work towards his independence. Discussed how cognitive goals (ie, judgement, decision making, attention, memory) relate toward goal of driving. Patient recognized the complexity of the task and how he requires more help. Ongoing education/discussion recommended. During a following activity, patient provided solution to problem-based scenerio with 80% acc given MIN cues.  -SR -- --      Row Name 06/29/23 1200 06/28/23 1330 06/28/23 0830       Attention Goal 1 (SLP)    Improve Attention by Goal 1 (SLP) complete selective attention task;complete sustained attention task;80%;with minimal cues (75-90%)  -SR -- complete selective attention task;complete sustained attention task;80%;with minimal cues (75-90%)  -AL    Time Frame (Attention Goal 1, SLP) by discharge  -SR -- by discharge  -AL    Progress/Outcomes (Attention Goal 1, SLP) continuing progress toward goal  -SR -- continuing progress toward goal  -AL    Comment (Attention Goal 1, SLP) Demonstrated increased difficulty with sustained attention during therapy session. Required entire 30 minutes to complete one therapy task.  -SR -- Initially required NO cues for selective attention; however, as session progressed, pt required MIN cues due to being distracted by wife in room. Mildly reduced processing speed noted.  -AL       Reasoning Goal 1 (SLP)    Improve Reasoning Through Goal 1 (SLP) complete deductive reasoning task;complete basic reasoning task;complete mental flexibility task;80%;with moderate cues (50-74%)  -SR complete deductive reasoning task;complete basic reasoning task;complete mental flexibility task;80%;with moderate cues (50-74%)  -AL --    Time Frame (Reasoning Goal 1, SLP) by discharge  -SR by discharge  -AL --    Progress/Outcomes  (Reasoning Goal 1, SLP) good progress toward goal  -SR -- --    Comment (Reasoning Goal 1, SLP) Attempted to complete acrostic word puzzle from yesterday's session. Patient required MOD cues to transfer target letter and its associated number to the chart to determine the target phrase. Patient completed 4 stimulus items given MOD cues. Task incomplete due to time constraint.  -SR Acrostics task: Word deduction- 50% with NO cues, 100% with MIN-MOD cues. Required MAX cues to copy letters to correct boxes.  -AL --       Executive Functional Skills Goal 1 (SLP)    Improve Executive Function Skills Goal 1 (SLP) -- demonstrate awareness of deficit;home management activity;80%;with minimal cues (75-90%)  -AL demonstrate awareness of deficit;home management activity;80%;with minimal cues (75-90%)  -AL    Time Frame (Executive Function Skills Goal 1, SLP) -- by discharge  -AL by discharge  -AL    Progress/Outcomes (Executive Function Skills Goal 1, SLP) -- good progress toward goal  -AL good progress toward goal  -AL    Comment (Executive Function Skills Goal 1, SLP) -- Completed medicine management task: overall 7/9 with NO cues, 9/9 with MIN cues.  -AL Medication management with pill box using pt's medicine list: 5/7 with NO cues, 7/7 with MIN cues. Error x1 when scheduling (AM vs. PM) and error x1 when forgetting which medication he was placing in box.  -AL              User Key  (r) = Recorded By, (t) = Taken By, (c) = Cosigned By      Initials Name Provider Type    Charmaine Carreon, MS CCC-SLP Speech and Language Pathologist     Latisha Morris CCC-SLP Speech and Language Pathologist                                Time Calculation:        Time Calculation- SLP       Row Name 06/30/23 1529 06/30/23 1240          Time Calculation- SLP    SLP Start Time 1400  -SR 0830  -SR     SLP Stop Time 1430  -SR 0900  -SR     SLP Time Calculation (min) 30 min  -SR 30 min  -SR               User Key  (r) = Recorded By, (t) =  Taken By, (c) = Cosigned By      Initials Name Provider Type    Latisha Haas CCC-SLP Speech and Language Pathologist                      Therapy Charges for Today       Code Description Service Date Service Provider Modifiers Qty    14984261884 HC ST DEV OF COGN SKILLS INITIAL 15 MIN 6/29/2023 Latisha Morris CCC-SLP  1    25274612538 HC ST DEV OF COGN SKILLS EACH ADDT'L 15 MIN 6/29/2023 Latisha Morris CCC-SLP  3    74727419260 HC ST DEV OF COGN SKILLS INITIAL 15 MIN 6/30/2023 Latisha Morris CCC-SLP  1    94322758276 HC ST DEV OF COGN SKILLS EACH ADDT'L 15 MIN 6/30/2023 Latisha Morris CCC-SLP  3                             JENNIFFER Hobbs  6/30/2023

## 2023-06-30 NOTE — PLAN OF CARE
Problem: Rehabilitation (IRF) Plan of Care  Goal: Plan of Care Review  Outcome: Ongoing, Progressing  Flowsheets (Taken 6/29/2023 2344)  Plan of Care Reviewed With: patient  Goal: Patient-Specific Goal (Individualized)  Outcome: Ongoing, Progressing  Goal: Absence of New-Onset Illness or Injury  Outcome: Ongoing, Progressing  Intervention: Prevent Fall and Fall Injury  Recent Flowsheet Documentation  Taken 6/29/2023 2038 by Delvin Jansen RN  Safety Promotion/Fall Prevention:   activity supervised   assistive device/personal items within reach   clutter free environment maintained   fall prevention program maintained   lighting adjusted   nonskid shoes/slippers when out of bed   room organization consistent   safety round/check completed  Intervention: Prevent Infection  Recent Flowsheet Documentation  Taken 6/29/2023 2038 by Delvin Jansen RN  Infection Prevention:   environmental surveillance performed   hand hygiene promoted   single patient room provided  Intervention: Prevent VTE (Venous Thromboembolism)  Recent Flowsheet Documentation  Taken 6/29/2023 2038 by Delvin Jansen RN  VTE Prevention/Management:   bilateral   sequential compression devices off   patient refused intervention  Goal: Optimal Comfort and Wellbeing  Outcome: Ongoing, Progressing  Goal: Home and Community Transition Plan Established  Outcome: Ongoing, Progressing     Problem: Fall Injury Risk  Goal: Absence of Fall and Fall-Related Injury  Outcome: Ongoing, Progressing  Intervention: Identify and Manage Contributors  Recent Flowsheet Documentation  Taken 6/29/2023 2038 by Delvin Jansen RN  Medication Review/Management: medications reviewed  Self-Care Promotion: independence encouraged  Intervention: Promote Injury-Free Environment  Recent Flowsheet Documentation  Taken 6/29/2023 2038 by Delvin Jansen RN  Safety Promotion/Fall Prevention:   activity supervised   assistive device/personal items within reach   clutter free  environment maintained   fall prevention program maintained   lighting adjusted   nonskid shoes/slippers when out of bed   room organization consistent   safety round/check completed     Problem: Skin Injury Risk Increased  Goal: Skin Health and Integrity  Outcome: Ongoing, Progressing  Intervention: Optimize Skin Protection  Recent Flowsheet Documentation  Taken 6/29/2023 2038 by Delvin Jansen RN  Pressure Reduction Techniques: frequent weight shift encouraged  Head of Bed (HOB) Positioning:   HOB elevated   HOB at 30-45 degrees  Pressure Reduction Devices:   positioning supports utilized   pressure-redistributing mattress utilized  Skin Protection: adhesive use limited     Problem: Adjustment to Stroke (Stroke Rehabilitation)  Goal: Optimal Adjustment to Stroke  Outcome: Ongoing, Progressing  Intervention: Promote Patient and Family Adjustment to Stroke  Recent Flowsheet Documentation  Taken 6/29/2023 2038 by Delvin Jansen RN  Supportive Measures:   active listening utilized   verbalization of feelings encouraged   self-responsibility promoted   self-reflection promoted   self-care encouraged   relaxation techniques promoted     Problem: BADL (Basic Activities of Daily Living) Impairment (Stroke Rehabilitation)  Goal: Optimal Safe BADL Performance  Outcome: Ongoing, Progressing     Problem: Bowel Elimination Management (Stroke Rehabilitation)  Goal: Effective Bowel Elimination/Continence  Outcome: Ongoing, Progressing  Intervention: Promote Bowel Elimination and Continence  Recent Flowsheet Documentation  Taken 6/29/2023 2038 by Delvin Jansen RN  Bowel Program: maintenance program followed  Strategies/Techniques (Bowel Management):   fiber intake encouraged   fluid intake encouraged     Problem: Cognitive Impairment (Stroke Rehabilitation)  Goal: Optimal Cognitive Function  Outcome: Ongoing, Progressing  Intervention: Optimize Cognitive Function  Recent Flowsheet Documentation  Taken 6/29/2023 2038 by  Namazzi, Delvin, RN  Sensory Stimulation Regulation:   quiet environment promoted   care clustered   lighting decreased   auditory stimulation minimized  Cognitive Support Measures:   physical activity promoted   environmental distractions minimized  Reorientation Measures: reorientation provided     Problem: Communication Impairment (Stroke Rehabilitation)  Goal: Effective Communication Skills  Outcome: Ongoing, Progressing  Intervention: Optimize Communication Skills  Recent Flowsheet Documentation  Taken 6/29/2023 2038 by Delvin Jansen RN  Communication Enhancement Strategies: call light answered in person     Problem: IADL (Instrumental Activities of Daily Living) Impairment (Stroke Rehabilitation)  Goal: Optimal Safe IADL Performance  Outcome: Ongoing, Progressing     Problem: Mobility Impairment (Stroke Rehabilitation)  Goal: Optimal Mobility Clements and Safety  Outcome: Ongoing, Progressing  Intervention: Support Mobility Clements and Safety  Recent Flowsheet Documentation  Taken 6/29/2023 2038 by Delvin Jansen RN  Mobility Safety Promotion: physical assistance provided     Problem: Movement and Motor Control Impairment (Stroke Rehabilitation)  Goal: Optimal Movement and Motor Control  Outcome: Ongoing, Progressing     Problem: Sensory Perceptual Impairment (Stroke Rehabilitation)  Goal: Optimal Sensory Perceptual Status  Outcome: Ongoing, Progressing  Intervention: Facilitate Optimal Sensory Perceptual Status  Recent Flowsheet Documentation  Taken 6/29/2023 2038 by Delvin Jansen RN  Sensory Impairment Compensatory Techniques:   frequent position change encouraged   frequent skin inspection facilitated   friction and shear protection promoted   optimal skin moisture level promoted     Problem: Sexuality and Intimacy (Stroke Rehabilitation)  Goal: Maintains Intimacy/Sexual Expression  Outcome: Ongoing, Progressing     Problem: Spasticity (Stroke Rehabilitation)  Goal: Effective Spasticity  Management  Outcome: Ongoing, Progressing  Intervention: Promote Spasticity Management  Recent Flowsheet Documentation  Taken 6/29/2023 2038 by Delvin Jansen RN  Spasticity Management: positioned with supportive device     Problem: Swallowing Impairment (Stroke Rehabilitation)  Goal: Optimal Oral Motor and Swallow Ability  Outcome: Ongoing, Progressing  Intervention: Optimize Eating and Swallowing  Recent Flowsheet Documentation  Taken 6/29/2023 2038 by Delvin Jansen RN  Aspiration Precautions:   awake/alert before oral intake   distractions minimized during oral intake   upright posture maintained     Problem: Urinary Elimination Management (Stroke Rehabilitation)  Goal: Effective Urinary Elimination/Continence  Outcome: Ongoing, Progressing  Intervention: Promote Urinary Elimination and Continence  Recent Flowsheet Documentation  Taken 6/29/2023 2038 by Delvin Jansen RN  Assistive Device Use (Bladder Management): urinal  Urinary Elimination Techniques: fluid intake encouraged     Problem: Adjustment to Illness (Stroke, Ischemic/Transient Ischemic Attack)  Goal: Optimal Coping  Outcome: Ongoing, Progressing  Intervention: Support Psychosocial Response to Stroke  Recent Flowsheet Documentation  Taken 6/29/2023 2038 by Delvin Jansen RN  Supportive Measures:   active listening utilized   verbalization of feelings encouraged   self-responsibility promoted   self-reflection promoted   self-care encouraged   relaxation techniques promoted     Problem: Bowel Elimination Impaired (Stroke, Ischemic/Transient Ischemic Attack)  Goal: Effective Bowel Elimination  Outcome: Ongoing, Progressing  Intervention: Promote Effective Bowel Elimination  Recent Flowsheet Documentation  Taken 6/29/2023 2038 by Delvin Jansen RN  Bowel Program: maintenance program followed     Problem: Cerebral Tissue Perfusion (Stroke, Ischemic/Transient Ischemic Attack)  Goal: Optimal Cerebral Tissue Perfusion  Outcome: Ongoing,  Progressing  Intervention: Protect and Optimize Cerebral Perfusion  Recent Flowsheet Documentation  Taken 6/29/2023 2038 by Delvin Jansen RN  Sensory Stimulation Regulation:   quiet environment promoted   care clustered   lighting decreased   auditory stimulation minimized  Cerebral Perfusion Promotion: blood pressure monitored     Problem: Cognitive Impairment (Stroke, Ischemic/Transient Ischemic Attack)  Goal: Optimal Cognitive Function  Outcome: Ongoing, Progressing  Intervention: Optimize Cognitive Function  Recent Flowsheet Documentation  Taken 6/29/2023 2038 by Delvin Jansen RN  Sensory Stimulation Regulation:   quiet environment promoted   care clustered   lighting decreased   auditory stimulation minimized  Reorientation Measures: reorientation provided  Environment Familiarity/Consistency:   daily routine followed   personal clothing/items utilized     Problem: Communication Impairment (Stroke, Ischemic/Transient Ischemic Attack)  Goal: Improved Communication Skills  Outcome: Ongoing, Progressing  Intervention: Optimize Communication Skills  Recent Flowsheet Documentation  Taken 6/29/2023 2038 by Delvin Jansen RN  Communication Enhancement Strategies: call light answered in person     Problem: Functional Ability Impaired (Stroke, Ischemic/Transient Ischemic Attack)  Goal: Optimal Functional Ability  Outcome: Ongoing, Progressing  Intervention: Optimize Functional Ability  Recent Flowsheet Documentation  Taken 6/29/2023 2038 by Delvin Jansen RN  Activity Management: activity encouraged  Self-Care Promotion: independence encouraged     Problem: Respiratory Compromise (Stroke, Ischemic/Transient Ischemic Attack)  Goal: Effective Oxygenation and Ventilation  Outcome: Ongoing, Progressing  Intervention: Optimize Oxygenation and Ventilation  Recent Flowsheet Documentation  Taken 6/29/2023 2038 by Delvin Jansen RN  Head of Bed (HOB) Positioning:   HOB elevated   HOB at 30-45 degrees      Problem: Sensorimotor Impairment (Stroke, Ischemic/Transient Ischemic Attack)  Goal: Improved Sensorimotor Function  Outcome: Ongoing, Progressing  Intervention: Optimize Range of Motion, Motor Control and Function  Recent Flowsheet Documentation  Taken 6/29/2023 2038 by Delvin Jansen RN  Spasticity Management: positioned with supportive device  Positioning/Transfer Devices:   pillows   in use  Range of Motion: active ROM (range of motion) encouraged  Intervention: Optimize Sensory and Perceptual Ability  Recent Flowsheet Documentation  Taken 6/29/2023 2038 by Delvin Jansen RN  Pressure Reduction Techniques: frequent weight shift encouraged  Pressure Reduction Devices:   positioning supports utilized   pressure-redistributing mattress utilized     Problem: Swallowing Impairment (Stroke, Ischemic/Transient Ischemic Attack)  Goal: Optimal Eating and Swallowing without Aspiration  Outcome: Ongoing, Progressing  Intervention: Optimize Eating and Swallowing  Recent Flowsheet Documentation  Taken 6/29/2023 2038 by Delvin Jansen RN  Aspiration Precautions:   awake/alert before oral intake   distractions minimized during oral intake   upright posture maintained     Problem: Urinary Elimination Impaired (Stroke, Ischemic/Transient Ischemic Attack)  Goal: Effective Urinary Elimination  Outcome: Ongoing, Progressing  Intervention: Promote Effective Bladder Elimination  Recent Flowsheet Documentation  Taken 6/29/2023 2038 by Delvin Jansen RN  Urinary Elimination Promotion:   frequent voiding encouraged   toileting scheduled   Goal Outcome Evaluation:  Plan of Care Reviewed With: patient           Outcome Evaluation: pt A&Ox4, able to answer all orientation questions but appears with forgetifulness, reoriented to environment and sorroundings, education on safety completed, all safety precautions in place, will continue to monitor pt.

## 2023-06-30 NOTE — PROGRESS NOTES
[facility] Progress Note      Patient Name: Vernon Solorzano  : 1950  MRN: [id number]  Primary Care Physician:  Liza Oconnell III, RACHAEL  Date of admission: 2023    Subjective     Interval History:   Follow up CKD III.     Patient dyspnea seems to be slowly improving with diuretic titration  Lower extremity edema also resolving currently with compression socks  Tolerating oral intake  Denies any chest pain or palpitations  No abdominal pain      Urinating spontaneously    No fevers or chills      Review of Systems:   As noted above    Objective     Vitals:   Temp:  [96.8 °F (36 °C)-98.5 °F (36.9 °C)] 96.8 °F (36 °C)  Heart Rate:  [] 101  Resp:  [18-20] 18  BP: ()/(68-79) 107/69    Intake/Output Summary (Last 24 hours) at 2023 1107  Last data filed at 2023 0831  Gross per 24 hour   Intake 900 ml   Output 2050 ml   Net -1150 ml         Physical Exam:    General Appearance: Patient is alert, oriented.  Ill looking.  Questions skin: warm and dry  HEENT: oral mucosa normal, nonicteric sclera  Neck: supple, no JVD  Lungs: Clear to auscultation.   Heart: RRR, normal S1 and S2  Abdomen: soft, nontender, + bs, distended.  Extremities 1-2+ lower ext edema.  Erythematous rash noted on the anterior aspect of right leg    Scheduled Meds:     aspirin, 81 mg, Oral, Daily  atorvastatin, 40 mg, Oral, Nightly  budesonide-formoterol, 2 puff, Inhalation, BID - RT  calcium 500 mg vitamin D 5 mcg (200 UT), 1 tablet, Oral, Daily  colchicine, 0.6 mg, Oral, Daily  furosemide, 80 mg, Intravenous, BID  guaifenesin, 400 mg, Oral, TID  insulin glargine, 20 Units, Subcutaneous, Nightly  insulin glargine, 20 Units, Subcutaneous, Daily  insulin lispro, 3-14 Units, Subcutaneous, 4x Daily AC & at Bedtime  insulin lispro, 4 Units, Subcutaneous, TID With Meals  magnesium oxide, 400 mg, Oral, Daily  melatonin, 3 mg, Oral, Nightly  metoprolol succinate XL, 25 mg, Oral,  Q24H  pantoprazole, 40 mg, Oral, Q AM  potassium chloride, 40 mEq, Oral, Once  sodium chloride, 3 mL, Intravenous, Q12H  tiotropium bromide monohydrate, 2 puff, Inhalation, Daily - RT      IV Meds:        Results Reviewed:   I have personally reviewed the results from the time of this admission to 6/30/2023 11:07 EDT     Results from last 7 days   Lab Units 06/30/23  0719 06/28/23  0753 06/27/23  0710 06/24/23  1946 06/24/23  0228   SODIUM mmol/L 140 140 138   < > 139   POTASSIUM mmol/L 3.2* 3.8 3.1*   < > 3.4*   CHLORIDE mmol/L 98 101 98   < > 96*   CO2 mmol/L 29.6* 29.6* 29.0   < > 30.0*   BUN mg/dL 15 16 18   < > 14   CREATININE mg/dL 1.31* 1.26 1.31*   < > 1.50*   CALCIUM mg/dL 9.2 9.5 8.9   < > 9.1   BILIRUBIN mg/dL 0.5  --   --   --  0.4   ALK PHOS U/L 115  --   --   --  122*   ALT (SGPT) U/L 11  --   --   --  12   AST (SGOT) U/L 9  --   --   --  9   GLUCOSE mg/dL 135* 127* 128*   < > 149*    < > = values in this interval not displayed.         Estimated Creatinine Clearance: 64.9 mL/min (A) (by C-G formula based on SCr of 1.31 mg/dL (H)).    Results from last 7 days   Lab Units 06/30/23  0719 06/28/23  0753 06/24/23  1946 06/24/23  0228   MAGNESIUM mg/dL 2.0 2.1 1.8 2.0   PHOSPHORUS mg/dL 4.0 3.3  --  3.9               Results from last 7 days   Lab Units 06/29/23  0705 06/28/23  0753 06/27/23  0710 06/26/23  0322 06/25/23  0739   WBC 10*3/mm3 8.23 8.52 7.07 9.69 11.08*   HEMOGLOBIN g/dL 8.9* 9.5* 9.3* 9.9* 10.3*   PLATELETS 10*3/mm3 277 290 255 246 259               Assessment / Plan     ASSESSMENT:  1.  Acute kidney injury on top of CKD III, baseline creatinine 1.2.  Peak 1.68.  Creatinine  stable around 1.3  , most recent down to 1.2  2. MR now sp MV replacement, tissue and TV repair, SU closure 5/24/23.  3. Chronic steroid use after COVID 19 PNA.  4. Anemia of CKD and postoperative anemia.  Hemoglobin is 8.9.  No evidence of active bleeding  5. 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  Working  with physical occupational therapy  6. diabetes mellitus type 2. On diabetic diet and insulin regimen   7. Aflutter.Cardioversion 6/2/23. On chronic anticoagulation eliquis and rate controlled  metoprolol.   8.  Hypokalemia on KCL replacement protocol, volume diuretics  9.  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

## 2023-07-01 LAB
GLUCOSE BLDC GLUCOMTR-MCNC: 112 MG/DL (ref 70–130)
GLUCOSE BLDC GLUCOMTR-MCNC: 119 MG/DL (ref 70–130)
GLUCOSE BLDC GLUCOMTR-MCNC: 274 MG/DL (ref 70–130)
GLUCOSE BLDC GLUCOMTR-MCNC: 278 MG/DL (ref 70–130)
MAGNESIUM SERPL-MCNC: 2.2 MG/DL (ref 1.6–2.4)

## 2023-07-01 NOTE — THERAPY TREATMENT NOTE
Inpatient Rehabilitation - Physical Therapy Treatment Note       [facility]     Patient Name: Vernon Solorzano  : 1950  MRN: [id number]    Today's Date: 2023                    Admit Date: 2023      Visit Dx:     ICD-10-CM ICD-9-CM   1. Impaired functional mobility, balance, gait, and endurance  Z74.09 V49.89       Patient Active Problem List   Diagnosis    DM (diabetes mellitus), type 2    Mixed hyperlipidemia    Mild intermittent asthma without complication    New onset of congestive heart failure    Nonrheumatic mitral valve regurgitation    Left-sided chest pain    Essential hypertension    Mitral valve disease    Acute ischemic left MCA stroke    Atelectasis of both lungs    Pericardial effusion, acute    History of ischemic stroke    Obese    Physical debility    Chronic diastolic CHF (congestive heart failure)    Anemia    Stage 3a chronic kidney disease    Paroxysmal atrial flutter    H/O mitral valve replacement with tissue graft    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 AMARILIS MORGAN       Past Surgical History:   Procedure Laterality Date    CARDIAC CATHETERIZATION N/A 2023    Procedure: Right Heart Cath;  Surgeon: Corey Gaffney MD;  Location:  [facility] CATH INVASIVE LOCATION;  Service: Cardiology;  Laterality: N/A;    CARDIAC CATHETERIZATION N/A 2023    Procedure: Left Heart Cath;  Surgeon: Corey Gaffney MD;  Location:  [facility] CATH [facility]IVE [facility];  Service: Cardiology;  Laterality: N/A;    CARDIAC CATHETERIZATION N/A 2023    Procedure: Left ventriculography;   Surgeon: Corey Gaffney MD;  Location: [facility] CATH INVASIVE LOCATION;  Service: Cardiology;  Laterality: N/A;    CARDIAC CATHETERIZATION N/A 5/11/2023    Procedure: Coronary angiography;  Surgeon: Corey Gaffney MD;  Location: [facility] CATH INVASIVE LOC[facility];  Service: Cardiology;  Laterality: N/A;    COLONOSCOPY N/A     WNL PER PT, NO RECORDS,     COLONOSCOPY N/A 04/07/2009    DR. GORGE BENAVIDEZ AT [place]    MITRAL VALVE REPAIR/REPLACEMENT N/A 5/24/2023    Procedure: MITRAL VALVE REPAIR/REPLACEMENT TRICUSPID VALVE REPAIR/REPLACEMENT TRANSESOPHAGEAL ECHOCARDIOGRAM WITH ANESTHESIA;  Surgeon: George Frey MD;  Location: [facility] CVOR;  Service: Cardiothoracic;  Laterality: N/A;    PROSTATE SURGERY N/A 11/12/2010    TURP, DR. COREY COLLAZO AT [facility]    SKIN TAG REMOVAL N/A 12/20/2017    ANAL SKIN TAG X2, PERFORMED IN OFFICE, DR. LISA ORANTES    TURP / TRANSURETHRAL INCISION / DRAINAGE PROSTATE  2010    Dr. Goodrich       PT ASSESSMENT (last 12 hours)       IRF PT Evaluation and Treatment       Row Name 07/01/23 0818          PT Time and Intention    Document Type daily treatment  -DP     Mode of Treatment individual therapy;physical therapy  -DP     Patient/Family/Caregiver Comments/Observations Pt sitting EOB donning socks with nsg upon PT entry with ambulating around room with no AD even with cuing and pt perserverated on not enough liquids even with PT education on safety and fluid restiction  -DP       Row Name 07/01/23 0818          General Information    Patient Profile Reviewed yes  -DP     Existing Precautions/Restrictions cardiac;fall;sternal  -DP       Row Name 07/01/23 0818          Pain Assessment    Pretreatment Pain Rating 0/10 - no pain  -DP     Posttreatment Pain Rating 0/10 - no pain  -DP       Row Name 07/01/23 0818          Cognition/Psychosocial    Affect/Mental Status (Cognition) WNL  -DP     Orientation Status (Cognition) oriented x 3  -DP     Follows Commands (Cognition) follows  one-step commands;over 90% accuracy;repetition of directions required  -DP     Personal Safety Interventions safety round/check completed;elopement precautions initiated;fall prevention program maintained;gait belt;muscle strengthening facilitated;nonskid shoes/slippers when out of bed;supervised activity  -DP       Row Name 07/01/23 0818          Sit-Stand Transfer    Sit-Stand Titus (Transfers) standby assist  -DP     Assistive Device (Sit-Stand Transfers) wheelchair;walker, front-wheeled  -DP       Row Name 07/01/23 0818          Stand-Sit Transfer    Stand-Sit Titus (Transfers) standby assist  -DP     Assistive Device (Stand-Sit Transfers) walker, front-wheeled;wheelchair  -DP       Row Name 07/01/23 0818          Gait/Stairs (Locomotion)    Titus Level (Gait) standby assist  -DP     Assistive Device (Gait) walker, front-wheeled  -DP     Distance in Feet (Gait) 160'x2  -DP     Pattern (Gait) step-through  -DP     Deviations/Abnormal Patterns (Gait) gait speed decreased;stride length decreased;angie decreased;festinating/shuffling  -DP     Bilateral Gait Deviations forward flexed posture  -DP     Left Sided Gait Deviations heel strike decreased  -DP     Right Sided Gait Deviations heel strike decreased  -DP     Gait Assessment/Intervention vc for increased step length with pt able to correct but reverted back after 2-3 steps  -DP     Titus Level (Stairs) contact guard;verbal cues  -DP     Handrail Location (Stairs) left side (ascending);right side (descending)  -DP     Number of Steps (Stairs) 8  -DP     Ascending Technique (Stairs) step-to-step  -DP     Descending Technique (Stairs) step-to-step  -DP     Stairs, Safety Issues balance decreased during turns  -DP     Stairs Assessment/Intervention pt demonstrated unsafe navigation of stairs as pt did not allow enough space up asecneding or descending steps for trailing foot. At times pt would lead with trail LE and required Vc and was  able to correct partially  -DP       Row Name 07/01/23 0818          Safety Issues, Functional Mobility    Impairments Affecting Function (Mobility) endurance/activity tolerance;balance;strength  -DP       Row Name 07/01/23 0818          Balance    Comment, Balance stood with donning underware for 30 seconds with Teofilo and UE support  -DP       Row Name 07/01/23 0818          Hip (Therapeutic Exercise)    Hip (Therapeutic Exercise) isometric exercises  -DP     Hip Isometrics (Therapeutic Exercise) bilateral;gluteal sets;10 repetitions;2 sets;3 second hold  -DP     Hip Strengthening (Therapeutic Exercise) bilateral;aBduction;marching while seated;mini squats;10 repetitions;2 sets;red;resistance band  -DP       Row Name 07/01/23 0818          Knee (Therapeutic Exercise)    Knee AROM (Therapeutic Exercise) bilateral;LAQ (long arc quad);hamstring curls;sitting;10 repetitions;2 sets  -DP       Row Name 07/01/23 0818          Ankle (Therapeutic Exercise)    Ankle Strengthening (Therapeutic Exercise) bilateral;dorsiflexion;plantarflexion;sitting;resistance band;red;10 repetitions;2 sets  -DP       Row Name 07/01/23 0818          Positioning and Restraints    Pre-Treatment Position in bed  -DP     Post Treatment Position wheelchair  -DP     In Wheelchair sitting;exit alarm on;with OT  -DP               User Key  (r) = Recorded By, (t) = Taken By, (c) = Cosigned By      Initials Name Provider Type    Gulshan Parker, NANETTE Physical Therapist                     Physical Therapy Education       Title: PT OT SLP Therapies (In Progress)       Topic: Physical Therapy (Done)       Point: Mobility training (Done)       Learning Progress Summary             Patient Acceptance, E, VU by WN at 6/29/2023 2344    Acceptance, E,TB, VU by LB at 6/28/2023 1051    Comment: stairs    Acceptance, E,D, VU,DU,NR by CH at 6/23/2023 1456    Acceptance, E,D, NR by DP at 6/22/2023 1309    Comment: hand placement    Acceptance, E,D, VU,DU by DP at  6/21/2023 1007    Acceptance, E, VU by MD at 6/20/2023 1504    Acceptance, E, VU by MD at 6/19/2023 1058    Acceptance, E,TB, VU,NR by TONI at 6/17/2023 1501    Acceptance, E, VU by MD at 6/16/2023 0919    Acceptance, E, VU by MD at 6/15/2023 1140    Eager, E,D,TB, NR by KP at 6/14/2023 1459    Acceptance, E, VU by MD at 6/13/2023 1012    Acceptance, E, VU by MD at 6/12/2023 1018    Acceptance, E, VU by MD at 6/10/2023 1040    Acceptance, E, VU by MD at 6/8/2023 1017    Acceptance, E,TB,D, VU,DU,NR by  at 6/7/2023 1513    Comment: POC, Goals, safety with mobility.   Family Acceptance, E,D, VU by MD at 6/27/2023 1517    Comment: Pt spouse participated in family teaching for ambulation and observed car transfer and stairs.   Significant Other Acceptance, E,TB,D, VU,DU,NR by  at 6/7/2023 1513    Comment: POC, Goals, safety with mobility.                         Point: Home exercise program (Done)       Learning Progress Summary             Patient Acceptance, E, VU by WN at 6/29/2023 2344    Acceptance, E,D, NR by DP at 6/22/2023 1309    Comment: hand placement    Acceptance, E,D, VU,DU by DP at 6/21/2023 1007    Eager, E,D,TB, NR by  at 6/14/2023 1459    Acceptance, E,TB,D, VU,DU,NR by  at 6/7/2023 1513    Comment: POC, Goals, safety with mobility.   Significant Other Acceptance, E,TB,D, VU,DU,NR by  at 6/7/2023 1513    Comment: POC, Goals, safety with mobility.                         Point: Body mechanics (Done)       Learning Progress Summary             Patient Acceptance, E, VU by WN at 6/29/2023 2344                         Point: Precautions (Done)       Learning Progress Summary             Patient Acceptance, E, VU by MD at 6/30/2023 1055    Acceptance, E, VU by WN at 6/29/2023 2344    Acceptance, E, VU by MD at 6/29/2023 1420    Acceptance, E, VU by MD at 6/27/2023 1517                                         User Key       Initials Effective Dates Name Provider Type Discipline    TONI 06/16/21 -   Nubia Orellana, PT Physical Therapist PT    LB 06/16/21 -  Lara Cm, PT Physical Therapist PT    MD 06/16/21 -  Damaris Francis, PT Physical Therapist PT    KP 06/16/21 -  Elizabeth Levin, PT Physical Therapist PT    WN 08/23/22 -  Delvin Jansen, RN Registered Nurse Nurse    DP 08/24/21 -  Gulshan Mcdonald, PT Physical Therapist PT    CH 06/01/23 -  Nicolas Suero, PT Student PT Student PT                    PT Recommendation and Plan                          Time Calculation:      PT Charges       Row Name 07/01/23 1159             Time Calculation    Start Time 1000  -DP      Stop Time 1100  -DP      Time Calculation (min) 60 min  -DP      PT Received On 07/01/23  -DP      PT - Next Appointment 07/03/23  -DP         Time Calculation- PT    Total Timed Code Minutes- PT 60 minute(s)  -DP                User Key  (r) = Recorded By, (t) = Taken By, (c) = Cosigned By      Initials Name Provider Type    DP Gulshan Mcdonald, PT Physical Therapist                    Therapy Charges for Today       Code Description Service Date Service Provider Modifiers Qty    41986327460 HC PT THERAPEUTIC ACT EA 15 MIN 7/1/2023 Gulshan Mcdonald, PT GP 2    95915084395 HC PT THER PROC EA 15 MIN 7/1/2023 Gulshan Mcdonald, PT GP 2              PT G-Codes  AM-PAC 6 Clicks Score (PT): 14      Gulshan Mcdonald PT  7/1/2023

## 2023-07-01 NOTE — PLAN OF CARE
Goal Outcome Evaluation:  Plan of Care Reviewed With: patient           Outcome Evaluation: pt alert and oreinted X4, forgetful at times, up assist X1 walker, lasix changed to PO torsemide, pt complaining of not enough to drink because of his fluid restriction. will cont to monitor         Problem: Rehabilitation (IRF) Plan of Care  Goal: Plan of Care Review  Outcome: Ongoing, Progressing  Flowsheets (Taken 7/1/2023 1437)  Plan of Care Reviewed With: patient  Outcome Evaluation: pt alert and oreinted X4, forgetful at times, up assist X1 walker, lasix changed to PO torsemide, pt complaining of not enough to drink because of his fluid restriction. will cont to monitor  Goal: Patient-Specific Goal (Individualized)  Outcome: Ongoing, Progressing  Goal: Absence of New-Onset Illness or Injury  Outcome: Ongoing, Progressing  Intervention: Prevent Fall and Fall Injury  Recent Flowsheet Documentation  Taken 7/1/2023 1420 by Lara Ann RN  Safety Promotion/Fall Prevention: (therapy) patient off unit  Taken 7/1/2023 1208 by Lara Ann RN  Safety Promotion/Fall Prevention:   activity supervised   assistive device/personal items within reach   clutter free environment maintained   fall prevention program maintained   nonskid shoes/slippers when out of bed   room organization consistent   safety round/check completed  Taken 7/1/2023 1031 by Lara Ann RN  Safety Promotion/Fall Prevention: (therapy) patient off unit  Taken 7/1/2023 0846 by Lara Ann RN  Safety Promotion/Fall Prevention:   activity supervised   assistive device/personal items within reach   clutter free environment maintained   fall prevention program maintained   nonskid shoes/slippers when out of bed   room organization consistent   safety round/check completed  Intervention: Prevent Infection  Recent Flowsheet Documentation  Taken 7/1/2023 1208 by Lara Ann RN  Infection Prevention: single patient room provided  Taken 7/1/2023  0846 by Lara Ann, RN  Infection Prevention: single patient room provided  Intervention: Prevent VTE (Venous Thromboembolism)  Recent Flowsheet Documentation  Taken 7/1/2023 0846 by Lara Ann, RN  VTE Prevention/Management:   bilateral   sequential compression devices off  Goal: Optimal Comfort and Wellbeing  Outcome: Ongoing, Progressing  Goal: Home and Community Transition Plan Established  Outcome: Ongoing, Progressing

## 2023-07-01 NOTE — PROGRESS NOTES
LOS: 25 days   Patient Care Team:  Liza Oconnell III, NP-C as PCP - General (Family Medicine)  Corey Lao Jr., MD (Inactive) as Consulting Physician (Urology)      KRIS TAYLOR  1950      ADMITTING DIAGNOSIS:  Stroke  Valvular heart disease status post repair/replacement      Subjective     Patient reports tolerating therapies.  Edema is little bit better.  Comfortable with his breathing.  Reports improvement with pleuritic pain on the left.  Does feel that colchicine has been helpful      Objective     Vitals:    07/01/23 1345   BP: 122/67   Pulse: 100   Resp: 18   Temp: 98 °F (36.7 °C)   SpO2: 97%       PHYSICAL EXAM:   MENTAL STATUS -  AWAKE / ALERT  HEENT-   LUNGS - CTA,    Sternotomy incision -dressed  HEART-sinus with occasional ectopy  ABD - NORMOACTIVE BOWEL SOUNDS, SOFT, NT.   EXT -1+ pedal edema bilateral lower extremities,   NEURO -oriented to person place time and situation.    Speech is fluent.    Good strength bilaterally          MEDICATIONS  Scheduled Meds:aspirin, 81 mg, Oral, Daily  atorvastatin, 40 mg, Oral, Nightly  budesonide-formoterol, 2 puff, Inhalation, BID - RT  calcium 500 mg vitamin D 5 mcg (200 UT), 1 tablet, Oral, Daily  colchicine, 0.6 mg, Oral, Daily  guaifenesin, 400 mg, Oral, TID  insulin glargine, 20 Units, Subcutaneous, Nightly  insulin glargine, 20 Units, Subcutaneous, Daily  insulin lispro, 3-14 Units, Subcutaneous, 4x Daily AC & at Bedtime  insulin lispro, 4 Units, Subcutaneous, TID With Meals  magnesium oxide, 400 mg, Oral, Daily  melatonin, 3 mg, Oral, Nightly  metoprolol succinate XL, 25 mg, Oral, Q24H  pantoprazole, 40 mg, Oral, Q AM  sodium chloride, 3 mL, Intravenous, Q12H  tiotropium bromide monohydrate, 2 puff, Inhalation, Daily - RT  torsemide, 100 mg, Oral, Daily      Continuous Infusions:   PRN Meds:.  acetaminophen **OR** [DISCONTINUED] acetaminophen **OR** [DISCONTINUED] acetaminophen    bisacodyl    calcium carbonate     dextrose    dextrose    docusate sodium    glucagon (human recombinant)    hydrocortisone-bacitracin-zinc oxide-nystatin    ipratropium-albuterol    melatonin    ondansetron **OR** ondansetron    senna    sodium chloride    traMADol      RESULTS  Glucose   Date/Time Value Ref Range Status   07/01/2023 1618 274 (H) 70 - 130 mg/dL Final     Comment:     MAAME NOTIFIED RN Notified R and V Meter: [id number] : [id number] Roe Engel   07/01/2023 1136 119 70 - 130 mg/dL Final     Comment:     Meter: [id number] : [id number] Seth Bermudez RN   07/01/2023 0714 112 70 - 130 mg/dL Final     Comment:     Meter: [id number] : [id number] Roe BARRETO   06/30/2023 2009 210 (H) 70 - 130 mg/dL Final     Comment:     Meter: [id number] : [id number] Kwasi Bangura NA   06/30/2023 1559 177 (H) 70 - 130 mg/dL Final     Comment:     Meter: [id number] : [id number] Laura Moscosoa NA   06/30/2023 1110 142 (H) 70 - 130 mg/dL Final     Comment:     Meter: [id number] : [id number] Laura Moscosoa NA   06/30/2023 0707 146 (H) 70 - 130 mg/dL Final     Comment:     Meter: [id number] : [id number] Laura Moscosoa NA   06/29/2023 2042 161 (H) 70 - 130 mg/dL Final     Comment:     Meter: [id number] : [id number] Dillon BARRETO     Results from last 7 days   Lab Units 06/29/23  0705 06/28/23  0753 06/27/23  0710   WBC 10*3/mm3 8.23 8.52 7.07   HEMOGLOBIN g/dL 8.9* 9.5* 9.3*   HEMATOCRIT % 26.2* 28.3* 28.0*   PLATELETS 10*3/mm3 277 290 255       Results from last 7 days   Lab Units 06/30/23  0719 06/28/23  0753 06/27/23  0710   SODIUM mmol/L 140 140 138   POTASSIUM mmol/L 3.2* 3.8 3.1*   CHLORIDE mmol/L 98 101 98   CO2 mmol/L 29.6* 29.6* 29.0   BUN mg/dL 15 16 18   CREATININE mg/dL 1.31* 1.26 1.31*   CALCIUM mg/dL 9.2 9.5 8.9   BILIRUBIN mg/dL 0.5  --   --    ALK PHOS U/L 115  --   --    ALT (SGPT) U/L 11  --   --    AST (SGOT) U/L 9  --   --    GLUCOSE mg/dL 135* 127* 128*        Latest Reference  Range & Units 05/23/23 14:29   Hemoglobin A1C 4.80 - 5.60 % 9.20 (H)   Total Cholesterol 0 - 200 mg/dL 155   HDL Cholesterol 40 - 60 mg/dL 41   LDL Cholesterol  0 - 100 mg/dL 81   Triglycerides 0 - 150 mg/dL 197 (H)   (H): Data is abnormally high     Latest Reference Range & Units 05/31/23 08:03   25 Hydroxy, Vitamin D 30.0 - 100.0 ng/ml 20.0 (L)   (L): Data is abnormally low    Lower extremity venous duplex-June 19, 2023-Chronic right lower extremity superficial thrombophlebitis noted in the small saphenous.     ASSESSMENT and PLAN    Acute ischemic left MCA stroke    Status post CVA-Scattered  punctate restricted diffusion acute infarct in the bilateral frontal and   left parietal occipital cortices.        Impaired Cognition/impaired mobility/impaired self-care     Aphasia-resolved     Right hand apraxia-resolved     Encephalopathy-improving     Dysphagia/nutrition-healthy heart 2-3 g sodium, consistent carb, no mixed consistency, regular texture, nectar thick liquid  June 7-videofluoroscopic swallow study planned tomorrow  June 8-swallow study today-advance to regular solid, no mixed consistency, thin liquids by cup.  No straws.     Stroke prophylaxis-Eliquis/aspirin/atorvastatin     History of severe mitral regurgitation and annular dilatation, moderate to severe tricuspid regurgitation-  status post May 24, 2023 - 1. Mitral valve repair with a 28 mm physio II ring annuloplasty with residual mild to moderate MR  2.  Mitral valve replacement with a 29 mm Schwartz II porcine prosthesis  3.  Tricuspid valve repair with 28 mm physio tricuspid ring  4.  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, returned June 26-better on colchicine     Volume status-diuretics per Cardiology/nephrology  June 30-Per nephrology-[continue furosemide 80 mg IV twice a day, hope to  transition to oral diuretics during his admission   ]  July 1-changed to torsemide     Severe pulmonary hypertension  Obstructive sleep apnea     Interstitial lung disease status post COVID-19 infection-steroid tapered off per pulmonology June 7  Home inhaler regimen  Chronic steroid use with cushingoid syndrome-steroids now tapered off   June 23-change back to his home regimen Spiriva 2 puffs once a day and Symbicort 160/4.5 two puffs twice a day       Leukocytosis-reactive/steroid administration     Postoperative anemia     Tqctantjcpezdwpb-mpehsrtlcqv-jqykzlbg     Chronic kidney disease stage III-baseline creatinine 1.2  Renal insufficiency - Nephrology following        Diabetes mellitus type 2-Jardiance/Lantus/sliding scale insulin-uncontrolled  Off Jardiance secondary to renal function.  Adjusting short and long acting insulin.       BPH/urinary retention-history of TURP  June 14-void 300 cc with postvoid residual 205 cc.         Pain management-tramadol-/Tylenol Dk report reviewed              TEAM CONF - JUNE 8 - ENDURANCE POOR. BED MOD. TRANSFERS MOD. GAIT 25 FEET MOD ASSIST RW. SHOWER TRANSFERS MOD A.   BATH MOD ASSIST. LBD MAX. UBD MIN. TOILETING MAX.   Patient is currently on nectar liquids VFSS TODAY.   BIMS SUMMARY SCORE: 9 Moderately impaired   DIFFICULTY WITH SUSTAINED AND FOCAL ATTENTION.  CONTINENT / INCONTINENT  ELOS - 2 -3 WEEKS    TEAM CONF - Kari 15 - BED MOD. TRANSFERS MIN. 4 STAIRS MIN X 2. SATS 97% OR HIGHER WITH GAIT WITH FATIGUE, GAIT 80 FEET MIN ASSIST RW. TOILET TRANSFERS MIN. SHOWER TRANSFERS MIN MOD. BATH MIN. LBD MIN MOD. UBD MIN. GROOMING SET UP. TOILETING MIN MOD. DRESSING TAKES 30 MINUTES WIETH SLOW PROCESSING. EDEMA - JOBST STOCKINGS. MILD DYSPHGAIA, REG THIN BY CUP, NO STRAWS. CLQT MODERATE DEFICITS, DIFFICULTY WITH ATTENTION AND MEMORY, PROLONGED PROCESSING SPEED. BNE FOLLOWING. VARIABLE INTAKE - DIETITIAN FOLLOWING. RASH ON ABD AND LEFT CHEST, POSSIBLY CONTACT DERMATITIS.  TORSEMIDE ADDED.  ELOS - TWO WEEKS.    TEAM CONF - June 22- DIURETIC ADJUSTED BY NEPRHOLOGY. PATIENT DOES NOT FOLLOW 1500 CC FLUID RESTRICTION. TRANSFERS CTG. 4 STAIRS MIN X 2. GAIT 80 FEET CTG RW.   TOILET AND SHOWER TRANSFERS CTG. BATH MIN-CTG. LBD MIN-CTG FOR COMPRESSION STOCKINGS. UBD SBA. TOILETING CTG AS ASKS FOR HELP.   SWALLOW - REGULAR SOLID, THIN LIQUID BY CUP. MOSTLY CONTINENT.   Orientation: WNL  Attention: WNL to Mildly Impaired  Executive Functioning: Mildly Impaired  Abstract Reasoning: WNL  Arithmetic: Mildly Impaired  Visuospatial Perception: WNL  Visuospatial Praxis: WNL for Figure Copy; Severely Impaired Coding related to slowed processing speed.  Verbal Memory: Moderately to Severely Impaired  Visual Memory: Mildly Impaired  Emotional: Some frustration regarding current level of functioning, but minimal anxiety and depression symptoms reported on the GDS and BROOKE.  -demonstrating moderate to severe verbal memory deficits and slowed processing speed that are consistent with the location of his stroke.  Word-finding difficulties are also present, but are more evident conversationally than during testing.  ELOS - ONE WEEK- June 29       TEAM CONF - June 29 - TRANSFERS CTG. 4 STAIRS CTG . DID 8 ON Tuesday. AT HOME 14 STAIRS, LANDING, THEN 8 STAIRS TO SECOND FLOOR. GAIT  RW CTG SBA. TOILET TRANSFERS CTG SBA. SHOWER TRANSFERS CTG  BATH SBA CTG. LBD MIN. UBD SBA. GROOMING SET UP. TOILETING MIN CTG. EASILY DISTRACTED ON TASK. PROCESSING SPEED IMPROVING, BUT STILL SLOWED WHEN TIRED.   DRESSLER SYNDROME - TRAMADOL HELPED PAIN. ON COLCHICINE.   ELOS - WED (discharge was delayed given transfer off the unit for Dressler syndrome)       Goal is for home with outpatient cardiac rehab   therapies.  Barrier to discharge: Impaired cognition mobility self-care- work on follow, executive function, conditioning, transfers, progressive ambulation, ADLs to overcome.           Tu Silver MD,     During  rounds, used appropriate personal protective equipment including mask and gloves.  Additional gown if indicated.  Mask used was standard procedure mask. Appropriate PPE was worn during the entire visit.  Hand hygiene was completed before and after.      Patient has been staffed and discussed with attending Physician, Dr. Tu Silver.

## 2023-07-01 NOTE — THERAPY TREATMENT NOTE
Inpatient Rehabilitation - Speech Language Pathology Treatment Note    [facility]     Patient Name: Vernon Solorzano  : 1950  MRN: [id number]    Today's Date: 2023                   Admit Date: 2023       Visit Dx:      ICD-10-CM ICD-9-CM   1. Impaired functional mobility, balance, gait, and endurance  Z74.09 V49.89       Patient Active Problem List   Diagnosis    DM (diabetes mellitus), type 2    Mixed hyperlipidemia    Mild intermittent asthma without complication    New onset of congestive heart failure    Nonrheumatic mitral valve regurgitation    Left-sided chest pain    Essential hypertension    Mitral valve disease    Acute ischemic left MCA stroke    Atelectasis of both lungs    Pericardial effusion, acute    History of ischemic stroke    Obese    Physical debility    Chronic diastolic CHF (congestive heart failure)    Anemia    Stage 3a chronic kidney disease    Paroxysmal atrial flutter    H/O mitral valve replacement with tissue graft    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 AMARILIS MORGAN       Past Surgical History:   Procedure Laterality Date    CARDIAC CATHETERIZATION N/A 2023    Procedure: Right Heart Cath;  Surgeon: Corey Gaffney MD;  Location:  [facility] CATH INVASIVE LOC[facility];  Service: Cardiology;  Laterality: N/A;    CARDIAC CATHETERIZATION N/A 2023    Procedure: Left Heart Cath;  Surgeon: Corey Gaffney MD;  Location:  [facility];  Service: Cardiology;  Laterality: N/A;    CARDIAC CATHETERIZATION N/A 2023    Procedure: Left ventriculography;   Surgeon: Corey Gaffney MD;  Location: [facility] CATH INVASIVE LOCATION;  Service: Cardiology;  Laterality: N/A;    CARDIAC CATHETERIZATION N/A 5/11/2023    Procedure: Coronary angiography;  Surgeon: Corey Gaffney MD;  [facility];  Service: Cardiology;  Laterality: N/A;    COLONOSCOPY N/A     WNL PER PT, NO RECORDS,     COLONOSCOPY N/A 04/07/2009    DR. GORGE BENAVIDEZ AT [place]    MITRAL VALVE REPAIR/REPLACEMENT N/A 5/24/2023    Procedure: MITRAL VALVE REPAIR/REPLACEMENT TRICUSPID VALVE REPAIR/REPLACEMENT TRANSESOPHAGEAL ECHOCARDIOGRAM WITH ANESTHESIA;  Surgeon: George Frey MD;  Location: [facility] CVOR;  Service: Cardiothoracic;  Laterality: N/A;    PROSTATE SURGERY N/A 11/12/2010    TURP, DR. COREY COLLAZO AT [facility]    SKIN TAG REMOVAL N/A 12/20/2017    ANAL SKIN TAG X2, PERFORMED IN OFFICE, DR. LISA ORANTES    TURP / TRANSURETHRAL INCISION / DRAINAGE PROSTATE  2010    Dr. Goodrich       SLP Recommendation and Plan                                                            SLP EVALUATION (last 72 hours)       SLP SLC Evaluation       Row Name 06/30/23 1400 06/30/23 0830 06/29/23 1330 06/29/23 1200 06/28/23 1330       Communication Assessment/Intervention    Document Type therapy note (daily note)  -SR therapy note (daily note)  -SR therapy note (daily note)  -SR therapy note (daily note)  -SR therapy note (daily note)  -AL    Subjective Information no complaints  -SR no complaints  -SR no complaints  -SR no complaints  -SR --    Patient Observations alert;cooperative;agree to therapy  -SR alert;cooperative;agree to therapy  -SR alert;cooperative;agree to therapy  -SR alert;cooperative;agree to therapy  -SR --    Patient/Family/Caregiver Comments/Observations -- -- -- -- Pt pleasant and cooperative.  -AL    Patient Effort good  -SR good  -SR good  -SR good  -SR --    Symptoms Noted During/After Treatment none  -SR none  -SR none  -SR none  -SR --       Pain Scale: Numbers  Pre/Post-Treatment    Pretreatment Pain Rating 0/10 - no pain  -SR 0/10 - no pain  -SR 0/10 - no pain  -SR 0/10 - no pain  -SR 0/10 - no pain  -AL    Posttreatment Pain Rating 0/10 - no pain  -SR 0/10 - no pain  -SR 0/10 - no pain  -SR 0/10 - no pain  -SR --              User Key  (r) = Recorded By, (t) = Taken By, (c) = Cosigned By      Initials Name Effective Dates    Charmaine Carreon, MS CCC-SLP 06/16/21 -     SR Arturo Latisha CCC-SLP 11/10/22 -                        EDUCATION    The patient has been educated in the following areas:       Cognitive Impairment.             SLP GOALS       Row Name 07/01/23 0900 06/30/23 1400 06/30/23 0830       Attention Goal 1 (SLP)    Improve Attention by Goal 1 (SLP) -- complete selective attention task;complete sustained attention task;80%;with minimal cues (75-90%)  -SR complete selective attention task;complete sustained attention task;80%;with minimal cues (75-90%)  -SR    Time Frame (Attention Goal 1, SLP) -- by discharge  -SR by discharge  -SR    Progress/Outcomes (Attention Goal 1, SLP) -- continuing progress toward goal  -SR continuing progress toward goal  -SR    Comment (Attention Goal 1, SLP) Sustaned attention targeted with menta flexibility tas on the Ipad. Pt was asked to quickly determine which category a given image belonged . Pt was givenb a field of four words at the bottom of the screen to choose from.  Pt achieved 73% accuracy improving to 92% accuracy when the task was repeated.  -LS Sustained/selective attention targeted with written directions task. Patient followed 2 step directions given MOD verbal/visual cues. Prolonged response time noted. Activity incomplete due to time constraint. Patient completed 3/10 stimulus items.  -SR Increased difficulty with sustained attention during AM session. Required verbal cues to redirect to therapy tasks. Patient distracted by the events from the morning.  -SR       Memory Skills Goal 1 (SLP)    Improve  Memory Skills Through Goal 1 (SLP) -- -- use memory strategies;listen to a paragraph and answer questions;recall details of the day;80%;with moderate cues (50-74%)  -SR    Time Frame (Memory Skills Goal 1, SLP) -- -- by discharge  -SR    Progress/Outcomes (Memory Skills Goal 1, SLP) -- -- goal ongoing  -SR    Comment (Memory Skills Goal 1, SLP) Memory was targeted by the use of a memory and mental manipulation task.  SLP read pt 3 words. Pt was asked to repeat the words backwards.  Pt took a very long time to respons.  SLP then repeated the words and asked the pt to imitate then repeat in the reverse order.  Pt achieved 30% accuracy. Pt was not able to manipulate the information to reverse the order.  -LS -- Memory and mental manipulation; patient listened to 3 related words and repeated the list back in sequential order with 80% acc given MOD cues. Accuracy increased with written cues and repetition. Slow response time noted. Reviewed memory strategies with patient. He reported that he uses a schedule book to keep track of therapy appointments/important events. Encouraged patient to utilize writing information down as a memory strategy.  -SR       Executive Functional Skills Goal 1 (SLP)    Improve Executive Function Skills Goal 1 (SLP) -- demonstrate awareness of deficit;home management activity;80%;with minimal cues (75-90%)  -SR --    Time Frame (Executive Function Skills Goal 1, SLP) -- by discharge  -SR --    Progress/Outcomes (Executive Function Skills Goal 1, SLP) -- good progress toward goal  -SR --    Comment (Executive Function Skills Goal 1, SLP) SLP  and pt discussed the  role memory plays in his every day life.  Pt stated that he only notices that he is having memory probllems when he is in speech therapy.  -LS Safety/judgement scenerios discussed during session. Reviewed current goals and plans for home. Patient eager to drive and work towards his independence. Discussed how cognitive goals (ie,  judgement, decision making, attention, memory) relate toward goal of driving. Patient recognized the complexity of the task and how he requires more help. Ongoing education/discussion recommended. During a following activity, patient provided solution to problem-based scenerio with 80% acc given MIN cues.  -SR --      Row Name 06/29/23 1330 06/29/23 1200 06/28/23 1330       Attention Goal 1 (SLP)    Improve Attention by Goal 1 (SLP) complete selective attention task;complete sustained attention task;80%;with minimal cues (75-90%)  -SR complete selective attention task;complete sustained attention task;80%;with minimal cues (75-90%)  -SR --    Time Frame (Attention Goal 1, SLP) by discharge  -SR by discharge  -SR --    Progress/Outcomes (Attention Goal 1, SLP) continuing progress toward goal  -SR continuing progress toward goal  -SR --    Comment (Attention Goal 1, SLP) Visual scanning/visual reasoning activity completed during PM therapy session. Patient labeled visual pictures in alphabetical order using connecting line with 50% acc given MIN cues; 80% acc given MOD cues. Extended time provided to complete activity due to slow processing.  -SR Demonstrated increased difficulty with sustained attention during therapy session. Required entire 30 minutes to complete one therapy task.  -SR --       Memory Skills Goal 1 (SLP)    Improve Memory Skills Through Goal 1 (SLP) use memory strategies;listen to a paragraph and answer questions;recall details of the day;80%;with moderate cues (50-74%)  -SR -- --    Time Frame (Memory Skills Goal 1, SLP) by discharge  -SR -- --    Progress/Outcomes (Memory Skills Goal 1, SLP) goal ongoing  -SR -- --    Comment (Memory Skills Goal 1, SLP) Short-term memory; Patient listened to audio recording of voicemail message and answered questions about the content with 50% acc given NO cues; 80% acc given MIN cues.  -SR -- --       Reasoning Goal 1 (SLP)    Improve Reasoning Through Goal 1  (SLP) -- complete deductive reasoning task;complete basic reasoning task;complete mental flexibility task;80%;with moderate cues (50-74%)  -SR complete deductive reasoning task;complete basic reasoning task;complete mental flexibility task;80%;with moderate cues (50-74%)  -AL    Time Frame (Reasoning Goal 1, SLP) -- by discharge  -SR by discharge  -AL    Progress/Outcomes (Reasoning Goal 1, SLP) -- good progress toward goal  -SR --    Comment (Reasoning Goal 1, SLP) -- Attempted to complete acrostic word puzzle from yesterday's session. Patient required MOD cues to transfer target letter and its associated number to the chart to determine the target phrase. Patient completed 4 stimulus items given MOD cues. Task incomplete due to time constraint.  -SR Acrostics task: Word deduction- 50% with NO cues, 100% with MIN-MOD cues. Required MAX cues to copy letters to correct boxes.  -AL       Executive Functional Skills Goal 1 (SLP)    Improve Executive Function Skills Goal 1 (SLP) -- -- demonstrate awareness of deficit;home management activity;80%;with minimal cues (75-90%)  -AL    Time Frame (Executive Function Skills Goal 1, SLP) -- -- by discharge  -AL    Progress/Outcomes (Executive Function Skills Goal 1, SLP) -- -- good progress toward goal  -AL    Comment (Executive Function Skills Goal 1, SLP) -- -- Completed medicine management task: overall 7/9 with NO cues, 9/9 with MIN cues.  -AL              User Key  (r) = Recorded By, (t) = Taken By, (c) = Cosigned By      Initials Name Provider Type     Jacinta Goodwin, MS CCC-SLP Speech and Language Pathologist    Charmaine Carreon, MS CCC-SLP Speech and Language Pathologist    Latisha Haas CCC-SLP Speech and Language Pathologist                                Time Calculation:        Time Calculation- SLP       Row Name 07/01/23 6694             Time Calculation- [facility]    SLP Start Time 0900  -      SLP Stop Time 1000  -LS      SLP Time Calculation (min) 60  min  -LS                User Key  (r) = Recorded By, (t) = Taken By, (c) = Cosigned By      Initials Name Provider Type    Jacinta Flower MS CCC-SLP Speech and Language Pathologist                                           Jacinta Goodwin MS CCC-SLP  7/1/2023

## 2023-07-01 NOTE — PROGRESS NOTES
Came by, not in room.    IV came out, nurse states going to talk with renal who are managing diuretics to look to move to oral diuretics as was the plan yesterday.    Informed no current cardiac complaints.    Will check back tomorrow.

## 2023-07-01 NOTE — THERAPY TREATMENT NOTE
Inpatient Rehabilitation - Occupational Therapy Treatment Note    [facility]     Patient Name: Vernon Solorzano  : 1950  MRN: [id number]    Today's Date: 2023                 Admit Date: 2023         ICD-10-CM ICD-9-CM   1. Impaired functional mobility, balance, gait, and endurance  Z74.09 V49.89       Patient Active Problem List   Diagnosis    DM (diabetes mellitus), type 2    Mixed hyperlipidemia    Mild intermittent asthma without complication    New onset of congestive heart failure    Nonrheumatic mitral valve regurgitation    Left-sided chest pain    Essential hypertension    Mitral valve disease    Acute ischemic left MCA stroke    Atelectasis of both lungs    Pericardial effusion, acute    History of ischemic stroke    Obese    Physical debility    Chronic diastolic CHF (congestive heart failure)    Anemia    Stage 3a chronic kidney disease    Paroxysmal atrial flutter    H/O mitral valve replacement with tissue graft    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 AMARILIS MORGAN       Past Surgical History:   Procedure Laterality Date    CARDIAC CATHETERIZATION N/A 2023    Procedure: Right Heart Cath;  Surgeon: Corey Gaffney MD;  Location:  [facility];  Service: Cardiology;  Laterality: N/A;    CARDIAC CATHETERIZATION N/A 2023    Procedure: Left Heart Cath;  Surgeon: Corey Gaffney MD;  Location:  [facility];  Service: Cardiology;  Laterality: N/A;    CARDIAC CATHETERIZATION N/A 2023    Procedure: Left ventriculography;  Surgeon: Gulshan  MD Corey;  Location: [facility] CATH INVASIVE LOCATION;  Service: Cardiology;  Laterality: N/A;    CARDIAC CATHETERIZATION N/A 5/11/2023    Procedure: Coronary angiography;  Surgeon: Corey Gaffney MD;  Location: [facility] CATH INVASIVE LOCATION;  Service: Cardiology;  Laterality: N/A;    COLONOSCOPY N/A     WNL PER PT, NO RECORDS,     COLONOSCOPY N/A 04/07/2009    DR. GORGE BENAVIDEZ AT [facility]    MITRAL VALVE REPAIR/REPLACEMENT N/A 5/24/2023    Procedure: MITRAL VALVE REPAIR/REPLACEMENT TRICUSPID VALVE REPAIR/REPLACEMENT TRANSESOPHAGEAL ECHOCARDIOGRAM WITH ANESTHESIA;  Surgeon: George Frey MD;  Location: [facility] CVOR;  Service: Cardiothoracic;  Laterality: N/A;    PROSTATE SURGERY N/A 11/12/2010    TURP, DR. COREY COLLAZO AT [facility]    SKIN TAG REMOVAL N/A 12/20/2017    ANAL SKIN TAG X2, PERFORMED IN OFFICE, DR. LISA ORANTES    TURP / TRANSURETHRAL INCISION / DRAINAGE PROSTATE  2010    Dr. Goodrich             IRF OT ASSESSMENT FLOWSHEET (last 12 hours)       IRF OT Evaluation and Treatment       Row Name 07/01/23 1220          OT Time and Intention    Document Type daily treatment  -CC     Mode of Treatment occupational therapy;individual therapy  -CC     Patient Effort good  -CC     Symptoms Noted During/After Treatment shortness of breath  -CC       Row Name 07/01/23 1220          Pain Assessment    Pretreatment Pain Rating 5/10  -CC     Posttreatment Pain Rating 5/10  -CC     Pain Location lower  -CC     Pain Location - back  -CC       Row Name 07/01/23 1220          Cognition/Psychosocial    Affect/Mental Status (Cognition) WNL  -CC     Orientation Status (Cognition) oriented x 3  -CC     Follows Commands (Cognition) follows one-step commands;over 90% accuracy;repetition of directions required  -CC     Personal Safety Interventions fall prevention program maintained;gait belt;nonskid shoes/slippers when out of bed  -CC       Row Name 07/01/23 1220          Bathing    [place] Level (Bathing)  bathing skills;lower body;upper body;supervision  -     Assistive Device (Bathing) grab bar/tub rail;hand held shower spray hose  -     Position (Bathing) supported standing  -     Set-up Assistance (Bathing) adjust water temperature;obtain supplies  -       Row Name 07/01/23 1220          Upper Body Dressing    [place] Level (Upper Body Dressing) upper body dressing skills;doff;don;pull over garment;set up assistance  -     Position (Upper Body Dressing) supported sitting;supported standing  -     Set-up Assistance (Upper Body Dressing) obtain clothing  -       Row Name 07/01/23 1220          Lower Body Dressing    [place] Level (Lower Body Dressing) doff;don;pants/bottoms;socks;underwear;moderate assist (50% patient effort)  -     Position (Lower Body Dressing) supported sitting;supported standing  -     Set-up Assistance (Lower Body Dressing) anti-embolic stockings  -       Row Name 07/01/23 1220          Grooming    [place] Level (Grooming) grooming skills;deodorant application;hair care, combing/brushing;oral care regimen;set up  -     Position (Grooming) sink side;supported sitting  -     Set-up Assistance (Grooming) obtain supplies  -       Row Name 07/01/23 1220          Bed Mobility    Comment, (Bed Mobility) in w.c  -       Row Name 07/01/23 1220          Functional Mobility    Functional Mobility- Ind. Level supervision required  -     Functional Mobility- Device walker, front-wheeled  -     Functional Mobility-Distance (Feet) --  to BR  -       Row Name 07/01/23 1220          Sit-Stand Transfer    Sit-Stand [place] (Transfers) standby assist  -     Assistive Device (Sit-Stand Transfers) wheelchair;walker, front-wheeled  -       Row Name 07/01/23 1220          Stand-Sit Transfer    Stand-Sit [place] (Transfers) standby assist  -     Assistive Device (Stand-Sit Transfers) walker, front-wheeled;wheelchair  -       Row Name 07/01/23 1220           Shower Transfer    Type (Shower Transfer) stand-sit;sit-stand  -CC     [place] Level (Shower Transfer) stand by assist  -CC     Assistive Device (Shower Transfer) walker, front-wheeled;grab bar, tub/shower  -CC       Row Name 07/01/23 1220          Balance    Static Sitting Balance independent  -CC     Static Standing Balance supervision  -       Row Name 07/01/23 1220          Positioning and Restraints    Pre-Treatment Position sitting in chair/recliner  -CC     Post Treatment Position wheelchair  -CC     In Wheelchair sitting;exit alarm on;with nsg  -CC               User Key  (r) = Recorded By, (t) = Taken By, (c) = Cosigned By      Initials Name Effective Dates    CC Kathrine Pinedo, OTR 06/16/21 -                      Occupational Therapy Education       Title: PT OT SLP Therapies (In Progress)       Topic: Occupational Therapy (In Progress)       Point: ADL training (In Progress)       Description:   Instruct learner(s) on proper safety adaptation and remediation techniques during self care or transfers.   Instruct in proper use of assistive devices.                  Learning Progress Summary             Patient Acceptance, E, VU by WN at 6/29/2023 2344    Acceptance, E, NR by AF at 6/13/2023 1527    Comment: benefits of therapy, endurance   Family Acceptance, E, NR by AF at 6/13/2023 1527    Comment: benefits of therapy, endurance                         Point: Home exercise program (In Progress)       Description:   Instruct learner(s) on appropriate technique for monitoring, assisting and/or progressing therapeutic exercises/activities.                  Learning Progress Summary             Patient Acceptance, E, VU by WN at 6/29/2023 2344    Acceptance, E, NR by AF at 6/13/2023 1527    Comment: benefits of therapy, endurance   Family Acceptance, E, NR by AF at 6/13/2023 1527    Comment: benefits of therapy, endurance                         Point: Precautions (In Progress)       Description:    Instruct learner(s) on prescribed precautions during self-care and functional transfers.                  Learning Progress Summary             Patient Acceptance, E, VU by WN at 6/29/2023 2344    Acceptance, E, NR by AF at 6/13/2023 1527    Comment: benefits of therapy, endurance   Family Acceptance, E, NR by AF at 6/13/2023 1527    Comment: benefits of therapy, endurance                         Point: Body mechanics (In Progress)       Description:   Instruct learner(s) on proper positioning and spine alignment during self-care, functional mobility activities and/or exercises.                  Learning Progress Summary             Patient Acceptance, E, VU by WN at 6/29/2023 2344    Acceptance, E, NR by AF at 6/13/2023 1527    Comment: benefits of therapy, endurance   Family Acceptance, E, NR by AF at 6/13/2023 1527    Comment: benefits of therapy, endurance                                         User Key       Initials Effective Dates Name Provider Type Discipline     06/16/21 -  Candy Davis OTR Occupational Therapist OT     08/23/22 -  Delvin Jansen RN Registered Nurse Nurse                        OT Recommendation and Plan                         Time Calculation:      Time Calculation- OT       Row Name 07/01/23 1100             Time Calculation- OT    OT Start Time 1100  -CC      OT Stop Time 1130  -CC      OT Time Calculation (min) 30 min  -CC                User Key  (r) = Recorded By, (t) = Taken By, (c) = Cosigned By      Initials Name Provider Type    CC Kathrine Pinedo OTR Occupational Therapist                  Therapy Charges for Today       Code Description Service Date Service Provider Modifiers Qty    46565431544 HC OT SELF CARE/MGMT/TRAIN EA 15 MIN 6/30/2023 Kathrine Pinedo OTR GO 2    [id number] HC OT SELF CARE/MGMT/TRAIN EA 15 MIN 7/1/2023 Kathrine Pinedo OTR GO 2                     HI Morgan  7/1/2023

## 2023-07-01 NOTE — PROGRESS NOTES
[facility] Progress Note      Patient Name: Vernon Solorzano  : 1950  MRN: [id number]  Primary Care Physician:  Liza Oconnell III, RACHAEL  Date of admission: 2023    Subjective     Interval History:   Follow up CKD III.   Overnight no event    Improving dyspnea and lower extremity edema  Denies any chest pain or palpitations  Continues to feel tired and fatigue and is working with PT and OT        Review of Systems:   As noted above    Objective     Vitals:   Temp:  [96.7 °F (35.9 °C)-97.1 °F (36.2 °C)] 96.7 °F (35.9 °C)  Heart Rate:  [90-99] 98  Resp:  [18] 18  BP: (107-119)/(72-79) 119/79    Intake/Output Summary (Last 24 hours) at 2023 1359  Last data filed at 2023 1208  Gross per 24 hour   Intake 840 ml   Output 945 ml   Net -105 ml         Physical Exam:    General Appearance: Patient is alert, oriented.  Ill looking.  Questions skin: warm and dry  HEENT: oral mucosa normal, nonicteric sclera  Neck: supple, no JVD  Lungs: Clear to auscultation.   Heart: RRR, normal S1 and S2  Abdomen: soft, nontender, + bs, distended.  Extremities 1-2+ lower ext edema.  Erythematous rash noted on the anterior aspect of right leg    Scheduled Meds:     aspirin, 81 mg, Oral, Daily  atorvastatin, 40 mg, Oral, Nightly  budesonide-formoterol, 2 puff, Inhalation, BID - RT  calcium 500 mg vitamin D 5 mcg (200 UT), 1 tablet, Oral, Daily  colchicine, 0.6 mg, Oral, Daily  guaifenesin, 400 mg, Oral, TID  insulin glargine, 20 Units, Subcutaneous, Nightly  insulin glargine, 20 Units, Subcutaneous, Daily  insulin lispro, 3-14 Units, Subcutaneous, 4x Daily AC & at Bedtime  insulin lispro, 4 Units, Subcutaneous, TID With Meals  magnesium oxide, 400 mg, Oral, Daily  melatonin, 3 mg, Oral, Nightly  metoprolol succinate XL, 25 mg, Oral, Q24H  pantoprazole, 40 mg, Oral, Q AM  sodium chloride, 3 mL, Intravenous, Q12H  tiotropium bromide monohydrate, 2 puff, Inhalation, Daily -  RT  torsemide, 100 mg, Oral, Daily      IV Meds:        Results Reviewed:   I have personally reviewed the results from the time of this admission to 7/1/2023 13:59 EDT     Results from last 7 days   Lab Units 06/30/23  0719 06/28/23  0753 06/27/23  0710   SODIUM mmol/L 140 140 138   POTASSIUM mmol/L 3.2* 3.8 3.1*   CHLORIDE mmol/L 98 101 98   CO2 mmol/L 29.6* 29.6* 29.0   BUN mg/dL 15 16 18   CREATININE mg/dL 1.31* 1.26 1.31*   CALCIUM mg/dL 9.2 9.5 8.9   BILIRUBIN mg/dL 0.5  --   --    ALK PHOS U/L 115  --   --    ALT (SGPT) U/L 11  --   --    AST (SGOT) U/L 9  --   --    GLUCOSE mg/dL 135* 127* 128*         Estimated Creatinine Clearance: 64.9 mL/min (A) (by C-G formula based on SCr of 1.31 mg/dL (H)).    Results from last 7 days   Lab Units 07/01/23  0630 06/30/23  0719 06/28/23  0753   MAGNESIUM mg/dL 2.2 2.0 2.1   PHOSPHORUS mg/dL  --  4.0 3.3               Results from last 7 days   Lab Units 06/29/23  0705 06/28/23  0753 06/27/23  0710 06/26/23  0322 06/25/23  0739   WBC 10*3/mm3 8.23 8.52 7.07 9.69 11.08*   HEMOGLOBIN g/dL 8.9* 9.5* 9.3* 9.9* 10.3*   PLATELETS 10*3/mm3 277 290 255 246 259               Assessment / Plan     ASSESSMENT:    1.  Acute kidney injury on top of CKD III, baseline creatinine 1.2.  Peak 1.68.  Creatinine  stable around 1.3  , most recent down to 1.2-1.3  2. MR now sp MV replacement, tissue and TV repair, SU closure 5/24/23.  3. Chronic steroid use after COVID 19 PNA.  4. Anemia of CKD and postoperative anemia.  Hemoglobin is 8.9.  No evidence of active bleeding  5. 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  Working with physical occupational therapy  6. diabetes mellitus type 2. On diabetic diet and insulin regimen   7. Aflutter.Cardioversion 6/2/23. On chronic anticoagulation eliquis and rate controlled  metoprolol.   8.  Hypokalemia on KCL replacement protocol, volume diuretics  9.  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

## 2023-07-01 NOTE — THERAPY TREATMENT NOTE
Inpatient Rehabilitation - Occupational Therapy Treatment Note    [facility]     Patient Name: Vernon Solorzano  : 1950  MRN: [id number]    Today's Date: 2023                 Admit Date: 2023         ICD-10-CM ICD-9-CM   1. Impaired functional mobility, balance, gait, and endurance  Z74.09 V49.89       Patient Active Problem List   Diagnosis    DM (diabetes mellitus), type 2    Mixed hyperlipidemia    Mild intermittent asthma without complication    New onset of congestive heart failure    Nonrheumatic mitral valve regurgitation    Left-sided chest pain    Essential hypertension    Mitral valve disease    Acute ischemic left MCA stroke    Atelectasis of both lungs    Pericardial effusion, acute    History of ischemic stroke    Obese    Physical debility    Chronic diastolic CHF (congestive heart failure)    Anemia    Stage 3a chronic kidney disease    Paroxysmal atrial flutter    H/O mitral valve replacement with tissue graft    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 AMARILIS MORGAN       Past Surgical History:   Procedure Laterality Date    CARDIAC CATHETERIZATION N/A 2023    Procedure: Right Heart Cath;  Surgeon: Corey Gaffney MD;  Location:  [facility] CATH [facility]VASIVE [facility];  Service: Cardiology;  Laterality: N/A;    CARDIAC CATHETERIZATION N/A 2023    Procedure: Left Heart Cath;  Surgeon: Corey aGffney MD;  Location:  [facility]ATH [facility]IVE [facility];  Service: Cardiology;  Laterality: N/A;    CARDIAC CATHETERIZATION N/A 2023    Procedure: Left ventriculography;  Surgeon: Gulshan  MD Corey;  Location: [facility] CATH INVASIVE LOCATION;  Service: Cardiology;  Laterality: N/A;    CARDIAC CATHETERIZATION N/A 5/11/2023    Procedure: Coronary angiography;  Surgeon: Corey Gaffney MD;  Location: [facility] CATH INVASIVE LOCATION;  Service: Cardiology;  Laterality: N/A;    COLONOSCOPY N/A     WNL PER PT, NO RECORDS,     COLONOSCOPY N/A 04/07/2009    DR. GORGE BENAVIDEZ AT [facility]    MITRAL VALVE REPAIR/REPLACEMENT N/A 5/24/2023    Procedure: MITRAL VALVE REPAIR/REPLACEMENT TRICUSPID VALVE REPAIR/REPLACEMENT TRANSESOPHAGEAL ECHOCARDIOGRAM WITH ANESTHESIA;  Surgeon: George Frey MD;  Location: [facility] CVOR;  Service: Cardiothoracic;  Laterality: N/A;    PROSTATE SURGERY N/A 11/12/2010    TURP, DR. COREY COLLAZO AT [facility]    SKIN TAG REMOVAL N/A 12/20/2017    ANAL SKIN TAG X2, PERFORMED IN OFFICE, DR. LISA ORANTES    TURP / TRANSURETHRAL INCISION / DRAINAGE PROSTATE  2010    Dr. Goodrich             IRF OT ASSESSMENT FLOWSHEET (last 12 hours)       IRF OT Evaluation and Treatment       Row Name 07/01/23 1431 07/01/23 1220       OT Time and Intention    Document Type daily treatment  -CC daily treatment  -CC    Mode of Treatment occupational therapy;individual therapy  -CC occupational therapy;individual therapy  -CC    Patient Effort good  -CC good  -CC    Symptoms Noted During/After Treatment none  -CC shortness of breath  -CC      Row Name 07/01/23 1431 07/01/23 1220       Pain Assessment    Pretreatment Pain Rating 0/10 - no pain  -CC 5/10  -CC    Posttreatment Pain Rating 0/10 - no pain  -CC 5/10  -CC    Pain Location -- lower  -CC    Pain Location - -- back  -CC      Row Name 07/01/23 1431 07/01/23 1220       Cognition/Psychosocial    Affect/Mental Status (Cognition) WFL  -CC WNL  -CC    Orientation Status (Cognition) oriented x 3  -CC oriented x 3  -CC    Follows Commands (Cognition) follows one-step commands;over 90% accuracy;repetition of directions required  -CC follows one-step  commands;over 90% accuracy;repetition of directions required  -    Personal Safety Interventions fall prevention program maintained;gait belt  - fall prevention program maintained;gait belt;nonskid shoes/slippers when out of bed  -      Row Name 07/01/23 1220          Bathing    [place] Level (Bathing) bathing skills;lower body;upper body;supervision  -     Assistive Device (Bathing) grab bar/tub rail;hand held shower spray hose  -     Position (Bathing) supported standing  -     Set-up Assistance (Bathing) adjust water temperature;obtain supplies  -       Row Name 07/01/23 1220          Upper Body Dressing    [place] Level (Upper Body Dressing) upper body dressing skills;doff;don;pull over garment;set up assistance  -     Position (Upper Body Dressing) supported sitting;supported standing  -     Set-up Assistance (Upper Body Dressing) obtain clothing  -       Row Name 07/01/23 1220          Lower Body Dressing    [place] Level (Lower Body Dressing) doff;don;pants/bottoms;socks;underwear;moderate assist (50% patient effort)  -     Position (Lower Body Dressing) supported sitting;supported standing  -     Set-up Assistance (Lower Body Dressing) anti-embolic stockings  -       Row Name 07/01/23 1220          Grooming    [place] Level (Grooming) grooming skills;deodorant application;hair care, combing/brushing;oral care regimen;set up  -     Position (Grooming) sink side;supported sitting  -     Set-up Assistance (Grooming) obtain supplies  [facility]       Row Name 07/01/23 1220          Bed Mobility    Comment, (Bed Mobility) in w.c  -       Row Name 07/01/23 1220          Functional Mobility    Functional Mobility- Ind. Level supervision required  -     Functional Mobility- Device walker, front-wheeled  -     Functional Mobility-Distance (Feet) --  to BR  -       Row Name 07/01/23 1220          Sit-Stand Transfer    Sit-Stand [place] (Transfers) standby assist   -     Assistive Device (Sit-Stand Transfers) wheelchair;walker, front-wheeled  -       Row Name 07/01/23 1220          Stand-Sit Transfer    Stand-Sit [place] (Transfers) standby assist  -     Assistive Device (Stand-Sit Transfers) walker, front-wheeled;wheelchair  -       Row Name 07/01/23 1220          Shower Transfer    Type (Shower Transfer) stand-sit;sit-stand  -     [place] Level (Shower Transfer) stand by assist  -     Assistive Device (Shower Transfer) walker, front-wheeled;grab bar, tub/shower  -       Row Name 07/01/23 1431          Shoulder (Therapeutic Exercise)    Shoulder (Therapeutic Exercise) strengthening exercise  -     Shoulder Strengthening (Therapeutic Exercise) flexion;extension;sitting;2 lb free weight;15 repititions;2 sets  -       Row Name 07/01/23 1431          Elbow/Forearm (Therapeutic Exercise)    Elbow/Forearm (Therapeutic Exercise) strengthening exercise  -     Elbow/Forearm Strengthening (Therapeutic Exercise) bilateral;flexion;extension;supination;pronation;sitting;3 lb free weight;15 repititions;3 sets  -       Row Name 07/01/23 1431          Wrist (Therapeutic Exercise)    Wrist (Therapeutic Exercise) strengthening exercise  -     Wrist Strengthening (Therapeutic Exercise) bilateral;flexion;extension;3 lb free weight;15 repititions;2 sets  -       Row Name 07/01/23 1431          Hand (Therapeutic Exercise)    Hand (Therapeutic Exercise) strengthening exercise  -     Hand Strengthening (Therapeutic Exercise) bilateral; strengthening;hand gripper;20 repititions;2 sets  -       Row Name 07/01/23 1220          Balance    Static Sitting Balance independent  -     Static Standing Balance supervision  -       Row Name 07/01/23 1431 07/01/23 1220       Positioning and Restraints    Pre-Treatment Position sitting in chair/recliner  - sitting in chair/recliner  -    Post Treatment Position other  - wheelchair  -    In Wheelchair --  sitting;exit alarm on;with nsg  -CC    Other Position return to room with caregiver  spouse  -CC --              User Key  (r) = Recorded By, (t) = Taken By, (c) = Cosigned By      Initials Name Effective Dates    CC Kathrine Pinedo, OTR 06/16/21 -                      Occupational Therapy Education       Title: PT OT SLP Therapies (In Progress)       Topic: Occupational Therapy (In Progress)       Point: ADL training (In Progress)       Description:   Instruct learner(s) on proper safety adaptation and remediation techniques during self care or transfers.   Instruct in proper use of assistive devices.                  Learning Progress Summary             Patient Acceptance, E, VU by WN at 6/29/2023 2344    Acceptance, E, NR by AF at 6/13/2023 1527    Comment: benefits of therapy, endurance   Family Acceptance, E, NR by AF at 6/13/2023 1527    Comment: benefits of therapy, endurance                         Point: Home exercise program (In Progress)       Description:   Instruct learner(s) on appropriate technique for monitoring, assisting and/or progressing therapeutic exercises/activities.                  Learning Progress Summary             Patient Acceptance, E, VU by WN at 6/29/2023 2344    Acceptance, E, NR by AF at 6/13/2023 1527    Comment: benefits of therapy, endurance   Family Acceptance, E, NR by AF at 6/13/2023 1527    Comment: benefits of therapy, endurance                         Point: Precautions (In Progress)       Description:   Instruct learner(s) on prescribed precautions during self-care and functional transfers.                  Learning Progress Summary             Patient Acceptance, E, VU by WN at 6/29/2023 2344    Acceptance, E, NR by AF at 6/13/2023 1527    Comment: benefits of therapy, endurance   Family Acceptance, E, NR by AF at 6/13/2023 1527    Comment: benefits of therapy, endurance                         Point: Body mechanics (In Progress)       Description:   Instruct  learner(s) on proper positioning and spine alignment during self-care, functional mobility activities and/or exercises.                  Learning Progress Summary             Patient Acceptance, E, VU by WN at 6/29/2023 2344    Acceptance, E, NR by AF at 6/13/2023 1527    Comment: benefits of therapy, endurance   Family Acceptance, E, NR by AF at 6/13/2023 1527    Comment: benefits of therapy, endurance                                         User Key       Initials Effective Dates Name Provider Type Discipline    AF 06/16/21 -  Candy Davis OTSIMONA Occupational Therapist OT    WN 08/23/22 -  Delvin Jansen, RN Registered Nurse Nurse                        OT Recommendation and Plan                         Time Calculation:      Time Calculation- OT       Row Name 07/01/23 1400 07/01/23 1100          Time Calculation- OT    OT Start Time 1400  -CC 1100  -CC     OT Stop Time 1430  -CC 1130  -CC     OT Time Calculation (min) 30 min  -CC 30 min  -CC               User Key  (r) = Recorded By, (t) = Taken By, (c) = Cosigned By      Initials Name Provider Type    CC Kathrine Pinedo OTR Occupational Therapist                  Therapy Charges for Today       Code Description Service Date Service Provider Modifiers Qty    21754954594 HC OT SELF CARE/MGMT/TRAIN EA 15 MIN 6/30/2023 Kathrine Pinedo OTR GO 2    57764570666 HC OT SELF CARE/MGMT/TRAIN EA 15 MIN 7/1/2023 Kathrine Pinedo OTR GO 2    22979586327 HC OT THER PROC EA 15 MIN 7/1/2023 Kathrine Pinedo OTR GO 2                     HI Morgan  7/1/2023

## 2023-07-02 LAB
ANION GAP SERPL CALCULATED.3IONS-SCNC: 12 MMOL/L (ref 5–15)
BUN SERPL-MCNC: 13 MG/DL (ref 8–23)
BUN/CREAT SERPL: 9.6 (ref 7–25)
CALCIUM SPEC-SCNC: 8.6 MG/DL (ref 8.6–10.5)
CHLORIDE SERPL-SCNC: 101 MMOL/L (ref 98–107)
CO2 SERPL-SCNC: 29 MMOL/L (ref 22–29)
CREAT SERPL-MCNC: 1.35 MG/DL (ref 0.76–1.27)
EGFRCR SERPLBLD CKD-EPI 2021: 55.1 ML/MIN/1.73
GLUCOSE BLDC GLUCOMTR-MCNC: 120 MG/DL (ref 70–130)
GLUCOSE BLDC GLUCOMTR-MCNC: 138 MG/DL (ref 70–130)
GLUCOSE BLDC GLUCOMTR-MCNC: 293 MG/DL (ref 70–130)
GLUCOSE BLDC GLUCOMTR-MCNC: 320 MG/DL (ref 70–130)
GLUCOSE SERPL-MCNC: 113 MG/DL (ref 65–99)
POTASSIUM SERPL-SCNC: 3.1 MMOL/L (ref 3.5–5.2)
SODIUM SERPL-SCNC: 142 MMOL/L (ref 136–145)

## 2023-07-02 NOTE — PROGRESS NOTES
LOS: 26 days   Patient Care Team:  Liza Oconnell III, NP-C as PCP - General (Family Medicine)  Corey Lao Jr., MD (Inactive) as Consulting Physician (Urology)    Chief Complaint: Follow-up pericardial effusion, Dressler's syndrome.    Interval History: no sig CP, denies SOB, hoping to go home soon    Vital Signs:  Temp:  [97.6 °F (36.4 °C)-98.4 °F (36.9 °C)] 97.6 °F (36.4 °C)  Heart Rate:  [] 105  Resp:  [18] 18  BP: ()/(61-75) 114/75    Intake/Output Summary (Last 24 hours) at 7/2/2023 1135  Last data filed at 7/2/2023 1113  Gross per 24 hour   Intake 778 ml   Output 2150 ml   Net -1372 ml       Physical Exam:   General Appearance:    No acute distress, alert and oriented x4   Lungs:     Clear to auscultation bilaterally     Heart:    Regular rhythm and normal rate.  No murmurs, gallops, or   rubs.   Abdomen:     Soft, nontender, nondistended.    Extremities:   1+ edema of the lower extremities.  Compression socks in place.     Results Review:    Results from last 7 days   Lab Units 07/02/23  0644   SODIUM mmol/L 142   POTASSIUM mmol/L 3.1*   CHLORIDE mmol/L 101   CO2 mmol/L 29.0   BUN mg/dL 13   CREATININE mg/dL 1.35*   GLUCOSE mg/dL 113*   CALCIUM mg/dL 8.6           Results from last 7 days   Lab Units 06/29/23  0705   WBC 10*3/mm3 8.23   HEMOGLOBIN g/dL 8.9*   HEMATOCRIT % 26.2*   PLATELETS 10*3/mm3 277             Results from last 7 days   Lab Units 07/01/23  0630   MAGNESIUM mg/dL 2.2           I reviewed the patient's new clinical results.        Assessment:  1.  Moderate posterior pericardial effusion and pleuritic chest pain, consistent with Dressler's syndrome- Holding Eliquis with pericardial effusion. He will need a repeat limited echocardiogram proximately 2 weeks from the previous study to re-evaluate the pericardial effusion.  2.  Acute on chronic diastolic CHF and lower extremity edema- doing well on oral diuretic  3.  Status post tissue mitral valve  replacement and tricuspid valve repair (and left atrial appendage closure) on 5/24/2023 by Dr. Frey  4.  Acute kidney injury with stage IIIa chronic kidney disease   5.  History of COVID-19 pneumonia with residual dyspnea and possible interstitial lung disease  6.  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  Expected postoperative anemia  8.  Postoperative CVA following cardiac surgery  9.  Postoperative junctional bradycardia, complete heart block, and typical atrial flutter (status post DCCV on 6/2/2023).  10.  LBBB and first degree AV block  11.  Diabetes      Peter Cisneros III, MD  07/02/23  11:35 EDT

## 2023-07-02 NOTE — PLAN OF CARE
Goal Outcome Evaluation:      Pt is A/Ox3-4, calm/cooperative, OOB x 1. Meds taken whole w thin liquids. No c/o pain overnight. Pt has been continent.

## 2023-07-02 NOTE — PLAN OF CARE
Goal Outcome Evaluation:  Plan of Care Reviewed With: patient           Outcome Evaluation: pt alert and oriented X4, forgetful at times, stand by assist, torsemide given, potassium replaced today, will cont to monitor         Problem: Rehabilitation (IRF) Plan of Care  Goal: Plan of Care Review  Outcome: Ongoing, Progressing  Flowsheets (Taken 7/2/2023 1335)  Plan of Care Reviewed With: patient  Outcome Evaluation: pt alert and oriented X4, forgetful at times, stand by assist, torsemide given, potassium replaced today, will cont to monitor  Goal: Patient-Specific Goal (Individualized)  Outcome: Ongoing, Progressing  Goal: Absence of New-Onset Illness or Injury  Outcome: Ongoing, Progressing  Intervention: Prevent Fall and Fall Injury  Recent Flowsheet Documentation  Taken 7/2/2023 1230 by Lara Ann RN  Safety Promotion/Fall Prevention:   activity supervised   assistive device/personal items within reach   clutter free environment maintained   fall prevention program maintained   nonskid shoes/slippers when out of bed   room organization consistent   safety round/check completed  Taken 7/2/2023 1017 by Lara Ann RN  Safety Promotion/Fall Prevention:   activity supervised   assistive device/personal items within reach   clutter free environment maintained   fall prevention program maintained   nonskid shoes/slippers when out of bed   room organization consistent   safety round/check completed  Taken 7/2/2023 0840 by Lara Ann RN  Safety Promotion/Fall Prevention:   activity supervised   assistive device/personal items within reach   clutter free environment maintained   fall prevention program maintained   gait belt   nonskid shoes/slippers when out of bed   room organization consistent   safety round/check completed  Intervention: Prevent Infection  Recent Flowsheet Documentation  Taken 7/2/2023 1230 by Lara Ann RN  Infection Prevention: single patient room provided  Taken 7/2/2023 1017  by Lara Ann RN  Infection Prevention: single patient room provided  Taken 7/2/2023 0840 by Lara Ann RN  Infection Prevention: single patient room provided  Intervention: Prevent VTE (Venous Thromboembolism)  Recent Flowsheet Documentation  Taken 7/2/2023 0840 by Lara Ann RN  VTE Prevention/Management:   bilateral   sequential compression devices off  Goal: Optimal Comfort and Wellbeing  Outcome: Ongoing, Progressing  Goal: Home and Community Transition Plan Established  Outcome: Ongoing, Progressing

## 2023-07-02 NOTE — PROGRESS NOTES
LOS: 26 days   Patient Care Team:  Liza Oconnell III, NP-C as PCP - General (Family Medicine)  Corey Lao Jr., MD (Inactive) as Consulting Physician (Urology)      KRIS TAYLOR  1950      ADMITTING DIAGNOSIS:  Stroke  Valvular heart disease status post repair/replacement      Subjective     Comfortable with his breathing.  Fatigue is still an issue.  Chest pain is overall better on colchicine for Dressler syndrome.  Edema is a little bit better in the legs.        Objective     Vitals:    07/02/23 0832   BP: 114/75   Pulse: 105   Resp:    Temp:    SpO2:        PHYSICAL EXAM:   MENTAL STATUS -  AWAKE / ALERT  HEENT-   LUNGS - CTA,    Sternotomy incision -dressed  HEART-sinus with occasional ectopy  ABD - NORMOACTIVE BOWEL SOUNDS, SOFT, NT.   EXT -1+ pedal edema bilateral lower extremities,   NEURO -oriented to person place time and situation.    Speech is fluent.    Good strength bilaterally          MEDICATIONS  Scheduled Meds:aspirin, 81 mg, Oral, Daily  atorvastatin, 40 mg, Oral, Nightly  budesonide-formoterol, 2 puff, Inhalation, BID - RT  calcium 500 mg vitamin D 5 mcg (200 UT), 1 tablet, Oral, Daily  colchicine, 0.6 mg, Oral, Daily  guaiFENesin, 600 mg, Oral, Q12H  insulin glargine, 20 Units, Subcutaneous, Nightly  insulin glargine, 20 Units, Subcutaneous, Daily  insulin lispro, 3-14 Units, Subcutaneous, 4x Daily AC & at Bedtime  insulin lispro, 4 Units, Subcutaneous, TID With Meals  magnesium oxide, 400 mg, Oral, Daily  melatonin, 3 mg, Oral, Nightly  metoprolol succinate XL, 25 mg, Oral, Q24H  pantoprazole, 40 mg, Oral, Q AM  potassium chloride, 40 mEq, Oral, Q4H  sodium chloride, 3 mL, Intravenous, Q12H  tiotropium bromide monohydrate, 2 puff, Inhalation, Daily - RT  [START ON 7/3/2023] torsemide, 60 mg, Oral, Daily      Continuous Infusions:   PRN Meds:.  acetaminophen **OR** [DISCONTINUED] acetaminophen **OR** [DISCONTINUED] acetaminophen    bisacodyl    calcium  carbonate    dextrose    dextrose    docusate sodium    glucagon (human recombinant)    hydrocortisone-bacitracin-zinc oxide-nystatin    ipratropium-albuterol    melatonin    ondansetron **OR** ondansetron    senna    sodium chloride    traMADol      RESULTS  Glucose   Date/Time Value Ref Range Status   07/02/2023 1125 138 (H) 70 - 130 mg/dL Final     Comment:     Meter: [id number] : [id number] Roe Herman NA   07/02/2023 0724 120 70 - 130 mg/dL Final     Comment:     Meter: [id number] : [id number] Roe Herman    07/01/2023 2007 278 (H) 70 - 130 mg/dL Final     Comment:     Meter: [id number] : [id number] Villalpandobishop Bangura    07/01/2023 1618 274 (H) 70 - 130 mg/dL Final     Comment:     MAAME NOTIFIED RN Notified R and V Meter: [id number] : [id number] Roe [facility]   07/01/2023 1136 119 70 - 130 mg/dL Final     Comment:     Meter: [id number] : [id number] Seth Bermudez RN   07/01/2023 0714 112 70 - 130 mg/dL Final     Comment:     Meter: [id number] : [id number] Roe Herman    06/30/2023 2009 210 (H) 70 - 130 mg/dL Final     Comment:     Meter: [id number] : [id number] Kwasi Bangura NA   06/30/2023 1559 177 (H) 70 - 130 mg/dL Final     Comment:     Meter: [id number] : [id number] Laura BARRETO     Results from last 7 days   Lab Units 06/29/23  0705 06/28/23  0753 06/27/23  0710   WBC 10*3/mm3 8.23 8.52 7.07   HEMOGLOBIN g/dL 8.9* 9.5* 9.3*   HEMATOCRIT % 26.2* 28.3* 28.0*   PLATELETS 10*3/mm3 277 290 255       Results from last 7 days   Lab Units 07/02/23  0644 06/30/23  0719 06/28/23  0753   SODIUM mmol/L 142 140 140   POTASSIUM mmol/L 3.1* 3.2* 3.8   CHLORIDE mmol/L 101 98 101   CO2 mmol/L 29.0 29.6* 29.6*   BUN mg/dL 13 15 16   CREATININE mg/dL 1.35* 1.31* 1.26   CALCIUM mg/dL 8.6 9.2 9.5   BILIRUBIN mg/dL  --  0.5  --    ALK PHOS U/L  --  115  --    ALT (SGPT) U/L  --  11  --    AST (SGOT) U/L  --  9  --    GLUCOSE mg/dL 113* 135* 127*        Latest  Reference Range & Units 05/23/23 14:29   Hemoglobin A1C 4.80 - 5.60 % 9.20 (H)   Total Cholesterol 0 - 200 mg/dL 155   HDL Cholesterol 40 - 60 mg/dL 41   LDL Cholesterol  0 - 100 mg/dL 81   Triglycerides 0 - 150 mg/dL 197 (H)   (H): Data is abnormally high     Latest Reference Range & Units 05/31/23 08:03   25 Hydroxy, Vitamin D 30.0 - 100.0 ng/ml 20.0 (L)   (L): Data is abnormally low    Lower extremity venous duplex-June 19, 2023-Chronic right lower extremity superficial thrombophlebitis noted in the small saphenous.     ASSESSMENT and PLAN    Acute ischemic left MCA stroke    Status post CVA-Scattered  punctate restricted diffusion acute infarct in the bilateral frontal and   left parietal occipital cortices.        Impaired Cognition/impaired mobility/impaired self-care     Aphasia-resolved     Right hand apraxia-resolved     Encephalopathy-improving     Dysphagia/nutrition-healthy heart 2-3 g sodium, consistent carb, no mixed consistency, regular texture, nectar thick liquid  June 7-videofluoroscopic swallow study planned tomorrow  June 8-swallow study today-advance to regular solid, no mixed consistency, thin liquids by cup.  No straws.     Stroke prophylaxis-Eliquis/aspirin/atorvastatin     History of severe mitral regurgitation and annular dilatation, moderate to severe tricuspid regurgitation-  status post May 24, 2023 - 1. Mitral valve repair with a 28 mm physio II ring annuloplasty with residual mild to moderate MR  2.  Mitral valve replacement with a 29 mm Schwartz II porcine prosthesis  3.  Tricuspid valve repair with 28 mm physio tricuspid ring  4.  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, returned June 26-better on colchicine     Volume status-diuretics per Cardiology/nephrology  June 30-Per nephrology-[continue furosemide 80 mg IV twice a day,  hope to transition to oral diuretics during his admission   ]  July 1-changed to torsemide     Severe pulmonary hypertension  Obstructive sleep apnea     Interstitial lung disease status post COVID-19 infection-steroid tapered off per pulmonology June 7  Home inhaler regimen  Chronic steroid use with cushingoid syndrome-steroids now tapered off   June 23-change back to his home regimen Spiriva 2 puffs once a day and Symbicort 160/4.5 two puffs twice a day       Leukocytosis-reactive/steroid administration     Postoperative anemia     Uocajzhvrmdcloor-zdqtljlnddj-ufadvucv     Chronic kidney disease stage III-baseline creatinine 1.2  Renal insufficiency - Nephrology following        Diabetes mellitus type 2-Jardiance/Lantus/sliding scale insulin-uncontrolled  Off Jardiance secondary to renal function.  Adjusting short and long acting insulin.       BPH/urinary retention-history of TURP  June 14-void 300 cc with postvoid residual 205 cc.         Pain management-tramadol-/Tylenol Dk report reviewed              TEAM CONF - JUNE 8 - ENDURANCE POOR. BED MOD. TRANSFERS MOD. GAIT 25 FEET MOD ASSIST RW. SHOWER TRANSFERS MOD A.   BATH MOD ASSIST. LBD MAX. UBD MIN. TOILETING MAX.   Patient is currently on nectar liquids VFSS TODAY.   BIMS SUMMARY SCORE: 9 Moderately impaired   DIFFICULTY WITH SUSTAINED AND FOCAL ATTENTION.  CONTINENT / INCONTINENT  ELOS - 2 -3 WEEKS    TEAM CONF - Kari 15 - BED MOD. TRANSFERS MIN. 4 STAIRS MIN X 2. SATS 97% OR HIGHER WITH GAIT WITH FATIGUE, GAIT 80 FEET MIN ASSIST RW. TOILET TRANSFERS MIN. SHOWER TRANSFERS MIN MOD. BATH MIN. LBD MIN MOD. UBD MIN. GROOMING SET UP. TOILETING MIN MOD. DRESSING TAKES 30 MINUTES WIETH SLOW PROCESSING. EDEMA - JOBST STOCKINGS. MILD DYSPHGAIA, REG THIN BY CUP, NO STRAWS. CLQT MODERATE DEFICITS, DIFFICULTY WITH ATTENTION AND MEMORY, PROLONGED PROCESSING SPEED. BNE FOLLOWING. VARIABLE INTAKE - DIETITIAN FOLLOWING. RASH ON ABD AND LEFT CHEST, POSSIBLY CONTACT  DERMATITIS. TORSEMIDE ADDED.  ELOS - TWO WEEKS.    TEAM CONF - June 22- DIURETIC ADJUSTED BY NEPRHOLOGY. PATIENT DOES NOT FOLLOW 1500 CC FLUID RESTRICTION. TRANSFERS CTG. 4 STAIRS MIN X 2. GAIT 80 FEET CTG RW.   TOILET AND SHOWER TRANSFERS CTG. BATH MIN-CTG. LBD MIN-CTG FOR COMPRESSION STOCKINGS. UBD SBA. TOILETING CTG AS ASKS FOR HELP.   SWALLOW - REGULAR SOLID, THIN LIQUID BY CUP. MOSTLY CONTINENT.   Orientation: WNL  Attention: WNL to Mildly Impaired  Executive Functioning: Mildly Impaired  Abstract Reasoning: WNL  Arithmetic: Mildly Impaired  Visuospatial Perception: WNL  Visuospatial Praxis: WNL for Figure Copy; Severely Impaired Coding related to slowed processing speed.  Verbal Memory: Moderately to Severely Impaired  Visual Memory: Mildly Impaired  Emotional: Some frustration regarding current level of functioning, but minimal anxiety and depression symptoms reported on the GDS and BROKOE.  -demonstrating moderate to severe verbal memory deficits and slowed processing speed that are consistent with the location of his stroke.  Word-finding difficulties are also present, but are more evident conversationally than during testing.  ELOS - ONE WEEK- June 29       TEAM CONF - June 29 - TRANSFERS CTG. 4 STAIRS CTG . DID 8 ON Tuesday. AT HOME 14 STAIRS, LANDING, THEN 8 STAIRS TO SECOND FLOOR. GAIT  RW CTG SBA. TOILET TRANSFERS CTG SBA. SHOWER TRANSFERS CTG  BATH SBA CTG. LBD MIN. UBD SBA. GROOMING SET UP. TOILETING MIN CTG. EASILY DISTRACTED ON TASK. PROCESSING SPEED IMPROVING, BUT STILL SLOWED WHEN TIRED.   DRESSLER SYNDROME - TRAMADOL HELPED PAIN. ON COLCHICINE.   ELOS - WED (discharge was delayed given transfer off the unit for Dressler syndrome)       Goal is for home with outpatient cardiac rehab   therapies.  Barrier to discharge: Impaired cognition mobility self-care- work on follow, executive function, conditioning, transfers, progressive ambulation, ADLs to overcome.           Tu Silver MD,      During rounds, used appropriate personal protective equipment including mask and gloves.  Additional gown if indicated.  Mask used was standard procedure mask. Appropriate PPE was worn during the entire visit.  Hand hygiene was completed before and after.      Patient has been staffed and discussed with attending Physician, Dr. Tu Silver.

## 2023-07-02 NOTE — PROGRESS NOTES
Inpatient Rehabilitation Plan of Care Note    Plan of Care  Care Plan Reviewed - Updates as Follows    Sphincter Control    [RN] Bladder Management(Active)  Current Status(07/02/2023): Patient is continent of urine using urinal or toilet  but may have urgency.  Weekly Goal(07/05/2023): Patient will be continent 90% of the time.  Discharge Goal: Patient will be continent    [RN] Bowel Management(Active)  Current Status(07/02/2023): Patietn is continent of bowels  Weekly Goal(07/06/2023): patient is continent of bowels  Discharge Goal: Patient is continent of bowels    Performed Intervention(s)  Offer toileting/ timed voids  Monitor I&O      Safety    [RN] Potential for Injury(Active)  Current Status(07/02/2023): Patient is at increased risk for falls/injury r/t  recent surgery and stroke.  Weekly Goal(07/06/2023): Patient will use the call light for assistance  Discharge Goal: Patient/ family will be aware of risk of fall and safety in the  home setting    Performed Intervention(s)  Safety monitoring during functional activities  Environmental set- upto reduce risk      Psychosocial    [RN] Coping/Adjustment(Active)  Current Status(07/02/2023): Patient has a supportive family. Pt is at increased  risk of impaired coping r/t recent stroke and hospitalization. Pt is A/Ox4 but  may be forgetful.  Weekly Goal(07/05/2023): Patient will express concerns regarding current status  Discharge Goal: Patient will demonstrate healthy coping strategies    Performed Intervention(s)  Allow patient to verbalize needs and concerns      Body Systems    [RN] Endocrine(Active)  Current Status(07/02/2023): Uncontrolled BS  Weekly Goal(07/06/2023): BS WNL  Discharge Goal: Diabetes education, BS WNL    Performed Intervention(s)  ACCU check ACHS    Signed by: Tala Sharma RN

## 2023-07-02 NOTE — PROGRESS NOTES
[facility] Progress Note      Patient Name: Vernon Solorzano  : 1950  MRN: [id number]  Primary Care Physician:  Liza Oconnell III, RACHAEL  Date of admission: 2023    Subjective     Interval History:   Follow up CKD III.   Overnight no event    Edema improving   Appetite stable w/o nausea or emesis  Denies any chest pain or palpitations      Urinating spontaneously        Review of Systems:   As noted above    Objective     Vitals:   Temp:  [97.6 °F (36.4 °C)-98.4 °F (36.9 °C)] 97.6 °F (36.4 °C)  Heart Rate:  [] 105  Resp:  [18] 18  BP: ()/(61-75) 114/75    Intake/Output Summary (Last 24 hours) at 2023 1332  Last data filed at 2023 1158  Gross per 24 hour   Intake 1013 ml   Output 2150 ml   Net -1137 ml         Physical Exam:    General Appearance: Patient is alert, oriented.  Ill looking.  Questions skin: warm and dry  HEENT: oral mucosa normal, nonicteric sclera  Neck: supple, no JVD  Lungs: Clear to auscultation.   Heart: RRR, normal S1 and S2  Abdomen: soft, nontender, + bs, distended.  Extremities trace lower ext edema.  Erythematous rash noted on the anterior aspect of right leg    Scheduled Meds:     aspirin, 81 mg, Oral, Daily  atorvastatin, 40 mg, Oral, Nightly  budesonide-formoterol, 2 puff, Inhalation, BID - RT  calcium 500 mg vitamin D 5 mcg (200 UT), 1 tablet, Oral, Daily  colchicine, 0.6 mg, Oral, Daily  guaiFENesin, 600 mg, Oral, Q12H  insulin glargine, 20 Units, Subcutaneous, Nightly  insulin glargine, 20 Units, Subcutaneous, Daily  insulin lispro, 3-14 Units, Subcutaneous, 4x Daily AC & at Bedtime  insulin lispro, 4 Units, Subcutaneous, TID With Meals  magnesium oxide, 400 mg, Oral, Daily  melatonin, 3 mg, Oral, Nightly  metoprolol succinate XL, 25 mg, Oral, Q24H  pantoprazole, 40 mg, Oral, Q AM  potassium chloride, 40 mEq, Oral, Q4H  sodium chloride, 3 mL, Intravenous, Q12H  tiotropium bromide monohydrate, 2 puff, Inhalation,  Daily - RT  [START ON 7/3/2023] torsemide, 60 mg, Oral, Daily      IV Meds:        Results Reviewed:   I have personally reviewed the results from the time of this admission to 7/2/2023 13:32 EDT     Results from last 7 days   Lab Units 07/02/23  0644 06/30/23  0719 06/28/23  0753   SODIUM mmol/L 142 140 140   POTASSIUM mmol/L 3.1* 3.2* 3.8   CHLORIDE mmol/L 101 98 101   CO2 mmol/L 29.0 29.6* 29.6*   BUN mg/dL 13 15 16   CREATININE mg/dL 1.35* 1.31* 1.26   CALCIUM mg/dL 8.6 9.2 9.5   BILIRUBIN mg/dL  --  0.5  --    ALK PHOS U/L  --  115  --    ALT (SGPT) U/L  --  11  --    AST (SGOT) U/L  --  9  --    GLUCOSE mg/dL 113* 135* 127*         Estimated Creatinine Clearance: 62.9 mL/min (A) (by C-G formula based on SCr of 1.35 mg/dL (H)).    Results from last 7 days   Lab Units 07/01/23  0630 06/30/23  0719 06/28/23  0753   MAGNESIUM mg/dL 2.2 2.0 2.1   PHOSPHORUS mg/dL  --  4.0 3.3               Results from last 7 days   Lab Units 06/29/23  0705 06/28/23  0753 06/27/23  0710 06/26/23  0322   WBC 10*3/mm3 8.23 8.52 7.07 9.69   HEMOGLOBIN g/dL 8.9* 9.5* 9.3* 9.9*   PLATELETS 10*3/mm3 277 290 255 246               Assessment / Plan     ASSESSMENT:    1.  Acute kidney injury on top of CKD III, baseline creatinine 1.2.  Peak 1.68.  Creatinine  stable around 1.3  , most recent down to 1.2-1.3  2. MR now sp MV replacement, tissue and TV repair, SU closure 5/24/23.  3. Chronic steroid use after COVID 19 PNA.  4. Anemia of CKD and postoperative anemia.  Hemoglobin is 8.9.  No evidence of active bleeding  5. 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  Working with physical occupational therapy  6. diabetes mellitus type 2. On diabetic diet and insulin regimen   7. Aflutter.Cardioversion 6/2/23. On chronic anticoagulation eliquis and rate controlled  metoprolol.   8.  Hypokalemia on KCL replacement protocol, volume diuretics  9.  Pleuritic chest pain likely secondary to pericarditis possible Dressler syndrome on  colchicine    PLAN:    -Volume status  w titration of diuretics will decreased torsemide to 60 mg daily and will titrate accordingly  - KCL replacement 40 mEq x 2 doses   - Will follow labs closely     Discussed with wife at bedside      Jesús Braxton MD  07/02/23  13:32 EDT    [facility]  [phone]

## 2023-07-03 LAB
ALBUMIN SERPL-MCNC: 3.4 G/DL (ref 3.5–5.2)
ANION GAP SERPL CALCULATED.3IONS-SCNC: 13 MMOL/L (ref 5–15)
ATMOSPHERIC PRESS: 741.5 MMHG
BASE EXCESS BLDV CALC-SCNC: 4.7 MMOL/L (ref -2–2)
BDY SITE: ABNORMAL
BUN SERPL-MCNC: 15 MG/DL (ref 8–23)
BUN/CREAT SERPL: 10.6 (ref 7–25)
CALCIUM SPEC-SCNC: 9 MG/DL (ref 8.6–10.5)
CHLORIDE SERPL-SCNC: 99 MMOL/L (ref 98–107)
CO2 SERPL-SCNC: 29 MMOL/L (ref 22–29)
CREAT SERPL-MCNC: 1.41 MG/DL (ref 0.76–1.27)
EGFRCR SERPLBLD CKD-EPI 2021: 52.3 ML/MIN/1.73
GLUCOSE BLDC GLUCOMTR-MCNC: 133 MG/DL (ref 70–130)
GLUCOSE BLDC GLUCOMTR-MCNC: 142 MG/DL (ref 70–130)
GLUCOSE BLDC GLUCOMTR-MCNC: 168 MG/DL (ref 70–130)
GLUCOSE BLDC GLUCOMTR-MCNC: 252 MG/DL (ref 70–130)
GLUCOSE SERPL-MCNC: 215 MG/DL (ref 65–99)
HCO3 BLDV-SCNC: 29.7 MMOL/L (ref 22–28)
MAXIMAL PREDICTED HEART RATE: 146 BPM
MODALITY: ABNORMAL
PCO2 BLDV: 45.3 MM HG (ref 41–51)
PH BLDV: 7.42 PH UNITS (ref 7.31–7.41)
PHOSPHATE SERPL-MCNC: 2.2 MG/DL (ref 2.5–4.5)
PO2 BLDV: 52.4 MM HG (ref 35–45)
POTASSIUM SERPL-SCNC: 3.6 MMOL/L (ref 3.5–5.2)
SAO2 % BLDCOA: 87.1 % (ref 92–99)
SODIUM SERPL-SCNC: 141 MMOL/L (ref 136–145)
STRESS TARGET HR: 124 BPM
TOTAL RATE: 16 BREATHS/MINUTE
VENTILATOR MODE: ABNORMAL

## 2023-07-03 NOTE — PLAN OF CARE
Problem: Rehabilitation (IRF) Plan of Care  Goal: Plan of Care Review  Outcome: Ongoing, Progressing  Goal: Patient-Specific Goal (Individualized)  Outcome: Ongoing, Progressing  Goal: Absence of New-Onset Illness or Injury  Outcome: Ongoing, Progressing  Intervention: Prevent Fall and Fall Injury  Recent Flowsheet Documentation  Taken 7/3/2023 1805 by Luz Ibarra RN  Safety Promotion/Fall Prevention: room organization consistent  Taken 7/3/2023 1600 by Luz Ibarra RN  Safety Promotion/Fall Prevention: room organization consistent  Taken 7/3/2023 1445 by Luz Ibarra RN  Safety Promotion/Fall Prevention:   activity supervised   assistive device/personal items within reach  Taken 7/3/2023 1200 by Luz Ibarra RN  Safety Promotion/Fall Prevention: room organization consistent  Taken 7/3/2023 1015 by Luz Ibarra RN  Safety Promotion/Fall Prevention: room organization consistent  Taken 7/3/2023 0945 by Luz Ibarra RN  Safety Promotion/Fall Prevention: safety round/check completed  Intervention: Prevent Infection  Recent Flowsheet Documentation  Taken 7/3/2023 1805 by Luz Ibarra RN  Infection Prevention: single patient room provided  Taken 7/3/2023 1600 by Luz Ibarra RN  Infection Prevention: single patient room provided  Taken 7/3/2023 1445 by Luz Ibarra RN  Infection Prevention: single patient room provided  Taken 7/3/2023 1200 by Luz Ibarra RN  Infection Prevention: single patient room provided  Taken 7/3/2023 1015 by Luz Ibarra RN  Infection Prevention: single patient room provided  Taken 7/3/2023 0945 by Luz Ibarra RN  Infection Prevention: single patient room provided  Intervention: Prevent VTE (Venous Thromboembolism)  Recent Flowsheet Documentation  Taken 7/3/2023 0945 by Luz Ibarra RN  VTE Prevention/Management:   bilateral   compression stockings on  Goal: Optimal Comfort and Wellbeing  Outcome: Ongoing, Progressing  Goal: Home and Community Transition  Plan Established  Outcome: Ongoing, Progressing     Problem: Fall Injury Risk  Goal: Absence of Fall and Fall-Related Injury  Outcome: Ongoing, Progressing  Intervention: Identify and Manage Contributors  Recent Flowsheet Documentation  Taken 7/3/2023 1805 by Luz Ibarra RN  Medication Review/Management: medications reviewed  Taken 7/3/2023 1600 by Luz Ibarra RN  Medication Review/Management: medications reviewed  Taken 7/3/2023 1445 by Luz Ibarra RN  Medication Review/Management: medications reviewed  Taken 7/3/2023 1200 by Luz Ibarra RN  Medication Review/Management: medications reviewed  Taken 7/3/2023 1015 by Luz Ibarra RN  Medication Review/Management: medications reviewed  Taken 7/3/2023 0945 by Luz Ibarra RN  Medication Review/Management: medications reviewed  Intervention: Promote Injury-Free Environment  Recent Flowsheet Documentation  Taken 7/3/2023 1805 by Luz Ibarra RN  Safety Promotion/Fall Prevention: room organization consistent  Taken 7/3/2023 1600 by Luz Ibarra RN  Safety Promotion/Fall Prevention: room organization consistent  Taken 7/3/2023 1445 by Luz Ibarra RN  Safety Promotion/Fall Prevention:   activity supervised   assistive device/personal items within reach  Taken 7/3/2023 1200 by Luz Ibarra RN  Safety Promotion/Fall Prevention: room organization consistent  Taken 7/3/2023 1015 by Luz Ibarra RN  Safety Promotion/Fall Prevention: room organization consistent  Taken 7/3/2023 0945 by Luz Ibarra RN  Safety Promotion/Fall Prevention: safety round/check completed     Problem: Skin Injury Risk Increased  Goal: Skin Health and Integrity  Outcome: Ongoing, Progressing  Intervention: Optimize Skin Protection  Recent Flowsheet Documentation  Taken 7/3/2023 1805 by Luz Ibarra RN  Head of Bed (HOB) Positioning: HOB at 30-45 degrees  Taken 7/3/2023 1600 by Luz Ibarra RN  Head of Bed (HOB) Positioning: HOB at 30-45 degrees  Taken  7/3/2023 0945 by Luz Ibarra RN  Head of Bed (HOB) Positioning: HOB at 30-45 degrees     Problem: Adjustment to Stroke (Stroke Rehabilitation)  Goal: Optimal Adjustment to Stroke  Outcome: Ongoing, Progressing  Intervention: Promote Patient and Family Adjustment to Stroke  Recent Flowsheet Documentation  Taken 7/3/2023 0945 by Luz Ibarra RN  Supportive Measures: active listening utilized  Family/Support System Care: self-care encouraged     Problem: BADL (Basic Activities of Daily Living) Impairment (Stroke Rehabilitation)  Goal: Optimal Safe BADL Performance  Outcome: Ongoing, Progressing     Problem: Bowel Elimination Management (Stroke Rehabilitation)  Goal: Effective Bowel Elimination/Continence  Outcome: Ongoing, Progressing     Problem: Cognitive Impairment (Stroke Rehabilitation)  Goal: Optimal Cognitive Function  Outcome: Ongoing, Progressing  Intervention: Optimize Cognitive Function  Recent Flowsheet Documentation  Taken 7/3/2023 0945 by Luz Ibarra RN  Sensory Stimulation Regulation: care clustered     Problem: Communication Impairment (Stroke Rehabilitation)  Goal: Effective Communication Skills  Outcome: Ongoing, Progressing  Intervention: Optimize Communication Skills  Recent Flowsheet Documentation  Taken 7/3/2023 0945 by Luz Ibarra RN  Communication Enhancement Strategies: call light answered in person     Problem: IADL (Instrumental Activities of Daily Living) Impairment (Stroke Rehabilitation)  Goal: Optimal Safe IADL Performance  Outcome: Ongoing, Progressing     Problem: Mobility Impairment (Stroke Rehabilitation)  Goal: Optimal Mobility King and Safety  Outcome: Ongoing, Progressing     Problem: Movement and Motor Control Impairment (Stroke Rehabilitation)  Goal: Optimal Movement and Motor Control  Outcome: Ongoing, Progressing     Problem: Sensory Perceptual Impairment (Stroke Rehabilitation)  Goal: Optimal Sensory Perceptual Status  Outcome: Ongoing, Progressing      Problem: Sexuality and Intimacy (Stroke Rehabilitation)  Goal: Maintains Intimacy/Sexual Expression  Outcome: Ongoing, Progressing     Problem: Spasticity (Stroke Rehabilitation)  Goal: Effective Spasticity Management  Outcome: Ongoing, Progressing     Problem: Swallowing Impairment (Stroke Rehabilitation)  Goal: Optimal Oral Motor and Swallow Ability  Outcome: Ongoing, Progressing     Problem: Urinary Elimination Management (Stroke Rehabilitation)  Goal: Effective Urinary Elimination/Continence  Outcome: Ongoing, Progressing     Problem: Adjustment to Illness (Stroke, Ischemic/Transient Ischemic Attack)  Goal: Optimal Coping  Outcome: Ongoing, Progressing  Intervention: Support Psychosocial Response to Stroke  Recent Flowsheet Documentation  Taken 7/3/2023 0945 by Luz Ibarra RN  Supportive Measures: active listening utilized  Family/Support System Care: self-care encouraged     Problem: Bowel Elimination Impaired (Stroke, Ischemic/Transient Ischemic Attack)  Goal: Effective Bowel Elimination  Outcome: Ongoing, Progressing     Problem: Cerebral Tissue Perfusion (Stroke, Ischemic/Transient Ischemic Attack)  Goal: Optimal Cerebral Tissue Perfusion  Outcome: Ongoing, Progressing  Intervention: Protect and Optimize Cerebral Perfusion  Recent Flowsheet Documentation  Taken 7/3/2023 0945 by Luz Ibarra RN  Sensory Stimulation Regulation: care clustered     Problem: Cognitive Impairment (Stroke, Ischemic/Transient Ischemic Attack)  Goal: Optimal Cognitive Function  Outcome: Ongoing, Progressing  Intervention: Optimize Cognitive Function  Recent Flowsheet Documentation  Taken 7/3/2023 0945 by Luz Ibarra RN  Sensory Stimulation Regulation: care clustered  Environment Familiarity/Consistency: daily routine followed     Problem: Communication Impairment (Stroke, Ischemic/Transient Ischemic Attack)  Goal: Improved Communication Skills  Outcome: Ongoing, Progressing  Intervention: Optimize Communication  Skills  Recent Flowsheet Documentation  Taken 7/3/2023 0945 by Luz Ibarra RN  Communication Enhancement Strategies: call light answered in person     Problem: Functional Ability Impaired (Stroke, Ischemic/Transient Ischemic Attack)  Goal: Optimal Functional Ability  Outcome: Ongoing, Progressing  Intervention: Optimize Functional Ability  Recent Flowsheet Documentation  Taken 7/3/2023 1805 by Luz Ibarra RN  Activity Management: up in chair  Taken 7/3/2023 1600 by Luz Ibarra RN  Activity Management: up in chair  Taken 7/3/2023 1200 by Luz Ibarra RN  Activity Management: up in chair  Taken 7/3/2023 0945 by Luz Ibarra RN  Activity Management: up in chair     Problem: Respiratory Compromise (Stroke, Ischemic/Transient Ischemic Attack)  Goal: Effective Oxygenation and Ventilation  Outcome: Ongoing, Progressing  Intervention: Optimize Oxygenation and Ventilation  Recent Flowsheet Documentation  Taken 7/3/2023 1805 by Luz Ibarra RN  Head of Bed (HOB) Positioning: HOB at 30-45 degrees  Taken 7/3/2023 1600 by Luz Ibarra RN  Head of Bed (HOB) Positioning: HOB at 30-45 degrees  Taken 7/3/2023 0945 by Luz Ibarra RN  Head of Bed (HOB) Positioning: HOB at 30-45 degrees     Problem: Sensorimotor Impairment (Stroke, Ischemic/Transient Ischemic Attack)  Goal: Improved Sensorimotor Function  Outcome: Ongoing, Progressing  Intervention: Optimize Range of Motion, Motor Control and Function  Recent Flowsheet Documentation  Taken 7/3/2023 1805 by Luz Ibarra RN  Positioning/Transfer Devices:   pillows   in use  Taken 7/3/2023 1600 by Luz Ibarra RN  Positioning/Transfer Devices:   pillows   in use  Taken 7/3/2023 0945 by Luz Ibarra RN  Positioning/Transfer Devices:   pillows   in use  Range of Motion: active ROM (range of motion) encouraged     Problem: Swallowing Impairment (Stroke, Ischemic/Transient Ischemic Attack)  Goal: Optimal Eating and Swallowing without Aspiration  Outcome:  Ongoing, Progressing     Problem: Urinary Elimination Impaired (Stroke, Ischemic/Transient Ischemic Attack)  Goal: Effective Urinary Elimination  Outcome: Ongoing, Progressing   Goal Outcome Evaluation:

## 2023-07-03 NOTE — THERAPY TREATMENT NOTE
Inpatient Rehabilitation - Occupational Therapy Treatment Note    [facility]     Patient Name: Vernon Solorzano  : 1950  MRN: [id number]    Today's Date: 7/3/2023                 Admit Date: 2023         ICD-10-CM ICD-9-CM   1. Impaired functional mobility, balance, gait, and endurance  Z74.09 V49.89       Patient Active Problem List   Diagnosis    DM (diabetes mellitus), type 2    Mixed hyperlipidemia    Mild intermittent asthma without complication    New onset of congestive heart failure    Nonrheumatic mitral valve regurgitation    Left-sided chest pain    Essential hypertension    Mitral valve disease    Acute ischemic left MCA stroke    Atelectasis of both lungs    Pericardial effusion, acute    History of ischemic stroke    Obese    Physical debility    Chronic diastolic CHF (congestive heart failure)    Anemia    Stage 3a chronic kidney disease    Paroxysmal atrial flutter    H/O mitral valve replacement with tissue graft    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 AMARILIS MORGAN       Past Surgical History:   Procedure Laterality Date    CARDIAC CATHETERIZATION N/A 2023    Procedure: Right Heart Cath;  Surgeon: Corey Gaffney MD;  Location:  [facility] CATH INVASIVE LOCATION;  Service: Cardiology;  Laterality: N/A;    CARDIAC CATHETERIZATION N/A 2023    Procedure: Left Heart Cath;  Surgeon: Corey Gaffney MD;  Location:  [facility] CATH [facility]VASIVE [facility];  Service: Cardiology;  Laterality: N/A;    CARDIAC CATHETERIZATION N/A 2023    Procedure: Left ventriculography;  Surgeon: Gulshan  MD Corey;  Location: [facility] CATH INVASIVE LOCATION;  Service: Cardiology;  Laterality: N/A;    CARDIAC CATHETERIZATION N/A 5/11/2023    Procedure: Coronary angiography;  Surgeon: Corey Gaffney MD;  Location: [facility] CATH INVASIVE LOCATION;  Service: Cardiology;  Laterality: N/A;    COLONOSCOPY N/A     WNL PER PT, NO RECORDS,     COLONOSCOPY N/A 04/07/2009    DR. GORGE BENAVIDEZ AT [place]    MITRAL VALVE REPAIR/REPLACEMENT N/A 5/24/2023    Procedure: MITRAL VALVE REPAIR/REPLACEMENT TRICUSPID VALVE REPAIR/REPLACEMENT TRANSESOPHAGEAL ECHOCARDIOGRAM WITH ANESTHESIA;  Surgeon: George Frey MD;  Location: [facility] CVOR;  Service: Cardiothoracic;  Laterality: N/A;    PROSTATE SURGERY N/A 11/12/2010    TURP, DR. COREY COLLAZO AT [facility]    SKIN TAG REMOVAL N/A 12/20/2017    ANAL SKIN TAG X2, PERFORMED IN OFFICE, DR. LISA ORANTES    TURP / TRANSURETHRAL INCISION / DRAINAGE PROSTATE  2010    Dr. Goodrich             IRF OT ASSESSMENT FLOWSHEET (last 12 hours)       IRF OT Evaluation and Treatment       Row Name 07/03/23 1500          OT Time and Intention    Document Type daily treatment  -CC     Mode of Treatment occupational therapy  -CC     Patient Effort good  -CC     Symptoms Noted During/After Treatment none  -CC       Row Name 07/03/23 1500          Pain Assessment    Pretreatment Pain Rating 0/10 - no pain  -CC     Posttreatment Pain Rating 0/10 - no pain  -CC       Row Name 07/03/23 1500          Cognition/Psychosocial    Affect/Mental Status (Cognition) WFL  -CC     Orientation Status (Cognition) oriented x 3  -CC     Follows Commands (Cognition) follows one-step commands;75-90% accuracy;repetition of directions required  -CC     Personal Safety Interventions fall prevention program maintained;gait belt;nonskid shoes/slippers when out of bed  -CC       Row Name 07/03/23 1500          Vision Assessment/Intervention    Visual Impairment/Limitations WFL  -CC       Row Name 07/03/23 1500          Bathing     Rolette Level (Bathing) bathing skills;lower body;upper body;supervision  -     Assistive Device (Bathing) grab bar/tub rail;shower chair;hand held shower spray hose  -     Position (Bathing) supported standing  -     Set-up Assistance (Bathing) adjust water temperature;obtain supplies  -       Row Name 07/03/23 1500          Upper Body Dressing    Rolette Level (Upper Body Dressing) upper body dressing skills;doff;don;set up assistance;clothes fastener management;front opening garment  -     Position (Upper Body Dressing) supported sitting  -     Set-up Assistance (Upper Body Dressing) obtain clothing  -       Row Name 07/03/23 1500          Lower Body Dressing    Rolette Level (Lower Body Dressing) doff;don;pants/bottoms;socks;underwear;moderate assist (50% patient effort)  -     Position (Lower Body Dressing) supported sitting;supported standing  -     Set-up Assistance (Lower Body Dressing) anti-embolic stockings  -       Row Name 07/03/23 1500          Grooming    Rolette Level (Grooming) grooming skills;deodorant application;oral care regimen;hair care, combing/brushing;set up  -     Position (Grooming) sink side;supported sitting  -     Set-up Assistance (Grooming) open containers  -       Row Name 07/03/23 1500          Bed Mobility    Comment, (Bed Mobility) in w/c  -       Row Name 07/03/23 1500          Functional Mobility    Functional Mobility- Ind. Level supervision required  -     Functional Mobility- Device walker, front-wheeled  -     Functional Mobility-Distance (Feet) --  in room  -       Row Name 07/03/23 1500          Sit-Stand Transfer    Sit-Stand Rolette (Transfers) standby assist  -     Assistive Device (Sit-Stand Transfers) wheelchair;walker, front-wheeled  -       Row Name 07/03/23 1500          Stand-Sit Transfer    Stand-Sit Rolette (Transfers) standby assist  -     Assistive Device (Stand-Sit Transfers) walker,  front-wheeled;wheelchair  -       Row Name 07/03/23 1500          Shoulder (Therapeutic Exercise)    Shoulder (Therapeutic Exercise) strengthening exercise  -     Shoulder Strengthening (Therapeutic Exercise) flexion;extension;sitting;3 lb free weight;10 repetitions;3 sets  -       Row Name 07/03/23 1500          Elbow/Forearm (Therapeutic Exercise)    Elbow/Forearm (Therapeutic Exercise) strengthening exercise  -     Elbow/Forearm Strengthening (Therapeutic Exercise) bilateral;flexion;extension;supination;pronation;sitting;3 lb free weight;10 repetitions;3 sets  -       Row Name 07/03/23 1500          Wrist (Therapeutic Exercise)    Wrist (Therapeutic Exercise) strengthening exercise  -     Wrist Strengthening (Therapeutic Exercise) bilateral;flexion;extension;3 lb free weight;10 repetitions;3 sets  -       Row Name 07/03/23 1500          Balance    Static Sitting Balance independent  -     Dynamic Sitting Balance independent  -     Static Standing Balance supervision  -       Row Name 07/03/23 1500          Positioning and Restraints    Pre-Treatment Position in bed  -     Post Treatment Position wheelchair  -     In Wheelchair sitting;call light within reach;encouraged to call for assist;exit alarm on;with family/caregiver  -               User Key  (r) = Recorded By, (t) = Taken By, (c) = Cosigned By      Initials Name Effective Dates    CC Kathrine Pinedo, OTR 06/16/21 -                      Occupational Therapy Education       Title: PT OT SLP Therapies (In Progress)       Topic: Occupational Therapy (In Progress)       Point: ADL training (In Progress)       Description:   Instruct learner(s) on proper safety adaptation and remediation techniques during self care or transfers.   Instruct in proper use of assistive devices.                  Learning Progress Summary             Patient Acceptance, E, VU by DEYANIRA at 6/29/2023 2584    Acceptance, E, NR by URBAN at 6/13/2023 8387    Comment:  benefits of therapy, endurance   Family Acceptance, E, NR by AF at 6/13/2023 1527    Comment: benefits of therapy, endurance                         Point: Home exercise program (In Progress)       Description:   Instruct learner(s) on appropriate technique for monitoring, assisting and/or progressing therapeutic exercises/activities.                  Learning Progress Summary             Patient Acceptance, E, VU by WN at 6/29/2023 2344    Acceptance, E, NR by AF at 6/13/2023 1527    Comment: benefits of therapy, endurance   Family Acceptance, E, NR by AF at 6/13/2023 1527    Comment: benefits of therapy, endurance                         Point: Precautions (In Progress)       Description:   Instruct learner(s) on prescribed precautions during self-care and functional transfers.                  Learning Progress Summary             Patient Acceptance, E, VU by WN at 6/29/2023 2344    Acceptance, E, NR by AF at 6/13/2023 1527    Comment: benefits of therapy, endurance   Family Acceptance, E, NR by AF at 6/13/2023 1527    Comment: benefits of therapy, endurance                         Point: Body mechanics (In Progress)       Description:   Instruct learner(s) on proper positioning and spine alignment during self-care, functional mobility activities and/or exercises.                  Learning Progress Summary             Patient Acceptance, E, VU by WN at 6/29/2023 2344    Acceptance, E, NR by AF at 6/13/2023 1527    Comment: benefits of therapy, endurance   Family Acceptance, E, NR by AF at 6/13/2023 1527    Comment: benefits of therapy, endurance                                         User Key       Initials Effective Dates Name Provider Type Discipline     06/16/21 -  Candy Davis OTR Occupational Therapist OT     08/23/22 -  Delvin Jansen, RN Registered Nurse Nurse                        OT Recommendation and Plan                         Time Calculation:      Time Calculation- OT       Row Name  07/03/23 1300 07/03/23 0800          Time Calculation- OT    OT Start Time 1300  -CC 0800  -CC     OT Stop Time 1330  -CC 0830  -CC     OT Time Calculation (min) 30 min  -CC 30 min  -CC               User Key  (r) = Recorded By, (t) = Taken By, (c) = Cosigned By      Initials Name Provider Type    CC Kathrine Pinedo OTR Occupational Therapist                  Therapy Charges for Today       Code Description Service Date Service Provider Modifiers Qty    [id number]  OT SELF CARE/MGMT/TRAIN EA 15 MIN 7/3/2023 Kathrine Pinedo OTR GO 2    [id number]  OT THER PROC EA 15 MIN 7/3/2023 Kathrine Pinedo OTR GO 2                     HI Morgan  7/3/2023

## 2023-07-03 NOTE — PROGRESS NOTES
LOS: 27 days   Patient Care Team:  Liza Oconnell III, NP-C as PCP - General (Family Medicine)  Corey Lao Jr., MD (Inactive) as Consulting Physician (Urology)    Chief Complaint: Follow-up pericardial effusion, Dressler's syndrome.    Interval History: He has been more short of breath overnight and this morning.  He was very short of breath when I saw him and he had just gotten out of the shower.  Still with some pleuritic right-sided chest pain.    Vital Signs:  Temp:  [97.6 °F (36.4 °C)-98.2 °F (36.8 °C)] 97.6 °F (36.4 °C)  Heart Rate:  [100-103] 103  Resp:  [18-20] 18  BP: ()/(58-76) 113/76    Intake/Output Summary (Last 24 hours) at 7/3/2023 1542  Last data filed at 7/3/2023 1143  Gross per 24 hour   Intake 650 ml   Output 1000 ml   Net -350 ml       Physical Exam:   General Appearance:    No acute distress, alert and oriented x4   Lungs:     Clear to auscultation bilaterally     Heart:    Regular rhythm and normal rate.  No murmurs, gallops, or   rubs.   Abdomen:     Soft, nontender, nondistended.    Extremities:   1+ edema of the lower extremities.       Results Review:    Results from last 7 days   Lab Units 07/03/23  1427   SODIUM mmol/L 141   POTASSIUM mmol/L 3.6   CHLORIDE mmol/L 99   CO2 mmol/L 29.0   BUN mg/dL 15   CREATININE mg/dL 1.41*   GLUCOSE mg/dL 215*   CALCIUM mg/dL 9.0           Results from last 7 days   Lab Units 06/29/23  0705   WBC 10*3/mm3 8.23   HEMOGLOBIN g/dL 8.9*   HEMATOCRIT % 26.2*   PLATELETS 10*3/mm3 277             Results from last 7 days   Lab Units 07/01/23  0630   MAGNESIUM mg/dL 2.2           I reviewed the patient's new clinical results.        Assessment:  1.  Moderate posterior pericardial effusion and pleuritic chest pain, consistent with Dressler's syndrome  2.  Acute on chronic diastolic CHF and lower extremity edema  3.  Status post tissue mitral valve replacement and tricuspid valve repair (and left atrial appendage closure) on  5/24/2023 by Dr. Frey  4.  Acute kidney injury with stage IIIa chronic kidney disease   5.  History of COVID-19 pneumonia with residual dyspnea and possible interstitial lung disease  6.  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  Expected postoperative anemia  8.  Postoperative CVA following cardiac surgery  9.  Postoperative junctional bradycardia, complete heart block, and typical atrial flutter (status post DCCV on 6/2/2023).  10.  LBBB and first degree AV block  11.  Diabetes    Plan:  -Shortness of breath noted.  Check PA and lateral chest x-ray.  Given the pericardial effusion, I am also going to repeat a limited echocardiogram to ensure that the pericardial effusion has not enlarged or is causing hemodynamic compromise.    -Continue torsemide 60 mg p.o. daily.  Nephrology also following.  Continue compression socks.    -Pulmonology consult ordered for increasing shortness of breath.    -Holding Eliquis with pericardial effusion.  Watch for any recurrent atrial fibrillation or atrial flutter.  In sinus rhythm today.    -Right-sided pleuritic chest pain is not severe, but has been somewhat recurrent.  Suspect this is from Dressler's syndrome.  Continue colchicine.  Watch for any diarrhea.      Corwin Hobson MD  07/03/23  15:42 EDT

## 2023-07-03 NOTE — THERAPY DISCHARGE NOTE
Inpatient Rehabilitation - Physical Therapy Treatment Note/Discharge  [facility]     Patient Name: Vernon Solorzano  : 1950  MRN: [id number]  Today's Date: 7/3/2023                Admit Date: 2023    Visit Dx:    ICD-10-CM ICD-9-CM   1. Impaired functional mobility, balance, gait, and endurance  Z74.09 V49.89     Patient Active Problem List   Diagnosis    DM (diabetes mellitus), type 2    Mixed hyperlipidemia    Mild intermittent asthma without complication    New onset of congestive heart failure    Nonrheumatic mitral valve regurgitation    Left-sided chest pain    Essential hypertension    Mitral valve disease    Acute ischemic left MCA stroke    Atelectasis of both lungs    Pericardial effusion, acute    History of ischemic stroke    Obese    Physical debility    Chronic diastolic CHF (congestive heart failure)    Anemia    Stage 3a chronic kidney disease    Paroxysmal atrial flutter    H/O mitral valve replacement with tissue graft    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 AMARILIS MORGAN     Past Surgical History:   Procedure Laterality Date    CARDIAC CATHETERIZATION N/A 2023    Procedure: Right Heart Cath;  Surgeon: Corey Gaffney MD;  Location:  [facility];  Service: Cardiology;  Laterality: N/A;    CARDIAC CATHETERIZATION N/A 2023    Procedure: Left Heart Cath;  Surgeon: Corey Gaffney MD;  Location:  [facility];  Service: Cardiology;  Laterality: N/A;    CARDIAC CATHETERIZATION N/A 2023    Procedure: Left ventriculography;  Surgeon:  Corey Gaffney MD;  Location:  [facility] CATH [facility];  Service: Cardiology;  Laterality: N/A;    CARDIAC CATHETERIZATION N/A 5/11/2023    Procedure: Coronary angiography;  Surgeon: Corey Gaffney MD;  [facility];  Service: Cardiology;  Laterality: N/A;    COLONOSCOPY N/A     WNL PER PT, NO RECORDS,     COLONOSCOPY N/A 04/07/2009    DR. GORGE BENAVIDEZ AT [place]    MITRAL VALVE REPAIR/REPLACEMENT N/A 5/24/2023    Procedure: MITRAL VALVE REPAIR/REPLACEMENT TRICUSPID VALVE REPAIR/REPLACEMENT TRANSESOPHAGEAL ECHOCARDIOGRAM WITH ANESTHESIA;  Surgeon: George Frey MD;  Location: [facility] CV[facility];  Service: Cardiothoracic;  Laterality: N/A;    PROSTATE SURGERY N/A 11/12/2010    TURP, DR. COREY COLLAZO AT [facility]    SKIN TAG REMOVAL N/A 12/20/2017    ANAL SKIN TAG X2, PERFORMED IN OFFICE, DR. LISA ORANTES    TURP / TRANSURETHRAL INCISION / DRAINAGE PROSTATE  2010    Dr. Goodrich       PT ASSESSMENT (last 12 hours)       IRF PT Evaluation and Treatment       Row Name 07/03/23 1211          PT Time and Intention    Document Type discharge evaluation  -MD     Mode of Treatment physical therapy  -MD     Patient/Family/Caregiver Comments/Observations Pt supine w L LE hanging off side of bed when PT arrived.  Pt showing no signs of acute distress.  -MD       Row Name 07/03/23 1211          Pain Assessment    Pretreatment Pain Rating 0/10 - no pain  -MD       Row Name 07/03/23 1211          Cognition/Psychosocial    Orientation Status (Cognition) oriented x 3  -MD     Follows Commands (Cognition) follows one-step commands;75-90% accuracy;repetition of directions required  -MD     Personal Safety Interventions fall prevention program maintained;gait belt  -MD       Row Name 07/03/23 1211          Bed Mobility    Supine-Sit [place] (Bed Mobility) modified independence  -MD     Assistive Device (Bed Mobility) head of bed elevated  -MD       Row Name 07/03/23 1211          Sit-Stand  Transfer    Sit-Stand [place] (Transfers) standby assist  -MD     Assistive Device (Sit-Stand Transfers) wheelchair;walker, front-wheeled  -MD       Row Name 07/03/23 1211          Stand-Sit Transfer    Stand-Sit [place] (Transfers) standby assist  -MD     Assistive Device (Stand-Sit Transfers) walker, front-wheeled;wheelchair  -MD       Row Name 07/03/23 1211          Car Transfer    Type (Car Transfer) stand pivot/stand step  -MD     [place] Level (Car Transfer) standby assist  -MD     Assistive Device (Car Transfer) walker, front-wheeled  -MD       Row Name 07/03/23 1211          Gait/Stairs (Locomotion)    [place] Level (Gait) standby assist  -MD     Assistive Device (Gait) walker, front-wheeled  -MD     Distance in Feet (Gait) 160'  -MD     Deviations/Abnormal Patterns (Gait) gait speed decreased;stride length decreased;angie decreased;festinating/shuffling  -MD     Bilateral Gait Deviations forward flexed posture  -MD     [place] Level (Stairs) verbal cues;contact guard  -MD     Handrail Location (Stairs) left side (ascending);right side (descending)  -MD     Number of Steps (Stairs) 20 (12 then a seated rest break then 8 more w a seated rest break before descending the stairs)  -MD     Ascending Technique (Stairs) step-to-step  -MD     Descending Technique (Stairs) step-to-step  -MD     Comment, (Gait/Stairs) Discussed stairs with spouse who states she feels comfortable with getting pt up and down stairs at home.  PT reiterated pt is to be SBA w transfers and ambulation using RWx and CGA when going up and down stairs.  Spouse states understanding.  -MD       Row Name 07/03/23 1211          Positioning and Restraints    Pre-Treatment Position sitting in chair/recliner  -MD     Post Treatment Position wheelchair  -MD     In Wheelchair sitting;with OT;exit alarm on;with family/caregiver  -MD       Row Name 07/03/23 1211          Bed Mobility Goal 2 (PT-IRF)    Progress/Outcomes (Bed  Mobility Goal 2, PT-IRF) goal met  -MD       Row Name 07/03/23 1211          Transfer Goal 2 (PT-IRF)    Progress/Outcomes (Transfer Goal 2, PT-IRF) goal met  -MD       Row Name 07/03/23 1211          Stairs Goal 1 (PT-IRF)    Progress/Outcomes (Stairs Goal 1, PT-IRF) goal met  -MD       Row Name 07/03/23 1211          Caregiver Training Goal 1 (PT-IRF)    Progress/Outcomes (Caregiver Training Goal 1, PT-IRF) goal met  per pt and family, spouse Maricel ambulated w pt around nsg station twice this weekend w RWx and necessary level of assistance for pt safety  -MD       Row Name 07/03/23 1211          Discharge Summary (PT)    Discharge Summary Statement (PT) Pt to d/c 7/5 from inpatient rehab.  Pt continues to experience dyspnea on exertion however pt has improved to SBA for all transfers and ambulation.  Pt does require CGA for stairs.  Pt will benifit from out pt PT to address gait deviations and increase pts safety and independence w functional mobility.  Spouse Maricel has participated in family teaching and states she is comfortable with the level of assistance necessary for pt safety at home.  PT emphasized SBA with RWx for transfers and ambulation at home.  Spouse and pt state understanding.  -MD               User Key  (r) = Recorded By, (t) = Taken By, (c) = Cosigned By      Initials Name Provider Type    Damaris Farrell, PT Physical Therapist                    Physical Therapy Education       Title: PT OT SLP Therapies (In Progress)       Topic: Physical Therapy (Done)       Point: Mobility training (Done)       Learning Progress Summary             Patient Acceptance, E, VU by WN at 6/29/2023 2344    Acceptance, E,TB, VU by LEO at 6/28/2023 1051    Comment: stairs    Acceptance, E,D, VU,DU,NR by  at 6/23/2023 1456    Acceptance, E,D, NR by MONA at 6/22/2023 1309    Comment: hand placement    Acceptance, E,D, VU,DU by MONA at 6/21/2023 1007    Acceptance, E, VU by MD at 6/20/2023 1504    Acceptance, E, VU by MD  at 6/19/2023 1058    Acceptance, E,TB, VU,NR by TONI at 6/17/2023 1501    Acceptance, E, VU by MD at 6/16/2023 0919    Acceptance, E, VU by MD at 6/15/2023 1140    Eager, E,D,TB, NR by  at 6/14/2023 1459    Acceptance, E, VU by MD at 6/13/2023 1012    Acceptance, E, VU by MD at 6/12/2023 1018    Acceptance, E, VU by MD at 6/10/2023 1040    Acceptance, E, VU by MD at 6/8/2023 1017    Acceptance, E,TB,D, VU,DU,NR by  at 6/7/2023 1513    Comment: POC, Goals, safety with mobility.   Family Acceptance, E,D, VU by MD at 6/27/2023 1517    Comment: Pt spouse participated in family teaching for ambulation and observed car transfer and stairs.   Significant Other Acceptance, E,TB,D, VU,DU,NR by  at 6/7/2023 1513    Comment: POC, Goals, safety with mobility.                         Point: Home exercise program (Done)       Learning Progress Summary             Patient Acceptance, E, VU by WN at 6/29/2023 2344    Acceptance, E,D, NR by DP at 6/22/2023 1309    Comment: hand placement    Acceptance, E,D, VU,DU by DP at 6/21/2023 1007    Eager, E,D,TB, NR by  at 6/14/2023 1459    Acceptance, E,TB,D, VU,DU,NR by  at 6/7/2023 1513    Comment: POC, Goals, safety with mobility.   Significant Other Acceptance, E,TB,D, VU,DU,NR by  at 6/7/2023 1513    Comment: POC, Goals, safety with mobility.                         Point: Body mechanics (Done)       Learning Progress Summary             Patient Acceptance, E, VU by WN at 6/29/2023 2344                         Point: Precautions (Done)       Learning Progress Summary             Patient Acceptance, E, VU by MD at 7/3/2023 1346    Acceptance, E, VU by MD at 6/30/2023 1055    Acceptance, E, VU by WN at 6/29/2023 2344    Acceptance, E, VU by MD at 6/29/2023 1420    Acceptance, E, VU by MD at 6/27/2023 1517                                         User Key       Initials Effective Dates Name Provider Type Discipline     06/16/21 -  Nubia Orellana, PT Physical Therapist PT     LB 06/16/21 -  Lara Cm, PT Physical Therapist PT    MD 06/16/21 -  Damaris Francis, PT Physical Therapist PT    KP 06/16/21 -  Elizabeth Levin, PT Physical Therapist PT    WN 08/23/22 -  Delvin Jansen, RN Registered Nurse Nurse    DP 08/24/21 -  Gulshan Mcdonald, PT Physical Therapist PT    CH 06/01/23 -  Nicolas Suero, PT Student PT Student PT                    PT Recommendation and Plan        Daily Progress Summary (PT)  Daily Progress Summary (PT): PT notified RN of pts change in cognitive status as pt is more impulsive, decreased awearness of his deficits and more perseverative/distractable this date.         Time Calculation:    PT Charges       Row Name 07/03/23 1346 07/03/23 1211          Time Calculation    Start Time 1230  -MD 1100  -MD     Stop Time 1300  -MD 1130  -MD     Time Calculation (min) 30 min  -MD 30 min  -MD     PT Received On -- 07/03/23  -MD     PT - Next Appointment -- 07/04/23  -MD     PT Goal Re-Cert Due Date -- 07/05/23  -MD               User Key  (r) = Recorded By, (t) = Taken By, (c) = Cosigned By      Initials Name Provider Type    Damaris Farrell, PT Physical Therapist                    Therapy Charges for Today       Code Description Service Date Service Provider Modifiers Qty    10745195110 HC PT THERAPEUTIC ACT EA 15 MIN 7/3/2023 Damaris Francis, PT GP 1    22583354951 HC GAIT TRAINING EA 15 MIN 7/3/2023 Damaris Francis, PT GP 2    72161778075 HC PT THER PROC EA 15 MIN 7/3/2023 Damaris Francis, PT GP 1            PT G-Codes  AM-PAC 6 Clicks Score (PT): 14         Damaris Francis PT  7/3/2023

## 2023-07-03 NOTE — PROGRESS NOTES
Inpatient Rehabilitation Plan of Care Note    Plan of Care  Care Plan Reviewed - Updates as Follows    Sphincter Control    [RN] Bladder Management(Active)  Current Status(07/03/2023): Patient is continent of urine using urinal or toilet  but may have urgency.  Weekly Goal(07/10/2023): Patient will be continent 90% of the time.  Discharge Goal: Patient will be continent    [RN] Bowel Management(Active)  Current Status(07/03/2023): Patietn is continent of bowels  Weekly Goal(07/10/2023): patient is continent of bowels  Discharge Goal: Patient is continent of bowels    Performed Intervention(s)  Offer toileting/ timed voids  Monitor I&O      Safety    [RN] Potential for Injury(Active)  Current Status(07/03/2023): Patient is at increased risk for falls/injury r/t  recent surgery and stroke.  Weekly Goal(07/10/2023): Patient will use the call light for assistance  Discharge Goal: Patient/ family will be aware of risk of fall and safety in the  home setting    Performed Intervention(s)  Safety monitoring during functional activities  Environmental set- upto reduce risk      Psychosocial    [RN] Coping/Adjustment(Active)  Current Status(07/03/2023): Patient has a supportive family. Pt is at increased  risk of impaired coping r/t recent stroke and hospitalization. Pt is A/Ox4 but  may be forgetful.  Weekly Goal(07/10/2023): Patient will express concerns regarding current status  Discharge Goal: Patient will demonstrate healthy coping strategies    Performed Intervention(s)  Allow patient to verbalize needs and concerns      Body Systems    [RN] Endocrine(Active)  Current Status(07/03/2023): Uncontrolled BS  Weekly Goal(07/10/2023): BS WNL  Discharge Goal: Diabetes education, BS WNL    Performed Intervention(s)  ACCU check ACHS    Signed by: Manju Merrill RN

## 2023-07-03 NOTE — THERAPY TREATMENT NOTE
Inpatient Rehabilitation - Speech Language Pathology Treatment Note    [facility]     Patient Name: Vernon Solorzano  : 1950  MRN: [id number]    Today's Date: 7/3/2023                   Admit Date: 2023       Visit Dx:      ICD-10-CM ICD-9-CM   1. Impaired functional mobility, balance, gait, and endurance  Z74.09 V49.89       Patient Active Problem List   Diagnosis    DM (diabetes mellitus), type 2    Mixed hyperlipidemia    Mild intermittent asthma without complication    New onset of congestive heart failure    Nonrheumatic mitral valve regurgitation    Left-sided chest pain    Essential hypertension    Mitral valve disease    Acute ischemic left MCA stroke    Atelectasis of both lungs    Pericardial effusion, acute    History of ischemic stroke    Obese    Physical debility    Chronic diastolic CHF (congestive heart failure)    Anemia    Stage 3a chronic kidney disease    Paroxysmal atrial flutter    H/O mitral valve replacement with tissue graft    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 AMARILIS MORGAN       Past Surgical History:   Procedure Laterality Date    CARDIAC CATHETERIZATION N/A 2023    Procedure: Right Heart Cath;  Surgeon: Corey Gaffney MD;  Location:  [facility];  Service: Cardiology;  Laterality: N/A;    CARDIAC CATHETERIZATION N/A 2023    Procedure: Left Heart Cath;  Surgeon: Corey Gaffney MD;  Location:  [facility]ATH [facility];  Service: Cardiology;  Laterality: N/A;    CARDIAC CATHETERIZATION N/A 2023    Procedure: Left ventriculography;   Surgeon: Corey Gaffney MD;  Location:  [facility] CATH [facility];  Service: Cardiology;  Laterality: N/A;    CARDIAC CATHETERIZATION N/A 5/11/2023    Procedure: Coronary angiography;  Surgeon: Corey Gaffney MD;  [facility];  Service: Cardiology;  Laterality: N/A;    COLONOSCOPY N/A     WNL PER PT, NO RECORDS,     COLONOSCOPY N/A 04/07/2009    DR. GORGE BENAVIDEZ AT [place]    MITRAL VALVE REPAIR/REPLACEMENT N/A 5/24/2023    Procedure: MITRAL VALVE REPAIR/REPLACEMENT TRICUSPID VALVE REPAIR/REPLACEMENT TRANSESOPHAGEAL ECHOCARDIOGRAM WITH ANESTHESIA;  Surgeon: George Frey MD;  Location: [facility] CV[facility];  Service: Cardiothoracic;  Laterality: N/A;    PROSTATE SURGERY N/A 11/12/2010    TURP, DR. COREY COLLAZO AT [facility]    SKIN TAG REMOVAL N/A 12/20/2017    ANAL SKIN TAG X2, PERFORMED IN OFFICE, DR. LISA ORANTES    TURP / TRANSURETHRAL INCISION / DRAINAGE PROSTATE  2010    Dr. Goodrich       SLP Recommendation and Plan                                                            SLP EVALUATION (last 72 hours)       SLP SLC Evaluation       Row Name 07/03/23 1000 07/03/23 0830 06/30/23 1400             Communication Assessment/Intervention    Document Type therapy note (daily note)  -CR therapy note (daily note)  -CR therapy note (daily note)  -SR      Subjective Information no complaints  -CR no complaints  -CR no complaints  -SR      Patient Observations alert;cooperative  -CR alert;cooperative  -CR alert;cooperative;agree to therapy  -SR      Patient Effort good  -CR good  -CR good  -SR      Symptoms Noted During/After Treatment none  -CR none  -CR none  -SR         General Information    Patient Profile Reviewed yes  -CR yes  -CR --         Pain Scale: Numbers Pre/Post-Treatment    Pretreatment Pain Rating 0/10 - no pain  -CR 0/10 - no pain  -CR 0/10 - no pain  -SR      Posttreatment Pain Rating 0/10 - no pain  -CR 0/10 - no pain  -CR 0/10 - no pain  -SR                User  Key  (r) = Recorded By, (t) = Taken By, (c) = Cosigned By      Initials Name Effective Dates    SR Latisha Morris, CCC-SLP 11/10/22 -     CR Mariely Bridges CF-SLP 05/31/23 -                        EDUCATION    The patient has been educated in the following areas:       Cognitive Impairment.             SLP GOALS       Row Name 07/03/23 1000 07/03/23 0830 07/01/23 0900       Attention Goal 1 (SLP)    Improve Attention by Goal 1 (SLP) complete selective attention task;complete sustained attention task;80%;with minimal cues (75-90%)  -CR complete selective attention task;complete sustained attention task;80%;with minimal cues (75-90%)  -CR --    Time Frame (Attention Goal 1, SLP) by discharge  -CR by discharge  -CR --    Progress/Outcomes (Attention Goal 1, SLP) good progress toward goal  -CR continuing progress toward goal  -CR --    Comment (Attention Goal 1, SLP) Sustained/selective attention targeted with written directions task. Patient followed 2 step directions with 100% accuracy given NO cues. Notable improvement since initial AM session this date.  -CR Sustained/selective attention targeted with written directions task. Patient followed 2 step directions with 60% accuracy given NO cues, increased to 100% accuracy given MOD verbal/visual cues. Prolonged completion time noted.  -CR Sustaned attention targeted with menta bambi tas on the Ipad. Pt was asked to quickly determine which category a given image belonged . Pt was givenb a field of four words at the bottom of the screen to choose from.  Pt achieved 73% accuracy improving to 92% accuracy when the task was repeated.  -       Memory Skills Goal 1 (SLP)    Improve Memory Skills Through Goal 1 (SLP) use memory strategies;listen to a paragraph and answer questions;recall details of the day;80%;with moderate cues (50-74%)  -CR -- --    Time Frame (Memory Skills Goal 1, SLP) by discharge  -CR -- --    Progress/Outcomes (Memory Skills Goal 1, SLP)  goal ongoing  -CR -- --    Comment (Memory Skills Goal 1, SLP) Memory and mental manipulation task completed with 4-items in order of occurence. Patient with 20% accuracy given NO cues, increased to 80% accuracy when given MIN-MOD verbal cues to utilize memory strategy of repetition.  -CR -- Memory was targeted by the use of a memory and mental manipulation task.  SLP read pt 3 words. Pt was asked to repeat the words backwards.  Pt took a very long time to respons.  SLP then repeated the words and asked the pt to imitate then repeat in the reverse order.  Pt achieved 30% accuracy. Pt was not able to manipulate the information to reverse the order.  -       Executive Functional Skills Goal 1 (SLP)    Comment (Executive Function Skills Goal 1, SLP) -- -- SLP  and pt discussed the  role memory plays in his every day life.  Pt stated that he only notices that he is having memory probllems when he is in speech therapy.  -      Row Name 06/30/23 1400             Attention Goal 1 (SLP)    Improve Attention by Goal 1 (SLP) complete selective attention task;complete sustained attention task;80%;with minimal cues (75-90%)  -SR      Time Frame (Attention Goal 1, SLP) by discharge  -SR      Progress/Outcomes (Attention Goal 1, SLP) continuing progress toward goal  -SR      Comment (Attention Goal 1, SLP) Sustained/selective attention targeted with written directions task. Patient followed 2 step directions given MOD verbal/visual cues. Prolonged response time noted. Activity incomplete due to time constraint. Patient completed 3/10 stimulus items.  -SR         Executive Functional Skills Goal 1 (SLP)    Improve Executive Function Skills Goal 1 (SLP) demonstrate awareness of deficit;home management activity;80%;with minimal cues (75-90%)  -SR      Time Frame (Executive Function Skills Goal 1, SLP) by discharge  -SR      Progress/Outcomes (Executive Function Skills Goal 1, SLP) good progress toward goal  -SR      Comment  (Executive Function Skills Goal 1, SLP) Safety/judgement scenerios discussed during session. Reviewed current goals and plans for home. Patient eager to drive and work towards his independence. Discussed how cognitive goals (ie, judgement, decision making, attention, memory) relate toward goal of driving. Patient recognized the complexity of the task and how he requires more help. Ongoing education/discussion recommended. During a following activity, patient provided solution to problem-based scenerio with 80% acc given MIN cues.  -SR                User Key  (r) = Recorded By, (t) = Taken By, (c) = Cosigned By      Initials Name Provider Type    Jacinta Flower, MS CCC-SLP Speech and Language Pathologist    SR Latisha Morris CCC-SLP Speech and Language Pathologist    Mariely Corcoran CF-SLP Speech and Language Pathologist                                Time Calculation:        Time Calculation- SLP       Row Name 07/03/23 1027 07/03/23 0900          Time Calculation- SLP    SLP Start Time 1000  -CR 0830  -CR     SLP Stop Time 1030  -CR 0900  -CR     SLP Time Calculation (min) 30 min  -CR 30 min  -CR               User Key  (r) = Recorded By, (t) = Taken By, (c) = Cosigned By      Initials Name Provider Type    Mariely Corcoran CF-SLP Speech and Language Pathologist                      Therapy Charges for Today       Code Description Service Date Service Provider Modifiers Qty    32357898906 HC ST DEV OF COGN SKILLS INITIAL 15 MIN 7/3/2023 Mariely Bridges CF-SLP  1    62100112193 HC ST DEV OF COGN SKILLS EACH ADDT'L 15 MIN 7/3/2023 Mariely Bridges CF-VIKI  3                             ZACH [place]  7/3/2023

## 2023-07-03 NOTE — PROGRESS NOTES
LOS: 27 days   Patient Care Team:  Liza Oconnell III, NP-C as PCP - General (Family Medicine)  Corey Lao Jr., MD (Inactive) as Consulting Physician (Urology)      KRIS TAYLOR  1950      ADMITTING DIAGNOSIS:  Stroke  Valvular heart disease status post repair/replacement      Subjective   Fatigue is still an issue.  Gets winded walking around the rehab unit.  Edema is better in the legs.         Objective     Vitals:    07/03/23 0717   BP:    Pulse: 100   Resp: 18   Temp:    SpO2: 97%       PHYSICAL EXAM:   MENTAL STATUS -  AWAKE / ALERT  HEENT-   LUNGS - CTA,    Sternotomy incision -dressed  HEART-sinus with occasional ectopy  ABD - NORMOACTIVE BOWEL SOUNDS, SOFT, NT.   EXT -1+ pedal edema bilateral lower extremities,   NEURO -oriented to person place time and situation.    Speech is fluent.    Good strength bilaterally          MEDICATIONS  Scheduled Meds:aspirin, 81 mg, Oral, Daily  atorvastatin, 40 mg, Oral, Nightly  budesonide-formoterol, 2 puff, Inhalation, BID - RT  calcium 500 mg vitamin D 5 mcg (200 UT), 1 tablet, Oral, Daily  colchicine, 0.6 mg, Oral, Daily  guaiFENesin, 600 mg, Oral, Q12H  insulin glargine, 20 Units, Subcutaneous, Nightly  insulin glargine, 20 Units, Subcutaneous, Daily  insulin lispro, 3-14 Units, Subcutaneous, 4x Daily AC & at Bedtime  insulin lispro, 6 Units, Subcutaneous, TID With Meals  magnesium oxide, 400 mg, Oral, Daily  melatonin, 3 mg, Oral, Nightly  metoprolol succinate XL, 25 mg, Oral, Q24H  pantoprazole, 40 mg, Oral, Q AM  sodium chloride, 3 mL, Intravenous, Q12H  tiotropium bromide monohydrate, 2 puff, Inhalation, Daily - RT  torsemide, 60 mg, Oral, Daily      Continuous Infusions:   PRN Meds:.  acetaminophen **OR** [DISCONTINUED] acetaminophen **OR** [DISCONTINUED] acetaminophen    bisacodyl    calcium carbonate    dextrose    dextrose    diphenhydrAMINE    docusate sodium    glucagon (human recombinant)     hydrocortisone-bacitracin-zinc oxide-nystatin    ipratropium-albuterol    ondansetron **OR** ondansetron    senna    sodium chloride    traMADol      RESULTS  Glucose   Date/Time Value Ref Range Status   07/03/2023 0713 142 (H) 70 - 130 mg/dL Final     Comment:     Meter: [id number] : [id number] Jamila Guerrier NA   07/02/2023 2019 293 (H) 70 - 130 mg/dL Final     Comment:     Meter: [id number] : [id number] Jonatan Saul NA   07/02/2023 1624 320 (H) 70 - 130 mg/dL Final     Comment:     Meter: [id number] : [id number] Roe Herman    07/02/2023 1125 138 (H) 70 - 130 mg/dL Final     Comment:     Meter: [id number] : [id number] Roe Herman NA   07/02/2023 0724 120 70 - 130 mg/dL Final     Comment:     Meter: [id number] : [id number] Roe Herman NA   07/01/2023 2007 278 (H) 70 - 130 mg/dL Final     Comment:     Meter: [id number] : [id number] Kwasi Bangura    07/01/2023 1618 274 (H) 70 - 130 mg/dL Final     Comment:     MAAME GONZALEZIED RN Notified R and V Meter: [id number] : [id number] Roe [facility]   07/01/2023 1136 119 70 - 130 mg/dL Final     Comment:     Meter: [id number] : [id number] Seth Bermudez RN     Results from last 7 days   Lab Units 06/29/23  0705 06/28/23  0753 06/27/23  0710   WBC 10*3/mm3 8.23 8.52 7.07   HEMOGLOBIN g/dL 8.9* 9.5* 9.3*   HEMATOCRIT % 26.2* 28.3* 28.0*   PLATELETS 10*3/mm3 277 290 255     Results from last 7 days   Lab Units 07/02/23  0644 06/30/23  0719 06/28/23  0753   SODIUM mmol/L 142 140 140   POTASSIUM mmol/L 3.1* 3.2* 3.8   CHLORIDE mmol/L 101 98 101   CO2 mmol/L 29.0 29.6* 29.6*   BUN mg/dL 13 15 16   CREATININE mg/dL 1.35* 1.31* 1.26   CALCIUM mg/dL 8.6 9.2 9.5   BILIRUBIN mg/dL  --  0.5  --    ALK PHOS U/L  --  115  --    ALT (SGPT) U/L  --  11  --    AST (SGOT) U/L  --  9  --    GLUCOSE mg/dL 113* 135* 127*      [facility] Reference Range & Units 05/23/23 14:29   Hemoglobin A1C 4.80 - 5.60 % 9.20 (H)   Total Cholesterol 0 - 200  mg/dL 155   HDL Cholesterol 40 - 60 mg/dL 41   LDL Cholesterol  0 - 100 mg/dL 81   Triglycerides 0 - 150 mg/dL 197 (H)   (H): Data is abnormally high     Latest Reference Range & Units 05/31/23 08:03   25 Hydroxy, Vitamin D 30.0 - 100.0 ng/ml 20.0 (L)   (L): Data is abnormally low    Lower extremity venous duplex-June 19, 2023-Chronic right lower extremity superficial thrombophlebitis noted in the small saphenous.     ASSESSMENT and PLAN    Acute ischemic left MCA stroke    Status post CVA-Scattered  punctate restricted diffusion acute infarct in the bilateral frontal and   left parietal occipital cortices.        Impaired Cognition/impaired mobility/impaired self-care     Aphasia-resolved     Right hand apraxia-resolved     Encephalopathy-improving     Dysphagia/nutrition-healthy heart 2-3 g sodium, consistent carb, no mixed consistency, regular texture, nectar thick liquid  June 7-videofluoroscopic swallow study planned tomorrow  June 8-swallow study today-advance to regular solid, no mixed consistency, thin liquids by cup.  No straws.     Stroke prophylaxis-Eliquis/aspirin/atorvastatin     History of severe mitral regurgitation and annular dilatation, moderate to severe tricuspid regurgitation-  status post May 24, 2023 - 1. Mitral valve repair with a 28 mm physio II ring annuloplasty with residual mild to moderate MR  2.  Mitral valve replacement with a 29 mm Schwartz II porcine prosthesis  3.  Tricuspid valve repair with 28 mm physio tricuspid ring  4.  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, returned June 26-better on colchicine     Volume status-diuretics per Cardiology/nephrology  June 30-Per nephrology-[continue furosemide 80 mg IV twice a day, hope to transition to oral diuretics during his admission   ]  July 1-changed to torsemide     Severe  pulmonary hypertension  Obstructive sleep apnea     Interstitial lung disease status post COVID-19 infection-steroid tapered off per pulmonology June 7  Home inhaler regimen  Chronic steroid use with cushingoid syndrome-steroids now tapered off   June 23-change back to his home regimen Spiriva 2 puffs once a day and Symbicort 160/4.5 two puffs twice a day  July 3 - Continued ZUÑIGA.  Pulmonary to see in follow up. ECHO and CXR ordered.        Leukocytosis-reactive/steroid administration     Postoperative anemia     Rquiiislwzjzvmvu-lragiocnuew-jsscofei     Chronic kidney disease stage III-baseline creatinine 1.2  Renal insufficiency - Nephrology following        Diabetes mellitus type 2-Jardiance/Lantus/sliding scale insulin-uncontrolled  Off Jardiance secondary to renal function.  Adjusting short and long acting insulin.       BPH/urinary retention-history of TURP  June 14-void 300 cc with postvoid residual 205 cc.         Pain management-tramadol-/Tylenol Dk report reviewed              TEAM CONF - JUNE 8 - ENDURANCE POOR. BED MOD. TRANSFERS MOD. GAIT 25 FEET MOD ASSIST RW. SHOWER TRANSFERS MOD A.   BATH MOD ASSIST. LBD MAX. UBD MIN. TOILETING MAX.   Patient is currently on nectar liquids VFSS TODAY.   BIMS SUMMARY SCORE: 9 Moderately impaired   DIFFICULTY WITH SUSTAINED AND FOCAL ATTENTION.  CONTINENT / INCONTINENT  ELOS - 2 -3 WEEKS    TEAM CONF - Kari 15 - BED MOD. TRANSFERS MIN. 4 STAIRS MIN X 2. SATS 97% OR HIGHER WITH GAIT WITH FATIGUE, GAIT 80 FEET MIN ASSIST RW. TOILET TRANSFERS MIN. SHOWER TRANSFERS MIN MOD. BATH MIN. LBD MIN MOD. UBD MIN. GROOMING SET UP. TOILETING MIN MOD. DRESSING TAKES 30 MINUTES WIETH SLOW PROCESSING. EDEMA - JOBST STOCKINGS. MILD DYSPHGAIA, REG THIN BY CUP, NO STRAWS. CLQT MODERATE DEFICITS, DIFFICULTY WITH ATTENTION AND MEMORY, PROLONGED PROCESSING SPEED. BNE FOLLOWING. VARIABLE INTAKE - DIETITIAN FOLLOWING. RASH ON ABD AND LEFT CHEST, POSSIBLY CONTACT DERMATITIS. TORSEMIDE  ADDED.  ELOS - TWO WEEKS.    TEAM CONF - June 22- DIURETIC ADJUSTED BY NEPRHOLOGY. PATIENT DOES NOT FOLLOW 1500 CC FLUID RESTRICTION. TRANSFERS CTG. 4 STAIRS MIN X 2. GAIT 80 FEET CTG RW.   TOILET AND SHOWER TRANSFERS CTG. BATH MIN-CTG. LBD MIN-CTG FOR COMPRESSION STOCKINGS. UBD SBA. TOILETING CTG AS ASKS FOR HELP.   SWALLOW - REGULAR SOLID, THIN LIQUID BY CUP. MOSTLY CONTINENT.   Orientation: WNL  Attention: WNL to Mildly Impaired  Executive Functioning: Mildly Impaired  Abstract Reasoning: WNL  Arithmetic: Mildly Impaired  Visuospatial Perception: WNL  Visuospatial Praxis: WNL for Figure Copy; Severely Impaired Coding related to slowed processing speed.  Verbal Memory: Moderately to Severely Impaired  Visual Memory: Mildly Impaired  Emotional: Some frustration regarding current level of functioning, but minimal anxiety and depression symptoms reported on the GDS and BROOKE.  -demonstrating moderate to severe verbal memory deficits and slowed processing speed that are consistent with the location of his stroke.  Word-finding difficulties are also present, but are more evident conversationally than during testing.  ELOS - ONE WEEK- June 29       TEAM CONF - June 29 - TRANSFERS CTG. 4 STAIRS CTG . DID 8 ON Tuesday. AT HOME 14 STAIRS, LANDING, THEN 8 STAIRS TO SECOND FLOOR. GAIT  RW CTG SBA. TOILET TRANSFERS CTG SBA. SHOWER TRANSFERS CTG  BATH SBA CTG. LBD MIN. UBD SBA. GROOMING SET UP. TOILETING MIN CTG. EASILY DISTRACTED ON TASK. PROCESSING SPEED IMPROVING, BUT STILL SLOWED WHEN TIRED.   DRESSLER SYNDROME - TRAMADOL HELPED PAIN. ON COLCHICINE.   ELOS - WED (discharge was delayed given transfer off the unit for Dressler syndrome)       Goal is for home with outpatient cardiac rehab   therapies.  Barrier to discharge: Impaired cognition mobility self-care- work on follow, executive function, conditioning, transfers, progressive ambulation, ADLs to overcome.           Tu Silver MD,     During rounds,  used appropriate personal protective equipment including mask and gloves.  Additional gown if indicated.  Mask used was standard procedure mask. Appropriate PPE was worn during the entire visit.  Hand hygiene was completed before and after.      Patient has been staffed and discussed with attending Physician, Dr. Tu Silver.

## 2023-07-03 NOTE — PROGRESS NOTES
"Nutrition Services    Patient Name:  Vernon Solorzano  YOB: 1950  MRN: [id number]  Admit Date:  6/6/2023    Assessment Date:  07/03/23    FOLLOW UP NOTE - CLINICAL NUTRITION    Encounter Information        Reason for Encounter Follow-up protocol, rehab   Current Issues S/p acute ischemic L MCA stroke following heart valve surgery, on-going therapy. Planned discharge last week delayed due to transfer off the unit 2/2 Dressler syndrome. Persistent SOB noted. Edema noted to be improved. Intake/appetite stable.      Current Nutrition Orders & Evaluation of Intake       Oral Nutrition     Current PO Diet Diet: Cardiac Diets, Fluid Restriction (240 mL/tray) Diets, Diabetic Diets; Healthy Heart (2-3 Na+); Consistent Carbohydrate; Other (Specify mL/day) (1200); Texture: Regular Texture (IDDSI 7); Fluid Consistency: Thin (IDDSI 0)   Supplement    PO Evaluation    % PO Intake/# of days % most meals, 6/29 - present   Factors Affecting Intake  No factors at this time     Anthropometrics         Height   Weight Height: 185.4 cm (73\")  Weight: 113 kg (248 lb 14.4 oz) (07/03/23 0500)   BMI kg/m2 Body mass index is 32.84 kg/m².  Obese, Class I (30 - 34.9)   Weight trend Stable  ..      07/02/23  0500 07/02/23  0556 07/03/23  0500   Weight: 112 kg (247 lb 9.2 oz) 112 kg (247 lb 4.8 oz) 113 kg (248 lb 14.4 oz)         Physical Findings          Physical Appearance alert, flat affect, obese, oriented, room air   Oral/Mouth Cavity tooth or teeth missing   Edema  lower extremity , upper extremity, 2+ (mild), 3+ (moderate)   Gastrointestinal normoactive, last bowel movement:7/1   Skin  bruising, pale, surgical incision   Tubes/Drains/Lines none     Labs       Pertinent Labs Reviewed, listed below     Results from last 7 days   Lab Units 07/02/23  0644 06/30/23  0719 06/28/23  0753   SODIUM mmol/L 142 140 140   POTASSIUM mmol/L 3.1* 3.2* 3.8   CHLORIDE mmol/L 101 98 101   CO2 mmol/L 29.0 29.6* 29.6*   BUN mg/dL 13 " 15 16   CREATININE mg/dL 1.35* 1.31* 1.26   CALCIUM mg/dL 8.6 9.2 9.5   BILIRUBIN mg/dL  --  0.5  --    ALK PHOS U/L  --  115  --    ALT (SGPT) U/L  --  11  --    AST (SGOT) U/L  --  9  --    GLUCOSE mg/dL 113* 135* 127*       Results from last 7 days   Lab Units 07/01/23  0630 06/30/23  0719 06/29/23  0705 06/28/23  0753   MAGNESIUM mg/dL 2.2 2.0  --  2.1   PHOSPHORUS mg/dL  --  4.0  --  3.3   HEMOGLOBIN g/dL  --   --  8.9* 9.5*   HEMATOCRIT %  --   --  26.2* 28.3*   WBC 10*3/mm3  --   --  8.23 8.52       Results from last 7 days   Lab Units 06/29/23  0705 06/28/23 0753 06/27/23  0710   PLATELETS 10*3/mm3 277 290 255       COVID19   Date Value Ref Range Status   05/23/2023 Not Detected Not Detected - Ref. Range Final     Lab Results   Component Value Date    HGBA1C 9.20 (H) 05/23/2023          Medications           Scheduled Medications aspirin, 81 mg, Oral, Daily  atorvastatin, 40 mg, Oral, Nightly  budesonide-formoterol, 2 puff, Inhalation, BID - RT  calcium 500 mg vitamin D 5 mcg (200 UT), 1 tablet, Oral, Daily  colchicine, 0.6 mg, Oral, Daily  guaiFENesin, 600 mg, Oral, Q12H  insulin glargine, 20 Units, Subcutaneous, Nightly  insulin glargine, 20 Units, Subcutaneous, Daily  insulin lispro, 3-14 Units, Subcutaneous, 4x Daily AC & at Bedtime  insulin lispro, 6 Units, Subcutaneous, TID With Meals  magnesium oxide, 400 mg, Oral, Daily  melatonin, 3 mg, Oral, Nightly  metoprolol succinate XL, 25 mg, Oral, Q24H  pantoprazole, 40 mg, Oral, Q AM  sodium chloride, 3 mL, Intravenous, Q12H  tiotropium bromide monohydrate, 2 puff, Inhalation, Daily - RT  torsemide, 60 mg, Oral, Daily       Infusions     PRN Medications   acetaminophen **OR** [DISCONTINUED] acetaminophen **OR** [DISCONTINUED] acetaminophen    bisacodyl    calcium carbonate    dextrose    dextrose    diphenhydrAMINE    docusate sodium    glucagon (human recombinant)    hydrocortisone-bacitracin-zinc oxide-nystatin    ipratropium-albuterol    ondansetron  **OR** ondansetron    senna    sodium chloride    traMADol     PLAN OF CARE  Intervention Goal        Intervention Goal(s) Maintain nutrition status, Improved nutrition related labs, Continue positive trend, Advance diet, No significant weight loss, and Appropriate weight loss     Nutrition Intervention        RD Action Advise alternative selection, Advise available snack, Adjusted supplement, Encourage intake, Follow Tx Progress, and Care plan reviewed     Prescription        Diet Prescription    Supplement Prescription    EN/PN Prescription    Prescription Ordered Continue same per protocol, No changes at this time   --  Monitor/Evaluation        Monitor Per protocol, PO intake, Supplement intake, Pertinent labs, Weight, Skin status, GI status, Symptoms   Discharge Plan Pending clinical course   Education Will educate as needed       Electronically signed by:  Cher Weaver RD  07/03/23 13:17 EDT

## 2023-07-03 NOTE — PROGRESS NOTES
[facility] Progress Note      Patient Name: Vernon Solorzano  : 1950  MRN: [id number]  Primary Care Physician:  Liza Oconnell III, RACHAEL  Date of admission: 2023    Subjective     Interval History:   Follow up CKD III.   Overnight no event    Patient with persistent shortness of breath with history of COVID in the past   Extremity edema improving with medical management and titration of diuretics  Working with PT and OT      Review of Systems:   As noted above    Objective     Vitals:   Temp:  [97.7 °F (36.5 °C)-98.2 °F (36.8 °C)] 98.2 °F (36.8 °C)  Heart Rate:  [100-105] 100  Resp:  [18-20] 18  BP: ()/(58-78) 94/58    Intake/Output Summary (Last 24 hours) at 7/3/2023 0805  Last data filed at 7/3/2023 0048  Gross per 24 hour   Intake 883 ml   Output 2000 ml   Net -1117 ml         Physical Exam:    General Appearance: Patient is alert, oriented.  Ill looking.  Questions skin: warm and dry  HEENT: oral mucosa normal, nonicteric sclera  Neck: supple, no JVD  Lungs: Clear to auscultation.   Heart: RRR, normal S1 and S2  Abdomen: soft, nontender, + bs, distended.  Extremities trace lower ext edema.  Erythematous rash noted on the anterior aspect of right leg    Scheduled Meds:     aspirin, 81 mg, Oral, Daily  atorvastatin, 40 mg, Oral, Nightly  budesonide-formoterol, 2 puff, Inhalation, BID - RT  calcium 500 mg vitamin D 5 mcg (200 UT), 1 tablet, Oral, Daily  colchicine, 0.6 mg, Oral, Daily  guaiFENesin, 600 mg, Oral, Q12H  insulin glargine, 20 Units, Subcutaneous, Nightly  insulin glargine, 20 Units, Subcutaneous, Daily  insulin lispro, 3-14 Units, Subcutaneous, 4x Daily AC & at Bedtime  insulin lispro, 4 Units, Subcutaneous, TID With Meals  magnesium oxide, 400 mg, Oral, Daily  melatonin, 3 mg, Oral, Nightly  metoprolol succinate XL, 25 mg, Oral, Q24H  pantoprazole, 40 mg, Oral, Q AM  sodium chloride, 3 mL, Intravenous, Q12H  tiotropium bromide monohydrate,  2 puff, Inhalation, Daily - RT  torsemide, 60 mg, Oral, Daily      IV Meds:        Results Reviewed:   I have personally reviewed the results from the time of this admission to 7/3/2023 08:05 EDT     Results from last 7 days   Lab Units 07/02/23  0644 06/30/23  0719 06/28/23  0753   SODIUM mmol/L 142 140 140   POTASSIUM mmol/L 3.1* 3.2* 3.8   CHLORIDE mmol/L 101 98 101   CO2 mmol/L 29.0 29.6* 29.6*   BUN mg/dL 13 15 16   CREATININE mg/dL 1.35* 1.31* 1.26   CALCIUM mg/dL 8.6 9.2 9.5   BILIRUBIN mg/dL  --  0.5  --    ALK PHOS U/L  --  115  --    ALT (SGPT) U/L  --  11  --    AST (SGOT) U/L  --  9  --    GLUCOSE mg/dL 113* 135* 127*         Estimated Creatinine Clearance: 63.2 mL/min (A) (by C-G formula based on SCr of 1.35 mg/dL (H)).    Results from last 7 days   Lab Units 07/01/23  0630 06/30/23  0719 06/28/23  0753   MAGNESIUM mg/dL 2.2 2.0 2.1   PHOSPHORUS mg/dL  --  4.0 3.3               Results from last 7 days   Lab Units 06/29/23  0705 06/28/23  0753 06/27/23  0710   WBC 10*3/mm3 8.23 8.52 7.07   HEMOGLOBIN g/dL 8.9* 9.5* 9.3*   PLATELETS 10*3/mm3 277 290 255               Assessment / Plan     ASSESSMENT:    1.  Acute kidney injury on top of CKD III, baseline creatinine 1.2.  Peak 1.68.  Creatinine  stable around 1.3  , most recent down to 1.2-1.3  2. MR now sp MV replacement, tissue and TV repair, SU closure 5/24/23.  3. Chronic steroid use after COVID 19 PNA.  4. Anemia of CKD and postoperative anemia.  Hemoglobin is 8.9.  No evidence of active bleeding  5. 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  Working with physical occupational therapy  6. diabetes mellitus type 2. On diabetic diet and insulin regimen   7. Aflutter.Cardioversion 6/2/23. On chronic anticoagulation eliquis and rate controlled  metoprolol.   8.  Hypokalemia on KCL replacement protocol, volume diuretics  9.  Pleuritic chest pain likely secondary to pericarditis possible Dressler syndrome on colchicine    PLAN:    -Continue  torsemide to 60 mg daily and  KCL replacement   -Despite diuretic therapy patient continues to to have shortness of breath.  Dr. Solorzano is requesting a pulmonary evaluation after history of COVID and use of inhalers with persistent dyspnea   - Will follow labs closely         Jesús Braxton MD  07/03/23  08:05 EDT    [facility]  [phone]

## 2023-07-03 NOTE — CONSULTS
Patient Care Team:  Liza Oconnell III, NP-C as PCP - General (Family Medicine)  Corey Lao Jr., MD (Inactive) as Consulting Physician (Urology)      Subjective     Patient is a 72 y.o. male.  Asked to see regarding dyspnea Dr. Vernon Solorzano is well-known to me he had COVID had post COVID shortness of breath question of long COVID subsequently found to have severe mitral regurgitation and on 5/24/2023 had mitral valve repair with physio ring with residual mild to moderate MR and he had a mitral valve replacement with a porcine prosthesis he had tricuspid valve repair with a 20 mm physio tricuspid ring and left atrial appendage closure.  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  He has severe pulmonary hypertension he has underlying obstructive sleep apnea and there is a question of any underlying interstitial disease.  He has chronic kidney disease stage III he is a type II diabetic with BPH and had urinary retention.  So sustained strokes with his procedures she has punctate left frontal and left parietal he has been written on room air for days his oxygen saturations have been 95 to 98%.  Last hemoglobin on the 29th was 8.9 and that was relatively stable he had a CT angiogram of the chest back on 24 June which showed a couple areas of minimal linear atelectasis scarring in the left base lesser degree on the right base there was a big pericardial effusion at the time and poststernotomy changes.  No evidence of PE.  Patient is still having shortness of breath that is only with exertion not at rest and no PND or orthopnea.  He notes that his oxygen saturations have been good I actually spoke with therapy and they walked him and checked him he did 20 stairs yesterday got very short of breath but his oxygen was in the upper 90s.  He walked with me we did around 100 feet he got  very short of breath and had to stop his oxygen saturations when he stopped were 98% his heart rate was 134.  Having no chest pain he says he still having a little bit of the pleuritic pain from his Dressler's every now and then.  He is having no jaw shoulder arm pain when he is doing this.  No nausea .      Review of Systems:  As above      History  Past Medical History:   Diagnosis Date    Allergic rhinitis     Arthritis     BPH (benign prostatic hyperplasia)     Diabetes mellitus     TYPE 2    Foraminal stenosis of cervical region     C5-C6    GERD (gastroesophageal reflux disease)     Hemorrhoids     History of urinary retention 2010    S/P TURP    Hyperlipidemia     Macular degeneration     PING (obstructive sleep apnea) 01/07/2016    MILD, SEES DR. AMARILIS MORGAN     Past Surgical History:   Procedure Laterality Date    CARDIAC CATHETERIZATION N/A 5/11/2023    Procedure: Right Heart Cath;  Surgeon: Corey Gaffney MD;  Location: [facility] CATH INVASIVE LOCATION;  Service: Cardiology;  Laterality: N/A;    CARDIAC CATHETERIZATION N/A 5/11/2023    Procedure: Left Heart Cath;  Surgeon: Corey Gaffney MD;  Location: [facility] CATH INVASIVE LOCATION;  Service: Cardiology;  Laterality: N/A;    CARDIAC CATHETERIZATION N/A 5/11/2023    Procedure: Left ventriculography;  Surgeon: Corey Gaffney MD;  Location: [facility] CATH INVASIVE LOCATION;  Service: Cardiology;  Laterality: N/A;    CARDIAC CATHETERIZATION N/A 5/11/2023    Procedure: Coronary angiography;  Surgeon: Corey Gaffney MD;  Location: [facility] CATH INVASIVE LOCATION;  Service: Cardiology;  Laterality: N/A;    COLONOSCOPY N/A     WNL PER PT, NO RECORDS,     COLONOSCOPY N/A 04/07/2009    DR. GORGE BENAVIDEZ AT [place]    MITRAL VALVE REPAIR/REPLACEMENT N/A 5/24/2023    Procedure: MITRAL VALVE REPAIR/REPLACEMENT TRICUSPID VALVE REPAIR/REPLACEMENT TRANSESOPHAGEAL ECHOCARDIOGRAM WITH ANESTHESIA;  Surgeon: George Frey MD;  Location: [facility];  Service:  Cardiothoracic;  Laterality: N/A;    PROSTATE SURGERY N/A 11/12/2010    TURP, DR. BRIGHT COLLAZO AT [facility]    SKIN TAG REMOVAL N/A 12/20/2017    ANAL SKIN TAG X2, PERFORMED IN OFFICE, DR. LISA ORANTES    TURP / TRANSURETHRAL INCISION / DRAINAGE PROSTATE  2010    Dr. Goodrich     Social History     Socioeconomic History    Marital status:    Tobacco Use    Smoking status: Never     Passive exposure: Never    Smokeless tobacco: Never   Vaping Use    Vaping Use: Never used   Substance and Sexual Activity    Alcohol use: Yes     Alcohol/week: 1.0 standard drink     Types: 1 Drinks containing 0.5 oz of alcohol per week     Comment: RARELY    Drug use: No    Sexual activity: Defer     Partners: Female     Family History   Problem Relation Age of Onset    Lung cancer Mother     Stroke Father     Dementia Father     Hyperlipidemia Brother     Hypertension Brother     Heart disease Brother     Diabetes Brother     Colon polyps Brother     Colon polyps Brother          Allergies:  Patient has no known allergies.    Medications:  Prior to Admission medications    Medication Sig Start Date End Date Taking? Authorizing Provider   aspirin 81 MG tablet Take 1 tablet by mouth Daily.   Yes Jv Beatty MD   atorvastatin (LIPITOR) 10 MG tablet Take 1 tablet by mouth Every Night.   Yes Jv Beatty MD   furosemide (LASIX) 40 MG tablet Take 1 tablet by mouth 2 (Two) Times a Day. 5/12/23  Yes Corwin Hobson MD   glucose blood (OneTouch Verio) test strip 1 each by Other route As Needed. Use as instructed   Yes Jv Beatty MD   insulin degludec (Tresiba FlexTouch) 100 UNIT/ML solution pen-injector injection Inject 50 Units under the skin into the appropriate area as directed Daily.   Yes Jv Beatty MD   irbesartan (AVAPRO) 75 MG tablet Take 1 tablet by mouth Daily. 4/24/23  Yes Jv Beatty MD   Jardiance 25 MG tablet tablet Take 1 tablet by mouth Daily. 5/1/23  Yes Jaci  MD Jv   metoclopramide (REGLAN) 10 MG tablet Take 1 tablet by mouth Every Night. 4/21/23  Yes Jv Beatty MD   montelukast (SINGULAIR) 10 MG tablet Take 1 tablet by mouth Every Night.   Yes Jv Beatty MD   Multiple Vitamins-Minerals (PRESERVISION AREDS 2+MULTI VIT PO) Take 1 tablet by mouth 2 (Two) Times a Day.   Yes Jv Beatty MD   omeprazole (priLOSEC) 40 MG capsule Take 1 capsule by mouth Every Night.   Yes Jv Beatty MD   potassium chloride (MICRO-K) 10 MEQ CR capsule Take 2 capsules by mouth Daily. 5/3/23  Yes Jv Beatty MD   predniSONE (DELTASONE) 1 MG tablet Take 9 tablets by mouth Daily. 3/27/23  Yes Jv Beatty MD   SITagliptin-metFORMIN HCl ER (Janumet XR) 100-1000 MG tablet Take 1 tablet by mouth Daily.   Yes Jv Beatty MD   Symbicort 160-4.5 MCG/ACT inhaler Inhale 2 puffs 2 (Two) Times a Day. 5/1/23  Yes Jv Beatty MD   tiotropium bromide monohydrate (SPIRIVA RESPIMAT) 2.5 MCG/ACT aerosol solution inhaler Inhale 2 puffs Daily.   Yes Jv Beatty MD   Calcium Carbonate-Vitamin D (calcium 500 mg vitamin D 5 mcg, 200 UT,) 500-5 MG-MCG tablet per tablet Take 1 tablet by mouth 2 (Two) Times a Day.    Jv Beatty MD   guaifenesin (ROBITUSSIN) 100 MG/5ML liquid Take 20 mL by mouth Every 8 (Eight) Hours.    Jv Beatty MD   pantoprazole (PROTONIX) 40 MG EC tablet Take 1 tablet by mouth Daily.    Jv Beatty MD   torsemide (DEMADEX) 100 MG tablet Take 1 tablet by mouth 2 (Two) Times a Day.    Jv Beatty MD   traMADol (ULTRAM) 50 MG tablet Take 1 tablet by mouth Every 6 (Six) Hours As Needed for Moderate Pain.    Jv Beatty MD     aspirin, 81 mg, Oral, Daily  atorvastatin, 40 mg, Oral, Nightly  budesonide-formoterol, 2 puff, Inhalation, BID - RT  calcium 500 mg vitamin D 5 mcg (200 UT), 1 tablet, Oral, Daily  colchicine, 0.6 mg, Oral, Daily  guaiFENesin, 600  "mg, Oral, Q12H  insulin glargine, 20 Units, Subcutaneous, Nightly  insulin glargine, 20 Units, Subcutaneous, Daily  insulin lispro, 3-14 Units, Subcutaneous, 4x Daily AC & at Bedtime  insulin lispro, 6 Units, Subcutaneous, TID With Meals  magnesium oxide, 400 mg, Oral, Daily  melatonin, 3 mg, Oral, Nightly  metoprolol succinate XL, 25 mg, Oral, Q24H  pantoprazole, 40 mg, Oral, Q AM  sodium chloride, 3 mL, Intravenous, Q12H  tiotropium bromide monohydrate, 2 puff, Inhalation, Daily - RT  torsemide, 60 mg, Oral, Daily           Objective     Vital Signs  Vital Sign Min/Max for last 24 hours  Temp  Min: 97.6 °F (36.4 °C)  Max: 98.2 °F (36.8 °C)   BP  Min: 94/58  Max: 113/76   Pulse  Min: 100  Max: 103   Resp  Min: 18  Max: 20   SpO2  Min: 95 %  Max: 97 %   No data recorded   Weight  Min: 113 kg (248 lb 14.4 oz)  Max: 113 kg (248 lb 14.4 oz)       Intake/Output Summary (Last 24 hours) at 7/3/2023 1449  Last data filed at 7/3/2023 1143  Gross per 24 hour   Intake 650 ml   Output 1000 ml   Net -350 ml     I/O this shift:  In: 480 [P.O.:480]  Out: 600 [Urine:600]  Last Weight and Admission Weight        07/03/23  0500   Weight: 113 kg (248 lb 14.4 oz)     Flowsheet Rows      Flowsheet Row First Filed Value   Admission Height 185.4 cm (73\") Documented at 06/06/2023 1641   Admission Weight 115 kg (254 lb 10.1 oz) Documented at 06/06/2023 1641            Body mass index is 32.84 kg/m².           Physical Exam:  General Appearance: Well-developed white male he looks better than when I last saw him he is losing some of that cushingoid facies that he had  Eyes: Conjunctiva are clear and anicteric  ENT: Mucous membranes are moist no erythema no exudates  Neck: No jugular venous tension trachea midline  Lungs: Clear no wheezes no rales no rhonchi chest expansion symmetric there is no dullness on percussion.  Cardiac: Regular rate rhythm at rest he is tachycardic still regular with exertion I do not hear a murmur or rub  Abdomen: " Soft nontender obese no palpable hepatosplenomegaly  : Not examined  Musculoskeletal: Poststernotomy changes looks like sternum is healing well  Skin: Warm and dry no jaundice no petechiae  Neuro: He is alert oriented cooperative he is walking he has to use a walker  Extremities/P Vascular: No clubbing no cyanosis really does not have any significant edema and has palpable radial and dorsalis pedis pulses  MSE: He is in pretty good spirits      Labs:  Results from last 7 days   Lab Units 07/02/23  0644 07/01/23  0630 06/30/23  0719 06/28/23  0753 06/27/23  0710   GLUCOSE mg/dL 113*  --  135* 127* 128*   SODIUM mmol/L 142  --  140 140 138   POTASSIUM mmol/L 3.1*  --  3.2* 3.8 3.1*   MAGNESIUM mg/dL  --  2.2 2.0 2.1  --    CO2 mmol/L 29.0  --  29.6* 29.6* 29.0   CHLORIDE mmol/L 101  --  98 101 98   ANION GAP mmol/L 12.0  --  12.4 9.4 11.0   CREATININE mg/dL 1.35*  --  1.31* 1.26 1.31*   BUN mg/dL 13  --  15 16 18   BUN / CREAT RATIO  9.6  --  11.5 12.7 13.7   CALCIUM mg/dL 8.6  --  9.2 9.5 8.9   ALK PHOS U/L  --   --  115  --   --    TOTAL PROTEIN g/dL  --   --  7.1  --   --    ALT (SGPT) U/L  --   --  11  --   --    AST (SGOT) U/L  --   --  9  --   --    BILIRUBIN mg/dL  --   --  0.5  --   --    ALBUMIN g/dL  --   --  3.6 3.6  --    GLOBULIN gm/dL  --   --  3.5  --   --      Estimated Creatinine Clearance: 63.2 mL/min (A) (by C-G formula based on SCr of 1.35 mg/dL (H)).      Results from last 7 days   Lab Units 06/29/23  0705 06/28/23  0753 06/27/23  0710   WBC 10*3/mm3 8.23 8.52 7.07   RBC 10*6/mm3 3.08* 3.42* 3.35*   HEMOGLOBIN g/dL 8.9* 9.5* 9.3*   HEMATOCRIT % 26.2* 28.3* 28.0*   MCV fL 85.1 82.7 83.6   MCH pg 28.9 27.8 27.8   MCHC g/dL 34.0 33.6 33.2   RDW % 14.1 13.8 14.0   RDW-SD fl 43.4 41.3 42.3   MPV fL 9.1 9.3 9.3   PLATELETS 10*3/mm3 277 290 255                 Results from last 7 days   Lab Units 06/30/23  0719   TSH uIU/mL 1.830             Microbiology Results (last 10 days)       Procedure Component  Value - Date/Time    Blood Culture - Blood, Arm, Left [[id number]]  (Normal) Collected: 06/25/23 0955    Lab Status: Final result Specimen: Blood from Arm, Left Updated: 06/30/23 1015     Blood Culture No growth at 5 days    Blood Culture - Blood, Arm, Right [[id number]]  (Normal) Collected: 06/25/23 0944    Lab Status: Final result Specimen: Blood from Arm, Right Updated: 06/30/23 1015     Blood Culture No growth at 5 days              Diagnostics:  CT Angiogram Chest    Result Date: 6/24/2023  CT ANGIOGRAPHY OF THE CHEST WITH INTRAVENOUS CONTRAST AND 3-D RECONSTRUCTIONS  HISTORY: Recent mitral valve and tricuspid valve replacement surgery. Severe upper left chest pain last night. Evaluate for pulmonary embolus.  The CT scan was performed as an emergency procedure through the chest with CT angiography protocol using intervenous contrast and 3-D reconstructions. The following findings are present: 1. The pulmonary arteries are well-opacified and there is no evidence of pulmonary embolus. The thoracic aorta shows no evidence of aneurysm or dissection. 2. There is some minimal strand-like atelectasis at both bases, left greater than right and the lungs are otherwise clear. There is no mediastinal or hilar or axillary adenopathy. There is a moderately large pericardial effusion particularly posteriorly where it measures up to 4 cm in thickness. This is new since the preoperative CT scan dated 09/22/2022. The patient has had recent mitral valve and tricuspid valve replacements. 3. The median sternotomy demonstrates minimal postsurgical change in the retrosternal space. There is no abnormal substernal fluid collection. The CT images through the upper liver, spleen, and both adrenal glands are unremarkable. There is a cyst involving the upper pole of the partially visualized right kidney that is unchanged.      Radiation dose reduction techniques were utilized, including automated exposure control and exposure modulation  based on body size.  This report was finalized on 6/24/2023 2:58 PM by Dr. Ignacio Teague M.D.      MRI Brain Without Contrast    Result Date: 6/5/2023  Patient: KRIS TAYLOR  Time Out: 02:00 Exam(s): MRI HEAD Without Contrast EXAM:   MR Head Without Intravenous Contrast CLINICAL HISTORY:    Reason for exam: unlateral weakness  aphasia. TECHNIQUE:   Magnetic resonance images of the head brain without intravenous contrast in multiple planes. COMPARISON:   No relevant prior studies available. FINDINGS:   Brain:  No mass.  Few scattered chronic microhemorrhage.  Scattered punctate restricted diffusion acute infarct in the bilateral frontal and left parietal occipital cortices.   Ventricles:  No hydrocephalus.   Bones joints:  Unremarkable.   Sinuses:  No acute sinusitis.   Mastoid air cells:  No effusion.   Orbits:  Unremarkable. IMPRESSION:       Scattered punctate restricted diffusion acute infarct in the bilateral frontal and left parietal occipital cortices.    Electronically signed by Sofiya Manuel MD on 06-05-23 at 0200    XR Chest 1 View    Result Date: 6/24/2023  ONE VIEW PORTABLE CHEST AT 9:53 AM  HISTORY: Mitral valve replacement 1 month ago. Upper left chest pain.  FINDINGS: There is mild-to-moderate cardiomegaly with a mitral valve prosthesis in place and there is mild generalized vascular congestion showing perhaps minimal worsening compared to the study performed 7 hours ago. However it shows improvement as well as improved lung expansion when compared to an earlier exam dated 05/30/2023.  This report was finalized on 6/24/2023 10:41 AM by Dr. Ignacio Teague M.D.      XR Chest 1 View    Result Date: 6/24/2023  SINGLE VIEW OF THE CHEST  HISTORY: Chest pain  COMPARISON: None available.  FINDINGS: There is cardiomegaly. There is no vascular congestion. Patient is status post median sternotomy and valve replacement. There is some nonspecific left basilar consolidation. No pneumothorax is identified. No  definite effusion is seen.      Nonspecific left basilar consolidation.  This report was finalized on 6/24/2023 2:59 AM by Dr. Torrie Sargent M.D.      Duplex Venous Lower Extremity - Right CAR    Result Date: 6/19/2023    Chronic right lower extremity superficial thrombophlebitis noted in the small saphenous.   All other right sided veins appeared normal.     Adult Transthoracic Echo Limited W/ Cont if Necessary Per Protocol    Result Date: 6/24/2023    Left ventricular systolic function is normal. Calculated left ventricular EF = 50.1%   Left ventricular diastolic function was not assessed.   There is a prosthetic mitral valve present.   There is a moderate (1-2cm) pericardial effusion adjacent to the left ventricle.   This was limited study. Discussed with Dr. Mccormick. Effusion does not appear to be causing tamponade. It is mostly around the left ventricle and cannot be easily approached percutaneously.     FL Video Swallow Single Contrast    Result Date: 6/8/2023  VIDEO SWALLOWING EXAMINATION BY SPEECH PATHOLOGY  Clinical: Dysphasia  Video swallowing examination performed under the direction of speech pathology. Imaging reviewed by radiologist who concurs with the findings.  Speech pathology summary:VFSS completed with radiologist present, Dr. Montalvo. Patient presents with mild oropharyngeal dysphagia which is an improvement from the previous swallow study completed on 5/31. No penetration/aspiration noted with nectar by spoon, cup, or straw (single sips). Transient penetration during the swallow with initial trial of thin by spoon. Progressed to deep penetration with trace aspiration with further trials of thin by spoon. Sensed by patient with throat clear, successful in expectorating material from airway. Exhibited penetration with thin by spoon with chin tuck strategy. No penetration or aspiration noted with thin by cup. Timely anterior posterior transit with puree. Pre-spill to the pyriforms with  mechanical soft solids. Exhibited nontransient penetration of thin liquid portion of mixed consistency. Prolonged mastication with regular solids with trace valleculae residue post swallow. No penetration or aspiration with regular solids. No penetration or aspiration with nectar by straw as liquid wash or with final trials of thin by cup.   FLUOROSCOPY TIME: 1 minute 57 seconds, 3141 images.  This report was finalized on 6/8/2023 3:56 PM by Dr. Sander Montalvo M.D.      Results for orders placed during the hospital encounter of 06/24/23    Adult Transthoracic Echo Limited W/ Cont if Necessary Per Protocol    Interpretation Summary    Left ventricular systolic function is normal. Calculated left ventricular EF = 50.1%    Left ventricular diastolic function was not assessed.    There is a prosthetic mitral valve present.    There is a moderate (1-2cm) pericardial effusion adjacent to the left ventricle.    This was limited study. Discussed with Dr. Mccormick. Effusion does not appear to be causing tamponade. It is mostly around the left ventricle and cannot be easily approached percutaneously.      Chest x-ray just done no report I reviewed it and looks very similar I think to the CT scan he had on 24 June there is a couple little areas of linear atelectasis or scarring in both lung bases otherwise the lungs look pretty clear there is no significant pleural effusions or poststernotomy changes and the cardiac silhouette is a little generous.    Assessment & Plan     Dyspnea with exertion patient does not feel he should really be short of breath at this point in time having gone through rehab.  I do not think this is a primary pulmonary issue he had a CT angiogram a couple of weeks ago he was having some dyspnea then as well he really does not have significant interstitial changes he is got a couple little areas of atelectasis or scarring at the lung bases but these are really pretty trivial.  I think the most telling  finding is that his oxygen saturations are excellent and this has been documented by himself by therapy and by myself with him exerting to a level of shortness of breath where he could not go much further.  His heart rate was high I think this is probably an indication that he is significantly out of shape/deconditioned he really has not done much for a year then he had this big surgery he has been laying around he is really not getting a lot of cardiac conditioning and rehab he is trying to recover from his stroke and learned function safely and normally.  Does have some mild anemia that may contribute a little but usually do not transfuse at this level.  I am sure his skin to take months for his heart to recover from all of this and that probably contributes some as well.  We always have to worry about the pericardial effusion I do not see any thing on exam to suggest impending tamponade I believe an echocardiogram is pending to help rule this out.  I explained to him that I would expect it to be 6 months to a year of hard work for him to get back to where he does not have dyspnea with exertion.  He says he felt reassured with this      Luis Slater Jr, MD  07/03/23  14:49 EDT    Time:

## 2023-07-04 LAB
ANION GAP SERPL CALCULATED.3IONS-SCNC: 12.9 MMOL/L (ref 5–15)
BUN SERPL-MCNC: 15 MG/DL (ref 8–23)
BUN/CREAT SERPL: 10.8 (ref 7–25)
CALCIUM SPEC-SCNC: 9.2 MG/DL (ref 8.6–10.5)
CHLORIDE SERPL-SCNC: 101 MMOL/L (ref 98–107)
CO2 SERPL-SCNC: 28.1 MMOL/L (ref 22–29)
CREAT SERPL-MCNC: 1.39 MG/DL (ref 0.76–1.27)
EGFRCR SERPLBLD CKD-EPI 2021: 53.2 ML/MIN/1.73
GLUCOSE BLDC GLUCOMTR-MCNC: 123 MG/DL (ref 70–130)
GLUCOSE BLDC GLUCOMTR-MCNC: 152 MG/DL (ref 70–130)
GLUCOSE BLDC GLUCOMTR-MCNC: 154 MG/DL (ref 70–130)
GLUCOSE BLDC GLUCOMTR-MCNC: 218 MG/DL (ref 70–130)
GLUCOSE SERPL-MCNC: 112 MG/DL (ref 65–99)
POTASSIUM SERPL-SCNC: 3.5 MMOL/L (ref 3.5–5.2)
QT INTERVAL: 407 MS
SODIUM SERPL-SCNC: 142 MMOL/L (ref 136–145)

## 2023-07-04 NOTE — THERAPY TREATMENT NOTE
Inpatient Rehabilitation - Occupational Therapy Treatment Note    [facility]     Patient Name: Vernon Solorzano  : 1950  MRN: [id number]    Today's Date: 2023                 Admit Date: 2023         ICD-10-CM ICD-9-CM   1. Impaired functional mobility, balance, gait, and endurance  Z74.09 V49.89       Patient Active Problem List   Diagnosis    DM (diabetes mellitus), type 2    Mixed hyperlipidemia    Mild intermittent asthma without complication    New onset of congestive heart failure    Nonrheumatic mitral valve regurgitation    Left-sided chest pain    Essential hypertension    Mitral valve disease    Acute ischemic left MCA stroke    Atelectasis of both lungs    Pericardial effusion, acute    History of ischemic stroke    Obese    Physical debility    Chronic diastolic CHF (congestive heart failure)    Anemia    Stage 3a chronic kidney disease    Paroxysmal atrial flutter    H/O mitral valve replacement with tissue graft    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 AMARILIS MORGAN       Past Surgical History:   Procedure Laterality Date    CARDIAC CATHETERIZATION N/A 2023    Procedure: Right Heart Cath;  Surgeon: Corey Gaffney MD;  Location:  [facility] CATH INVASIVE LOC[facility];  Service: Cardiology;  Laterality: N/A;    CARDIAC CATHETERIZATION N/A 2023    Procedure: Left Heart Cath;  Surgeon: Corey Gaffney MD;  Location:  [facility]ATH [facility];  Service: Cardiology;  Laterality: N/A;    CARDIAC CATHETERIZATION N/A 2023    Procedure: Left ventriculography;  Surgeon: Gulshan  MD Corey;  Location: [facility] CATH INVASIVE LOCATION;  Service: Cardiology;  Laterality: N/A;    CARDIAC CATHETERIZATION N/A 5/11/2023    Procedure: Coronary angiography;  Surgeon: Corey Gaffney MD;  Location: [facility] CATH INVASIVE LOCATION;  Service: Cardiology;  Laterality: N/A;    COLONOSCOPY N/A     WNL PER PT, NO RECORDS,     COLONOSCOPY N/A 04/07/2009    DR. GORGE BENAVIDEZ AT [place]    MITRAL VALVE REPAIR/REPLACEMENT N/A 5/24/2023    Procedure: MITRAL VALVE REPAIR/REPLACEMENT TRICUSPID VALVE REPAIR/REPLACEMENT TRANSESOPHAGEAL ECHOCARDIOGRAM WITH ANESTHESIA;  Surgeon: George Frey MD;  Location: [facility] CVOR;  Service: Cardiothoracic;  Laterality: N/A;    PROSTATE SURGERY N/A 11/12/2010    TURP, DR. COREY COLLAZO AT [facility]    SKIN TAG REMOVAL N/A 12/20/2017    ANAL SKIN TAG X2, PERFORMED IN OFFICE, DR. LISA ORANTES    TURP / TRANSURETHRAL INCISION / DRAINAGE PROSTATE  2010    Dr. Goodrich             IRF OT ASSESSMENT FLOWSHEET (last 12 hours)       IRF OT Evaluation and Treatment       Row Name 07/04/23 1224          OT Time and Intention    Document Type daily treatment  -LE     Mode of Treatment individual therapy;occupational therapy  -LE     Patient Effort good  -LE       Row Name 07/04/23 1224          General Information    Patient/Family/Caregiver Comments/Observations wife present.  pt cooperative and participated well.  -LE     General Observations of Patient in w/c in room, with wife.  -LE     Existing Precautions/Restrictions cardiac;sternal;fall  -LE       Row Name 07/04/23 1224          Pain Assessment    Pretreatment Pain Rating 0/10 - no pain  -LE       Row Name 07/04/23 1224          Range of Motion Comprehensive    General Range of Motion --  B UE 7/8 AROM  -LE       Row Name 07/04/23 1224          Strength (Manual Muscle Testing)    Strength (Manual Muscle Testing) --  B UE 4/5.  no focal weakness on R noted.  -LE       Row Name 07/04/23 1224          Basic Activities of Daily  Living (BADLs)    Additional Documentation --  ADL completed by [facility].  -LE       Row Name 07/04/23 1224          Bed Mobility    Comment, (Bed Mobility) in w/c.  -LE       Row Name 07/04/23 1224          Shoulder (Therapeutic Exercise)    Shoulder (Therapeutic Exercise) strengthening exercise  -LE     Shoulder AROM (Therapeutic Exercise) bilateral;flexion;extension;horizontal aBduction/aDduction;10 repetitions;2 sets  -LE     Shoulder Strengthening (Therapeutic Exercise) 2 lb free weight  -LE     Shoulder Wand Exercises (Therapeutic Exercise) --  began with 3# wieght and pt felt too heavy.  did better with 2# and 10 reps instead  of 15 today.  OT completes scapular mobilization as well.  -LE       Row Name 07/04/23 1224          Elbow/Forearm (Therapeutic Exercise)    Elbow/Forearm (Therapeutic Exercise) strengthening exercise  -LE     Elbow/Forearm AROM (Therapeutic Exercise) bilateral;flexion;extension;10 repetitions;2 sets  -LE     Elbow/Forearm Strengthening (Therapeutic Exercise) 3 lb free weight  -LE       Row Name 07/04/23 1224          Hand (Therapeutic Exercise)    Hand (Therapeutic Exercise) strengthening exercise  -LE     Hand AROM/AAROM (Therapeutic Exercise) finger extension;finger flexion  -LE     Hand Strengthening (Therapeutic Exercise) bilateral; strengthening;hand gripper;15 repititions;2 sets  cues for slowing both flexion and extension.  -LE       Row Name 07/04/23 1224          Positioning and Restraints    Pre-Treatment Position sitting in chair/recliner  -LE     Post Treatment Position wheelchair  -LE     In Wheelchair sitting;encouraged to call for assist;with family/caregiver;exit alarm on  -LE       Row Name 07/04/23 1224          Vital Signs    O2 Delivery Pre Treatment room air  -LE     Pre Patient Position Sitting  -LE     Intra Patient Position Sitting  -LE     Post Patient Position Sitting  -LE               User Key  (r) = Recorded By, (t) = Taken By, (c) = Cosigned By       Initials Name Effective Dates    Nicole Marsh, OTR 06/16/21 -                      Occupational Therapy Education       Title: PT OT SLP Therapies (In Progress)       Topic: Occupational Therapy (In Progress)       Point: ADL training (In Progress)       Description:   Instruct learner(s) on proper safety adaptation and remediation techniques during self care or transfers.   Instruct in proper use of assistive devices.                  Learning Progress Summary             Patient Acceptance, E, VU by WN at 6/29/2023 2344    Acceptance, E, NR by AF at 6/13/2023 1527    Comment: benefits of therapy, endurance   Family Acceptance, E, NR by AF at 6/13/2023 1527    Comment: benefits of therapy, endurance                         Point: Home exercise program (In Progress)       Description:   Instruct learner(s) on appropriate technique for monitoring, assisting and/or progressing therapeutic exercises/activities.                  Learning Progress Summary             Patient Acceptance, E, VU by WN at 6/29/2023 2344    Acceptance, E, NR by AF at 6/13/2023 1527    Comment: benefits of therapy, endurance   Family Acceptance, E, NR by AF at 6/13/2023 1527    Comment: benefits of therapy, endurance                         Point: Precautions (In Progress)       Description:   Instruct learner(s) on prescribed precautions during self-care and functional transfers.                  Learning Progress Summary             Patient Acceptance, E, VU by WN at 6/29/2023 2344    Acceptance, E, NR by AF at 6/13/2023 1527    Comment: benefits of therapy, endurance   Family Acceptance, E, NR by AF at 6/13/2023 1527    Comment: benefits of therapy, endurance                         Point: Body mechanics (In Progress)       Description:   Instruct learner(s) on proper positioning and spine alignment during self-care, functional mobility activities and/or exercises.                  Learning Progress Summary             Patient  Acceptance, E, VU by WN at 6/29/2023 2344    Acceptance, E, NR by AF at 6/13/2023 1527    Comment: benefits of therapy, endurance   Family Acceptance, E, NR by AF at 6/13/2023 1527    Comment: benefits of therapy, endurance                                         User Key       Initials Effective Dates Name Provider Type Discipline    AF 06/16/21 -  Candy Davis, OTR Occupational Therapist OT    WN 08/23/22 -  Delvin Jansen, RN Registered Nurse Nurse                        OT Recommendation and Plan                         Time Calculation:      Time Calculation- OT       Row Name 07/04/23 1230             Time Calculation- OT    OT Start Time 1100  -LE      OT Stop Time 1130  -LE      OT Time Calculation (min) 30 min  -LE      Total Timed Code Minutes- OT 30 minute(s)  -LE                User Key  (r) = Recorded By, (t) = Taken By, (c) = Cosigned By      Initials Name Provider Type    Nicole Marsh OTSIMONA Occupational Therapist                  Therapy Charges for Today       Code Description Service Date Service Provider Modifiers Qty    [id number] HC OT THER PROC EA 15 MIN 7/4/2023 Nicole Worthy OTR GO 2                     HI Lombardi  7/4/2023

## 2023-07-04 NOTE — PROGRESS NOTES
[facility] Progress Note      Patient Name: Vernon Solorzano  : 1950  MRN: [id number]  Primary Care Physician:  Liza Oconnell III, NP-C  Date of admission: 2023    Subjective     Interval History:   Follow up CKD III.   Overnight no event    Patient was seen by pulmonary and thought his dyspnea was secondary to physical deconditioning  Patient denies any chest pain, palpitations  Tolerating oral intake   Minimal lower extremity edema    Review of Systems:   As noted above    Objective     Vitals:   Temp:  [96.9 °F (36.1 °C)-98.6 °F (37 °C)] 96.9 °F (36.1 °C)  Heart Rate:  [] 93  Resp:  [18-20] 18  BP: (104-136)/(63-87) 111/77    Intake/Output Summary (Last 24 hours) at 2023 1412  Last data filed at 2023 1200  Gross per 24 hour   Intake 800 ml   Output 1775 ml   Net -975 ml         Physical Exam:    General Appearance: Patient is alert, oriented.  Ill looking.  Questions skin: warm and dry  HEENT: oral mucosa normal, nonicteric sclera  Neck: supple, no JVD  Lungs: Clear to auscultation.   Heart: RRR, normal S1 and S2  Abdomen: soft, nontender, + bs, distended.  Extremities trace lower ext edema.  Erythematous rash noted on the anterior aspect of right leg    Scheduled Meds:     aspirin, 81 mg, Oral, Daily  atorvastatin, 40 mg, Oral, Nightly  budesonide-formoterol, 2 puff, Inhalation, BID - RT  calcium 500 mg vitamin D 5 mcg (200 UT), 1 tablet, Oral, Daily  colchicine, 0.6 mg, Oral, Daily  guaiFENesin, 600 mg, Oral, Q12H  insulin glargine, 20 Units, Subcutaneous, Nightly  insulin glargine, 20 Units, Subcutaneous, Daily  insulin lispro, 3-14 Units, Subcutaneous, 4x Daily AC & at Bedtime  insulin lispro, 6 Units, Subcutaneous, TID With Meals  magnesium oxide, 400 mg, Oral, Daily  melatonin, 3 mg, Oral, Nightly  metoprolol succinate XL, 25 mg, Oral, Q24H  pantoprazole, 40 mg, Oral, Q AM  sodium chloride, 3 mL, Intravenous, Q12H  tiotropium bromide  monohydrate, 2 puff, Inhalation, Daily - RT  torsemide, 60 mg, Oral, Daily      IV Meds:        Results Reviewed:   I have personally reviewed the results from the time of this admission to 7/4/2023 14:12 EDT     Results from last 7 days   Lab Units 07/04/23  0700 07/03/23  1427 07/02/23  0644 06/30/23  0719   SODIUM mmol/L 142 141 142 140   POTASSIUM mmol/L 3.5 3.6 3.1* 3.2*   CHLORIDE mmol/L 101 99 101 98   CO2 mmol/L 28.1 29.0 29.0 29.6*   BUN mg/dL 15 15 13 15   CREATININE mg/dL 1.39* 1.41* 1.35* 1.31*   CALCIUM mg/dL 9.2 9.0 8.6 9.2   BILIRUBIN mg/dL  --   --   --  0.5   ALK PHOS U/L  --   --   --  115   ALT (SGPT) U/L  --   --   --  11   AST (SGOT) U/L  --   --   --  9   GLUCOSE mg/dL 112* 215* 113* 135*         Estimated Creatinine Clearance: 61.1 mL/min (A) (by C-G formula based on SCr of 1.39 mg/dL (H)).    Results from last 7 days   Lab Units 07/03/23  1427 07/01/23  0630 06/30/23  0719 06/28/23  0753   MAGNESIUM mg/dL  --  2.2 2.0 2.1   PHOSPHORUS mg/dL 2.2*  --  4.0 3.3               Results from last 7 days   Lab Units 06/29/23  0705 06/28/23  0753   WBC 10*3/mm3 8.23 8.52   HEMOGLOBIN g/dL 8.9* 9.5*   PLATELETS 10*3/mm3 277 290               Assessment / Plan     ASSESSMENT:    1.  Acute kidney injury on top of CKD III, baseline creatinine 1.2.  Peak 1.68.  Creatinine  stable around 1.3  , most recent down to 1.2-1.3  2. MR now sp MV replacement, tissue and TV repair, SU closure 5/24/23.  3. Chronic steroid use after COVID 19 PNA.  4. Anemia of CKD and postoperative anemia.  Hemoglobin is 8.9.  No evidence of active bleeding  5. 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  Working with physical occupational therapy  6. diabetes mellitus type 2. On diabetic diet and insulin regimen   7. Aflutter.Cardioversion 6/2/23. On chronic anticoagulation eliquis and rate controlled  metoprolol.   8.  Hypokalemia on KCL replacement protocol, volume diuretics  9.  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

## 2023-07-04 NOTE — PROGRESS NOTES
Inpatient Rehabilitation Plan of Care Note    Plan of Care  Care Plan Reviewed - No updates at this time.    Sphincter Control    Performed Intervention(s)  Offer toileting/ timed voids  Monitor I&O      Safety    Performed Intervention(s)  Safety monitoring during functional activities  Environmental set- upto reduce risk      Psychosocial    Performed Intervention(s)  Allow patient to verbalize needs and concerns      Body Systems    Performed Intervention(s)  ACCU check ACHS    Signed by: Lisa Pereira RN

## 2023-07-04 NOTE — PROGRESS NOTES
"Section C. BIMS  Brief Interview for Mental Status (BIMS) was conducted.  Repetition of Three Words: Three words  Able to report correct year: Correct  Able to report correct month: Accurate within 5 days  Able to report correct day of the week: Incorrect or no answer  Able to recall \"sock\": Yes, no cue required  Able to recall \"blue\": Yes, no cue required  Able to recall \"bed\": No, could not recall    BIMS SUMMARY SCORE: 12 Moderately impaired    Section C. Signs and Symptoms of Delirium (from CAM)  Acute Change in Mental Status:   No  Inattention:   Behavior not present  Disorganized Thinking:   Behavior not present  Altered Level of Consciousness:   Behavior not present    Signed by: Charmaine Sheth, SLP    "

## 2023-07-04 NOTE — PROGRESS NOTES
LOS: 28 days   Patient Care Team:  Liza Oconnell III, NP-C as PCP - General (Family Medicine)  Corey Lao Jr., MD (Inactive) as Consulting Physician (Urology)      KRIS TAYLOR  1950      ADMITTING DIAGNOSIS:  Stroke  Valvular heart disease status post repair/replacement      Subjective     Fatigue continues to be an issue.  Is participating in activities.  Patient reviewed with cardiology.    Objective     Vitals:    07/04/23 1143   BP: 111/77   Pulse: 93   Resp: 18   Temp: 96.9 °F (36.1 °C)   SpO2: 93%       PHYSICAL EXAM:   MENTAL STATUS -  AWAKE / ALERT  HEENT-   LUNGS - CTA,    Sternotomy incision -dressed  HEART-sinus with occasional ectopy  ABD - NORMOACTIVE BOWEL SOUNDS, SOFT, NT.   EXT -1+ pedal edema bilateral lower extremities,   NEURO -oriented to person place time and situation.    Speech is fluent.    Good strength bilaterally          MEDICATIONS  Scheduled Meds:aspirin, 81 mg, Oral, Daily  atorvastatin, 40 mg, Oral, Nightly  budesonide-formoterol, 2 puff, Inhalation, BID - RT  calcium 500 mg vitamin D 5 mcg (200 UT), 1 tablet, Oral, Daily  colchicine, 0.6 mg, Oral, Daily  guaiFENesin, 600 mg, Oral, Q12H  insulin glargine, 20 Units, Subcutaneous, Nightly  insulin glargine, 20 Units, Subcutaneous, Daily  insulin lispro, 3-14 Units, Subcutaneous, 4x Daily AC & at Bedtime  insulin lispro, 6 Units, Subcutaneous, TID With Meals  magnesium oxide, 400 mg, Oral, Daily  melatonin, 3 mg, Oral, Nightly  metoprolol succinate XL, 25 mg, Oral, Q24H  pantoprazole, 40 mg, Oral, Q AM  sodium chloride, 3 mL, Intravenous, Q12H  tiotropium bromide monohydrate, 2 puff, Inhalation, Daily - RT  torsemide, 60 mg, Oral, Daily      Continuous Infusions:   PRN Meds:.  acetaminophen **OR** [DISCONTINUED] acetaminophen **OR** [DISCONTINUED] acetaminophen    bisacodyl    calcium carbonate    dextrose    dextrose    diphenhydrAMINE    docusate sodium    glucagon (human recombinant)     hydrocortisone-bacitracin-zinc oxide-nystatin    ipratropium-albuterol    ondansetron **OR** ondansetron    senna    sodium chloride    traMADol      RESULTS  Glucose   Date/Time Value Ref Range Status   07/04/2023 1146 154 (H) 70 - 130 mg/dL Final     Comment:     Meter: [id number] : [id number] Paul Eisenberg NA   07/04/2023 0720 123 70 - 130 mg/dL Final     Comment:     Meter: [id number] : [id number] Paul Eisenberg NA   07/03/2023 2011 168 (H) 70 - 130 mg/dL Final     Comment:     Meter: [id number] : [id number] Summer Venegas NA   07/03/2023 1722 133 (H) 70 - 130 mg/dL Final     Comment:     Meter: [id number] : [id number] Laura Kothari NA   07/03/2023 1137 252 (H) 70 - 130 mg/dL Final     Comment:     Meter: [id number] : [id number] Jamila Cani NA   07/03/2023 0713 142 (H) 70 - 130 mg/dL Final     Comment:     Meter: [id number] : [id number] Jamila Cani NA   07/02/2023 2019 293 (H) 70 - 130 mg/dL Final     Comment:     Meter: [id number] : [id number] Jonatan Saul NA   07/02/2023 1624 320 (H) 70 - 130 mg/dL Final     Comment:     Meter: [id number] : [id number] Roe BARRETO     Results from last 7 days   Lab Units 06/29/23  0705 06/28/23  0753   WBC 10*3/mm3 8.23 8.52   HEMOGLOBIN g/dL 8.9* 9.5*   HEMATOCRIT % 26.2* 28.3*   PLATELETS 10*3/mm3 277 290       Results from last 7 days   Lab Units 07/04/23  0700 07/03/23  1427 07/02/23  0644 06/30/23  0719   SODIUM mmol/L 142 141 142 140   POTASSIUM mmol/L 3.5 3.6 3.1* 3.2*   CHLORIDE mmol/L 101 99 101 98   CO2 mmol/L 28.1 29.0 29.0 29.6*   BUN mg/dL 15 15 13 15   CREATININE mg/dL 1.39* 1.41* 1.35* 1.31*   CALCIUM mg/dL 9.2 9.0 8.6 9.2   BILIRUBIN mg/dL  --   --   --  0.5   ALK PHOS U/L  --   --   --  115   ALT (SGPT) U/L  --   --   --  11   AST (SGOT) U/L  --   --   --  9   GLUCOSE mg/dL 112* 215* 113* 135*        [facility] Reference Range & Units 05/23/23 14:29   Hemoglobin A1C 4.80 - 5.60 % 9.20 (H)   Total  Cholesterol 0 - 200 mg/dL 155   HDL Cholesterol 40 - 60 mg/dL 41   LDL Cholesterol  0 - 100 mg/dL 81   Triglycerides 0 - 150 mg/dL 197 (H)   (H): Data is abnormally high     Latest Reference Range & Units 05/31/23 08:03   25 Hydroxy, Vitamin D 30.0 - 100.0 ng/ml 20.0 (L)   (L): Data is abnormally low    Lower extremity venous duplex-June 19, 2023-Chronic right lower extremity superficial thrombophlebitis noted in the small saphenous.     ASSESSMENT and PLAN    Acute ischemic left MCA stroke    Status post CVA-Scattered  punctate restricted diffusion acute infarct in the bilateral frontal and   left parietal occipital cortices.        Impaired Cognition/impaired mobility/impaired self-care     Aphasia-resolved     Right hand apraxia-resolved     Encephalopathy-improving     Dysphagia/nutrition-healthy heart 2-3 g sodium, consistent carb, no mixed consistency, regular texture, nectar thick liquid  June 7-videofluoroscopic swallow study planned tomorrow  June 8-swallow study today-advance to regular solid, no mixed consistency, thin liquids by cup.  No straws.     Stroke prophylaxis-Eliquis/aspirin/atorvastatin     History of severe mitral regurgitation and annular dilatation, moderate to severe tricuspid regurgitation-  status post May 24, 2023 - 1. Mitral valve repair with a 28 mm physio II ring annuloplasty with residual mild to moderate MR  2.  Mitral valve replacement with a 29 mm Schwartz II porcine prosthesis  3.  Tricuspid valve repair with 28 mm physio tricuspid ring  4.  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 Overall, no significant change from the previous study      Volume status-diuretics per  Cardiology/nephrology  June 30-Per nephrology-[continue furosemide 80 mg IV twice a day, hope to transition to oral diuretics during his admission   ]  July 1-changed to torsemide     Severe pulmonary hypertension  Obstructive sleep apnea     Interstitial lung disease status post COVID-19 infection-steroid tapered off per pulmonology June 7  Home inhaler regimen  Chronic steroid use with cushingoid syndrome-steroids now tapered off   June 23-change back to his home regimen Spiriva 2 puffs once a day and Symbicort 160/4.5 two puffs twice a day  July 3 - Continued ZUÑIGA.  Pulmonary to see in follow up. ECHO and CXR ordered.  Chest x-ray showed-  Pulmonary vasculature is unremarkable. Likely atelectasis or scarring left mid to lower lung. No pleural effusion, or pneumothorax.        Leukocytosis-reactive/steroid administration     Postoperative anemia     Bjttetbwhwccdazf-duvtjygdrxm-kcrwracm     Chronic kidney disease stage III-baseline creatinine 1.2  Renal insufficiency - Nephrology following        Diabetes mellitus type 2-Jardiance/Lantus/sliding scale insulin-uncontrolled  Off Jardiance secondary to renal function.  Adjusting short and long acting insulin.       BPH/urinary retention-history of TURP  June 14-void 300 cc with postvoid residual 205 cc.         Pain management-tramadol-/Tylenol Dk report reviewed              TEAM CONF - JUNE 8 - ENDURANCE POOR. BED MOD. TRANSFERS MOD. GAIT 25 FEET MOD ASSIST RW. SHOWER TRANSFERS MOD A.   BATH MOD ASSIST. LBD MAX. UBD MIN. TOILETING MAX.   Patient is currently on nectar liquids VFSS TODAY.   BIMS SUMMARY SCORE: 9 Moderately impaired   DIFFICULTY WITH SUSTAINED AND FOCAL ATTENTION.  CONTINENT / INCONTINENT  ELOS - 2 -3 WEEKS    TEAM CONF - Kari 15 - BED MOD. TRANSFERS MIN. 4 STAIRS MIN X 2. SATS 97% OR HIGHER WITH GAIT WITH FATIGUE, GAIT 80 FEET MIN ASSIST RW. TOILET TRANSFERS MIN. SHOWER TRANSFERS MIN MOD. BATH MIN. LBD MIN MOD. UBD MIN. GROOMING SET UP. TOILETING  MIN MOD. DRESSING TAKES 30 MINUTES WIETH SLOW PROCESSING. EDEMA - JOBST STOCKINGS. MILD DYSPHGAIA, REG THIN BY CUP, NO STRAWS. CLQT MODERATE DEFICITS, DIFFICULTY WITH ATTENTION AND MEMORY, PROLONGED PROCESSING SPEED. BNE FOLLOWING. VARIABLE INTAKE - DIETITIAN FOLLOWING. RASH ON ABD AND LEFT CHEST, POSSIBLY CONTACT DERMATITIS. TORSEMIDE ADDED.  ELOS - TWO WEEKS.    TEAM CONF - June 22- DIURETIC ADJUSTED BY NEPRHOLOGY. PATIENT DOES NOT FOLLOW 1500 CC FLUID RESTRICTION. TRANSFERS CTG. 4 STAIRS MIN X 2. GAIT 80 FEET CTG RW.   TOILET AND SHOWER TRANSFERS CTG. BATH MIN-CTG. LBD MIN-CTG FOR COMPRESSION STOCKINGS. UBD SBA. TOILETING CTG AS ASKS FOR HELP.   SWALLOW - REGULAR SOLID, THIN LIQUID BY CUP. MOSTLY CONTINENT.   Orientation: WNL  Attention: WNL to Mildly Impaired  Executive Functioning: Mildly Impaired  Abstract Reasoning: WNL  Arithmetic: Mildly Impaired  Visuospatial Perception: WNL  Visuospatial Praxis: WNL for Figure Copy; Severely Impaired Coding related to slowed processing speed.  Verbal Memory: Moderately to Severely Impaired  Visual Memory: Mildly Impaired  Emotional: Some frustration regarding current level of functioning, but minimal anxiety and depression symptoms reported on the GDS and BROOKE.  -demonstrating moderate to severe verbal memory deficits and slowed processing speed that are consistent with the location of his stroke.  Word-finding difficulties are also present, but are more evident conversationally than during testing.  ELOS - ONE WEEK- June 29       TEAM CONF - June 29 - TRANSFERS CTG. 4 STAIRS CTG . DID 8 ON Tuesday. AT HOME 14 STAIRS, LANDING, THEN 8 STAIRS TO SECOND FLOOR. GAIT  RW CTG SBA. TOILET TRANSFERS CTG SBA. SHOWER TRANSFERS CTG  BATH SBA CTG. LBD MIN. UBD SBA. GROOMING SET UP. TOILETING MIN CTG. EASILY DISTRACTED ON TASK. PROCESSING SPEED IMPROVING, BUT STILL SLOWED WHEN TIRED.   DRESSLER SYNDROME - TRAMADOL HELPED PAIN. ON COLCHICINE.   ELOS - WED (discharge was delayed  given transfer off the unit for Dressler syndrome)       Goal is for home with outpatient cardiac rehab   therapies.  Barrier to discharge: Impaired cognition mobility self-care- work on follow, executive function, conditioning, transfers, progressive ambulation, ADLs to overcome.           Tu Silver MD,     During rounds, used appropriate personal protective equipment including mask and gloves.  Additional gown if indicated.  Mask used was standard procedure mask. Appropriate PPE was worn during the entire visit.  Hand hygiene was completed before and after.      Patient has been staffed and discussed with attending Physician, Dr. Tu Silver.

## 2023-07-04 NOTE — PLAN OF CARE
Goal Outcome Evaluation:  Plan of Care Reviewed With: patient         Pt is alert and oriented. Follows all commands. Delayed response at times. Impulsive at times. Generalized weakness. Denies shortness of breath, cough and chest pain. Up x 1 assist. Home tomorrow. Wife at bedside.

## 2023-07-04 NOTE — THERAPY TREATMENT NOTE
Inpatient Rehabilitation - Physical Therapy  Addendum to D/C       [facility]     Patient Name: Vernon Solorzano  : 1950  MRN: [id number]    Today's Date: 2023                    Admit Date: 2023      Visit Dx:     ICD-10-CM ICD-9-CM   1. Impaired functional mobility, balance, gait, and endurance  Z74.09 V49.89       Patient Active Problem List   Diagnosis    DM (diabetes mellitus), type 2    Mixed hyperlipidemia    Mild intermittent asthma without complication    New onset of congestive heart failure    Nonrheumatic mitral valve regurgitation    Left-sided chest pain    Essential hypertension    Mitral valve disease    Acute ischemic left MCA stroke    Atelectasis of both lungs    Pericardial effusion, acute    History of ischemic stroke    Obese    Physical debility    Chronic diastolic CHF (congestive heart failure)    Anemia    Stage 3a chronic kidney disease    Paroxysmal atrial flutter    H/O mitral valve replacement with tissue graft    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 AMARILIS MORGAN       Past Surgical History:   Procedure Laterality Date    CARDIAC CATHETERIZATION N/A 2023    Procedure: Right Heart Cath;  Surgeon: Corey Gaffney MD;  Location: [facility] CATH INVASIVE LOCATION;  Service: Cardiology;  Laterality: N/A;    CARDIAC CATHETERIZATION N/A 2023    Procedure: Left Heart Cath;  Surgeon: Corey Gaffney MD;  Location:  [facility] CATH INVASIVE LOCATION;  Service: Cardiology;  Laterality: N/A;    CARDIAC CATHETERIZATION N/A 2023    Procedure: Left ventriculography;   Surgeon: Croey Gaffney MD;  Location: [facility] CATH INVASIVE LOCATION;  Service: Cardiology;  Laterality: N/A;    CARDIAC CATHETERIZATION N/A 5/11/2023    Procedure: Coronary angiography;  Surgeon: Corey Gaffney MD;  Location: [facility] CATH INVASIVE LOCATION;  Service: Cardiology;  Laterality: N/A;    COLONOSCOPY N/A     WNL PER PT, NO RECORDS,     COLONOSCOPY N/A 04/07/2009    DR. GORGE BENAVIDEZ AT [place]    MITRAL VALVE REPAIR/REPLACEMENT N/A 5/24/2023    Procedure: MITRAL VALVE REPAIR/REPLACEMENT TRICUSPID VALVE REPAIR/REPLACEMENT TRANSESOPHAGEAL ECHOCARDIOGRAM WITH ANESTHESIA;  Surgeon: George Frey MD;  Location: [facility] CVOR;  Service: Cardiothoracic;  Laterality: N/A;    PROSTATE SURGERY N/A 11/12/2010    TURP, DR. COREY COLLAZO AT [facility]    SKIN TAG REMOVAL N/A 12/20/2017    ANAL SKIN TAG X2, PERFORMED IN OFFICE, DR. LISA ORANTES    TURP / TRANSURETHRAL INCISION / DRAINAGE PROSTATE  2010    Dr. Goodrich       PT ASSESSMENT (last 12 hours)       IRF PT Evaluation and Treatment       Row Name 07/04/23 0808          PT Time and Intention    Document Type daily treatment  -DP     Mode of Treatment individual therapy;physical therapy  -DP     Patient/Family/Caregiver Comments/Observations pt sitting up in w/c upon PT arrival  -DP       Row Name 07/04/23 0808          General Information    Patient Profile Reviewed yes  -DP     Existing Precautions/Restrictions cardiac;fall;sternal  -DP       Row Name 07/04/23 0808          Pain Assessment    Pretreatment Pain Rating 0/10 - no pain  -DP     Posttreatment Pain Rating 0/10 - no pain  -DP       Row Name 07/04/23 0808          Cognition/Psychosocial    Affect/Mental Status (Cognition) WFL  -DP     Orientation Status (Cognition) oriented x 3  -DP     Follows Commands (Cognition) follows one-step commands;75-90% accuracy;repetition of directions required  -DP     Personal Safety Interventions safety round/check completed;elopement precautions  initiated;fall prevention program maintained;gait belt;muscle strengthening facilitated;nonskid shoes/slippers when out of bed;supervised activity  -DP       Row Name 07/04/23 0808          Sit-Stand Transfer    Sit-Stand [place] (Transfers) standby assist  -DP     Assistive Device (Sit-Stand Transfers) wheelchair;walker, front-wheeled  -DP       Row Name 07/04/23 0808          Stand-Sit Transfer    Stand-Sit [place] (Transfers) standby assist  -DP     Assistive Device (Stand-Sit Transfers) walker, front-wheeled;wheelchair  -DP       Row Name 07/04/23 0808          Gait/Stairs (Locomotion)    [place] Level (Gait) standby assist  -DP     Assistive Device (Gait) walker, front-wheeled  -DP     Distance in Feet (Gait) 160'x2  -DP     Pattern (Gait) step-through  -DP     Deviations/Abnormal Patterns (Gait) gait speed decreased;stride length decreased;angie decreased;festinating/shuffling  -DP     Bilateral Gait Deviations forward flexed posture  -DP     Left Sided Gait Deviations heel strike decreased  -DP     Right Sided Gait Deviations heel strike decreased  -DP       Row Name 07/04/23 0808          Safety Issues, Functional Mobility    Impairments Affecting Function (Mobility) endurance/activity tolerance;balance;strength  -DP       Row Name 07/04/23 0808          Hip (Therapeutic Exercise)    Hip Strengthening (Therapeutic Exercise) bilateral;aBduction;aDduction;marching while seated;sitting;red;resistance band;10 repetitions  -DP       Row Name 07/04/23 0808          Knee (Therapeutic Exercise)    Knee Strengthening (Therapeutic Exercise) bilateral;LAQ (long arc quad);hamstring curls;sitting;red;resistance band;10 repetitions  -DP       Row Name 07/04/23 0808          Ankle (Therapeutic Exercise)    Ankle Strengthening (Therapeutic Exercise) bilateral;dorsiflexion;plantarflexion;sitting;10 repetitions  -DP       Row Name 07/04/23 0808          Positioning and Restraints    Pre-Treatment Position  "sitting in chair/recliner  -DP     Post Treatment Position wheelchair  -DP     In Wheelchair sitting;exit alarm on;with family/caregiver  -DP       Row Name 07/04/23 0808          Daily Progress Summary (PT)    Daily Progress Summary (PT) wife present for treatment and asked if pt was safe to walk without walker to BR once he is home. PT replied \"no, he needs to use walker\". Pt stated he was \"fine\" with using walker  -DP               User Key  (r) = Recorded By, (t) = Taken By, (c) = Cosigned By      Initials Name Provider Type    Gulshan Parker PT Physical Therapist                     Physical Therapy Education       Title: PT OT SLP Therapies (In Progress)       Topic: Physical Therapy (Done)       Point: Mobility training (Done)       Learning Progress Summary             Patient Acceptance, E, VU by WN at 6/29/2023 2344    Acceptance, E,TB, VU by LEO at 6/28/2023 1051    Comment: stairs    Acceptance, E,D, VU,DU,NR by  at 6/23/2023 1456    Acceptance, E,D, NR by MONA at 6/22/2023 1309    Comment: hand placement    Acceptance, E,D, VU,DU by MONA at 6/21/2023 1007    Acceptance, E, VU by MD at 6/20/2023 1504    Acceptance, E, VU by MD at 6/19/2023 1058    Acceptance, E,TB, VU,NR by TONI at 6/17/2023 1501    Acceptance, E, VU by MD at 6/16/2023 0919    Acceptance, E, VU by MD at 6/15/2023 1140    Eager, E,D,TB, NR by YAQUELIN at 6/14/2023 1459    Acceptance, E, VU by MD at 6/13/2023 1012    Acceptance, E, VU by MD at 6/12/2023 1018    Acceptance, E, VU by MD at 6/10/2023 1040    Acceptance, E, VU by MD at 6/8/2023 1017    Acceptance, E,TB,D, VU,DU,NR by YAQUELIN at 6/7/2023 1513    Comment: POC, Goals, safety with mobility.   Family Acceptance, E,D, VU by MD at 6/27/2023 1517    Comment: Pt spouse participated in family teaching for ambulation and observed car transfer and stairs.   Significant Other Acceptance, E,TB,D, VU,DU,NR by  at 6/7/2023 8361    Comment: POC, Goals, safety with mobility.                         " "Point: Home exercise program (Done)       Learning Progress Summary             Patient Acceptance, E, VU by WN at 6/29/2023 2344    Acceptance, E,D, NR by DP at 6/22/2023 1309    Comment: hand placement    Acceptance, E,D, VU,DU by DP at 6/21/2023 1007    Eager, E,D,TB, NR by KP at 6/14/2023 1459    Acceptance, E,TB,D, VU,DU,NR by  at 6/7/2023 1513    Comment: POC, Goals, safety with mobility.   Significant Other Acceptance, E,TB,D, VU,DU,NR by KP at 6/7/2023 1513    Comment: POC, Goals, safety with mobility.                         Point: Body mechanics (Done)       Learning Progress Summary             Patient Acceptance, E, VU by WN at 6/29/2023 2344                         Point: Precautions (Done)       Learning Progress Summary             Patient Acceptance, E, VU by MD at 7/3/2023 1346    Acceptance, E, VU by MD at 6/30/2023 1055    Acceptance, E, VU by WN at 6/29/2023 2344    Acceptance, E, VU by MD at 6/29/2023 1420    Acceptance, E, VU by MD at 6/27/2023 1517                                         User Key       Initials Effective Dates Name Provider Type Discipline    TONI 06/16/21 -  Nubia Orellana, PT Physical Therapist PT    LB 06/16/21 -  Lara Cm, PT Physical Therapist PT    MD 06/16/21 -  Damaris Francis, PT Physical Therapist PT    KP 06/16/21 -  Elizabeth Levin, PT Physical Therapist PT    WN 08/23/22 -  Delvin Jansen, RN Registered Nurse Nurse    DP 08/24/21 -  Gulshan Mcdonald, PT Physical Therapist PT     06/01/23 -  Nicolas Suero, PT Student PT Student PT                    PT Recommendation and Plan          Daily Progress Summary (PT)  Daily Progress Summary (PT): wife present for treatment and asked if pt was safe to walk without walker to BR once he is home. PT replied \"no, he needs to use walker\". Pt stated he was \"fine\" with using walker               Time Calculation:      PT Charges       Row Name 07/04/23 1012             Time Calculation    Start Time 0930  -DP      " Stop Time 1000  -DP      Time Calculation (min) 30 min  -DP      PT Received On 07/05/23  -DP         Time Calculation- PT    Total Timed Code Minutes- PT 30 minute(s)  -DP                User Key  (r) = Recorded By, (t) = Taken By, (c) = Cosigned By      Initials Name Provider Type    DP Gulshan Mcdonald, PT Physical Therapist                    Therapy Charges for Today       Code Description Service Date Service Provider Modifiers Qty    81039901817  PT THERAPEUTIC ACT EA 15 MIN 7/4/2023 Gulshan Mcdonald, PT GP 1    03677035438 HC PT THER PROC EA 15 MIN 7/4/2023 Gulshan Mcdonald, PT GP 1              PT G-Codes  AM-PAC 6 Clicks Score (PT): 14      Gulshan Mcdonald PT  7/4/2023

## 2023-07-04 NOTE — PROGRESS NOTES
LOS: 28 days   Patient Care Team:  Liza Oconnell III, NP-C as PCP - General (Family Medicine)  Corey Lao Jr., MD (Inactive) as Consulting Physician (Urology)    Subjective     For dyspnea with exertion patient had his echocardiogram there is a moderate posterior pericardial effusion without any evidence of tamponade.  Patient says everything we talked about makes sense to him and he is much more comfortable knowing that he does note that he thinks the inhalers make his breathing better he is on Symbicort and Spiriva    Review of Systems:          Objective     Vital Signs  Vital Sign Min/Max for last 24 hours  Temp  Min: 97.6 °F (36.4 °C)  Max: 98.6 °F (37 °C)   BP  Min: 104/63  Max: 136/87   Pulse  Min: 70  Max: 108   Resp  Min: 18  Max: 20   SpO2  Min: 95 %  Max: 99 %   No data recorded   Weight  Min: 112 kg (247 lb 9.6 oz)  Max: 112 kg (248 lb)        Ventilator/Non-Invasive Ventilation Settings (From admission, onward)      None                         Body mass index is 32.67 kg/m².  I/O last 3 completed shifts:  In: 1370 [P.O.:1370]  Out: 2025 [Urine:2025]  No intake/output data recorded.        Physical Exam:  General Appearance: Developed obese white male resting in bed does not look in any acute distress on room air    Neck: No jugular venous distention  Lungs: Clear no wheezes no rales no rhonchi nonlabored symmetric expansion at rest  Cardiac: Regular rate rhythm I do not hear a murmur  Abdomen: Obese soft nontender  Extremities/P Vascular: No clubbing no cyanosis no edema  MSE: Seems to be in good spirits today       Labs:  Results from last 7 days   Lab Units 07/04/23  0700 07/03/23  1427 07/02/23  0644 07/01/23  0630 06/30/23  0719 06/28/23  0753   GLUCOSE mg/dL 112* 215* 113*  --  135* 127*   SODIUM mmol/L 142 141 142  --  140 140   POTASSIUM mmol/L 3.5 3.6 3.1*  --  3.2* 3.8   MAGNESIUM mg/dL  --   --   --  2.2 2.0 2.1   CO2 mmol/L 28.1 29.0 29.0  --  29.6*  29.6*   CHLORIDE mmol/L 101 99 101  --  98 101   ANION GAP mmol/L 12.9 13.0 12.0  --  12.4 9.4   CREATININE mg/dL 1.39* 1.41* 1.35*  --  1.31* 1.26   BUN mg/dL 15 15 13  --  15 16   BUN / CREAT RATIO  10.8 10.6 9.6  --  11.5 12.7   CALCIUM mg/dL 9.2 9.0 8.6  --  9.2 9.5   ALK PHOS U/L  --   --   --   --  115  --    TOTAL PROTEIN g/dL  --   --   --   --  7.1  --    ALT (SGPT) U/L  --   --   --   --  11  --    AST (SGOT) U/L  --   --   --   --  9  --    BILIRUBIN mg/dL  --   --   --   --  0.5  --    ALBUMIN g/dL  --  3.4*  --   --  3.6 3.6   GLOBULIN gm/dL  --   --   --   --  3.5  --      Estimated Creatinine Clearance: 61.1 mL/min (A) (by C-G formula based on SCr of 1.39 mg/dL (H)).      Results from last 7 days   Lab Units 06/29/23  0705 06/28/23  0753   WBC 10*3/mm3 8.23 8.52   RBC 10*6/mm3 3.08* 3.42*   HEMOGLOBIN g/dL 8.9* 9.5*   HEMATOCRIT % 26.2* 28.3*   MCV fL 85.1 82.7   MCH pg 28.9 27.8   MCHC g/dL 34.0 33.6   RDW % 14.1 13.8   RDW-SD fl 43.4 41.3   MPV fL 9.1 9.3   PLATELETS 10*3/mm3 277 290                 Results from last 7 days   Lab Units 06/30/23  0719   TSH uIU/mL 1.830             Microbiology Results (last 10 days)       Procedure Component Value - Date/Time    Blood Culture - Blood, Arm, Left [[id number]]  (Normal) Collected: 06/25/23 0955    Lab Status: Final result Specimen: Blood from Arm, Left Updated: 06/30/23 1015     Blood Culture No growth at 5 days    Blood Culture - Blood, Arm, Right [[id number]]  (Normal) Collected: 06/25/23 0944    Lab Status: Final result Specimen: Blood from Arm, Right Updated: 06/30/23 1015     Blood Culture No growth at 5 days                aspirin, 81 mg, Oral, Daily  atorvastatin, 40 mg, Oral, Nightly  budesonide-formoterol, 2 puff, Inhalation, BID - RT  calcium 500 mg vitamin D 5 mcg (200 UT), 1 tablet, Oral, Daily  colchicine, 0.6 mg, Oral, Daily  guaiFENesin, 600 mg, Oral, Q12H  insulin glargine, 20 Units, Subcutaneous, Nightly  insulin glargine, 20 Units,  Subcutaneous, Daily  insulin lispro, 3-14 Units, Subcutaneous, 4x Daily AC & at Bedtime  insulin lispro, 6 Units, Subcutaneous, TID With Meals  magnesium oxide, 400 mg, Oral, Daily  melatonin, 3 mg, Oral, Nightly  metoprolol succinate XL, 25 mg, Oral, Q24H  pantoprazole, 40 mg, Oral, Q AM  sodium chloride, 3 mL, Intravenous, Q12H  tiotropium bromide monohydrate, 2 puff, Inhalation, Daily - RT  torsemide, 60 mg, Oral, Daily           Diagnostics:  CT Angiogram Chest    Result Date: 6/24/2023  CT ANGIOGRAPHY OF THE CHEST WITH INTRAVENOUS CONTRAST AND 3-D RECONSTRUCTIONS  HISTORY: Recent mitral valve and tricuspid valve replacement surgery. Severe upper left chest pain last night. Evaluate for pulmonary embolus.  The CT scan was performed as an emergency procedure through the chest with CT angiography protocol using intervenous contrast and 3-D reconstructions. The following findings are present: 1. The pulmonary arteries are well-opacified and there is no evidence of pulmonary embolus. The thoracic aorta shows no evidence of aneurysm or dissection. 2. There is some minimal strand-like atelectasis at both bases, left greater than right and the lungs are otherwise clear. There is no mediastinal or hilar or axillary adenopathy. There is a moderately large pericardial effusion particularly posteriorly where it measures up to 4 cm in thickness. This is new since the preoperative CT scan dated 09/22/2022. The patient has had recent mitral valve and tricuspid valve replacements. 3. The median sternotomy demonstrates minimal postsurgical change in the retrosternal space. There is no abnormal substernal fluid collection. The CT images through the upper liver, spleen, and both adrenal glands are unremarkable. There is a cyst involving the upper pole of the partially visualized right kidney that is unchanged.      Radiation dose reduction techniques were utilized, including automated exposure control and exposure modulation  based on body size.  This report was finalized on 6/24/2023 2:58 PM by Dr. Ignacio Teague M.D.      MRI Brain Without Contrast    Result Date: 6/5/2023  Patient: KRIS TAYLOR  Time Out: 02:00 Exam(s): MRI HEAD Without Contrast EXAM:   MR Head Without Intravenous Contrast CLINICAL HISTORY:    Reason for exam: unlateral weakness  aphasia. TECHNIQUE:   Magnetic resonance images of the head brain without intravenous contrast in multiple planes. COMPARISON:   No relevant prior studies available. FINDINGS:   Brain:  No mass.  Few scattered chronic microhemorrhage.  Scattered punctate restricted diffusion acute infarct in the bilateral frontal and left parietal occipital cortices.   Ventricles:  No hydrocephalus.   Bones joints:  Unremarkable.   Sinuses:  No acute sinusitis.   Mastoid air cells:  No effusion.   Orbits:  Unremarkable. IMPRESSION:       Scattered punctate restricted diffusion acute infarct in the bilateral frontal and left parietal occipital cortices.    Electronically signed by Sofiya Manuel MD on 06-05-23 at 0200    XR Chest 1 View    Result Date: 6/24/2023  ONE VIEW PORTABLE CHEST AT 9:53 AM  HISTORY: Mitral valve replacement 1 month ago. Upper left chest pain.  FINDINGS: There is mild-to-moderate cardiomegaly with a mitral valve prosthesis in place and there is mild generalized vascular congestion showing perhaps minimal worsening compared to the study performed 7 hours ago. However it shows improvement as well as improved lung expansion when compared to an earlier exam dated 05/30/2023.  This report was finalized on 6/24/2023 10:41 AM by Dr. Ignacio Teague M.D.      XR Chest 1 View    Result Date: 6/24/2023  SINGLE VIEW OF THE CHEST  HISTORY: Chest pain  COMPARISON: None available.  FINDINGS: There is cardiomegaly. There is no vascular congestion. Patient is status post median sternotomy and valve replacement. There is some nonspecific left basilar consolidation. No pneumothorax is identified. No  definite effusion is seen.      Nonspecific left basilar consolidation.  This report was finalized on 6/24/2023 2:59 AM by Dr. Torrie Sargent M.D.      Duplex Venous Lower Extremity - Right CAR    Result Date: 6/19/2023    Chronic right lower extremity superficial thrombophlebitis noted in the small saphenous.   All other right sided veins appeared normal.     Adult Transthoracic Echo Limited W/ Cont if Necessary Per Protocol    Result Date: 7/3/2023    Limited echocardiogram for pericardial assessment.   There is a moderate posterior pericardial effusion without evidence of tamponade. Overall, no significant change from the previous study     Adult Transthoracic Echo Limited W/ Cont if Necessary Per Protocol    Result Date: 6/24/2023    Left ventricular systolic function is normal. Calculated left ventricular EF = 50.1%   Left ventricular diastolic function was not assessed.   There is a prosthetic mitral valve present.   There is a moderate (1-2cm) pericardial effusion adjacent to the left ventricle.   This was limited study. Discussed with Dr. Mccormick. Effusion does not appear to be causing tamponade. It is mostly around the left ventricle and cannot be easily approached percutaneously.     XR Chest PA & Lateral    Result Date: 7/3/2023  XR CHEST PA AND LATERAL-  HISTORY: Male who is 72 years-old,  dyspnea  TECHNIQUE: Frontal and lateral views of the chest  COMPARISON: 06/24/2023  FINDINGS: The heart size is borderline. Sternotomy wires, cardiac valve markers noted. Pulmonary vasculature is unremarkable. Likely atelectasis or scarring left mid to lower lung. No pleural effusion, or pneumothorax. Old granulomatous disease is present. No acute osseous process.      Likely atelectasis or scarring left mid to lower lung. Borderline heart size. Follow-up as clinical indications persist.  This report was finalized on 7/3/2023 3:27 PM by Dr. Randall Barajas M.D.      FL Video Swallow Single Contrast    Result Date:  6/8/2023  VIDEO SWALLOWING EXAMINATION BY SPEECH PATHOLOGY  Clinical: Dysphasia  Video swallowing examination performed under the direction of speech pathology. Imaging reviewed by radiologist who concurs with the findings.  Speech pathology summary:VFSS completed with radiologist present, Dr. Montalvo. Patient presents with mild oropharyngeal dysphagia which is an improvement from the previous swallow study completed on 5/31. No penetration/aspiration noted with nectar by spoon, cup, or straw (single sips). Transient penetration during the swallow with initial trial of thin by spoon. Progressed to deep penetration with trace aspiration with further trials of thin by spoon. Sensed by patient with throat clear, successful in expectorating material from airway. Exhibited penetration with thin by spoon with chin tuck strategy. No penetration or aspiration noted with thin by cup. Timely anterior posterior transit with puree. Pre-spill to the pyriforms with mechanical soft solids. Exhibited nontransient penetration of thin liquid portion of mixed consistency. Prolonged mastication with regular solids with trace valleculae residue post swallow. No penetration or aspiration with regular solids. No penetration or aspiration with nectar by straw as liquid wash or with final trials of thin by cup.   FLUOROSCOPY TIME: 1 minute 57 seconds, 3141 images.  This report was finalized on 6/8/2023 3:56 PM by Dr. Sander Montalvo M.D.      Results for orders placed during the hospital encounter of 06/06/23    Adult Transthoracic Echo Limited W/ Cont if Necessary Per Protocol    Interpretation Summary    Limited echocardiogram for pericardial assessment.    There is a moderate posterior pericardial effusion without evidence of tamponade. Overall, no significant change from the previous study          Active Hospital Problems    Diagnosis  POA    **Acute ischemic left MCA stroke [I63.512]  Yes      Resolved Hospital Problems   No resolved  problems to display.         Assessment & Plan     Dyspnea with exertion with normal oxygen saturation on multiple checks even when dyspneic.  Is very consistent with deconditioning, he is really not done much for a year he has been in bed basically for 6 weeks or more now.  Heart is recovering from all of this he is going to need weeks and months of therapy to get in good condition again we talked about this.  He is never really had any obstructive disease but he feels the Spiriva and Symbicort are helping him.  I told him lets go ahead and let him go home with those for a couple of months and then we will talk about trying to get off of those.  We will get some lung functions and see him in the office in a few months.  I will see as needed    Plan for disposition:    Luis Slater Jr, MD  07/04/23  08:31 EDT    Time:

## 2023-07-04 NOTE — PROGRESS NOTES
LOS: 28 days   Patient Care Team:  Liza Oconnell III, NP-C as PCP - General (Family Medicine)  Corey Lao Jr., MD (Inactive) as Consulting Physician (Urology)    Chief Complaint: Follow-up pericardial effusion, Dressler's syndrome.    Interval History: Still with some shortness of breath, although he was able to walk 2 laps today.  Still with some mild pleuritic right-sided chest discomfort.    Vital Signs:  Temp:  [96.9 °F (36.1 °C)-98.6 °F (37 °C)] 96.9 °F (36.1 °C)  Heart Rate:  [] 93  Resp:  [18-20] 18  BP: (104-136)/(63-87) 111/77    Intake/Output Summary (Last 24 hours) at 7/4/2023 1556  Last data filed at 7/4/2023 1400  Gross per 24 hour   Intake 950 ml   Output 2025 ml   Net -1075 ml       Physical Exam:   General Appearance:    No acute distress, alert and oriented x4   Lungs:     Clear to auscultation bilaterally     Heart:    Regular rhythm and normal rate.  No murmurs, gallops, or   rubs.   Abdomen:     Soft, nontender, nondistended.    Extremities:   1+ edema of the lower extremities.  Compression stockings in place.     Results Review:    Results from last 7 days   Lab Units 07/04/23  0700   SODIUM mmol/L 142   POTASSIUM mmol/L 3.5   CHLORIDE mmol/L 101   CO2 mmol/L 28.1   BUN mg/dL 15   CREATININE mg/dL 1.39*   GLUCOSE mg/dL 112*   CALCIUM mg/dL 9.2           Results from last 7 days   Lab Units 06/29/23  0705   WBC 10*3/mm3 8.23   HEMOGLOBIN g/dL 8.9*   HEMATOCRIT % 26.2*   PLATELETS 10*3/mm3 277             Results from last 7 days   Lab Units 07/01/23  0630   MAGNESIUM mg/dL 2.2           I reviewed the patient's new clinical results.        Assessment:  1.  Moderate posterior pericardial effusion and pleuritic chest pain, consistent with Dressler's syndrome  2.  Acute on chronic diastolic CHF and lower extremity edema  3.  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 Frey  4.   Acute kidney injury with stage IIIa chronic kidney disease   5.  History of COVID-19 pneumonia with residual dyspnea and possible interstitial lung disease  6.  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  Expected postoperative anemia  8.  Postoperative CVA following cardiac surgery  9.  Postoperative junctional bradycardia, complete heart block, and typical atrial flutter (status post DCCV on 6/2/2023).  10.  LBBB and first degree AV block  11.  Diabetes  12.  Deconditioning    Plan:  -Chest x-ray was unremarkable yesterday.  Limited echocardiogram again showed a posterior pericardial effusion which was not causing tamponade.  The effusion was not bigger than the previous study.    -Continue torsemide 60 mg p.o. daily.  Nephrology also following.  Continue compression socks.    -Pulmonology saw him yesterday.  I agree with Dr. Slater.  A good bit of the shortness of breath is very likely from significant deconditioning.    -I am going to resume his Eliquis given that the effusion is not changed.  I still feel he is at risk to have recurrent atrial flutter in the future.    -Right-sided pleuritic chest pain likely from Dressler syndrome.  Continue colchicine.  No diarrhea thus far.    -Hopefully going home tomorrow.  I will see him again tomorrow morning.      Corwin Hobson MD  07/04/23  15:56 EDT

## 2023-07-04 NOTE — PLAN OF CARE
Goal Outcome Evaluation:       Pt is A/Ox4, calm/cooperative, OOB x 1 w CGA w rwx. Meds taken whole w thin liquids. Pt has been continent using urinal for I/O measurement. Around 2115, pt c/o L chest pain and reported it was similar to previous/recent pain. EKG was ordered. Notified Dahiana Lantigua with cardiology. No new orders. Prn Tramadol given x2 overnight (once for the chest discomfort and once for c/o generalized arthritic pain). Pt reported resolution of chest pain quickly (within few minutes) after it began.

## 2023-07-04 NOTE — THERAPY DISCHARGE NOTE
Inpatient Rehabilitation - IRF Speech Language Pathology /Discharge  [facility]     Patient Name: Vernon Solorzano  : 1950  MRN: [id number]  Today's Date: 2023         Admit Date: 2023    Visit Dx:     ICD-10-CM ICD-9-CM   1. Impaired functional mobility, balance, gait, and endurance  Z74.09 V49.89     Patient Active Problem List   Diagnosis    DM (diabetes mellitus), type 2    Mixed hyperlipidemia    Mild intermittent asthma without complication    New onset of congestive heart failure    Nonrheumatic mitral valve regurgitation    Left-sided chest pain    Essential hypertension    Mitral valve disease    Acute ischemic left MCA stroke    Atelectasis of both lungs    Pericardial effusion, acute    History of ischemic stroke    Obese    Physical debility    Chronic diastolic CHF (congestive heart failure)    Anemia    Stage 3a chronic kidney disease    Paroxysmal atrial flutter    H/O mitral valve replacement with tissue graft    Possible Dressler syndrome       SLP Recommendation and Plan  Pt made good progress during inpatient stay toward cognitive-communication and swallow goals. At discharge, pt presents with an overall mild-moderate cognitive impairment, characterized by reduced attention, memory, reasoning and executive function skills. Pt exhibits improved processing speed for conversation; however, he continues to exhibit intermittent processing delays during complex tasks. Pt is tolerating regular diet with thins with NO cues for compensations. Recommend 24 hour indirect supervision and assist with finance and medicine management. Recommend outpatient speech therapy at discharge to address cognitive-communication deficit.          SLP GOALS       Row Name 23 0900 23 1000 23 0830       Attention Goal 1 (SLP)    Improve Attention by Goal 1 (SLP) complete selective attention task;complete sustained attention task;80%;with minimal cues (75-90%)  -AL complete selective  attention task;complete sustained attention task;80%;with minimal cues (75-90%)  -CR complete selective attention task;complete sustained attention task;80%;with minimal cues (75-90%)  -CR    Time Frame (Attention Goal 1, SLP) by discharge  -AL by discharge  -CR by discharge  -CR    Progress/Outcomes (Attention Goal 1, SLP) good progress toward goal  -AL good progress toward goal  -CR continuing progress toward goal  -CR    Comment (Attention Goal 1, SLP) Moderately complex temporal problem solvin% with NO cues; delays in processing noted.  -AL Sustained/selective attention targeted with written directions task. Patient followed 2 step directions with 100% accuracy given NO cues. Notable improvement since initial AM session this date.  -CR Sustained/selective attention targeted with written directions task. Patient followed 2 step directions with 60% accuracy given NO cues, increased to 100% accuracy given MOD verbal/visual cues. Prolonged completion time noted.  -CR       Orientation Goal 1 (SLP)    Improve Orientation Through Goal 1 (SLP) demonstrating orientation to day;demonstrating orientation to month;demonstrating orientation to year;demonstrating orientation to place;demonstrating orientation to disease/impairment;use environmental aids to assist with orientation;80%;with minimal cues (75-90%)  -AL -- --    Time Frame (Orientation Goal 1, SLP) by discharge  -AL -- --    Progress/Outcomes (Orientation Goal 1, SLP) goal partially met  -AL -- --    Comment (Orientation Goal 1, SLP) Oriented to month and year. MONE was incorrect by one day.  -AL -- --       Memory Skills Goal 1 (SLP)    Improve Memory Skills Through Goal 1 (SLP) use memory strategies;listen to a paragraph and answer questions;recall details of the day;80%;with moderate cues (50-74%)  -AL use memory strategies;listen to a paragraph and answer questions;recall details of the day;80%;with moderate cues (50-74%)  -CR --    Time Frame (Memory  Skills Goal 1, SLP) by discharge  -AL by discharge  -CR --    Progress/Outcomes (Memory Skills Goal 1, SLP) goal ongoing  -AL goal ongoing  -CR --    Comment (Memory Skills Goal 1, SLP) BIMS: recalled 3/3 unrelated words immediately and 2/3 with NO cues.  -AL Memory and mental manipulation task completed with 4-items in order of occurence. Patient with 20% accuracy given NO cues, increased to 80% accuracy when given MIN-MOD verbal cues to utilize memory strategy of repetition.  -CR --       Executive Functional Skills Goal 1 (SLP)    Improve Executive Function Skills Goal 1 (SLP) demonstrate awareness of deficit;home management activity;80%;with minimal cues (75-90%)  -AL -- --    Time Frame (Executive Function Skills Goal 1, SLP) by discharge  -AL -- --    Progress/Outcomes (Executive Function Skills Goal 1, SLP) good progress toward goal  -AL -- --    Comment (Executive Function Skills Goal 1, SLP) Analyzing medicine label: 80% with NO cues, 100% with MIN-MOD cues.  -AL -- --              User Key  (r) = Recorded By, (t) = Taken By, (c) = Cosigned By      Initials Name Provider Type    Charmaine Carreon MS CCC-SLP Speech and Language Pathologist    Mariely Corcoran CF-SLP Speech and Language Pathologist                    EDUCATION  The patient has been educated in the following areas:   Cognitive Impairment Dysphagia (Swallowing Impairment).              Time Calculation:    Time Calculation- SLP       Row Name 07/04/23 0929             Time Calculation- SLP    SLP Start Time 0900  -AL      SLP Stop Time 0930  -AL      SLP Time Calculation (min) 30 min  -AL      SLP Received On 07/04/23  -AL                User Key  (r) = Recorded By, (t) = Taken By, (c) = Cosigned By      Initials Name Provider Type    Charmaine Carreon MS CCC-SLP Speech and Language Pathologist                    Therapy Charges for Today       Code Description Service Date Service Provider Modifiers Qty    [id number] [facility] DEV  OF COGN SKILLS INITIAL 15 MIN 7/4/2023 Charmaine Sheth, MS CCC-SLP  1    [id number]  ST DEV OF COGN SKILLS EACH ADDT'L 15 MIN 7/4/2023 Charmaine Sheth, MS CCC-SLP  1                      Charmaine Sheth, MS CCC-SLP  7/4/2023

## 2023-07-05 LAB
GLUCOSE BLDC GLUCOMTR-MCNC: 139 MG/DL (ref 70–130)
GLUCOSE BLDC GLUCOMTR-MCNC: 179 MG/DL (ref 70–130)

## 2023-07-05 NOTE — PROGRESS NOTES
SECTION GG      Self Care Performance Discharge:   Oral Hygiene: [place] sets up or cleans up; patient completes activity. [place]  assists only prior to or following the activity.   Toileting Hygiene: : [place] provides verbal cues and/or touching/steadying  and/or contact guard assistance as patient completes activity.   Shower/Bathe Self: [place] provides verbal cues and/or touching/steadying and/or  contact guard assistance as patient completes activity.   Upper Body Dressing: [place] sets up or cleans up; patient completes activity.  [place] assists only prior to or following the activity.   Lower Body Dressing: [place] provides verbal cues and/or touching/steadying  and/or contact guard assistance as patient completes activity.   Putting On/Taking Off Footwear: [place] does less than half the effort. [place]  lifts, holds or supports trunk or limbs but provides less than half the effort.    Mobility Toilet Transfer Discharge: [place] provides verbal cues or  touching/steadying assistance as patient completes activity.    Signed by: Kathrine Pinedo OTR/CASIE

## 2023-07-05 NOTE — PLAN OF CARE
Goal Outcome Evaluation:      oal Outcome Evaluation:    Pt is A/Ox4, calm/cooperative, OOB x 1 w CGA w rwx. Meds taken whole w thin liquids. Pt has been continent using urinal for I/O measurement. Prn Tramadol given x1, prn Benadryl given x1.

## 2023-07-05 NOTE — THERAPY DISCHARGE NOTE
Inpatient Rehabilitation - IRF Occupational Therapy Treatment Note/Discharge  [facility]     Patient Name: Vernon Solorzano  : 1950  MRN: [id number]  Today's Date: 2023               Admit Date: 2023       ICD-10-CM ICD-9-CM   1. Impaired functional mobility, balance, gait, and endurance  Z74.09 V49.89     Patient Active Problem List   Diagnosis    DM (diabetes mellitus), type 2    Mixed hyperlipidemia    Mild intermittent asthma without complication    New onset of congestive heart failure    Nonrheumatic mitral valve regurgitation    Left-sided chest pain    Essential hypertension    Mitral valve disease    Acute ischemic left MCA stroke    Atelectasis of both lungs    Pericardial effusion, acute    History of ischemic stroke    Obese    Physical debility    Chronic diastolic CHF (congestive heart failure)    Anemia    Stage 3a chronic kidney disease    Paroxysmal atrial flutter    H/O mitral valve replacement with tissue graft    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 AMARILIS MORGAN     Past Surgical History:   Procedure Laterality Date    CARDIAC CATHETERIZATION N/A 2023    Procedure: Right Heart Cath;  Surgeon: Corey Gaffney MD;  Location:  [facility] CATH [facility]VASIVE [facility];  Service: Cardiology;  Laterality: N/A;    CARDIAC CATHETERIZATION N/A 2023    Procedure: Left Heart Cath;  Surgeon: Corey Gaffney MD;  Location:  [facility];  Service: Cardiology;  Laterality: N/A;    CARDIAC CATHETERIZATION N/A 2023    Procedure: Left ventriculography;  Surgeon: Gulshan  MD Corey;  Location: [facility] CATH INVASIVE LOCATION;  Service: Cardiology;  Laterality: N/A;    CARDIAC CATHETERIZATION N/A 5/11/2023    Procedure: Coronary angiography;  Surgeon: Corey Gaffney MD;  Location: [facility] CATH INVASIVE LOCATION;  Service: Cardiology;  Laterality: N/A;    COLONOSCOPY N/A     WNL PER PT, NO RECORDS,     COLONOSCOPY N/A 04/07/2009    DR. GORGE BENAVIDEZ AT [place]    MITRAL VALVE REPAIR/REPLACEMENT N/A 5/24/2023    Procedure: MITRAL VALVE REPAIR/REPLACEMENT TRICUSPID VALVE REPAIR/REPLACEMENT TRANSESOPHAGEAL ECHOCARDIOGRAM WITH ANESTHESIA;  Surgeon: George Frey MD;  Location: [facility] CVOR;  Service: Cardiothoracic;  Laterality: N/A;    PROSTATE SURGERY N/A 11/12/2010    TURP, DR. COREY COLLAZO AT [facility]    SKIN TAG REMOVAL N/A 12/20/2017    ANAL SKIN TAG X2, PERFORMED IN OFFICE, DR. LISA ORANTES    TURP / TRANSURETHRAL INCISION / DRAINAGE PROSTATE  2010    Dr. Goodrich       IRF OT ASSESSMENT FLOWSHEET (last 12 hours)       IRF OT Evaluation and Treatment       Row Name 07/05/23 1020          OT Time and Intention    Document Type discharge evaluation  -CC     Mode of Treatment occupational therapy  -CC     Symptoms Noted During/After Treatment none  -CC       Row Name 07/05/23 1020          Pain Assessment    Pretreatment Pain Rating 0/10 - no pain  -CC     Posttreatment Pain Rating 0/10 - no pain  -CC       Row Name 07/05/23 1020          Cognition/Psychosocial    Affect/Mental Status (Cognition) WFL  -CC     Orientation Status (Cognition) oriented x 3  -CC     Follows Commands (Cognition) follows one-step commands;75-90% accuracy;repetition of directions required  -CC     Cognitive Function attention deficit;safety deficit  -CC     Attention Deficit (Cognition) distractible in noisy environment;focused/sustained attention  -CC     Safety Deficit (Cognition) impulsivity  -CC       Row Name 07/05/23 1020          Range of Motion (ROM)    Range of Motion ROM is WFL;bilateral  upper extremities  -       Row Name 07/05/23 1020          Strength (Manual Muscle Testing)    Left Hand, Setting 2 (Dynamometer Testing) 46  -CC     Right Hand, Setting 2 (Dynamometer Testing) 34  -CC     Left Hand: Lateral (Key) Pinch Strength (Pinch Dynamometer Testing) 14  -CC     Left Hand: [place] (Barber) Pinch Strength (Pinch Dynamometer Testing) 10  -CC     Right Hand: Lateral (Key) Pinch Strength (Pinch Dynamometer Testing) 12  -CC     Right Hand: [place] (Barber) Pinch Strength (Pinch Dynamometer Testing) 12  -CC       Row Name 07/05/23 1020          Vision Assessment/Intervention    Visual Impairment/Limitations WFL  -       Row Name 07/05/23 1020          Bathing    [place] Level (Bathing) lower body;bathing skills;upper body;set up;standby assist  -     Position (Bathing) supported sitting;supported standing  -     Set-up Assistance (Bathing) adjust water temperature;obtain supplies  -[facility] Name 07/05/23 1020          Upper Body Dressing    [place] Level (Upper Body Dressing) upper body dressing skills;doff;don;pull over garment;set up assistance  -     Position (Upper Body Dressing) supported sitting  -     Set-up Assistance (Upper Body Dressing) obtain clothing  -       Row Name 07/05/23 1020          Lower Body Dressing    [place] Level (Lower Body Dressing) doff;don;pants/bottoms;socks;underwear;moderate assist (50% patient effort)  -     Position (Lower Body Dressing) supported sitting;supported standing  -     Set-up Assistance (Lower Body Dressing) anti-embolic stockings  -       Row Name 07/05/23 1020          Grooming    [place] Level (Grooming) grooming skills;deodorant application;hair care, combing/brushing;oral care regimen;shave face;wash face, hands;set up  -     Position (Grooming) sink side;supported sitting  -     Set-up Assistance (Grooming) obtain supplies  -       Row Name 07/05/23 1020          Toileting    [place]  Level (Toileting) toileting skills;contact guard assist;supervision  -     Position (Toileting) supported sitting  -       Row Name 07/05/23 1020          Bed Mobility    Scooting/Bridging [place] (Bed Mobility) supervision  -     Supine-Sit [place] (Bed Mobility) modified independence  -     Sit-Supine [place] (Bed Mobility) modified independence  -       Row Name 07/05/23 1020          Functional Mobility    Functional Mobility- Ind. Level supervision required  -     Functional Mobility- Device walker, front-wheeled  -       Row Name 07/05/23 1020          Sit-Stand Transfer    Sit-Stand [place] (Transfers) standby assist  -     Assistive Device (Sit-Stand Transfers) wheelchair;walker, front-wheeled  -       Row Name 07/05/23 1020          Stand-Sit Transfer    Stand-Sit [place] (Transfers) standby assist  -     Assistive Device (Stand-Sit Transfers) walker, front-wheeled;wheelchair  -       Row Name 07/05/23 1020          Toilet Transfer    Type (Toilet Transfer) sit-stand;stand-sit  -     [place] Level (Toilet Transfer) supervision  -       Row Name 07/05/23 1020          Shower Transfer    Type (Shower Transfer) stand-sit;sit-stand  -     [place] Level (Shower Transfer) stand by assist  -     Assistive Device (Shower Transfer) walker, front-wheeled;grab bar, tub/shower  -       Row Name 07/05/23 1020          Motor Skills    Motor Skills functional endurance  -     Functional Endurance Fair+  -       Row Name 07/05/23 1020          Shoulder (Therapeutic Exercise)    Shoulder Wand Exercises (Therapeutic Exercise) --  HEP reviewed  -       Row Name 07/05/23 1020          Balance    Static Sitting Balance independent  -     Dynamic Sitting Balance independent  -     Static Standing Balance supervision  -     Dynamic Standing Balance standby assist  -       Row Name 07/05/23 1020          Positioning and Restraints    Pre-Treatment  Position in bed  -CC     Post Treatment Position bed  -CC     In Bed sitting EOB;notified nsg;call light within reach;encouraged to call for assist;exit alarm on  -CC       Row Name 07/05/23 1020          Transfer Goal 2 (OT-IRF)    Progress/Outcomes (Transfer Goal 2, OT-IRF) goal met  -       Row Name 07/05/23 1020          Bathing Goal 2 (OT-IRF)    Progress/Outcomes (Bathing Goal 2, OT-IRF) goal met  -       Row Name 07/05/23 1020          Discharge Summary (OT)    Outcomes Achieved Upon Discharge (OT) all goals met within established timeframes  -     Discharge Summary Statement (OT) Pt D/C home w spouse and HEP. Pt has DME in home setting. Pt at SBA level.  -               User Key  (r) = Recorded By, (t) = Taken By, (c) = Cosigned By      Initials Name Effective Dates    CC Kathrine Pinedo, OTR 06/16/21 -                        Occupational Therapy Education       Title: PT OT SLP Therapies (Done)       Topic: Occupational Therapy (Resolved)       Point: ADL training (Resolved)       Description:   Instruct learner(s) on proper safety adaptation and remediation techniques during self care or transfers.   Instruct in proper use of assistive devices.                  Learning Progress Summary             Patient Acceptance, E,TB,H,D, VU,DU by CC at 7/5/2023 1020    Comment: UE HEP    Acceptance, E, VU by WN at 6/29/2023 2344    Acceptance, E, NR by AF at 6/13/2023 1527    Comment: benefits of therapy, endurance   Family Acceptance, E, NR by AF at 6/13/2023 1527    Comment: benefits of therapy, endurance                         Point: Home exercise program (Resolved)       Description:   Instruct learner(s) on appropriate technique for monitoring, assisting and/or progressing therapeutic exercises/activities.                  Learning Progress Summary             Patient Acceptance, E,TB,H,D, VU,DU by CC at 7/5/2023 1020    Comment: UE HEP    Acceptance, E, VU by WN at 6/29/2023 2344    Acceptance, E, NR  by AF at 6/13/2023 1527    Comment: benefits of therapy, endurance   Family Acceptance, E, NR by AF at 6/13/2023 1527    Comment: benefits of therapy, endurance                         Point: Precautions (Resolved)       Description:   Instruct learner(s) on prescribed precautions during self-care and functional transfers.                  Learning Progress Summary             Patient Acceptance, E,TB,H,D, VU,DU by CC at 7/5/2023 1020    Comment: UE HEP    Acceptance, E, VU by WN at 6/29/2023 2344    Acceptance, E, NR by AF at 6/13/2023 1527    Comment: benefits of therapy, endurance   Family Acceptance, E, NR by AF at 6/13/2023 1527    Comment: benefits of therapy, endurance                         Point: Body mechanics (Resolved)       Description:   Instruct learner(s) on proper positioning and spine alignment during self-care, functional mobility activities and/or exercises.                  Learning Progress Summary             Patient Acceptance, E,TB,H,D, VU,DU by  at 7/5/2023 1020    Comment: UE HEP    Acceptance, E, VU by WN at 6/29/2023 2344    Acceptance, E, NR by AF at 6/13/2023 1527    Comment: benefits of therapy, endurance   Family Acceptance, E, NR by AF at 6/13/2023 1527    Comment: benefits of therapy, endurance                                         User Key       Initials Effective Dates Name Provider Type Discipline     06/16/21 -  Kathrine Pinedo, OTR Occupational Therapist OT    AF 06/16/21 -  Candy Davis OTR Occupational Therapist OT    WN 08/23/22 -  Delvin Jansen, RN Registered Nurse Nurse                    OT Recommendation and Plan  Planned Therapy Interventions (OT): activity tolerance training, adaptive equipment training, BADL retraining, cognitive/visual perception retraining, neuromuscular control/coordination retraining, occupation/activity based interventions, patient/caregiver education/training, ROM/therapeutic exercise, strengthening exercise, transfer/mobility  retraining           OT IRF GOALS       Row Name 07/05/23 1020 06/22/23 1600          Transfer Goal 1 (OT-IRF)    Progress/Outcomes (Transfer Goal 1, OT-IRF) -- goal met  -CC        Transfer Goal 2 (OT-IRF)    Activity/Assistive Device (Transfer Goal 2, OT-IRF) -- toilet;shower chair  -CC     [place] Level (Transfer Goal 2, OT-IRF) -- supervision required  -CC     Time Frame (Transfer Goal 2, OT-IRF) -- long-term goal (LTG)  -CC     Progress/Outcomes (Transfer Goal 2, OT-IRF) goal met  -CC goal revised this date  -CC        Bathing Goal 1 (OT-IRF)    Progress/Outcomes (Bathing Goal 1, OT-IRF) -- goal met  -CC        Bathing Goal 2 (OT-IRF)    Progress/Outcomes (Bathing Goal 2, OT-IRF) goal met  -CC continuing progress toward goal  -CC        UB Dressing Goal 1 (OT-IRF)    Progress/Outcomes (UB Dressing Goal 1, OT-IRF) -- goal met  -CC        UB Dressing Goal 2 (OT-IRF)    Progress/Outcomes (UB Dressing Goal 2, OT-IRF) -- goal no longer appropriate  -CC        LB Dressing Goal 1 (OT-IRF)    Progress/Outcomes (LB Dressing Goal 1, OT-IRF) -- goal met  -CC        LB Dressing Goal 2 (OT-IRF)    Progress/Outcomes (LB Dressing Goal 2, OT-IRF) -- goal met  -CC        Grooming Goal 1 (OT-IRF)    Progress/Outcomes (Grooming Goal 1, OT-IRF) -- goal met  -CC        Grooming Goal 2 (OT-IRF)    Progress/Outcomes (Grooming Goal 2, OT-IRF) -- goal met  -CC        Toileting Goal 1 (OT-IRF)    Progress/Outcomes (Toileting Goal 1, OT-IRF) -- goal met  -CC        Toileting Goal 2 (OT-IRF)    Progress/Outcomes (Toileting Goal 2, OT-IRF) -- goal ongoing  -CC        Strength Goal 1 (OT-IRF)    Progress/Outcomes (Strength Goal 1, OT-IRF) -- goal ongoing  -CC        Caregiver Training Goal 1 (OT-IRF)    Progress/Outcomes (Caregiver Training Goal 1, OT-IRF) -- goal ongoing  -CC               User Key  (r) = Recorded By, (t) = Taken By, (c) = Cosigned By      Initials Name Provider Type    CC Kathrine Pinedo, OTR Occupational Therapist                         Time Calculation:                OT Discharge Summary  Discharge Destination: Home with assist    HI Morgan  7/5/2023

## 2023-07-05 NOTE — PROGRESS NOTES
Inpatient Rehabilitation Discharge  Section A. Transportation  Issues Due to Lack of Transportation:  No    Section A. Medication List  Medication List to Subsequent Provider:  Not applicable.  Patient was not  discharged to a subsequent provider.  Discharge Location:  01 - Home  Medication List to Patient at Discharge:  Yes - Current reconciled medication  list provided to the patient, family and/or caregiver  Route(s) of Medication List Transmission to Patient:  Electronic Health Record,  Verbal (e.g., in-person, telephone, video conferencing), Paper-based (e.g., fax,  copies, printouts)    Signed by: Génesis Narayan, Social Work

## 2023-07-05 NOTE — PROGRESS NOTES
Inpatient Rehabilitation Plan of Care Note    Plan of Care  Care Plan Reviewed - No updates at this time.    Sphincter Control    Performed Intervention(s)  Offer toileting/ timed voids  Monitor I&O      Safety    Performed Intervention(s)  Safety monitoring during functional activities  Environmental set- upto reduce risk      Psychosocial    Performed Intervention(s)  Allow patient to verbalize needs and concerns      Body Systems    Performed Intervention(s)  ACCU check ACHS    Signed by: Viviana Whitfield RN

## 2023-07-05 NOTE — DISCHARGE SUMMARY
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  Now transferred to the acute inpatient rehab program.     Per his discharge summary-[On 5/24 he underwent MVR, TVR, and left atrial appendage closure with Dr. Frey.  He has done well despite a very complicated postoperative course.  He was extubated day of surgery.  The following morning he was unable to move his right hand and had echolalia, constantly repeating his date of birth.  He underwent CT of the head which was normal.  He was given IV steroids given altered mental status.  Home p.o. steroids were resumed.  Nephrology was consulted and he was gently IV diuresed.  He was evaluated by speech therapy swallow concerns.  Feeding tube was placed and tube feeds were managed per SLP until 6/6 when he was cleared for modified diet..  On postop day 2 he was in AV block with frequent PVCs, with rates in the 50s.  Beta-blocker was held.  He was then found to be in a junctional rhythm.  After being IV diuresed he was changed to p.o. Lasix per nephrology.  On postop day 6 his rhythm changed to atrial flutter with PVCs.  He came hyperglycemic with tube feeds.  He was placed on SSI and Lantus.  This was uptitrated until 6/5.  His prednisone was tapered per pulmonology.  The morning of postop day 8 he was alert and oriented, but had word salad which was an acute change from his previous neurologic state.  He underwent repeat CT scan which was negative for acute changes.  He also underwent lower extremity duplex due to swelling of the left lower extremity which was negative for DVT but positive for superficial thrombophlebitis. He remained in  atrial flutter, was place on heparin drip, and underwent cardioversion and converted to normal sinus rhythm with rates in the 90s, however he went back into atrial fibrillation afterward with controlled rate.  AV wires were removed afterward on postop day 10 and he was started on Eliquis.  On postop day 11 he was started on Toprol-XL.  He was weaned to room air.  He tolerated modified diet per SLP and received nocturnal tube feeding.  He underwent MRI which showed an acute infarction in the bilateral frontal and left parietal occipital cortices.  He was back in normal sinus rhythm with rates in the 90s.  He tolerated beta-blocker.  He was again IV diuresed.  Then switched to p.o. torsemide per nephrology. ]        Recently, aphasia and right hand apraxia improved.  Transfers min assist.  Gait two-person assist min assist 25 feet, festinating, shuffling, decreased stride length, forward flexed posture.  Impaired ADL performance.  On a dysphagia diet with nectar thick liquids     Given his functional impairments and comorbidities he is now mated for acute inpatient rehabilitation  He complains of expected sternotomy discomfort.  Denies any headache.  No vision changes.  Reports swallowing fair and tolerating dysphagia diet.  No shortness of air.  Denies any bowel complaints.  Does have some bladder urgency.  Chronic edema in the legs which was worse over the past 6 months but is better now although still present.  His speech is showing improvement.  His wife notes that he does wake up with initial disorientation each time.  Pain control is better on the right side.       HOSPITAL COURSE:    Patient participated in a comprehensive acute inpatient rehabilitation program.     During the hospital stay the following areas were addressed:      Status post CVA-Scattered  punctate restricted diffusion acute infarct in the bilateral frontal and   left parietal occipital cortices.        Impaired Cognition/impaired  mobility/impaired self-care-improved     Aphasia-resolved     Right hand apraxia-resolved     Encephalopathy-improving     Dysphagia/nutrition-healthy heart 2-3 g sodium, consistent carb, no mixed consistency, regular texture, nectar thick liquid  June 7-videofluoroscopic swallow study planned tomorrow  June 8-swallow study today-advance to regular solid, no mixed consistency, thin liquids by cup.  No straws.     Stroke prophylaxis-Eliquis/aspirin/atorvastatin     History of severe mitral regurgitation and annular dilatation, moderate to severe tricuspid regurgitation-  status post May 24, 2023 - 1. Mitral valve repair with a 28 mm physio II ring annuloplasty with residual mild to moderate MR  2.  Mitral valve replacement with a 29 mm Schwartz II porcine prosthesis  3.  Tricuspid valve repair with 28 mm physio tricuspid ring  4.  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 Overall, no significant change from the previous study      Volume status-diuretics per Cardiology/nephrology  June 30-Per nephrology-[continue furosemide 80 mg IV twice a day, hope to transition to oral diuretics during his admission   ]  July 1-changed to torsemide  July 5-Home on torsemide 60 mg daily.  Per cardiology they will manage diuretics after discharge     Severe pulmonary hypertension  Obstructive sleep apnea     Interstitial lung disease status post COVID-19 infection-steroid tapered off per pulmonology June 7  Home inhaler regimen  Chronic steroid use with cushingoid syndrome-steroids now tapered off   June 23-change back to his home regimen Spiriva 2 puffs once a day and Symbicort 160/4.5 two puffs twice a day  July 3 - Continued  ZUÑIGA.  Pulmonary to see in follow up. ECHO and CXR ordered.  Chest x-ray showed-  Pulmonary vasculature is unremarkable. Likely atelectasis or scarring left mid to lower lung. No pleural effusion, or pneumothorax.         Leukocytosis-reactive/steroid administration     Postoperative anemia     Zimrlbziapxovnij-alqzlwuskcs-cskvfhhd     Chronic kidney disease stage III-baseline creatinine 1.2  Renal insufficiency - Nephrology following          Diabetes mellitus type 2-Jardiance/Lantus/sliding scale insulin-uncontrolled  Off Jardiance secondary to renal function.  Adjusting short and long acting insulin.  July 5-Home on Lantus 20 units twice daily and lispro 6 units 3 times a day with meals        BPH/urinary retention-history of TURP  June 14-void 300 cc with postvoid residual 205 cc.           Pain management-tramadol-/Tylenol Dk report reviewed  July 5-sparingly taking tramadol.  -will continue with Tylenol as needed at home               TEAM CONF - JUNE 8 - ENDURANCE POOR. BED MOD. TRANSFERS MOD. GAIT 25 FEET MOD ASSIST RW. SHOWER TRANSFERS MOD A.   BATH MOD ASSIST. LBD MAX. UBD MIN. TOILETING MAX.   Patient is currently on nectar liquids VFSS TODAY.   BIMS SUMMARY SCORE: 9 Moderately impaired   DIFFICULTY WITH SUSTAINED AND FOCAL ATTENTION.  CONTINENT / INCONTINENT  ELOS - 2 -3 WEEKS     TEAM CONF - Kari 15 - BED MOD. TRANSFERS MIN. 4 STAIRS MIN X 2. SATS 97% OR HIGHER WITH GAIT WITH FATIGUE, GAIT 80 FEET MIN ASSIST RW. TOILET TRANSFERS MIN. SHOWER TRANSFERS MIN MOD. BATH MIN. LBD MIN MOD. UBD MIN. GROOMING SET UP. TOILETING MIN MOD. DRESSING TAKES 30 MINUTES WIETH SLOW PROCESSING. EDEMA - JOBST STOCKINGS. MILD DYSPHGAIA, REG THIN BY CUP, NO STRAWS. CLQT MODERATE DEFICITS, DIFFICULTY WITH ATTENTION AND MEMORY, PROLONGED PROCESSING SPEED. BNE FOLLOWING. VARIABLE INTAKE - DIETITIAN FOLLOWING. RASH ON ABD AND LEFT CHEST, POSSIBLY CONTACT DERMATITIS. TORSEMIDE ADDED.  ELOS - TWO WEEKS.     TEAM CONF - June 22-  DIURETIC ADJUSTED BY NEPRHOLOGY. PATIENT DOES NOT FOLLOW 1500 CC FLUID RESTRICTION. TRANSFERS CTG. 4 STAIRS MIN X 2. GAIT 80 FEET CTG RW.   TOILET AND SHOWER TRANSFERS CTG. BATH MIN-CTG. LBD MIN-CTG FOR COMPRESSION STOCKINGS. UBD SBA. TOILETING CTG AS ASKS FOR HELP.   SWALLOW - REGULAR SOLID, THIN LIQUID BY CUP. MOSTLY CONTINENT.   Orientation: WNL  Attention: WNL to Mildly Impaired  Executive Functioning: Mildly Impaired  Abstract Reasoning: WNL  Arithmetic: Mildly Impaired  Visuospatial Perception: WNL  Visuospatial Praxis: WNL for Figure Copy; Severely Impaired Coding related to slowed processing speed.  Verbal Memory: Moderately to Severely Impaired  Visual Memory: Mildly Impaired  Emotional: Some frustration regarding current level of functioning, but minimal anxiety and depression symptoms reported on the GDS and BROOKE.  -demonstrating moderate to severe verbal memory deficits and slowed processing speed that are consistent with the location of his stroke.  Word-finding difficulties are also present, but are more evident conversationally than during testing.  ELOS - ONE WEEK- June 29        TEAM CONF - June 29 - TRANSFERS CTG. 4 STAIRS CTG . DID 8 ON Tuesday. AT HOME 14 STAIRS, LANDING, THEN 8 STAIRS TO SECOND FLOOR. GAIT  RW CTG SBA. TOILET TRANSFERS CTG SBA. SHOWER TRANSFERS CTG  BATH SBA CTG. LBD MIN. UBD SBA. GROOMING SET UP. TOILETING MIN CTG. EASILY DISTRACTED ON TASK. PROCESSING SPEED IMPROVING, BUT STILL SLOWED WHEN TIRED.   DRESSLER SYNDROME - TRAMADOL HELPED PAIN. ON COLCHICINE.   ELOS - WED (discharge was delayed given transfer off the unit for Dressler syndrome)        Goal is for home with outpatient cardiac rehab   therapies.  Barrier to discharge: Impaired cognition mobility self-care- work on follow, executive function, conditioning, transfers, progressive ambulation, ADLs to overcome.         Disposition-July 5-achieved inpatient rehab goals.  Medically stable.  Cleared for discharge by  consulting physicians.  Discharge home with outpatient therapies.  Medications and follow-up reviewed.        Physical Exam near the time of discharge:      MENTAL STATUS -  AWAKE / ALERT  HEENT-   LUNGS - CTA,    Sternotomy incision -dressed  HEART-sinus with occasional ectopy  ABD - NORMOACTIVE BOWEL SOUNDS, SOFT, NT.   EXT -1+ pedal edema bilateral lower extremities,   NEURO -oriented to person place time and situation.    Speech is fluent.    Good strength bilaterally  RESULTS:  Glucose   Date/Time Value Ref Range Status   07/05/2023 1124 179 (H) 70 - 130 mg/dL Final     Comment:     Meter: [id number] : [id number] Roe Herman NA   07/05/2023 0723 139 (H) 70 - 130 mg/dL Final     Comment:     Meter: [id number] : [id number] Roe Herman NA   07/04/2023 1938 152 (H) 70 - 130 mg/dL Final     Comment:     Meter: [id number] : [id number] Kwasi Bangura NA   07/04/2023 1632 218 (H) 70 - 130 mg/dL Final     Comment:     Meter: [id number] : [id number] Hotstepheng Faina NA   07/04/2023 1146 154 (H) 70 - 130 mg/dL Final     Comment:     Meter: [id number] : [id number] Hotstepheng Faina NA   07/04/2023 0720 123 70 - 130 mg/dL Final     Comment:     Meter: [id number] : [id number] Hotstepheng Faina NA   07/03/2023 2011 168 (H) 70 - 130 mg/dL Final     Comment:     Meter: [id number] : [id number] Summer Rubi NA   07/03/2023 1722 133 (H) 70 - 130 mg/dL Final     Comment:     Meter: [id number] : [id number] Laura Saniya NA     Results from last 7 days   Lab Units 06/29/23  0705   WBC 10*3/mm3 8.23   HEMOGLOBIN g/dL 8.9*   HEMATOCRIT % 26.2*   PLATELETS 10*3/mm3 277     Results from last 7 days   Lab Units 07/04/23  0700 07/03/23  1427 07/02/23  0644 06/30/23  0719   SODIUM mmol/L 142 141 142 140   POTASSIUM mmol/L 3.5 3.6 3.1* 3.2*   CHLORIDE mmol/L 101 99 101 98   CO2 mmol/L 28.1 29.0 29.0 29.6*   BUN mg/dL 15 15 13 15   CREATININE mg/dL 1.39* 1.41* 1.35* 1.31*   CALCIUM  mg/dL 9.2 9.0 8.6 9.2   BILIRUBIN mg/dL  --   --   --  0.5   ALK PHOS U/L  --   --   --  115   ALT (SGPT) U/L  --   --   --  11   AST (SGOT) U/L  --   --   --  9   GLUCOSE mg/dL 112* 215* 113* 135*              Latest Reference Range & Units 05/23/23 14:29   Hemoglobin A1C 4.80 - 5.60 % 9.20 (H)   Total Cholesterol 0 - 200 mg/dL 155   HDL Cholesterol 40 - 60 mg/dL 41   LDL Cholesterol  0 - 100 mg/dL 81   Triglycerides 0 - 150 mg/dL 197 (H)   (H): Data is abnormally high       Latest Reference Range & Units 05/31/23 08:03   25 Hydroxy, Vitamin D 30.0 - 100.0 ng/ml 20.0 (L)   (L): Data is abnormally low     Lower extremity venous duplex-June 19, 2023-Chronic right lower extremity superficial thrombophlebitis noted in the small saphenous.      Echocardiogram June 24, 2023    Left ventricular systolic function is normal. Calculated left ventricular EF = 50.1%    Left ventricular diastolic function was not assessed.    There is a prosthetic mitral valve present.    There is a moderate (1-2cm) pericardial effusion adjacent to the left ventricle.    This was limited study. Discussed with Dr. Mccormick. Effusion does not appear to be causing tamponade. It is mostly around the left ventricle and cannot be easily approached percutaneously.    XR CHEST PA AND LATERAL-July 3, 2023     HISTORY: Male who is 72 years-old,  dyspnea     TECHNIQUE: Frontal and lateral views of the chest     COMPARISON: 06/24/2023     FINDINGS: The heart size is borderline. Sternotomy wires, cardiac valve  markers noted. Pulmonary vasculature is unremarkable. Likely atelectasis  or scarring left mid to lower lung. No pleural effusion, or  pneumothorax. Old granulomatous disease is present. No acute osseous  process.     IMPRESSION:  Likely atelectasis or scarring left mid to lower lung.  Borderline heart size.        Discharge Medications        New Medications        Instructions Start Date   acetaminophen 325 MG tablet  Commonly known as: TYLENOL   650  mg, Oral, Every 6 Hours PRN      apixaban 5 MG tablet tablet  Commonly known as: ELIQUIS   5 mg, Oral, Every 12 Hours Scheduled      calcium carbonate 500 MG chewable tablet  Commonly known as: TUMS   2 tablets, Oral, 4 Times Daily PRN      colchicine 0.6 MG tablet   0.6 mg, Oral, Daily   Start Date: July 6, 2023     guaiFENesin 600 MG 12 hr tablet  Commonly known as: MUCINEX  Replaces: guaifenesin 100 MG/5ML liquid   600 mg, Oral, Every 12 Hours Scheduled      insulin glargine 100 UNIT/ML injection  Commonly known as: LANTUS, SEMGLEE   20 Units, Subcutaneous, 2 Times Daily      insulin lispro 100 UNIT/ML injection  Commonly known as: HUMALOG/ADMELOG   6 Units, Subcutaneous, 3 Times Daily With Meals      magnesium oxide 400 tablet tablet  Commonly known as: MAG-OX   400 mg, Oral, Daily   Start Date: July 6, 2023     melatonin 3 MG tablet   3 mg, Oral, Nightly      metoprolol succinate XL 25 MG 24 hr tablet  Commonly known as: TOPROL-XL   25 mg, Oral, Every 24 Hours Scheduled   Start Date: July 6, 2023     potassium chloride 20 MEQ CR tablet  Commonly known as: K-DUR,KLOR-CON   20 mEq, Oral, Daily   Start Date: July 6, 2023            Changes to Medications        Instructions Start Date   atorvastatin 40 MG tablet  Commonly known as: LIPITOR  What changed:   medication strength  how much to take   40 mg, Oral, Nightly      Torsemide 60 MG tablet  What changed:   medication strength  how much to take  when to take this   60 mg, Oral, Daily   Start Date: July 6, 2023            Continue These Medications        Instructions Start Date   calcium 500 mg vitamin D 5 mcg (200 UT) 500-5 MG-MCG tablet per tablet   1 tablet, Oral, 2 Times Daily      OneTouch Verio test strip  Generic drug: glucose blood   1 each, Other, As Needed, Use as instructed      pantoprazole 40 MG EC tablet  Commonly known as: PROTONIX   40 mg, Oral, Daily      PRESERVISION AREDS 2+MULTI VIT PO   1 tablet, Oral, 2 Times Daily      Symbicort 160-4.5  MCG/ACT inhaler  Generic drug: budesonide-formoterol   2 puffs, Inhalation, 2 Times Daily      tiotropium bromide monohydrate 2.5 MCG/ACT aerosol solution inhaler  Commonly known as: SPIRIVA RESPIMAT   2 puffs, Inhalation, Daily      traMADol 50 MG tablet  Commonly known as: ULTRAM   50 mg, Oral, Every 6 Hours PRN             Stop These Medications      aspirin 81 MG tablet     furosemide 40 MG tablet  Commonly known as: LASIX     guaifenesin 100 MG/5ML liquid  Commonly known as: ROBITUSSIN  Replaced by: guaiFENesin 600 MG 12 hr tablet     irbesartan 75 MG tablet  Commonly known as: AVAPRO     Janumet -1000 MG tablet  Generic drug: SITagliptin-metFORMIN HCl ER     Jardiance 25 MG tablet tablet  Generic drug: empagliflozin     metoclopramide 10 MG tablet  Commonly known as: REGLAN     montelukast 10 MG tablet  Commonly known as: SINGULAIR     omeprazole 40 MG capsule  Commonly known as: priLOSEC     potassium chloride 10 MEQ CR capsule  Commonly known as: MICRO-K     predniSONE 1 MG tablet  Commonly known as: DELTASONE     Tresiba FlexTouch 100 UNIT/ML solution pen-injector injection  Generic drug: insulin degludec               Contact information for follow-up providers       George Frey MD. Go on 7/12/2023.    Specialty: Cardiothoracic Surgery  Why: At 10:30 AM  Contact information:  [address] [facility] [address]  [place] [address]  [phone]               Corwin Hobson MD Follow up on 7/18/2023.    Specialty: Cardiology  Why: appointment 7/18 @ 9 am with Christina Nuñez  Contact information:  [address] [facility] [address]  [place] [address]  [phone]               [facility] Follow up.    Why: pt has to call office after discharge to schedule appointment per office staff  Contact information:  [address] [place] [address]  [phone]             Tae Lay MD Follow up on 7/18/2023.    Specialty: Nephrology  Why: 7/18 @ 1130 am appointment  Contact  information:  [address]  [address]  [facility] [address]  [phone]               Jim Sage MD Follow up on 9/11/2023.    Specialty: Neurology  Why: appointment on 9/11 @ 830am  Contact information:  [address]  [facility] [address]  [facility] [address]  [phone]               Liza Oconnell III, NP-C Follow up on 7/7/2023.    Specialty: Internal Medicine  Why: appointment on 7/7 @ 845 am  Contact information:  [address]  [facility] [address]  [facility] [address]  [phone]               [facility]. Go to.    Contact information:  [address] [place] [address]    [phone]             Tu Silver MD Follow up in 1 month(s).    Specialty: Physical Medicine and Rehabilitation  Contact information:  [address]  [facility] [address]  [facility] [address]  [phone]               Lab Work - BMP in one week Follow up.               Liza Oconnell III NP-C .    Specialty: Internal Medicine  Contact information:  [address]  [facility] [address]  [place] [address]  [phone]                       Contact information for after-discharge care       Durable Medical Equipment       [facility] .    Service: Durable Medical Equipment  Contact information:  [address]  [facility] [address]  [phone]                                   AVS and Handouts       Admission (Discharged) (5/23/23 - 6/6/23)   - [facility] CARDIOVASC UNIT Principal Problem: Mitral valve disease          Cardiac Rehabilitation (English)     Exercise Guidelines During Cardiac Rehabilitation (English)     Mitral Valve Replacement (English)                     Discharge Instructions         Cardiac precautions-heart rate less than 120 or less than 20 bpm above baseline.  Systolic blood pressure greater than 90 and less than 180.  Sternotomy precautions-no lifting greater than 10 pounds x 6 weeks from date of  sternotomy surgery-May 24, 2023.    Outpatient speech therapy and physical therapy    No driving until released by cardiothoracic surgery            >30 on discharge    Tu Silver MD

## 2023-07-05 NOTE — PROGRESS NOTES
LOS: 29 days   Patient Care Team:  Liza Oconnell III, NP-C as PCP - General (Family Medicine)  Corey Lao Jr., MD (Inactive) as Consulting Physician (Urology)    Chief Complaint: Follow-up pericardial effusion, Dressler's syndrome.    Interval History: Still with some shortness of breath, although stable.  Still with the same mild pleuritic right-sided chest discomfort.    Vital Signs:  Temp:  [96.9 °F (36.1 °C)-98.4 °F (36.9 °C)] 98.3 °F (36.8 °C)  Heart Rate:  [] 79  Resp:  [18-20] 20  BP: (111-143)/(74-81) 118/74    Intake/Output Summary (Last 24 hours) at 7/5/2023 1045  Last data filed at 7/5/2023 0619  Gross per 24 hour   Intake 510 ml   Output 1300 ml   Net -790 ml       Physical Exam:   General Appearance:    No acute distress, alert and oriented x4   Lungs:     Clear to auscultation bilaterally     Heart:    Regular rhythm and normal rate.  No murmurs, gallops, or   rubs.   Abdomen:     Soft, nontender, nondistended.    Extremities:   1+ edema of the lower extremities.       Results Review:    Results from last 7 days   Lab Units 07/04/23  0700   SODIUM mmol/L 142   POTASSIUM mmol/L 3.5   CHLORIDE mmol/L 101   CO2 mmol/L 28.1   BUN mg/dL 15   CREATININE mg/dL 1.39*   GLUCOSE mg/dL 112*   CALCIUM mg/dL 9.2           Results from last 7 days   Lab Units 06/29/23  0705   WBC 10*3/mm3 8.23   HEMOGLOBIN g/dL 8.9*   HEMATOCRIT % 26.2*   PLATELETS 10*3/mm3 277             Results from last 7 days   Lab Units 07/01/23  0630   MAGNESIUM mg/dL 2.2           I reviewed the patient's new clinical results.        Assessment:  1.  Moderate posterior pericardial effusion and pleuritic chest pain, consistent with Dressler's syndrome  2.  Acute on chronic diastolic CHF and lower extremity edema  3.  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 Frey  4.  Acute kidney injury with stage IIIa chronic kidney  disease   5.  History of COVID-19 pneumonia with residual dyspnea and possible interstitial lung disease  6.  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  Expected postoperative anemia  8.  Postoperative CVA following cardiac surgery  9.  Postoperative junctional bradycardia, complete heart block, and typical atrial flutter (status post DCCV on 6/2/2023).  10.  LBBB and first degree AV block  11.  Diabetes  12.  Deconditioning    Plan:  -Limited echocardiogram from 2 days ago unchanged with moderate posterior pericardial effusion.  No indication for drainage.  He will likely need another limited echo in approximately 1 month.    -Continue torsemide 60 mg p.o. daily.  Continue compression socks.    -Do suspect that much of his shortness of breath is from deconditioning (or at least this plays a significant role).    -Eliquis resumed yesterday as the pericardial effusion was not significantly larger.    -Right-sided pleuritic chest pain likely from Dressler syndrome.  Continue colchicine.  No diarrhea thus far.    -Okay to discharge today per cardiology.  I will arrange for follow-up with him in the office next week.  He will need his labs periodically checked, and will likely need a BMP after that appointment.      Corwin Hobson MD  07/05/23  10:45 EDT

## 2023-07-05 NOTE — NURSING NOTE
All lifting precautions and swallow precautions discussed with pt and wife at NH. Updated insulin and Tylenol order reviewed with pt and wife.

## 2023-07-05 NOTE — PROGRESS NOTES
Case Management Discharge Note      Final Note: Patient d/c home with his wife. He will continue with outpatient PT/ST at [facility] after d/c. Therapy ordered a walker for patient and it was delivered to patient's room by [employer]. Wife will transport patient home.         Selected Continued Care - Admitted Since 6/6/2023     Destination    No services have been selected for the patient.              Durable Medical Equipment Coordination complete.    Service Provider Selected Services Address Phone Fax Patient Preferred    [facility] [address] [place] [id number] [phone] --          Dialysis/Infusion    No services have been selected for the patient.              Home Medical Care    No services have been selected for the patient.              Therapy    No services have been selected for the patient.              Community Resources    No services have been selected for the patient.              Community & DME    No services have been selected for the patient.                Selected Continued Care - Prior Encounters Includes continued care and service providers with selected services from prior encounters from 3/8/2023 to 7/5/2023    Discharged on 6/26/2023 Admission date: 6/24/2023 - Discharge disposition: Rehab Facility or Unit ([facility])    Destination     Service Provider Selected Services Address Phone Fax Patient Preferred    [facility] Inpatient Rehabilitation [address] [place], [place] [id number] [phone] -- --                Discharged on 6/6/2023 Admission date: 5/23/2023 - Discharge disposition: Rehab Facility or Unit (DC - External)    Destination     Service Provider Selected Services Address Phone Fax Patient Preferred    [facility] ACUTE REHAB PROGRAM Inpatient Rehabilitation [address] [place] [id number] [phone] -- --          Home  Medical Care     Service Provider Selected Services Address Phone Fax Patient Preferred    [facility] [address] [address] [phone] --                         Final Discharge Disposition Code: 01 - home or self-care

## 2023-07-05 NOTE — PROGRESS NOTES
[facility] Progress Note      Patient Name: Vernon Solorzano  : 1950  MRN: [id number]  Primary Care Physician:  Liza Oconnell III, NP-C  Date of admission: 2023    Subjective     Interval History:   Follow up CKD III.   Overnight no event    Patient having some dyspnea on exertion, but slowly improving  No chest pain or palpitations  Tolerating oral intake without nausea and emesis  No abdominal pain  Urinating spontaneously    No fevers or chills    Review of Systems:   As noted above    Objective     Vitals:   Temp:  [96.9 °F (36.1 °C)-98.4 °F (36.9 °C)] 98.3 °F (36.8 °C)  Heart Rate:  [] 79  Resp:  [18-20] 20  BP: (111-143)/(74-81) 118/74    Intake/Output Summary (Last 24 hours) at 2023 0758  Last data filed at 2023 0619  Gross per 24 hour   Intake 710 ml   Output 1300 ml   Net -590 ml         Physical Exam:    General Appearance: Patient is alert, oriented.  Ill looking.  Questions skin: warm and dry  HEENT: oral mucosa normal, nonicteric sclera  Neck: supple, no JVD  Lungs: Clear to auscultation.   Heart: RRR, normal S1 and S2  Abdomen: soft, nontender, + bs, distended.  Extremities trace lower ext edema.  Erythematous rash noted on the anterior aspect of right leg    Scheduled Meds:     apixaban, 5 mg, Oral, Q12H  atorvastatin, 40 mg, Oral, Nightly  budesonide-formoterol, 2 puff, Inhalation, BID - RT  calcium 500 mg vitamin D 5 mcg (200 UT), 1 tablet, Oral, Daily  colchicine, 0.6 mg, Oral, Daily  guaiFENesin, 600 mg, Oral, Q12H  insulin glargine, 20 Units, Subcutaneous, Nightly  insulin glargine, 20 Units, Subcutaneous, Daily  insulin lispro, 3-14 Units, Subcutaneous, 4x Daily AC & at Bedtime  insulin lispro, 6 Units, Subcutaneous, TID With Meals  magnesium oxide, 400 mg, Oral, Daily  melatonin, 3 mg, Oral, Nightly  metoprolol succinate XL, 25 mg, Oral, Q24H  pantoprazole, 40 mg, Oral, Q AM  potassium chloride, 20 mEq, Oral, Daily  sodium  chloride, 3 mL, Intravenous, Q12H  tiotropium bromide monohydrate, 2 puff, Inhalation, Daily - RT  torsemide, 60 mg, Oral, Daily      IV Meds:        Results Reviewed:   I have personally reviewed the results from the time of this admission to 7/5/2023 07:58 EDT     Results from last 7 days   Lab Units 07/04/23  0700 07/03/23  1427 07/02/23  0644 06/30/23  0719   SODIUM mmol/L 142 141 142 140   POTASSIUM mmol/L 3.5 3.6 3.1* 3.2*   CHLORIDE mmol/L 101 99 101 98   CO2 mmol/L 28.1 29.0 29.0 29.6*   BUN mg/dL 15 15 13 15   CREATININE mg/dL 1.39* 1.41* 1.35* 1.31*   CALCIUM mg/dL 9.2 9.0 8.6 9.2   BILIRUBIN mg/dL  --   --   --  0.5   ALK PHOS U/L  --   --   --  115   ALT (SGPT) U/L  --   --   --  11   AST (SGOT) U/L  --   --   --  9   GLUCOSE mg/dL 112* 215* 113* 135*         Estimated Creatinine Clearance: 61.4 mL/min (A) (by C-G formula based on SCr of 1.39 mg/dL (H)).    Results from last 7 days   Lab Units 07/03/23  1427 07/01/23  0630 06/30/23  0719   MAGNESIUM mg/dL  --  2.2 2.0   PHOSPHORUS mg/dL 2.2*  --  4.0               Results from last 7 days   Lab Units 06/29/23  0705   WBC 10*3/mm3 8.23   HEMOGLOBIN g/dL 8.9*   PLATELETS 10*3/mm3 277               Assessment / Plan     ASSESSMENT:    1.  Acute kidney injury on top of CKD III, baseline creatinine 1.2.  Peak 1.68.  Creatinine  stable around 1.3  , most recent down to 1.2-1.3  2. MR now sp MV replacement, tissue and TV repair, SU closure 5/24/23.  3. Chronic steroid use after COVID 19 PNA.  4. Anemia of CKD and postoperative anemia.  Hemoglobin is 8.9.  No evidence of active bleeding  5. 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  Working with physical occupational therapy  6. diabetes mellitus type 2. On diabetic diet and insulin regimen   7. Aflutter.Cardioversion 6/2/23. On chronic anticoagulation eliquis and rate controlled  metoprolol.   8.  Hypokalemia on KCL replacement protocol, volume diuretics  9.  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

## 2023-07-06 NOTE — PROGRESS NOTES
PPS CMG Coordinator  Inpatient Rehabilitation Discharge    Mode of Locomotion: Walking.    Discharge Against Medical Advice:  No.  Discharge Information  Patient Discharged Alive:  Yes  Discharge Destination/Living Setting: Home.  At discharge, the patient was discharged to live (with) (02)  Family / Relatives    Diagnosis for Interruption/Death: ICD    Impairment Group: Stroke: 01.4 No Paresis    Comorbidities: ICD    Complications: ICD    QUALITY INDICATORS  Section A. Medication List  Medication List to Subsequent Provider:  Not applicable.  Patient was not  discharged to a subsequent provider.  Discharge Location:  01 - Home  Medication List to Patient at Discharge:  Yes - Current reconciled medication  list provided to the patient, family and/or caregiver  Route(s) of Medication List Transmission to Patient:  Electronic Health Record,  Verbal (e.g., in-person, telephone, video conferencing), Paper-based (e.g., fax,  copies, printouts)    Section J Health Conditions: Fall(s) Since Admission:  No    Section K. Swallowing/Nutritional Status  Nutritional Approaches Past 7 Days:   Therapeutic diet (e.g., low salt,  diabetic, low cholesterol)  Nutritional Approaches at Discharge:  Therapeutic diet (e.g., low salt,  diabetic, low cholesterol)    Section M. Skin Conditions Discharge:  Unhealed Pressure Ulcer(s) at Stage 1 or  Higher:  No    . Current Number of Unhealed Pressure Ulcers  Branch    Section N. Medication:  Medication Intervention: Not applicable - There were no potential clinically  significant medication issues identified since admission or patient is not  taking any medications.  Section . High-Risk Drug Classes: Use and Indication                       Is Taking                    Indication noted  High-RiskDrug Class  E. Anticoagulant     Yes                          Yes  H. Opioid            Yes                          Yes  I. Antiplatelet      Yes                          No  J.  Hypoglycemic      Yes                          Yes    Section O. Special Treatments, Procedures, and Programs  None    Signed by: Angelika Najera RN

## 2023-07-10 ENCOUNTER — APPOINTMENT (OUTPATIENT)
Dept: SPEECH THERAPY | Facility: HOSPITAL | Age: 73
End: 2023-07-10
Payer: MEDICARE

## 2023-07-13 PROBLEM — N18.31 STAGE 3A CHRONIC KIDNEY DISEASE: Chronic | Status: ACTIVE | Noted: 2023-06-24

## 2023-07-18 ENCOUNTER — OFFICE VISIT (OUTPATIENT)
Dept: CARDIOLOGY | Facility: CLINIC | Age: 73
End: 2023-07-18
Payer: MEDICARE

## 2023-07-18 VITALS
DIASTOLIC BLOOD PRESSURE: 70 MMHG | SYSTOLIC BLOOD PRESSURE: 110 MMHG | HEIGHT: 73 IN | BODY MASS INDEX: 33.66 KG/M2 | WEIGHT: 254 LBS | HEART RATE: 95 BPM

## 2023-07-18 DIAGNOSIS — R06.00 DYSPNEA, UNSPECIFIED TYPE: Primary | ICD-10-CM

## 2023-07-18 DIAGNOSIS — Z98.890 S/P MVR (MITRAL VALVE REPAIR): ICD-10-CM

## 2023-07-18 DIAGNOSIS — R06.09 DOE (DYSPNEA ON EXERTION): ICD-10-CM

## 2023-07-18 DIAGNOSIS — R06.02 SOB (SHORTNESS OF BREATH): ICD-10-CM

## 2023-07-18 NOTE — PROGRESS NOTES
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  In 2022 he developed COVID-pneumonia and findings suggestive of interstitial lung disease with possible COVID pneumonitis.  Despite adequate treatment and subsequent calcification LAD and circumflex.  With progressive dyspnea an echo was performed that showed normal sinus rhythm with questionable wall motion abnormality in the inferolateral wall with thickened mitral valve leaflets with mild regurgitation that was at least moderate but likely underestimated with RVSP 60 mmHg.  Heart failure.  He was admitted to treat heart failure.  GAUDENCIO showed severe mitral valve regurgitation.  Cardiac cath showed normal coronary arteries.  He therefore underwent mitral valve repair and tricuspid valve repair with left atrial appendage closure..  He did demonstrate renal insufficiency during his hospitalization.  Postoperatively he had altered mental status without obvious stroke.  He demonstrated AV block that was transient.  He demonstrated moderate-sized pericardial effusion.  He was treated medically with colchicine and went to rehab and subsequently went home.  Last echo 7/3/2023 showed modest pericardial effusion that was posterior located and no change from prior study and without evidence of tamponade.    Patient has had no palpitations.  He still has significant dyspnea on exertion and edema but he believes edema is improved.  He believes his blood pressures been as it is today.  Glucose has been elevated.    Past Medical History:   Diagnosis Date    Allergic rhinitis     Arthritis     BPH (benign prostatic hyperplasia)      Diabetes mellitus     TYPE 2    Foraminal stenosis of cervical region     C5-C6    GERD (gastroesophageal reflux disease)     Hemorrhoids     History of urinary retention 2010    S/P TURP    Hyperlipidemia     Macular degeneration     PING (obstructive sleep apnea) 01/07/2016    MILD, SEES DR. AMARILIS MORGAN     Past Surgical History:   Procedure Laterality Date    CARDIAC CATHETERIZATION N/A 5/11/2023    Procedure: Right Heart Cath;  Surgeon: Corey Gaffney MD;  Location:  [facility] CATH INVASIVE LOCATION;  Service: Cardiology;  Laterality: N/A;    CARDIAC CATHETERIZATION N/A 5/11/2023    Procedure: Left Heart Cath;  Surgeon: Corey Gaffney MD;  Location:  [facility];  Service: Cardiology;  Laterality: N/A;    CARDIAC CATHETERIZATION N/A 5/11/2023    Procedure: Left ventriculography;  Surgeon: Corey Gaffney MD;  Location:  [facility] C[facility]VASIVE [facility];  Service: Cardiology;  Laterality: N/A;    CARDIAC CATHETERIZATION N/A 5/11/2023    Procedure: Coronary angiography;  Surgeon: Corey Gaffney MD;  [facility] C[facility];  Service: Cardiology;  Laterality: N/A;    COLONOSCOPY N/A     WNL PER PT, NO RECORDS,     COLONOSCOPY N/A 04/07/2009    DR. GORGE BENAVIDEZ AT [place]    MITRAL VALVE REPAIR/REPLACEMENT N/A 5/24/2023    Procedure: MITRAL VALVE REPAIR/REPLACEMENT TRICUSPID VALVE REPAIR/REPLACEMENT TRANSESOPHAGEAL ECHOCARDIOGRAM WITH ANESTHESIA;  Surgeon: George Frey MD;  Location: [facility];  Service: Cardiothoracic;  Laterality: N/A;    PROSTATE SURGERY N/A 11/12/2010    TURP, DR. COREY COLLAZO AT [facility]    SKIN TAG REMOVAL N/A 12/20/2017    ANAL SKIN TAG X2, PERFORMED IN OFFICE, DR. LISA ORANTES    TURP / TRANSURETHRAL INCISION / DRAINAGE PROSTATE  2010    Dr. Goodrich     Outpatient Medications Prior to Visit   Medication Sig Dispense Refill    acetaminophen (TYLENOL) 325 MG tablet Take 2 tablets by mouth Every 6 (Six) Hours As Needed for Mild Pain.       atorvastatin (LIPITOR) 40 MG tablet Take 1 tablet by mouth Every Night. 30 tablet 1    calcium carbonate (TUMS) 500 MG chewable tablet Chew 2 tablets 4 (Four) Times a Day As Needed for Indigestion or Heartburn.      Calcium Carbonate-Vitamin D (calcium 500 mg vitamin D 5 mcg, 200 UT,) 500-5 MG-MCG tablet per tablet Take 1 tablet by mouth 2 (Two) Times a Day.      colchicine 0.6 MG tablet Take 1 tablet by mouth Daily. 30 tablet 1    glucose blood (OneTouch Verio) test strip 1 each by Other route As Needed. Use as instructed      insulin degludec (Tresiba FlexTouch) 100 UNIT/ML solution pen-injector injection Inject 40 Units under the skin into the appropriate area as directed Daily. (Patient taking differently: Inject 40 Units under the skin into the appropriate area as directed Daily. 44 units)      Insulin Pen Needle 32G X 4 MM misc Use to inject insulin under the skin via pens up to 4 times daily 100 each 1    magnesium oxide (MAG-OX) 400 tablet tablet Take 1 tablet by mouth Daily. 90 tablet 0    melatonin 3 MG tablet Take 1 tablet by mouth Every Night. 90 tablet 0    metoprolol succinate XL (TOPROL-XL) 25 MG 24 hr tablet Take 1 tablet by mouth Daily. 30 tablet 1    Multiple Vitamins-Minerals (PRESERVISION AREDS 2+MULTI VIT PO) Take 1 tablet by mouth 2 (Two) Times a Day.      pantoprazole (PROTONIX) 40 MG EC tablet Take 1 tablet by mouth Daily. 90 tablet 0    potassium chloride (K-DUR,KLOR-CON) 20 MEQ CR tablet Take 1 tablet by mouth Daily. 30 tablet 1    Symbicort 160-4.5 MCG/ACT inhaler Inhale 2 puffs 2 (Two) Times a Day.      tiotropium bromide monohydrate (SPIRIVA RESPIMAT) 2.5 MCG/ACT aerosol solution inhaler Inhale 2 puffs Daily.      torsemide (DEMADEX) 20 MG tablet Take 3 tablets by mouth Daily. 90 tablet 1    apixaban (ELIQUIS) 5 MG tablet tablet Take 1 tablet by mouth Every 12 (Twelve) Hours. Indications: Atrial Fibrillation (Patient not taking: Reported on 7/18/2023) 60 tablet 1    Insulin Lispro, 1  "Unit Dial, (HUMALOG) 100 UNIT/ML solution pen-injector Inject 6 Units under the skin into the appropriate area as directed 3 (Three) Times a Day With Meals. (Patient not taking: Reported on 7/18/2023) 15 mL 2     No facility-administered medications prior to visit.       Allergies as of 07/18/2023    (No Known Allergies)     Social History     Socioeconomic History    Marital status:    Tobacco Use    Smoking status: Never     Passive exposure: Never    Smokeless tobacco: Never   Vaping Use    Vaping Use: Never used   Substance and Sexual Activity    Alcohol use: Yes     Alcohol/week: 1.0 standard drink     Types: 1 Drinks containing 0.5 oz of alcohol per week     Comment: RARELY    Drug use: No    Sexual activity: Defer     Partners: Female     Family History   Problem Relation Age of Onset    Lung cancer Mother     Stroke Father     Dementia Father     Hyperlipidemia Brother     Hypertension Brother     Heart disease Brother     Diabetes Brother     Colon polyps Brother     Colon polyps Brother      Review of Systems   Constitutional: Positive for malaise/fatigue. Negative for chills, fever, weight gain and weight loss.   Cardiovascular:  Positive for dyspnea on exertion and leg swelling.   Respiratory:  Positive for shortness of breath. Negative for cough, snoring and wheezing.    Hematologic/Lymphatic: Negative for bleeding problem. Does not bruise/bleed easily.   Skin:  Negative for color change.   Musculoskeletal:  Negative for falls, joint pain and myalgias.   Gastrointestinal:  Negative for melena.   Genitourinary:  Negative for hematuria.   Neurological:  Negative for excessive daytime sleepiness.   Psychiatric/Behavioral:  Negative for depression. The patient is not nervous/anxious.       Objective:     Vitals:    07/18/23 1339   BP: 110/70   Pulse: 95   Weight: 115 kg (254 lb)   Height: 185.4 cm (73\")     Body mass index is 33.51 kg/m².    Vitals reviewed.   Constitutional:       Appearance: " Well-developed. Obese.   Eyes:      General: No scleral icterus.        Right eye: No discharge.      Conjunctiva/sclera: Conjunctivae normal.      Pupils: Pupils are equal, round, and reactive to light.   HENT:      Head: Normocephalic.      Nose: Nose normal.   Neck:      Thyroid: No thyromegaly.      Vascular: No JVD.   Pulmonary:      Effort: Pulmonary effort is normal. No respiratory distress.      Breath sounds: Normal breath sounds. Examination of the right-lower field reveals rales. Examination of the left-lower field reveals rales. No wheezing. No rales.   Cardiovascular:      Normal rate. Regular rhythm. Normal S1. Normal S2.       Murmurs: There is no murmur.      No gallop.    Pulses:     Intact distal pulses.      Carotid: 2+ bilaterally.     Radial: 2+ bilaterally.     Femoral: 2+ bilaterally.     Popliteal: 2+ bilaterally.     Dorsalis pedis: 2+ bilaterally.     Posterior tibial: 2+ bilaterally.  Edema:     Peripheral edema present.     Pretibial: bilateral 1+ edema of the pretibial area.     Ankle: bilateral 1+ edema of the ankle.  Abdominal:      General: Bowel sounds are normal. There is no distension.      Palpations: Abdomen is soft.      Tenderness: There is no abdominal tenderness. There is no rebound.   Musculoskeletal: Normal range of motion.         General: No tenderness.      Cervical back: Normal range of motion and neck supple. Skin:     General: Skin is warm and dry.      Findings: No erythema or rash.   Neurological:      Mental Status: Alert and oriented to person, place, and time.   Psychiatric:         Behavior: Behavior normal.         Thought Content: Thought content normal.         Judgment: Judgment normal.     Lab Review:   Lab Results - Last 18 Months   Lab Units 07/18/23  1434 06/29/23  0705 06/24/23  0228 06/23/23  0812 06/21/23  0608 03/29/23  1015 05/23/22  1602   WBC 10*3/mm3 10.30 8.23   < > 7.91 8.42   < > 6.7   RBC 10*6/mm3 3.99* 3.08*   < > 3.68* 3.28*   < > 4.60    HEMOGLOBIN g/dL 11.1* 8.9*   < > 10.5* 9.6*   < > 14.0   HEMOGLOBIN, POC   --   --   --   --   --    < >  --    HEMATOCRIT % 34.0* 26.2*   < > 31.6* 28.3*   < > 40.5   HEMATOCRIT POC   --   --   --   --   --    < >  --    MCV fL 85.2 85.1   < > 85.9 86.3   < > 88   MCH pg 27.8 28.9   < > 28.5 29.3   < > 30.4   MCHC g/dL 32.6 34.0   < > 33.2 33.9   < > 34.6   RDW % 14.4 14.1   < > 14.2 13.9   < > 13.0   PLATELETS 10*3/mm3 230 277   < > 247 207   < > 256   NEUTROPHIL % %  --   --   --  64.9 66.6   < > 65   LYMPHOCYTE % %  --   --   --  15.7* 13.4*   < > 17   MONOCYTES % %  --   --   --  10.4 10.7   < > 10   EOSINOPHIL % %  --   --   --  7.8* 8.2*   < > 6   BASOPHIL % %  --   --   --  0.8 0.6   < > 1   NEUTROS ABS 10*3/mm3  --   --   --  5.14 5.61   < > 4.3   LYMPHS ABS 10*3/mm3  --   --   --  1.24 1.13   < > 1.2   MONOS ABS 10*3/mm3  --   --   --  0.82 0.90   < > 0.7   EOS ABS 10*3/mm3  --   --   --  0.62* 0.69*   < > 0.4   BASOS ABS 10*3/mm3  --   --   --  0.06 0.05   < > 0.0   IMM GRAN % %  --   --   --   --   --   --  1   IMMATURE GRANS (ABS) x10E3/uL  --   --   --   --   --   --  0.1   RDW-SD fl 43.9 43.4   < > 44.3 43.8   < >  --    MPV fL 9.5 9.1   < > 9.3 8.9   < >  --     < > = values in this interval not displayed.       Lab Results - Last 18 Months   Lab Units 07/18/23  1434 07/04/23  0700 07/03/23  1427 07/02/23  0644 06/30/23  0719   GLUCOSE mg/dL 233* 112* 215*   < > 135*   BUN mg/dL 16 15 15   < > 15   CREATININE mg/dL 1.25 1.39* 1.41*   < > 1.31*   SODIUM mmol/L 141 142 141   < > 140   POTASSIUM mmol/L 3.9 3.5 3.6   < > 3.2*   CHLORIDE mmol/L 101 101 99   < > 98   CO2 mmol/L 26.1 28.1 29.0   < > 29.6*   CALCIUM mg/dL 9.6 9.2 9.0   < > 9.2   TOTAL PROTEIN g/dL 7.1  --   --   --  7.1   ALBUMIN g/dL 4.0  --  3.4*  --  3.6   ALT (SGPT) U/L 23  --   --   --  11   AST (SGOT) U/L 18  --   --   --  9   ALK PHOS U/L 123*  --   --   --  115   BILIRUBIN mg/dL 0.5  --   --   --  0.5   GLOBULIN gm/dL 3.1  --   --    --  3.5   A/G RATIO g/dL 1.3  --   --   --  1.0   BUN / CREAT RATIO  12.8 10.8 10.6   < > 11.5   ANION GAP mmol/L 13.9 12.9 13.0   < > 12.4   EGFR mL/min/1.73 60.4 53.2* 52.3*   < > 57.1*    < > = values in this interval not displayed.     Lab Results - Last 18 Months   Lab Units 05/23/23  1429 03/29/23  1015   CHOLESTEROL mg/dL 155  --    TRIGLYCERIDES mg/dL 197* 107   HDL CHOL mg/dL 41 39*   LDL CHOL mg/dL 81 59   VLDL CHOL mg/dL 33  --    VLDL CHOLESTEROL QUE mg/dL  --  20   LDL/HDL RATIO  1.82 1.5     Lab Results - Last 18 Months   Lab Units 07/18/23  1434 06/24/23  0228   PROBNP pg/mL 5,156.0* 1,600.0*     Lab Results - Last 18 Months   Lab Units 06/25/23  0944 06/25/23  0739   HSTROP T ng/L 95* 97*     Lab Results - Last 18 Months   Lab Units 06/30/23  0719 05/31/23  0448   TSH uIU/mL 1.830 2.430     Lab Results - Last 18 Months   Lab Units 06/04/23  1410 06/04/23  0319 06/01/23  2318 06/01/23  1555 05/25/23  0307   PROTIME Seconds  --   --   --  13.4 15.0*   APTT seconds 30.3 151.6*   < > 24.2  --     < > = values in this interval not displayed.           ECG 12 Lead    Date/Time: 7/18/2023 12:35 AM  Performed by: Karishma Arango MD  Authorized by: Karishma Arango MD   Comparison: compared with previous ECG   Similar to previous ECG  Rhythm: sinus rhythm  Ectopy: unifocal PVCs  Conduction: right bundle branch block and left anterior fascicular block  Other findings: non-specific ST-T wave changes    Clinical impression: abnormal EKG      Assessment:       Diagnosis Plan   1. Dyspnea, unspecified type  Magnesium    Adult Transthoracic Echo Limited W/ Cont if Necessary Per Protocol    ECG 12 Lead      2. S/P MVR (mitral valve repair)  proBNP    Comprehensive Metabolic Panel    CBC (No Diff)    ECG 12 Lead      3. SOB (shortness of breath)  proBNP    Adult Transthoracic Echo Limited W/ Cont if Necessary Per Protocol    ECG 12 Lead      4. ZUÑIGA (dyspnea on exertion)  CBC (No Diff)    ECG 12 Lead        Plan:       1.   Mitral valve repair tricuspid valve repair.  Recent echo still showed pericardial effusion without tamponade.  We will check a sed rate.  And concerned about the degree of heart failure.  We will contact APRN Epley to initiate consider retrial of Jardiance.  Apparently he had been on this previously and did not tolerate it is unclear exactly why this was stopped.  With borderline low blood pressures would prefer to a utilize Jardiance if possible.  Further diuresis.  2.  Chronic diastolic heart failure.  As above. Still sounds wet.  Will get labs and echo today and contact nephrology to determine if torsemide can be increased further and to arrange for an earlier follow-up.  3.  Renal insufficiency, as above. Will recheck labs today  4.  Hypertension controlled  5.  Hyperlipidemia  6.  Diabetes.    7.  Postop encephalopathy  8.  Post op stroke    Time Spent: I spent 45 minutes caring for Vernon on this date of service. This time includes time spent by me in the following activities: preparing for the visit, reviewing tests, obtaining and/or reviewing a separately obtained history, performing a medically appropriate examination and/or evaluation, counseling and educating the patient/family/caregiver, ordering medications, tests, or procedures, documenting information in the medical record, and independently interpreting results and communicating that information with the patient/family/caregiver.   I spent 3 minutes on the separately reported service of ECG and Echo. This time is not included in the time used to support the E/M service also reported today.        Your medication list            Accurate as of July 18, 2023 11:59 PM. If you have any questions, ask your nurse or doctor.                CHANGE how you take these medications        Instructions Last Dose Given Next Dose Due   Tresiba FlexTouch 100 UNIT/ML solution pen-injector injection  Generic drug: insulin degludec  What changed: additional  instructions      Inject 40 Units under the skin into the appropriate area as directed Daily.              CONTINUE taking these medications        Instructions Last Dose Given Next Dose Due   acetaminophen 325 MG tablet  Commonly known as: TYLENOL      Take 2 tablets by mouth Every 6 (Six) Hours As Needed for Mild Pain.       atorvastatin 40 MG tablet  Commonly known as: LIPITOR      Take 1 tablet by mouth Every Night.       BD Pen Needle Yolette 2nd Gen 32G X 4 MM misc  Generic drug: Insulin Pen Needle      Use to inject insulin under the skin via pens up to 4 times daily       calcium 500 mg vitamin D 5 mcg (200 UT) 500-5 MG-MCG tablet per tablet      Take 1 tablet by mouth 2 (Two) Times a Day.       calcium carbonate 500 MG chewable tablet  Commonly known as: TUMS      Chew 2 tablets 4 (Four) Times a Day As Needed for Indigestion or Heartburn.       colchicine 0.6 MG tablet      Take 1 tablet by mouth Daily.       Magnesium Oxide -Mg Supplement 400 (240 Mg) MG tablet      Take 1 tablet by mouth Daily.       melatonin 3 MG tablet      Take 1 tablet by mouth Every Night.       metoprolol succinate XL 25 MG 24 hr tablet  Commonly known as: TOPROL-XL      Take 1 tablet by mouth Daily.       OneTouch Verio test strip  Generic drug: glucose blood      1 each by Other route As Needed. Use as instructed       pantoprazole 40 MG EC tablet  Commonly known as: PROTONIX      Take 1 tablet by mouth Daily.       potassium chloride 20 MEQ CR tablet  Commonly known as: K-DUR,KLOR-CON      Take 1 tablet by mouth Daily.       PRESERVISION AREDS 2+MULTI VIT PO      Take 1 tablet by mouth 2 (Two) Times a Day.       Symbicort 160-4.5 MCG/ACT inhaler  Generic drug: budesonide-formoterol      Inhale 2 puffs 2 (Two) Times a Day.       tiotropium bromide monohydrate 2.5 MCG/ACT aerosol solution inhaler  Commonly known as: SPIRIVA RESPIMAT      Inhale 2 puffs Daily.       torsemide 20 MG tablet  Commonly known as: DEMADEX      Take 3  tablets by mouth Daily.                Patient is no longer taking -.  I corrected the med list to reflect this.  I did not stop these medications.      Dictated utilizing Dragon dictation

## 2023-07-20 ENCOUNTER — TELEPHONE (OUTPATIENT)
Dept: CARDIOLOGY | Facility: CLINIC | Age: 73
End: 2023-07-20
Payer: MEDICARE

## 2023-07-20 DIAGNOSIS — E11.59 TYPE 2 DIABETES MELLITUS WITH OTHER CIRCULATORY COMPLICATION, WITH LONG-TERM CURRENT USE OF INSULIN: Primary | ICD-10-CM

## 2023-07-20 DIAGNOSIS — Z79.4 TYPE 2 DIABETES MELLITUS WITH OTHER CIRCULATORY COMPLICATION, WITH LONG-TERM CURRENT USE OF INSULIN: Primary | ICD-10-CM

## 2023-07-20 RX ORDER — OMEPRAZOLE 40 MG/1
40 CAPSULE, DELAYED RELEASE ORAL DAILY
Qty: 90 CAPSULE | Refills: 4 | OUTPATIENT
Start: 2023-07-20

## 2023-07-20 NOTE — TELEPHONE ENCOUNTER
I called the patient at listed number and got voicemail.  I called what was listed as a home number which turns out to be his wife's number and talk to his wife.  I reviewed blood test with her and recommended endocrinology evaluation and possible institution of Jardiance.  She told me he had taken this before and for some reason this was discontinued.  While awaiting endocrinology appointment I will call APRN a peer to see about possibly restarting this and may be adjusting his insulin to further help with diuresis.  In the meanwhile I also recommended that patient contact nephrology who it appears has been trying to contact him for follow-up as will need further diuresis with underlying renal insufficiency.  His sed rate is slightly elevated and he has been taking colchicine.  Can add nonsteroidals or prednisone however both have consequences to renal function and blood glucose control.  So for now we will hold off on either and await callback from DESTIN Oconnell regarding institution of Jardiance.  Also await opinion from nephrology regarding additional diuretics.  I am also placing a consult for endocrinology.    Addendum: I talked to Dr. Swenson.  The patient apparently missed an appointment with him.     Nurses, please let him know the above-they will be contacting patient to come in to see him next week.  Please contact patient and wife and let them know this and also that Dr. Swenson (nephrologist) said to increase the torsemide to 100 mg a day.  He will address it further at follow-up next week.    Nurses, also please call DESTIN Oconnellfor me in a.m. regarding Jardiance.

## 2023-07-21 NOTE — TELEPHONE ENCOUNTER
Discussed with Dr. Arango: ok to call DESTIN Oconnell now    Placed call to DESTIN Oconnell's office.  Spoke with Amarilis.  Transferred call to Dr. Arango.      Thank you,  Khadra COSBY RN  Triage Nurse [facility] [place]   10:35 EDT

## 2023-07-21 NOTE — TELEPHONE ENCOUNTER
Called primary number on Vernon Solorzano's chart and spoke with his spouse, Maricel.  Reviewed Dr. Arango' message with her and she verbalized understanding, with repeat back of medication instructions.    Maricel stated that patient has an appointment with nephrology on Tuesday at 1:15 pm and an appointment with DESTIN Oconnell on 8/8.    Thank you,  Khadra COSBY RN  Triage Nurse [facility] [place], [place]   10:25 EDT

## 2023-07-24 ENCOUNTER — HOSPITAL ENCOUNTER (OUTPATIENT)
Dept: PHYSICAL THERAPY | Facility: HOSPITAL | Age: 73
Setting detail: THERAPIES SERIES
Discharge: HOME | End: 2023-07-24
Payer: MEDICARE

## 2023-07-24 ENCOUNTER — HOSPITAL ENCOUNTER (OUTPATIENT)
Dept: SPEECH THERAPY | Facility: HOSPITAL | Age: 73
Setting detail: THERAPIES SERIES
Discharge: HOME | End: 2023-07-24
Payer: MEDICARE

## 2023-07-24 DIAGNOSIS — Z86.73 HISTORY OF ISCHEMIC STROKE: Primary | ICD-10-CM

## 2023-07-24 DIAGNOSIS — R41.841 COGNITIVE COMMUNICATION DEFICIT: ICD-10-CM

## 2023-07-24 DIAGNOSIS — I63.9 CEREBROVASCULAR ACCIDENT (CVA), UNSPECIFIED MECHANISM: Primary | ICD-10-CM

## 2023-07-24 DIAGNOSIS — R26.89 FUNCTIONAL GAIT ABNORMALITY: ICD-10-CM

## 2023-07-24 DIAGNOSIS — Z74.09 IMPAIRED FUNCTIONAL MOBILITY, BALANCE, GAIT, AND ENDURANCE: ICD-10-CM

## 2023-07-24 PROCEDURE — 97530 THERAPEUTIC ACTIVITIES: CPT

## 2023-07-24 PROCEDURE — 97130 THER IVNTJ EA ADDL 15 MIN: CPT | Performed by: SPEECH-LANGUAGE PATHOLOGIST

## 2023-07-24 PROCEDURE — 97110 THERAPEUTIC EXERCISES: CPT

## 2023-07-24 PROCEDURE — 97129 THER IVNTJ 1ST 15 MIN: CPT | Performed by: SPEECH-LANGUAGE PATHOLOGIST

## 2023-07-24 NOTE — THERAPY TREATMENT NOTE
Outpatient Speech Language Pathology   Adult Speech Language Cognitive Treatment Note  [facility]     Patient Name: Vernon Solorzano  : 1950  MRN: [id number]  Today's Date: 2023         Visit Date: 2023   Patient Active Problem List   Diagnosis    DM (diabetes mellitus), type 2    Mixed hyperlipidemia    Mild intermittent asthma without complication    New onset of congestive heart failure    Nonrheumatic mitral valve regurgitation    Left-sided chest pain    Essential hypertension    Mitral valve disease    Acute ischemic left MCA stroke    Atelectasis of both lungs    Pericardial effusion, acute    History of ischemic stroke    Obese    Physical debility    Chronic diastolic CHF (congestive heart failure)    Anemia    Stage 3a chronic kidney disease    Paroxysmal atrial flutter    H/O mitral valve replacement with tissue graft    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 Cerebrovascular accident (CVA), unspecified mechanism  I63.9 434.91   2. Cognitive communication deficit  R41.841 799.52                              SLP OP Goals       Row Name 23 1400          Subjective Comments    Subjective Comments Patient is pleasant and cooperative. He reports feeling tired today. He does not complete homework provided and reports he should. Patient requires increased processing speed for all tasks.  -KA        Subjective Pain    Able to rate subjective pain? yes  -KA     Pre-Treatment Pain Level 0  -KA     Post-Treatment Pain Level 0  -KA        Written Language Comprehension Goals    Patient will improve comprehension of written language skills by answering written questions related to home management/social/work tasks (bills, recipes, tv guide, emails, procedures) 90%:;without cues  -KA     Status: Patient will improve comprehension of written language skills by answering written questions related to home management/social/work tasks (bills, recipes, tv guide,  emails, procedures) New  -KA     Comments: Patient will improve comprehension of written language skills by answering written questions related to home management/social/work tasks (bills, recipes, tv guide, emails, procedures) Inferences about paragraph 80% with increased processing speed  -KA        Verbal Expression Goals    Verbal Expression STG's Patient will improve verbal expressive language skills by completing convergent naming tasks  -KA     Patient will improve verbal expressive language skills by completing convergent naming tasks 90%:;without cues  -KA     Status: Patient will improve verbal expressive language skills by completing convergent naming tasks New  -KA     Comments: Patient will improve verbal expressive language skills by completing convergent naming tasks 90% with concrete convergent and 71% with abstract convergent with increased processing speed.  -KA        Memory Goals    Patient’s memory skills will be enhanced as reported by patient by utilizing internal memory strategies to recall up to 3 pieces of information after a 5- minute delay 90%:;without cues  -KA     Status: Patient’s memory skills will be enhanced as reported by patient by utilizing internal memory strategies to recall up to 3 pieces of information after a 5- minute delay New  -KA     Comments: Patient’s memory skills will be enhanced as reported by patient by utilizing internal memory strategies to recall up to 3 pieces of information after a 5- minute delay delayed recall of three functional errands after 5 minute delay 50% without cues, was to use visualization as strategy to assist with recall. Immediate recall of detail from voicemails 80% with multiple choice.  -KA        Problem Solving Goals    Patient will improve ability to analyze problems and determine solutions by correctly sequencing __ steps to complete an activity 90%:;without cues  -KA     Status: Patient will improve ability to analyze problems and  determine solutions by correctly sequencing __ steps to complete an activity --  ongoing  -KA     Comments: Patient will improve ability to analyze problems and determine solutions by correctly sequencing __ steps to complete an activity 5 step functional sequencing task, 20% without cues on one functional problem with extended processing time required.  -KA               User Key  (r) = Recorded By, (t) = Taken By, (c) = Cosigned By      Initials Name Provider Type    Ryan Rey MA,CCC-SLP Speech and Language Pathologist                           Time Calculation:   SLP Start Time: 1300  SLP Stop Time: 1345  SLP Time Calculation (min): 45 min  Timed Charges  63416-BS Dev of Cogn Skills Initial Minutes: 15  86287-XE Dev of Cogn Skills Add Minutes: 30  Total Minutes  Timed Charges Total Minutes: 45   Total Minutes: 45    Therapy Charges for Today       Code Description Service Date Service Provider Modifiers Qty    25256200412 HC ST DEV OF COGN SKILLS INITIAL 15 MIN 7/24/2023 Ryan Alcala MA,CCC-SLP  1    85387594492 HC ST DEV OF COGN SKILLS EACH ADDT'L 15 MIN 7/24/2023 Ryan Alcala MA,CCC-SLP  2                     Ryan Alcala MA,CCC-SLP  7/24/2023

## 2023-07-24 NOTE — THERAPY TREATMENT NOTE
Outpatient Physical Therapy Neuro Treatment Note  [facility]     Patient Name: Vernon Solorzano  : 1950  MRN: [id number]  Today's Date: 2023      Visit Date: 2023    Visit Dx:    ICD-10-CM ICD-9-CM   1. History of ischemic stroke  Z86.73 V12.54   2. Impaired functional mobility, balance, gait, and endurance  Z74.09 V49.89   3. Functional gait abnormality  R26.89 781.2       Patient Active Problem List   Diagnosis    DM (diabetes mellitus), type 2    Mixed hyperlipidemia    Mild intermittent asthma without complication    New onset of congestive heart failure    Nonrheumatic mitral valve regurgitation    Left-sided chest pain    Essential hypertension    Mitral valve disease    Acute ischemic left MCA stroke    Atelectasis of both lungs    Pericardial effusion, acute    History of ischemic stroke    Obese    Physical debility    Chronic diastolic CHF (congestive heart failure)    Anemia    Stage 3a chronic kidney disease    Paroxysmal atrial flutter    H/O mitral valve replacement with tissue graft    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 yes  -DP      Pre-Treatment Pain Level 0  -DP      Post-Treatment Pain Level 4  -DP         Cognition    Overall Cognitive Status Impaired  -DP      Arousal/Alertness Appropriate responses to stimuli  -DP      Memory Decreased recall of biographical information  -DP      Orientation Level Oriented to place;Oriented to situation;Oriented to person  -DP      Safety Judgment Good awareness of safety precautions  -DP      Deficits Fully aware of deficits  -DP         Transfers    Sit-Stand [place] (Transfers) modified independence  -DP      Stand-Sit [place] (Transfers) modified independence  -DP      Transfers, Sit-Stand-Sit, Assist Device rolling walker  -DP          Gait/Stairs (Locomotion)    [place] Level (Gait) standby assist  -DP      Assistive Device (Gait) walker, front-wheeled  no AD  -DP      Distance in Feet (Gait) 140'x1, 40'x2, 160'x1  -DP      Deviations/Abnormal Patterns (Gait) bilateral deviations;base of support, narrow;gait speed decreased;stride length decreased;weight shifting decreased  -DP      Bilateral Gait Deviations heel strike decreased  -DP      Left Sided Gait Deviations heel strike decreased  -DP      Right Sided Gait Deviations heel strike decreased  -DP                User Key  (r) = Recorded By, (t) = Taken By, (c) = Cosigned By      Initials Name Provider Type    Gulshan Parker PT Physical Therapist                             PT Assessment/Plan       Row Name 07/24/23 1509          PT Assessment    Functional Limitations Impaired gait;Limitation in home management;Limitations in community activities;Performance in leisure activities;Impaired locomotion;Performance in self-care ADL;Limitations in functional capacity and performance  -DP     Impairments Balance;Cognition;Endurance;Gait;Impaired muscle endurance;Pain;Muscle strength;Locomotion  -DP     Assessment Comments Today pt was able to ambulate further distances with no AD but did become SOA but was able to ambulate up to 160 feet on this date with improved step length with cuing needed. PT performed a total of 7 mins on the nu step with rest at 3 mins and then performed again for 4 mins with HR staying between 85-97.  -DP               User Key  (r) = Recorded By, (t) = Taken By, (c) = Cosigned By      Initials Name Provider Type    Gulshan Parker PT Physical Therapist                        OP Exercises       Row Name 07/24/23 1400             Subjective Comments    Subjective Comments Im tired today, my dgt is in the ER  -DP         Subjective Pain    Able to rate subjective pain? yes  -DP      Pre-Treatment Pain Level 0  -DP      Post-Treatment Pain Level 4  -DP          Total Minutes    18449 - PT Therapeutic Exercise Minutes 25  -DP      42961 - PT Therapeutic Activity Minutes 20  -DP         Exercise 1    Exercise Name 1 Nu Step  -DP      Cueing 1 Verbal  -DP      Sets 1 2  3 mins then rest, 4 mins  -DP      Time 1 7 mins  -DP      Additional Comments level ` WL  -DP         Exercise 7    Exercise Name 7 LAQ  -DP      Cueing 7 Verbal;Demo  -DP      Sets 7 2  -DP      Reps 7 10  -DP      Additional Comments 4lbs  -DP         Exercise 8    Exercise Name 8 Seated MIP  -DP      Cueing 8 Verbal;Demo  -DP      Sets 8 2  -DP      Reps 8 10  -DP      Additional Comments 4lbs  -DP                User Key  (r) = Recorded By, (t) = Taken By, (c) = Cosigned By      Initials Name Provider Type    DP Gulshan Mcdonald PT Physical Therapist                                    Therapy Education  Education Details: increase step length  Given: Mobility training  Program: Reinforced  How Provided: Verbal  Provided to: Patient  Level of Understanding: Verbalized, Teach back education performed              Time Calculation:   Start Time: 1345  Stop Time: 1430  Time Calculation (min): 45 min  Total Timed Code Minutes- PT: 45 minute(s)  Timed Charges  32812 - PT Therapeutic Exercise Minutes: 25  51387 - PT Therapeutic Activity Minutes: 20  Total Minutes  Timed Charges Total Minutes: 45   Total Minutes: 45   Therapy Charges for Today       Code Description Service Date Service Provider Modifiers Qty    89829029811 HC PT THER PROC EA 15 MIN 7/24/2023 Gulshan Mcdonald, PT GP 2    67183670457 HC PT THERAPEUTIC ACT EA 15 MIN 7/24/2023 Gulshan Mcdonald, PT GP 1                      Gulshan Mcdonald PT  7/24/2023

## 2023-07-27 ENCOUNTER — HOSPITAL ENCOUNTER (OUTPATIENT)
Dept: PHYSICAL THERAPY | Facility: HOSPITAL | Age: 73
Setting detail: THERAPIES SERIES
Discharge: HOME | End: 2023-07-27
Payer: MEDICARE

## 2023-07-27 ENCOUNTER — HOSPITAL ENCOUNTER (OUTPATIENT)
Dept: SPEECH THERAPY | Facility: HOSPITAL | Age: 73
Setting detail: THERAPIES SERIES
Discharge: HOME | End: 2023-07-27
Payer: MEDICARE

## 2023-07-27 DIAGNOSIS — E11.59 TYPE 2 DIABETES MELLITUS WITH OTHER CIRCULATORY COMPLICATION, WITH LONG-TERM CURRENT USE OF INSULIN: Primary | ICD-10-CM

## 2023-07-27 DIAGNOSIS — Z86.73 HISTORY OF ISCHEMIC STROKE: Primary | ICD-10-CM

## 2023-07-27 DIAGNOSIS — Z79.4 TYPE 2 DIABETES MELLITUS WITH OTHER CIRCULATORY COMPLICATION, WITH LONG-TERM CURRENT USE OF INSULIN: Primary | ICD-10-CM

## 2023-07-27 DIAGNOSIS — R26.89 FUNCTIONAL GAIT ABNORMALITY: ICD-10-CM

## 2023-07-27 DIAGNOSIS — Z74.09 IMPAIRED FUNCTIONAL MOBILITY, BALANCE, GAIT, AND ENDURANCE: ICD-10-CM

## 2023-07-27 DIAGNOSIS — R41.841 COGNITIVE COMMUNICATION DEFICIT: ICD-10-CM

## 2023-07-27 DIAGNOSIS — I63.9 CEREBROVASCULAR ACCIDENT (CVA), UNSPECIFIED MECHANISM: Primary | ICD-10-CM

## 2023-07-27 PROCEDURE — 97129 THER IVNTJ 1ST 15 MIN: CPT | Performed by: SPEECH-LANGUAGE PATHOLOGIST

## 2023-07-27 PROCEDURE — 97110 THERAPEUTIC EXERCISES: CPT

## 2023-07-27 PROCEDURE — 97530 THERAPEUTIC ACTIVITIES: CPT

## 2023-07-27 PROCEDURE — 97130 THER IVNTJ EA ADDL 15 MIN: CPT | Performed by: SPEECH-LANGUAGE PATHOLOGIST

## 2023-07-27 NOTE — THERAPY TREATMENT NOTE
"Outpatient Speech Language Pathology   Adult Speech Language Cognitive Treatment Note  [facility]     Patient Name: Vernon Solorzano  : 1950  MRN: [id number]  Today's Date: 2023         Visit Date: 2023   Patient Active Problem List   Diagnosis    DM (diabetes mellitus), type 2    Mixed hyperlipidemia    Mild intermittent asthma without complication    New onset of congestive heart failure    Nonrheumatic mitral valve regurgitation    Left-sided chest pain    Essential hypertension    Mitral valve disease    Acute ischemic left MCA stroke    Atelectasis of both lungs    Pericardial effusion, acute    History of ischemic stroke    Obese    Physical debility    Chronic diastolic CHF (congestive heart failure)    Anemia    Stage 3a chronic kidney disease    Paroxysmal atrial flutter    H/O mitral valve replacement with tissue graft    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 Cerebrovascular accident (CVA), unspecified mechanism  I63.9 434.91   2. Cognitive communication deficit  R41.841 799.52                              SLP OP Goals       Row Name 23 1500          Subjective Comments    Subjective Comments Patient reported feeling tired today, did not sleep lastnight. Patient stated \"I haven't done my homework.\"  -KA        Subjective Pain    Able to rate subjective pain? yes  -KA     Pre-Treatment Pain Level 0  -KA     Post-Treatment Pain Level 0  -KA        Written Language Comprehension Goals    Patient will improve comprehension of written language skills by answering written questions related to home management/social/work tasks (bills, recipes, tv guide, emails, procedures) 90%:;without cues  -KA     Status: Patient will improve comprehension of written language skills by answering written questions related to home management/social/work tasks (bills, recipes, tv guide, emails, procedures) Progressing as expected  -KA     Comments: Patient will " improve comprehension of written language skills by answering written questions related to home management/social/work tasks (bills, recipes, tv guide, emails, procedures) Inferences about paragraph 100% with increased processing speed  -KA        Verbal Expression Goals    Patient will improve verbal expressive language skills by completing divergent naming tasks 90%:;without cues  -KA     Status: Patient will improve verbal expressive language skills by completing divergent naming tasks --  ongoing  -KA     Comments: Patient will improve verbal expressive language skills by completing divergent naming tasks concrete divergent task average of 5 without cues and 8 with mod cues and use of visualization.  -KA     Patient will improve verbal expressive language skills by completing convergent naming tasks 90%:;without cues  -KA     Status: Patient will improve verbal expressive language skills by completing convergent naming tasks --  ongoing  -KA     Comments: Patient will improve verbal expressive language skills by completing convergent naming tasks 70% without cues  abstract convergent with increased processing speed.  -KA        Problem Solving Goals    Patient will improve ability to analyze problems and determine solutions by correctly sequencing __ steps to complete an activity 90%:;without cues  -KA     Status: Patient will improve ability to analyze problems and determine solutions by correctly sequencing __ steps to complete an activity --  ongoing  -KA     Comments: Patient will improve ability to analyze problems and determine solutions by correctly sequencing __ steps to complete an activity 5 step functional sequencing task, 60% without cues on one functional problem with extended processing time required.  -KA               User Key  (r) = Recorded By, (t) = Taken By, (c) = Cosigned By      Initials Name Provider Type    Ryan Rey MA,CCC-SLP Speech and Language Pathologist                            Time Calculation:   SLP Start Time: 1350  SLP Stop Time: 1430  SLP Time Calculation (min): 40 min  Timed Charges  46902-XR Dev of Cogn Skills Initial Minutes: 15  87403-DJ Dev of Cogn Skills Add Minutes: 25  Total Minutes  Timed Charges Total Minutes: 40   Total Minutes: 40    Therapy Charges for Today       Code Description Service Date Service Provider Modifiers Qty    77493317510 HC ST DEV OF COGN SKILLS INITIAL 15 MIN 7/27/2023 Ryan Alcala MA,CCC-SLP  1    01338784664 HC ST DEV OF COGN SKILLS EACH ADDT'L 15 MIN 7/27/2023 Ryan Alcala MA,CCC-SLP  2                     Ryan Alcala MA,CCC-SLP  7/27/2023

## 2023-07-27 NOTE — THERAPY TREATMENT NOTE
Outpatient Physical Therapy Neuro Treatment Note  [facility]     Patient Name: Vernon Solorzano  : 1950  MRN: [id number]  Today's Date: 2023      Visit Date: 2023    Visit Dx:    ICD-10-CM ICD-9-CM   1. History of ischemic stroke  Z86.73 V12.54   2. Impaired functional mobility, balance, gait, and endurance  Z74.09 V49.89   3. Functional gait abnormality  R26.89 781.2       Patient Active Problem List   Diagnosis    DM (diabetes mellitus), type 2    Mixed hyperlipidemia    Mild intermittent asthma without complication    New onset of congestive heart failure    Nonrheumatic mitral valve regurgitation    Left-sided chest pain    Essential hypertension    Mitral valve disease    Acute ischemic left MCA stroke    Atelectasis of both lungs    Pericardial effusion, acute    History of ischemic stroke    Obese    Physical debility    Chronic diastolic CHF (congestive heart failure)    Anemia    Stage 3a chronic kidney disease    Paroxysmal atrial flutter    H/O mitral valve replacement with tissue graft    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 yes  -LB --     Pre-Treatment Pain Level 0  -LB --     Post-Treatment Pain Level 0  -LB --        Vital Signs    Pre Systolic BP Rehab 100  -LB --     Pre Treatment Diastolic BP 62  -LB --        Cognition    Arousal/Alertness Appropriate responses to stimuli  -LB --        Posture/Observations    Posture/Observations Comments Pt walked up to unit using a FWW  -LB Pt walked up to unit using a fWW  -LB        Transfers    Sit-Stand [place] (Transfers) modified independence  -LB modified independence  -LB     Stand-Sit [place] (Transfers) modified independence  -LB  modified independence  -LB     Transfers, Sit-Stand-Sit, Assist Device rolling walker  -LB rolling walker  -LB        Gait/Stairs (Locomotion)    [place] Level (Gait) standby assist;contact guard  -LB --     Assistive Device (Gait) --  no AD  -LB --     Distance in Feet (Gait) 65; 100; 120 with FWW  -LB --     Deviations/Abnormal Patterns (Gait) bilateral deviations;base of support, narrow;gait speed decreased;stride length decreased;weight shifting decreased  -LB --     Bilateral Gait Deviations heel strike decreased  -LB --     Gait Assessment/Intervention Cues for foot clearance which the patient does not maintain for long periods of time  -LB --        Balance Skills Training    Gait Balance-Level of Assistance Close supervision  -LB --     Gait Balance Support anup bar  -LB --     Gait Balance Activities side-stepping  -LB --               User Key  (r) = Recorded By, (t) = Taken By, (c) = Cosigned By      Initials Name Provider Type    Lara Duenas, PT Physical Therapist                             PT Assessment/Plan       Row Name 07/27/23 0506          PT Assessment    Assessment Comments The patient initially felt a lttle dizziness but did not sustain during the session.  Started with seated resitance band exercises.  The patient tolerated all exercises well, some complaints of knee pain with sit to stand.  Without the use of AD, the patient does display improved bilateral heelstrike and food clearance.  -LB               User Key  (r) = Recorded By, (t) = Taken By, (c) = Cosigned By      Initials Name Provider Type    Lara Duenas PT Physical Therapist                        OP Exercises       Row Name 07/27/23 1600 07/27/23 1516          Subjective Comments    Subjective Comments -- i am not having a great day  -LB        Subjective Pain    Able to rate subjective pain? -- yes  -LB     Pre-Treatment Pain Level -- 0  -LB     Post-Treatment Pain Level -- 0  -LB        Total Minutes    41076 -  PT Therapeutic Exercise Minutes 28  -LB --     89282 - PT Therapeutic Activity Minutes 15  -LB --        Exercise 3    Exercise Name 3 -- standing knee flexion  -LB     Reps 3 -- 15  -LB        Exercise 4    Exercise Name 4 -- standing heelraises  -LB     Reps 4 -- 15  -LB        Exercise 5    Exercise Name 5 -- Standing hip flexion  -LB     Reps 5 -- 10  -LB        Exercise 6    Exercise Name 6 -- STS from high surface  -LB     Reps 6 -- 5  -LB     Additional Comments -- some pain in right knee  -LB        Exercise 8    Exercise Name 8 -- seated hip abd and hip flexion  -LB     Sets 8 -- 2  -LB     Reps 8 -- 10  -LB     Additional Comments -- GTB  -LB        Exercise 9    Exercise Name 9 -- mini squats  -LB     Reps 9 -- 10  -LB               User Key  (r) = Recorded By, (t) = Taken By, (c) = Cosigned By      Initials Name Provider Type    Lara Duenas, PT Physical Therapist                                    Therapy Education  Education Details: sit to stand  Given: Mobility training  Program: Reinforced, Progressed  How Provided: Verbal  Provided to: Patient  Level of Understanding: Verbalized, Teach back education performed              Time Calculation:   Start Time: 1432  Stop Time: 1515  Time Calculation (min): 43 min  Timed Charges  55507 - PT Therapeutic Exercise Minutes: 28  12099 - PT Therapeutic Activity Minutes: 15  Total Minutes  Timed Charges Total Minutes: 43   Total Minutes: 43   Therapy Charges for Today       Code Description Service Date Service Provider Modifiers Qty    17652827481  PT THER PROC EA 15 MIN 7/27/2023 Lara Cm, PT GP 2    [id number]  PT THERAPEUTIC ACT EA 15 MIN 7/27/2023 Lara Cm PT GP 1                      Lara Cm PT  7/27/2023

## 2023-07-27 NOTE — PROGRESS NOTES
Spoke with patient this week.   Advised to restart jardiance.     Most recent creatinine 1.39 mg/dL and GFR 53.2 mL/min., on 7/4/2023    States still has at home and will restart.

## 2023-07-31 ENCOUNTER — HOSPITAL ENCOUNTER (OUTPATIENT)
Dept: PHYSICAL THERAPY | Facility: HOSPITAL | Age: 73
Setting detail: THERAPIES SERIES
Discharge: HOME | End: 2023-07-31
Payer: MEDICARE

## 2023-07-31 ENCOUNTER — PATIENT MESSAGE (OUTPATIENT)
Dept: CARDIOLOGY | Facility: CLINIC | Age: 73
End: 2023-07-31
Payer: MEDICARE

## 2023-07-31 ENCOUNTER — HOSPITAL ENCOUNTER (OUTPATIENT)
Dept: SPEECH THERAPY | Facility: HOSPITAL | Age: 73
Setting detail: THERAPIES SERIES
Discharge: HOME | End: 2023-07-31
Payer: MEDICARE

## 2023-07-31 DIAGNOSIS — Z74.09 IMPAIRED FUNCTIONAL MOBILITY, BALANCE, GAIT, AND ENDURANCE: ICD-10-CM

## 2023-07-31 DIAGNOSIS — Z86.73 HISTORY OF ISCHEMIC STROKE: Primary | ICD-10-CM

## 2023-07-31 DIAGNOSIS — R26.89 FUNCTIONAL GAIT ABNORMALITY: ICD-10-CM

## 2023-07-31 PROCEDURE — 97530 THERAPEUTIC ACTIVITIES: CPT

## 2023-07-31 PROCEDURE — 97110 THERAPEUTIC EXERCISES: CPT

## 2023-07-31 PROCEDURE — 97130 THER IVNTJ EA ADDL 15 MIN: CPT

## 2023-07-31 PROCEDURE — 97129 THER IVNTJ 1ST 15 MIN: CPT

## 2023-08-01 ENCOUNTER — HOSPITAL ENCOUNTER (OUTPATIENT)
Dept: CARDIOLOGY | Facility: HOSPITAL | Age: 73
Discharge: HOME | End: 2023-08-01
Payer: MEDICARE

## 2023-08-01 ENCOUNTER — TELEPHONE (OUTPATIENT)
Dept: CARDIOLOGY | Facility: CLINIC | Age: 73
End: 2023-08-01

## 2023-08-01 VITALS
OXYGEN SATURATION: 98 % | BODY MASS INDEX: 33.66 KG/M2 | HEART RATE: 65 BPM | SYSTOLIC BLOOD PRESSURE: 122 MMHG | WEIGHT: 254 LBS | DIASTOLIC BLOOD PRESSURE: 80 MMHG | HEIGHT: 73 IN

## 2023-08-01 DIAGNOSIS — R06.09 DOE (DYSPNEA ON EXERTION): ICD-10-CM

## 2023-08-01 DIAGNOSIS — I42.8 NON-ISCHEMIC CARDIOMYOPATHY: Primary | ICD-10-CM

## 2023-08-01 DIAGNOSIS — R06.00 DYSPNEA, UNSPECIFIED TYPE: ICD-10-CM

## 2023-08-01 DIAGNOSIS — R06.02 SOB (SHORTNESS OF BREATH): ICD-10-CM

## 2023-08-01 DIAGNOSIS — I42.8 NON-ISCHEMIC CARDIOMYOPATHY: ICD-10-CM

## 2023-08-01 LAB
ANION GAP SERPL CALCULATED.3IONS-SCNC: 15.5 MMOL/L (ref 5–15)
BH CV ECHO MEAS - EDV(CUBED): 113.1 ML
BH CV ECHO MEAS - EDV(MOD-SP2): 178 ML
BH CV ECHO MEAS - EDV(MOD-SP4): 140 ML
BH CV ECHO MEAS - EF(MOD-BP): 25.3 %
BH CV ECHO MEAS - EF(MOD-SP2): 35.4 %
BH CV ECHO MEAS - EF(MOD-SP4): 24.3 %
BH CV ECHO MEAS - ESV(CUBED): 67.2 ML
BH CV ECHO MEAS - ESV(MOD-SP2): 115 ML
BH CV ECHO MEAS - ESV(MOD-SP4): 106 ML
BH CV ECHO MEAS - FS: 15.9 %
BH CV ECHO MEAS - IVS/LVPW: 1.25 CM
BH CV ECHO MEAS - IVSD: 1.57 CM
BH CV ECHO MEAS - LV DIASTOLIC VOL/BSA (35-75): 58.8 CM2
BH CV ECHO MEAS - LV MASS(C)D: 279.9 GRAMS
BH CV ECHO MEAS - LV SYSTOLIC VOL/BSA (12-30): 44.5 CM2
BH CV ECHO MEAS - LVIDD: 4.8 CM
BH CV ECHO MEAS - LVIDS: 4.1 CM
BH CV ECHO MEAS - LVPWD: 1.25 CM
BH CV ECHO MEAS - MV A MAX VEL: 157.5 CM/SEC
BH CV ECHO MEAS - MV DEC SLOPE: 1274 CM/SEC2
BH CV ECHO MEAS - MV DEC TIME: 0.09 MSEC
BH CV ECHO MEAS - MV E MAX VEL: 148.2 CM/SEC
BH CV ECHO MEAS - MV E/A: 0.94
BH CV ECHO MEAS - MV MAX PG: 14.9 MMHG
BH CV ECHO MEAS - MV MEAN PG: 9.2 MMHG
BH CV ECHO MEAS - MV P1/2T: 45.3 MSEC
BH CV ECHO MEAS - MV V2 VTI: 41.5 CM
BH CV ECHO MEAS - MVA(P1/2T): 4.9 CM2
BH CV ECHO MEAS - SI(MOD-SP2): 26.4 ML/M2
BH CV ECHO MEAS - SI(MOD-SP4): 14.3 ML/M2
BH CV ECHO MEAS - SV(MOD-SP2): 63 ML
BH CV ECHO MEAS - SV(MOD-SP4): 34 ML
BH CV XLRA - RV BASE: 3.7 CM
BH CV XLRA - RV LENGTH: 7.8 CM
BH CV XLRA - RV MID: 2.8 CM
BUN SERPL-MCNC: 21 MG/DL (ref 8–23)
BUN/CREAT SERPL: 14.1 (ref 7–25)
CALCIUM SPEC-SCNC: 9.3 MG/DL (ref 8.6–10.5)
CHLORIDE SERPL-SCNC: 96 MMOL/L (ref 98–107)
CO2 SERPL-SCNC: 28.5 MMOL/L (ref 22–29)
CREAT SERPL-MCNC: 1.49 MG/DL (ref 0.76–1.27)
EGFRCR SERPLBLD CKD-EPI 2021: 48.9 ML/MIN/1.73
GLUCOSE SERPL-MCNC: 280 MG/DL (ref 65–99)
LEFT ATRIUM VOLUME INDEX: 20.1 ML/M2
NT-PROBNP SERPL-MCNC: 1948 PG/ML (ref 0–900)
POTASSIUM SERPL-SCNC: 3.9 MMOL/L (ref 3.5–5.2)
SODIUM SERPL-SCNC: 140 MMOL/L (ref 136–145)

## 2023-08-01 PROCEDURE — 93321 DOPPLER ECHO F-UP/LMTD STD: CPT | Performed by: INTERNAL MEDICINE

## 2023-08-01 PROCEDURE — 93308 TTE F-UP OR LMTD: CPT | Performed by: INTERNAL MEDICINE

## 2023-08-01 PROCEDURE — 93308 TTE F-UP OR LMTD: CPT

## 2023-08-01 PROCEDURE — 25510000001 PERFLUTREN (DEFINITY) 8.476 MG IN SODIUM CHLORIDE (PF) 0.9 % 10 ML INJECTION: Performed by: INTERNAL MEDICINE

## 2023-08-01 PROCEDURE — 93321 DOPPLER ECHO F-UP/LMTD STD: CPT

## 2023-08-01 PROCEDURE — 93325 DOPPLER ECHO COLOR FLOW MAPG: CPT | Performed by: INTERNAL MEDICINE

## 2023-08-01 PROCEDURE — 83880 ASSAY OF NATRIURETIC PEPTIDE: CPT | Performed by: INTERNAL MEDICINE

## 2023-08-01 PROCEDURE — 93325 DOPPLER ECHO COLOR FLOW MAPG: CPT

## 2023-08-01 PROCEDURE — 80048 BASIC METABOLIC PNL TOTAL CA: CPT | Performed by: INTERNAL MEDICINE

## 2023-08-01 RX ADMIN — PERFLUTREN 1.5 ML: 6.52 INJECTION, SUSPENSION INTRAVENOUS at 13:11

## 2023-08-02 ENCOUNTER — OFFICE VISIT (OUTPATIENT)
Dept: CARDIOLOGY | Facility: CLINIC | Age: 73
End: 2023-08-02
Payer: MEDICARE

## 2023-08-02 VITALS
HEART RATE: 108 BPM | DIASTOLIC BLOOD PRESSURE: 68 MMHG | HEIGHT: 73 IN | BODY MASS INDEX: 33 KG/M2 | SYSTOLIC BLOOD PRESSURE: 122 MMHG | WEIGHT: 249 LBS

## 2023-08-02 DIAGNOSIS — R06.09 DOE (DYSPNEA ON EXERTION): Primary | ICD-10-CM

## 2023-08-02 DIAGNOSIS — G47.33 OBSTRUCTIVE SLEEP APNEA (ADULT) (PEDIATRIC): ICD-10-CM

## 2023-08-02 PROCEDURE — 99214 OFFICE O/P EST MOD 30 MIN: CPT | Performed by: INTERNAL MEDICINE

## 2023-08-02 PROCEDURE — 3074F SYST BP LT 130 MM HG: CPT | Performed by: INTERNAL MEDICINE

## 2023-08-02 PROCEDURE — 3078F DIAST BP <80 MM HG: CPT | Performed by: INTERNAL MEDICINE

## 2023-08-02 PROCEDURE — 93000 ELECTROCARDIOGRAM COMPLETE: CPT | Performed by: INTERNAL MEDICINE

## 2023-08-02 RX ORDER — METOPROLOL SUCCINATE 25 MG/1
25 TABLET, EXTENDED RELEASE ORAL 2 TIMES DAILY
Qty: 60 TABLET | Refills: 1 | Status: SHIPPED | OUTPATIENT
Start: 2023-08-02 | End: 2023-08-03 | Stop reason: SDUPTHER

## 2023-08-03 ENCOUNTER — HOSPITAL ENCOUNTER (OUTPATIENT)
Dept: PHYSICAL THERAPY | Facility: HOSPITAL | Age: 73
Setting detail: THERAPIES SERIES
Discharge: HOME | End: 2023-08-03
Payer: MEDICARE

## 2023-08-03 ENCOUNTER — HOSPITAL ENCOUNTER (OUTPATIENT)
Dept: SPEECH THERAPY | Facility: HOSPITAL | Age: 73
Setting detail: THERAPIES SERIES
Discharge: HOME | End: 2023-08-03
Payer: MEDICARE

## 2023-08-03 DIAGNOSIS — R41.841 COGNITIVE COMMUNICATION DEFICIT: ICD-10-CM

## 2023-08-03 DIAGNOSIS — I63.9 CEREBROVASCULAR ACCIDENT (CVA), UNSPECIFIED MECHANISM: Primary | ICD-10-CM

## 2023-08-03 DIAGNOSIS — R26.89 FUNCTIONAL GAIT ABNORMALITY: ICD-10-CM

## 2023-08-03 DIAGNOSIS — Z86.73 HISTORY OF ISCHEMIC STROKE: Primary | ICD-10-CM

## 2023-08-03 DIAGNOSIS — Z74.09 IMPAIRED FUNCTIONAL MOBILITY, BALANCE, GAIT, AND ENDURANCE: ICD-10-CM

## 2023-08-03 PROCEDURE — 97130 THER IVNTJ EA ADDL 15 MIN: CPT | Performed by: SPEECH-LANGUAGE PATHOLOGIST

## 2023-08-03 PROCEDURE — 97530 THERAPEUTIC ACTIVITIES: CPT

## 2023-08-03 PROCEDURE — 97129 THER IVNTJ 1ST 15 MIN: CPT | Performed by: SPEECH-LANGUAGE PATHOLOGIST

## 2023-08-03 PROCEDURE — 97110 THERAPEUTIC EXERCISES: CPT

## 2023-08-03 RX ORDER — METOPROLOL SUCCINATE 25 MG/1
25 TABLET, EXTENDED RELEASE ORAL 2 TIMES DAILY
Qty: 180 TABLET | Refills: 2 | Status: SHIPPED | OUTPATIENT
Start: 2023-08-03

## 2023-08-07 ENCOUNTER — HOSPITAL ENCOUNTER (OUTPATIENT)
Dept: PHYSICAL THERAPY | Facility: HOSPITAL | Age: 73
Setting detail: THERAPIES SERIES
Discharge: HOME | End: 2023-08-07
Payer: MEDICARE

## 2023-08-07 ENCOUNTER — HOSPITAL ENCOUNTER (OUTPATIENT)
Dept: SPEECH THERAPY | Facility: HOSPITAL | Age: 73
Setting detail: THERAPIES SERIES
Discharge: HOME | End: 2023-08-07
Payer: MEDICARE

## 2023-08-07 DIAGNOSIS — Z86.73 HISTORY OF ISCHEMIC STROKE: Primary | ICD-10-CM

## 2023-08-07 DIAGNOSIS — R41.841 COGNITIVE COMMUNICATION DEFICIT: ICD-10-CM

## 2023-08-07 DIAGNOSIS — R26.89 FUNCTIONAL GAIT ABNORMALITY: ICD-10-CM

## 2023-08-07 DIAGNOSIS — I63.9 CEREBROVASCULAR ACCIDENT (CVA), UNSPECIFIED MECHANISM: Primary | ICD-10-CM

## 2023-08-07 DIAGNOSIS — Z74.09 IMPAIRED FUNCTIONAL MOBILITY, BALANCE, GAIT, AND ENDURANCE: ICD-10-CM

## 2023-08-07 PROCEDURE — 97530 THERAPEUTIC ACTIVITIES: CPT

## 2023-08-07 PROCEDURE — 92507 TX SP LANG VOICE COMM INDIV: CPT

## 2023-08-07 PROCEDURE — 97110 THERAPEUTIC EXERCISES: CPT

## 2023-08-07 NOTE — THERAPY TREATMENT NOTE
Outpatient Speech Language Pathology   Adult Speech Language Cognitive Treatment Note  [facility]     Patient Name: Vernon Solorzano  : 1950  MRN: [id number]  Today's Date: 2023         Visit Date: 2023   Patient Active Problem List   Diagnosis    DM (diabetes mellitus), type 2    Mixed hyperlipidemia    Mild intermittent asthma without complication    New onset of congestive heart failure    Nonrheumatic mitral valve regurgitation    Left-sided chest pain    Essential hypertension    Mitral valve disease    Acute ischemic left MCA stroke    Atelectasis of both lungs    Pericardial effusion, acute    History of ischemic stroke    Obese    Physical debility    Chronic diastolic CHF (congestive heart failure)    Anemia    Stage 3a chronic kidney disease    Paroxysmal atrial flutter    H/O mitral valve replacement with tissue graft    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 Cerebrovascular accident (CVA), unspecified mechanism  I63.9 434.91   2. Cognitive communication deficit  R41.841 799.52                              SLP OP Goals       Row Name 23 1300          Subjective Comments    Subjective Comments Pt arrived alone for today's visit. Pt reports that he has trouble knowing his speech therapy needs. He reports that he knows that he has memory difficulties but relies on his wife to help him know what he should work on. He is agreeable to working on various SLP tasks based upon SLP suggestions.  -BB        Verbal Expression Goals    Status: Patient will improve verbal expressive language skills by completing divergent naming tasks Progressing as expected  -BB     Comments: Patient will improve verbal expressive language skills by completing divergent naming tasks Object description: mod A for circumlocution strategy  -BB        Memory Goals    Patient's memory skills will be enhanced as reported by patient by utilizing internal memory strategies to  recall up to 3 pieces of information after a 5- minute delay 90%:;without cues  -BB     Status: Patient's memory skills will be enhanced as reported by patient by utilizing internal memory strategies to recall up to 3 pieces of information after a 5- minute delay Progressing as expected  -BB     Comments: Patient's memory skills will be enhanced as reported by patient by utilizing internal memory strategies to recall up to 3 pieces of information after a 5- minute delay Recall of 9 word sentence with training to utilize strategies (eg, visualization): 90% acc. Recall of short story details: 50% acc wo cues and 100% acc w multimodal strategy (eg, repeptition, writing, review). Pt required mod A for implementation of strategy.  -BB        Attention/Orientation Goals    Patient will improve attention skills by dividing focus and responding simultaneously to multiple tasks or in order to complete task 90%:;without cues  -BB     Status: Patient will improve attention skills by dividing focus and responding simultaneously to multiple tasks or in order to complete task Progressing as expected  -BB     Comments: Patient will improve attention skills by dividing focus and responding simultaneously to multiple tasks or in order to complete task Sustained auditory attention task (4 items in reverse): 70% acc wo cues and 85% acc w mod A. Pt unable to successfully complete task after approx 5 min despite cueing; suspect possibly due to decreased sustained attention abilities and/or cognitive fatigue.  -BB               User Key  (r) = Recorded By, (t) = Taken By, (c) = Cosigned By      Initials Name Provider Type    Ervin Temple, SLP Speech and Language Pathologist                           Time Calculation:   SLP Start Time: 1300  SLP Stop Time: 1345  SLP Time Calculation (min): 45 min  Untimed Charges  59677-EI Treatment/ST Modification Prosth Aug Alter : 45  Total Minutes  Untimed Charges Total Minutes: 45   Total Minutes:  45    Therapy Charges for Today       Code Description Service Date Service Provider Modifiers Qty    47988608845  ST TREATMENT SPEECH 3 8/7/2023 Ervin Bermudez, SLP GN 1                     VIKI Rodriguez  8/7/2023

## 2023-08-07 NOTE — THERAPY PROGRESS REPORT/RE-CERT
Outpatient Physical Therapy Neuro Progress Note  [facility]     Patient Name: Vernon Solorzano  : 1950  MRN: [id number]  Today's Date: 2023      Visit Date: 2023    Visit Dx:    ICD-10-CM ICD-9-CM   1. History of ischemic stroke  Z86.73 V12.54   2. Impaired functional mobility, balance, gait, and endurance  Z74.09 V49.89   3. Functional gait abnormality  R26.89 781.2       Patient Active Problem List   Diagnosis    DM (diabetes mellitus), type 2    Mixed hyperlipidemia    Mild intermittent asthma without complication    New onset of congestive heart failure    Nonrheumatic mitral valve regurgitation    Left-sided chest pain    Essential hypertension    Mitral valve disease    Acute ischemic left MCA stroke    Atelectasis of both lungs    Pericardial effusion, acute    History of ischemic stroke    Obese    Physical debility    Chronic diastolic CHF (congestive heart failure)    Anemia    Stage 3a chronic kidney disease    Paroxysmal atrial flutter    H/O mitral valve replacement with tissue graft    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 yes  -LB      Pre-Treatment Pain Level 3  -LB      Post-Treatment Pain Level 3  -LB         Vital Signs    Pre Systolic BP Rehab 121  -LB      Pre Treatment Diastolic BP 81  -LB      Intratreatment Heart Rate (beats/min) 92  while on nustep  -LB      Pre Patient Position Sitting  -LB         Posture/Observations    Posture/Observations Comments Pt walked up to unit using a FWW  -LB         Transfers    Sit-Stand Sylva (Transfers) independent  -LB      Stand-Sit Sylva (Transfers) independent  -LB         Gait/Stairs (Locomotion)    Sylva  Level (Gait) independent;standby assist  -LB      Distance in Feet (Gait) 80; 150  -LB      Deviations/Abnormal Patterns (Gait) bilateral deviations;base of support, narrow;stride length decreased  -LB      Bilateral Gait Deviations heel strike decreased  -LB         Balance Skills Training    Gait Balance-Level of Assistance Contact guard  -LB      Gait Balance Support No upper extremity supported  -LB      Gait Balance Activities side-stepping;backwards  -LB      Gait Balance # of Minutes GTB around thighs  -LB                User Key  (r) = Recorded By, (t) = Taken By, (c) = Cosigned By      Initials Name Provider Type    Lara Duenas PT Physical Therapist                             PT Assessment/Plan       Row Name 08/07/23 1607          PT Assessment    Assessment Comments The patient was able to show good progress with acheiving all short term goals.  Improvement with all fucntional mobility as well as balance.  The paitent is not using the walker in the house but in community.  The patient is ar less risk for falls as indicated by score on TUG and without the use of any assistive device to complete.  The patient's endurance is improving with longer distance walking and tolerance to the Nustep.  In addition, his HR is more stable, only increasing to low 90's with activity.  The patient points to chest pain but it is not radtiating and cardiologist feels it is the healing process from surgery.  The patient continues to benefit from PT services at this time with appropriate long term goals to work towards  -LB               User Key  (r) = Recorded By, (t) = Taken By, (c) = Cosigned By      Initials Name Provider Type    Lara Duenas PT Physical Therapist                        OP Exercises       Row Name 08/07/23 1612 08/07/23 1405          Subjective Comments    Subjective Comments -- I am doing more, I went out to dinner and I went [place]  -LB        Subjective Pain    Able to rate subjective  pain? -- yes  -LB     Pre-Treatment Pain Level -- 3  -LB     Post-Treatment Pain Level -- 3  -LB        Total Minutes    57357 - PT Therapeutic Exercise Minutes 25  -LB --     43069 - PT Therapeutic Activity Minutes 17  -LB --        Exercise 1    Exercise Name 1 -- Nu Step  -LB     Time 1 -- 5 mins  -LB     Additional Comments -- level 3  -LB        Exercise 6    Exercise Name 6 -- STS from high surface  -LB     Reps 6 -- 5  -LB        Exercise 10    Exercise Name 10 -- alternting toe taps  -LB     Reps 10 -- 10  -LB     Time 10 -- 4 inch step  -LB               User Key  (r) = Recorded By, (t) = Taken By, (c) = Cosigned By      Initials Name Provider Type    Lara Duenas, PT Physical Therapist                                 PT OP Goals       Row Name 08/07/23 1406          PT Short Term Goals    STG 1 Pt to be indep with inital HEP for strength  -LB     STG 1 Progress Met  -LB     STG 2 Pt to come to stand from arm and armless chair without assist and less dependence on UEs  -LB     STG 2 Progress Met  -LB     STG 3 Pt to ambulate 100 x 2 ft without AD and CGA  -LB     STG 3 Progress Met  -LB     STG 4 Pt to ambulate over 300 ft with FWW and Mod Indep  -LB     STG 4 Progress Met  -LB     STG 5 Pt to tolerate Nustep for 5 mins on level 3 or above with HR below 115 to indicate improved endurance  -LB     Additional STG's STG 6  -LB     STG 6 Pt to complete TUG in less than 28 secs  -LB     STG 6 Progress Met  -LB        Long Term Goals    LTG 1 Pt to be indep with advanced HEP for strength and balance  -LB     LTG 1 Progress Ongoing  -LB     LTG 2 Pt to be indep with sit to stand from various surfaces and chairs  -LB     LTG 2 Progress Progressing;Ongoing  -LB     LTG 3 Pt to ambulate 300 ft with least restrictive AD and Mod Indep  -LB     LTG 3 Progress Progressing;Goal Revised  -LB     LTG 4 Pt to negogiate flight of stairs with 1 HR and Mod Indep  -LB     LTG 4 Progress Ongoing;Progressing  -LB     LTG 5 Pt  to tolerate Nutstep for 10 mins level 5 or greater with HR less than 100 indicating improved endurance  -LB     LTG 5 Progress Ongoing  -LB     LTG 6 Pt to improve Tinetti score by 7 points to indicate improved balance  -LB     LTG 6 Progress Ongoing  -LB     LTG 7 Pt to complete TUG in less than 12 secs to indicate less risk for falls  -LB     LTG 7 Progress Progressing;Goal Revised  -LB        Time Calculation    PT Goal Re-Cert Due Date 09/04/23  -LB               User Key  (r) = Recorded By, (t) = Taken By, (c) = Cosigned By      Initials Name Provider Type    Lara Duenas, PT Physical Therapist                    Therapy Education  Education Details: Possible transition to cardiac rehab  Given: Other (comment)  Program: Reinforced, Progressed  How Provided: Verbal  Provided to: Patient, Caregiver  Level of Understanding: Verbalized, Teach back education performed       Timed Up and Go (TUG)  TUG Test 1: 12 seconds      Time Calculation:   Start Time: 1347  Stop Time: 1430  Time Calculation (min): 43 min  Timed Charges  98962 - PT Therapeutic Exercise Minutes: 25  13776 - PT Therapeutic Activity Minutes: 17  Total Minutes  Timed Charges Total Minutes: 42   Total Minutes: 42   Therapy Charges for Today       Code Description Service Date Service Provider Modifiers Qty    71172417386 HC PT THER PROC EA 15 MIN 8/7/2023 Lara Cm, PT GP 2    38960673279 HC PT THERAPEUTIC ACT EA 15 MIN 8/7/2023 Lara Cm, PT GP 1            PT G-Codes  TUG Test 1: 12 seconds         Lara Cm PT  8/7/2023

## 2023-08-08 ENCOUNTER — OFFICE VISIT (OUTPATIENT)
Dept: INTERNAL MEDICINE | Facility: CLINIC | Age: 73
End: 2023-08-08
Payer: MEDICARE

## 2023-08-08 VITALS
DIASTOLIC BLOOD PRESSURE: 72 MMHG | WEIGHT: 253.4 LBS | OXYGEN SATURATION: 98 % | SYSTOLIC BLOOD PRESSURE: 132 MMHG | HEART RATE: 105 BPM | BODY MASS INDEX: 33.58 KG/M2 | HEIGHT: 73 IN

## 2023-08-08 DIAGNOSIS — G47.13 RECURRENT HYPERSOMNIA: ICD-10-CM

## 2023-08-08 DIAGNOSIS — R06.09 DOE (DYSPNEA ON EXERTION): Primary | ICD-10-CM

## 2023-08-08 DIAGNOSIS — E11.59 TYPE 2 DIABETES MELLITUS WITH OTHER CIRCULATORY COMPLICATION, WITH LONG-TERM CURRENT USE OF INSULIN: ICD-10-CM

## 2023-08-08 DIAGNOSIS — Z79.4 TYPE 2 DIABETES MELLITUS WITH OTHER CIRCULATORY COMPLICATION, WITH LONG-TERM CURRENT USE OF INSULIN: ICD-10-CM

## 2023-08-08 DIAGNOSIS — E78.2 MIXED HYPERLIPIDEMIA: Chronic | ICD-10-CM

## 2023-08-08 DIAGNOSIS — N18.31 STAGE 3A CHRONIC KIDNEY DISEASE: Chronic | ICD-10-CM

## 2023-08-08 PROBLEM — I12.9 HYPERTENSIVE CHRONIC KIDNEY DISEASE: Status: ACTIVE | Noted: 2023-07-17

## 2023-08-08 PROBLEM — K21.9 GASTROESOPHAGEAL REFLUX DISEASE: Status: ACTIVE | Noted: 2023-07-17

## 2023-08-08 NOTE — PROGRESS NOTES
Chief Complaint  Congestive Heart Failure     Subjective:      History of Present Illness {CC  Problem List  Visit  Diagnosis   Encounters  Notes  Medications  Labs  Result Review Imaging  Media :23}     Vernon Solorzano presents to [facility] for:      Patient has Stage 3a CKD, hypertension, diabetes 2 (uncontrolled), GERD, PING (untreated: last study 1/7/16), asthma, HFrEF, S/P  mitral valve replacement and tricuspid valve repair (5/24/23), AF (post op - s/p cardioversion), CVA (bilateral frontal and left parietal occipital cortices: postop), post op Dressler's syndrome      He saw renal on 7/25/23: torsemide increased to 60 mg bid  Felt he needed to restart CPAP and advised to follow up with pulm. He also discussed with Dr Arango - she placed order for home sleep study but states no one has contacted him.  Not clear why referral had not been processed, will place new referral today.   When he saw Dr Arango last week, edema had improved and seems his urinary frequency was affecting his PT so was decreased by to 60 mg.  He is taking 40 mg in am and then 20 mg in PM.     During his hospital and rehab admission: renal discontinued jardiance on 6/12/23.   I have since spoke with Dr Arango and she had also spoke with renal and agreed to restart jardiance.  He had told me he had 10 mg at home but his wife today states it is 25 mg daily he is taking.       Creatine   7/18: 1.25  8/1: 1.49       HFrEF   Post op: EF 50 to 55%   8/1/23: EF 25.3% (decreased): pericardial effusion resolved, proBNP had improved to 1950.   He will follow up with Dr Arango on 8/17/23   He states edema has improved.  Still greater in right.  He has had 2 negative dopplers. No redness.     Diabetes: chronic and has not been controlled.   He has not checked glucose in 2 days.  Had a long discussion about diet. He has been having small portion of ice cream with fruit at bedtime.  Discussed with him and his wife  and will stop.  Stated it was still in allowed number of daily carbs.    He is scheduled to see Dr Mcneill on 8/16/23.      He had PT yesterday: per note: pt is becoming more active.  Went out to dinner and went to his farm. /81, HR 92  No longer using walker in the house but using it when out of the house.  Endurance mentioned as improved.     Speech and cognition improving and states he didn't see utility of ST initially but feels it is really helping.     He wants to start driving again: he will discuss with neurology at appointment on 9/11.     I have reviewed patient's medical history, any new submitted information provided by patient or medical assistant and updated medical record.      Objective:      Physical Exam  Vitals reviewed.   Constitutional:       Appearance: Normal appearance. He is well-developed.   Neck:      Thyroid: No thyromegaly.   Cardiovascular:      Rate and Rhythm: Normal rate and regular rhythm.      Pulses: Normal pulses.      Heart sounds: Normal heart sounds. No murmur heard.  Pulmonary:      Effort: Pulmonary effort is normal.      Breath sounds: Normal breath sounds. No wheezing.      Comments: E/U   Abdominal:      General: Bowel sounds are normal.      Palpations: Abdomen is soft.   Musculoskeletal:      Right lower leg: Edema present.      Left lower leg: Edema present.   Feet:      Comments: Diabetic Foot Exam Performed     Lymphadenopathy:      Cervical: No cervical adenopathy.   Skin:     General: Skin is warm and dry.      Capillary Refill: Capillary refill takes 2 to 3 seconds.   Neurological:      Mental Status: He is alert and oriented to person, place, and time.   Psychiatric:         Mood and Affect: Mood normal.         Behavior: Behavior normal. Behavior is cooperative.         Thought Content: Thought content normal.         Judgment: Judgment normal.      Result Review  Data Reviewed:{ Labs  Result Review  Imaging  Med Tab  Media :23}     The following data was  "reviewed by: Liza Oconnell III, NP-C on 08/08/2023  Common labs          7/4/2023    07:00 7/18/2023    14:34 8/1/2023    13:05   Common Labs   Glucose 112  233  280    BUN 15  16  21    Creatinine 1.39  1.25  1.49    Sodium 142  141  140    Potassium 3.5  3.9  3.9    Chloride 101  101  96    Calcium 9.2  9.6  9.3    Albumin  4.0     Total Bilirubin  0.5     Alkaline Phosphatase  123     AST (SGOT)  18     ALT (SGPT)  23     WBC  10.30     Hemoglobin  11.1     Hematocrit  34.0     Platelets  230       Lab Results   Component Value Date    HGBA1C 9.20 (H) 05/23/2023               Vital Signs:   /72 (BP Location: Left arm, Patient Position: Sitting, Cuff Size: Adult)   Pulse 105   Ht 185.4 cm (73\")   Wt 115 kg (253 lb 6.4 oz)   SpO2 98%   BMI 33.43 kg/mý         Requested Prescriptions     Signed Prescriptions Disp Refills    empagliflozin (JARDIANCE) 25 MG tablet tablet       Sig: Take 1 tablet by mouth Daily.       Routine medications provided by this office will also be refilled via pharmacy request.       Current Outpatient Medications:     acetaminophen (TYLENOL) 325 MG tablet, Take 2 tablets by mouth Every 6 (Six) Hours As Needed for Mild Pain., Disp: , Rfl:     atorvastatin (LIPITOR) 40 MG tablet, Take 1 tablet by mouth Every Night., Disp: 30 tablet, Rfl: 1    calcium carbonate (TUMS) 500 MG chewable tablet, Chew 2 tablets 4 (Four) Times a Day As Needed for Indigestion or Heartburn., Disp: , Rfl:     Calcium Carbonate-Vitamin D (calcium 500 mg vitamin D 5 mcg, 200 UT,) 500-5 MG-MCG tablet per tablet, Take 1 tablet by mouth 2 (Two) Times a Day., Disp: , Rfl:     colchicine 0.6 MG tablet, Take 1 tablet by mouth Daily., Disp: 30 tablet, Rfl: 1    empagliflozin (JARDIANCE) 25 MG tablet tablet, Take 1 tablet by mouth Daily., Disp: , Rfl:     glucose blood (OneTouch Verio) test strip, 1 each by Other route As Needed. Use as instructed, Disp: , Rfl:     insulin degludec (Tresiba FlexTouch) 100 " UNIT/ML solution pen-injector injection, Inject 40 Units under the skin into the appropriate area as directed Daily. (Patient taking differently: Inject 40 Units under the skin into the appropriate area as directed Daily. 44 units), Disp: , Rfl:     Insulin Pen Needle 32G X 4 MM misc, Use to inject insulin under the skin via pens up to 4 times daily, Disp: 100 each, Rfl: 1    metoprolol succinate XL (TOPROL-XL) 25 MG 24 hr tablet, Take 1 tablet by mouth 2 (Two) Times a Day., Disp: 180 tablet, Rfl: 2    Multiple Vitamins-Minerals (PRESERVISION AREDS 2+MULTI VIT PO), Take 1 tablet by mouth 2 (Two) Times a Day., Disp: , Rfl:     pantoprazole (PROTONIX) 40 MG EC tablet, Take 1 tablet by mouth Daily., Disp: 90 tablet, Rfl: 0    potassium chloride (K-DUR,KLOR-CON) 20 MEQ CR tablet, Take 1 tablet by mouth Daily., Disp: 30 tablet, Rfl: 1    torsemide (DEMADEX) 20 MG tablet, Take 3 tablets by mouth Daily. (Patient taking differently: Take 4 tablets by mouth Daily.), Disp: 90 tablet, Rfl: 1     Assessment and Plan:      Assessment and Plan {CC Problem List  Visit Diagnosis  ROS  Review (Popup)  Health Maintenance  Quality  BestPractice  Medications  SmartSets  SnapShot Encounters  Media :23}     Problem List Items Addressed This Visit          Cardiac and Vasculature    Mixed hyperlipidemia (Chronic)    Current Assessment & Plan     Lipid abnormalities are improving with treatment.  Pharmacotherapy as ordered.  Lipids will be reassessed in 6 months.    Lab Results   Component Value Date    CHOL 155 05/23/2023    CHLPL 118 03/29/2023    TRIG 197 (H) 05/23/2023    HDL 41 05/23/2023    LDL 81 05/23/2023          Your triglycerides are elevated. You should cut back on sugar, carbohydrates (including white breads or items made with white flour), and limit alcohol if your currently consume. Increase your exercise level.               Endocrine and Metabolic    DM (diabetes mellitus), type 2 (Chronic)    Overview      5/2022:   Glucose Monitor: OneTouch Verio Flex          Current Assessment & Plan     Diabetes is uncontrolled.   Lab Results   Component Value Date    HGBA1C 9.20 (H) 05/23/2023        Discussed diet: will stop ice cream at night.     As he is seeing endocrine next week, I placed Free Style Ysabel 2 to left arm, downloaded aarti to his phone and set up device with goal for him to have more accurate readings at visit.                 Relevant Medications    empagliflozin (JARDIANCE) 25 MG tablet tablet       Genitourinary and Reproductive     Stage 3a chronic kidney disease (Chronic)    Current Assessment & Plan     Cr: increase after restarting jardiance.   States he is drinking plenty of fluid: but often diet coke.   Asked him to cut back on sodas in increase water intake.     He will follow up with renal in next week.           Other Visit Diagnoses       ZUÑIGA (dyspnea on exertion)    -  Primary    Relevant Orders    Home Sleep Study    Recurrent hypersomnia        Relevant Orders    Home Sleep Study            Follow Up {Instructions Charge Capture  Follow-up Communications :23}     Return in about 6 months (around 2/8/2024).      Patient was given instructions and counseling regarding his condition or for health maintenance advice. Please see specific information pulled into the AVS if appropriate.    Dragon disclaimer:   Much of this encounter note is an electronic transcription/translation of spoken language to printed text. The electronic translation of spoken language may permit erroneous, or at times, nonsensical words or phrases to be inadvertently transcribed; Although I have reviewed the note for such errors, some may still exist.     Additional Patient Counseling:       There are no Patient Instructions on file for this visit.

## 2023-08-09 NOTE — ASSESSMENT & PLAN NOTE
Diabetes is uncontrolled.   Lab Results   Component Value Date    HGBA1C 9.20 (H) 05/23/2023        Discussed diet: will stop ice cream at night.     As he is seeing endocrine next week, I placed Free Style Ysabel 2 to left arm, downloaded aarti to his phone and set up device with goal for him to have more accurate readings at visit.

## 2023-08-09 NOTE — ASSESSMENT & PLAN NOTE
Lipid abnormalities are improving with treatment.  Pharmacotherapy as ordered.  Lipids will be reassessed in 6 months.    Lab Results   Component Value Date    CHOL 155 05/23/2023    CHLPL 118 03/29/2023    TRIG 197 (H) 05/23/2023    HDL 41 05/23/2023    LDL 81 05/23/2023          Your triglycerides are elevated. You should cut back on sugar, carbohydrates (including white breads or items made with white flour), and limit alcohol if your currently consume. Increase your exercise level.

## 2023-08-09 NOTE — ASSESSMENT & PLAN NOTE
Cr: increase after restarting jardiance.   States he is drinking plenty of fluid: but often diet coke.   Asked him to cut back on sodas in increase water intake.     He will follow up with renal in next week.

## 2023-08-10 ENCOUNTER — HOSPITAL ENCOUNTER (OUTPATIENT)
Dept: SPEECH THERAPY | Facility: HOSPITAL | Age: 73
Setting detail: THERAPIES SERIES
Discharge: HOME | End: 2023-08-10
Payer: MEDICARE

## 2023-08-10 ENCOUNTER — HOSPITAL ENCOUNTER (OUTPATIENT)
Dept: PHYSICAL THERAPY | Facility: HOSPITAL | Age: 73
Setting detail: THERAPIES SERIES
Discharge: HOME | End: 2023-08-10
Payer: MEDICARE

## 2023-08-10 DIAGNOSIS — I63.9 CEREBROVASCULAR ACCIDENT (CVA), UNSPECIFIED MECHANISM: Primary | ICD-10-CM

## 2023-08-10 DIAGNOSIS — R26.89 FUNCTIONAL GAIT ABNORMALITY: ICD-10-CM

## 2023-08-10 DIAGNOSIS — Z86.73 HISTORY OF ISCHEMIC STROKE: Primary | ICD-10-CM

## 2023-08-10 DIAGNOSIS — Z74.09 IMPAIRED FUNCTIONAL MOBILITY, BALANCE, GAIT, AND ENDURANCE: ICD-10-CM

## 2023-08-10 DIAGNOSIS — R41.841 COGNITIVE COMMUNICATION DEFICIT: ICD-10-CM

## 2023-08-10 PROCEDURE — 97530 THERAPEUTIC ACTIVITIES: CPT

## 2023-08-10 PROCEDURE — 97130 THER IVNTJ EA ADDL 15 MIN: CPT

## 2023-08-10 PROCEDURE — 97110 THERAPEUTIC EXERCISES: CPT

## 2023-08-10 PROCEDURE — 97129 THER IVNTJ 1ST 15 MIN: CPT

## 2023-08-10 NOTE — THERAPY TREATMENT NOTE
Outpatient Physical Therapy Neuro Treatment Note  [facility]     Patient Name: Vernon Solorzano  : 1950  MRN: [id number]  Today's Date: 8/10/2023      Visit Date: 08/10/2023    Visit Dx:    ICD-10-CM ICD-9-CM   1. History of ischemic stroke  Z86.73 V12.54   2. Impaired functional mobility, balance, gait, and endurance  Z74.09 V49.89   3. Functional gait abnormality  R26.89 781.2       Patient Active Problem List   Diagnosis    DM (diabetes mellitus), type 2    Mixed hyperlipidemia    Mild intermittent asthma without complication    New onset of congestive heart failure    Nonrheumatic mitral valve regurgitation    Left-sided chest pain    Essential hypertension    Mitral valve disease    Acute ischemic left MCA stroke    Atelectasis of both lungs    Pericardial effusion, acute    History of ischemic stroke    Obese    Physical debility    Chronic diastolic CHF (congestive heart failure)    Anemia    Stage 3a chronic kidney disease    Paroxysmal atrial flutter    H/O mitral valve replacement with tissue graft    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 yes  -LB      Subjective Pain Comment Did not complain of pain except for right shoulder occasionally  -LB         Posture/Observations    Posture/Observations Comments Pt walked up to unit using a FWW  -LB         Transfers    Sit-Stand [place] (Transfers) independent  -LB      Stand-Sit [place] (Transfers) independent  -LB         Gait/Stairs (Locomotion)    [place] Level (Gait) independent;standby assist  -LB      Assistive Device (Gait) --  no AD  -LB      Distance in Feet (Gait) 100; 140 x 2 wiht  seated rest  -LB      Deviations/Abnormal Patterns (Gait) bilateral deviations;base of support, narrow;stride length decreased  -LB      Bilateral Gait Deviations heel strike decreased  -LB      Comment, (Gait/Stairs) Pt walked down to the nursing station and used scale,  Due to not feeling well, the patient's spouse questioned if the patient could have water weight gain.  From MD office yesterday and from last month, the patient's weight was fairly stable.  Pt did not have any issues  -LB                User Key  (r) = Recorded By, (t) = Taken By, (c) = Cosigned By      Initials Name Provider Type    Lara Duenas PT Physical Therapist                             PT Assessment/Plan       Row Name 08/10/23 1615          PT Assessment    Assessment Comments The patient did report taking a walk at home yesterday but reports fatigue after walking.  Today the patient again felt quite fatigue and not sure if the reason.  Discussed several different causes with patient and spouse such as ejection fraction, limited sleeping; limited intake and just ongoing recovery from surgery and stroke.  While walking the patient did not have any LOB but when fatigued walked slower and increased respirations.  -LB               User Key  (r) = Recorded By, (t) = Taken By, (c) = Cosigned By      Initials Name Provider Type    Lara Duenas PT Physical Therapist                        OP Exercises       Row Name 08/10/23 1624 08/10/23 1515          Subjective Comments    Subjective Comments -- I just dont feel good today  -LB        Subjective Pain    Able to rate subjective pain? -- yes  -LB     Subjective Pain Comment -- Did not complain of pain except for right shoulder occasionally  -LB        Total Minutes    32509 - PT Therapeutic Exercise Minutes 21  -LB --     92540 - PT Therapeutic Activity Minutes 20  -LB --        Exercise 1    Exercise Name 1 -- Nu Step  -LB     Time 1 -- 5 mins  -LB     Additional Comments -- level 1   -LB        Exercise 8    Exercise Name 8 -- seated hip abd and hip flexion  -LB     Sets 8 -- 2  -LB     Reps 8 -- 10  -LB     Additional Comments -- GTB  -LB        Exercise 11    Exercise Name 11 -- seated hamstring curls  -LB     Sets 11 -- 1  -LB     Reps 11 -- 10  -LB     Additional Comments -- GTB  -LB               User Key  (r) = Recorded By, (t) = Taken By, (c) = Cosigned By      Initials Name Provider Type    Lara Duenas PT Physical Therapist                                                   Time Calculation:   Start Time: 1435  Stop Time: 1520  Time Calculation (min): 45 min  Timed Charges  24190 - PT Therapeutic Exercise Minutes: 21  15428 - PT Therapeutic Activity Minutes: 20  Total Minutes  Timed Charges Total Minutes: 41   Total Minutes: 41   Therapy Charges for Today       Code Description Service Date Service Provider Modifiers Qty    93137780492  PT THER PROC EA 15 MIN 8/10/2023 Lara Cm, PT GP 2    [id number]  PT THERAPEUTIC ACT EA 15 MIN 8/10/2023 Lara Cm, PT GP 1                      Lara Cm PT  8/10/2023

## 2023-08-10 NOTE — THERAPY PROGRESS REPORT/RE-CERT
Outpatient Speech Language Pathology   Adult Speech Language Cognitive Progress Note  [facility]     Patient Name: Vernon Solorzano  : 1950  MRN: [id number]  Today's Date: 8/10/2023         Visit Date: 08/10/2023   Patient Active Problem List   Diagnosis    DM (diabetes mellitus), type 2    Mixed hyperlipidemia    Mild intermittent asthma without complication    New onset of congestive heart failure    Nonrheumatic mitral valve regurgitation    Left-sided chest pain    Essential hypertension    Mitral valve disease    Acute ischemic left MCA stroke    Atelectasis of both lungs    Pericardial effusion, acute    History of ischemic stroke    Obese    Physical debility    Chronic diastolic CHF (congestive heart failure)    Anemia    Stage 3a chronic kidney disease    Paroxysmal atrial flutter    H/O mitral valve replacement with tissue graft    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 Cerebrovascular accident (CVA), unspecified mechanism  I63.9 434.91   2. Cognitive communication deficit  R41.841 799.52                              SLP OP Goals       Row Name 08/10/23 1700          Subjective Comments    Subjective Comments Pt arrives on time, pleasant and cooperative throughout.  Progress note completed per established plan of care  -AB        Written Language Comprehension Goals    Written Language Comprehension LTG's Patient will be able to comprehend written material in home/home and social  environment  -AB     Patient will be able to comprehend written material in home/home and social  environment 90%:;without cues  -AB     Status: Patient will be able to comprehend written material in home/home and social  environment Progressing as expected  -AB     Comments: Patient will be able to comprehend written material in home/home and social  environment Good progress. Average of % with increased  latency for paragraph level material. Recommend continue targeting to increase accuracy, consistency, and processing speed.  -AB     Written Language Comprehension STG's Patient will improve comprehension of written language skills by answering written questions related to home management/social/work tasks (bills, recipes, tv guide, emails, procedures)  -AB     Patient will improve comprehension of written language skills by answering written questions related to home management/social/work tasks (bills, recipes, tv guide, emails, procedures) 90%:;without cues  -AB     Status: Patient will improve comprehension of written language skills by answering written questions related to home management/social/work tasks (bills, recipes, tv guide, emails, procedures) Progressing as expected  -AB        Verbal Expression Goals    Verbal Expression LTG's Patient will be able to use verbal expressive language skills to communicate effectively in all situations with  familiar listener  -AB     Patient will be able to use verbal expressive language skills to communicate effectively in all situations with  familiar listener 90%:;without cues  -AB     Status: Patient will be able to use verbal expressive language skills to communicate effectively in all situations with  familiar listener Progressing as expected  -AB     Comments: Patient will be able to use verbal expressive language skills to communicate effectively in all situations with  familiar listener Fair progress. Averaging 70-75% for convergent and divergent naming tasks, moderate cueing for utilization of word finding strategies . Recommend continue targeting.  -AB     Verbal Expression STG's Patient will improve verbal expressive language skills by completing convergent naming tasks  -AB     Patient will improve verbal expressive language skills by completing divergent naming tasks 90%:;without cues  -AB     Status: Patient will improve verbal expressive language skills  by completing divergent naming tasks Progressing as expected  -AB     Comments: Patient will improve verbal expressive language skills by completing divergent naming tasks 6 in 1 minute for personally salient concrete category  -AB     Patient will improve verbal expressive language skills by completing convergent naming tasks 90%:;without cues  -AB     Status: Patient will improve verbal expressive language skills by completing convergent naming tasks Progressing as expected  -AB     Comments: Patient will improve verbal expressive language skills by completing convergent naming tasks concrete convergent namin/8, 75% with NO cues  -AB        Memory Goals    Memory LTG's Patient will be able to remember information needed to participate in avocational activities  -AB     Status: Patient will be able to remember information needed to participate in avocational activities Progressing as expected  -AB     Comments: Patient will be able to remember information needed to participate in avocational activities Fair progress. Moderate cueing for utilization of memory strategies, averages ranging for delayed recall of items, improved vastly for immediate recall. Recommend: continue targeting.  -AB     Memory STG's Patient's memory skills will be enhanced as reported by patient by using external memory aides  -AB     Patient's memory skills will be enhanced as reported by patient by utilizing internal memory strategies to recall up to 3 pieces of information after a 5- minute delay 90%:;without cues  -AB     Status: Patient's memory skills will be enhanced as reported by patient by utilizing internal memory strategies to recall up to 3 pieces of information after a 5- minute delay Progressing as expected  -AB     Comments: Patient's memory skills will be enhanced as reported by patient by utilizing internal memory strategies to recall up to 3 pieces of information after a 5- minute delay delayed recall 3  errands/personally salient tasks: 1/3, 33% NO cues, can increase to 2/3, 67% MOD MAX cues  -AB     Patient's memory skills will be enhanced as reported by patient by using external memory aides 90%:;without cues  -AB     Status: Patient's memory skills will be enhanced as reported by patient by using external memory aides Progressing as expected  -AB        Problem Solving Goals    Problem Solving LTG's Patient will be able to interact safely in home environment  -AB     Patient will be able to interact safely in home environment Independently  -AB     Status: Patient will be able to interact safely in home environment Progressing as expected  -AB     Comments: Patient will be able to interact safely in home environment Fair progress. Averaging 40% for functional math, 60% sequencing. High prior level of function, retired MD, mildly resistant to cueing for utilization of strategies within problem solving tasks. Recommend continue targeting.  -AB     Patient will improve ability to analyze problems and determine solutions by correctly sequencing __ steps to complete an activity 90%:;without cues  -AB     Status: Patient will improve ability to analyze problems and determine solutions by correctly sequencing __ steps to complete an activity Progressing as expected  -AB     Patient will improve ability to analyze problems and determine solutions by completing a visual representation/filling in a chart by following  written directions 90%:;without cues  -AB     Status: Patient will improve ability to analyze problems and determine solutions by completing a visual representation/filling in a chart by following  written directions Progressing as expected  -AB     Patient will improve ability to analyze problems and determine solutions by completing functional math problems 90%:;without cues  -AB     Status: Patient will improve ability to analyze problems and determine solutions by completing functional math problems  Progressing as expected  -AB     Comments: Patient will improve ability to analyze problems and determine solutions by completing functional math problems Constant Therapy functional math (solve everday math) for addition/subtraction simple money/time problems: 2/5, 40% mod cues, increased processing time. pt becomes irritable with suggestions for compensatory strategies and techniques  -AB        Attention/Orientation Goals    Attention/Orientation LTG's Patient will be able to interact safely in home environment  -AB     Patient will be able to interact safely in home environment Independently  -AB     Status: Patient will be able to interact safely in home environment Progressing as expected  -AB     Comments: Patient will be able to interact safely in home environment Fair progress. Sustained attention and mental manipulation averaging 70% without cueing - recommend continue targeting.  -AB     Patient will improve attention skills by dividing focus and responding simultaneously to multiple tasks or in order to complete task 90%:;without cues  -AB     Status: Patient will improve attention skills by dividing focus and responding simultaneously to multiple tasks or in order to complete task Progressing as expected  -AB               User Key  (r) = Recorded By, (t) = Taken By, (c) = Cosigned By      Initials Name Provider Type    AB Ericka Nye, MS CCC-SLP Speech and Language Pathologist                           Time Calculation:   SLP Start Time: 1345  SLP Stop Time: 1430  SLP Time Calculation (min): 45 min  Timed Charges  18589-WG Dev of Cogn Skills Initial Minutes: 15  00052-TF Dev of Cogn Skills Add Minutes: 30  Total Minutes  Timed Charges Total Minutes: 45   Total Minutes: 45    Therapy Charges for Today       Code Description Service Date Service Provider Modifiers Qty    93072891285 HC ST DEV OF COGN SKILLS INITIAL 15 MIN 8/10/2023 Ericka Nye, MS CCC-SLP KX 1    52542693954 HC ST DEV OF COGN  SKILLS EACH ADDT'L 15 MIN 8/10/2023 Ericka Nye, MS ARMANDO-SLP KX 2                     MS JENNIFFER Denise  8/10/2023

## 2023-08-14 ENCOUNTER — HOSPITAL ENCOUNTER (OUTPATIENT)
Dept: PHYSICAL THERAPY | Facility: HOSPITAL | Age: 73
Setting detail: THERAPIES SERIES
Discharge: HOME | End: 2023-08-14
Payer: MEDICARE

## 2023-08-14 ENCOUNTER — HOSPITAL ENCOUNTER (OUTPATIENT)
Dept: SPEECH THERAPY | Facility: HOSPITAL | Age: 73
Setting detail: THERAPIES SERIES
Discharge: HOME | End: 2023-08-14
Payer: MEDICARE

## 2023-08-14 DIAGNOSIS — Z74.09 IMPAIRED FUNCTIONAL MOBILITY, BALANCE, GAIT, AND ENDURANCE: ICD-10-CM

## 2023-08-14 DIAGNOSIS — I63.9 CEREBROVASCULAR ACCIDENT (CVA), UNSPECIFIED MECHANISM: Primary | ICD-10-CM

## 2023-08-14 DIAGNOSIS — Z86.73 HISTORY OF ISCHEMIC STROKE: Primary | ICD-10-CM

## 2023-08-14 DIAGNOSIS — R26.89 FUNCTIONAL GAIT ABNORMALITY: ICD-10-CM

## 2023-08-14 DIAGNOSIS — R41.841 COGNITIVE COMMUNICATION DEFICIT: ICD-10-CM

## 2023-08-14 PROCEDURE — 97130 THER IVNTJ EA ADDL 15 MIN: CPT

## 2023-08-14 PROCEDURE — 97110 THERAPEUTIC EXERCISES: CPT

## 2023-08-14 PROCEDURE — 97530 THERAPEUTIC ACTIVITIES: CPT

## 2023-08-14 PROCEDURE — 97129 THER IVNTJ 1ST 15 MIN: CPT

## 2023-08-14 NOTE — THERAPY TREATMENT NOTE
Outpatient Speech Language Pathology   Adult Speech Language Cognitive Treatment Note  [facility]     Patient Name: Vernon Solorzano  : 1950  MRN: [id number]  Today's Date: 2023         Visit Date: 2023   Patient Active Problem List   Diagnosis    DM (diabetes mellitus), type 2    Mixed hyperlipidemia    Mild intermittent asthma without complication    New onset of congestive heart failure    Nonrheumatic mitral valve regurgitation    Left-sided chest pain    Essential hypertension    Mitral valve disease    Acute ischemic left MCA stroke    Atelectasis of both lungs    Pericardial effusion, acute    History of ischemic stroke    Obese    Physical debility    Chronic diastolic CHF (congestive heart failure)    Anemia    Stage 3a chronic kidney disease    Paroxysmal atrial flutter    H/O mitral valve replacement with tissue graft    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 Cerebrovascular accident (CVA), unspecified mechanism  I63.9 434.91   2. Cognitive communication deficit  R41.841 799.52                              SLP OP Goals       Row Name 23 1300          Subjective Comments    Subjective Comments Pt reports that he is starting to realize that he has holes in his cognitive-communication abilities and is starting to recognize the value of speech therapy.  -BB        Memory Goals    Patient's memory skills will be enhanced as reported by patient by utilizing internal memory strategies to recall up to 3 pieces of information after a 5- minute delay 90%:;without cues  -BB     Status: Patient's memory skills will be enhanced as reported by patient by utilizing internal memory strategies to recall up to 3 pieces of information after a 5- minute delay Progressing as expected  -BB     Comments: Patient's memory skills will be enhanced as reported by patient by utilizing internal  memory strategies to recall up to 3 pieces of information after a 5- minute delay Recall of details of paragraph read aloud: 60% acc w min cues (eg, key word stress for visualization/repetition strategies) and 95% acc w mod-min cues (eg, paragraph repetition and/or semantic cues). Visual recall of 3 pairs of items with training with strategies (eg, association, emotion): 33% acc.  -BB               User Key  (r) = Recorded By, (t) = Taken By, (c) = Cosigned By      Initials Name Provider Type    Ervin Temple, SLP Speech and Language Pathologist                           Time Calculation:   SLP Start Time: 1300  SLP Stop Time: 1345  SLP Time Calculation (min): 45 min  Timed Charges  50976-LV Dev of Cogn Skills Initial Minutes: 15  11061-EF Dev of Cogn Skills Add Minutes: 30  Total Minutes  Timed Charges Total Minutes: 45   Total Minutes: 45    Therapy Charges for Today       Code Description Service Date Service Provider Modifiers Qty    17657247861 HC ST DEV OF COGN SKILLS INITIAL 15 MIN 8/14/2023 Ervin Bermudez, SLP  1    51769136542 HC ST DEV OF COGN SKILLS EACH ADDT'L 15 MIN 8/14/2023 rEvin Bermudez, SLP  2                     Ervin Bermudez, VIKI  8/14/2023

## 2023-08-14 NOTE — THERAPY TREATMENT NOTE
Outpatient Physical Therapy Neuro Treatment Note  [facility]     Patient Name: Vernon Solorzano  : 1950  MRN: [id number]  Today's Date: 2023      Visit Date: 2023    Visit Dx:    ICD-10-CM ICD-9-CM   1. History of ischemic stroke  Z86.73 V12.54   2. Impaired functional mobility, balance, gait, and endurance  Z74.09 V49.89   3. Functional gait abnormality  R26.89 781.2       Patient Active Problem List   Diagnosis    DM (diabetes mellitus), type 2    Mixed hyperlipidemia    Mild intermittent asthma without complication    New onset of congestive heart failure    Nonrheumatic mitral valve regurgitation    Left-sided chest pain    Essential hypertension    Mitral valve disease    Acute ischemic left MCA stroke    Atelectasis of both lungs    Pericardial effusion, acute    History of ischemic stroke    Obese    Physical debility    Chronic diastolic CHF (congestive heart failure)    Anemia    Stage 3a chronic kidney disease    Paroxysmal atrial flutter    H/O mitral valve replacement with tissue graft    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 yes  -LB      Pre-Treatment Pain Level 2  -LB      Post-Treatment Pain Level 2  -LB         Posture/Observations    Posture/Observations Comments Pt walked up to unit using a FWW  -LB         Transfers    Sit-Stand [place] (Transfers) independent  -LB      Stand-Sit [place] (Transfers) independent  -LB      Comment, (Transfers) When using the walker, cues for hand placement  -LB         Gait/Stairs (Locomotion)    [place] Level (Gait) independent;standby assist  -LB      Distance in Feet (Gait) 220 x 2 with seated rest   -LB      Deviations/Abnormal Patterns (Gait) bilateral deviations;base of support, narrow;stride length decreased  -LB      Bilateral Gait Deviations heel strike decreased  -LB      Gait Assessment/Intervention Cues for longer step length and heelstrike on bilateral LEs  -LB         Balance Skills Training    Gait Balance-Level of Assistance Contact guard  -LB      Gait Balance Support No upper extremity supported  -LB      Gait Balance Activities side-stepping  GTB around thighs  -LB                User Key  (r) = Recorded By, (t) = Taken By, (c) = Cosigned By      Initials Name Provider Type    Lara Duenas PT Physical Therapist                             PT Assessment/Plan       Row Name 08/14/23 7436          PT Assessment    Assessment Comments Concentrated on endurance training, pt did feel SOA with activity and HR increased to 112 with activity and pt reports feeling SOA but the patient was able to continue with the activity.  The patient does continue to report pain in left chest wall lateral to sternum which is helped when the patient applies pressure to the area.  Pt and spouse report Dr. Arango feels that the pain is related to open heart surgery.  Pt inquired about more exercises and was provided with seated HEP with resistance band  -LB               User Key  (r) = Recorded By, (t) = Taken By, (c) = Cosigned By      Initials Name Provider Type    Lara Duenas PT Physical Therapist                        OP Exercises       Row Name 08/14/23 1500 08/14/23 1430          Subjective Comments    Subjective Comments -- Doing okay  -LB        Subjective Pain    Able to rate subjective pain? -- yes  -LB     Pre-Treatment Pain Level -- 2  -LB     Post-Treatment Pain Level -- 2  -LB        Total Minutes    13687 - PT Therapeutic Exercise Minutes 30  -LB --     55577 - PT Therapeutic Activity Minutes 10  -LB --        Exercise 6    Exercise Name 6 -- STS from high surface  -LB     Sets 6 -- 2  -LB      Reps 6 -- 5  -LB     Additional Comments -- elevated mat and resistance loop around thighs  -LB        Exercise 7    Exercise Name 7 -- LAQ  -LB     Reps 7 -- 10  -LB     Additional Comments -- GTB  -LB        Exercise 8    Exercise Name 8 -- seated hip abd and hip flexion  -LB     Sets 8 -- 2  -LB     Reps 8 -- 10  -LB     Additional Comments -- GTB  -LB        Exercise 11    Exercise Name 11 -- seated hamstring curls  -LB     Sets 11 -- 1  -LB     Reps 11 -- 10  -LB     Additional Comments -- GTB  -LB        Exercise 12    Exercise Name 12 -- Seated leg press with resistance band  -LB     Reps 12 -- 10  -LB     Additional Comments -- GTB  -LB        Exercise 13    Exercise Name 13 -- elbow flex/ext and chest press  -LB     Reps 13 -- 10  -LB     Additional Comments -- performed standing  -LB               User Key  (r) = Recorded By, (t) = Taken By, (c) = Cosigned By      Initials Name Provider Type    Lara Duenas, PT Physical Therapist                                                   Time Calculation:   Start Time: 1450  Stop Time: 1532  Time Calculation (min): 42 min  Timed Charges  41566 - PT Therapeutic Exercise Minutes: 30  33155 - PT Therapeutic Activity Minutes: 10  Total Minutes  Timed Charges Total Minutes: 40   Total Minutes: 40   Therapy Charges for Today       Code Description Service Date Service Provider Modifiers Qty    79094821206  PT THER PROC EA 15 MIN 8/14/2023 Lara Cm, PT GP 2    [id number]  PT THERAPEUTIC ACT EA 15 MIN 8/14/2023 Lara Cm, PT GP 1                      Lara Cm PT  8/14/2023

## 2023-08-15 ENCOUNTER — TELEPHONE (OUTPATIENT)
Dept: CARDIOLOGY | Facility: CLINIC | Age: 73
End: 2023-08-15
Payer: MEDICARE

## 2023-08-15 ENCOUNTER — PATIENT MESSAGE (OUTPATIENT)
Dept: CARDIOLOGY | Facility: CLINIC | Age: 73
End: 2023-08-15
Payer: MEDICARE

## 2023-08-15 NOTE — TELEPHONE ENCOUNTER
----- Message from Vernon Solorzano sent at 8/15/2023 12:44 PM EDT -----  Regarding: bloodwork for appt on 8/17  Contact: [phone]  DoI  need to go to the lab for blood work the day before our appt ? Thanks, Vernon

## 2023-08-16 ENCOUNTER — OFFICE VISIT (OUTPATIENT)
Dept: ENDOCRINOLOGY | Age: 73
End: 2023-08-16
Payer: MEDICARE

## 2023-08-16 ENCOUNTER — TELEPHONE (OUTPATIENT)
Dept: ENDOCRINOLOGY | Age: 73
End: 2023-08-16

## 2023-08-16 VITALS
SYSTOLIC BLOOD PRESSURE: 130 MMHG | OXYGEN SATURATION: 95 % | HEIGHT: 73 IN | BODY MASS INDEX: 33.4 KG/M2 | HEART RATE: 100 BPM | WEIGHT: 252 LBS | DIASTOLIC BLOOD PRESSURE: 70 MMHG

## 2023-08-16 DIAGNOSIS — E55.9 VITAMIN D DEFICIENCY: ICD-10-CM

## 2023-08-16 DIAGNOSIS — Z95.4 H/O MITRAL VALVE REPLACEMENT WITH TISSUE GRAFT: ICD-10-CM

## 2023-08-16 DIAGNOSIS — E11.9 TYPE 2 DIABETES MELLITUS WITHOUT COMPLICATION, WITH LONG-TERM CURRENT USE OF INSULIN: Primary | ICD-10-CM

## 2023-08-16 DIAGNOSIS — Z79.4 TYPE 2 DIABETES MELLITUS WITHOUT COMPLICATION, WITH LONG-TERM CURRENT USE OF INSULIN: Primary | ICD-10-CM

## 2023-08-16 DIAGNOSIS — I10 ESSENTIAL HYPERTENSION: Chronic | ICD-10-CM

## 2023-08-16 DIAGNOSIS — E78.2 MIXED HYPERLIPIDEMIA: Chronic | ICD-10-CM

## 2023-08-16 RX ORDER — INSULIN DEGLUDEC INJECTION 100 U/ML
INJECTION, SOLUTION SUBCUTANEOUS
Qty: 15 ML | Refills: 6 | Status: SHIPPED | OUTPATIENT
Start: 2023-08-16

## 2023-08-16 RX ORDER — INSULIN LISPRO 100 [IU]/ML
INJECTION, SOLUTION INTRAVENOUS; SUBCUTANEOUS
Qty: 15 ML | Refills: 6 | Status: SHIPPED | OUTPATIENT
Start: 2023-08-16

## 2023-08-16 NOTE — PROGRESS NOTES
Subjective   Vernon Solorzano is a 72 y.o. male.     History of Present Illness     Patient is a 72-year-old male who came in for diabetes control.    He has known diabetes mellitus since 2020 and started an insulin in 2022.  He is on Tresiba 44 units every morning and Jardiance 25 mg/day.  He started on a freestyle juan 2 sensor last week.  His last meal was at 7 AM.    Sensor data reviewed.  Average glucose 252 mg per DL.  Time in range 15%.  Time above range 85%.  Time below range 0.  He has multiple gaps in data due to inadequate frequency of scanning.  He has no early morning hypoglycemia.  Fasting glucose is 125 or higher.  He has postprandial hyperglycemia after breakfast and supper.    His last eye examination was in August 2023.  He has macular degeneration and gets an intraocular injection from Dr. Hadley.  He denies numbness, tingling or burning in his hands or feet.  He denies nephropathy.    He has hyperlipidemia and is on Lipitor 40 mg/day.  He has no myalgia.    He has acid reflux disease and is on Protonix.    He had mitral valve replacement with a porcine valve, tricuspid valve repair and left atrial appendage closure due to severe mitral regurgitation and severe tricuspid regurgitation in May 2023.  It was complicated by atrial flutter and stroke.  He had aphasia and right-sided weakness which has improved.  He is on Eliquis and is being followed by Dr. Arango.    He has history of asthma.  He had COVID infection in 2022 treated with Paxlovid.  He is scheduled for sleep study.    He has vitamin D deficiency and is on calcium plus D 1 tablet per day.    The following portions of the patient's history were reviewed and updated as appropriate: allergies, current medications, past family history, past medical history, past social history, past surgical history, and problem list.    Review of Systems   Respiratory:  Positive for shortness of breath (after walking 10 yards and 1/2 flight of stairs).  "Negative for wheezing.    Cardiovascular:  Negative for chest pain and palpitations.   Gastrointestinal: Negative.    Endocrine: Negative for cold intolerance and heat intolerance.   Genitourinary: Negative.    Musculoskeletal:  Negative for myalgias.   Neurological:  Negative for numbness.   Vitals:    08/16/23 0805   BP: 130/70   Pulse: 100   SpO2: 95%   Weight: 114 kg (252 lb)   Height: 185.4 cm (72.99\")      Objective   Physical Exam  Constitutional:       General: He is not in acute distress.     Appearance: Normal appearance. He is obese. He is not ill-appearing or toxic-appearing.   HENT:      Mouth/Throat:      Mouth: Mucous membranes are moist.   Eyes:      General: No scleral icterus.        Right eye: No discharge.         Left eye: No discharge.      Extraocular Movements: Extraocular movements intact.      Conjunctiva/sclera: Conjunctivae normal.   Neck:      Vascular: No carotid bruit.   Cardiovascular:      Rate and Rhythm: Normal rate and regular rhythm.      Heart sounds: No murmur heard.    No friction rub. No gallop.   Pulmonary:      Breath sounds: Normal breath sounds.   Abdominal:      General: Bowel sounds are normal.      Palpations: Abdomen is soft.   Musculoskeletal:      Right lower leg: No edema.      Left lower leg: No edema.      Comments: No plantar ulcers.  Good dorsalis pedis pulses.   Lymphadenopathy:      Cervical: No cervical adenopathy.   Skin:     General: Skin is warm.   Neurological:      Mental Status: He is alert and oriented to person, place, and time.      Comments: Intact light touch in lower extremities.     Hospital Outpatient Visit on 08/01/2023   Component Date Value Ref Range Status    Glucose 08/01/2023 280 (H)  65 - 99 mg/dL Final    BUN 08/01/2023 21  8 - 23 mg/dL Final    Creatinine 08/01/2023 1.49 (H)  0.76 - 1.27 mg/dL Final    Sodium 08/01/2023 140  136 - 145 mmol/L Final    Potassium 08/01/2023 3.9  3.5 - 5.2 mmol/L Final    Slight hemolysis detected by " analyzer. Results may be affected.    Chloride 08/01/2023 96 (L)  98 - 107 mmol/L Final    CO2 08/01/2023 28.5  22.0 - 29.0 mmol/L Final    Calcium 08/01/2023 9.3  8.6 - 10.5 mg/dL Final    BUN/Creatinine Ratio 08/01/2023 14.1  7.0 - 25.0 Final    Anion Gap 08/01/2023 15.5 (H)  5.0 - 15.0 mmol/L Final    eGFR 08/01/2023 48.9 (L)  >60.0 mL/min/1.73 Final    proBNP 08/01/2023 1,948.0 (H)  0.0 - 900.0 pg/mL Final     Assessment & Plan   Diagnoses and all orders for this visit:    1. Type 2 diabetes mellitus without complication, with long-term current use of insulin (Primary)  -     Comprehensive Metabolic Panel  -     Lipid Panel  -     Hemoglobin A1c  -     TSH  -     Microalbumin / Creatinine Urine Ratio - Urine, Clean Catch    2. Mixed hyperlipidemia  -     Comprehensive Metabolic Panel  -     Lipid Panel    3. H/O mitral valve replacement with tissue graft  -     CBC & Differential    4. Essential hypertension    5. Vitamin D deficiency  -     Vitamin D,25-Hydroxy    Other orders  -     Insulin Lispro, 1 Unit Dial, (HumaLOG KwikPen) 100 UNIT/ML solution pen-injector; 5 units 3 times daily with meals  Dispense: 15 mL; Refill: 6  -     insulin degludec (Tresiba FlexTouch) 100 UNIT/ML solution pen-injector injection; 46 units every morning  Dispense: 15 mL; Refill: 6      Increase Tresiba to 46 units every morning.  Start Humalog 5 units with each meal.  Continue Jardiance 25 mg/day.  Continue Lipitor 40 mg/day.  Continue Toprol-XL, Demadex, and Eliquis per Dr. Arango.  Check vitamin D levels.  Flu vaccine this fall.    Copy of my note sent to Corey Oconnell NP, Dr. Arango, and Dr. Slater.    RTC 6 mos.

## 2023-08-16 NOTE — PROGRESS NOTES
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  In 2022 he developed COVID-pneumonia and findings suggestive of interstitial lung disease with possible COVID pneumonitis.  Despite adequate treatment and subsequent calcification LAD and circumflex.  With progressive dyspnea an echo was performed that showed normal sinus rhythm with questionable wall motion abnormality in the inferolateral wall with thickened mitral valve leaflets with mild regurgitation that was at least moderate but likely underestimated with RVSP 60 mmHg.  Heart failure.  He was admitted to treat heart failure.  GAUDENCIO showed severe mitral valve regurgitation.  Cardiac cath showed normal coronary arteries.  He therefore underwent mitral valve repair and tricuspid valve repair with left atrial appendage closure..  He did demonstrate renal insufficiency during his hospitalization.  Postoperatively he had altered mental status without obvious stroke.  He demonstrated AV block that was transient.  He demonstrated moderate-sized pericardial effusion.  He was treated medically with colchicine and went to rehab and subsequently went home.  Echo 7/3/2023 showed modest pericardial effusion that was posterior located and no change from prior study and without evidence of tamponade.  He was found to have congestive heart failure with a markedly elevated proBNP of 5156 with creatinine 1.26.  Torsemide was cautiously increased.  He was seen by nephrology.  He had a follow-up echocardiogram yesterday which showed an ejection fraction further decreased to 25%, mild to moderate left ventricular hypertrophy, mitral valve  prosthesis appeared to be functioning relatively well with trivial to mild regurgitation.  Resolution of pericardial effusion.  It was again a technically limited study.  Blood work yesterday showed an proBNP 1950 (improved) with a creatinine increased to 1.49.  Glucose remains elevated in the 280 range.  At the last visit he had been addressing diabetes.  Heart failure fluid status had improved.    Since last visit patient /////        Past Medical History:   Diagnosis Date    Allergic rhinitis     Arthritis     BPH (benign prostatic hyperplasia)     Diabetes mellitus     TYPE 2    Foraminal stenosis of cervical region     C5-C6    GERD (gastroesophageal reflux disease)     Hemorrhoids     History of urinary retention 2010    S/P TURP    Hyperlipidemia     Macular degeneration     PING (obstructive sleep apnea) 01/07/2016    MILD, SEES DR. AMARILIS MORGAN     Past Surgical History:   Procedure Laterality Date    CARDIAC CATHETERIZATION N/A 5/11/2023    Procedure: Right Heart Cath;  Surgeon: Corey Gaffney MD;  Location:  [facility];  Service: Cardiology;  Laterality: N/A;    CARDIAC CATHETERIZATION N/A 5/11/2023    Procedure: Left Heart Cath;  Surgeon: Corey Gaffney MD;  Location:  [facility];  Service: Cardiology;  Laterality: N/A;    CARDIAC CATHETERIZATION N/A 5/11/2023    Procedure: Left ventriculography;  Surgeon: Corey Gaffney MD;  Location:  [facility];  Service: Cardiology;  Laterality: N/A;    CARDIAC CATHETERIZATION N/A 5/11/2023    Procedure: Coronary angiography;  Surgeon: Corey Gaffney MD;  Location:  [facility];  Service: Cardiology;  Laterality: N/A;    COLONOSCOPY N/A     WNL PER PT, NO RECORDS,     COLONOSCOPY N/A 04/07/2009    DR. GORGE BENAVIDEZ AT [place]    MITRAL VALVE REPAIR/REPLACEMENT N/A 5/24/2023    Procedure: MITRAL VALVE REPAIR/REPLACEMENT TRICUSPID VALVE REPAIR/REPLACEMENT TRANSESOPHAGEAL  ECHOCARDIOGRAM WITH ANESTHESIA;  Surgeon: George Frey MD;  Location: [facility];  Service: Cardiothoracic;  Laterality: N/A;    PROSTATE SURGERY N/A 11/12/2010    TURP, DR. BRIGHT COLLAZO AT [facility]    SKIN TAG REMOVAL N/A 12/20/2017    ANAL SKIN TAG X2, PERFORMED IN OFFICE, DR. LISA ORANTES    TURP / TRANSURETHRAL INCISION / DRAINAGE PROSTATE  2010    Dr. Goodrich     Outpatient Medications Prior to Visit   Medication Sig Dispense Refill    acetaminophen (TYLENOL) 325 MG tablet Take 2 tablets by mouth Every 6 (Six) Hours As Needed for Mild Pain.      atorvastatin (LIPITOR) 40 MG tablet Take 1 tablet by mouth Every Night. 30 tablet 1    calcium carbonate (TUMS) 500 MG chewable tablet Chew 2 tablets 4 (Four) Times a Day As Needed for Indigestion or Heartburn.      Calcium Carbonate-Vitamin D (calcium 500 mg vitamin D 5 mcg, 200 UT,) 500-5 MG-MCG tablet per tablet Take 1 tablet by mouth 2 (Two) Times a Day.      colchicine 0.6 MG tablet Take 1 tablet by mouth Daily. 30 tablet 1    Continuous Blood Gluc Sensor (FreeStyle Ysabel 2 Sensor) misc 1 each Every 14 (Fourteen) Days. Use 1 sensor every 14 days 6 each 3    empagliflozin (JARDIANCE) 25 MG tablet tablet Take 1 tablet by mouth Daily.      glucose blood (OneTouch Verio) test strip 1 each by Other route As Needed. Use as instructed      insulin degludec (Tresiba FlexTouch) 100 UNIT/ML solution pen-injector injection 46 units every morning 15 mL 6    Insulin Lispro, 1 Unit Dial, (HumaLOG KwikPen) 100 UNIT/ML solution pen-injector 5 units 3 times daily with meals 15 mL 6    Insulin Pen Needle 32G X 4 MM misc Use to inject insulin under the skin via pens up to 4 times daily 100 each 1    metoprolol succinate XL (TOPROL-XL) 25 MG 24 hr tablet Take 1 tablet by mouth 2 (Two) Times a Day. 180 tablet 2    Multiple Vitamins-Minerals (PRESERVISION AREDS 2+MULTI VIT PO) Take 1 tablet by mouth 2 (Two) Times a Day.      pantoprazole (PROTONIX) 40 MG EC tablet Take 1  tablet by mouth Daily. 90 tablet 0    potassium chloride (K-DUR,KLOR-CON) 20 MEQ CR tablet Take 1 tablet by mouth Daily. 30 tablet 1    torsemide (DEMADEX) 20 MG tablet Take 3 tablets by mouth Daily. (Patient taking differently: Take 4 tablets by mouth Daily.) 90 tablet 1     No facility-administered medications prior to visit.       Allergies as of 08/17/2023    (No Known Allergies)     Social History     Socioeconomic History    Marital status:    Tobacco Use    Smoking status: Never     Passive exposure: Never    Smokeless tobacco: Never   Vaping Use    Vaping Use: Never used   Substance and Sexual Activity    Alcohol use: Yes     Alcohol/week: 1.0 standard drink     Types: 1 Drinks containing 0.5 oz of alcohol per week     Comment: RARELY    Drug use: No    Sexual activity: Defer     Partners: Female     Family History   Problem Relation Age of Onset    Lung cancer Mother     Stroke Father     Dementia Father     Hyperlipidemia Brother     Hypertension Brother     Heart disease Brother     Diabetes Brother     Colon polyps Brother     Colon polyps Brother      ROS     Objective:   There were no vitals filed for this visit.  There is no height or weight on file to calculate BMI.    Physical Exam  Lab Review:   Lab Results - Last 18 Months   Lab Units 07/18/23  1434 06/29/23  0705 06/24/23  0228 06/23/23  0812 06/21/23  0608 03/29/23  1015 05/23/22  1602   WBC 10*3/mm3 10.30 8.23   < > 7.91 8.42   < > 6.7   RBC 10*6/mm3 3.99* 3.08*   < > 3.68* 3.28*   < > 4.60   HEMOGLOBIN g/dL 11.1* 8.9*   < > 10.5* 9.6*   < > 14.0   HEMOGLOBIN, POC   --   --   --   --   --    < >  --    HEMATOCRIT % 34.0* 26.2*   < > 31.6* 28.3*   < > 40.5   HEMATOCRIT POC   --   --   --   --   --    < >  --    MCV fL 85.2 85.1   < > 85.9 86.3   < > 88   MCH pg 27.8 28.9   < > 28.5 29.3   < > 30.4   MCHC g/dL 32.6 34.0   < > 33.2 33.9   < > 34.6   RDW % 14.4 14.1   < > 14.2 13.9   < > 13.0   PLATELETS 10*3/mm3 230 277   < > 247 207   < >  256   NEUTROPHIL % %  --   --   --  64.9 66.6   < > 65   LYMPHOCYTE % %  --   --   --  15.7* 13.4*   < > 17   MONOCYTES % %  --   --   --  10.4 10.7   < > 10   EOSINOPHIL % %  --   --   --  7.8* 8.2*   < > 6   BASOPHIL % %  --   --   --  0.8 0.6   < > 1   NEUTROS ABS 10*3/mm3  --   --   --  5.14 5.61   < > 4.3   LYMPHS ABS 10*3/mm3  --   --   --  1.24 1.13   < > 1.2   MONOS ABS 10*3/mm3  --   --   --  0.82 0.90   < > 0.7   EOS ABS 10*3/mm3  --   --   --  0.62* 0.69*   < > 0.4   BASOS ABS 10*3/mm3  --   --   --  0.06 0.05   < > 0.0   IMM GRAN % %  --   --   --   --   --   --  1   IMMATURE GRANS (ABS) x10E3/uL  --   --   --   --   --   --  0.1   RDW-SD fl 43.9 43.4   < > 44.3 43.8   < >  --    MPV fL 9.5 9.1   < > 9.3 8.9   < >  --     < > = values in this interval not displayed.     Lab Results - Last 18 Months   Lab Units 08/01/23  1305 07/18/23  1434   GLUCOSE mg/dL 280* 233*   BUN mg/dL 21 16   CREATININE mg/dL 1.49* 1.25   SODIUM mmol/L 140 141   POTASSIUM mmol/L 3.9 3.9   CHLORIDE mmol/L 96* 101   CO2 mmol/L 28.5 26.1   CALCIUM mg/dL 9.3 9.6   BUN / CREAT RATIO  14.1 12.8   ANION GAP mmol/L 15.5* 13.9   EGFR mL/min/1.73 48.9* 60.4     Lab Results - Last 18 Months   Lab Units 08/01/23  1305 07/18/23  1434 07/04/23  0700 07/03/23  1427 07/02/23  0644 06/30/23  0719   GLUCOSE mg/dL 280* 233*   < > 215*   < > 135*   BUN mg/dL 21 16   < > 15   < > 15   CREATININE mg/dL 1.49* 1.25   < > 1.41*   < > 1.31*   SODIUM mmol/L 140 141   < > 141   < > 140   POTASSIUM mmol/L 3.9 3.9   < > 3.6   < > 3.2*   CHLORIDE mmol/L 96* 101   < > 99   < > 98   CO2 mmol/L 28.5 26.1   < > 29.0   < > 29.6*   CALCIUM mg/dL 9.3 9.6   < > 9.0   < > 9.2   TOTAL PROTEIN g/dL  --  7.1  --   --   --  7.1   ALBUMIN g/dL  --  4.0  --  3.4*  --  3.6   ALT (SGPT) U/L  --  23  --   --   --  11   AST (SGOT) U/L  --  18  --   --   --  9   ALK PHOS U/L  --  123*  --   --   --  115   BILIRUBIN mg/dL  --  0.5  --   --   --  0.5   GLOBULIN gm/dL  --  3.1  --    --   --  3.5   A/G RATIO g/dL  --  1.3  --   --   --  1.0   BUN / CREAT RATIO  14.1 12.8   < > 10.6   < > 11.5   ANION GAP mmol/L 15.5* 13.9   < > 13.0   < > 12.4   EGFR mL/min/1.73 48.9* 60.4   < > 52.3*   < > 57.1*    < > = values in this interval not displayed.     Lab Results - Last 18 Months   Lab Units 05/23/23  1429 03/29/23  1015   CHOLESTEROL mg/dL 155  --    TRIGLYCERIDES mg/dL 197* 107   HDL CHOL mg/dL 41 39*   LDL CHOL mg/dL 81 59   VLDL CHOL mg/dL 33  --    VLDL CHOLESTEROL QUE mg/dL  --  20   LDL/HDL RATIO  1.82 1.5     Lab Results - Last 18 Months   Lab Units 08/01/23  1305 07/18/23  1434   PROBNP pg/mL 1,948.0* 5,156.0*     Lab Results - Last 18 Months   Lab Units 06/25/23  0944 06/25/23  0739   HSTROP T ng/L 95* 97*     Lab Results - Last 18 Months   Lab Units 06/30/23  0719 05/31/23  0448   TSH uIU/mL 1.830 2.430     Lab Results - Last 18 Months   Lab Units 06/04/23  1410 06/04/23  0319 06/01/23  2318 06/01/23  1555 05/25/23  0307   PROTIME Seconds  --   --   --  13.4 15.0*   APTT seconds 30.3 151.6*   < > 24.2  --     < > = values in this interval not displayed.         Procedures  Assessment:    No diagnosis found.  Plan:           1.  Mitral valve repair tricuspid valve repair.  Echo yesterday shows resolution of pericardial effusion however ejection fraction has worsened.  2.  Acute on chronic systolic and diastolic diastolic heart failure.  Ejection fraction yesterday 25%.  Etiology unclear possibly due to severity and duration of mitral regurgitation with postop decompensation.  The sleep apnea may hypoxia may be playing a role.  Care further complicated by renal insufficiency and pericardial effusion.  Clinically////  3.  Pericardial effusion.  Resolved.  Sed rate////??? DC colchicine/  4.  Renal insufficiency, as above.  Labs pending  5.  Hypertension .  ///////  6.  Hyperlipidemia  7.  Diabetes..  He will continue with Jardiance.  8.  Postop encephalopathy  9.  Post op stroke                 Your medication list            Accurate as of August 16, 2023  7:22 PM. If you have any questions, ask your nurse or doctor.                CHANGE how you take these medications        Instructions Last Dose Given Next Dose Due   torsemide 20 MG tablet  Commonly known as: DEMADEX  What changed: how much to take      Take 3 tablets by mouth Daily.              CONTINUE taking these medications        Instructions Last Dose Given Next Dose Due   acetaminophen 325 MG tablet  Commonly known as: TYLENOL      Take 2 tablets by mouth Every 6 (Six) Hours As Needed for Mild Pain.       atorvastatin 40 MG tablet  Commonly known as: LIPITOR      Take 1 tablet by mouth Every Night.       BD Pen Needle Yolette 2nd Gen 32G X 4 MM misc  Generic drug: Insulin Pen Needle      Use to inject insulin under the skin via pens up to 4 times daily       calcium 500 mg vitamin D 5 mcg (200 UT) 500-5 MG-MCG tablet per tablet      Take 1 tablet by mouth 2 (Two) Times a Day.       calcium carbonate 500 MG chewable tablet  Commonly known as: TUMS      Chew 2 tablets 4 (Four) Times a Day As Needed for Indigestion or Heartburn.       colchicine 0.6 MG tablet      Take 1 tablet by mouth Daily.       empagliflozin 25 MG tablet tablet  Commonly known as: JARDIANCE      Take 1 tablet by mouth Daily.       FreeStyle Ysabel 2 Sensor misc      1 each Every 14 (Fourteen) Days. Use 1 sensor every 14 days       Insulin Lispro (1 Unit Dial) 100 UNIT/ML solution pen-injector  Commonly known as: HumaLOG KwikPen      5 units 3 times daily with meals       metoprolol succinate XL 25 MG 24 hr tablet  Commonly known as: TOPROL-XL      Take 1 tablet by mouth 2 (Two) Times a Day.       OneTouch Verio test strip  Generic drug: glucose blood      1 each by Other route As Needed. Use as instructed       pantoprazole 40 MG EC tablet  Commonly known as: PROTONIX      Take 1 tablet by mouth Daily.       potassium chloride 20 MEQ CR tablet  Commonly known as:  K-DUR,KLOR-CON      Take 1 tablet by mouth Daily.       PRESERVISION AREDS 2+MULTI VIT PO      Take 1 tablet by mouth 2 (Two) Times a Day.       Tresiba FlexTouch 100 UNIT/ML solution pen-injector injection  Generic drug: insulin degludec      46 units every morning                Patient is no longer taking -.  I corrected the med list to reflect this.  I did not stop these medications.      Dictated utilizing Dragon dictation

## 2023-08-16 NOTE — TELEPHONE ENCOUNTER
He does have active lab orders and would have these drawn today prior to his appointment tomorrow.

## 2023-08-16 NOTE — TELEPHONE ENCOUNTER
"    Caller: RAQUEL    Relationship to patient: Other    Best call back number: [phone]    Patient is needing:  ASSISTANT RAQUEL CALLED IN REGARDS TO SOME BLOOD WORK THE PATIENT HAD PERFORMED THIS MORNING.  WANTS TO KNOW IF  WOULD LIKE TO ADD \"FERRITIN\" AS ANOTHER LAB TO BE DONE. SHE CAN BE CONTACTED AT THE NUMBER ABOVE.       "

## 2023-08-17 ENCOUNTER — HOSPITAL ENCOUNTER (OUTPATIENT)
Dept: PHYSICAL THERAPY | Facility: HOSPITAL | Age: 73
Setting detail: THERAPIES SERIES
Discharge: HOME | End: 2023-08-17
Payer: MEDICARE

## 2023-08-17 ENCOUNTER — OFFICE VISIT (OUTPATIENT)
Dept: CARDIOLOGY | Facility: CLINIC | Age: 73
End: 2023-08-17
Payer: MEDICARE

## 2023-08-17 ENCOUNTER — LAB (OUTPATIENT)
Dept: LAB | Facility: HOSPITAL | Age: 73
End: 2023-08-17
Payer: MEDICARE

## 2023-08-17 ENCOUNTER — PRIOR AUTHORIZATION (OUTPATIENT)
Dept: ENDOCRINOLOGY | Age: 73
End: 2023-08-17
Payer: MEDICARE

## 2023-08-17 ENCOUNTER — APPOINTMENT (OUTPATIENT)
Dept: SPEECH THERAPY | Facility: HOSPITAL | Age: 73
End: 2023-08-17
Payer: MEDICARE

## 2023-08-17 ENCOUNTER — HOSPITAL ENCOUNTER (OUTPATIENT)
Dept: SLEEP MEDICINE | Facility: HOSPITAL | Age: 73
End: 2023-08-17
Payer: MEDICARE

## 2023-08-17 VITALS
HEIGHT: 73 IN | SYSTOLIC BLOOD PRESSURE: 90 MMHG | DIASTOLIC BLOOD PRESSURE: 64 MMHG | WEIGHT: 249 LBS | HEART RATE: 97 BPM | BODY MASS INDEX: 33 KG/M2

## 2023-08-17 DIAGNOSIS — Z98.890 S/P MVR (MITRAL VALVE REPAIR): ICD-10-CM

## 2023-08-17 DIAGNOSIS — E78.2 MIXED HYPERLIPIDEMIA: ICD-10-CM

## 2023-08-17 DIAGNOSIS — G47.33 OBSTRUCTIVE SLEEP APNEA (ADULT) (PEDIATRIC): ICD-10-CM

## 2023-08-17 DIAGNOSIS — I42.8 NON-ISCHEMIC CARDIOMYOPATHY: ICD-10-CM

## 2023-08-17 DIAGNOSIS — R26.89 FUNCTIONAL GAIT ABNORMALITY: ICD-10-CM

## 2023-08-17 DIAGNOSIS — Z74.09 IMPAIRED FUNCTIONAL MOBILITY, BALANCE, GAIT, AND ENDURANCE: ICD-10-CM

## 2023-08-17 DIAGNOSIS — R06.09 DOE (DYSPNEA ON EXERTION): ICD-10-CM

## 2023-08-17 DIAGNOSIS — E11.59 TYPE 2 DIABETES MELLITUS WITH OTHER CIRCULATORY COMPLICATION, WITH LONG-TERM CURRENT USE OF INSULIN: ICD-10-CM

## 2023-08-17 DIAGNOSIS — I34.0 NONRHEUMATIC MITRAL VALVE REGURGITATION: Primary | ICD-10-CM

## 2023-08-17 DIAGNOSIS — Z79.4 TYPE 2 DIABETES MELLITUS WITH OTHER CIRCULATORY COMPLICATION, WITH LONG-TERM CURRENT USE OF INSULIN: ICD-10-CM

## 2023-08-17 DIAGNOSIS — Z86.73 HISTORY OF ISCHEMIC STROKE: Primary | ICD-10-CM

## 2023-08-17 LAB
25(OH)D3 SERPL-MCNC: 26.3 NG/ML (ref 30–100)
ALBUMIN SERPL-MCNC: 4 G/DL (ref 3.5–5.2)
ALBUMIN UR-MCNC: <1.2 MG/DL
ALBUMIN/GLOB SERPL: 1.4 G/DL
ALP SERPL-CCNC: 111 U/L (ref 39–117)
ALT SERPL W P-5'-P-CCNC: 19 U/L (ref 1–41)
ANION GAP SERPL CALCULATED.3IONS-SCNC: 13.3 MMOL/L (ref 5–15)
AST SERPL-CCNC: 21 U/L (ref 1–40)
BASOPHILS # BLD AUTO: 0.06 10*3/MM3 (ref 0–0.2)
BASOPHILS NFR BLD AUTO: 0.8 % (ref 0–1.5)
BILIRUB SERPL-MCNC: 0.5 MG/DL (ref 0–1.2)
BUN SERPL-MCNC: 25 MG/DL (ref 8–23)
BUN/CREAT SERPL: 16.7 (ref 7–25)
CALCIUM SPEC-SCNC: 9.7 MG/DL (ref 8.6–10.5)
CHLORIDE SERPL-SCNC: 99 MMOL/L (ref 98–107)
CHOLEST SERPL-MCNC: 116 MG/DL (ref 0–200)
CO2 SERPL-SCNC: 27.7 MMOL/L (ref 22–29)
CREAT SERPL-MCNC: 1.5 MG/DL (ref 0.76–1.27)
CREAT UR-MCNC: 42.7 MG/DL
DEPRECATED RDW RBC AUTO: 42.8 FL (ref 37–54)
EGFRCR SERPLBLD CKD-EPI 2021: 48.6 ML/MIN/1.73
EOSINOPHIL # BLD AUTO: 0.48 10*3/MM3 (ref 0–0.4)
EOSINOPHIL NFR BLD AUTO: 6.1 % (ref 0.3–6.2)
ERYTHROCYTE [DISTWIDTH] IN BLOOD BY AUTOMATED COUNT: 14.4 % (ref 12.3–15.4)
GLOBULIN UR ELPH-MCNC: 2.9 GM/DL
GLUCOSE SERPL-MCNC: 323 MG/DL (ref 65–99)
HBA1C MFR BLD: 10 % (ref 4.8–5.6)
HCT VFR BLD AUTO: 36.4 % (ref 37.5–51)
HDLC SERPL-MCNC: 34 MG/DL (ref 40–60)
HGB BLD-MCNC: 11.9 G/DL (ref 13–17.7)
IMM GRANULOCYTES # BLD AUTO: 0.04 10*3/MM3 (ref 0–0.05)
IMM GRANULOCYTES NFR BLD AUTO: 0.5 % (ref 0–0.5)
LDLC SERPL CALC-MCNC: 47 MG/DL (ref 0–100)
LDLC/HDLC SERPL: 1.11 {RATIO}
LYMPHOCYTES # BLD AUTO: 0.91 10*3/MM3 (ref 0.7–3.1)
LYMPHOCYTES NFR BLD AUTO: 11.6 % (ref 19.6–45.3)
MCH RBC QN AUTO: 27 PG (ref 26.6–33)
MCHC RBC AUTO-ENTMCNC: 32.7 G/DL (ref 31.5–35.7)
MCV RBC AUTO: 82.5 FL (ref 79–97)
MICROALBUMIN/CREAT UR: NORMAL MG/G{CREAT}
MONOCYTES # BLD AUTO: 0.89 10*3/MM3 (ref 0.1–0.9)
MONOCYTES NFR BLD AUTO: 11.3 % (ref 5–12)
NEUTROPHILS NFR BLD AUTO: 5.47 10*3/MM3 (ref 1.7–7)
NEUTROPHILS NFR BLD AUTO: 69.7 % (ref 42.7–76)
NRBC BLD AUTO-RTO: 0 /100 WBC (ref 0–0.2)
PLATELET # BLD AUTO: 225 10*3/MM3 (ref 140–450)
PMV BLD AUTO: 9.5 FL (ref 6–12)
POTASSIUM SERPL-SCNC: 3.8 MMOL/L (ref 3.5–5.2)
PROT SERPL-MCNC: 6.9 G/DL (ref 6–8.5)
RBC # BLD AUTO: 4.41 10*6/MM3 (ref 4.14–5.8)
SODIUM SERPL-SCNC: 140 MMOL/L (ref 136–145)
TRIGL SERPL-MCNC: 221 MG/DL (ref 0–150)
TSH SERPL DL<=0.05 MIU/L-ACNC: 2.32 UIU/ML (ref 0.27–4.2)
VLDLC SERPL-MCNC: 35 MG/DL (ref 5–40)
WBC NRBC COR # BLD: 7.85 10*3/MM3 (ref 3.4–10.8)

## 2023-08-17 PROCEDURE — 80061 LIPID PANEL: CPT | Performed by: INTERNAL MEDICINE

## 2023-08-17 PROCEDURE — 93000 ELECTROCARDIOGRAM COMPLETE: CPT | Performed by: NURSE PRACTITIONER

## 2023-08-17 PROCEDURE — 3074F SYST BP LT 130 MM HG: CPT | Performed by: NURSE PRACTITIONER

## 2023-08-17 PROCEDURE — 83036 HEMOGLOBIN GLYCOSYLATED A1C: CPT | Performed by: INTERNAL MEDICINE

## 2023-08-17 PROCEDURE — 82306 VITAMIN D 25 HYDROXY: CPT | Performed by: INTERNAL MEDICINE

## 2023-08-17 PROCEDURE — 3078F DIAST BP <80 MM HG: CPT | Performed by: NURSE PRACTITIONER

## 2023-08-17 PROCEDURE — 80053 COMPREHEN METABOLIC PANEL: CPT | Performed by: INTERNAL MEDICINE

## 2023-08-17 PROCEDURE — 82043 UR ALBUMIN QUANTITATIVE: CPT | Performed by: INTERNAL MEDICINE

## 2023-08-17 PROCEDURE — 83880 ASSAY OF NATRIURETIC PEPTIDE: CPT | Performed by: INTERNAL MEDICINE

## 2023-08-17 PROCEDURE — 84443 ASSAY THYROID STIM HORMONE: CPT | Performed by: INTERNAL MEDICINE

## 2023-08-17 PROCEDURE — 99214 OFFICE O/P EST MOD 30 MIN: CPT | Performed by: NURSE PRACTITIONER

## 2023-08-17 PROCEDURE — 97530 THERAPEUTIC ACTIVITIES: CPT

## 2023-08-17 PROCEDURE — 1160F RVW MEDS BY RX/DR IN RCRD: CPT | Performed by: NURSE PRACTITIONER

## 2023-08-17 PROCEDURE — 85652 RBC SED RATE AUTOMATED: CPT | Performed by: INTERNAL MEDICINE

## 2023-08-17 PROCEDURE — 1159F MED LIST DOCD IN RCRD: CPT | Performed by: NURSE PRACTITIONER

## 2023-08-17 PROCEDURE — 97110 THERAPEUTIC EXERCISES: CPT

## 2023-08-17 PROCEDURE — 82570 ASSAY OF URINE CREATININE: CPT | Performed by: INTERNAL MEDICINE

## 2023-08-17 NOTE — TELEPHONE ENCOUNTER
8/17/2023    PA has been initiated in Covermymeds as of today for the FreeStyle Ysabel 2 Sensor all information has been submitted and forward over to the patient insurance company for further review. It is currently still pending a response back from the patient insurance company for further determination.      Key: [id number] - PA Case ID: [id number] - Rx #: [id number]

## 2023-08-17 NOTE — PROGRESS NOTES
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  In 2022 he developed COVID-pneumonia and findings suggestive of interstitial lung disease with possible COVID pneumonitis.  Despite adequate treatment and subsequent calcification LAD and circumflex.  With progressive dyspnea an echo was performed that showed normal sinus rhythm with questionable wall motion abnormality in the inferolateral wall with thickened mitral valve leaflets with mild regurgitation that was at least moderate but likely underestimated with RVSP 60 mmHg.  Heart failure.  He was admitted to treat heart failure.  GAUDENCIO showed severe mitral valve regurgitation.  Cardiac cath showed normal coronary arteries.  He therefore underwent mitral valve repair and tricuspid valve repair with left atrial appendage closure..  He did demonstrate renal insufficiency during his hospitalization.  Postoperatively he had altered mental status without obvious stroke.  He demonstrated AV block that was transient.  He demonstrated moderate-sized pericardial effusion.  He was treated medically with colchicine and went to rehab and subsequently went home.  Echo 7/3/2023 showed modest pericardial effusion that was posterior located and no change from prior study and without evidence of tamponade.  He was found to have congestive heart failure with a markedly elevated proBNP of 5156 with creatinine 1.26.  Torsemide was cautiously increased.  He was seen by nephrology.  He had a follow-up echocardiogram yesterday which showed an ejection fraction further decreased to 25%, mild to moderate left ventricular hypertrophy,  mitral valve prosthesis appeared to be functioning relatively well with trivial to mild regurgitation.  Resolution of pericardial effusion.  It was again a technically limited study.  Blood work yesterday showed an proBNP 1950 (improved) with a creatinine increased to 1.49.  Glucose remains elevated in the 280 range.  At the last visit he had been addressing diabetes.  Heart failure fluid status had improved.    Interval history  Patient presents today for routine follow up. I will visit with him today for the first time and have reviewed his medical record. Since last visit, he is not treating sleep apnea though has appointment today for evaluation by sleep medicine.  He denies palpitations, syncope or presyncope.  He reports that shortness of breath and fatigue are about the same.  He has occasional edema in his feet but notes that he missed 2 doses of diuretic earlier this week.  He does still struggle with dizziness at times and also chest soreness since surgery.  He is currently participating in physical therapy and he reports that he often feels better after therapy sessions.  Blood pressure today is low at 90/64 but he reports that at home blood pressure has been 130s over 60s.  He notes that it is low at physical therapy on occasion.  He is maintained on metoprolol 25 mg twice daily.  Blood sugar is still high with A1c 10.0 on recent labs.  He had recent visit with endocrine who has added mealtime Humalog in addition to increasing daily Tresiba.  He is also on Jardiance and compliant with this.  He has finished colchicine prescription (took last dose yesterday).        Past Medical History:   Diagnosis Date    Allergic rhinitis     Arthritis     BPH (benign prostatic hyperplasia)     Diabetes mellitus     TYPE 2    Foraminal stenosis of cervical region     C5-C6    GERD (gastroesophageal reflux disease)     Hemorrhoids     History of urinary retention 2010    S/P TURP    Hyperlipidemia     Macular  degeneration     PING (obstructive sleep apnea) 01/07/2016    MILD, SEES DR. AMARILIS MORGAN     Past Surgical History:   Procedure Laterality Date    CARDIAC CATHETERIZATION N/A 5/11/2023    Procedure: Right Heart Cath;  Surgeon: Corey Gaffney MD;  Location[facility] C[facility] LOC[facility];  Service: Cardiology;  Laterality: N/A;    CARDIAC CATHETERIZATION N/A 5/11/2023    Procedure: Left Heart Cath;  Surgeon: Corey Gaffney MD;  Location: [facility] CATH [facility]IVE LOCATION;  Service: Cardiology;  Laterality: N/A;    CARDIAC CATHETERIZATION N/A 5/11/2023    Procedure: Left ventriculography;  Surgeon: Corey Gaffney MD;  Location:  [facility] CATH INVASIVE LOCATION;  Service: Cardiology;  Laterality: N/A;    CARDIAC CATHETERIZATION N/A 5/11/2023    Procedure: Coronary angiography;  Surgeon: Corey Gaffney MD;  Location:  [facility] CATH INVASIVE LOCATION;  Service: Cardiology;  Laterality: N/A;    COLONOSCOPY N/A     WNL PER PT, NO RECORDS,     COLONOSCOPY N/A 04/07/2009    DR. GORGE BENAVIDEZ AT [place]    MITRAL VALVE REPAIR/REPLACEMENT N/A 5/24/2023    Procedure: MITRAL VALVE REPAIR/REPLACEMENT TRICUSPID VALVE REPAIR/REPLACEMENT TRANSESOPHAGEAL ECHOCARDIOGRAM WITH ANESTHESIA;  Surgeon: George Frey MD;  Location: [facility];  Service: Cardiothoracic;  Laterality: N/A;    PROSTATE SURGERY N/A 11/12/2010    [facility], DR. COREY COLLAZO AT [facility]    SKIN TAG REMOVAL N/A 12/20/2017    ANAL SKIN TAG X2, PERFORMED IN OFFICE, DR. LISA CHANGP / TRANSURETHRAL INCISION / DRAINAGE PROSTATE  2010    Dr. Goodrich     Outpatient Medications Prior to Visit   Medication Sig Dispense Refill    acetaminophen (TYLENOL) 325 MG tablet Take 2 tablets by mouth Every 6 (Six) Hours As Needed for Mild Pain.      atorvastatin (LIPITOR) 40 MG tablet Take 1 tablet by mouth Every Night. 30 tablet 1    calcium carbonate (TUMS) 500 MG chewable tablet Chew 2 tablets 4 (Four) Times a Day As Needed for Indigestion or Heartburn.       Calcium Carbonate-Vitamin D (calcium 500 mg vitamin D 5 mcg, 200 UT,) 500-5 MG-MCG tablet per tablet Take 1 tablet by mouth 2 (Two) Times a Day.      Continuous Blood Gluc Sensor (FreeStyle Ysabel 2 Sensor) misc 1 each Every 14 (Fourteen) Days. Use 1 sensor every 14 days 6 each 3    empagliflozin (JARDIANCE) 25 MG tablet tablet Take 1 tablet by mouth Daily.      glucose blood (OneTouch Verio) test strip 1 each by Other route As Needed. Use as instructed      insulin degludec (Tresiba FlexTouch) 100 UNIT/ML solution pen-injector injection 46 units every morning 15 mL 6    Insulin Lispro, 1 Unit Dial, (HumaLOG KwikPen) 100 UNIT/ML solution pen-injector 5 units 3 times daily with meals 15 mL 6    Insulin Pen Needle 32G X 4 MM misc Use to inject insulin under the skin via pens up to 4 times daily 100 each 1    metoprolol succinate XL (TOPROL-XL) 25 MG 24 hr tablet Take 1 tablet by mouth 2 (Two) Times a Day. 180 tablet 2    Multiple Vitamins-Minerals (PRESERVISION AREDS 2+MULTI VIT PO) Take 1 tablet by mouth 2 (Two) Times a Day.      pantoprazole (PROTONIX) 40 MG EC tablet Take 1 tablet by mouth Daily. 90 tablet 0    potassium chloride (K-DUR,KLOR-CON) 20 MEQ CR tablet Take 1 tablet by mouth Daily. 30 tablet 1    torsemide (DEMADEX) 20 MG tablet Take 3 tablets by mouth Daily. 90 tablet 1    apixaban (ELIQUIS) 5 MG tablet tablet Take 1 tablet by mouth 2 (Two) Times a Day.      colchicine 0.6 MG tablet Take 1 tablet by mouth Daily. (Patient not taking: Reported on 8/17/2023) 30 tablet 1     No facility-administered medications prior to visit.       Allergies as of 08/17/2023    (No Known Allergies)     Social History     Socioeconomic History    Marital status:    Tobacco Use    Smoking status: Never     Passive exposure: Never    Smokeless tobacco: Never   Vaping Use    Vaping Use: Never used   Substance and Sexual Activity    Alcohol use: Yes     Alcohol/week: 1.0 standard drink     Types: 1 Drinks containing 0.5  "oz of alcohol per week     Comment: RARELY    Drug use: No    Sexual activity: Defer     Partners: Female     Family History   Problem Relation Age of Onset    Lung cancer Mother     Stroke Father     Dementia Father     Hyperlipidemia Brother     Hypertension Brother     Heart disease Brother     Diabetes Brother     Colon polyps Brother     Colon polyps Brother      Review of Systems   Constitutional: Positive for malaise/fatigue.   HENT:  Negative for nosebleeds.    Cardiovascular:  Positive for chest pain and leg swelling. Negative for claudication, dyspnea on exertion, near-syncope, orthopnea, palpitations, paroxysmal nocturnal dyspnea and syncope.   Respiratory:  Positive for shortness of breath.    Hematologic/Lymphatic: Negative for bleeding problem. Bruises/bleeds easily.   Gastrointestinal:  Negative for hematemesis, hematochezia and melena.   Genitourinary:  Negative for hematuria.   Neurological:  Positive for dizziness. Negative for brief paralysis, headaches and light-headedness.   All other systems reviewed and are negative.     Objective:     Vitals:    08/17/23 0919   BP: 90/64   Pulse: 97   Weight: 113 kg (249 lb)   Height: 185.4 cm (73\")     Body mass index is 32.85 kg/mý.    Vitals reviewed.   Constitutional:       General: Not in acute distress.     Appearance: Well-developed and not in distress. Not diaphoretic.   HENT:      Head: Normocephalic.   Pulmonary:      Effort: Pulmonary effort is normal. No respiratory distress.      Breath sounds: Normal breath sounds. No wheezing. No rhonchi. No rales.   Cardiovascular:      Normal rate. Regular rhythm.      Murmurs: There is no murmur.   Pulses:     Radial: 2+ bilaterally.  Edema:     Peripheral edema absent.   Skin:     General: Skin is warm and dry. There is no cyanosis.      Findings: No rash.   Neurological:      Mental Status: Alert and oriented to person, place, and time.   Psychiatric:         Behavior: Behavior normal. Behavior is " cooperative.         Thought Content: Thought content normal.         Judgment: Judgment normal.     Lab Review:   Lab Results - Last 18 Months   Lab Units 08/17/23  0831 07/18/23  1434 06/24/23  0228 06/23/23  0812 03/29/23  1015 05/23/22  1602   WBC 10*3/mm3 7.85 10.30   < > 7.91   < > 6.7   RBC 10*6/mm3 4.41 3.99*   < > 3.68*   < > 4.60   HEMOGLOBIN g/dL 11.9* 11.1*   < > 10.5*   < > 14.0   HEMOGLOBIN, POC   --   --   --   --    < >  --    HEMATOCRIT % 36.4* 34.0*   < > 31.6*   < > 40.5   HEMATOCRIT POC   --   --   --   --    < >  --    MCV fL 82.5 85.2   < > 85.9   < > 88   MCH pg 27.0 27.8   < > 28.5   < > 30.4   MCHC g/dL 32.7 32.6   < > 33.2   < > 34.6   RDW % 14.4 14.4   < > 14.2   < > 13.0   PLATELETS 10*3/mm3 225 230   < > 247   < > 256   NEUTROPHIL % % 69.7  --   --  64.9   < > 65   LYMPHOCYTE % % 11.6*  --   --  15.7*   < > 17   MONOCYTES % % 11.3  --   --  10.4   < > 10   EOSINOPHIL % % 6.1  --   --  7.8*   < > 6   BASOPHIL % % 0.8  --   --  0.8   < > 1   NEUTROS ABS 10*3/mm3 5.47  --   --  5.14   < > 4.3   LYMPHS ABS 10*3/mm3 0.91  --   --  1.24   < > 1.2   MONOS ABS 10*3/mm3 0.89  --   --  0.82   < > 0.7   EOS ABS 10*3/mm3 0.48*  --   --  0.62*   < > 0.4   BASOS ABS 10*3/mm3 0.06  --   --  0.06   < > 0.0   IMM GRAN % %  --   --   --   --   --  1   IMMATURE GRANS (ABS) x10E3/uL  --   --   --   --   --  0.1   RDW-SD fl 42.8 43.9   < > 44.3   < >  --    MPV fL 9.5 9.5   < > 9.3   < >  --     < > = values in this interval not displayed.     Lab Results - Last 18 Months   Lab Units 08/17/23  0831 08/01/23  1305   GLUCOSE mg/dL 323* 280*   BUN mg/dL 25* 21   CREATININE mg/dL 1.50* 1.49*   SODIUM mmol/L 140 140   POTASSIUM mmol/L 3.8 3.9   CHLORIDE mmol/L 99 96*   CO2 mmol/L 27.7 28.5   CALCIUM mg/dL 9.7 9.3   BUN / CREAT RATIO  16.7 14.1   ANION GAP mmol/L 13.3 15.5*   EGFR mL/min/1.73 48.6* 48.9*     Lab Results - Last 18 Months   Lab Units 08/17/23  0831 08/01/23  1305 07/18/23  1434   GLUCOSE mg/dL 323*  280* 233*   BUN mg/dL 25* 21 16   CREATININE mg/dL 1.50* 1.49* 1.25   SODIUM mmol/L 140 140 141   POTASSIUM mmol/L 3.8 3.9 3.9   CHLORIDE mmol/L 99 96* 101   CO2 mmol/L 27.7 28.5 26.1   CALCIUM mg/dL 9.7 9.3 9.6   TOTAL PROTEIN g/dL 6.9  --  7.1   ALBUMIN g/dL 4.0  --  4.0   ALT (SGPT) U/L 19  --  23   AST (SGOT) U/L 21  --  18   ALK PHOS U/L 111  --  123*   BILIRUBIN mg/dL 0.5  --  0.5   GLOBULIN gm/dL 2.9  --  3.1   A/G RATIO g/dL 1.4  --  1.3   BUN / CREAT RATIO  16.7 14.1 12.8   ANION GAP mmol/L 13.3 15.5* 13.9   EGFR mL/min/1.73 48.6* 48.9* 60.4     Lab Results - Last 18 Months   Lab Units 08/17/23  0831 05/23/23  1429   CHOLESTEROL mg/dL 116 155   TRIGLYCERIDES mg/dL 221* 197*   HDL CHOL mg/dL 34* 41   LDL CHOL mg/dL 47 81   VLDL CHOL mg/dL 35 33   LDL/HDL RATIO  1.11 1.82     Lab Results - Last 18 Months   Lab Units 08/17/23  0831 08/01/23  1305   PROBNP pg/mL 1,268.0* 1,948.0*     Lab Results - Last 18 Months   Lab Units 06/25/23  0944 06/25/23  0739   HSTROP T ng/L 95* 97*     Lab Results - Last 18 Months   Lab Units 08/17/23  0831 06/30/23  0719   TSH uIU/mL 2.320 1.830     Lab Results - Last 18 Months   Lab Units 06/04/23  1410 06/04/23  0319 06/01/23  2318 06/01/23  1555 05/25/23  0307   PROTIME Seconds  --   --   --  13.4 15.0*   APTT seconds 30.3 151.6*   < > 24.2  --     < > = values in this interval not displayed.           ECG 12 Lead    Date/Time: 8/17/2023 12:57 PM  Performed by: Christina Diaz APRN  Authorized by: Christina Diaz APRN   Comparison: compared with previous ECG   Similar to previous ECG  Rhythm: sinus rhythm  Rate: normal  BPM: 97  Conduction: right bundle branch block and non-specific intraventricular conduction delay  QRS axis: normal  Other findings: left ventricular hypertrophy  Comments: Similar to prior.  No new ischemic changes      Assessment:       Diagnosis Plan   1. Nonrheumatic mitral valve regurgitation  Adult Transthoracic Echo Limited W/ Cont if Necessary Per  Protocol      2. S/P MVR (mitral valve repair)  Adult Transthoracic Echo Limited W/ Cont if Necessary Per Protocol      3. Non-ischemic cardiomyopathy  Adult Transthoracic Echo Limited W/ Cont if Necessary Per Protocol      4. Obstructive sleep apnea (adult) (pediatric)  Adult Transthoracic Echo Limited W/ Cont if Necessary Per Protocol      5. Type 2 diabetes mellitus with other circulatory complication, with long-term current use of insulin  Adult Transthoracic Echo Limited W/ Cont if Necessary Per Protocol      6. Mixed hyperlipidemia  Adult Transthoracic Echo Limited W/ Cont if Necessary Per Protocol        Plan:           1.  Mitral valve repair tricuspid valve repair. Post op echo showed resolution of pericardial effusion however ejection fraction has worsened.  2.  Acute on chronic systolic and diastolic diastolic heart failure.  Ejection fraction on most recent echo 25%.  Etiology unclear possibly due to severity and duration of mitral regurgitation with postop decompensation. The sleep apnea and hypoxia may be playing a role, recommend treating sleep apnea and agree with upcoming sleep medicine evaluation.  Care further complicated by renal insufficiency and pericardial effusion.  Clinically appears better today, though still struggling with shortness of breath and fatigue.  He does admit to missing diuretics on occasion.  We will plan to repeat echocardiogram in 4 weeks to reassess EF and valvular function.  3.  Pericardial effusion.  Resolved.  Sed rate still elevated.  He has completed colchicine.  We will plan on repeat echocardiogram to evaluate for pericardial effusion in 4 weeks.  4.  Renal insufficiency, as above.  Creatinine stable on recent labs.  5.  Hypertension .  Overall controlled and at times borderline low.  I asked him to check this carefully at home as metoprolol may need to be slightly decreased if blood pressure is running too low.  6.  Hyperlipidemia on atorvastatin.  LDL at goal  though HDL is low and triglycerides slightly elevated.  7.  Diabetes..  He will continue with Jardiance.  Management per endocrinology  8.  Postop encephalopathy  9.  Post op stroke with transient postop atrial fibrillation.  He is maintained on Eliquis 5 mg twice daily which she is tolerating well with no falls or abnormal bleeding.  Discussed timing of discontinuing Eliquis with Dr. Arango and due to his postoperative CVA would recommend continuing Eliquis for at least 1 year.  We will repeat Holter monitor at that time and consider discontinuing Eliquis if no A-fib present on monitor.      Time Spent: I spent 30 minutes caring for Vernon on this date of service. This time includes time spent by me in the following activities: preparing for the visit, reviewing tests, performing a medically appropriate examination and/or evaluation, counseling and educating the patient/family/caregiver, ordering medications, tests, or procedures, and documenting information in the medical record.   I spent 1 minutes on the separately reported service of ECG. This time is not included in the time used to support the E/M service also reported today.        Your medication list            Accurate as of August 17, 2023 12:57 PM. If you have any questions, ask your nurse or doctor.                CONTINUE taking these medications        Instructions Last Dose Given Next Dose Due   acetaminophen 325 MG tablet  Commonly known as: TYLENOL      Take 2 tablets by mouth Every 6 (Six) Hours As Needed for Mild Pain.       apixaban 5 MG tablet tablet  Commonly known as: ELIQUIS      Take 1 tablet by mouth 2 (Two) Times a Day.       atorvastatin 40 MG tablet  Commonly known as: LIPITOR      Take 1 tablet by mouth Every Night.       BD Pen Needle Yolette 2nd Gen 32G X 4 MM misc  Generic drug: Insulin Pen Needle      Use to inject insulin under the skin via pens up to 4 times daily       calcium 500 mg vitamin D 5 mcg (200 UT) 500-5 MG-MCG tablet per  tablet      Take 1 tablet by mouth 2 (Two) Times a Day.       calcium carbonate 500 MG chewable tablet  Commonly known as: TUMS      Chew 2 tablets 4 (Four) Times a Day As Needed for Indigestion or Heartburn.       empagliflozin 25 MG tablet tablet  Commonly known as: JARDIANCE      Take 1 tablet by mouth Daily.       FreeStyle Ysabel 2 Sensor misc      1 each Every 14 (Fourteen) Days. Use 1 sensor every 14 days       Insulin Lispro (1 Unit Dial) 100 UNIT/ML solution pen-injector  Commonly known as: HumaLOG KwikPen      5 units 3 times daily with meals       metoprolol succinate XL 25 MG 24 hr tablet  Commonly known as: TOPROL-XL      Take 1 tablet by mouth 2 (Two) Times a Day.       OneTouch Verio test strip  Generic drug: glucose blood      1 each by Other route As Needed. Use as instructed       pantoprazole 40 MG EC tablet  Commonly known as: PROTONIX      Take 1 tablet by mouth Daily.       potassium chloride 20 MEQ CR tablet  Commonly known as: K-DUR,KLOR-CON      Take 1 tablet by mouth Daily.       PRESERVISION AREDS 2+MULTI VIT PO      Take 1 tablet by mouth 2 (Two) Times a Day.       torsemide 20 MG tablet  Commonly known as: DEMADEX      Take 3 tablets by mouth Daily.       Tresiba FlexTouch 100 UNIT/ML solution pen-injector injection  Generic drug: insulin degludec      46 units every morning                 Where to Get Your Medications        These medications were sent to [id number] - [place], [place] - [address] AT [facility] - [phone]  - [phone] fx [id number], [facility] [address]      Phone: [phone]   apixaban 5 MG tablet tablet         Patient is no longer taking -.  I corrected the med list to reflect this.  I did not stop these medications.      Dictated utilizing Dragon dictation

## 2023-08-17 NOTE — THERAPY TREATMENT NOTE
Outpatient Physical Therapy Neuro Treatment Note  [facility]     Patient Name: Vernon Solorzano  : 1950  MRN: [id number]  Today's Date: 2023      Visit Date: 2023    Visit Dx:    ICD-10-CM ICD-9-CM   1. History of ischemic stroke  Z86.73 V12.54   2. Impaired functional mobility, balance, gait, and endurance  Z74.09 V49.89   3. Functional gait abnormality  R26.89 781.2       Patient Active Problem List   Diagnosis    DM (diabetes mellitus), type 2    Mixed hyperlipidemia    Mild intermittent asthma without complication    New onset of congestive heart failure    Nonrheumatic mitral valve regurgitation    Left-sided chest pain    Essential hypertension    Mitral valve disease    Acute ischemic left MCA stroke    Atelectasis of both lungs    Pericardial effusion, acute    History of ischemic stroke    Obese    Physical debility    Chronic diastolic CHF (congestive heart failure)    Anemia    Stage 3a chronic kidney disease    Paroxysmal atrial flutter    H/O mitral valve replacement with tissue graft    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 yes  -LB      Pre-Treatment Pain Level 6  -LB      Post-Treatment Pain Level 6  -LB         Cognition    Overall Cognitive Status Impaired  -LB      Memory Decreased recall of recent events;Decreased short term memory  -LB         Posture/Observations    Posture/Observations Comments Pt walked up to unit using a FWW  -LB         Transfers    Sit-Stand [place] (Transfers) independent  -LB      Stand-Sit [place] (Transfers) independent  -LB         Gait/Stairs (Locomotion)    [place] Level (Gait) independent;standby assist  -LB      Distance in Feet (Gait) 240 ; 160 x 2; 120  -LB       Deviations/Abnormal Patterns (Gait) bilateral deviations;base of support, narrow;stride length decreased  -LB      Bilateral Gait Deviations heel strike decreased  -LB      [place] Level (Stairs) stand by assist  -LB      Handrail Location (Stairs) right side (ascending);left side (descending)  -LB      Number of Steps (Stairs) 4  -LB      Ascending Technique (Stairs) step-over-step  -LB      Descending Technique (Stairs) step-over-step  -LB         Balance Skills Training    Gait Balance-Level of Assistance Close supervision  -LB      Gait Balance Support No upper extremity supported  -LB      Gait Balance Activities backwards  -LB                User Key  (r) = Recorded By, (t) = Taken By, (c) = Cosigned By      Initials Name Provider Type    Lara Duenas PT Physical Therapist                             PT Assessment/Plan       Row Name 08/17/23 1528          PT Assessment    Assessment Comments The patient tolerated more activities during session with less seated rest.  Felt that the patient was not as SOA as before but the patient did not feel a difference.  HRs was in the 100s with all activities.  Pt displayed good balance with stair negogiation  -LB               User Key  (r) = Recorded By, (t) = Taken By, (c) = Cosigned By      Initials Name Provider Type    Lara Duenas, PT Physical Therapist                        OP Exercises       Row Name 08/17/23 0311 08/17/23 6305          Subjective Comments    Subjective Comments -- Busy day, not my best day  -LB        Subjective Pain    Able to rate subjective pain? -- yes  -LB     Pre-Treatment Pain Level -- 6  -LB     Post-Treatment Pain Level -- 6  -LB        Total Minutes    34945 - PT Therapeutic Exercise Minutes 32  -LB --     14190 - PT Therapeutic Activity Minutes 13  -LB --        Exercise 1    Exercise Name 1 -- Nu Step  -LB     Time 1 -- 5 mins  -LB     Additional Comments -- level 3; tried to keep steps per minute above 40  -LB         Exercise 8    Exercise Name 8 -- seated hip abd and hip flexion  -LB     Reps 8 -- 15  -LB     Additional Comments -- GTB  -LB        Exercise 11    Exercise Name 11 -- seated hamstring curls  -LB     Reps 11 -- 15  -LB     Additional Comments -- GTB  -LB        Exercise 14    Exercise Name 14 -- Forward step ups  -LB     Reps 14 -- 15  -LB     Additional Comments -- each LE  -LB               User Key  (r) = Recorded By, (t) = Taken By, (c) = Cosigned By      Initials Name Provider Type    Lara Duenas, PT Physical Therapist                                    Therapy Education  Education Details: stair climbing  Given: Mobility training  Program: Reinforced, Progressed  How Provided: Verbal  Provided to: Patient  Level of Understanding: Demonstrated, Verbalized              Time Calculation:   Start Time: 1430  Stop Time: 1518  Time Calculation (min): 48 min  Timed Charges  62196 - PT Therapeutic Exercise Minutes: 32  20777 - PT Therapeutic Activity Minutes: 13  Total Minutes  Timed Charges Total Minutes: 45   Total Minutes: 45   Therapy Charges for Today       Code Description Service Date Service Provider Modifiers Qty    60472148297  PT THER PROC EA 15 MIN 8/17/2023 Lara Cm, PT GP 2    [id number]  PT THERAPEUTIC ACT EA 15 MIN 8/17/2023 Lara Cm, PT GP 1                      Lara Cm PT  8/17/2023

## 2023-08-18 ENCOUNTER — PATIENT ROUNDING (BHMG ONLY) (OUTPATIENT)
Dept: ENDOCRINOLOGY | Age: 73
End: 2023-08-18

## 2023-08-18 ENCOUNTER — TELEPHONE (OUTPATIENT)
Dept: SLEEP MEDICINE | Facility: HOSPITAL | Age: 73
End: 2023-08-18
Payer: MEDICARE

## 2023-08-18 NOTE — TELEPHONE ENCOUNTER
Tried to call pt. Unable to leave message, vm is full. HST failed. O2 sensor was out majority of the study. Pt will need to repeat.

## 2023-08-20 NOTE — PROGRESS NOTES
Vitamin D insufficient.  Continue calcium plus D 1 tablet per day.  Recheck vitamin D with next follow-up.  LDL 47.  HDL 34.  Nonfasting triglycerides 221.  Continue Lipitor 40 mg a day.  Serum creatinine elevated at 1.5 mg per DL with an of EGFR 48.6.  Hemoglobin A1c 10.0%.  Diabetes poorly controlled.  Sensor download in 2 to 3 weeks.  Hemoglobin improved at 11.9 g per DL.  Normal urine microalbumin.  Normal thyroid function tests.  Copy of labs sent to Corey Oconnell NP, Dr. Arango, and Dr. Slater.  Please notify patient of the results and instructions.

## 2023-08-21 ENCOUNTER — HOSPITAL ENCOUNTER (OUTPATIENT)
Dept: PHYSICAL THERAPY | Facility: HOSPITAL | Age: 73
Setting detail: THERAPIES SERIES
Discharge: HOME | End: 2023-08-21
Payer: MEDICARE

## 2023-08-21 ENCOUNTER — HOSPITAL ENCOUNTER (OUTPATIENT)
Dept: SPEECH THERAPY | Facility: HOSPITAL | Age: 73
Setting detail: THERAPIES SERIES
Discharge: HOME | End: 2023-08-21
Payer: MEDICARE

## 2023-08-21 DIAGNOSIS — Z86.73 HISTORY OF ISCHEMIC STROKE: Primary | ICD-10-CM

## 2023-08-21 DIAGNOSIS — Z74.09 IMPAIRED FUNCTIONAL MOBILITY, BALANCE, GAIT, AND ENDURANCE: ICD-10-CM

## 2023-08-21 DIAGNOSIS — R26.89 FUNCTIONAL GAIT ABNORMALITY: ICD-10-CM

## 2023-08-21 PROCEDURE — 97129 THER IVNTJ 1ST 15 MIN: CPT

## 2023-08-21 PROCEDURE — 97130 THER IVNTJ EA ADDL 15 MIN: CPT

## 2023-08-21 PROCEDURE — 97530 THERAPEUTIC ACTIVITIES: CPT

## 2023-08-21 PROCEDURE — 97110 THERAPEUTIC EXERCISES: CPT

## 2023-08-21 NOTE — THERAPY TREATMENT NOTE
Outpatient Speech Language Pathology   Adult Speech Language Cognitive Treatment Note  [facility]     Patient Name: Vernon Solorzano  : 1950  MRN: [id number]  Today's Date: 2023         Visit Date: 2023   Patient Active Problem List   Diagnosis    DM (diabetes mellitus), type 2    Mixed hyperlipidemia    Mild intermittent asthma without complication    New onset of congestive heart failure    Nonrheumatic mitral valve regurgitation    Left-sided chest pain    Essential hypertension    Mitral valve disease    Acute ischemic left MCA stroke    Atelectasis of both lungs    Pericardial effusion, acute    History of ischemic stroke    Obese    Physical debility    Chronic diastolic CHF (congestive heart failure)    Anemia    Stage 3a chronic kidney disease    Paroxysmal atrial flutter    H/O mitral valve replacement with tissue graft    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                           SLP OP Goals       Row Name 23 1400          Subjective Comments    Subjective Comments Patient arrives approx 20 min late for appointment. Pt very pleasant and agreeable to all tasks.  -BB        Verbal Expression Goals    Patient will improve verbal expressive language skills by completing divergent naming tasks 90%:;without cues  -BB     Status: Patient will improve verbal expressive language skills by completing divergent naming tasks Progressing as expected  -BB     Comments: Patient will improve verbal expressive language skills by completing divergent naming tasks Automatic language task (alphabet): pt with difficulties reciting alphabet with several omissions and substitutions. Modeling provided as well as cues to write alphabet: improved accuracy, however pt continued with several omissions or substitutions.  -BB        Memory Goals    Patient's memory skills will be enhanced as reported  by patient by utilizing internal memory strategies to recall up to 3 pieces of information after a 5- minute delay 90%:;without cues  -BB     Status: Patient's memory skills will be enhanced as reported by patient by utilizing internal memory strategies to recall up to 3 pieces of information after a 5- minute delay Progressing as expected  -BB     Comments: Patient's memory skills will be enhanced as reported by patient by utilizing internal memory strategies to recall up to 3 pieces of information after a 5- minute delay Visual recall of 3 pairs of items with training with strategies (eg, association, emotion): 100% acc with mod-min A (semantic cueing). Following 2-step directions: 100% acc.  -BB               User Key  (r) = Recorded By, (t) = Taken By, (c) = Cosigned By      Initials Name Provider Type    Ervin Temple, SLP Speech and Language Pathologist                    Patient's recall abilities with strategies appear to be improving. He appears to continue to benefit from skilled SLP services.       Time Calculation:   SLP Start Time: 1318  SLP Stop Time: 1353  SLP Time Calculation (min): 35 min  Timed Charges  57915-YV Dev of Cogn Skills Initial Minutes: 15  25339-VI Dev of Cogn Skills Add Minutes: 15  Total Minutes  Timed Charges Total Minutes: 30   Total Minutes: 30    Therapy Charges for Today       Code Description Service Date Service Provider Modifiers Qty    74410324085 HC ST DEV OF COGN SKILLS INITIAL 15 MIN 8/21/2023 Ervin Bermudez, SLP  1    46920209072 HC ST DEV OF COGN SKILLS EACH ADDT'L 15 MIN 8/21/2023 Ervin Bermudez, SLP  1                     VIKI Rodriguez  8/21/2023

## 2023-08-21 NOTE — THERAPY TREATMENT NOTE
Outpatient Physical Therapy Neuro Treatment Note  [facility]     Patient Name: Vernon Solorzano  : 1950  MRN: [id number]  Today's Date: 2023      Visit Date: 2023    Visit Dx:    ICD-10-CM ICD-9-CM   1. History of ischemic stroke  Z86.73 V12.54   2. Impaired functional mobility, balance, gait, and endurance  Z74.09 V49.89   3. Functional gait abnormality  R26.89 781.2       Patient Active Problem List   Diagnosis    DM (diabetes mellitus), type 2    Mixed hyperlipidemia    Mild intermittent asthma without complication    New onset of congestive heart failure    Nonrheumatic mitral valve regurgitation    Left-sided chest pain    Essential hypertension    Mitral valve disease    Acute ischemic left MCA stroke    Atelectasis of both lungs    Pericardial effusion, acute    History of ischemic stroke    Obese    Physical debility    Chronic diastolic CHF (congestive heart failure)    Anemia    Stage 3a chronic kidney disease    Paroxysmal atrial flutter    H/O mitral valve replacement with tissue graft    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 yes  -LW      Subjective Pain Comment Noted pain in his knees, did not give rating  -LW         Cognition    Overall Cognitive Status Impaired  -LW      Memory Decreased recall of recent events;Decreased short term memory  -LW         Posture/Observations    Posture/Observations Comments Pt walked up to unit using a FWW with wife present  -LW         Transfers    Sit-Stand [place] (Transfers) independent  -LW      Stand-Sit [place] (Transfers) independent  -LW         Gait/Stairs (Locomotion)    [place] Level (Gait) independent;standby assist  -LW      Assistive  Device (Gait) --  no AD  -LW      Distance in Feet (Gait) 320', 240x3  -LW      Deviations/Abnormal Patterns (Gait) bilateral deviations;base of support, narrow;stride length decreased  -LW      Bilateral Gait Deviations heel strike decreased  -      Gait Assessment/Intervention Ambulated 200' through hallway to practice head turns and naming specific objects  Pace decreased when scanning  -LW                User Key  (r) = Recorded By, (t) = Taken By, (c) = Cosigned By      Initials Name Provider Type    Regi Griffith PT Physical Therapist                             PT Assessment/Plan       Row Name 08/21/23 1451          PT Assessment    Assessment Comments Patient exhibited an improvement in his endurance by progressing distance ambulated and requiring less seated rest breaks between activities. He tolerated today's session well, focused on improving endurance and strengthening. He had some complaint of SOA throughout session but did not demonstrate any labored breathing. Patient will benefit from continued skilled PT addressing limitations in functional mobility by improving activity tolerance and strength.  -LW               User Key  (r) = Recorded By, (t) = Taken By, (c) = Cosigned By      Initials Name Provider Type    Regi Griffith PT Physical Therapist                        OP Exercises       Row Name 08/21/23 1458 08/21/23 1430          Subjective Comments    Subjective Comments -- Says he feels like he has had increased SOA recently  -        Subjective Pain    Able to rate subjective pain? -- yes  -     Subjective Pain Comment -- Noted pain in his knees, did not give rating  -        Total Minutes    36628 - PT Therapeutic Exercise Minutes 30  -LW --     39935 - PT Therapeutic Activity Minutes 10  -LW --        Exercise 6    Exercise Name 6 -- STS from high surface  -LW     Sets 6 -- 2  -LW     Reps 6 -- 8  -LW        Exercise 15    Exercise Name 15 -- Standing rows with scapular  retraction  -LW     Sets 15 -- 1  -LW     Reps 15 -- 15  -LW     Additional Comments -- for postural improvement  -LW        Exercise 16    Exercise Name 16 -- Ball press out & in  -LW     Sets 16 -- 1  -LW     Reps 16 -- 15  -LW     Additional Comments -- weighted yellow ball  -LW        Exercise 17    Exercise Name 17 -- Sidestep  -LW     Sets 17 -- 3  -LW     Reps 17 -- length of anup bars  -LW     Additional Comments -- GTB  -LW               User Key  (r) = Recorded By, (t) = Taken By, (c) = Cosigned By      Initials Name Provider Type    Regi Griffith PT Physical Therapist                                    Therapy Education  Education Details: Postural muscle training  Given: Posture/body mechanics  Program: Reinforced, Progressed  How Provided: Verbal, Demonstration  Provided to: Patient  Level of Understanding: Demonstrated, Verbalized              Time Calculation:   Start Time: 1350  Stop Time: 1430  Time Calculation (min): 40 min  Total Timed Code Minutes- PT: 40 minute(s)  Timed Charges  58758 - PT Therapeutic Exercise Minutes: 30  76047 - PT Therapeutic Activity Minutes: 10  Total Minutes  Timed Charges Total Minutes: 40   Total Minutes: 40   Therapy Charges for Today       Code Description Service Date Service Provider Modifiers Qty    86677450737 HC PT THER PROC EA 15 MIN 8/21/2023 Regi Rust, PT GP 2    33874140997 HC PT THERAPEUTIC ACT EA 15 MIN 8/21/2023 Regi Rust, PT GP 1                      Regi Rust PT  8/21/2023

## 2023-08-23 NOTE — THERAPY TREATMENT NOTE
Patient Name: Vernon Solorzano  : 1950    MRN: [id number]                              Today's Date: 2023       Admit Date: 2023    Visit Dx:     ICD-10-CM ICD-9-CM   1. S/P MVR (mitral valve replacement)  Z95.2 V43.3   2. Nonrheumatic mitral valve regurgitation  I34.0 424.0     Patient Active Problem List   Diagnosis   • DM (diabetes mellitus), type 2   • Mixed hyperlipidemia   • Mild intermittent asthma without complication   • New onset of congestive heart failure   • Nonrheumatic mitral valve regurgitation   • Chest pain   • Essential hypertension   • Mitral valve disease     Past Medical History:   Diagnosis Date   • Allergic rhinitis    • Arthritis    • BPH (benign prostatic hyperplasia)    • Diabetes mellitus     TYPE 2   • Foraminal stenosis of cervical region     C5-C6   • GERD (gastroesophageal reflux disease)    • Hemorrhoids    • History of urinary retention 2010    S/P TURP   • Hyperlipidemia    • Macular degeneration    • PING (obstructive sleep apnea) 2016    MILD, SEES DR. AMARILIS MORGAN     Past Surgical History:   Procedure Laterality Date   • CARDIAC CATHETERIZATION N/A 2023    Procedure: Right Heart Cath;  Surgeon: Corey Gaffney MD;  Location:  [facility];  Service: Cardiology;  Laterality: N/A;   • CARDIAC CATHETERIZATION N/A 2023    Procedure: Left Heart Cath;  Surgeon: Corey Gaffney MD;  Location:  [facility];  Service: Cardiology;  Laterality: N/A;   • CARDIAC CATHETERIZATION N/A 2023    Procedure: Left ventriculography;  Surgeon: Corey Gaffney MD;  Location:  [facility];  Service: Cardiology;  Laterality: N/A;   • CARDIAC CATHETERIZATION N/A 2023    Procedure: Coronary angiography;  Surgeon: Corey Gaffney MD;  Location:  [facility];  Service: Cardiology;  Laterality: N/A;   • COLONOSCOPY N/A     WNL PER PT, NO RECORDS,    • COLONOSCOPY N/A 2009      GORGE BENAVIDEZ AT [place]   • MITRAL VALVE REPAIR/REPLACEMENT N/A 5/24/2023    Procedure: MITRAL VALVE REPAIR/REPLACEMENT TRICUSPID VALVE REPAIR/REPLACEMENT TRANSESOPHAGEAL ECHOCARDIOGRAM WITH ANESTHESIA;  Surgeon: George Frey MD;  Location: [facility];  Service: Cardiothoracic;  Laterality: N/A;   • PROSTATE SURGERY N/A 11/12/2010    TURP, DR. BRIGHT COLLAZO AT [facility]   • SKIN TAG REMOVAL N/A 12/20/2017    ANAL SKIN TAG X2, PERFORMED IN OFFICE, DR. LISA ORANTES   • TURP / TRANSURETHRAL INCISION / DRAINAGE PROSTATE  2010    Dr. Goodrich      General Information     Row Name 05/29/23 1150          Physical Therapy Time and Intention    Document Type therapy note (daily note)  -DB     Mode of Treatment individual therapy;physical therapy  -DB     Row Name 05/29/23 1150          General Information    Patient Profile Reviewed yes  -DB           User Key  (r) = Recorded By, (t) = Taken By, (c) = Cosigned By    Initials Name Provider Type    DB Marissa Zamora PT Physical Therapist               Mobility     Row Name 05/29/23 1150          Bed Mobility    Comment, (Bed Mobility) NT, pt UIC  -DB     Row Name 05/29/23 1150          Sit-Stand Transfer    Sit-Stand [place] (Transfers) maximum assist (25% patient effort);2 person assist  -DB     Assistive Device (Sit-Stand Transfers) other (see comments)  HHA x 2  -DB     Comment, (Sit-Stand Transfer) attempted STS from chair, unable to come to full stand  -DB     Row Name 05/29/23 1150          Gait/Stairs (Locomotion)    [place] Level (Gait) unable to assess  -DB           User Key  (r) = Recorded By, (t) = Taken By, (c) = Cosigned By    Initials Name Provider Type    DB Marissa Zamora, PT Physical Therapist               Obj/Interventions     Row Name 05/29/23 1152          Motor Skills    Therapeutic Exercise other (see comments)  cardiac protocol x 5, max cues to participate  -DB     Row Name 05/29/23 1152          Balance    Balance Assessment  sitting static balance;sitting dynamic balance  -DB     Static Sitting Balance standby assist  -DB     Dynamic Sitting Balance standby assist  -DB     Position, Sitting Balance sitting in chair  -DB           User Key  (r) = Recorded By, (t) = Taken By, (c) = Cosigned By    Initials Name Provider Type    Marissa Gimenez, PT Physical Therapist               Goals/Plan    No documentation.                Clinical Impression     Row Name 05/29/23 1153          Pain    Pain Intervention(s) Ambulation/increased activity;Repositioned  -DB     Row Name 05/29/23 1153          Plan of Care Review    Plan of Care Reviewed With patient  -DB     Progress no change  -DB     Outcome Evaluation Pt seated UIC at start of session, agreeable to work with PT. Attempted STS from chair with maxA x2, pt unable to clear bottom from chair. Performed seated LE ther ex, pt needing multiple cues to participate. Pt very fatigued. Encouraged pt and wife to perform LE ther ex later this afternoon as well. Will continue to progress pt as tolerated.  -DB     Row Name 05/29/23 1153          Vital Signs    O2 Delivery Pre Treatment supplemental O2  -DB     O2 Delivery Intra Treatment supplemental O2  -DB     O2 Delivery Post Treatment supplemental O2  -DB     Pre Patient Position Sitting  -DB     Intra Patient Position Sitting  -DB     Post Patient Position Sitting  -DB     Row Name 05/29/23 1153          Positioning and Restraints    Pre-Treatment Position sitting in chair/recliner  -DB     Post Treatment Position chair  -DB     In Chair sitting;call light within reach;encouraged to call for assist;exit alarm on;with family/caregiver  -DB           User Key  (r) = Recorded By, (t) = Taken By, (c) = Cosigned By    Initials Name Provider Type    Marissa Gimenez PT Physical Therapist               Outcome Measures     Row Name 05/29/23 1156 05/29/23 0927       How much help from another person do you currently need...    Turning from your  back to your side while in flat bed without using bedrails? 2  -DB 2  -CB    Moving from lying on back to sitting on the side of a flat bed without bedrails? 2  -DB 2  -CB    Moving to and from a bed to a chair (including a wheelchair)? 1  -DB 2  -CB    Standing up from a chair using your arms (e.g., wheelchair, bedside chair)? 1  -DB 2  -CB    Climbing 3-5 steps with a railing? 1  -DB 2  -CB    To walk in hospital room? 1  -DB 2  -CB    AM-PAC 6 Clicks Score (PT) 8  -DB 12  -CB    Highest level of mobility 3 --> Sat at edge of bed  -DB 4 --> Transferred to chair/commode  -CB    Row Name 05/29/23 1156          Functional Assessment    Outcome Measure Options AM-PAC 6 Clicks Basic Mobility (PT)  -DB           User Key  (r) = Recorded By, (t) = Taken By, (c) = Cosigned By    Initials Name Provider Type    DB Marissa Zamora, PT Physical Therapist    Moni Flores, RN Registered Nurse                             Physical Therapy Education     Title: PT OT SLP Therapies (In Progress)     Topic: Physical Therapy (In Progress)     Point: Mobility training (In Progress)     Learning Progress Summary           Patient Acceptance, E, NR by DB at 5/29/2023 1157    Acceptance, E, VU by DB at 5/28/2023 1344    Acceptance, E, NR by EM at 5/26/2023 1304    Acceptance, E, NR by EM at 5/25/2023 1208                   Point: Home exercise program (In Progress)     Learning Progress Summary           Patient Acceptance, E, NR by DB at 5/29/2023 1157    Acceptance, E, VU by DB at 5/28/2023 1344    Acceptance, E, NR by EM at 5/25/2023 1208                   Point: Body mechanics (In Progress)     Learning Progress Summary           Patient Acceptance, E, NR by DB at 5/29/2023 1157    Acceptance, E, VU by DB at 5/28/2023 1344                   Point: Precautions (In Progress)     Learning Progress Summary           Patient Acceptance, E, NR by DB at 5/29/2023 1157    Acceptance, E, VU by DB at 5/28/2023 1344                                User Key     Initials Effective Dates Name Provider Type Discipline    EM 06/16/21 -  Kathy Shaffer PT Physical Therapist PT    DB 06/16/21 -  Marissa Zamora PT Physical Therapist PT              PT Recommendation and Plan     Plan of Care Reviewed With: patient  Progress: no change  Outcome Evaluation: Pt seated UIC at start of session, agreeable to work with PT. Attempted STS from chair with maxA x2, pt unable to clear bottom from chair. Performed seated LE ther ex, pt needing multiple cues to participate. Pt very fatigued. Encouraged pt and wife to perform LE ther ex later this afternoon as well. Will continue to progress pt as tolerated.     Time Calculation:    PT Charges     Row Name 05/29/23 1157             Time Calculation    Start Time 0858  -DB      Stop Time 0913  -DB      Time Calculation (min) 15 min  -DB      PT Received On 05/29/23  -DB      PT - Next Appointment 05/30/23  -DB         Time Calculation- PT    Total Timed Code Minutes- PT 15 minute(s)  -DB            User Key  (r) = Recorded By, (t) = Taken By, (c) = Cosigned By    Initials Name Provider Type    DB Marissa Zamora, NANETTE Physical Therapist              Therapy Charges for Today     Code Description Service Date Service Provider Modifiers Qty    30427937616 HC PT THERAPEUTIC ACT EA 15 MIN 5/28/2023 Marissa Zamora, PT GP 1    58707719211 HC PT THERAPEUTIC ACT EA 15 MIN 5/29/2023 Marissa Zamora, PT GP 1          PT G-Codes  Outcome Measure Options: AM-PAC 6 Clicks Basic Mobility (PT)  AM-PAC 6 Clicks Score (PT): 8  AM-PAC 6 Clicks Score (OT): 6  PT Discharge Summary  Anticipated Discharge Disposition (PT): skilled nursing facility    Marissa Zamora PT  5/29/2023     5-Fu Pregnancy And Lactation Text: This medication is Pregnancy Category X and contraindicated in pregnancy and in women who may become pregnant. It is unknown if this medication is excreted in breast milk.

## 2023-08-24 ENCOUNTER — HOSPITAL ENCOUNTER (OUTPATIENT)
Dept: PHYSICAL THERAPY | Facility: HOSPITAL | Age: 73
Setting detail: THERAPIES SERIES
Discharge: HOME | End: 2023-08-24
Payer: MEDICARE

## 2023-08-24 ENCOUNTER — HOSPITAL ENCOUNTER (OUTPATIENT)
Dept: SPEECH THERAPY | Facility: HOSPITAL | Age: 73
Setting detail: THERAPIES SERIES
Discharge: HOME | End: 2023-08-24
Payer: MEDICARE

## 2023-08-24 DIAGNOSIS — I63.9 CEREBROVASCULAR ACCIDENT (CVA), UNSPECIFIED MECHANISM: Primary | ICD-10-CM

## 2023-08-24 DIAGNOSIS — R26.89 FUNCTIONAL GAIT ABNORMALITY: ICD-10-CM

## 2023-08-24 DIAGNOSIS — Z74.09 IMPAIRED FUNCTIONAL MOBILITY, BALANCE, GAIT, AND ENDURANCE: ICD-10-CM

## 2023-08-24 DIAGNOSIS — Z86.73 HISTORY OF ISCHEMIC STROKE: Primary | ICD-10-CM

## 2023-08-24 DIAGNOSIS — R41.841 COGNITIVE COMMUNICATION DEFICIT: ICD-10-CM

## 2023-08-24 PROCEDURE — 97110 THERAPEUTIC EXERCISES: CPT

## 2023-08-24 PROCEDURE — 97130 THER IVNTJ EA ADDL 15 MIN: CPT | Performed by: SPEECH-LANGUAGE PATHOLOGIST

## 2023-08-24 PROCEDURE — 97129 THER IVNTJ 1ST 15 MIN: CPT | Performed by: SPEECH-LANGUAGE PATHOLOGIST

## 2023-08-24 PROCEDURE — 97530 THERAPEUTIC ACTIVITIES: CPT

## 2023-08-24 NOTE — THERAPY TREATMENT NOTE
Outpatient Physical Therapy Neuro Treatment Note  [facility]     Patient Name: Vernon Solorzano  : 1950  MRN: [id number]  Today's Date: 2023      Visit Date: 2023    Visit Dx:    ICD-10-CM ICD-9-CM   1. History of ischemic stroke  Z86.73 V12.54   2. Impaired functional mobility, balance, gait, and endurance  Z74.09 V49.89   3. Functional gait abnormality  R26.89 781.2       Patient Active Problem List   Diagnosis    DM (diabetes mellitus), type 2    Mixed hyperlipidemia    Mild intermittent asthma without complication    New onset of congestive heart failure    Nonrheumatic mitral valve regurgitation    Left-sided chest pain    Essential hypertension    Mitral valve disease    Acute ischemic left MCA stroke    Atelectasis of both lungs    Pericardial effusion, acute    History of ischemic stroke    Obese    Physical debility    Chronic diastolic CHF (congestive heart failure)    Anemia    Stage 3a chronic kidney disease    Paroxysmal atrial flutter    H/O mitral valve replacement with tissue graft    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 yes  -LB      Pre-Treatment Pain Level 5  -LB      Post-Treatment Pain Level 6  -LB      Subjective Pain Comment knee pain  -LB         Posture/Observations    Posture/Observations Comments Pt walked up to unit using a FWW with wife present  -LB         Transfers    Sit-Stand [place] (Transfers) independent  -LB      Stand-Sit [place] (Transfers) independent  -LB      Comment, (Transfers) extra time to complete today  -LB         Gait/Stairs (Locomotion)    [place] Level (Gait) independent;standby assist  -LB      Assistive Device (Gait) --  no AD; used rollator for second walk due to  left knee pain  -LB      Distance in Feet (Gait) 200 x 2  -LB      Bilateral Gait Deviations heel strike decreased  -LB      Left Sided Gait Deviations heel strike decreased  -LB      Gait Assessment/Intervention cues for longer steps  -LB                User Key  (r) = Recorded By, (t) = Taken By, (c) = Cosigned By      Initials Name Provider Type    aLra Duenas PT Physical Therapist                             PT Assessment/Plan       Row Name 08/24/23 1706          PT Assessment    Assessment Comments The patient complained of more leg pain today, especially left arthric pain.  Pt did have moments of dyspnea but is able to talk through most of that.  To maintain endurance training, used a rollator during part or session to relieve LE discomfort.  Pt is more indep with all functional mobiltiy and is only using walker for community due to worries of fatigue.  -LB               User Key  (r) = Recorded By, (t) = Taken By, (c) = Cosigned By      Initials Name Provider Type    Lara Duenas PT Physical Therapist                        OP Exercises       Row Name 08/24/23 1709 08/24/23 9774          Subjective Comments    Subjective Comments -- My legs are hurting more today  -LB        Subjective Pain    Able to rate subjective pain? -- yes  -LB     Pre-Treatment Pain Level -- 5  -LB     Post-Treatment Pain Level -- 6  -LB     Subjective Pain Comment -- knee pain  -LB        Total Minutes    23780 - PT Therapeutic Exercise Minutes 30  -LB --     67270 - PT Therapeutic Activity Minutes 10  -LB --        Exercise 10    Exercise Name 10 -- alternating toe taps  -LB     Reps 10 -- 10  -LB     Time 10 -- 4 inch step  -LB     Additional Comments -- cues to lift higher  -LB        Exercise 11    Exercise Name 11 -- seated hamstring curls  -LB     Sets 11 -- 2  -LB     Reps 11 -- 15  -LB     Additional Comments -- BTB  -LB        Exercise 13    Exercise Name 13 -- lateral step ups  -LB     Reps 13 -- 10  -LB         Exercise 15    Exercise Name 15 -- Standing rows with scapular retraction  -LB     Sets 15 -- 2  -LB     Reps 15 -- 15  -LB        Exercise 17    Exercise Name 17 -- elbow flexion/ext  -LB     Sets 17 -- 10  -LB     Additional Comments -- GTB  -LB               User Key  (r) = Recorded By, (t) = Taken By, (c) = Cosigned By      Initials Name Provider Type    Lara Duenas, PT Physical Therapist                                    Therapy Education  Education Details: ice for left knee  Given: Pain management  Program: New  How Provided: Verbal  Provided to: Patient  Level of Understanding: Verbalized, Teach back education performed              Time Calculation:   Start Time: 1432  Stop Time: 1514  Time Calculation (min): 42 min  Timed Charges  76640 - PT Therapeutic Exercise Minutes: 30  24054 - PT Therapeutic Activity Minutes: 10  Total Minutes  Timed Charges Total Minutes: 40   Total Minutes: 40   Therapy Charges for Today       Code Description Service Date Service Provider Modifiers Qty    89311457832  PT THER PROC EA 15 MIN 8/24/2023 Lara Cm, PT GP 2    [id number]  PT THERAPEUTIC ACT EA 15 MIN 8/24/2023 Lara Cm, PT GP 1                      Lara Cm PT  8/24/2023

## 2023-08-24 NOTE — THERAPY TREATMENT NOTE
Outpatient Speech Language Pathology   Adult Speech Language Cognitive Treatment Note  [facility]     Patient Name: Vernon Solorzano  : 1950  MRN: [id number]  Today's Date: 2023         Visit Date: 2023   Patient Active Problem List   Diagnosis    DM (diabetes mellitus), type 2    Mixed hyperlipidemia    Mild intermittent asthma without complication    New onset of congestive heart failure    Nonrheumatic mitral valve regurgitation    Left-sided chest pain    Essential hypertension    Mitral valve disease    Acute ischemic left MCA stroke    Atelectasis of both lungs    Pericardial effusion, acute    History of ischemic stroke    Obese    Physical debility    Chronic diastolic CHF (congestive heart failure)    Anemia    Stage 3a chronic kidney disease    Paroxysmal atrial flutter    H/O mitral valve replacement with tissue graft    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 Cerebrovascular accident (CVA), unspecified mechanism  I63.9 434.91   2. Cognitive communication deficit  R41.841 799.52                              SLP OP Goals       Row Name 23 1600          Subjective Comments    Subjective Comments Patient voiced today he is aware of his deficits and realizing importance of therapy and doing his homework. He acknowledges he is not ready to drive due to his deficits.  -KA        Subjective Pain    Able to rate subjective pain? yes  -KA     Pre-Treatment Pain Level 0  -KA        Written Language Comprehension Goals    Patient will improve comprehension of written language skills by answering written questions related to home management/social/work tasks (bills, recipes, tv guide, emails, procedures) 90%:;without cues  -KA     Status: Patient will improve comprehension of written language skills by answering written questions related to home management/social/work tasks (bills,  recipes, tv guide, emails, procedures) Progressing as expected  -KA     Comments: Patient will improve comprehension of written language skills by answering written questions related to home management/social/work tasks (bills, recipes, tv guide, emails, procedures) Inferences about paragraphs 60% without cues. Answering questions on menu  66% without cues increased processing time required.  -KA        Memory Goals    Patient's memory skills will be enhanced as reported by patient by utilizing internal memory strategies to recall up to 3 pieces of information after a 5- minute delay 90%:;without cues  -KA     Status: Patient's memory skills will be enhanced as reported by patient by utilizing internal memory strategies to recall up to 3 pieces of information after a 5- minute delay --  ongoing  -KA     Comments: Patient's memory skills will be enhanced as reported by patient by utilizing internal memory strategies to recall up to 3 pieces of information after a 5- minute delay Delayed recall of three related words after 5 minute delay 0% without cues and 2/3 with mod cues. Recall of detail from voicemails in immediate recall tasks 70% without cues.  -KA               User Key  (r) = Recorded By, (t) = Taken By, (c) = Cosigned By      Initials Name Provider Type    Ryan Rey MA,CCC-SLP Speech and Language Pathologist                           Time Calculation:   SLP Start Time: 1345  SLP Stop Time: 1430  SLP Time Calculation (min): 45 min  Timed Charges  27240-RS Dev of Cogn Skills Initial Minutes: 15  94488-JG Dev of Cogn Skills Add Minutes: 30  Total Minutes  Timed Charges Total Minutes: 45   Total Minutes: 45    Therapy Charges for Today       Code Description Service Date Service Provider Modifiers Qty    31311094579 HC ST DEV OF COGN SKILLS INITIAL 15 MIN 8/24/2023 Ryan Alcala MA,CCC-SLP KX 1    95754776774 HC ST DEV OF COGN SKILLS EACH ADDT'L 15 MIN 8/24/2023 Ryan Alcala MA,CCC-SLP KX 2                      Ryan Alcala MA,CCC-SLP  8/24/2023

## 2023-08-28 ENCOUNTER — HOSPITAL ENCOUNTER (OUTPATIENT)
Dept: PHYSICAL THERAPY | Facility: HOSPITAL | Age: 73
Setting detail: THERAPIES SERIES
Discharge: HOME | End: 2023-08-28
Payer: MEDICARE

## 2023-08-28 ENCOUNTER — APPOINTMENT (OUTPATIENT)
Dept: SPEECH THERAPY | Facility: HOSPITAL | Age: 73
End: 2023-08-28
Payer: MEDICARE

## 2023-08-28 DIAGNOSIS — Z86.73 HISTORY OF ISCHEMIC STROKE: Primary | ICD-10-CM

## 2023-08-28 DIAGNOSIS — Z74.09 IMPAIRED FUNCTIONAL MOBILITY, BALANCE, GAIT, AND ENDURANCE: ICD-10-CM

## 2023-08-28 DIAGNOSIS — R26.89 FUNCTIONAL GAIT ABNORMALITY: ICD-10-CM

## 2023-08-28 PROCEDURE — 97110 THERAPEUTIC EXERCISES: CPT

## 2023-08-28 NOTE — THERAPY TREATMENT NOTE
Outpatient Physical Therapy Neuro Treatment Note  [facility]     Patient Name: Vernon Solorzano  : 1950  MRN: [id number]  Today's Date: 2023      Visit Date: 2023    Visit Dx:    ICD-10-CM ICD-9-CM   1. History of ischemic stroke  Z86.73 V12.54   2. Impaired functional mobility, balance, gait, and endurance  Z74.09 V49.89   3. Functional gait abnormality  R26.89 781.2       Patient Active Problem List   Diagnosis    DM (diabetes mellitus), type 2    Mixed hyperlipidemia    Mild intermittent asthma without complication    New onset of congestive heart failure    Nonrheumatic mitral valve regurgitation    Left-sided chest pain    Essential hypertension    Mitral valve disease    Acute ischemic left MCA stroke    Atelectasis of both lungs    Pericardial effusion, acute    History of ischemic stroke    Obese    Physical debility    Chronic diastolic CHF (congestive heart failure)    Anemia    Stage 3a chronic kidney disease    Paroxysmal atrial flutter    H/O mitral valve replacement with tissue graft    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 yes  -LB      Pre-Treatment Pain Level 5  -LB      Post-Treatment Pain Level 6  -LB      Subjective Pain Comment knee pain  -LB         Vital Signs    Intra Systolic BP Rehab 117  -LB      Intra Treatment Diastolic BP 65  -LB      Post Systolic BP Rehab 124  -LB      Post Treatment Diastolic BP 68  -LB      Intratreatment Heart Rate (beats/min) 105  -LB      Posttreatment Heart Rate (beats/min) 95  -LB          Posture/Observations    Posture/Observations Comments Pt walked up to unit using a FWW with wife present  -LB         Transfers    Sit-Stand [place] (Transfers) independent  -LB      Stand-Sit [place] (Transfers) independent  -LB      Comment, (Transfers) extra time, complained of knee pain  -LB         Gait/Stairs (Locomotion)    [place] Level (Gait) independent;standby assist  -LB      Distance in Feet (Gait) 240; 160  -LB      Deviations/Abnormal Patterns (Gait) bilateral deviations;base of support, narrow;stride length decreased  -LB      Bilateral Gait Deviations heel strike decreased  -LB      Left Sided Gait Deviations heel strike decreased  -LB      Right Sided Gait Deviations heel strike decreased  -LB      Gait Assessment/Intervention cues for longer steps  -LB         Balance Skills Training    Gait Balance-Level of Assistance Close supervision  -LB      Gait Balance Support Right upper extremity supported;Left upper extremity supported  -LB      Gait Balance Activities side-stepping;backwards  -LB                User Key  (r) = Recorded By, (t) = Taken By, (c) = Cosigned By      Initials Name Provider Type    Lara Duenas PT Physical Therapist                             PT Assessment/Plan       Row Name 08/28/23 6789          PT Assessment    Assessment Comments The patient continues to report of significant amount of fatigue, sleeping most of the day and night and weaker.  Spouse reports pt is not eating well.  Spouse states she might send a message to cardiologist as there is concern about his ejection fracture.  Therapy is alsol limited by knee pain now which is more intense with coming up to stand, performed less weight bearing activities during the session.  -LB               User Key  (r) = Recorded By, (t) = Taken By, (c) = Cosigned By      Initials Name Provider Type    Lara Duenas PT Physical Therapist                        OP Exercises       Row Name 08/28/23 4357  08/28/23 1345          Subjective Comments    Subjective Comments -- I had a bad weekend, sleeping almost all the day, knee pain and trouble on the steps  -LB        Subjective Pain    Able to rate subjective pain? -- yes  -LB     Pre-Treatment Pain Level -- 5  -LB     Post-Treatment Pain Level -- 6  -LB     Subjective Pain Comment -- knee pain  -LB        Total Minutes    18682 - PT Therapeutic Exercise Minutes 40  -LB --        Exercise 1    Exercise Name 1 -- Nu Step  -LB     Time 1 -- 5:30  -LB               User Key  (r) = Recorded By, (t) = Taken By, (c) = Cosigned By      Initials Name Provider Type    LB Lara Cm, PT Physical Therapist                                    Therapy Education  Education Details: Discussed messaging Dr. Arango with the patient feeling so fatigued  Given: Symptoms/condition management  Program: Reinforced  How Provided: Verbal  Provided to: Patient  Level of Understanding: Verbalized              Time Calculation:   Start Time: 1346  Stop Time: 1430  Time Calculation (min): 44 min  Timed Charges  60946 - PT Therapeutic Exercise Minutes: 40  Total Minutes  Timed Charges Total Minutes: 40   Total Minutes: 40   Therapy Charges for Today       Code Description Service Date Service Provider Modifiers Qty    90220192984  PT THER PROC EA 15 MIN 8/28/2023 Lara Cm, PT GP 3                      Lara Cm PT  8/28/2023

## 2023-08-29 ENCOUNTER — TELEPHONE (OUTPATIENT)
Dept: CARDIOLOGY | Facility: CLINIC | Age: 73
End: 2023-08-29
Payer: MEDICARE

## 2023-08-29 NOTE — TELEPHONE ENCOUNTER
----- Message from Natasha Thompson MA sent at 8/29/2023 12:37 PM EDT -----  Regarding: FW: Metoprolol  Contact: [phone]    ----- Message -----  From: Vernon Solorzano  Sent: 8/29/2023  12:13 PM EDT  To: Primitivo Sandhu [facility]  Subject: Metoprolol                                       From PT yesterday, /65.  Last heart rate from PT 8/23.  95

## 2023-08-29 NOTE — TELEPHONE ENCOUNTER
----- Message from DESTIN Lutz sent at 8/29/2023  9:40 AM EDT -----  Regarding: FW: Metoprolol  Contact: [phone]  Can you see how BP and heart rate have been since increasing medication? He was having these symptoms at my visit with him as well, and stated he felt things were improving with rehab/PT.       ----- Message -----  From: Natasha Thompson MA  Sent: 8/29/2023   9:29 AM EDT  To: DESTIN Lutz  Subject: FW: Metoprolol                                     ----- Message -----  From: Vernon Solorzano  Sent: 8/28/2023   8:33 PM EDT  To: Primitivo Sandhu [facility]  Subject: Metoprolol                                         On the last visit with Dr. Fermin, the prescribed dose for MetoprololSuccinate XL was increased to 25 mg., bid. Since that increase was made , Vernon has been extremely lethargic and tired.He complians of being light headed. He often sleeps most of the day.  Please advise. Thanks, Maricel Solorzano

## 2023-08-29 NOTE — TELEPHONE ENCOUNTER
With good BP and HR still in the 90s, would not want to change metoprolol at this time. Would continue to monitor BP and HR at home as well as at PT and bring log to appointment with Dr. Arango on 9/15/23. Would continue rehab as scheduled. Would also make sure he is using CPAP whenever he is sleeping as this can affect energy levels as well.

## 2023-08-30 DIAGNOSIS — Z79.4 TYPE 2 DIABETES MELLITUS WITHOUT COMPLICATION, WITH LONG-TERM CURRENT USE OF INSULIN: Primary | ICD-10-CM

## 2023-08-30 DIAGNOSIS — E11.9 TYPE 2 DIABETES MELLITUS WITHOUT COMPLICATION, WITH LONG-TERM CURRENT USE OF INSULIN: Primary | ICD-10-CM

## 2023-08-30 RX ORDER — INSULIN LISPRO 100 [IU]/ML
5 INJECTION, SOLUTION INTRAVENOUS; SUBCUTANEOUS 3 TIMES DAILY
Qty: 14 ML | Refills: 2 | Status: SHIPPED | OUTPATIENT
Start: 2023-08-30

## 2023-08-31 ENCOUNTER — HOSPITAL ENCOUNTER (OUTPATIENT)
Dept: SPEECH THERAPY | Facility: HOSPITAL | Age: 73
Setting detail: THERAPIES SERIES
Discharge: HOME | End: 2023-08-31
Payer: MEDICARE

## 2023-08-31 ENCOUNTER — HOSPITAL ENCOUNTER (OUTPATIENT)
Dept: PHYSICAL THERAPY | Facility: HOSPITAL | Age: 73
Setting detail: THERAPIES SERIES
Discharge: HOME | End: 2023-08-31
Payer: MEDICARE

## 2023-08-31 DIAGNOSIS — R41.841 COGNITIVE COMMUNICATION DEFICIT: ICD-10-CM

## 2023-08-31 DIAGNOSIS — Z86.73 HISTORY OF ISCHEMIC STROKE: Primary | ICD-10-CM

## 2023-08-31 DIAGNOSIS — I63.9 CEREBROVASCULAR ACCIDENT (CVA), UNSPECIFIED MECHANISM: Primary | ICD-10-CM

## 2023-08-31 DIAGNOSIS — R26.89 FUNCTIONAL GAIT ABNORMALITY: ICD-10-CM

## 2023-08-31 DIAGNOSIS — Z74.09 IMPAIRED FUNCTIONAL MOBILITY, BALANCE, GAIT, AND ENDURANCE: ICD-10-CM

## 2023-08-31 PROCEDURE — 97530 THERAPEUTIC ACTIVITIES: CPT

## 2023-08-31 PROCEDURE — 97129 THER IVNTJ 1ST 15 MIN: CPT | Performed by: SPEECH-LANGUAGE PATHOLOGIST

## 2023-08-31 PROCEDURE — 97130 THER IVNTJ EA ADDL 15 MIN: CPT | Performed by: SPEECH-LANGUAGE PATHOLOGIST

## 2023-08-31 PROCEDURE — 97110 THERAPEUTIC EXERCISES: CPT

## 2023-08-31 NOTE — THERAPY TREATMENT NOTE
Outpatient Speech Language Pathology   Adult Speech Language Cognitive Treatment Note  [facility]     Patient Name: Vernon Solorzano  : 1950  MRN: [id number]  Today's Date: 2023         Visit Date: 2023   Patient Active Problem List   Diagnosis    DM (diabetes mellitus), type 2    Mixed hyperlipidemia    Mild intermittent asthma without complication    New onset of congestive heart failure    Nonrheumatic mitral valve regurgitation    Left-sided chest pain    Essential hypertension    Mitral valve disease    Acute ischemic left MCA stroke    Atelectasis of both lungs    Pericardial effusion, acute    History of ischemic stroke    Obese    Physical debility    Chronic diastolic CHF (congestive heart failure)    Anemia    Stage 3a chronic kidney disease    Paroxysmal atrial flutter    H/O mitral valve replacement with tissue graft    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 Cerebrovascular accident (CVA), unspecified mechanism  I63.9 434.91   2. Cognitive communication deficit  R41.841 799.52                              SLP OP Goals       Row Name 23 1400          Subjective Comments    Subjective Comments Patient continues to voice his awareness of the importance of therapy and his awareness of his deficits. He demonstrated improvement in processing speed today.  -KA        Written Language Comprehension Goals    Patient will improve comprehension of written language skills by answering written questions related to home management/social/work tasks (bills, recipes, tv guide, emails, procedures) 90%:;without cues  -KA     Status: Patient will improve comprehension of written language skills by answering written questions related to home management/social/work tasks (bills, recipes, tv guide, emails, procedures) Progressing as expected  -KA     Comments: Patient will improve  comprehension of written language skills by answering written questions related to home management/social/work tasks (bills, recipes, tv guide, emails, procedures) Inferences about paragraphs 60% without cues. Answering questions   on banking statement 83% without cues  -KA        Memory Goals    Patient's memory skills will be enhanced as reported by patient by utilizing internal memory strategies to recall up to 3 pieces of information after a 5- minute delay 90%:;without cues  -KA     Status: Patient's memory skills will be enhanced as reported by patient by utilizing internal memory strategies to recall up to 3 pieces of information after a 5- minute delay Progressing as expected  -KA     Comments: Patient's memory skills will be enhanced as reported by patient by utilizing internal memory strategies to recall up to 3 pieces of information after a 5- minute delay Delayed recall of 3 related words after 5 minute delay 2/3 66% without cues.  -KA        Problem Solving Goals    Patient will improve ability to analyze problems and determine solutions by correctly sequencing __ steps to complete an activity 90%:;without cues  -KA     Status: Patient will improve ability to analyze problems and determine solutions by correctly sequencing __ steps to complete an activity Progressing as expected  -KA     Comments: Patient will improve ability to analyze problems and determine solutions by correctly sequencing __ steps to complete an activity 5 step functional sequencing task, 90% without cues, significant improvement in processing speed today.  -KA        Attention/Orientation Goals    Patient will improve attention skills by dividing focus and responding simultaneously to multiple tasks or in order to complete task 90%:;without cues  -KA     Status: Patient will improve attention skills by dividing focus and responding simultaneously to multiple tasks or in order to complete task Progressing as expected  -KA      Comments: Patient will improve attention skills by dividing focus and responding simultaneously to multiple tasks or in order to complete task Divided attention task 60% without cues, increased processing speed however improved/  -KA               User Key  (r) = Recorded By, (t) = Taken By, (c) = Cosigned By      Initials Name Provider Type    Ryan Rey MA,CCC-SLP Speech and Language Pathologist                           Time Calculation:   SLP Start Time: 1350  SLP Stop Time: 1435  SLP Time Calculation (min): 45 min  Timed Charges  19993-LO Dev of Cogn Skills Initial Minutes: 15  28392-JF Dev of Cogn Skills Add Minutes: 30  Total Minutes  Timed Charges Total Minutes: 45   Total Minutes: 45    Therapy Charges for Today       Code Description Service Date Service Provider Modifiers Qty    74450301224 HC ST DEV OF COGN SKILLS INITIAL 15 MIN 8/31/2023 Ryan Alcala MA,CCC-SLP KX 1    76791167771 HC ST DEV OF COGN SKILLS EACH ADDT'L 15 MIN 8/31/2023 Ryan Alcala MA,CCC-SLP KX 2                     Ryan Alcala MA,CCC-SLP  8/31/2023

## 2023-08-31 NOTE — THERAPY TREATMENT NOTE
Outpatient Physical Therapy Neuro Treatment Note  [facility]     Patient Name: Vernon Solorzano  : 1950  MRN: [id number]  Today's Date: 2023      Visit Date: 2023    Visit Dx:    ICD-10-CM ICD-9-CM   1. History of ischemic stroke  Z86.73 V12.54   2. Impaired functional mobility, balance, gait, and endurance  Z74.09 V49.89   3. Functional gait abnormality  R26.89 781.2       Patient Active Problem List   Diagnosis    DM (diabetes mellitus), type 2    Mixed hyperlipidemia    Mild intermittent asthma without complication    New onset of congestive heart failure    Nonrheumatic mitral valve regurgitation    Left-sided chest pain    Essential hypertension    Mitral valve disease    Acute ischemic left MCA stroke    Atelectasis of both lungs    Pericardial effusion, acute    History of ischemic stroke    Obese    Physical debility    Chronic diastolic CHF (congestive heart failure)    Anemia    Stage 3a chronic kidney disease    Paroxysmal atrial flutter    H/O mitral valve replacement with tissue graft    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 I tired of being sick and tired  -LB         Precautions and Contraindications    Precautions/Limitations fall precautions  -LB      Precautions Monitor HR  -LB         Subjective Pain    Able to rate subjective pain? yes  -LB      Pre-Treatment Pain Level 4  -LB      Post-Treatment Pain Level 4  -LB      Subjective Pain Comment knee  -LB         Vital Signs    Pre Systolic BP Rehab 142  -LB      Pre Treatment Diastolic BP 82  -LB      Intratreatment Heart Rate (beats/min) 106  -LB         Cognition    Arousal/Alertness Appropriate responses to stimuli  -LB      Memory Decreased recall of recent events;Decreased short term memory  -LB         Posture/Observations     Posture/Observations Comments Pt walked up to unit using a FWW with wife present  -LB         Transfers    Sit-Stand [place] (Transfers) independent  -LB      Stand-Sit [place] (Transfers) independent  -LB      Comment, (Transfers) pt complained of knee pain with transition coming up to stand  -LB         Gait/Stairs (Locomotion)    [place] Level (Gait) independent;standby assist  -LB      Assistive Device (Gait) --  no AD  -LB      Distance in Feet (Gait) 250; 200  -LB      Deviations/Abnormal Patterns (Gait) stride length decreased  -LB      Bilateral Gait Deviations forward flexed posture;decreased arm swing  -LB      Gait Assessment/Intervention Pt was displaying longer step length without any cueing  -LB         Balance Skills Training    Standing Balance # of Minutes Pt stood for 3 mins performing a bean bag toss with weight shifting activities and SBA.  -LB                User Key  (r) = Recorded By, (t) = Taken By, (c) = Cosigned By      Initials Name Provider Type    Lara Duenas PT Physical Therapist                             PT Assessment/Plan       Row Name 08/31/23 1620          PT Assessment    Assessment Comments The patient reports that he can stay and sleep in bed for up to 12 hours.  Discussed again, that he needs to work on increasing his time out of bed, find any activity that motivates himself.  Discussed taking care of the fish in his pond, reading magazines that he enjoyed previously or of the like to be out of bed more during the day.  In regards to mobility, the patient displayed a stronger walk with longer stride length, foot clearance and push off.  -LB               User Key  (r) = Recorded By, (t) = Taken By, (c) = Cosigned By      Initials Name Provider Type    Lara Duenas PT Physical Therapist                        OP Exercises       Row Name 08/31/23 1623 08/31/23 1400          Subjective Comments    Subjective Comments -- I need to be up doing more. I  tired of being sick and tired  -LB        Subjective Pain    Able to rate subjective pain? -- yes  -LB     Pre-Treatment Pain Level -- 4  -LB     Post-Treatment Pain Level -- 4  -LB     Subjective Pain Comment -- knee  -LB        Total Minutes    15792 - PT Therapeutic Exercise Minutes 10  -LB --     22419 - PT Therapeutic Activity Minutes 30  -LB --        Exercise 2    Exercise Name 2 -- Standing hip abd/ flexion  -LB     Sets 2 -- 2  -LB     Reps 2 -- 10  -LB        Exercise 3    Exercise Name 3 -- standing knee flexion  -LB     Sets 3 -- 2  -LB     Reps 3 -- 10  -LB        Exercise 4    Exercise Name 4 -- standing heelraises  -LB     Sets 4 -- 2  -LB     Reps 4 -- 10  -LB               User Key  (r) = Recorded By, (t) = Taken By, (c) = Cosigned By      Initials Name Provider Type    Lara Duenas PT Physical Therapist                                    Therapy Education  Education Details: BP readings which were slightly higher  Given: Symptoms/condition management  Program: Reinforced  How Provided: Verbal  Provided to: Patient  Level of Understanding: Verbalized              Time Calculation:   Start Time: 1432  Stop Time: 1515  Time Calculation (min): 43 min  Timed Charges  77849 - PT Therapeutic Exercise Minutes: 10  84461 - PT Therapeutic Activity Minutes: 30  Total Minutes  Timed Charges Total Minutes: 40   Total Minutes: 40   Therapy Charges for Today       Code Description Service Date Service Provider Modifiers Qty    12465822725  PT THER PROC EA 15 MIN 8/31/2023 Lara Cm, PT GP 1    [id number]  PT THERAPEUTIC ACT EA 15 MIN 8/31/2023 Lara Cm PT GP 2                      Lara Cm PT  8/31/2023

## 2023-09-06 NOTE — PROGRESS NOTES
"DOS: 2023  NAME: Vernon Solorzano   : 1950  PCP: Liza Oconnell III [facility]  Chief Complaint   Patient presents with    Stroke     Hospital f/u       Chief complaint: Fatigue, shortness of air  Subjective: 72-year-old man seen by me in the hospital after he underwent a mitral valve replacement and tricuspid valve repair as well as left atrial appendage closure.  Postoperatively he had persistent aphasia with some fluctuations.  MRI ultimately showed small bilateral embolic appearing infarcts.  He made a good recovery neurologically.  Since going home he complains of persistent fatigue and more recently in the last week shortness of air.  He denies word-finding trouble or any other neurologic deficits but does complain of limited short-term memory and concentration.  Of note there is suspicion for underlying sleep apnea and he has a repeat home sleep study pending for later this week.  He continues on Eliquis for a flutter but it is not clear if he needs this long-term.    Objective:  Vital signs: /70   Pulse 104   Ht 185.4 cm (73\")   Wt 115 kg (254 lb)   SpO2 97%   BMI 33.51 kg/m²    Exam:  vitals reviewed, appears fatigued  MS: oriented x3, recent/remote memory intact, normal attention/concentration, language intact, no neglect.  CN: visual acuity grossly normal, visual fields full, PERRL, EOMI, no facial droop, no dysarthria  Motor: 5/5 throughout upper and lower extremities, normal tone  Sensory: Mildly diminished vibration distal left greater than right lower extremity  Gait: Normal station, no ataxia    Laboratory results:  Lab Results   Component Value Date    LDL 47 2023    LDL 81 2023    LDL 59 2023            Review of labs: Most recent LDL was 47 last month    Review and interpretation of imaging: I personally reviewed his MRI of the brain from the hospital which showed small bilateral embolic infarcts.  CTA head and neck was " negative.    Diagnoses:  Stroke, bilateral middle cerebral artery, embolic, no residual deficits  Memory loss  Fatigue  Shortness of air  Cardiomyopathy  Status post mitral valve replacement and tricuspid valve repair    Assessment/comments: With regard to patient's strokes he essentially has recovered without residual deficits.  There certainly may be some contribution to his short-term memory and fatigue from these small chronic strokes however I am more concerned about his reduced ejection fraction and exertional dyspnea.  I also agree with pursuing a diagnosis of obstructive sleep apnea and he has an upcoming home sleep study this Thursday.    Plan:  1.  Defer to cardiology duration of Eliquis.  Patient's strokes were felt to be postsurgical given the type of operation.  2.  Following up with cardiology for cardiomyopathy and dyspnea  3.  Agree with sleep study    I spent a total of 35 minutes on this clinical encounter including chart/records review, review of laboratory data, personal review of imaging studies, patient counseling, and care coordination.    Follow-up with me on an as-needed basis

## 2023-09-07 ENCOUNTER — HOSPITAL ENCOUNTER (OUTPATIENT)
Dept: PHYSICAL THERAPY | Facility: HOSPITAL | Age: 73
Setting detail: THERAPIES SERIES
Discharge: HOME | End: 2023-09-07
Payer: MEDICARE

## 2023-09-07 ENCOUNTER — HOSPITAL ENCOUNTER (OUTPATIENT)
Dept: SPEECH THERAPY | Facility: HOSPITAL | Age: 73
Setting detail: THERAPIES SERIES
Discharge: HOME | End: 2023-09-07
Payer: MEDICARE

## 2023-09-07 DIAGNOSIS — Z74.09 IMPAIRED FUNCTIONAL MOBILITY, BALANCE, GAIT, AND ENDURANCE: ICD-10-CM

## 2023-09-07 DIAGNOSIS — R41.841 COGNITIVE COMMUNICATION DEFICIT: ICD-10-CM

## 2023-09-07 DIAGNOSIS — R26.89 FUNCTIONAL GAIT ABNORMALITY: ICD-10-CM

## 2023-09-07 DIAGNOSIS — I63.9 CEREBROVASCULAR ACCIDENT (CVA), UNSPECIFIED MECHANISM: Primary | ICD-10-CM

## 2023-09-07 DIAGNOSIS — Z86.73 HISTORY OF ISCHEMIC STROKE: Primary | ICD-10-CM

## 2023-09-07 PROCEDURE — 97110 THERAPEUTIC EXERCISES: CPT

## 2023-09-07 PROCEDURE — 97129 THER IVNTJ 1ST 15 MIN: CPT | Performed by: SPEECH-LANGUAGE PATHOLOGIST

## 2023-09-07 PROCEDURE — 97130 THER IVNTJ EA ADDL 15 MIN: CPT | Performed by: SPEECH-LANGUAGE PATHOLOGIST

## 2023-09-07 PROCEDURE — 97530 THERAPEUTIC ACTIVITIES: CPT

## 2023-09-07 NOTE — THERAPY PROGRESS REPORT/RE-CERT
Outpatient Physical Therapy Neuro Progress Note  [facility]     Patient Name: Vernon Solorzano  : 1950  MRN: [id number]  Today's Date: 2023      Visit Date: 2023    Visit Dx:    ICD-10-CM ICD-9-CM   1. History of ischemic stroke  Z86.73 V12.54   2. Impaired functional mobility, balance, gait, and endurance  Z74.09 V49.89   3. Functional gait abnormality  R26.89 781.2       Patient Active Problem List   Diagnosis    DM (diabetes mellitus), type 2    Mixed hyperlipidemia    Mild intermittent asthma without complication    New onset of congestive heart failure    Nonrheumatic mitral valve regurgitation    Left-sided chest pain    Essential hypertension    Mitral valve disease    Acute ischemic left MCA stroke    Atelectasis of both lungs    Pericardial effusion, acute    History of ischemic stroke    Obese    Physical debility    Chronic diastolic CHF (congestive heart failure)    Anemia    Stage 3a chronic kidney disease    Paroxysmal atrial flutter    H/O mitral valve replacement with tissue graft    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  I was reaching for thedog [employer]  -LB         Precautions and Contraindications    Precautions/Limitations fall precautions  -LB      Precautions Monitor HR  -LB         Subjective Pain    Able to rate subjective pain? yes  -LB      Pre-Treatment Pain Level 4  -LB      Post-Treatment Pain Level 4  -LB         Vital Signs    Intratreatment Heart Rate (beats/min) 105  -LB         Cognition    Arousal/Alertness Appropriate responses to stimuli  -LB      Memory Decreased recall of recent events;Decreased short term memory  -LB      Safety Judgment Good awareness of safety precautions  -LB         Posture/Observations    Posture/Observations Comments Pt walked up to unit  using a FWW  -LB         Transfers    Sit-Stand [place] (Transfers) independent  -LB      Stand-Sit [place] (Transfers) independent  -LB      Comment, (Transfers) slower because of knee pain  -LB         Gait/Stairs (Locomotion)    [place] Level (Gait) independent;standby assist  -LB      Distance in Feet (Gait) 300; 150  -LB      Deviations/Abnormal Patterns (Gait) stride length decreased  -LB      Bilateral Gait Deviations forward flexed posture;decreased arm swing  -LB      [place] Level (Stairs) supervision  -LB      Handrail Location (Stairs) left side (ascending);right side (descending)  -LB      Number of Steps (Stairs) 8  -LB      Ascending Technique (Stairs) step-to-step  -LB      Descending Technique (Stairs) step-to-step  -LB         Balance Skills Training    Balance Comments See Tinetti and TUG  -LB                User Key  (r) = Recorded By, (t) = Taken By, (c) = Cosigned By      Initials Name Provider Type    Lara Duenas PT Physical Therapist                             PT Assessment/Plan       Row Name 09/07/23 1504          PT Assessment    Assessment Comments The patient has shown progress in balance with increased score of the Tinetti with the score on TUG remaining the same.  The patient did well on stairs but still holds onto one railing with both hands and ascending and descending sideways.  The patient displayed improved endurance with spending 10 mins on the Nustep with 2 short breaks.  The patient questions about driving.  The patient has strength to drive in [place] but question his concentration and reaction speed. Encouraged pt to discuss with neurologist.  HR stayed in 90's during activity.  Discussed transitioning to cardiac rehab  -LB               User Key  (r) = Recorded By, (t) = Taken By, (c) = Cosigned By      Initials Name Provider Type    Lara Duenas PT Physical Therapist                        OP Exercises       Row Name 09/07/23 4370 09/07/23 6123           Subjective Comments    Subjective Comments -- I fell the other day outside.  I was reaching for thedog leash  -LB        Subjective Pain    Able to rate subjective pain? -- yes  -LB     Pre-Treatment Pain Level -- 4  -LB     Post-Treatment Pain Level -- 4  -LB        Total Minutes    99792 - PT Therapeutic Exercise Minutes 24  -LB --     12257 - PT Therapeutic Activity Minutes 16  -LB --        Exercise 1    Exercise Name 1 -- Nu Step  -LB     Time 1 -- 10 mins  -LB               User Key  (r) = Recorded By, (t) = Taken By, (c) = Cosigned By      Initials Name Provider Type    LB Lara Cm, PT Physical Therapist                                 PT OP Goals       Row Name 09/07/23 1500          PT Short Term Goals    STG 5 Pt to tolerate Nustep for 5 mins on level 3 or above with HR below 115 to indicate improved endurance  -LB     STG 5 Progress Met  -LB        Long Term Goals    LTG Date to Achieve 10/02/23  -LB     LTG 1 Pt to be indep with advanced HEP for strength and balance  -LB     LTG 1 Progress Ongoing  -LB     LTG 2 Pt to be indep with sit to stand from various surfaces and chairs  -LB     LTG 2 Progress Met  -LB     LTG 3 Pt to ambulate 300 ft with least restrictive AD and Mod Indep  -LB     LTG 3 Progress Met  -LB     LTG 4 Pt to negogiate flight of stairs with 1 HR and Mod Indep  -LB     LTG 4 Progress Progressing  -LB     LTG 5 Pt to tolerate Nutstep for 10 mins level 5 or greater with HR less than 100 indicating improved endurance  -LB     LTG 5 Progress Progressing  -LB     LTG 5 Progress Comments 10 mins level 3  -LB     LTG 6 Pt to improve Tinetti score by 7 points to indicate improved balance  -LB     LTG 6 Progress Met  -LB     LTG 7 Pt to complete TUG in less than 12 secs to indicate less risk for falls  -LB     LTG 7 Progress Ongoing;Progressing  -LB     LTG 8 Pt to ambulate 5 mins without stapping  -LB     LTG 8 Progress Ongoing  -LB        Time Calculation    PT Goal Re-Cert Due  "Date 10/05/23  -LB               User Key  (r) = Recorded By, (t) = Taken By, (c) = Cosigned By      Initials Name Provider Type    Lara Duenas PT Physical Therapist                    Therapy Education  Education Details: POC, Discussed transitioning to cardiac rehab       Tinetti Assessment  Sitting Balance: Steady,safe  Arises: Able in 1 attempt  Attempts to Rise: Able in 1 attempt  Immediate Standing Balance (first 5 sec): Steady without support  Standing Balance: Steady, stance > 4 inch CROW & requires support  Sternal Nudge (feet close together): Steady  Eyes Closed (feet close together): Steady  Turning 360 Degrees- Steps: Continuous steps  Turning 360 Degrees- Steadiness: Steady  Sitting Down: Uses arms or not a smooth motion  Tinetti Balance Score: 14  Gait Initiation (immediate after told \"go\"): No hesitancy  Step Length- Right Swing: Right swing foot passes Left stance leg  Step Length- Left Swing: Left swing foot passes Right  Foot Clearance- Right Foot: Right foot does not completely clear floor  Foot Clearance- Left Foot: Left foot does not completely clear floor  Step Symmetry: Right and Left step length equal  Step Continuity: Steps appear continuous  Path (excursion): Marked deviation  Trunk: No sway but knee or trunk flexion or spread arms while walking  Base of Support: Heels close while walking  Gait Score: 7  Tinetti Total Score: 21  Tinetti Assessment Comments: no AD  Timed Up and Go (TUG)  TUG Test 1: 12 seconds  Timed Up and Go Comments: no AD      Time Calculation:   Start Time: 1432  Stop Time: 1515  Time Calculation (min): 43 min  Timed Charges  00045 - PT Therapeutic Exercise Minutes: 24  99337 - PT Therapeutic Activity Minutes: 16  Total Minutes  Timed Charges Total Minutes: 40   Total Minutes: 40   Therapy Charges for Today       Code Description Service Date Service Provider Modifiers Qty    93272383356  PT THER PROC EA 15 MIN 9/7/2023 Lara Cm PT GP 2    [id number]  PT " THERAPEUTIC ACT EA 15 MIN 9/7/2023 Lara Cm, PT GP 1            PT G-Codes  Tinetti Total Score: 21  TUG Test 1: 12 seconds         Lara Cm, PT  9/7/2023

## 2023-09-07 NOTE — THERAPY TREATMENT NOTE
Outpatient Speech Language Pathology   Adult Speech Language Cognitive Treatment Note  [facility]     Patient Name: Vernon Solorzano  : 1950  MRN: [id number]  Today's Date: 2023         Visit Date: 2023   Patient Active Problem List   Diagnosis    DM (diabetes mellitus), type 2    Mixed hyperlipidemia    Mild intermittent asthma without complication    New onset of congestive heart failure    Nonrheumatic mitral valve regurgitation    Left-sided chest pain    Essential hypertension    Mitral valve disease    Acute ischemic left MCA stroke    Atelectasis of both lungs    Pericardial effusion, acute    History of ischemic stroke    Obese    Physical debility    Chronic diastolic CHF (congestive heart failure)    Anemia    Stage 3a chronic kidney disease    Paroxysmal atrial flutter    H/O mitral valve replacement with tissue graft    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 Cerebrovascular accident (CVA), unspecified mechanism  I63.9 434.91   2. Cognitive communication deficit  R41.841 799.52                              SLP OP Goals       Row Name 23 1600          Subjective Comments    Subjective Comments Patient is pleasant and cooperative  -KA        Subjective Pain    Able to rate subjective pain? yes  -KA     Pre-Treatment Pain Level 0  -KA     Post-Treatment Pain Level 0  -KA        Written Language Comprehension Goals    Patient will improve comprehension of written language skills by answering written questions related to home management/social/work tasks (bills, recipes, tv guide, emails, procedures) 90%:;without cues  -KA     Status: Patient will improve comprehension of written language skills by answering written questions related to home management/social/work tasks (bills, recipes, tv guide, emails, procedures) Progressing as expected  -KA     Comments: Patient will  improve comprehension of written language skills by answering written questions related to home management/social/work tasks (bills, recipes, tv guide, emails, procedures) Answering simple questions on calendar task 90% without cues, increased processing time with pt taking approximately 8 minutes to complete task  -KA        Verbal Expression Goals    Patient will improve verbal expressive language skills by completing convergent naming tasks 90%:;without cues  -KA     Status: Patient will improve verbal expressive language skills by completing convergent naming tasks Progressing as expected  -KA     Comments: Patient will improve verbal expressive language skills by completing convergent naming tasks word deductions 73% without cues  -KA        Problem Solving Goals    Patient will improve ability to analyze problems and determine solutions by correctly sequencing __ steps to complete an activity 90%:;without cues  -KA     Status: Patient will improve ability to analyze problems and determine solutions by correctly sequencing __ steps to complete an activity --  ongoing  -KA     Comments: Patient will improve ability to analyze problems and determine solutions by correctly sequencing __ steps to complete an activity 5 step functional sequencing task,60% without cues and 100% with min cues.  -KA               User Key  (r) = Recorded By, (t) = Taken By, (c) = Cosigned By      Initials Name Provider Type    Ryan Rey MA,CCC-SLP Speech and Language Pathologist                           Time Calculation:   SLP Start Time: 1345  SLP Stop Time: 1430  SLP Time Calculation (min): 45 min  Timed Charges  32643-NR Dev of Cogn Skills Initial Minutes: 15  95230-MU Dev of Cogn Skills Add Minutes: 30  Total Minutes  Timed Charges Total Minutes: 45   Total Minutes: 45    Therapy Charges for Today       Code Description Service Date Service Provider Modifiers Qty    45350061604  ST DEV OF COGN SKILLS INITIAL 15 MIN  9/7/2023 Ryan Alcala MA,CCC-SLP KX 1    [id number] [facility] DEV OF COGN SKILLS EACH ADDT'L 15 MIN 9/7/2023 Ryan Alcala MA,CCC-SLP KX 2                     Ryan Alcala MA,ARMANDO-SLP  9/7/2023

## 2023-09-07 NOTE — THERAPY TREATMENT NOTE
Outpatient Speech Language Pathology   Adult Speech Language Cognitive Treatment Note  [facility]     Patient Name: Vernon Solorzano  : 1950  MRN: [id number]  Today's Date: 2023         Visit Date: 2023   Patient Active Problem List   Diagnosis    DM (diabetes mellitus), type 2    Mixed hyperlipidemia    Mild intermittent asthma without complication    New onset of congestive heart failure    Nonrheumatic mitral valve regurgitation    Left-sided chest pain    Essential hypertension    Mitral valve disease    Acute ischemic left MCA stroke    Atelectasis of both lungs    Pericardial effusion, acute    History of ischemic stroke    Obese    Physical debility    Chronic diastolic CHF (congestive heart failure)    Anemia    Stage 3a chronic kidney disease    Paroxysmal atrial flutter    H/O mitral valve replacement with tissue graft    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 Cerebrovascular accident (CVA), unspecified mechanism  I63.9 434.91   2. Cognitive communication deficit  R41.841 799.52                              SLP OP Goals       Row Name 23 1600          Subjective Comments    Subjective Comments Patient is pleasant and cooperative  -KA        Subjective Pain    Able to rate subjective pain? yes  -KA     Pre-Treatment Pain Level 0  -KA     Post-Treatment Pain Level 0  -KA        Written Language Comprehension Goals    Patient will improve comprehension of written language skills by answering written questions related to home management/social/work tasks (bills, recipes, tv guide, emails, procedures) 90%:;without cues  -KA     Status: Patient will improve comprehension of written language skills by answering written questions related to home management/social/work tasks (bills, recipes, tv guide, emails, procedures) Progressing as expected  -KA     Comments: Patient will  improve comprehension of written language skills by answering written questions related to home management/social/work tasks (bills, recipes, tv guide, emails, procedures) Answering simple questions on calendar task 90% without cues, increased processing time with pt taking approximately 8 minutes to complete task  -KA        Verbal Expression Goals    Patient will improve verbal expressive language skills by completing convergent naming tasks 90%:;without cues  -KA     Status: Patient will improve verbal expressive language skills by completing convergent naming tasks Progressing as expected  -KA     Comments: Patient will improve verbal expressive language skills by completing convergent naming tasks word deductions 73% without cues  -KA        Problem Solving Goals    Patient will improve ability to analyze problems and determine solutions by correctly sequencing __ steps to complete an activity 90%:;without cues  -KA     Status: Patient will improve ability to analyze problems and determine solutions by correctly sequencing __ steps to complete an activity --  ongoing  -KA     Comments: Patient will improve ability to analyze problems and determine solutions by correctly sequencing __ steps to complete an activity 5 step functional sequencing task,60% without cues and 100% with min cues.  -KA               User Key  (r) = Recorded By, (t) = Taken By, (c) = Cosigned By      Initials Name Provider Type    Ryan Rey MA,CCC-SLP Speech and Language Pathologist                           Time Calculation:   SLP Start Time: 1345  SLP Stop Time: 1430  SLP Time Calculation (min): 45 min  Timed Charges  16016-AP Dev of Cogn Skills Initial Minutes: 15  23665-ZC Dev of Cogn Skills Add Minutes: 30  Total Minutes  Timed Charges Total Minutes: 45   Total Minutes: 45    Therapy Charges for Today       Code Description Service Date Service Provider Modifiers Qty    24976794284  ST DEV OF COGN SKILLS INITIAL 15 MIN  9/7/2023 Ryan Alcala MA,CCC-SLP  1    [id number] [facility] DEV OF COGN SKILLS EACH ADDT'L 15 MIN 9/7/2023 Ryan Alcala MA,CCC-SLP  2                     Ryan Alcala MA,CCC-SLP  9/7/2023

## 2023-09-11 ENCOUNTER — HOSPITAL ENCOUNTER (OUTPATIENT)
Dept: SPEECH THERAPY | Facility: HOSPITAL | Age: 73
Setting detail: THERAPIES SERIES
Discharge: HOME | End: 2023-09-11
Payer: MEDICARE

## 2023-09-11 ENCOUNTER — OFFICE VISIT (OUTPATIENT)
Dept: NEUROLOGY | Facility: CLINIC | Age: 73
End: 2023-09-11
Payer: MEDICARE

## 2023-09-11 ENCOUNTER — HOSPITAL ENCOUNTER (OUTPATIENT)
Dept: PHYSICAL THERAPY | Facility: HOSPITAL | Age: 73
Setting detail: THERAPIES SERIES
Discharge: HOME | End: 2023-09-11
Payer: MEDICARE

## 2023-09-11 VITALS
HEIGHT: 73 IN | WEIGHT: 254 LBS | SYSTOLIC BLOOD PRESSURE: 120 MMHG | BODY MASS INDEX: 33.66 KG/M2 | DIASTOLIC BLOOD PRESSURE: 70 MMHG | HEART RATE: 104 BPM | OXYGEN SATURATION: 97 %

## 2023-09-11 DIAGNOSIS — I69.30 CHRONIC ARTERIAL ISCHEMIC STROKE: Primary | ICD-10-CM

## 2023-09-11 DIAGNOSIS — Z86.73 HISTORY OF ISCHEMIC STROKE: Primary | ICD-10-CM

## 2023-09-11 DIAGNOSIS — R26.89 FUNCTIONAL GAIT ABNORMALITY: ICD-10-CM

## 2023-09-11 DIAGNOSIS — R41.841 COGNITIVE COMMUNICATION DEFICIT: ICD-10-CM

## 2023-09-11 DIAGNOSIS — I63.9 CEREBROVASCULAR ACCIDENT (CVA), UNSPECIFIED MECHANISM: Primary | ICD-10-CM

## 2023-09-11 DIAGNOSIS — Z74.09 IMPAIRED FUNCTIONAL MOBILITY, BALANCE, GAIT, AND ENDURANCE: ICD-10-CM

## 2023-09-11 DIAGNOSIS — I48.92 PAROXYSMAL ATRIAL FLUTTER: ICD-10-CM

## 2023-09-11 PROCEDURE — 97110 THERAPEUTIC EXERCISES: CPT

## 2023-09-11 PROCEDURE — 97530 THERAPEUTIC ACTIVITIES: CPT

## 2023-09-11 PROCEDURE — 97130 THER IVNTJ EA ADDL 15 MIN: CPT

## 2023-09-11 PROCEDURE — 97129 THER IVNTJ 1ST 15 MIN: CPT

## 2023-09-11 RX ORDER — ALBUTEROL SULFATE 90 UG/1
2 AEROSOL, METERED RESPIRATORY (INHALATION) EVERY 4 HOURS PRN
COMMUNITY

## 2023-09-11 NOTE — LETTER
"2023       No Recipients    Patient: Vernon Solorzano   YOB: 1950   Date of Visit: 2023     Dear RACHAEL Ramírez III:       Thank you for referring Vernon Solorzano to me for evaluation. Below are the relevant portions of my assessment and plan of care.    If you have questions, please do not hesitate to call me. I look forward to following Vernon along with you.         Sincerely,        Jim Sage MD        CC:   No Recipients    Jim Sage MD  23 1237  Sign when Signing Visit  DOS: 2023  NAME: Vernon Solorzano   : 1950  PCP: Liza Oconnell III, NP-C  Chief Complaint   Patient presents with    Stroke     Hospital f/u       Chief complaint: Fatigue, shortness of air  Subjective: 72-year-old man seen by me in the hospital after he underwent a mitral valve replacement and tricuspid valve repair as well as left atrial appendage closure.  Postoperatively he had persistent aphasia with some fluctuations.  MRI ultimately showed small bilateral embolic appearing infarcts.  He made a good recovery neurologically.  Since going home he complains of persistent fatigue and more recently in the last week shortness of air.  He denies word-finding trouble or any other neurologic deficits but does complain of limited short-term memory and concentration.  Of note there is suspicion for underlying sleep apnea and he has a repeat home sleep study pending for later this week.  He continues on Eliquis for a flutter but it is not clear if he needs this long-term.    Objective:  Vital signs: /70   Pulse 104   Ht 185.4 cm (73\")   Wt 115 kg (254 lb)   SpO2 97%   BMI 33.51 kg/m²    Exam:  vitals reviewed, appears fatigued  MS: oriented x3, recent/remote memory intact, normal attention/concentration, language intact, no neglect.  CN: visual acuity grossly normal, visual fields full, PERRL, EOMI, no facial droop, no " dysarthria  Motor: 5/5 throughout upper and lower extremities, normal tone  Sensory: Mildly diminished vibration distal left greater than right lower extremity  Gait: Normal station, no ataxia    Laboratory results:  Lab Results   Component Value Date    LDL 47 08/17/2023    LDL 81 05/23/2023    LDL 59 03/29/2023            Review of labs: Most recent LDL was 47 last month    Review and interpretation of imaging: I personally reviewed his MRI of the brain from the hospital which showed small bilateral embolic infarcts.  CTA head and neck was negative.    Diagnoses:  Stroke, bilateral middle cerebral artery, embolic, no residual deficits  Memory loss  Fatigue  Shortness of air  Cardiomyopathy  Status post mitral valve replacement and tricuspid valve repair    Assessment/comments: With regard to patient's strokes he essentially has recovered without residual deficits.  There certainly may be some contribution to his short-term memory and fatigue from these small chronic strokes however I am more concerned about his reduced ejection fraction and exertional dyspnea.  I also agree with pursuing a diagnosis of obstructive sleep apnea and he has an upcoming home sleep study this Thursday.    Plan:  1.  Defer to cardiology duration of Eliquis.  Patient's strokes were felt to be postsurgical given the type of operation.  2.  Following up with cardiology for cardiomyopathy and dyspnea  3.  Agree with sleep study    I spent a total of 35 minutes on this clinical encounter including chart/records review, review of laboratory data, personal review of imaging studies, patient counseling, and care coordination.    Follow-up with me on an as-needed basis

## 2023-09-11 NOTE — THERAPY TREATMENT NOTE
Outpatient Physical Therapy Neuro Treatment Note  [facility]     Patient Name: Vernon Solorzano  : 1950  MRN: [id number]  Today's Date: 2023      Visit Date: 2023    Visit Dx:    ICD-10-CM ICD-9-CM   1. History of ischemic stroke  Z86.73 V12.54   2. Impaired functional mobility, balance, gait, and endurance  Z74.09 V49.89   3. Functional gait abnormality  R26.89 781.2       Patient Active Problem List   Diagnosis    DM (diabetes mellitus), type 2    Mixed hyperlipidemia    Mild intermittent asthma without complication    New onset of congestive heart failure    Nonrheumatic mitral valve regurgitation    Left-sided chest pain    Essential hypertension    Mitral valve disease    Acute ischemic left MCA stroke    Atelectasis of both lungs    Pericardial effusion, acute    History of ischemic stroke    Obese    Physical debility    Chronic diastolic CHF (congestive heart failure)    Anemia    Stage 3a chronic kidney disease    Paroxysmal atrial flutter    H/O mitral valve replacement with tissue graft    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 yes  -LB      Pre-Treatment Pain Level 4  -LB      Post-Treatment Pain Level 4  -LB      Subjective Pain Comment knee  -LB         Cognition    Overall Cognitive Status --  Pt did not know it was Monday  -LB         Posture/Observations    Posture/Observations Comments Pt walked up to unit using a FWW  -LB         Transfers    Sit-Stand Arbuckle (Transfers) independent  -LB      Stand-Sit Arbuckle (Transfers) independent  -LB         Gait/Stairs (Locomotion)    Arbuckle Level (Gait) independent  -LB       Distance in Feet (Gait) 250; 50 x 2  -LB      Deviations/Abnormal Patterns (Gait) stride length decreased  -LB      Bilateral Gait Deviations forward flexed posture;decreased arm swing  -LB         Balance Skills Training    Standing-Level of Assistance Independent  -LB      Gait Balance-Level of Assistance Independent  -LB                User Key  (r) = Recorded By, (t) = Taken By, (c) = Cosigned By      Initials Name Provider Type    Lara Duenas PT Physical Therapist                             PT Assessment/Plan       Row Name 09/11/23 1433          PT Assessment    Assessment Comments The patient had neurologist appointment earlier today and MD reports good recover from CVA.  Discussed again with patient and spouse about transitioning to cardiac rehab.  Reinforced the the concept of performing HEP at home, the same as able to complete here.  Spouse has been in contact with cardiac rehab.  Pt had some left knee pain but was able to complete all activities including step ups and multiple sit to stands.  Pt is indep with walking and not utilizes walker during session.  Will review HEP for discharge next session.  -LB               User Key  (r) = Recorded By, (t) = Taken By, (c) = Cosigned By      Initials Name Provider Type    Lara Duenas PT Physical Therapist                        OP Exercises       Row Name 09/11/23 1445 09/11/23 1430          Subjective Comments    Subjective Comments -- I know I need to do more at home  -LB        Subjective Pain    Able to rate subjective pain? -- yes  -LB     Pre-Treatment Pain Level -- 4  -LB     Post-Treatment Pain Level -- 4  -LB     Subjective Pain Comment -- knee  -LB        Total Minutes    73439 - PT Therapeutic Exercise Minutes 30  -LB --     32116 - PT Therapeutic Activity Minutes 10  -LB --        Exercise 6    Exercise Name 6 -- STS from high surface  -LB     Sets 6 -- 2  -LB     Reps 6 -- 5  -LB     Additional Comments -- pt held onto 4# ball  peformed shoulder flexion  -LB        Exercise 10    Exercise Name 10 -- alternating toe taps  -LB     Reps 10 -- 10  -LB        Exercise 14    Exercise Name 14 -- Forward step ups with alternate hip flexion  -LB     Sets 14 -- 3  -LB     Reps 14 -- 5  -LB     Additional Comments -- UE support  -LB        Exercise 15    Exercise Name 15 -- Standing rows with scapular retraction  -LB     Sets 15 -- 1  -LB     Reps 15 -- 15  -LB     Additional Comments -- Red Tband  -LB               User Key  (r) = Recorded By, (t) = Taken By, (c) = Cosigned By      Initials Name Provider Type    Lara Duenas, PT Physical Therapist                                    Therapy Education  Education Details: reinforced the need to perform both physical and cognitive activities at home  Given: HEP  Program: Reinforced  How Provided: Verbal  Provided to: Patient, Caregiver  Level of Understanding: Verbalized              Time Calculation:   Start Time: 1347  Stop Time: 1430  Time Calculation (min): 43 min  Timed Charges  48497 - PT Therapeutic Exercise Minutes: 30  12273 - PT Therapeutic Activity Minutes: 10  Total Minutes  Timed Charges Total Minutes: 40   Total Minutes: 40   Therapy Charges for Today       Code Description Service Date Service Provider Modifiers Qty    [id number]  PT THER PROC EA 15 MIN 9/11/2023 Lara Cm, PT GP 2    [id number]  PT THERAPEUTIC ACT EA 15 MIN 9/11/2023 Lara Cm, PT GP 1                      Lara Cm PT  9/11/2023

## 2023-09-11 NOTE — THERAPY PROGRESS REPORT/RE-CERT
Outpatient Speech Language Pathology   Adult Speech Language Cognitive Progress Note  [facility]     Patient Name: Vernon Solorzano  : 1950  MRN: [id number]  Today's Date: 2023         Visit Date: 2023   Patient Active Problem List   Diagnosis    DM (diabetes mellitus), type 2    Mixed hyperlipidemia    Mild intermittent asthma without complication    New onset of congestive heart failure    Nonrheumatic mitral valve regurgitation    Left-sided chest pain    Essential hypertension    Mitral valve disease    Acute ischemic left MCA stroke    Atelectasis of both lungs    Pericardial effusion, acute    History of ischemic stroke    Obese    Physical debility    Chronic diastolic CHF (congestive heart failure)    Anemia    Stage 3a chronic kidney disease    Paroxysmal atrial flutter    H/O mitral valve replacement with tissue graft    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 Cerebrovascular accident (CVA), unspecified mechanism  I63.9 434.91   2. Cognitive communication deficit  R41.841 799.52               Patient demonstrates fair progress. He is motivated to continue SLP services. He feels that he is making improvements which he notices during ADLs. He endorses that he has not been adherant to HEP and reports that he is motivated to complete daily in order to actively and consistently target goals. SLP will include in patient goals.               SLP OP Goals       Row Name 23 1500 23 1300       Goal Type Needed    Goal Type Needed Other Adult Goals  -BB --       Subjective Comments    Subjective Comments Discussed patient progress and HEP with patient and SLP colleague. Pt would like to continue SLP tx. He reports that he feels that he is making progress with cognitive-communication. He endorses that he has not been adherant to HEP and reports that he is motivated to complete  daily in order to actively and consistently target goals. Updated pt's spouse regarding pt progress and wishes for SLP. Pt and pt's wife report that they will discuss outside of therapy.  -BB -       Written Language Comprehension Goals    Written Language Comprehension LTG's Patient will be able to comprehend written material in home/home and social  environment  -BB --    Patient will be able to comprehend written material in home/home and social  environment 90%:;without cues  -BB --    Status: Patient will be able to comprehend written material in home/home and social  environment Progressing as expected  -BB --    Comments: Patient will be able to comprehend written material in home/home and social  environment Variable progress. 60-89% acc. Subjectively increased latency for task completion, however this also appears to subjectively be improving. Suggest consider continue to target increased accuracy with basic-advanced tasks, consistnency and (tracking) processing speed.  -BB --    Written Language Comprehension STG's Patient will improve comprehension of written language skills by answering written questions related to home management/social/work tasks (bills, recipes, tv guide, emails, procedures)  -BB --    Patient will improve comprehension of written language skills by answering written questions related to home management/social/work tasks (bills, recipes, tv guide, emails, procedures) 90%:;without cues  -BB --    Status: Patient will improve comprehension of written language skills by answering written questions related to home management/social/work tasks (bills, recipes, tv guide, emails, procedures) Progressing as expected  -BB --    Comments: Patient will improve comprehension of written language skills by answering written questions related to home management/social/work tasks (bills, recipes, tv guide, emails, procedures) Today's visit (9/11): 89% acc with following directions to complete a  schedule with basic inference component.  -BB --       Verbal Expression Goals    Verbal Expression LTG's Patient will be able to use verbal expressive language skills to communicate effectively in all situations with  familiar listener  -BB --    Patient will be able to use verbal expressive language skills to communicate effectively in all situations with  familiar listener 90%:;without cues  -BB --    Status: Patient will be able to use verbal expressive language skills to communicate effectively in all situations with  familiar listener Progressing as expected  -BB --    Comments: Patient will be able to use verbal expressive language skills to communicate effectively in all situations with  familiar listener Fair progress. Averaging 73%. Targeted x1 since previous progress note. Recommend continue targeting.  -BB --    Verbal Expression STG's Patient will improve verbal expressive language skills by completing convergent naming tasks  -BB --    Patient will improve verbal expressive language skills by completing divergent naming tasks 90%:;without cues  -BB --    Status: Patient will improve verbal expressive language skills by completing divergent naming tasks Progressing as expected  -BB --    Comments: Patient will improve verbal expressive language skills by completing divergent naming tasks 9/7/23: word deductions 73% without cues  -BB --    Patient will improve verbal expressive language skills by completing convergent naming tasks 90%:;without cues  -BB --    Comments: Patient will improve verbal expressive language skills by completing convergent naming tasks not targeted this progress period  -BB --       Memory Goals    Memory LTG's Patient will be able to remember information needed to participate in avocational activities  -BB --    Status: Patient will be able to remember information needed to participate in avocational activities Progressing as expected  -BB --    Comments: Patient will be able to  remember information needed to participate in avocational activities Fair to good progress with some variability. Delayed recall: 0%-66% acc wo cues and % acc w min-mod cueing. Immediate memory: % acc.  -BB --    Memory STG's Patient’s memory skills will be enhanced as reported by patient by using external memory aides  -BB --    Patient’s memory skills will be enhanced as reported by patient by utilizing internal memory strategies to recall up to 3 pieces of information after a 5- minute delay 90%:;without cues  -BB --    Status: Patient’s memory skills will be enhanced as reported by patient by utilizing internal memory strategies to recall up to 3 pieces of information after a 5- minute delay Progressing as expected  -BB --    Comments: Patient’s memory skills will be enhanced as reported by patient by utilizing internal memory strategies to recall up to 3 pieces of information after a 5- minute delay 23: Dealyed recall of 3 related words after 5 minute delay 2/3 66% without cues.  -BB --    Patient’s memory skills will be enhanced as reported by patient by using external memory aides 90%:;without cues  -BB --    Status: Patient’s memory skills will be enhanced as reported by patient by using external memory aides Progressing as expected  -BB --    Comments: Patient’s memory skills will be enhanced as reported by patient by using external memory aides not targeted this progress period  -BB --       Problem Solving Goals    Problem Solving LTG's Patient will be able to interact safely in home environment  -BB --    Patient will be able to interact safely in home environment Independently  -BB --    Status: Patient will be able to interact safely in home environment Progressing as expected  -BB --    Comments: Patient will be able to interact safely in home environment Fair progress. Sequencin-90% acc wo cues.  -BB --    Problem Solving STG's Patient will improve ability to analyze problems and  determine solutions by correctly sequencing __ steps to complete an activity  -BB --    Patient will improve ability to analyze problems and determine solutions by correctly sequencing __ steps to complete an activity 90%:;without cues  -BB --    Status: Patient will improve ability to analyze problems and determine solutions by correctly sequencing __ steps to complete an activity Progressing as expected  -BB --    Comments: Patient will improve ability to analyze problems and determine solutions by correctly sequencing __ steps to complete an activity 9/7/23: 5 step functional sequencing task,60% without cues and 100% with min cues.  -BB --    Patient will improve ability to analyze problems and determine solutions by completing a visual representation/filling in a chart by following  written directions 90%:;without cues  -BB --    Comments: Patient will improve ability to analyze problems and determine solutions by completing a visual representation/filling in a chart by following  written directions not targeted this progress period  -BB --    Patient will improve ability to analyze problems and determine solutions by completing functional math problems 90%:;without cues  -BB --    Comments: Patient will improve ability to analyze problems and determine solutions by completing functional math problems not targeted this progress period  -BB --       Attention/Orientation Goals    Attention/Orientation LTG's Patient will be able to interact safely in home environment  -BB --    Patient will be able to interact safely in home environment Independently  -BB --    Status: Patient will be able to interact safely in home environment Progressing as expected  -BB --    Comments: Patient will be able to interact safely in home environment Divided attention targeted this progress period: 60% acc with increased processing speed  -BB --    Attention/Orientation STG's Patient will improve attention skills by dividing focus  and responding simultaneously to multiple tasks or in order to complete task  -BB --    Patient will improve attention skills by dividing focus and responding simultaneously to multiple tasks or in order to complete task 90%:;without cues  -BB --    Status: Patient will improve attention skills by dividing focus and responding simultaneously to multiple tasks or in order to complete task Progressing as expected  -BB --    Comments: Patient will improve attention skills by dividing focus and responding simultaneously to multiple tasks or in order to complete task 8/31/23: Divided attention task 60% without cues, increased processing speed however improved/  -BB --       Other Goals    Other Adult Goal- 1 Patient will consistently complete 80% of HEP handouts from 9/11 to 10/2.  -BB --    Status: Other Adult Goal- 1 New  -BB --              User Key  (r) = Recorded By, (t) = Taken By, (c) = Cosigned By      Initials Name Provider Type    Ervin Temple SLP Speech and Language Pathologist                           Time Calculation:   SLP Start Time: 1310  SLP Stop Time: 1350  SLP Time Calculation (min): 40 min  Timed Charges  59866-[place] Dev of Cogn Skills Initial Minutes: 15  75008-[place] Dev of Cogn Skills Add Minutes: 30  Total Minutes  Timed Charges Total Minutes: 45   Total Minutes: 45    Therapy Charges for Today       Code Description Service Date Service Provider Modifiers Qty    70092805136 HC ST DEV OF COGN SKILLS INITIAL 15 MIN 9/11/2023 Ervin Bermudez, SLP KX 1    21800659331 HC ST DEV OF COGN SKILLS EACH ADDT'L 15 MIN 9/11/2023 Ervin Bermudez, SLP KX 2                     VIKI Rodriguez  9/11/2023

## 2023-09-14 ENCOUNTER — HOSPITAL ENCOUNTER (OUTPATIENT)
Dept: PHYSICAL THERAPY | Facility: HOSPITAL | Age: 73
Setting detail: THERAPIES SERIES
Discharge: HOME | End: 2023-09-14
Payer: MEDICARE

## 2023-09-14 ENCOUNTER — HOSPITAL ENCOUNTER (OUTPATIENT)
Dept: SLEEP MEDICINE | Facility: HOSPITAL | Age: 73
Discharge: HOME | End: 2023-09-14
Admitting: NURSE PRACTITIONER
Payer: MEDICARE

## 2023-09-14 ENCOUNTER — HOSPITAL ENCOUNTER (OUTPATIENT)
Dept: SPEECH THERAPY | Facility: HOSPITAL | Age: 73
Setting detail: THERAPIES SERIES
Discharge: HOME | End: 2023-09-14
Payer: MEDICARE

## 2023-09-14 ENCOUNTER — LAB (OUTPATIENT)
Dept: LAB | Facility: HOSPITAL | Age: 73
End: 2023-09-14
Payer: MEDICARE

## 2023-09-14 DIAGNOSIS — I34.0 NONRHEUMATIC MITRAL VALVE REGURGITATION: Primary | ICD-10-CM

## 2023-09-14 DIAGNOSIS — R26.89 FUNCTIONAL GAIT ABNORMALITY: ICD-10-CM

## 2023-09-14 DIAGNOSIS — I42.8 NON-ISCHEMIC CARDIOMYOPATHY: ICD-10-CM

## 2023-09-14 DIAGNOSIS — I34.0 NONRHEUMATIC MITRAL VALVE REGURGITATION: ICD-10-CM

## 2023-09-14 DIAGNOSIS — Z74.09 IMPAIRED FUNCTIONAL MOBILITY, BALANCE, GAIT, AND ENDURANCE: ICD-10-CM

## 2023-09-14 DIAGNOSIS — Z86.73 HISTORY OF ISCHEMIC STROKE: Primary | ICD-10-CM

## 2023-09-14 LAB
ANION GAP SERPL CALCULATED.3IONS-SCNC: 11.5 MMOL/L (ref 5–15)
BUN SERPL-MCNC: 16 MG/DL (ref 8–23)
BUN/CREAT SERPL: 11.9 (ref 7–25)
CALCIUM SPEC-SCNC: 9 MG/DL (ref 8.6–10.5)
CHLORIDE SERPL-SCNC: 104 MMOL/L (ref 98–107)
CO2 SERPL-SCNC: 23.5 MMOL/L (ref 22–29)
CREAT SERPL-MCNC: 1.34 MG/DL (ref 0.76–1.27)
DEPRECATED RDW RBC AUTO: 43.4 FL (ref 37–54)
EGFRCR SERPLBLD CKD-EPI 2021: 55.6 ML/MIN/1.73
ERYTHROCYTE [DISTWIDTH] IN BLOOD BY AUTOMATED COUNT: 14.7 % (ref 12.3–15.4)
ERYTHROCYTE [SEDIMENTATION RATE] IN BLOOD: 33 MM/HR (ref 0–20)
GLUCOSE SERPL-MCNC: 203 MG/DL (ref 65–99)
HCT VFR BLD AUTO: 34.6 % (ref 37.5–51)
HGB BLD-MCNC: 11.4 G/DL (ref 13–17.7)
MCH RBC QN AUTO: 26.7 PG (ref 26.6–33)
MCHC RBC AUTO-ENTMCNC: 32.9 G/DL (ref 31.5–35.7)
MCV RBC AUTO: 81 FL (ref 79–97)
NT-PROBNP SERPL-MCNC: 2785 PG/ML (ref 0–900)
PLATELET # BLD AUTO: 191 10*3/MM3 (ref 140–450)
PMV BLD AUTO: 9.5 FL (ref 6–12)
POTASSIUM SERPL-SCNC: 3.9 MMOL/L (ref 3.5–5.2)
RBC # BLD AUTO: 4.27 10*6/MM3 (ref 4.14–5.8)
SODIUM SERPL-SCNC: 139 MMOL/L (ref 136–145)
WBC NRBC COR # BLD: 9.53 10*3/MM3 (ref 3.4–10.8)

## 2023-09-14 PROCEDURE — 97110 THERAPEUTIC EXERCISES: CPT

## 2023-09-14 PROCEDURE — 97530 THERAPEUTIC ACTIVITIES: CPT

## 2023-09-14 PROCEDURE — 80048 BASIC METABOLIC PNL TOTAL CA: CPT

## 2023-09-14 PROCEDURE — 85027 COMPLETE CBC AUTOMATED: CPT

## 2023-09-14 PROCEDURE — 85652 RBC SED RATE AUTOMATED: CPT

## 2023-09-14 PROCEDURE — 83880 ASSAY OF NATRIURETIC PEPTIDE: CPT

## 2023-09-14 PROCEDURE — 36415 COLL VENOUS BLD VENIPUNCTURE: CPT

## 2023-09-14 NOTE — THERAPY DISCHARGE NOTE
Outpatient Physical Therapy Neuro Treatment Note/Discharge Summary  [facility]     Patient Name: Vernon Solorzano  : 1950  MRN: [id number]  Today's Date: 2023      Visit Date: 2023    Visit Dx:    ICD-10-CM ICD-9-CM   1. History of ischemic stroke  Z86.73 V12.54   2. Impaired functional mobility, balance, gait, and endurance  Z74.09 V49.89   3. Functional gait abnormality  R26.89 781.2       Patient Active Problem List   Diagnosis    DM (diabetes mellitus), type 2    Mixed hyperlipidemia    Mild intermittent asthma without complication    New onset of congestive heart failure    Nonrheumatic mitral valve regurgitation    Left-sided chest pain    Essential hypertension    Mitral valve disease    Acute ischemic left MCA stroke    Atelectasis of both lungs    Pericardial effusion, acute    History of ischemic stroke    Obese    Physical debility    Chronic diastolic CHF (congestive heart failure)    Anemia    Stage 3a chronic kidney disease    Paroxysmal atrial flutter    H/O mitral valve replacement with tissue graft    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 yes  -LB      Pre-Treatment Pain Level 6  -LB      Post-Treatment Pain Level 6  -LB      Subjective Pain Comment right knee  -LB         Cognition    Overall Cognitive Status WFL  -LB      Memory Appears intact  -LB      Orientation Level Oriented X4  -LB      Safety Judgment Good awareness of safety precautions  -LB      Deficits Fully aware of deficits  -LB         Posture/Observations    Posture/Observations Comments Pt walked up to unit no AD  -LB         Transfers    Sit-Stand Cottle (Transfers) independent  -LB      Stand-Sit Cottle (Transfers) independent  -LB      Comment,  (Transfers) slower due to knee pain  -LB         Gait/Stairs (Locomotion)    [place] Level (Gait) independent  -LB      Distance in Feet (Gait) 200  -LB      Bilateral Gait Deviations forward flexed posture;decreased arm swing  decreased step length  -LB      Left Sided Gait Deviations heel strike decreased  -LB      [place] Level (Stairs) modified independence  -LB      Handrail Location (Stairs) both sides  -LB      Number of Steps (Stairs) 4  -LB      Ascending Technique (Stairs) step-to-step  -LB      Descending Technique (Stairs) step-over-step  -LB                User Key  (r) = Recorded By, (t) = Taken By, (c) = Cosigned By      Initials Name Provider Type    Lara Duenas, PT Physical Therapist                             PT Assessment/Plan       Row Name 09/14/23 5242          PT Assessment    Assessment Comments The patient has been provided and HEP that was performed and reviewed today.  The patient was able to complete the HEP with written directions and was provided with theraband to use at home.  The patient will be starting cardiac rehab in a few weeks and reports that he is walking daily in the neighborhood.  Currently the patient is having more left knee pain which affects his speed on sit to stand which was was reflected in TUG time.  Previously the patient has improved both TUG and Tineti scores. The patient and spouse report indep at home but the patient does negogiate steps in step to fashion due to pain in left knee.  -LB               User Key  (r) = Recorded By, (t) = Taken By, (c) = Cosigned By      Initials Name Provider Type    Lara Duenas PT Physical Therapist                          OP Exercises       Row Name 09/14/23 8571 09/14/23 1502          Subjective    Subjective Comments -- So today is my last day  -LB        Subjective Pain    Able to rate subjective pain? -- yes  -LB     Pre-Treatment Pain Level -- 6  -LB     Post-Treatment Pain Level -- 6  -LB      Subjective Pain Comment -- right knee  -LB        Total Minutes    45019 - Gait Training Minutes  --  -LB --     30692 - PT Therapeutic Exercise Minutes 30  -LB --     94383 - PT Therapeutic Activity Minutes 10  -LB --        Exercise 1    Exercise Name 1 -- Nu Step  -LB        Exercise 2    Exercise Name 2 -- Standing hip abd/ flexion/extension  -LB     Sets 2 -- 1  -LB     Reps 2 -- 10  -LB     Additional Comments -- counter top  -LB        Exercise 3    Exercise Name 3 -- standing knee flexion  -LB     Reps 3 -- 10  -LB     Additional Comments -- counter top  -LB        Exercise 4    Exercise Name 4 -- standing heelraises  -LB     Reps 4 -- 10  -LB     Additional Comments -- counter top  -LB        Exercise 7    Exercise Name 7 -- LAQ  -LB     Reps 7 -- 10  -LB     Additional Comments -- BTB  -LB        Exercise 11    Exercise Name 11 -- seated hamstring curls  -LB     Reps 11 -- 10  -LB     Additional Comments -- BTB  -LB        Exercise 15    Exercise Name 15 -- Standing rows with scapular retraction  -LB     Reps 15 -- 10  -LB     Additional Comments -- BTB  -LB               User Key  (r) = Recorded By, (t) = Taken By, (c) = Cosigned By      Initials Name Provider Type    Lara Duenas, PT Physical Therapist                                      Therapy Education  Education Details: HEP  Given: HEP  Program: Reinforced, Progressed, Modified  How Provided: Verbal, Demonstration, Written  Provided to: Patient  Level of Understanding: Verbalized, Demonstrated, Teach back education performed       Timed Up and Go (TUG)  TUG Test 1: 14 seconds  Timed Up and Go Comments: no AD    Time Calculation:   Start Time: 1430  Stop Time: 1514  Time Calculation (min): 44 min  Timed Charges  97934 - PT Therapeutic Exercise Minutes: 30  98496 - PT Therapeutic Activity Minutes: 10  Total Minutes  Timed Charges Total Minutes: 40   Total Minutes: 40     Therapy Charges for Today       Code Description Service Date Service  Provider Modifiers Qty    50617550310  PT THER PROC EA 15 MIN 9/14/2023 Lara Cm PT KX, GP 2    40914819226 HC PT THERAPEUTIC ACT EA 15 MIN 9/14/2023 Lara Cm PT KX, GP 1            PT G-Codes  TUG Test 1: 14 seconds     OP PT Discharge Summary  Date of Discharge: 09/14/23  Reason for Discharge: Independent (platued)  Outcomes Achieved: Patient able to partially acheive established goals  Discharge Destination: Home with home program  Discharge Instructions/Additional Comments: Plan is for cardiac rehab in a few weeks..  Strongly encouraged daily walks and activity        Lara Cm, PT  9/14/2023

## 2023-09-15 ENCOUNTER — HOSPITAL ENCOUNTER (OUTPATIENT)
Dept: CARDIOLOGY | Facility: HOSPITAL | Age: 73
Discharge: HOME | End: 2023-09-15
Payer: MEDICARE

## 2023-09-15 ENCOUNTER — OFFICE VISIT (OUTPATIENT)
Dept: CARDIOLOGY | Facility: CLINIC | Age: 73
End: 2023-09-15
Payer: MEDICARE

## 2023-09-15 VITALS
HEIGHT: 73 IN | OXYGEN SATURATION: 99 % | SYSTOLIC BLOOD PRESSURE: 126 MMHG | HEART RATE: 94 BPM | WEIGHT: 254 LBS | DIASTOLIC BLOOD PRESSURE: 80 MMHG | BODY MASS INDEX: 33.66 KG/M2

## 2023-09-15 VITALS
SYSTOLIC BLOOD PRESSURE: 112 MMHG | HEART RATE: 91 BPM | WEIGHT: 253.4 LBS | DIASTOLIC BLOOD PRESSURE: 60 MMHG | BODY MASS INDEX: 33.58 KG/M2 | HEIGHT: 73 IN

## 2023-09-15 DIAGNOSIS — Z98.890 S/P MVR (MITRAL VALVE REPAIR): ICD-10-CM

## 2023-09-15 DIAGNOSIS — E11.59 TYPE 2 DIABETES MELLITUS WITH OTHER CIRCULATORY COMPLICATION, WITH LONG-TERM CURRENT USE OF INSULIN: ICD-10-CM

## 2023-09-15 DIAGNOSIS — I42.8 NON-ISCHEMIC CARDIOMYOPATHY: ICD-10-CM

## 2023-09-15 DIAGNOSIS — Z95.4 H/O MITRAL VALVE REPLACEMENT WITH TISSUE GRAFT: ICD-10-CM

## 2023-09-15 DIAGNOSIS — G47.33 OBSTRUCTIVE SLEEP APNEA (ADULT) (PEDIATRIC): ICD-10-CM

## 2023-09-15 DIAGNOSIS — E78.2 MIXED HYPERLIPIDEMIA: ICD-10-CM

## 2023-09-15 DIAGNOSIS — Z79.4 TYPE 2 DIABETES MELLITUS WITH OTHER CIRCULATORY COMPLICATION, WITH LONG-TERM CURRENT USE OF INSULIN: ICD-10-CM

## 2023-09-15 DIAGNOSIS — E78.2 MIXED HYPERLIPIDEMIA: Chronic | ICD-10-CM

## 2023-09-15 DIAGNOSIS — I34.0 NONRHEUMATIC MITRAL VALVE REGURGITATION: ICD-10-CM

## 2023-09-15 DIAGNOSIS — I42.8 NON-ISCHEMIC CARDIOMYOPATHY: Primary | ICD-10-CM

## 2023-09-15 DIAGNOSIS — I50.32 CHRONIC DIASTOLIC CHF (CONGESTIVE HEART FAILURE): ICD-10-CM

## 2023-09-15 DIAGNOSIS — G47.33 OSA (OBSTRUCTIVE SLEEP APNEA): ICD-10-CM

## 2023-09-15 PROCEDURE — 99214 OFFICE O/P EST MOD 30 MIN: CPT | Performed by: INTERNAL MEDICINE

## 2023-09-15 PROCEDURE — 93325 DOPPLER ECHO COLOR FLOW MAPG: CPT

## 2023-09-15 PROCEDURE — 3074F SYST BP LT 130 MM HG: CPT | Performed by: INTERNAL MEDICINE

## 2023-09-15 PROCEDURE — 93000 ELECTROCARDIOGRAM COMPLETE: CPT | Performed by: INTERNAL MEDICINE

## 2023-09-15 PROCEDURE — 25510000001 PERFLUTREN (DEFINITY) 8.476 MG IN SODIUM CHLORIDE (PF) 0.9 % 10 ML INJECTION: Performed by: NURSE PRACTITIONER

## 2023-09-15 PROCEDURE — 93321 DOPPLER ECHO F-UP/LMTD STD: CPT

## 2023-09-15 PROCEDURE — 3078F DIAST BP <80 MM HG: CPT | Performed by: INTERNAL MEDICINE

## 2023-09-15 PROCEDURE — 1159F MED LIST DOCD IN RCRD: CPT | Performed by: INTERNAL MEDICINE

## 2023-09-15 PROCEDURE — 93308 TTE F-UP OR LMTD: CPT

## 2023-09-15 PROCEDURE — 1160F RVW MEDS BY RX/DR IN RCRD: CPT | Performed by: INTERNAL MEDICINE

## 2023-09-15 RX ORDER — LOSARTAN POTASSIUM 25 MG/1
25 TABLET ORAL DAILY
Qty: 30 TABLET | Refills: 6 | Status: SHIPPED | OUTPATIENT
Start: 2023-09-15

## 2023-09-15 RX ADMIN — PERFLUTREN 1 ML: 6.52 INJECTION, SUSPENSION INTRAVENOUS at 12:00

## 2023-09-15 NOTE — THERAPY DISCHARGE NOTE
Speech Language Pathology Discharge Summary  [facility]       Patient Name: Vernon Solorzano  : 1950  MRN: [id number]    Today's Date: 9/15/2023  Please refer to progress note on 23 for detail of patients progress. Patient arrived late for appointment on . Patient discharged at this time due to plateau. Patient is starting cardiac therapy in upcoming weeks. Recommend ST in the future as indicated.      SLP OP Goals       Row Name 09/15/23 1300          Written Language Comprehension Goals    Status: Patient will be able to comprehend written material in home/home and social  environment --  not achieved  -KA     Status: Patient will improve comprehension of written language skills by answering written questions related to home management/social/work tasks (bills, recipes, tv guide, emails, procedures) --  not achieved  -KA        Verbal Expression Goals    Status: Patient will be able to use verbal expressive language skills to communicate effectively in all situations with  familiar listener --  not achieved  -KA     Status: Patient will improve verbal expressive language skills by completing divergent naming tasks --  not achieved  -KA     Status: Patient will improve verbal expressive language skills by completing convergent naming tasks --  not achieved  -KA        Memory Goals    Status: Patient will be able to remember information needed to participate in avocational activities --  not achieved  -KA     Status: Patient’s memory skills will be enhanced as reported by patient by utilizing internal memory strategies to recall up to 3 pieces of information after a 5- minute delay --  not achieved  -KA     Status: Patient’s memory skills will be enhanced as reported by patient by using external memory aides --  not achieved  -KA        Problem Solving Goals    Status: Patient will be able to interact safely in home environment --  not achieved  -KA     Status: Patient will improve ability to  analyze problems and determine solutions by correctly sequencing __ steps to complete an activity --  not achieved  -KA     Status: Patient will improve ability to analyze problems and determine solutions by completing a visual representation/filling in a chart by following  written directions --  not achieved  -KA     Status: Patient will improve ability to analyze problems and determine solutions by completing functional math problems --  not achieved  -KA        Attention/Orientation Goals    Status: Patient will be able to interact safely in home environment --  not achieved  -KA     Status: Patient will improve attention skills by dividing focus and responding simultaneously to multiple tasks or in order to complete task --  not achieved  -KA               User Key  (r) = Recorded By, (t) = Taken By, (c) = Cosigned By      Initials Name Provider Type    Ryan Rey MA,CCC-SLP Speech and Language Pathologist                    OP SLP Discharge Summary  Date of Discharge: 09/14/23  Reason for Discharge: no further expectation of functional progress, patient/family request discontinuation of services  Progress Toward Achieving Short/long Term Goals: goals not met within established timelines  Discharge Destination: home w/ assist  Discharge Instructions: Extensive home exercise program provided      Time Calculation:                    Ryan Alcala MA,CCC-SLP  9/15/2023

## 2023-09-15 NOTE — PROGRESS NOTES
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  In 2022 he developed COVID-pneumonia and findings suggestive of interstitial lung disease with possible COVID pneumonitis.  Despite adequate treatment and subsequent calcification LAD and circumflex.  With progressive dyspnea an echo was performed that showed normal sinus rhythm with questionable wall motion abnormality in the inferolateral wall with thickened mitral valve leaflets with mild regurgitation that was at least moderate but likely underestimated with RVSP 60 mmHg.  Heart failure.  He was admitted to treat heart failure.  GAUDENCIO showed severe mitral valve regurgitation.  Cardiac cath showed normal coronary arteries.  He therefore underwent mitral valve repair with 29 mm Schwartz bioprosthetic valve replacement, tricuspid valve repair with left atrial appendage closure..  He did demonstrate renal insufficiency during his hospitalization.  Postoperatively he had altered mental status without obvious stroke.  He demonstrated AV block that was transient.  He demonstrated moderate-sized pericardial effusion.  He was treated medically with colchicine and went to rehab and subsequently went home.  Echo 7/3/2023 showed modest pericardial effusion that was posterior located and no change from prior study and without evidence of tamponade.  He was found to have congestive heart failure with a markedly elevated proBNP of 5156 with creatinine 1.26.  Torsemide was cautiously increased.  He was seen by nephrology.  He had a follow-up echocardiogram 2023 which showed an ejection fraction further decreased  to 25%, mild to moderate left ventricular hypertrophy, mitral valve prosthesis appeared to be functioning relatively well with trivial to mild regurgitation.  Resolution of pericardial effusion.  It was again a technically limited study.  Blood work 1/2023 showed an proBNP 1950 (improved) with a creatinine increased to 1.49.  Glucose remains elevated in the 280 range.  At the last visit he had been addressing diabetes.  Heart failure fluid status had improved.    Since last visit he has had no palpitations he is having good and bad days with dyspnea he has no edema or dizziness.  Blood pressure appears stable.  He is doing physical therapy through rehab which is last rehab day is to start cardiac rehab in 2 weeks.  He has completed therapy with prednisone and colchicine.  Glucose remains elevated.  proBNP had fallen to 1268 on 817 but has increased again on to 2785.  He just turned on his sleep study. Echo today disease (8/15/2023) with ejection fraction 30% global hypokinesis.  No significant pericardial effusion.  Valve function is normal.    Patient states he has completed physical therapy and is to start cardiac rehab hopefully in 2 weeks.  He has had to return to see home sleep study devices as he did not understand how to wear this.  He is to see sleep company again to have them show him more clearly.  Blood pressure at home has been as it is today or slightly lower.  He has had good and bad days in terms of shortness of breath with no clear precipitators.  He has had no palpitations edema or dizziness.  He has mild sharp left-sided chest discomfort that seems to be improving it is not positional or pleuritic or exertional.  They believe his blood sugar control has improved with glucose levels in the 150s.  Creatinine checked earlier today is 1.34 proBNP however slightly elevated at 2785.  Patient and his wife state have been taking meds consistently.    Past Medical History:   Diagnosis Date    Allergic  rhinitis     Arthritis     BPH (benign prostatic hyperplasia)     Diabetes mellitus     TYPE 2    Foraminal stenosis of cervical region     C5-C6    GERD (gastroesophageal reflux disease)     Hemorrhoids     History of urinary retention 2010    S/P TURP    Hyperlipidemia     Macular degeneration     PING (obstructive sleep apnea) 01/07/2016    MILD, SEES DR. AMARILIS MORGAN     Past Surgical History:   Procedure Laterality Date    CARDIAC CATHETERIZATION N/A 5/11/2023    Procedure: Right Heart Cath;  Surgeon: Corey Gaffney MD;  Location:  [facility];  Service: Cardiology;  Laterality: N/A;    CARDIAC CATHETERIZATION N/A 5/11/2023    Procedure: Left Heart Cath;  Surgeon: Corey Gaffney MD;  Location:  [facility];  Service: Cardiology;  Laterality: N/A;    CARDIAC CATHETERIZATION N/A 5/11/2023    Procedure: Left ventriculography;  Surgeon: Corey Gaffney MD;  Location:  [facility];  Service: Cardiology;  Laterality: N/A;    CARDIAC CATHETERIZATION N/A 5/11/2023    Procedure: Coronary angiography;  Surgeon: Corey Gaffney MD;  Location:  [facility];  Service: Cardiology;  Laterality: N/A;    COLONOSCOPY N/A     WNL PER PT, NO RECORDS,     COLONOSCOPY N/A 04/07/2009    DR. GORGE BENAVIDEZ AT [place]    MITRAL VALVE REPAIR/REPLACEMENT N/A 5/24/2023    Procedure: MITRAL VALVE REPAIR/REPLACEMENT TRICUSPID VALVE REPAIR/REPLACEMENT TRANSESOPHAGEAL ECHOCARDIOGRAM WITH ANESTHESIA;  Surgeon: George Frey MD;  Location: [facility];  Service: Cardiothoracic;  Laterality: N/A;    PROSTATE SURGERY N/A 11/12/2010    TURP, DR. COREY COLLAZO AT [facility]    SKIN TAG REMOVAL N/A 12/20/2017    ANAL SKIN TAG X2, PERFORMED IN OFFICE, DR. LISA ORANTES    TURP / TRANSURETHRAL INCISION / DRAINAGE PROSTATE  2010    Dr. Goodrich     Outpatient Medications Prior to Visit   Medication Sig Dispense Refill    acetaminophen (TYLENOL) 325 MG tablet Take 2  tablets by mouth Every 6 (Six) Hours As Needed for Mild Pain.      albuterol sulfate  (90 Base) MCG/ACT inhaler Inhale 2 puffs Every 4 (Four) Hours As Needed for Wheezing.      apixaban (ELIQUIS) 5 MG tablet tablet Take 1 tablet by mouth 2 (Two) Times a Day. 180 tablet 3    atorvastatin (LIPITOR) 40 MG tablet Take 1 tablet by mouth Every Night. 30 tablet 1    calcium carbonate (TUMS) 500 MG chewable tablet Chew 2 tablets 4 (Four) Times a Day As Needed for Indigestion or Heartburn.      Calcium Carbonate-Vitamin D (calcium 500 mg vitamin D 5 mcg, 200 UT,) 500-5 MG-MCG tablet per tablet Take 1 tablet by mouth 2 (Two) Times a Day.      Continuous Blood Gluc Sensor (FreeStyle Ysabel 2 Sensor) misc 1 each Every 14 (Fourteen) Days. Use 1 sensor every 14 days 6 each 3    empagliflozin (JARDIANCE) 25 MG tablet tablet Take 1 tablet by mouth Daily.      glucose blood (OneTouch Verio) test strip 1 each by Other route As Needed. Use as instructed      insulin degludec (Tresiba FlexTouch) 100 UNIT/ML solution pen-injector injection 46 units every morning 15 mL 6    Insulin Lispro, 1 Unit Dial, (HumaLOG KwikPen) 100 UNIT/ML solution pen-injector Inject 5 Units under the skin into the appropriate area as directed 3 (Three) Times a Day. Indications: Type 2 Diabetes 14 mL 2    Insulin Pen Needle 32G X 4 MM misc Use to inject insulin under the skin via pens up to 4 times daily 100 each 1    metoprolol succinate XL (TOPROL-XL) 25 MG 24 hr tablet Take 1 tablet by mouth 2 (Two) Times a Day. 180 tablet 2    Multiple Vitamins-Minerals (PRESERVISION AREDS 2+MULTI VIT PO) Take 1 tablet by mouth 2 (Two) Times a Day.      pantoprazole (PROTONIX) 40 MG EC tablet Take 1 tablet by mouth Daily. 90 tablet 0    potassium chloride (K-DUR,KLOR-CON) 20 MEQ CR tablet Take 1 tablet by mouth Daily. 30 tablet 1    tiotropium bromide monohydrate (SPIRIVA RESPIMAT) 2.5 MCG/ACT aerosol solution inhaler Inhale 2 puffs.      torsemide (DEMADEX) 20 MG  "tablet Take 3 tablets by mouth Daily. 90 tablet 1     No facility-administered medications prior to visit.       Allergies as of 09/15/2023    (No Known Allergies)     Social History     Socioeconomic History    Marital status:    Tobacco Use    Smoking status: Never     Passive exposure: Never    Smokeless tobacco: Never   Vaping Use    Vaping Use: Never used   Substance and Sexual Activity    Alcohol use: Yes     Alcohol/week: 1.0 standard drink     Types: 1 Drinks containing 0.5 oz of alcohol per week     Comment: RARELY    Drug use: No    Sexual activity: Defer     Partners: Female     Family History   Problem Relation Age of Onset    Lung cancer Mother     Stroke Father     Dementia Father     Hyperlipidemia Brother     Hypertension Brother     Heart disease Brother     Diabetes Brother     Colon polyps Brother     Colon polyps Brother      Review of Systems   Constitutional: Negative for chills, fever, weight gain and weight loss.   Cardiovascular:  Positive for chest pain and dyspnea on exertion. Negative for leg swelling.   Respiratory:  Negative for cough, snoring and wheezing.    Hematologic/Lymphatic: Negative for bleeding problem. Does not bruise/bleed easily.   Skin:  Negative for color change.   Musculoskeletal:  Positive for joint pain. Negative for falls and myalgias.   Gastrointestinal:  Negative for melena.   Genitourinary:  Negative for hematuria.   Neurological:  Negative for excessive daytime sleepiness.   Psychiatric/Behavioral:  Negative for depression. The patient is not nervous/anxious.       Objective:     Vitals:    09/15/23 1226   BP: 112/60   BP Location: Left arm   Pulse: 91   Weight: 115 kg (253 lb 6.4 oz)   Height: 185.4 cm (73\")     Body mass index is 33.43 kg/m².    Vitals reviewed.   Constitutional:       Appearance: Well-developed.   Eyes:      General: No scleral icterus.        Right eye: No discharge.      Conjunctiva/sclera: Conjunctivae normal.      Pupils: Pupils are " equal, round, and reactive to light.   HENT:      Head: Normocephalic.      Nose: Nose normal.   Neck:      Thyroid: No thyromegaly.      Vascular: No JVD.   Pulmonary:      Effort: Pulmonary effort is normal. No respiratory distress.      Breath sounds: Normal breath sounds. No wheezing. No rales.   Cardiovascular:      Normal rate. Regular rhythm. Normal S1. Normal S2.       Murmurs: There is a grade 1/6 systolic murmur at the LLSB.      No gallop.    Pulses:     Intact distal pulses.      Carotid: 2+ bilaterally.     Radial: 2+ bilaterally.     Femoral: 2+ bilaterally.     Popliteal: 2+ bilaterally.     Dorsalis pedis: 2+ bilaterally.     Posterior tibial: 2+ bilaterally.  Edema:     Peripheral edema present.     Ankle: bilateral edema of the ankle.  Abdominal:      General: Bowel sounds are normal. There is no distension.      Palpations: Abdomen is soft.      Tenderness: There is no abdominal tenderness. There is no rebound.   Musculoskeletal: Normal range of motion.         General: No tenderness.      Cervical back: Normal range of motion and neck supple. Skin:     General: Skin is warm and dry.      Findings: No erythema or rash.   Neurological:      Mental Status: Alert and oriented to person, place, and time.   Psychiatric:         Behavior: Behavior normal.         Thought Content: Thought content normal.         Judgment: Judgment normal.     Lab Review:   Lab Results - Last 18 Months   Lab Units 09/14/23  1530 08/17/23  0831 06/24/23  0228 06/23/23  0812 03/29/23  1015 05/23/22  1602   WBC 10*3/mm3 9.53 7.85   < > 7.91   < > 6.7   RBC 10*6/mm3 4.27 4.41   < > 3.68*   < > 4.60   HEMOGLOBIN g/dL 11.4* 11.9*   < > 10.5*   < > 14.0   HEMOGLOBIN, POC   --   --   --   --    < >  --    HEMATOCRIT % 34.6* 36.4*   < > 31.6*   < > 40.5   HEMATOCRIT POC   --   --   --   --    < >  --    MCV fL 81.0 82.5   < > 85.9   < > 88   MCH pg 26.7 27.0   < > 28.5   < > 30.4   MCHC g/dL 32.9 32.7   < > 33.2   < > 34.6   RDW %  14.7 14.4   < > 14.2   < > 13.0   PLATELETS 10*3/mm3 191 225   < > 247   < > 256   NEUTROPHIL % %  --  69.7  --  64.9   < > 65   LYMPHOCYTE % %  --  11.6*  --  15.7*   < > 17   MONOCYTES % %  --  11.3  --  10.4   < > 10   EOSINOPHIL % %  --  6.1  --  7.8*   < > 6   BASOPHIL % %  --  0.8  --  0.8   < > 1   NEUTROS ABS 10*3/mm3  --  5.47  --  5.14   < > 4.3   LYMPHS ABS 10*3/mm3  --  0.91  --  1.24   < > 1.2   MONOS ABS 10*3/mm3  --  0.89  --  0.82   < > 0.7   EOS ABS 10*3/mm3  --  0.48*  --  0.62*   < > 0.4   BASOS ABS 10*3/mm3  --  0.06  --  0.06   < > 0.0   IMM GRAN % %  --   --   --   --   --  1   IMMATURE GRANS (ABS) x10E3/uL  --   --   --   --   --  0.1   RDW-SD fl 43.4 42.8   < > 44.3   < >  --    MPV fL 9.5 9.5   < > 9.3   < >  --     < > = values in this interval not displayed.       Lab Results - Last 18 Months   Lab Units 09/14/23  1530 08/17/23  0831 08/01/23  1305 07/18/23  1434   GLUCOSE mg/dL 203* 323*   < > 233*   BUN mg/dL 16 25*   < > 16   CREATININE mg/dL 1.34* 1.50*   < > 1.25   SODIUM mmol/L 139 140   < > 141   POTASSIUM mmol/L 3.9 3.8   < > 3.9   CHLORIDE mmol/L 104 99   < > 101   CO2 mmol/L 23.5 27.7   < > 26.1   CALCIUM mg/dL 9.0 9.7   < > 9.6   TOTAL PROTEIN g/dL  --  6.9  --  7.1   ALBUMIN g/dL  --  4.0  --  4.0   ALT (SGPT) U/L  --  19  --  23   AST (SGOT) U/L  --  21  --  18   ALK PHOS U/L  --  111  --  123*   BILIRUBIN mg/dL  --  0.5  --  0.5   GLOBULIN gm/dL  --  2.9  --  3.1   A/G RATIO g/dL  --  1.4  --  1.3   BUN / CREAT RATIO  11.9 16.7   < > 12.8   ANION GAP mmol/L 11.5 13.3   < > 13.9   EGFR mL/min/1.73 55.6* 48.6*   < > 60.4    < > = values in this interval not displayed.     Lab Results - Last 18 Months   Lab Units 08/17/23  0831 05/23/23  1429   CHOLESTEROL mg/dL 116 155   TRIGLYCERIDES mg/dL 221* 197*   HDL CHOL mg/dL 34* 41   LDL CHOL mg/dL 47 81   VLDL CHOL mg/dL 35 33   LDL/HDL RATIO  1.11 1.82     Lab Results - Last 18 Months   Lab Units 09/14/23  1530 08/17/23  0831    PROBNP pg/mL 2,785.0* 1,268.0*     Lab Results - Last 18 Months   Lab Units 06/25/23  0944 06/25/23  0739   HSTROP T ng/L 95* 97*     Lab Results - Last 18 Months   Lab Units 08/17/23  0831 06/30/23  0719   TSH uIU/mL 2.320 1.830     Lab Results - Last 18 Months   Lab Units 06/04/23  1410 06/04/23  0319 06/01/23  2318 06/01/23  1555 05/25/23  0307   PROTIME Seconds  --   --   --  13.4 15.0*   APTT seconds 30.3 151.6*   < > 24.2  --     < > = values in this interval not displayed.           ECG 12 Lead    Date/Time: 9/15/2023 11:37 AM  Performed by: Karishma Arango MD  Authorized by: Karishma Arango MD   Comparison: compared with previous ECG   Similar to previous ECG  Rhythm: sinus rhythm  Conduction: right bundle branch block    Clinical impression: abnormal EKG      Assessment:       Diagnosis Plan   1. Non-ischemic cardiomyopathy  Basic Metabolic Panel    ECG 12 Lead      2. Nonrheumatic mitral valve regurgitation        3. Mixed hyperlipidemia        4. H/O mitral valve replacement with tissue graft        5. Chronic diastolic CHF (congestive heart failure)        6. PING (obstructive sleep apnea)          Plan:       1.  Mitral valve repair with porcine valve replacement and tricuspid valve repair 5/2023.  Valve function appears normal EF today with ejection fraction 30%.  Improved from 1 month ago.    2.  Nonischemic cardiomyopathy with acute on chronic diastolic heart failure.  proBNP slightly elevated though clinically stable.  Hopefully this will continue to improve though still need to treat sleep apnea and work on additional risk factor modification.  I contacted nephrology and renal labs have been stable.  We will add losartan 25 mg daily to current regimen and check follow-up BMP in 1 week.  In addition he is to have another attempt at assessing for sleep apnea  3.  Pericardial effusion.  Resolved with slight elevation in sed rate.  We discussed addition of colchicine but he deferred this for now I think it  is reasonable.  4.  Renal insufficiency, as above.  Improved  5.  Hypertension.  Slightly low.  We will collect cautiously addition of losartan.  6.  Hyperlipidemia  7.  Diabetes.  He will continue with Jardiance and is to follow-up with DESTIN Oconnell in the near future.  8.  Postop encephalopathy.  This seems to have improved even from last visit  9.  Post op stroke  10.  Postoperative atrial fibrillation.  He has had atrial appendage closure.  He is on Eliquis for now we will continue this for least another 6 months.  11.  Renal insufficiency      Time Spent: I spent 35 minutes caring for Vernon on this date of service. This time includes time spent by me in the following activities: preparing for the visit, reviewing tests, obtaining and/or reviewing a separately obtained history, performing a medically appropriate examination and/or evaluation, counseling and educating the patient/family/caregiver, ordering medications, tests, or procedures, documenting information in the medical record, and independently interpreting results and communicating that information with the patient/family/caregiver.   I spent 3 minutes on the separately reported service of ECG and Echo. This time is not included in the time used to support the E/M service also reported today.        Your medication list            Accurate as of September 15, 2023 11:59 PM. If you have any questions, ask your nurse or doctor.                START taking these medications        Instructions Last Dose Given Next Dose Due   losartan 25 MG tablet  Commonly known as: Cozaar  Started by: Karishma Arango MD      Take 1 tablet by mouth Daily.              CONTINUE taking these medications        Instructions Last Dose Given Next Dose Due   acetaminophen 325 MG tablet  Commonly known as: TYLENOL      Take 2 tablets by mouth Every 6 (Six) Hours As Needed for Mild Pain.       albuterol sulfate  (90 Base) MCG/ACT inhaler  Commonly known as: PROVENTIL  HFA;VENTOLIN HFA;PROAIR HFA      Inhale 2 puffs Every 4 (Four) Hours As Needed for Wheezing.       apixaban 5 MG tablet tablet  Commonly known as: ELIQUIS      Take 1 tablet by mouth 2 (Two) Times a Day.       atorvastatin 40 MG tablet  Commonly known as: LIPITOR      Take 1 tablet by mouth Every Night.       BD Pen Needle Yolette 2nd Gen 32G X 4 MM misc  Generic drug: Insulin Pen Needle      Use to inject insulin under the skin via pens up to 4 times daily       calcium 500 mg vitamin D 5 mcg (200 UT) 500-5 MG-MCG tablet per tablet      Take 1 tablet by mouth 2 (Two) Times a Day.       calcium carbonate 500 MG chewable tablet  Commonly known as: TUMS      Chew 2 tablets 4 (Four) Times a Day As Needed for Indigestion or Heartburn.       empagliflozin 25 MG tablet tablet  Commonly known as: JARDIANCE      Take 1 tablet by mouth Daily.       FreeStyle Ysabel 2 Sensor misc      1 each Every 14 (Fourteen) Days. Use 1 sensor every 14 days       Insulin Lispro (1 Unit Dial) 100 UNIT/ML solution pen-injector  Commonly known as: HumaLOG KwikPen      Inject 5 Units under the skin into the appropriate area as directed 3 (Three) Times a Day. Indications: Type 2 Diabetes       metoprolol succinate XL 25 MG 24 hr tablet  Commonly known as: TOPROL-XL      Take 1 tablet by mouth 2 (Two) Times a Day.       OneTouch Verio test strip  Generic drug: glucose blood      1 each by Other route As Needed. Use as instructed       pantoprazole 40 MG EC tablet  Commonly known as: PROTONIX      Take 1 tablet by mouth Daily.       potassium chloride 20 MEQ CR tablet  Commonly known as: K-DUR,KLOR-CON      Take 1 tablet by mouth Daily.       PRESERVISION AREDS 2+MULTI VIT PO      Take 1 tablet by mouth 2 (Two) Times a Day.       tiotropium bromide monohydrate 2.5 MCG/ACT aerosol solution inhaler  Commonly known as: SPIRIVA RESPIMAT      Inhale 2 puffs.       torsemide 20 MG tablet  Commonly known as: DEMADEX      Take 3 tablets by mouth Daily.        Tresiba FlexTouch 100 UNIT/ML solution pen-injector injection  Generic drug: insulin degludec      46 units every morning                 Where to Get Your Medications        These medications were sent to [id number] - [place], [place] - [address] [place] [facility] [place] [facility] & [facility] - [phone]  - [phone] FX  [address], [facility] [address]      Phone: [phone]   losartan 25 MG tablet         Patient is no longer taking -.  I corrected the med list to reflect this.  I did not stop these medications.      Dictated utilizing Dragon dictation

## 2023-09-18 ENCOUNTER — APPOINTMENT (OUTPATIENT)
Dept: SPEECH THERAPY | Facility: HOSPITAL | Age: 73
End: 2023-09-18
Payer: MEDICARE

## 2023-09-18 ENCOUNTER — APPOINTMENT (OUTPATIENT)
Dept: PHYSICAL THERAPY | Facility: HOSPITAL | Age: 73
End: 2023-09-18
Payer: MEDICARE

## 2023-09-18 LAB
BH CV ECHO MEAS - AO MAX PG: 8.8 MMHG
BH CV ECHO MEAS - AO MEAN PG: 4.8 MMHG
BH CV ECHO MEAS - AO V2 MAX: 148 CM/SEC
BH CV ECHO MEAS - AO V2 VTI: 25.8 CM
BH CV ECHO MEAS - AVA(I,D): 2.29 CM2
BH CV ECHO MEAS - EDV(CUBED): 202.1 ML
BH CV ECHO MEAS - EDV(MOD-SP2): 212 ML
BH CV ECHO MEAS - EDV(MOD-SP4): 190 ML
BH CV ECHO MEAS - EF(MOD-BP): 31.1 %
BH CV ECHO MEAS - EF(MOD-SP2): 29.7 %
BH CV ECHO MEAS - EF(MOD-SP4): 29.5 %
BH CV ECHO MEAS - ESV(CUBED): 114.5 ML
BH CV ECHO MEAS - ESV(MOD-SP2): 149 ML
BH CV ECHO MEAS - ESV(MOD-SP4): 134 ML
BH CV ECHO MEAS - FS: 17.2 %
BH CV ECHO MEAS - IVS/LVPW: 1.03 CM
BH CV ECHO MEAS - IVSD: 1.13 CM
BH CV ECHO MEAS - LV DIASTOLIC VOL/BSA (35-75): 79.8 CM2
BH CV ECHO MEAS - LV MASS(C)D: 273.2 GRAMS
BH CV ECHO MEAS - LV MAX PG: 5.7 MMHG
BH CV ECHO MEAS - LV MEAN PG: 3.6 MMHG
BH CV ECHO MEAS - LV SYSTOLIC VOL/BSA (12-30): 56.3 CM2
BH CV ECHO MEAS - LV V1 MAX: 119.2 CM/SEC
BH CV ECHO MEAS - LV V1 VTI: 19.5 CM
BH CV ECHO MEAS - LVIDD: 5.9 CM
BH CV ECHO MEAS - LVIDS: 4.9 CM
BH CV ECHO MEAS - LVOT AREA: 3 CM2
BH CV ECHO MEAS - LVOT DIAM: 1.97 CM
BH CV ECHO MEAS - LVPWD: 1.09 CM
BH CV ECHO MEAS - MV DEC SLOPE: 882.7 CM/SEC2
BH CV ECHO MEAS - MV MAX PG: 18.6 MMHG
BH CV ECHO MEAS - MV MEAN PG: 9.2 MMHG
BH CV ECHO MEAS - MV P1/2T: 71.6 MSEC
BH CV ECHO MEAS - MV V2 VTI: 46 CM
BH CV ECHO MEAS - MVA(P1/2T): 3.1 CM2
BH CV ECHO MEAS - MVA(VTI): 1.28 CM2
BH CV ECHO MEAS - SI(MOD-SP2): 26.4 ML/M2
BH CV ECHO MEAS - SI(MOD-SP4): 23.5 ML/M2
BH CV ECHO MEAS - SV(LVOT): 59.1 ML
BH CV ECHO MEAS - SV(MOD-SP2): 63 ML
BH CV ECHO MEAS - SV(MOD-SP4): 56 ML
BH CV ECHO MEAS - TAPSE (>1.6): 1.3 CM
BH CV FAKE - TV VALVE AREA P 1/2 METHOD: 3.39 CM2
BH CV XLRA - RV BASE: 4.2 CM
BH CV XLRA - RV LENGTH: 8.9 CM
BH CV XLRA - RV MID: 3.1 CM
BH CV XLRA - TDI S': 8.1 CM/SEC
LEFT ATRIUM VOLUME INDEX: 20.8 ML/M2
TV MEAN GRADIENT: 4 MMHG
TV PEAK GRADIENT: 11 MMHG
TV STENOSIS PRESSURE HALF TIME: 56 MS
TV VTI: 32 CM

## 2023-09-21 ENCOUNTER — APPOINTMENT (OUTPATIENT)
Dept: SPEECH THERAPY | Facility: HOSPITAL | Age: 73
End: 2023-09-21
Payer: MEDICARE

## 2023-09-21 ENCOUNTER — APPOINTMENT (OUTPATIENT)
Dept: PHYSICAL THERAPY | Facility: HOSPITAL | Age: 73
End: 2023-09-21
Payer: MEDICARE

## 2023-09-22 ENCOUNTER — HOSPITAL ENCOUNTER (OUTPATIENT)
Dept: SLEEP MEDICINE | Facility: HOSPITAL | Age: 73
Discharge: HOME | End: 2023-09-22
Admitting: INTERNAL MEDICINE
Payer: MEDICARE

## 2023-09-22 PROCEDURE — 95806 SLEEP STUDY UNATT&RESP EFFT: CPT

## 2023-09-25 ENCOUNTER — APPOINTMENT (OUTPATIENT)
Dept: PHYSICAL THERAPY | Facility: HOSPITAL | Age: 73
End: 2023-09-25
Payer: MEDICARE

## 2023-09-25 ENCOUNTER — APPOINTMENT (OUTPATIENT)
Dept: SPEECH THERAPY | Facility: HOSPITAL | Age: 73
End: 2023-09-25
Payer: MEDICARE

## 2023-09-27 ENCOUNTER — OFFICE VISIT (OUTPATIENT)
Dept: CARDIAC REHAB | Facility: HOSPITAL | Age: 73
End: 2023-09-27
Payer: MEDICARE

## 2023-09-27 DIAGNOSIS — Z95.2 S/P MVR (MITRAL VALVE REPLACEMENT): Primary | ICD-10-CM

## 2023-09-27 PROCEDURE — 93798 PHYS/QHP OP CAR RHAB W/ECG: CPT

## 2023-09-28 ENCOUNTER — APPOINTMENT (OUTPATIENT)
Dept: SPEECH THERAPY | Facility: HOSPITAL | Age: 73
End: 2023-09-28
Payer: MEDICARE

## 2023-09-28 ENCOUNTER — APPOINTMENT (OUTPATIENT)
Dept: PHYSICAL THERAPY | Facility: HOSPITAL | Age: 73
End: 2023-09-28
Payer: MEDICARE

## 2023-09-29 ENCOUNTER — TREATMENT (OUTPATIENT)
Dept: CARDIAC REHAB | Facility: HOSPITAL | Age: 73
End: 2023-09-29
Payer: MEDICARE

## 2023-09-29 DIAGNOSIS — Z95.2 S/P MVR (MITRAL VALVE REPLACEMENT): Primary | ICD-10-CM

## 2023-09-29 PROCEDURE — 93798 PHYS/QHP OP CAR RHAB W/ECG: CPT

## 2023-10-01 NOTE — PLAN OF CARE
Goal Outcome Evaluation:  Plan of Care Reviewed With: patient        Progress: improving  Outcome Evaluation: D/C home          Tacrolimus level 6.5 at 11.5 hours, on 10/1/2023.  Goal 8-10.   Current dose 3 mg in AM, 3 mg in PM    Dose changed to 5mg in AM, 5 mg in PM   Recheck level tomorrow     Discussed with Ariane Rowland RN   Transplant Coordinator  [phone]

## 2023-10-02 ENCOUNTER — TREATMENT (OUTPATIENT)
Dept: CARDIAC REHAB | Facility: HOSPITAL | Age: 73
End: 2023-10-02
Payer: MEDICARE

## 2023-10-02 DIAGNOSIS — Z95.2 S/P MVR (MITRAL VALVE REPLACEMENT): Primary | ICD-10-CM

## 2023-10-02 PROCEDURE — 93798 PHYS/QHP OP CAR RHAB W/ECG: CPT

## 2023-10-04 ENCOUNTER — TREATMENT (OUTPATIENT)
Dept: CARDIAC REHAB | Facility: HOSPITAL | Age: 73
End: 2023-10-04
Payer: MEDICARE

## 2023-10-04 ENCOUNTER — TELEPHONE (OUTPATIENT)
Dept: CARDIAC SURGERY | Facility: CLINIC | Age: 73
End: 2023-10-04
Payer: MEDICARE

## 2023-10-04 DIAGNOSIS — Z95.4 H/O MITRAL VALVE REPLACEMENT WITH TISSUE GRAFT: Primary | ICD-10-CM

## 2023-10-04 DIAGNOSIS — Z95.2 S/P MVR (MITRAL VALVE REPLACEMENT): Primary | ICD-10-CM

## 2023-10-04 PROCEDURE — 93798 PHYS/QHP OP CAR RHAB W/ECG: CPT

## 2023-10-06 ENCOUNTER — TREATMENT (OUTPATIENT)
Dept: CARDIAC REHAB | Facility: HOSPITAL | Age: 73
End: 2023-10-06
Payer: MEDICARE

## 2023-10-06 DIAGNOSIS — Z95.2 S/P MVR (MITRAL VALVE REPLACEMENT): Primary | ICD-10-CM

## 2023-10-06 PROCEDURE — 93798 PHYS/QHP OP CAR RHAB W/ECG: CPT

## 2023-10-09 ENCOUNTER — TREATMENT (OUTPATIENT)
Dept: CARDIAC REHAB | Facility: HOSPITAL | Age: 73
End: 2023-10-09
Payer: MEDICARE

## 2023-10-09 DIAGNOSIS — Z95.2 S/P MVR (MITRAL VALVE REPLACEMENT): Primary | ICD-10-CM

## 2023-10-09 PROCEDURE — 93798 PHYS/QHP OP CAR RHAB W/ECG: CPT

## 2023-10-10 ENCOUNTER — TELEPHONE (OUTPATIENT)
Dept: SLEEP MEDICINE | Facility: HOSPITAL | Age: 73
End: 2023-10-10
Payer: MEDICARE

## 2023-10-10 ENCOUNTER — LAB (OUTPATIENT)
Dept: LAB | Facility: HOSPITAL | Age: 73
End: 2023-10-10
Payer: MEDICARE

## 2023-10-10 DIAGNOSIS — I42.8 NON-ISCHEMIC CARDIOMYOPATHY: ICD-10-CM

## 2023-10-10 LAB
ANION GAP SERPL CALCULATED.3IONS-SCNC: 9.4 MMOL/L (ref 5–15)
BUN SERPL-MCNC: 17 MG/DL (ref 8–23)
BUN/CREAT SERPL: 13 (ref 7–25)
CALCIUM SPEC-SCNC: 9.3 MG/DL (ref 8.6–10.5)
CHLORIDE SERPL-SCNC: 105 MMOL/L (ref 98–107)
CO2 SERPL-SCNC: 26.6 MMOL/L (ref 22–29)
CREAT SERPL-MCNC: 1.31 MG/DL (ref 0.76–1.27)
EGFRCR SERPLBLD CKD-EPI 2021: 57.1 ML/MIN/1.73
GLUCOSE SERPL-MCNC: 179 MG/DL (ref 65–99)
POTASSIUM SERPL-SCNC: 4.4 MMOL/L (ref 3.5–5.2)
SODIUM SERPL-SCNC: 141 MMOL/L (ref 136–145)

## 2023-10-10 PROCEDURE — 36415 COLL VENOUS BLD VENIPUNCTURE: CPT

## 2023-10-10 PROCEDURE — 80048 BASIC METABOLIC PNL TOTAL CA: CPT

## 2023-10-10 NOTE — TELEPHONE ENCOUNTER
LV requesting pt call if he has any questions about sleep study results , or if he would like to proceed with titration recommended by Dr Hinton

## 2023-10-11 ENCOUNTER — OFFICE VISIT (OUTPATIENT)
Dept: CARDIOLOGY | Facility: CLINIC | Age: 73
End: 2023-10-11
Payer: MEDICARE

## 2023-10-11 ENCOUNTER — TREATMENT (OUTPATIENT)
Dept: CARDIAC REHAB | Facility: HOSPITAL | Age: 73
End: 2023-10-11
Payer: MEDICARE

## 2023-10-11 VITALS
HEART RATE: 90 BPM | OXYGEN SATURATION: 97 % | WEIGHT: 255 LBS | SYSTOLIC BLOOD PRESSURE: 122 MMHG | HEIGHT: 73 IN | DIASTOLIC BLOOD PRESSURE: 72 MMHG | BODY MASS INDEX: 33.8 KG/M2 | RESPIRATION RATE: 20 BRPM

## 2023-10-11 DIAGNOSIS — I50.9 NEW ONSET OF CONGESTIVE HEART FAILURE: ICD-10-CM

## 2023-10-11 DIAGNOSIS — E11.59 TYPE 2 DIABETES MELLITUS WITH OTHER CIRCULATORY COMPLICATION, WITH LONG-TERM CURRENT USE OF INSULIN: Chronic | ICD-10-CM

## 2023-10-11 DIAGNOSIS — Z95.2 S/P MVR (MITRAL VALVE REPLACEMENT): Primary | ICD-10-CM

## 2023-10-11 DIAGNOSIS — Z79.4 TYPE 2 DIABETES MELLITUS WITHOUT COMPLICATION, WITH LONG-TERM CURRENT USE OF INSULIN: Primary | ICD-10-CM

## 2023-10-11 DIAGNOSIS — E78.2 MIXED HYPERLIPIDEMIA: Chronic | ICD-10-CM

## 2023-10-11 DIAGNOSIS — Z95.4 H/O MITRAL VALVE REPLACEMENT WITH TISSUE GRAFT: ICD-10-CM

## 2023-10-11 DIAGNOSIS — I48.92 PAROXYSMAL ATRIAL FLUTTER: ICD-10-CM

## 2023-10-11 DIAGNOSIS — E11.9 TYPE 2 DIABETES MELLITUS WITHOUT COMPLICATION, WITH LONG-TERM CURRENT USE OF INSULIN: Primary | ICD-10-CM

## 2023-10-11 DIAGNOSIS — I42.8 NON-ISCHEMIC CARDIOMYOPATHY: Primary | ICD-10-CM

## 2023-10-11 DIAGNOSIS — G47.33 OSA (OBSTRUCTIVE SLEEP APNEA): ICD-10-CM

## 2023-10-11 DIAGNOSIS — Z79.4 TYPE 2 DIABETES MELLITUS WITH OTHER CIRCULATORY COMPLICATION, WITH LONG-TERM CURRENT USE OF INSULIN: Chronic | ICD-10-CM

## 2023-10-11 PROCEDURE — 99214 OFFICE O/P EST MOD 30 MIN: CPT | Performed by: INTERNAL MEDICINE

## 2023-10-11 PROCEDURE — 3078F DIAST BP <80 MM HG: CPT | Performed by: INTERNAL MEDICINE

## 2023-10-11 PROCEDURE — 93798 PHYS/QHP OP CAR RHAB W/ECG: CPT

## 2023-10-11 PROCEDURE — 93000 ELECTROCARDIOGRAM COMPLETE: CPT | Performed by: INTERNAL MEDICINE

## 2023-10-11 PROCEDURE — 3074F SYST BP LT 130 MM HG: CPT | Performed by: INTERNAL MEDICINE

## 2023-10-11 NOTE — PROGRESS NOTES
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  In 2022 he developed COVID-pneumonia and findings suggestive of interstitial lung disease with possible COVID pneumonitis.  Despite adequate treatment and subsequent calcification LAD and circumflex.  With progressive dyspnea an echo was performed that showed normal sinus rhythm with questionable wall motion abnormality in the inferolateral wall with thickened mitral valve leaflets with mild regurgitation that was at least moderate but likely underestimated with RVSP 60 mmHg.  Heart failure.  He was admitted to treat heart failure.  GAUDENCIO showed severe mitral valve regurgitation.  Cardiac cath showed normal coronary arteries.  He therefore underwent mitral valve repair with 29 mm Schwartz bioprosthetic valve replacement, tricuspid valve repair with left atrial appendage closure..  He did demonstrate renal insufficiency during his hospitalization.  Postoperatively he had altered mental status without obvious stroke.  He demonstrated AV block that was transient.  He demonstrated moderate-sized pericardial effusion.  He was treated medically with colchicine and went to rehab and subsequently went home.  Echo 7/3/2023 showed modest pericardial effusion that was posterior located and no change from prior study and without evidence of tamponade.  He was found to have congestive heart failure with a markedly elevated proBNP of 5156 with creatinine 1.26.  Torsemide was cautiously increased.  He was seen by nephrology.  He had a follow-up echocardiogram 2023 which showed an ejection fraction further decreased  to 25%, mild to moderate left ventricular hypertrophy, mitral valve prosthesis appeared to be functioning relatively well with trivial to mild regurgitation.  Resolution of pericardial effusion.  It was again a technically limited study.  Blood work 1/2023 showed an proBNP 1950 (improved) with a creatinine increased to 1.49.  Glucose remains elevated in the 280 range.  At the last visit he had been addressing diabetes.  Heart failure fluid status had improved.  Echo on 9/18/2023 showed an ejection fraction 30% and of note diastolic function aortic valve calcification without stenosis prosthetic mitral valve without stenosis regurgitation, tricuspid valve repair number of 4 mmHg at a rate of 89 bpm was noted.  It was borderline elevated.    He looks well he is starting rehab.  He has had no palpitations dizziness or chest pain.  He continues to have dyspnea fatigue.  His edema has improved.  He is only taking Toprol-XL once a day and torsemide 40 mg a day.  He is doing better with managing his diet.  His sleep study did show severe sleep apnea both supine and on his side.    Blood work on 1010 was normal BMP except for creatinine 1.31.  proBNP is pending.    Past Medical History:   Diagnosis Date    Allergic rhinitis     Arthritis     BPH (benign prostatic hyperplasia)     Diabetes mellitus     TYPE 2    Foraminal stenosis of cervical region     C5-C6    GERD (gastroesophageal reflux disease)     Hemorrhoids     History of urinary retention 2010    S/P TURP    Hyperlipidemia     Macular degeneration     PING (obstructive sleep apnea) 01/07/2016    MILD, SEES DR. AMARILIS MORGAN     Past Surgical History:   Procedure Laterality Date    CARDIAC CATHETERIZATION N/A 5/11/2023    Procedure: Right Heart Cath;  Surgeon: Corey Gaffney MD;  Location: [facility] INVASIVE LOCATION;  Service: Cardiology;  Laterality: N/A;    CARDIAC CATHETERIZATION N/A 5/11/2023    Procedure: Left Heart Cath;  Surgeon: Corey Gaffney MD;   Location:  [facility] CATH INVASIVE LOCATION;  Service: Cardiology;  Laterality: N/A;    CARDIAC CATHETERIZATION N/A 5/11/2023    Procedure: Left ventriculography;  Surgeon: Corey Gaffney MD;  Location: [facility] CATH INVASIVE LOCATION;  Service: Cardiology;  Laterality: N/A;    CARDIAC CATHETERIZATION N/A 5/11/2023    Procedure: Coronary angiography;  Surgeon: Corey Gaffney MD;  Location: [facility] CATH INVASIVE LOCATION;  Service: Cardiology;  Laterality: N/A;    COLONOSCOPY N/A     WNL PER PT, NO RECORDS,     COLONOSCOPY N/A 04/07/2009    DR. GORGE BENAVIDEZ AT [place]    MITRAL VALVE REPAIR/REPLACEMENT N/A 5/24/2023    Procedure: MITRAL VALVE REPAIR/REPLACEMENT TRICUSPID VALVE REPAIR/REPLACEMENT TRANSESOPHAGEAL ECHOCARDIOGRAM WITH ANESTHESIA;  Surgeon: George Frey MD;  Location: [facility] CVOR;  Service: Cardiothoracic;  Laterality: N/A;    PROSTATE SURGERY N/A 11/12/2010    TURP, DR. COREY COLLAZO AT [facility]    SKIN TAG REMOVAL N/A 12/20/2017    ANAL SKIN TAG X2, PERFORMED IN OFFICE, DR. LISA ORANTES    TURP / TRANSURETHRAL INCISION / DRAINAGE PROSTATE  2010    Dr. Goodrich     Outpatient Medications Prior to Visit   Medication Sig Dispense Refill    acetaminophen (TYLENOL) 325 MG tablet Take 2 tablets by mouth Every 6 (Six) Hours As Needed for Mild Pain.      albuterol sulfate  (90 Base) MCG/ACT inhaler Inhale 2 puffs Every 4 (Four) Hours As Needed for Wheezing.      apixaban (ELIQUIS) 5 MG tablet tablet Take 1 tablet by mouth 2 (Two) Times a Day. 180 tablet 3    atorvastatin (LIPITOR) 40 MG tablet Take 1 tablet by mouth Every Night. 30 tablet 1    calcium carbonate (TUMS) 500 MG chewable tablet Chew 2 tablets 4 (Four) Times a Day As Needed for Indigestion or Heartburn.      Calcium Carbonate-Vitamin D (calcium 500 mg vitamin D 5 mcg, 200 UT,) 500-5 MG-MCG tablet per tablet Take 1 tablet by mouth 2 (Two) Times a Day.      glucose blood (OneTouch Verio) test strip 1 each by Other route As Needed. Use  as instructed      insulin degludec (Tresiba FlexTouch) 100 UNIT/ML solution pen-injector injection 46 units every morning 15 mL 6    Insulin Lispro, 1 Unit Dial, (HumaLOG KwikPen) 100 UNIT/ML solution pen-injector Inject 5 Units under the skin into the appropriate area as directed 3 (Three) Times a Day. Indications: Type 2 Diabetes 14 mL 2    losartan (Cozaar) 25 MG tablet Take 1 tablet by mouth Daily. 30 tablet 6    metoprolol succinate XL (TOPROL-XL) 25 MG 24 hr tablet Take 1 tablet by mouth 2 (Two) Times a Day. (Patient taking differently: Take 1 tablet by mouth 2 (Two) Times a Day. ONLY TAKING ONCE DAILY) 180 tablet 2    Multiple Vitamins-Minerals (PRESERVISION AREDS 2+MULTI VIT PO) Take 1 tablet by mouth 2 (Two) Times a Day.      pantoprazole (PROTONIX) 40 MG EC tablet Take 1 tablet by mouth Daily. 90 tablet 0    potassium chloride (K-DUR,KLOR-CON) 20 MEQ CR tablet Take 1 tablet by mouth Daily. 30 tablet 1    torsemide (DEMADEX) 20 MG tablet Take 3 tablets by mouth Daily. (Patient taking differently: Take 3 tablets by mouth Daily. TAKES 40 MG) 90 tablet 1    Continuous Blood Gluc Sensor (FreeStyle Ysabel 2 Sensor) misc 1 each Every 14 (Fourteen) Days. Use 1 sensor every 14 days 6 each 3    empagliflozin (JARDIANCE) 25 MG tablet tablet Take 1 tablet by mouth Daily.      Insulin Pen Needle 32G X 4 MM misc Use to inject insulin under the skin via pens up to 4 times daily 100 each 1    tiotropium bromide monohydrate (SPIRIVA RESPIMAT) 2.5 MCG/ACT aerosol solution inhaler Inhale 2 puffs. (Patient not taking: Reported on 10/11/2023)       No facility-administered medications prior to visit.       Allergies as of 10/11/2023    (No Known Allergies)     Social History     Socioeconomic History    Marital status:    Tobacco Use    Smoking status: Never     Passive exposure: Never    Smokeless tobacco: Never   Vaping Use    Vaping Use: Never used   Substance and Sexual Activity    Alcohol use: Yes     Alcohol/week:  "1.0 standard drink of alcohol     Types: 1 Drinks containing 0.5 oz of alcohol per week     Comment: RARELY    Drug use: No    Sexual activity: Defer     Partners: Female     Family History   Problem Relation Age of Onset    Lung cancer Mother     Stroke Father     Dementia Father     Hyperlipidemia Brother     Hypertension Brother     Heart disease Brother     Diabetes Brother     Colon polyps Brother     Colon polyps Brother      Review of Systems   Constitutional: Positive for weight gain. Negative for chills, fever and weight loss.   Cardiovascular:  Negative for leg swelling.   Respiratory:  Negative for cough, snoring and wheezing.    Hematologic/Lymphatic: Negative for bleeding problem. Does not bruise/bleed easily.   Skin:  Negative for color change.   Musculoskeletal:  Negative for falls, joint pain and myalgias.   Gastrointestinal:  Negative for melena.   Genitourinary:  Negative for hematuria.   Neurological:  Negative for excessive daytime sleepiness.   Psychiatric/Behavioral:  Negative for depression. The patient is not nervous/anxious.         Objective:     Vitals:    10/11/23 1315   BP: 122/72   BP Location: Right arm   Patient Position: Sitting   Cuff Size: Large Adult   Pulse: 90   Resp: 20   SpO2: 97%   Weight: 116 kg (255 lb)   Height: 185.4 cm (73\")     Body mass index is 33.64 kg/m².    Vitals reviewed.   Constitutional:       Appearance: Well-developed.   Eyes:      General: No scleral icterus.        Right eye: No discharge.      Conjunctiva/sclera: Conjunctivae normal.      Pupils: Pupils are equal, round, and reactive to light.   HENT:      Head: Normocephalic.      Nose: Nose normal.   Neck:      Thyroid: No thyromegaly.      Vascular: No JVD.   Pulmonary:      Effort: Pulmonary effort is normal. No respiratory distress.      Breath sounds: Normal breath sounds. No wheezing. No rales.   Cardiovascular:      Normal rate. Regular rhythm. Normal S1. Normal S2.       Murmurs: There is a grade " 1/6 systolic murmur, radiating to the apex.      No gallop.    Pulses:     Intact distal pulses.      Carotid: 2+ bilaterally.     Radial: 2+ bilaterally.     Femoral: 2+ bilaterally.     Popliteal: 2+ bilaterally.     Dorsalis pedis: 2+ bilaterally.     Posterior tibial: 2+ bilaterally.  Edema:     Peripheral edema absent.   Abdominal:      General: Bowel sounds are normal. There is no distension.      Palpations: Abdomen is soft.      Tenderness: There is no abdominal tenderness. There is no rebound.   Musculoskeletal: Normal range of motion.         General: No tenderness.      Cervical back: Normal range of motion and neck supple. Skin:     General: Skin is warm and dry.      Findings: No erythema or rash.   Neurological:      Mental Status: Alert and oriented to person, place, and time.   Psychiatric:         Behavior: Behavior normal.         Thought Content: Thought content normal.         Judgment: Judgment normal.       Lab Review:   Lab Results - Last 18 Months   Lab Units 09/14/23  1530 08/17/23  0831 06/24/23  0228 06/23/23  0812 03/29/23  1015 05/23/22  1602   WBC 10*3/mm3 9.53 7.85   < > 7.91   < > 6.7   RBC 10*6/mm3 4.27 4.41   < > 3.68*   < > 4.60   HEMOGLOBIN g/dL 11.4* 11.9*   < > 10.5*   < > 14.0   HEMOGLOBIN, POC   --   --   --   --    < >  --    HEMATOCRIT % 34.6* 36.4*   < > 31.6*   < > 40.5   HEMATOCRIT POC   --   --   --   --    < >  --    MCV fL 81.0 82.5   < > 85.9   < > 88   MCH pg 26.7 27.0   < > 28.5   < > 30.4   MCHC g/dL 32.9 32.7   < > 33.2   < > 34.6   RDW % 14.7 14.4   < > 14.2   < > 13.0   PLATELETS 10*3/mm3 191 225   < > 247   < > 256   NEUTROPHIL % %  --  69.7  --  64.9   < > 65   LYMPHOCYTE % %  --  11.6*  --  15.7*   < > 17   MONOCYTES % %  --  11.3  --  10.4   < > 10   EOSINOPHIL % %  --  6.1  --  7.8*   < > 6   BASOPHIL % %  --  0.8  --  0.8   < > 1   NEUTROS ABS 10*3/mm3  --  5.47  --  5.14   < > 4.3   LYMPHS ABS 10*3/mm3  --  0.91  --  1.24   < > 1.2   MONOS ABS 10*3/mm3   --  0.89  --  0.82   < > 0.7   EOS ABS 10*3/mm3  --  0.48*  --  0.62*   < > 0.4   BASOS ABS 10*3/mm3  --  0.06  --  0.06   < > 0.0   IMM GRAN % %  --   --   --   --   --  1   IMMATURE GRANS (ABS) x10E3/uL  --   --   --   --   --  0.1   RDW-SD fl 43.4 42.8   < > 44.3   < >  --    MPV fL 9.5 9.5   < > 9.3   < >  --     < > = values in this interval not displayed.     Lab Results - Last 18 Months   Lab Units 10/10/23  1420 09/14/23  1530   GLUCOSE mg/dL 179* 203*   BUN mg/dL 17 16   CREATININE mg/dL 1.31* 1.34*   SODIUM mmol/L 141 139   POTASSIUM mmol/L 4.4 3.9   CHLORIDE mmol/L 105 104   CO2 mmol/L 26.6 23.5   CALCIUM mg/dL 9.3 9.0   BUN / CREAT RATIO  13.0 11.9   ANION GAP mmol/L 9.4 11.5   EGFR mL/min/1.73 57.1* 55.6*     Lab Results - Last 18 Months   Lab Units 10/10/23  1420 09/14/23  1530 08/17/23  0831 08/01/23  1305 07/18/23  1434   GLUCOSE mg/dL 179* 203* 323*   < > 233*   BUN mg/dL 17 16 25*   < > 16   CREATININE mg/dL 1.31* 1.34* 1.50*   < > 1.25   SODIUM mmol/L 141 139 140   < > 141   POTASSIUM mmol/L 4.4 3.9 3.8   < > 3.9   CHLORIDE mmol/L 105 104 99   < > 101   CO2 mmol/L 26.6 23.5 27.7   < > 26.1   CALCIUM mg/dL 9.3 9.0 9.7   < > 9.6   TOTAL PROTEIN g/dL  --   --  6.9  --  7.1   ALBUMIN g/dL  --   --  4.0  --  4.0   ALT (SGPT) U/L  --   --  19  --  23   AST (SGOT) U/L  --   --  21  --  18   ALK PHOS U/L  --   --  111  --  123*   BILIRUBIN mg/dL  --   --  0.5  --  0.5   GLOBULIN gm/dL  --   --  2.9  --  3.1   A/G RATIO g/dL  --   --  1.4  --  1.3   BUN / CREAT RATIO  13.0 11.9 16.7   < > 12.8   ANION GAP mmol/L 9.4 11.5 13.3   < > 13.9   EGFR mL/min/1.73 57.1* 55.6* 48.6*   < > 60.4    < > = values in this interval not displayed.     Lab Results - Last 18 Months   Lab Units 08/17/23  0831 05/23/23  1429   CHOLESTEROL mg/dL 116 155   TRIGLYCERIDES mg/dL 221* 197*   HDL CHOL mg/dL 34* 41   LDL CHOL mg/dL 47 81   VLDL CHOL mg/dL 35 33   LDL/HDL RATIO  1.11 1.82     Lab Results - Last 18 Months   Lab Units  09/14/23  1530 08/17/23  0831   PROBNP pg/mL 2,785.0* 1,268.0*     Lab Results - Last 18 Months   Lab Units 06/25/23  0944 06/25/23  0739   HSTROP T ng/L 95* 97*     Lab Results - Last 18 Months   Lab Units 08/17/23  0831 06/30/23  0719   TSH uIU/mL 2.320 1.830     Lab Results - Last 18 Months   Lab Units 06/04/23  1410 06/04/23  0319 06/01/23  2318 06/01/23  1555 05/25/23  0307   PROTIME Seconds  --   --   --  13.4 15.0*   APTT seconds 30.3 151.6*   < > 24.2  --     < > = values in this interval not displayed.           ECG 12 Lead    Date/Time: 10/11/2023 2:27 AM  Performed by: Karishma Arango MD    Authorized by: Karishma Arango MD  Comparison: compared with previous ECG   Similar to previous ECG  Rhythm: sinus rhythm  Conduction: right bundle branch block and left anterior fascicular block    Clinical impression: abnormal EKG        Assessment:       Diagnosis Plan   1. Non-ischemic cardiomyopathy  proBNP    ECG 12 Lead      2. Paroxysmal atrial flutter        3. New onset of congestive heart failure        4. Mixed hyperlipidemia        5. H/O mitral valve replacement with tissue graft        6. Type 2 diabetes mellitus with other circulatory complication, with long-term current use of insulin        7. PING (obstructive sleep apnea)          Plan:       1.  Mitral valve replacement and tricuspid valve repair 5/2023.  Valve function clinically seems better.  Will treat sleep apnea aggressively and plan on repeat echo in 2 to 3 months.  2.  Nonischemic cardiomyopathy with acute on chronic diastolic heart failure.  Tolerating current regimen relatively well and appears to be fairly well compensated.  We will add a proBNP to labs done yesterday.  I did also recommend switching from losartan to Entresto.  He will look at affordability get back to us.  3.  Pericardial effusion.  Resolved with slight elevation in sed rate.  We discussed addition of colchicine but he deferred this for now I think it is reasonable.  4.  Renal  insufficiency, as above.  Improved  5.  Hypertension.  Slightly low.  We will collect cautiously addition of losartan.  6.  Hyperlipidemia  7.  Diabetes.  He will continue with Jardiance and is to follow-up with DESTIN Oconnell in the near future.  8.  Postop encephalopathy.  This seems to have improved even from last visit  9.  Post op stroke  10.  Postoperative atrial fibrillation.  He has had atrial appendage closure.  He is on Eliquis for now we will continue this for least another 6 months.      Time Spent: I spent 35 minutes caring for Vernon on this date of service. This time includes time spent by me in the following activities: preparing for the visit, reviewing tests, obtaining and/or reviewing a separately obtained history, performing a medically appropriate examination and/or evaluation, counseling and educating the patient/family/caregiver, ordering medications, tests, or procedures, documenting information in the medical record, and independently interpreting results and communicating that information with the patient/family/caregiver.   I spent 1 minutes on the separately reported service of ECG. This time is not included in the time used to support the E/M service also reported today.        Your medication list            Accurate as of October 11, 2023 11:59 PM. If you have any questions, ask your nurse or doctor.                CHANGE how you take these medications        Instructions Last Dose Given Next Dose Due   Insulin Pen Needle 32G X 4 MM misc  What changed:   how much to take  how to take this  when to take this  Changed by: Femi Mcneill MD      1 each by Other route 3 (Three) Times a Day. Use to inject insulin under the skin via pens up to 4 times daily  Indications: Type 2 Diabetes       metoprolol succinate XL 25 MG 24 hr tablet  Commonly known as: TOPROL-XL  What changed: additional instructions      Take 1 tablet by mouth 2 (Two) Times a Day.       torsemide 20 MG tablet  Commonly  known as: DEMADEX  What changed: additional instructions      Take 3 tablets by mouth Daily.              CONTINUE taking these medications        Instructions Last Dose Given Next Dose Due   acetaminophen 325 MG tablet  Commonly known as: TYLENOL      Take 2 tablets by mouth Every 6 (Six) Hours As Needed for Mild Pain.       albuterol sulfate  (90 Base) MCG/ACT inhaler  Commonly known as: PROVENTIL HFA;VENTOLIN HFA;PROAIR HFA      Inhale 2 puffs Every 4 (Four) Hours As Needed for Wheezing.       apixaban 5 MG tablet tablet  Commonly known as: ELIQUIS      Take 1 tablet by mouth 2 (Two) Times a Day.       atorvastatin 40 MG tablet  Commonly known as: LIPITOR      Take 1 tablet by mouth Every Night.       calcium 500 mg vitamin D 5 mcg (200 UT) 500-5 MG-MCG tablet per tablet      Take 1 tablet by mouth 2 (Two) Times a Day.       calcium carbonate 500 MG chewable tablet  Commonly known as: TUMS      Chew 2 tablets 4 (Four) Times a Day As Needed for Indigestion or Heartburn.       empagliflozin 25 MG tablet tablet  Commonly known as: JARDIANCE      Take 1 tablet by mouth Daily.       FreeStyle Ysabel 2 Sensor misc      Use 1 each Every 14 (Fourteen) Days. Use 1 sensor every 14 days  Indications: Type 2 Diabetes       Insulin Lispro (1 Unit Dial) 100 UNIT/ML solution pen-injector  Commonly known as: HumaLOG KwikPen      Inject 5 Units under the skin into the appropriate area as directed 3 (Three) Times a Day. Indications: Type 2 Diabetes       losartan 25 MG tablet  Commonly known as: Cozaar      Take 1 tablet by mouth Daily.       OneTouch Verio test strip  Generic drug: glucose blood      1 each by Other route As Needed. Use as instructed       pantoprazole 40 MG EC tablet  Commonly known as: PROTONIX      Take 1 tablet by mouth Daily.       potassium chloride 20 MEQ CR tablet  Commonly known as: K-DUR,KLOR-CON      Take 1 tablet by mouth Daily.       PRESERVISION AREDS 2+MULTI VIT PO      Take 1 tablet by mouth  2 (Two) Times a Day.       tiotropium bromide monohydrate 2.5 MCG/ACT aerosol solution inhaler  Commonly known as: SPIRIVA RESPIMAT      Inhale 2 puffs.       Tresiba FlexTouch 100 UNIT/ML solution pen-injector injection  Generic drug: insulin degludec      46 units every morning                 Where to Get Your Medications        These medications were sent to [id number] - [place], [place] - [address] AT [facility] - [phone] [facility] [phone]   [address] [address]      Phone: [phone]   FreeStyle Ysabel 2 Sensor misc  Insulin Pen Needle 32G X 4 MM misc         Patient is no longer taking -.  I corrected the med list to reflect this.  I did not stop these medications.      Dictated utilizing Dragon dictation

## 2023-10-12 DIAGNOSIS — G47.33 OSA (OBSTRUCTIVE SLEEP APNEA): Primary | ICD-10-CM

## 2023-10-13 ENCOUNTER — TREATMENT (OUTPATIENT)
Dept: CARDIAC REHAB | Facility: HOSPITAL | Age: 73
End: 2023-10-13
Payer: MEDICARE

## 2023-10-13 DIAGNOSIS — Z95.2 S/P MVR (MITRAL VALVE REPLACEMENT): Primary | ICD-10-CM

## 2023-10-13 PROCEDURE — 93798 PHYS/QHP OP CAR RHAB W/ECG: CPT

## 2023-10-15 ENCOUNTER — HOSPITAL ENCOUNTER (OUTPATIENT)
Dept: SLEEP MEDICINE | Facility: HOSPITAL | Age: 73
Discharge: HOME | End: 2023-10-15
Admitting: INTERNAL MEDICINE
Payer: MEDICARE

## 2023-10-15 DIAGNOSIS — G47.33 OSA (OBSTRUCTIVE SLEEP APNEA): ICD-10-CM

## 2023-10-15 PROCEDURE — 95811 POLYSOM 6/>YRS CPAP 4/> PARM: CPT

## 2023-10-16 ENCOUNTER — TREATMENT (OUTPATIENT)
Dept: CARDIAC REHAB | Facility: HOSPITAL | Age: 73
End: 2023-10-16
Payer: MEDICARE

## 2023-10-16 DIAGNOSIS — Z95.2 S/P MVR (MITRAL VALVE REPLACEMENT): Primary | ICD-10-CM

## 2023-10-16 PROCEDURE — 93798 PHYS/QHP OP CAR RHAB W/ECG: CPT

## 2023-10-17 DIAGNOSIS — Z79.4 TYPE 2 DIABETES MELLITUS WITHOUT COMPLICATION, WITH LONG-TERM CURRENT USE OF INSULIN: ICD-10-CM

## 2023-10-17 DIAGNOSIS — Z79.4 TYPE 2 DIABETES MELLITUS WITH OTHER CIRCULATORY COMPLICATION, WITH LONG-TERM CURRENT USE OF INSULIN: ICD-10-CM

## 2023-10-17 DIAGNOSIS — E11.9 TYPE 2 DIABETES MELLITUS WITHOUT COMPLICATION, WITH LONG-TERM CURRENT USE OF INSULIN: ICD-10-CM

## 2023-10-17 DIAGNOSIS — E11.59 TYPE 2 DIABETES MELLITUS WITH OTHER CIRCULATORY COMPLICATION, WITH LONG-TERM CURRENT USE OF INSULIN: ICD-10-CM

## 2023-10-17 NOTE — TELEPHONE ENCOUNTER
PATIENT CALLED REGARDING THE SENSORS. WE SENT THE PRESCRIPTION TO [facility] ON [address] BUT THEY ARE SAYING MEDICARE WILL NOT FILL IT BECAUSE IT IS WRITTEN WRONG.CAN WE REVIEW THIS?    PATIENT ALSO NEEDS A REFILL ON THE JARDIANCE. PLEASE SEND TO [facility] ON [address]

## 2023-10-18 ENCOUNTER — HOSPITAL ENCOUNTER (OUTPATIENT)
Dept: GENERAL RADIOLOGY | Facility: HOSPITAL | Age: 73
Discharge: HOME | End: 2023-10-18
Admitting: THORACIC SURGERY (CARDIOTHORACIC VASCULAR SURGERY)
Payer: MEDICARE

## 2023-10-18 ENCOUNTER — TREATMENT (OUTPATIENT)
Dept: CARDIAC REHAB | Facility: HOSPITAL | Age: 73
End: 2023-10-18
Payer: MEDICARE

## 2023-10-18 DIAGNOSIS — Z95.2 S/P MVR (MITRAL VALVE REPLACEMENT): Primary | ICD-10-CM

## 2023-10-18 DIAGNOSIS — Z95.4 H/O MITRAL VALVE REPLACEMENT WITH TISSUE GRAFT: ICD-10-CM

## 2023-10-18 PROCEDURE — 71046 X-RAY EXAM CHEST 2 VIEWS: CPT

## 2023-10-18 PROCEDURE — 93798 PHYS/QHP OP CAR RHAB W/ECG: CPT

## 2023-10-20 ENCOUNTER — TREATMENT (OUTPATIENT)
Dept: CARDIAC REHAB | Facility: HOSPITAL | Age: 73
End: 2023-10-20
Payer: MEDICARE

## 2023-10-20 DIAGNOSIS — Z95.2 S/P MVR (MITRAL VALVE REPLACEMENT): Primary | ICD-10-CM

## 2023-10-20 PROCEDURE — 93798 PHYS/QHP OP CAR RHAB W/ECG: CPT

## 2023-10-23 ENCOUNTER — TREATMENT (OUTPATIENT)
Dept: CARDIAC REHAB | Facility: HOSPITAL | Age: 73
End: 2023-10-23
Payer: MEDICARE

## 2023-10-23 DIAGNOSIS — Z95.2 S/P MVR (MITRAL VALVE REPLACEMENT): Primary | ICD-10-CM

## 2023-10-23 PROCEDURE — 93798 PHYS/QHP OP CAR RHAB W/ECG: CPT

## 2023-10-25 ENCOUNTER — TREATMENT (OUTPATIENT)
Dept: CARDIAC REHAB | Facility: HOSPITAL | Age: 73
End: 2023-10-25
Payer: MEDICARE

## 2023-10-25 DIAGNOSIS — Z95.2 S/P MVR (MITRAL VALVE REPLACEMENT): Primary | ICD-10-CM

## 2023-10-25 PROCEDURE — 93798 PHYS/QHP OP CAR RHAB W/ECG: CPT

## 2023-10-27 ENCOUNTER — TREATMENT (OUTPATIENT)
Dept: CARDIAC REHAB | Facility: HOSPITAL | Age: 73
End: 2023-10-27
Payer: MEDICARE

## 2023-10-27 DIAGNOSIS — Z95.2 S/P MVR (MITRAL VALVE REPLACEMENT): Primary | ICD-10-CM

## 2023-10-27 PROCEDURE — 93798 PHYS/QHP OP CAR RHAB W/ECG: CPT

## 2023-10-30 ENCOUNTER — TREATMENT (OUTPATIENT)
Dept: CARDIAC REHAB | Facility: HOSPITAL | Age: 73
End: 2023-10-30
Payer: MEDICARE

## 2023-10-30 DIAGNOSIS — Z95.2 S/P MVR (MITRAL VALVE REPLACEMENT): Primary | ICD-10-CM

## 2023-10-30 PROCEDURE — 93798 PHYS/QHP OP CAR RHAB W/ECG: CPT

## 2023-11-01 ENCOUNTER — TRANSCRIBE ORDERS (OUTPATIENT)
Dept: ADMINISTRATIVE | Facility: HOSPITAL | Age: 73
End: 2023-11-01
Payer: MEDICARE

## 2023-11-01 ENCOUNTER — TELEPHONE (OUTPATIENT)
Dept: SLEEP MEDICINE | Facility: HOSPITAL | Age: 73
End: 2023-11-01

## 2023-11-01 ENCOUNTER — TREATMENT (OUTPATIENT)
Dept: CARDIAC REHAB | Facility: HOSPITAL | Age: 73
End: 2023-11-01
Payer: MEDICARE

## 2023-11-01 DIAGNOSIS — R91.1 PULMONARY NODULE: Primary | ICD-10-CM

## 2023-11-01 DIAGNOSIS — Z95.2 S/P MVR (MITRAL VALVE REPLACEMENT): Primary | ICD-10-CM

## 2023-11-01 PROCEDURE — 93798 PHYS/QHP OP CAR RHAB W/ECG: CPT

## 2023-11-01 NOTE — TELEPHONE ENCOUNTER
Spoke with pt. Pt stated he had seen his pulmonary doctor @ [facility] and an order was sent somewhere for cpap. No mention of cpap ordered in recent office note. Tech faxed order for cpap to [facility]

## 2023-11-03 ENCOUNTER — TREATMENT (OUTPATIENT)
Dept: CARDIAC REHAB | Facility: HOSPITAL | Age: 73
End: 2023-11-03
Payer: MEDICARE

## 2023-11-03 DIAGNOSIS — Z95.2 S/P MVR (MITRAL VALVE REPLACEMENT): Primary | ICD-10-CM

## 2023-11-03 PROCEDURE — 93798 PHYS/QHP OP CAR RHAB W/ECG: CPT

## 2023-11-06 ENCOUNTER — TREATMENT (OUTPATIENT)
Dept: CARDIAC REHAB | Facility: HOSPITAL | Age: 73
End: 2023-11-06
Payer: MEDICARE

## 2023-11-06 DIAGNOSIS — Z95.2 S/P MVR (MITRAL VALVE REPLACEMENT): Primary | ICD-10-CM

## 2023-11-06 PROCEDURE — 93798 PHYS/QHP OP CAR RHAB W/ECG: CPT

## 2023-11-08 ENCOUNTER — TREATMENT (OUTPATIENT)
Dept: CARDIAC REHAB | Facility: HOSPITAL | Age: 73
End: 2023-11-08
Payer: MEDICARE

## 2023-11-08 DIAGNOSIS — Z95.2 S/P MVR (MITRAL VALVE REPLACEMENT): Primary | ICD-10-CM

## 2023-11-08 PROCEDURE — 93798 PHYS/QHP OP CAR RHAB W/ECG: CPT

## 2023-11-10 ENCOUNTER — TREATMENT (OUTPATIENT)
Dept: CARDIAC REHAB | Facility: HOSPITAL | Age: 73
End: 2023-11-10
Payer: MEDICARE

## 2023-11-10 DIAGNOSIS — Z95.2 S/P MVR (MITRAL VALVE REPLACEMENT): Primary | ICD-10-CM

## 2023-11-10 PROCEDURE — 93798 PHYS/QHP OP CAR RHAB W/ECG: CPT

## 2023-11-13 ENCOUNTER — TREATMENT (OUTPATIENT)
Dept: CARDIAC REHAB | Facility: HOSPITAL | Age: 73
End: 2023-11-13
Payer: MEDICARE

## 2023-11-13 DIAGNOSIS — Z95.2 S/P MVR (MITRAL VALVE REPLACEMENT): Primary | ICD-10-CM

## 2023-11-13 PROCEDURE — 93798 PHYS/QHP OP CAR RHAB W/ECG: CPT

## 2023-11-15 ENCOUNTER — TREATMENT (OUTPATIENT)
Dept: CARDIAC REHAB | Facility: HOSPITAL | Age: 73
End: 2023-11-15
Payer: MEDICARE

## 2023-11-15 DIAGNOSIS — Z95.2 S/P MVR (MITRAL VALVE REPLACEMENT): Primary | ICD-10-CM

## 2023-11-15 PROCEDURE — 93798 PHYS/QHP OP CAR RHAB W/ECG: CPT

## 2023-11-17 ENCOUNTER — TREATMENT (OUTPATIENT)
Dept: CARDIAC REHAB | Facility: HOSPITAL | Age: 73
End: 2023-11-17
Payer: MEDICARE

## 2023-11-17 DIAGNOSIS — Z95.2 S/P MVR (MITRAL VALVE REPLACEMENT): Primary | ICD-10-CM

## 2023-11-17 PROCEDURE — 93798 PHYS/QHP OP CAR RHAB W/ECG: CPT

## 2023-11-20 ENCOUNTER — TREATMENT (OUTPATIENT)
Dept: CARDIAC REHAB | Facility: HOSPITAL | Age: 73
End: 2023-11-20
Payer: MEDICARE

## 2023-11-20 DIAGNOSIS — Z95.2 S/P MVR (MITRAL VALVE REPLACEMENT): Primary | ICD-10-CM

## 2023-11-20 PROCEDURE — 93798 PHYS/QHP OP CAR RHAB W/ECG: CPT

## 2023-11-22 ENCOUNTER — TREATMENT (OUTPATIENT)
Dept: CARDIAC REHAB | Facility: HOSPITAL | Age: 73
End: 2023-11-22
Payer: MEDICARE

## 2023-11-22 DIAGNOSIS — Z95.2 S/P MVR (MITRAL VALVE REPLACEMENT): Primary | ICD-10-CM

## 2023-11-22 PROCEDURE — 93798 PHYS/QHP OP CAR RHAB W/ECG: CPT

## 2023-11-27 ENCOUNTER — TREATMENT (OUTPATIENT)
Dept: CARDIAC REHAB | Facility: HOSPITAL | Age: 73
End: 2023-11-27
Payer: MEDICARE

## 2023-11-27 DIAGNOSIS — Z95.2 S/P MVR (MITRAL VALVE REPLACEMENT): Primary | ICD-10-CM

## 2023-11-27 PROCEDURE — 93798 PHYS/QHP OP CAR RHAB W/ECG: CPT

## 2023-11-29 ENCOUNTER — TREATMENT (OUTPATIENT)
Dept: CARDIAC REHAB | Facility: HOSPITAL | Age: 73
End: 2023-11-29
Payer: MEDICARE

## 2023-11-29 DIAGNOSIS — Z95.2 S/P MVR (MITRAL VALVE REPLACEMENT): Primary | ICD-10-CM

## 2023-11-29 PROCEDURE — 93798 PHYS/QHP OP CAR RHAB W/ECG: CPT

## 2023-12-01 ENCOUNTER — TREATMENT (OUTPATIENT)
Dept: CARDIAC REHAB | Facility: HOSPITAL | Age: 73
End: 2023-12-01
Payer: MEDICARE

## 2023-12-01 DIAGNOSIS — Z95.2 S/P MVR (MITRAL VALVE REPLACEMENT): Primary | ICD-10-CM

## 2023-12-01 PROCEDURE — 93798 PHYS/QHP OP CAR RHAB W/ECG: CPT

## 2023-12-04 ENCOUNTER — TREATMENT (OUTPATIENT)
Dept: CARDIAC REHAB | Facility: HOSPITAL | Age: 73
End: 2023-12-04
Payer: MEDICARE

## 2023-12-04 DIAGNOSIS — Z95.2 S/P MVR (MITRAL VALVE REPLACEMENT): Primary | ICD-10-CM

## 2023-12-04 PROCEDURE — 93798 PHYS/QHP OP CAR RHAB W/ECG: CPT

## 2023-12-06 ENCOUNTER — TREATMENT (OUTPATIENT)
Dept: CARDIAC REHAB | Facility: HOSPITAL | Age: 73
End: 2023-12-06
Payer: MEDICARE

## 2023-12-06 DIAGNOSIS — Z95.2 S/P MVR (MITRAL VALVE REPLACEMENT): Primary | ICD-10-CM

## 2023-12-06 PROCEDURE — 93798 PHYS/QHP OP CAR RHAB W/ECG: CPT

## 2023-12-08 ENCOUNTER — TREATMENT (OUTPATIENT)
Dept: CARDIAC REHAB | Facility: HOSPITAL | Age: 73
End: 2023-12-08
Payer: MEDICARE

## 2023-12-08 DIAGNOSIS — Z95.2 S/P MVR (MITRAL VALVE REPLACEMENT): Primary | ICD-10-CM

## 2023-12-08 PROCEDURE — 93798 PHYS/QHP OP CAR RHAB W/ECG: CPT

## 2023-12-11 ENCOUNTER — TREATMENT (OUTPATIENT)
Dept: CARDIAC REHAB | Facility: HOSPITAL | Age: 73
End: 2023-12-11
Payer: MEDICARE

## 2023-12-11 DIAGNOSIS — Z95.2 S/P MVR (MITRAL VALVE REPLACEMENT): Primary | ICD-10-CM

## 2023-12-11 PROCEDURE — 93798 PHYS/QHP OP CAR RHAB W/ECG: CPT

## 2023-12-13 ENCOUNTER — HOSPITAL ENCOUNTER (OUTPATIENT)
Dept: CT IMAGING | Facility: HOSPITAL | Age: 73
Discharge: HOME | End: 2023-12-13
Admitting: INTERNAL MEDICINE
Payer: MEDICARE

## 2023-12-13 ENCOUNTER — TREATMENT (OUTPATIENT)
Dept: CARDIAC REHAB | Facility: HOSPITAL | Age: 73
End: 2023-12-13
Payer: MEDICARE

## 2023-12-13 DIAGNOSIS — Z95.2 S/P MVR (MITRAL VALVE REPLACEMENT): Primary | ICD-10-CM

## 2023-12-13 DIAGNOSIS — R91.1 PULMONARY NODULE: ICD-10-CM

## 2023-12-13 PROCEDURE — 71250 CT THORAX DX C-: CPT

## 2023-12-13 PROCEDURE — 93798 PHYS/QHP OP CAR RHAB W/ECG: CPT

## 2023-12-15 ENCOUNTER — APPOINTMENT (OUTPATIENT)
Dept: CARDIAC REHAB | Facility: HOSPITAL | Age: 73
End: 2023-12-15
Payer: MEDICARE

## 2023-12-18 ENCOUNTER — APPOINTMENT (OUTPATIENT)
Dept: CARDIAC REHAB | Facility: HOSPITAL | Age: 73
End: 2023-12-18
Payer: MEDICARE

## 2023-12-20 ENCOUNTER — APPOINTMENT (OUTPATIENT)
Dept: CARDIAC REHAB | Facility: HOSPITAL | Age: 73
End: 2023-12-20
Payer: MEDICARE

## 2023-12-20 DIAGNOSIS — I48.92 PAROXYSMAL ATRIAL FLUTTER: ICD-10-CM

## 2023-12-20 DIAGNOSIS — Z95.4 H/O MITRAL VALVE REPLACEMENT WITH TISSUE GRAFT: ICD-10-CM

## 2023-12-20 DIAGNOSIS — I42.8 NON-ISCHEMIC CARDIOMYOPATHY: Primary | ICD-10-CM

## 2023-12-22 ENCOUNTER — APPOINTMENT (OUTPATIENT)
Dept: CARDIAC REHAB | Facility: HOSPITAL | Age: 73
End: 2023-12-22
Payer: MEDICARE

## 2023-12-22 ENCOUNTER — TRANSCRIBE ORDERS (OUTPATIENT)
Dept: ADMINISTRATIVE | Facility: HOSPITAL | Age: 73
End: 2023-12-22
Payer: MEDICARE

## 2023-12-22 ENCOUNTER — HOSPITAL ENCOUNTER (OUTPATIENT)
Dept: CARDIOLOGY | Facility: HOSPITAL | Age: 73
Discharge: HOME | End: 2023-12-22
Payer: MEDICARE

## 2023-12-22 VITALS
OXYGEN SATURATION: 96 % | HEIGHT: 73 IN | HEART RATE: 67 BPM | BODY MASS INDEX: 33.8 KG/M2 | WEIGHT: 255 LBS | DIASTOLIC BLOOD PRESSURE: 85 MMHG | SYSTOLIC BLOOD PRESSURE: 120 MMHG

## 2023-12-22 DIAGNOSIS — Z95.4 H/O MITRAL VALVE REPLACEMENT WITH TISSUE GRAFT: ICD-10-CM

## 2023-12-22 DIAGNOSIS — I42.8 NON-ISCHEMIC CARDIOMYOPATHY: ICD-10-CM

## 2023-12-22 DIAGNOSIS — R91.8 PULMONARY NODULES: Primary | ICD-10-CM

## 2023-12-22 DIAGNOSIS — I48.92 PAROXYSMAL ATRIAL FLUTTER: ICD-10-CM

## 2023-12-22 PROCEDURE — 93306 TTE W/DOPPLER COMPLETE: CPT

## 2023-12-22 PROCEDURE — 25510000001 PERFLUTREN (DEFINITY) 8.476 MG IN SODIUM CHLORIDE (PF) 0.9 % 10 ML INJECTION: Performed by: NURSE PRACTITIONER

## 2023-12-22 RX ADMIN — PERFLUTREN 3 ML: 6.52 INJECTION, SUSPENSION INTRAVENOUS at 10:48

## 2023-12-23 LAB
AORTIC DIMENSIONLESS INDEX: 1.3 (DI)
ASCENDING AORTA: 3.6 CM
BH CV ECHO MEAS - ACS: 1.93 CM
BH CV ECHO MEAS - AO MAX PG: 6.5 MMHG
BH CV ECHO MEAS - AO MEAN PG: 3.2 MMHG
BH CV ECHO MEAS - AO ROOT DIAM: 3.3 CM
BH CV ECHO MEAS - AO V2 MAX: 127.6 CM/SEC
BH CV ECHO MEAS - AO V2 VTI: 20.5 CM
BH CV ECHO MEAS - AVA(I,D): 4.4 CM2
BH CV ECHO MEAS - EDV(CUBED): 160.2 ML
BH CV ECHO MEAS - EDV(MOD-SP2): 160 ML
BH CV ECHO MEAS - EDV(MOD-SP4): 172 ML
BH CV ECHO MEAS - EF(MOD-BP): 50.3 %
BH CV ECHO MEAS - EF(MOD-SP2): 45.6 %
BH CV ECHO MEAS - EF(MOD-SP4): 53.5 %
BH CV ECHO MEAS - ESV(CUBED): 59.6 ML
BH CV ECHO MEAS - ESV(MOD-SP2): 87 ML
BH CV ECHO MEAS - ESV(MOD-SP4): 80 ML
BH CV ECHO MEAS - FS: 28.1 %
BH CV ECHO MEAS - IVS/LVPW: 0.93 CM
BH CV ECHO MEAS - IVSD: 1.01 CM
BH CV ECHO MEAS - LA DIMENSION: 5 CM
BH CV ECHO MEAS - LAT PEAK E' VEL: 6.3 CM/SEC
BH CV ECHO MEAS - LV DIASTOLIC VOL/BSA (35-75): 72.1 CM2
BH CV ECHO MEAS - LV MASS(C)D: 222.8 GRAMS
BH CV ECHO MEAS - LV MAX PG: 7.2 MMHG
BH CV ECHO MEAS - LV MEAN PG: 4 MMHG
BH CV ECHO MEAS - LV SYSTOLIC VOL/BSA (12-30): 33.5 CM2
BH CV ECHO MEAS - LV V1 MAX: 134.5 CM/SEC
BH CV ECHO MEAS - LV V1 VTI: 26.6 CM
BH CV ECHO MEAS - LVIDD: 5.4 CM
BH CV ECHO MEAS - LVIDS: 3.9 CM
BH CV ECHO MEAS - LVOT AREA: 3.4 CM2
BH CV ECHO MEAS - LVOT DIAM: 2.07 CM
BH CV ECHO MEAS - LVPWD: 1.09 CM
BH CV ECHO MEAS - MED PEAK E' VEL: 3.9 CM/SEC
BH CV ECHO MEAS - MV A DUR: 0.13 SEC
BH CV ECHO MEAS - MV A MAX VEL: 138.9 CM/SEC
BH CV ECHO MEAS - MV DEC SLOPE: 891.6 CM/SEC2
BH CV ECHO MEAS - MV DEC TIME: 0.25 SEC
BH CV ECHO MEAS - MV E MAX VEL: 184 CM/SEC
BH CV ECHO MEAS - MV E/A: 1.32
BH CV ECHO MEAS - MV MAX PG: 20.6 MMHG
BH CV ECHO MEAS - MV MEAN PG: 10.3 MMHG
BH CV ECHO MEAS - MV P1/2T: 76.2 MSEC
BH CV ECHO MEAS - MV V2 VTI: 53 CM
BH CV ECHO MEAS - MVA(P1/2T): 2.9 CM2
BH CV ECHO MEAS - MVA(VTI): 1.69 CM2
BH CV ECHO MEAS - PA ACC TIME: 0.13 SEC
BH CV ECHO MEAS - RAP SYSTOLE: 3 MMHG
BH CV ECHO MEAS - RVSP: 17 MMHG
BH CV ECHO MEAS - SI(MOD-SP2): 30.6 ML/M2
BH CV ECHO MEAS - SI(MOD-SP4): 38.6 ML/M2
BH CV ECHO MEAS - SV(LVOT): 89.4 ML
BH CV ECHO MEAS - SV(MOD-SP2): 73 ML
BH CV ECHO MEAS - SV(MOD-SP4): 92 ML
BH CV ECHO MEAS - TAPSE (>1.6): 1.18 CM
BH CV ECHO MEAS - TR MAX PG: 14 MMHG
BH CV ECHO MEAS - TR MAX VEL: 187.3 CM/SEC
BH CV ECHO MEASUREMENTS AVERAGE E/E' RATIO: 36.08
BH CV XLRA - RV BASE: 4 CM
BH CV XLRA - RV LENGTH: 8.1 CM
BH CV XLRA - RV MID: 2.9 CM
BH CV XLRA - TDI S': 7.7 CM/SEC
LEFT ATRIUM VOLUME INDEX: 26.6 ML/M2
SINUS: 3.6 CM
STJ: 3.3 CM

## 2023-12-27 ENCOUNTER — APPOINTMENT (OUTPATIENT)
Dept: CARDIAC REHAB | Facility: HOSPITAL | Age: 73
End: 2023-12-27
Payer: MEDICARE

## 2023-12-27 ENCOUNTER — OFFICE VISIT (OUTPATIENT)
Dept: CARDIOLOGY | Facility: CLINIC | Age: 73
End: 2023-12-27
Payer: MEDICARE

## 2023-12-27 VITALS
OXYGEN SATURATION: 97 % | HEIGHT: 73 IN | HEART RATE: 94 BPM | BODY MASS INDEX: 34.72 KG/M2 | WEIGHT: 262 LBS | RESPIRATION RATE: 18 BRPM | SYSTOLIC BLOOD PRESSURE: 138 MMHG | DIASTOLIC BLOOD PRESSURE: 78 MMHG

## 2023-12-27 DIAGNOSIS — Z95.4 H/O MITRAL VALVE REPLACEMENT WITH TISSUE GRAFT: ICD-10-CM

## 2023-12-27 DIAGNOSIS — I50.9 NEW ONSET OF CONGESTIVE HEART FAILURE: ICD-10-CM

## 2023-12-27 DIAGNOSIS — E78.2 MIXED HYPERLIPIDEMIA: Chronic | ICD-10-CM

## 2023-12-27 DIAGNOSIS — R10.13 EPIGASTRIC PAIN: Primary | ICD-10-CM

## 2023-12-27 DIAGNOSIS — G47.33 OSA (OBSTRUCTIVE SLEEP APNEA): ICD-10-CM

## 2023-12-27 PROCEDURE — 3075F SYST BP GE 130 - 139MM HG: CPT | Performed by: INTERNAL MEDICINE

## 2023-12-27 PROCEDURE — 1159F MED LIST DOCD IN RCRD: CPT | Performed by: INTERNAL MEDICINE

## 2023-12-27 PROCEDURE — 99213 OFFICE O/P EST LOW 20 MIN: CPT | Performed by: INTERNAL MEDICINE

## 2023-12-27 PROCEDURE — 1160F RVW MEDS BY RX/DR IN RCRD: CPT | Performed by: INTERNAL MEDICINE

## 2023-12-27 PROCEDURE — 3078F DIAST BP <80 MM HG: CPT | Performed by: INTERNAL MEDICINE

## 2023-12-27 RX ORDER — FUROSEMIDE 20 MG/1
20 TABLET ORAL 2 TIMES DAILY
COMMUNITY
Start: 2023-11-27

## 2023-12-27 NOTE — PROGRESS NOTES
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  In 2022 he developed COVID-pneumonia and findings suggestive of interstitial lung disease with possible COVID pneumonitis.  Despite adequate treatment and subsequent calcification LAD and circumflex.  With progressive dyspnea an echo was performed that showed normal sinus rhythm with questionable wall motion abnormality in the inferolateral wall with thickened mitral valve leaflets with mild regurgitation that was at least moderate but likely underestimated with RVSP 60 mmHg.  Heart failure.  He was admitted to treat heart failure.  GAUDENCIO showed severe mitral valve regurgitation.  Cardiac cath showed normal coronary arteries.  He therefore underwent mitral valve repair with 29 mm Schwartz bioprosthetic valve replacement, tricuspid valve repair with left atrial appendage closure..  He did demonstrate renal insufficiency during his hospitalization.  Postoperatively he had altered mental status without obvious stroke.  He demonstrated AV block that was transient.  He demonstrated moderate-sized pericardial effusion.  He was treated medically with colchicine and went to rehab and subsequently went home.  Echo 7/3/2023 showed modest pericardial effusion that was posterior located and no change from prior study and without evidence of tamponade.  He was found to have congestive heart failure with a markedly elevated proBNP of 5156 with creatinine 1.26.  Torsemide was cautiously increased.  He was seen by nephrology.  He had a follow-up echocardiogram 2023 which showed an ejection fraction further decreased  to 25%, mild to moderate left ventricular hypertrophy, mitral valve prosthesis appeared to be functioning relatively well with trivial to mild regurgitation.  Resolution of pericardial effusion.  It was again a technically limited study.  Blood work 1/2023 showed an proBNP 1950 (improved) with a creatinine increased to 1.49.  Glucose remains elevated in the 280 range.  At the last visit he had been addressing diabetes.  Heart failure fluid status had improved.  Echo on 9/18/2023 showed an ejection fraction 30% and of note diastolic function aortic valve calcification without stenosis prosthetic mitral valve without stenosis regurgitation, tricuspid valve repair number of 4 mmHg at a rate of 89 bpm was noted.  It was borderline elevated.  Echo on 12/2023 with an ejection fraction 50% with indeterminate diastolic function, left ventricle still dilated.  RV dilated with mild RV dysfunction.  Bioprosthetic valve had slightly increased gradient of 10 mmHg heart rate of 84 bpm.  However no regurgitation is present.  There is trivial tricuspid regurgitation.    Since last visit he has had no palpitations edema dizziness chest pain or fatigue.  His dyspnea is unchanged.  He is using his CPAP but not throughout the night.  He believes after 5 hours he takes this off.  His blood sugars have improved.  Blood pressure slightly elevated.  He is having heartburn and is having to use Tums fairly regularly.  He is riding his bicycle 20 minutes a day now that he is completed cardiac rehab.       Past Medical History:   Diagnosis Date    Allergic rhinitis     Arthritis     BPH (benign prostatic hyperplasia)     Diabetes mellitus     TYPE 2    Foraminal stenosis of cervical region     C5-C6    GERD (gastroesophageal reflux disease)     Hemorrhoids     History of urinary retention 2010    S/P TURP    Hyperlipidemia     Macular degeneration     PING (obstructive sleep apnea) 01/07/2016    MILD, SEES DR. AMARILIS MORGAN     Past Surgical  History:   Procedure Laterality Date    CARDIAC CATHETERIZATION N/A 5/11/2023    Procedure: Right Heart Cath;  Surgeon: Corey Gaffney MD;  Location: [facility] CATH INVASIVE LOCATION;  Service: Cardiology;  Laterality: N/A;    CARDIAC CATHETERIZATION N/A 5/11/2023    Procedure: Left Heart Cath;  Surgeon: Corey Gaffney MD;  Location: [facility] CATH INVASIVE LOCATION;  Service: Cardiology;  Laterality: N/A;    CARDIAC CATHETERIZATION N/A 5/11/2023    Procedure: Left ventriculography;  Surgeon: Corey Gaffney MD;  Location:  [facility] CATH INVASIVE LOCATION;  Service: Cardiology;  Laterality: N/A;    CARDIAC CATHETERIZATION N/A 5/11/2023    Procedure: Coronary angiography;  Surgeon: Corey Gfafney MD;  Location: [facility] CATH INVASIVE LOCATION;  Service: Cardiology;  Laterality: N/A;    COLONOSCOPY N/A     WNL PER PT, NO RECORDS,     COLONOSCOPY N/A 04/07/2009    DR. GORGE BENAVIDEZ AT [place]    MITRAL VALVE REPAIR/REPLACEMENT N/A 5/24/2023    Procedure: MITRAL VALVE REPAIR/REPLACEMENT TRICUSPID VALVE REPAIR/REPLACEMENT TRANSESOPHAGEAL ECHOCARDIOGRAM WITH ANESTHESIA;  Surgeon: George Frey MD;  Location: [facility];  Service: Cardiothoracic;  Laterality: N/A;    PROSTATE SURGERY N/A 11/12/2010    BERYL, DR. COREY COLLAZO AT [facility]    SKIN TAG REMOVAL N/A 12/20/2017    ANAL SKIN TAG X2, PERFORMED IN OFFICE, DR. LISA CORDOBA / TRANSURETHRAL INCISION / DRAINAGE PROSTATE  2010    Dr. Goodrich     Outpatient Medications Prior to Visit   Medication Sig Dispense Refill    acetaminophen (TYLENOL) 325 MG tablet Take 2 tablets by mouth Every 6 (Six) Hours As Needed for Mild Pain.      albuterol sulfate  (90 Base) MCG/ACT inhaler Inhale 2 puffs Every 4 (Four) Hours As Needed for Wheezing.      apixaban (ELIQUIS) 5 MG tablet tablet Take 1 tablet by mouth 2 (Two) Times a Day. 180 tablet 3    atorvastatin (LIPITOR) 40 MG tablet Take 1 tablet by mouth Every Night. 30 tablet 1    calcium carbonate (TUMS) 500  MG chewable tablet Chew 2 tablets 4 (Four) Times a Day As Needed for Indigestion or Heartburn.      Calcium Carbonate-Vitamin D (calcium 500 mg vitamin D 5 mcg, 200 UT,) 500-5 MG-MCG tablet per tablet Take 1 tablet by mouth 2 (Two) Times a Day.      Continuous Blood Gluc Sensor (FreeStyle Ysabel 2 Sensor) misc 1 each by Other route Every 14 (Fourteen) Days. Use 1 sensor every 14 days  Indications: Type 2 Diabetes 6 each 3    empagliflozin (JARDIANCE) 25 MG tablet tablet Take 1 tablet by mouth Daily. Indications: Type 2 Diabetes 90 tablet 2    furosemide (LASIX) 20 MG tablet Take 1 tablet by mouth 2 (Two) Times a Day.      glucose blood (OneTouch Verio) test strip 1 each by Other route As Needed. Use as instructed      insulin degludec (Tresiba FlexTouch) 100 UNIT/ML solution pen-injector injection 46 units every morning (Patient taking differently: Inject 50 Units under the skin into the appropriate area as directed. 50units every morning) 15 mL 6    Insulin Lispro, 1 Unit Dial, (HumaLOG KwikPen) 100 UNIT/ML solution pen-injector Inject 5 Units under the skin into the appropriate area as directed 3 (Three) Times a Day. Indications: Type 2 Diabetes (Patient taking differently: Inject 10 Units under the skin into the appropriate area as directed 1 (One) Time. Indications: Type 2 Diabetes) 14 mL 2    Insulin Pen Needle 32G X 4 MM misc 1 each by Other route 3 (Three) Times a Day. Use to inject insulin under the skin via pens up to 4 times daily  Indications: Type 2 Diabetes 300 each 3    losartan (Cozaar) 25 MG tablet Take 1 tablet by mouth Daily. 30 tablet 6    metoprolol succinate XL (TOPROL-XL) 25 MG 24 hr tablet Take 1 tablet by mouth 2 (Two) Times a Day. (Patient taking differently: Take 1 tablet by mouth 2 (Two) Times a Day. ONLY TAKING ONCE DAILY) 180 tablet 2    Multiple Vitamins-Minerals (PRESERVISION AREDS 2+MULTI VIT PO) Take 1 tablet by mouth 2 (Two) Times a Day.      pantoprazole (PROTONIX) 40 MG EC tablet  Take 1 tablet by mouth Daily. 90 tablet 0    potassium chloride (K-DUR,KLOR-CON) 20 MEQ CR tablet Take 1 tablet by mouth Daily. 30 tablet 1    tiotropium bromide monohydrate (SPIRIVA RESPIMAT) 2.5 MCG/ACT aerosol solution inhaler Inhale 2 puffs.      torsemide (DEMADEX) 20 MG tablet Take 3 tablets by mouth Daily. (Patient not taking: Reported on 12/27/2023) 90 tablet 1     No facility-administered medications prior to visit.       Allergies as of 12/27/2023    (No Known Allergies)     Social History     Socioeconomic History    Marital status:    Tobacco Use    Smoking status: Never     Passive exposure: Never    Smokeless tobacco: Never   Vaping Use    Vaping Use: Never used   Substance and Sexual Activity    Alcohol use: Yes     Alcohol/week: 1.0 standard drink of alcohol     Types: 1 Drinks containing 0.5 oz of alcohol per week     Comment: RARELY    Drug use: No    Sexual activity: Defer     Partners: Female     Family History   Problem Relation Age of Onset    Lung cancer Mother     Stroke Father     Dementia Father     Hyperlipidemia Brother     Hypertension Brother     Heart disease Brother     Diabetes Brother     Colon polyps Brother     Colon polyps Brother      Review of Systems   Constitutional: Negative for chills, fever, weight gain and weight loss.   Cardiovascular:  Positive for leg swelling.   Respiratory:  Negative for cough, snoring and wheezing.    Hematologic/Lymphatic: Negative for bleeding problem. Does not bruise/bleed easily.   Skin:  Negative for color change.   Musculoskeletal:  Negative for falls, joint pain and myalgias.   Gastrointestinal:  Negative for melena.   Genitourinary:  Negative for hematuria.   Neurological:  Negative for excessive daytime sleepiness.   Psychiatric/Behavioral:  Negative for depression. The patient is not nervous/anxious.         Objective:     Vitals:    12/27/23 1152   BP: 138/78   BP Location: Right arm   Patient Position: Sitting   Cuff Size: Large  "Adult   Pulse: 94   Resp: 18   SpO2: 97%   Weight: 119 kg (262 lb)   Height: 185.4 cm (73\")     Body mass index is 34.57 kg/m².    Vitals reviewed.   Constitutional:       Appearance: Well-developed.   Eyes:      General: No scleral icterus.        Right eye: No discharge.      Conjunctiva/sclera: Conjunctivae normal.      Pupils: Pupils are equal, round, and reactive to light.   HENT:      Head: Normocephalic.      Nose: Nose normal.   Neck:      Thyroid: No thyromegaly.      Vascular: No JVD.   Pulmonary:      Effort: Pulmonary effort is normal. No respiratory distress.      Breath sounds: Normal breath sounds. No wheezing. No rales.   Cardiovascular:      Normal rate. Irregularly irregular rhythm. Normal S1. Normal S2.       Murmurs: There is no murmur.      No gallop.    Pulses:     Intact distal pulses.      Carotid: 2+ bilaterally.     Radial: 2+ bilaterally.     Femoral: 2+ bilaterally.     Popliteal: 2+ bilaterally.     Dorsalis pedis: 2+ bilaterally.     Posterior tibial: 2+ bilaterally.  Edema:     Peripheral edema present.     Pretibial: bilateral trace edema of the pretibial area.     Ankle: bilateral trace edema of the ankle.  Abdominal:      General: Bowel sounds are normal. There is no distension.      Palpations: Abdomen is soft.      Tenderness: There is no abdominal tenderness. There is no rebound.   Musculoskeletal: Normal range of motion.         General: No tenderness.      Cervical back: Normal range of motion and neck supple. Skin:     General: Skin is warm and dry.      Findings: No erythema or rash.   Neurological:      Mental Status: Alert and oriented to person, place, and time.   Psychiatric:         Behavior: Behavior normal.         Thought Content: Thought content normal.         Judgment: Judgment normal.       Lab Review:   Lab Results - Last 18 Months   Lab Units 09/14/23  1530 08/17/23  0831 06/24/23  0228 06/23/23  0812   WBC 10*3/mm3 9.53 7.85   < > 7.91   RBC 10*6/mm3 4.27 4.41  "  < > 3.68*   HEMOGLOBIN g/dL 11.4* 11.9*   < > 10.5*   HEMATOCRIT % 34.6* 36.4*   < > 31.6*   MCV fL 81.0 82.5   < > 85.9   MCH pg 26.7 27.0   < > 28.5   MCHC g/dL 32.9 32.7   < > 33.2   RDW % 14.7 14.4   < > 14.2   PLATELETS 10*3/mm3 191 225   < > 247   NEUTROPHIL % %  --  69.7  --  64.9   LYMPHOCYTE % %  --  11.6*  --  15.7*   MONOCYTES % %  --  11.3  --  10.4   EOSINOPHIL % %  --  6.1  --  7.8*   BASOPHIL % %  --  0.8  --  0.8   NEUTROS ABS 10*3/mm3  --  5.47  --  5.14   LYMPHS ABS 10*3/mm3  --  0.91  --  1.24   MONOS ABS 10*3/mm3  --  0.89  --  0.82   EOS ABS 10*3/mm3  --  0.48*  --  0.62*   BASOS ABS 10*3/mm3  --  0.06  --  0.06   RDW-SD fl 43.4 42.8   < > 44.3   MPV fL 9.5 9.5   < > 9.3    < > = values in this interval not displayed.       Lab Results - Last 18 Months   Lab Units 10/10/23  1420 09/14/23  1530 08/17/23  0831 08/01/23  1305 07/18/23  1434   GLUCOSE mg/dL 179* 203* 323*   < > 233*   BUN mg/dL 17 16 25*   < > 16   CREATININE mg/dL 1.31* 1.34* 1.50*   < > 1.25   SODIUM mmol/L 141 139 140   < > 141   POTASSIUM mmol/L 4.4 3.9 3.8   < > 3.9   CHLORIDE mmol/L 105 104 99   < > 101   CO2 mmol/L 26.6 23.5 27.7   < > 26.1   CALCIUM mg/dL 9.3 9.0 9.7   < > 9.6   TOTAL PROTEIN g/dL  --   --  6.9  --  7.1   ALBUMIN g/dL  --   --  4.0  --  4.0   ALT (SGPT) U/L  --   --  19  --  23   AST (SGOT) U/L  --   --  21  --  18   ALK PHOS U/L  --   --  111  --  123*   BILIRUBIN mg/dL  --   --  0.5  --  0.5   GLOBULIN gm/dL  --   --  2.9  --  3.1   A/G RATIO g/dL  --   --  1.4  --  1.3   BUN / CREAT RATIO  13.0 11.9 16.7   < > 12.8   ANION GAP mmol/L 9.4 11.5 13.3   < > 13.9   EGFR mL/min/1.73 57.1* 55.6* 48.6*   < > 60.4    < > = values in this interval not displayed.     Lab Results - Last 18 Months   Lab Units 08/17/23  0831 05/23/23  1429   CHOLESTEROL mg/dL 116 155   TRIGLYCERIDES mg/dL 221* 197*   HDL CHOL mg/dL 34* 41   LDL CHOL mg/dL 47 81   VLDL CHOL mg/dL 35 33   LDL/HDL RATIO  1.11 1.82     Lab Results - Last  18 Months   Lab Units 09/14/23  1530 08/17/23  0831   PROBNP pg/mL 2,785.0* 1,268.0*     Lab Results - Last 18 Months   Lab Units 06/25/23  0944 06/25/23  0739   HSTROP T ng/L 95* 97*     Lab Results - Last 18 Months   Lab Units 08/17/23  0831 06/30/23  0719   TSH uIU/mL 2.320 1.830     Lab Results - Last 18 Months   Lab Units 06/04/23  1410 06/04/23  0319 06/01/23  2318 06/01/23  1555 05/25/23  0307   PROTIME Seconds  --   --   --  13.4 15.0*   APTT seconds 30.3 151.6*   < > 24.2  --     < > = values in this interval not displayed.         Procedures  Assessment:       Diagnosis Plan   1. Epigastric pain  Ambulatory Referral to Gastroenterology      2. H/O mitral valve replacement with tissue graft        3. Mixed hyperlipidemia        4. New onset of congestive heart failure        5. PING (obstructive sleep apnea)          Plan:       1.  Mitral valve replacement and tricuspid valve repair 5/2023.  Mean mitral valve gradient is slightly elevated at 10 mmHg.  However resting heart rate remains in the 90s.  When this is in part due to atrial fibrillation and large quantities of caffeine he close admits he is still consuming.  Will try to minimize this and call me next week with his heart rate.  If it remains elevated we will need to titrate meds further.  Would like to keep his heart rate around 60 to 80 bpm.  This should help with prosthetic valve gradient.  I congratulated him  2.  Nonischemic cardiomyopathy with acute on chronic diastolic heart failure.  I congratulated him on the marked improvement in ejection fraction likely due to combination medical therapy and treatment of sleep apnea and time.  Strongly advised to continue with all of these and including adequate CPAP therapy.  3.  Pericardial effusion.  Resolved  4.  Renal insufficiency, as above.  Improved  5.  Hypertension.  Slightly elevated today but he will pay more attention to this at home and call next week with readings.  6.  Hyperlipidemia  7.   Diabetes.  He will continue with Jardiance and is to follow-up with DESTIN Oconnell in the near future.  8.  Postop encephalopathy and encephalopathy.  Slowly improving.   9.  Edema.  Clinically much improved.  Continue with the current regimen.  10.  Persistent atrial fibrillation.  He has had atrial appendage closure.  He is on Eliquis for now we will and for rate control for now as he still works on risk factor modification including further treatment of sleep apnea and avoidance of stimulants.  However  if he still struggles with edema in the next several months, may consider attempts at cardioversion.         Time Spent: I spent 35 minutes caring for Vernon on this date of service. This time includes time spent by me in the following activities: preparing for the visit, reviewing tests, obtaining and/or reviewing a separately obtained history, performing a medically appropriate examination and/or evaluation, counseling and educating the patient/family/caregiver, documenting information in the medical record, and independently interpreting results and communicating that information with the patient/family/caregiver.   I spent 1 minutes on the separately reported service of ECG. This time is not included in the time used to support the E/M service also reported today.        Your medication list            Accurate as of December 27, 2023  8:17 PM. If you have any questions, ask your nurse or doctor.                CHANGE how you take these medications        Instructions Last Dose Given Next Dose Due   Insulin Lispro (1 Unit Dial) 100 UNIT/ML solution pen-injector  Commonly known as: HumaLOG KwikPen  What changed:   how much to take  when to take this      Inject 5 Units under the skin into the appropriate area as directed 3 (Three) Times a Day. Indications: Type 2 Diabetes       metoprolol succinate XL 25 MG 24 hr tablet  Commonly known as: TOPROL-XL  What changed: additional instructions      Take 1 tablet by mouth  2 (Two) Times a Day.       Tresiba FlexTouch 100 UNIT/ML solution pen-injector injection  Generic drug: insulin degludec  What changed:   how much to take  how to take this  additional instructions      46 units every morning              CONTINUE taking these medications        Instructions Last Dose Given Next Dose Due   acetaminophen 325 MG tablet  Commonly known as: TYLENOL      Take 2 tablets by mouth Every 6 (Six) Hours As Needed for Mild Pain.       albuterol sulfate  (90 Base) MCG/ACT inhaler  Commonly known as: PROVENTIL HFA;VENTOLIN HFA;PROAIR HFA      Inhale 2 puffs Every 4 (Four) Hours As Needed for Wheezing.       apixaban 5 MG tablet tablet  Commonly known as: ELIQUIS      Take 1 tablet by mouth 2 (Two) Times a Day.       atorvastatin 40 MG tablet  Commonly known as: LIPITOR      Take 1 tablet by mouth Every Night.       calcium 500 mg vitamin D 5 mcg (200 UT) 500-5 MG-MCG tablet per tablet      Take 1 tablet by mouth 2 (Two) Times a Day.       calcium carbonate 500 MG chewable tablet  Commonly known as: TUMS      Chew 2 tablets 4 (Four) Times a Day As Needed for Indigestion or Heartburn.       empagliflozin 25 MG tablet tablet  Commonly known as: JARDIANCE      Take 1 tablet by mouth Daily. Indications: Type 2 Diabetes       FreeStyle Ysabel 2 Sensor misc      1 each by Other route Every 14 (Fourteen) Days. Use 1 sensor every 14 days  Indications: Type 2 Diabetes       furosemide 20 MG tablet  Commonly known as: LASIX      Take 1 tablet by mouth 2 (Two) Times a Day.       Insulin Pen Needle 32G X 4 MM misc      1 each by Other route 3 (Three) Times a Day. Use to inject insulin under the skin via pens up to 4 times daily  Indications: Type 2 Diabetes       losartan 25 MG tablet  Commonly known as: Cozaar      Take 1 tablet by mouth Daily.       OneTouch Verio test strip  Generic drug: glucose blood      1 each by Other route As Needed. Use as instructed       pantoprazole 40 MG EC  tablet  Commonly known as: PROTONIX      Take 1 tablet by mouth Daily.       potassium chloride 20 MEQ CR tablet  Commonly known as: K-DUR,KLOR-CON      Take 1 tablet by mouth Daily.       PRESERVISION AREDS 2+MULTI VIT PO      Take 1 tablet by mouth 2 (Two) Times a Day.       tiotropium bromide monohydrate 2.5 MCG/ACT aerosol solution inhaler  Commonly known as: SPIRIVA RESPIMAT      Inhale 2 puffs.                Patient is no longer taking -.  I corrected the med list to reflect this.  I did not stop these medications.      Dictated utilizing Dragon dictation

## 2023-12-29 ENCOUNTER — APPOINTMENT (OUTPATIENT)
Dept: CARDIAC REHAB | Facility: HOSPITAL | Age: 73
End: 2023-12-29
Payer: MEDICARE

## 2024-02-12 PROBLEM — Z86.73 HISTORY OF ISCHEMIC STROKE: Chronic | Status: ACTIVE | Noted: 2023-06-24

## 2024-02-12 PROBLEM — K21.9 GASTROESOPHAGEAL REFLUX DISEASE: Chronic | Status: ACTIVE | Noted: 2023-07-17

## 2024-02-13 ENCOUNTER — OFFICE VISIT (OUTPATIENT)
Dept: INTERNAL MEDICINE | Facility: CLINIC | Age: 74
End: 2024-02-13
Payer: MEDICARE

## 2024-02-13 VITALS
HEART RATE: 90 BPM | HEIGHT: 73 IN | SYSTOLIC BLOOD PRESSURE: 120 MMHG | OXYGEN SATURATION: 98 % | DIASTOLIC BLOOD PRESSURE: 84 MMHG | BODY MASS INDEX: 35.44 KG/M2 | WEIGHT: 267.4 LBS

## 2024-02-13 DIAGNOSIS — Z23 NEED FOR PNEUMOCOCCAL 20-VALENT CONJUGATE VACCINATION: ICD-10-CM

## 2024-02-13 DIAGNOSIS — N18.31 STAGE 3A CHRONIC KIDNEY DISEASE: Chronic | ICD-10-CM

## 2024-02-13 DIAGNOSIS — E78.2 MIXED HYPERLIPIDEMIA: Chronic | ICD-10-CM

## 2024-02-13 DIAGNOSIS — I50.32 CHRONIC HEART FAILURE WITH PRESERVED EJECTION FRACTION: Chronic | ICD-10-CM

## 2024-02-13 DIAGNOSIS — Z23 NEED FOR INFLUENZA VACCINATION: ICD-10-CM

## 2024-02-13 DIAGNOSIS — Z00.00 MEDICARE ANNUAL WELLNESS VISIT, SUBSEQUENT: Primary | ICD-10-CM

## 2024-02-13 DIAGNOSIS — K21.9 GASTROESOPHAGEAL REFLUX DISEASE WITHOUT ESOPHAGITIS: ICD-10-CM

## 2024-02-13 DIAGNOSIS — Z86.73 HISTORY OF ISCHEMIC STROKE: ICD-10-CM

## 2024-02-13 DIAGNOSIS — R91.1 LUNG NODULE SEEN ON IMAGING STUDY: ICD-10-CM

## 2024-02-13 DIAGNOSIS — E11.65 TYPE 2 DIABETES MELLITUS WITH HYPERGLYCEMIA, WITH LONG-TERM CURRENT USE OF INSULIN: Chronic | ICD-10-CM

## 2024-02-13 DIAGNOSIS — I48.19 PERSISTENT ATRIAL FIBRILLATION: ICD-10-CM

## 2024-02-13 DIAGNOSIS — I48.92 PAROXYSMAL ATRIAL FLUTTER: ICD-10-CM

## 2024-02-13 DIAGNOSIS — Z79.01 ANTICOAGULATED: ICD-10-CM

## 2024-02-13 DIAGNOSIS — R53.83 OTHER FATIGUE: ICD-10-CM

## 2024-02-13 DIAGNOSIS — I10 ESSENTIAL HYPERTENSION: Chronic | ICD-10-CM

## 2024-02-13 DIAGNOSIS — Z79.4 TYPE 2 DIABETES MELLITUS WITH HYPERGLYCEMIA, WITH LONG-TERM CURRENT USE OF INSULIN: Chronic | ICD-10-CM

## 2024-02-13 DIAGNOSIS — G47.33 OSA (OBSTRUCTIVE SLEEP APNEA): ICD-10-CM

## 2024-02-13 DIAGNOSIS — D64.9 ANEMIA, UNSPECIFIED TYPE: ICD-10-CM

## 2024-02-13 DIAGNOSIS — J45.20 MILD INTERMITTENT ASTHMA WITHOUT COMPLICATION: Chronic | ICD-10-CM

## 2024-02-13 PROBLEM — I50.9 NEW ONSET OF CONGESTIVE HEART FAILURE: Status: RESOLVED | Noted: 2023-05-10 | Resolved: 2024-02-13

## 2024-02-13 PROBLEM — J98.11 ATELECTASIS OF BOTH LUNGS: Status: RESOLVED | Noted: 2023-06-24 | Resolved: 2024-02-13

## 2024-02-13 PROBLEM — I30.9 PERICARDIAL EFFUSION, ACUTE: Status: RESOLVED | Noted: 2023-06-24 | Resolved: 2024-02-13

## 2024-02-13 PROBLEM — R07.9 LEFT-SIDED CHEST PAIN: Status: RESOLVED | Noted: 2019-12-27 | Resolved: 2024-02-13

## 2024-02-13 RX ORDER — OMEPRAZOLE 40 MG/1
1 CAPSULE, DELAYED RELEASE ORAL DAILY
COMMUNITY
Start: 2024-01-16 | End: 2024-02-13

## 2024-02-13 RX ORDER — PANTOPRAZOLE SODIUM 40 MG/1
40 TABLET, DELAYED RELEASE ORAL DAILY
Qty: 90 TABLET | Refills: 0 | Status: SHIPPED | OUTPATIENT
Start: 2024-02-13 | End: 2024-02-14 | Stop reason: SDUPTHER

## 2024-02-13 RX ORDER — TORSEMIDE 20 MG/1
40 TABLET ORAL 2 TIMES DAILY
Qty: 120 TABLET | Refills: 5 | Status: SHIPPED | OUTPATIENT
Start: 2024-02-13 | End: 2024-02-14 | Stop reason: SDUPTHER

## 2024-02-14 ENCOUNTER — DOCUMENTATION (OUTPATIENT)
Dept: INTERNAL MEDICINE | Facility: CLINIC | Age: 74
End: 2024-02-14
Payer: MEDICARE

## 2024-02-14 LAB
25(OH)D3+25(OH)D2 SERPL-MCNC: 28.6 NG/ML (ref 30–100)
ALBUMIN SERPL-MCNC: 4.2 G/DL (ref 3.5–5.2)
ALBUMIN/GLOB SERPL: 2.1 G/DL
ALP SERPL-CCNC: 101 U/L (ref 39–117)
ALT SERPL-CCNC: 14 U/L (ref 1–41)
AST SERPL-CCNC: 14 U/L (ref 1–40)
BILIRUB SERPL-MCNC: 0.4 MG/DL (ref 0–1.2)
BUN SERPL-MCNC: 22 MG/DL (ref 8–23)
BUN/CREAT SERPL: 14 (ref 7–25)
CALCIUM SERPL-MCNC: 8.9 MG/DL (ref 8.6–10.5)
CHLORIDE SERPL-SCNC: 98 MMOL/L (ref 98–107)
CHOLEST SERPL-MCNC: 105 MG/DL (ref 0–200)
CO2 SERPL-SCNC: 24.5 MMOL/L (ref 22–29)
CREAT SERPL-MCNC: 1.57 MG/DL (ref 0.76–1.27)
EGFRCR SERPLBLD CKD-EPI 2021: 45.7 ML/MIN/1.73
GLOBULIN SER CALC-MCNC: 2 GM/DL
GLUCOSE SERPL-MCNC: 419 MG/DL (ref 65–99)
HBA1C MFR BLD: 11.2 % (ref 4.8–5.6)
HDLC SERPL-MCNC: 31 MG/DL (ref 40–60)
LDLC SERPL CALC-MCNC: 43 MG/DL (ref 0–100)
LDLC/HDLC SERPL: 1.15 {RATIO}
MAGNESIUM SERPL-MCNC: 2.2 MG/DL (ref 1.6–2.4)
PHOSPHATE SERPL-MCNC: 3.3 MG/DL (ref 2.5–4.5)
POTASSIUM SERPL-SCNC: 5 MMOL/L (ref 3.5–5.2)
PROT SERPL-MCNC: 6.2 G/DL (ref 6–8.5)
SODIUM SERPL-SCNC: 136 MMOL/L (ref 136–145)
TRIGL SERPL-MCNC: 191 MG/DL (ref 0–150)
TSH SERPL DL<=0.005 MIU/L-ACNC: 1.76 UIU/ML (ref 0.27–4.2)
URATE SERPL-MCNC: 4.7 MG/DL (ref 3.4–7)
VLDLC SERPL CALC-MCNC: 31 MG/DL (ref 5–40)

## 2024-02-14 RX ORDER — PANTOPRAZOLE SODIUM 40 MG/1
40 TABLET, DELAYED RELEASE ORAL DAILY
Qty: 90 TABLET | Refills: 1 | Status: SHIPPED | OUTPATIENT
Start: 2024-02-14

## 2024-02-14 RX ORDER — TORSEMIDE 20 MG/1
40 TABLET ORAL 2 TIMES DAILY
Qty: 360 TABLET | Refills: 0 | Status: SHIPPED | OUTPATIENT
Start: 2024-02-14

## 2024-02-21 ENCOUNTER — OFFICE VISIT (OUTPATIENT)
Dept: GASTROENTEROLOGY | Facility: CLINIC | Age: 74
End: 2024-02-21
Payer: MEDICARE

## 2024-02-21 VITALS
SYSTOLIC BLOOD PRESSURE: 117 MMHG | HEART RATE: 94 BPM | HEIGHT: 73 IN | WEIGHT: 265.4 LBS | BODY MASS INDEX: 35.17 KG/M2 | TEMPERATURE: 97.5 F | DIASTOLIC BLOOD PRESSURE: 77 MMHG

## 2024-02-21 DIAGNOSIS — R12 HEARTBURN: ICD-10-CM

## 2024-02-21 DIAGNOSIS — R10.13 EPIGASTRIC PAIN: Primary | ICD-10-CM

## 2024-02-21 PROCEDURE — 3078F DIAST BP <80 MM HG: CPT | Performed by: NURSE PRACTITIONER

## 2024-02-21 PROCEDURE — 1160F RVW MEDS BY RX/DR IN RCRD: CPT | Performed by: NURSE PRACTITIONER

## 2024-02-21 PROCEDURE — 99214 OFFICE O/P EST MOD 30 MIN: CPT | Performed by: NURSE PRACTITIONER

## 2024-02-21 PROCEDURE — 1159F MED LIST DOCD IN RCRD: CPT | Performed by: NURSE PRACTITIONER

## 2024-02-21 PROCEDURE — 3074F SYST BP LT 130 MM HG: CPT | Performed by: NURSE PRACTITIONER

## 2024-02-21 NOTE — PROGRESS NOTES
Chief Complaint   Patient presents with    Abdominal Pain       HPI    Vernon Solorzano is a  73 y.o. male here to establish care as a new patient for complaints of abdominal pain.    Patient will also follow with Dr. Wang.    Past medical history of arthritis, BPH, diabetes, cervical stenosis, hyperlipidemia, sleep apnea, macular degeneration, A-fib on Eliquis, mitral valve replacement and tricuspid valve repair (5/24/23), AF (post op - s/p cardioversion), CVA (bilateral frontal and left parietal occipital cortices: postop), post op Dressler's syndrome, nonischemic cardiomyopathy, congestive heart failure along with renal insufficiency.      Patient accompanied by his wife who gives collateral medical information.  He reports some epigastric discomfort that comes and goes for at least the past 6 months likely longer associated with heartburn.  He has been taking tums on a regular basis along with omeprazole 40 mg once daily.  He was recently transition to Protonix 40 mg once daily and has found it helpful.  He has not had to use Tums as often.  Denies nausea, vomiting, dysphagia or odynophagia.  Appetite is good.  His weight is stable.  BMI 35.02.    No diarrhea, constipation or rectal bleeding.  His wife reports normal colonoscopy within the last 8 to 10 years.  Denies personal history of colon polyps.  No family history of colon cancer.      Patient is a retired internal medicine physician.  He retired 3 years ago.    Past Medical History:   Diagnosis Date    Allergic rhinitis     Arthritis     BPH (benign prostatic hyperplasia)     Diabetes mellitus     TYPE 2    Foraminal stenosis of cervical region     C5-C6    GERD (gastroesophageal reflux disease)     Hemorrhoids     History of urinary retention 2010    S/P TURP    Hyperlipidemia     Macular degeneration     PING (obstructive sleep apnea) 01/07/2016    MILD, SEES DR. AMARILIS MORGAN    Pericardial effusion, acute        Past Surgical History:   Procedure  Laterality Date    CARDIAC CATHETERIZATION N/A 5/11/2023    Procedure: Right Heart Cath;  Surgeon: Corey Gaffney MD;  Location: [facility] CATH INVASIVE LOCATION;  Service: Cardiology;  Laterality: N/A;    CARDIAC CATHETERIZATION N/A 5/11/2023    Procedure: Left Heart Cath;  Surgeon: Corey Gaffney MD;  Location: [facility] CATH INVASIVE LOCATION;  Service: Cardiology;  Laterality: N/A;    CARDIAC CATHETERIZATION N/A 5/11/2023    Procedure: Left ventriculography;  Surgeon: Corey Gaffney MD;  Location: [facility] CATH INVASIVE LOC[facility];  Service: Cardiology;  Laterality: N/A;    CARDIAC CATHETERIZATION N/A 5/11/2023    Procedure: Coronary angiography;  Surgeon: Corey Gaffney MD;  [facility] C[facility];  Service: Cardiology;  Laterality: N/A;    COLONOSCOPY N/A     WNL PER PT, NO RECORDS,     COLONOSCOPY N/A 04/07/2009    DR. GORGE BENAVIDEZ AT [place]    MITRAL VALVE REPAIR/REPLACEMENT N/A 5/24/2023    Procedure: MITRAL VALVE REPAIR/REPLACEMENT TRICUSPID VALVE REPAIR/REPLACEMENT TRANSESOPHAGEAL ECHOCARDIOGRAM WITH ANESTHESIA;  Surgeon: George Frey MD;  Location: [facility];  Service: Cardiothoracic;  Laterality: N/A;    PROSTATE SURGERY N/A 11/12/2010    TURP, DR. COREY COLLAZO AT [facility]    SKIN TAG REMOVAL N/A 12/20/2017    ANAL SKIN TAG X2, PERFORMED IN OFFICE, DR. LISA CHANGP / TRANSURETHRAL INCISION / DRAINAGE PROSTATE  2010    Dr. Goodrich       Scheduled Meds:     Continuous Infusions: No current facility-administered medications for this visit.      PRN Meds:     No Known Allergies    Social History     Socioeconomic History    Marital status:    Tobacco Use    Smoking status: Never     Passive exposure: Never    Smokeless tobacco: Never   Vaping Use    Vaping Use: Never used   Substance and Sexual Activity    Alcohol use: Yes     Alcohol/week: 1.0 standard drink of alcohol     Types: 1 Drinks containing 0.5 oz of alcohol per week     Comment: RARELY    Drug use:  No    Sexual activity: Defer     Partners: Female       Family History   Problem Relation Age of Onset    Lung cancer Mother     Stroke Father     Dementia Father     Hyperlipidemia Brother     Hypertension Brother     Heart disease Brother     Diabetes Brother     Colon polyps Brother     Colon polyps Brother        Review of Systems   Gastrointestinal:  Positive for abdominal pain.       Vitals:    02/21/24 1325   BP: 117/77   Pulse: 94   Temp: 97.5 °F (36.4 °C)       Physical Exam  Constitutional:       Appearance: He is well-developed.   Abdominal:      General: Bowel sounds are normal. There is no distension.      Palpations: Abdomen is soft. There is no mass.      Tenderness: There is no abdominal tenderness. There is no guarding.      Hernia: No hernia is present.   Skin:     General: Skin is warm and dry.      Capillary Refill: Capillary refill takes less than 2 seconds.   Neurological:      Mental Status: He is alert and oriented to person, place, and time.   Psychiatric:         Behavior: Behavior normal.     Assessment    Diagnoses and all orders for this visit:    1. Epigastric pain (Primary)  -     FL Upper GI With Air Contrast; Future    2. Heartburn  -     FL Upper GI With Air Contrast; Future       Plan    Schedule upper GI series for further evaluation of symptoms  Continue pantoprazole 40 mg once daily  We discussed using Gaviscon for breakthrough or at bedtime. Low acid diet reviewed. Keep head of bed elevated. Stop eating/drinking at least 3 hours prior to bedtime. Eliminate caffeine and carbonated beverages.  Weight loss encouraged if BMI over 25.  Further recommendations and follow-up pending imaging.          DESTIN Vicente  [facility]  [address] [place] [address]  Office: [phone]

## 2024-02-26 ENCOUNTER — HOSPITAL ENCOUNTER (OUTPATIENT)
Dept: GENERAL RADIOLOGY | Facility: HOSPITAL | Age: 74
Discharge: HOME | End: 2024-02-26
Payer: MEDICARE

## 2024-02-26 ENCOUNTER — HOSPITAL ENCOUNTER (OUTPATIENT)
Dept: CT IMAGING | Facility: HOSPITAL | Age: 74
Discharge: HOME | End: 2024-02-26
Payer: MEDICARE

## 2024-02-26 DIAGNOSIS — I34.0 NONRHEUMATIC MITRAL VALVE REGURGITATION: ICD-10-CM

## 2024-02-26 DIAGNOSIS — R91.8 PULMONARY NODULES: ICD-10-CM

## 2024-02-26 PROCEDURE — 71250 CT THORAX DX C-: CPT

## 2024-03-05 ENCOUNTER — OFFICE VISIT (OUTPATIENT)
Dept: ENDOCRINOLOGY | Age: 74
End: 2024-03-05
Payer: MEDICARE

## 2024-03-05 VITALS
WEIGHT: 268 LBS | BODY MASS INDEX: 35.52 KG/M2 | HEIGHT: 73 IN | DIASTOLIC BLOOD PRESSURE: 62 MMHG | OXYGEN SATURATION: 96 % | SYSTOLIC BLOOD PRESSURE: 124 MMHG | TEMPERATURE: 98 F | HEART RATE: 96 BPM

## 2024-03-05 DIAGNOSIS — I07.9 TRICUSPID VALVE DISEASE: ICD-10-CM

## 2024-03-05 DIAGNOSIS — Z79.4 TYPE 2 DIABETES MELLITUS WITHOUT COMPLICATION, WITH LONG-TERM CURRENT USE OF INSULIN: Primary | ICD-10-CM

## 2024-03-05 DIAGNOSIS — K21.9 GASTROESOPHAGEAL REFLUX DISEASE WITHOUT ESOPHAGITIS: Chronic | ICD-10-CM

## 2024-03-05 DIAGNOSIS — I05.9 MITRAL VALVE DISEASE: ICD-10-CM

## 2024-03-05 DIAGNOSIS — E11.65 POORLY CONTROLLED TYPE 2 DIABETES MELLITUS: ICD-10-CM

## 2024-03-05 DIAGNOSIS — E78.2 MIXED HYPERLIPIDEMIA: Chronic | ICD-10-CM

## 2024-03-05 DIAGNOSIS — E11.9 TYPE 2 DIABETES MELLITUS WITHOUT COMPLICATION, WITH LONG-TERM CURRENT USE OF INSULIN: Primary | ICD-10-CM

## 2024-03-05 NOTE — PROGRESS NOTES
Subjective   Vernon Solorzano is a 73 y.o. male.     History of Present Illness     He has known diabetes mellitus since 2020 and started an insulin in 2022.  He is on Tresiba 50 units every morning, Humalog 15 units 3 times daily and Jardiance 25 mg/day.  He may take Humalog after the meal.  He started on a freestyle juan 2 sensor last week.  His last meal was at 7 AM.  Hemoglobin A1c done in February 2024 is 11.2%.     Sensor data from February 18, 2024 to March 2, 2024 reviewed.  Average glucose 300 mg per DL.  Time in range 3%.  Time above range 97%.  Time below range 0.  There are multiple gaps on sensor data due to inadequate scanning.  Fasting glucose 166 or higher.    His last eye examination was in 2/24.  He has macular degeneration and gets an intraocular injection from Dr. Hadley.  He denies numbness, tingling or burning in his hands or feet.  Urine microalbumin was normal in August 2023.     He has hyperlipidemia and is on Lipitor 40 mg/day.  He has no myalgia.  Lipid panel done in February 2024 as follows: Cholesterol 105.  HDL 31.  LDL 43.  Triglycerides 191.     He has acid reflux disease and is on Protonix 40 mg/day.     He had mitral valve replacement with a porcine valve, tricuspid valve repair and left atrial appendage closure due to severe mitral regurgitation and severe tricuspid regurgitation in May 2023.  It was complicated by atrial flutter and stroke.  He has intermittent atrial fibrillation.  He had aphasia and right-sided weakness which has improved.  He denies chest pain, orthopnea or paroxysmal nocturnal dyspnea.  He is on Eliquis, torsemide, Toprol XL,  and losartan.   He is being followed by Dr. Arango.     He has history of asthma.  He had COVID infection in 2022 treated with Paxlovid.  He has sleep apnea and is using CPAP regularly.  He wakes up rested.  He had a CT chest done in 2/24.     He has vitamin D deficiency and is on calcium plus D 1 tablet per day.  25-hydroxy vitamin D done  "in February 2024 is 28.6 ng/mL.    He chronic back pain which limits his ability to walk.    The following portions of the patient's history were reviewed and updated as appropriate: allergies, current medications, past family history, past medical history, past social history, past surgical history, and problem list.    Review of Systems  Vitals:    03/05/24 1135   BP: 124/62   Pulse: 96   Temp: 98 °F (36.7 °C)   TempSrc: Temporal   SpO2: 96%   Weight: 122 kg (268 lb)   Height: 185.4 cm (72.99\")      Objective   Physical Exam  Constitutional:       Appearance: Normal appearance. He is obese.   Eyes:      General: No scleral icterus.        Right eye: No discharge.         Left eye: No discharge.      Extraocular Movements: Extraocular movements intact.      Conjunctiva/sclera: Conjunctivae normal.   Cardiovascular:      Rate and Rhythm: Normal rate and regular rhythm.      Heart sounds: Normal heart sounds. No murmur heard.     No friction rub.   Pulmonary:      Breath sounds: Normal breath sounds. No rales.   Chest:      Chest wall: No tenderness.   Abdominal:      General: Bowel sounds are normal.      Palpations: Abdomen is soft.      Tenderness: There is no right CVA tenderness or left CVA tenderness.   Musculoskeletal:      Right lower leg: Edema present.      Left lower leg: Edema present.   Neurological:      Mental Status: He is alert and oriented to person, place, and time.      Comments: Intact light touch on lower ext       Office Visit on 02/13/2024   Component Date Value Ref Range Status    Phosphorus 02/13/2024 3.3  2.5 - 4.5 mg/dL Final    Magnesium 02/13/2024 2.2  1.6 - 2.4 mg/dL Final    Uric Acid 02/13/2024 4.7  3.4 - 7.0 mg/dL Final    25 Hydroxy, Vitamin D 02/13/2024 28.6 (L)  30.0 - 100.0 ng/ml Final    Comment: Reference Range for Total Vitamin D 25(OH)  Deficiency <20.0 ng/mL  Insufficiency 21-29 ng/mL  Sufficiency  ng/mL  Toxicity >100 ng/ml      Glucose 02/13/2024 419 (C)  65 - 99 " mg/dL Final    BUN 02/13/2024 22  8 - 23 mg/dL Final    Creatinine 02/13/2024 1.57 (H)  0.76 - 1.27 mg/dL Final    EGFR Result 02/13/2024 45.7 (L)  >60.0 mL/min/1.73 Final    Comment: GFR Normal >60  Chronic Kidney Disease <60  Kidney Failure <15  The GFR formula is only valid for adults with stable renal  function between ages 18 and 70.      BUN/Creatinine Ratio 02/13/2024 14.0  7.0 - 25.0 Final    Sodium 02/13/2024 136  136 - 145 mmol/L Final    Potassium 02/13/2024 5.0  3.5 - 5.2 mmol/L Final    Chloride 02/13/2024 98  98 - 107 mmol/L Final    Total CO2 02/13/2024 24.5  22.0 - 29.0 mmol/L Final    Calcium 02/13/2024 8.9  8.6 - 10.5 mg/dL Final    Total Protein 02/13/2024 6.2  6.0 - 8.5 g/dL Final    Albumin 02/13/2024 4.2  3.5 - 5.2 g/dL Final    Globulin 02/13/2024 2.0  gm/dL Final    A/G Ratio 02/13/2024 2.1  g/dL Final    Total Bilirubin 02/13/2024 0.4  0.0 - 1.2 mg/dL Final    Alkaline Phosphatase 02/13/2024 101  39 - 117 U/L Final    AST (SGOT) 02/13/2024 14  1 - 40 U/L Final    ALT (SGPT) 02/13/2024 14  1 - 41 U/L Final    Hemoglobin A1C 02/13/2024 11.20 (H)  4.80 - 5.60 % Final    Comment: Hemoglobin A1C Ranges:  Increased Risk for Diabetes  5.7% to 6.4%  Diabetes                     >= 6.5%  Diabetic Goal                < 7.0%      Total Cholesterol 02/13/2024 105  0 - 200 mg/dL Final    Comment: Cholesterol Reference Ranges  (U.S. Department of Health and Human Services ATP III  Classifications)  Desirable          <200 mg/dL  Borderline High    200-239 mg/dL  High Risk          >240 mg/dL  Triglyceride Reference Ranges  (U.S. Department of Health and Human Services ATP III  Classifications)  Normal           <150 mg/dL  Borderline High  150-199 mg/dL  High             200-499 mg/dL  Very High        >500 mg/dL  HDL Reference Ranges  (U.S. Department of Health and Human Services ATP III  Classifications)  Low     <40 mg/dl (major risk factor for CHD)  High    >60 mg/dl ('negative' risk factor for  CHD)  LDL Reference Ranges  (U.S. Department of Health and Human Services ATP III  Classifications)  Optimal          <100 mg/dL  Near Optimal     100-129 mg/dL  Borderline High  130-159 mg/dL  High             160-189 mg/dL  Very High        >189 mg/dL      Triglycerides 02/13/2024 191 (H)  0 - 150 mg/dL Final    HDL Cholesterol 02/13/2024 31 (L)  40 - 60 mg/dL Final    VLDL Cholesterol Héctor 02/13/2024 31  5 - 40 mg/dL Final    LDL Chol Calc (NIH) 02/13/2024 43  0 - 100 mg/dL Final    LDL/HDL RATIO 02/13/2024 1.15   Final    TSH 02/13/2024 1.760  0.270 - 4.200 uIU/mL Final     Assessment & Plan   Diagnoses and all orders for this visit:    1. Type 2 diabetes mellitus without complication, with long-term current use of insulin (Primary)    2. Mixed hyperlipidemia    3. Gastroesophageal reflux disease without esophagitis    4. Mitral valve disease    5. Tricuspid valve disease    6. Poorly controlled type 2 diabetes mellitus        Increase Tresiba to 55 units daily.  Continue Humalog 15 units 3 times daily and Jardiance 25 mg/day.  Start Ozempic 0.25 mg weekly for 4 weeks then 0.5 mg weekly thereafter.  2-month sample was given to the patient.  Sensor download in 2 to 3 weeks.    Continue Lipitor 40 mg/day.    Continue Protonix 40 mg/day.    Patient complains of fatigue which may be due to intermittent atrial fibrillation.  Will defer evaluation and treatment to Dr. Arango.  Follow-up with Dr. Slater as scheduled.  Continue CPAP.    Continue calcium plus D 1 tablet/day.    Will defer evaluation of chronic back pain to PCP.    Copy of my note sent to Corey Oconnell NP, Dr. Slater, and Dr. Arango    Follow-up in 4 months.

## 2024-03-06 ENCOUNTER — TRANSCRIBE ORDERS (OUTPATIENT)
Dept: ADMINISTRATIVE | Facility: HOSPITAL | Age: 74
End: 2024-03-06
Payer: MEDICARE

## 2024-03-06 DIAGNOSIS — R91.8 LUNG NODULES: Primary | ICD-10-CM

## 2024-04-17 ENCOUNTER — OFFICE VISIT (OUTPATIENT)
Dept: CARDIOLOGY | Facility: CLINIC | Age: 74
End: 2024-04-17
Payer: MEDICARE

## 2024-04-17 ENCOUNTER — HOSPITAL ENCOUNTER (OUTPATIENT)
Dept: CARDIOLOGY | Facility: HOSPITAL | Age: 74
Discharge: HOME | End: 2024-04-17
Admitting: NURSE PRACTITIONER
Payer: MEDICARE

## 2024-04-17 VITALS
WEIGHT: 270 LBS | HEART RATE: 86 BPM | BODY MASS INDEX: 36.57 KG/M2 | DIASTOLIC BLOOD PRESSURE: 80 MMHG | HEIGHT: 72 IN | SYSTOLIC BLOOD PRESSURE: 142 MMHG

## 2024-04-17 DIAGNOSIS — I48.92 PAROXYSMAL ATRIAL FLUTTER: ICD-10-CM

## 2024-04-17 DIAGNOSIS — E11.65 TYPE 2 DIABETES MELLITUS WITH HYPERGLYCEMIA, WITH LONG-TERM CURRENT USE OF INSULIN: Chronic | ICD-10-CM

## 2024-04-17 DIAGNOSIS — Z79.4 TYPE 2 DIABETES MELLITUS WITH HYPERGLYCEMIA, WITH LONG-TERM CURRENT USE OF INSULIN: Chronic | ICD-10-CM

## 2024-04-17 DIAGNOSIS — Z95.4 H/O MITRAL VALVE REPLACEMENT WITH TISSUE GRAFT: Primary | ICD-10-CM

## 2024-04-17 DIAGNOSIS — G47.33 OSA (OBSTRUCTIVE SLEEP APNEA): ICD-10-CM

## 2024-04-17 DIAGNOSIS — I10 ESSENTIAL HYPERTENSION: Chronic | ICD-10-CM

## 2024-04-17 DIAGNOSIS — Z95.4 H/O MITRAL VALVE REPLACEMENT WITH TISSUE GRAFT: ICD-10-CM

## 2024-04-17 DIAGNOSIS — I42.8 NON-ISCHEMIC CARDIOMYOPATHY: ICD-10-CM

## 2024-04-17 DIAGNOSIS — E78.2 MIXED HYPERLIPIDEMIA: ICD-10-CM

## 2024-04-17 DIAGNOSIS — N18.31 STAGE 3A CHRONIC KIDNEY DISEASE: Chronic | ICD-10-CM

## 2024-04-17 DIAGNOSIS — I05.9 MITRAL VALVE DISEASE: ICD-10-CM

## 2024-04-17 LAB
ANION GAP SERPL CALCULATED.3IONS-SCNC: 11.1 MMOL/L (ref 5–15)
BUN SERPL-MCNC: 19 MG/DL (ref 8–23)
BUN/CREAT SERPL: 13.7 (ref 7–25)
CALCIUM SPEC-SCNC: 9.3 MG/DL (ref 8.6–10.5)
CHLORIDE SERPL-SCNC: 99 MMOL/L (ref 98–107)
CO2 SERPL-SCNC: 23.9 MMOL/L (ref 22–29)
CREAT SERPL-MCNC: 1.39 MG/DL (ref 0.76–1.27)
DEPRECATED RDW RBC AUTO: 42.4 FL (ref 37–54)
EGFRCR SERPLBLD CKD-EPI 2021: 52.9 ML/MIN/1.73
ERYTHROCYTE [DISTWIDTH] IN BLOOD BY AUTOMATED COUNT: 15.4 % (ref 12.3–15.4)
GLUCOSE SERPL-MCNC: 219 MG/DL (ref 65–99)
HCT VFR BLD AUTO: 35.8 % (ref 37.5–51)
HGB BLD-MCNC: 11.7 G/DL (ref 13–17.7)
MCH RBC QN AUTO: 25.6 PG (ref 26.6–33)
MCHC RBC AUTO-ENTMCNC: 32.7 G/DL (ref 31.5–35.7)
MCV RBC AUTO: 78.3 FL (ref 79–97)
NT-PROBNP SERPL-MCNC: 925 PG/ML (ref 0–1800)
PLATELET # BLD AUTO: 158 10*3/MM3 (ref 140–450)
PMV BLD AUTO: 9.9 FL (ref 6–12)
POTASSIUM SERPL-SCNC: 3.9 MMOL/L (ref 3.5–5.2)
RBC # BLD AUTO: 4.57 10*6/MM3 (ref 4.14–5.8)
SODIUM SERPL-SCNC: 134 MMOL/L (ref 136–145)
WBC NRBC COR # BLD AUTO: 8.06 10*3/MM3 (ref 3.4–10.8)

## 2024-04-17 PROCEDURE — 80048 BASIC METABOLIC PNL TOTAL CA: CPT | Performed by: NURSE PRACTITIONER

## 2024-04-17 PROCEDURE — 85027 COMPLETE CBC AUTOMATED: CPT | Performed by: NURSE PRACTITIONER

## 2024-04-17 PROCEDURE — 36415 COLL VENOUS BLD VENIPUNCTURE: CPT

## 2024-04-17 PROCEDURE — 83880 ASSAY OF NATRIURETIC PEPTIDE: CPT | Performed by: NURSE PRACTITIONER

## 2024-04-17 RX ORDER — TORSEMIDE 20 MG/1
40 TABLET ORAL DAILY
Qty: 180 TABLET | Refills: 3 | Status: SHIPPED | OUTPATIENT
Start: 2024-04-17

## 2024-04-17 NOTE — PROGRESS NOTES
Date of Office Visit: 2024  Encounter Provider: DESTIN Lutz  Place of Service: [facility] CARDIOLOGY  Patient Name: Vernon Solorzano  :1950    Chief complaint  Cardiomyopathy, valvular heart disease    History of Present Illness  Patient is a 73 y.o. year old male  patient of Dr. Arango. Past medical history includes diabetes, hyperlipidemia, obstructive sleep apnea and GE flux disease.  In 2022 he developed COVID-pneumonia and findings suggestive of interstitial lung disease with possible COVID pneumonitis.  Despite adequate treatment and subsequent calcification LAD and circumflex.  With progressive dyspnea an echo was performed that showed normal sinus rhythm with questionable wall motion abnormality in the inferolateral wall with thickened mitral valve leaflets with mild regurgitation that was at least moderate but likely underestimated with RVSP 60 mmHg.  Heart failure.  He was admitted to treat heart failure.  GAUDENCIO showed severe mitral valve regurgitation.  Cardiac cath showed normal coronary arteries.  He therefore underwent mitral valve repair with 29 mm Schwartz bioprosthetic valve replacement, tricuspid valve repair with left atrial appendage closure..  He did demonstrate renal insufficiency during his hospitalization.  Postoperatively he had altered mental status without obvious stroke.  He demonstrated AV block that was transient.  He demonstrated moderate-sized pericardial effusion.  He was treated medically with colchicine and went to rehab and subsequently went home.  Echo 7/3/2023 showed modest pericardial effusion that was posterior located and no change from prior study and without evidence of tamponade.  He was found to have congestive heart failure with a markedly elevated proBNP of 5156 with creatinine 1.26.  Torsemide was cautiously increased.  He was seen by nephrology.  He had a follow-up echocardiogram 2023 which showed an ejection  fraction further decreased to 25%, mild to moderate left ventricular hypertrophy, mitral valve prosthesis appeared to be functioning relatively well with trivial to mild regurgitation.  Resolution of pericardial effusion.  It was again a technically limited study.  Blood work 1/2023 showed an proBNP 1950 (improved) with a creatinine increased to 1.49.  Glucose remains elevated in the 280 range.  At the last visit he had been addressing diabetes.  Heart failure fluid status had improved.  Echo on 9/18/2023 showed an ejection fraction 30% and of note diastolic function aortic valve calcification without stenosis prosthetic mitral valve without stenosis regurgitation, tricuspid valve repair number of 4 mmHg at a rate of 89 bpm was noted.  It was borderline elevated.  Echo on 12/2023 with an ejection fraction 50% with indeterminate diastolic function, left ventricle still dilated.  RV dilated with mild RV dysfunction.  Bioprosthetic valve had slightly increased gradient of 10 mmHg heart rate of 84 bpm.  However no regurgitation is present.  There is trivial tricuspid regurgitation.     Interval history  Patient presents today for routine follow-up.  I will visit with him today and have reviewed his medical record.  His wife has accompanied him to visit per his preference.  Since last visit he is wearing CPAP consistently.  He denies palpitations, dizziness, chest pain or chest pressure, syncope or presyncope.  He states shortness of breath and fatigue are stable but is frustrated that they are not improving.  He reports edema is worsening right greater than left.  He also complains of persistent back pain that is worse when walking and better when he rests.  This is not present when he exercises on his elliptical.  Blood pressure today is slightly elevated.  Dr. Mcneill has recommended starting Ozempic.  Last A1c >11.  He continues to follow with Dr. Varner for renal insufficiency.    Past Medical History:   Diagnosis  Date    Allergic rhinitis     Arthritis     BPH (benign prostatic hyperplasia)     Diabetes mellitus     TYPE 2    Foraminal stenosis of cervical region     C5-C6    GERD (gastroesophageal reflux disease)     Hemorrhoids     History of urinary retention 2010    S/P TURP    Hyperlipidemia     Macular degeneration     PING (obstructive sleep apnea) 01/07/2016    MILD, SEES DR. AMARILIS MORGAN    Pericardial effusion, acute      Past Surgical History:   Procedure Laterality Date    CARDIAC CATHETERIZATION N/A 5/11/2023    Procedure: Right Heart Cath;  Surgeon: Corey Gaffney MD;  Location:  [facility];  Service: Cardiology;  Laterality: N/A;    CARDIAC CATHETERIZATION N/A 5/11/2023    Procedure: Left Heart Cath;  Surgeon: Corey Gaffney MD;  Location:  [facility];  Service: Cardiology;  Laterality: N/A;    CARDIAC CATHETERIZATION N/A 5/11/2023    Procedure: Left ventriculography;  Surgeon: Corey Gaffney MD;  Location:  [facility];  Service: Cardiology;  Laterality: N/A;    CARDIAC CATHETERIZATION N/A 5/11/2023    Procedure: Coronary angiography;  Surgeon: Corey Gaffney MD;  Location:  [facility];  Service: Cardiology;  Laterality: N/A;    COLONOSCOPY N/A     WNL PER PT, NO RECORDS,     COLONOSCOPY N/A 04/07/2009    DR. GORGE BENAVIDEZ AT [place]    MITRAL VALVE REPAIR/REPLACEMENT N/A 5/24/2023    Procedure: MITRAL VALVE REPAIR/REPLACEMENT TRICUSPID VALVE REPAIR/REPLACEMENT TRANSESOPHAGEAL ECHOCARDIOGRAM WITH ANESTHESIA;  Surgeon: George Frey MD;  Location: [facility];  Service: Cardiothoracic;  Laterality: N/A;    PROSTATE SURGERY N/A 11/12/2010    TURP, DR. COREY COLLAZO AT [facility]    SKIN TAG REMOVAL N/A 12/20/2017    ANAL SKIN TAG X2, PERFORMED IN OFFICE, DR. LISA ORANTES    TURP / TRANSURETHRAL INCISION / DRAINAGE PROSTATE  2010    Dr. Goodrich     Outpatient Medications Prior to Visit   Medication Sig Dispense Refill     acetaminophen (TYLENOL) 325 MG tablet Take 2 tablets by mouth Every 6 (Six) Hours As Needed for Mild Pain.      albuterol sulfate  (90 Base) MCG/ACT inhaler Inhale 2 puffs Every 4 (Four) Hours As Needed for Wheezing.      apixaban (ELIQUIS) 5 MG tablet tablet Take 1 tablet by mouth 2 (Two) Times a Day. 180 tablet 3    atorvastatin (LIPITOR) 40 MG tablet Take 1 tablet by mouth Every Night. 30 tablet 1    calcium carbonate (TUMS) 500 MG chewable tablet Chew 2 tablets 4 (Four) Times a Day As Needed for Indigestion or Heartburn.      Calcium Carbonate-Vitamin D (calcium 500 mg vitamin D 5 mcg, 200 UT,) 500-5 MG-MCG tablet per tablet Take 1 tablet by mouth 2 (Two) Times a Day.      Continuous Blood Gluc Sensor (FreeStyle Ysabel 2 Sensor) misc 1 each by Other route Every 14 (Fourteen) Days. Use 1 sensor every 14 days  Indications: Type 2 Diabetes 6 each 3    empagliflozin (JARDIANCE) 25 MG tablet tablet Take 1 tablet by mouth Daily. Indications: Type 2 Diabetes 90 tablet 2    glucose blood (OneTouch Verio) test strip 1 each by Other route As Needed. Use as instructed      insulin degludec (Tresiba FlexTouch) 100 UNIT/ML solution pen-injector injection 46 units every morning (Patient taking differently: Inject 50 Units under the skin into the appropriate area as directed. 50units every morning) 15 mL 6    Insulin Lispro, 1 Unit Dial, (HumaLOG KwikPen) 100 UNIT/ML solution pen-injector Inject 5 Units under the skin into the appropriate area as directed 3 (Three) Times a Day. Indications: Type 2 Diabetes (Patient taking differently: Inject 15 Units under the skin into the appropriate area as directed 3 times a day. Indications: Type 2 Diabetes) 14 mL 2    Insulin Pen Needle 32G X 4 MM misc 1 each by Other route 3 (Three) Times a Day. Use to inject insulin under the skin via pens up to 4 times daily  Indications: Type 2 Diabetes 300 each 3    losartan (Cozaar) 25 MG tablet Take 1 tablet by mouth Daily. 30 tablet 6     metoprolol succinate XL (TOPROL-XL) 25 MG 24 hr tablet Take 1 tablet by mouth 2 (Two) Times a Day. (Patient taking differently: Take 1 tablet by mouth Daily. ONLY TAKING ONCE DAILY) 180 tablet 2    Multiple Vitamins-Minerals (PRESERVISION AREDS 2+MULTI VIT PO) Take 1 tablet by mouth 2 (Two) Times a Day.      pantoprazole (PROTONIX) 40 MG EC tablet Take 1 tablet by mouth Daily. 90 tablet 1    potassium chloride (K-DUR,KLOR-CON) 20 MEQ CR tablet Take 1 tablet by mouth Daily. 30 tablet 1    tiotropium bromide monohydrate (SPIRIVA RESPIMAT) 2.5 MCG/ACT aerosol solution inhaler Inhale 2 puffs.      torsemide (DEMADEX) 20 MG tablet Take 2 tablets by mouth 2 (Two) Times a Day. (Patient taking differently: Take 2 tablets by mouth Daily.) 360 tablet 0     No facility-administered medications prior to visit.       Allergies as of 04/17/2024    (No Known Allergies)     Social History     Socioeconomic History    Marital status:    Tobacco Use    Smoking status: Never     Passive exposure: Never    Smokeless tobacco: Never   Vaping Use    Vaping status: Never Used   Substance and Sexual Activity    Alcohol use: Yes     Alcohol/week: 1.0 standard drink of alcohol     Types: 1 Drinks containing 0.5 oz of alcohol per week     Comment: RARELY    Drug use: No    Sexual activity: Defer     Partners: Female     Family History   Problem Relation Age of Onset    Lung cancer Mother     Stroke Father     Dementia Father     Hyperlipidemia Brother     Hypertension Brother     Heart disease Brother     Diabetes Brother     Colon polyps Brother     Colon polyps Brother      Review of Systems   Constitutional: Positive for malaise/fatigue.   Cardiovascular:  Positive for leg swelling. Negative for chest pain, claudication, dyspnea on exertion, near-syncope, orthopnea, palpitations, paroxysmal nocturnal dyspnea and syncope.   Respiratory:  Positive for shortness of breath.    Neurological:  Negative for brief paralysis, dizziness,  "headaches and light-headedness.   All other systems reviewed and are negative.       Objective:     Vitals:    04/17/24 1323   BP: 142/80   BP Location: Left arm   Patient Position: Sitting   Pulse: 86   Weight: 122 kg (270 lb)   Height: 182.9 cm (72\")     Body mass index is 36.62 kg/m².    Vitals reviewed.   Constitutional:       General: Not in acute distress.     Appearance: Well-developed and not in distress. Not diaphoretic.   HENT:      Head: Normocephalic.   Pulmonary:      Effort: Pulmonary effort is normal. No respiratory distress.      Breath sounds: Normal breath sounds. No wheezing. No rhonchi. No rales.   Cardiovascular:      Normal rate. Regular rhythm.      Murmurs: There is no murmur.   Pulses:     Radial: 2+ bilaterally.  Edema:     Peripheral edema present.     Pretibial: trace edema of the left pretibial area and 1+ edema of the right pretibial area.     Ankle: trace edema of the left ankle and 1+ edema of the right ankle.     Feet: trace edema of the left foot and 1+ edema of the right foot.  Skin:     General: Skin is warm and dry. There is no cyanosis.      Findings: No rash.   Neurological:      Mental Status: Alert and oriented to person, place, and time.   Psychiatric:         Behavior: Behavior normal. Behavior is cooperative.         Thought Content: Thought content normal.         Judgment: Judgment normal.       Lab Review:     Lab Results   Component Value Date     02/13/2024     10/10/2023    K 5.0 02/13/2024    K 4.4 10/10/2023    CL 98 02/13/2024     10/10/2023    CO2 24.5 02/13/2024    CO2 26.6 10/10/2023    BUN 22 02/13/2024    BUN 17 10/10/2023    CREATININE 1.57 (H) 02/13/2024    CREATININE 1.31 (H) 10/10/2023    EGFRIFNONA 66 12/15/2021    EGFRIFNONA 60 06/07/2021    EGFRIFAFRI 76 12/15/2021    EGFRIFAFRI 69 06/07/2021    GLUCOSE 419 (C) 02/13/2024    GLUCOSE 179 (H) 10/10/2023    CALCIUM 8.9 02/13/2024    CALCIUM 9.3 10/10/2023    PROTENTOTREF 6.2 02/13/2024 "    PROTENTOTREF 6.3 03/29/2023    ALBUMIN 4.2 02/13/2024    ALBUMIN 4.0 08/17/2023    BILITOT 0.4 02/13/2024    BILITOT 0.5 08/17/2023    AST 14 02/13/2024    AST 21 08/17/2023    ALT 14 02/13/2024    ALT 19 08/17/2023     Lab Results   Component Value Date    WBC 9.53 09/14/2023    WBC 7.85 08/17/2023    HGB 11.4 (L) 09/14/2023    HGB 11.9 (L) 08/17/2023    HCT 34.6 (L) 09/14/2023    HCT 36.4 (L) 08/17/2023    MCV 81.0 09/14/2023    MCV 82.5 08/17/2023     09/14/2023     08/17/2023     Lab Results   Component Value Date    PROBNP 2,785.0 (H) 09/14/2023    PROBNP 1,268.0 (H) 08/17/2023     Lab Results   Component Value Date    CKMB 1.09 06/24/2023    TROPONINT 95 (C) 06/25/2023     Lab Results   Component Value Date    TSH 1.760 02/13/2024    TSH 2.320 08/17/2023      Lipid Panel          5/23/2023    14:29 8/17/2023    08:31 2/13/2024    12:11   Lipid Panel   Total Cholesterol 155  116     Total Cholesterol   105    Triglycerides 197  221  191    HDL Cholesterol 41  34  31    VLDL Cholesterol 33  35  31    LDL Cholesterol  81  47  43    LDL/HDL Ratio 1.82  1.11  1.15           ECG 12 Lead    Date/Time: 4/17/2024 4:11 PM  Performed by: Christina Diaz APRN    Authorized by: Christina Diaz APRN  Comparison: compared with previous ECG   Similar to previous ECG  Rhythm: sinus rhythm  Ectopy: unifocal PVCs  Rate: normal  BPM: 86  Conduction: right bundle branch block and left anterior fascicular block  QRS axis: normal  Comments: Similar to prior        Assessment:       Diagnosis Plan   1. H/O mitral valve replacement with tissue graft  Basic Metabolic Panel    proBNP    CBC (No Diff)    Adult Transthoracic Echo Complete w/ Color, Spectral and Contrast if Necessary Per Protocol      2. Non-ischemic cardiomyopathy  Basic Metabolic Panel    proBNP    CBC (No Diff)    Adult Transthoracic Echo Complete w/ Color, Spectral and Contrast if Necessary Per Protocol      3. Paroxysmal atrial flutter  Basic  Metabolic Panel    proBNP    CBC (No Diff)    Adult Transthoracic Echo Complete w/ Color, Spectral and Contrast if Necessary Per Protocol        Plan:       1.  Mitral valve replacement and tricuspid valve repair 5/2023.  Mean mitral valve gradient is slightly elevated at 10 mmHg.  However resting heart rate remains in the 80s-90s.    2.  Nonischemic cardiomyopathy with acute on chronic diastolic heart failure.  With increasing edema will get BMP, proBNP, CBC.  Will also repeat echocardiogram.   3.  Pericardial effusion.  Resolved  4.  Renal insufficiency, as above.  Baseline creatinine 1.3-1.5.  Follows with nephrology  5.  Hypertension.  Elevated in office today but with some volume excess.  Will increase torsemide to 40 mg daily (has been taking 20 mg daily) and repeat echocardiogram.  Labs as above.  He has appointment with nephrology next week.  6.  Hyperlipidemia  7.  Diabetes.  Last A1c greater than 11.  Discussed with him that the high blood sugars are likely contributing to his significant edema.  8.  Postop encephalopathy and encephalopathy.  Slowly improving.   9.  Edema.  Likely multifactorial.  10.  Persistent atrial fibrillation.  He has had atrial appendage closure.  Sinus rhythm today.  He is on Eliquis for now we will and for rate control for now as he still works on risk factor modification including further treatment of sleep apnea and avoidance of stimulants.  However  if he still struggles with edema in the next several months, may consider attempts at cardioversion.      Time Spent: I spent 30 minutes caring for Vernon on this date of service. This time includes time spent by me in the following activities: preparing for the visit, reviewing tests, performing a medically appropriate examination and/or evaluations, counseling and educating the patient/family/caregiver, ordering medications, tests, or procedures, documenting information in the medical record, and independently interpreting results  and communicating that information with the patient/family/caregiver.   I spent 1 minutes on the separately reported service of ECG. This time is not included in the time used to support the E/M service also reported today.        Your medication list            Accurate as of April 17, 2024  4:15 PM. If you have any questions, ask your nurse or doctor.                CHANGE how you take these medications        Instructions Last Dose Given Next Dose Due   Insulin Lispro (1 Unit Dial) 100 UNIT/ML solution pen-injector  Commonly known as: HumaLOG KwikPen  What changed:   how much to take  when to take this      Inject 5 Units under the skin into the appropriate area as directed 3 (Three) Times a Day. Indications: Type 2 Diabetes       metoprolol succinate XL 25 MG 24 hr tablet  Commonly known as: TOPROL-XL  What changed:   when to take this  additional instructions      Take 1 tablet by mouth 2 (Two) Times a Day.       Tresiba FlexTouch 100 UNIT/ML solution pen-injector injection  Generic drug: insulin degludec  What changed:   how much to take  how to take this  additional instructions      46 units every morning              CONTINUE taking these medications        Instructions Last Dose Given Next Dose Due   acetaminophen 325 MG tablet  Commonly known as: TYLENOL      Take 2 tablets by mouth Every 6 (Six) Hours As Needed for Mild Pain.       albuterol sulfate  (90 Base) MCG/ACT inhaler  Commonly known as: PROVENTIL HFA;VENTOLIN HFA;PROAIR HFA      Inhale 2 puffs Every 4 (Four) Hours As Needed for Wheezing.       apixaban 5 MG tablet tablet  Commonly known as: ELIQUIS      Take 1 tablet by mouth 2 (Two) Times a Day.       atorvastatin 40 MG tablet  Commonly known as: LIPITOR      Take 1 tablet by mouth Every Night.       calcium 500 mg vitamin D 5 mcg (200 UT) 500-5 MG-MCG tablet per tablet      Take 1 tablet by mouth 2 (Two) Times a Day.       calcium carbonate 500 MG chewable tablet  Commonly known as:  TUMS      Chew 2 tablets 4 (Four) Times a Day As Needed for Indigestion or Heartburn.       empagliflozin 25 MG tablet tablet  Commonly known as: JARDIANCE      Take 1 tablet by mouth Daily. Indications: Type 2 Diabetes       FreeStyle Ysabel 2 Sensor misc      1 each by Other route Every 14 (Fourteen) Days. Use 1 sensor every 14 days  Indications: Type 2 Diabetes       Insulin Pen Needle 32G X 4 MM misc      1 each by Other route 3 (Three) Times a Day. Use to inject insulin under the skin via pens up to 4 times daily  Indications: Type 2 Diabetes       losartan 25 MG tablet  Commonly known as: Cozaar      Take 1 tablet by mouth Daily.       OneTouch Verio test strip  Generic drug: glucose blood      1 each by Other route As Needed. Use as instructed       pantoprazole 40 MG EC tablet  Commonly known as: PROTONIX      Take 1 tablet by mouth Daily.       potassium chloride 20 MEQ CR tablet  Commonly known as: KLOR-CON M20      Take 1 tablet by mouth Daily.       PRESERVISION AREDS 2+MULTI VIT PO      Take 1 tablet by mouth 2 (Two) Times a Day.       tiotropium bromide monohydrate 2.5 MCG/ACT aerosol solution inhaler  Commonly known as: SPIRIVA RESPIMAT      Inhale 2 puffs.       torsemide 20 MG tablet  Commonly known as: DEMADEX      Take 2 tablets by mouth Daily.                 Where to Get Your Medications        These medications were sent to [id number] - [place], [place] - [address] AT [facility] - [phone]  - [phone] [place] [id number], [facility] [address]      Phone: [phone]   torsemide 20 MG tablet         Patient is no longer taking -.  I corrected the med list to reflect this.  I did not stop these medications.    Return in about 6 weeks (around 5/29/2024) for with Dr. Arango.      Dictated utilizing Dragon dictation

## 2024-04-18 ENCOUNTER — TELEPHONE (OUTPATIENT)
Dept: CARDIOLOGY | Facility: CLINIC | Age: 74
End: 2024-04-18
Payer: MEDICARE

## 2024-04-18 NOTE — TELEPHONE ENCOUNTER
Called and left VM. Will continue to try to reach patient. HUB transfer call to triage.     Jane Shankar RN  Triage [facility]

## 2024-04-18 NOTE — TELEPHONE ENCOUNTER
Please let patient and family know that creatinine is stable at 1.39.  Sugar is still elevated on these labs.  As we discussed at appointment yesterday getting blood sugar down may also help with edema.  Hemoglobin is stable and close to normal.  proBNP is normal and significantly improved from last reading 7 months ago.  Would continue higher dose of torsemide until he sees nephrology next week.  Will update with echocardiogram results when they are available.

## 2024-04-19 NOTE — TELEPHONE ENCOUNTER
Notified patient's spouse, Maricel, of results/recommendations, allowed per ZACH. She verbalized understanding.    Jane Shankar RN  Triage [facility]

## 2024-04-19 NOTE — TELEPHONE ENCOUNTER
Called and left VM. Will continue to try to reach patient. HUB transfer call to triage.     Jane Shankar RN  [place] [facility]

## 2024-04-23 ENCOUNTER — HOSPITAL ENCOUNTER (OUTPATIENT)
Dept: CARDIOLOGY | Facility: HOSPITAL | Age: 74
Discharge: HOME | End: 2024-04-23
Admitting: NURSE PRACTITIONER
Payer: MEDICARE

## 2024-04-23 VITALS
DIASTOLIC BLOOD PRESSURE: 70 MMHG | WEIGHT: 270 LBS | BODY MASS INDEX: 35.78 KG/M2 | SYSTOLIC BLOOD PRESSURE: 120 MMHG | HEIGHT: 73 IN

## 2024-04-23 DIAGNOSIS — I42.8 NON-ISCHEMIC CARDIOMYOPATHY: ICD-10-CM

## 2024-04-23 DIAGNOSIS — I48.92 PAROXYSMAL ATRIAL FLUTTER: ICD-10-CM

## 2024-04-23 DIAGNOSIS — Z95.4 H/O MITRAL VALVE REPLACEMENT WITH TISSUE GRAFT: ICD-10-CM

## 2024-04-23 LAB
ASCENDING AORTA: 3.6 CM
BH CV ECHO MEAS - ACS: 1.84 CM
BH CV ECHO MEAS - AO MAX PG: 9.7 MMHG
BH CV ECHO MEAS - AO MEAN PG: 6.5 MMHG
BH CV ECHO MEAS - AO ROOT DIAM: 3.6 CM
BH CV ECHO MEAS - AO V2 MAX: 156.1 CM/SEC
BH CV ECHO MEAS - AO V2 VTI: 28.9 CM
BH CV ECHO MEAS - AVA(I,D): 1.59 CM2
BH CV ECHO MEAS - EDV(MOD-SP2): 84 ML
BH CV ECHO MEAS - EDV(MOD-SP4): 93 ML
BH CV ECHO MEAS - EF(MOD-BP): 53.8 %
BH CV ECHO MEAS - EF(MOD-SP2): 50 %
BH CV ECHO MEAS - EF(MOD-SP4): 54.8 %
BH CV ECHO MEAS - ESV(MOD-SP2): 42 ML
BH CV ECHO MEAS - ESV(MOD-SP4): 42 ML
BH CV ECHO MEAS - LV DIASTOLIC VOL/BSA (35-75): 38 CM2
BH CV ECHO MEAS - LV MAX PG: 3.9 MMHG
BH CV ECHO MEAS - LV MEAN PG: 1.62 MMHG
BH CV ECHO MEAS - LV SYSTOLIC VOL/BSA (12-30): 17.2 CM2
BH CV ECHO MEAS - LV V1 MAX: 98.6 CM/SEC
BH CV ECHO MEAS - LV V1 VTI: 15.2 CM
BH CV ECHO MEAS - LVOT AREA: 3 CM2
BH CV ECHO MEAS - LVOT DIAM: 1.97 CM
BH CV ECHO MEAS - MV A MAX VEL: 166.8 CM/SEC
BH CV ECHO MEAS - MV DEC SLOPE: 872.2 CM/SEC2
BH CV ECHO MEAS - MV DEC TIME: 0.35 SEC
BH CV ECHO MEAS - MV E MAX VEL: 182 CM/SEC
BH CV ECHO MEAS - MV E/A: 1.09
BH CV ECHO MEAS - MV MAX PG: 20.3 MMHG
BH CV ECHO MEAS - MV MEAN PG: 11 MMHG
BH CV ECHO MEAS - MV P1/2T: 72.3 MSEC
BH CV ECHO MEAS - MV V2 VTI: 53 CM
BH CV ECHO MEAS - MVA(P1/2T): 3 CM2
BH CV ECHO MEAS - MVA(VTI): 0.87 CM2
BH CV ECHO MEAS - PA ACC TIME: 0.11 SEC
BH CV ECHO MEAS - PA V2 MAX: 92.1 CM/SEC
BH CV ECHO MEAS - RV MAX PG: 1.3 MMHG
BH CV ECHO MEAS - RV V1 MAX: 57 CM/SEC
BH CV ECHO MEAS - RV V1 VTI: 9.9 CM
BH CV ECHO MEAS - SV(LVOT): 46 ML
BH CV ECHO MEAS - SV(MOD-SP2): 42 ML
BH CV ECHO MEAS - SV(MOD-SP4): 51 ML
BH CV ECHO MEAS - SVI(LVOT): 18.8 ML/M2
BH CV ECHO MEAS - SVI(MOD-SP2): 17.2 ML/M2
BH CV ECHO MEAS - SVI(MOD-SP4): 20.9 ML/M2
BH CV ECHO MEAS - TAPSE (>1.6): 1.07 CM
BH CV XLRA - RV BASE: 3.9 CM
BH CV XLRA - RV LENGTH: 7.8 CM
BH CV XLRA - RV MID: 3.2 CM
LEFT ATRIUM VOLUME INDEX: 24.6 ML/M2
SINUS: 3.2 CM
STJ: 2.8 CM

## 2024-04-23 PROCEDURE — 93306 TTE W/DOPPLER COMPLETE: CPT

## 2024-04-23 PROCEDURE — 93306 TTE W/DOPPLER COMPLETE: CPT | Performed by: INTERNAL MEDICINE

## 2024-04-23 PROCEDURE — 25510000001 PERFLUTREN (DEFINITY) 8.476 MG IN SODIUM CHLORIDE (PF) 0.9 % 10 ML INJECTION: Performed by: NURSE PRACTITIONER

## 2024-04-23 RX ADMIN — PERFLUTREN 2 ML: 6.52 INJECTION, SUSPENSION INTRAVENOUS at 12:32

## 2024-04-24 ENCOUNTER — TELEPHONE (OUTPATIENT)
Dept: CARDIOLOGY | Facility: CLINIC | Age: 74
End: 2024-04-24
Payer: MEDICARE

## 2024-04-24 ENCOUNTER — TELEPHONE (OUTPATIENT)
Dept: CASE MANAGEMENT | Facility: OTHER | Age: 74
End: 2024-04-24
Payer: MEDICARE

## 2024-04-24 NOTE — TELEPHONE ENCOUNTER
Called patient to offer CM services, no answer. Unable to leave message, no verbal release on file.

## 2024-04-24 NOTE — TELEPHONE ENCOUNTER
I spoke with Maricel and gave her message from the provider.  She verbalized understanding & has no further questions at this time.    Thank you,    Maricruz STEELE RN  Triage [facility]  04/24/24 11:13 EDT  
I spoke with Vernon Solorzano's wife, Maricel (okay per ZACH) and updated her on results/recommendations from provider.  She verbalized understanding.    Maricel was unaware pt had a nephrology appt today.  Upon chart review, pt has an appt scheduled for 5/7/24.  Did you have any recommendations for them until they see nephrology?    Thank you,    Maricruz STEELE RN  Triage [facility]  04/24/24 10:57 EDT    
Please let patient and wife know that echo looks good.  LVEF has improved further and is now 53.8% and in the normal range (was 50% on last check).  Bioprosthetic valve is functioning appropriately but he looks a little dehydrated on the echo.  I believe he is to see nephrology today would defer to their recommendations on diuretic use.  Would recommend good blood sugar control to help control edema and not escalating diuretics at this time.  
Would decrease back to prior dose of torsemide until seen by nephrology. I believe he was on 20 mg daily and I increased to 40 mg daily.
Adequate: hears normal conversation without difficulty

## 2024-04-25 ENCOUNTER — PATIENT OUTREACH (OUTPATIENT)
Dept: CASE MANAGEMENT | Facility: OTHER | Age: 74
End: 2024-04-25
Payer: MEDICARE

## 2024-04-25 DIAGNOSIS — E78.2 MIXED HYPERLIPIDEMIA: Chronic | ICD-10-CM

## 2024-04-25 DIAGNOSIS — E11.65 TYPE 2 DIABETES MELLITUS WITH HYPERGLYCEMIA, WITH LONG-TERM CURRENT USE OF INSULIN: Primary | Chronic | ICD-10-CM

## 2024-04-25 DIAGNOSIS — Z79.4 TYPE 2 DIABETES MELLITUS WITH HYPERGLYCEMIA, WITH LONG-TERM CURRENT USE OF INSULIN: Primary | Chronic | ICD-10-CM

## 2024-04-25 NOTE — OUTREACH NOTE
AMBULATORY CASE MANAGEMENT NOTE    Names and Relationships of Patient/Support Persons: Caller: Vernon Solorzano; Relationship: Self -     Patient Outreach    Called patient to offer CM services, wife answered phone. Patient is still asleep. Wife to have patient call ACM after he gets up.     Jelly GONZÁLES  Ambulatory Case Management    4/25/2024, 08:48 EDT

## 2024-04-25 NOTE — OUTREACH NOTE
AMBULATORY CASE MANAGEMENT NOTE    Names and Relationships of Patient/Support Persons: Caller: Maricel Solorzano; Relationship: Emergency Contact  Caller: Vernon Solorzano; Relationship: Self -     CCM Interim Update    Called patient to offer CM services. Wife states patient is still asleep, will have him call ACM after he wakes up.     Jelly GONZÁLES  Ambulatory Case Management    4/25/2024, 09:04 EDT      AMBULATORY CASE MANAGEMENT NOTE    Names and Relationships of Patient/Support Persons: Caller: Maricel Solorzano; Relationship: Emergency Contact  Caller: Vernon Solorzano; Relationship: Self -     CCM Interim Update    Spoke with patient. Introduced self and role. Discussed the purpose, goals, and expectations of the CCM program. Patient declined, states his wife manages his health conditions.       Jelly GONZÁLES  Ambulatory Case Management    4/25/2024, 11:19 EDT

## 2024-05-16 ENCOUNTER — OFFICE VISIT (OUTPATIENT)
Dept: INTERNAL MEDICINE | Facility: CLINIC | Age: 74
End: 2024-05-16
Payer: MEDICARE

## 2024-05-16 VITALS
WEIGHT: 267.4 LBS | BODY MASS INDEX: 35.44 KG/M2 | OXYGEN SATURATION: 95 % | SYSTOLIC BLOOD PRESSURE: 130 MMHG | DIASTOLIC BLOOD PRESSURE: 68 MMHG | HEIGHT: 73 IN | HEART RATE: 90 BPM

## 2024-05-16 DIAGNOSIS — I10 ESSENTIAL HYPERTENSION: Chronic | ICD-10-CM

## 2024-05-16 DIAGNOSIS — M54.50 ACUTE MIDLINE LOW BACK PAIN WITHOUT SCIATICA: ICD-10-CM

## 2024-05-16 DIAGNOSIS — I50.32 CHRONIC HEART FAILURE WITH PRESERVED EJECTION FRACTION: Chronic | ICD-10-CM

## 2024-05-16 DIAGNOSIS — N18.31 STAGE 3A CHRONIC KIDNEY DISEASE: Chronic | ICD-10-CM

## 2024-05-16 DIAGNOSIS — Z79.4 TYPE 2 DIABETES MELLITUS WITH HYPERGLYCEMIA, WITH LONG-TERM CURRENT USE OF INSULIN: Primary | Chronic | ICD-10-CM

## 2024-05-16 DIAGNOSIS — E11.65 TYPE 2 DIABETES MELLITUS WITH HYPERGLYCEMIA, WITH LONG-TERM CURRENT USE OF INSULIN: Primary | Chronic | ICD-10-CM

## 2024-05-16 DIAGNOSIS — D50.9 MICROCYTIC ANEMIA: ICD-10-CM

## 2024-05-16 DIAGNOSIS — G89.29 CHRONIC MIDLINE LOW BACK PAIN WITHOUT SCIATICA: ICD-10-CM

## 2024-05-16 DIAGNOSIS — M54.50 CHRONIC MIDLINE LOW BACK PAIN WITHOUT SCIATICA: ICD-10-CM

## 2024-05-16 DIAGNOSIS — Z79.01 ANTICOAGULATED: ICD-10-CM

## 2024-05-16 PROCEDURE — 3046F HEMOGLOBIN A1C LEVEL >9.0%: CPT | Performed by: NURSE PRACTITIONER

## 2024-05-16 PROCEDURE — 1160F RVW MEDS BY RX/DR IN RCRD: CPT | Performed by: NURSE PRACTITIONER

## 2024-05-16 PROCEDURE — 1159F MED LIST DOCD IN RCRD: CPT | Performed by: NURSE PRACTITIONER

## 2024-05-16 PROCEDURE — 3078F DIAST BP <80 MM HG: CPT | Performed by: NURSE PRACTITIONER

## 2024-05-16 PROCEDURE — 99214 OFFICE O/P EST MOD 30 MIN: CPT | Performed by: NURSE PRACTITIONER

## 2024-05-16 PROCEDURE — 3075F SYST BP GE 130 - 139MM HG: CPT | Performed by: NURSE PRACTITIONER

## 2024-05-16 PROCEDURE — G2211 COMPLEX E/M VISIT ADD ON: HCPCS | Performed by: NURSE PRACTITIONER

## 2024-05-16 PROCEDURE — 1126F AMNT PAIN NOTED NONE PRSNT: CPT | Performed by: NURSE PRACTITIONER

## 2024-05-16 RX ORDER — SEMAGLUTIDE 1.34 MG/ML
0.25 INJECTION, SOLUTION SUBCUTANEOUS WEEKLY
COMMUNITY

## 2024-05-16 NOTE — PROGRESS NOTES
Chief Complaint  Hypertension     Subjective:      History of Present Illness {CC  Problem List  Visit  Diagnosis   Encounters  Notes  Medications  Labs  Result Review Imaging  Media :23}     Vernon Solorzano presents to [facility] for:  follow up chronic conditions     Patient has Stage 3a CKD, hypertension, diabetes 2 (uncontrolled), GERD, PING (now using CPAP), asthma, HFrEF, S/P  mitral valve replacement and tricuspid valve repair (5/24/23), AF (post op - s/p cardioversion), CVA (bilateral frontal and left parietal occipital cortices: postop), post op Dressler's syndrome        His wife is with him today and gives most of medication history as she takes care of his medications.      HFrEF   Post op: EF 50 to 55%   8/1/23: EF 25.3% (decreased): pericardial effusion   12/23/23: EF 50.3%   4/23/24: EF 53.8%  resolved, proBNP had improved to 1948.   He had follow up with Dr Arango on 12/27/23   Pericardial effusion had resolved. He was advised to limit caffeine.  Last echo: cardiology had advised to decrease diuretic but renal increased due to edema.         PAF: rate has been controlled   He is only taking toprol 25 mg daily - discussed that cardiology notes show twice a day.   Per cardiology: may consider cardioversion in the future.      Pulm:   He had follow up with Dr Slater 12/22/23  Pulmonary nodules: stable but some new nodules and was advised for surveillance scan        Diabetes: Chronic  Poorly controlled: with last A1c being over 10%   LV with endocrine:   3/5/24: Ozempic started, tresiba increased to 55 units daily,   Continue Humalog 15 units 3 times daily and Jardiance 25 mg/day.     Today his wife states:   He just started ozempic last week. Tolerating.   Only taking humalog twice a day - aware it should be 3 times a day but states she isn't there during lunch.   CGM: gets message saying sensor isn't working and she has replaced several times.  I have  given her a juan 2 today to apply when they get home.  Will let me know if has further issues. Advised to place lower down on arm.      PING: now using CPAP  Getting 6 hours: midnight and then off at 6 am.   He doesn't notice any improvement with daytime fatigue.   CPAP: 6 hours a night      CKD: chronic: since 2016  Feels related to: hypertension and diabetic nephrosclerosis   5/7/24: Renal: torsemide was increased to 40 mg bid      Arterial ischemic stroke:   After mitral valve replacement and tricuspid valve repair as well as left atrial appendage closure   Expressive aphasia: has improved  He had follow up with neurology: [place] on 9/11/23    GERD:   He saw GI on 2/21/24: upper GI series ordered   Did not schedule because symptoms improved      LBP:   No known injury. Does not radiate. States only really bothers him when walking his dog. The longer he walks, more discomfort.     Elliptical 20 mins a day - no back pain when on eliptical, soa   Still cites fatigue as limiting factor for exercise. Denies CP       I have reviewed patient's medical history, any new submitted information provided by patient or medical assistant and updated medical record.      Objective:      Physical Exam  Vitals reviewed.   Constitutional:       Appearance: Normal appearance. He is well-developed.   Neck:      Thyroid: No thyromegaly.   Cardiovascular:      Rate and Rhythm: Normal rate and regular rhythm.      Pulses: Normal pulses.   Pulmonary:      Effort: Pulmonary effort is normal.      Breath sounds: Normal breath sounds.      Comments: E/U   Abdominal:      General: Bowel sounds are normal.   Musculoskeletal:      Lumbar back: No spasms, tenderness or bony tenderness.      Comments: Trace lower extremity edema, no redness, improved from last visit.    Lymphadenopathy:      Cervical: No cervical adenopathy.   Skin:     General: Skin is warm and dry.      Capillary Refill: Capillary refill takes 2 to 3 seconds.   Neurological:  "     Mental Status: He is alert and oriented to person, place, and time.   Psychiatric:         Mood and Affect: Mood normal.         Behavior: Behavior normal. Behavior is cooperative.         Thought Content: Thought content normal.         Judgment: Judgment normal.        Result Review  Data Reviewed:{ Labs  Result Review  Imaging  Med Tab  Media :23}     The following data was reviewed by: Liza Oconnell III, NP-C on 05/16/2024  Common labs          2/13/2024    12:11 4/17/2024    14:21 5/16/2024    11:56   Common Labs   Glucose 419  219  376    BUN 22  19  21    Creatinine 1.57  1.39  1.75    Sodium 136  134  139    Potassium 5.0  3.9  3.8    Chloride 98  99  95    Calcium 8.9  9.3  9.3    Total Protein 6.2      Albumin 4.2      Total Bilirubin 0.4      Alkaline Phosphatase 101      AST (SGOT) 14      ALT (SGPT) 14      WBC  8.06     Hemoglobin  11.7  12.9    Hematocrit  35.8  40.5    Platelets  158     Total Cholesterol 105      Triglycerides 191      HDL Cholesterol 31      LDL Cholesterol  43      Hemoglobin A1C 11.20   11.10    Uric Acid 4.7               Vital Signs:   /68 (BP Location: Left arm, Patient Position: Sitting, Cuff Size: Adult)   Pulse 90   Ht 185.4 cm (73\")   Wt 121 kg (267 lb 6.4 oz)   SpO2 95%   BMI 35.28 kg/m²   Estimated body mass index is 35.28 kg/m² as calculated from the following:    Height as of this encounter: 185.4 cm (73\").    Weight as of this encounter: 121 kg (267 lb 6.4 oz).        Requested Prescriptions      No prescriptions requested or ordered in this encounter       Routine medications provided by this office will also be refilled via pharmacy request.       Current Outpatient Medications:     acetaminophen (TYLENOL) 325 MG tablet, Take 2 tablets by mouth Every 6 (Six) Hours As Needed for Mild Pain., Disp: , Rfl:     albuterol sulfate  (90 Base) MCG/ACT inhaler, Inhale 2 puffs Every 4 (Four) Hours As Needed for Wheezing., Disp: , Rfl: "     apixaban (ELIQUIS) 5 MG tablet tablet, Take 1 tablet by mouth 2 (Two) Times a Day., Disp: 180 tablet, Rfl: 3    atorvastatin (LIPITOR) 40 MG tablet, Take 1 tablet by mouth Every Night., Disp: 30 tablet, Rfl: 1    calcium carbonate (TUMS) 500 MG chewable tablet, Chew 2 tablets 4 (Four) Times a Day As Needed for Indigestion or Heartburn., Disp: , Rfl:     Calcium Carbonate-Vitamin D (calcium 500 mg vitamin D 5 mcg, 200 UT,) 500-5 MG-MCG tablet per tablet, Take 1 tablet by mouth 2 (Two) Times a Day., Disp: , Rfl:     Continuous Blood Gluc Sensor (FreeStyle Ysabel 2 Sensor) misc, 1 each by Other route Every 14 (Fourteen) Days. Use 1 sensor every 14 days  Indications: Type 2 Diabetes, Disp: 6 each, Rfl: 3    empagliflozin (JARDIANCE) 25 MG tablet tablet, Take 1 tablet by mouth Daily. Indications: Type 2 Diabetes, Disp: 90 tablet, Rfl: 2    glucose blood (OneTouch Verio) test strip, 1 each by Other route As Needed. Use as instructed, Disp: , Rfl:     insulin degludec (Tresiba FlexTouch) 100 UNIT/ML solution pen-injector injection, 46 units every morning (Patient taking differently: Inject 55 Units under the skin into the appropriate area as directed Daily. 50units every morning), Disp: 15 mL, Rfl: 6    Insulin Lispro, 1 Unit Dial, (HumaLOG KwikPen) 100 UNIT/ML solution pen-injector, Inject 5 Units under the skin into the appropriate area as directed 3 (Three) Times a Day. Indications: Type 2 Diabetes (Patient taking differently: Inject 15 Units under the skin into the appropriate area as directed 2 (Two) Times a Day. Indications: Type 2 Diabetes), Disp: 14 mL, Rfl: 2    Insulin Pen Needle 32G X 4 MM misc, 1 each by Other route 3 (Three) Times a Day. Use to inject insulin under the skin via pens up to 4 times daily  Indications: Type 2 Diabetes, Disp: 300 each, Rfl: 3    losartan (Cozaar) 25 MG tablet, Take 1 tablet by mouth Daily., Disp: 30 tablet, Rfl: 6    metoprolol succinate XL (TOPROL-XL) 25 MG 24 hr tablet, Take  1 tablet by mouth 2 (Two) Times a Day. (Patient taking differently: Take 1 tablet by mouth Daily. ONLY TAKING ONCE DAILY), Disp: 180 tablet, Rfl: 2    Multiple Vitamins-Minerals (PRESERVISION AREDS 2+MULTI VIT PO), Take 1 tablet by mouth 2 (Two) Times a Day., Disp: , Rfl:     pantoprazole (PROTONIX) 40 MG EC tablet, Take 1 tablet by mouth Daily., Disp: 90 tablet, Rfl: 1    potassium chloride (K-DUR,KLOR-CON) 20 MEQ CR tablet, Take 1 tablet by mouth Daily., Disp: 30 tablet, Rfl: 1    torsemide (DEMADEX) 20 MG tablet, Take 2 tablets by mouth Daily., Disp: 180 tablet, Rfl: 3    Semaglutide,0.25 or 0.5MG/DOS, (Ozempic, 0.25 or 0.5 MG/DOSE,) 2 MG/1.5ML solution pen-injector, Inject 0.25 mg under the skin into the appropriate area as directed 1 (One) Time Per Week., Disp: , Rfl:      Assessment and Plan:      Assessment and Plan {CC Problem List  Visit Diagnosis  ROS  Review (Popup)  Health Maintenance  Quality  BestPractice  Medications  SmartSets  SnapShot Encounters  Media :23}     Diagnoses and all orders for this visit:    1. Type 2 diabetes mellitus with hyperglycemia, with long-term current use of insulin (Primary)  Overview:  5/2022: Glucose Monitor: OneTouch Verio Flex   2023: CGM: free style libre2    Renal protection: ARB  Statin: yes  Followed by endocrine: yes     Diabetic Nephrosclerosis: followed by renal     Assessment & Plan:  Diabetes is improving with treatment.   Continue current treatment regimen.  Diabetes will be reassessed in 3 months      Your last A1C (3 month average) =   Lab Results   Component Value Date    HGBA1C 11.10 (H) 05/16/2024      Diabetes is uncontrolled.   Just started ozempic.   Needs to take short acting 3 times a day.   New CGM sensor given today - discussed getting diabetes under better control.   Close follow up with endocrine.       The American Diabetes Association recommends an A1C of less than 7%.  A1C Average Levels Blood Sugar:   6%  126 mg/dL  7%  154  mg/dL  8%  183 mg/dL  9%  212 mg/dL  10%  240 mg/dL  11%  269 mg/dL  12%  298 mg/dL    Glucose goals for many adults with diabetes  Blood sugar before meals  mg/dL  Blood sugar 1-2 hours after the start of a meal Less than 180 mg/dL A1C Less than 7%    Eye Health:   You need a diabetic eye exam yearly.   Please have a copy of the note faxed to my office.   Fax: [phone]    Foot Health:   You need a diabetic foot exam yearly.   Check your feet routinely for any wounds.   You should always check your shoes for any debris that could cause a wound.        Orders:  -     Hemoglobin A1c  -     Basic Metabolic Panel    2. Microcytic anemia  -     Ferritin  -     Hemoglobin and hematocrit, blood    3. Acute midline low back pain without sciatica    4. Essential hypertension  Assessment & Plan:  Hypertension improved.   Limit sodium, work on weight loss.       5. Chronic heart failure with preserved ejection fraction  Assessment & Plan:  EF has improved.     He is compliant with CPAP      6. Anticoagulated    7. Stage 3a chronic kidney disease  Overview:  Followed by renal  Felt related to hypertension and diabetic nephrosclerosis     Assessment & Plan:  Lab Results   Component Value Date    CREATININE 1.75 (H) 05/16/2024        Avoid nephrotoxic medications - especially nsaids (like Ibuprofen, aleve)   Maintain blood pressure control  Maintain blood sugar control          8. Chronic midline low back pain without sciatica  Comments:  Would have him try back brace while walking to see if improves sx.  Not to be worn when not exercising.             No orders of the defined types were placed in this encounter.          Follow Up {Instructions Charge Capture  Follow-up Communications :23}     Return in about 4 months (around 9/16/2024).      Patient was given instructions and counseling regarding his condition or for health maintenance advice. Please see specific information pulled into the AVS if  appropriate.    Dragon disclaimer:   Much of this encounter note is an electronic transcription/translation of spoken language to printed text. The electronic translation of spoken language may permit erroneous, or at times, nonsensical words or phrases to be inadvertently transcribed; Although I have reviewed the note for such errors, some may still exist.     Additional Patient Counseling:       There are no Patient Instructions on file for this visit.

## 2024-05-17 ENCOUNTER — TELEPHONE (OUTPATIENT)
Dept: INTERNAL MEDICINE | Facility: CLINIC | Age: 74
End: 2024-05-17
Payer: MEDICARE

## 2024-05-17 LAB
BUN SERPL-MCNC: 21 MG/DL (ref 8–23)
BUN/CREAT SERPL: 12 (ref 7–25)
CALCIUM SERPL-MCNC: 9.3 MG/DL (ref 8.6–10.5)
CHLORIDE SERPL-SCNC: 95 MMOL/L (ref 98–107)
CO2 SERPL-SCNC: 27.6 MMOL/L (ref 22–29)
CREAT SERPL-MCNC: 1.75 MG/DL (ref 0.76–1.27)
EGFRCR SERPLBLD CKD-EPI 2021: 40.1 ML/MIN/1.73
FERRITIN SERPL-MCNC: 34 NG/ML (ref 30–400)
GLUCOSE SERPL-MCNC: 376 MG/DL (ref 65–99)
HBA1C MFR BLD: 11.1 % (ref 4.8–5.6)
HCT VFR BLD AUTO: 40.5 % (ref 37.5–51)
HGB BLD-MCNC: 12.9 G/DL (ref 13–17.7)
POTASSIUM SERPL-SCNC: 3.8 MMOL/L (ref 3.5–5.2)
SODIUM SERPL-SCNC: 139 MMOL/L (ref 136–145)

## 2024-05-17 NOTE — TELEPHONE ENCOUNTER
Caller: Maricel Solorzano    Relationship: Emergency Contact    Best call back number: [phone]    Who are you requesting to speak with (clinical staff, provider,  specific staff member): CLINICAL STAFF    What was the call regarding: STATED THAT THEY WENT TO GET THE PATIENT'S BACK BRACE THAT WAS PRESCRIBED BY [employer]. STATED THAT THEY WERE INFORMED THAT THEY WOULD NEED THE OFFICE NOTES, PROOF OF INSURANCE AND THE PRESCRIPTION BEFORE THEY CAN FIT THEM. PLEASE CALL AND ADVISE IF NEEDED.     [facility] FAX: [phone]

## 2024-05-20 ENCOUNTER — TELEPHONE (OUTPATIENT)
Dept: INTERNAL MEDICINE | Facility: CLINIC | Age: 74
End: 2024-05-20
Payer: MEDICARE

## 2024-05-20 PROBLEM — Z79.01 ANTICOAGULATED: Chronic | Status: ACTIVE | Noted: 2024-02-13

## 2024-05-20 NOTE — ASSESSMENT & PLAN NOTE
Diabetes is improving with treatment.   Continue current treatment regimen.  Diabetes will be reassessed in 3 months      Your last A1C (3 month average) =   Lab Results   Component Value Date    HGBA1C 11.10 (H) 05/16/2024      Diabetes is uncontrolled.   Just started ozempic.   Needs to take short acting 3 times a day.   New CGM sensor given today - discussed getting diabetes under better control.   Close follow up with endocrine.       The American Diabetes Association recommends an A1C of less than 7%.  A1C Average Levels Blood Sugar:   6%  126 mg/dL  7%  154 mg/dL  8%  183 mg/dL  9%  212 mg/dL  10%  240 mg/dL  11%  269 mg/dL  12%  298 mg/dL    Glucose goals for many adults with diabetes  Blood sugar before meals  mg/dL  Blood sugar 1-2 hours after the start of a meal Less than 180 mg/dL A1C Less than 7%    Eye Health:   You need a diabetic eye exam yearly.   Please have a copy of the note faxed to my office.   Fax: [phone]    Foot Health:   You need a diabetic foot exam yearly.   Check your feet routinely for any wounds.   You should always check your shoes for any debris that could cause a wound.

## 2024-05-20 NOTE — ASSESSMENT & PLAN NOTE
Lab Results   Component Value Date    CREATININE 1.75 (H) 05/16/2024        Avoid nephrotoxic medications - especially nsaids (like Ibuprofen, aleve)   Maintain blood pressure control  Maintain blood sugar control

## 2024-06-03 RX ORDER — METOPROLOL SUCCINATE 25 MG/1
25 TABLET, EXTENDED RELEASE ORAL 2 TIMES DAILY
Qty: 180 TABLET | Refills: 2 | Status: SHIPPED | OUTPATIENT
Start: 2024-06-03

## 2024-06-06 RX ORDER — PANTOPRAZOLE SODIUM 40 MG/1
40 TABLET, DELAYED RELEASE ORAL DAILY
Qty: 90 TABLET | Refills: 1 | Status: SHIPPED | OUTPATIENT
Start: 2024-06-06

## 2024-06-07 RX ORDER — LOSARTAN POTASSIUM 25 MG/1
25 TABLET ORAL DAILY
Qty: 30 TABLET | Refills: 6 | Status: SHIPPED | OUTPATIENT
Start: 2024-06-07

## 2024-06-07 NOTE — TELEPHONE ENCOUNTER
Rx Refill Note  Requested Prescriptions     Pending Prescriptions Disp Refills    losartan (Cozaar) 25 MG tablet 30 tablet 6     Sig: Take 1 tablet by mouth Daily.      Last office visit with prescribing clinician: 5/16/2024  Next office visit with prescribing clinician: 9/16/2024         Jessy Graves MA  06/07/24, 14:05 EDT

## 2024-06-11 ENCOUNTER — TRANSCRIBE ORDERS (OUTPATIENT)
Dept: ADMINISTRATIVE | Facility: HOSPITAL | Age: 74
End: 2024-06-11
Payer: MEDICARE

## 2024-06-11 ENCOUNTER — HOSPITAL ENCOUNTER (OUTPATIENT)
Dept: CARDIOLOGY | Facility: HOSPITAL | Age: 74
Discharge: HOME | End: 2024-06-11
Admitting: INTERNAL MEDICINE
Payer: MEDICARE

## 2024-06-11 ENCOUNTER — OFFICE VISIT (OUTPATIENT)
Dept: CARDIOLOGY | Facility: CLINIC | Age: 74
End: 2024-06-11
Payer: MEDICARE

## 2024-06-11 ENCOUNTER — TELEPHONE (OUTPATIENT)
Dept: CARDIOLOGY | Facility: CLINIC | Age: 74
End: 2024-06-11

## 2024-06-11 VITALS
WEIGHT: 269 LBS | DIASTOLIC BLOOD PRESSURE: 68 MMHG | SYSTOLIC BLOOD PRESSURE: 118 MMHG | HEART RATE: 82 BPM | HEIGHT: 73 IN | BODY MASS INDEX: 35.65 KG/M2

## 2024-06-11 DIAGNOSIS — R06.02 SOB (SHORTNESS OF BREATH): ICD-10-CM

## 2024-06-11 DIAGNOSIS — Z79.01 ANTICOAGULATED: Chronic | ICD-10-CM

## 2024-06-11 DIAGNOSIS — I12.9 HYPERTENSIVE CHRONIC KIDNEY DISEASE, UNSPECIFIED CKD STAGE: ICD-10-CM

## 2024-06-11 DIAGNOSIS — I10 ESSENTIAL HYPERTENSION: Chronic | ICD-10-CM

## 2024-06-11 DIAGNOSIS — E78.2 MIXED HYPERLIPIDEMIA: Chronic | ICD-10-CM

## 2024-06-11 DIAGNOSIS — Z95.4 H/O MITRAL VALVE REPLACEMENT WITH TISSUE GRAFT: ICD-10-CM

## 2024-06-11 DIAGNOSIS — G47.33 OSA (OBSTRUCTIVE SLEEP APNEA): Chronic | ICD-10-CM

## 2024-06-11 DIAGNOSIS — I42.8 NON-ISCHEMIC CARDIOMYOPATHY: Primary | ICD-10-CM

## 2024-06-11 DIAGNOSIS — J84.9 INTERSTITIAL PULMONARY DISEASE, UNSPECIFIED: Primary | ICD-10-CM

## 2024-06-11 DIAGNOSIS — I48.92 PAROXYSMAL ATRIAL FLUTTER: ICD-10-CM

## 2024-06-11 LAB
ALBUMIN SERPL-MCNC: 4 G/DL (ref 3.5–5.2)
ALBUMIN/GLOB SERPL: 1.4 G/DL
ALP SERPL-CCNC: 98 U/L (ref 39–117)
ALT SERPL W P-5'-P-CCNC: 16 U/L (ref 1–41)
ANION GAP SERPL CALCULATED.3IONS-SCNC: 12 MMOL/L (ref 5–15)
AST SERPL-CCNC: 10 U/L (ref 1–40)
BASOPHILS # BLD AUTO: 0.07 10*3/MM3 (ref 0–0.2)
BASOPHILS NFR BLD AUTO: 0.8 % (ref 0–1.5)
BILIRUB SERPL-MCNC: 0.4 MG/DL (ref 0–1.2)
BUN SERPL-MCNC: 22 MG/DL (ref 8–23)
BUN/CREAT SERPL: 14.6 (ref 7–25)
CALCIUM SPEC-SCNC: 9 MG/DL (ref 8.6–10.5)
CHLORIDE SERPL-SCNC: 101 MMOL/L (ref 98–107)
CO2 SERPL-SCNC: 25 MMOL/L (ref 22–29)
CREAT SERPL-MCNC: 1.51 MG/DL (ref 0.76–1.27)
DEPRECATED RDW RBC AUTO: 43.9 FL (ref 37–54)
EGFRCR SERPLBLD CKD-EPI 2021: 47.9 ML/MIN/1.73
EOSINOPHIL # BLD AUTO: 0.46 10*3/MM3 (ref 0–0.4)
EOSINOPHIL NFR BLD AUTO: 5.1 % (ref 0.3–6.2)
ERYTHROCYTE [DISTWIDTH] IN BLOOD BY AUTOMATED COUNT: 15.2 % (ref 12.3–15.4)
GLOBULIN UR ELPH-MCNC: 2.9 GM/DL
GLUCOSE SERPL-MCNC: 198 MG/DL (ref 65–99)
HCT VFR BLD AUTO: 37.3 % (ref 37.5–51)
HGB BLD-MCNC: 12.6 G/DL (ref 13–17.7)
IMM GRANULOCYTES # BLD AUTO: 0.04 10*3/MM3 (ref 0–0.05)
IMM GRANULOCYTES NFR BLD AUTO: 0.4 % (ref 0–0.5)
LYMPHOCYTES # BLD AUTO: 1.07 10*3/MM3 (ref 0.7–3.1)
LYMPHOCYTES NFR BLD AUTO: 11.9 % (ref 19.6–45.3)
MCH RBC QN AUTO: 27.3 PG (ref 26.6–33)
MCHC RBC AUTO-ENTMCNC: 33.8 G/DL (ref 31.5–35.7)
MCV RBC AUTO: 80.9 FL (ref 79–97)
MONOCYTES # BLD AUTO: 0.95 10*3/MM3 (ref 0.1–0.9)
MONOCYTES NFR BLD AUTO: 10.5 % (ref 5–12)
NEUTROPHILS NFR BLD AUTO: 6.42 10*3/MM3 (ref 1.7–7)
NEUTROPHILS NFR BLD AUTO: 71.3 % (ref 42.7–76)
NRBC BLD AUTO-RTO: 0 /100 WBC (ref 0–0.2)
NT-PROBNP SERPL-MCNC: 1124 PG/ML (ref 0–1800)
PLATELET # BLD AUTO: 157 10*3/MM3 (ref 140–450)
PMV BLD AUTO: 9.7 FL (ref 6–12)
POTASSIUM SERPL-SCNC: 4 MMOL/L (ref 3.5–5.2)
PROT SERPL-MCNC: 6.9 G/DL (ref 6–8.5)
RBC # BLD AUTO: 4.61 10*6/MM3 (ref 4.14–5.8)
SODIUM SERPL-SCNC: 138 MMOL/L (ref 136–145)
WBC NRBC COR # BLD AUTO: 9.01 10*3/MM3 (ref 3.4–10.8)

## 2024-06-11 PROCEDURE — 83880 ASSAY OF NATRIURETIC PEPTIDE: CPT | Performed by: INTERNAL MEDICINE

## 2024-06-11 PROCEDURE — 3078F DIAST BP <80 MM HG: CPT | Performed by: INTERNAL MEDICINE

## 2024-06-11 PROCEDURE — 99215 OFFICE O/P EST HI 40 MIN: CPT | Performed by: INTERNAL MEDICINE

## 2024-06-11 PROCEDURE — 93000 ELECTROCARDIOGRAM COMPLETE: CPT | Performed by: INTERNAL MEDICINE

## 2024-06-11 PROCEDURE — 36415 COLL VENOUS BLD VENIPUNCTURE: CPT

## 2024-06-11 PROCEDURE — 85025 COMPLETE CBC W/AUTO DIFF WBC: CPT | Performed by: INTERNAL MEDICINE

## 2024-06-11 PROCEDURE — 80053 COMPREHEN METABOLIC PANEL: CPT | Performed by: INTERNAL MEDICINE

## 2024-06-11 PROCEDURE — 3074F SYST BP LT 130 MM HG: CPT | Performed by: INTERNAL MEDICINE

## 2024-06-11 PROCEDURE — 1160F RVW MEDS BY RX/DR IN RCRD: CPT | Performed by: INTERNAL MEDICINE

## 2024-06-11 PROCEDURE — 1159F MED LIST DOCD IN RCRD: CPT | Performed by: INTERNAL MEDICINE

## 2024-06-11 RX ORDER — POTASSIUM CHLORIDE 750 MG/1
1 TABLET, FILM COATED, EXTENDED RELEASE ORAL DAILY
COMMUNITY
Start: 2024-05-23 | End: 2024-06-11

## 2024-06-11 NOTE — PROGRESS NOTES
Date of Office Visit: 2024  Encounter Provider: Karishma Arango MD  Place of Service: [facility] CARDIOLOGY  Patient Name: Vernon Solorzano  :1950    Chief complaint  Mitral valve disease, atrial flutter, cardiomyopathy.    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  In 2022 he developed COVID-pneumonia possible COVID pneumonitis and progressive shortness of breath with CT findings also demonstrating calcification LAD and circumflex vessel..  He was also noted to have significant pulmonary hypertension with RVSP of 60 mmHg.  By 2023 he was found to have severe mitral regurgitation with annular dilatation and moderate severe tricuspid regurgitation.  He underwent mitral valve repair with annuloplasty ring with residual mild to moderate regurgitation subsequent mitral valve replacement with 29 mm Schwartz 2 porcine prosthesis.  He also had tricuspid valve repair and left atrial appendage closure.  Preoperative cardiac cath showed normal coronary arteries.  Postoperatively he also had a pericardial effusion which slowly resolved.  He initially had LV systolic dysfunction with an ejection fraction reduced to 25% though on medical therapy and with treatment of CPAP by 2024 ejection fraction 54% with mild left ventricular hypertrophy, indeterminate diastolic function, mitral valve prosthesis in place with a mean gradient of 11 mmHg and a pressure half-time of 73 ms suggestive of hyperdynamic state or patient valve mismatch.  Right-sided chambers were not well-seen.  Aortic valve was thickened without stenosis.  His postop care has been also complicated by renal insufficiency and poor control of diabetes.     Patient continues to complain edema hands and legs.  Has had worsening shortness of breath.  He has had no palpitations dizziness or chest pain.  He is  using CPAP consistently but states his CPAP company is asking for recertification.  In addition he is walking on the elliptical 5 to 10 minutes a day but states he has dyspnea with other types of activities and just does not feel back to what he would expect to be normal.  His glucose had been quite high in the 300-400 range at various visits though they state intermittently it has been low in the 150s.  Of note his hemoglobin A1c was still around 11.  They believe blood pressure has been as it is today.  He has started Ozempic and is just taken the third dose.    Past Medical History:   Diagnosis Date    Allergic rhinitis     Arthritis     BPH (benign prostatic hyperplasia)     Diabetes mellitus     TYPE 2    Foraminal stenosis of cervical region     C5-C6    GERD (gastroesophageal reflux disease)     Hemorrhoids     History of urinary retention 2010    S/P TURP    Hyperlipidemia     Macular degeneration     PING (obstructive sleep apnea) 01/07/2016    MILD, SEES DR. AMARILIS MORGAN    Pericardial effusion, acute      Past Surgical History:   Procedure Laterality Date    CARDIAC CATHETERIZATION N/A 5/11/2023    Procedure: Right Heart Cath;  Surgeon: Corey Gaffney MD;  Location: [facility] CATH INVASIVE LOCATION;  Service: Cardiology;  Laterality: N/A;    CARDIAC CATHETERIZATION N/A 5/11/2023    Procedure: Left Heart Cath;  Surgeon: Corey Gaffney MD;  Location:  [facility] CATH INVASIVE LOCATION;  Service: Cardiology;  Laterality: N/A;    CARDIAC CATHETERIZATION N/A 5/11/2023    Procedure: Left ventriculography;  Surgeon: Corey Gaffney MD;  Location:  [facility] CATH INVASIVE LOCATION;  Service: Cardiology;  Laterality: N/A;    CARDIAC CATHETERIZATION N/A 5/11/2023    Procedure: Coronary angiography;  Surgeon: Corey Gaffney MD;  Location: [facility] CATH INVASIVE LOCATION;  Service: Cardiology;  Laterality: N/A;    COLONOSCOPY N/A     WNL PER PT, NO RECORDS,     COLONOSCOPY N/A 04/07/2009    DR. GORGE BENAVIDEZ AT  [facility]    MITRAL VALVE REPAIR/REPLACEMENT N/A 5/24/2023    Procedure: MITRAL VALVE REPAIR/REPLACEMENT TRICUSPID VALVE REPAIR/REPLACEMENT TRANSESOPHAGEAL ECHOCARDIOGRAM WITH ANESTHESIA;  Surgeon: George Frey MD;  Location: [facility];  Service: Cardiothoracic;  Laterality: N/A;    PROSTATE SURGERY N/A 11/12/2010    TURP, DR. BRIGHT COLLAZO AT [facility]    SKIN TAG REMOVAL N/A 12/20/2017    ANAL SKIN TAG X2, PERFORMED IN OFFICE, DR. LISA CHANGP / TRANSURETHRAL INCISION / DRAINAGE PROSTATE  2010    Dr. Goodrich     Outpatient Medications Prior to Visit   Medication Sig Dispense Refill    acetaminophen (TYLENOL) 325 MG tablet Take 2 tablets by mouth Every 6 (Six) Hours As Needed for Mild Pain.      apixaban (ELIQUIS) 5 MG tablet tablet Take 1 tablet by mouth 2 (Two) Times a Day. 180 tablet 3    atorvastatin (LIPITOR) 40 MG tablet Take 1 tablet by mouth Every Night. 30 tablet 1    calcium carbonate (TUMS) 500 MG chewable tablet Chew 2 tablets 4 (Four) Times a Day As Needed for Indigestion or Heartburn.      Continuous Blood Gluc Sensor (FreeStyle Ysabel 2 Sensor) misc 1 each by Other route Every 14 (Fourteen) Days. Use 1 sensor every 14 days  Indications: Type 2 Diabetes 6 each 3    empagliflozin (JARDIANCE) 25 MG tablet tablet Take 1 tablet by mouth Daily. Indications: Type 2 Diabetes 90 tablet 2    glucose blood (OneTouch Verio) test strip 1 each by Other route As Needed. Use as instructed      insulin degludec (Tresiba FlexTouch) 100 UNIT/ML solution pen-injector injection 46 units every morning (Patient taking differently: Inject 55 Units under the skin into the appropriate area as directed Daily. 50units every morning) 15 mL 6    Insulin Lispro, 1 Unit Dial, (HumaLOG KwikPen) 100 UNIT/ML solution pen-injector Inject 5 Units under the skin into the appropriate area as directed 3 (Three) Times a Day. Indications: Type 2 Diabetes (Patient taking differently: Inject 15 Units under the skin into the  appropriate area as directed 2 (Two) Times a Day. Indications: Type 2 Diabetes) 14 mL 2    Insulin Pen Needle 32G X 4 MM misc 1 each by Other route 3 (Three) Times a Day. Use to inject insulin under the skin via pens up to 4 times daily  Indications: Type 2 Diabetes 300 each 3    losartan (Cozaar) 25 MG tablet Take 1 tablet by mouth Daily. 30 tablet 6    metoprolol succinate XL (TOPROL-XL) 25 MG 24 hr tablet TAKE 1 TABLET BY MOUTH TWICE DAILY (Patient taking differently: Take 1 tablet by mouth Every Morning.) 180 tablet 2    Multiple Vitamins-Minerals (PRESERVISION AREDS 2+MULTI VIT PO) Take 1 tablet by mouth 2 (Two) Times a Day.      pantoprazole (PROTONIX) 40 MG EC tablet TAKE 1 TABLET BY MOUTH DAILY 90 tablet 1    potassium chloride (K-DUR,KLOR-CON) 20 MEQ CR tablet Take 1 tablet by mouth Daily. 30 tablet 1    Semaglutide,0.25 or 0.5MG/DOS, (Ozempic, 0.25 or 0.5 MG/DOSE,) 2 MG/1.5ML solution pen-injector Inject 0.25 mg under the skin into the appropriate area as directed 1 (One) Time Per Week.      torsemide (DEMADEX) 20 MG tablet Take 2 tablets by mouth Daily. 180 tablet 3    albuterol sulfate  (90 Base) MCG/ACT inhaler Inhale 2 puffs Every 4 (Four) Hours As Needed for Wheezing. (Patient not taking: Reported on 6/11/2024)      Calcium Carbonate-Vitamin D (calcium 500 mg vitamin D 5 mcg, 200 UT,) 500-5 MG-MCG tablet per tablet Take 1 tablet by mouth 2 (Two) Times a Day. (Patient not taking: Reported on 6/11/2024)      potassium chloride 10 MEQ CR tablet Take 1 tablet by mouth Daily. (Patient not taking: Reported on 6/11/2024)       No facility-administered medications prior to visit.       Allergies as of 06/11/2024    (No Known Allergies)     Social History     Socioeconomic History    Marital status:    Tobacco Use    Smoking status: Never     Passive exposure: Never    Smokeless tobacco: Never   Vaping Use    Vaping status: Never Used   Substance and Sexual Activity    Alcohol use: Yes      "Alcohol/week: 1.0 standard drink of alcohol     Types: 1 Drinks containing 0.5 oz of alcohol per week     Comment: RARELY    Drug use: No    Sexual activity: Defer     Partners: Female     Family History   Problem Relation Age of Onset    Lung cancer Mother     Stroke Father     Dementia Father     Hyperlipidemia Brother     Hypertension Brother     Heart disease Brother     Diabetes Brother     Colon polyps Brother     Colon polyps Brother      Review of Systems   Constitutional: Positive for malaise/fatigue. Negative for chills, fever, weight gain and weight loss.   Cardiovascular:  Positive for dyspnea on exertion and leg swelling.   Respiratory:  Negative for cough, snoring and wheezing.    Hematologic/Lymphatic: Negative for bleeding problem. Does not bruise/bleed easily.   Skin:  Negative for color change.   Musculoskeletal:  Negative for falls, joint pain and myalgias.   Gastrointestinal:  Negative for melena.   Genitourinary:  Negative for hematuria.   Neurological:  Negative for excessive daytime sleepiness.   Psychiatric/Behavioral:  Positive for memory loss. Negative for depression. The patient is not nervous/anxious.         Objective:     Vitals:    06/11/24 1301   BP: 118/68   Pulse: 82   Weight: 122 kg (269 lb)   Height: 185.4 cm (73\")     Body mass index is 35.49 kg/m².    Vitals reviewed.   Constitutional:       Appearance: Well-developed. Obese.   Eyes:      General: No scleral icterus.        Right eye: No discharge.      Conjunctiva/sclera: Conjunctivae normal.      Pupils: Pupils are equal, round, and reactive to light.   HENT:      Head: Normocephalic.      Nose: Nose normal.   Neck:      Thyroid: No thyromegaly.      Vascular: No JVD.   Pulmonary:      Effort: Pulmonary effort is normal. No respiratory distress.      Breath sounds: Normal breath sounds. Examination of the right-lower field reveals rales. Examination of the left-lower field reveals rales. No wheezing. No rales. "   Cardiovascular:      Normal rate. Frequent ectopic beats. Regular rhythm. Normal S1. Normal S2.       Murmurs: There is no murmur.      No gallop.    Pulses:     Intact distal pulses.      Carotid: 2+ bilaterally.     Radial: 2+ bilaterally.     Femoral: 2+ bilaterally.     Popliteal: 2+ bilaterally.     Dorsalis pedis: 2+ bilaterally.     Posterior tibial: 2+ bilaterally.  Edema:     Peripheral edema present.     Pretibial: 1+ edema of the left pretibial area and 2+ edema of the right pretibial area.     Ankle: 1+ edema of the left ankle and 2+ edema of the right ankle.  Abdominal:      General: Bowel sounds are normal. There is no distension.      Palpations: Abdomen is soft.      Tenderness: There is no abdominal tenderness. There is no rebound.   Musculoskeletal: Normal range of motion.         General: No tenderness.      Cervical back: Normal range of motion and neck supple. Skin:     General: Skin is warm and dry.      Findings: No erythema or rash.   Neurological:      Mental Status: Alert and oriented to person, place, and time.   Psychiatric:         Behavior: Behavior normal.         Thought Content: Thought content normal.         Judgment: Judgment normal.       Lab Review:   Lab Results - Last 18 Months   Lab Units 06/11/24  1438 05/16/24  1156 04/17/24  1421 09/14/23  1530 08/17/23  0831   WBC 10*3/mm3 9.01  --  8.06   < > 7.85   RBC 10*6/mm3 4.61  --  4.57   < > 4.41   HEMOGLOBIN g/dL 12.6* 12.9* 11.7*   < > 11.9*   HEMATOCRIT % 37.3* 40.5 35.8*   < > 36.4*   MCV fL 80.9  --  78.3*   < > 82.5   MCH pg 27.3  --  25.6*   < > 27.0   MCHC g/dL 33.8  --  32.7   < > 32.7   RDW % 15.2  --  15.4   < > 14.4   PLATELETS 10*3/mm3 157  --  158   < > 225   NEUTROPHIL % % 71.3  --   --   --  69.7   LYMPHOCYTE % % 11.9*  --   --   --  11.6*   MONOCYTES % % 10.5  --   --   --  11.3   EOSINOPHIL % % 5.1  --   --   --  6.1   BASOPHIL % % 0.8  --   --   --  0.8   NEUTROS ABS 10*3/mm3 6.42  --   --   --  5.47   LYMPHS  ABS 10*3/mm3 1.07  --   --   --  0.91   MONOS ABS 10*3/mm3 0.95*  --   --   --  0.89   EOS ABS 10*3/mm3 0.46*  --   --   --  0.48*   BASOS ABS 10*3/mm3 0.07  --   --   --  0.06   RDW-SD fl 43.9  --  42.4   < > 42.8   MPV fL 9.7  --  9.9   < > 9.5    < > = values in this interval not displayed.       Lab Results - Last 18 Months   Lab Units 06/11/24  1438 05/16/24  1156 04/17/24  1421 02/13/24  1211 09/14/23  1530 08/17/23  0831   GLUCOSE mg/dL 198* 376* 219* 419*   < > 323*   BUN mg/dL 22 21 19 22   < > 25*   CREATININE mg/dL 1.51* 1.75* 1.39* 1.57*   < > 1.50*   SODIUM mmol/L 138 139 134* 136   < > 140   POTASSIUM mmol/L 4.0 3.8 3.9 5.0   < > 3.8   CHLORIDE mmol/L 101 95* 99 98   < > 99   CO2 mmol/L 25.0 27.6 23.9 24.5   < > 27.7   CALCIUM mg/dL 9.0 9.3 9.3 8.9   < > 9.7   TOTAL PROTEIN g/dL 6.9  --   --   --   --  6.9   ALBUMIN g/dL 4.0  --   --  4.2  --  4.0   ALT (SGPT) U/L 16  --   --  14  --  19   AST (SGOT) U/L 10  --   --  14  --  21   ALK PHOS U/L 98  --   --  101  --  111   BILIRUBIN mg/dL 0.4  --   --  0.4  --  0.5   GLOBULIN gm/dL 2.9  --   --   --   --  2.9   A/G RATIO g/dL 1.4  --   --   --   --  1.4   BUN / CREAT RATIO  14.6 12.0 13.7 14.0   < > 16.7   ANION GAP mmol/L 12.0  --  11.1  --    < > 13.3   EGFR mL/min/1.73 47.9*  --  52.9*  --    < > 48.6*    < > = values in this interval not displayed.     Lab Results - Last 18 Months   Lab Units 02/13/24  1211 08/17/23  0831 05/23/23  1429   CHOLESTEROL mg/dL  --  116 155   TRIGLYCERIDES mg/dL 191* 221* 197*   HDL CHOL mg/dL 31* 34* 41   LDL CHOL mg/dL 43 47 81   VLDL CHOL mg/dL  --  35 33   VLDL CHOLESTEROL QUE mg/dL 31  --   --    LDL/HDL RATIO  1.15 1.11 1.82     Lab Results - Last 18 Months   Lab Units 06/11/24  1438 04/17/24  1421   PROBNP pg/mL 1,124.0 925.0     Lab Results - Last 18 Months   Lab Units 06/25/23  0944 06/25/23  0739   HSTROP T ng/L 95* 97*     Lab Results - Last 18 Months   Lab Units 02/13/24  1211 08/17/23  0831   TSH uIU/mL 1.760  2.320     Lab Results - Last 18 Months   Lab Units 06/04/23  1410 06/04/23  0319 06/01/23  2318 06/01/23  1555 05/25/23  0307   PROTIME Seconds  --   --   --  13.4 15.0*   APTT seconds 30.3 151.6*   < > 24.2  --     < > = values in this interval not displayed.       ECG 12 Lead    Date/Time: 6/11/2024 1:21 PM  Performed by: Karishma Arango MD    Authorized by: Karishma Arango MD  Rhythm comments: Sinus arrhythmia  Conduction: right bundle branch block, left anterior fascicular block and 1st degree AV block    Clinical impression: abnormal EKG            Diagnosis Plan   1. Non-ischemic cardiomyopathy  ECG 12 Lead    Comprehensive Metabolic Panel      2. Paroxysmal atrial flutter  ECG 12 Lead    Holter Monitor - 48 Hour      3. SOB (shortness of breath)  ECG 12 Lead    proBNP    CBC & Differential    Comprehensive Metabolic Panel      4. Essential hypertension        5. Anticoagulated        6. H/O mitral valve replacement with tissue graft        7. Hypertensive chronic kidney disease, unspecified CKD stage        8. Mixed hyperlipidemia        9. PING (obstructive sleep apnea)          Plan:       1.  Mitral valve replacement and tricuspid valve repair 5/2023.  Mean mitral valve gradient is slightly elevated at 11 mmHg.  However resting heart rate remains in the 90s.  Not been checking heart rate at home however today it is in the 80s with relatively frequent extrasystole on exam EKG showed PACs.  Will place a 48-hour rhythm control.  Hyperglycemia and renal insufficiency also complicating care.  Depending on results of testing below, may increase metoprolol further.  2.  Nonischemic cardiomyopathy with acute on chronic diastolic heart failure.  With normal EF on echo 4/2024.  Diastolic function indeterminate.  Previously Entresto was cost prohibitive.  Currently concerned about initiating this with worsening creatinine following increase in diuretic await renal labs today prior to making additional changes.  3.  Dyspnea  on exertion.  Patient was still with significant dyspnea and certainly poorly controlled diabetes and diastolic heart failure also contributing.  After patient left I found chest CT scan from 2/2024 which suggests possible sarcoid or lymphangitic carcinomatosis based on perihilar pulmonary nodules and mediastinal adenopathy which was stable.  I contacted Dr. Slater after patient left the office.  He will contact patient to arrange CT imaging and discuss further with patient.  4.  Renal insufficiency.  Diuretics were increased early May however 9 days later creatinine increased further.  Recheck labs today.  Also recommended leg elevation and use of support stockings including 20 mm Ben stockings after additional nocturnal leg elevation and further decrease and edema.  Hyperglycemia also likely contributing to progression of renal insufficiency.  Not sure how much proteinuria he has.  5.  Hypertension.  Controlled and they will start checking pressures at home to look for hypotension.  6.  Hyperlipidemia  7.  Diabetes.  Remains poorly controlled with hemoglobin A1c over 11.  Patient and wife states that they have just started Ozempic 3 weeks ago and blood sugars checked sporadically has been much lower.  He follows up with regarding this with Dr Mcneill  8.  Postop encephalopathy and encephalopathy.  Slowly improving.   9.  Edema.  Still quite significant (right greater than left).  Lasix was recently increased but renal labs had worsened.  Recheck labs today.  I also recommended leg elevation and subsequent use of support stockings.  Also continue use of CPAP.  10.  Paroxysmal atrial fibrillation.  He has had atrial appendage closure.  Extrasystole noted on exam.  Will check an 1248-hour Holter and may need to increase Toprol and discontinue losartan consider additional arrhythmias.  Continue Eliquis for now.  11.  Obstructive sleep apnea.  Patient's wife states he received a message that ongoing use of CPAP may be in  question.  Strongly urged him to follow-up with Dr. Pineda regarding this as he clearly has severe sleep apnea with hypoxia which was treated well with CPAP as noted on titration study.      Time Spent: I spent 45 minutes caring for Vernon on this date of service. This time includes time spent by me in the following activities: preparing for the visit, reviewing tests, obtaining and/or reviewing a separately obtained history, performing a medically appropriate examination and/or evaluation, counseling and educating the patient/family/caregiver, ordering medications, tests, or procedures, documenting information in the medical record, independently interpreting results and communicating that information with the patient/family/caregiver, and care coordination.   I spent 1 minutes on the separately reported service of ECG. This time is not included in the time used to support the E/M service also reported today.        Your medication list            Accurate as of June 11, 2024 11:59 PM. If you have any questions, ask your nurse or doctor.                CHANGE how you take these medications        Instructions Last Dose Given Next Dose Due   Insulin Lispro (1 Unit Dial) 100 UNIT/ML solution pen-injector  Commonly known as: HumaLOG KwikPen  What changed:   how much to take  when to take this      Inject 5 Units under the skin into the appropriate area as directed 3 (Three) Times a Day. Indications: Type 2 Diabetes       metoprolol succinate XL 25 MG 24 hr tablet  Commonly known as: TOPROL-XL  What changed: when to take this      TAKE 1 TABLET BY MOUTH TWICE DAILY       Tresiba FlexTouch 100 UNIT/ML solution pen-injector injection  Generic drug: insulin degludec  What changed:   how much to take  how to take this  when to take this  additional instructions      46 units every morning              CONTINUE taking these medications        Instructions Last Dose Given Next Dose Due   acetaminophen 325 MG  tablet  Commonly known as: TYLENOL      Take 2 tablets by mouth Every 6 (Six) Hours As Needed for Mild Pain.       apixaban 5 MG tablet tablet  Commonly known as: ELIQUIS      Take 1 tablet by mouth 2 (Two) Times a Day.       atorvastatin 40 MG tablet  Commonly known as: LIPITOR      Take 1 tablet by mouth Every Night.       calcium carbonate 500 MG chewable tablet  Commonly known as: TUMS      Chew 2 tablets 4 (Four) Times a Day As Needed for Indigestion or Heartburn.       empagliflozin 25 MG tablet tablet  Commonly known as: JARDIANCE      Take 1 tablet by mouth Daily. Indications: Type 2 Diabetes       FreeStyle Ysabel 2 Sensor misc      1 each by Other route Every 14 (Fourteen) Days. Use 1 sensor every 14 days  Indications: Type 2 Diabetes       Insulin Pen Needle 32G X 4 MM misc      1 each by Other route 3 (Three) Times a Day. Use to inject insulin under the skin via pens up to 4 times daily  Indications: Type 2 Diabetes       losartan 25 MG tablet  Commonly known as: Cozaar      Take 1 tablet by mouth Daily.       OneTouch Verio test strip  Generic drug: glucose blood      1 each by Other route As Needed. Use as instructed       Ozempic (0.25 or 0.5 MG/DOSE) 2 MG/1.5ML solution pen-injector  Generic drug: Semaglutide(0.25 or 0.5MG/DOS)      Inject 0.25 mg under the skin into the appropriate area as directed 1 (One) Time Per Week.       pantoprazole 40 MG EC tablet  Commonly known as: PROTONIX      TAKE 1 TABLET BY MOUTH DAILY       potassium chloride 20 MEQ CR tablet  Commonly known as: KLOR-CON M20      Take 1 tablet by mouth Daily.       PRESERVISION AREDS 2+MULTI VIT PO      Take 1 tablet by mouth 2 (Two) Times a Day.       torsemide 20 MG tablet  Commonly known as: DEMADEX      Take 2 tablets by mouth Daily.                Patient is no longer taking -.  I corrected the med list to reflect this.  I did not stop these medications.      Dictated utilizing Dragon dictation

## 2024-06-11 NOTE — TELEPHONE ENCOUNTER
Please let the patient and wife know that the test for heart failure was slightly higher than the prior study of April 17, 2024 though it was still normal.  It may still be underestimating the amount of fluid retention but it is definitely better than it was last August and September.  Also let them know that kidney tests are slightly improved (creatinine 1.51 and GFR 48).  Liver enzymes are normal.  Hemoglobin is stable at 12.6.    I think at this point best to stay with the same medications for now, elevate legs and await monitor results and/or Dr. Slater is recommendations after CT scan has been obtained before making more changes.  Please have him call back and let me know when he is active for the CT and follow-up with Dr. Slater

## 2024-06-12 NOTE — TELEPHONE ENCOUNTER
Results and recommendations called to pt.  Instructed to call with any further questions or concerns.  Verbalized understanding.  Pt states that he will follow up with our office after CT scan/Dr. Slater's recommendations.    Kusum Wynn RN  Triage Nurse, [facility] [place]  06/12/24 10:16 EDT

## 2024-06-18 RX ORDER — TORSEMIDE 20 MG/1
40 TABLET ORAL 2 TIMES DAILY
Qty: 360 TABLET | Refills: 1 | OUTPATIENT
Start: 2024-06-18

## 2024-06-18 RX ORDER — TORSEMIDE 20 MG/1
40 TABLET ORAL DAILY
Qty: 180 TABLET | Refills: 1 | Status: CANCELLED | OUTPATIENT
Start: 2024-06-18

## 2024-06-18 RX ORDER — TORSEMIDE 20 MG/1
40 TABLET ORAL 2 TIMES DAILY
Qty: 360 TABLET | Refills: 1 | Status: SHIPPED | OUTPATIENT
Start: 2024-06-18

## 2024-06-18 NOTE — TELEPHONE ENCOUNTER
Rx Refill Note  Requested Prescriptions     Pending Prescriptions Disp Refills    torsemide (DEMADEX) 20 MG tablet [Pharmacy Med Name: Torsemide 20 MG Oral Tablet] 360 tablet 3     Sig: TAKE 2 TABLETS BY MOUTH TWICE  DAILY      Last office visit with prescribing clinician: 5/16/2024  Next office visit with prescribing clinician: 9/16/2024         Jessy Graves MA  06/18/24, 08:43 EDT

## 2024-06-19 ENCOUNTER — TELEPHONE (OUTPATIENT)
Dept: CARDIOLOGY | Facility: CLINIC | Age: 74
End: 2024-06-19
Payer: MEDICARE

## 2024-06-19 NOTE — TELEPHONE ENCOUNTER
Please let him know that 48-hour Holter was normal.  No evidence of atrial fibrillation or atrial flutter.  No supraventricular tachycardia and only rare PACs and PVCs.  Would not make any medication changes based on this.  Continue current medications and keep July follow-up.

## 2024-06-19 NOTE — TELEPHONE ENCOUNTER
Results and recommendations called to pt.  Instructed to call with any further questions or concerns.  Verbalized understanding.    Kusum Wynn RN  Triage Nurse, [facility] [place]  06/19/24 08:49 EDT

## 2024-06-29 ENCOUNTER — HOSPITAL ENCOUNTER (OUTPATIENT)
Facility: HOSPITAL | Age: 74
Discharge: HOME | End: 2024-06-29
Payer: MEDICARE

## 2024-06-29 DIAGNOSIS — J84.9 INTERSTITIAL PULMONARY DISEASE, UNSPECIFIED: ICD-10-CM

## 2024-06-29 PROCEDURE — 71250 CT THORAX DX C-: CPT

## 2024-07-02 ENCOUNTER — PATIENT MESSAGE (OUTPATIENT)
Dept: ENDOCRINOLOGY | Age: 74
End: 2024-07-02
Payer: MEDICARE

## 2024-07-10 ENCOUNTER — ANESTHESIA (OUTPATIENT)
Dept: GASTROENTEROLOGY | Facility: HOSPITAL | Age: 74
End: 2024-07-10
Payer: MEDICARE

## 2024-07-10 ENCOUNTER — ANESTHESIA EVENT (OUTPATIENT)
Dept: GASTROENTEROLOGY | Facility: HOSPITAL | Age: 74
End: 2024-07-10
Payer: MEDICARE

## 2024-07-10 ENCOUNTER — APPOINTMENT (OUTPATIENT)
Dept: GENERAL RADIOLOGY | Facility: HOSPITAL | Age: 74
End: 2024-07-10
Payer: MEDICARE

## 2024-07-10 ENCOUNTER — HOSPITAL ENCOUNTER (OUTPATIENT)
Facility: HOSPITAL | Age: 74
Setting detail: HOSPITAL OUTPATIENT SURGERY
Discharge: HOME | End: 2024-07-10
Attending: INTERNAL MEDICINE | Admitting: INTERNAL MEDICINE
Payer: MEDICARE

## 2024-07-10 VITALS
RESPIRATION RATE: 22 BRPM | WEIGHT: 272 LBS | DIASTOLIC BLOOD PRESSURE: 84 MMHG | BODY MASS INDEX: 36.05 KG/M2 | SYSTOLIC BLOOD PRESSURE: 130 MMHG | OXYGEN SATURATION: 93 % | HEART RATE: 86 BPM | HEIGHT: 73 IN

## 2024-07-10 DIAGNOSIS — R91.8 MULTIPLE LUNG NODULES: ICD-10-CM

## 2024-07-10 DIAGNOSIS — Z79.4 TYPE 2 DIABETES MELLITUS WITHOUT COMPLICATION, WITH LONG-TERM CURRENT USE OF INSULIN: ICD-10-CM

## 2024-07-10 DIAGNOSIS — E11.9 TYPE 2 DIABETES MELLITUS WITHOUT COMPLICATION, WITH LONG-TERM CURRENT USE OF INSULIN: ICD-10-CM

## 2024-07-10 LAB
GIE STN SPEC: NORMAL
GLUCOSE BLDC GLUCOMTR-MCNC: 241 MG/DL (ref 70–130)

## 2024-07-10 PROCEDURE — 82948 REAGENT STRIP/BLOOD GLUCOSE: CPT

## 2024-07-10 PROCEDURE — 25010000002 PROPOFOL 10 MG/ML EMULSION: Performed by: REGISTERED NURSE

## 2024-07-10 PROCEDURE — 87206 SMEAR FLUORESCENT/ACID STAI: CPT | Performed by: INTERNAL MEDICINE

## 2024-07-10 PROCEDURE — 88312 SPECIAL STAINS GROUP 1: CPT | Performed by: INTERNAL MEDICINE

## 2024-07-10 PROCEDURE — 25810000003 LACTATED RINGERS PER 1000 ML: Performed by: INTERNAL MEDICINE

## 2024-07-10 PROCEDURE — 76000 FLUOROSCOPY <1 HR PHYS/QHP: CPT

## 2024-07-10 PROCEDURE — 87071 CULTURE AEROBIC QUANT OTHER: CPT | Performed by: INTERNAL MEDICINE

## 2024-07-10 PROCEDURE — 87102 FUNGUS ISOLATION CULTURE: CPT | Performed by: INTERNAL MEDICINE

## 2024-07-10 PROCEDURE — 87116 MYCOBACTERIA CULTURE: CPT | Performed by: INTERNAL MEDICINE

## 2024-07-10 PROCEDURE — 87205 SMEAR GRAM STAIN: CPT | Performed by: INTERNAL MEDICINE

## 2024-07-10 PROCEDURE — 88305 TISSUE EXAM BY PATHOLOGIST: CPT | Performed by: INTERNAL MEDICINE

## 2024-07-10 PROCEDURE — 88112 CYTOPATH CELL ENHANCE TECH: CPT | Performed by: INTERNAL MEDICINE

## 2024-07-10 RX ORDER — FENTANYL CITRATE 50 UG/ML
25 INJECTION, SOLUTION INTRAMUSCULAR; INTRAVENOUS
Status: DISCONTINUED | OUTPATIENT
Start: 2024-07-10 | End: 2024-07-10 | Stop reason: HOSPADM

## 2024-07-10 RX ORDER — LIDOCAINE HYDROCHLORIDE 20 MG/ML
INJECTION, SOLUTION INFILTRATION; PERINEURAL AS NEEDED
Status: DISCONTINUED | OUTPATIENT
Start: 2024-07-10 | End: 2024-07-10 | Stop reason: SURG

## 2024-07-10 RX ORDER — DIPHENHYDRAMINE HYDROCHLORIDE 50 MG/ML
12.5 INJECTION INTRAMUSCULAR; INTRAVENOUS
Status: DISCONTINUED | OUTPATIENT
Start: 2024-07-10 | End: 2024-07-10 | Stop reason: HOSPADM

## 2024-07-10 RX ORDER — IPRATROPIUM BROMIDE AND ALBUTEROL SULFATE 2.5; .5 MG/3ML; MG/3ML
3 SOLUTION RESPIRATORY (INHALATION) ONCE AS NEEDED
Status: DISCONTINUED | OUTPATIENT
Start: 2024-07-10 | End: 2024-07-10 | Stop reason: HOSPADM

## 2024-07-10 RX ORDER — PROPOFOL 10 MG/ML
VIAL (ML) INTRAVENOUS AS NEEDED
Status: DISCONTINUED | OUTPATIENT
Start: 2024-07-10 | End: 2024-07-10 | Stop reason: SURG

## 2024-07-10 RX ORDER — DROPERIDOL 2.5 MG/ML
0.62 INJECTION, SOLUTION INTRAMUSCULAR; INTRAVENOUS
Status: DISCONTINUED | OUTPATIENT
Start: 2024-07-10 | End: 2024-07-10 | Stop reason: HOSPADM

## 2024-07-10 RX ORDER — HYDROMORPHONE HYDROCHLORIDE 2 MG/ML
0.25 INJECTION, SOLUTION INTRAMUSCULAR; INTRAVENOUS; SUBCUTANEOUS
Status: DISCONTINUED | OUTPATIENT
Start: 2024-07-10 | End: 2024-07-10 | Stop reason: HOSPADM

## 2024-07-10 RX ORDER — LIDOCAINE HYDROCHLORIDE 10 MG/ML
INJECTION, SOLUTION EPIDURAL; INFILTRATION; INTRACAUDAL; PERINEURAL AS NEEDED
Status: DISCONTINUED | OUTPATIENT
Start: 2024-07-10 | End: 2024-07-10 | Stop reason: HOSPADM

## 2024-07-10 RX ORDER — FLUMAZENIL 0.1 MG/ML
0.2 INJECTION INTRAVENOUS AS NEEDED
Status: DISCONTINUED | OUTPATIENT
Start: 2024-07-10 | End: 2024-07-10 | Stop reason: HOSPADM

## 2024-07-10 RX ORDER — PROMETHAZINE HYDROCHLORIDE 25 MG/1
25 TABLET ORAL ONCE AS NEEDED
Status: DISCONTINUED | OUTPATIENT
Start: 2024-07-10 | End: 2024-07-10 | Stop reason: HOSPADM

## 2024-07-10 RX ORDER — PROMETHAZINE HYDROCHLORIDE 25 MG/1
25 SUPPOSITORY RECTAL ONCE AS NEEDED
Status: DISCONTINUED | OUTPATIENT
Start: 2024-07-10 | End: 2024-07-10 | Stop reason: HOSPADM

## 2024-07-10 RX ORDER — ONDANSETRON 2 MG/ML
4 INJECTION INTRAMUSCULAR; INTRAVENOUS ONCE AS NEEDED
Status: DISCONTINUED | OUTPATIENT
Start: 2024-07-10 | End: 2024-07-10 | Stop reason: HOSPADM

## 2024-07-10 RX ORDER — LABETALOL HYDROCHLORIDE 5 MG/ML
5 INJECTION, SOLUTION INTRAVENOUS
Status: DISCONTINUED | OUTPATIENT
Start: 2024-07-10 | End: 2024-07-10 | Stop reason: HOSPADM

## 2024-07-10 RX ORDER — SODIUM CHLORIDE, SODIUM LACTATE, POTASSIUM CHLORIDE, CALCIUM CHLORIDE 600; 310; 30; 20 MG/100ML; MG/100ML; MG/100ML; MG/100ML
30 INJECTION, SOLUTION INTRAVENOUS CONTINUOUS PRN
Status: DISCONTINUED | OUTPATIENT
Start: 2024-07-10 | End: 2024-07-10 | Stop reason: HOSPADM

## 2024-07-10 RX ORDER — NALOXONE HCL 0.4 MG/ML
0.2 VIAL (ML) INJECTION AS NEEDED
Status: DISCONTINUED | OUTPATIENT
Start: 2024-07-10 | End: 2024-07-10 | Stop reason: HOSPADM

## 2024-07-10 RX ORDER — HYDROCODONE BITARTRATE AND ACETAMINOPHEN 7.5; 325 MG/1; MG/1
1 TABLET ORAL EVERY 4 HOURS PRN
Status: DISCONTINUED | OUTPATIENT
Start: 2024-07-10 | End: 2024-07-10 | Stop reason: HOSPADM

## 2024-07-10 RX ORDER — HYDRALAZINE HYDROCHLORIDE 20 MG/ML
5 INJECTION INTRAMUSCULAR; INTRAVENOUS
Status: DISCONTINUED | OUTPATIENT
Start: 2024-07-10 | End: 2024-07-10 | Stop reason: HOSPADM

## 2024-07-10 RX ORDER — HYDROCODONE BITARTRATE AND ACETAMINOPHEN 5; 325 MG/1; MG/1
1 TABLET ORAL ONCE AS NEEDED
Status: DISCONTINUED | OUTPATIENT
Start: 2024-07-10 | End: 2024-07-10 | Stop reason: HOSPADM

## 2024-07-10 RX ORDER — LIDOCAINE HYDROCHLORIDE 20 MG/ML
INJECTION, SOLUTION EPIDURAL; INFILTRATION; INTRACAUDAL; PERINEURAL AS NEEDED
Status: DISCONTINUED | OUTPATIENT
Start: 2024-07-10 | End: 2024-07-10 | Stop reason: HOSPADM

## 2024-07-10 RX ORDER — SODIUM CHLORIDE 0.9 % (FLUSH) 0.9 %
10 SYRINGE (ML) INJECTION EVERY 12 HOURS SCHEDULED
Status: DISCONTINUED | OUTPATIENT
Start: 2024-07-10 | End: 2024-07-10 | Stop reason: HOSPADM

## 2024-07-10 RX ORDER — EPHEDRINE SULFATE 50 MG/ML
5 INJECTION, SOLUTION INTRAVENOUS ONCE AS NEEDED
Status: DISCONTINUED | OUTPATIENT
Start: 2024-07-10 | End: 2024-07-10 | Stop reason: HOSPADM

## 2024-07-10 RX ADMIN — SODIUM CHLORIDE, POTASSIUM CHLORIDE, SODIUM LACTATE AND CALCIUM CHLORIDE 30 ML/HR: 600; 310; 30; 20 INJECTION, SOLUTION INTRAVENOUS at 10:53

## 2024-07-10 RX ADMIN — PROPOFOL 180 MCG/KG/MIN: 10 INJECTION, EMULSION INTRAVENOUS at 11:42

## 2024-07-10 RX ADMIN — LIDOCAINE HYDROCHLORIDE 100 MG: 20 INJECTION, SOLUTION INFILTRATION; PERINEURAL at 11:40

## 2024-07-10 RX ADMIN — PROPOFOL 200 MG: 10 INJECTION, EMULSION INTRAVENOUS at 11:40

## 2024-07-10 NOTE — ANESTHESIA PREPROCEDURE EVALUATION
Anesthesia Evaluation                  Airway   Mallampati: III  Possible difficult intubation  Dental      Pulmonary    (+) asthma,sleep apnea  Cardiovascular     ECG reviewed  PT is on anticoagulation therapy  Patient on routine beta blocker  Rhythm: regular  Rate: normal    (+) hypertension, valvular problems/murmurs, dysrhythmias Paroxysmal Atrial Fib, hyperlipidemia      Neuro/Psych  (+) CVA  GI/Hepatic/Renal/Endo    (+) morbid obesity, GERD, renal disease- CRI, diabetes mellitus type 2    Musculoskeletal     Abdominal    Substance History      OB/GYN          Other   arthritis,                 Anesthesia Plan    ASA 3     general     (I have reviewed the patient's history with the patient and the chart, including all pertinent laboratory results and imaging. I have explained the risks of anesthesia including but not limited to dental damage, corneal abrasion, nerve injury, MI, stroke, and death. Questions asked and answered. Anesthetic plan discussed with patient and team as indicated. Patient expressed understanding of the above.    )  intravenous induction     Anesthetic plan, risks, benefits, and alternatives have been provided, discussed and informed consent has been obtained with: patient.    CODE STATUS:

## 2024-07-10 NOTE — ANESTHESIA PROCEDURE NOTES
Airway  Urgency: elective    Date/Time: 7/10/2024 11:41 AM  Airway not difficult    General Information and Staff    Patient location during procedure: OR  CRNA/CAA: Barbara Hernandez CRNA    Indications and Patient Condition  Indications for airway management: airway protection    Preoxygenated: yes  MILS maintained throughout  Mask difficulty assessment: 0 - not attempted    Final Airway Details  Final airway type: supraglottic airway      Successful airway: LMA  Size 5     Number of attempts at approach: 1  Assessment: lips, teeth, and gum same as pre-op and atraumatic intubation    Additional Comments  Atraumatic insertion

## 2024-07-10 NOTE — ANESTHESIA POSTPROCEDURE EVALUATION
Patient: Vernon Solorzano    Procedure Summary       Date: 07/10/24 Room / Location: [facility] ENDOSCOPY 7 / [facility] ENDOSCOPY    Anesthesia Start: 1134 Anesthesia Stop: 1221    Procedure: BRONCHOSCOPY WITH C-ARM with biopsies and washing (Bronchus) Diagnosis:     Surgeons: Luis Slater Jr., MD Provider: Nicolas Carmona MD    Anesthesia Type: general ASA Status: 3            Anesthesia Type: general    Vitals  Vitals Value Taken Time   /84 07/10/24 1243   Temp     Pulse 87 07/10/24 1247   Resp 22 07/10/24 1243   SpO2 93 % 07/10/24 1246   Vitals shown include unfiled device data.        Post Anesthesia Care and Evaluation    Patient location during evaluation: PACU  Patient participation: complete - patient participated  Level of consciousness: awake and alert  Pain management: adequate    Airway patency: patent  Anesthetic complications: No anesthetic complications    Cardiovascular status: acceptable  Respiratory status: acceptable  Hydration status: acceptable    Comments: --------------------            07/10/24               1243     --------------------   BP:       130/84     Pulse:      86       Resp:       22       SpO2:      93%      --------------------

## 2024-07-10 NOTE — H&P
History and physical this is addendum to my office H&P that is attached from 7/5/2024  Patient here for bronchoscopy with progressive reticular nodular changes particularly nodules and sort of a perilymphatic distribution suggestive of sarcoidosis.  Plan is for diagnostic biopsies patient has been holding his Eliquis for over 24 hours now.  There is no change in his history his chest is clear on exam his heart is regular he is alert and has consented to the procedure.

## 2024-07-10 NOTE — DISCHARGE INSTRUCTIONS
Will very likely run a mild fever today, 99 to 100 is expected. Please use tylenol today. If fever reaches 101 please call  at [phone]

## 2024-07-11 LAB
LAB AP CASE REPORT: NORMAL
LAB AP DIAGNOSIS COMMENT: NORMAL
LAB AP INTRADEPARTMENTAL CONSULT: NORMAL
LAB AP SPECIAL STAINS: NORMAL
PATH REPORT.FINAL DX SPEC: NORMAL
PATH REPORT.GROSS SPEC: NORMAL

## 2024-07-12 LAB
BACTERIA SPEC AEROBE CULT: NORMAL
CYTO UR: NORMAL
GRAM STN SPEC: NORMAL
LAB AP CASE REPORT: NORMAL
LAB AP CLINICAL INFORMATION: NORMAL
LAB AP DIAGNOSIS COMMENT: NORMAL
PATH REPORT.FINAL DX SPEC: NORMAL
PATH REPORT.GROSS SPEC: NORMAL

## 2024-07-17 LAB
FUNGUS WND CULT: NORMAL
MYCOBACTERIUM SPEC CULT: NORMAL
MYCOBACTERIUM SPEC CULT: NORMAL
NIGHT BLUE STAIN TISS: NORMAL
NIGHT BLUE STAIN TISS: NORMAL

## 2024-07-22 LAB — FUNGUS WND CULT: ABNORMAL

## 2024-07-23 NOTE — TELEPHONE ENCOUNTER
Spoke with patient's wife.  Ozempic was discontinued 1 month ago because of his concerns of intermittent choking and abdominal bloating.  Jardiance was stopped 3 weeks ago because of increased urinary frequency.  Patient is also on torsemide for nonischemic CHF.  Patient's wife was advised to discuss about Jardiance which is being used mainly for his heart failure.  We can use insulin alone for diabetes control.    Patient is being on Tresiba 60 units every morning and Humalog 16 units at breakfast and 16 units at supper.  Advised patient's wife to get sensor downloaded for reevaluation.  Advised wife to make a follow-up appointment.

## 2024-08-04 LAB — FUNGUS WND CULT: ABNORMAL

## 2024-08-09 ENCOUNTER — TRANSCRIBE ORDERS (OUTPATIENT)
Dept: ADMINISTRATIVE | Facility: HOSPITAL | Age: 74
End: 2024-08-09
Payer: MEDICARE

## 2024-08-09 DIAGNOSIS — R13.10 DYSPHAGIA, UNSPECIFIED TYPE: Primary | ICD-10-CM

## 2024-08-14 ENCOUNTER — TRANSCRIBE ORDERS (OUTPATIENT)
Dept: CARDIAC REHAB | Facility: HOSPITAL | Age: 74
End: 2024-08-14
Payer: MEDICARE

## 2024-08-14 DIAGNOSIS — J98.4 RESTRICTIVE LUNG DISEASE: Primary | ICD-10-CM

## 2024-08-14 LAB
MYCOBACTERIUM SPEC CULT: NORMAL
MYCOBACTERIUM SPEC CULT: NORMAL
NIGHT BLUE STAIN TISS: NORMAL
NIGHT BLUE STAIN TISS: NORMAL

## 2024-08-26 RX ORDER — INSULIN DEGLUDEC 100 U/ML
INJECTION, SOLUTION SUBCUTANEOUS
Qty: 15 ML | Refills: 6 | OUTPATIENT
Start: 2024-08-26

## 2024-08-26 NOTE — TELEPHONE ENCOUNTER
Rx Refill Note  Requested Prescriptions     Pending Prescriptions Disp Refills    insulin degludec (Tresiba FlexTouch) 100 UNIT/ML solution pen-injector injection [Pharmacy Med Name: TRESIBA FLEXTOUCH PEN (U-100)[id number]] 15 mL 6     Sig: ADMINISTER 46 UNITS UNDER THE SKIN EVERY MORNING      Last office visit with prescribing clinician: 3/5/2024   Last telemedicine visit with prescribing clinician: Visit date not found   Next office visit with prescribing clinician: Visit date not found                         Would you like a call back once the refill request has been completed: [] Yes [] No    If the office needs to give you a call back, can they leave a voicemail: [] Yes [] No    Wandy Rodrigues MA  08/26/24, 08:05 EDT

## 2024-09-06 ENCOUNTER — OFFICE VISIT (OUTPATIENT)
Dept: CARDIAC REHAB | Facility: HOSPITAL | Age: 74
End: 2024-09-06
Payer: MEDICARE

## 2024-09-06 VITALS
WEIGHT: 273.4 LBS | DIASTOLIC BLOOD PRESSURE: 82 MMHG | HEART RATE: 91 BPM | SYSTOLIC BLOOD PRESSURE: 124 MMHG | OXYGEN SATURATION: 94 % | BODY MASS INDEX: 36.23 KG/M2 | HEIGHT: 73 IN

## 2024-09-06 DIAGNOSIS — J98.4 RESTRICTIVE LUNG DISEASE: Primary | ICD-10-CM

## 2024-09-06 PROCEDURE — G0238 OTH RESP PROC, INDIV: HCPCS

## 2024-09-06 NOTE — PROGRESS NOTES
"Pulmonary Rehab Initial Assessment      Name: Vernon Solorzano  :1950 Allergies:Patient has no known allergies.   MRN: [id number] 73 y.o. Physician: Liza Oconnell III NPSolangeC   Primary Diagnosis:    Diagnosis Plan   1. Restrictive lung disease         Event Date: 2024 Specialist: Robin Slater MD   Secondary Diagnosis: Lung nodule  Note Author: Vickie Patel RN     Cardiovascular History: Hyperlipidemia, HTN, CHF, non-rheumatic mitral valve regurgitation, mitral valve disease, atrial Fib, hx/of mitral valve replacement      EXERCISE AT HOME  no  N/a  N/A          Ambulatory Status:Independent  Ambulatory Fall Risk Assessed on Initial Visit: yes 6 Minute Walk Pre- Pulmonary Rehab:  Distance:1180 ft      RPE:2        RPD: 2              MPH: 2.2  Max. HR: 89        SPO2:96    MET: 2.7  Resting BP: 124/82     Peak BP: 120/76  Recovery BP: 118/64  Comments: Pt tolerated exercise well. No complaints noted. Sp02 was 96-98% with exercise. Walk test done on Nu-Step due to falls risk.       NUTRITION  Lipids:yes If yes, labs as follows;  Total: No components found for: \"CHOLESTEROL\"  HDL:   HDL Cholesterol   Date Value Ref Range Status   2024 31 (L) 40 - 60 mg/dL Final   2023 34 (L) 40 - 60 mg/dL Final    Lipids continued:  LDL:  LDL Chol Calc ([facility])   Date Value Ref Range Status   2024 43 0 - 100 mg/dL Final     Triglyceride: No components found for: \"TRIGLYCERIDE\"   Weight Management:                 Weight: 273.4lbs  Height: 73inches                                   BMI: Body mass index is 36.07 kg/m².  Waist Circumference: n/a inches   Alcohol Use: social drinker-rare Diabetes:Yes,  Monitors BS at home- yes, Frequency: continually, Random BS: Mica II glucose monitor     Last HGBA1C with date if applicable:No components found for: \"A1C\"         SOCIAL HISTORY  Social History     Socioeconomic History    Marital status:    Tobacco Use    Smoking status: Never     " Passive exposure: Never    Smokeless tobacco: Never   Vaping Use    Vaping status: Never Used   Substance and Sexual Activity    Alcohol use: Yes     Alcohol/week: 1.0 standard drink of alcohol     Types: 1 Drinks containing 0.5 oz of alcohol per week     Comment: RARELY    Drug use: No    Sexual activity: Defer     Partners: Female    Learning Barriers:Ready to Learn, Cognitive  Family Support:yes  Living Arrangement: lives with their spouse Tobacco Adjunct:no  Do you live with a smoker: no     PSYCHOSOCIAL  Clinical Depression: no    Stress: yes- health related     Assess presence or absence of depression using a valid screening tool: no      Assessment:Pt is alert and oriented X3           Are you being hurt, hit, or freightened by anyone at home or in your life? no    Are you being neglected by a caregiver? No Shoulder flexibility/Range of motion: Below average     Recommended arm activity: Any    Chair sit and reach within: 4 inches   Leg flexibility: Average    Leg Strength/Balance/Five times sit to stand: 20 seconds.   Pt was able to do 2 sit to stands with extreme effort    Recommended stretching: Chair   Balance: Fall Risk Assessment: Lungs clear bilat, regular heart rate and rhythm noted.     Family attends [place]: yes      COMORBIDITIES  Sleep Apnea: yes If yes, Choose: CPAP    Cancer: no    Stroke: yes   Pneumonia: no If yes, how many times n/a    Osteoporosis: no    GI Problems: yes- GERD Frequent colds/allergies: no    Other illnesses, surgeries, or comments Stage 3a chronic kidney disease, amenia, arthritis, macular degeneration, pericardial effusion, TURP, foraminal stenosis of cervical region.    PAIN:  Are you having pain? no  If yes where is pain? N/a If yes, pain scale:  n/a      PULMONARY:  Do you use a nebulizer?: no   If yes, n/a    Do you use oxygen at home?: no  If yes, amount n/a    With rest: no  With activity: no Do you have a daily cough?: yes  If yes, choose: productive of clear  sputum    Do you every notice yourself wheezing?: no  If yes, when: n/a Other pulmonary/breathing problems?: no   OTHER:  Do you have physical limitations?: yes  If yes, Pt is falls risk due to storke    Do you need assistance with ADLs?: yes  If yes, cooking, cleaning, washing clothes, shopping Do you climb stairs at home?:yes  If yes, how many times daily    Have you ever attended a pulmonary rehab?: no  If yes, n/a [facility] Dyspnea Scale: 0 - 4:  3 = stops for breath after walking about 100 yards or after a few minutes on the level     Patient Goals: Pt would like to return to his normal activity. He would like to work in the garden again. Pt would like to work in the yard. He would like to go fishing again.     DISCHARGE PLANNING:  Do you have any home exercise equipment?: yes-seated elliptical    What are you plans for continuing exercise after completion of pulmonary rehab? Pt plans to continue his exercise at home     EDUCATION:  Pursed - lip breathing, Diaphragmatic breathing, Relaxation techniques, and Program information folder       PRE-PROGRAM ASSESSMENT:  PFT Date: 7-5-2024    FEV1/FVC: 70 FEV1: 62    FVC: 60 DLCO: 82     Time of arrival: 10:00    Time of departure: 11:30           9/6/2024  10:27 EDT  Vickie Patel RN

## 2024-09-09 ENCOUNTER — HOSPITAL ENCOUNTER (OUTPATIENT)
Dept: GENERAL RADIOLOGY | Facility: HOSPITAL | Age: 74
Discharge: HOME | End: 2024-09-09
Admitting: INTERNAL MEDICINE
Payer: MEDICARE

## 2024-09-09 DIAGNOSIS — R13.10 DYSPHAGIA, UNSPECIFIED TYPE: ICD-10-CM

## 2024-09-09 PROCEDURE — 92611 MOTION FLUOROSCOPY/SWALLOW: CPT

## 2024-09-09 PROCEDURE — A9270 NON-COVERED ITEM OR SERVICE: HCPCS | Performed by: INTERNAL MEDICINE

## 2024-09-09 PROCEDURE — 63710000001 BARIUM SULFATE 40 % RECONSTITUTED SUSPENSION: Performed by: INTERNAL MEDICINE

## 2024-09-09 PROCEDURE — 63710000001 BARIUM SULFATE 98 % RECONSTITUTED SUSPENSION: Performed by: INTERNAL MEDICINE

## 2024-09-09 PROCEDURE — 63710000001 BARIUM SULFATE 60 % CREAM: Performed by: INTERNAL MEDICINE

## 2024-09-09 PROCEDURE — 63710000001 BARIUM SULFATE 40 % SUSPENSION: Performed by: INTERNAL MEDICINE

## 2024-09-09 PROCEDURE — 74230 X-RAY XM SWLNG FUNCJ C+: CPT

## 2024-09-09 RX ADMIN — BARIUM SULFATE 1 TEASPOON(S): 0.6 CREAM ORAL at 14:45

## 2024-09-09 RX ADMIN — BARIUM SULFATE 50 ML: 400 SUSPENSION ORAL at 14:46

## 2024-09-09 RX ADMIN — BARIUM SULFATE 135 ML: 980 POWDER, FOR SUSPENSION ORAL at 14:46

## 2024-09-09 RX ADMIN — BARIUM SULFATE 250 ML: 0.81 POWDER, FOR SUSPENSION ORAL at 14:45

## 2024-09-09 NOTE — MBS/VFSS/FEES
Outpatient Speech Language Pathology   Adult Swallow Initial Evaluation  [facility]     Patient Name: Vernon Solorzano  : 1950  MRN: [id number]  Today's Date: 2024         Visit Date: 2024   Patient Active Problem List   Diagnosis    DM (diabetes mellitus), type 2    Mixed hyperlipidemia    Mild intermittent asthma without complication    Nonrheumatic mitral valve regurgitation    Essential hypertension    Mitral valve disease    Acute ischemic left MCA stroke    History of ischemic stroke    Obese    Physical debility    Chronic heart failure with preserved ejection fraction    Anemia    Stage 3a chronic kidney disease    Persistent atrial fibrillation    H/O mitral valve replacement with tissue graft    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 AMARILIS MORGAN    Pericardial effusion, acute         Past Surgical History:   Procedure Laterality Date    BRONCHOSCOPY N/A 7/10/2024    Procedure: BRONCHOSCOPY WITH C-ARM with biopsies and washing;  Surgeon: Luis Slater Jr., MD;  Location: [facility] ENDOSCOPY;  Service: Pulmonary;  Laterality: N/A;  pre-possible sarcoidosis; multiple nodules  post-multiple nodules    CARDIAC CATHETERIZATION N/A 2023    Procedure: Right Heart Cath;  Surgeon: Corey Gaffney MD;  Location: [facility] CATH INVASIVE LOCATION;  Service: Cardiology;  Laterality: N/A;    CARDIAC CATHETERIZATION N/A 2023     Procedure: Left Heart Cath;  Surgeon: Corey Gaffney MD;  Location:  [facility] CATH INVASIVE LOCATION;  Service: Cardiology;  Laterality: N/A;    CARDIAC CATHETERIZATION N/A 5/11/2023    Procedure: Left ventriculography;  Surgeon: Corey Gaffney MD;  Location:  [facility] CATH INVASIVE LOCATION;  Service: Cardiology;  Laterality: N/A;    CARDIAC CATHETERIZATION N/A 5/11/2023    Procedure: Coronary angiography;  Surgeon: Corey Gaffney MD;  Location:  [facility] CATH INVASIVE LOCATION;  Service: Cardiology;  Laterality: N/A;    COLONOSCOPY N/A     WNL PER PT, NO RECORDS,     COLONOSCOPY N/A 04/07/2009    DR. GORGE BENAVIDEZ AT [place]    MITRAL VALVE REPAIR/REPLACEMENT N/A 5/24/2023    Procedure: MITRAL VALVE REPAIR/REPLACEMENT TRICUSPID VALVE REPAIR/REPLACEMENT TRANSESOPHAGEAL ECHOCARDIOGRAM WITH ANESTHESIA;  Surgeon: George Frey MD;  Location: [facility];  Service: Cardiothoracic;  Laterality: N/A;    PROSTATE SURGERY N/A 11/12/2010    TURP, DR. COREY COLLAZO AT [facility]    SKIN TAG REMOVAL N/A 12/20/2017    ANAL SKIN TAG X2, PERFORMED IN OFFICE, DR. LISA ORANTES    TURP / TRANSURETHRAL INCISION / DRAINAGE PROSTATE  2010    Dr. Goodrich         Visit Dx:     ICD-10-CM ICD-9-CM   1. Dysphagia, unspecified type  R13.10 787.20            OP SLP Assessment/Plan - 09/09/24 1527          SLP Assessment    Functional Problems Swallowing  -BB    Clinical Impression: Swallowing Mild:;oropharyngeal phase dysphagia  -BB              User Key  (r) = Recorded By, (t) = Taken By, (c) = Cosigned By      Initials Name Provider Type    BB Ervin Bermudez, SLP Speech and Language Pathologist                     SLP Adult Swallow Evaluation       Row Name 09/09/24 1400       Rehab Evaluation    Document Type evaluation  -BB    Subjective Information no complaints  -BB    Patient Observations alert;cooperative;agree to therapy  -BB    Patient Effort good  -BB    Symptoms Noted During/After Treatment none  -BB       General  "Information    Patient Profile Reviewed yes  -BB    Pertinent History Of 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 Pt repors that pulm referred him to rule out aspiration given findings on pulmonary study. Last VFSS on 6/8/23, which showed: \"mild oropharyngeal dysphagia.\" Regular solids (NO MIXED) with thin liquids via cup only was recommended. Pt received dysphagia therapy after CVA last year.  -BB    Current Method of Nutrition regular textures;thin liquids  -BB    Precautions/Limitations, Vision WFL with corrective lenses;for purposes of eval  -BB    Precautions/Limitations, Hearing WFL;for purposes of eval  -BB    Prior Level of Function-Communication cognitive-linguistic impairment  -BB    Prior Level of Function-Swallowing no diet consistency restrictions  -BB    Plans/Goals Discussed with patient;agreed upon  -BB    Barriers to Rehab none identified  -BB    Patient's Goals for Discharge --  rule out aspiration  -BB       Pain    Additional Documentation Pain Scale: FACES Pre/Post-Treatment (Group)  -BB       Pain Scale: FACES Pre/Post-Treatment    Pain: FACES Scale, Pretreatment 0-->no hurt  -BB    Posttreatment Pain Rating 0-->no hurt  -BB       Oral Motor Structure and Function    Dentition Assessment natural, present and adequate  -BB    Secretion Management WNL/WFL  -BB       General Eating/Swallowing Observations    Respiratory Support Currently in Use room air  -BB    Eating/Swallowing Skills self-fed  -BB    Positioning During Eating upright in chair  -BB       MBS/VFSS    Utensils Used spoon;cup;straw  -BB    Consistencies Trialed regular textures;soft to chew textures;chopped;mixed consistency;pureed;thin liquids;nectar/syrup-thick liquids;honey-thick liquids  -BB       MBS/VFSS Interpretation    VFSS Summary Oral phase most notable for delayed swallow reaction time with thin liquids. (eg, 0.7 seconds w thin via tsp. Expected is </=0.577seconds). "     Pharyngeal phase most notable for laryngeal penetration (PAS 3) with thin sequential cup sips (before and during the swallow) and (PAS 3 or 4) with nectar thick liquids via cup sip (before the swallow). Also, notable for reduced tongue-based retraction to posterior pharyngeal wall and reduced pharygneal stripping wave contributing to mild, diffuse post-swallow residue. primarily at the vallecular space (<10%).     Penetration Aspiration Scale (PAS).   PAS 1 (Material does not enter airway): thin tsp x2, bolus hold, thin single sip cup, honey thick liquids via tsp, puree, soft/bite sized (mixed), regular.  PAS 2 (Material enters the airway, remains above the vocal folds, and is ejected from the airway): thin straw sequential, nectar tsp.   PAS 3 (Material enters the airway, remains above the vocal folds, and is not ejected from the airway): (possible PAS 4) nectar cup.      Cursory view of the esophagus not completed 2/2 logistical reasons not related to impairment.  -BB      Oral Phase, Comment VFSS results largely stable compared to previous VFSS 6/8/23 (improvement with thin liquids and mixed textures noted, per report. Slight change with nectar thick liquids).  -BB       MBSImP Score    MBSImP Score Completed? Yes  -BB    Materials presented per Standard Protocol? No  -BB    How was standard protocol modified? A-P not obtained;Other (comment)  esophageal sweep not obtained  -BB       Oral Impairment Domain    Component 1- Lip Closure --  not tested  -BB    Component 2- Tongue Control During Bolus Hold 1: Escape to lateral buccal cavity/floor of mouth (FOM)  -BB    Component 3- Bolus Prep/Mastication 0: Timely and efficient chewing and mashing  -BB    Component 4- Bolus Transport/Lingual Motion 0: Brisk tongue motion  -BB    Component 5- Oral Residue 2: Residue collection on oral structures  -BB    Component 6- Initiation of Pharyngeal Swallow 3: Bolus head in pyriforms  -BB       Pharyngeal Impairment Domain     Component 7- Soft Palate Elevation 0: No bolus between soft palate (SP)/pharyngeal wall (PW)  -BB    Component 8- Laryngeal Elevation 1: Partial superior movement of thyroid cartilage/partial approximation of arytenoids to epiglottic petiole  -BB    Component 9- Anterior Hyoid Excursion 0: Complete anterior movement  -BB    Component 10- Epiglottic Movement 0: Complete inversion  -BB    Component 11- Laryngeal Vestibular Closure- Height of Swallow 1: Incomplete- narrow column air/contrast in laryngeal vestibule  -BB    Component 12- Pharyngeal Stripping Wave 1: Present- diminished  -BB    Component 13- Pharyngeal Contraction (A/P View Only) --  not tested  -BB    Component 14- PE Segment Opening 0: Complete distension and complete duration- no obstruction of flow  -BB    Component 15- Tongue Base Retraction 3: Wide column of contrast or air between TB and PW  -BB    Component 16- Pharyngeal Residue 2: Collection of residue within or on pharyngeal structures  -BB    Component 16 Location Diffuse (>3 areas);Vallecula  -BB       Esophageal Impairment Domain    Component 17- Esophageal Clearance (Upright Position) --  not tested  -BB       SLP Communication to Radiology    Severity Level of Dysphagia mild dysphagia  per DIGEST score  -BB    Summary Statement Radiologist, Dr Montalvo, present. Mild oropharyngeal dysphagia. Laryngeal penetration with sequential sips of thin liquids and single cup sip of nectar thick liquids. No arlyn tracheal aspiration observed.  -BB       SLP Evaluation Clinical Impression    SLP Swallowing Diagnosis mild;oral dysphagia;pharyngeal dysphagia  -BB       Recommendations    SLP Diet Recommendation regular textures;thin liquids  -BB    Recommended Precautions and Strategies upright posture during/after eating;no straw;small bites of food and sips of liquid;general aspiration precautions;reflux precautions  -BB    Oral Care Recommendations Oral Care BID/PRN;Toothbrush  -BB    SLP Rec. for  "Method of Medication Administration meds whole;with puree;as tolerated  -BB    Monitor for Signs of Aspiration notify SLP if any concerns  -BB    Anticipated Discharge Disposition (SLP) home with assist  -BB    Demonstrates Need for Referral to Another Service otolaryngology (ENT);gastroenterology  Suggest consider referral for consult for \"respiratory\" reflux (i.e., LPR, \"silent\" reflux, GERD).  -BB              User Key  (r) = Recorded By, (t) = Taken By, (c) = Cosigned By      Initials Name Provider Type    Ervin Temple SLP Speech and Language Pathologist                                   OP SLP Education       Row Name 09/09/24 1527       Education    Education Provided Described results of evaluation;Patient expressed understanding of evaluation  -BB    Assessed Learning needs;Learning motivation;Learning preferences;Learning readiness  -ZUHAIR    Learning Motivation Strong  -ZUHAIR    Learning Method Explanation  -BB    Teaching Response Verbalized understanding;Reinforcement needed  -BB              User Key  (r) = Recorded By, (t) = Taken By, (c) = Cosigned By      Initials Name Effective Dates    Ervin Temple SLP 02/19/23 -                                Time Calculation:   SLP Start Time: 1400  SLP Stop Time: 1535  SLP Time Calculation (min): 95 min  Untimed Charges  SLP Eval/Re-eval : ST Motion Fluoro Eval Swallow - 80255  94508-TP Motion Fluoro Eval Swallow Minutes: 95  Total Minutes  Untimed Charges Total Minutes: 95   Total Minutes: 95    Therapy Charges for Today       Code Description Service Date Service Provider Modifiers Qty    [id number]  ST MOTION FLUORO EVAL SWALLOW 6 9/9/2024 Ervin Bermudez SLP GN 1                     VIKI Rodriguez  9/9/2024  "

## 2024-09-10 ENCOUNTER — TREATMENT (OUTPATIENT)
Dept: CARDIAC REHAB | Facility: HOSPITAL | Age: 74
End: 2024-09-10
Payer: MEDICARE

## 2024-09-10 DIAGNOSIS — J98.4 RESTRICTIVE LUNG DISEASE: Primary | ICD-10-CM

## 2024-09-10 PROCEDURE — G0238 OTH RESP PROC, INDIV: HCPCS

## 2024-09-12 ENCOUNTER — TREATMENT (OUTPATIENT)
Dept: CARDIAC REHAB | Facility: HOSPITAL | Age: 74
End: 2024-09-12
Payer: MEDICARE

## 2024-09-12 DIAGNOSIS — J98.4 RESTRICTIVE LUNG DISEASE: Primary | ICD-10-CM

## 2024-09-12 PROCEDURE — G0238 OTH RESP PROC, INDIV: HCPCS

## 2024-09-17 ENCOUNTER — TREATMENT (OUTPATIENT)
Dept: CARDIAC REHAB | Facility: HOSPITAL | Age: 74
End: 2024-09-17
Payer: MEDICARE

## 2024-09-17 DIAGNOSIS — J98.4 RESTRICTIVE LUNG DISEASE: Primary | ICD-10-CM

## 2024-09-17 PROCEDURE — G0238 OTH RESP PROC, INDIV: HCPCS

## 2024-09-19 ENCOUNTER — TREATMENT (OUTPATIENT)
Dept: CARDIAC REHAB | Facility: HOSPITAL | Age: 74
End: 2024-09-19
Payer: MEDICARE

## 2024-09-19 DIAGNOSIS — J98.4 RESTRICTIVE LUNG DISEASE: Primary | ICD-10-CM

## 2024-09-19 PROCEDURE — G0238 OTH RESP PROC, INDIV: HCPCS

## 2024-09-23 ENCOUNTER — OFFICE VISIT (OUTPATIENT)
Dept: INTERNAL MEDICINE | Facility: CLINIC | Age: 74
End: 2024-09-23
Payer: MEDICARE

## 2024-09-23 VITALS
WEIGHT: 273.4 LBS | BODY MASS INDEX: 36.23 KG/M2 | DIASTOLIC BLOOD PRESSURE: 80 MMHG | SYSTOLIC BLOOD PRESSURE: 135 MMHG | OXYGEN SATURATION: 97 % | HEIGHT: 73 IN | HEART RATE: 92 BPM

## 2024-09-23 DIAGNOSIS — Z79.4 TYPE 2 DIABETES MELLITUS WITH HYPERGLYCEMIA, WITH LONG-TERM CURRENT USE OF INSULIN: Chronic | ICD-10-CM

## 2024-09-23 DIAGNOSIS — F32.9 REACTIVE DEPRESSION: Primary | ICD-10-CM

## 2024-09-23 DIAGNOSIS — G47.33 OSA (OBSTRUCTIVE SLEEP APNEA): Chronic | ICD-10-CM

## 2024-09-23 DIAGNOSIS — E78.2 MIXED HYPERLIPIDEMIA: Chronic | ICD-10-CM

## 2024-09-23 DIAGNOSIS — N18.31 STAGE 3A CHRONIC KIDNEY DISEASE: Chronic | ICD-10-CM

## 2024-09-23 DIAGNOSIS — Z79.01 ANTICOAGULATED: Chronic | ICD-10-CM

## 2024-09-23 DIAGNOSIS — I50.32 CHRONIC HEART FAILURE WITH PRESERVED EJECTION FRACTION: Chronic | ICD-10-CM

## 2024-09-23 DIAGNOSIS — E11.65 TYPE 2 DIABETES MELLITUS WITH HYPERGLYCEMIA, WITH LONG-TERM CURRENT USE OF INSULIN: Chronic | ICD-10-CM

## 2024-09-23 DIAGNOSIS — I10 ESSENTIAL HYPERTENSION: Chronic | ICD-10-CM

## 2024-09-23 PROCEDURE — 1160F RVW MEDS BY RX/DR IN RCRD: CPT | Performed by: NURSE PRACTITIONER

## 2024-09-23 PROCEDURE — 3079F DIAST BP 80-89 MM HG: CPT | Performed by: NURSE PRACTITIONER

## 2024-09-23 PROCEDURE — 99214 OFFICE O/P EST MOD 30 MIN: CPT | Performed by: NURSE PRACTITIONER

## 2024-09-23 PROCEDURE — 1159F MED LIST DOCD IN RCRD: CPT | Performed by: NURSE PRACTITIONER

## 2024-09-23 PROCEDURE — 3046F HEMOGLOBIN A1C LEVEL >9.0%: CPT | Performed by: NURSE PRACTITIONER

## 2024-09-23 PROCEDURE — 3075F SYST BP GE 130 - 139MM HG: CPT | Performed by: NURSE PRACTITIONER

## 2024-09-23 PROCEDURE — 1126F AMNT PAIN NOTED NONE PRSNT: CPT | Performed by: NURSE PRACTITIONER

## 2024-09-23 RX ORDER — SERTRALINE HYDROCHLORIDE 25 MG/1
25 TABLET, FILM COATED ORAL DAILY
Qty: 30 TABLET | Refills: 2 | Status: SHIPPED | OUTPATIENT
Start: 2024-09-23

## 2024-09-24 ENCOUNTER — TREATMENT (OUTPATIENT)
Dept: CARDIAC REHAB | Facility: HOSPITAL | Age: 74
End: 2024-09-24
Payer: MEDICARE

## 2024-09-24 DIAGNOSIS — J98.4 RESTRICTIVE LUNG DISEASE: Primary | ICD-10-CM

## 2024-09-24 PROCEDURE — G0238 OTH RESP PROC, INDIV: HCPCS

## 2024-09-25 PROBLEM — F32.9 REACTIVE DEPRESSION: Status: ACTIVE | Noted: 2024-09-25

## 2024-09-26 ENCOUNTER — TREATMENT (OUTPATIENT)
Dept: CARDIAC REHAB | Facility: HOSPITAL | Age: 74
End: 2024-09-26
Payer: MEDICARE

## 2024-09-26 DIAGNOSIS — J98.4 RESTRICTIVE LUNG DISEASE: Primary | ICD-10-CM

## 2024-09-26 PROCEDURE — G0238 OTH RESP PROC, INDIV: HCPCS

## 2024-10-01 ENCOUNTER — TREATMENT (OUTPATIENT)
Dept: CARDIAC REHAB | Facility: HOSPITAL | Age: 74
End: 2024-10-01
Payer: MEDICARE

## 2024-10-01 DIAGNOSIS — J98.4 RESTRICTIVE LUNG DISEASE: Primary | ICD-10-CM

## 2024-10-01 PROCEDURE — G0238 OTH RESP PROC, INDIV: HCPCS

## 2024-10-03 ENCOUNTER — TREATMENT (OUTPATIENT)
Dept: CARDIAC REHAB | Facility: HOSPITAL | Age: 74
End: 2024-10-03
Payer: MEDICARE

## 2024-10-03 DIAGNOSIS — J98.4 RESTRICTIVE LUNG DISEASE: Primary | ICD-10-CM

## 2024-10-03 PROCEDURE — G0238 OTH RESP PROC, INDIV: HCPCS

## 2024-10-07 ENCOUNTER — TELEPHONE (OUTPATIENT)
Dept: CARDIOLOGY | Facility: CLINIC | Age: 74
End: 2024-10-07
Payer: MEDICARE

## 2024-10-07 NOTE — TELEPHONE ENCOUNTER
Maricel Solorzano reached out today who is on Pt's ZACH, she left message to have pt scheduled for f/u appointment with Dr. Arango.     Scheduling, please reach out to pt's wife [phone] and schedule pt. Thank you         no shortness of breath/no cough

## 2024-10-08 ENCOUNTER — TREATMENT (OUTPATIENT)
Dept: CARDIAC REHAB | Facility: HOSPITAL | Age: 74
End: 2024-10-08
Payer: MEDICARE

## 2024-10-08 ENCOUNTER — TELEPHONE (OUTPATIENT)
Dept: CARDIOLOGY | Facility: CLINIC | Age: 74
End: 2024-10-08

## 2024-10-08 DIAGNOSIS — J98.4 RESTRICTIVE LUNG DISEASE: Primary | ICD-10-CM

## 2024-10-08 PROCEDURE — G0238 OTH RESP PROC, INDIV: HCPCS

## 2024-10-08 NOTE — TELEPHONE ENCOUNTER
Caller: Maricel Solorzano    Relationship to patient: Emergency Contact    Best call back number: [phone]    Type of visit: FU    Requested date:ASAP    If rescheduling, when is the original appointment: 11.14.24    Additional notes:HUB UNABLE TO SCHEDULE WITHIN 48 HOUR TIME FRAME THERE ARE AVAILABLE APPOINTMENTS WITH DR MATIAS ON 10.10.24 PLEASE ADVISE.

## 2024-10-10 ENCOUNTER — TREATMENT (OUTPATIENT)
Dept: CARDIAC REHAB | Facility: HOSPITAL | Age: 74
End: 2024-10-10
Payer: MEDICARE

## 2024-10-10 DIAGNOSIS — J98.4 RESTRICTIVE LUNG DISEASE: Primary | ICD-10-CM

## 2024-10-10 PROCEDURE — G0238 OTH RESP PROC, INDIV: HCPCS

## 2024-10-15 ENCOUNTER — TREATMENT (OUTPATIENT)
Dept: CARDIAC REHAB | Facility: HOSPITAL | Age: 74
End: 2024-10-15
Payer: MEDICARE

## 2024-10-15 DIAGNOSIS — J98.4 RESTRICTIVE LUNG DISEASE: Primary | ICD-10-CM

## 2024-10-15 PROCEDURE — G0238 OTH RESP PROC, INDIV: HCPCS

## 2024-10-16 ENCOUNTER — LAB (OUTPATIENT)
Dept: LAB | Facility: HOSPITAL | Age: 74
End: 2024-10-16
Payer: MEDICARE

## 2024-10-16 ENCOUNTER — OFFICE VISIT (OUTPATIENT)
Dept: CARDIOLOGY | Facility: CLINIC | Age: 74
End: 2024-10-16
Payer: MEDICARE

## 2024-10-16 VITALS
OXYGEN SATURATION: 99 % | RESPIRATION RATE: 20 BRPM | DIASTOLIC BLOOD PRESSURE: 78 MMHG | HEART RATE: 86 BPM | HEIGHT: 73 IN | WEIGHT: 281 LBS | SYSTOLIC BLOOD PRESSURE: 124 MMHG | BODY MASS INDEX: 37.24 KG/M2

## 2024-10-16 DIAGNOSIS — R06.09 DOE (DYSPNEA ON EXERTION): ICD-10-CM

## 2024-10-16 DIAGNOSIS — R06.09 DOE (DYSPNEA ON EXERTION): Primary | ICD-10-CM

## 2024-10-16 LAB — NT-PROBNP SERPL-MCNC: 3078 PG/ML (ref 0–1800)

## 2024-10-16 PROCEDURE — 36415 COLL VENOUS BLD VENIPUNCTURE: CPT

## 2024-10-16 PROCEDURE — 83880 ASSAY OF NATRIURETIC PEPTIDE: CPT

## 2024-10-16 RX ORDER — OMEPRAZOLE 40 MG/1
40 CAPSULE, DELAYED RELEASE ORAL DAILY
COMMUNITY
Start: 2024-10-12

## 2024-10-16 RX ORDER — BUDESONIDE AND FORMOTEROL FUMARATE DIHYDRATE 160; 4.5 UG/1; UG/1
2 AEROSOL RESPIRATORY (INHALATION) 2 TIMES DAILY
COMMUNITY
Start: 2024-10-14

## 2024-10-16 RX ORDER — PHENOL 1.4 %
600 AEROSOL, SPRAY (ML) MUCOUS MEMBRANE DAILY
COMMUNITY

## 2024-10-16 RX ORDER — METOPROLOL SUCCINATE 25 MG/1
25 TABLET, EXTENDED RELEASE ORAL 2 TIMES DAILY
Qty: 180 TABLET | Refills: 2 | Status: SHIPPED | OUTPATIENT
Start: 2024-10-16

## 2024-10-16 RX ORDER — ALBUTEROL SULFATE 90 UG/1
2 INHALANT RESPIRATORY (INHALATION) EVERY 4 HOURS PRN
COMMUNITY
Start: 2024-10-08

## 2024-10-16 NOTE — PROGRESS NOTES
Date of Office Visit: 10/16/2024  Encounter Provider: Karishma Arango MD  Place of Service: [facility] CARDIOLOGY  Patient Name: Vernon Solorzano  :1950    Chief complaint  Mitral valve disease, atrial flutter, cardiomyopathy.    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  In 2022 he developed COVID-pneumonia possible COVID pneumonitis and progressive shortness of breath with CT findings also demonstrating calcification LAD and circumflex vessel..  He was also noted to have significant pulmonary hypertension with RVSP of 60 mmHg.  By 2023 he was found to have severe mitral regurgitation with annular dilatation and moderate severe tricuspid regurgitation.  He underwent mitral valve repair with annuloplasty ring with residual mild to moderate regurgitation subsequent mitral valve replacement with 29 mm Schwartz 2 porcine prosthesis.  He also had tricuspid valve repair and left atrial appendage closure.  Preoperative cardiac cath showed normal coronary arteries.  Postoperatively he also had a pericardial effusion which slowly resolved.  He initially had LV systolic dysfunction with an ejection fraction reduced to 25% though on medical therapy and with treatment of CPAP by 2024 ejection fraction 54% with mild left ventricular hypertrophy, indeterminate diastolic function, mitral valve prosthesis in place with a mean gradient of 11 mmHg and a pressure half-time of 73 ms suggestive of hyperdynamic state or patient valve mismatch.  Right-sided chambers were not well-seen.  Aortic valve was thickened without stenosis.  His postop care has been also complicated by renal insufficiency and poor control of diabetes.     Since last visit patient is using CPAP religiously.  Patient denies any palpitations dizziness chest pain.  However edema and shortness of breath are worse.   He he is trying to do physical therapy and pulmonary rehab twice a week.  When ambulating from the chair to the examining table he is quite short of breath with decreased mobility.  Blood pressure has been as it is today.  Blood sugars been in the 200 range.    Past Medical History:   Diagnosis Date    Allergic rhinitis     Arthritis     BPH (benign prostatic hyperplasia)     Diabetes mellitus     TYPE 2    Foraminal stenosis of cervical region     C5-C6    GERD (gastroesophageal reflux disease)     Hemorrhoids     History of urinary retention 2010    S/P TURP    Hyperlipidemia     Macular degeneration     PING (obstructive sleep apnea) 01/07/2016    MILD, SEES DR. AMARILIS MORGAN    Pericardial effusion, acute      Past Surgical History:   Procedure Laterality Date    BRONCHOSCOPY N/A 7/10/2024    Procedure: BRONCHOSCOPY WITH C-ARM with biopsies and washing;  Surgeon: Luis Slater Jr., MD;  [facility];  Service: Pulmonary;  Laterality: N/A;  pre-possible sarcoidosis; multiple nodules  post-multiple nodules    CARDIAC CATHETERIZATION N/A 5/11/2023    Procedure: Right Heart Cath;  Surgeon: Corey Gaffney MD;  Location:  [facility] CATH INVASIVE [facility];  Service: Cardiology;  Laterality: N/A;    CARDIAC CATHETERIZATION N/A 5/11/2023    Procedure: Left Heart Cath;  Surgeon: Corey Gaffney MD;  Location:  [facility]IVE [facility];  Service: Cardiology;  Laterality: N/A;    CARDIAC CATHETERIZATION N/A 5/11/2023    Procedure: Left ventriculography;  Surgeon: Corey Gaffney MD;  Location:  [facility] CATH [facility]IVE [facility];  Service: Cardiology;  Laterality: N/A;    CARDIAC CATHETERIZATION N/A 5/11/2023    Procedure: Coronary angiography;  Surgeon: Corey Gaffney MD;  [facility];  Service: Cardiology;  Laterality: N/A;    COLONOSCOPY N/A     WNL PER PT, NO RECORDS,     COLONOSCOPY N/A 04/07/2009    DR. GORGE BENAVIDEZ AT [place]    MITRAL VALVE REPAIR/REPLACEMENT  N/A 5/24/2023    Procedure: MITRAL VALVE REPAIR/REPLACEMENT TRICUSPID VALVE REPAIR/REPLACEMENT TRANSESOPHAGEAL ECHOCARDIOGRAM WITH ANESTHESIA;  Surgeon: George Frey MD;  Location: [facility];  Service: Cardiothoracic;  Laterality: N/A;    PROSTATE SURGERY N/A 11/12/2010    TURP, DR. BRIGHT COLLAZO AT [facility]    SKIN TAG REMOVAL N/A 12/20/2017    ANAL SKIN TAG X2, PERFORMED IN OFFICE, DR. LISA CHANGP / TRANSURETHRAL INCISION / DRAINAGE PROSTATE  2010    Dr. Goodrich     Outpatient Medications Prior to Visit   Medication Sig Dispense Refill    acetaminophen (TYLENOL) 325 MG tablet Take 2 tablets by mouth Every 6 (Six) Hours As Needed for Mild Pain.      albuterol sulfate  (90 Base) MCG/ACT inhaler Inhale 2 puffs Every 4 (Four) Hours As Needed. (Patient taking differently: Inhale 2 puffs Every 4 (Four) Hours As Needed for Wheezing or Shortness of Air.)      apixaban (ELIQUIS) 5 MG tablet tablet Take 1 tablet by mouth 2 (Two) Times a Day. 180 tablet 3    atorvastatin (LIPITOR) 40 MG tablet Take 1 tablet by mouth Every Night. 30 tablet 1    calcium carbonate (OS-QUE) 600 MG tablet Take 1 tablet by mouth Daily.      calcium carbonate (TUMS) 500 MG chewable tablet Chew 2 tablets 4 (Four) Times a Day As Needed for Indigestion or Heartburn.      Continuous Blood Gluc Sensor (FreeStyle Ysabel 2 Sensor) misc 1 each by Other route Every 14 (Fourteen) Days. Use 1 sensor every 14 days  Indications: Type 2 Diabetes 6 each 3    glucose blood (OneTouch Verio) test strip 1 each by Other route As Needed. Use as instructed      insulin degludec (Tresiba FlexTouch) 100 UNIT/ML solution pen-injector injection 46 units every morning (Patient taking differently: Inject 55 Units under the skin into the appropriate area as directed Daily. 50units every morning) 15 mL 6    Insulin Lispro, 1 Unit Dial, (HumaLOG KwikPen) 100 UNIT/ML solution pen-injector Inject 5 Units under the skin into the appropriate area as directed  3 (Three) Times a Day. Indications: Type 2 Diabetes 14 mL 2    Insulin Pen Needle 32G X 4 MM misc 1 each by Other route 3 (Three) Times a Day. Use to inject insulin under the skin via pens up to 4 times daily  Indications: Type 2 Diabetes 300 each 3    losartan (Cozaar) 25 MG tablet Take 1 tablet by mouth Daily. 30 tablet 6    Multiple Vitamins-Minerals (PRESERVISION AREDS 2+MULTI VIT PO) Take 1 tablet by mouth 2 (Two) Times a Day.      omeprazole (priLOSEC) 40 MG capsule Take 1 capsule by mouth Daily.      potassium chloride (K-DUR,KLOR-CON) 20 MEQ CR tablet Take 1 tablet by mouth Daily. 30 tablet 1    sertraline (Zoloft) 25 MG tablet Take 1 tablet by mouth Daily. 30 tablet 2    torsemide (DEMADEX) 20 MG tablet Take 2 tablets by mouth 2 (Two) Times a Day. 360 tablet 1    metoprolol succinate XL (TOPROL-XL) 25 MG 24 hr tablet TAKE 1 TABLET BY MOUTH TWICE DAILY (Patient taking differently: Take 1 tablet by mouth Daily.) 180 tablet 2    budesonide-formoterol (SYMBICORT) 160-4.5 MCG/ACT inhaler Inhale 2 puffs 2 (Two) Times a Day. (Patient not taking: Reported on 10/16/2024)      pantoprazole (PROTONIX) 40 MG EC tablet TAKE 1 TABLET BY MOUTH DAILY (Patient not taking: Reported on 10/16/2024) 90 tablet 1     No facility-administered medications prior to visit.       Allergies as of 10/16/2024    (No Known Allergies)     Social History     Socioeconomic History    Marital status:    Tobacco Use    Smoking status: Never     Passive exposure: Never    Smokeless tobacco: Never   Vaping Use    Vaping status: Never Used   Substance and Sexual Activity    Alcohol use: Yes     Alcohol/week: 1.0 standard drink of alcohol     Types: 1 Drinks containing 0.5 oz of alcohol per week     Comment: RARELY    Drug use: No    Sexual activity: Defer     Partners: Female     Family History   Problem Relation Age of Onset    Lung cancer Mother     Stroke Father     Dementia Father     Hyperlipidemia Brother     Hypertension Brother      "Heart disease Brother     Diabetes Brother     Colon polyps Brother     Colon polyps Brother     Malig Hyperthermia Neg Hx      Review of Systems   Constitutional: Negative for chills, fever, weight gain and weight loss.   Cardiovascular:  Positive for dyspnea on exertion and leg swelling.   Respiratory:  Negative for cough, snoring and wheezing.    Hematologic/Lymphatic: Negative for bleeding problem. Does not bruise/bleed easily.   Skin:  Negative for color change.   Musculoskeletal:  Negative for falls, joint pain and myalgias.   Gastrointestinal:  Negative for melena.   Genitourinary:  Negative for hematuria.   Neurological:  Negative for excessive daytime sleepiness.   Psychiatric/Behavioral:  Negative for depression. The patient is not nervous/anxious.         Objective:     Vitals:    10/16/24 1207   BP: 124/78   Pulse: 86   Resp: 20   SpO2: 99%   Weight: 127 kg (281 lb)   Height: 185.4 cm (73\")     Body mass index is 37.07 kg/m².    Vitals reviewed.   Constitutional:       Appearance: Well-developed. Obese.   Eyes:      General: No scleral icterus.        Right eye: No discharge.      Conjunctiva/sclera: Conjunctivae normal.      Pupils: Pupils are equal, round, and reactive to light.   HENT:      Head: Normocephalic.      Nose: Nose normal.   Neck:      Thyroid: No thyromegaly.      Vascular: No JVD.   Pulmonary:      Effort: Pulmonary effort is normal. No respiratory distress.      Breath sounds: Normal breath sounds. No wheezing. No rales.   Cardiovascular:      Normal rate. Regular rhythm. Normal S1. Normal S2.       Murmurs: There is a grade 1to 2/6 systolic murmur at the LLSB.      No gallop.    Pulses:     Carotid: 2+ bilaterally.     Radial: 2+ bilaterally.  Edema:     Peripheral edema present.     Pretibial: trace edema of the left pretibial area and 2+ edema of the right pretibial area.     Ankle: 1+ edema of the left ankle and 2+ edema of the right ankle.  Abdominal:      General: Bowel sounds are " normal. There is no distension.      Palpations: Abdomen is soft.      Tenderness: There is no abdominal tenderness. There is no rebound.   Musculoskeletal: Normal range of motion.         General: No tenderness.      Cervical back: Normal range of motion and neck supple. Skin:     General: Skin is warm and dry.      Findings: No erythema or rash.   Neurological:      Mental Status: Alert and oriented to person, place, and time.   Psychiatric:         Behavior: Behavior normal.         Thought Content: Thought content normal.         Judgment: Judgment normal.       Lab Review:   Lab Results - Last 18 Months   Lab Units 06/11/24  1438 05/16/24  1156 04/17/24  1421 09/14/23  1530 08/17/23  0831   WBC 10*3/mm3 9.01  --  8.06   < > 7.85   RBC 10*6/mm3 4.61  --  4.57   < > 4.41   HEMOGLOBIN g/dL 12.6* 12.9* 11.7*   < > 11.9*   HEMATOCRIT % 37.3* 40.5 35.8*   < > 36.4*   MCV fL 80.9  --  78.3*   < > 82.5   MCH pg 27.3  --  25.6*   < > 27.0   MCHC g/dL 33.8  --  32.7   < > 32.7   RDW % 15.2  --  15.4   < > 14.4   PLATELETS 10*3/mm3 157  --  158   < > 225   NEUTROPHIL % % 71.3  --   --   --  69.7   LYMPHOCYTE % % 11.9*  --   --   --  11.6*   MONOCYTES % % 10.5  --   --   --  11.3   EOSINOPHIL % % 5.1  --   --   --  6.1   BASOPHIL % % 0.8  --   --   --  0.8   NEUTROS ABS 10*3/mm3 6.42  --   --   --  5.47   LYMPHS ABS 10*3/mm3 1.07  --   --   --  0.91   MONOS ABS 10*3/mm3 0.95*  --   --   --  0.89   EOS ABS 10*3/mm3 0.46*  --   --   --  0.48*   BASOS ABS 10*3/mm3 0.07  --   --   --  0.06   RDW-SD fl 43.9  --  42.4   < > 42.8   MPV fL 9.7  --  9.9   < > 9.5    < > = values in this interval not displayed.     Lab Results - Last 18 Months   Lab Units 06/11/24  1438 05/16/24  1156 04/17/24  1421 02/13/24  1211 09/14/23  1530 08/17/23  0831   GLUCOSE mg/dL 198* 376* 219* 419*   < > 323*   BUN mg/dL 22 21 19 22   < > 25*   CREATININE mg/dL 1.51* 1.75* 1.39* 1.57*   < > 1.50*   SODIUM mmol/L 138 139 134* 136   < > 140   POTASSIUM  mmol/L 4.0 3.8 3.9 5.0   < > 3.8   CHLORIDE mmol/L 101 95* 99 98   < > 99   CO2 mmol/L 25.0 27.6 23.9 24.5   < > 27.7   CALCIUM mg/dL 9.0 9.3 9.3 8.9   < > 9.7   TOTAL PROTEIN g/dL 6.9  --   --   --   --  6.9   ALBUMIN g/dL 4.0  --   --  4.2  --  4.0   ALT (SGPT) U/L 16  --   --  14  --  19   AST (SGOT) U/L 10  --   --  14  --  21   ALK PHOS U/L 98  --   --  101  --  111   BILIRUBIN mg/dL 0.4  --   --  0.4  --  0.5   GLOBULIN gm/dL 2.9  --   --   --   --  2.9   A/G RATIO g/dL 1.4  --   --   --   --  1.4   BUN / CREAT RATIO  14.6 12.0 13.7 14.0   < > 16.7   ANION GAP mmol/L 12.0  --  11.1  --    < > 13.3   EGFR mL/min/1.73 47.9*  --  52.9*  --    < > 48.6*    < > = values in this interval not displayed.     Lab Results - Last 18 Months   Lab Units 02/13/24  1211 08/17/23  0831 05/23/23  1429   CHOLESTEROL mg/dL  --  116 155   TRIGLYCERIDES mg/dL 191* 221* 197*   HDL CHOL mg/dL 31* 34* 41   LDL CHOL mg/dL 43 47 81   VLDL CHOL mg/dL  --  35 33   VLDL CHOLESTEROL QUE mg/dL 31  --   --    LDL/HDL RATIO  1.15 1.11 1.82     Lab Results - Last 18 Months   Lab Units 10/16/24  1352 06/11/24  1438   PROBNP pg/mL 3,078.0* 1,124.0     Lab Results - Last 18 Months   Lab Units 06/25/23  0944 06/25/23  0739   HSTROP T ng/L 95* 97*     Lab Results - Last 18 Months   Lab Units 02/13/24  1211 08/17/23  0831   TSH uIU/mL 1.760 2.320     Lab Results - Last 18 Months   Lab Units 06/04/23  1410 06/04/23  0319 06/01/23  2318 06/01/23  1555 05/25/23  0307   PROTIME Seconds  --   --   --  13.4 15.0*   APTT seconds 30.3 151.6*   < > 24.2  --     < > = values in this interval not displayed.       ECG 12 Lead    Date/Time: 10/16/2024 8:47 PM  Performed by: Karishma Arango MD    Authorized by: Karishma Arango MD  Comparison: compared with previous ECG   Rhythm: sinus rhythm  Conduction: right bundle branch block and 1st degree AV block  Other findings: left ventricular hypertrophy    Clinical impression: abnormal EKG        Assessment:       Diagnosis  Plan   1. ZUÑIGA (dyspnea on exertion)  Adult Transthoracic Echo Complete W/ Cont if Necessary Per Protocol    proBNP    ECG 12 Lead        Plan:     1.  Mitral valve replacement and tricuspid valve repair 5/2023.  Echo 5/2024 with slightly increased mitral valve mean gradient consistent with hyperdynamic state.  Heart rate is still elevated.  He may have some degree of functional mitral valve stenosis.  Will check a proBNP and increase metoprolol to 25 mg twice daily.  Will recheck echo next week.  Not appear overtly wet though has dependent edema likely due to venous insufficiency.  2.  Nonischemic cardiomyopathy with acute on chronic diastolic heart failure.  With normal EF on echo 4/2024.  Diastolic function indeterminate with increased gradients likely due to hyperdynamic state.  As above.  Will repeat echo.  They had avoided Entresto due to cost now willing to consider it but will titrate after we increased dose of metoprolol.  3.  Dyspnea on exertion.  Still quite significant and worse.  I am not convinced this is cardiac.  Will check an echocardiogram next week after increasing Toprol further.  Depending on blood pressure response will also switch from losartan to Entresto.  I reached out to Dr. Slater who was seen patient later this week and consider bronchoscopy.  He feels that the CT scan of the chest shows progression of disease possible sarcoidosis.  This was discussed with the patient.    4.  Renal insufficiency.  Followed by nephrology tattheresa while he was in the office.  They are fine with increasing diuretic if need be though  5.  Hypertension.  Controlled  6.  Hyperlipidemia  7.  Diabetes.  Not ideally controlled though glucose appears to be somewhat lower  8.  Postop encephalopathy and encephalopathy.  Slowly improving.   9.  Edema.  Still quite significant (right greater than left).  To leg elevation and assess as above.  He really wishes to avoid increasing diuretics as he has polyuria.  10.   Paroxysmal atrial fibrillation.  He has had atrial appendage closure.  Subsequent monitor 6/2024 was normal.   11.  Obstructive sleep apnea.  Also discussed with him was the compliance in the CPAP use in terms of duration of use was suboptimal.  He is surprised at this as he is wearing this much of the night.  He will discuss this further with Dr. Slater    Addendum: proBNP was elevated.  It turns out that  Has not been taking diuretics 40 mg twice daily of torsemide.  He will do so.  Check BMP next week and consider switching losartan to Entresto.  In the meanwhile check an echo next week on the higher dose of metoprolol.  Dr. Slater does not feel patient needs bronchoscopy or biopsy as this has been previously performed and was negative for sarcoidosis    Time Spent: I spent 45 minutes caring for Vernon on this date of service.This time includes time spent by me in the following activities: preparing for the visit, reviewing tests, obtaining and/or reviewing a separately obtained history, performing a medically appropriate examination and/or evaluation, counseling and educating the patient/family/caregiver, ordering medications, tests, or procedures, documenting information in the medical record, independently interpreting results and communicating that information with the patient/family/caregiver, and care coordination.   I spent 45 minutes on the separately reported service of ECG. This time is not included in the time used to support the E/M service also reported today.        Your medication list            Accurate as of October 16, 2024 11:59 PM. If you have any questions, ask your nurse or doctor.                CHANGE how you take these medications        Instructions Last Dose Given Next Dose Due   albuterol sulfate  (90 Base) MCG/ACT inhaler  Commonly known as: PROVENTIL HFA;VENTOLIN HFA;PROAIR HFA  What changed: reasons to take this      Inhale 2 puffs Every 4 (Four) Hours As Needed.        metoprolol succinate XL 25 MG 24 hr tablet  Commonly known as: TOPROL-XL  What changed: when to take this      Take 1 tablet by mouth 2 (Two) Times a Day.       Tresiba FlexTouch 100 UNIT/ML solution pen-injector injection  Generic drug: insulin degludec  What changed:   how much to take  how to take this  when to take this  additional instructions      46 units every morning              CONTINUE taking these medications        Instructions Last Dose Given Next Dose Due   acetaminophen 325 MG tablet  Commonly known as: TYLENOL      Take 2 tablets by mouth Every 6 (Six) Hours As Needed for Mild Pain.       apixaban 5 MG tablet tablet  Commonly known as: ELIQUIS      Take 1 tablet by mouth 2 (Two) Times a Day.       atorvastatin 40 MG tablet  Commonly known as: LIPITOR      Take 1 tablet by mouth Every Night.       budesonide-formoterol 160-4.5 MCG/ACT inhaler  Commonly known as: SYMBICORT      Inhale 2 puffs 2 (Two) Times a Day.       calcium carbonate 500 MG chewable tablet  Commonly known as: TUMS      Chew 2 tablets 4 (Four) Times a Day As Needed for Indigestion or Heartburn.       calcium carbonate 600 MG tablet  Commonly known as: OS-QUE      Take 1 tablet by mouth Daily.       FreeStyle Ysabel 2 Sensor misc      1 each by Other route Every 14 (Fourteen) Days. Use 1 sensor every 14 days  Indications: Type 2 Diabetes       Insulin Lispro (1 Unit Dial) 100 UNIT/ML solution pen-injector  Commonly known as: HumaLOG KwikPen      Inject 5 Units under the skin into the appropriate area as directed 3 (Three) Times a Day. Indications: Type 2 Diabetes       Insulin Pen Needle 32G X 4 MM misc      1 each by Other route 3 (Three) Times a Day. Use to inject insulin under the skin via pens up to 4 times daily  Indications: Type 2 Diabetes       losartan 25 MG tablet  Commonly known as: Cozaar      Take 1 tablet by mouth Daily.       omeprazole 40 MG capsule  Commonly known as: priLOSEC      Take 1 capsule by mouth Daily.        OneTouch Verio test strip  Generic drug: glucose blood      1 each by Other route As Needed. Use as instructed       pantoprazole 40 MG EC tablet  Commonly known as: PROTONIX      TAKE 1 TABLET BY MOUTH DAILY       potassium chloride 20 MEQ CR tablet  Commonly known as: KLOR-CON M20      Take 1 tablet by mouth Daily.       PRESERVISION AREDS 2+MULTI VIT PO      Take 1 tablet by mouth 2 (Two) Times a Day.       sertraline 25 MG tablet  Commonly known as: Zoloft      Take 1 tablet by mouth Daily.       torsemide 20 MG tablet  Commonly known as: DEMADEX      Take 2 tablets by mouth 2 (Two) Times a Day.                 Where to Get Your Medications        These medications were sent to [id number] - [place], [place] - [address] AT [facility] - [phone]  - [phone] FX  [address], [facility] [address]      Phone: [phone]   metoprolol succinate XL 25 MG 24 hr tablet         Patient is no longer taking -.  I corrected the med list to reflect this.  I did not stop these medications.      Dictated utilizing Dragon dictation

## 2024-10-17 ENCOUNTER — TREATMENT (OUTPATIENT)
Dept: CARDIAC REHAB | Facility: HOSPITAL | Age: 74
End: 2024-10-17
Payer: MEDICARE

## 2024-10-17 DIAGNOSIS — J98.4 RESTRICTIVE LUNG DISEASE: Primary | ICD-10-CM

## 2024-10-17 PROCEDURE — G0238 OTH RESP PROC, INDIV: HCPCS

## 2024-10-18 ENCOUNTER — TELEPHONE (OUTPATIENT)
Dept: CARDIOLOGY | Facility: CLINIC | Age: 74
End: 2024-10-18
Payer: MEDICARE

## 2024-10-18 DIAGNOSIS — R06.09 DOE (DYSPNEA ON EXERTION): Primary | ICD-10-CM

## 2024-10-18 NOTE — TELEPHONE ENCOUNTER
I talked to patient's wife and Dr. Slater (patient not available) regarding blood test results.  Patient has already had a biopsy that was negative for sarcoid by Dr. Mitchell previously.  Bronchoscopy and biopsy therefore is not needed.  It turns out he is not taking torsemide 40 mg a.m. and p.m.  He is only taking 40 in the morning 20 in p.m.  I have asked her to increase the torsemide to 40 mg twice daily.  He will return for an echo and BMP next week after increasing Toprol.  Depending on these findings we will change losartan to Entresto.    Kusum,  Can you please see if he can get the echo done on Wednesday along with a BMP.

## 2024-10-21 NOTE — TELEPHONE ENCOUNTER
I got him scheduled on Tuesday 10/22 for the echo.  There were not any available appts on Wed for an echo.

## 2024-10-22 ENCOUNTER — TELEPHONE (OUTPATIENT)
Dept: CARDIOLOGY | Facility: CLINIC | Age: 74
End: 2024-10-22
Payer: MEDICARE

## 2024-10-22 ENCOUNTER — HOSPITAL ENCOUNTER (OUTPATIENT)
Dept: CARDIOLOGY | Facility: HOSPITAL | Age: 74
Discharge: HOME | End: 2024-10-22
Payer: MEDICARE

## 2024-10-22 ENCOUNTER — TREATMENT (OUTPATIENT)
Dept: CARDIAC REHAB | Facility: HOSPITAL | Age: 74
End: 2024-10-22
Payer: MEDICARE

## 2024-10-22 ENCOUNTER — LAB (OUTPATIENT)
Dept: LAB | Facility: HOSPITAL | Age: 74
End: 2024-10-22
Payer: MEDICARE

## 2024-10-22 VITALS
BODY MASS INDEX: 37.24 KG/M2 | WEIGHT: 281 LBS | SYSTOLIC BLOOD PRESSURE: 114 MMHG | HEIGHT: 73 IN | DIASTOLIC BLOOD PRESSURE: 80 MMHG

## 2024-10-22 DIAGNOSIS — R06.09 DOE (DYSPNEA ON EXERTION): ICD-10-CM

## 2024-10-22 DIAGNOSIS — J98.4 RESTRICTIVE LUNG DISEASE: Primary | ICD-10-CM

## 2024-10-22 LAB
ANION GAP SERPL CALCULATED.3IONS-SCNC: 9.8 MMOL/L (ref 5–15)
AORTIC DIMENSIONLESS INDEX: 0.5 (DI)
BH CV ECHO MEAS - ACS: 1.78 CM
BH CV ECHO MEAS - AO MAX PG: 9.4 MMHG
BH CV ECHO MEAS - AO MEAN PG: 5.9 MMHG
BH CV ECHO MEAS - AO ROOT AREA (BSA CORRECTED): 1.4 CM2
BH CV ECHO MEAS - AO ROOT DIAM: 3.6 CM
BH CV ECHO MEAS - AO V2 MAX: 153.2 CM/SEC
BH CV ECHO MEAS - AO V2 VTI: 29.8 CM
BH CV ECHO MEAS - AVA(I,D): 1.55 CM2
BH CV ECHO MEAS - EDV(CUBED): 229.6 ML
BH CV ECHO MEAS - EDV(MOD-SP2): 170 ML
BH CV ECHO MEAS - EDV(MOD-SP4): 160 ML
BH CV ECHO MEAS - EF(MOD-BP): 42.5 %
BH CV ECHO MEAS - EF(MOD-SP2): 45.3 %
BH CV ECHO MEAS - EF(MOD-SP4): 40.6 %
BH CV ECHO MEAS - ESV(CUBED): 109.6 ML
BH CV ECHO MEAS - ESV(MOD-SP2): 93 ML
BH CV ECHO MEAS - ESV(MOD-SP4): 95 ML
BH CV ECHO MEAS - FS: 21.8 %
BH CV ECHO MEAS - IVS/LVPW: 1.07 CM
BH CV ECHO MEAS - IVSD: 1.24 CM
BH CV ECHO MEAS - LAT PEAK E' VEL: 4.1 CM/SEC
BH CV ECHO MEAS - LV DIASTOLIC VOL/BSA (35-75): 64.4 CM2
BH CV ECHO MEAS - LV MASS(C)D: 324.3 GRAMS
BH CV ECHO MEAS - LV MAX PG: 3 MMHG
BH CV ECHO MEAS - LV MEAN PG: 1.55 MMHG
BH CV ECHO MEAS - LV SYSTOLIC VOL/BSA (12-30): 38.2 CM2
BH CV ECHO MEAS - LV V1 MAX: 86.3 CM/SEC
BH CV ECHO MEAS - LV V1 VTI: 16.4 CM
BH CV ECHO MEAS - LVIDD: 6.1 CM
BH CV ECHO MEAS - LVIDS: 4.8 CM
BH CV ECHO MEAS - LVOT AREA: 2.8 CM2
BH CV ECHO MEAS - LVOT DIAM: 1.89 CM
BH CV ECHO MEAS - LVPWD: 1.16 CM
BH CV ECHO MEAS - MED PEAK E' VEL: 3.5 CM/SEC
BH CV ECHO MEAS - MV A DUR: 0.09 SEC
BH CV ECHO MEAS - MV A MAX VEL: 134 CM/SEC
BH CV ECHO MEAS - MV DEC SLOPE: 756.4 CM/SEC2
BH CV ECHO MEAS - MV DEC TIME: 0.3 SEC
BH CV ECHO MEAS - MV E MAX VEL: 189 CM/SEC
BH CV ECHO MEAS - MV E/A: 1.41
BH CV ECHO MEAS - MV MAX PG: 16.6 MMHG
BH CV ECHO MEAS - MV MEAN PG: 7.9 MMHG
BH CV ECHO MEAS - MV P1/2T: 80.9 MSEC
BH CV ECHO MEAS - MV V2 VTI: 47.3 CM
BH CV ECHO MEAS - MVA(P1/2T): 2.7 CM2
BH CV ECHO MEAS - MVA(VTI): 0.98 CM2
BH CV ECHO MEAS - PA ACC TIME: 0.18 SEC
BH CV ECHO MEAS - PA V2 MAX: 107.6 CM/SEC
BH CV ECHO MEAS - RAP SYSTOLE: 3 MMHG
BH CV ECHO MEAS - RV MAX PG: 2.2 MMHG
BH CV ECHO MEAS - RV V1 MAX: 74.1 CM/SEC
BH CV ECHO MEAS - RV V1 VTI: 15.2 CM
BH CV ECHO MEAS - SV(LVOT): 46.1 ML
BH CV ECHO MEAS - SV(MOD-SP2): 77 ML
BH CV ECHO MEAS - SV(MOD-SP4): 65 ML
BH CV ECHO MEAS - SVI(LVOT): 18.5 ML/M2
BH CV ECHO MEAS - SVI(MOD-SP2): 31 ML/M2
BH CV ECHO MEAS - SVI(MOD-SP4): 26.1 ML/M2
BH CV ECHO MEAS - TAPSE (>1.6): 1.52 CM
BH CV ECHO MEASUREMENTS AVERAGE E/E' RATIO: 49.74
BH CV XLRA - RV BASE: 3.7 CM
BH CV XLRA - RV LENGTH: 7.8 CM
BH CV XLRA - RV MID: 2.37 CM
BH CV XLRA - TDI S': 9.9 CM/SEC
BUN SERPL-MCNC: 21 MG/DL (ref 8–23)
BUN/CREAT SERPL: 14 (ref 7–25)
CALCIUM SPEC-SCNC: 11.4 MG/DL (ref 8.6–10.5)
CHLORIDE SERPL-SCNC: 96 MMOL/L (ref 98–107)
CO2 SERPL-SCNC: 30.2 MMOL/L (ref 22–29)
CREAT SERPL-MCNC: 1.5 MG/DL (ref 0.76–1.27)
EGFRCR SERPLBLD CKD-EPI 2021: 48.2 ML/MIN/1.73
GLUCOSE SERPL-MCNC: 242 MG/DL (ref 65–99)
LEFT ATRIUM VOLUME INDEX: 13.8 ML/M2
POTASSIUM SERPL-SCNC: 4 MMOL/L (ref 3.5–5.2)
SINUS: 2.38 CM
SODIUM SERPL-SCNC: 136 MMOL/L (ref 136–145)

## 2024-10-22 PROCEDURE — 80048 BASIC METABOLIC PNL TOTAL CA: CPT

## 2024-10-22 PROCEDURE — 36415 COLL VENOUS BLD VENIPUNCTURE: CPT

## 2024-10-22 PROCEDURE — 93306 TTE W/DOPPLER COMPLETE: CPT | Performed by: INTERNAL MEDICINE

## 2024-10-22 PROCEDURE — G0238 OTH RESP PROC, INDIV: HCPCS

## 2024-10-22 PROCEDURE — 93306 TTE W/DOPPLER COMPLETE: CPT

## 2024-10-22 PROCEDURE — 25510000001 PERFLUTREN 6.52 MG/ML SUSPENSION 2 ML VIAL: Performed by: INTERNAL MEDICINE

## 2024-10-22 RX ADMIN — PERFLUTREN 4 ML: 6.52 INJECTION, SUSPENSION INTRAVENOUS at 10:55

## 2024-10-22 NOTE — TELEPHONE ENCOUNTER
I called patient listed number and got voicemail.  Left message to call back.    (Biventricular LV systolic dysfunction present since April 2024 which raises a concern of possible poor compliance with CPAP therapy.  Please verify he is addressing this consistently and.  However had mentioned that daily duration of CPAP therapy was not sufficient.  Also have him switch from losartan to Entresto/26 mg twice daily and check a BMP in 1 week.  Also find out how is his shortness of breath and edema after increasing diuretic last week)

## 2024-10-22 NOTE — TELEPHONE ENCOUNTER
Pt's wife Maricel who is on [facility] reached out today and LMM re: confirming if pt needed to have more blood work done.     MA reached out to pt's wife who figured it out and pt will have blood work completed later today.     Done

## 2024-10-24 ENCOUNTER — TREATMENT (OUTPATIENT)
Dept: CARDIAC REHAB | Facility: HOSPITAL | Age: 74
End: 2024-10-24
Payer: MEDICARE

## 2024-10-24 DIAGNOSIS — J98.4 RESTRICTIVE LUNG DISEASE: Primary | ICD-10-CM

## 2024-10-24 PROCEDURE — G0238 OTH RESP PROC, INDIV: HCPCS

## 2024-10-24 NOTE — TELEPHONE ENCOUNTER
Called the number listed (only number listed) and got message that voicemail is full and cannot leave another message.  Please contact this patient for me tomorrow ASAP

## 2024-10-29 ENCOUNTER — TREATMENT (OUTPATIENT)
Dept: CARDIAC REHAB | Facility: HOSPITAL | Age: 74
End: 2024-10-29
Payer: MEDICARE

## 2024-10-29 DIAGNOSIS — J98.4 RESTRICTIVE LUNG DISEASE: Primary | ICD-10-CM

## 2024-10-29 PROCEDURE — G0238 OTH RESP PROC, INDIV: HCPCS

## 2024-10-31 ENCOUNTER — TREATMENT (OUTPATIENT)
Dept: CARDIAC REHAB | Facility: HOSPITAL | Age: 74
End: 2024-10-31
Payer: MEDICARE

## 2024-10-31 DIAGNOSIS — J98.4 RESTRICTIVE LUNG DISEASE: Primary | ICD-10-CM

## 2024-10-31 PROCEDURE — G0238 OTH RESP PROC, INDIV: HCPCS

## 2024-11-01 DIAGNOSIS — I10 ESSENTIAL HYPERTENSION: ICD-10-CM

## 2024-11-01 DIAGNOSIS — R06.02 SOB (SHORTNESS OF BREATH): Primary | ICD-10-CM

## 2024-11-01 RX ORDER — SACUBITRIL AND VALSARTAN 24; 26 MG/1; MG/1
1 TABLET, FILM COATED ORAL 2 TIMES DAILY
Qty: 180 TABLET | Refills: 3 | Status: SHIPPED | OUTPATIENT
Start: 2024-11-01

## 2024-11-01 NOTE — TELEPHONE ENCOUNTER
I talked to patient's wife regarding echo results.  It turns out patient is sleeping a lot without CPAP though he is using it 6 hours a day he is sleeping much longer than this on many days.  Will have her address this with him and wear the CPAP as long as he is asleep.  And also switch from losartan to Entresto.  They are still to get BMP next week with increased dose of torsemide.  Depending on how he is doing at follow-up we will continue with this regimen and plan on repeat echo in 3 months     Please follow-up on the BMP

## 2024-11-05 ENCOUNTER — TREATMENT (OUTPATIENT)
Dept: CARDIAC REHAB | Facility: HOSPITAL | Age: 74
End: 2024-11-05
Payer: MEDICARE

## 2024-11-05 DIAGNOSIS — J98.4 RESTRICTIVE LUNG DISEASE: Primary | ICD-10-CM

## 2024-11-05 PROCEDURE — G0238 OTH RESP PROC, INDIV: HCPCS

## 2024-11-07 ENCOUNTER — TREATMENT (OUTPATIENT)
Dept: CARDIAC REHAB | Facility: HOSPITAL | Age: 74
End: 2024-11-07
Payer: MEDICARE

## 2024-11-07 DIAGNOSIS — J98.4 RESTRICTIVE LUNG DISEASE: Primary | ICD-10-CM

## 2024-11-07 PROCEDURE — G0238 OTH RESP PROC, INDIV: HCPCS

## 2024-11-10 ENCOUNTER — TELEPHONE (OUTPATIENT)
Dept: CARDIOLOGY | Facility: CLINIC | Age: 74
End: 2024-11-10
Payer: MEDICARE

## 2024-11-10 DIAGNOSIS — I50.32 CHRONIC HEART FAILURE WITH PRESERVED EJECTION FRACTION: Primary | Chronic | ICD-10-CM

## 2024-11-11 ENCOUNTER — LAB (OUTPATIENT)
Dept: LAB | Facility: HOSPITAL | Age: 74
End: 2024-11-11
Payer: MEDICARE

## 2024-11-11 ENCOUNTER — OFFICE VISIT (OUTPATIENT)
Dept: CARDIOLOGY | Facility: CLINIC | Age: 74
End: 2024-11-11
Payer: MEDICARE

## 2024-11-11 ENCOUNTER — HOSPITAL ENCOUNTER (INPATIENT)
Facility: HOSPITAL | Age: 74
LOS: 2 days | Discharge: HOME | DRG: 291 | End: 2024-11-13
Attending: INTERNAL MEDICINE | Admitting: INTERNAL MEDICINE
Payer: MEDICARE

## 2024-11-11 VITALS
HEART RATE: 79 BPM | BODY MASS INDEX: 36.98 KG/M2 | SYSTOLIC BLOOD PRESSURE: 100 MMHG | DIASTOLIC BLOOD PRESSURE: 60 MMHG | WEIGHT: 279 LBS | HEIGHT: 73 IN

## 2024-11-11 DIAGNOSIS — I48.19 PERSISTENT ATRIAL FIBRILLATION: ICD-10-CM

## 2024-11-11 DIAGNOSIS — R06.02 SOB (SHORTNESS OF BREATH): ICD-10-CM

## 2024-11-11 DIAGNOSIS — I34.0 NONRHEUMATIC MITRAL VALVE REGURGITATION: Primary | ICD-10-CM

## 2024-11-11 DIAGNOSIS — I50.9 NEW ONSET OF CONGESTIVE HEART FAILURE: ICD-10-CM

## 2024-11-11 DIAGNOSIS — G47.33 OSA (OBSTRUCTIVE SLEEP APNEA): Chronic | ICD-10-CM

## 2024-11-11 DIAGNOSIS — I10 ESSENTIAL HYPERTENSION: ICD-10-CM

## 2024-11-11 DIAGNOSIS — I50.32 CHRONIC HEART FAILURE WITH PRESERVED EJECTION FRACTION: Chronic | ICD-10-CM

## 2024-11-11 LAB
ANION GAP SERPL CALCULATED.3IONS-SCNC: 11.8 MMOL/L (ref 5–15)
BUN SERPL-MCNC: 20 MG/DL (ref 8–23)
BUN/CREAT SERPL: 12.1 (ref 7–25)
CALCIUM SPEC-SCNC: 9 MG/DL (ref 8.6–10.5)
CHLORIDE SERPL-SCNC: 101 MMOL/L (ref 98–107)
CO2 SERPL-SCNC: 25.2 MMOL/L (ref 22–29)
CREAT SERPL-MCNC: 1.65 MG/DL (ref 0.76–1.27)
EGFRCR SERPLBLD CKD-EPI 2021: 43 ML/MIN/1.73
GLUCOSE BLDC GLUCOMTR-MCNC: 159 MG/DL (ref 70–130)
GLUCOSE BLDC GLUCOMTR-MCNC: 210 MG/DL (ref 70–130)
GLUCOSE BLDC GLUCOMTR-MCNC: 220 MG/DL (ref 70–130)
GLUCOSE SERPL-MCNC: 344 MG/DL (ref 65–99)
NT-PROBNP SERPL-MCNC: 3327 PG/ML (ref 0–1800)
POTASSIUM SERPL-SCNC: 4.3 MMOL/L (ref 3.5–5.2)
SODIUM SERPL-SCNC: 138 MMOL/L (ref 136–145)
TSH SERPL DL<=0.05 MIU/L-ACNC: 2.93 UIU/ML (ref 0.27–4.2)

## 2024-11-11 PROCEDURE — 36415 COLL VENOUS BLD VENIPUNCTURE: CPT

## 2024-11-11 PROCEDURE — 63710000001 INSULIN LISPRO (HUMAN) PER 5 UNITS: Performed by: INTERNAL MEDICINE

## 2024-11-11 PROCEDURE — 99215 OFFICE O/P EST HI 40 MIN: CPT | Performed by: INTERNAL MEDICINE

## 2024-11-11 PROCEDURE — 83880 ASSAY OF NATRIURETIC PEPTIDE: CPT

## 2024-11-11 PROCEDURE — 82948 REAGENT STRIP/BLOOD GLUCOSE: CPT

## 2024-11-11 PROCEDURE — 80048 BASIC METABOLIC PNL TOTAL CA: CPT

## 2024-11-11 PROCEDURE — 87040 BLOOD CULTURE FOR BACTERIA: CPT | Performed by: INTERNAL MEDICINE

## 2024-11-11 PROCEDURE — 25010000002 FUROSEMIDE PER 20 MG: Performed by: INTERNAL MEDICINE

## 2024-11-11 PROCEDURE — 3074F SYST BP LT 130 MM HG: CPT | Performed by: INTERNAL MEDICINE

## 2024-11-11 PROCEDURE — 93000 ELECTROCARDIOGRAM COMPLETE: CPT | Performed by: INTERNAL MEDICINE

## 2024-11-11 PROCEDURE — 1160F RVW MEDS BY RX/DR IN RCRD: CPT | Performed by: INTERNAL MEDICINE

## 2024-11-11 PROCEDURE — 1159F MED LIST DOCD IN RCRD: CPT | Performed by: INTERNAL MEDICINE

## 2024-11-11 PROCEDURE — 84443 ASSAY THYROID STIM HORMONE: CPT | Performed by: INTERNAL MEDICINE

## 2024-11-11 PROCEDURE — 3078F DIAST BP <80 MM HG: CPT | Performed by: INTERNAL MEDICINE

## 2024-11-11 RX ORDER — METOPROLOL SUCCINATE 25 MG/1
25 TABLET, EXTENDED RELEASE ORAL 2 TIMES DAILY
Status: DISCONTINUED | OUTPATIENT
Start: 2024-11-11 | End: 2024-11-13 | Stop reason: HOSPADM

## 2024-11-11 RX ORDER — POTASSIUM CHLORIDE 750 MG/1
20 TABLET, FILM COATED, EXTENDED RELEASE ORAL DAILY
Status: DISCONTINUED | OUTPATIENT
Start: 2024-11-11 | End: 2024-11-13 | Stop reason: HOSPADM

## 2024-11-11 RX ORDER — ACETAMINOPHEN 325 MG/1
650 TABLET ORAL EVERY 6 HOURS PRN
Status: DISCONTINUED | OUTPATIENT
Start: 2024-11-11 | End: 2024-11-13 | Stop reason: HOSPADM

## 2024-11-11 RX ORDER — PANTOPRAZOLE SODIUM 40 MG/1
40 TABLET, DELAYED RELEASE ORAL DAILY
Status: DISCONTINUED | OUTPATIENT
Start: 2024-11-11 | End: 2024-11-13 | Stop reason: HOSPADM

## 2024-11-11 RX ORDER — ATORVASTATIN CALCIUM 20 MG/1
40 TABLET, FILM COATED ORAL NIGHTLY
Status: DISCONTINUED | OUTPATIENT
Start: 2024-11-11 | End: 2024-11-13 | Stop reason: HOSPADM

## 2024-11-11 RX ORDER — NITROGLYCERIN 0.4 MG/1
0.4 TABLET SUBLINGUAL
Status: DISCONTINUED | OUTPATIENT
Start: 2024-11-11 | End: 2024-11-13 | Stop reason: HOSPADM

## 2024-11-11 RX ORDER — DEXTROSE MONOHYDRATE 25 G/50ML
25 INJECTION, SOLUTION INTRAVENOUS
Status: DISCONTINUED | OUTPATIENT
Start: 2024-11-11 | End: 2024-11-13 | Stop reason: HOSPADM

## 2024-11-11 RX ORDER — BUDESONIDE AND FORMOTEROL FUMARATE DIHYDRATE 160; 4.5 UG/1; UG/1
2 AEROSOL RESPIRATORY (INHALATION) 2 TIMES DAILY
Status: DISCONTINUED | OUTPATIENT
Start: 2024-11-11 | End: 2024-11-13 | Stop reason: HOSPADM

## 2024-11-11 RX ORDER — ALBUTEROL SULFATE 0.83 MG/ML
2.5 SOLUTION RESPIRATORY (INHALATION) EVERY 6 HOURS PRN
Status: DISCONTINUED | OUTPATIENT
Start: 2024-11-11 | End: 2024-11-13 | Stop reason: HOSPADM

## 2024-11-11 RX ORDER — INSULIN LISPRO 100 [IU]/ML
2-7 INJECTION, SOLUTION INTRAVENOUS; SUBCUTANEOUS
Status: DISCONTINUED | OUTPATIENT
Start: 2024-11-11 | End: 2024-11-13 | Stop reason: HOSPADM

## 2024-11-11 RX ORDER — NICOTINE POLACRILEX 4 MG
15 LOZENGE BUCCAL
Status: DISCONTINUED | OUTPATIENT
Start: 2024-11-11 | End: 2024-11-13 | Stop reason: HOSPADM

## 2024-11-11 RX ORDER — CALCIUM CARBONATE 500(1250)
500 TABLET ORAL DAILY
Status: DISCONTINUED | OUTPATIENT
Start: 2024-11-12 | End: 2024-11-13 | Stop reason: HOSPADM

## 2024-11-11 RX ORDER — FUROSEMIDE 10 MG/ML
40 INJECTION INTRAMUSCULAR; INTRAVENOUS EVERY 12 HOURS
Status: DISCONTINUED | OUTPATIENT
Start: 2024-11-11 | End: 2024-11-12

## 2024-11-11 RX ORDER — IBUPROFEN 600 MG/1
1 TABLET ORAL
Status: DISCONTINUED | OUTPATIENT
Start: 2024-11-11 | End: 2024-11-13 | Stop reason: HOSPADM

## 2024-11-11 RX ORDER — INSULIN LISPRO 100 [IU]/ML
5 INJECTION, SOLUTION INTRAVENOUS; SUBCUTANEOUS 3 TIMES DAILY
Status: DISCONTINUED | OUTPATIENT
Start: 2024-11-11 | End: 2024-11-13 | Stop reason: HOSPADM

## 2024-11-11 RX ADMIN — POTASSIUM CHLORIDE 20 MEQ: 750 TABLET, EXTENDED RELEASE ORAL at 18:09

## 2024-11-11 RX ADMIN — ATORVASTATIN CALCIUM 40 MG: 20 TABLET, FILM COATED ORAL at 20:00

## 2024-11-11 RX ADMIN — METOPROLOL SUCCINATE 25 MG: 25 TABLET, EXTENDED RELEASE ORAL at 20:00

## 2024-11-11 RX ADMIN — INSULIN LISPRO 2 UNITS: 100 INJECTION, SOLUTION INTRAVENOUS; SUBCUTANEOUS at 18:10

## 2024-11-11 RX ADMIN — INSULIN LISPRO 5 UNITS: 100 INJECTION, SOLUTION INTRAVENOUS; SUBCUTANEOUS at 22:40

## 2024-11-11 RX ADMIN — FUROSEMIDE 40 MG: 10 INJECTION, SOLUTION INTRAMUSCULAR; INTRAVENOUS at 19:25

## 2024-11-11 RX ADMIN — SERTRALINE 25 MG: 50 TABLET, FILM COATED ORAL at 18:10

## 2024-11-11 RX ADMIN — APIXABAN 5 MG: 5 TABLET, FILM COATED ORAL at 20:00

## 2024-11-11 RX ADMIN — INSULIN LISPRO 3 UNITS: 100 INJECTION, SOLUTION INTRAVENOUS; SUBCUTANEOUS at 20:54

## 2024-11-11 RX ADMIN — PANTOPRAZOLE SODIUM 40 MG: 40 TABLET, DELAYED RELEASE ORAL at 18:09

## 2024-11-11 NOTE — PROGRESS NOTES
Date of Office Visit: 2024  Encounter Provider: Karishma Arango MD  Place of Service: [facility] CARDIOLOGY  Patient Name: Vernon Solorzano  :1950    Chief complaint  Mitral valve replacement, atrial flutter, cardiomyopathy.    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  In 2022 he developed COVID-pneumonia possible COVID pneumonitis and progressive shortness of breath with CT findings also demonstrating calcification LAD and circumflex vessel..  He was also noted to have significant pulmonary hypertension with RVSP of 60 mmHg.  By 2023 he was found to have severe mitral regurgitation with annular dilatation and moderate severe tricuspid regurgitation.  He underwent mitral valve repair with annuloplasty ring with residual mild to moderate regurgitation subsequent mitral valve replacement with 29 mm Schwartz 2 porcine prosthesis.  He also had tricuspid valve repair and left atrial appendage closure.  Preoperative cardiac cath showed normal coronary arteries.  Postoperatively he also had a pericardial effusion which slowly resolved.  He initially had LV systolic dysfunction with an ejection fraction reduced to 25% though on medical therapy and with treatment of CPAP by 2024 ejection fraction 54% with mild left ventricular hypertrophy, indeterminate diastolic function, mitral valve prosthesis in place with a mean gradient of 11 mmHg and a pressure half-time of 73 ms suggestive of hyperdynamic state or patient valve mismatch.  Right-sided chambers were not well-seen.  Aortic valve was thickened without stenosis.  His postop care has been also complicated by renal insufficiency and poor control of diabetes.  He continued complaint of dyspnea and edema.  On 10/2024 echo showed an ejection fraction 43% with regional wall motion abnormality with normal  prosthetic valve function mildly reduced RV function.  Tricuspid ring present with normal function.  Demadex was increased and patient was to consider reinitiation of Xarelto.  Switch to switch from losartan to Entresto.    Since last visit he is using his CPAP.  Unfortunately he was not able to get to pharmacy with timely manner to start Entresto.  He states he is using CPAP but not using it during the day when he is lying in his chair or bed.  Blood work today shows worsening proBNP, renal function glucose of 344 and his pressure is 100/60 he has significant edema.  He denies any palpitations or chest pain or dizziness.  He has modest dyspnea with minimal activity.        Past Medical History:   Diagnosis Date    Allergic rhinitis     Arthritis     BPH (benign prostatic hyperplasia)     Diabetes mellitus     TYPE 2    Foraminal stenosis of cervical region     C5-C6    GERD (gastroesophageal reflux disease)     Hemorrhoids     History of urinary retention 2010    S/P TURP    Hyperlipidemia     Macular degeneration     PING (obstructive sleep apnea) 01/07/2016    MILD, SEES DR. AMARILIS MORGAN    Pericardial effusion, acute      Past Surgical History:   Procedure Laterality Date    BRONCHOSCOPY N/A 7/10/2024    Procedure: BRONCHOSCOPY WITH C-ARM with biopsies and washing;  Surgeon: Luis Slater Jr., MD;  Location: [facility] ENDOSCOPY;  Service: Pulmonary;  Laterality: N/A;  pre-possible sarcoidosis; multiple nodules  post-multiple nodules    CARDIAC CATHETERIZATION N/A 5/11/2023    Procedure: Right Heart Cath;  Surgeon: Corey Gaffney MD;  Location: [facility] CATH INVASIVE LOCATION;  Service: Cardiology;  Laterality: N/A;    CARDIAC CATHETERIZATION N/A 5/11/2023    Procedure: Left Heart Cath;  Surgeon: Corey Gaffney MD;  Location: [facility] CATH INVASIVE LOCATION;  Service: Cardiology;  Laterality: N/A;    CARDIAC CATHETERIZATION N/A 5/11/2023    Procedure: Left ventriculography;  Surgeon: Corey Gaffney MD;   Location: [facility] CATH INVASIVE LOCATION;  Service: Cardiology;  Laterality: N/A;    CARDIAC CATHETERIZATION N/A 5/11/2023    Procedure: Coronary angiography;  Surgeon: Corey Gaffney MD;  Location: [facility] CATH INVASIVE LOCATION;  Service: Cardiology;  Laterality: N/A;    COLONOSCOPY N/A     WNL PER PT, NO RECORDS,     COLONOSCOPY N/A 04/07/2009    DR. GORGE BENAVIDEZ AT [place]    MITRAL VALVE REPAIR/REPLACEMENT N/A 5/24/2023    Procedure: MITRAL VALVE REPAIR/REPLACEMENT TRICUSPID VALVE REPAIR/REPLACEMENT TRANSESOPHAGEAL ECHOCARDIOGRAM WITH ANESTHESIA;  Surgeon: George Frey MD;  Location: [facility] CVOR;  Service: Cardiothoracic;  Laterality: N/A;    PROSTATE SURGERY N/A 11/12/2010    TURP, DR. COREY COLLAZO AT [facility]    SKIN TAG REMOVAL N/A 12/20/2017    ANAL SKIN TAG X2, PERFORMED IN OFFICE, DR. LISA ORANTES    TURP / TRANSURETHRAL INCISION / DRAINAGE PROSTATE  2010    Dr. Goodrich     Outpatient Medications Prior to Visit   Medication Sig Dispense Refill    acetaminophen (TYLENOL) 325 MG tablet Take 2 tablets by mouth Every 6 (Six) Hours As Needed for Mild Pain.      albuterol sulfate  (90 Base) MCG/ACT inhaler Inhale 2 puffs Every 4 (Four) Hours As Needed. (Patient taking differently: Inhale 2 puffs Every 4 (Four) Hours As Needed for Wheezing or Shortness of Air.)      apixaban (ELIQUIS) 5 MG tablet tablet Take 1 tablet by mouth 2 (Two) Times a Day. 180 tablet 3    atorvastatin (LIPITOR) 40 MG tablet Take 1 tablet by mouth Every Night. 30 tablet 1    budesonide-formoterol (SYMBICORT) 160-4.5 MCG/ACT inhaler Inhale 2 puffs 2 (Two) Times a Day.      calcium carbonate (OS-QUE) 600 MG tablet Take 1 tablet by mouth Daily.      calcium carbonate (TUMS) 500 MG chewable tablet Chew 2 tablets 4 (Four) Times a Day As Needed for Indigestion or Heartburn.      Continuous Blood Gluc Sensor (FreeStyle Ysabel 2 Sensor) misc 1 each by Other route Every 14 (Fourteen) Days. Use 1 sensor every 14 days   Indications: Type 2 Diabetes 6 each 3    glucose blood (OneTouch Verio) test strip 1 each by Other route As Needed. Use as instructed      insulin degludec (Tresiba FlexTouch) 100 UNIT/ML solution pen-injector injection 46 units every morning (Patient taking differently: Inject 55 Units under the skin into the appropriate area as directed Daily. 50units every morning) 15 mL 6    Insulin Lispro, 1 Unit Dial, (HumaLOG KwikPen) 100 UNIT/ML solution pen-injector Inject 5 Units under the skin into the appropriate area as directed 3 (Three) Times a Day. Indications: Type 2 Diabetes 14 mL 2    Insulin Pen Needle 32G X 4 MM misc 1 each by Other route 3 (Three) Times a Day. Use to inject insulin under the skin via pens up to 4 times daily  Indications: Type 2 Diabetes 300 each 3    metoprolol succinate XL (TOPROL-XL) 25 MG 24 hr tablet Take 1 tablet by mouth 2 (Two) Times a Day. 180 tablet 2    Multiple Vitamins-Minerals (PRESERVISION AREDS 2+MULTI VIT PO) Take 1 tablet by mouth 2 (Two) Times a Day.      pantoprazole (PROTONIX) 40 MG EC tablet TAKE 1 TABLET BY MOUTH DAILY 90 tablet 1    potassium chloride (K-DUR,KLOR-CON) 20 MEQ CR tablet Take 1 tablet by mouth Daily. 30 tablet 1    sacubitril-valsartan (Entresto) 24-26 MG tablet Take 1 tablet by mouth 2 (Two) Times a Day. 180 tablet 3    sertraline (Zoloft) 25 MG tablet Take 1 tablet by mouth Daily. 30 tablet 2    torsemide (DEMADEX) 20 MG tablet Take 2 tablets by mouth 2 (Two) Times a Day. 360 tablet 1    omeprazole (priLOSEC) 40 MG capsule Take 1 capsule by mouth Daily. (Patient not taking: Reported on 11/11/2024)       No facility-administered medications prior to visit.       Allergies as of 11/11/2024    (No Known Allergies)     Social History     Socioeconomic History    Marital status:    Tobacco Use    Smoking status: Never     Passive exposure: Never    Smokeless tobacco: Never   Vaping Use    Vaping status: Never Used   Substance and Sexual Activity     "Alcohol use: Yes     Alcohol/week: 1.0 standard drink of alcohol     Types: 1 Drinks containing 0.5 oz of alcohol per week     Comment: RARELY    Drug use: No    Sexual activity: Defer     Partners: Female     Family History   Problem Relation Age of Onset    Lung cancer Mother     Stroke Father     Dementia Father     Hyperlipidemia Brother     Hypertension Brother     Heart disease Brother     Diabetes Brother     Colon polyps Brother     Colon polyps Brother     Malig Hyperthermia Neg Hx      Review of Systems   Constitutional: Positive for malaise/fatigue. Negative for chills, fever, weight gain and weight loss.   Cardiovascular:  Positive for dyspnea on exertion and leg swelling.   Respiratory:  Positive for snoring. Negative for cough and wheezing.    Hematologic/Lymphatic: Negative for bleeding problem. Does not bruise/bleed easily.   Skin:  Negative for color change.   Musculoskeletal:  Positive for joint pain. Negative for falls and myalgias.   Gastrointestinal:  Negative for melena.   Genitourinary:  Negative for hematuria.   Neurological:  Negative for excessive daytime sleepiness.   Psychiatric/Behavioral:  Negative for depression. The patient is not nervous/anxious.         Objective:     Vitals:    11/11/24 1224   BP: 100/60   Pulse: 79   Weight: 127 kg (279 lb)   Height: 185.4 cm (73\")     Body mass index is 36.81 kg/m².    Vitals reviewed.   Constitutional:       Appearance: Well-developed. Obese.   Eyes:      General: No scleral icterus.        Right eye: No discharge.      Conjunctiva/sclera: Conjunctivae normal.      Pupils: Pupils are equal, round, and reactive to light.   HENT:      Head: Normocephalic.      Nose: Nose normal.   Neck:      Thyroid: No thyromegaly.      Vascular: No JVD.   Pulmonary:      Effort: Pulmonary effort is normal. No respiratory distress.      Breath sounds: Normal breath sounds. No wheezing. No rales.   Cardiovascular:      Normal rate. Regular rhythm. Normal S1. " Normal S2.       Murmurs: There is no murmur.      No gallop.       Comments: Mild sacral edema is present  Pulses:     Intact distal pulses.      Carotid: 2+ bilaterally.     Radial: 2+ bilaterally.     Femoral: 2+ bilaterally.     Popliteal: 2+ bilaterally.     Dorsalis pedis: 2+ bilaterally.     Posterior tibial: 2+ bilaterally.  Edema:     Peripheral edema present.     Thigh: 1+ edema of the right thigh.     Pretibial: 1+ edema of the left pretibial area and 3+ edema of the right pretibial area.     Ankle: 1+ edema of the left ankle and 3+ edema of the right ankle.  Abdominal:      General: Bowel sounds are normal. There is no distension.      Palpations: Abdomen is soft.      Tenderness: There is no abdominal tenderness. There is no rebound.   Musculoskeletal: Normal range of motion.         General: No tenderness.      Cervical back: Normal range of motion and neck supple. Skin:     General: Skin is warm and dry.      Findings: No erythema or rash.   Neurological:      Mental Status: Alert and oriented to person, place, and time.   Psychiatric:         Behavior: Behavior normal.         Thought Content: Thought content normal.         Judgment: Judgment normal.       Lab Review:   Lab Results - Last 18 Months   Lab Units 06/11/24  1438 05/16/24  1156 04/17/24  1421 09/14/23  1530 08/17/23  0831   WBC 10*3/mm3 9.01  --  8.06   < > 7.85   RBC 10*6/mm3 4.61  --  4.57   < > 4.41   HEMOGLOBIN g/dL 12.6* 12.9* 11.7*   < > 11.9*   HEMATOCRIT % 37.3* 40.5 35.8*   < > 36.4*   MCV fL 80.9  --  78.3*   < > 82.5   MCH pg 27.3  --  25.6*   < > 27.0   MCHC g/dL 33.8  --  32.7   < > 32.7   RDW % 15.2  --  15.4   < > 14.4   PLATELETS 10*3/mm3 157  --  158   < > 225   NEUTROPHIL % % 71.3  --   --   --  69.7   LYMPHOCYTE % % 11.9*  --   --   --  11.6*   MONOCYTES % % 10.5  --   --   --  11.3   EOSINOPHIL % % 5.1  --   --   --  6.1   BASOPHIL % % 0.8  --   --   --  0.8   NEUTROS ABS 10*3/mm3 6.42  --   --   --  5.47   LYMPHS ABS  10*3/mm3 1.07  --   --   --  0.91   MONOS ABS 10*3/mm3 0.95*  --   --   --  0.89   EOS ABS 10*3/mm3 0.46*  --   --   --  0.48*   BASOS ABS 10*3/mm3 0.07  --   --   --  0.06   RDW-SD fl 43.9  --  42.4   < > 42.8   MPV fL 9.7  --  9.9   < > 9.5    < > = values in this interval not displayed.     Lab Results - Last 18 Months   Lab Units 11/11/24  1122 10/22/24  1403   GLUCOSE mg/dL 344* 242*   BUN mg/dL 20 21   CREATININE mg/dL 1.65* 1.50*   SODIUM mmol/L 138 136   POTASSIUM mmol/L 4.3 4.0   CHLORIDE mmol/L 101 96*   CO2 mmol/L 25.2 30.2*   CALCIUM mg/dL 9.0 11.4*   BUN / CREAT RATIO  12.1 14.0   ANION GAP mmol/L 11.8 9.8   EGFR mL/min/1.73 43.0* 48.2*     Lab Results - Last 18 Months   Lab Units 11/11/24  1122 10/22/24  1403 06/11/24  1438 04/17/24  1421 02/13/24  1211 09/14/23  1530 08/17/23  0831   GLUCOSE mg/dL 344* 242* 198*   < > 419*   < > 323*   BUN mg/dL 20 21 22   < > 22   < > 25*   CREATININE mg/dL 1.65* 1.50* 1.51*   < > 1.57*   < > 1.50*   SODIUM mmol/L 138 136 138   < > 136   < > 140   POTASSIUM mmol/L 4.3 4.0 4.0   < > 5.0   < > 3.8   CHLORIDE mmol/L 101 96* 101   < > 98   < > 99   CO2 mmol/L 25.2 30.2* 25.0   < > 24.5   < > 27.7   CALCIUM mg/dL 9.0 11.4* 9.0   < > 8.9   < > 9.7   TOTAL PROTEIN g/dL  --   --  6.9  --   --   --  6.9   ALBUMIN g/dL  --   --  4.0  --  4.2  --  4.0   ALT (SGPT) U/L  --   --  16  --  14  --  19   AST (SGOT) U/L  --   --  10  --  14  --  21   ALK PHOS U/L  --   --  98  --  101  --  111   BILIRUBIN mg/dL  --   --  0.4  --  0.4  --  0.5   GLOBULIN gm/dL  --   --  2.9  --   --   --  2.9   A/G RATIO g/dL  --   --  1.4  --   --   --  1.4   BUN / CREAT RATIO  12.1 14.0 14.6   < > 14.0   < > 16.7   ANION GAP mmol/L 11.8 9.8 12.0   < >  --    < > 13.3   EGFR mL/min/1.73 43.0* 48.2* 47.9*   < >  --    < > 48.6*    < > = values in this interval not displayed.     Lab Results - Last 18 Months   Lab Units 02/13/24  1211 08/17/23  0831 05/23/23  1429   CHOLESTEROL mg/dL  --  116 155    TRIGLYCERIDES mg/dL 191* 221* 197*   HDL CHOL mg/dL 31* 34* 41   LDL CHOL mg/dL 43 47 81   VLDL CHOL mg/dL  --  35 33   VLDL CHOLESTEROL QUE mg/dL 31  --   --    LDL/HDL RATIO  1.15 1.11 1.82     Lab Results - Last 18 Months   Lab Units 11/11/24  1122 10/16/24  1352   PROBNP pg/mL 3,327.0* 3,078.0*     Lab Results - Last 18 Months   Lab Units 06/25/23  0944 06/25/23  0739   HSTROP T ng/L 95* 97*     Lab Results - Last 18 Months   Lab Units 02/13/24  1211 08/17/23  0831   TSH uIU/mL 1.760 2.320     Lab Results - Last 18 Months   Lab Units 06/04/23  1410 06/04/23  0319 06/01/23  2318 06/01/23  1555 05/25/23  0307   PROTIME Seconds  --   --   --  13.4 15.0*   APTT seconds 30.3 151.6*   < > 24.2  --     < > = values in this interval not displayed.           ECG 12 Lead    Date/Time: 11/11/2024 12:43 PM  Performed by: Karishma Arango MD    Authorized by: Karishma Arango MD  Comparison: compared with previous ECG   Similar to previous ECG  Rhythm: sinus rhythm  Conduction: right bundle branch block, left anterior fascicular block and 1st degree AV block            Diagnosis Plan   1. Nonrheumatic mitral valve regurgitation        2. Persistent atrial fibrillation        3. New onset of congestive heart failure        4. PING (obstructive sleep apnea)          Plan:         1.  Acute on chronic systolic and diastolic heart failure.  EF 43% by echo 10/16/2024. Demadex was increased several weeks ago but edema,  shortness of breath fatigue no better with worsening renal function and borderline low blood pressure.  Glucose also 344.  I recommended hospitalization for IV diuresis with nephrology and internal medicine support.  Hopefully can start Entresto while in hospital.    2.  Mitral valve replacement and tricuspid valve repair 5/2023.  Echo 5/2024 with slightly increased mitral valve mean gradient consistent with hyperdynamic state though there may be a small component mitral stenosis though recent echo did not demonstrate  this.  2.  Recurrent cardiomyopathy with regional wall motion abnormality at this time.  In part due to poor compliance with medical therapy.  EF had decreased to 43% from prior study 4/2024.  As above.  3.  Poorly controlled diabetes  4.  Renal insufficiency.  Creatinine further increase to 1.65 with GFR 43 today.  Unclear if he has been taking all of thed Demadex at home.  His wife had been away for short period of time.  Will ask nephrology to review  5.  Hypotension  6.  Hyperlipidemia  7.  Postop encephalopathy and encephalopathy.  Slowly improving.   9.  Paroxysmal atrial fibrillation.  He has had atrial appendage closure.  Subsequent monitor 6/2024 was normal.  Today  10.  Obstructive sleep apnea.  As above.  Of note at home he has been spending many hours laying in bed or couch without CPAP.  I asked him to use his CPAP whenever he is laying around as he may intermittently fall asleep.    This visit was of HIGH complexity medical decision making.    Vernon Solorzano is a 73 y.o. male with multiple comorbidites;  he was seen for Decision regarding hospitalization or escalation of hospital level care.     Time Spent: I spent 55 minutes caring for Vernon on this date of service. This time includes time spent by me in the following activities: preparing for the visit, reviewing tests, obtaining and/or reviewing a separately obtained history, performing a medically appropriate examination and/or evaluation, counseling and educating the patient/family/caregiver, ordering medications, tests, or procedures, referring and communicating with other health care professionals, documenting information in the medical record, independently interpreting results and communicating that information with the patient/family/caregiver, and care coordination.   I spent 1 minutes on the separately reported service of ECG. This time is not included in the time used to support the E/M service also reported today.        Your medication  list            Accurate as of November 11, 2024  3:06 PM. If you have any questions, ask your nurse or doctor.                CHANGE how you take these medications        Instructions Last Dose Given Next Dose Due   albuterol sulfate  (90 Base) MCG/ACT inhaler  Commonly known as: PROVENTIL HFA;VENTOLIN HFA;PROAIR HFA  What changed: reasons to take this      Inhale 2 puffs Every 4 (Four) Hours As Needed.       Tresiba FlexTouch 100 UNIT/ML solution pen-injector injection  Generic drug: insulin degludec  What changed:   how much to take  how to take this  when to take this  additional instructions      46 units every morning              CONTINUE taking these medications        Instructions Last Dose Given Next Dose Due   acetaminophen 325 MG tablet  Commonly known as: TYLENOL      Take 2 tablets by mouth Every 6 (Six) Hours As Needed for Mild Pain.       apixaban 5 MG tablet tablet  Commonly known as: ELIQUIS      Take 1 tablet by mouth 2 (Two) Times a Day.       atorvastatin 40 MG tablet  Commonly known as: LIPITOR      Take 1 tablet by mouth Every Night.       budesonide-formoterol 160-4.5 MCG/ACT inhaler  Commonly known as: SYMBICORT      Inhale 2 puffs 2 (Two) Times a Day.       calcium carbonate 500 MG chewable tablet  Commonly known as: TUMS      Chew 2 tablets 4 (Four) Times a Day As Needed for Indigestion or Heartburn.       calcium carbonate 600 MG tablet  Commonly known as: OS-QUE      Take 1 tablet by mouth Daily.       Entresto 24-26 MG tablet  Generic drug: sacubitril-valsartan      Take 1 tablet by mouth 2 (Two) Times a Day.       FreeStyle Ysabel 2 Sensor misc      1 each by Other route Every 14 (Fourteen) Days. Use 1 sensor every 14 days  Indications: Type 2 Diabetes       Insulin Lispro (1 Unit Dial) 100 UNIT/ML solution pen-injector  Commonly known as: HumaLOG KwikPen      Inject 5 Units under the skin into the appropriate area as directed 3 (Three) Times a Day. Indications: Type 2 Diabetes        Insulin Pen Needle 32G X 4 MM misc      1 each by Other route 3 (Three) Times a Day. Use to inject insulin under the skin via pens up to 4 times daily  Indications: Type 2 Diabetes       metoprolol succinate XL 25 MG 24 hr tablet  Commonly known as: TOPROL-XL      Take 1 tablet by mouth 2 (Two) Times a Day.       OneTouch Verio test strip  Generic drug: glucose blood      1 each by Other route As Needed. Use as instructed       pantoprazole 40 MG EC tablet  Commonly known as: PROTONIX      TAKE 1 TABLET BY MOUTH DAILY       potassium chloride 20 MEQ CR tablet  Commonly known as: KLOR-CON M20      Take 1 tablet by mouth Daily.       PRESERVISION AREDS 2+MULTI VIT PO      Take 1 tablet by mouth 2 (Two) Times a Day.       sertraline 25 MG tablet  Commonly known as: Zoloft      Take 1 tablet by mouth Daily.       torsemide 20 MG tablet  Commonly known as: DEMADEX      Take 2 tablets by mouth 2 (Two) Times a Day.                Patient is no longer taking -.  I corrected the med list to reflect this.  I did not stop these medications.      Dictated utilizing Dragon dictation

## 2024-11-11 NOTE — TELEPHONE ENCOUNTER
Pt's wife called back. Let her know that pt needs lab work before appt. Order placed for BNP (BMP order was already in). She verbalized understanding.    Thank you,    Shanda Pascual, RN  [place] [facility]  11/11/24 09:21 EST

## 2024-11-11 NOTE — H&P (VIEW-ONLY)
Date of Office Visit: 2024  Encounter Provider: Karishma Arango MD  Place of Service: [facility] CARDIOLOGY  Patient Name: Vernon Solorzano  :1950    Chief complaint  Mitral valve replacement, atrial flutter, cardiomyopathy.    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  In 2022 he developed COVID-pneumonia possible COVID pneumonitis and progressive shortness of breath with CT findings also demonstrating calcification LAD and circumflex vessel..  He was also noted to have significant pulmonary hypertension with RVSP of 60 mmHg.  By 2023 he was found to have severe mitral regurgitation with annular dilatation and moderate severe tricuspid regurgitation.  He underwent mitral valve repair with annuloplasty ring with residual mild to moderate regurgitation subsequent mitral valve replacement with 29 mm Schwartz 2 porcine prosthesis.  He also had tricuspid valve repair and left atrial appendage closure.  Preoperative cardiac cath showed normal coronary arteries.  Postoperatively he also had a pericardial effusion which slowly resolved.  He initially had LV systolic dysfunction with an ejection fraction reduced to 25% though on medical therapy and with treatment of CPAP by 2024 ejection fraction 54% with mild left ventricular hypertrophy, indeterminate diastolic function, mitral valve prosthesis in place with a mean gradient of 11 mmHg and a pressure half-time of 73 ms suggestive of hyperdynamic state or patient valve mismatch.  Right-sided chambers were not well-seen.  Aortic valve was thickened without stenosis.  His postop care has been also complicated by renal insufficiency and poor control of diabetes.  He continued complaint of dyspnea and edema.  On 10/2024 echo showed an ejection fraction 43% with regional wall motion abnormality with normal  prosthetic valve function mildly reduced RV function.  Tricuspid ring present with normal function.  Demadex was increased and patient was to consider reinitiation of Xarelto.  Switch to switch from losartan to Entresto.    Since last visit he is using his CPAP.  Unfortunately he was not able to get to pharmacy with timely manner to start Entresto.  He states he is using CPAP but not using it during the day when he is lying in his chair or bed.  Blood work today shows worsening proBNP, renal function glucose of 344 and his pressure is 100/60 he has significant edema.  He denies any palpitations or chest pain or dizziness.  He has modest dyspnea with minimal activity.        Past Medical History:   Diagnosis Date    Allergic rhinitis     Arthritis     BPH (benign prostatic hyperplasia)     Diabetes mellitus     TYPE 2    Foraminal stenosis of cervical region     C5-C6    GERD (gastroesophageal reflux disease)     Hemorrhoids     History of urinary retention 2010    S/P TURP    Hyperlipidemia     Macular degeneration     PING (obstructive sleep apnea) 01/07/2016    MILD, SEES DR. AMARILIS MORGAN    Pericardial effusion, acute      Past Surgical History:   Procedure Laterality Date    BRONCHOSCOPY N/A 7/10/2024    Procedure: BRONCHOSCOPY WITH C-ARM with biopsies and washing;  Surgeon: Luis Slater Jr., MD;  Location: [facility] ENDOSCOPY;  Service: Pulmonary;  Laterality: N/A;  pre-possible sarcoidosis; multiple nodules  post-multiple nodules    CARDIAC CATHETERIZATION N/A 5/11/2023    Procedure: Right Heart Cath;  Surgeon: Corey Gaffney MD;  Location: [facility] CATH INVASIVE LOCATION;  Service: Cardiology;  Laterality: N/A;    CARDIAC CATHETERIZATION N/A 5/11/2023    Procedure: Left Heart Cath;  Surgeon: Corey Gaffney MD;  Location: [facility] CATH INVASIVE LOCATION;  Service: Cardiology;  Laterality: N/A;    CARDIAC CATHETERIZATION N/A 5/11/2023    Procedure: Left ventriculography;  Surgeon: Corey Gaffney MD;   Location: [facility] CATH INVASIVE LOCATION;  Service: Cardiology;  Laterality: N/A;    CARDIAC CATHETERIZATION N/A 5/11/2023    Procedure: Coronary angiography;  Surgeon: Corey Gaffney MD;  Location: [facility] CATH INVASIVE LOCATION;  Service: Cardiology;  Laterality: N/A;    COLONOSCOPY N/A     WNL PER PT, NO RECORDS,     COLONOSCOPY N/A 04/07/2009    DR. GORGE BENAVIDEZ AT [place]    MITRAL VALVE REPAIR/REPLACEMENT N/A 5/24/2023    Procedure: MITRAL VALVE REPAIR/REPLACEMENT TRICUSPID VALVE REPAIR/REPLACEMENT TRANSESOPHAGEAL ECHOCARDIOGRAM WITH ANESTHESIA;  Surgeon: George Frey MD;  Location: [facility] CVOR;  Service: Cardiothoracic;  Laterality: N/A;    PROSTATE SURGERY N/A 11/12/2010    TURP, DR. COREY COLLAZO AT [facility]    SKIN TAG REMOVAL N/A 12/20/2017    ANAL SKIN TAG X2, PERFORMED IN OFFICE, DR. LISA ORANTES    TURP / TRANSURETHRAL INCISION / DRAINAGE PROSTATE  2010    Dr. Goodrich     Outpatient Medications Prior to Visit   Medication Sig Dispense Refill    acetaminophen (TYLENOL) 325 MG tablet Take 2 tablets by mouth Every 6 (Six) Hours As Needed for Mild Pain.      albuterol sulfate  (90 Base) MCG/ACT inhaler Inhale 2 puffs Every 4 (Four) Hours As Needed. (Patient taking differently: Inhale 2 puffs Every 4 (Four) Hours As Needed for Wheezing or Shortness of Air.)      apixaban (ELIQUIS) 5 MG tablet tablet Take 1 tablet by mouth 2 (Two) Times a Day. 180 tablet 3    atorvastatin (LIPITOR) 40 MG tablet Take 1 tablet by mouth Every Night. 30 tablet 1    budesonide-formoterol (SYMBICORT) 160-4.5 MCG/ACT inhaler Inhale 2 puffs 2 (Two) Times a Day.      calcium carbonate (OS-QUE) 600 MG tablet Take 1 tablet by mouth Daily.      calcium carbonate (TUMS) 500 MG chewable tablet Chew 2 tablets 4 (Four) Times a Day As Needed for Indigestion or Heartburn.      Continuous Blood Gluc Sensor (FreeStyle Ysabel 2 Sensor) misc 1 each by Other route Every 14 (Fourteen) Days. Use 1 sensor every 14 days   Indications: Type 2 Diabetes 6 each 3    glucose blood (OneTouch Verio) test strip 1 each by Other route As Needed. Use as instructed      insulin degludec (Tresiba FlexTouch) 100 UNIT/ML solution pen-injector injection 46 units every morning (Patient taking differently: Inject 55 Units under the skin into the appropriate area as directed Daily. 50units every morning) 15 mL 6    Insulin Lispro, 1 Unit Dial, (HumaLOG KwikPen) 100 UNIT/ML solution pen-injector Inject 5 Units under the skin into the appropriate area as directed 3 (Three) Times a Day. Indications: Type 2 Diabetes 14 mL 2    Insulin Pen Needle 32G X 4 MM misc 1 each by Other route 3 (Three) Times a Day. Use to inject insulin under the skin via pens up to 4 times daily  Indications: Type 2 Diabetes 300 each 3    metoprolol succinate XL (TOPROL-XL) 25 MG 24 hr tablet Take 1 tablet by mouth 2 (Two) Times a Day. 180 tablet 2    Multiple Vitamins-Minerals (PRESERVISION AREDS 2+MULTI VIT PO) Take 1 tablet by mouth 2 (Two) Times a Day.      pantoprazole (PROTONIX) 40 MG EC tablet TAKE 1 TABLET BY MOUTH DAILY 90 tablet 1    potassium chloride (K-DUR,KLOR-CON) 20 MEQ CR tablet Take 1 tablet by mouth Daily. 30 tablet 1    sacubitril-valsartan (Entresto) 24-26 MG tablet Take 1 tablet by mouth 2 (Two) Times a Day. 180 tablet 3    sertraline (Zoloft) 25 MG tablet Take 1 tablet by mouth Daily. 30 tablet 2    torsemide (DEMADEX) 20 MG tablet Take 2 tablets by mouth 2 (Two) Times a Day. 360 tablet 1    omeprazole (priLOSEC) 40 MG capsule Take 1 capsule by mouth Daily. (Patient not taking: Reported on 11/11/2024)       No facility-administered medications prior to visit.       Allergies as of 11/11/2024    (No Known Allergies)     Social History     Socioeconomic History    Marital status:    Tobacco Use    Smoking status: Never     Passive exposure: Never    Smokeless tobacco: Never   Vaping Use    Vaping status: Never Used   Substance and Sexual Activity     "Alcohol use: Yes     Alcohol/week: 1.0 standard drink of alcohol     Types: 1 Drinks containing 0.5 oz of alcohol per week     Comment: RARELY    Drug use: No    Sexual activity: Defer     Partners: Female     Family History   Problem Relation Age of Onset    Lung cancer Mother     Stroke Father     Dementia Father     Hyperlipidemia Brother     Hypertension Brother     Heart disease Brother     Diabetes Brother     Colon polyps Brother     Colon polyps Brother     Malig Hyperthermia Neg Hx      Review of Systems   Constitutional: Positive for malaise/fatigue. Negative for chills, fever, weight gain and weight loss.   Cardiovascular:  Positive for dyspnea on exertion and leg swelling.   Respiratory:  Positive for snoring. Negative for cough and wheezing.    Hematologic/Lymphatic: Negative for bleeding problem. Does not bruise/bleed easily.   Skin:  Negative for color change.   Musculoskeletal:  Positive for joint pain. Negative for falls and myalgias.   Gastrointestinal:  Negative for melena.   Genitourinary:  Negative for hematuria.   Neurological:  Negative for excessive daytime sleepiness.   Psychiatric/Behavioral:  Negative for depression. The patient is not nervous/anxious.         Objective:     Vitals:    11/11/24 1224   BP: 100/60   Pulse: 79   Weight: 127 kg (279 lb)   Height: 185.4 cm (73\")     Body mass index is 36.81 kg/m².    Vitals reviewed.   Constitutional:       Appearance: Well-developed. Obese.   Eyes:      General: No scleral icterus.        Right eye: No discharge.      Conjunctiva/sclera: Conjunctivae normal.      Pupils: Pupils are equal, round, and reactive to light.   HENT:      Head: Normocephalic.      Nose: Nose normal.   Neck:      Thyroid: No thyromegaly.      Vascular: No JVD.   Pulmonary:      Effort: Pulmonary effort is normal. No respiratory distress.      Breath sounds: Normal breath sounds. No wheezing. No rales.   Cardiovascular:      Normal rate. Regular rhythm. Normal S1. " Normal S2.       Murmurs: There is no murmur.      No gallop.       Comments: Mild sacral edema is present  Pulses:     Intact distal pulses.      Carotid: 2+ bilaterally.     Radial: 2+ bilaterally.     Femoral: 2+ bilaterally.     Popliteal: 2+ bilaterally.     Dorsalis pedis: 2+ bilaterally.     Posterior tibial: 2+ bilaterally.  Edema:     Peripheral edema present.     Thigh: 1+ edema of the right thigh.     Pretibial: 1+ edema of the left pretibial area and 3+ edema of the right pretibial area.     Ankle: 1+ edema of the left ankle and 3+ edema of the right ankle.  Abdominal:      General: Bowel sounds are normal. There is no distension.      Palpations: Abdomen is soft.      Tenderness: There is no abdominal tenderness. There is no rebound.   Musculoskeletal: Normal range of motion.         General: No tenderness.      Cervical back: Normal range of motion and neck supple. Skin:     General: Skin is warm and dry.      Findings: No erythema or rash.   Neurological:      Mental Status: Alert and oriented to person, place, and time.   Psychiatric:         Behavior: Behavior normal.         Thought Content: Thought content normal.         Judgment: Judgment normal.       Lab Review:   Lab Results - Last 18 Months   Lab Units 06/11/24  1438 05/16/24  1156 04/17/24  1421 09/14/23  1530 08/17/23  0831   WBC 10*3/mm3 9.01  --  8.06   < > 7.85   RBC 10*6/mm3 4.61  --  4.57   < > 4.41   HEMOGLOBIN g/dL 12.6* 12.9* 11.7*   < > 11.9*   HEMATOCRIT % 37.3* 40.5 35.8*   < > 36.4*   MCV fL 80.9  --  78.3*   < > 82.5   MCH pg 27.3  --  25.6*   < > 27.0   MCHC g/dL 33.8  --  32.7   < > 32.7   RDW % 15.2  --  15.4   < > 14.4   PLATELETS 10*3/mm3 157  --  158   < > 225   NEUTROPHIL % % 71.3  --   --   --  69.7   LYMPHOCYTE % % 11.9*  --   --   --  11.6*   MONOCYTES % % 10.5  --   --   --  11.3   EOSINOPHIL % % 5.1  --   --   --  6.1   BASOPHIL % % 0.8  --   --   --  0.8   NEUTROS ABS 10*3/mm3 6.42  --   --   --  5.47   LYMPHS ABS  10*3/mm3 1.07  --   --   --  0.91   MONOS ABS 10*3/mm3 0.95*  --   --   --  0.89   EOS ABS 10*3/mm3 0.46*  --   --   --  0.48*   BASOS ABS 10*3/mm3 0.07  --   --   --  0.06   RDW-SD fl 43.9  --  42.4   < > 42.8   MPV fL 9.7  --  9.9   < > 9.5    < > = values in this interval not displayed.     Lab Results - Last 18 Months   Lab Units 11/11/24  1122 10/22/24  1403   GLUCOSE mg/dL 344* 242*   BUN mg/dL 20 21   CREATININE mg/dL 1.65* 1.50*   SODIUM mmol/L 138 136   POTASSIUM mmol/L 4.3 4.0   CHLORIDE mmol/L 101 96*   CO2 mmol/L 25.2 30.2*   CALCIUM mg/dL 9.0 11.4*   BUN / CREAT RATIO  12.1 14.0   ANION GAP mmol/L 11.8 9.8   EGFR mL/min/1.73 43.0* 48.2*     Lab Results - Last 18 Months   Lab Units 11/11/24  1122 10/22/24  1403 06/11/24  1438 04/17/24  1421 02/13/24  1211 09/14/23  1530 08/17/23  0831   GLUCOSE mg/dL 344* 242* 198*   < > 419*   < > 323*   BUN mg/dL 20 21 22   < > 22   < > 25*   CREATININE mg/dL 1.65* 1.50* 1.51*   < > 1.57*   < > 1.50*   SODIUM mmol/L 138 136 138   < > 136   < > 140   POTASSIUM mmol/L 4.3 4.0 4.0   < > 5.0   < > 3.8   CHLORIDE mmol/L 101 96* 101   < > 98   < > 99   CO2 mmol/L 25.2 30.2* 25.0   < > 24.5   < > 27.7   CALCIUM mg/dL 9.0 11.4* 9.0   < > 8.9   < > 9.7   TOTAL PROTEIN g/dL  --   --  6.9  --   --   --  6.9   ALBUMIN g/dL  --   --  4.0  --  4.2  --  4.0   ALT (SGPT) U/L  --   --  16  --  14  --  19   AST (SGOT) U/L  --   --  10  --  14  --  21   ALK PHOS U/L  --   --  98  --  101  --  111   BILIRUBIN mg/dL  --   --  0.4  --  0.4  --  0.5   GLOBULIN gm/dL  --   --  2.9  --   --   --  2.9   A/G RATIO g/dL  --   --  1.4  --   --   --  1.4   BUN / CREAT RATIO  12.1 14.0 14.6   < > 14.0   < > 16.7   ANION GAP mmol/L 11.8 9.8 12.0   < >  --    < > 13.3   EGFR mL/min/1.73 43.0* 48.2* 47.9*   < >  --    < > 48.6*    < > = values in this interval not displayed.     Lab Results - Last 18 Months   Lab Units 02/13/24  1211 08/17/23  0831 05/23/23  1429   CHOLESTEROL mg/dL  --  116 155    TRIGLYCERIDES mg/dL 191* 221* 197*   HDL CHOL mg/dL 31* 34* 41   LDL CHOL mg/dL 43 47 81   VLDL CHOL mg/dL  --  35 33   VLDL CHOLESTEROL QUE mg/dL 31  --   --    LDL/HDL RATIO  1.15 1.11 1.82     Lab Results - Last 18 Months   Lab Units 11/11/24  1122 10/16/24  1352   PROBNP pg/mL 3,327.0* 3,078.0*     Lab Results - Last 18 Months   Lab Units 06/25/23  0944 06/25/23  0739   HSTROP T ng/L 95* 97*     Lab Results - Last 18 Months   Lab Units 02/13/24  1211 08/17/23  0831   TSH uIU/mL 1.760 2.320     Lab Results - Last 18 Months   Lab Units 06/04/23  1410 06/04/23  0319 06/01/23  2318 06/01/23  1555 05/25/23  0307   PROTIME Seconds  --   --   --  13.4 15.0*   APTT seconds 30.3 151.6*   < > 24.2  --     < > = values in this interval not displayed.           ECG 12 Lead    Date/Time: 11/11/2024 12:43 PM  Performed by: Karishma Arango MD    Authorized by: Karishma Arango MD  Comparison: compared with previous ECG   Similar to previous ECG  Rhythm: sinus rhythm  Conduction: right bundle branch block, left anterior fascicular block and 1st degree AV block            Diagnosis Plan   1. Nonrheumatic mitral valve regurgitation        2. Persistent atrial fibrillation        3. New onset of congestive heart failure        4. PING (obstructive sleep apnea)          Plan:         1.  Acute on chronic systolic and diastolic heart failure.  EF 43% by echo 10/16/2024. Demadex was increased several weeks ago but edema,  shortness of breath fatigue no better with worsening renal function and borderline low blood pressure.  Glucose also 344.  I recommended hospitalization for IV diuresis with nephrology and internal medicine support.  Hopefully can start Entresto while in hospital.    2.  Mitral valve replacement and tricuspid valve repair 5/2023.  Echo 5/2024 with slightly increased mitral valve mean gradient consistent with hyperdynamic state though there may be a small component mitral stenosis though recent echo did not demonstrate  this.  2.  Recurrent cardiomyopathy with regional wall motion abnormality at this time.  In part due to poor compliance with medical therapy.  EF had decreased to 43% from prior study 4/2024.  As above.  3.  Poorly controlled diabetes  4.  Renal insufficiency.  Creatinine further increase to 1.65 with GFR 43 today.  Unclear if he has been taking all of thed Demadex at home.  His wife had been away for short period of time.  Will ask nephrology to review  5.  Hypotension  6.  Hyperlipidemia  7.  Postop encephalopathy and encephalopathy.  Slowly improving.   9.  Paroxysmal atrial fibrillation.  He has had atrial appendage closure.  Subsequent monitor 6/2024 was normal.  Today  10.  Obstructive sleep apnea.  As above.  Of note at home he has been spending many hours laying in bed or couch without CPAP.  I asked him to use his CPAP whenever he is laying around as he may intermittently fall asleep.    This visit was of HIGH complexity medical decision making.    Vernon Solorzano is a 73 y.o. male with multiple comorbidites;  he was seen for Decision regarding hospitalization or escalation of hospital level care.     Time Spent: I spent 55 minutes caring for Vernon on this date of service. This time includes time spent by me in the following activities: preparing for the visit, reviewing tests, obtaining and/or reviewing a separately obtained history, performing a medically appropriate examination and/or evaluation, counseling and educating the patient/family/caregiver, ordering medications, tests, or procedures, referring and communicating with other health care professionals, documenting information in the medical record, independently interpreting results and communicating that information with the patient/family/caregiver, and care coordination.   I spent 1 minutes on the separately reported service of ECG. This time is not included in the time used to support the E/M service also reported today.        Your medication  list            Accurate as of November 11, 2024  3:06 PM. If you have any questions, ask your nurse or doctor.                CHANGE how you take these medications        Instructions Last Dose Given Next Dose Due   albuterol sulfate  (90 Base) MCG/ACT inhaler  Commonly known as: PROVENTIL HFA;VENTOLIN HFA;PROAIR HFA  What changed: reasons to take this      Inhale 2 puffs Every 4 (Four) Hours As Needed.       Tresiba FlexTouch 100 UNIT/ML solution pen-injector injection  Generic drug: insulin degludec  What changed:   how much to take  how to take this  when to take this  additional instructions      46 units every morning              CONTINUE taking these medications        Instructions Last Dose Given Next Dose Due   acetaminophen 325 MG tablet  Commonly known as: TYLENOL      Take 2 tablets by mouth Every 6 (Six) Hours As Needed for Mild Pain.       apixaban 5 MG tablet tablet  Commonly known as: ELIQUIS      Take 1 tablet by mouth 2 (Two) Times a Day.       atorvastatin 40 MG tablet  Commonly known as: LIPITOR      Take 1 tablet by mouth Every Night.       budesonide-formoterol 160-4.5 MCG/ACT inhaler  Commonly known as: SYMBICORT      Inhale 2 puffs 2 (Two) Times a Day.       calcium carbonate 500 MG chewable tablet  Commonly known as: TUMS      Chew 2 tablets 4 (Four) Times a Day As Needed for Indigestion or Heartburn.       calcium carbonate 600 MG tablet  Commonly known as: OS-QUE      Take 1 tablet by mouth Daily.       Entresto 24-26 MG tablet  Generic drug: sacubitril-valsartan      Take 1 tablet by mouth 2 (Two) Times a Day.       FreeStyle Ysabel 2 Sensor misc      1 each by Other route Every 14 (Fourteen) Days. Use 1 sensor every 14 days  Indications: Type 2 Diabetes       Insulin Lispro (1 Unit Dial) 100 UNIT/ML solution pen-injector  Commonly known as: HumaLOG KwikPen      Inject 5 Units under the skin into the appropriate area as directed 3 (Three) Times a Day. Indications: Type 2 Diabetes        Insulin Pen Needle 32G X 4 MM misc      1 each by Other route 3 (Three) Times a Day. Use to inject insulin under the skin via pens up to 4 times daily  Indications: Type 2 Diabetes       metoprolol succinate XL 25 MG 24 hr tablet  Commonly known as: TOPROL-XL      Take 1 tablet by mouth 2 (Two) Times a Day.       OneTouch Verio test strip  Generic drug: glucose blood      1 each by Other route As Needed. Use as instructed       pantoprazole 40 MG EC tablet  Commonly known as: PROTONIX      TAKE 1 TABLET BY MOUTH DAILY       potassium chloride 20 MEQ CR tablet  Commonly known as: KLOR-CON M20      Take 1 tablet by mouth Daily.       PRESERVISION AREDS 2+MULTI VIT PO      Take 1 tablet by mouth 2 (Two) Times a Day.       sertraline 25 MG tablet  Commonly known as: Zoloft      Take 1 tablet by mouth Daily.       torsemide 20 MG tablet  Commonly known as: DEMADEX      Take 2 tablets by mouth 2 (Two) Times a Day.                Patient is no longer taking -.  I corrected the med list to reflect this.  I did not stop these medications.      Dictated utilizing Dragon dictation

## 2024-11-11 NOTE — TELEPHONE ENCOUNTER
I tried to call Vernon Solorzano but there was no answer.  Left a voicemail asking patient to call back.  Will continue to try to reach pt.    HUB- if pt calls back, please transfer through to triage.    Thank you,    Maricruz STEELE RN  [place] [facility] [place]  11/11/24 09:12 EST

## 2024-11-12 ENCOUNTER — APPOINTMENT (OUTPATIENT)
Dept: CARDIAC REHAB | Facility: HOSPITAL | Age: 74
End: 2024-11-12
Payer: MEDICARE

## 2024-11-12 PROBLEM — Z79.4 TYPE 2 DIABETES MELLITUS WITH HYPERGLYCEMIA, WITH LONG-TERM CURRENT USE OF INSULIN: Status: ACTIVE | Noted: 2022-06-02

## 2024-11-12 PROBLEM — N17.9 AKI (ACUTE KIDNEY INJURY): Status: ACTIVE | Noted: 2024-11-12

## 2024-11-12 PROBLEM — I50.43 ACUTE ON CHRONIC COMBINED SYSTOLIC AND DIASTOLIC HEART FAILURE: Status: ACTIVE | Noted: 2024-11-12

## 2024-11-12 PROBLEM — E11.65 TYPE 2 DIABETES MELLITUS WITH HYPERGLYCEMIA, WITH LONG-TERM CURRENT USE OF INSULIN: Status: ACTIVE | Noted: 2022-06-02

## 2024-11-12 PROBLEM — I50.33 ACUTE ON CHRONIC DIASTOLIC CHF (CONGESTIVE HEART FAILURE): Status: ACTIVE | Noted: 2024-11-12

## 2024-11-12 LAB
ALBUMIN SERPL-MCNC: 3.5 G/DL (ref 3.5–5.2)
ALBUMIN UR-MCNC: 2.7 MG/DL
ALBUMIN/GLOB SERPL: 1.2 G/DL
ALP SERPL-CCNC: 123 U/L (ref 39–117)
ALT SERPL W P-5'-P-CCNC: 32 U/L (ref 1–41)
ANION GAP SERPL CALCULATED.3IONS-SCNC: 8.8 MMOL/L (ref 5–15)
AST SERPL-CCNC: 24 U/L (ref 1–40)
BILIRUB SERPL-MCNC: 0.6 MG/DL (ref 0–1.2)
BUN SERPL-MCNC: 18 MG/DL (ref 8–23)
BUN/CREAT SERPL: 12.9 (ref 7–25)
CALCIUM SPEC-SCNC: 9 MG/DL (ref 8.6–10.5)
CHLORIDE SERPL-SCNC: 103 MMOL/L (ref 98–107)
CO2 SERPL-SCNC: 27.2 MMOL/L (ref 22–29)
CREAT SERPL-MCNC: 1.39 MG/DL (ref 0.76–1.27)
CREAT UR-MCNC: 123.2 MG/DL
CREAT UR-MCNC: 123.2 MG/DL
EGFRCR SERPLBLD CKD-EPI 2021: 52.9 ML/MIN/1.73
GLOBULIN UR ELPH-MCNC: 3 GM/DL
GLUCOSE BLDC GLUCOMTR-MCNC: 115 MG/DL (ref 70–130)
GLUCOSE BLDC GLUCOMTR-MCNC: 165 MG/DL (ref 70–130)
GLUCOSE BLDC GLUCOMTR-MCNC: 190 MG/DL (ref 70–130)
GLUCOSE BLDC GLUCOMTR-MCNC: 248 MG/DL (ref 70–130)
GLUCOSE SERPL-MCNC: 110 MG/DL (ref 65–99)
HBA1C MFR BLD: 12.1 % (ref 4.8–5.6)
MICROALBUMIN/CREAT UR: 21.9 MG/G (ref 0–29)
POTASSIUM SERPL-SCNC: 3.8 MMOL/L (ref 3.5–5.2)
PROT ?TM UR-MCNC: 13 MG/DL
PROT SERPL-MCNC: 6.5 G/DL (ref 6–8.5)
PROT/CREAT UR: 105.5 MG/G CREA (ref 0–200)
SODIUM SERPL-SCNC: 139 MMOL/L (ref 136–145)
SODIUM UR-SCNC: 73 MMOL/L

## 2024-11-12 PROCEDURE — 97161 PT EVAL LOW COMPLEX 20 MIN: CPT

## 2024-11-12 PROCEDURE — 82043 UR ALBUMIN QUANTITATIVE: CPT

## 2024-11-12 PROCEDURE — 83036 HEMOGLOBIN GLYCOSYLATED A1C: CPT | Performed by: HOSPITALIST

## 2024-11-12 PROCEDURE — 94799 UNLISTED PULMONARY SVC/PX: CPT

## 2024-11-12 PROCEDURE — 84300 ASSAY OF URINE SODIUM: CPT

## 2024-11-12 PROCEDURE — 82948 REAGENT STRIP/BLOOD GLUCOSE: CPT

## 2024-11-12 PROCEDURE — 94761 N-INVAS EAR/PLS OXIMETRY MLT: CPT

## 2024-11-12 PROCEDURE — 94640 AIRWAY INHALATION TREATMENT: CPT

## 2024-11-12 PROCEDURE — 5A09357 ASSISTANCE WITH RESPIRATORY VENTILATION, LESS THAN 24 CONSECUTIVE HOURS, CONTINUOUS POSITIVE AIRWAY PRESSURE: ICD-10-PCS | Performed by: HOSPITALIST

## 2024-11-12 PROCEDURE — 25010000002 FUROSEMIDE PER 20 MG: Performed by: INTERNAL MEDICINE

## 2024-11-12 PROCEDURE — 94760 N-INVAS EAR/PLS OXIMETRY 1: CPT

## 2024-11-12 PROCEDURE — 84156 ASSAY OF PROTEIN URINE: CPT

## 2024-11-12 PROCEDURE — 25010000002 FUROSEMIDE PER 20 MG: Performed by: HOSPITALIST

## 2024-11-12 PROCEDURE — 99232 SBSQ HOSP IP/OBS MODERATE 35: CPT | Performed by: INTERNAL MEDICINE

## 2024-11-12 PROCEDURE — 97530 THERAPEUTIC ACTIVITIES: CPT

## 2024-11-12 PROCEDURE — 63710000001 INSULIN GLARGINE PER 5 UNITS: Performed by: INTERNAL MEDICINE

## 2024-11-12 PROCEDURE — 80053 COMPREHEN METABOLIC PANEL: CPT | Performed by: INTERNAL MEDICINE

## 2024-11-12 PROCEDURE — 82570 ASSAY OF URINE CREATININE: CPT

## 2024-11-12 PROCEDURE — 94660 CPAP INITIATION&MGMT: CPT

## 2024-11-12 PROCEDURE — 63710000001 INSULIN LISPRO (HUMAN) PER 5 UNITS: Performed by: INTERNAL MEDICINE

## 2024-11-12 RX ORDER — TORSEMIDE 100 MG/1
100 TABLET ORAL DAILY
Status: DISCONTINUED | OUTPATIENT
Start: 2024-11-12 | End: 2024-11-12

## 2024-11-12 RX ORDER — FUROSEMIDE 10 MG/ML
40 INJECTION INTRAMUSCULAR; INTRAVENOUS EVERY 12 HOURS
Status: COMPLETED | OUTPATIENT
Start: 2024-11-12 | End: 2024-11-12

## 2024-11-12 RX ORDER — TORSEMIDE 100 MG/1
100 TABLET ORAL DAILY
Status: DISCONTINUED | OUTPATIENT
Start: 2024-11-13 | End: 2024-11-13 | Stop reason: HOSPADM

## 2024-11-12 RX ADMIN — PANTOPRAZOLE SODIUM 40 MG: 40 TABLET, DELAYED RELEASE ORAL at 08:58

## 2024-11-12 RX ADMIN — METOPROLOL SUCCINATE 25 MG: 25 TABLET, EXTENDED RELEASE ORAL at 20:00

## 2024-11-12 RX ADMIN — APIXABAN 5 MG: 5 TABLET, FILM COATED ORAL at 20:00

## 2024-11-12 RX ADMIN — BUDESONIDE AND FORMOTEROL FUMARATE DIHYDRATE 2 PUFF: 160; 4.5 AEROSOL RESPIRATORY (INHALATION) at 20:57

## 2024-11-12 RX ADMIN — METOPROLOL SUCCINATE 25 MG: 25 TABLET, EXTENDED RELEASE ORAL at 08:58

## 2024-11-12 RX ADMIN — INSULIN LISPRO 2 UNITS: 100 INJECTION, SOLUTION INTRAVENOUS; SUBCUTANEOUS at 17:01

## 2024-11-12 RX ADMIN — INSULIN GLARGINE 55 UNITS: 100 INJECTION, SOLUTION SUBCUTANEOUS at 08:59

## 2024-11-12 RX ADMIN — SERTRALINE 25 MG: 50 TABLET, FILM COATED ORAL at 08:58

## 2024-11-12 RX ADMIN — ATORVASTATIN CALCIUM 40 MG: 20 TABLET, FILM COATED ORAL at 20:00

## 2024-11-12 RX ADMIN — INSULIN LISPRO 5 UNITS: 100 INJECTION, SOLUTION INTRAVENOUS; SUBCUTANEOUS at 20:50

## 2024-11-12 RX ADMIN — INSULIN LISPRO 5 UNITS: 100 INJECTION, SOLUTION INTRAVENOUS; SUBCUTANEOUS at 17:01

## 2024-11-12 RX ADMIN — FUROSEMIDE 40 MG: 10 INJECTION, SOLUTION INTRAMUSCULAR; INTRAVENOUS at 05:14

## 2024-11-12 RX ADMIN — Medication 500 MG: at 08:59

## 2024-11-12 RX ADMIN — INSULIN LISPRO 3 UNITS: 100 INJECTION, SOLUTION INTRAVENOUS; SUBCUTANEOUS at 20:50

## 2024-11-12 RX ADMIN — BUDESONIDE AND FORMOTEROL FUMARATE DIHYDRATE 2 PUFF: 160; 4.5 AEROSOL RESPIRATORY (INHALATION) at 10:37

## 2024-11-12 RX ADMIN — FUROSEMIDE 40 MG: 10 INJECTION, SOLUTION INTRAMUSCULAR; INTRAVENOUS at 17:01

## 2024-11-12 RX ADMIN — APIXABAN 5 MG: 5 TABLET, FILM COATED ORAL at 08:58

## 2024-11-12 RX ADMIN — POTASSIUM CHLORIDE 20 MEQ: 750 TABLET, EXTENDED RELEASE ORAL at 08:58

## 2024-11-12 RX ADMIN — INSULIN LISPRO 2 UNITS: 100 INJECTION, SOLUTION INTRAVENOUS; SUBCUTANEOUS at 12:33

## 2024-11-12 NOTE — CASE MANAGEMENT/SOCIAL WORK
Continued Stay Note  [facility]     Patient Name: Vernon Solorzano  MRN: [id number]  Today's Date: 11/12/2024    Admit Date: 11/11/2024    Plan: Home with wife and continue Pulmonary Rehab at D/C. Denies any needs. Family transport.   Discharge Plan       Row Name 11/12/24 0018       Plan    Plan Home with wife and continue Pulmonary Rehab at D/C. Denies any needs. Family transport.    Patient/Family in Agreement with Plan yes    Plan Comments Met with pt and wife at bedside. Explained roll of . Face sheet and pharmacy verified. Pt lives with his wife. There are 3 steps to enter home. Home DME includes a CPAP, glucometer, walker, and nebulizer.  Pt is normally independent with ADLs. Pt has been to [facility] for Rehab. He has not used HH. He had OP PT/ST at . Prior to admission he was in Pulmonary Rehab. He plans to finish up Pulmonary Rehab once D/C.  Pt's PCP is [facility].  Enrolled in Meds to Bed. Pt normally drives. At discharge, family will transport. HgbA1C 12 on admit. Diabetic Clinician consulted and has seen pt. Ambulated 150 ft in hallway with PT today. Denies any D/C needs. Explained that CCP would follow to assess for discharge needs.  Rob Roth RN-BC                   Discharge Codes    No documentation.                 Expected Discharge Date and Time       Expected Discharge Date Expected Discharge Time    Nov 13, 2024               Rob Roth RN

## 2024-11-12 NOTE — PLAN OF CARE
Goal Outcome Evaluation:  Plan of Care Reviewed With: patient        Progress: improving  Outcome Evaluation: IV lasix given per order, output in chart. Fluid restriction started. VSS, no c/p pain or SOA. Up to bathroom with standby assist. Call light within reach.

## 2024-11-12 NOTE — CONSULTS
CONSULT NOTE    INTERNAL MEDICINE   [facility]       Patient Identification:  Name: Vernon Solorzano  Age: 73 y.o.  Sex: male  :  1950  MRN: [id number]             Date of Consultation:  24          Primary Care Physician: Liza Oconnell III, NP-C                               Requesting Physician: dr max  Reason for Consultation:medical management    Chief Complaint:  73 year old gentleman was admitted by cardiology with a chf exacerbation; we are asked to see him regarding management of his diabetes; he reports  that he is feeling well; no visitors are present;     History of Present Illness:   As above      Past Medical History:  Past Medical History:   Diagnosis Date    Allergic rhinitis     Arthritis     BPH (benign prostatic hyperplasia)     Diabetes mellitus     TYPE 2    Foraminal stenosis of cervical region     C5-C6    GERD (gastroesophageal reflux disease)     Hemorrhoids     History of urinary retention     S/P TURP    Hyperlipidemia     Macular degeneration     PING (obstructive sleep apnea) 2016    MILD, SEES DR. AMARILIS MORGAN    Pericardial effusion, acute      Past Surgical History:  Past Surgical History:   Procedure Laterality Date    BRONCHOSCOPY N/A 7/10/2024    Procedure: BRONCHOSCOPY WITH C-ARM with biopsies and washing;  Surgeon: Luis Slater Jr., MD;  Location: [facility] ENDOSCOPY;  Service: Pulmonary;  Laterality: N/A;  pre-possible sarcoidosis; multiple nodules  post-multiple nodules    CARDIAC CATHETERIZATION N/A 2023    Procedure: Right Heart Cath;  Surgeon: Corey Gaffney MD;  [facility];  Service: Cardiology;  Laterality: N/A;    CARDIAC CATHETERIZATION N/A 2023    Procedure: Left Heart Cath;  Surgeon: Corey Gaffney MD;  Location: [facility] CATH INVASIVE LOCATION;  Service: Cardiology;  Laterality: N/A;    CARDIAC CATHETERIZATION N/A 2023    Procedure: Left ventriculography;   Surgeon: Corey Gaffney MD;  Location: [facility] CATH INVASIVE LOCATION;  Service: Cardiology;  Laterality: N/A;    CARDIAC CATHETERIZATION N/A 5/11/2023    Procedure: Coronary angiography;  Surgeon: Corey Gaffney MD;  [facility];  Service: Cardiology;  Laterality: N/A;    COLONOSCOPY N/A     WNL PER PT, NO RECORDS,     COLONOSCOPY N/A 04/07/2009    DR. GORGE BENAVIDEZ AT [place]    MITRAL VALVE REPAIR/REPLACEMENT N/A 5/24/2023    Procedure: MITRAL VALVE REPAIR/REPLACEMENT TRICUSPID VALVE REPAIR/REPLACEMENT TRANSESOPHAGEAL ECHOCARDIOGRAM WITH ANESTHESIA;  Surgeon: George Frey MD;  Location: [facility] CVOR;  Service: Cardiothoracic;  Laterality: N/A;    PROSTATE SURGERY N/A 11/12/2010    TURP, DR. COREY COLLAZO AT [facility]    SKIN TAG REMOVAL N/A 12/20/2017    ANAL SKIN TAG X2, PERFORMED IN OFFICE, DR. LISA CHANGP / TRANSURETHRAL INCISION / DRAINAGE PROSTATE  2010    Dr. Goodrich      Home Meds:  Medications Prior to Admission   Medication Sig Dispense Refill Last Dose/Taking    acetaminophen (TYLENOL) 325 MG tablet Take 2 tablets by mouth Every 6 (Six) Hours As Needed for Mild Pain.       albuterol sulfate  (90 Base) MCG/ACT inhaler Inhale 2 puffs Every 4 (Four) Hours As Needed. (Patient taking differently: Inhale 2 puffs Every 4 (Four) Hours As Needed for Wheezing or Shortness of Air.)       apixaban (ELIQUIS) 5 MG tablet tablet Take 1 tablet by mouth 2 (Two) Times a Day. 180 tablet 3     atorvastatin (LIPITOR) 40 MG tablet Take 1 tablet by mouth Every Night. 30 tablet 1     budesonide-formoterol (SYMBICORT) 160-4.5 MCG/ACT inhaler Inhale 2 puffs 2 (Two) Times a Day.       calcium carbonate (OS-QUE) 600 MG tablet Take 1 tablet by mouth Daily.       calcium carbonate (TUMS) 500 MG chewable tablet Chew 2 tablets 4 (Four) Times a Day As Needed for Indigestion or Heartburn.       Continuous Blood Gluc Sensor (FreeStyle Ysabel 2 Sensor) misc 1 each by Other route  Every 14 (Fourteen) Days. Use 1 sensor every 14 days  Indications: Type 2 Diabetes 6 each 3     glucose blood (OneTouch Verio) test strip 1 each by Other route As Needed. Use as instructed       insulin degludec (Tresiba FlexTouch) 100 UNIT/ML solution pen-injector injection 46 units every morning (Patient taking differently: Inject 55 Units under the skin into the appropriate area as directed Daily. 50units every morning) 15 mL 6     Insulin Lispro, 1 Unit Dial, (HumaLOG KwikPen) 100 UNIT/ML solution pen-injector Inject 5 Units under the skin into the appropriate area as directed 3 (Three) Times a Day. Indications: Type 2 Diabetes 14 mL 2     Insulin Pen Needle 32G X 4 MM misc 1 each by Other route 3 (Three) Times a Day. Use to inject insulin under the skin via pens up to 4 times daily  Indications: Type 2 Diabetes 300 each 3     metoprolol succinate XL (TOPROL-XL) 25 MG 24 hr tablet Take 1 tablet by mouth 2 (Two) Times a Day. 180 tablet 2     Multiple Vitamins-Minerals (PRESERVISION AREDS 2+MULTI VIT PO) Take 1 tablet by mouth 2 (Two) Times a Day.       pantoprazole (PROTONIX) 40 MG EC tablet TAKE 1 TABLET BY MOUTH DAILY 90 tablet 1     potassium chloride (K-DUR,KLOR-CON) 20 MEQ CR tablet Take 1 tablet by mouth Daily. 30 tablet 1     sacubitril-valsartan (Entresto) 24-26 MG tablet Take 1 tablet by mouth 2 (Two) Times a Day. 180 tablet 3     sertraline (Zoloft) 25 MG tablet Take 1 tablet by mouth Daily. 30 tablet 2     torsemide (DEMADEX) 20 MG tablet Take 2 tablets by mouth 2 (Two) Times a Day. 360 tablet 1      Current Meds:     Current Facility-Administered Medications:     acetaminophen (TYLENOL) tablet 650 mg, 650 mg, Oral, Q6H PRN, Karishma Arango MD    albuterol (PROVENTIL) nebulizer solution 0.083% 2.5 mg/3mL, 2.5 mg, Nebulization, Q6H PRN, Karishma Arango MD    apixaban (ELIQUIS) tablet 5 mg, 5 mg, Oral, BID, Karishma Arango MD, 5 mg at 11/11/24 2000    atorvastatin (LIPITOR) tablet 40 mg, 40 mg, Oral, Nightly,  Karishma Arango MD, 40 mg at 11/11/24 2000    budesonide-formoterol (SYMBICORT) 160-4.5 MCG/ACT inhaler 2 puff, 2 puff, Inhalation, BID, Karishma Arango MD    dextrose (D50W) (25 g/50 mL) IV injection 25 g, 25 g, Intravenous, Q15 Min PRN, Lyndsay Stallworth MD    dextrose (GLUTOSE) oral gel 15 g, 15 g, Oral, Q15 Min PRN, Lyndsay Stallworth MD    furosemide (LASIX) injection 40 mg, 40 mg, Intravenous, Q12H, Karishma Arango MD, 40 mg at 11/11/24 1925    glucagon (GLUCAGEN) injection 1 mg, 1 mg, Intramuscular, Q15 Min PRN, Lyndsay Stallworth MD    insulin lispro (HUMALOG/ADMELOG) injection 2-7 Units, 2-7 Units, Subcutaneous, 4x Daily AC & at Bedtime, Lyndsay Stallworth MD, 3 Units at 11/11/24 2054    metoprolol succinate XL (TOPROL-XL) 24 hr tablet 25 mg, 25 mg, Oral, BID, Karishma Arango MD, 25 mg at 11/11/24 2000    nitroglycerin (NITROSTAT) SL tablet 0.4 mg, 0.4 mg, Sublingual, Q5 Min PRN, Karishma Arango MD    pantoprazole (PROTONIX) EC tablet 40 mg, 40 mg, Oral, Daily, Karishma Arango MD, 40 mg at 11/11/24 1809    potassium chloride (K-DUR,KLOR-CON) ER tablet 20 mEq, 20 mEq, Oral, Daily, Karishma Arango MD, 20 mEq at 11/11/24 1809    sertraline (ZOLOFT) tablet 25 mg, 25 mg, Oral, Daily, Karishma Arango MD, 25 mg at 11/11/24 1810  Allergies:  No Known Allergies  Social History:   Social History     Socioeconomic History    Marital status:    Tobacco Use    Smoking status: Never     Passive exposure: Never    Smokeless tobacco: Never   Vaping Use    Vaping status: Never Used   Substance and Sexual Activity    Alcohol use: Yes     Alcohol/week: 1.0 standard drink of alcohol     Types: 1 Drinks containing 0.5 oz of alcohol per week     Comment: RARELY    Drug use: No    Sexual activity: Defer     Partners: Female     Family History:  Family History   Problem Relation Age of Onset    Lung cancer Mother     Stroke Father     Dementia Father     Hyperlipidemia Brother     Hypertension Brother     Heart disease Brother      "Diabetes Brother     Colon polyps Brother     Colon polyps Brother     Malig Hyperthermia Neg Hx           Review of Systems  See history of present illness and past medical history.  Patient denies headache, dizziness, syncope, falls, trauma, change in vision, change in hearing, change in taste, changes in weight, changes in appetite, focal weakness, numbness, or paresthesia.  Patient denies chest pain, palpitations, dyspnea, orthopnea, PND, cough, sinus pressure, rhinorrhea, epistaxis, hemoptysis, nausea, vomiting,hematemesis, diarrhea, constipation or hematochezia. Denies cold or heat intolerance, polydipsia, polyuria, polyphagia. Denies hematuria, pyuria, dysuria, hesitancy, frequency or urgency. Denies consumption of raw and under cooked meats foods or change in water source.  Denies fever, chills, sweats, night sweats.  Denies missing any routine medications. Remainder of ROS is negative.      Vitals:   /77 (BP Location: Right arm, Patient Position: Lying) Comment: Simultaneous filing. User may be unaware of other data.  Pulse 75   Temp 97.7 °F (36.5 °C) (Oral)   Resp 17   Ht 185.4 cm (73\")   Wt 118 kg (261 lb)   SpO2 97%   BMI 34.43 kg/m²   I/O:   Intake/Output Summary (Last 24 hours) at 11/11/2024 2208  Last data filed at 11/11/2024 2200  Gross per 24 hour   Intake 50 ml   Output 1200 ml   Net -1150 ml     Exam:  General Appearance:    Alert, cooperative, no distress, appears stated age   Head:    Normocephalic, without obvious abnormality, atraumatic   Eyes:    PERRL, conjunctivae/corneas clear, EOM's intact, both eyes   Ears:    Normal external ear canals, both ears   Nose:   Nares normal, septum midline, mucosa normal, no drainage    or sinus tenderness   Throat:   Lips, tongue, gums normal; oral mucosa pink and moist   Neck:   Supple, symmetrical, trachea midline, no adenopathy;     thyroid:  no enlargement/tenderness/nodules; no carotid    bruit or JVD   Back:     Symmetric, no curvature, " "ROM normal, no CVA tenderness   Lungs:     Decreased breath sounds bilaterally, respirations unlabored   Chest Wall:    No tenderness or deformity    Heart:    Regular rate and rhythm, S1 and S2 normal, no murmur, rub   or gallop   Abdomen:     Soft, nontender, bowel sounds active all four quadrants,     no masses, no hepatomegaly, no splenomegaly   Extremities:   Extremities normal, atraumatic, no cyanosis or edema                Data Review:  Labs in chart were reviewed.  No results found for: \"WBC\", \"HGB\", \"HCT\", \"PLT\"  Sodium   Date Value Ref Range Status   11/11/2024 138 136 - 145 mmol/L Final     Potassium   Date Value Ref Range Status   11/11/2024 4.3 3.5 - 5.2 mmol/L Final     Chloride   Date Value Ref Range Status   11/11/2024 101 98 - 107 mmol/L Final     CO2   Date Value Ref Range Status   11/11/2024 25.2 22.0 - 29.0 mmol/L Final     BUN   Date Value Ref Range Status   11/11/2024 20 8 - 23 mg/dL Final     Creatinine   Date Value Ref Range Status   11/11/2024 1.65 (H) 0.76 - 1.27 mg/dL Final     Glucose   Date Value Ref Range Status   11/11/2024 344 (H) 65 - 99 mg/dL Final     Calcium   Date Value Ref Range Status   11/11/2024 9.0 8.6 - 10.5 mg/dL Final     No results found for: \"AST\", \"ALT\", \"ALKPHOS\"  Results from last 7 days   Lab Units 11/11/24  1122   TSH uIU/mL 2.930           Imaging Results (Last 7 Days)       ** No results found for the last 168 hours. **          Past Medical History:   Diagnosis Date    Allergic rhinitis     Arthritis     BPH (benign prostatic hyperplasia)     Diabetes mellitus     TYPE 2    Foraminal stenosis of cervical region     C5-C6    GERD (gastroesophageal reflux disease)     Hemorrhoids     History of urinary retention 2010    S/P TURP    Hyperlipidemia     Macular degeneration     PING (obstructive sleep apnea) 01/07/2016    MILD, SEES DR. AMARILIS MORGAN    Pericardial effusion, acute        Assessment:  Acute on chronic combined chf  Diabetes  Hyperlipidemia  Obesity "   Sleep apnea  ckd3    Plan:  Sliding scale insulin  Diuresis  Nephrology to see  Continue home insulin  Check [id number]  Thanks for involving us in his care  Will follow with you    Lyndsay Stallworth MD   11/11/2024  22:08 EST

## 2024-11-12 NOTE — CONSULTS
"Diabetes Education  Assessment/Teaching    Patient Name:  Vernon Solorzano  YOB: 1950  MRN: [id number]  Admit Date:  11/11/2024      Assessment Date:  11/12/2024  Flowsheet Row Most Recent Value   General Information     Referral From: A1c, Database  [A1C 12%]   Height 185.4 cm (73\")   Height Method Stated   Weight 123 kg (271 lb 6.4 oz)   Weight Method Standing scale   Are you currently involved in an activity/exercise program?  No   Pregnancy Assessment    Diabetes History    What type of diabetes do you have? Type 2   Length of Diabetes Diagnosis 10 + years   Current DM knowledge excellent   Have you had diabetes education/teaching in the past? yes   Do you test your blood sugar at home? yes   Meter type Ysabel 2   Have you had low blood sugar? (<70mg/dl) no   Have you had high blood sugar? (>140mg/dl) yes   How often do you have high blood sugar? frequently   What makes it difficult for you to take care of your diabetes or yourself? multiple health issues   Education Preferences    What areas of diabetes would you like to learn about? avoiding high blood sugar, medications for diabetes, testing my blood sugar at home   Nutrition Information    Assessment Topics    Taking Medication - Assessment Needs education   Monitoring - Assessment Needs education   DM Goals    Taking Medication - Goal 0-7 days from discharge   Monitoring - Goal 0-7 days from discharge            Flowsheet Row Most Recent Value   DM Education Needs    Meter Type Other (comment)  [CGM]   Medication Insulin, Administration  [Tresiba 60  U QD and Humulog 20U TID]   Problem Solving Hyperglycemia, Signs, Symptoms   Healthy Coping Appropriate   Discharge Plan Home, Follow-up with MD, Follow-up with endocrinolgoist   Motivation Engaged   Teaching Method Explanation, Discussion, Teach back, Handouts   Patient Response Verbalized understanding, Needs reinforcement  [spoke to pts wife on phone]              Other Comments:  discussed " with pt his diabetes management. Pt states he is very regular about taking long acting insulin (Tresiba 60U QD) . He states he occasionally misses his mealtime doses ( Humulog 20U TID) but that he is not really eating much at meals at all these days.. We discussed adding correctional dosing to mealtime and also to add more insulin if pt eats more carbs at a meal.     Spoke with pts  wife on phone during visit(She is a dietician) about these adjustments in his insulin dosing.gave our contact information for pt or wife to call our office for any further instruction/education. Pt is using a Libre2 and encouraged them to upgrade to the Libre3 and also consider looking into a pump down the road if not getting good results.Pt sees and Endo at [facility].        Electronically signed by:  Aleta Casillas RN  11/12/24 12:55 EST

## 2024-11-12 NOTE — PLAN OF CARE
Problem: Adult Inpatient Plan of Care  Goal: Plan of Care Review  Outcome: Progressing  Flowsheets (Taken 11/12/2024 9196)  Progress: improving  Outcome Evaluation: Vital signs stable. No complaints of pain. Home cpap worn overnight. IV lasix continued. Spouse visited. Stand-by assist to bathroom. Pt refusing bed alarm- educated on importance of preventing falls. Plan of care ongoing.  Plan of Care Reviewed With: patient

## 2024-11-12 NOTE — PLAN OF CARE
Goal Outcome Evaluation:  Plan of Care Reviewed With: patient           Outcome Evaluation: Pt enmanuel  72 yo male admitted for CHF exacerbation. Pt lives with spouse, reports independence with functional mobility at baseline. Pt denies falls and denies use of AD. Pt completed all functional mobility with mod (I) and ambulated 150' in hallway without overt loss of balance. Pt demo wide base of support and appears generally unsteady but declines further PT needs. PT signing off, encouraged pt to continue calling for nsg supervision to get up to bathroom and amb in hallway. Anticipate d/c home.    Anticipated Discharge Disposition (PT): home

## 2024-11-12 NOTE — PROGRESS NOTES
Name: Vernon Solorzano ADMIT: 2024   : 1950  PCP: Liza Oconnell III, NP-C    MRN: [id number] LOS: 1 days   AGE/SEX: 73 y.o. male  ROOM: [facility]     Subjective   Subjective   Feels a lot better.       Objective   Objective   Vital Signs  Temp:  [97.7 °F (36.5 °C)-98.2 °F (36.8 °C)] 97.9 °F (36.6 °C)  Heart Rate:  [71-98] 81  Resp:  [17-18] 18  BP: (121-142)/(62-92) 121/62  SpO2:  [97 %-99 %] 98 %  on   ;   Device (Oxygen Therapy): CPAP  Body mass index is 35.81 kg/m².  Physical Exam  Vitals reviewed.   Constitutional:       General: He is not in acute distress.  Eyes:      General: No scleral icterus.  Cardiovascular:      Rate and Rhythm: Normal rate. Rhythm irregular.   Pulmonary:      Effort: Pulmonary effort is normal. No respiratory distress.   Musculoskeletal:      Right lower leg: Edema present.      Left lower leg: Edema present.   Skin:     General: Skin is dry.      Findings: No rash.   Neurological:      Mental Status: He is alert and oriented to person, place, and time.   Psychiatric:         Mood and Affect: Mood normal.       Results Review     I reviewed the patient's new clinical results.      Results from last 7 days   Lab Units 24  0339 24  1122   SODIUM mmol/L 139 138   POTASSIUM mmol/L 3.8 4.3   CHLORIDE mmol/L 103 101   CO2 mmol/L 27.2 25.2   BUN mg/dL 18 20   CREATININE mg/dL 1.39* 1.65*   GLUCOSE mg/dL 110* 344*   EGFR mL/min/1.73 52.9* 43.0*     Results from last 7 days   Lab Units 24  0339   ALBUMIN g/dL 3.5   BILIRUBIN mg/dL 0.6   ALK PHOS U/L 123*   AST (SGOT) U/L 24   ALT (SGPT) U/L 32     Results from last 7 days   Lab Units 24  0339 24  1122   CALCIUM mg/dL 9.0 9.0   ALBUMIN g/dL 3.5  --        Hemoglobin A1C   Date/Time Value Ref Range Status   2024 0955 12.10 (H) 4.80 - 5.60 % Final     Glucose   Date/Time Value Ref Range Status   2024 1131 190 (H) 70 - 130 mg/dL Final   2024 0746 115 70 - 130 mg/dL Final    11/11/2024 2239 210 (H) 70 - 130 mg/dL Final   11/11/2024 2034 220 (H) 70 - 130 mg/dL Final   11/11/2024 1650 159 (H) 70 - 130 mg/dL Final       No radiology results for the last day    I have personally reviewed all medications:  Scheduled Medications  apixaban, 5 mg, Oral, BID  atorvastatin, 40 mg, Oral, Nightly  budesonide-formoterol, 2 puff, Inhalation, BID  calcium carbonate (oyster shell), 500 mg, Oral, Daily  furosemide, 40 mg, Intravenous, Q12H  insulin glargine, 55 Units, Subcutaneous, Daily  insulin lispro, 2-7 Units, Subcutaneous, 4x Daily AC & at Bedtime  insulin lispro, 5 Units, Subcutaneous, TID  metoprolol succinate XL, 25 mg, Oral, BID  pantoprazole, 40 mg, Oral, Daily  potassium chloride, 20 mEq, Oral, Daily  sertraline, 25 mg, Oral, Daily    Infusions   Diet  Diet: Diabetic, Fluid Restriction (240 mL/tray), Cardiac; Healthy Heart (2-3 Na+); Consistent Carbohydrate; 1500 mL/day; Fluid Consistency: Thin (IDDSI 0)    I have personally reviewed:  [x]  Laboratory   []  Microbiology   []  Radiology   [x]  EKG/Telemetry  [x]  Cardiology/Vascular   []  Pathology    []  Records       Assessment/Plan     Active Hospital Problems    Diagnosis  POA    **Acute on chronic diastolic CHF (congestive heart failure) [I50.33]  Yes    CUONG (acute kidney injury) [N17.9]  Yes    Persistent atrial fibrillation [I48.19]  Yes    Stage 3a chronic kidney disease [N18.31]  Yes    Type 2 diabetes mellitus with hyperglycemia, with long-term current use of insulin [E11.65, Z79.4]  Not Applicable    PING (obstructive sleep apnea) [G47.33]  Yes      Resolved Hospital Problems   No resolved problems to display.       73 y.o. male admitted with Acute on chronic diastolic CHF (congestive heart failure).  Consulted for diabetes management    DM2, controlled at home with A1c 12.1.  He is actually doing very well here on current regimen which is not even as much as home doses.  I would typically say dietary compliance is the issue  although his wife is apparently a dietitian and watches it closely.  Diabetic educator was seeing when I came around to discuss further.    Acute on chronic CHF, improving with diuresis.  Defer any dose changes on this to nephrology/cardiology.  - Continue Eliquis for A-fib.  On Toprol-XL    CUONG/CKD is improving and near baseline.  Nephrology consulted      DVT prophy: Eliquis  Disposition: Per primary team.  Likely 1 to 2 days      Florentino Garay MD  [facility]  11/12/24  15:10 EST

## 2024-11-12 NOTE — THERAPY DISCHARGE NOTE
Patient Name: Vernon Solorzano  : 1950    MRN: [id number]                              Today's Date: 2024       Admit Date: 2024    Visit Dx: No diagnosis found.  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    Hypertensive chronic kidney disease    Gastroesophageal reflux disease    PING (obstructive sleep apnea)    Lung nodule seen on imaging study    Anticoagulated    Reactive depression    Acute on chronic diastolic CHF (congestive heart failure)    CUONG (acute kidney injury)    Acute on chronic combined systolic and diastolic heart failure     Past Medical History:   Diagnosis Date    Allergic rhinitis     Arthritis     BPH (benign prostatic hyperplasia)     Diabetes mellitus     TYPE 2    Foraminal stenosis of cervical region     C5-C6    GERD (gastroesophageal reflux disease)     Hemorrhoids     History of urinary retention     S/P TURP    Hyperlipidemia     Macular degeneration     PING (obstructive sleep apnea) 2016    MILD, SEES DR. AMARILIS MORGAN    Pericardial effusion, acute      Past Surgical History:   Procedure Laterality Date    BRONCHOSCOPY N/A 7/10/2024    Procedure: BRONCHOSCOPY WITH C-ARM with biopsies and washing;  Surgeon: Luis Slater Jr., MD;  Location: [facility] ENDOSCOPY;  Service: Pulmonary;  Laterality: N/A;  pre-possible sarcoidosis; multiple nodules  post-multiple nodules    CARDIAC CATHETERIZATION N/A  5/11/2023    Procedure: Right Heart Cath;  Surgeon: Corey Gaffney MD;  Location:  [facility];  Service: Cardiology;  Laterality: N/A;    CARDIAC CATHETERIZATION N/A 5/11/2023    Procedure: Left Heart Cath;  Surgeon: Corey Gaffney MD;  Location:  [facility];  Service: Cardiology;  Laterality: N/A;    CARDIAC CATHETERIZATION N/A 5/11/2023    Procedure: Left ventriculography;  Surgeon: Corey Gaffney MD;  Location:  [facility];  Service: Cardiology;  Laterality: N/A;    CARDIAC CATHETERIZATION N/A 5/11/2023    Procedure: Coronary angiography;  Surgeon: Corey Gaffney MD;  Location:  [facility];  Service: Cardiology;  Laterality: N/A;    COLONOSCOPY N/A     WNL PER PT, NO RECORDS,     COLONOSCOPY N/A 04/07/2009    DR. GORGE BENAVIDEZ AT [facility]    MITRAL VALVE REPAIR/REPLACEMENT N/A 5/24/2023    Procedure: MITRAL VALVE REPAIR/REPLACEMENT TRICUSPID VALVE REPAIR/REPLACEMENT TRANSESOPHAGEAL ECHOCARDIOGRAM WITH ANESTHESIA;  Surgeon: George Frey MD;  Location: [facility] CV[facility];  Service: Cardiothoracic;  Laterality: N/A;    PROSTATE SURGERY N/A 11/12/2010    TURP, DR. COREY COLLAZO AT [facility]    SKIN TAG REMOVAL N/A 12/20/2017    ANAL SKIN TAG X2, PERFORMED IN OFFICE, DR. LISA ORANTES    TURP / TRANSURETHRAL INCISION / DRAINAGE PROSTATE  2010    Dr. Goodrich      General Information       Row Name 11/12/24 1534          Physical Therapy Time and Intention    Document Type discharge evaluation/summary  -CW     Mode of Treatment individual therapy;physical therapy  -CW       Row Name 11/12/24 9104          General Information    Patient Profile Reviewed yes  -CW     Prior Level of Function independent:;transfer;all household mobility;bed mobility;community mobility  -CW     Existing Precautions/Restrictions no known precautions/restrictions  -CW     Barriers to Rehab none identified  -CW       Row Name 11/12/24 4285          Living  Environment    People in Home spouse  -CW       Row Name 11/12/24 1535          Home Main Entrance    Number of Stairs, Main Entrance none  -CW       Row Name 11/12/24 1535          Cognition    Orientation Status (Cognition) oriented x 4  -CW               User Key  (r) = Recorded By, (t) = Taken By, (c) = Cosigned By      Initials Name Provider Type    CW Beena Roland, PT Physical Therapist                   Mobility       Row Name 11/12/24 1537          Bed Mobility    Bed Mobility supine-sit;sit-supine  -CW     Supine-Sit [place] (Bed Mobility) modified independence  -CW     Sit-Supine [place] (Bed Mobility) modified independence  -CW       Row Name 11/12/24 1537          Sit-Stand Transfer    Sit-Stand [place] (Transfers) modified independence  -CW       Row Name 11/12/24 1537          Gait/Stairs (Locomotion)    [place] Level (Gait) supervision  -CW     Distance in Feet (Gait) 150  -CW     Deviations/Abnormal Patterns (Gait) gait speed decreased;base of support, wide  -CW     Comment, (Gait/Stairs) No overt loss of balance, appears generally unsteady  -CW               User Key  (r) = Recorded By, (t) = Taken By, (c) = Cosigned By      Initials Name Provider Type    CW Beena Roland, PT Physical Therapist                   Obj/Interventions       Row Name 11/12/24 1538          Range of Motion Comprehensive    General Range of Motion bilateral lower extremity ROM WFL  -CW       Row Name 11/12/24 1538          Strength Comprehensive (MMT)    General Manual Muscle Testing (MMT) Assessment no strength deficits identified  -CW       Row Name 11/12/24 1538          Balance    Balance Assessment standing dynamic balance;standing static balance  -CW     Static Standing Balance verbal cues;supervision  -CW     Dynamic Standing Balance verbal cues;supervision  -CW     Position/Device Used, Standing Balance unsupported  -CW               User Key  (r) = Recorded By, (t) = Taken By, (c) =  Cosigned By      Initials Name Provider Type    Beena Bañuelos, PT Physical Therapist                   Goals/Plan    No documentation.                  Clinical Impression       Row Name 11/12/24 1539          Pain    Pretreatment Pain Rating 0/10 - no pain  -CW       Row Name 11/12/24 1539          Plan of Care Review    Plan of Care Reviewed With patient  -CW     Outcome Evaluation Pt enmanuel  74 yo male admitted for CHF exacerbation. Pt lives with spouse, reports independence with functional mobility at baseline. Pt denies falls and denies use of AD. Pt completed all functional mobility with mod (I) and ambulated 150' in hallway without overt loss of balance. Pt demo wide base of support and appears generally unsteady but declines further PT needs. PT signing off, encouraged pt to continue calling for nsg supervision to get up to bathroom and amb in hallway. Anticipate d/c home.  -CW       Row Name 11/12/24 1539          Therapy Assessment/Plan (PT)    Criteria for Skilled Interventions Met (PT) no problems identified which require skilled intervention  -CW     Therapy Frequency (PT) evaluation only  -CW       Row Name 11/12/24 1539          Vital Signs    O2 Delivery Pre Treatment room air  -CW     O2 Delivery Intra Treatment room air  -CW     O2 Delivery Post Treatment room air  -CW       [facility] Name 11/12/24 1539          Positioning and Restraints    Pre-Treatment Position in bed  -CW     Post Treatment Position bed  -CW     In Bed notified nsg;call light within reach;encouraged to call for assist;exit alarm on;fowlers  -CW               User Key  (r) = Recorded By, (t) = Taken By, (c) = Cosigned By      Initials Name Provider Type    Beena Bañuelos, PT Physical Therapist                   Outcome Measures       Row Name 11/12/24 2488          How much help from another person do you currently need...    Turning from your back to your side while in flat bed without using bedrails? 4  -CW     Moving from  lying on back to sitting on the side of a flat bed without bedrails? 4  -CW     Moving to and from a bed to a chair (including a wheelchair)? 4  -CW     Standing up from a chair using your arms (e.g., wheelchair, bedside chair)? 4  -CW     Climbing 3-5 steps with a railing? 3  -CW     To walk in hospital room? 3  -CW     AM-PAC 6 Clicks Score (PT) 22  -CW     Highest Level of Mobility Goal 7 --> Walk 25 feet or more  -CW       Row Name 11/12/24 1543          Functional Assessment    Outcome Measure Options AM-PAC 6 Clicks Basic Mobility (PT)  -CW               User Key  (r) = Recorded By, (t) = Taken By, (c) = Cosigned By      Initials Name Provider Type    CW Beena Roland PT Physical Therapist                  Physical Therapy Education       Title: PT OT SLP Therapies (In Progress)       Topic: Physical Therapy (In Progress)       Point: Mobility training (Done)       Learning Progress Summary            Patient Acceptance, E, VU by CW at 11/12/2024 1544                      Point: Home exercise program (Not Started)       Learner Progress:  Not documented in this visit.              Point: Body mechanics (Not Started)       Learner Progress:  Not documented in this visit.              Point: Precautions (Not Started)       Learner Progress:  Not documented in this visit.                              User Key       Initials Effective Dates Name Provider Type Discipline     12/13/22 -  Beena Roland PT Physical Therapist PT                  PT Recommendation and Plan     Outcome Evaluation: Pt enmanuel  74 yo male admitted for CHF exacerbation. Pt lives with spouse, reports independence with functional mobility at baseline. Pt denies falls and denies use of AD. Pt completed all functional mobility with mod (I) and ambulated 150' in hallway without overt loss of balance. Pt demo wide base of support and appears generally unsteady but declines further PT needs. PT signing off, encouraged pt to continue  calling for nsg supervision to get up to bathroom and amb in hallway. Anticipate d/c home.     Time Calculation:         PT Charges       Row Name 11/12/24 1534             Time Calculation    Start Time 1330  -CW      Stop Time 1342  -CW      Time Calculation (min) 12 min  -CW      PT Received On 11/12/24  -CW         Time Calculation- PT    Total Timed Code Minutes- PT 8 minute(s)  -CW         Timed Charges    81386 - PT Therapeutic Activity Minutes 8  -CW         Total Minutes    Timed Charges Total Minutes 8  -CW       Total Minutes 8  -CW                User Key  (r) = Recorded By, (t) = Taken By, (c) = Cosigned By      Initials Name Provider Type    CW Beena Roland, PT Physical Therapist                  Therapy Charges for Today       Code Description Service Date Service Provider Modifiers Qty    58116814396 HC PT EVAL LOW COMPLEXITY 3 11/12/2024 Beena Roland, PT GP 1    69550987763 HC PT THERAPEUTIC ACT EA 15 MIN 11/12/2024 Beena Roland, PT GP 1            PT G-Codes  Outcome Measure Options: AM-PAC 6 Clicks Basic Mobility (PT)  AM-PAC 6 Clicks Score (PT): 22    PT Discharge Summary  Anticipated Discharge Disposition (PT): home    Beena Roland PT  11/12/2024

## 2024-11-12 NOTE — PROGRESS NOTES
LOS: 1 day   Patient Care Team:  Liza Oconnell III, NP-C as PCP - General (Family Medicine)  Corey Lao Jr., MD (Inactive) as Consulting Physician (Urology)  Tae Lay MD as Consulting Physician (Nephrology)  Karishma Arango MD as Consulting Physician (Cardiology)  Luis Slater Jr., MD as Consulting Physician (Pulmonary Disease)  Femi Mcneill MD as Consulting Physician (Endocrinology)  Jelly Tijerina RN as Ambulatory  (Population Health)    Chief Complaint: Follow-up CHF, tissue mitral valve replacement, tricuspid valve repair.    Interval History: He feels better.  His lower extremity edema is improved.  No chest pain.  Shortness of breath has largely resolved with diuretics.    Vital Signs:  Temp:  [97.7 °F (36.5 °C)-98.1 °F (36.7 °C)] 97.9 °F (36.6 °C)  Heart Rate:  [71-98] 81  Resp:  [17-18] 18  BP: (121-142)/(62-92) 121/62    Intake/Output Summary (Last 24 hours) at 11/12/2024 1604  Last data filed at 11/12/2024 1347  Gross per 24 hour   Intake 350 ml   Output 2250 ml   Net -1900 ml       Physical Exam:   General Appearance:    No acute distress, alert and oriented x4   Lungs:     Rales at the right base.    Heart:    Regular rhythm and normal rate.  No murmurs, gallops, or rubs.   Abdomen:     Soft, nontender, nondistended.    Extremities:   1+ edema of the lower extremities.     Results Review:    Results from last 7 days   Lab Units 11/12/24  0339   SODIUM mmol/L 139   POTASSIUM mmol/L 3.8   CHLORIDE mmol/L 103   CO2 mmol/L 27.2   BUN mg/dL 18   CREATININE mg/dL 1.39*   GLUCOSE mg/dL 110*   CALCIUM mg/dL 9.0                               I reviewed the patient's new clinical results.        Assessment:  1.  Acute on chronic combined CHF (unclear trigger)  2.  Cardiomyopathy with EF 43% by echo on 10/22/2024  3.  Acute kidney injury with stage IIIa chronic kidney disease  4.  Status post tissue mitral valve replacement and tricuspid valve repair  (and left atrial appendage closure) on 5/24/2023 by Dr. Frey  5.  Postoperative junctional bradycardia, complete AV block, typical atrial flutter (status post DCCV on 6/2/2023)  6.  Paroxysmal atrial fibrillation  7.  Obstructive sleep apnea, on CPAP  8.  Bifascicular block (RBBB, LAFB)  9.  Type 2 diabetes  10.  History of COVID-19 pneumonia with potential residual ILD    Plan:  -Seems to be making good progress with Lasix 40 mg IV twice daily.  His renal function actually improved, which suggests venous congestion.  Nephrology has been consulted.    -Continue Toprol-XL 25 mg twice daily.  He is in sinus rhythm.  Continue Eliquis 5 mg twice daily.    -Recent echo showed an EF of 43% with no significant pathology of the mitral valve replacement or tricuspid valve repair.    -Holding Entresto until cleared by nephrology.    -Potentially home tomorrow if he continues to improve with diuresis and can be started back on his Entresto as an outpatient.    Corwin Hobson MD  11/12/24  16:04 EST

## 2024-11-12 NOTE — CASE MANAGEMENT/SOCIAL WORK
Discharge Planning Assessment  [facility]     Patient Name: Vernon Solorzano  MRN: [id number]  Today's Date: 11/12/2024    Admit Date: 11/11/2024    Plan: Home with wife and continue Pulmonary Rehab at D/C. Denies any needs. Family transport.   Discharge Needs Assessment       Row Name 11/12/24 1705       Living Environment    People in Home spouse    Current Living Arrangements home    Potentially Unsafe Housing Conditions none    In the past 12 months has the electric, gas, oil, or water company threatened to shut off services in your home? No    Primary Care Provided by self;spouse/significant other    Provides Primary Care For no one    Family Caregiver if Needed spouse    Quality of Family Relationships helpful;involved;supportive    Able to Return to Prior Arrangements yes       Resource/Environmental Concerns    Resource/Environmental Concerns none    Transportation Concerns none       Transportation Needs    In the past 12 months, has lack of transportation kept you from medical appointments or from getting medications? no    In the past 12 months, has lack of transportation kept you from meetings, work, or from getting things needed for daily living? No       Food Insecurity    Within the past 12 months, you worried that your food would run out before you got the money to buy more. Never true    Within the past 12 months, the food you bought just didn't last and you didn't have money to get more. Never true       Transition Planning    Patient/Family Anticipates Transition to home with family    Patient/Family Anticipated Services at Transition none    Transportation Anticipated family or friend will provide       Discharge Needs Assessment    Readmission Within the Last 30 Days no previous admission in last 30 days    Current Outpatient/Agency/Support Group other (see comments)  Current with pulmonary rehab    Equipment Currently Used at Home walker, rolling;glucometer;cpap;nebulizer    Concerns to be  Addressed care coordination/care conferences;discharge planning;denies needs/concerns at this time    Do you want help finding or keeping work or a job? I do not need or want help    Do you want help with school or training? For example, starting or completing job training or getting a high school diploma, GED or equivalent No    Anticipated Changes Related to Illness none    Equipment Needed After Discharge none    Provided Post Acute Provider List? N/A    N/A Provider List Comment Denies any needs at this time    Provided Post Acute Provider Quality & Resource List? N/A    Offered/Gave Vendor List no    Current Discharge Risk chronically ill                   Discharge Plan       Row Name 11/12/24 7145       Plan    Plan Home with wife and continue Pulmonary Rehab at D/C. Denies any needs. Family transport.    Patient/Family in Agreement with Plan yes    Plan Comments Met with pt and wife at bedside. Explained roll of . Face sheet and pharmacy verified. Pt lives with his wife. There are 3 steps to enter home. Home DME includes a CPAP, glucometer, walker, and nebulizer.  Pt is normally independent with ADLs. Pt has been to [facility] for Rehab. He has not used HH. He had OP PT/ST at . Prior to admission he was in Pulmonary Rehab. He plans to finish up Pulmonary Rehab once D/C.  Pt's PCP is Liza Oconnell III, NP-C.  Enrolled in Meds to Bed. Pt normally drives. At discharge, family will transport. HgbA1C 12 on admit. Diabetic Clinician consulted and has seen pt. Ambulated 150 ft in hallway with PT today. Denies any D/C needs. Explained that CCP would follow to assess for discharge needs.  Rob Roth RN-BC                  Continued Care and Services - Admitted Since 11/11/2024    No active coordination exists for this encounter.       Selected Continued Care - Episodes Includes continued care and service providers with selected services from the active episodes listed below      Chronic Care  Management Episode start date: 11/12/2024   There are no active outsourced providers for this episode.                 Expected Discharge Date and Time       Expected Discharge Date Expected Discharge Time    Nov 13, 2024            Demographic Summary       Row Name 11/12/24 1702       General Information    Admission Type inpatient    Arrived From physician office - internal    Required Notices Provided Important Message from Medicare    Reason for Consult care coordination/care conference;discharge planning    Preferred Language English                   Functional Status       Row Name 11/12/24 1708       Functional Status    Usual Activity Tolerance moderate    Current Activity Tolerance moderate       Functional Status, IADL    Medications independent    Meal Preparation independent    Housekeeping independent    Laundry independent    Shopping independent    If for any reason you need help with day-to-day activities such as bathing, preparing meals, shopping, managing finances, etc., do you get the help you need? I get all the help I need       Mental Status    General Appearance WDL WDL       Mental Status Summary    Recent Changes in Mental Status/Cognitive Functioning no changes       Employment/    Employment Status retired    Current or Previous Occupation healthcare                    Rob Roth RN

## 2024-11-12 NOTE — NURSING NOTE
Nephrology answering service called. Spoke with Deepali. Routine nephrology consult placed.   motor intact/RANGE OF MOTION LIMITED/+ minor neck strain , FROM , no motor or sensory deficit

## 2024-11-12 NOTE — CONSULTS
[facility] Consult Note      Patient Name: Vernon Solorzano  : 1950  MRN: [id number]  Primary Care Physician:  Liza Oconnell III, NPSolangeC  Referring Physician: Karishma Arango MD  Date of admission: 2024    Subjective     Reason for Consult: CUONG on CKD stage IIIa    HPI:   Vernon Solorzano is a 73 y.o. male with CKD stage IIIa (baseline SCr 1.3-1.5, followed by Dr. Varner from our group), type II DM, CHF, paroxysmal A-fib, CAD s/p mitral valve replacement and tricuspid valve repair, admitted under cardiology yesterday for progressively worsening dyspnea on exertion and BLE edema despite on torsemide 40 mg BID.    On admission, proBNP 3327, SCr 1.65, .  Patient was started on IV Lasix 40 mg BID with good UOP.  Today, SCr improved to 1.39.   patient does not limit fluid or salt intake, reports poor p.o. appetite; denies chest pain, N/V/D, or urinary symptoms.  Denies NSAID use.    Review of Systems:   14 point review of systems is otherwise negative except for mentioned above on HPI    Personal History     Past Medical History:   Diagnosis Date    Allergic rhinitis     Arthritis     BPH (benign prostatic hyperplasia)     Diabetes mellitus     TYPE 2    Foraminal stenosis of cervical region     C5-C6    GERD (gastroesophageal reflux disease)     Hemorrhoids     History of urinary retention     S/P TURP    Hyperlipidemia     Macular degeneration     PING (obstructive sleep apnea) 2016    MILD, SEES DR. AMARILIS MORGAN    Pericardial effusion, acute        Past Surgical History:   Procedure Laterality Date    BRONCHOSCOPY N/A 7/10/2024    Procedure: BRONCHOSCOPY WITH C-ARM with biopsies and washing;  Surgeon: Luis Slater Jr., MD;  Location: [facility] ENDOSCOPY;  Service: Pulmonary;  Laterality: N/A;  pre-possible sarcoidosis; multiple nodules  post-multiple nodules    CARDIAC CATHETERIZATION N/A 2023    Procedure: Right Heart Cath;  Surgeon:  Corey Gaffney MD;  Location: [facility] CATH INVASIVE LOCATION;  Service: Cardiology;  Laterality: N/A;    CARDIAC CATHETERIZATION N/A 5/11/2023    Procedure: Left Heart Cath;  Surgeon: Corey Gaffney MD;  Location: [facility] CATH INVASIVE LOCATION;  Service: Cardiology;  Laterality: N/A;    CARDIAC CATHETERIZATION N/A 5/11/2023    Procedure: Left ventriculography;  Surgeon: Corey Gaffney MD;  Location: [facility] CATH INVASIVE LOCATION;  Service: Cardiology;  Laterality: N/A;    CARDIAC CATHETERIZATION N/A 5/11/2023    Procedure: Coronary angiography;  Surgeon: Corey Gaffney MD;  Location: [facility] CATH INVASIVE LOCATION;  Service: Cardiology;  Laterality: N/A;    COLONOSCOPY N/A     WNL PER PT, NO RECORDS,     COLONOSCOPY N/A 04/07/2009    DR. GORGE BENAVIDEZ AT [place]    MITRAL VALVE REPAIR/REPLACEMENT N/A 5/24/2023    Procedure: MITRAL VALVE REPAIR/REPLACEMENT TRICUSPID VALVE REPAIR/REPLACEMENT TRANSESOPHAGEAL ECHOCARDIOGRAM WITH ANESTHESIA;  Surgeon: George Frey MD;  Location: [facility];  Service: Cardiothoracic;  Laterality: N/A;    PROSTATE SURGERY N/A 11/12/2010    TURP, DR. COREY COLLAZO AT [facility]    SKIN TAG REMOVAL N/A 12/20/2017    ANAL SKIN TAG X2, PERFORMED IN OFFICE, DR. LISA CHANGP / TRANSURETHRAL INCISION / DRAINAGE PROSTATE  2010    Dr. Goodrich       Family History: family history includes Colon polyps in his brother and brother; Dementia in his father; Diabetes in his brother; Heart disease in his brother; Hyperlipidemia in his brother; Hypertension in his brother; Lung cancer in his mother; Stroke in his father.    Social History:  reports that he has never smoked. He has never been exposed to tobacco smoke. He has never used smokeless tobacco. He reports current alcohol use of about 1.0 standard drink of alcohol per week. He reports that he does not use drugs.    Home Medications:  Prior to Admission medications    Medication Sig Start Date End Date Taking? Authorizing  Provider   acetaminophen (TYLENOL) 325 MG tablet Take 2 tablets by mouth Every 6 (Six) Hours As Needed for Mild Pain. 7/5/23   Tu Silver MD   albuterol sulfate  (90 Base) MCG/ACT inhaler Inhale 2 puffs Every 4 (Four) Hours As Needed.  Patient taking differently: Inhale 2 puffs Every 4 (Four) Hours As Needed for Wheezing or Shortness of Air. 10/8/24   Jv Beatty MD   apixaban (ELIQUIS) 5 MG tablet tablet Take 1 tablet by mouth 2 (Two) Times a Day. 8/17/23   Christina Diaz APRN   atorvastatin (LIPITOR) 40 MG tablet Take 1 tablet by mouth Every Night. 7/5/23   Tu Silver MD   budesonide-formoterol (SYMBICORT) 160-4.5 MCG/ACT inhaler Inhale 2 puffs 2 (Two) Times a Day. 10/14/24   Jv Beatty MD   calcium carbonate (OS-QUE) 600 MG tablet Take 1 tablet by mouth Daily.    Jv Beatty MD   calcium carbonate (TUMS) 500 MG chewable tablet Chew 2 tablets 4 (Four) Times a Day As Needed for Indigestion or Heartburn. 7/5/23   Tu Silver MD   Continuous Blood Gluc Sensor (FreeStyle Ysabel 2 Sensor) misc 1 each by Other route Every 14 (Fourteen) Days. Use 1 sensor every 14 days  Indications: Type 2 Diabetes 10/17/23   Femi Mcneill MD   glucose blood (OneTouch Verio) test strip 1 each by Other route As Needed. Use as instructed    Jv Beatty MD   insulin degludec (Tresiba FlexTouch) 100 UNIT/ML solution pen-injector injection 46 units every morning  Patient taking differently: Inject 55 Units under the skin into the appropriate area as directed Daily. 50units every morning 8/16/23   Femi Mcneill MD   Insulin Lispro, 1 Unit Dial, (HumaLOG KwikPen) 100 UNIT/ML solution pen-injector Inject 5 Units under the skin into the appropriate area as directed 3 (Three) Times a Day. Indications: Type 2 Diabetes 8/30/23   Femi Mcneill MD   Insulin Pen Needle 32G X 4 MM misc 1 each by Other route 3 (Three) Times a Day. Use to inject insulin  under the skin via pens up to 4 times daily  Indications: Type 2 Diabetes 10/12/23   Femi Mcneill MD   metoprolol succinate XL (TOPROL-XL) 25 MG 24 hr tablet Take 1 tablet by mouth 2 (Two) Times a Day. 10/16/24   Karishma Arango MD   Multiple Vitamins-Minerals (PRESERVISION AREDS 2+MULTI VIT PO) Take 1 tablet by mouth 2 (Two) Times a Day.    Provider, MD Jv   pantoprazole (PROTONIX) 40 MG EC tablet TAKE 1 TABLET BY MOUTH DAILY 6/6/24   Liza Oconnell III, NP-C   potassium chloride (K-DUR,KLOR-CON) 20 MEQ CR tablet Take 1 tablet by mouth Daily. 7/6/23   Tu Silver MD   sacubitril-valsartan (Entresto) 24-26 MG tablet Take 1 tablet by mouth 2 (Two) Times a Day. 11/1/24   Karishma Arango MD   sertraline (Zoloft) 25 MG tablet Take 1 tablet by mouth Daily. 9/23/24   Liza Oconnell III, NP-C   torsemide (DEMADEX) 20 MG tablet Take 2 tablets by mouth 2 (Two) Times a Day. 6/18/24   Liza Oconnell III, NP-C       Allergies:  No Known Allergies    Objective     Vitals:   Temp:  [97.7 °F (36.5 °C)-98.2 °F (36.8 °C)] 98.1 °F (36.7 °C)  Heart Rate:  [71-98] 77  Resp:  [17-18] 18  BP: (100-142)/(60-92) 135/92    Intake/Output Summary (Last 24 hours) at 11/12/2024 1038  Last data filed at 11/12/2024 0723  Gross per 24 hour   Intake 50 ml   Output 2250 ml   Net -2200 ml       Physical Exam:   Constitutional: Awake, chronically ill, no acute distress.  HEENT: Sclera anicteric, no conjunctival injection  Neck: No JVD  Respiratory: Clear to auscultation bilaterally, nonlabored respiration on CPAP  Cardiovascular: RRR, no murmurs, no rub, no carotid bruit  Gastrointestinal: Positive bowel sounds, abdomen is soft, nontender and nondistended  : No palpable bladder  Musculoskeletal: 1+ BLE edema, R>L, no clubbing or cyanosis  Psychiatric: Appropriate affect, cooperative  Neurologic: Oriented x3, moving all extremities, normal speech and mental status, no asterixis  Skin: Warm  and dry       Scheduled Meds:     apixaban, 5 mg, Oral, BID  atorvastatin, 40 mg, Oral, Nightly  budesonide-formoterol, 2 puff, Inhalation, BID  calcium carbonate (oyster shell), 500 mg, Oral, Daily  furosemide, 40 mg, Intravenous, Q12H  insulin glargine, 55 Units, Subcutaneous, Daily  insulin lispro, 2-7 Units, Subcutaneous, 4x Daily AC & at Bedtime  insulin lispro, 5 Units, Subcutaneous, TID  metoprolol succinate XL, 25 mg, Oral, BID  pantoprazole, 40 mg, Oral, Daily  potassium chloride, 20 mEq, Oral, Daily  sertraline, 25 mg, Oral, Daily      IV Meds:        Results Reviewed:   I have personally reviewed the results from the time of this admission to 11/12/2024 10:38 EST     Lab Results   Component Value Date    GLUCOSE 110 (H) 11/12/2024    CALCIUM 9.0 11/12/2024     11/12/2024    K 3.8 11/12/2024    CO2 27.2 11/12/2024     11/12/2024    BUN 18 11/12/2024    CREATININE 1.39 (H) 11/12/2024    EGFRIFAFRI 76 12/15/2021    EGFRIFNONA 66 12/15/2021    BCR 12.9 11/12/2024    ANIONGAP 8.8 11/12/2024      Lab Results   Component Value Date    MG 2.1 08/02/2024    PHOS 2.2 (L) 07/03/2023    ALBUMIN 3.5 11/12/2024           Assessment / Plan       * No active hospital problems. *      ASSESSMENT:  CUONG on top of chronic kidney disease stage III AA, nonoliguric, SCr 1.65 on admission, secondary to cardiorenal syndrome, improved to 1.39 with IV diuretics.  Patient still has volume excess on exam; electrolytes within acceptable range  CKD stage IIIa, baseline SCr 1.3-1.5, secondary to longstanding hypertensive and diabetic nephrosclerosis, UPCR 149 mg/g on 8/2/2024  Acute on chronic systolic and diastolic heart failure, was on torsemide 40 mg BID at home with poor compliance with dietary salt restriction.  Not clear if he was compliant with medication  Cardiomyopathy/nonrheumatic mitral valve regurg s/p replacement and tricuspid valve repair 5/2023.  EF has dropped from 53.8% from 4/2024 to 42.5% in 10/2024.   Entresto was never started due to issues with pharmacy  Paroxysmal A-fib, currently in sinus rhythm, HR controlled, on Eliquis  Hypertension with CKD, BP well-controlled on metoprolol  Type II DM with CKD, poorly controlled, managed by primary team  Hyperlipidemia, on statin    PLAN:  Feeling much better with Lasix 40 mg IV twice daily which equivalent to his home dose of torsemide suggesting noncompliance with dietary restriction in addition to possible noncompliance with medication use  I did stress on importance of salt restricted diet.   Switch Lasix to oral 100 p.o. daily  Check Trent, UPCR, microalbumin to creatinine ratio  Check uric acid  Check orthostatic blood pressure and bladder scan  Start fluid restriction at 1500 mL/day, educated patient about the importance of fluid and sodium restriction  No objection to start Entresto provided renal function continues to be stable tomorrow  Surveillance labs    Thank you for involving us in the care of Vernon Solorzano.  Please feel free to call with any questions.    Ludwig Estrada MD  11/12/24  10:38 EST    [facility]  [phone]      Please note that portions of this note were completed with a voice recognition program.

## 2024-11-12 NOTE — PLAN OF CARE
Goal Outcome Evaluation:  Plan of Care Reviewed With: patient        Progress: improving  Outcome Evaluation: Pt arrived 4E, admission completed. BLE elevated on pillow x4. IV lasix given after IV insertion. Standby assist to bathroom. Bed alarm refuse. Gait steady but slightly wobbly at times, pt aware of own limitation. Call light within reach. CPAP at bedside.

## 2024-11-13 ENCOUNTER — READMISSION MANAGEMENT (OUTPATIENT)
Dept: CALL CENTER | Facility: HOSPITAL | Age: 74
End: 2024-11-13
Payer: MEDICARE

## 2024-11-13 VITALS
SYSTOLIC BLOOD PRESSURE: 131 MMHG | OXYGEN SATURATION: 97 % | WEIGHT: 270.28 LBS | HEIGHT: 73 IN | HEART RATE: 74 BPM | DIASTOLIC BLOOD PRESSURE: 77 MMHG | TEMPERATURE: 98.4 F | BODY MASS INDEX: 35.82 KG/M2 | RESPIRATION RATE: 18 BRPM

## 2024-11-13 LAB
ALBUMIN SERPL-MCNC: 3.6 G/DL (ref 3.5–5.2)
ANION GAP SERPL CALCULATED.3IONS-SCNC: 11.2 MMOL/L (ref 5–15)
BASOPHILS # BLD AUTO: 0.05 10*3/MM3 (ref 0–0.2)
BASOPHILS NFR BLD AUTO: 0.6 % (ref 0–1.5)
BUN SERPL-MCNC: 21 MG/DL (ref 8–23)
BUN/CREAT SERPL: 13.8 (ref 7–25)
CALCIUM SPEC-SCNC: 8.7 MG/DL (ref 8.6–10.5)
CHLORIDE SERPL-SCNC: 103 MMOL/L (ref 98–107)
CO2 SERPL-SCNC: 25.8 MMOL/L (ref 22–29)
CREAT SERPL-MCNC: 1.52 MG/DL (ref 0.76–1.27)
DEPRECATED RDW RBC AUTO: 44.8 FL (ref 37–54)
EGFRCR SERPLBLD CKD-EPI 2021: 47.5 ML/MIN/1.73
EOSINOPHIL # BLD AUTO: 0.44 10*3/MM3 (ref 0–0.4)
EOSINOPHIL NFR BLD AUTO: 5 % (ref 0.3–6.2)
ERYTHROCYTE [DISTWIDTH] IN BLOOD BY AUTOMATED COUNT: 14.5 % (ref 12.3–15.4)
GLUCOSE BLDC GLUCOMTR-MCNC: 120 MG/DL (ref 70–130)
GLUCOSE SERPL-MCNC: 109 MG/DL (ref 65–99)
HCT VFR BLD AUTO: 37.9 % (ref 37.5–51)
HGB BLD-MCNC: 12.5 G/DL (ref 13–17.7)
IMM GRANULOCYTES # BLD AUTO: 0.05 10*3/MM3 (ref 0–0.05)
IMM GRANULOCYTES NFR BLD AUTO: 0.6 % (ref 0–0.5)
LYMPHOCYTES # BLD AUTO: 1.01 10*3/MM3 (ref 0.7–3.1)
LYMPHOCYTES NFR BLD AUTO: 11.4 % (ref 19.6–45.3)
MAGNESIUM SERPL-MCNC: 2.2 MG/DL (ref 1.6–2.4)
MCH RBC QN AUTO: 27.8 PG (ref 26.6–33)
MCHC RBC AUTO-ENTMCNC: 33 G/DL (ref 31.5–35.7)
MCV RBC AUTO: 84.2 FL (ref 79–97)
MONOCYTES # BLD AUTO: 0.93 10*3/MM3 (ref 0.1–0.9)
MONOCYTES NFR BLD AUTO: 10.5 % (ref 5–12)
NEUTROPHILS NFR BLD AUTO: 6.37 10*3/MM3 (ref 1.7–7)
NEUTROPHILS NFR BLD AUTO: 71.9 % (ref 42.7–76)
NRBC BLD AUTO-RTO: 0 /100 WBC (ref 0–0.2)
PHOSPHATE SERPL-MCNC: 3.5 MG/DL (ref 2.5–4.5)
PLATELET # BLD AUTO: 197 10*3/MM3 (ref 140–450)
PMV BLD AUTO: 9.8 FL (ref 6–12)
POTASSIUM SERPL-SCNC: 3.8 MMOL/L (ref 3.5–5.2)
RBC # BLD AUTO: 4.5 10*6/MM3 (ref 4.14–5.8)
SODIUM SERPL-SCNC: 140 MMOL/L (ref 136–145)
URATE SERPL-MCNC: 6.1 MG/DL (ref 3.4–7)
WBC NRBC COR # BLD AUTO: 8.85 10*3/MM3 (ref 3.4–10.8)

## 2024-11-13 PROCEDURE — 85025 COMPLETE CBC W/AUTO DIFF WBC: CPT

## 2024-11-13 PROCEDURE — 94760 N-INVAS EAR/PLS OXIMETRY 1: CPT

## 2024-11-13 PROCEDURE — 94799 UNLISTED PULMONARY SVC/PX: CPT

## 2024-11-13 PROCEDURE — 84550 ASSAY OF BLOOD/URIC ACID: CPT

## 2024-11-13 PROCEDURE — 82948 REAGENT STRIP/BLOOD GLUCOSE: CPT

## 2024-11-13 PROCEDURE — 63710000001 INSULIN GLARGINE PER 5 UNITS: Performed by: INTERNAL MEDICINE

## 2024-11-13 PROCEDURE — 94761 N-INVAS EAR/PLS OXIMETRY MLT: CPT

## 2024-11-13 PROCEDURE — 83735 ASSAY OF MAGNESIUM: CPT

## 2024-11-13 PROCEDURE — 99239 HOSP IP/OBS DSCHRG MGMT >30: CPT | Performed by: INTERNAL MEDICINE

## 2024-11-13 PROCEDURE — 63710000001 INSULIN LISPRO (HUMAN) PER 5 UNITS: Performed by: INTERNAL MEDICINE

## 2024-11-13 PROCEDURE — 80069 RENAL FUNCTION PANEL: CPT

## 2024-11-13 RX ORDER — INSULIN LISPRO 100 [IU]/ML
20 INJECTION, SOLUTION INTRAVENOUS; SUBCUTANEOUS
COMMUNITY

## 2024-11-13 RX ORDER — POTASSIUM CHLORIDE 1500 MG/1
20 TABLET, EXTENDED RELEASE ORAL DAILY
Qty: 30 TABLET | Refills: 1 | Status: SHIPPED | OUTPATIENT
Start: 2024-11-13

## 2024-11-13 RX ORDER — PANTOPRAZOLE SODIUM 40 MG/1
40 TABLET, DELAYED RELEASE ORAL DAILY
Qty: 30 TABLET | Refills: 1 | Status: SHIPPED | OUTPATIENT
Start: 2024-11-13

## 2024-11-13 RX ORDER — TORSEMIDE 100 MG/1
100 TABLET ORAL DAILY
Qty: 30 TABLET | Refills: 1 | Status: SHIPPED | OUTPATIENT
Start: 2024-11-14

## 2024-11-13 RX ORDER — INSULIN DEGLUDEC 100 U/ML
60 INJECTION, SOLUTION SUBCUTANEOUS DAILY
COMMUNITY

## 2024-11-13 RX ADMIN — Medication 500 MG: at 09:10

## 2024-11-13 RX ADMIN — INSULIN GLARGINE 55 UNITS: 100 INJECTION, SOLUTION SUBCUTANEOUS at 09:11

## 2024-11-13 RX ADMIN — TORSEMIDE 100 MG: 100 TABLET ORAL at 09:12

## 2024-11-13 RX ADMIN — INSULIN LISPRO 5 UNITS: 100 INJECTION, SOLUTION INTRAVENOUS; SUBCUTANEOUS at 09:11

## 2024-11-13 RX ADMIN — PANTOPRAZOLE SODIUM 40 MG: 40 TABLET, DELAYED RELEASE ORAL at 09:10

## 2024-11-13 RX ADMIN — APIXABAN 5 MG: 5 TABLET, FILM COATED ORAL at 09:10

## 2024-11-13 RX ADMIN — METOPROLOL SUCCINATE 25 MG: 25 TABLET, EXTENDED RELEASE ORAL at 09:11

## 2024-11-13 RX ADMIN — BUDESONIDE AND FORMOTEROL FUMARATE DIHYDRATE 2 PUFF: 160; 4.5 AEROSOL RESPIRATORY (INHALATION) at 08:31

## 2024-11-13 RX ADMIN — POTASSIUM CHLORIDE 20 MEQ: 750 TABLET, EXTENDED RELEASE ORAL at 09:10

## 2024-11-13 RX ADMIN — SACUBITRIL AND VALSARTAN 1 TABLET: 24; 26 TABLET, FILM COATED ORAL at 09:11

## 2024-11-13 NOTE — PLAN OF CARE
Goal Outcome Evaluation:  Plan of Care Reviewed With: patient        Progress: improving  Outcome Evaluation: VSS. CPAP with sleep. No c/o pain, soa, N/V. Blood sugar at hs of 248. 8 total units humalog given. Continued 1500mL fluid restriction. Up with standby assist to bathroom for voiding. No BM this shift. Orthostatic Bps completed. Bladder scanned for 77 near end of shift.

## 2024-11-13 NOTE — OUTREACH NOTE
Prep Survey      Flowsheet Row Responses   [facility] patient discharged from? [place]   Is LACE score < 7 ? No   Eligibility [facility]   Date of Admission 11/11/24   Date of Discharge 11/13/24   Discharge diagnosis Acute on chronic diastolic CHF (congestive heart failure)   Does the patient have one of the following disease processes/diagnoses(primary or secondary)? CHF   Prep survey completed? Yes            Agustina CAMILO - Registered Nurse

## 2024-11-13 NOTE — DISCHARGE SUMMARY
Date of Admission: 11/11/2024    Date of Discharge:  11/13/2024    Discharge Diagnoses:   1.  Acute on chronic combined CHF (unclear trigger)  2.  Cardiomyopathy with EF 43% by echo on 10/22/2024  3.  Acute kidney injury with stage IIIa chronic kidney disease  4.  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 Frey  5.  Postoperative junctional bradycardia, complete AV block, typical atrial flutter (status post DCCV on 6/2/2023)  6.  Paroxysmal atrial fibrillation  7.  Obstructive sleep apnea, on CPAP  8.  Bifascicular block (RBBB, LAFB)  9.  Type 2 diabetes  10.  History of COVID-19 pneumonia with potential residual ILD        Procedures Performed:  None       Consults       Date and Time Order Name Status Description    11/11/2024  3:50 PM Inpatient Internal Medicine Consult      11/11/2024  3:50 PM Inpatient Nephrology Consult Completed               Hospital Course:     Please see H&P for full details.  This is a very pleasant 73 year-old male who is normally followed by Dr. Arango in our group with the above medical history.    He was seen in the office on 11/11/2024, and was felt to be in decompensated congestive heart failure.  He recently had an echocardiogram on 10/16/2024 which showed an ejection fraction of 43%.  His edema had increased.  There was also evidently some mild shortness of breath noted.  He was admitted and placed on IV Lasix with an excellent response.  His renal function actually improved during his hospitalization, suggesting venous congestion.  His Entresto was resumed.  Nephrology saw the patient, and ultimately recommended discharge on torsemide at 100 mg/day (he had been on 40 mg twice daily previously).  His Entresto was resumed while hospitalized (this had been recently added).  Spironolactone and Jardiance were not added during his hospitalization in order to make sure he is going to tolerate the  increased dose of torsemide and the recent addition of Entresto.  These medications can be added later as an outpatient if appropriate.    He was doing much better, and was stable for discharge on 11/13/2024.  I will get him follow-up in the office in 1 week with DESTIN Park.  He will need a BMP performed at that time.      Discharge Medications:     Discharge Medications        Changes to Medications        Instructions Start Date   albuterol sulfate  (90 Base) MCG/ACT inhaler  Commonly known as: PROVENTIL HFA;VENTOLIN HFA;PROAIR HFA  What changed: reasons to take this   2 puffs, Inhalation, Every 4 Hours PRN      torsemide 100 MG tablet  Commonly known as: DEMADEX  What changed:   medication strength  how much to take  when to take this   100 mg, Oral, Daily   Start Date: November 14, 2024     Tresiba FlexTouch 100 UNIT/ML solution pen-injector injection  Generic drug: insulin degludec  What changed:   how much to take  how to take this  when to take this  additional instructions   46 units every morning             Continue These Medications        Instructions Start Date   acetaminophen 325 MG tablet  Commonly known as: TYLENOL   650 mg, Oral, Every 6 Hours PRN      apixaban 5 MG tablet tablet  Commonly known as: ELIQUIS   5 mg, Oral, 2 Times Daily      atorvastatin 40 MG tablet  Commonly known as: LIPITOR   40 mg, Oral, Nightly      budesonide-formoterol 160-4.5 MCG/ACT inhaler  Commonly known as: SYMBICORT   2 puffs, 2 Times Daily      calcium carbonate 500 MG chewable tablet  Commonly known as: TUMS   2 tablets, Oral, 4 Times Daily PRN      calcium carbonate 600 MG tablet  Commonly known as: OS-QUE   600 mg, Daily      Entresto 24-26 MG tablet  Generic drug: sacubitril-valsartan   1 tablet, Oral, 2 Times Daily      FreeStyle Ysabel 2 Sensor misc   1 each, Other, Every 14 Days, Use 1 sensor every 14 days      Insulin Lispro (1 Unit Dial) 100 UNIT/ML solution pen-injector  Commonly known as:  HumaLOG KwikPen   5 Units, Subcutaneous, 3 Times Daily      Insulin Pen Needle 32G X 4 MM misc   1 each, Other, 3 Times Daily, Use to inject insulin under the skin via pens up to 4 times daily      metoprolol succinate XL 25 MG 24 hr tablet  Commonly known as: TOPROL-XL   25 mg, Oral, 2 Times Daily      OneTouch Verio test strip  Generic drug: glucose blood   1 each, As Needed      pantoprazole 40 MG EC tablet  Commonly known as: PROTONIX   40 mg, Oral, Daily      PRESERVISION AREDS 2+MULTI VIT PO   1 tablet, 2 Times Daily      sertraline 25 MG tablet  Commonly known as: Zoloft   25 mg, Oral, Daily             Stop These Medications      potassium chloride 20 MEQ CR tablet  Commonly known as: KLOR-CON M20                Follow-up:  1.  Follow-up with DESTIN Park, in 1 week.    2.  Follow-up with Dr. Arango in 4 weeks.    Physical Exam:           General Appearance:    No acute distress, alert and oriented x4   Lungs:     Clear to auscultation.    Heart:    Regular rhythm and normal rate.  No murmurs, gallops, or rubs.   Abdomen:     Soft, nontender, nondistended.    Extremities:   Trace edema of the lower extremities.       Time Spent on Discharge: 35 minutes.      Corwin Hobson MD  11/13/24  10:22 EST

## 2024-11-13 NOTE — CASE MANAGEMENT/SOCIAL WORK
Case Management Discharge Note      Final Note: Home with wife and continue Pulmonary Rehab at D/C. Denies any needs. Family transport.    Provided Post Acute Provider List?: N/A  N/A Provider List Comment: Denies any needs at this time  Provided Post Acute Provider Quality & Resource List?: N/A    Selected Continued Care - Discharged on 11/13/2024 Admission date: 11/11/2024 - Discharge disposition: Home or Self Care      Destination    No services have been selected for the patient.                Durable Medical Equipment    No services have been selected for the patient.                Dialysis/Infusion    No services have been selected for the patient.                Home Medical Care    No services have been selected for the patient.                Therapy    No services have been selected for the patient.                Community Resources    No services have been selected for the patient.                Community & DME    No services have been selected for the patient.                    Selected Continued Care - Episodes Includes continued care and service providers with selected services from the active episodes listed below      Chronic Care Management Episode start date: 11/12/2024   There are no active outsourced providers for this episode.                 Transportation Services  Private: Car    Final Discharge Disposition Code: 01 - home or self-care

## 2024-11-13 NOTE — PROGRESS NOTES
[facility] Progress Note      Patient Name: Vernon Solorzano  : 1950  MRN: [id number]  Primary Care Physician:  Liza Oconnell III, NP-C  Date of admission: 2024    Subjective     Interval History:   Follow-up on CUONG on CKD stage IIIa    Doing well, denies chest pain, dyspnea, or N/V/D.  UOP yesterday 850 cc    Review of Systems:   As noted above    Objective     Vitals:   Temp:  [97.3 °F (36.3 °C)-98.4 °F (36.9 °C)] 98.4 °F (36.9 °C)  Heart Rate:  [72-81] 74  Resp:  [18-20] 18  BP: (121-172)/(62-99) 131/77    Intake/Output Summary (Last 24 hours) at 2024 0845  Last data filed at 2024 0727  Gross per 24 hour   Intake 900 ml   Output 850 ml   Net 50 ml       Physical Exam:    General Appearance: Awake, chronically ill, oriented x 3, no acute distress   Skin: warm and dry  HEENT: oral mucosa normal, nonicteric sclera  Neck: supple, no JVD  Lungs: CTA, nonlabored on RA  Heart: RRR, no murmur, no rub, no carotid bruits  Abdomen: soft, nontender, nondistended  : no palpable bladder  Extremities: 1+ BLE edema, no cyanosis or clubbing  Neuro: normal speech and mental status, no asterixis    Scheduled Meds:     apixaban, 5 mg, Oral, BID  atorvastatin, 40 mg, Oral, Nightly  budesonide-formoterol, 2 puff, Inhalation, BID  calcium carbonate (oyster shell), 500 mg, Oral, Daily  insulin glargine, 55 Units, Subcutaneous, Daily  insulin lispro, 2-7 Units, Subcutaneous, 4x Daily AC & at Bedtime  insulin lispro, 5 Units, Subcutaneous, TID  metoprolol succinate XL, 25 mg, Oral, BID  pantoprazole, 40 mg, Oral, Daily  potassium chloride, 20 mEq, Oral, Daily  sacubitril-valsartan, 1 tablet, Oral, Q12H  sertraline, 25 mg, Oral, Daily  torsemide, 100 mg, Oral, Daily      IV Meds:        Results Reviewed:   I have personally reviewed the results from the time of this admission to 2024 08:45 EST     Results from last 7 days   Lab Units 24  7812  11/12/24  0339 11/11/24  1122   SODIUM mmol/L 140 139 138   POTASSIUM mmol/L 3.8 3.8 4.3   CHLORIDE mmol/L 103 103 101   CO2 mmol/L 25.8 27.2 25.2   BUN mg/dL 21 18 20   CREATININE mg/dL 1.52* 1.39* 1.65*   CALCIUM mg/dL 8.7 9.0 9.0   BILIRUBIN mg/dL  --  0.6  --    ALK PHOS U/L  --  123*  --    ALT (SGPT) U/L  --  32  --    AST (SGOT) U/L  --  24  --    GLUCOSE mg/dL 109* 110* 344*       Estimated Creatinine Clearance: 57.7 mL/min (A) (by C-G formula based on SCr of 1.52 mg/dL (H)).    Results from last 7 days   Lab Units 11/13/24  0339   MAGNESIUM mg/dL 2.2   PHOSPHORUS mg/dL 3.5       Results from last 7 days   Lab Units 11/13/24  0339   URIC ACID mg/dL 6.1       Results from last 7 days   Lab Units 11/13/24  0339   WBC 10*3/mm3 8.85   HEMOGLOBIN g/dL 12.5*   PLATELETS 10*3/mm3 197             Assessment / Plan     ASSESSMENT:  CUONG, nonoliguric, SCr 1.65 on admission, secondary to cardiorenal syndrome, improved to 1.39 with IV diuretics, today slightly up to 1.5 to, still at baseline.  Volume excess improving; electrolytes within acceptable range  CKD stage IIIa, baseline SCr 1.3-1.5, secondary to longstanding hypertensive and diabetic nephrosclerosis, UPCR 105 mg/g  Acute on chronic systolic and diastolic heart failure, was on torsemide 40 mg BID at home with poor compliance with dietary salt restriction.  Not clear if he was compliant with medication  Cardiomyopathy/nonrheumatic mitral valve regurg s/p replacement and tricuspid valve repair 5/2023.  EF has dropped from 53.8% from 4/2024 to 42.5% in 10/2024.  Entresto was never started due to issues with pharmacy  Paroxysmal A-fib, currently in sinus rhythm, HR controlled, on Eliquis  Hypertension with CKD, BP well-controlled on metoprolol  Type II DM with CKD, poorly controlled, managed by primary team  Hyperlipidemia, on statin    PLAN:  Switch IV diuretics to p.o. torsemide 100 mg daily  Ok to start Entresto today  No objection to be discharged from renal  standpoint  Follow-up in the clinic within 1 to 2 weeks  Discussed with Dr. Hobson    Thank you for involving us in the care of Vernon Solorzano.  Please feel free to call with any questions.    Ludwig Estrada MD  11/13/24  08:45 [facility]    [facility]  [phone]    Please note that portions of this note were completed with a voice recognition program.

## 2024-11-13 NOTE — PROGRESS NOTES
Name: Vernon Solorzano ADMIT: 2024   : 1950  PCP: Liza Oconnell III, NP-C    MRN: [id number] LOS: 2 days   AGE/SEX: 73 y.o. male  ROOM: [facility]     Subjective   Subjective   Ready to go home       Objective   Objective   Vital Signs  Temp:  [97.3 °F (36.3 °C)-98.4 °F (36.9 °C)] 98.4 °F (36.9 °C)  Heart Rate:  [72-81] 74  Resp:  [18-20] 18  BP: (121-172)/(62-99) 131/77  SpO2:  [95 %-100 %] 97 %  on   ;   Device (Oxygen Therapy): room air  Body mass index is 35.66 kg/m².  Physical Exam  Vitals reviewed.   Constitutional:       General: He is not in acute distress.  Eyes:      General: No scleral icterus.  Pulmonary:      Effort: No respiratory distress.   Neurological:      General: No focal deficit present.      Mental Status: He is alert.   Psychiatric:         Mood and Affect: Mood normal.       Results Review     I reviewed the patient's new clinical results.  Results from last 7 days   Lab Units 24   WBC 10*3/mm3 8.85   HEMOGLOBIN g/dL 12.5*   PLATELETS 10*3/mm3 197     Results from last 7 days   Lab Units 24  03324  1122   SODIUM mmol/L 140 139 138   POTASSIUM mmol/L 3.8 3.8 4.3   CHLORIDE mmol/L 103 103 101   CO2 mmol/L 25.8 27.2 25.2   BUN mg/dL 21 18 20   CREATININE mg/dL 1.52* 1.39* 1.65*   GLUCOSE mg/dL 109* 110* 344*   EGFR mL/min/1.73 47.5* 52.9* 43.0*     Results from last 7 days   Lab Units 24  033   ALBUMIN g/dL 3.6 3.5   BILIRUBIN mg/dL  --  0.6   ALK PHOS U/L  --  123*   AST (SGOT) U/L  --  24   ALT (SGPT) U/L  --  32     Results from last 7 days   Lab Units 2411/24  1122   CALCIUM mg/dL 8.7 9.0 9.0   ALBUMIN g/dL 3.6 3.5  --    MAGNESIUM mg/dL 2.2  --   --    PHOSPHORUS mg/dL 3.5  --   --        Hemoglobin A1C   Date/Time Value Ref Range Status   2024 0955 12.10 (H) 4.80 - 5.60 % Final     Glucose   Date/Time Value Ref Range Status   2024 0806 120 70 - 130 mg/dL  Final   11/12/2024 2019 248 (H) 70 - 130 mg/dL Final   11/12/2024 1645 165 (H) 70 - 130 mg/dL Final   11/12/2024 1131 190 (H) 70 - 130 mg/dL Final   11/12/2024 0746 115 70 - 130 mg/dL Final   11/11/2024 2239 210 (H) 70 - 130 mg/dL Final   11/11/2024 2034 220 (H) 70 - 130 mg/dL Final       No radiology results for the last day    I have personally reviewed all medications:  Scheduled Medications  apixaban, 5 mg, Oral, BID  atorvastatin, 40 mg, Oral, Nightly  budesonide-formoterol, 2 puff, Inhalation, BID  calcium carbonate (oyster shell), 500 mg, Oral, Daily  insulin glargine, 55 Units, Subcutaneous, Daily  insulin lispro, 2-7 Units, Subcutaneous, 4x Daily AC & at Bedtime  insulin lispro, 5 Units, Subcutaneous, TID  metoprolol succinate XL, 25 mg, Oral, BID  pantoprazole, 40 mg, Oral, Daily  potassium chloride, 20 mEq, Oral, Daily  sacubitril-valsartan, 1 tablet, Oral, Q12H  sertraline, 25 mg, Oral, Daily  torsemide, 100 mg, Oral, Daily    Infusions   Diet  Diet: Diabetic, Fluid Restriction (240 mL/tray), Cardiac; Healthy Heart (2-3 Na+); Consistent Carbohydrate; 1500 mL/day; Fluid Consistency: Thin (IDDSI 0)    I have personally reviewed:  [x]  Laboratory   []  Microbiology   []  Radiology   [x]  EKG/Telemetry  [x]  Cardiology/Vascular   []  Pathology    []  Records       Assessment/Plan     Active Hospital Problems    Diagnosis  POA    **Acute on chronic diastolic CHF (congestive heart failure) [I50.33]  Yes    CUONG (acute kidney injury) [N17.9]  Yes    Acute on chronic combined systolic and diastolic heart failure [I50.43]  Yes    Persistent atrial fibrillation [I48.19]  Yes    Stage 3a chronic kidney disease [N18.31]  Yes    Type 2 diabetes mellitus with hyperglycemia, with long-term current use of insulin [E11.65, Z79.4]  Not Applicable    PING (obstructive sleep apnea) [G47.33]  Yes      Resolved Hospital Problems   No resolved problems to display.       73 y.o. male admitted with Acute on chronic diastolic CHF  (congestive heart failure).  Consulted for diabetes management    DM2, uncontrolled at home with A1c 12.1.  He did great here on even less insulin than his home regimen which is indicative to me of noncompliance at home.  Wife is at bedside this morning, states that his sugars have been much better at home over the last 3 weeks and she is encouraged by his progress.  I told them to make an appointment with Dr. Mcneill as soon as possible to review.  Last visit was in March per review of chart.  For now, would continue his current regimen as it is difficult for me to make any changes given his good control here in the hospital.    Otherwise labs and vital signs are stable and he has been cleared by cardiology and nephrology to go home on new medications as outlined in the discharge summary.  No objections from the internal medicine standpoint.    Thanks for the consult !      Florentino Garay MD  [facility]  11/13/24  11:41 EST

## 2024-11-14 ENCOUNTER — TRANSITIONAL CARE MANAGEMENT TELEPHONE ENCOUNTER (OUTPATIENT)
Dept: CALL CENTER | Facility: HOSPITAL | Age: 74
End: 2024-11-14
Payer: MEDICARE

## 2024-11-14 ENCOUNTER — TREATMENT (OUTPATIENT)
Dept: CARDIAC REHAB | Facility: HOSPITAL | Age: 74
End: 2024-11-14
Payer: MEDICARE

## 2024-11-14 DIAGNOSIS — J98.4 RESTRICTIVE LUNG DISEASE: Primary | ICD-10-CM

## 2024-11-14 PROCEDURE — G0238 OTH RESP PROC, INDIV: HCPCS

## 2024-11-14 NOTE — OUTREACH NOTE
Call Center TCM Note      Flowsheet Row Responses   [facility] patient discharged from? [place]   Does the patient have one of the following disease processes/diagnoses(primary or secondary)? CHF   TCM attempt successful? Yes   Call start time 1105   Call end time 1115   Discharge diagnosis Acute on chronic diastolic CHF (congestive heart failure)   Person spoke with today (if not patient) and relationship Wife   Meds reviewed with patient/caregiver? Yes   Is the patient having any side effects they believe may be caused by any medication additions or changes? No   Does the patient have all medications ordered at discharge? Yes   Is the patient taking all medications as directed (includes completed medication regime)? Yes   Comments HOSP DC FU appt 11/27/24 10 am   Does the patient have an appointment with their PCP within 7-14 days of discharge? Yes   Has home health visited the patient within 72 hours of discharge? N/A   Psychosocial issues? No   Did the patient receive a copy of their discharge instructions? Yes   Nursing interventions Reviewed instructions with patient   What is the patient's perception of their health status since discharge? Improving   Nursing interventions Nurse provided patient education   Is the patient able to teach back signs and symptoms of worsening condition? (i.e. weight gain, shortness of air, etc.) Yes   Is the patient/caregiver able to teach back the hierarchy of who to call/visit for symptoms/problems? PCP, Specialist, Home health nurse, Urgent Care, ED, 911 Yes   TCM call completed? Yes   Wrap up additional comments Wife had questions about the Torsemide 100 mg. Pt wants to take 50 mg  2 x day verse the 100 at one time. Wife will fu with cardio fro question. Discussed the need for wts in am and reasoning as well as when to contact the  PCP appt set and call transfered to HF clinic for appt.   Call end time 1115             Carolina Jackson RN    11/14/2024, 11:15  EST

## 2024-11-15 ENCOUNTER — PATIENT OUTREACH (OUTPATIENT)
Dept: CASE MANAGEMENT | Facility: OTHER | Age: 74
End: 2024-11-15
Payer: MEDICARE

## 2024-11-15 DIAGNOSIS — E11.65 TYPE 2 DIABETES MELLITUS WITH HYPERGLYCEMIA, WITH LONG-TERM CURRENT USE OF INSULIN: Primary | ICD-10-CM

## 2024-11-15 DIAGNOSIS — Z79.4 TYPE 2 DIABETES MELLITUS WITH HYPERGLYCEMIA, WITH LONG-TERM CURRENT USE OF INSULIN: Primary | ICD-10-CM

## 2024-11-15 DIAGNOSIS — E78.2 MIXED HYPERLIPIDEMIA: ICD-10-CM

## 2024-11-15 NOTE — OUTREACH NOTE
AMBULATORY CASE MANAGEMENT NOTE    Names and Relationships of Patient/Support Persons: Contact: Vernon Solorzano; Relationship: Self -     Patient Outreach    Spoke to patient and spouse. Introduced self and explained role. Discussed the purpose, goals, and expectations of the program. Reviewed possible costs and ability to stop program at any time. Patient states his wife cares for him and seems to have everything under control. Patient declined CM assistance at this time. He reports that he has hospital follow up visits scheduled with PCP and cardiologist. Instructed to call ACM if he would like CM assistance in the future.     Jelly GONZÁLES  Ambulatory Case Management    11/15/2024, 10:44 EST

## 2024-11-16 LAB
BACTERIA SPEC AEROBE CULT: NORMAL
BACTERIA SPEC AEROBE CULT: NORMAL

## 2024-11-19 ENCOUNTER — TREATMENT (OUTPATIENT)
Dept: CARDIAC REHAB | Facility: HOSPITAL | Age: 74
End: 2024-11-19
Payer: MEDICARE

## 2024-11-19 DIAGNOSIS — J98.4 RESTRICTIVE LUNG DISEASE: Primary | ICD-10-CM

## 2024-11-19 PROCEDURE — G0238 OTH RESP PROC, INDIV: HCPCS

## 2024-11-21 ENCOUNTER — TREATMENT (OUTPATIENT)
Dept: CARDIAC REHAB | Facility: HOSPITAL | Age: 74
End: 2024-11-21
Payer: MEDICARE

## 2024-11-21 DIAGNOSIS — J98.4 RESTRICTIVE LUNG DISEASE: Primary | ICD-10-CM

## 2024-11-21 PROCEDURE — G0238 OTH RESP PROC, INDIV: HCPCS

## 2024-11-26 ENCOUNTER — TREATMENT (OUTPATIENT)
Dept: CARDIAC REHAB | Facility: HOSPITAL | Age: 74
End: 2024-11-26
Payer: MEDICARE

## 2024-11-26 DIAGNOSIS — J98.4 RESTRICTIVE LUNG DISEASE: Primary | ICD-10-CM

## 2024-11-26 PROCEDURE — G0238 OTH RESP PROC, INDIV: HCPCS

## 2024-11-27 ENCOUNTER — OFFICE VISIT (OUTPATIENT)
Dept: INTERNAL MEDICINE | Facility: CLINIC | Age: 74
End: 2024-11-27
Payer: MEDICARE

## 2024-11-27 VITALS
BODY MASS INDEX: 36.5 KG/M2 | OXYGEN SATURATION: 97 % | DIASTOLIC BLOOD PRESSURE: 84 MMHG | HEART RATE: 89 BPM | SYSTOLIC BLOOD PRESSURE: 122 MMHG | HEIGHT: 73 IN | WEIGHT: 275.4 LBS

## 2024-11-27 DIAGNOSIS — E78.2 MIXED HYPERLIPIDEMIA: Chronic | ICD-10-CM

## 2024-11-27 DIAGNOSIS — Z79.01 ANTICOAGULATED: Chronic | ICD-10-CM

## 2024-11-27 DIAGNOSIS — Z23 NEED FOR INFLUENZA VACCINATION: ICD-10-CM

## 2024-11-27 DIAGNOSIS — G47.33 OSA (OBSTRUCTIVE SLEEP APNEA): Chronic | ICD-10-CM

## 2024-11-27 DIAGNOSIS — N18.31 STAGE 3A CHRONIC KIDNEY DISEASE: Chronic | ICD-10-CM

## 2024-11-27 DIAGNOSIS — I10 ESSENTIAL HYPERTENSION: Chronic | ICD-10-CM

## 2024-11-27 DIAGNOSIS — Z86.73 HISTORY OF ISCHEMIC STROKE: Chronic | ICD-10-CM

## 2024-11-27 DIAGNOSIS — E11.65 TYPE 2 DIABETES MELLITUS WITH HYPERGLYCEMIA, WITH LONG-TERM CURRENT USE OF INSULIN: ICD-10-CM

## 2024-11-27 DIAGNOSIS — Z79.4 TYPE 2 DIABETES MELLITUS WITH HYPERGLYCEMIA, WITH LONG-TERM CURRENT USE OF INSULIN: ICD-10-CM

## 2024-11-27 DIAGNOSIS — I50.33 ACUTE ON CHRONIC DIASTOLIC CHF (CONGESTIVE HEART FAILURE): Primary | ICD-10-CM

## 2024-11-27 PROCEDURE — 1160F RVW MEDS BY RX/DR IN RCRD: CPT | Performed by: NURSE PRACTITIONER

## 2024-11-27 PROCEDURE — 3074F SYST BP LT 130 MM HG: CPT | Performed by: NURSE PRACTITIONER

## 2024-11-27 PROCEDURE — 3079F DIAST BP 80-89 MM HG: CPT | Performed by: NURSE PRACTITIONER

## 2024-11-27 PROCEDURE — 90662 IIV NO PRSV INCREASED AG IM: CPT | Performed by: NURSE PRACTITIONER

## 2024-11-27 PROCEDURE — 1159F MED LIST DOCD IN RCRD: CPT | Performed by: NURSE PRACTITIONER

## 2024-11-27 PROCEDURE — 99214 OFFICE O/P EST MOD 30 MIN: CPT | Performed by: NURSE PRACTITIONER

## 2024-11-27 PROCEDURE — G0008 ADMIN INFLUENZA VIRUS VAC: HCPCS | Performed by: NURSE PRACTITIONER

## 2024-11-27 PROCEDURE — 1126F AMNT PAIN NOTED NONE PRSNT: CPT | Performed by: NURSE PRACTITIONER

## 2024-11-27 NOTE — PROGRESS NOTES
Chief Complaint  Congestive Heart Failure     Subjective:      History of Present Illness {CC  Problem List  Visit  Diagnosis   Encounters  Notes  Medications  Labs  Result Review Imaging  Media :23}     Vernon Solorzano presents to [facility] for:  Hospital follow up     Date of Admission: 11/11/2024     Date of Discharge:  11/13/2024     Follow up with Dr Arango on 11/11 and felt CHF decompensated: admitted and diuresed.  Renal function improved.   Renal saw patient as well: changed torsemide to 100 mg daily.     Discharge dx:   1.  Acute on chronic combined CHF (unclear trigger)  2.  Cardiomyopathy with EF 43% by echo on 10/22/2024  3.  Acute kidney injury with stage IIIa chronic kidney disease  4.  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 Frey  5.  Postoperative junctional bradycardia, complete AV block, typical atrial flutter (status post DCCV on 6/2/2023)  6.  Paroxysmal atrial fibrillation  7.  Obstructive sleep apnea, on CPAP  8.  Bifascicular block (RBBB, LAFB)  9.  Type 2 diabetes  10.  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  States feeling better.   Edema has remained improved.   States wife is in charge of all of his medications and takes as directed.     He went to pulm rehab yesterday.     Uncontrolled diabetes: last A1c 12.1%  Diabetes was well controlled during admission and per notes: needed less coverage. Felt compliance issue at home.    He states he and wife can't figure out what was different during admission.  He has a CGM but it is connected to wife's phone so I can not review glucose readings at home.   Needs follow up with Dr Mcneill.     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   We started zoloft at last visit  and thinks it may help.     States compliant with CPAP.     He will follow up with cardiology next week: 12/3              I have reviewed patient's medical history, any new submitted information provided by patient or medical assistant and updated medical record.      Objective:      Physical Exam  Vitals reviewed.   Constitutional:       Appearance: Normal appearance. He is well-developed.   Neck:      Thyroid: No thyromegaly.      Vascular: No JVD.   Cardiovascular:      Rate and Rhythm: Normal rate and regular rhythm.      Pulses: Normal pulses.      Heart sounds: Normal heart sounds.   Pulmonary:      Effort: Pulmonary effort is normal.      Breath sounds: Normal breath sounds.      Comments: E/U   Musculoskeletal:      Comments: Trace lower extremity edema: right has been greater than left since hospitalization.    He did have right sided weakness after stroke which has resolved.  No redness/ tenderness to right lower extremity.     Lymphadenopathy:      Cervical: No cervical adenopathy.   Skin:     General: Skin is warm and dry.      Capillary Refill: Capillary refill takes 2 to 3 seconds.   Neurological:      General: No focal deficit present.      Mental Status: He is alert.   Psychiatric:         Mood and Affect: Mood normal.         Behavior: Behavior normal. Behavior is cooperative.         Thought Content: Thought content normal.         Judgment: Judgment normal.        Result Review  Data Reviewed:{ Labs  Result Review  Imaging  Med Tab  Media :23}     The following data was reviewed by: Liza Oconnell III, NP-C on 11/27/2024  Common labs          11/11/2024    11:22 11/12/2024    03:39 11/12/2024    09:55 11/12/2024    16:44 11/13/2024    03:39   Common Labs   Glucose 344  110    109    BUN 20  18    21    Creatinine 1.65  1.39    1.52    Sodium 138  139    140    Potassium 4.3  3.8    3.8    Chloride 101  103    103    Calcium 9.0  9.0    8.7    Albumin  3.5    3.6    Total  "Bilirubin  0.6       Alkaline Phosphatase  123       AST (SGOT)  24       ALT (SGPT)  32       WBC     8.85    Hemoglobin     12.5    Hematocrit     37.9    Platelets     197    Hemoglobin A1C   12.10      Microalbumin, Urine    2.7     Uric Acid     6.1             Vital Signs:   /84 (BP Location: Left arm, Patient Position: Sitting, Cuff Size: Adult)   Pulse 89   Ht 185.4 cm (73\")   Wt 125 kg (275 lb 6.4 oz)   SpO2 97%   BMI 36.33 kg/m²   Estimated body mass index is 36.33 kg/m² as calculated from the following:    Height as of this encounter: 185.4 cm (73\").    Weight as of this encounter: 125 kg (275 lb 6.4 oz).        Requested Prescriptions      No prescriptions requested or ordered in this encounter       Routine medications provided by this office will also be refilled via pharmacy request.       Current Outpatient Medications:     acetaminophen (TYLENOL) 325 MG tablet, Take 2 tablets by mouth Every 6 (Six) Hours As Needed for Mild Pain., Disp: , Rfl:     albuterol sulfate  (90 Base) MCG/ACT inhaler, Inhale 2 puffs Every 4 (Four) Hours As Needed., Disp: , Rfl:     apixaban (ELIQUIS) 5 MG tablet tablet, Take 1 tablet by mouth 2 (Two) Times a Day., Disp: 180 tablet, Rfl: 3    atorvastatin (LIPITOR) 40 MG tablet, Take 1 tablet by mouth Every Night., Disp: 30 tablet, Rfl: 1    budesonide-formoterol (SYMBICORT) 160-4.5 MCG/ACT inhaler, Inhale 2 puffs 2 (Two) Times a Day., Disp: , Rfl:     calcium carbonate (OS-QUE) 600 MG tablet, Take 1 tablet by mouth Daily., Disp: , Rfl:     calcium carbonate (TUMS) 500 MG chewable tablet, Chew 2 tablets 4 (Four) Times a Day As Needed for Indigestion or Heartburn., Disp: , Rfl:     Continuous Blood Gluc Sensor (FreeStyle Ysabel 2 Sensor) misc, 1 each by Other route Every 14 (Fourteen) Days. Use 1 sensor every 14 days  Indications: Type 2 Diabetes, Disp: 6 each, Rfl: 3    glucose blood (OneTouch Verio) test strip, 1 each by Other route As Needed. Use as " instructed, Disp: , Rfl:     insulin degludec (Tresiba FlexTouch) 100 UNIT/ML solution pen-injector injection, Inject 60 Units under the skin into the appropriate area as directed Daily., Disp: , Rfl:     Insulin Lispro, 1 Unit Dial, (HumaLOG KwikPen) 100 UNIT/ML solution pen-injector, Inject 20 Units under the skin into the appropriate area as directed 3 (Three) Times a Day With Meals., Disp: , Rfl:     Insulin Pen Needle 32G X 4 MM misc, 1 each by Other route 3 (Three) Times a Day. Use to inject insulin under the skin via pens up to 4 times daily  Indications: Type 2 Diabetes, Disp: 300 each, Rfl: 3    metoprolol succinate XL (TOPROL-XL) 25 MG 24 hr tablet, Take 1 tablet by mouth 2 (Two) Times a Day., Disp: 180 tablet, Rfl: 2    Multiple Vitamins-Minerals (PRESERVISION AREDS 2+MULTI VIT PO), Take 1 tablet by mouth 2 (Two) Times a Day., Disp: , Rfl:     pantoprazole (PROTONIX) 40 MG EC tablet, Take 1 tablet by mouth Daily., Disp: 30 tablet, Rfl: 1    potassium chloride (KLOR-CON M20) 20 MEQ CR tablet, Take 1 tablet by mouth Daily., Disp: 30 tablet, Rfl: 1    sacubitril-valsartan (Entresto) 24-26 MG tablet, Take 1 tablet by mouth 2 (Two) Times a Day., Disp: 180 tablet, Rfl: 3    sertraline (Zoloft) 25 MG tablet, Take 1 tablet by mouth Daily., Disp: 30 tablet, Rfl: 2    torsemide (DEMADEX) 100 MG tablet, Take 1 tablet by mouth Daily., Disp: 30 tablet, Rfl: 1     Assessment and Plan:      Assessment and Plan {CC Problem List  Visit Diagnosis  ROS  Review (Popup)  Health Maintenance  Quality  BestPractice  Medications  SmartSets  SnapShot Encounters  Media :23}     Diagnoses and all orders for this visit:    1. Acute on chronic diastolic CHF (congestive heart failure) (Primary)  Assessment & Plan:  Compensated: continue torsemide, entresto   Follow up with cardiology next week.           2. Essential hypertension  Assessment & Plan:  Hypertension stable.   Limit sodium, work on weight loss.       3. Mixed  hyperlipidemia  Assessment & Plan:   Lipid abnormalities are improving with treatment    Plan:  Continue same medication/s without change.    Continue lipitor and low fat diet.     Discussed medication dosage, use, side effects, and goals of treatment in detail.    Counseled patient on lifestyle modifications to help control hyperlipidemia.   Advised patient to exercise for 150 minutes weekly. (30 minute brisk walk, 5 days a week for example)  Weight Loss encouraged    Patient Treatment Goals:   LDL goal is less than 70    Lab Results   Component Value Date    CHOL 116 08/17/2023    CHLPL 105 02/13/2024    TRIG 191 (H) 02/13/2024    HDL 31 (L) 02/13/2024    LDL 43 02/13/2024        Followup at the next regular appointment.      4. Anticoagulated    5. Stage 3a chronic kidney disease  Overview:  Followed by renal  Felt related to hypertension and diabetic nephrosclerosis     Assessment & Plan:  Lab Results   Component Value Date    CREATININE 1.52 (H) 11/13/2024      Follow up with renal in 2 weeks.     Avoid nephrotoxic medications - especially nsaids (like Ibuprofen, aleve)   Maintain blood pressure control  Maintain blood sugar control          6. PING (obstructive sleep apnea)  Overview:  2023: started CPAP       7. History of ischemic stroke    8. Type 2 diabetes mellitus with hyperglycemia, with long-term current use of insulin  Overview:  5/2022: Glucose Monitor: OneTouch Verio Flex   2023: CGM: free style libre2    Renal protection: ARB  Statin: yes  Followed by endocrine: yes     Diabetic Nephrosclerosis: followed by renal     Assessment & Plan:  Your last A1C (3 month average) =   Lab Results   Component Value Date    HGBA1C 12.10 (H) 11/12/2024      Your diabetes is Uncontrolled.    Plan    [x] Continue same treatment: he needs to follow up with endocrine.   Not clear why glucose was much better controlled during admission.     Diet versus compliance with insulin.   States they are working on improving.      [] Will modify dose/ treatment if needed based upon lab results today.     [] Change:       Diet: low carbohydrate, low sugar.     ADA Website for diabetic food choices.   https://diabetes.org/food-nutrition/eating-healthy    The American Diabetes Association recommends an A1C of less than 7%.  A1C Average Levels Blood Sugar:   6%  126 mg/dL  7%  154 mg/dL  8%  183 mg/dL  9%  212 mg/dL  10%  240 mg/dL  11%  269 mg/dL  12%  298 mg/dL    Glucose goals for many adults with diabetes  Blood sugar before meals  mg/dL  Blood sugar 1-2 hours after the start of a meal Less than 180 mg/dL A1C Less than 7%    Eye Health:   You need a diabetic eye exam yearly.   Please have a copy of the note faxed to my office.   Fax: [phone]    Foot Health:   You need a diabetic foot exam yearly.   Check your feet routinely for any wounds.   You should always check your shoes for any debris that could cause a wound.          9. Need for influenza vaccination  -     Fluzone High-Dose 65+yrs             No orders of the defined types were placed in this encounter.            Follow Up {Instructions Charge Capture  Follow-up Communications :23}     Return if symptoms worsen or fail to improve.      Patient was given instructions and counseling regarding his condition or for health maintenance advice. Please see specific information pulled into the AVS if appropriate.    Dragon disclaimer:   Much of this encounter note is an electronic transcription/translation of spoken language to printed text. The electronic translation of spoken language may permit erroneous, or at times, nonsensical words or phrases to be inadvertently transcribed; Although I have reviewed the note for such errors, some may still exist.     Additional Patient Counseling:       There are no Patient Instructions on file for this visit.

## 2024-11-27 NOTE — ASSESSMENT & PLAN NOTE
Lab Results   Component Value Date    CREATININE 1.52 (H) 11/13/2024      Follow up with renal in 2 weeks.     Avoid nephrotoxic medications - especially nsaids (like Ibuprofen, aleve)   Maintain blood pressure control  Maintain blood sugar control

## 2024-11-27 NOTE — ASSESSMENT & PLAN NOTE
Lipid abnormalities are improving with treatment    Plan:  Continue same medication/s without change.    Continue lipitor and low fat diet.     Discussed medication dosage, use, side effects, and goals of treatment in detail.    Counseled patient on lifestyle modifications to help control hyperlipidemia.   Advised patient to exercise for 150 minutes weekly. (30 minute brisk walk, 5 days a week for example)  Weight Loss encouraged    Patient Treatment Goals:   LDL goal is less than 70    Lab Results   Component Value Date    CHOL 116 08/17/2023    CHLPL 105 02/13/2024    TRIG 191 (H) 02/13/2024    HDL 31 (L) 02/13/2024    LDL 43 02/13/2024        Followup at the next regular appointment.

## 2024-11-27 NOTE — ASSESSMENT & PLAN NOTE
Your last A1C (3 month average) =   Lab Results   Component Value Date    HGBA1C 12.10 (H) 11/12/2024      Your diabetes is Uncontrolled.    Plan    [x] Continue same treatment: he needs to follow up with endocrine.   Not clear why glucose was much better controlled during admission.     Diet versus compliance with insulin.   States they are working on improving.     [] Will modify dose/ treatment if needed based upon lab results today.     [] Change:       Diet: low carbohydrate, low sugar.     ADA Website for diabetic food choices.   https://diabetes.org/food-nutrition/eating-healthy    The American Diabetes Association recommends an A1C of less than 7%.  A1C Average Levels Blood Sugar:   6%  126 mg/dL  7%  154 mg/dL  8%  183 mg/dL  9%  212 mg/dL  10%  240 mg/dL  11%  269 mg/dL  12%  298 mg/dL    Glucose goals for many adults with diabetes  Blood sugar before meals  mg/dL  Blood sugar 1-2 hours after the start of a meal Less than 180 mg/dL A1C Less than 7%    Eye Health:   You need a diabetic eye exam yearly.   Please have a copy of the note faxed to my office.   Fax: [phone]    Foot Health:   You need a diabetic foot exam yearly.   Check your feet routinely for any wounds.   You should always check your shoes for any debris that could cause a wound.

## 2024-12-03 ENCOUNTER — APPOINTMENT (OUTPATIENT)
Dept: CARDIAC REHAB | Facility: HOSPITAL | Age: 74
End: 2024-12-03
Payer: MEDICARE

## 2024-12-03 ENCOUNTER — OFFICE VISIT (OUTPATIENT)
Dept: CARDIOLOGY | Facility: CLINIC | Age: 74
End: 2024-12-03
Payer: MEDICARE

## 2024-12-03 VITALS
SYSTOLIC BLOOD PRESSURE: 124 MMHG | WEIGHT: 279 LBS | HEIGHT: 73 IN | BODY MASS INDEX: 36.98 KG/M2 | HEART RATE: 78 BPM | DIASTOLIC BLOOD PRESSURE: 76 MMHG

## 2024-12-03 DIAGNOSIS — G47.33 OSA (OBSTRUCTIVE SLEEP APNEA): ICD-10-CM

## 2024-12-03 DIAGNOSIS — Z79.01 ANTICOAGULATED: ICD-10-CM

## 2024-12-03 DIAGNOSIS — I48.92 PAROXYSMAL ATRIAL FLUTTER: ICD-10-CM

## 2024-12-03 DIAGNOSIS — Z95.4 H/O MITRAL VALVE REPLACEMENT WITH TISSUE GRAFT: ICD-10-CM

## 2024-12-03 DIAGNOSIS — Z79.4 TYPE 2 DIABETES MELLITUS WITH HYPERGLYCEMIA, WITH LONG-TERM CURRENT USE OF INSULIN: ICD-10-CM

## 2024-12-03 DIAGNOSIS — E11.65 TYPE 2 DIABETES MELLITUS WITH HYPERGLYCEMIA, WITH LONG-TERM CURRENT USE OF INSULIN: ICD-10-CM

## 2024-12-03 DIAGNOSIS — E78.2 MIXED HYPERLIPIDEMIA: ICD-10-CM

## 2024-12-03 DIAGNOSIS — I34.0 NONRHEUMATIC MITRAL VALVE REGURGITATION: ICD-10-CM

## 2024-12-03 DIAGNOSIS — I10 ESSENTIAL HYPERTENSION: ICD-10-CM

## 2024-12-03 DIAGNOSIS — I42.8 NON-ISCHEMIC CARDIOMYOPATHY: Primary | ICD-10-CM

## 2024-12-03 DIAGNOSIS — N18.31 STAGE 3A CHRONIC KIDNEY DISEASE: ICD-10-CM

## 2024-12-03 RX ORDER — TORSEMIDE 100 MG/1
100 TABLET ORAL DAILY
Start: 2024-12-03

## 2024-12-05 ENCOUNTER — TREATMENT (OUTPATIENT)
Dept: CARDIAC REHAB | Facility: HOSPITAL | Age: 74
End: 2024-12-05
Payer: MEDICARE

## 2024-12-05 DIAGNOSIS — J98.4 RESTRICTIVE LUNG DISEASE: Primary | ICD-10-CM

## 2024-12-05 PROCEDURE — G0238 OTH RESP PROC, INDIV: HCPCS

## 2024-12-10 ENCOUNTER — APPOINTMENT (OUTPATIENT)
Dept: CARDIAC REHAB | Facility: HOSPITAL | Age: 74
End: 2024-12-10
Payer: MEDICARE

## 2024-12-12 ENCOUNTER — APPOINTMENT (OUTPATIENT)
Dept: CARDIAC REHAB | Facility: HOSPITAL | Age: 74
End: 2024-12-12
Payer: MEDICARE

## 2024-12-26 ENCOUNTER — TELEPHONE (OUTPATIENT)
Dept: ENDOCRINOLOGY | Age: 74
End: 2024-12-26
Payer: MEDICARE

## 2024-12-26 DIAGNOSIS — E11.9 TYPE 2 DIABETES MELLITUS WITHOUT COMPLICATION, WITH LONG-TERM CURRENT USE OF INSULIN: ICD-10-CM

## 2024-12-26 DIAGNOSIS — Z79.4 TYPE 2 DIABETES MELLITUS WITHOUT COMPLICATION, WITH LONG-TERM CURRENT USE OF INSULIN: ICD-10-CM

## 2024-12-26 RX ORDER — PEN NEEDLE, DIABETIC 32GX 5/32"
NEEDLE, DISPOSABLE MISCELLANEOUS
Qty: 400 EACH | Refills: 3 | Status: SHIPPED | OUTPATIENT
Start: 2024-12-26

## 2024-12-26 RX ORDER — APIXABAN 5 MG/1
5 TABLET, FILM COATED ORAL 2 TIMES DAILY
Qty: 180 TABLET | Refills: 3 | Status: SHIPPED | OUTPATIENT
Start: 2024-12-26

## 2024-12-26 NOTE — TELEPHONE ENCOUNTER
Rx Refill Note  Requested Prescriptions     Pending Prescriptions Disp Refills    BD Pen Needle [place] 2nd Gen 32G X 4 MM misc [Pharmacy Med Name: B-D [place] 2ND GEN PEN NDL [id number]] 300 each 3     Sig: USE TO INJECT INSULIN UNDER THE SKIN VIA PENS UP TO FOUR TIMES DAILY      Last office visit with prescribing clinician: 3/5/2024   Last telemedicine visit with prescribing clinician: Visit date not found   Next office visit with prescribing clinician: Visit date not found                         Would you like a call back once the refill request has been completed: [] Yes [] No    If the office needs to give you a call back, can they leave a voicemail: [] Yes [] No    Annie Mckee MA  12/26/24, 11:59 EST

## 2025-01-17 ENCOUNTER — OFFICE VISIT (OUTPATIENT)
Dept: CARDIOLOGY | Facility: CLINIC | Age: 75
End: 2025-01-17
Payer: MEDICARE

## 2025-01-17 VITALS
DIASTOLIC BLOOD PRESSURE: 62 MMHG | BODY MASS INDEX: 38.57 KG/M2 | HEART RATE: 76 BPM | WEIGHT: 291 LBS | HEIGHT: 73 IN | SYSTOLIC BLOOD PRESSURE: 126 MMHG

## 2025-01-17 DIAGNOSIS — I50.43 ACUTE ON CHRONIC COMBINED SYSTOLIC AND DIASTOLIC HEART FAILURE: ICD-10-CM

## 2025-01-17 DIAGNOSIS — I42.8 NON-ISCHEMIC CARDIOMYOPATHY: Primary | ICD-10-CM

## 2025-01-17 DIAGNOSIS — Z79.01 ANTICOAGULATED: Chronic | ICD-10-CM

## 2025-01-17 DIAGNOSIS — Z95.4 H/O MITRAL VALVE REPLACEMENT WITH TISSUE GRAFT: ICD-10-CM

## 2025-01-17 DIAGNOSIS — E78.2 MIXED HYPERLIPIDEMIA: Chronic | ICD-10-CM

## 2025-01-17 DIAGNOSIS — I10 ESSENTIAL HYPERTENSION: Chronic | ICD-10-CM

## 2025-01-17 DIAGNOSIS — I12.9 HYPERTENSIVE CHRONIC KIDNEY DISEASE, UNSPECIFIED CKD STAGE: ICD-10-CM

## 2025-01-17 RX ORDER — POTASSIUM CHLORIDE 750 MG/1
1 TABLET, EXTENDED RELEASE ORAL DAILY
COMMUNITY
Start: 2024-12-13

## 2025-01-17 RX ORDER — EMPAGLIFLOZIN 10 MG/1
TABLET, FILM COATED ORAL
COMMUNITY

## 2025-01-17 RX ORDER — CYCLOBENZAPRINE HCL 5 MG
1 TABLET ORAL 3 TIMES DAILY
COMMUNITY
Start: 2024-12-18 | End: 2025-01-17

## 2025-01-17 NOTE — PROGRESS NOTES
Date of Office Visit: 2025  Encounter Provider: Karishma Arango MD  Place of Service: [facility] CARDIOLOGY  Patient Name: Vernon Solorzano  :1950    Chief complaint  Hypertension, congestive heart failure, cardiomyopathy, atrial fibrillation.    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  In 2022 he developed COVID-pneumonia possible COVID pneumonitis and progressive shortness of breath with CT findings also demonstrating calcification LAD and circumflex vessel..  He was also noted to have significant pulmonary hypertension with RVSP of 60 mmHg.  By 2023 he was found to have severe mitral regurgitation with annular dilatation and moderate severe tricuspid regurgitation.  He underwent mitral valve repair with annuloplasty ring with residual mild to moderate regurgitation subsequent mitral valve replacement with 29 mm Schwartz 2 porcine prosthesis.  He also had tricuspid valve repair and left atrial appendage closure.  Preoperative cardiac cath showed normal coronary arteries.  Postoperatively he also had a pericardial effusion which slowly resolved.  He initially had LV systolic dysfunction with an ejection fraction reduced to 25% though on medical therapy and with treatment of CPAP by 2024 ejection fraction 54% with mild left ventricular hypertrophy, indeterminate diastolic function, mitral valve prosthesis in place with a mean gradient of 11 mmHg and a pressure half-time of 73 ms suggestive of hyperdynamic state or patient valve mismatch.  Right-sided chambers were not well-seen.  Aortic valve was thickened without stenosis.  His postop care has been also complicated by renal insufficiency and poor control of diabetes.  He continued complaint of dyspnea and edema.  On 10/2024 echo showed an ejection fraction 43% with regional wall motion  abnormality with normal prosthetic valve function mildly reduced RV function.  Tricuspid ring present with normal function.  Demadex was increased and patient was to consider reinitiation of Xarelto.  Switch to switch from losartan to Entresto.      Patient was seen in December by nephrology torsemide was increased further and patient was started on Jardiance.  On January 5 creatinine increased to 1.7 with GFR 40.  Glucose slightly better.  Patient has had no palpitations dizziness or chest pain.  Dyspnea on exertion and edema are unchanged.  Patient is using CPAP consistently at night but not during the day when he is napping fairly frequently..  He is riding his bicycle 20 minutes daily.      Past Medical History:   Diagnosis Date    Allergic rhinitis     Arthritis     Asthma 1954    BPH (benign prostatic hyperplasia)     Cataract     Coronary artery disease     Diabetes mellitus     TYPE 2    Foraminal stenosis of cervical region     C5-C6    GERD (gastroesophageal reflux disease)     Hemorrhoids     History of urinary retention 2010    S/P TURP    Hyperlipidemia     Hypertension 2021    Macular degeneration     PING (obstructive sleep apnea) 01/07/2016    MILD, SEES DR. AMARILIS MORGAN    Pericardial effusion, acute     Stroke      Past Surgical History:   Procedure Laterality Date    BRONCHOSCOPY N/A 07/10/2024    Procedure: BRONCHOSCOPY WITH C-ARM with biopsies and washing;  Surgeon: Luis Slater Jr., MD;  Location: [facility] ENDOSCOPY;  Service: Pulmonary;  Laterality: N/A;  pre-possible sarcoidosis; multiple nodules  post-multiple nodules    CARDIAC CATHETERIZATION N/A 05/11/2023    Procedure: Right Heart Cath;  Surgeon: Corey Gaffney MD;  Location: [facility] CATH INVASIVE LOCATION;  Service: Cardiology;  Laterality: N/A;    CARDIAC CATHETERIZATION N/A 05/11/2023    Procedure: Left Heart Cath;  Surgeon: Corey Gaffney MD;  Location: [facility] CATH INVASIVE LOCATION;  Service: Cardiology;  Laterality: N/A;     CARDIAC CATHETERIZATION N/A 05/11/2023    Procedure: Left ventriculography;  Surgeon: Corey Gaffney MD;  Location: [facility] CATH INVASIVE LOCATION;  Service: Cardiology;  Laterality: N/A;    CARDIAC CATHETERIZATION N/A 05/11/2023    Procedure: Coronary angiography;  Surgeon: Corey Gaffney MD;  Location:  [facility] CATH INVASIVE LOCATION;  Service: Cardiology;  Laterality: N/A;    CARDIAC SURGERY      Mytrial valve,Tricuspid destiny replacement  or repair indicated  no    CARDIAC VALVE REPLACEMENT      COLONOSCOPY N/A     WNL PER PT, NO RECORDS,     COLONOSCOPY N/A 04/07/2009    DR. GORGE BENAVIDEZ AT [place]    MITRAL VALVE REPAIR/REPLACEMENT N/A 05/24/2023    Procedure: MITRAL VALVE REPAIR/REPLACEMENT TRICUSPID VALVE REPAIR/REPLACEMENT TRANSESOPHAGEAL ECHOCARDIOGRAM WITH ANESTHESIA;  Surgeon: George Frey MD;  Location: [facility] CVOR;  Service: Cardiothoracic;  Laterality: N/A;    PROSTATE SURGERY N/A 11/12/2010    TURP, DR. COREY COLLAZO AT [facility]    SKIN TAG REMOVAL N/A 12/20/2017    ANAL SKIN TAG X2, PERFORMED IN OFFICE, DR. LISA ORANTES    TURP / TRANSURETHRAL INCISION / DRAINAGE PROSTATE  2010    Dr. Goodrich     Outpatient Medications Prior to Visit   Medication Sig Dispense Refill    acetaminophen (TYLENOL) 325 MG tablet Take 2 tablets by mouth Every 6 (Six) Hours As Needed for Mild Pain.      albuterol sulfate  (90 Base) MCG/ACT inhaler Inhale 2 puffs Every 4 (Four) Hours As Needed.      atorvastatin (LIPITOR) 40 MG tablet Take 1 tablet by mouth Every Night. 30 tablet 1    budesonide-formoterol (SYMBICORT) 160-4.5 MCG/ACT inhaler Inhale 2 puffs 2 (Two) Times a Day. (Patient not taking: Reported on 1/24/2025)      calcium carbonate (OS-QUE) 600 MG tablet Take 1 tablet by mouth Daily.      calcium carbonate (TUMS) 500 MG chewable tablet Chew 2 tablets 4 (Four) Times a Day As Needed for Indigestion or Heartburn.      Continuous Blood Gluc Sensor (FreeStyle Ysabel 2 Sensor) misc 1 each by Other  route Every 14 (Fourteen) Days. Use 1 sensor every 14 days  Indications: Type 2 Diabetes 6 each 3    Eliquis 5 MG tablet tablet TAKE 1 TABLET BY MOUTH TWICE DAILY 180 tablet 3    insulin degludec (Tresiba FlexTouch) 100 UNIT/ML solution pen-injector injection Inject 68 Units under the skin into the appropriate area as directed Daily.      Insulin Lispro, 1 Unit Dial, (HumaLOG KwikPen) 100 UNIT/ML solution pen-injector Inject 24 Units under the skin into the appropriate area as directed 3 (Three) Times a Day With Meals.      Insulin Pen Needle (BD Pen Needle Yolette 2nd Gen) 32G X 4 MM misc Use pen needle 4 times a day for insulin injection 400 each 3    Jardiance 10 MG tablet tablet take 1 tablet by mouth 1 time each day in the morning      metoprolol succinate XL (TOPROL-XL) 25 MG 24 hr tablet Take 1 tablet by mouth 2 (Two) Times a Day. 180 tablet 2    Multiple Vitamins-Minerals (PRESERVISION AREDS 2+MULTI VIT PO) Take 1 tablet by mouth 2 (Two) Times a Day.      pantoprazole (PROTONIX) 40 MG EC tablet Take 1 tablet by mouth Daily. 30 tablet 1    potassium chloride 10 MEQ CR tablet Take 1 tablet by mouth Daily.      sacubitril-valsartan (Entresto) 24-26 MG tablet Take 1 tablet by mouth 2 (Two) Times a Day. 180 tablet 3    torsemide (DEMADEX) 100 MG tablet Take 1 tablet by mouth Daily.      glucose blood (OneTouch Verio) test strip 1 each by Other route As Needed. Use as instructed      sertraline (Zoloft) 25 MG tablet Take 1 tablet by mouth Daily. (Patient not taking: Reported on 1/24/2025) 30 tablet 2    cyclobenzaprine (FLEXERIL) 5 MG tablet Take 1 tablet by mouth 3 times a day. (Patient not taking: Reported on 1/17/2025)      potassium chloride (KLOR-CON M20) 20 MEQ CR tablet Take 1 tablet by mouth Daily. (Patient not taking: Reported on 1/17/2025) 30 tablet 1     No facility-administered medications prior to visit.       Allergies as of 01/17/2025    (No Known Allergies)     Social History     Socioeconomic History  "   Marital status:    Tobacco Use    Smoking status: Never     Passive exposure: Never    Smokeless tobacco: Never   Vaping Use    Vaping status: Never Used   Substance and Sexual Activity    Alcohol use: Yes     Alcohol/week: 1.0 standard drink of alcohol     Types: 1 Drinks containing 0.5 oz of alcohol per week     Comment: Seldom    Drug use: No    Sexual activity: Yes     Partners: Female     Birth control/protection: None     Family History   Problem Relation Age of Onset    Lung cancer Mother     Stroke Father     Dementia Father     Hyperlipidemia Brother     Hypertension Brother     Heart disease Brother     Diabetes Brother     Colon polyps Brother     Colon polyps Brother     Heart disease Brother     Hyperlipidemia Brother     Hypertension Brother     Malig Hyperthermia Neg Hx      Review of Systems   Constitutional: Positive for malaise/fatigue. Negative for chills, fever, weight gain and weight loss.   Cardiovascular:  Positive for dyspnea on exertion and leg swelling.   Respiratory:  Negative for cough, snoring and wheezing.    Hematologic/Lymphatic: Negative for bleeding problem. Does not bruise/bleed easily.   Skin:  Negative for color change.   Musculoskeletal:  Negative for falls, joint pain and myalgias.   Gastrointestinal:  Negative for melena.   Genitourinary:  Negative for hematuria.   Neurological:  Negative for excessive daytime sleepiness.   Psychiatric/Behavioral:  Negative for depression. The patient is not nervous/anxious.         Objective:     Vitals:    01/17/25 1248   BP: 126/62   Pulse: 76   Weight: 132 kg (291 lb)   Height: 185.4 cm (73\")     Body mass index is 38.39 kg/m².    Vitals reviewed.   Constitutional:       Appearance: Well-developed.   Eyes:      General: No scleral icterus.        Right eye: No discharge.      Conjunctiva/sclera: Conjunctivae normal.      Pupils: Pupils are equal, round, and reactive to light.   HENT:      Head: Normocephalic.      Nose: Nose " normal.   Neck:      Thyroid: No thyromegaly.      Vascular: No JVD.   Pulmonary:      Effort: Pulmonary effort is normal. No respiratory distress.      Breath sounds: Normal breath sounds. No wheezing. No rales.   Cardiovascular:      Normal rate. Regular rhythm. Normal S1. Normal S2.       Murmurs: There is a grade 1to 2/6 high frequency blowing holosystolic murmur at the apex.      No gallop.    Pulses:     Intact distal pulses.      Carotid: 2+ bilaterally.     Radial: 2+ bilaterally.     Femoral: 2+ bilaterally.     Popliteal: 2+ bilaterally.     Dorsalis pedis: 2+ bilaterally.     Posterior tibial: 2+ bilaterally.  Edema:     Peripheral edema present.     Pretibial: trace edema of the left pretibial area and 1+ edema of the right pretibial area.     Ankle: 1+ edema of the left ankle and 2+ edema of the right ankle.  Abdominal:      General: Bowel sounds are normal. There is no distension.      Palpations: Abdomen is soft.      Tenderness: There is no abdominal tenderness. There is no rebound.   Musculoskeletal: Normal range of motion.         General: No tenderness.      Cervical back: Normal range of motion and neck supple. Skin:     General: Skin is warm and dry.      Findings: No erythema or rash.   Neurological:      Mental Status: Alert and oriented to person, place, and time.   Psychiatric:         Behavior: Behavior normal.         Thought Content: Thought content normal.         Judgment: Judgment normal.       Lab Review:   Lab Results - Last 18 Months   Lab Units 11/13/24  0339 06/11/24  1438   WBC 10*3/mm3 8.85 9.01   RBC 10*6/mm3 4.50 4.61   HEMOGLOBIN g/dL 12.5* 12.6*   HEMATOCRIT % 37.9 37.3*   MCV fL 84.2 80.9   MCH pg 27.8 27.3   MCHC g/dL 33.0 33.8   RDW % 14.5 15.2   PLATELETS 10*3/mm3 197 157   NEUTROPHIL % % 71.9 71.3   LYMPHOCYTE % % 11.4* 11.9*   MONOCYTES % % 10.5 10.5   EOSINOPHIL % % 5.0 5.1   BASOPHIL % % 0.6 0.8   NEUTROS ABS 10*3/mm3 6.37 6.42   LYMPHS ABS 10*3/mm3 1.01 1.07   MONOS  ABS 10*3/mm3 0.93* 0.95*   EOS ABS 10*3/mm3 0.44* 0.46*   BASOS ABS 10*3/mm3 0.05 0.07   RDW-SD fl 44.8 43.9   MPV fL 9.8 9.7     Lab Results - Last 18 Months   Lab Units 11/13/24  0339 11/12/24  0339 10/22/24  1403 06/11/24  1438   GLUCOSE mg/dL 109* 110*   < > 198*   BUN mg/dL 21 18   < > 22   CREATININE mg/dL 1.52* 1.39*   < > 1.51*   SODIUM mmol/L 140 139   < > 138   POTASSIUM mmol/L 3.8 3.8   < > 4.0   CHLORIDE mmol/L 103 103   < > 101   CO2 mmol/L 25.8 27.2   < > 25.0   CALCIUM mg/dL 8.7 9.0   < > 9.0   TOTAL PROTEIN g/dL  --  6.5  --  6.9   ALBUMIN g/dL 3.6 3.5  --  4.0   ALT (SGPT) U/L  --  32  --  16   AST (SGOT) U/L  --  24  --  10   ALK PHOS U/L  --  123*  --  98   BILIRUBIN mg/dL  --  0.6  --  0.4   GLOBULIN gm/dL  --  3.0  --  2.9   A/G RATIO g/dL  --  1.2  --  1.4   BUN / CREAT RATIO  13.8 12.9   < > 14.6   ANION GAP mmol/L 11.2 8.8   < > 12.0   EGFR mL/min/1.73 47.5* 52.9*   < > 47.9*    < > = values in this interval not displayed.     Lab Results - Last 18 Months   Lab Units 02/13/24  1211 08/17/23  0831   CHOLESTEROL mg/dL  --  116   TRIGLYCERIDES mg/dL 191* 221*   HDL CHOL mg/dL 31* 34*   LDL CHOL mg/dL 43 47   VLDL CHOL mg/dL  --  35   VLDL CHOLESTEROL QUE mg/dL 31  --    LDL/HDL RATIO  1.15 1.11     Lab Results - Last 18 Months   Lab Units 11/11/24  1122 10/16/24  1352   PROBNP pg/mL 3,327.0* 3,078.0*       Lab Results - Last 18 Months   Lab Units 11/11/24  1122 02/13/24  1211   TSH uIU/mL 2.930 1.760         ECG 12 Lead    Date/Time: 1/17/2025 5:28 PM  Performed by: Karishma Arango MD    Authorized by: Karishma Arango MD  Comparison: compared with previous ECG   Similar to previous ECG  Rhythm: sinus rhythm  Conduction: right bundle branch block, left anterior fascicular block and 1st degree AV block        Assessment:       Diagnosis Plan   1. Non-ischemic cardiomyopathy  Adult Transthoracic Echo Complete W/ Cont if Necessary Per Protocol    ECG 12 Lead      2. Acute on chronic combined systolic and  diastolic heart failure        3. Essential hypertension        4. H/O mitral valve replacement with tissue graft        5. Mixed hyperlipidemia        6. Anticoagulated        7. Hypertensive chronic kidney disease, unspecified CKD stage          Plan:       1.  Acute on chronic systolic and diastolic heart failure.  EF 43% by echo 10/16/2024.  Patient was placed on Entresto after last visit.  More recently Demadex was increased and he was started on Jardiance.  Clinically/still with fluid overload predominantly in the legs though care limited by renal insufficiency.  I recommended he increase his exercise 20 minutes twice a day.  He will elevate his legs during the day and use CPAP while asleep during the day.  Return in 3 months with repeat proBNP and BMP.  Recheck echo in 3 months.  2.  Mitral valve replacement and tricuspid valve repair 5/2023.  Echo 5/2024 with slightly increased mitral valve mean gradient consistent with hyperdynamic state though there may be a small component mitral stenosis though recent echo did not demonstrate this.  As above  2.  Recurrent cardiomyopathy with regional wall motion abnormality at this time.  In part due to poor compliance with medical therapy.  EF had decreased to 43% from prior study 4/2024.  As above.  3.  Poorly controlled diabetes.  Glucosuria noted on urinalysis January 2025.  4.  Renal insufficiency.  Creatinine further increase to 1.65 with GFR 43 today.  Unclear follow-up by nephrology  5.  Hypotension  6.  Hyperlipidemia  7.  Postop encephalopathy and encephalopathy.  Slowly improving.   9.  Paroxysmal atrial fibrillation.  He has had atrial appendage closure.  Subsequent monitor 6/2024 was normal.  He is in sinus rhythm today.  10.  Obstructive sleep apnea.  As above.                    Your medication list            Accurate as of January 17, 2025 11:59 PM. If you have any questions, ask your nurse or doctor.                CONTINUE taking these medications         Instructions Last Dose Given Next Dose Due   acetaminophen 325 MG tablet  Commonly known as: TYLENOL      Take 2 tablets by mouth Every 6 (Six) Hours As Needed for Mild Pain.       albuterol sulfate  (90 Base) MCG/ACT inhaler  Commonly known as: PROVENTIL HFA;VENTOLIN HFA;PROAIR HFA      Inhale 2 puffs Every 4 (Four) Hours As Needed.       atorvastatin 40 MG tablet  Commonly known as: LIPITOR      Take 1 tablet by mouth Every Night.       BD Pen Needle Yolette 2nd Gen 32G X 4 MM misc  Generic drug: Insulin Pen Needle      Use pen needle 4 times a day for insulin injection       budesonide-formoterol 160-4.5 MCG/ACT inhaler  Commonly known as: SYMBICORT      Inhale 2 puffs 2 (Two) Times a Day.       calcium carbonate 500 MG chewable tablet  Commonly known as: TUMS      Chew 2 tablets 4 (Four) Times a Day As Needed for Indigestion or Heartburn.       calcium carbonate 600 MG tablet  Commonly known as: OS-QUE      Take 1 tablet by mouth Daily.       Eliquis 5 MG tablet tablet  Generic drug: apixaban      TAKE 1 TABLET BY MOUTH TWICE DAILY       Entresto 24-26 MG tablet  Generic drug: sacubitril-valsartan      Take 1 tablet by mouth 2 (Two) Times a Day.       FreeStyle Ysabel 2 Sensor misc      1 each by Other route Every 14 (Fourteen) Days. Use 1 sensor every 14 days  Indications: Type 2 Diabetes       HumaLOG KwikPen 100 UNIT/ML solution pen-injector  Generic drug: Insulin Lispro (1 Unit Dial)      Inject 24 Units under the skin into the appropriate area as directed 3 (Three) Times a Day With Meals.       Jardiance 10 MG tablet tablet  Generic drug: empagliflozin      take 1 tablet by mouth 1 time each day in the morning       metoprolol succinate XL 25 MG 24 hr tablet  Commonly known as: TOPROL-XL      Take 1 tablet by mouth 2 (Two) Times a Day.       pantoprazole 40 MG EC tablet  Commonly known as: PROTONIX      Take 1 tablet by mouth Daily.       potassium chloride 10 MEQ CR tablet      Take 1 tablet by  mouth Daily.       PRESERVISION AREDS 2+MULTI VIT PO      Take 1 tablet by mouth 2 (Two) Times a Day.       torsemide 100 MG tablet  Commonly known as: DEMADEX      Take 1 tablet by mouth Daily.       Tresiba FlexTouch 100 UNIT/ML solution pen-injector injection  Generic drug: insulin degludec      Inject 68 Units under the skin into the appropriate area as directed Daily.                Patient is no longer taking -.  I corrected the med list to reflect this.  I did not stop these medications.      Dictated utilizing Dragon dictation

## 2025-01-24 ENCOUNTER — PRIOR AUTHORIZATION (OUTPATIENT)
Dept: INTERNAL MEDICINE | Facility: CLINIC | Age: 75
End: 2025-01-24
Payer: MEDICARE

## 2025-01-24 ENCOUNTER — OFFICE VISIT (OUTPATIENT)
Dept: INTERNAL MEDICINE | Facility: CLINIC | Age: 75
End: 2025-01-24
Payer: MEDICARE

## 2025-01-24 VITALS
BODY MASS INDEX: 37.14 KG/M2 | HEIGHT: 73 IN | HEART RATE: 88 BPM | DIASTOLIC BLOOD PRESSURE: 80 MMHG | SYSTOLIC BLOOD PRESSURE: 130 MMHG | WEIGHT: 280.2 LBS | OXYGEN SATURATION: 97 %

## 2025-01-24 DIAGNOSIS — N18.31 STAGE 3A CHRONIC KIDNEY DISEASE: Chronic | ICD-10-CM

## 2025-01-24 DIAGNOSIS — G47.33 OSA (OBSTRUCTIVE SLEEP APNEA): Chronic | ICD-10-CM

## 2025-01-24 DIAGNOSIS — E11.65 TYPE 2 DIABETES MELLITUS WITH HYPERGLYCEMIA, WITH LONG-TERM CURRENT USE OF INSULIN: Primary | ICD-10-CM

## 2025-01-24 DIAGNOSIS — Z79.4 TYPE 2 DIABETES MELLITUS WITH HYPERGLYCEMIA, WITH LONG-TERM CURRENT USE OF INSULIN: Primary | ICD-10-CM

## 2025-01-24 DIAGNOSIS — E78.2 MIXED HYPERLIPIDEMIA: Chronic | ICD-10-CM

## 2025-01-24 DIAGNOSIS — I10 ESSENTIAL HYPERTENSION: Chronic | ICD-10-CM

## 2025-01-24 PROCEDURE — 1159F MED LIST DOCD IN RCRD: CPT | Performed by: NURSE PRACTITIONER

## 2025-01-24 PROCEDURE — 3079F DIAST BP 80-89 MM HG: CPT | Performed by: NURSE PRACTITIONER

## 2025-01-24 PROCEDURE — 99214 OFFICE O/P EST MOD 30 MIN: CPT | Performed by: NURSE PRACTITIONER

## 2025-01-24 PROCEDURE — 1160F RVW MEDS BY RX/DR IN RCRD: CPT | Performed by: NURSE PRACTITIONER

## 2025-01-24 PROCEDURE — 3075F SYST BP GE 130 - 139MM HG: CPT | Performed by: NURSE PRACTITIONER

## 2025-01-24 PROCEDURE — 1126F AMNT PAIN NOTED NONE PRSNT: CPT | Performed by: NURSE PRACTITIONER

## 2025-01-24 NOTE — PROGRESS NOTES
Chief Complaint  Hypertension     Subjective:      History of Present Illness {  Problem List  Visit  Diagnosis   Encounters  Notes  Medications  Labs  Result Review Imaging  Media :23}     Vernon Solorzano presents to [facility] for:   Stage 3a CKD, hypertension, diabetes 2 (uncontrolled), GERD, PING (now using CPAP), asthma, HFrEF, S/P  mitral valve replacement and tricuspid valve repair (5/24/23), AF (post op - s/p cardioversion), CVA (bilateral frontal and left parietal occipital cortices: postop), post op Dressler's syndrome          LV: CHF - exacerbation 11/2024 with admission   No further hospitalization.     Cardiology follow up 1/17/25:   Advised to use CPAP during the day and elevate legs when napping.  Wife states they have not implemented changes yet.    Plan to recheck echo in 3 months.     He had follow up with renal yesterday:   Urine+glucose but on jardiance   Started Jardiance December 3, 2024     Wife states glucose 140 in am.   She states he forgets to take medication at lunch time.  States he will eat and generally go back to bed and forget to take insulin.   We discussed that he should take as he starts eating for adherence.                  Answers submitted by the patient for this visit:  Shortness of Breath Questionnaire (Submitted on 1/23/2025)  Chief Complaint: Shortness of breath  Onset: more than 1 month ago  Frequency: daily  Progression since onset: unchanged  Fever: no fever  chest congestion: No  chest pain/tightness: No  hemoptysis: No  sputum production: No  PND: No  syncope: No  fever: No  headaches: No  dry cough: Yes  leg pain: No  leg swelling: Yes  orthopnea: No  sore throat: No  nasal congestion: No  swollen glands: No  vomiting: No  wheezing: No  Aggravating factors: exercise  asthma: Yes  COPD: No        I have reviewed patient's medical history, any new submitted information provided by patient or medical assistant and  "updated medical record.      Objective:      Physical Exam  Constitutional:       Appearance: He is obese.   Cardiovascular:      Rate and Rhythm: Normal rate and regular rhythm.      Pulses: Normal pulses.      Heart sounds: Normal heart sounds.   Pulmonary:      Effort: Pulmonary effort is normal.      Breath sounds: Normal breath sounds. No wheezing.   Musculoskeletal:      Comments: Trace lower extremity edema, no redness.    Neurological:      Comments: Looks to wife for most medical answers         Result Review  Data Reviewed:{ Labs  Result Review  Imaging  Med Tab  Media :23}     Outside labs:   1/15/25: hgb 12.6, cr 1.73, mg 2.2, uric acid 6.2               Vital Signs:   /80 (BP Location: Left arm, Patient Position: Sitting, Cuff Size: Adult)   Pulse 88   Ht 185.4 cm (73\")   Wt 127 kg (280 lb 3.2 oz)   SpO2 97%   BMI 36.97 kg/m²   Estimated body mass index is 36.97 kg/m² as calculated from the following:    Height as of this encounter: 185.4 cm (73\").    Weight as of this encounter: 127 kg (280 lb 3.2 oz).        Requested Prescriptions     Signed Prescriptions Disp Refills    Tirzepatide 2.5 MG/0.5ML solution auto-injector 2 mL 0     Sig: Inject 2.5 mg under the skin into the appropriate area as directed 1 (One) Time Per Week.       Routine medications provided by this office will also be refilled via pharmacy request.       Current Outpatient Medications:     acetaminophen (TYLENOL) 325 MG tablet, Take 2 tablets by mouth Every 6 (Six) Hours As Needed for Mild Pain., Disp: , Rfl:     albuterol sulfate  (90 Base) MCG/ACT inhaler, Inhale 2 puffs Every 4 (Four) Hours As Needed., Disp: , Rfl:     atorvastatin (LIPITOR) 40 MG tablet, Take 1 tablet by mouth Every Night., Disp: 30 tablet, Rfl: 1    calcium carbonate (OS-QUE) 600 MG tablet, Take 1 tablet by mouth Daily., Disp: , Rfl:     calcium carbonate (TUMS) 500 MG chewable tablet, Chew 2 tablets 4 (Four) Times a Day As Needed for " Indigestion or Heartburn., Disp: , Rfl:     Continuous Blood Gluc Sensor (FreeStyle Ysabel 2 Sensor) misc, 1 each by Other route Every 14 (Fourteen) Days. Use 1 sensor every 14 days  Indications: Type 2 Diabetes, Disp: 6 each, Rfl: 3    Eliquis 5 MG tablet tablet, TAKE 1 TABLET BY MOUTH TWICE DAILY, Disp: 180 tablet, Rfl: 3    insulin degludec (Tresiba FlexTouch) 100 UNIT/ML solution pen-injector injection, Inject 68 Units under the skin into the appropriate area as directed Daily., Disp: , Rfl:     Insulin Lispro, 1 Unit Dial, (HumaLOG KwikPen) 100 UNIT/ML solution pen-injector, Inject 24 Units under the skin into the appropriate area as directed 3 (Three) Times a Day With Meals., Disp: , Rfl:     Insulin Pen Needle (BD Pen Needle Yolette 2nd Gen) 32G X 4 MM misc, Use pen needle 4 times a day for insulin injection, Disp: 400 each, Rfl: 3    Jardiance 10 MG tablet tablet, take 1 tablet by mouth 1 time each day in the morning, Disp: , Rfl:     metoprolol succinate XL (TOPROL-XL) 25 MG 24 hr tablet, Take 1 tablet by mouth 2 (Two) Times a Day., Disp: 180 tablet, Rfl: 2    Multiple Vitamins-Minerals (PRESERVISION AREDS 2+MULTI VIT PO), Take 1 tablet by mouth 2 (Two) Times a Day., Disp: , Rfl:     pantoprazole (PROTONIX) 40 MG EC tablet, Take 1 tablet by mouth Daily., Disp: 30 tablet, Rfl: 1    potassium chloride 10 MEQ CR tablet, Take 1 tablet by mouth Daily., Disp: , Rfl:     sacubitril-valsartan (Entresto) 24-26 MG tablet, Take 1 tablet by mouth 2 (Two) Times a Day., Disp: 180 tablet, Rfl: 3    torsemide (DEMADEX) 100 MG tablet, Take 1 tablet by mouth Daily., Disp: , Rfl:     budesonide-formoterol (SYMBICORT) 160-4.5 MCG/ACT inhaler, Inhale 2 puffs 2 (Two) Times a Day. (Patient not taking: Reported on 1/24/2025), Disp: , Rfl:     Tirzepatide 2.5 MG/0.5ML solution auto-injector, Inject 2.5 mg under the skin into the appropriate area as directed 1 (One) Time Per Week., Disp: 2 mL, Rfl: 0     Assessment and Plan:       Assessment and Plan {CC Problem List  Visit Diagnosis  ROS  Review (Popup)  Health Maintenance  Quality  BestPractice  Medications  SmartSets  SnapShot Encounters  Media :23}     Diagnoses and all orders for this visit:    1. Type 2 diabetes mellitus with hyperglycemia, with long-term current use of insulin (Primary)  Overview:  5/2022: Glucose Monitor: OneTouch Verio Flex   2023: CGM: free style libre2    Renal protection: ARB  Statin: yes  Followed by endocrine: yes     Diabetic Nephrosclerosis: followed by renal     Assessment & Plan:  Your last A1C (3 month average) =   Lab Results   Component Value Date    HGBA1C 12.10 (H) 11/12/2024      Your diabetes is Uncontrolled.  Glucose readings improved while hospitalized.   Does not have CGM readings today.     Plan    [x] Continue same treatment: he needs to follow up with endocrine.       Diet versus compliance with insulin. Ensure he is taking lunch time insulin.       States they are working on improving.     [x] Change: Will start mounjaro. (Beneficial also for PING - weight loss)       Diet: low carbohydrate, low sugar.     ADA Website for diabetic food choices.   https://diabetes.org/food-nutrition/eating-healthy    The American Diabetes Association recommends an A1C of less than 7%.  A1C Average Levels Blood Sugar:   6%  126 mg/dL  7%  154 mg/dL  8%  183 mg/dL  9%  212 mg/dL  10%  240 mg/dL  11%  269 mg/dL  12%  298 mg/dL    Glucose goals for many adults with diabetes  Blood sugar before meals  mg/dL  Blood sugar 1-2 hours after the start of a meal Less than 180 mg/dL A1C Less than 7%    Eye Health:   You need a diabetic eye exam yearly.   Please have a copy of the note faxed to my office.   Fax: [phone]    Foot Health:   You need a diabetic foot exam yearly.   Check your feet routinely for any wounds.   You should always check your shoes for any debris that could cause a wound.        Orders:  -     Tirzepatide 2.5 MG/0.5ML solution  auto-injector; Inject 2.5 mg under the skin into the appropriate area as directed 1 (One) Time Per Week.  Dispense: 2 mL; Refill: 0    2. Essential hypertension  Assessment & Plan:  Hypertension stable.   Limit sodium, work on weight loss.       3. Mixed hyperlipidemia  Assessment & Plan:   Lipid abnormalities are improving with treatment    Plan:  Continue same medication/s without change.    Continue lipitor and low fat diet.     Discussed medication dosage, use, side effects, and goals of treatment in detail.    Counseled patient on lifestyle modifications to help control hyperlipidemia.   Advised patient to exercise for 150 minutes weekly. (30 minute brisk walk, 5 days a week for example)  Weight Loss encouraged    Patient Treatment Goals:   LDL goal is less than 70    Lab Results   Component Value Date    CHOL 116 08/17/2023    CHLPL 105 02/13/2024    TRIG 191 (H) 02/13/2024    HDL 31 (L) 02/13/2024    LDL 43 02/13/2024        Followup at the next regular appointment.      4. Stage 3a chronic kidney disease  Overview:  Followed by renal  Felt related to hypertension and diabetic nephrosclerosis     Assessment & Plan:  Stable - followed by renal       5. PING (obstructive sleep apnea)  Overview:  2023: started CPAP     Assessment & Plan:  States compliant and wearing 6 hours nightly   Plans to start wearing while napping                New Medications Ordered This Visit   Medications    Tirzepatide 2.5 MG/0.5ML solution auto-injector     Sig: Inject 2.5 mg under the skin into the appropriate area as directed 1 (One) Time Per Week.     Dispense:  2 mL     Refill:  0             Follow Up {Instructions Charge Capture  Follow-up Communications :23}     Return in about 6 months (around 7/24/2025) for Medicare Wellness.      Patient was given instructions and counseling regarding his condition or for health maintenance advice. Please see specific information pulled into the AVS if appropriate.    Dragon disclaimer:    Much of this encounter note is an electronic transcription/translation of spoken language to printed text. The electronic translation of spoken language may permit erroneous, or at times, nonsensical words or phrases to be inadvertently transcribed; Although I have reviewed the note for such errors, some may still exist.     Additional Patient Counseling:       There are no Patient Instructions on file for this visit.

## 2025-02-05 NOTE — ASSESSMENT & PLAN NOTE
Your last A1C (3 month average) =   Lab Results   Component Value Date    HGBA1C 12.10 (H) 11/12/2024      Your diabetes is Uncontrolled.  Glucose readings improved while hospitalized.   Does not have CGM readings today.     Plan    [x] Continue same treatment: he needs to follow up with endocrine.       Diet versus compliance with insulin. Ensure he is taking lunch time insulin.       States they are working on improving.     [x] Change: Will start mounjaro. (Beneficial also for PING - weight loss)       Diet: low carbohydrate, low sugar.     ADA Website for diabetic food choices.   https://diabetes.org/food-nutrition/eating-healthy    The American Diabetes Association recommends an A1C of less than 7%.  A1C Average Levels Blood Sugar:   6%  126 mg/dL  7%  154 mg/dL  8%  183 mg/dL  9%  212 mg/dL  10%  240 mg/dL  11%  269 mg/dL  12%  298 mg/dL    Glucose goals for many adults with diabetes  Blood sugar before meals  mg/dL  Blood sugar 1-2 hours after the start of a meal Less than 180 mg/dL A1C Less than 7%    Eye Health:   You need a diabetic eye exam yearly.   Please have a copy of the note faxed to my office.   Fax: [phone]    Foot Health:   You need a diabetic foot exam yearly.   Check your feet routinely for any wounds.   You should always check your shoes for any debris that could cause a wound.

## 2025-02-27 ENCOUNTER — TRANSCRIBE ORDERS (OUTPATIENT)
Dept: ADMINISTRATIVE | Facility: HOSPITAL | Age: 75
End: 2025-02-27
Payer: MEDICARE

## 2025-02-27 DIAGNOSIS — R91.8 LUNG NODULES: Primary | ICD-10-CM

## 2025-03-12 ENCOUNTER — HOSPITAL ENCOUNTER (OUTPATIENT)
Dept: CT IMAGING | Facility: HOSPITAL | Age: 75
Discharge: HOME | End: 2025-03-12
Admitting: INTERNAL MEDICINE
Payer: MEDICARE

## 2025-03-12 DIAGNOSIS — R91.8 LUNG NODULES: ICD-10-CM

## 2025-03-12 PROCEDURE — 71250 CT THORAX DX C-: CPT

## 2025-04-08 ENCOUNTER — OFFICE VISIT (OUTPATIENT)
Dept: ENDOCRINOLOGY | Age: 75
End: 2025-04-08
Payer: MEDICARE

## 2025-04-08 VITALS
SYSTOLIC BLOOD PRESSURE: 102 MMHG | TEMPERATURE: 97.6 F | HEIGHT: 73 IN | OXYGEN SATURATION: 97 % | HEART RATE: 78 BPM | WEIGHT: 285.8 LBS | DIASTOLIC BLOOD PRESSURE: 62 MMHG | BODY MASS INDEX: 37.88 KG/M2

## 2025-04-08 DIAGNOSIS — E11.9 TYPE 2 DIABETES MELLITUS WITHOUT COMPLICATION, WITH LONG-TERM CURRENT USE OF INSULIN: Primary | ICD-10-CM

## 2025-04-08 DIAGNOSIS — Z79.4 TYPE 2 DIABETES MELLITUS WITHOUT COMPLICATION, WITH LONG-TERM CURRENT USE OF INSULIN: Primary | ICD-10-CM

## 2025-04-08 DIAGNOSIS — K21.9 GASTROESOPHAGEAL REFLUX DISEASE WITHOUT ESOPHAGITIS: ICD-10-CM

## 2025-04-08 DIAGNOSIS — I05.9 MITRAL VALVE DISEASE: ICD-10-CM

## 2025-04-08 DIAGNOSIS — I07.9 TRICUSPID VALVE DISEASE: ICD-10-CM

## 2025-04-08 DIAGNOSIS — E55.9 VITAMIN D DEFICIENCY: ICD-10-CM

## 2025-04-08 DIAGNOSIS — E78.5 HYPERLIPIDEMIA, UNSPECIFIED HYPERLIPIDEMIA TYPE: ICD-10-CM

## 2025-04-08 RX ORDER — EMPAGLIFLOZIN 10 MG/1
10 TABLET, FILM COATED ORAL DAILY
Qty: 90 TABLET | Refills: 1 | Status: SHIPPED | OUTPATIENT
Start: 2025-04-08

## 2025-04-08 NOTE — PROGRESS NOTES
Subjective   Vernon Solorzano is a 74 y.o. male.     History of Present Illness     He has known diabetes mellitus since 2020 and started an insulin in 2022.      He is on Tresiba 64 units every morning, Humalog 24 units 3 times daily, Mounjaro 2.5 mg weekly and Jardiance 10 mg/day.  He started on Mounjaro 1 week ago without side effects.  He had nausea and abdominal bloating with Ozempic 0.25 mg in the past.  He has gained 23 pounds since March 2024.  He is using an elliptical machine for exercise.  His last meal was at 8:30 AM.     Sensor data from March 26, 2025 through April 8, 2025 reviewed.  Average glucose 316 mg per DL.  Glucose variability 25%  Time in range 4%.  Time above range 96%.  Time below range 0  There are large gaps in sensor data due to infrequent scanning.  Blood sugars have been lower over last few days since Mounjaro was started.  Latest fasting glucose is 174     His last eye examination was in 3/25.  He has macular degeneration and gets an intraocular injection from Dr. Hadley.  He denies numbness, tingling or burning in his hands or feet.  Urine protein to creatinine ratio done in February 2025 is normal at 136 mg/g creatinine.  eGFR done in April 2025 is 47.5.  He follows with Dr. Lay.     He has hyperlipidemia and is on Lipitor 40 mg/day.  He has no myalgia.     He has acid reflux disease and is on Protonix 40 mg/day.  He denies heartburn.     He had mitral valve replacement with a porcine valve, tricuspid valve repair and left atrial appendage closure due to severe mitral regurgitation and severe tricuspid regurgitation in May 2023.  It was complicated by atrial flutter and stroke.  He has intermittent atrial fibrillation.  He had aphasia and right-sided weakness which has improved.  He denies chest pain, orthopnea or paroxysmal nocturnal dyspnea.  He is on Eliquis 5 mg twice a day, torsemide 75 mg daily, Toprol XL 25 mg twice a day,  and Entresto 24/26 1 tablet twice a day.   He  "is being followed by Dr. Arango.     He has history of asthma.  He had COVID infection in 2022 treated with Paxlovid.  He has sleep apnea and is using CPAP regularly.  He wakes up rested.  He had a CT chest done in 2/24.     He has vitamin D deficiency and is on calcium plus D 1 tablet per day.  25-hydroxy vitamin D done in January 2025 is normal at 37.4 ng/mL.     He has chronic back pain which limits his ability to walk.  He is on Tylenol as needed    The following portions of the patient's history were reviewed and updated as appropriate: allergies, current medications, past family history, past medical history, past social history, past surgical history, and problem list.    Review of Systems   Respiratory:  Negative for shortness of breath and wheezing.    Cardiovascular:  Negative for chest pain and palpitations.   Gastrointestinal: Negative.    Genitourinary: Negative.    Musculoskeletal:  Negative for myalgias.   Neurological:  Negative for numbness.     Vitals:    04/08/25 1111   BP: 102/62   Pulse: 78   Temp: 97.6 °F (36.4 °C)   TempSrc: Oral   SpO2: 97%   Weight: 130 kg (285 lb 12.8 oz)   Height: 185.4 cm (72.99\")      Objective   Physical Exam  Constitutional:       General: He is not in acute distress.     Appearance: Normal appearance. He is not ill-appearing, toxic-appearing or diaphoretic.   Eyes:      General: No scleral icterus.        Right eye: No discharge.         Left eye: No discharge.      Extraocular Movements: Extraocular movements intact.   Neck:      Vascular: No carotid bruit.   Cardiovascular:      Rate and Rhythm: Normal rate and regular rhythm.      Heart sounds: Normal heart sounds.   Pulmonary:      Breath sounds: No rales.   Chest:      Chest wall: No tenderness.   Abdominal:      General: Bowel sounds are normal.      Palpations: Abdomen is soft.      Tenderness: There is no right CVA tenderness or left CVA tenderness.   Musculoskeletal:      Comments: Feet warm.  Chronic venous " stasis changes on distal right lower extremities.  +2 edema on the right and +1 edema on the left.  No calf tenderness.  No cyanosis or clubbing.  No plantar ulcers.   Lymphadenopathy:      Cervical: No cervical adenopathy.   Neurological:      Mental Status: He is alert and oriented to person, place, and time.      Comments: Intact light touch in lower extremities.       Admission on 11/11/2024, Discharged on 11/13/2024   Component Date Value Ref Range Status    TSH 11/11/2024 2.930  0.270 - 4.200 uIU/mL Final    Blood Culture 11/11/2024 No growth at 5 days   Final    Blood Culture 11/11/2024 No growth at 5 days   Final    Glucose 11/11/2024 159 (H)  70 - 130 mg/dL Final    Glucose 11/11/2024 220 (H)  70 - 130 mg/dL Final    Glucose 11/12/2024 110 (H)  65 - 99 mg/dL Final    BUN 11/12/2024 18  8 - 23 mg/dL Final    Creatinine 11/12/2024 1.39 (H)  0.76 - 1.27 mg/dL Final    Sodium 11/12/2024 139  136 - 145 mmol/L Final    Potassium 11/12/2024 3.8  3.5 - 5.2 mmol/L Final    Chloride 11/12/2024 103  98 - 107 mmol/L Final    CO2 11/12/2024 27.2  22.0 - 29.0 mmol/L Final    Calcium 11/12/2024 9.0  8.6 - 10.5 mg/dL Final    Total Protein 11/12/2024 6.5  6.0 - 8.5 g/dL Final    Albumin 11/12/2024 3.5  3.5 - 5.2 g/dL Final    ALT (SGPT) 11/12/2024 32  1 - 41 U/L Final    AST (SGOT) 11/12/2024 24  1 - 40 U/L Final    Alkaline Phosphatase 11/12/2024 123 (H)  39 - 117 U/L Final    Total Bilirubin 11/12/2024 0.6  0.0 - 1.2 mg/dL Final    Globulin 11/12/2024 3.0  gm/dL Final    A/G Ratio 11/12/2024 1.2  g/dL Final    BUN/Creatinine Ratio 11/12/2024 12.9  7.0 - 25.0 Final    Anion Gap 11/12/2024 8.8  5.0 - 15.0 mmol/L Final    eGFR 11/12/2024 52.9 (L)  >60.0 mL/min/1.73 Final    Glucose 11/11/2024 210 (H)  70 - 130 mg/dL Final    Glucose 11/12/2024 115  70 - 130 mg/dL Final    Hemoglobin A1C 11/12/2024 12.10 (H)  4.80 - 5.60 % Final    Sodium, Urine 11/12/2024 73  mmol/L Final    Protein/Creatinine Ratio, Urine 11/12/2024 105.5   0.0 - 200.0 mg/G Crea Final    Creatinine, Urine 11/12/2024 123.2  mg/dL Final    Total Protein, Urine 11/12/2024 13.0  mg/dL Final    Microalbumin/Creatinine Ratio 11/12/2024 21.9  0.0 - 29.0 mg/g Final    Creatinine, Urine 11/12/2024 123.2  mg/dL Final    Microalbumin, Urine 11/12/2024 2.7  mg/dL Final    Glucose 11/12/2024 190 (H)  70 - 130 mg/dL Final    Glucose 11/12/2024 165 (H)  70 - 130 mg/dL Final    Glucose 11/12/2024 248 (H)  70 - 130 mg/dL Final    Glucose 11/13/2024 109 (H)  65 - 99 mg/dL Final    BUN 11/13/2024 21  8 - 23 mg/dL Final    Creatinine 11/13/2024 1.52 (H)  0.76 - 1.27 mg/dL Final    Sodium 11/13/2024 140  136 - 145 mmol/L Final    Potassium 11/13/2024 3.8  3.5 - 5.2 mmol/L Final    Chloride 11/13/2024 103  98 - 107 mmol/L Final    CO2 11/13/2024 25.8  22.0 - 29.0 mmol/L Final    Calcium 11/13/2024 8.7  8.6 - 10.5 mg/dL Final    Albumin 11/13/2024 3.6  3.5 - 5.2 g/dL Final    Phosphorus 11/13/2024 3.5  2.5 - 4.5 mg/dL Final    Anion Gap 11/13/2024 11.2  5.0 - 15.0 mmol/L Final    BUN/Creatinine Ratio 11/13/2024 13.8  7.0 - 25.0 Final    eGFR 11/13/2024 47.5 (L)  >60.0 mL/min/1.73 Final    Magnesium 11/13/2024 2.2  1.6 - 2.4 mg/dL Final    Uric Acid 11/13/2024 6.1  3.4 - 7.0 mg/dL Final    WBC 11/13/2024 8.85  3.40 - 10.80 10*3/mm3 Final    RBC 11/13/2024 4.50  4.14 - 5.80 10*6/mm3 Final    Hemoglobin 11/13/2024 12.5 (L)  13.0 - 17.7 g/dL Final    Hematocrit 11/13/2024 37.9  37.5 - 51.0 % Final    MCV 11/13/2024 84.2  79.0 - 97.0 fL Final    MCH 11/13/2024 27.8  26.6 - 33.0 pg Final    MCHC 11/13/2024 33.0  31.5 - 35.7 g/dL Final    RDW 11/13/2024 14.5  12.3 - 15.4 % Final    RDW-SD 11/13/2024 44.8  37.0 - 54.0 fl Final    MPV 11/13/2024 9.8  6.0 - 12.0 fL Final    Platelets 11/13/2024 197  140 - 450 10*3/mm3 Final    Neutrophil % 11/13/2024 71.9  42.7 - 76.0 % Final    Lymphocyte % 11/13/2024 11.4 (L)  19.6 - 45.3 % Final    Monocyte % 11/13/2024 10.5  5.0 - 12.0 % Final    Eosinophil %  11/13/2024 5.0  0.3 - 6.2 % Final    Basophil % 11/13/2024 0.6  0.0 - 1.5 % Final    Immature Grans % 11/13/2024 0.6 (H)  0.0 - 0.5 % Final    Neutrophils, Absolute 11/13/2024 6.37  1.70 - 7.00 10*3/mm3 Final    Lymphocytes, Absolute 11/13/2024 1.01  0.70 - 3.10 10*3/mm3 Final    Monocytes, Absolute 11/13/2024 0.93 (H)  0.10 - 0.90 10*3/mm3 Final    Eosinophils, Absolute 11/13/2024 0.44 (H)  0.00 - 0.40 10*3/mm3 Final    Basophils, Absolute 11/13/2024 0.05  0.00 - 0.20 10*3/mm3 Final    Immature Grans, Absolute 11/13/2024 0.05  0.00 - 0.05 10*3/mm3 Final    nRBC 11/13/2024 0.0  0.0 - 0.2 /100 WBC Final    Glucose 11/13/2024 120  70 - 130 mg/dL Final     Assessment & Plan   Diagnoses and all orders for this visit:    1. Type 2 diabetes mellitus without complication, with long-term current use of insulin (Primary)  -     Jardiance 10 MG tablet tablet; Take 1 tablet by mouth Daily. Indications: Type 2 Diabetes  Dispense: 90 tablet; Refill: 1  -     Comprehensive Metabolic Panel  -     Hemoglobin A1c  -     Lipid Panel  -     TSH Rfx On Abnormal To Free T4    2. Hyperlipidemia, unspecified hyperlipidemia type  -     Lipid Panel  -     TSH Rfx On Abnormal To Free T4    3. Gastroesophageal reflux disease without esophagitis    4. Mitral valve disease    5. Tricuspid valve disease    6. Vitamin D deficiency  -     Vitamin D,25-Hydroxy      Continue Tresiba, Humalog, and Jardiance.  Continue Mounjaro 2.5 mg weekly.  May increase dose monthly if needed.  Patient instructed to call DME supplier about switching to Dexcom G7 so that data can be shared with family.  Follow-up with Dr. Hadley and Dr. Lay.    Continue Lipitor 40 mg/day.    Continue Protonix 40 mg/day.  Continue Eliquis, torsemide, Toprol-XL, Entresto per Dr. Arango.    Follow-up with Dr. Slater    Continue CPAP.    Continue calcium plus D 1 tablet daily.    Copy my note sent to Corey Oconnell, Dr. Hadley, Dr. Lay, Dr. Arango and Dr. Slater.    RTC 4  mos.

## 2025-04-08 NOTE — LETTER
April 8, 2025     Liza Oconnell III, NP-C  [address] [facility]  [address]  [place] [id number]    Patient: Vernon Solorzano   YOB: 1950   Date of Visit: 4/8/2025     Dear RACHAEL Ramírez III:       Thank you for referring Vernon Solorzano to me for evaluation. Below are the relevant portions of my assessment and plan of care.    If you have questions, please do not hesitate to call me. I look forward to following Vernon along with you.         Sincerely,        Femi Mcneill MD        CC: MD Luis Lynn Jr., MD Osito Null, Femi COX MD  04/08/25 1223  Sign when Signing Visit  Subjective   Vernon Solorzano is a 74 y.o. male.     History of Present Illness     He has known diabetes mellitus since 2020 and started an insulin in 2022.      He is on Tresiba 64 units every morning, Humalog 24 units 3 times daily, Mounjaro 2.5 mg weekly and Jardiance 10 mg/day.  He started on Mounjaro 1 week ago without side effects.  He had nausea and abdominal bloating with Ozempic 0.25 mg in the past.  He has gained 23 pounds since March 2024.  He is using an elliptical machine for exercise.  His last meal was at 8:30 AM.     Sensor data from March 26, 2025 through April 8, 2025 reviewed.  Average glucose 316 mg per DL.  Glucose variability 25%  Time in range 4%.  Time above range 96%.  Time below range 0  There are large gaps in sensor data due to infrequent scanning.  Blood sugars have been lower over last few days since Mounjaro was started.  Latest fasting glucose is 174     His last eye examination was in 3/25.  He has macular degeneration and gets an intraocular injection from Dr. Hadley.  He denies numbness, tingling or burning in his hands or feet.  Urine protein to creatinine ratio done in February 2025 is normal at 136 mg/g creatinine.  eGFR done in April 2025 is 47.5.  He follows with Dr. Lay.     He has hyperlipidemia and is  "on Lipitor 40 mg/day.  He has no myalgia.     He has acid reflux disease and is on Protonix 40 mg/day.  He denies heartburn.     He had mitral valve replacement with a porcine valve, tricuspid valve repair and left atrial appendage closure due to severe mitral regurgitation and severe tricuspid regurgitation in May 2023.  It was complicated by atrial flutter and stroke.  He has intermittent atrial fibrillation.  He had aphasia and right-sided weakness which has improved.  He denies chest pain, orthopnea or paroxysmal nocturnal dyspnea.  He is on Eliquis 5 mg twice a day, torsemide 75 mg daily, Toprol XL 25 mg twice a day,  and Entresto 24/26 1 tablet twice a day.   He is being followed by Dr. Arango.     He has history of asthma.  He had COVID infection in 2022 treated with Paxlovid.  He has sleep apnea and is using CPAP regularly.  He wakes up rested.  He had a CT chest done in 2/24.     He has vitamin D deficiency and is on calcium plus D 1 tablet per day.  25-hydroxy vitamin D done in January 2025 is normal at 37.4 ng/mL.     He has chronic back pain which limits his ability to walk.  He is on Tylenol as needed    The following portions of the patient's history were reviewed and updated as appropriate: allergies, current medications, past family history, past medical history, past social history, past surgical history, and problem list.    Review of Systems   Respiratory:  Negative for shortness of breath and wheezing.    Cardiovascular:  Negative for chest pain and palpitations.   Gastrointestinal: Negative.    Genitourinary: Negative.    Musculoskeletal:  Negative for myalgias.   Neurological:  Negative for numbness.     Vitals:    04/08/25 1111   BP: 102/62   Pulse: 78   Temp: 97.6 °F (36.4 °C)   TempSrc: Oral   SpO2: 97%   Weight: 130 kg (285 lb 12.8 oz)   Height: 185.4 cm (72.99\")      Objective   Physical Exam  Constitutional:       General: He is not in acute distress.     Appearance: Normal appearance. He is " not ill-appearing, toxic-appearing or diaphoretic.   Eyes:      General: No scleral icterus.        Right eye: No discharge.         Left eye: No discharge.      Extraocular Movements: Extraocular movements intact.   Neck:      Vascular: No carotid bruit.   Cardiovascular:      Rate and Rhythm: Normal rate and regular rhythm.      Heart sounds: Normal heart sounds.   Pulmonary:      Breath sounds: No rales.   Chest:      Chest wall: No tenderness.   Abdominal:      General: Bowel sounds are normal.      Palpations: Abdomen is soft.      Tenderness: There is no right CVA tenderness or left CVA tenderness.   Musculoskeletal:      Comments: Feet warm.  Chronic venous stasis changes on distal right lower extremities.  +2 edema on the right and +1 edema on the left.  No calf tenderness.  No cyanosis or clubbing.  No plantar ulcers.   Lymphadenopathy:      Cervical: No cervical adenopathy.   Neurological:      Mental Status: He is alert and oriented to person, place, and time.      Comments: Intact light touch in lower extremities.       Admission on 11/11/2024, Discharged on 11/13/2024   Component Date Value Ref Range Status   • TSH 11/11/2024 2.930  0.270 - 4.200 uIU/mL Final   • Blood Culture 11/11/2024 No growth at 5 days   Final   • Blood Culture 11/11/2024 No growth at 5 days   Final   • Glucose 11/11/2024 159 (H)  70 - 130 mg/dL Final   • Glucose 11/11/2024 220 (H)  70 - 130 mg/dL Final   • Glucose 11/12/2024 110 (H)  65 - 99 mg/dL Final   • BUN 11/12/2024 18  8 - 23 mg/dL Final   • Creatinine 11/12/2024 1.39 (H)  0.76 - 1.27 mg/dL Final   • Sodium 11/12/2024 139  136 - 145 mmol/L Final   • Potassium 11/12/2024 3.8  3.5 - 5.2 mmol/L Final   • Chloride 11/12/2024 103  98 - 107 mmol/L Final   • CO2 11/12/2024 27.2  22.0 - 29.0 mmol/L Final   • Calcium 11/12/2024 9.0  8.6 - 10.5 mg/dL Final   • Total Protein 11/12/2024 6.5  6.0 - 8.5 g/dL Final   • Albumin 11/12/2024 3.5  3.5 - 5.2 g/dL Final   • ALT (SGPT) 11/12/2024  32  1 - 41 U/L Final   • AST (SGOT) 11/12/2024 24  1 - 40 U/L Final   • Alkaline Phosphatase 11/12/2024 123 (H)  39 - 117 U/L Final   • Total Bilirubin 11/12/2024 0.6  0.0 - 1.2 mg/dL Final   • Globulin 11/12/2024 3.0  gm/dL Final   • A/G Ratio 11/12/2024 1.2  g/dL Final   • BUN/Creatinine Ratio 11/12/2024 12.9  7.0 - 25.0 Final   • Anion Gap 11/12/2024 8.8  5.0 - 15.0 mmol/L Final   • eGFR 11/12/2024 52.9 (L)  >60.0 mL/min/1.73 Final   • Glucose 11/11/2024 210 (H)  70 - 130 mg/dL Final   • Glucose 11/12/2024 115  70 - 130 mg/dL Final   • Hemoglobin A1C 11/12/2024 12.10 (H)  4.80 - 5.60 % Final   • Sodium, Urine 11/12/2024 73  mmol/L Final   • Protein/Creatinine Ratio, Urine 11/12/2024 105.5  0.0 - 200.0 mg/G Crea Final   • Creatinine, Urine 11/12/2024 123.2  mg/dL Final   • Total Protein, Urine 11/12/2024 13.0  mg/dL Final   • Microalbumin/Creatinine Ratio 11/12/2024 21.9  0.0 - 29.0 mg/g Final   • Creatinine, Urine 11/12/2024 123.2  mg/dL Final   • Microalbumin, Urine 11/12/2024 2.7  mg/dL Final   • Glucose 11/12/2024 190 (H)  70 - 130 mg/dL Final   • Glucose 11/12/2024 165 (H)  70 - 130 mg/dL Final   • Glucose 11/12/2024 248 (H)  70 - 130 mg/dL Final   • Glucose 11/13/2024 109 (H)  65 - 99 mg/dL Final   • BUN 11/13/2024 21  8 - 23 mg/dL Final   • Creatinine 11/13/2024 1.52 (H)  0.76 - 1.27 mg/dL Final   • Sodium 11/13/2024 140  136 - 145 mmol/L Final   • Potassium 11/13/2024 3.8  3.5 - 5.2 mmol/L Final   • Chloride 11/13/2024 103  98 - 107 mmol/L Final   • CO2 11/13/2024 25.8  22.0 - 29.0 mmol/L Final   • Calcium 11/13/2024 8.7  8.6 - 10.5 mg/dL Final   • Albumin 11/13/2024 3.6  3.5 - 5.2 g/dL Final   • Phosphorus 11/13/2024 3.5  2.5 - 4.5 mg/dL Final   • Anion Gap 11/13/2024 11.2  5.0 - 15.0 mmol/L Final   • BUN/Creatinine Ratio 11/13/2024 13.8  7.0 - 25.0 Final   • eGFR 11/13/2024 47.5 (L)  >60.0 mL/min/1.73 Final   • Magnesium 11/13/2024 2.2  1.6 - 2.4 mg/dL Final   • Uric Acid 11/13/2024 6.1  3.4 - 7.0 mg/dL  Final   • WBC 11/13/2024 8.85  3.40 - 10.80 10*3/mm3 Final   • RBC 11/13/2024 4.50  4.14 - 5.80 10*6/mm3 Final   • Hemoglobin 11/13/2024 12.5 (L)  13.0 - 17.7 g/dL Final   • Hematocrit 11/13/2024 37.9  37.5 - 51.0 % Final   • MCV 11/13/2024 84.2  79.0 - 97.0 fL Final   • MCH 11/13/2024 27.8  26.6 - 33.0 pg Final   • MCHC 11/13/2024 33.0  31.5 - 35.7 g/dL Final   • RDW 11/13/2024 14.5  12.3 - 15.4 % Final   • RDW-SD 11/13/2024 44.8  37.0 - 54.0 fl Final   • MPV 11/13/2024 9.8  6.0 - 12.0 fL Final   • Platelets 11/13/2024 197  140 - 450 10*3/mm3 Final   • Neutrophil % 11/13/2024 71.9  42.7 - 76.0 % Final   • Lymphocyte % 11/13/2024 11.4 (L)  19.6 - 45.3 % Final   • Monocyte % 11/13/2024 10.5  5.0 - 12.0 % Final   • Eosinophil % 11/13/2024 5.0  0.3 - 6.2 % Final   • Basophil % 11/13/2024 0.6  0.0 - 1.5 % Final   • Immature Grans % 11/13/2024 0.6 (H)  0.0 - 0.5 % Final   • Neutrophils, Absolute 11/13/2024 6.37  1.70 - 7.00 10*3/mm3 Final   • Lymphocytes, Absolute 11/13/2024 1.01  0.70 - 3.10 10*3/mm3 Final   • Monocytes, Absolute 11/13/2024 0.93 (H)  0.10 - 0.90 10*3/mm3 Final   • Eosinophils, Absolute 11/13/2024 0.44 (H)  0.00 - 0.40 10*3/mm3 Final   • Basophils, Absolute 11/13/2024 0.05  0.00 - 0.20 10*3/mm3 Final   • Immature Grans, Absolute 11/13/2024 0.05  0.00 - 0.05 10*3/mm3 Final   • nRBC 11/13/2024 0.0  0.0 - 0.2 /100 WBC Final   • Glucose 11/13/2024 120  70 - 130 mg/dL Final     Assessment & Plan   Diagnoses and all orders for this visit:    1. Type 2 diabetes mellitus without complication, with long-term current use of insulin (Primary)  -     Jardiance 10 MG tablet tablet; Take 1 tablet by mouth Daily. Indications: Type 2 Diabetes  Dispense: 90 tablet; Refill: 1  -     Comprehensive Metabolic Panel  -     Hemoglobin A1c  -     Lipid Panel  -     TSH Rfx On Abnormal To Free T4    2. Hyperlipidemia, unspecified hyperlipidemia type  -     Lipid Panel  -     TSH Rfx On Abnormal To Free T4    3. Gastroesophageal  reflux disease without esophagitis    4. Mitral valve disease    5. Tricuspid valve disease    6. Vitamin D deficiency  -     Vitamin D,25-Hydroxy      Continue Tresiba, Humalog, and Jardiance.  Continue Mounjaro 2.5 mg weekly.  May increase dose monthly if needed.  Patient instructed to call DME supplier about switching to Dexcom G7 so that data can be shared with family.  Follow-up with Dr. Hadlye and Dr. Lay.    Continue Lipitor 40 mg/day.    Continue Protonix 40 mg/day.  Continue Eliquis, torsemide, Toprol-XL, Entresto per Dr. Arango.    Follow-up with Dr. Slater    Continue CPAP.    Continue calcium plus D 1 tablet daily.    Copy my note sent to Corey Oconnell, Dr. Hadley, Dr. Lay, Dr. Arango and Dr. Slater.    RTC 4 mos.

## 2025-04-09 LAB
25(OH)D3+25(OH)D2 SERPL-MCNC: 32.5 NG/ML (ref 30–100)
ALBUMIN SERPL-MCNC: 4 G/DL (ref 3.5–5.2)
ALBUMIN/GLOB SERPL: 1.4 G/DL
ALP SERPL-CCNC: 113 U/L (ref 39–117)
ALT SERPL-CCNC: 17 U/L (ref 1–41)
AST SERPL-CCNC: 19 U/L (ref 1–40)
BILIRUB SERPL-MCNC: 0.5 MG/DL (ref 0–1.2)
BUN SERPL-MCNC: 22 MG/DL (ref 8–23)
BUN/CREAT SERPL: 13.1 (ref 7–25)
CALCIUM SERPL-MCNC: 8.6 MG/DL (ref 8.6–10.5)
CHLORIDE SERPL-SCNC: 99 MMOL/L (ref 98–107)
CHOLEST SERPL-MCNC: 111 MG/DL (ref 0–200)
CO2 SERPL-SCNC: 24.8 MMOL/L (ref 22–29)
CREAT SERPL-MCNC: 1.68 MG/DL (ref 0.76–1.27)
EGFRCR SERPLBLD CKD-EPI 2021: 41.9 ML/MIN/1.73
GLOBULIN SER CALC-MCNC: 2.8 GM/DL
GLUCOSE SERPL-MCNC: 268 MG/DL (ref 65–99)
HBA1C MFR BLD: 11.2 % (ref 4.8–5.6)
HDLC SERPL-MCNC: 29 MG/DL (ref 40–60)
IMP & REVIEW OF LAB RESULTS: NORMAL
LDLC SERPL CALC-MCNC: 48 MG/DL (ref 0–100)
POTASSIUM SERPL-SCNC: 4.2 MMOL/L (ref 3.5–5.2)
PROT SERPL-MCNC: 6.8 G/DL (ref 6–8.5)
SODIUM SERPL-SCNC: 138 MMOL/L (ref 136–145)
TRIGL SERPL-MCNC: 206 MG/DL (ref 0–150)
TSH SERPL DL<=0.005 MIU/L-ACNC: 1.7 UIU/ML (ref 0.27–4.2)
VLDLC SERPL CALC-MCNC: 34 MG/DL (ref 5–40)

## 2025-04-14 RX ORDER — INSULIN LISPRO 100 [IU]/ML
INJECTION, SOLUTION INTRAVENOUS; SUBCUTANEOUS
Qty: 71 ML | Refills: 2 | Status: SHIPPED | OUTPATIENT
Start: 2025-04-14

## 2025-04-14 NOTE — TELEPHONE ENCOUNTER
Rx Refill Note  Requested Prescriptions     Pending Prescriptions Disp Refills    Insulin Lispro, 1 Unit Dial, (HumaLOG KwikPen) 100 UNIT/ML solution pen-injector [Pharmacy Med Name: HUMALOG 100 U/ML KWIK PEN INJ 3ML] 15 mL 0     Sig: ADMINISTER 5 UNITS UNDER THE SKIN THREE TIMES DAILY AS DIRECTED FOR DIABETES      Last office visit with prescribing clinician: 4/8/2025   Last telemedicine visit with prescribing clinician: Visit date not found   Next office visit with prescribing clinician: 8/27/2025                         Would you like a call back once the refill request has been completed: [] Yes [] No    If the office needs to give you a call back, can they leave a voicemail: [] Yes [] No    Marlena Rodrigues  04/14/25, 11:10 EDT

## 2025-04-16 ENCOUNTER — TELEPHONE (OUTPATIENT)
Dept: CARDIOLOGY | Age: 75
End: 2025-04-16
Payer: MEDICARE

## 2025-04-17 ENCOUNTER — HOSPITAL ENCOUNTER (OUTPATIENT)
Dept: CARDIOLOGY | Facility: HOSPITAL | Age: 75
Discharge: HOME | End: 2025-04-17
Admitting: INTERNAL MEDICINE
Payer: MEDICARE

## 2025-04-17 VITALS
DIASTOLIC BLOOD PRESSURE: 80 MMHG | BODY MASS INDEX: 38.6 KG/M2 | HEART RATE: 71 BPM | HEIGHT: 72 IN | SYSTOLIC BLOOD PRESSURE: 140 MMHG | OXYGEN SATURATION: 95 % | WEIGHT: 285 LBS

## 2025-04-17 DIAGNOSIS — I42.8 NON-ISCHEMIC CARDIOMYOPATHY: ICD-10-CM

## 2025-04-17 LAB
AORTIC ARCH: 3.1 CM
AORTIC DIMENSIONLESS INDEX: 0.42 (DI)
ASCENDING AORTA: 3.6 CM
AV MEAN PRESS GRAD SYS DOP V1V2: 8 MMHG
AV VMAX SYS DOP: 191 CM/SEC
BH CV ECHO MEAS - ACS: 1.84 CM
BH CV ECHO MEAS - AO MAX PG: 14.6 MMHG
BH CV ECHO MEAS - AO ROOT DIAM: 4 CM
BH CV ECHO MEAS - AO V2 VTI: 37.8 CM
BH CV ECHO MEAS - AVA(I,D): 1.31 CM2
BH CV ECHO MEAS - EDV(CUBED): 157.8 ML
BH CV ECHO MEAS - EDV(MOD-SP2): 233 ML
BH CV ECHO MEAS - EDV(MOD-SP4): 191 ML
BH CV ECHO MEAS - EF(MOD-SP2): 49.4 %
BH CV ECHO MEAS - EF(MOD-SP4): 51.3 %
BH CV ECHO MEAS - ESV(CUBED): 68.7 ML
BH CV ECHO MEAS - ESV(MOD-SP2): 118 ML
BH CV ECHO MEAS - ESV(MOD-SP4): 93 ML
BH CV ECHO MEAS - FS: 24.2 %
BH CV ECHO MEAS - IVS/LVPW: 1.01 CM
BH CV ECHO MEAS - IVSD: 1.31 CM
BH CV ECHO MEAS - LAT PEAK E' VEL: 6.3 CM/SEC
BH CV ECHO MEAS - LV DIASTOLIC VOL/BSA (35-75): 77.1 CM2
BH CV ECHO MEAS - LV MASS(C)D: 297.7 GRAMS
BH CV ECHO MEAS - LV MAX PG: 2.8 MMHG
BH CV ECHO MEAS - LV MEAN PG: 1 MMHG
BH CV ECHO MEAS - LV SYSTOLIC VOL/BSA (12-30): 37.6 CM2
BH CV ECHO MEAS - LV V1 MAX: 84.1 CM/SEC
BH CV ECHO MEAS - LV V1 VTI: 15.8 CM
BH CV ECHO MEAS - LVIDD: 5.4 CM
BH CV ECHO MEAS - LVIDS: 4.1 CM
BH CV ECHO MEAS - LVOT AREA: 3.1 CM2
BH CV ECHO MEAS - LVOT DIAM: 1.99 CM
BH CV ECHO MEAS - LVPWD: 1.3 CM
BH CV ECHO MEAS - MED PEAK E' VEL: 5.2 CM/SEC
BH CV ECHO MEAS - MV A DUR: 0.15 SEC
BH CV ECHO MEAS - MV A MAX VEL: 143 CM/SEC
BH CV ECHO MEAS - MV DEC SLOPE: 521.9 CM/SEC2
BH CV ECHO MEAS - MV DEC TIME: 0.31 SEC
BH CV ECHO MEAS - MV E MAX VEL: 172 CM/SEC
BH CV ECHO MEAS - MV E/A: 1.2
BH CV ECHO MEAS - MV MAX PG: 14.4 MMHG
BH CV ECHO MEAS - MV MEAN PG: 7 MMHG
BH CV ECHO MEAS - MV P1/2T: 102.1 MSEC
BH CV ECHO MEAS - MV V2 VTI: 54.9 CM
BH CV ECHO MEAS - MVA(P1/2T): 2.16 CM2
BH CV ECHO MEAS - MVA(VTI): 0.9 CM2
BH CV ECHO MEAS - PA ACC TIME: 0.12 SEC
BH CV ECHO MEAS - PA V2 MAX: 93.7 CM/SEC
BH CV ECHO MEAS - PULM A REVS DUR: 0.11 SEC
BH CV ECHO MEAS - PULM A REVS VEL: 16.2 CM/SEC
BH CV ECHO MEAS - PULM DIAS VEL: 29.9 CM/SEC
BH CV ECHO MEAS - PULM S/D: 1.72
BH CV ECHO MEAS - PULM SYS VEL: 51.4 CM/SEC
BH CV ECHO MEAS - QP/QS: 1.56
BH CV ECHO MEAS - RAP SYSTOLE: 3 MMHG
BH CV ECHO MEAS - RV MAX PG: 3.7 MMHG
BH CV ECHO MEAS - RV V1 MAX: 96.4 CM/SEC
BH CV ECHO MEAS - RV V1 VTI: 17.7 CM
BH CV ECHO MEAS - RVOT DIAM: 2.35 CM
BH CV ECHO MEAS - SUP REN AO DIAM: 2.4 CM
BH CV ECHO MEAS - SV(LVOT): 49.4 ML
BH CV ECHO MEAS - SV(MOD-SP2): 115 ML
BH CV ECHO MEAS - SV(MOD-SP4): 98 ML
BH CV ECHO MEAS - SV(RVOT): 77 ML
BH CV ECHO MEAS - SVI(LVOT): 19.9 ML/M2
BH CV ECHO MEAS - SVI(MOD-SP2): 46.4 ML/M2
BH CV ECHO MEAS - SVI(MOD-SP4): 39.6 ML/M2
BH CV ECHO MEAS - TAPSE (>1.6): 1.71 CM
BH CV ECHO MEASUREMENTS AVERAGE E/E' RATIO: 29.91
BH CV XLRA - RV BASE: 3.8 CM
BH CV XLRA - RV LENGTH: 9.2 CM
BH CV XLRA - RV MID: 3.5 CM
BH CV XLRA - TDI S': 8.2 CM/SEC
LEFT ATRIUM VOLUME INDEX: 22.3 ML/M2
LV EF BIPLANE MOD: 50 %
SINUS: 3.4 CM
STJ: 3.1 CM
TV MEAN GRADIENT: 7 MMHG

## 2025-04-17 PROCEDURE — 25510000001 PERFLUTREN 6.52 MG/ML SUSPENSION 2 ML VIAL: Performed by: INTERNAL MEDICINE

## 2025-04-17 PROCEDURE — 93306 TTE W/DOPPLER COMPLETE: CPT

## 2025-04-17 RX ADMIN — PERFLUTREN 3 ML: 6.52 INJECTION, SUSPENSION INTRAVENOUS at 13:19

## 2025-04-18 ENCOUNTER — OFFICE VISIT (OUTPATIENT)
Dept: CARDIOLOGY | Age: 75
End: 2025-04-18
Payer: MEDICARE

## 2025-04-18 VITALS — BODY MASS INDEX: 38.65 KG/M2 | HEIGHT: 72 IN

## 2025-04-18 DIAGNOSIS — I10 ESSENTIAL HYPERTENSION: Chronic | ICD-10-CM

## 2025-04-18 DIAGNOSIS — I42.8 NON-ISCHEMIC CARDIOMYOPATHY: Primary | ICD-10-CM

## 2025-04-18 DIAGNOSIS — I24.1 DRESSLER SYNDROME: ICD-10-CM

## 2025-04-18 DIAGNOSIS — E78.2 MIXED HYPERLIPIDEMIA: Chronic | ICD-10-CM

## 2025-04-18 DIAGNOSIS — Z95.4 H/O MITRAL VALVE REPLACEMENT WITH TISSUE GRAFT: ICD-10-CM

## 2025-04-18 DIAGNOSIS — I48.19 PERSISTENT ATRIAL FIBRILLATION: ICD-10-CM

## 2025-04-18 DIAGNOSIS — I50.32 CHRONIC HEART FAILURE WITH PRESERVED EJECTION FRACTION: Chronic | ICD-10-CM

## 2025-04-18 RX ORDER — TORSEMIDE 20 MG/1
80 TABLET ORAL DAILY
COMMUNITY

## 2025-04-18 NOTE — PROGRESS NOTES
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  In 2022 he developed COVID-pneumonia possible COVID pneumonitis and progressive shortness of breath with CT findings also demonstrating calcification LAD and circumflex vessel..  He was also noted to have significant pulmonary hypertension with RVSP of 60 mmHg.  By 2023 he was found to have severe mitral regurgitation with annular dilatation and moderate severe tricuspid regurgitation.  He underwent mitral valve repair with annuloplasty ring with residual mild to moderate regurgitation subsequent mitral valve replacement with 29 mm Schwartz 2 porcine prosthesis.  He also had tricuspid valve repair and left atrial appendage closure.  Preoperative cardiac cath showed normal coronary arteries.  Postoperatively he also had a pericardial effusion which slowly resolved.  He initially had LV systolic dysfunction with an ejection fraction reduced to 25% though on medical therapy and with treatment of CPAP by 2024 ejection fraction 54% with mild left ventricular hypertrophy, indeterminate diastolic function, mitral valve prosthesis in place with a mean gradient of 11 mmHg and a pressure half-time of 73 ms suggestive of hyperdynamic state or patient valve mismatch.  Right-sided chambers were not well-seen.  Aortic valve was thickened without stenosis.  His postop care has been also complicated by renal insufficiency and poor control of diabetes.  He continued complaint of dyspnea and edema.  On 10/2024 echo showed an ejection fraction 43% with regional wall motion abnormality with  normal prosthetic valve function mildly reduced RV function.  Tricuspid ring present with normal function.  Demadex was increased and patient was to consider reinitiation of Xarelto.  Switch to switch from losartan to Entresto.  Echo on 4/2025 shows ejection fraction 50% indeterminate diastolic function with mild aortic valve stenosis, bioprosthetic mitral valve with slightly increased gradient of 7 mmHg and heart rate 70 bpm.  There was no regurgitation    Since last visit patient has had no palpitations dizziness chest pain.  Has chronic edema and dyspnea.  He looks much brighter and more active.  He states he has been working on the treadmill 20 minutes a day. Unfortunately glucose remains elevated with a hemoglobin A1c of 11.2 which is slightly down from November when it was 12.1    Past Medical History:   Diagnosis Date    Allergic rhinitis     Arthritis     Asthma 1954    BPH (benign prostatic hyperplasia)     Cataract     Coronary artery disease     Diabetes mellitus     TYPE 2    Foraminal stenosis of cervical region     C5-C6    GERD (gastroesophageal reflux disease)     Hemorrhoids     History of urinary retention 2010    S/P TURP    Hyperlipidemia     Hypertension 2021    Macular degeneration     PING (obstructive sleep apnea) 01/07/2016    MILD, SEES DR. AMARILIS MORGAN    Pericardial effusion, acute 6/24/2023    Stroke      Past Surgical History:   Procedure Laterality Date    BRONCHOSCOPY N/A 07/10/2024    Procedure: BRONCHOSCOPY WITH C-ARM with biopsies and washing;  Surgeon: Luis Slater Jr., MD;  Location: [facility] ENDOSCOPY;  Service: Pulmonary;  Laterality: N/A;  pre-possible sarcoidosis; multiple nodules  post-multiple nodules    CARDIAC CATHETERIZATION N/A 05/11/2023    Procedure: Right Heart Cath;  Surgeon: Corey Gaffney MD;  Location: [facility] CATH INVASIVE LOCATION;  Service: Cardiology;  Laterality: N/A;    CARDIAC CATHETERIZATION N/A 05/11/2023    Procedure: Left Heart Cath;  Surgeon:  Corey Gaffney MD;  Location:  [facility] CATH INVASIVE LOCATION;  Service: Cardiology;  Laterality: N/A;    CARDIAC CATHETERIZATION N/A 05/11/2023    Procedure: Left ventriculography;  Surgeon: Corey Gaffney MD;  [facility];  Service: Cardiology;  Laterality: N/A;    CARDIAC CATHETERIZATION N/A 05/11/2023    Procedure: Coronary angiography;  Surgeon: Corey Gaffney MD;  [facility];  Service: Cardiology;  Laterality: N/A;    CARDIAC SURGERY      Mytrial valve,Tricuspid destiny replacement  or repair indicated  no    CARDIAC VALVE REPLACEMENT      COLONOSCOPY N/A     WNL PER PT, NO RECORDS,     COLONOSCOPY N/A 04/07/2009    DR. GORGE BENAVIDEZ AT [place]    MITRAL VALVE REPAIR/REPLACEMENT N/A 05/24/2023    Procedure: MITRAL VALVE REPAIR/REPLACEMENT TRICUSPID VALVE REPAIR/REPLACEMENT TRANSESOPHAGEAL ECHOCARDIOGRAM WITH ANESTHESIA;  Surgeon: George Frey MD;  [facility];  Service: Cardiothoracic;  Laterality: N/A;    PROSTATE SURGERY N/A 11/12/2010    TURP, DR. COREY COLLAZO AT [facility]    SKIN TAG REMOVAL N/A 12/20/2017    ANAL SKIN TAG X2, PERFORMED IN OFFICE, DR. LISA CHANGP / TRANSURETHRAL INCISION / DRAINAGE PROSTATE  2010    Dr. Goodrich     Outpatient Medications Prior to Visit   Medication Sig Dispense Refill    acetaminophen (TYLENOL) 325 MG tablet Take 2 tablets by mouth Every 6 (Six) Hours As Needed for Mild Pain.      albuterol sulfate  (90 Base) MCG/ACT inhaler Inhale 2 puffs Every 4 (Four) Hours As Needed.      atorvastatin (LIPITOR) 40 MG tablet Take 1 tablet by mouth Every Night. 30 tablet 1    calcium carbonate (OS-QUE) 600 MG tablet Take 1 tablet by mouth Daily.      calcium carbonate (TUMS) 500 MG chewable tablet Chew 2 tablets 4 (Four) Times a Day As Needed for Indigestion or Heartburn.      Continuous Blood Gluc Sensor (FreeStyle Ysabel 2 Sensor) misc 1 each by Other route Every 14 (Fourteen) Days. Use 1  sensor every 14 days  Indications: Type 2 Diabetes 6 each 3    Eliquis 5 MG tablet tablet TAKE 1 TABLET BY MOUTH TWICE DAILY 180 tablet 3    insulin degludec (Tresiba FlexTouch) 100 UNIT/ML solution pen-injector injection Inject 64 Units under the skin into the appropriate area as directed Every Morning.      Insulin Lispro, 1 Unit Dial, (HumaLOG KwikPen) 100 UNIT/ML solution pen-injector 24 units 3 times daily with meals subcutaneous.  Prime needle with 2 units before each use 71 mL 2    Insulin Pen Needle (BD Pen Needle Yolette 2nd Gen) 32G X 4 MM misc Use pen needle 4 times a day for insulin injection 400 each 3    Jardiance 10 MG tablet tablet Take 1 tablet by mouth Daily. Indications: Type 2 Diabetes 90 tablet 1    metoprolol succinate XL (TOPROL-XL) 25 MG 24 hr tablet Take 1 tablet by mouth 2 (Two) Times a Day. 180 tablet 2    Multiple Vitamins-Minerals (PRESERVISION AREDS 2+MULTI VIT PO) Take 1 tablet by mouth 2 (Two) Times a Day.      pantoprazole (PROTONIX) 40 MG EC tablet Take 1 tablet by mouth Daily. 30 tablet 1    potassium chloride 10 MEQ CR tablet Take 1 tablet by mouth Daily.      sacubitril-valsartan (Entresto) 24-26 MG tablet Take 1 tablet by mouth 2 (Two) Times a Day. 180 tablet 3    Tirzepatide 2.5 MG/0.5ML solution auto-injector Inject 2.5 mg under the skin into the appropriate area as directed 1 (One) Time Per Week. 2 mL 0    torsemide (DEMADEX) 20 MG tablet Take 4 tablets by mouth Daily.      torsemide (DEMADEX) 100 MG tablet Take 1 tablet by mouth Daily. (Patient not taking: Reported on 4/18/2025)       No facility-administered medications prior to visit.       Allergies as of 04/18/2025    (No Known Allergies)     Social History     Socioeconomic History    Marital status:    Tobacco Use    Smoking status: Never     Passive exposure: Never    Smokeless tobacco: Never   Vaping Use    Vaping status: Never Used   Substance and Sexual Activity    Alcohol use: Yes     Alcohol/week: 1.0  "standard drink of alcohol     Types: 1 Drinks containing 0.5 oz of alcohol per week     Comment: Seldom    Drug use: No    Sexual activity: Yes     Partners: Female     Birth control/protection: None     Family History   Problem Relation Age of Onset    Lung cancer Mother     Stroke Father     Dementia Father     Hyperlipidemia Brother     Hypertension Brother     Heart disease Brother     Diabetes Brother     Colon polyps Brother     Colon polyps Brother     Heart disease Brother     Hyperlipidemia Brother     Hypertension Brother     Malig Hyperthermia Neg Hx      Review of Systems   Constitutional: Positive for malaise/fatigue. Negative for chills, fever, weight gain and weight loss.   Cardiovascular:  Positive for dyspnea on exertion and leg swelling.   Respiratory:  Negative for cough, snoring and wheezing.    Hematologic/Lymphatic: Negative for bleeding problem. Does not bruise/bleed easily.   Skin:  Negative for color change.   Musculoskeletal:  Negative for falls, joint pain and myalgias.   Gastrointestinal:  Negative for melena.   Genitourinary:  Negative for hematuria.   Neurological:  Negative for excessive daytime sleepiness.   Psychiatric/Behavioral:  Negative for depression. The patient is not nervous/anxious.         Objective:     Vitals:    04/18/25 1252   Height: 182.9 cm (72\")     Body mass index is 38.65 kg/m².    Vitals reviewed.   Constitutional:       Appearance: Well-developed. Obese.   Eyes:      General: No scleral icterus.        Right eye: No discharge.      Conjunctiva/sclera: Conjunctivae normal.      Pupils: Pupils are equal, round, and reactive to light.   HENT:      Head: Normocephalic.      Nose: Nose normal.   Neck:      Thyroid: No thyromegaly.      Vascular: No JVD.   Pulmonary:      Effort: Pulmonary effort is normal. No respiratory distress.      Breath sounds: Normal breath sounds. No wheezing. No rales.   Cardiovascular:      Normal rate. Regular rhythm. Normal S1. Normal S2. "       Murmurs: There is no murmur.      No gallop.    Pulses:     Intact distal pulses.      Carotid: 2+ bilaterally.     Radial: 2+ bilaterally.     Femoral: 2+ bilaterally.     Popliteal: 2+ bilaterally.     Dorsalis pedis: 2+ bilaterally.     Posterior tibial: 2+ bilaterally.  Edema:     Peripheral edema present.     Pretibial: bilateral 1+ edema of the pretibial area.     Ankle: bilateral 1+ edema of the ankle.  Abdominal:      General: Bowel sounds are normal. There is no distension.      Palpations: Abdomen is soft.      Tenderness: There is no abdominal tenderness. There is no rebound.   Musculoskeletal: Normal range of motion.         General: No tenderness.      Cervical back: Normal range of motion and neck supple. Skin:     General: Skin is warm and dry.      Findings: No erythema or rash.   Neurological:      Mental Status: Alert and oriented to person, place, and time.   Psychiatric:         Behavior: Behavior normal.         Thought Content: Thought content normal.         Judgment: Judgment normal.       Lab Review:   Lab Results - Last 18 Months   Lab Units 11/13/24  0339 06/11/24  1438   WBC 10*3/mm3 8.85 9.01   RBC 10*6/mm3 4.50 4.61   HEMOGLOBIN g/dL 12.5* 12.6*   HEMATOCRIT % 37.9 37.3*   MCV fL 84.2 80.9   MCH pg 27.8 27.3   MCHC g/dL 33.0 33.8   RDW % 14.5 15.2   PLATELETS 10*3/mm3 197 157   NEUTROPHIL % % 71.9 71.3   LYMPHOCYTE % % 11.4* 11.9*   MONOCYTES % % 10.5 10.5   EOSINOPHIL % % 5.0 5.1   BASOPHIL % % 0.6 0.8   NEUTROS ABS 10*3/mm3 6.37 6.42   LYMPHS ABS 10*3/mm3 1.01 1.07   MONOS ABS 10*3/mm3 0.93* 0.95*   EOS ABS 10*3/mm3 0.44* 0.46*   BASOS ABS 10*3/mm3 0.05 0.07   RDW-SD fl 44.8 43.9   MPV fL 9.8 9.7     Lab Results - Last 18 Months   Lab Units 04/08/25  1232 11/13/24  0339 11/12/24  0339   GLUCOSE mg/dL 268* 109* 110*   BUN mg/dL 22 21 18   CREATININE mg/dL 1.68* 1.52* 1.39*   SODIUM mmol/L 138 140 139   POTASSIUM mmol/L 4.2 3.8 3.8   CHLORIDE mmol/L 99 103 103   CO2 mmol/L 24.8  "25.8 27.2   CALCIUM mg/dL 8.6 8.7 9.0   BUN / CREAT RATIO  13.1 13.8 12.9   ANION GAP mmol/L  --  11.2 8.8   EGFR mL/min/1.73  --  47.5* 52.9*   EGFR RESULT mL/min/1.73 41.9*  --   --      Lab Results - Last 18 Months   Lab Units 04/08/25  1232 11/13/24  0339 11/12/24  0339   GLUCOSE mg/dL 268* 109* 110*   BUN mg/dL 22 21 18   CREATININE mg/dL 1.68* 1.52* 1.39*   SODIUM mmol/L 138 140 139   POTASSIUM mmol/L 4.2 3.8 3.8   CHLORIDE mmol/L 99 103 103   CO2 mmol/L 24.8 25.8 27.2   CALCIUM mg/dL 8.6 8.7 9.0   TOTAL PROTEIN g/dL 6.8  --  6.5   ALBUMIN g/dL 4.0 3.6 3.5   ALT (SGPT) U/L 17  --  32   AST (SGOT) U/L 19  --  24   ALK PHOS U/L 113  --  123*   BILIRUBIN mg/dL 0.5  --  0.6   GLOBULIN gm/dL  --   --  3.0   GLOBULINREF gm/dL 2.8  --   --    A/G RATIO g/dL 1.4  --  1.2   BUN / CREAT RATIO  13.1 13.8 12.9   ANION GAP mmol/L  --  11.2 8.8   EGFR mL/min/1.73  --  47.5* 52.9*   EGFR RESULT mL/min/1.73 41.9*  --   --      Lab Results - Last 18 Months   Lab Units 04/08/25  1232 02/13/24  1211   TRIGLYCERIDES mg/dL 206* 191*   HDL CHOL mg/dL 29* 31*   LDL CHOL mg/dL 48 43   VLDL CHOLESTEROL QUE mg/dL 34 31   LDL/HDL RATIO   --  1.15     Lab Results - Last 18 Months   Lab Units 11/11/24  1122 10/16/24  1352   PROBNP pg/mL 3,327.0* 3,078.0*     No results for input(s): \"TROPONINT\" in the last 44117 hours.  Lab Results - Last 18 Months   Lab Units 04/08/25  1232 11/11/24  1122   TSH uIU/mL 1.700 2.930         ECG 12 Lead    Date/Time: 4/18/2025 7:47 PM  Performed by: Karishma Arango MD    Authorized by: Karishma Arango MD  Comparison: compared with previous ECG   Similar to previous ECG  Rhythm: sinus rhythm  Conduction: right bundle branch block, left anterior fascicular block and 1st degree AV block    Clinical impression: abnormal EKG        Assessment:       Diagnosis Plan   1. Non-ischemic cardiomyopathy  ECG 12 Lead      2. H/O mitral valve replacement with tissue graft  ECG 12 Lead      3. Chronic heart failure with " preserved ejection fraction        4. Essential hypertension        5. Mixed hyperlipidemia        6. Persistent atrial fibrillation        7. 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  Acute on chronic systolic and diastolic heart failure.  EF 43% by echo 10/16/2024.  Ejection fraction now improved on 4/17/2025 to 50% on medical therapy.  Clinically he appears slightly improved than prior visits though still has significant fluid retention.  I believe quite a bit of this at this point is due to poorly controlled diabetes and he will address this further.  He is using CPAP I am not sure how consistently but he believes most of the night though at times he notes that it falls off.  2.  Mitral valve replacement and tricuspid valve repair 5/2023.  Echo 5/20245 with increased but stable gradient across the mitral valve prosthesis felt to be due to hyperdynamic state.  Pressure half-time is not extended.  Continue with SBE prophylaxis.  3.  Mild aortic valve stenosis.  Recheck echo in 1 year as long as he continues to do well clinically  4.  Recurrent cardiomyopathy with regional wall motion abnormality at this time.  In part due to poor compliance with medical therap.  Regional wall motion abnormalities has resolved.  Ejection fraction has improved as have his symptoms.  He has no anginal symptoms.  Recommendations as above  5.  Poorly controlled diabetes.  Strongly recommend he address this further with Dr. Mcneill.  He states he will call to arrange an appointment.  I suspect this is a big part of his fluid retention.  4.  Renal insufficiency.  Creatinine further increase to 1.65 with GFR 43 today.  Unclear follow-up by nephrology  5.  Hypotension is controlled for the most part.  He cannot tell what it has been at home.  6.  Hyperlipidemia  7.  Conduction disease.  He remains asymptomatic.  Will avoid increasing AV myrna blocker therapy.  8.  Postop encephalopathy and encephalopathy.  Slowly improving.   9.   Paroxysmal atrial fibrillation.  He has had atrial appendage closure.  Subsequent monitor 6/2024 was normal.  He is in sinus rhythm today.  10.  Obstructive sleep apnea.  As above.  Followed by sleep medicine.      Time Spent: I spent 25 minutes caring for Vernon on this date of service. This time includes time spent by me in the following activities: preparing for the visit, reviewing tests, obtaining and/or reviewing a separately obtained history, performing a medically appropriate examination and/or evaluation, counseling and educating the patient/family/caregiver, documenting information in the medical record, and independently interpreting results and communicating that information with the patient/family/caregiver.   I spent 1 minutes on the separately reported service of ECG. This time is not included in the time used to support the E/M service also reported today.        Your medication list            Accurate as of April 18, 2025 11:59 PM. If you have any questions, ask your nurse or doctor.                CONTINUE taking these medications        Instructions Last Dose Given Next Dose Due   acetaminophen 325 MG tablet  Commonly known as: TYLENOL      Take 2 tablets by mouth Every 6 (Six) Hours As Needed for Mild Pain.       albuterol sulfate  (90 Base) MCG/ACT inhaler  Commonly known as: PROVENTIL HFA;VENTOLIN HFA;PROAIR HFA      Inhale 2 puffs Every 4 (Four) Hours As Needed.       atorvastatin 40 MG tablet  Commonly known as: LIPITOR      Take 1 tablet by mouth Every Night.       BD Pen Needle Yolette 2nd Gen 32G X 4 MM misc  Generic drug: Insulin Pen Needle      Use pen needle 4 times a day for insulin injection       calcium carbonate 500 MG chewable tablet  Commonly known as: TUMS      Chew 2 tablets 4 (Four) Times a Day As Needed for Indigestion or Heartburn.       calcium carbonate 600 MG tablet  Commonly known as: OS-QUE      Take 1 tablet by mouth Daily.       Eliquis 5 MG tablet tablet  Generic  drug: apixaban      TAKE 1 TABLET BY MOUTH TWICE DAILY       Entresto 24-26 MG tablet  Generic drug: sacubitril-valsartan      Take 1 tablet by mouth 2 (Two) Times a Day.       FreeStyle Ysabel 2 Sensor misc      1 each by Other route Every 14 (Fourteen) Days. Use 1 sensor every 14 days  Indications: Type 2 Diabetes       Insulin Lispro (1 Unit Dial) 100 UNIT/ML solution pen-injector  Commonly known as: HumaLOG KwikPen      24 units 3 times daily with meals subcutaneous.  Prime needle with 2 units before each use       Jardiance 10 MG tablet tablet  Generic drug: empagliflozin      Take 1 tablet by mouth Daily. Indications: Type 2 Diabetes       metoprolol succinate XL 25 MG 24 hr tablet  Commonly known as: TOPROL-XL      Take 1 tablet by mouth 2 (Two) Times a Day.       pantoprazole 40 MG EC tablet  Commonly known as: PROTONIX      Take 1 tablet by mouth Daily.       potassium chloride 10 MEQ CR tablet      Take 1 tablet by mouth Daily.       PRESERVISION AREDS 2+MULTI VIT PO      Take 1 tablet by mouth 2 (Two) Times a Day.       Tirzepatide 2.5 MG/0.5ML solution auto-injector      Inject 2.5 mg under the skin into the appropriate area as directed 1 (One) Time Per Week.       torsemide 20 MG tablet  Commonly known as: DEMADEX      Take 4 tablets by mouth Daily.       Tresiba FlexTouch 100 UNIT/ML solution pen-injector injection  Generic drug: insulin degludec      Inject 64 Units under the skin into the appropriate area as directed Every Morning.                Patient is no longer taking -.  I corrected the med list to reflect this.  I did not stop these medications.      Dictated utilizing Dragon dictation

## 2025-04-23 ENCOUNTER — TELEPHONE (OUTPATIENT)
Dept: CARDIOLOGY | Facility: CLINIC | Age: 75
End: 2025-04-23
Payer: MEDICARE

## 2025-05-05 NOTE — TELEPHONE ENCOUNTER
Dr Mcneill is currently in a room and will call your cell.  
I talked to Dr. Mcneill who stated they are trying to switch patient over to continuous monitor that they can also track.  There is a lot of blanking periods on the current monitor and patient was not able to tell them enough data regarding this.    Please call the patient and let them know I am sorry I was missed her at the appointment and that while his heart function has improved and edema has improved he still has a long ways to go.  I am really mostly concerned about the diabetes and sleep apnea at this point.  Dr. Mcneill states there is room to adjust medications but he will need 2 weeks of monitoring after the new device is in place.  So they should be getting this and they will need to set it up to where it can be tracked in the office.  And then make further adjustments accordingly after 2 weeks.  If they have questions about this please have them contact his office.  Please have her address his diet and continue to slowly increase exercise as I discussed with him in office.  Also please verify he is keeping the CPAP on during the day when sleeping and he had told me it was falling off at night.  He she may need to have this addressed further with Dr. Barker.  Depending on what she thinks she can do we can also increase Jardiance but I did rather not do that if she believes she can do more with blood sugar and CPAP.  Dietary changes and CPAP therapy.  
LMM with Maricel (pt's wife) re: call to discuss.    
Reviewed Dr. Arango' message with Vernon Solorzano.  He verbalized understanding of recommendations, with repeat back.    Thank you,  Khadra COSBY RN  Triage Nurse [facility]  04/24/25 08:29 EDT   
Sent to pt via [employer].    
Tere, Please contact Dr. Mcneill for me tomorrow to address further titration of diabetic regimen.  I suspect poorly controlled diabetes now contributing to significant residual fluid retention.    Nurses, please let patient know to call if heart rate is lower.  EKG does show a slight slowing of conduction to the lower chamber of the heart this is not new but if his heart rate gets to below 50 bpm he needs to let us know even if he is not lightheaded or dizzy.  And certainly should call early if he is dizzy  
No

## 2025-05-09 DIAGNOSIS — Z79.4 TYPE 2 DIABETES MELLITUS WITHOUT COMPLICATION, WITH LONG-TERM CURRENT USE OF INSULIN: ICD-10-CM

## 2025-05-09 DIAGNOSIS — E11.9 TYPE 2 DIABETES MELLITUS WITHOUT COMPLICATION, WITH LONG-TERM CURRENT USE OF INSULIN: ICD-10-CM

## 2025-05-09 RX ORDER — EMPAGLIFLOZIN 10 MG/1
TABLET, FILM COATED ORAL
Qty: 90 TABLET | Refills: 1 | OUTPATIENT
Start: 2025-05-09

## 2025-05-09 NOTE — TELEPHONE ENCOUNTER
Rx Refill Note  Requested Prescriptions     Pending Prescriptions Disp Refills    Jardiance 10 MG tablet tablet [Pharmacy Med Name: JARDIANCE 10MG TABLETS] 90 tablet 1     Sig: TAKE 1 TABLET BY MOUTH DAILY FOR DIABETES      Last office visit with prescribing clinician: 4/8/2025   Last telemedicine visit with prescribing clinician: Visit date not found   Next office visit with prescribing clinician: 8/27/2025                         Would you like a call back once the refill request has been completed: [] Yes [] No    If the office needs to give you a call back, can they leave a voicemail: [] Yes [] No    Marlena Rodrigues  05/09/25, 10:59 EDT

## 2025-05-13 NOTE — CASE MANAGEMENT/SOCIAL WORK
Continued Stay Note  [facility]     Patient Name: Vernon Solorzano  MRN: [id number]  Today's Date: 6/2/2023    Admit Date: 5/23/2023    Plan: [facility] once medically stable for transfer   Discharge Plan     Row Name 06/02/23 0931       Plan    Plan [facility] once medically stable for transfer    Patient/Family in Agreement with Plan yes    Plan Comments Plans for cardioversion today noted. Spoke with [facility] and they can accept the patient once medically stable for transfer. CCP will follow.               Discharge Codes    No documentation.               Expected Discharge Date and Time     Expected Discharge Date Expected Discharge Time    Jun 6, 2023             Wandy Humphries RN     Sacks, Ashley - MD

## 2025-06-05 ENCOUNTER — PATIENT MESSAGE (OUTPATIENT)
Dept: ENDOCRINOLOGY | Age: 75
End: 2025-06-05
Payer: MEDICARE

## 2025-06-05 RX ORDER — ACYCLOVIR 400 MG/1
1 TABLET ORAL
Qty: 9 EACH | Refills: 3 | Status: SHIPPED | OUTPATIENT
Start: 2025-06-05

## 2025-06-05 RX ORDER — ACYCLOVIR 400 MG/1
1 TABLET ORAL DAILY
Qty: 1 EACH | Refills: 1 | Status: SHIPPED | OUTPATIENT
Start: 2025-06-05

## 2025-06-10 ENCOUNTER — PATIENT MESSAGE (OUTPATIENT)
Dept: ENDOCRINOLOGY | Age: 75
End: 2025-06-10
Payer: MEDICARE

## 2025-07-18 NOTE — Clinical Note
Dima Tan inserted thru sheath. Per ems called to [facility] for sob x7 days. States recent dx with pneumonia. Ems found 02 at 91. States prn 02 found 78% room air. States at [facility] for r sided hip fx. States received 2.5 albuterol prior to arrival.

## 2025-07-22 ENCOUNTER — OFFICE VISIT (OUTPATIENT)
Dept: CARDIOLOGY | Age: 75
End: 2025-07-22
Payer: MEDICARE

## 2025-07-22 ENCOUNTER — HOSPITAL ENCOUNTER (OUTPATIENT)
Dept: CARDIOLOGY | Facility: HOSPITAL | Age: 75
Discharge: HOME | End: 2025-07-22
Admitting: INTERNAL MEDICINE
Payer: MEDICARE

## 2025-07-22 VITALS
WEIGHT: 288 LBS | SYSTOLIC BLOOD PRESSURE: 130 MMHG | DIASTOLIC BLOOD PRESSURE: 66 MMHG | HEIGHT: 72 IN | HEART RATE: 79 BPM | BODY MASS INDEX: 39.01 KG/M2

## 2025-07-22 DIAGNOSIS — R06.09 DOE (DYSPNEA ON EXERTION): ICD-10-CM

## 2025-07-22 DIAGNOSIS — I48.19 PERSISTENT ATRIAL FIBRILLATION: ICD-10-CM

## 2025-07-22 DIAGNOSIS — I42.8 NON-ISCHEMIC CARDIOMYOPATHY: Primary | ICD-10-CM

## 2025-07-22 DIAGNOSIS — I42.8 NON-ISCHEMIC CARDIOMYOPATHY: ICD-10-CM

## 2025-07-22 DIAGNOSIS — I50.32 CHRONIC HEART FAILURE WITH PRESERVED EJECTION FRACTION: Chronic | ICD-10-CM

## 2025-07-22 DIAGNOSIS — I10 ESSENTIAL HYPERTENSION: Chronic | ICD-10-CM

## 2025-07-22 DIAGNOSIS — E78.2 MIXED HYPERLIPIDEMIA: Chronic | ICD-10-CM

## 2025-07-22 DIAGNOSIS — G47.33 OSA (OBSTRUCTIVE SLEEP APNEA): Chronic | ICD-10-CM

## 2025-07-22 LAB — NT-PROBNP SERPL-MCNC: 1236 PG/ML (ref 0–1800)

## 2025-07-22 PROCEDURE — 83880 ASSAY OF NATRIURETIC PEPTIDE: CPT | Performed by: INTERNAL MEDICINE

## 2025-07-22 PROCEDURE — 36415 COLL VENOUS BLD VENIPUNCTURE: CPT

## 2025-07-23 ENCOUNTER — RESULTS FOLLOW-UP (OUTPATIENT)
Dept: CARDIOLOGY | Age: 75
End: 2025-07-23
Payer: MEDICARE

## 2025-07-23 NOTE — TELEPHONE ENCOUNTER
I am not sure if these results were communicated to him or not, but test for heart failure was significantly improved when checked yesterday and now in the normal range.

## 2025-07-23 NOTE — TELEPHONE ENCOUNTER
Left voicemail for Vernon Solorzano requesting callback.    HUB: please transfer to Triage if patient returns call    Thank you,  Khadra COSBY RN  Triage Nurse [facility]  07/23/25   14:09 EDT

## 2025-07-24 NOTE — TELEPHONE ENCOUNTER
Left voicemail for Vernon Solorzano requesting callback.    HUB: please transfer to Triage if patient/Maricel (ok per verbal ZACH) returns call    Thank you,  Khadra COSBY RN  Triage Nurse [facility] [place]  07/24/25   12:19 EDT

## 2025-07-25 NOTE — TELEPHONE ENCOUNTER
Left voicemail for Vernon Solorzano requesting callback.    HUB: please transfer to Triage if patient/Maricel returns call    Thank you,  Khadra COSBY RN  Triage Nurse [facility]  07/25/25   16:03 EDT

## 2025-08-27 ENCOUNTER — OFFICE VISIT (OUTPATIENT)
Dept: ENDOCRINOLOGY | Age: 75
End: 2025-08-27
Payer: MEDICARE

## 2025-08-27 VITALS
HEART RATE: 74 BPM | WEIGHT: 289.6 LBS | TEMPERATURE: 98.2 F | SYSTOLIC BLOOD PRESSURE: 104 MMHG | OXYGEN SATURATION: 95 % | BODY MASS INDEX: 39.22 KG/M2 | DIASTOLIC BLOOD PRESSURE: 56 MMHG | HEIGHT: 72 IN

## 2025-08-27 DIAGNOSIS — Z79.4 TYPE 2 DIABETES MELLITUS WITH HYPERGLYCEMIA, WITH LONG-TERM CURRENT USE OF INSULIN: ICD-10-CM

## 2025-08-27 DIAGNOSIS — E11.9 TYPE 2 DIABETES MELLITUS WITHOUT COMPLICATION, WITH LONG-TERM CURRENT USE OF INSULIN: Primary | ICD-10-CM

## 2025-08-27 DIAGNOSIS — Z79.4 TYPE 2 DIABETES MELLITUS WITHOUT COMPLICATION, WITH LONG-TERM CURRENT USE OF INSULIN: Primary | ICD-10-CM

## 2025-08-27 DIAGNOSIS — R94.8 ABNORMAL RESULTS OF FUNCTION STUDIES OF OTHER ORGANS AND SYSTEMS: ICD-10-CM

## 2025-08-27 DIAGNOSIS — E78.5 HYPERLIPIDEMIA, UNSPECIFIED HYPERLIPIDEMIA TYPE: ICD-10-CM

## 2025-08-27 DIAGNOSIS — Z12.5 SCREENING PSA (PROSTATE SPECIFIC ANTIGEN): ICD-10-CM

## 2025-08-27 DIAGNOSIS — E11.65 TYPE 2 DIABETES MELLITUS WITH HYPERGLYCEMIA, WITH LONG-TERM CURRENT USE OF INSULIN: ICD-10-CM

## 2025-08-27 DIAGNOSIS — E55.9 VITAMIN D DEFICIENCY: ICD-10-CM

## 2025-08-27 DIAGNOSIS — K21.9 GASTROESOPHAGEAL REFLUX DISEASE WITHOUT ESOPHAGITIS: ICD-10-CM

## 2025-08-28 LAB
25(OH)D3+25(OH)D2 SERPL-MCNC: 28.9 NG/ML (ref 30–100)
ALBUMIN SERPL-MCNC: 4.2 G/DL (ref 3.5–5.2)
ALBUMIN/GLOB SERPL: 1.4 G/DL
ALP SERPL-CCNC: 151 U/L (ref 39–117)
ALT SERPL-CCNC: 25 U/L (ref 1–41)
AST SERPL-CCNC: 17 U/L (ref 1–40)
BILIRUB SERPL-MCNC: 0.6 MG/DL (ref 0–1.2)
BUN SERPL-MCNC: 29 MG/DL (ref 8–23)
BUN/CREAT SERPL: 17.8 (ref 7–25)
CALCIUM SERPL-MCNC: 9.7 MG/DL (ref 8.6–10.5)
CHLORIDE SERPL-SCNC: 98 MMOL/L (ref 98–107)
CHOLEST SERPL-MCNC: 131 MG/DL (ref 0–200)
CO2 SERPL-SCNC: 23.3 MMOL/L (ref 22–29)
CREAT SERPL-MCNC: 1.63 MG/DL (ref 0.76–1.27)
EGFRCR SERPLBLD CKD-EPI 2021: 43.4 ML/MIN/1.73
GLOBULIN SER CALC-MCNC: 2.9 GM/DL
GLUCOSE SERPL-MCNC: 393 MG/DL (ref 65–99)
HBA1C MFR BLD: 10.4 % (ref 4.8–5.6)
HDLC SERPL-MCNC: 32 MG/DL (ref 40–60)
IMP & REVIEW OF LAB RESULTS: NORMAL
LDLC SERPL CALC-MCNC: 49 MG/DL (ref 0–100)
POTASSIUM SERPL-SCNC: 4.8 MMOL/L (ref 3.5–5.2)
PROT SERPL-MCNC: 7.1 G/DL (ref 6–8.5)
PSA SERPL-MCNC: 0.6 NG/ML (ref 0–4)
SODIUM SERPL-SCNC: 137 MMOL/L (ref 136–145)
TRIGL SERPL-MCNC: 324 MG/DL (ref 0–150)
TSH SERPL DL<=0.005 MIU/L-ACNC: 2.63 UIU/ML (ref 0.27–4.2)
VIT B12 SERPL-MCNC: 533 PG/ML (ref 211–946)
VLDLC SERPL CALC-MCNC: 50 MG/DL (ref 5–40)

## (undated) DEVICE — ADAPT CLN BIOGUARD AIR/H2O DISP

## (undated) DEVICE — TTL1LYR 16FR10ML 100%SIL TMPST TR: Brand: MEDLINE

## (undated) DEVICE — Device

## (undated) DEVICE — ADAPT SWVL FIBROPTIC BRONCH

## (undated) DEVICE — SOL ISO/ALC 70PCT 4OZ

## (undated) DEVICE — CATH VENT MIV RADL PIG ST TIP 4F 110CM

## (undated) DEVICE — MSK AIRWY LARYNG LMA PILOT SZ4

## (undated) DEVICE — CANN VENI DLP SGL/STAGE RT/ANGL 22F 30.5TO38.1CM

## (undated) DEVICE — DRP SLUSH WARMR MACH RECTG 66X44IN

## (undated) DEVICE — SPNG GZ WOVN 4X4IN 12PLY 10/BX STRL

## (undated) DEVICE — CANN AORT ROOT DLP VNT 14G 7F

## (undated) DEVICE — MYOCARDIAL PROBE NON-PYROGENIC: Brand: DEROYAL

## (undated) DEVICE — BG TRANSF W/COUPLER SPK 600ML

## (undated) DEVICE — DRSNG SURESITE WNDW 2.38X2.75

## (undated) DEVICE — GW EMR FIX EXCHG J STD .035 3MM 260CM

## (undated) DEVICE — SINGLE USE BIOPSY VALVE MAJ-210: Brand: SINGLE USE BIOPSY VALVE (STERILE)

## (undated) DEVICE — SOL NACL 0.9PCT 1000ML

## (undated) DEVICE — CLAMP INSERT: Brand: STEALTH® CLAMP INSERT

## (undated) DEVICE — TUBING, SUCTION, 1/4" X 10', STRAIGHT: Brand: MEDLINE

## (undated) DEVICE — SINGLE USE SUCTION VALVE MAJ-209: Brand: SINGLE USE SUCTION VALVE (STERILE)

## (undated) DEVICE — PK PERFUS CUST W/CARDIOPLEGIA

## (undated) DEVICE — CVR PROB 96IN LF STRL

## (undated) DEVICE — CANN VENI DLP SGL/STAGE RT/ANGL MTL/TP 28F

## (undated) DEVICE — 8 FOOT DISPOSABLE EXTENSION CABLE WITH SAFE CONNECT / ALLIGATOR CLIP

## (undated) DEVICE — SOL IRR NACL 0.9PCT BT 1000ML

## (undated) DEVICE — GW HITORQUE/BAL MID/WT J W/HCOAT .014 3X190CM

## (undated) DEVICE — BIOPATCH™ ANTIMICROBIAL DRESSING WITH CHLORHEXIDINE GLUCONATE IS A HYDROPHILLIC POLYURETHANE ABSORPTIVE FOAM WITH CHLORHEXIDINE GLUCONATE (CHG) WHICH INHIBITS BACTERIAL GROWTH UNDER THE DRESSING. THE DRESSING IS INTENDED TO BE USED TO ABSORB EXUDATE, COVER A WOUND CAUSED BY VASCULAR AND NONVASCULAR PERCUTANEOUS MEDICAL DEVICES DURING SURGERY, AS WELL AS REDUCE LOCAL INFECTION AND COLONIZATION OF MICROORGANISMS.: Brand: BIOPATCH

## (undated) DEVICE — OASIS DRAIN, SINGLE, INLINE & ATS COMPATIBLE: Brand: OASIS

## (undated) DEVICE — CANN ART EOPA 3D NV W/CONN 20F

## (undated) DEVICE — ST TOURNI COMPL A/ 7IN

## (undated) DEVICE — GLV SURG BIOGEL LTX PF 7 1/2

## (undated) DEVICE — OPTIFOAM GENTLE SA, POSTOP, 4X12: Brand: MEDLINE

## (undated) DEVICE — SENSR O2 OXIMAX FNGR A/ 18IN NONSTR

## (undated) DEVICE — PK CATH CARD 40

## (undated) DEVICE — PK HEART OPN 40

## (undated) DEVICE — PK ATS CUST W CARDIOTOMY RESEVOIR

## (undated) DEVICE — TEMP PACING WIRE: Brand: MYO/WIRE

## (undated) DEVICE — HEMOCONCENTRATOR PERFUS LPS06

## (undated) DEVICE — CONN REDUCING CANN/PUMP 1/2X3/8X3/8

## (undated) DEVICE — ORGANIZER SUT SHELIGH 3T 213013

## (undated) DEVICE — TBG ART PRESS 60 IN

## (undated) DEVICE — 28 FR STRAIGHT – SOFT PVC CATHETER: Brand: PVC THORACIC CATHETERS

## (undated) DEVICE — SPONGE,DISSECTOR,K,XRAY,9/16"X1/4",STRL: Brand: MEDLINE

## (undated) DEVICE — FRCP BX RADJAW4 PULM WO NDL STD1.8X2 100

## (undated) DEVICE — SOL IRR H2O BTL 1000ML STRL

## (undated) DEVICE — CANN RETRGR STYLET RSCP 15F

## (undated) DEVICE — GLV SURG BIOGEL LTX PF 6

## (undated) DEVICE — TRAP,MUCUS SPECIMEN, 80CC: Brand: MEDLINE

## (undated) DEVICE — CATHETER,URETHRAL,REDRUBBER,STERILE,20FR: Brand: MEDLINE

## (undated) DEVICE — SUT ETHIBOND 2/0 CV V5  30IN PXX52

## (undated) DEVICE — DECANTER BAG 9": Brand: MEDLINE INDUSTRIES, INC.

## (undated) DEVICE — KT MANIFLD CARDIAC

## (undated) DEVICE — CARPENTIER-EDWARDS PHYSIO II ANNULOPLASTY RING
Type: IMPLANTABLE DEVICE | Site: HEART | Status: NON-FUNCTIONAL
Brand: CARPENTIER-EDWARDS PHYSIO II ANNULOPLASTY RING
Removed: 2023-05-24

## (undated) DEVICE — SUT POLY BR TP 2STRND 1/8X30IN

## (undated) DEVICE — SYS PERFUS SEP PLATLT W TIPS CUST

## (undated) DEVICE — VITAL SIGNS™ JACKSON-REES CIRCUITS: Brand: VITAL SIGNS™

## (undated) DEVICE — CATH DIAG IMPULSE FR4 5F 100CM

## (undated) DEVICE — ST. SORBAVIEW ULTIMATE IJ SYSTEM A,C: Brand: CENTURION

## (undated) DEVICE — GLIDESHEATH BASIC HYDROPHILIC COATED INTRODUCER SHEATH: Brand: GLIDESHEATH

## (undated) DEVICE — SENSR CERBRL O2 PK/2

## (undated) DEVICE — CATH DIAG IMPULSE FL3.5 5F 100CM

## (undated) DEVICE — MARKR SKIN W/RULR AND LBL

## (undated) DEVICE — ADAPT ANTEGRADE RETRGR

## (undated) DEVICE — BALN PRESS WEDGE 5F 110CM

## (undated) DEVICE — 28 FR RIGHT ANGLE – SOFT PVC CATHETER: Brand: PVC THORACIC CATHETERS